# Patient Record
Sex: FEMALE | Race: WHITE | NOT HISPANIC OR LATINO | Employment: FULL TIME | URBAN - METROPOLITAN AREA
[De-identification: names, ages, dates, MRNs, and addresses within clinical notes are randomized per-mention and may not be internally consistent; named-entity substitution may affect disease eponyms.]

---

## 2017-03-01 ENCOUNTER — HOSPITAL ENCOUNTER (OUTPATIENT)
Dept: LAB | Facility: MEDICAL CENTER | Age: 47
End: 2017-03-01
Attending: NURSE PRACTITIONER
Payer: COMMERCIAL

## 2017-03-01 LAB
25(OH)D3 SERPL-MCNC: 31 NG/ML (ref 30–100)
ALBUMIN SERPL BCP-MCNC: 4.1 G/DL (ref 3.2–4.9)
ALBUMIN/GLOB SERPL: 1.3 G/DL
ALP SERPL-CCNC: 99 U/L (ref 30–99)
ALT SERPL-CCNC: 30 U/L (ref 2–50)
ANION GAP SERPL CALC-SCNC: 10 MMOL/L (ref 0–11.9)
APPEARANCE UR: ABNORMAL
AST SERPL-CCNC: 28 U/L (ref 12–45)
BACTERIA #/AREA URNS HPF: ABNORMAL /HPF
BASOPHILS # BLD AUTO: 0.6 % (ref 0–1.8)
BASOPHILS # BLD: 0.04 K/UL (ref 0–0.12)
BILIRUB SERPL-MCNC: 0.9 MG/DL (ref 0.1–1.5)
BILIRUB UR QL STRIP.AUTO: NEGATIVE
BUN SERPL-MCNC: 12 MG/DL (ref 8–22)
CALCIUM SERPL-MCNC: 8.8 MG/DL (ref 8.4–10.2)
CHLORIDE SERPL-SCNC: 105 MMOL/L (ref 96–112)
CHOLEST SERPL-MCNC: 236 MG/DL (ref 100–199)
CO2 SERPL-SCNC: 23 MMOL/L (ref 20–33)
COLOR UR: YELLOW
CREAT SERPL-MCNC: 0.57 MG/DL (ref 0.5–1.4)
CULTURE IF INDICATED INDCX: YES UA CULTURE
EOSINOPHIL # BLD AUTO: 0.07 K/UL (ref 0–0.51)
EOSINOPHIL NFR BLD: 1.1 % (ref 0–6.9)
EPI CELLS #/AREA URNS HPF: ABNORMAL /HPF
ERYTHROCYTE [DISTWIDTH] IN BLOOD BY AUTOMATED COUNT: 44.7 FL (ref 35.9–50)
GFR SERPL CREATININE-BSD FRML MDRD: >60 ML/MIN/1.73 M 2
GLOBULIN SER CALC-MCNC: 3.1 G/DL (ref 1.9–3.5)
GLUCOSE SERPL-MCNC: 81 MG/DL (ref 65–99)
GLUCOSE UR STRIP.AUTO-MCNC: NEGATIVE MG/DL
HCT VFR BLD AUTO: 40.5 % (ref 37–47)
HDLC SERPL-MCNC: 60 MG/DL
HGB BLD-MCNC: 13.9 G/DL (ref 12–16)
IMM GRANULOCYTES # BLD AUTO: 0.03 K/UL (ref 0–0.11)
IMM GRANULOCYTES NFR BLD AUTO: 0.5 % (ref 0–0.9)
KETONES UR STRIP.AUTO-MCNC: 15 MG/DL
LDLC SERPL CALC-MCNC: 157 MG/DL
LEUKOCYTE ESTERASE UR QL STRIP.AUTO: ABNORMAL
LYMPHOCYTES # BLD AUTO: 2.02 K/UL (ref 1–4.8)
LYMPHOCYTES NFR BLD: 31.1 % (ref 22–41)
MCH RBC QN AUTO: 32.2 PG (ref 27–33)
MCHC RBC AUTO-ENTMCNC: 34.3 G/DL (ref 33.6–35)
MCV RBC AUTO: 93.8 FL (ref 81.4–97.8)
MICRO URNS: ABNORMAL
MONOCYTES # BLD AUTO: 0.53 K/UL (ref 0–0.85)
MONOCYTES NFR BLD AUTO: 8.2 % (ref 0–13.4)
MUCOUS THREADS #/AREA URNS HPF: ABNORMAL /HPF
NEUTROPHILS # BLD AUTO: 3.8 K/UL (ref 2–7.15)
NEUTROPHILS NFR BLD: 58.5 % (ref 44–72)
NITRITE UR QL STRIP.AUTO: NEGATIVE
NRBC # BLD AUTO: 0 K/UL
NRBC BLD AUTO-RTO: 0 /100 WBC
PH UR STRIP.AUTO: 5 [PH]
PLATELET # BLD AUTO: 202 K/UL (ref 164–446)
PMV BLD AUTO: 10.5 FL (ref 9–12.9)
POTASSIUM SERPL-SCNC: 3.7 MMOL/L (ref 3.6–5.5)
PROLACTIN SERPL-MCNC: 7.52 NG/ML (ref 2.8–26)
PROT SERPL-MCNC: 7.2 G/DL (ref 6–8.2)
PROT UR QL STRIP: NEGATIVE MG/DL
RBC # BLD AUTO: 4.32 M/UL (ref 4.2–5.4)
RBC # URNS HPF: ABNORMAL /HPF
RBC UR QL AUTO: NEGATIVE
SODIUM SERPL-SCNC: 138 MMOL/L (ref 135–145)
SP GR UR REFRACTOMETRY: 1.03
T4 FREE SERPL-MCNC: 0.76 NG/DL (ref 0.58–1.64)
TESTOST SERPL-MCNC: 34 NG/DL (ref 9–75)
THYROPEROXIDASE AB SERPL-ACNC: 0.6 IU/ML (ref 0–9)
TRIGL SERPL-MCNC: 97 MG/DL (ref 0–149)
TSH SERPL DL<=0.005 MIU/L-ACNC: 1.07 UIU/ML (ref 0.35–5.5)
VIT B12 SERPL-MCNC: 289 PG/ML (ref 211–911)
WBC # BLD AUTO: 6.5 K/UL (ref 4.8–10.8)
WBC #/AREA URNS HPF: ABNORMAL /HPF

## 2017-03-01 PROCEDURE — 36415 COLL VENOUS BLD VENIPUNCTURE: CPT

## 2017-03-01 PROCEDURE — 84443 ASSAY THYROID STIM HORMONE: CPT

## 2017-03-01 PROCEDURE — 84146 ASSAY OF PROLACTIN: CPT

## 2017-03-01 PROCEDURE — 81001 URINALYSIS AUTO W/SCOPE: CPT

## 2017-03-01 PROCEDURE — 82607 VITAMIN B-12: CPT

## 2017-03-01 PROCEDURE — 80053 COMPREHEN METABOLIC PANEL: CPT

## 2017-03-01 PROCEDURE — 87086 URINE CULTURE/COLONY COUNT: CPT

## 2017-03-01 PROCEDURE — 86376 MICROSOMAL ANTIBODY EACH: CPT

## 2017-03-01 PROCEDURE — 84439 ASSAY OF FREE THYROXINE: CPT

## 2017-03-01 PROCEDURE — 85025 COMPLETE CBC W/AUTO DIFF WBC: CPT

## 2017-03-01 PROCEDURE — 80061 LIPID PANEL: CPT

## 2017-03-01 PROCEDURE — 82306 VITAMIN D 25 HYDROXY: CPT

## 2017-03-01 PROCEDURE — 84403 ASSAY OF TOTAL TESTOSTERONE: CPT

## 2017-03-03 LAB
BACTERIA UR CULT: NORMAL
SIGNIFICANT IND 70042: NORMAL
SOURCE SOURCE: NORMAL

## 2017-03-20 ENCOUNTER — HOSPITAL ENCOUNTER (OUTPATIENT)
Dept: RADIOLOGY | Facility: MEDICAL CENTER | Age: 47
End: 2017-03-20
Attending: NURSE PRACTITIONER
Payer: COMMERCIAL

## 2017-03-20 DIAGNOSIS — Z13.9 SCREENING: ICD-10-CM

## 2017-03-20 PROCEDURE — 77063 BREAST TOMOSYNTHESIS BI: CPT

## 2017-03-21 ENCOUNTER — HOSPITAL ENCOUNTER (OUTPATIENT)
Dept: RADIOLOGY | Facility: MEDICAL CENTER | Age: 47
End: 2017-03-21
Attending: NURSE PRACTITIONER
Payer: COMMERCIAL

## 2017-03-21 DIAGNOSIS — R51.9 HEADACHE, UNSPECIFIED HEADACHE TYPE: ICD-10-CM

## 2017-03-21 PROCEDURE — A9579 GAD-BASE MR CONTRAST NOS,1ML: HCPCS | Performed by: NURSE PRACTITIONER

## 2017-03-21 PROCEDURE — 70553 MRI BRAIN STEM W/O & W/DYE: CPT

## 2017-03-21 PROCEDURE — 700117 HCHG RX CONTRAST REV CODE 255: Performed by: NURSE PRACTITIONER

## 2017-03-21 RX ADMIN — GADODIAMIDE 20 ML: 287 INJECTION INTRAVENOUS at 09:12

## 2017-05-15 ENCOUNTER — HOSPITAL ENCOUNTER (OUTPATIENT)
Dept: LAB | Facility: MEDICAL CENTER | Age: 47
End: 2017-05-15
Attending: SPECIALIST
Payer: COMMERCIAL

## 2017-05-15 LAB
ALBUMIN SERPL BCP-MCNC: 4.6 G/DL (ref 3.2–4.9)
ALBUMIN/GLOB SERPL: 1.3 G/DL
ALP SERPL-CCNC: 111 U/L (ref 30–99)
ALT SERPL-CCNC: 19 U/L (ref 2–50)
ANION GAP SERPL CALC-SCNC: 10 MMOL/L (ref 0–11.9)
AST SERPL-CCNC: 27 U/L (ref 12–45)
BILIRUB SERPL-MCNC: 0.4 MG/DL (ref 0.1–1.5)
BUN SERPL-MCNC: 14 MG/DL (ref 8–22)
CALCIUM SERPL-MCNC: 10 MG/DL (ref 8.5–10.5)
CHLORIDE SERPL-SCNC: 107 MMOL/L (ref 96–112)
CO2 SERPL-SCNC: 20 MMOL/L (ref 20–33)
CREAT SERPL-MCNC: 0.78 MG/DL (ref 0.5–1.4)
GFR SERPL CREATININE-BSD FRML MDRD: >60 ML/MIN/1.73 M 2
GLOBULIN SER CALC-MCNC: 3.5 G/DL (ref 1.9–3.5)
GLUCOSE SERPL-MCNC: 84 MG/DL (ref 65–99)
POTASSIUM SERPL-SCNC: 4.2 MMOL/L (ref 3.6–5.5)
PROT SERPL-MCNC: 8.1 G/DL (ref 6–8.2)
SODIUM SERPL-SCNC: 137 MMOL/L (ref 135–145)

## 2017-05-15 PROCEDURE — 86038 ANTINUCLEAR ANTIBODIES: CPT

## 2017-05-15 PROCEDURE — 36415 COLL VENOUS BLD VENIPUNCTURE: CPT

## 2017-05-15 PROCEDURE — 80053 COMPREHEN METABOLIC PANEL: CPT

## 2017-05-16 ENCOUNTER — HOSPITAL ENCOUNTER (OUTPATIENT)
Dept: LAB | Facility: MEDICAL CENTER | Age: 47
End: 2017-05-16
Attending: SPECIALIST
Payer: COMMERCIAL

## 2017-05-16 PROCEDURE — 85652 RBC SED RATE AUTOMATED: CPT

## 2017-05-16 PROCEDURE — 85025 COMPLETE CBC W/AUTO DIFF WBC: CPT

## 2017-05-17 LAB
BASOPHILS # BLD AUTO: 0.6 % (ref 0–1.8)
BASOPHILS # BLD: 0.05 K/UL (ref 0–0.12)
EOSINOPHIL # BLD AUTO: 0.07 K/UL (ref 0–0.51)
EOSINOPHIL NFR BLD: 0.9 % (ref 0–6.9)
ERYTHROCYTE [DISTWIDTH] IN BLOOD BY AUTOMATED COUNT: 43.3 FL (ref 35.9–50)
ERYTHROCYTE [SEDIMENTATION RATE] IN BLOOD BY WESTERGREN METHOD: 14 MM/HOUR (ref 0–20)
HCT VFR BLD AUTO: 42.9 % (ref 37–47)
HGB BLD-MCNC: 14.3 G/DL (ref 12–16)
IMM GRANULOCYTES # BLD AUTO: 0.02 K/UL (ref 0–0.11)
IMM GRANULOCYTES NFR BLD AUTO: 0.3 % (ref 0–0.9)
LYMPHOCYTES # BLD AUTO: 2.52 K/UL (ref 1–4.8)
LYMPHOCYTES NFR BLD: 32.2 % (ref 22–41)
MCH RBC QN AUTO: 31.1 PG (ref 27–33)
MCHC RBC AUTO-ENTMCNC: 33.3 G/DL (ref 33.6–35)
MCV RBC AUTO: 93.3 FL (ref 81.4–97.8)
MONOCYTES # BLD AUTO: 0.7 K/UL (ref 0–0.85)
MONOCYTES NFR BLD AUTO: 8.9 % (ref 0–13.4)
NEUTROPHILS # BLD AUTO: 4.47 K/UL (ref 2–7.15)
NEUTROPHILS NFR BLD: 57.1 % (ref 44–72)
NRBC # BLD AUTO: 0 K/UL
NRBC BLD AUTO-RTO: 0 /100 WBC
NUCLEAR IGG SER QL IA: NORMAL
PLATELET # BLD AUTO: 203 K/UL (ref 164–446)
PMV BLD AUTO: 11.3 FL (ref 9–12.9)
RBC # BLD AUTO: 4.6 M/UL (ref 4.2–5.4)
WBC # BLD AUTO: 7.8 K/UL (ref 4.8–10.8)

## 2017-05-24 ENCOUNTER — RX ONLY (OUTPATIENT)
Age: 47
Setting detail: RX ONLY
End: 2017-05-24

## 2017-05-24 PROBLEM — L91.8 OTHER HYPERTROPHIC DISORDERS OF THE SKIN: Status: ACTIVE | Noted: 2017-05-24

## 2017-06-26 ENCOUNTER — OFFICE VISIT (OUTPATIENT)
Dept: URGENT CARE | Facility: PHYSICIAN GROUP | Age: 47
End: 2017-06-26
Payer: COMMERCIAL

## 2017-06-26 ENCOUNTER — HOSPITAL ENCOUNTER (OUTPATIENT)
Facility: MEDICAL CENTER | Age: 47
End: 2017-06-26
Attending: FAMILY MEDICINE
Payer: COMMERCIAL

## 2017-06-26 VITALS
RESPIRATION RATE: 12 BRPM | TEMPERATURE: 100 F | HEIGHT: 68 IN | BODY MASS INDEX: 35.61 KG/M2 | HEART RATE: 89 BPM | OXYGEN SATURATION: 100 % | WEIGHT: 235 LBS

## 2017-06-26 DIAGNOSIS — N39.0 ACUTE UTI: ICD-10-CM

## 2017-06-26 LAB
APPEARANCE UR: NORMAL
BILIRUB UR STRIP-MCNC: NEGATIVE MG/DL
COLOR UR AUTO: NORMAL
GLUCOSE UR STRIP.AUTO-MCNC: NEGATIVE MG/DL
KETONES UR STRIP.AUTO-MCNC: NEGATIVE MG/DL
LEUKOCYTE ESTERASE UR QL STRIP.AUTO: NORMAL
NITRITE UR QL STRIP.AUTO: NEGATIVE
PH UR STRIP.AUTO: 6 [PH] (ref 5–8)
PROT UR QL STRIP: 30 MG/DL
RBC UR QL AUTO: NORMAL
SP GR UR STRIP.AUTO: 1.03
UROBILINOGEN UR STRIP-MCNC: NEGATIVE MG/DL

## 2017-06-26 PROCEDURE — 87077 CULTURE AEROBIC IDENTIFY: CPT

## 2017-06-26 PROCEDURE — 87086 URINE CULTURE/COLONY COUNT: CPT

## 2017-06-26 PROCEDURE — 99214 OFFICE O/P EST MOD 30 MIN: CPT | Performed by: FAMILY MEDICINE

## 2017-06-26 PROCEDURE — 81002 URINALYSIS NONAUTO W/O SCOPE: CPT | Performed by: FAMILY MEDICINE

## 2017-06-26 RX ORDER — NITROFURANTOIN 25; 75 MG/1; MG/1
100 CAPSULE ORAL 2 TIMES DAILY
Qty: 10 CAP | Refills: 0 | Status: SHIPPED | OUTPATIENT
Start: 2017-06-26 | End: 2017-07-01

## 2017-06-26 RX ORDER — PHENAZOPYRIDINE HYDROCHLORIDE 100 MG/1
100 TABLET, FILM COATED ORAL 3 TIMES DAILY PRN
Qty: 6 TAB | Refills: 0 | Status: SHIPPED | OUTPATIENT
Start: 2017-06-26 | End: 2019-02-01

## 2017-06-26 ASSESSMENT — PATIENT HEALTH QUESTIONNAIRE - PHQ9: CLINICAL INTERPRETATION OF PHQ2 SCORE: 0

## 2017-06-26 NOTE — MR AVS SNAPSHOT
"        Melba Frye   2017 6:15 PM   Office Visit   MRN: [id number]    Department:  [facility]   Dept Phone:  [phone]    Description:  Female : 1970   Provider:  Dilip Babb M.D.           Reason for Visit     UTI poss UTI burn when urinate x 1 day       Allergies as of 2017     Allergen Noted Reactions    Bactrim 2010       Fatigue and ringing of the ears      You were diagnosed with     Acute UTI   [920841]         Vital Signs     Pulse Temperature Respirations Height Weight Body Mass Index    89 37.8 °C (100 °F) 12 1.727 m (5' 8\") 106.595 kg (235 lb) 35.74 kg/m2    Oxygen Saturation Smoking Status                100% Never Smoker           Basic Information     Date Of Birth Sex Race Ethnicity Preferred Language    1970 Female White Non- English      Problem List              ICD-10-CM Priority Class Noted - Resolved    Fatigue R53.83   2014 - Present    Mixed anxiety and depressive disorder F41.8   2014 - Present    Complicated migraine G43.109   2014 - Present    TMJ arthralgia M26.629   2014 - Present    Menopausal symptom N95.1   2014 - Present    Hyperlipidemia E78.5   2015 - Present    Dermatitis, contact L25.9   2015 - Present      Health Maintenance        Date Due Completion Dates    PAP SMEAR 2016 (Done)    Override on 2013: Done    MAMMOGRAM 3/20/2018 3/20/2017, 2016, 2015, 2015, 2014, 2013, 2012, 2011, 2010, 2010, 2009, 2009, 2008, 2007, 2007    IMM DTaP/Tdap/Td Vaccine (2 - Td) 2024            Current Immunizations     Influenza Vaccine Quad Inj (Preserved) 2015  4:45 PM    Tdap Vaccine 2014      Below and/or attached are the medications your provider expects you to take. Review all of your home medications and newly ordered medications with your provider and/or pharmacist. Follow medication instructions as " directed by your provider and/or pharmacist. Please keep your medication list with you and share with your provider. Update the information when medications are discontinued, doses are changed, or new medications (including over-the-counter products) are added; and carry medication information at all times in the event of emergency situations     Allergies:  BACTRIM - (reactions not documented)               Medications  Valid as of: June 26, 2017 -  6:54 PM    Generic Name Brand Name Tablet Size Instructions for use    BusPIRone HCl (Tab) BUSPAR 15 MG Take 1 Tab by mouth 2 times a day.        ClonazePAM (Tab) KLONOPIN 1 MG Take 1 Tab by mouth 2 times a day.        Estradiol (Gel) Estradiol 0.5 MG/0.5GM Apply 1 Device to skin as directed every day at 6 PM. Indications: Moderate to Severe Vasomotor Symptoms        Guaifenesin-Codeine (Solution) ROBITUSSIN -10 mg/5mL Take 5 mL by mouth every four hours as needed for Cough.        HydrOXYzine HCl (Tab) ATARAX 25 MG Take 1 Tab by mouth 3 times a day as needed. FOR ANXIETY        Nitrofurantoin Monohyd Macro (Cap) MACROBID 100 MG Take 1 Cap by mouth 2 times a day for 5 days.        Phenazopyridine HCl (Tab) PYRIDIUM 100 MG Take 1 Tab by mouth 3 times a day as needed.        Sertraline HCl (Tab) ZOLOFT 50 MG Take 1 Tab by mouth every day. Begin with one half tablet for five days        Testosterone (Gel) Testosterone 20.25 MG/1.25GM (1.62%) Apply  to skin as directed.        .                 Medicines prescribed today were sent to:     [id number] - [place] - [address]    [address] [place] [address]    Phone: [phone] Fax: [phone]    Open 24 Hours?: No      Medication refill instructions:       If your prescription bottle indicates you have medication refills left, it is not necessary to call your provider’s office. Please contact your pharmacy and they will refill your medication.    If your prescription bottle indicates you do not  have any refills left, you may request refills at any time through one of the following ways: The online [employer] system (except Urgent Care), by calling your provider’s office, or by asking your pharmacy to contact your provider’s office with a refill request. Medication refills are processed only during regular business hours and may not be available until the next business day. Your provider may request additional information or to have a follow-up visit with you prior to refilling your medication.   *Please Note: Medication refills are assigned a new Rx number when refilled electronically. Your pharmacy may indicate that no refills were authorized even though a new prescription for the same medication is available at the pharmacy. Please request the medicine by name with the pharmacy before contacting your provider for a refill.        Your To Do List     Future Labs/Procedures Complete By Expires    URINE CULTURE(NEW)  As directed 6/26/2018         [employer] Access Code: Activation code not generated  Current [employer] Status: Active

## 2017-06-27 DIAGNOSIS — N39.0 ACUTE UTI: ICD-10-CM

## 2017-06-27 ASSESSMENT — ENCOUNTER SYMPTOMS
WEIGHT LOSS: 0
EYE REDNESS: 0
EYE DISCHARGE: 0
MYALGIAS: 0

## 2017-06-27 NOTE — PROGRESS NOTES
"Subjective:      Melba Frye is a 46 y.o. female who presents with UTI            HPI  1 day urinary burning, urgency, frequency, blood. Moderate pelvic pressure. No fever. No flank pain. +PMH UTI. No PMH pyelonephritis. No OTC medications. No other aggravating or alleviating factors.    Review of Systems   Constitutional: Negative for weight loss and malaise/fatigue.   Eyes: Negative for discharge and redness.   Musculoskeletal: Negative for myalgias and joint pain.   Skin: Negative for itching and rash.     .  Medications, Allergies, and current problem list reviewed today in Epic  Denies possible pregnancy     Objective:     Pulse 89  Temp(Src) 37.8 °C (100 °F)  Resp 12  Ht 1.727 m (5' 8\")  Wt 106.595 kg (235 lb)  BMI 35.74 kg/m2  SpO2 100%     Physical Exam   Constitutional: She appears well-developed and well-nourished. No distress.   HENT:   Mouth/Throat: Oropharynx is clear and moist.   Eyes: Conjunctivae are normal.   Genitourinary:   Tender suprapubic, no cvat   Neurological:   Speech is clear. Patient is appropriate and cooperative.     Skin: Skin is warm and dry. No rash noted.               Assessment/Plan:     1. Acute UTI    - POCT Urinalysis  - nitrofurantoin monohydr macro (MACROBID) 100 MG Cap; Take 1 Cap by mouth 2 times a day for 5 days.  Dispense: 10 Cap; Refill: 0  - phenazopyridine (PYRIDIUM) 100 MG Tab; Take 1 Tab by mouth 3 times a day as needed.  Dispense: 6 Tab; Refill: 0  - URINE CULTURE(NEW); Future    Differential diagnosis, natural history, supportive care, and indications for immediate follow-up discussed at length.   Will f/u culture      "

## 2017-06-29 LAB
BACTERIA UR CULT: ABNORMAL
BACTERIA UR CULT: ABNORMAL
SIGNIFICANT IND 70042: ABNORMAL
SOURCE SOURCE: ABNORMAL

## 2017-06-29 RX ORDER — AMOXICILLIN 875 MG/1
875 TABLET, COATED ORAL 2 TIMES DAILY
Qty: 10 TAB | Refills: 0 | Status: SHIPPED | OUTPATIENT
Start: 2017-06-29 | End: 2017-07-04

## 2017-06-30 NOTE — PROGRESS NOTES
Culture + GBS  Discussed with patient probable colonization however she is still symptomatic. Will try amoxicillin.

## 2018-03-22 ENCOUNTER — HOSPITAL ENCOUNTER (OUTPATIENT)
Dept: RADIOLOGY | Facility: MEDICAL CENTER | Age: 48
End: 2018-03-22
Attending: NURSE PRACTITIONER
Payer: COMMERCIAL

## 2018-03-22 DIAGNOSIS — Z12.31 SCREENING MAMMOGRAM, ENCOUNTER FOR: ICD-10-CM

## 2018-03-22 PROCEDURE — 77067 SCR MAMMO BI INCL CAD: CPT

## 2018-03-26 ENCOUNTER — APPOINTMENT (RX ONLY)
Dept: URBAN - METROPOLITAN AREA CLINIC 4 | Facility: CLINIC | Age: 48
Setting detail: DERMATOLOGY
End: 2018-03-26

## 2018-03-26 DIAGNOSIS — L82.1 OTHER SEBORRHEIC KERATOSIS: ICD-10-CM

## 2018-03-26 DIAGNOSIS — D18.0 HEMANGIOMA: ICD-10-CM

## 2018-03-26 DIAGNOSIS — D22 MELANOCYTIC NEVI: ICD-10-CM

## 2018-03-26 DIAGNOSIS — L81.4 OTHER MELANIN HYPERPIGMENTATION: ICD-10-CM

## 2018-03-26 PROBLEM — D18.01 HEMANGIOMA OF SKIN AND SUBCUTANEOUS TISSUE: Status: ACTIVE | Noted: 2018-03-26

## 2018-03-26 PROBLEM — F41.9 ANXIETY DISORDER, UNSPECIFIED: Status: ACTIVE | Noted: 2018-03-26

## 2018-03-26 PROBLEM — D22.5 MELANOCYTIC NEVI OF TRUNK: Status: ACTIVE | Noted: 2018-03-26

## 2018-03-26 PROCEDURE — 99213 OFFICE O/P EST LOW 20 MIN: CPT

## 2018-03-26 PROCEDURE — ? COUNSELING

## 2018-03-26 ASSESSMENT — LOCATION SIMPLE DESCRIPTION DERM
LOCATION SIMPLE: RIGHT THIGH
LOCATION SIMPLE: LEFT FOREARM
LOCATION SIMPLE: RIGHT POSTERIOR UPPER ARM
LOCATION SIMPLE: UPPER BACK
LOCATION SIMPLE: RIGHT FOREARM
LOCATION SIMPLE: LEFT CHEEK
LOCATION SIMPLE: RIGHT ANTERIOR NECK
LOCATION SIMPLE: RIGHT HAND
LOCATION SIMPLE: LEFT POSTERIOR UPPER ARM
LOCATION SIMPLE: LEFT THIGH
LOCATION SIMPLE: LEFT HAND
LOCATION SIMPLE: ABDOMEN
LOCATION SIMPLE: POSTERIOR NECK
LOCATION SIMPLE: RIGHT UPPER BACK
LOCATION SIMPLE: LEFT FOREHEAD

## 2018-03-26 ASSESSMENT — LOCATION DETAILED DESCRIPTION DERM
LOCATION DETAILED: LEFT ANTERIOR PROXIMAL THIGH
LOCATION DETAILED: LEFT MEDIAL MALAR CHEEK
LOCATION DETAILED: RIGHT PROXIMAL DORSAL FOREARM
LOCATION DETAILED: RIGHT ANTERIOR PROXIMAL THIGH
LOCATION DETAILED: RIGHT POSTERIOR NECK
LOCATION DETAILED: SUPERIOR THORACIC SPINE
LOCATION DETAILED: LEFT RADIAL DORSAL HAND
LOCATION DETAILED: RIGHT INFERIOR ANTERIOR NECK
LOCATION DETAILED: LEFT MEDIAL FOREHEAD
LOCATION DETAILED: RIGHT PROXIMAL POSTERIOR UPPER ARM
LOCATION DETAILED: PERIUMBILICAL SKIN
LOCATION DETAILED: RIGHT SUPERIOR MEDIAL UPPER BACK
LOCATION DETAILED: RIGHT DORSAL MIDDLE METACARPOPHALANGEAL JOINT
LOCATION DETAILED: EPIGASTRIC SKIN
LOCATION DETAILED: RIGHT ANTERIOR DISTAL THIGH
LOCATION DETAILED: LEFT ANTERIOR DISTAL THIGH
LOCATION DETAILED: LEFT PROXIMAL MEDIAL POSTERIOR UPPER ARM
LOCATION DETAILED: LEFT PROXIMAL DORSAL FOREARM

## 2018-03-26 ASSESSMENT — LOCATION ZONE DERM
LOCATION ZONE: NECK
LOCATION ZONE: FACE
LOCATION ZONE: LEG
LOCATION ZONE: ARM
LOCATION ZONE: TRUNK
LOCATION ZONE: HAND

## 2018-06-09 ENCOUNTER — OFFICE VISIT (OUTPATIENT)
Dept: URGENT CARE | Facility: CLINIC | Age: 48
End: 2018-06-09
Payer: COMMERCIAL

## 2018-06-09 VITALS
SYSTOLIC BLOOD PRESSURE: 124 MMHG | RESPIRATION RATE: 16 BRPM | TEMPERATURE: 98.9 F | WEIGHT: 220 LBS | DIASTOLIC BLOOD PRESSURE: 88 MMHG | BODY MASS INDEX: 33.34 KG/M2 | HEIGHT: 68 IN | OXYGEN SATURATION: 96 % | HEART RATE: 80 BPM

## 2018-06-09 DIAGNOSIS — J40 BRONCHITIS: ICD-10-CM

## 2018-06-09 PROCEDURE — 99214 OFFICE O/P EST MOD 30 MIN: CPT | Performed by: PHYSICIAN ASSISTANT

## 2018-06-09 RX ORDER — TOPIRAMATE SPINKLE 15 MG/1
15 CAPSULE ORAL 2 TIMES DAILY
COMMUNITY
End: 2019-02-01

## 2018-06-09 RX ORDER — VENLAFAXINE 25 MG/1
25 TABLET ORAL 3 TIMES DAILY
COMMUNITY
End: 2020-03-03

## 2018-06-09 RX ORDER — DOXYCYCLINE HYCLATE 100 MG/1
100 CAPSULE ORAL 2 TIMES DAILY
Qty: 14 EACH | Refills: 0 | Status: SHIPPED | OUTPATIENT
Start: 2018-06-09 | End: 2018-06-16

## 2018-06-09 ASSESSMENT — ENCOUNTER SYMPTOMS
SHORTNESS OF BREATH: 0
PALPITATIONS: 0
SORE THROAT: 0
HEMOPTYSIS: 0
CHILLS: 0
SPUTUM PRODUCTION: 1
FEVER: 0
COUGH: 1
WHEEZING: 0

## 2018-06-09 NOTE — PROGRESS NOTES
Subjective:      Melba Frye is a 47 y.o. female who presents with Cough (x3days worsens at night- wet cough) and Sore Throat (x3days from cough)            Cough   This is a new problem. The current episode started in the past 7 days. The problem has been unchanged. The cough is productive of sputum. Pertinent negatives include no chest pain, chills, ear pain, fever, hemoptysis, sore throat, shortness of breath or wheezing. Nothing aggravates the symptoms. She has tried OTC cough suppressant for the symptoms. The treatment provided no relief.       Review of Systems   Constitutional: Negative for chills, fever and malaise/fatigue.   HENT: Negative for congestion, ear pain and sore throat.    Respiratory: Positive for cough and sputum production. Negative for hemoptysis, shortness of breath and wheezing.    Cardiovascular: Negative for chest pain and palpitations.   All other systems reviewed and are negative.    PMH:  has a past medical history of Anxiety; Complicated migraine (6/9/2014); Depression; Dermatitis, contact (11/16/2015); Fatigue (6/9/2014); Hyperlipidemia (1/23/2015); Menopausal symptom (7/2/2014); Mixed anxiety and depressive disorder (6/9/2014); TMJ arthralgia (6/9/2014); and UTI (lower urinary tract infection).  MEDS:   Current Outpatient Prescriptions:   •  venlafaxine (EFFEXOR) 25 MG Tab, Take 25 mg by mouth 3 times a day., Disp: , Rfl:   •  topiramate (TOPOMAX) 15 MG CAPSULE SPRINKLE, Take 15 mg by mouth 2 times a day., Disp: , Rfl:   •  SUMAtriptan Succinate (IMITREX PO), Take  by mouth., Disp: , Rfl:   •  Hydrocod Polst-CPM Polst ER (TUSSIONEX PENNKINETIC ER) 10-8 MG/5ML Suspension Extended Release, Take 5 mL by mouth every 12 hours for 10 days., Disp: 100 mL, Rfl: 0  •  doxycycline (VIBRAMYCIN) 100 MG Cap, Take 1 Cap by mouth 2 times a day for 7 days., Disp: 14 Each, Rfl: 0  •  phenazopyridine (PYRIDIUM) 100 MG Tab, Take 1 Tab by mouth 3 times a day as needed., Disp: 6 Tab, Rfl: 0  •   hydrOXYzine (ATARAX) 25 MG Tab, Take 1 Tab by mouth 3 times a day as needed. FOR ANXIETY, Disp: 90 Tab, Rfl: 3  •  guaifenesin-codeine (CHERATUSSIN AC) Solution, Take 5 mL by mouth every four hours as needed for Cough., Disp: 120 mL, Rfl: 0  •  sertraline (ZOLOFT) 50 MG Tab, Take 1 Tab by mouth every day. Begin with one half tablet for five days, Disp: 90 Tab, Rfl: 1  •  busPIRone (BUSPAR) 15 MG tablet, Take 1 Tab by mouth 2 times a day., Disp: 180 Tab, Rfl: 3  •  clonazepam (KLONOPIN) 1 MG TABS, Take 1 Tab by mouth 2 times a day., Disp: 60 Tab, Rfl: 0  •  Testosterone 20.25 MG/1.25GM (1.62%) GEL, Apply  to skin as directed., Disp: , Rfl:   •  Estradiol (DIVIGEL) 0.5 MG/0.5GM GEL, Apply 1 Device to skin as directed every day at 6 PM. Indications: Moderate to Severe Vasomotor Symptoms, Disp: 30 Each, Rfl: prn  ALLERGIES:   Allergies   Allergen Reactions   • Bactrim      Fatigue and ringing of the ears     SURGHX:   Past Surgical History:   Procedure Laterality Date   • MYRINGOTOMY  8/27/2010    Performed by [address]AV [address]RE[address] at SURGERY SAME DAY [facility] ORS   • LAPAROSCOPY  5/28/2010    Performed by JACKI SHARMA at SURGERY [facility] ORS   • LYMPH NODE SAMPLING  5/28/2010    Performed by JACKI SHARMA at SURGERY [facility] ORS   • DEBULKING  5/28/2010    Performed by JACKI SHARMA at SURGERY [facility] ORS   • CYSTOSCOPY  10/15/08    Performed by YU GODINEZ at SURGERY SAME DAY [facility] ORS   • VAGINAL HYSTERECTOMY SCOPE TOTAL  10/15/08    Performed by YU GODINEZ at SURGERY SAME DAY [facility] ORS   • OTHER  1984    TONSILECTOMY/ ADNOIDS   • APPENDECTOMY  1981   • TONSILLECTOMY AND ADENOIDECTOMY       SOCHX:  reports that she has never smoked. She has never used smokeless tobacco. She reports that she does not drink alcohol or use drugs.  FH: Family history was reviewed, no pertinent findings to report  Medications, Allergies, and current problem list reviewed today in Epic       Objective:     /88   " Pulse 80   Temp 37.2 °C (98.9 °F)   Resp 16   Ht 1.727 m (5' 8\")   Wt 99.8 kg (220 lb)   SpO2 96%   Breastfeeding? No   BMI 33.45 kg/m²      Physical Exam   Constitutional: She is oriented to person, place, and time. She appears well-developed and well-nourished. She is active.  Non-toxic appearance. She does not have a sickly appearance. She does not appear ill. No distress. She is not intubated.   HENT:   Head: Normocephalic and atraumatic.   Right Ear: Hearing, tympanic membrane, external ear and ear canal normal.   Left Ear: Hearing, tympanic membrane, external ear and ear canal normal.   Nose: Nose normal.   Mouth/Throat: Uvula is midline, oropharynx is clear and moist and mucous membranes are normal.   Eyes: Conjunctivae, EOM and lids are normal.   Neck: Normal range of motion and full passive range of motion without pain. Neck supple.   Cardiovascular: Regular rhythm, S1 normal, S2 normal and normal heart sounds.  Exam reveals no gallop and no friction rub.    No murmur heard.  Pulmonary/Chest: Effort normal and breath sounds normal. No accessory muscle usage. No apnea, no tachypnea and no bradypnea. She is not intubated. No respiratory distress. She has no decreased breath sounds. She has no wheezes. She has no rhonchi. She has no rales. She exhibits no tenderness.   Musculoskeletal: Normal range of motion.   Neurological: She is alert and oriented to person, place, and time.   Skin: Skin is warm and dry.   Psychiatric: She has a normal mood and affect. Her speech is normal and behavior is normal. Judgment and thought content normal.   Vitals reviewed.              Assessment/Plan:   Discussed most likely viral etiology.  Contingent antibiotic prescription given to patient to fill upon meeting criteria of guidelines discussed.     1. Bronchitis    - Hydrocod Polst-CPM Polst ER (TUSSIONEX PENNKINETIC ER) 10-8 MG/5ML Suspension Extended Release; Take 5 mL by mouth every 12 hours for 10 days.  " Dispense: 100 mL; Refill: 0  - doxycycline (VIBRAMYCIN) 100 MG Cap; Take 1 Cap by mouth 2 times a day for 7 days.  Dispense: 14 Each; Refill: 0    Differential diagnosis, natural history, supportive care discussed. Follow-up with primary care provider within 7-10 days, emergency room precautions discussed.  Patient and/or family appears understanding of information.  Handout and review of patients diagnosis and treatment was discussed extensively.

## 2018-06-27 ENCOUNTER — HOSPITAL ENCOUNTER (OUTPATIENT)
Dept: LAB | Facility: MEDICAL CENTER | Age: 48
End: 2018-06-27
Attending: OBSTETRICS & GYNECOLOGY
Payer: COMMERCIAL

## 2018-06-27 PROCEDURE — 87086 URINE CULTURE/COLONY COUNT: CPT

## 2018-06-29 LAB
BACTERIA UR CULT: NORMAL
SIGNIFICANT IND 70042: NORMAL
SITE SITE: NORMAL
SOURCE SOURCE: NORMAL

## 2018-10-14 ENCOUNTER — OFFICE VISIT (OUTPATIENT)
Dept: URGENT CARE | Facility: CLINIC | Age: 48
End: 2018-10-14
Payer: COMMERCIAL

## 2018-10-14 VITALS
OXYGEN SATURATION: 97 % | RESPIRATION RATE: 16 BRPM | BODY MASS INDEX: 33.8 KG/M2 | SYSTOLIC BLOOD PRESSURE: 124 MMHG | HEIGHT: 68 IN | WEIGHT: 223 LBS | DIASTOLIC BLOOD PRESSURE: 84 MMHG | TEMPERATURE: 98 F | HEART RATE: 95 BPM

## 2018-10-14 DIAGNOSIS — H66.91 ACUTE OTITIS MEDIA, RIGHT: ICD-10-CM

## 2018-10-14 PROCEDURE — 99214 OFFICE O/P EST MOD 30 MIN: CPT | Performed by: FAMILY MEDICINE

## 2018-10-14 RX ORDER — AMOXICILLIN AND CLAVULANATE POTASSIUM 875; 125 MG/1; MG/1
1 TABLET, FILM COATED ORAL 2 TIMES DAILY
Qty: 14 TAB | Refills: 0 | Status: SHIPPED | OUTPATIENT
Start: 2018-10-14 | End: 2018-10-14 | Stop reason: SDUPTHER

## 2018-10-14 RX ORDER — CIPROFLOXACIN AND DEXAMETHASONE 3; 1 MG/ML; MG/ML
4 SUSPENSION/ DROPS AURICULAR (OTIC) 2 TIMES DAILY
Qty: 1 BOTTLE | Refills: 0 | Status: SHIPPED | OUTPATIENT
Start: 2018-10-14 | End: 2018-10-21

## 2018-10-14 RX ORDER — VENLAFAXINE HYDROCHLORIDE 37.5 MG/1
37.5 CAPSULE, EXTENDED RELEASE ORAL
Refills: 2 | COMMUNITY
Start: 2018-09-30 | End: 2019-02-01

## 2018-10-14 RX ORDER — AMOXICILLIN AND CLAVULANATE POTASSIUM 875; 125 MG/1; MG/1
1 TABLET, FILM COATED ORAL 2 TIMES DAILY
Qty: 14 TAB | Refills: 0 | Status: SHIPPED | OUTPATIENT
Start: 2018-10-14 | End: 2018-10-21

## 2018-10-14 ASSESSMENT — ENCOUNTER SYMPTOMS
VOMITING: 0
WHEEZING: 0
FEVER: 0
PALPITATIONS: 0
DEPRESSION: 0
SINUS PAIN: 0
DOUBLE VISION: 0
SHORTNESS OF BREATH: 0
SORE THROAT: 0
DIZZINESS: 0
BLURRED VISION: 0
CHILLS: 0
COUGH: 0
ABDOMINAL PAIN: 0
MUSCULOSKELETAL NEGATIVE: 1
DIARRHEA: 0
BRUISES/BLEEDS EASILY: 0
HEADACHES: 0

## 2018-10-14 NOTE — PROGRESS NOTES
"Subjective:      Melba Frye is a 48 y.o. female who presents with Ear Fullness (x 1 wk, Rt. ear fullness)    Patient reports right ear pain and drainage x 10 days. States she has had recurrent ear problems x10 years and has been diagnosed with eustacian tube dystunction. She had 2 rounds of myringotomy tubes as an adult. Last set of tubes was removed 4 years ago. Saw her ENT Oct 3 Dr. Suarez and was feeling well on that day. The next day she started having right ear pressure/fullness. It has gotten progressively worse and is now having pink-tinged, yellow drainage from right ear. Has not needed oral or topical antibiotics in \"years\" for an ear infection. Denies any congestion/sinus pain, fever or chills, lymphadenopathy, sore throat. Denies any alleviating or exacerbating factors.      Review of Systems   Constitutional: Negative for chills and fever.   HENT: Positive for ear discharge (right), ear pain and hearing loss (chronic hearing loss in right ear, she has been to audiology and had multiple tests done). Negative for congestion, sinus pain and sore throat.    Eyes: Negative for blurred vision and double vision.   Respiratory: Negative for cough, shortness of breath and wheezing.    Cardiovascular: Negative for chest pain and palpitations.   Gastrointestinal: Negative for abdominal pain, diarrhea and vomiting.   Genitourinary: Negative for dysuria and urgency.   Musculoskeletal: Negative.    Skin: Negative for itching and rash.   Neurological: Negative for dizziness and headaches.   Endo/Heme/Allergies: Negative for environmental allergies. Does not bruise/bleed easily.   Psychiatric/Behavioral: Negative for depression and suicidal ideas.   All other systems reviewed and are negative.      PMH:  has a past medical history of Anxiety; Complicated migraine (6/9/2014); Depression; Dermatitis, contact (11/16/2015); Fatigue (6/9/2014); Hyperlipidemia (1/23/2015); Lentigo (10/17/2018); Menopausal symptom " (7/2/2014); Mixed anxiety and depressive disorder (6/9/2014); Seborrheic keratoses (10/17/2018); TMJ arthralgia (6/9/2014); and UTI (lower urinary tract infection).  MEDS:   Current Outpatient Prescriptions:   •  ciprofloxacin/dexamethasone (CIPRODEX) 0.3-0.1 % Suspension, Place 4 Drops in right ear 2 times a day for 7 days., Disp: 1 Bottle, Rfl: 0  •  amoxicillin-clavulanate (AUGMENTIN) 875-125 MG Tab, Take 1 Tab by mouth 2 times a day for 7 days., Disp: 14 Tab, Rfl: 0  •  venlafaxine XR (EFFEXOR XR) 37.5 MG CAPSULE SR 24 HR, Take 37.5 mg by mouth every day., Disp: , Rfl: 2  •  venlafaxine (EFFEXOR) 25 MG Tab, Take 25 mg by mouth 3 times a day., Disp: , Rfl:   •  topiramate (TOPOMAX) 15 MG CAPSULE SPRINKLE, Take 15 mg by mouth 2 times a day., Disp: , Rfl:   •  SUMAtriptan Succinate (IMITREX PO), Take  by mouth., Disp: , Rfl:   •  phenazopyridine (PYRIDIUM) 100 MG Tab, Take 1 Tab by mouth 3 times a day as needed., Disp: 6 Tab, Rfl: 0  •  hydrOXYzine (ATARAX) 25 MG Tab, Take 1 Tab by mouth 3 times a day as needed. FOR ANXIETY, Disp: 90 Tab, Rfl: 3  •  guaifenesin-codeine (CHERATUSSIN AC) Solution, Take 5 mL by mouth every four hours as needed for Cough., Disp: 120 mL, Rfl: 0  •  sertraline (ZOLOFT) 50 MG Tab, Take 1 Tab by mouth every day. Begin with one half tablet for five days, Disp: 90 Tab, Rfl: 1  •  busPIRone (BUSPAR) 15 MG tablet, Take 1 Tab by mouth 2 times a day., Disp: 180 Tab, Rfl: 3  •  clonazepam (KLONOPIN) 1 MG TABS, Take 1 Tab by mouth 2 times a day., Disp: 60 Tab, Rfl: 0  •  Testosterone 20.25 MG/1.25GM (1.62%) GEL, Apply  to skin as directed., Disp: , Rfl:   •  Estradiol (DIVIGEL) 0.5 MG/0.5GM GEL, Apply 1 Device to skin as directed every day at 6 PM. Indications: Moderate to Severe Vasomotor Symptoms, Disp: 30 Each, Rfl: prn  ALLERGIES:   Allergies   Allergen Reactions   • Bactrim      Fatigue and ringing of the ears     SURGHX:   Past Surgical History:   Procedure Laterality Date   • MYRINGOTOMY   "8/27/2010    Performed by [address] at SURGERY SAME DAY [facility] ORS   • LAPAROSCOPY  5/28/2010    Performed by JACKI SHARMA at SURGERY [facility]   • LYMPH NODE SAMPLING  5/28/2010    Performed by JACKI SHARMA at SURGERY [facility] ORS   • DEBULKING  5/28/2010    Performed by JACKI SHARMA at SURGERY [facility] ORS   • CYSTOSCOPY  10/15/08    Performed by YU GODINEZ at SURGERY SAME DAY [facility]   • VAGINAL HYSTERECTOMY SCOPE TOTAL  10/15/08    Performed by YU GODINEZ at SURGERY SAME DAY [facility] ORS   • OTHER  1984    TONSILECTOMY/ ADNOIDS   • APPENDECTOMY  1981   • TONSILLECTOMY AND ADENOIDECTOMY       SOCHX:  reports that she has never smoked. She has never used smokeless tobacco. She reports that she does not drink alcohol or use drugs.  FH: Family history was reviewed, no pertinent findings to report       Objective:     /84 (BP Location: Left arm, Patient Position: Sitting, BP Cuff Size: Adult)   Pulse 95   Temp 36.7 °C (98 °F) (Temporal)   Resp 16   Ht 1.727 m (5' 8\")   Wt 101.2 kg (223 lb)   SpO2 97%   BMI 33.91 kg/m²      Physical Exam   Constitutional: She is oriented to person, place, and time. She appears well-developed and well-nourished. No distress.   HENT:   Head: Normocephalic and atraumatic.   Right Ear: External ear normal.   Left Ear: External ear normal.   Ears:    Nose: Nose normal.   Mouth/Throat: No oropharyngeal exudate.   Left TM: intact, dull, no erythema or discharge  Right ear canal: +erythema, +drainage. TM visualized, appears to be small hole in TM (likely residual from myringotomy tube)   Eyes: Pupils are equal, round, and reactive to light. Conjunctivae and EOM are normal.   Neck: Normal range of motion. Neck supple.   Cardiovascular: Normal rate and regular rhythm.    No murmur heard.  Pulmonary/Chest: Effort normal and breath sounds normal.   Abdominal: Soft. Bowel sounds are normal.   Musculoskeletal: Normal range of motion. She exhibits " no edema or deformity.   Lymphadenopathy:     She has no cervical adenopathy.   Neurological: She is alert and oriented to person, place, and time.   Skin: Skin is warm and dry. Capillary refill takes less than 2 seconds. She is not diaphoretic.   Psychiatric: She has a normal mood and affect. Her behavior is normal.   Nursing note and vitals reviewed.         Assessment/Plan:     1. Acute otitis media, right  ciprofloxacin/dexamethasone (CIPRODEX) 0.3-0.1 % Suspension    amoxicillin-clavulanate (AUGMENTIN) 875-125 MG Tab     Differential diagnosis, natural history, supportive care discussed. Follow-up with primary care provider within 7-10 days, emergency room precautions discussed.  Patient and/or family appears understanding of information.  -Start Cidprodex Otic. You may not require oral antibiotics. If you develop fever or worsening of pain, start oral Augmentin.  -Return to UC if no improvement or worsening of symtpoms    Case reviewed and discussed with Dr. Barrios during Dr. Cleveland's raining period.

## 2018-10-17 ENCOUNTER — OFFICE VISIT (OUTPATIENT)
Dept: DERMATOLOGY | Facility: IMAGING CENTER | Age: 48
End: 2018-10-17
Payer: COMMERCIAL

## 2018-10-17 VITALS
WEIGHT: 215 LBS | BODY MASS INDEX: 32.58 KG/M2 | SYSTOLIC BLOOD PRESSURE: 110 MMHG | HEART RATE: 60 BPM | DIASTOLIC BLOOD PRESSURE: 80 MMHG | TEMPERATURE: 98.1 F | HEIGHT: 68 IN

## 2018-10-17 DIAGNOSIS — L82.1 SEBORRHEIC KERATOSES: ICD-10-CM

## 2018-10-17 DIAGNOSIS — D18.01 CHERRY ANGIOMA: ICD-10-CM

## 2018-10-17 DIAGNOSIS — D21.9 FIBROMA: ICD-10-CM

## 2018-10-17 DIAGNOSIS — L81.4 LENTIGO: ICD-10-CM

## 2018-10-17 PROCEDURE — 99212 OFFICE O/P EST SF 10 MIN: CPT | Performed by: NURSE PRACTITIONER

## 2018-10-17 NOTE — PROGRESS NOTES
"Chief Complaint   Patient presents with   • Skin Lesion       HISTORY OF PRESENT ILLNESS: Patient is a 48 y.o. Female patient who is known to me from primary care. She presents today requesting a skin check.  She has no personal or family history of skin cancer.                   Lentigo  Patient has had a lesion on her right temple for years that she is concerned about.     Seborrheic keratoses  She has had itchy lesions on her back for years.           Allergies:Bactrim          ROS:  Review of Systems   Constitutional: Negative for fever, chills, weight loss and malaise/fatigue.   Skin: she has     Exam:  Blood pressure 110/80, pulse 60, temperature 36.7 °C (98.1 °F), height 1.727 m (5' 8\"), weight 97.5 kg (215 lb), not currently breastfeeding.  General:  Well nourished, well developed female in NAD  Head is grossly normal.  An examination was performed including the scalp( including the hair) , head and face, inspection of eyelids, lips , right and left ear externally but not ear canals.  Neck and chest and back.  Including  Both upper and lower extremities, including hands and feet and nails and between fingers and toes.     All areas were found to be within normal limits except as noted in the description below.     Scalp: right lateral scalp two mm raised skin colored keratosis   Left face right temple 3 by 4 mm dark brown lentigo, scattered light brown lentigo's   Back with scattered cherry angioma and seborrheic keratosis  Right anterior shoulder with a small fibroma  Chest with scattered small lentigo and seborrheic keratosis  Lower abdomen with well-healed surgical incision consistent with history of ruptured appendix and peritonitis no other concerning lesions are seen              Please note that this dictation was created using voice recognition software. I have made every reasonable attempt to correct obvious errors, but I expect that there are errors of grammar and possibly content that I did not " discover before finalizing the note.    Assessment/Plan:  1. Lentigo     2. Seborrheic keratoses

## 2019-02-01 ENCOUNTER — HOSPITAL ENCOUNTER (OUTPATIENT)
Facility: MEDICAL CENTER | Age: 49
End: 2019-02-01
Attending: FAMILY MEDICINE
Payer: COMMERCIAL

## 2019-02-01 ENCOUNTER — OFFICE VISIT (OUTPATIENT)
Dept: URGENT CARE | Facility: MEDICAL CENTER | Age: 49
End: 2019-02-01
Payer: COMMERCIAL

## 2019-02-01 VITALS
OXYGEN SATURATION: 97 % | HEART RATE: 77 BPM | DIASTOLIC BLOOD PRESSURE: 92 MMHG | SYSTOLIC BLOOD PRESSURE: 142 MMHG | WEIGHT: 241.2 LBS | TEMPERATURE: 97.8 F | HEIGHT: 68 IN | BODY MASS INDEX: 36.56 KG/M2

## 2019-02-01 DIAGNOSIS — N30.00 ACUTE CYSTITIS WITHOUT HEMATURIA: ICD-10-CM

## 2019-02-01 DIAGNOSIS — R30.0 DYSURIA: ICD-10-CM

## 2019-02-01 DIAGNOSIS — R35.0 FREQUENCY OF URINATION: ICD-10-CM

## 2019-02-01 LAB
APPEARANCE UR: NORMAL
BILIRUB UR STRIP-MCNC: NEGATIVE MG/DL
COLOR UR AUTO: YELLOW
GLUCOSE UR STRIP.AUTO-MCNC: NEGATIVE MG/DL
KETONES UR STRIP.AUTO-MCNC: NEGATIVE MG/DL
LEUKOCYTE ESTERASE UR QL STRIP.AUTO: NORMAL
NITRITE UR QL STRIP.AUTO: NEGATIVE
PH UR STRIP.AUTO: 5.5 [PH] (ref 5–8)
PROT UR QL STRIP: NEGATIVE MG/DL
RBC UR QL AUTO: NORMAL
SP GR UR STRIP.AUTO: 1.02
UROBILINOGEN UR STRIP-MCNC: 0.2 MG/DL

## 2019-02-01 PROCEDURE — 81002 URINALYSIS NONAUTO W/O SCOPE: CPT | Performed by: FAMILY MEDICINE

## 2019-02-01 PROCEDURE — 87077 CULTURE AEROBIC IDENTIFY: CPT

## 2019-02-01 PROCEDURE — 87186 SC STD MICRODIL/AGAR DIL: CPT

## 2019-02-01 PROCEDURE — 99214 OFFICE O/P EST MOD 30 MIN: CPT | Performed by: FAMILY MEDICINE

## 2019-02-01 PROCEDURE — 87086 URINE CULTURE/COLONY COUNT: CPT

## 2019-02-01 RX ORDER — NITROFURANTOIN MACROCRYSTALS 100 MG/1
100 CAPSULE ORAL 2 TIMES DAILY
Qty: 14 CAP | Refills: 0 | Status: SHIPPED | OUTPATIENT
Start: 2019-02-01 | End: 2019-02-08

## 2019-02-01 RX ORDER — PHENAZOPYRIDINE HYDROCHLORIDE 100 MG/1
100 TABLET, FILM COATED ORAL 3 TIMES DAILY PRN
Qty: 6 TAB | Refills: 0 | Status: SHIPPED | OUTPATIENT
Start: 2019-02-01 | End: 2020-03-03

## 2019-02-01 ASSESSMENT — ENCOUNTER SYMPTOMS
CONSTITUTIONAL NEGATIVE: 1
NEUROLOGICAL NEGATIVE: 1

## 2019-02-02 NOTE — PATIENT INSTRUCTIONS
Follow up if not significantly improved as expected in 2-3 days, sooner if any worsening or new symptoms    For more information see the handout below      Urinary Tract Infection, Adult  Introduction  A urinary tract infection (UTI) is an infection of any part of the urinary tract. The urinary tract includes the:  · Kidneys.  · Ureters.  · Bladder.  · Urethra.  These organs make, store, and get rid of pee (urine) in the body.  Follow these instructions at home:  · Take over-the-counter and prescription medicines only as told by your doctor.  · If you were prescribed an antibiotic medicine, take it as told by your doctor. Do not stop taking the antibiotic even if you start to feel better.  · Avoid the following drinks:  ¨ Alcohol.  ¨ Caffeine.  ¨ Tea.  ¨ Carbonated drinks.  · Drink enough fluid to keep your pee clear or pale yellow.  · Keep all follow-up visits as told by your doctor. This is important.  · Make sure to:  ¨ Empty your bladder often and completely. Do not to hold pee for long periods of time.  ¨ Empty your bladder before and after sex.  ¨ Wipe from front to back after a bowel movement if you are female. Use each tissue one time when you wipe.  Contact a doctor if:  · You have back pain.  · You have a fever.  · You feel sick to your stomach (nauseous).  · You throw up (vomit).  · Your symptoms do not get better after 3 days.  · Your symptoms go away and then come back.  Get help right away if:  · You have very bad back pain.  · You have very bad lower belly (abdominal) pain.  · You are throwing up and cannot keep down any medicines or water.  This information is not intended to replace advice given to you by your health care provider. Make sure you discuss any questions you have with your health care provider.  Document Released: 06/05/2009 Document Revised: 05/25/2017 Document Reviewed: 11/07/2016  © 2017 [employer]

## 2019-02-02 NOTE — PROGRESS NOTES
"Subjective:      Melba Frye is a 48 y.o. female who presents with UTI (pressure in lower abdomen/ frequency X1 day)            This is a new problem  48-year-old female with remote history of frequent UTIs presenting with 1 day history of urinary frequency, urgency or dyspnea and dysuria.  She also has mild lower abdominal discomfort but denies any back pain or flank pain, nausea or vomiting, fever or chills.  No history of kidney stones.        Review of Systems   Constitutional: Negative.    Skin: Negative.    Neurological: Negative.           Objective:     /92   Pulse 77   Temp 36.6 °C (97.8 °F) (Temporal)   Ht 1.727 m (5' 8\")   Wt 109.4 kg (241 lb 3.2 oz)   SpO2 97%   BMI 36.67 kg/m²      Physical Exam   Constitutional: She is oriented to person, place, and time. She appears well-developed and well-nourished.  Non-toxic appearance. She does not have a sickly appearance. She does not appear ill. No distress.   HENT:   Head: Normocephalic and atraumatic.   Eyes: Conjunctivae are normal.   Cardiovascular: Normal rate.    Pulmonary/Chest: Effort normal. No respiratory distress.   Abdominal: Soft. She exhibits no distension and no mass. There is tenderness in the suprapubic area. There is no rebound, no guarding and no CVA tenderness. No hernia.   Neurological: She is alert and oriented to person, place, and time.   Skin: She is not diaphoretic. No pallor.       Results for orders placed or performed in visit on 02/01/19   POCT Urinalysis   Result Value Ref Range    POC Color Yellow Negative    POC Appearance Cloudy Negative    POC Leukocyte Esterase Small Negative    POC Nitrites Negative Negative    POC Urobiligen 0.2 Negative (0.2) mg/dL    POC Protein Negative Negative mg/dL    POC Urine PH 5.5 5.0 - 8.0    POC Blood Trace-intact Negative    POC Specific Gravity 1.025 <1.005 - >1.030    POC Ketones Negative Negative mg/dL    POC Bilirubin Negative Negative mg/dL    POC Glucose Negative Negative " mg/dL          Assessment/Plan:     1. Acute cystitis without hematuria  - URINE CULTURE(NEW); Future  - nitrofurantoin macro crystals (MACRODANTIN) 100 MG Cap; Take 1 Cap by mouth 2 Times a Day for 7 days.  Dispense: 14 Cap; Refill: 0    2. Frequency of urination  - POCT Urinalysis    3. Dysuria  - phenazopyridine (PYRIDIUM) 100 MG Tab; Take 1 Tab by mouth 3 times a day as needed.  Dispense: 6 Tab; Refill: 0      Uncomplicated UTI  Plan per orders and instructions  Warning signs reviewed

## 2019-02-04 LAB
BACTERIA UR CULT: ABNORMAL
BACTERIA UR CULT: ABNORMAL
SIGNIFICANT IND 70042: ABNORMAL
SITE SITE: ABNORMAL
SOURCE SOURCE: ABNORMAL

## 2019-02-25 ENCOUNTER — OFFICE VISIT (OUTPATIENT)
Dept: URGENT CARE | Facility: CLINIC | Age: 49
End: 2019-02-25
Payer: COMMERCIAL

## 2019-02-25 ENCOUNTER — APPOINTMENT (OUTPATIENT)
Dept: RADIOLOGY | Facility: MEDICAL CENTER | Age: 49
End: 2019-02-25
Attending: EMERGENCY MEDICINE
Payer: COMMERCIAL

## 2019-02-25 ENCOUNTER — HOSPITAL ENCOUNTER (EMERGENCY)
Facility: MEDICAL CENTER | Age: 49
End: 2019-02-25
Attending: EMERGENCY MEDICINE
Payer: COMMERCIAL

## 2019-02-25 VITALS
HEART RATE: 67 BPM | BODY MASS INDEX: 37.36 KG/M2 | WEIGHT: 246.47 LBS | OXYGEN SATURATION: 94 % | TEMPERATURE: 97.7 F | RESPIRATION RATE: 18 BRPM | HEIGHT: 68 IN | DIASTOLIC BLOOD PRESSURE: 81 MMHG | SYSTOLIC BLOOD PRESSURE: 123 MMHG

## 2019-02-25 VITALS
HEART RATE: 76 BPM | OXYGEN SATURATION: 98 % | BODY MASS INDEX: 36.53 KG/M2 | HEIGHT: 68 IN | RESPIRATION RATE: 16 BRPM | DIASTOLIC BLOOD PRESSURE: 88 MMHG | WEIGHT: 241 LBS | SYSTOLIC BLOOD PRESSURE: 140 MMHG | TEMPERATURE: 98.7 F

## 2019-02-25 DIAGNOSIS — K92.1 MELENA: ICD-10-CM

## 2019-02-25 DIAGNOSIS — R10.32 LLQ ABDOMINAL PAIN: ICD-10-CM

## 2019-02-25 DIAGNOSIS — R19.7 DIARRHEA, UNSPECIFIED TYPE: ICD-10-CM

## 2019-02-25 DIAGNOSIS — R10.814 LEFT LOWER QUADRANT ABDOMINAL TENDERNESS WITHOUT REBOUND TENDERNESS: ICD-10-CM

## 2019-02-25 LAB
ALBUMIN SERPL BCP-MCNC: 4.2 G/DL (ref 3.2–4.9)
ALBUMIN/GLOB SERPL: 1.5 G/DL
ALP SERPL-CCNC: 87 U/L (ref 30–99)
ALT SERPL-CCNC: 25 U/L (ref 2–50)
ANION GAP SERPL CALC-SCNC: 9 MMOL/L (ref 0–11.9)
APPEARANCE UR: CLEAR
APPEARANCE UR: CLEAR
AST SERPL-CCNC: 19 U/L (ref 12–45)
BACTERIA #/AREA URNS HPF: ABNORMAL /HPF
BASOPHILS # BLD AUTO: 0.6 % (ref 0–1.8)
BASOPHILS # BLD: 0.05 K/UL (ref 0–0.12)
BILIRUB SERPL-MCNC: 0.5 MG/DL (ref 0.1–1.5)
BILIRUB UR QL STRIP.AUTO: NEGATIVE
BILIRUB UR STRIP-MCNC: NORMAL MG/DL
BUN SERPL-MCNC: 18 MG/DL (ref 8–22)
CALCIUM SERPL-MCNC: 8.6 MG/DL (ref 8.4–10.2)
CHLORIDE SERPL-SCNC: 102 MMOL/L (ref 96–112)
CO2 SERPL-SCNC: 25 MMOL/L (ref 20–33)
COLOR UR AUTO: YELLOW
COLOR UR: YELLOW
CREAT SERPL-MCNC: 0.65 MG/DL (ref 0.5–1.4)
EOSINOPHIL # BLD AUTO: 0.13 K/UL (ref 0–0.51)
EOSINOPHIL NFR BLD: 1.6 % (ref 0–6.9)
EPI CELLS #/AREA URNS HPF: ABNORMAL /HPF
ERYTHROCYTE [DISTWIDTH] IN BLOOD BY AUTOMATED COUNT: 43.4 FL (ref 35.9–50)
GLOBULIN SER CALC-MCNC: 2.8 G/DL (ref 1.9–3.5)
GLUCOSE SERPL-MCNC: 80 MG/DL (ref 65–99)
GLUCOSE UR STRIP.AUTO-MCNC: NEGATIVE MG/DL
GLUCOSE UR STRIP.AUTO-MCNC: NORMAL MG/DL
HCG SERPL QL: NEGATIVE
HCT VFR BLD AUTO: 41.1 % (ref 37–47)
HGB BLD-MCNC: 13.8 G/DL (ref 12–16)
IMM GRANULOCYTES # BLD AUTO: 0.04 K/UL (ref 0–0.11)
IMM GRANULOCYTES NFR BLD AUTO: 0.5 % (ref 0–0.9)
INT CON NEG: NORMAL
INT CON POS: NORMAL
KETONES UR STRIP.AUTO-MCNC: NEGATIVE MG/DL
KETONES UR STRIP.AUTO-MCNC: NORMAL MG/DL
LEUKOCYTE ESTERASE UR QL STRIP.AUTO: ABNORMAL
LEUKOCYTE ESTERASE UR QL STRIP.AUTO: NORMAL
LIPASE SERPL-CCNC: 32 U/L (ref 7–58)
LYMPHOCYTES # BLD AUTO: 2.95 K/UL (ref 1–4.8)
LYMPHOCYTES NFR BLD: 36.5 % (ref 22–41)
MCH RBC QN AUTO: 31.2 PG (ref 27–33)
MCHC RBC AUTO-ENTMCNC: 33.6 G/DL (ref 33.6–35)
MCV RBC AUTO: 93 FL (ref 81.4–97.8)
MICRO URNS: ABNORMAL
MONOCYTES # BLD AUTO: 0.69 K/UL (ref 0–0.85)
MONOCYTES NFR BLD AUTO: 8.5 % (ref 0–13.4)
MUCOUS THREADS #/AREA URNS HPF: ABNORMAL /HPF
NEUTROPHILS # BLD AUTO: 4.23 K/UL (ref 2–7.15)
NEUTROPHILS NFR BLD: 52.3 % (ref 44–72)
NITRITE UR QL STRIP.AUTO: NEGATIVE
NITRITE UR QL STRIP.AUTO: NORMAL
NRBC # BLD AUTO: 0 K/UL
NRBC BLD-RTO: 0 /100 WBC
PH UR STRIP.AUTO: 6.5 [PH]
PH UR STRIP.AUTO: 7 [PH] (ref 5–8)
PLATELET # BLD AUTO: 196 K/UL (ref 164–446)
PMV BLD AUTO: 10.5 FL (ref 9–12.9)
POC FECAL OCCULT BLOOD: NORMAL
POTASSIUM SERPL-SCNC: 3.6 MMOL/L (ref 3.6–5.5)
PROT SERPL-MCNC: 7 G/DL (ref 6–8.2)
PROT UR QL STRIP: NEGATIVE MG/DL
PROT UR QL STRIP: NORMAL MG/DL
RBC # BLD AUTO: 4.42 M/UL (ref 4.2–5.4)
RBC # URNS HPF: ABNORMAL /HPF
RBC UR QL AUTO: NEGATIVE
RBC UR QL AUTO: NORMAL
SODIUM SERPL-SCNC: 136 MMOL/L (ref 135–145)
SP GR UR STRIP.AUTO: 1.01
SP GR UR STRIP.AUTO: <=1.005
UROBILINOGEN UR STRIP-MCNC: 0.2 MG/DL
WBC # BLD AUTO: 8.1 K/UL (ref 4.8–10.8)
WBC #/AREA URNS HPF: ABNORMAL /HPF

## 2019-02-25 PROCEDURE — 99285 EMERGENCY DEPT VISIT HI MDM: CPT

## 2019-02-25 PROCEDURE — 85025 COMPLETE CBC W/AUTO DIFF WBC: CPT

## 2019-02-25 PROCEDURE — 80053 COMPREHEN METABOLIC PANEL: CPT

## 2019-02-25 PROCEDURE — 84703 CHORIONIC GONADOTROPIN ASSAY: CPT

## 2019-02-25 PROCEDURE — 81001 URINALYSIS AUTO W/SCOPE: CPT

## 2019-02-25 PROCEDURE — 36415 COLL VENOUS BLD VENIPUNCTURE: CPT

## 2019-02-25 PROCEDURE — 83690 ASSAY OF LIPASE: CPT

## 2019-02-25 PROCEDURE — 81002 URINALYSIS NONAUTO W/O SCOPE: CPT | Performed by: EMERGENCY MEDICINE

## 2019-02-25 PROCEDURE — 82274 ASSAY TEST FOR BLOOD FECAL: CPT | Performed by: EMERGENCY MEDICINE

## 2019-02-25 PROCEDURE — 99214 OFFICE O/P EST MOD 30 MIN: CPT | Performed by: EMERGENCY MEDICINE

## 2019-02-25 PROCEDURE — 700117 HCHG RX CONTRAST REV CODE 255: Performed by: EMERGENCY MEDICINE

## 2019-02-25 PROCEDURE — 74177 CT ABD & PELVIS W/CONTRAST: CPT

## 2019-02-25 RX ADMIN — IOHEXOL 100 ML: 350 INJECTION, SOLUTION INTRAVENOUS at 23:05

## 2019-02-25 ASSESSMENT — ENCOUNTER SYMPTOMS
FEVER: 0
DIZZINESS: 0
NAUSEA: 0
HEADACHES: 0
BLOOD IN STOOL: 0
CHILLS: 0
FEVER: 0
CHILLS: 0
NAUSEA: 0
ANOREXIA: 0
SHORTNESS OF BREATH: 0
CONSTIPATION: 0
ABDOMINAL PAIN: 1
BRUISES/BLEEDS EASILY: 0
VOMITING: 0
FLANK PAIN: 0
ABDOMINAL PAIN: 1
DIZZINESS: 0
ROS GI COMMENTS: 1
HEARTBURN: 0
DIARRHEA: 1
WEIGHT LOSS: 0
CHANGE IN BOWEL HABIT: 1
VOMITING: 0
DIARRHEA: 1

## 2019-02-26 NOTE — ED PROVIDER NOTES
ED Provider Note    CHIEF COMPLAINT  Chief Complaint   Patient presents with   • Melena   • LLQ Pain       HPI  Melba Frye is a 48 y.o. female with a history of prior complicated appendectomy with abscess formation, prior hysterectomy and oophorectomy, and previous UTIs presenting to the emergency department with left lower quadrant pain and black stools.  Patient reports that over the past several months she has had intermittent episodes of sharp left lower quadrant pain, which has been associated with diarrhea.  She states that she recently was on a trip to [place] where she ate lots of spicy food, and after that trip she has had frequent episodes of watery diarrhea.  She took Pepto-Bismol for her symptoms 2 days ago and subsequently has had black stools today.  Patient was seen at urgent care where guaiac was positive for blood.  Patient denies any bright red blood per rectum, or any other medication use.  She has not had any fevers, nausea, or vomiting.    Of note, patient did recently have a UTI several weeks ago which was treated with antibiotics.  She denies any further dysuria or hematuria.    REVIEW OF SYSTEMS  Review of Systems   Constitutional: Negative for chills, fever and weight loss.   Respiratory: Negative for shortness of breath.    Cardiovascular: Negative for chest pain.   Gastrointestinal: Positive for abdominal pain, diarrhea and melena. Negative for nausea and vomiting.   Genitourinary: Negative for dysuria and hematuria.   Neurological: Negative for dizziness and headaches.   Endo/Heme/Allergies: Does not bruise/bleed easily.   All other systems reviewed and are negative.      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    SOCIAL HISTORY  Social History     Social History Main Topics   • Smoking status: Never Smoker   • Smokeless tobacco: Never Used   • Alcohol use No   • Drug use: No   • Sexual activity: Yes     Partners: Male       SURGICAL HISTORY   has a past surgical history that includes cystoscopy (10/15/08); laparoscopy (5/28/2010); lymph node sampling (5/28/2010); debulking (5/28/2010); appendectomy (1981); other (1984); vaginal hysterectomy scope total (10/15/08); myringotomy (8/27/2010); and tonsillectomy and adenoidectomy.    CURRENT MEDICATIONS  Home Medications    **Home medications have not yet been reviewed for this encounter**         ALLERGIES  Allergies   Allergen Reactions   • Bactrim      Fatigue and ringing of the ears       PHYSICAL EXAM  VITAL SIGNS: BP (!) 177/110   Pulse 64   Temp 36.5 °C (97.7 °F) (Temporal)   Resp 18   Ht 1.727 m (5' 8\")   Wt 111.8 kg (246 lb 7.6 oz)   SpO2 98%   BMI 37.48 kg/m²    Pulse ox interpretation: I interpret this pulse ox as normal.    Physical Exam   Constitutional: She is oriented to person, place, and time and well-developed, well-nourished, and in no distress. No distress.   HENT:   Head: Normocephalic and atraumatic.   Mouth/Throat: Oropharynx is clear and moist.   Eyes: Pupils are equal, round, and reactive to light. Conjunctivae and EOM are normal.   Neck: Normal range of motion. Neck supple.   Cardiovascular: Normal rate, regular rhythm and intact distal pulses.    Pulmonary/Chest: Effort normal and breath sounds normal. No respiratory distress. She has no rales.   Abdominal: Soft. Bowel sounds are normal. There is tenderness (LLQ). There is no rebound and no guarding.   Well healed laparotomy scar present without erythema   Musculoskeletal: Normal range of motion. She exhibits no edema or deformity.   Neurological: She is alert and oriented to person, place, and time. GCS score is " 15.   Skin: Skin is warm and dry. She is not diaphoretic.   Psychiatric: Affect and judgment normal.   Nursing note and vitals reviewed.        DIAGNOSTIC STUDIES  Results for orders placed or performed during the hospital encounter of 02/25/19   HCG Qual Serum   Result Value Ref Range    Beta-Hcg Qualitative Serum Negative Negative   CBC WITH DIFFERENTIAL   Result Value Ref Range    WBC 8.1 4.8 - 10.8 K/uL    RBC 4.42 4.20 - 5.40 M/uL    Hemoglobin 13.8 12.0 - 16.0 g/dL    Hematocrit 41.1 37.0 - 47.0 %    MCV 93.0 81.4 - 97.8 fL    MCH 31.2 27.0 - 33.0 pg    MCHC 33.6 33.6 - 35.0 g/dL    RDW 43.4 35.9 - 50.0 fL    Platelet Count 196 164 - 446 K/uL    MPV 10.5 9.0 - 12.9 fL    Neutrophils-Polys 52.30 44.00 - 72.00 %    Lymphocytes 36.50 22.00 - 41.00 %    Monocytes 8.50 0.00 - 13.40 %    Eosinophils 1.60 0.00 - 6.90 %    Basophils 0.60 0.00 - 1.80 %    Immature Granulocytes 0.50 0.00 - 0.90 %    Nucleated RBC 0.00 /100 WBC    Neutrophils (Absolute) 4.23 2.00 - 7.15 K/uL    Lymphs (Absolute) 2.95 1.00 - 4.80 K/uL    Monos (Absolute) 0.69 0.00 - 0.85 K/uL    Eos (Absolute) 0.13 0.00 - 0.51 K/uL    Baso (Absolute) 0.05 0.00 - 0.12 K/uL    Immature Granulocytes (abs) 0.04 0.00 - 0.11 K/uL    NRBC (Absolute) 0.00 K/uL   COMP METABOLIC PANEL   Result Value Ref Range    Sodium 136 135 - 145 mmol/L    Potassium 3.6 3.6 - 5.5 mmol/L    Chloride 102 96 - 112 mmol/L    Co2 25 20 - 33 mmol/L    Anion Gap 9.0 0.0 - 11.9    Glucose 80 65 - 99 mg/dL    Bun 18 8 - 22 mg/dL    Creatinine 0.65 0.50 - 1.40 mg/dL    Calcium 8.6 8.4 - 10.2 mg/dL    AST(SGOT) 19 12 - 45 U/L    ALT(SGPT) 25 2 - 50 U/L    Alkaline Phosphatase 87 30 - 99 U/L    Total Bilirubin 0.5 0.1 - 1.5 mg/dL    Albumin 4.2 3.2 - 4.9 g/dL    Total Protein 7.0 6.0 - 8.2 g/dL    Globulin 2.8 1.9 - 3.5 g/dL    A-G Ratio 1.5 g/dL   LIPASE   Result Value Ref Range    Lipase 32 7 - 58 U/L   URINALYSIS CULTURE, IF INDICATED   Result Value Ref Range    Color Yellow      Character Clear     Specific Gravity <=1.005 <1.035    Ph 6.5 5.0 - 8.0    Glucose Negative Negative mg/dL    Ketones Negative Negative mg/dL    Protein Negative Negative mg/dL    Bilirubin Negative Negative    Nitrite Negative Negative    Leukocyte Esterase Trace (A) Negative    Occult Blood Negative Negative    Micro Urine Req Microscopic    ESTIMATED GFR   Result Value Ref Range    GFR If African American >60 >60 mL/min/1.73 m 2    GFR If Non African American >60 >60 mL/min/1.73 m 2   URINE MICROSCOPIC (W/UA)   Result Value Ref Range    WBC 0-2 /hpf    RBC 0-2 /hpf    Bacteria Few (A) None /hpf    Epithelial Cells Few Few /hpf    Mucous Threads Rare /hpf       CT-ABDOMEN-PELVIS WITH   Final Result         1. No acute inflammatory change identified in the abdomen or pelvis.          COURSE & MEDICAL DECISION MAKING  Pertinent Labs & Imaging studies reviewed. (See chart for details)  48-year-old female presents emergency department by referral from urgent care for melena and left lower quadrant abdominal pain.  Differential diagnosis includes but is not limited to diverticulosis, diverticulitis, electrolyte abnormality, pregnancy, dehydration, UTI, pyelonephritis, anemia, coagulopathy, nephrolithiasis    Given concern for above listed differential diagnosis, IV access was obtained and basic laboratory studies were drawn.  These were largely unremarkable without significant leukocytosis, anemia, or electrolyte disturbance.  Patient had a negative pregnancy test and a history of a hysterectomy.  Urinalysis was not concerning for infection and had no present hematuria to suggest nephrolithiasis.    CT was obtained showing no acute inflammatory change.  There is no bowel wall thickening or edema to suggest gastroenteritis.    I discussed these results and the plan of care with the patient and her .  I recommended follow-up with gastroenterology with her history of guaiac positive stool.  Patient is not currently  "anemic, and is not having a brisk GI bleed to necessitate admission at this time.  Return precautions were discussed in detail, and patient was comfortable with discharge home.    On arrival, the patient was notably hypertensive, though this improved without intervention.  Feel that this was likely an aberrant reading.  Repeat vitals prior to discharge were as follows: /81   Pulse 67   Temp 36.5 °C (97.7 °F) (Temporal)   Resp 18   Ht 1.727 m (5' 8\")   Wt 111.8 kg (246 lb 7.6 oz)   SpO2 94%   BMI 37.48 kg/m²      The patient will return for new or worsening symptoms and is stable at the time of discharge.    The patient is referred to a primary physician for blood pressure management, diabetic screening, and for all other preventative health concerns.    DISPOSITION:  Patient will be discharged home in stable condition.    FOLLOW UP:  [facility]  [address] [address]  [place] [id number] [phone]    Schedule an appointment as soon as possible for a visit       [facility]  [place] [id number]  [phone]    Schedule an appointment as soon as possible for a visit         OUTPATIENT MEDICATIONS:  New Prescriptions    No medications on file         FINAL IMPRESSION  Visit Diagnoses     ICD-10-CM   1. LLQ abdominal pain R10.32   2. Melena K92.1   3. Diarrhea, unspecified type R19.7              Electronically signed by: Patricia Harden, 2/25/2019 9:54 PM        "

## 2019-02-26 NOTE — ED TRIAGE NOTES
Pt sent from UC with c/o LLQ pain x a few months. Pt states thought it was just gas but states that she started having black stools this evening. Pt states took pepto bismol yesterday morning. Pt has no other complaints

## 2019-04-03 ENCOUNTER — HOSPITAL ENCOUNTER (OUTPATIENT)
Dept: RADIOLOGY | Facility: MEDICAL CENTER | Age: 49
End: 2019-04-03
Attending: OBSTETRICS & GYNECOLOGY
Payer: COMMERCIAL

## 2019-04-03 DIAGNOSIS — Z12.31 VISIT FOR SCREENING MAMMOGRAM: ICD-10-CM

## 2019-04-03 PROCEDURE — 77063 BREAST TOMOSYNTHESIS BI: CPT

## 2019-04-26 ENCOUNTER — HOSPITAL ENCOUNTER (OUTPATIENT)
Dept: LAB | Facility: MEDICAL CENTER | Age: 49
End: 2019-04-26
Attending: NURSE PRACTITIONER
Payer: COMMERCIAL

## 2019-04-26 LAB
25(OH)D3 SERPL-MCNC: 31 NG/ML (ref 30–100)
ALBUMIN SERPL BCP-MCNC: 4.1 G/DL (ref 3.2–4.9)
ALBUMIN/GLOB SERPL: 1.7 G/DL
ALP SERPL-CCNC: 84 U/L (ref 30–99)
ALT SERPL-CCNC: 26 U/L (ref 2–50)
ANION GAP SERPL CALC-SCNC: 3 MMOL/L (ref 0–11.9)
APPEARANCE UR: CLEAR
AST SERPL-CCNC: 19 U/L (ref 12–45)
BASOPHILS # BLD AUTO: 0.7 % (ref 0–1.8)
BASOPHILS # BLD: 0.04 K/UL (ref 0–0.12)
BILIRUB SERPL-MCNC: 0.6 MG/DL (ref 0.1–1.5)
BILIRUB UR QL STRIP.AUTO: NEGATIVE
BUN SERPL-MCNC: 14 MG/DL (ref 8–22)
CALCIUM SERPL-MCNC: 9 MG/DL (ref 8.5–10.5)
CHLORIDE SERPL-SCNC: 106 MMOL/L (ref 96–112)
CHOLEST SERPL-MCNC: 220 MG/DL (ref 100–199)
CO2 SERPL-SCNC: 26 MMOL/L (ref 20–33)
COLOR UR: YELLOW
CREAT SERPL-MCNC: 0.66 MG/DL (ref 0.5–1.4)
EOSINOPHIL # BLD AUTO: 0.1 K/UL (ref 0–0.51)
EOSINOPHIL NFR BLD: 1.7 % (ref 0–6.9)
ERYTHROCYTE [DISTWIDTH] IN BLOOD BY AUTOMATED COUNT: 45.7 FL (ref 35.9–50)
FASTING STATUS PATIENT QL REPORTED: NORMAL
GLOBULIN SER CALC-MCNC: 2.4 G/DL (ref 1.9–3.5)
GLUCOSE SERPL-MCNC: 106 MG/DL (ref 65–99)
GLUCOSE UR STRIP.AUTO-MCNC: NEGATIVE MG/DL
HCT VFR BLD AUTO: 43.8 % (ref 37–47)
HDLC SERPL-MCNC: 45 MG/DL
HGB BLD-MCNC: 14.3 G/DL (ref 12–16)
IMM GRANULOCYTES # BLD AUTO: 0.01 K/UL (ref 0–0.11)
IMM GRANULOCYTES NFR BLD AUTO: 0.2 % (ref 0–0.9)
KETONES UR STRIP.AUTO-MCNC: NEGATIVE MG/DL
LDLC SERPL CALC-MCNC: 150 MG/DL
LEUKOCYTE ESTERASE UR QL STRIP.AUTO: NEGATIVE
LYMPHOCYTES # BLD AUTO: 1.78 K/UL (ref 1–4.8)
LYMPHOCYTES NFR BLD: 30.3 % (ref 22–41)
MCH RBC QN AUTO: 31.4 PG (ref 27–33)
MCHC RBC AUTO-ENTMCNC: 32.6 G/DL (ref 33.6–35)
MCV RBC AUTO: 96.1 FL (ref 81.4–97.8)
MICRO URNS: NORMAL
MONOCYTES # BLD AUTO: 0.46 K/UL (ref 0–0.85)
MONOCYTES NFR BLD AUTO: 7.8 % (ref 0–13.4)
NEUTROPHILS # BLD AUTO: 3.48 K/UL (ref 2–7.15)
NEUTROPHILS NFR BLD: 59.3 % (ref 44–72)
NITRITE UR QL STRIP.AUTO: NEGATIVE
NRBC # BLD AUTO: 0 K/UL
NRBC BLD-RTO: 0 /100 WBC
PH UR STRIP.AUTO: 6.5 [PH]
PLATELET # BLD AUTO: 202 K/UL (ref 164–446)
PMV BLD AUTO: 11.2 FL (ref 9–12.9)
POTASSIUM SERPL-SCNC: 4 MMOL/L (ref 3.6–5.5)
PROT SERPL-MCNC: 6.5 G/DL (ref 6–8.2)
PROT UR QL STRIP: NEGATIVE MG/DL
RBC # BLD AUTO: 4.56 M/UL (ref 4.2–5.4)
RBC UR QL AUTO: NEGATIVE
SODIUM SERPL-SCNC: 135 MMOL/L (ref 135–145)
SP GR UR STRIP.AUTO: 1.02
T4 FREE SERPL-MCNC: 0.78 NG/DL (ref 0.53–1.43)
TRIGL SERPL-MCNC: 124 MG/DL (ref 0–149)
TSH SERPL DL<=0.005 MIU/L-ACNC: 0.88 UIU/ML (ref 0.38–5.33)
UROBILINOGEN UR STRIP.AUTO-MCNC: 0.2 MG/DL
WBC # BLD AUTO: 5.9 K/UL (ref 4.8–10.8)

## 2019-04-26 PROCEDURE — 84443 ASSAY THYROID STIM HORMONE: CPT

## 2019-04-26 PROCEDURE — 81003 URINALYSIS AUTO W/O SCOPE: CPT

## 2019-04-26 PROCEDURE — 84439 ASSAY OF FREE THYROXINE: CPT

## 2019-04-26 PROCEDURE — 85025 COMPLETE CBC W/AUTO DIFF WBC: CPT

## 2019-04-26 PROCEDURE — 36415 COLL VENOUS BLD VENIPUNCTURE: CPT

## 2019-04-26 PROCEDURE — 82306 VITAMIN D 25 HYDROXY: CPT

## 2019-04-26 PROCEDURE — 80053 COMPREHEN METABOLIC PANEL: CPT

## 2019-04-26 PROCEDURE — 80061 LIPID PANEL: CPT

## 2019-05-28 ENCOUNTER — OFFICE VISIT (OUTPATIENT)
Dept: DERMATOLOGY | Facility: IMAGING CENTER | Age: 49
End: 2019-05-28
Payer: COMMERCIAL

## 2019-05-28 VITALS — HEIGHT: 68 IN | TEMPERATURE: 98.8 F | BODY MASS INDEX: 32.58 KG/M2 | WEIGHT: 215 LBS

## 2019-05-28 DIAGNOSIS — B35.8 MAJOCCHI'S GRANULOMA: ICD-10-CM

## 2019-05-28 PROCEDURE — 99213 OFFICE O/P EST LOW 20 MIN: CPT | Performed by: DERMATOLOGY

## 2019-05-28 RX ORDER — TERBINAFINE HYDROCHLORIDE 250 MG/1
250 TABLET ORAL DAILY
Qty: 14 TAB | Refills: 1 | Status: SHIPPED | OUTPATIENT
Start: 2019-05-28 | End: 2020-03-03

## 2019-05-28 ASSESSMENT — ENCOUNTER SYMPTOMS
CHILLS: 0
FEVER: 0

## 2019-05-28 NOTE — PROGRESS NOTES
Dermatology Return Patient Visit    Chief Complaint   Patient presents with   • Rash       Subjective:     HPI:   Melba Frye is a 48 y.o. female presenting for    HPI: rash lower extremity - left  Onset: first started 5 years ago, seems to have been off -on   Red, scaly, itchy, sometimes has pimples/blisters  Aggravating factors: unsure  Alleviating factors: topical medication   New creams/topicals: none prior  New medications (up to last 6 months): none   New travel:no, but son went to tropical country in [place] - was having skin issues, came back and stayed with her for awhile  Other exposures: no  Treatments: Triamcinolone , clobetasol ointment - help temporarily, then comes back worse    History of skin cancer: No  History of biopsies:Yes, Details: mole removal 5 years ago , benign   History of blistering/severe sunburns:No  Family history of skin cancer:No  Family history of atypical moles:No        Past Medical History:   Diagnosis Date   • Anxiety    • Complicated migraine 6/9/2014   • Depression    • Dermatitis, contact 11/16/2015   • Fatigue 6/9/2014   • Hyperlipidemia 1/23/2015   • Lentigo 10/17/2018   • Menopausal symptom 7/2/2014   • Mixed anxiety and depressive disorder 6/9/2014   • Seborrheic keratoses 10/17/2018   • TMJ arthralgia 6/9/2014   • UTI (lower urinary tract infection)        Current Outpatient Prescriptions on File Prior to Visit   Medication Sig Dispense Refill   • phenazopyridine (PYRIDIUM) 100 MG Tab Take 1 Tab by mouth 3 times a day as needed. (Patient not taking: Reported on 2/25/2019) 6 Tab 0   • venlafaxine (EFFEXOR) 25 MG Tab Take 25 mg by mouth 3 times a day.     • SUMAtriptan Succinate (IMITREX PO) Take  by mouth.       No current facility-administered medications on file prior to visit.        Allergies   Allergen Reactions   • Bactrim      Fatigue and ringing of the ears       Family History   Problem Relation Age of Onset   • Non-contributory Mother    • Lung Disease Mother   "       COPD   • Cancer Mother         dysplasia    • Arthritis Mother    • Psychiatry Mother    • Heart Disease Mother    • Hypertension Mother    • Stroke Mother    • Non-contributory Father    • Cancer Father 73        prostate cancer   • Lung Disease Maternal Grandmother    • Lung Disease Paternal Aunt    • Diabetes Paternal Aunt    • Hyperlipidemia Paternal Aunt        Social History     Social History   • Marital status:      Spouse name: N/A   • Number of children: N/A   • Years of education: N/A     Occupational History   • Not on file.     Social History Main Topics   • Smoking status: Never Smoker   • Smokeless tobacco: Never Used   • Alcohol use No   • Drug use: No   • Sexual activity: Yes     Partners: Male     Other Topics Concern   • Not on file     Social History Narrative   • No narrative on file       Review of Systems   Constitutional: Negative for chills and fever.   Skin: Positive for itching and rash.   All other systems reviewed and are negative.       Objective:     A focused cutaneous exam was completed including: hair, ears, face, eyelids, conjunctiva, lips, neck, left and right upper extremities (including hands/digits and fingernails), left and right lower extremities with the following pertinent findings listed below. Remaining above-listed examined areas within normal limits / negative for rashes or lesions.    Temp 37.1 °C (98.8 °F)   Ht 1.727 m (5' 8\")   Wt 97.5 kg (215 lb)     Physical Exam   Constitutional: She is oriented to person, place, and time and well-developed, well-nourished, and in no distress.   HENT:   Head: Normocephalic and atraumatic.   Eyes: Conjunctivae and lids are normal.   Neck: Normal range of motion.   Pulmonary/Chest: Effort normal.   Neurological: She is alert and oriented to person, place, and time.   Skin: Skin is warm and dry.        Psychiatric: Mood and affect normal.   Vitals reviewed.      DATA: lfts wnl 4/26    Assessment and Plan:     1. " Majocchi's granuloma  -terbinafine 250 mg daily x 2 weeks. S/e, included, but not limited to, rash, diarrhea, vomiting, abdomina pain, nausea, liver enzyme disorder, alopecia, discussed. Baseline labs okay. Patient to notify me if develops and abdominal pain, nausea, diarrhea, yellowing of eyes/mucosa.  - moisturizer, no steroid cream  - bx if no improvement in 2-4 weeks    Followup: Return in about 3 weeks (around 6/18/2019) for f/u rash, CHRIS.    Jill Dan M.D.

## 2019-06-20 ENCOUNTER — OFFICE VISIT (OUTPATIENT)
Dept: DERMATOLOGY | Facility: IMAGING CENTER | Age: 49
End: 2019-06-20
Payer: COMMERCIAL

## 2019-06-20 DIAGNOSIS — B35.8 MAJOCCHI'S GRANULOMA: ICD-10-CM

## 2019-06-20 DIAGNOSIS — D22.9 MULTIPLE NEVI: ICD-10-CM

## 2019-06-20 DIAGNOSIS — L82.1 SEBORRHEIC KERATOSES: ICD-10-CM

## 2019-06-20 PROCEDURE — 99213 OFFICE O/P EST LOW 20 MIN: CPT | Performed by: DERMATOLOGY

## 2019-06-20 ASSESSMENT — ENCOUNTER SYMPTOMS
FEVER: 0
CHILLS: 0

## 2019-06-20 NOTE — PROGRESS NOTES
Dermatology Return Patient Visit    Chief Complaint   Patient presents with   • Follow-Up       Subjective:     HPI:   Melba Frye is a 48 y.o. female presenting for    Follow up majocchis granumola - left leg - improved  S/p terbinafine, no more active rash/itching    CHRIS   HPI: several moles on back   Time present: years, increasing in number  Painful lesions: No  Itching lesions: No  Enlarging lesions: Yes  Anything make it better or worse?no    History of skin cancer: No  History of biopsies:Yes, Details: mole removal 5 years ago , benign   History of blistering/severe sunburns:No  Family history of skin cancer:No  Family history of atypical moles:No        Past Medical History:   Diagnosis Date   • Anxiety    • Complicated migraine 6/9/2014   • Depression    • Dermatitis, contact 11/16/2015   • Fatigue 6/9/2014   • Hyperlipidemia 1/23/2015   • Lentigo 10/17/2018   • Menopausal symptom 7/2/2014   • Mixed anxiety and depressive disorder 6/9/2014   • Seborrheic keratoses 10/17/2018   • TMJ arthralgia 6/9/2014   • UTI (lower urinary tract infection)        Current Outpatient Prescriptions on File Prior to Visit   Medication Sig Dispense Refill   • terbinafine (LAMISIL) 250 MG Tab Take 1 Tab by mouth every day. 14 Tab 1   • phenazopyridine (PYRIDIUM) 100 MG Tab Take 1 Tab by mouth 3 times a day as needed. (Patient not taking: Reported on 2/25/2019) 6 Tab 0   • venlafaxine (EFFEXOR) 25 MG Tab Take 25 mg by mouth 3 times a day.     • SUMAtriptan Succinate (IMITREX PO) Take  by mouth.       No current facility-administered medications on file prior to visit.        Allergies   Allergen Reactions   • Bactrim      Fatigue and ringing of the ears       Family History   Problem Relation Age of Onset   • Non-contributory Mother    • Lung Disease Mother         COPD   • Cancer Mother         dysplasia    • Arthritis Mother    • Psychiatry Mother    • Heart Disease Mother    • Hypertension Mother    • Stroke Mother    •  Non-contributory Father    • Cancer Father 73        prostate cancer   • Lung Disease Maternal Grandmother    • Lung Disease Paternal Aunt    • Diabetes Paternal Aunt    • Hyperlipidemia Paternal Aunt        Social History     Social History   • Marital status:      Spouse name: N/A   • Number of children: N/A   • Years of education: N/A     Occupational History   • Not on file.     Social History Main Topics   • Smoking status: Never Smoker   • Smokeless tobacco: Never Used   • Alcohol use No   • Drug use: No   • Sexual activity: Yes     Partners: Male     Other Topics Concern   • Not on file     Social History Narrative   • No narrative on file       Review of Systems   Constitutional: Negative for chills and fever.   Skin: Negative for itching and rash.   All other systems reviewed and are negative.       Objective:     A full mucocutaneous exam was completed including: scalp, hair, ears, face, eyelids, conjunctiva, lips, gums/tongue/oropharynx, neck, chest, breasts, abdomen, back , left and right upper extremities (including hands/digits and fingernails), left and right lower extremities (including feet/toes, toenails), buttocks and including external genitalia (patient refusal;) with the following pertinent findings listed below. Remaining above-listed examined areas within normal limits / negative for rashes or lesions.    There were no vitals taken for this visit.    Physical Exam   Constitutional: She is oriented to person, place, and time and well-developed, well-nourished, and in no distress.   HENT:   Head: Normocephalic and atraumatic.       Right Ear: External ear normal.   Left Ear: External ear normal.   Nose: Nose normal.   Mouth/Throat: Oropharynx is clear and moist.   Eyes: Conjunctivae and lids are normal.   Neck: Normal range of motion. Neck supple.   Cardiovascular: Intact distal pulses.    Pulmonary/Chest: Effort normal.   Neurological: She is alert and oriented to person, place, and  time.   Skin: Skin is warm and dry.        Psychiatric: Mood and affect normal.       DATA: lfts wnl 4/26    Assessment and Plan:     1. Multiple nevi  - Benign-appearing nature of lesions discussed. Advised to return to clinic for any new or concerning changes.  - ABCDE's of melanoma discussed  - discussed importance of regular sun protection/sunscreen use, SPF 30 or greater with broad spectrum coverage, need for reapplication every  minutes    2. Seborrheic keratoses  - Benign-appearing nature of lesions discussed. Advised to return to clinic for any new or concerning changes.    3. Majocchi's granuloma - resolved  - resolved, rtc if recurs    Followup: Return in about 1 year (around 6/20/2020), or if symptoms worsen or fail to improve.    Jill Dan M.D.

## 2019-06-21 ENCOUNTER — APPOINTMENT (OUTPATIENT)
Dept: DERMATOLOGY | Facility: IMAGING CENTER | Age: 49
End: 2019-06-21

## 2019-07-31 ENCOUNTER — APPOINTMENT (OUTPATIENT)
Dept: DERMATOLOGY | Facility: IMAGING CENTER | Age: 49
End: 2019-07-31

## 2020-02-20 ENCOUNTER — HOSPITAL ENCOUNTER (OUTPATIENT)
Dept: LAB | Facility: MEDICAL CENTER | Age: 50
End: 2020-02-20
Attending: OBSTETRICS & GYNECOLOGY
Payer: COMMERCIAL

## 2020-02-20 LAB
T4 FREE SERPL-MCNC: 1.05 NG/DL (ref 0.53–1.43)
TSH SERPL DL<=0.005 MIU/L-ACNC: 1.28 UIU/ML (ref 0.38–5.33)

## 2020-02-20 PROCEDURE — 36415 COLL VENOUS BLD VENIPUNCTURE: CPT

## 2020-02-20 PROCEDURE — 84439 ASSAY OF FREE THYROXINE: CPT

## 2020-02-20 PROCEDURE — 84443 ASSAY THYROID STIM HORMONE: CPT

## 2020-03-03 ENCOUNTER — OFFICE VISIT (OUTPATIENT)
Dept: MEDICAL GROUP | Facility: IMAGING CENTER | Age: 50
End: 2020-03-03
Payer: COMMERCIAL

## 2020-03-03 VITALS
DIASTOLIC BLOOD PRESSURE: 92 MMHG | HEIGHT: 67 IN | RESPIRATION RATE: 12 BRPM | WEIGHT: 229 LBS | BODY MASS INDEX: 35.94 KG/M2 | HEART RATE: 88 BPM | SYSTOLIC BLOOD PRESSURE: 150 MMHG | TEMPERATURE: 98.9 F | OXYGEN SATURATION: 95 %

## 2020-03-03 DIAGNOSIS — Z23 NEED FOR INFLUENZA VACCINATION: ICD-10-CM

## 2020-03-03 DIAGNOSIS — H69.93 DYSFUNCTION OF BOTH EUSTACHIAN TUBES: ICD-10-CM

## 2020-03-03 PROCEDURE — 90686 IIV4 VACC NO PRSV 0.5 ML IM: CPT | Performed by: FAMILY MEDICINE

## 2020-03-03 PROCEDURE — 99213 OFFICE O/P EST LOW 20 MIN: CPT | Mod: 25 | Performed by: FAMILY MEDICINE

## 2020-03-03 PROCEDURE — 90471 IMMUNIZATION ADMIN: CPT | Performed by: FAMILY MEDICINE

## 2020-03-03 RX ORDER — MELOXICAM 15 MG/1
15 TABLET ORAL
COMMUNITY
Start: 2020-02-25 | End: 2020-12-01

## 2020-03-03 SDOH — HEALTH STABILITY: MENTAL HEALTH: HOW OFTEN DO YOU HAVE A DRINK CONTAINING ALCOHOL?: MONTHLY OR LESS

## 2020-03-03 SDOH — HEALTH STABILITY: MENTAL HEALTH: HOW OFTEN DO YOU HAVE 6 OR MORE DRINKS ON ONE OCCASION?: NEVER

## 2020-03-03 SDOH — HEALTH STABILITY: MENTAL HEALTH: HOW MANY STANDARD DRINKS CONTAINING ALCOHOL DO YOU HAVE ON A TYPICAL DAY?: 1 OR 2

## 2020-03-03 ASSESSMENT — PAIN SCALES - GENERAL: PAINLEVEL: NO PAIN

## 2020-03-03 ASSESSMENT — FIBROSIS 4 INDEX: FIB4 SCORE: 0.92

## 2020-03-03 ASSESSMENT — PATIENT HEALTH QUESTIONNAIRE - PHQ9: CLINICAL INTERPRETATION OF PHQ2 SCORE: 0

## 2020-03-03 NOTE — PROGRESS NOTES
Subjective:     CC:    Chief Complaint   Patient presents with   • Establish Care   • Ear Fullness     right side       HISTORY OF THE PRESENT ILLNESS: This pleasant 49 y.o. female is here to establish care and discuss right ear fullness. His/her prior PCP was with [facility].  Traveling to [facility], [facility], and [place]. She is mostly concenred about corona.  She would like to discuss how she can avoid oswaldo this illness during her travels.  She has chronic issues with her ears. She currently feels fullness has had tubes an an adult. Ear infections as an adult. No fevers, chills, fatigue.  No ear pain.   She is a teacher.  Allergies: Bactrim    Current Outpatient Medications Ordered in Epic   Medication Sig Dispense Refill   • meloxicam (MOBIC) 15 MG tablet Take 15 mg by mouth every day.     • Tretinoin (ALTRENO) 0.05 % Lotion 1 Application by Apply externally route every bedtime. 1 Tube 3     No current Epic-ordered facility-administered medications on file.        Past Medical History:   Diagnosis Date   • Anxiety    • Complicated migraine 6/9/2014   • Depression    • Dermatitis, contact 11/16/2015   • Fatigue 6/9/2014   • Hyperlipidemia 1/23/2015   • Lentigo 10/17/2018   • Menopausal symptom 7/2/2014   • Mixed anxiety and depressive disorder 6/9/2014   • Seborrheic keratoses 10/17/2018   • TMJ arthralgia 6/9/2014   • UTI (lower urinary tract infection)        Past Surgical History:   Procedure Laterality Date   • MYRINGOTOMY  8/27/2010    Performed by BHARGAV STRE[address] at SURGERY SAME DAY [facility] ORS   • LAPAROSCOPY  5/28/2010    Performed by JACKI SHARMA at SURGERY [facility] ORS   • LYMPH NODE SAMPLING  5/28/2010    Performed by JACKI SHARMA at SURGERY [facility] ORS   • DEBULKING  5/28/2010    Performed by JACKI SHARMA at SURGERY [facility] ORS   • CYSTOSCOPY  10/15/08    Performed by YU GODINEZ at SURGERY SAME DAY [facility] ORS   • VAGINAL HYSTERECTOMY SCOPE TOTAL  10/15/08    Performed by  "YU GODINEZ at SURGERY SAME DAY [facility] ORS   • OTHER  1984    TONSILECTOMY/ ADNOIDS   • APPENDECTOMY  1981   • TONSILLECTOMY AND ADENOIDECTOMY         Social History     Tobacco Use   • Smoking status: Never Smoker   • Smokeless tobacco: Never Used   Substance Use Topics   • Alcohol use: Yes     Alcohol/week: 0.0 oz     Frequency: Monthly or less     Drinks per session: 1 or 2     Binge frequency: Never   • Drug use: No       Social History     Social History Narrative   • Not on file       Family History   Problem Relation Age of Onset   • Non-contributory Mother    • Lung Disease Mother         COPD   • Cancer Mother         dysplasia    • Arthritis Mother    • Psychiatric Illness Mother    • Heart Disease Mother    • Hypertension Mother    • Stroke Mother    • Non-contributory Father    • Cancer Father 73        prostate cancer   • Lung Disease Maternal Grandmother    • Lung Disease Paternal Aunt    • Diabetes Paternal Aunt    • Hyperlipidemia Paternal Aunt        ROS:   Gen: no fevers/chills, no changes in weight  Eyes: no changes in vision  ENT: no sore throat, no hearing loss  Pulm: no sob, no cough  CV: no chest pain, no palpitations  GI: no nausea/vomiting, no diarrhea  : no dysuria  MSk: no myalgias  Skin: no rash  Neuro: no headaches, no numbness/tingling  Heme/Lymph: no easy bruising      Objective:     Exam: /92   Pulse 88   Temp 37.2 °C (98.9 °F)   Resp 12   Ht 1.702 m (5' 7\")   Wt 103.9 kg (229 lb)   SpO2 95%  Body mass index is 35.87 kg/m².    General: Normal appearing. No distress.  HEENT: Normocephalic. Eyes conjunctiva clear lids without ptosis, eomi. ear canals clear without erythema bilaterally.  Tympanic membranes without bulging with good light reflex bilaterally.  Bilateral tympanic membranes with clear fluid behind them.  Neck: Thyroid is not enlarged.  Pulmonary: Clear to ausculation.  Normal effort. No rales, ronchi, or wheezing.  Cardiovascular: Regular rate and rhythm " without murmur. Carotid and radial pulses are intact and equal bilaterally.  Neurologic: No facial droop, normal gait, alert and oriented  Lymph: No cervical or supraclavicular lymph nodes are palpable  Skin: Warm and dry.  No obvious lesions.  Musculoskeletal: No extremity cyanosis, clubbing, or edema.  Psych: Normal mood and affect. Judgment and insight is normal.      Assessment & Plan:   49 y.o. female with the following -  Discussed how viruses such as corona virus are transmitted in great detail to help the patient avoid any illness while traveling.  We will do the flu vaccine today.  I discussed common side effects as well as rare.  Patient expresses understanding.  Return for annual  Problem List Items Addressed This Visit     Dysfunction of both eustachian tubes      Other Visit Diagnoses     Need for influenza vaccination        Relevant Orders    Influenza Vaccine Quad Injection (PF) (Completed)        Problem   Dysfunction of Both Eustachian Tubes    Discussed ways to mitigate such as applying a little bit of pressure closing the nose and pushing lightly  Flonase  Simply saline nasal mist  Do not do the opening up of the eustachian tube with viral or bacterial URI       Return for annual.    Please note that this dictation was created using voice recognition software. I have made every reasonable attempt to correct obvious errors, but I expect that there are errors of grammar and possibly content that I did not discover before finalizing the note.

## 2020-03-04 PROBLEM — H69.93 DYSFUNCTION OF BOTH EUSTACHIAN TUBES: Status: ACTIVE | Noted: 2020-03-04

## 2020-06-12 ENCOUNTER — OFFICE VISIT (OUTPATIENT)
Dept: DERMATOLOGY | Facility: IMAGING CENTER | Age: 50
End: 2020-06-12
Payer: COMMERCIAL

## 2020-06-12 DIAGNOSIS — L82.1 SEBORRHEIC KERATOSES: ICD-10-CM

## 2020-06-12 DIAGNOSIS — D22.9 MULTIPLE NEVI: ICD-10-CM

## 2020-06-12 DIAGNOSIS — Z12.83 SKIN CANCER SCREENING: ICD-10-CM

## 2020-06-12 DIAGNOSIS — D18.01 CHERRY ANGIOMA: ICD-10-CM

## 2020-06-12 DIAGNOSIS — L82.0 INFLAMED SEBORRHEIC KERATOSIS: ICD-10-CM

## 2020-06-12 DIAGNOSIS — L81.4 LENTIGO: ICD-10-CM

## 2020-06-12 DIAGNOSIS — R20.8 OTHER DISTURBANCES OF SKIN SENSATION: ICD-10-CM

## 2020-06-12 PROCEDURE — 17110 DESTRUCTION B9 LES UP TO 14: CPT | Performed by: NURSE PRACTITIONER

## 2020-06-12 PROCEDURE — 99213 OFFICE O/P EST LOW 20 MIN: CPT | Mod: 25 | Performed by: NURSE PRACTITIONER

## 2020-06-12 ASSESSMENT — ENCOUNTER SYMPTOMS
CHILLS: 0
FEVER: 0

## 2020-06-12 NOTE — PROGRESS NOTES
Dermatology Return Patient Visit    Chief Complaint   Patient presents with   • Follow-Up     CHRIS        Subjective:     HPI:   Melba Frye is a 48 y.o. 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     • Tretinoin (ALTRENO) 0.05 % Lotion 1 Application by Apply externally route every bedtime. 1 Tube 3     No current facility-administered medications on file prior to visit.        Allergies   Allergen Reactions   • Bactrim      Fatigue and ringing of the ears       Family History   Problem Relation Age of Onset   • Non-contributory Mother    • Lung Disease Mother         COPD   • Cancer Mother         dysplasia    • Arthritis Mother    • Psychiatric Illness Mother    • Heart Disease Mother    • Hypertension Mother    • Stroke Mother    • Non-contributory Father    • Cancer Father 73        prostate cancer   • Lung Disease Maternal Grandmother    • Lung Disease Paternal Aunt    • Diabetes Paternal Aunt    • Hyperlipidemia Paternal Aunt        Social History     Socioeconomic History   • Marital status:      Spouse name: Not on file   • Number of children: Not on file   • Years of education: Not on file   • Highest education level: Not on file    Occupational History   • Not on file   Social Needs   • Financial resource strain: Not on file   • Food insecurity     Worry: Not on file     Inability: Not on file   • Transportation needs     Medical: Not on file     Non-medical: Not on file   Tobacco Use   • Smoking status: Never Smoker   • Smokeless tobacco: Never Used   Substance and Sexual Activity   • Alcohol use: Yes     Alcohol/week: 0.0 oz     Frequency: Monthly or less     Drinks per session: 1 or 2     Binge frequency: Never   • Drug use: No   • Sexual activity: Yes     Partners: Male   Lifestyle   • Physical activity     Days per week: Not on file     Minutes per session: Not on file   • Stress: Not on file   Relationships   • Social connections     Talks on phone: Not on file     Gets together: Not on file     Attends Cheondo[employer] service: Not on file     Active member of club or organization: Not on file     Attends meetings of clubs or organizations: Not on file     Relationship status: Not on file   • Intimate partner violence     Fear of current or ex partner: Not on file     Emotionally abused: Not on file     Physically abused: Not on file     Forced sexual activity: Not on file   Other Topics Concern   • Not on file   Social History Narrative   • Not on file       Review of Systems   Constitutional: Negative for chills and fever.   Skin: Negative for itching and rash.   All other systems reviewed and are negative.       Objective:     A full mucocutaneous exam was completed including: scalp, hair, ears, face, eyelids, conjunctiva, lips, gums/tongue/oropharynx, neck, chest, breasts, abdomen, back , left and right upper extremities (including hands/digits and fingernails), left and right lower extremities (including feet/toes, toenails), buttocks and including external genitalia (patient refusal;) with the following pertinent findings listed below. Remaining above-listed examined areas within normal limits / negative for rashes or lesions.    There  were no vitals taken for this visit.    Physical Exam   Constitutional: She is oriented to person, place, and time and well-developed, well-nourished, and in no distress.   HENT:   Head: Normocephalic and atraumatic.       Right Ear: External ear normal.   Left Ear: External ear normal.   Nose: Nose normal.   Mouth/Throat: Oropharynx is clear and moist.   Eyes: Conjunctivae and lids are normal.   Neck: Normal range of motion. Neck supple.   Cardiovascular: Intact distal pulses.   Pulmonary/Chest: Effort normal.   Lymphadenopathy:     She has no cervical adenopathy.   Neurological: She is alert and oriented to person, place, and time.   Skin: Skin is warm and dry.   Several tan medium and dark brown stuck-on waxy papules scattered on the face, trunk and extremities    Several scattered tan and light brown macules over trunk and extremities       Psychiatric: Mood and affect normal.       DATA: none applicable    Assessment and Plan:     1. Multiple nevi  - Benign-appearing nature of lesions discussed. Advised to return to clinic for any new or concerning changes.  - ABCDE's of melanoma discussed      2. Other disturbances of skin sensation  R/t to ISK see plan below    3. Inflamed seborrheic keratosis  CRYOTHERAPY:  Risks (including, but not limited to: hypo or hyperpigmentation, redness, blister, blood blister, recurrence, need for further treatment, infection, scar) and benefits of cryotherapy discussed. Patient verbally agreed to proceed with treatment. 2 cryotherapy freeze thaw cycles of 10 seconds were applied to 1 lesion on face with cryac. Patient tolerated procedure well. Aftercare instructions given.      4. Seborrheic keratoses  - Benign-appearing nature of lesions discussed. Advised to return to clinic for any new or concerning changes.      5. Lentigo  - Discussed benign nature of lesions, related to sun exposure  - Discussed sun protection, hand out provided      6. Cherry angioma  - Benign-appearing  nature of lesions discussed. Advised to return to clinic for any new or concerning changes.      7. Skin cancer screening  Skin cancer education  - discussed importance of sun protective clothing, eyewear  - discussed importance of daily use of broad spectrum sunscreen with SPF 30 or greater, as well as need for reapplication ~every 2 hours when exposed to UVR  - discussed importance of regular self-exams, ideally once per month, every 12 months exams in clinic  - ABCDE's of melanoma discussed  - patient to bring any new or concerning lesions to my attention  - Patient educational handout provided and reviewed with patient        Followup: Return in about 1 year (around 6/12/2021) for CHRIS or sooner for any concerns.    MAYRA Hyde.

## 2020-06-22 ENCOUNTER — HOSPITAL ENCOUNTER (OUTPATIENT)
Dept: RADIOLOGY | Facility: MEDICAL CENTER | Age: 50
End: 2020-06-22
Attending: OBSTETRICS & GYNECOLOGY
Payer: COMMERCIAL

## 2020-06-22 DIAGNOSIS — Z12.31 VISIT FOR SCREENING MAMMOGRAM: ICD-10-CM

## 2020-06-22 PROCEDURE — 77067 SCR MAMMO BI INCL CAD: CPT | Mod: 50

## 2020-07-02 ENCOUNTER — TELEPHONE (OUTPATIENT)
Dept: OBGYN | Facility: CLINIC | Age: 50
End: 2020-07-02

## 2020-07-02 NOTE — TELEPHONE ENCOUNTER
Pt called this afternoon asking if she can get her shingles vaccine yet. After looking on the schedule, pt has 1 more pt ahead of her on the waiting list. I let pt know that as soon as we get the shipment in, we will give her a call to schedule a nurse visit for that. Pt understood and no further questions were asked/kb

## 2020-11-02 ENCOUNTER — HOSPITAL ENCOUNTER (OUTPATIENT)
Facility: MEDICAL CENTER | Age: 50
End: 2020-11-02
Attending: PHYSICIAN ASSISTANT
Payer: COMMERCIAL

## 2020-11-02 ENCOUNTER — OFFICE VISIT (OUTPATIENT)
Dept: URGENT CARE | Facility: CLINIC | Age: 50
End: 2020-11-02
Payer: COMMERCIAL

## 2020-11-02 VITALS
WEIGHT: 206 LBS | DIASTOLIC BLOOD PRESSURE: 80 MMHG | RESPIRATION RATE: 16 BRPM | TEMPERATURE: 98.2 F | HEIGHT: 67 IN | SYSTOLIC BLOOD PRESSURE: 130 MMHG | HEART RATE: 66 BPM | BODY MASS INDEX: 32.33 KG/M2 | OXYGEN SATURATION: 97 %

## 2020-11-02 DIAGNOSIS — H81.399 PERIPHERAL VERTIGO, UNSPECIFIED LATERALITY: ICD-10-CM

## 2020-11-02 LAB
APPEARANCE UR: CLEAR
BILIRUB UR STRIP-MCNC: NORMAL MG/DL
COLOR UR AUTO: YELLOW
GLUCOSE UR STRIP.AUTO-MCNC: NORMAL MG/DL
KETONES UR STRIP.AUTO-MCNC: NORMAL MG/DL
LEUKOCYTE ESTERASE UR QL STRIP.AUTO: NORMAL
NITRITE UR QL STRIP.AUTO: NORMAL
PH UR STRIP.AUTO: 5.5 [PH] (ref 5–8)
PROT UR QL STRIP: NORMAL MG/DL
RBC UR QL AUTO: NORMAL
SP GR UR STRIP.AUTO: 1.03
UROBILINOGEN UR STRIP-MCNC: 0.2 MG/DL

## 2020-11-02 PROCEDURE — 81002 URINALYSIS NONAUTO W/O SCOPE: CPT | Performed by: PHYSICIAN ASSISTANT

## 2020-11-02 PROCEDURE — 99214 OFFICE O/P EST MOD 30 MIN: CPT | Performed by: PHYSICIAN ASSISTANT

## 2020-11-02 PROCEDURE — 87086 URINE CULTURE/COLONY COUNT: CPT

## 2020-11-02 RX ORDER — MECLIZINE HCL 12.5 MG/1
12.5 TABLET ORAL 3 TIMES DAILY PRN
Qty: 30 TAB | Refills: 0 | Status: SHIPPED | OUTPATIENT
Start: 2020-11-02 | End: 2020-11-25 | Stop reason: SDUPTHER

## 2020-11-02 ASSESSMENT — ENCOUNTER SYMPTOMS
MYALGIAS: 0
NAUSEA: 0
CONSTIPATION: 0
EYE PAIN: 0
ABDOMINAL PAIN: 0
CHILLS: 0
DIARRHEA: 0
HEADACHES: 0
DIZZINESS: 1
SORE THROAT: 0
FEVER: 0
VOMITING: 0
SHORTNESS OF BREATH: 0
COUGH: 0

## 2020-11-02 ASSESSMENT — FIBROSIS 4 INDEX: FIB4 SCORE: 0.94

## 2020-11-03 DIAGNOSIS — H81.399 PERIPHERAL VERTIGO, UNSPECIFIED LATERALITY: ICD-10-CM

## 2020-11-03 NOTE — PROGRESS NOTES
"Subjective:   Melba Frye is a 50 y.o. female who presents for Otalgia (10/28 pressure both ears, dizziness)      This is a 50-year-old female presenting with several days of ear pressure of both ears as well as 2 episodes ofx disequilibrium lasting around 2 to 3 minutes each.  The patient has had positional vertigo in the past but this was different.  She reports that the first episode she was driving and had an onset of a \"roller coaster sensation\" which resolved over 1 to 2 minutes.  The second episode occurred when she was walking and she had this disequilibrium sensation lasting around 3 minutes.  She has baseline tinnitus in her right greater than left ears.  She has had numerous different ear procedures including tympanostomy and other procedures.  She has never had this problem before specifically.  Her disequilibrium resolves completely at baseline and is currently resolved.  She does not feel like she could triggered by moving her head although she does not want to try      Review of Systems   Constitutional: Negative for chills and fever.   HENT: Positive for ear pain and tinnitus. Negative for congestion and sore throat.    Eyes: Negative for pain.   Respiratory: Negative for cough and shortness of breath.    Cardiovascular: Negative for chest pain.   Gastrointestinal: Negative for abdominal pain, constipation, diarrhea, nausea and vomiting.   Genitourinary: Negative for dysuria.   Musculoskeletal: Negative for myalgias.   Skin: Negative for rash.   Neurological: Positive for dizziness. Negative for headaches.       Medications:    • meclizine Tabs  • meloxicam  • Tretinoin Lotn    Allergies: Bactrim    Problem List: Melba Frye has Fatigue; Mixed anxiety and depressive disorder; Complicated migraine; TMJ arthralgia; Menopausal symptom; Hyperlipidemia; Dermatitis, contact; Lentigo; Seborrheic keratoses; and Dysfunction of both eustachian tubes on their problem list.    Surgical History:  Past " "Surgical History:   Procedure Laterality Date   • MYRINGOTOMY  8/27/2010    Performed by BHARGAV [address] at SURGERY SAME DAY [facility] ORS   • LAPAROSCOPY  5/28/2010    Performed by JACKI SHARMA at SURGERY [facility] ORS   • LYMPH NODE SAMPLING  5/28/2010    Performed by JACKI SHARMA at SURGERY [facility] ORS   • DEBULKING  5/28/2010    Performed by JACKI SHARMA at SURGERY [facility] ORS   • CYSTOSCOPY  10/15/08    Performed by YU GODINEZ at SURGERY SAME DAY [facility] ORS   • VAGINAL HYSTERECTOMY SCOPE TOTAL  10/15/08    Performed by YU GODINEZ at SURGERY SAME DAY [facility] ORS   • OTHER  1984    TONSILECTOMY/ ADNOIDS   • APPENDECTOMY  1981   • TONSILLECTOMY AND ADENOIDECTOMY         Past Social Hx: Melba Frye  reports that she has never smoked. She has never used smokeless tobacco. She reports current alcohol use. She reports that she does not use drugs.     Past Family Hx:  Melba Frye family history includes Arthritis in her mother; Cancer in her mother; Cancer (age of onset: 73) in her father; Diabetes in her paternal aunt; Heart Disease in her mother; Hyperlipidemia in her paternal aunt; Hypertension in her mother; Lung Disease in her maternal grandmother, mother, and paternal aunt; Non-contributory in her father and mother; Psychiatric Illness in her mother; Stroke in her mother.     Problem list, medications, and allergies reviewed by myself today in Epic.     Objective:     /80 (BP Location: Right arm, Patient Position: Sitting, BP Cuff Size: Adult)   Pulse 66   Temp 36.8 °C (98.2 °F) (Temporal)   Resp 16   Ht 1.702 m (5' 7\")   Wt 93.4 kg (206 lb)   SpO2 97%   BMI 32.26 kg/m²     Physical Exam  Vitals signs reviewed.   Constitutional:       Appearance: Normal appearance.   HENT:      Head: Normocephalic and atraumatic.      Right Ear: Ear canal and external ear normal. There is no impacted cerumen.      Left Ear: Ear canal and external ear normal. There is no impacted " cerumen.      Ears:      Comments: Left-sided TM is sclerotic.  Right side is nonerythematous but with some air-fluid bubbles     Nose: Nose normal.      Mouth/Throat:      Mouth: Mucous membranes are moist.   Eyes:      Extraocular Movements: Extraocular movements intact.      Conjunctiva/sclera: Conjunctivae normal.      Pupils: Pupils are equal, round, and reactive to light.   Cardiovascular:      Rate and Rhythm: Normal rate.   Pulmonary:      Effort: Pulmonary effort is normal.   Skin:     General: Skin is warm and dry.      Capillary Refill: Capillary refill takes less than 2 seconds.   Neurological:      General: No focal deficit present.      Mental Status: She is alert and oriented to person, place, and time.      Cranial Nerves: No cranial nerve deficit.      Coordination: Coordination normal.      Gait: Gait normal.      Deep Tendon Reflexes: Reflexes normal.      Comments: Negative Romberg.  Ambulatory with steady station and gait.  Nonfocal neuro exam.       UA  Assessment/Plan:     Diagnosis and associated orders:     1. 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  She does not have any's associations currently.    • Her long history of ear  problems would also suggest a peripheral cause           Differential diagnosis, natural history, supportive care, and indications for immediate follow-up discussed.    Advised the patient to follow-up with the primary care physician for recheck, reevaluation, and consideration of further management.    Please note that this dictation was created using voice recognition software. I have made a reasonable attempt to correct obvious errors, but I expect that there are errors of grammar and possibly content that I did not discover before finalizing the note.    This note was electronically signed by Abhijeet Quijano PA-C

## 2020-11-04 ENCOUNTER — TELEMEDICINE (OUTPATIENT)
Dept: MEDICAL GROUP | Facility: IMAGING CENTER | Age: 50
End: 2020-11-04
Payer: COMMERCIAL

## 2020-11-04 ENCOUNTER — NURSE TRIAGE (OUTPATIENT)
Dept: HEALTH INFORMATION MANAGEMENT | Facility: OTHER | Age: 50
End: 2020-11-04

## 2020-11-04 VITALS — HEIGHT: 67 IN | WEIGHT: 199 LBS | BODY MASS INDEX: 31.23 KG/M2

## 2020-11-04 DIAGNOSIS — R42 VERTIGO: ICD-10-CM

## 2020-11-04 PROCEDURE — 99213 OFFICE O/P EST LOW 20 MIN: CPT | Mod: 95,CR | Performed by: FAMILY MEDICINE

## 2020-11-04 ASSESSMENT — PAIN SCALES - GENERAL: PAINLEVEL: NO PAIN

## 2020-11-04 ASSESSMENT — FIBROSIS 4 INDEX: FIB4 SCORE: 0.94

## 2020-11-04 NOTE — TELEPHONE ENCOUNTER
Regarding: NEXT STEP OF CARE OR CLEARANCE FOR IN OFFICE VISIT   ----- Message from Yanet Calero sent at 11/4/2020  8:04 AM PST -----  PT HAS EAR PAIN, 3X THIS WEEK AND LAST MORE THEN A MINUTE

## 2020-11-04 NOTE — TELEPHONE ENCOUNTER
Ear fullness for 11 years.  Dizzy started 10/28, while she was driving & she had to pull over.  Lasted over one minute & felt shaky & tired thereafter.  11/2 same thing happened, occurred while she was walking, lasted over 1 minute, went to , given meclizine, been taking, had another spell, dizziness last evening 11/3, vitals fine.    This is different than being light headed. She thinks related to her ears.   Ears stopped up right now.         Has had congestion & runny nose, started 1 week ago.  Not a constant, comes & goes.  No travel.  No known covid contact.  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

## 2020-11-04 NOTE — PROGRESS NOTES
Telemedicine Visit: New Patient     This encounter was conducted via Zoom.   Verbal consent was obtained. Patient's identity was verified. Patient aware this will be   billed the same as an in person evaluation.  Patient in home, I am in office at [address] [place] [address]  Subjective:     Chief Complaint   Patient presents with   • Dizziness     1 week ago. 2 episodes       Melba Frye is a 50 y.o. female with history of fatigue, anxiety and depression, migraine, TMJ, menopausal symptoms, hyperlipidemia, and dysfunction of both eustachian tubes on virtual visit to discuss dizziness for 1 week.  She did go to urgent care who gave her meclizine. And referred her to school therapy though patient is concerned because she was told that physical therapy would call her and she has not heard from them. The two times that it occurred it lasted just over a minute. She was driving the first time and the second time she was walking.     Infection: none  Position: not associated with position.   Tinnitus: yes both ears  Hearing loss: none    ROS  See HPI  Constitutional: Negative for fever, chills and malaise/fatigue.   HENT: Negative for congestion, itchy watery eyes  Eyes: Negative for pain or sudden changes in vision  Respiratory: Negative for cough and shortness of breath.    Cardiovascular: Negative for leg swelling or chest pain  Gastrointestinal: Negative for nausea, vomiting, abdominal pain and diarrhea.   Genitourinary: Negative for dysuria and hematuria.   Skin: Negative for rash or concerning moles   Neurological: With dizziness, no focal weakness   Psychiatric/Behavioral: Negative for depression.  The patient is not nervous/anxious.    Musculoskeletal: no weakness or joint stiffness    Allergies   Allergen Reactions   • Bactrim      Fatigue and ringing of the ears       Current medicines (including changes today)  Current Outpatient Medications   Medication Sig Dispense Refill   • meclizine (ANTIVERT)  12.5 MG Tab Take 1 Tab by mouth 3 times a day as needed. 30 Tab 0   • Tretinoin (ALTRENO) 0.05 % Lotion 1 Application by Apply externally route every bedtime. 1 Tube 3   • meloxicam (MOBIC) 15 MG tablet Take 15 mg by mouth every day.       No current facility-administered medications for this visit.        She  has a past medical history of Anxiety, Complicated migraine (2014), Depression, Dermatitis, contact (2015), Fatigue (2014), Hyperlipidemia (2015), Lentigo (10/17/2018), Menopausal symptom (2014), Mixed anxiety and depressive disorder (2014), Seborrheic keratoses (10/17/2018), TMJ arthralgia (2014), and UTI (lower urinary tract infection).  She  has a past surgical history that includes cystoscopy (10/15/08); laparoscopy (2010); lymph node sampling (2010); debulking (2010); appendectomy (); other (); vaginal hysterectomy scope total (10/15/08); myringotomy (2010); and tonsillectomy and adenoidectomy.      Family History   Problem Relation Age of Onset   • Non-contributory Mother    • Lung Disease Mother         COPD   • Cancer Mother         dysplasia    • Arthritis Mother    • Psychiatric Illness Mother    • Heart Disease Mother    • Hypertension Mother    • Stroke Mother    • Non-contributory Father    • Cancer Father 73        prostate cancer   • Lung Disease Maternal Grandmother    • Lung Disease Paternal Aunt    • Diabetes Paternal Aunt    • Hyperlipidemia Paternal Aunt      Family Status   Relation Name Status   • Mo   at age 73   • Fa     • Sis  Alive   • MGMo     • MGFa     • PGMo     • PGFa     • Son  Alive   • PAunt  (Not Specified)       Patient Active Problem List    Diagnosis Date Noted   • Dysfunction of both eustachian tubes 2020   • Lentigo 10/17/2018   • Seborrheic keratoses 10/17/2018   • Dermatitis, contact 2015   • Hyperlipidemia 2015   • Menopausal symptom 2014  "  • Fatigue 06/09/2014   • Mixed anxiety and depressive disorder 06/09/2014   • Complicated migraine 06/09/2014   • TMJ arthralgia 06/09/2014          Objective:   Vitals obtained by patient:  Ht 1.702 m (5' 7\")   Wt 90.3 kg (199 lb)   BMI 31.17 kg/m²     Physical Exam:  Constitutional: Alert, no distress, well-groomed.  Skin: No rashes in visible areas.  Eye: Round. Conjunctiva clear, lids normal. No icterus.   ENMT: Lips pink without lesions, good dentition, moist mucous membranes. Phonation normal.  Neck: No masses, no thyromegaly. Moves freely without pain.  CV: Pulse as reported by patient  Respiratory: Unlabored respiratory effort, no cough or audible wheeze  Psych: Alert and oriented x3, normal affect and mood.   Neuro: symmetric face. Alert and oriented. Follows commands. No aphasia or dysarthria.    Labs:  Office Visit on 11/02/2020   Component Date Value Ref Range Status   • POC Color 11/02/2020 yellow  Negative Final   • POC Appearance 11/02/2020 clear  Negative Final   • POC Leukocyte Esterase 11/02/2020 small  Negative Final   • POC Nitrites 11/02/2020 neg  Negative Final   • POC Urobiligen 11/02/2020 0.2  Negative (0.2) mg/dL Final   • POC Protein 11/02/2020 neg  Negative mg/dL Final   • POC Urine PH 11/02/2020 5.5  5.0 - 8.0 Final   • POC Blood 11/02/2020 neg  Negative Final   • POC Specific Gravity 11/02/2020 1.030  <1.005 - >1.030 Final   • POC Ketones 11/02/2020 neg  Negative mg/dL Final   • POC Bilirubin 11/02/2020 neg  Negative mg/dL Final   • POC Glucose 11/02/2020 neg  Negative mg/dL Final       Imaging:   No results found.    Assessment and Plan:   The following treatment plan was discussed:   -start claritin and flonase.   -Patient prefers that I placed the physical therapy referral again  -discussed most likely cause from her eustachian tube dysfunction and fluid build up in the inner ear possibly displacing her canalith  -continue meclizine as indicated   Problem List Items Addressed This " Visit     None      Visit Diagnoses     Vertigo        Relevant Orders    REFERRAL TO PHYSICAL THERAPY        Follow-up: Return if symptoms worsen or fail to improve.        Portions of this note may be dictated using [employer] voice recognition software.  Variances in spelling and vocabulary are possible and unintentional.  Not all areas may be caught/corrected.  Please notify me if any discrepancies are noted or if the meaning of any statement is not correct/clear.

## 2020-11-05 LAB
BACTERIA UR CULT: NORMAL
SIGNIFICANT IND 70042: NORMAL
SITE SITE: NORMAL
SOURCE SOURCE: NORMAL

## 2020-11-20 ENCOUNTER — PHYSICAL THERAPY (OUTPATIENT)
Dept: PHYSICAL THERAPY | Facility: REHABILITATION | Age: 50
End: 2020-11-20
Attending: FAMILY MEDICINE
Payer: COMMERCIAL

## 2020-11-20 DIAGNOSIS — R42 VERTIGO: ICD-10-CM

## 2020-11-20 PROCEDURE — 97161 PT EVAL LOW COMPLEX 20 MIN: CPT

## 2020-11-20 PROCEDURE — 97535 SELF CARE MNGMENT TRAINING: CPT

## 2020-11-20 ASSESSMENT — ENCOUNTER SYMPTOMS
AWAKENINGS PER NIGHT: 1
PAIN SCALE AT HIGHEST: 10
PAIN TIMING: UNABLE TO SPECIFY
PAIN SCALE AT LOWEST: 0
PAIN SCALE: 6
MIGRAINE HEADACHES: 1

## 2020-11-20 NOTE — OP THERAPY EVALUATION
"  Outpatient Physical Therapy  VESTIBULAR EVALUATION    [address]  [address]  [place] [address]  Phone:  [phone]  Fax:  [phone]    Date of Evaluation: 2020    Patient: Melba Frye  YOB: 1970  MRN: [id number]     Referring Provider: [place] [address] [address]   Referring Diagnosis: Vertigo [R42]     Time Calculation    Start time: 945                Chief Complaint: Vertigo    Visit Diagnoses     ICD-10-CM   1. Vertigo  R42         History of Present Illness:     Mechanism of injury:    Pt presents to PT with complaint of dizziness.  PMH:  fatigue, anxiety and depression, migraine, TMJ, menopausal symptoms, hyperlipidemia, and dysfunction of both eustachian tubes.  The problem started in Oct 28th. The first two times that it occurred it lasted just over a minute. She was driving the first time and the second time she was walking. She did go to urgent care who gave her meclizine (didn't help) and referred her to physical therapy. Since then, she has had 5-6 episodes a day.  Sometimes with motion and sometimes while she is sitting still.  Feels like \"there is a water balloon sloshing back and forth in my head.\" Constant lightheadedness bw episodes. Fatigued   Tried claratin and flonase, which didn't help.     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 Activities: bird watching, cooking reading, hiking.     Headaches: migraine headaches and tension headaches      Ear problems: Pain/pressure bilateral and chronic. Ringing more on the R since onset.    Sleep disturbance:  Interrupted sleep (Prefers L side sleeping)    Times per night awakened:  1  Symptoms:     Current symptom ratin    At best symptom ratin (rare)    At worst symptom rating:  10 (nauseating)    Symptom timing:  Unable to specify    Progression:  Worsening  Social Support:     Lives in:  " Multiple-level home    Lives with:  Spouse  Treatments:     No prior treatments received    Patient goals:     Patient goals for therapy:  Improved balance    Other patient goals:  Resolve dizziness      Past Medical History:   Diagnosis Date   • Anxiety    • Complicated migraine 6/9/2014   • Depression    • Dermatitis, contact 11/16/2015   • Fatigue 6/9/2014   • Hyperlipidemia 1/23/2015   • Lentigo 10/17/2018   • Menopausal symptom 7/2/2014   • Mixed anxiety and depressive disorder 6/9/2014   • Seborrheic keratoses 10/17/2018   • TMJ arthralgia 6/9/2014   • UTI (lower urinary tract infection)      Past Surgical History:   Procedure Laterality Date   • MYRINGOTOMY  8/27/2010    Performed by [address]AV [address] at SURGERY SAME DAY [facility] ORS   • LAPAROSCOPY  5/28/2010    Performed by JAKCI SHARMA at SURGERY [facility] ORS   • LYMPH NODE SAMPLING  5/28/2010    Performed by JACKI SHARMA at SURGERY [facility] ORS   • DEBULKING  5/28/2010    Performed by JACKI SHARMA at SURGERY [facility] ORS   • CYSTOSCOPY  10/15/08    Performed by YU GODINEZ at SURGERY SAME DAY [facility] ORS   • VAGINAL HYSTERECTOMY SCOPE TOTAL  10/15/08    Performed by YU GODINEZ at SURGERY SAME DAY [facility] ORS   • OTHER  1984    TONSILECTOMY/ ADNOIDS   • APPENDECTOMY  1981   • TONSILLECTOMY AND ADENOIDECTOMY       Social History     Tobacco Use   • Smoking status: Never Smoker   • Smokeless tobacco: Never Used   Substance Use Topics   • Alcohol use: Not Currently     Alcohol/week: 0.0 oz     Frequency: Monthly or less     Drinks per session: 1 or 2     Binge frequency: Never     Family and Occupational History     Socioeconomic History   • Marital status:      Spouse name: Not on file   • Number of children: Not on file   • Years of education: Not on file   • Highest education level: Not on file   Occupational History   • Not on file       Objective:    Gait:     Comments: Unremarkable  Vestibulospinal Exam:     MCTSIB:          Firm, eyes open: within normal limits        Firm, eyes closed: within normal limits        Foam, eyes open: within normal limits        Foam, eyes closed: within normal limits        Composite Score: within normal limits      Dynamic Gait Index: 24/24  Oculomotor Exam:         Details: No spontaneous nystagmus central gaze          Details: No spontaneous nystagmus eccentric gaze    No saccadic eye movements    Smooth pursuit present    Convergence:        Normal convergence    No skew  Active Range of Motion:     Within functional limits  Limb Ataxia Exam:     Finger-to-Nose:         Intention tremor: none    Dysdiadochokinesia: none.  Strength Exam:     Upper extremities within functional limits    Lower extremities within functional limits  Reflex Exam:     Sanchez reflex: absent      Deep Tendon Reflexes:         Left L4 (Patellar): normal (2+)        Right L4 (Patellar): normal (2+)  Sensation Tests:     Left Light Touch Sensation:         All left lumbar dermatomes intact      Right Light Touch Sensation:         All right lumbar dermatomes intact      Vertebrobasilar Exam:    Comments: NEG seated active exam  Vestibulo-Ocular Exam:     Abnormal head thrust test        Details: corrective saccade on head moving right (3 of 5 attempts)  BPPV Exam:     Normal Highland-Hallpike    Normal straight head-hanging test    Normal roll test  Breathing-Related Tests:     Normal hyperventilation test    Normal Valsalva test    Comments: Had an episode of her fwd/back sloshing sensation after the valsalva.  Was delayed 10-15 seconds and lasted about 30 seconds. NEG for nystagmus and did not happen on repeat testing. Perhaps spontaneous?        Therapeutic Treatments and Modalities:     1. Functional Training, Self Care (CPT 11790), Education: discussed testing results, sources of dizziness, importance of following through with sinus health recommendations considering issues with eustachian tubes. Discussed goals of VRT and  initiated trial of HEP: head circles, VORx1 and clayton sways EC.     Time-based treatments/modalities:             Assessment:     Functional impairments:  Decreased gaze stabilization and decreased postural stability    Other impairments:  Spontaneous dizziness (5-6 per day)    Assessment details:    Mrs. Frye is a 50 y.o female who presents to PT with complaint of dizziness, onset 10/28/20.  Her history is notable for migraines, chronic inner ear problems with dysfunction of both eustachian tubes and anxiety.  PT evaluation was relatively unremarkable, but some subtle findings could suggest vestibular neuritis.  Pt demonstrated decreased VOR control with GALINDO to the R and onset of episode after valsalva (pressure) testing.  Suggesting pt follow through with recommended sinus hygiene and antihistamine recommendations along with a trial of skilled PT services for VRT.      She may wish to speak with Dr. Nguyen about whether a trial of an anti-inflammatory/cortisone would be indicated to reduce inner ear pressure. Was not able to schedule with the ENT for several more months.     Prognosis: good    Goals:     Short term goals:  See LTGs    Long term goals:  - Reduce DHI to less than 20%  - Pt able to report no episodes of dizziness x 1 week  - Indep with HEP and 'just right' challenge progression    Long term goal time span:  6-8 weeks  Plan:     Therapy options:  Physical therapy treatment to continue    Planned therapy interventions:  Canalith Repositioning (CPT 87024), Neuromuscular Re-education (CPT 97811), Functional Training, Self Care (CPT 52510), Therapeutic Exercise (CPT 13252) and Therapeutic Activities (CPT 41795)    Frequency:  1x week    Duration in weeks:  8    Discussed with:  Patient      Functional Assessment Used    DHI= 60%      Referring provider co-signature:  I have reviewed this plan of care and my co-signature certifies the need for services.    Certification Period: 11/20/2020 to   01/15/21    Physician Signature: ________________________________ Date: ______________

## 2020-11-23 ENCOUNTER — APPOINTMENT (OUTPATIENT)
Dept: PHYSICAL THERAPY | Facility: REHABILITATION | Age: 50
End: 2020-11-23
Attending: FAMILY MEDICINE
Payer: COMMERCIAL

## 2020-11-25 ENCOUNTER — TELEMEDICINE (OUTPATIENT)
Dept: MEDICAL GROUP | Facility: IMAGING CENTER | Age: 50
End: 2020-11-25
Payer: COMMERCIAL

## 2020-11-25 VITALS — WEIGHT: 203 LBS | HEIGHT: 67 IN | BODY MASS INDEX: 31.86 KG/M2

## 2020-11-25 DIAGNOSIS — H69.93 DYSFUNCTION OF BOTH EUSTACHIAN TUBES: ICD-10-CM

## 2020-11-25 DIAGNOSIS — H81.399 PERIPHERAL VERTIGO, UNSPECIFIED LATERALITY: ICD-10-CM

## 2020-11-25 PROCEDURE — 99214 OFFICE O/P EST MOD 30 MIN: CPT | Mod: 95,CR | Performed by: FAMILY MEDICINE

## 2020-11-25 RX ORDER — MECLIZINE HCL 12.5 MG/1
12.5 TABLET ORAL
Qty: 30 TAB | Refills: 0 | Status: SHIPPED | OUTPATIENT
Start: 2020-11-25 | End: 2020-12-23

## 2020-11-25 RX ORDER — HYDROCHLOROTHIAZIDE 12.5 MG/1
12.5 TABLET ORAL EVERY MORNING
Qty: 30 TAB | Refills: 0 | Status: SHIPPED | OUTPATIENT
Start: 2020-11-25 | End: 2020-12-23

## 2020-11-25 ASSESSMENT — FIBROSIS 4 INDEX: FIB4 SCORE: 0.94

## 2020-11-25 NOTE — PROGRESS NOTES
Telemedicine Visit: New Patient     This encounter was conducted via Zoom.   Verbal consent was obtained. Patient's identity was verified. Patient aware this will be   billed the same as an in person evaluation.  Patient in home, I am in office at [address] [place] [address]  Subjective:     Chief Complaint   Patient presents with   • Paperwork     JONGBECKY Frye is a 50 y.o. female with history of fatigue, anxiety and depression, complicated migraine, TMJ, menopausal symptoms, hyperlipidemia, eustachian tube dysfunction on virtual visit to discuss leave of absence.  She also wants to discuss a steroid prescription.  She went to pt who does not think she has vertigo or bppv   She is sleeping elevated.   It has been j just under 3 weeks since she started with vertigo symptoms which are intermittent in nature and not associated with position.  Tendinitis bilaterally.  No hearing loss.  Last blood pressure read 130/80  Was seeing Dr. Suarez and Kevyn has an apt in [place]    She gets weakness if walking during an episode of vertigo. It occurs both arms and both legs. 5-6 times per day it is occurring last 15 sec to a minute.       ROS  See HPI  Constitutional: Negative for fever, chills and malaise/fatigue.   HENT: Negative for congestion, itchy watery eyes  Eyes: Negative for pain or sudden changes in vision  Respiratory: Negative for cough and shortness of breath.    Cardiovascular: Negative for leg swelling or chest pain  Gastrointestinal: Negative for nausea, vomiting, abdominal pain and diarrhea.   Genitourinary: Negative for dysuria and hematuria.   Skin: Negative for rash or concerning moles   Neurological: Negative for dizziness, focal weakness   Psychiatric/Behavioral: Negative for depression.  The patient is not nervous/anxious.    Musculoskeletal: no weakness or joint stiffness    Allergies   Allergen Reactions   • Bactrim      Fatigue and ringing of the ears       Current medicines  (including changes today)  Current Outpatient Medications   Medication Sig Dispense Refill   • Fluticasone Propionate (FLONASE NA) Administer  into affected nostril(S).     • hydroCHLOROthiazide (HYDRODIURIL) 12.5 MG tablet Take 1 Tab by mouth every morning. 30 Tab 0   • meclizine (ANTIVERT) 12.5 MG Tab Take 1 Tab by mouth every bedtime. 30 Tab 0   • Tretinoin (ALTRENO) 0.05 % Lotion 1 Application by Apply externally route every bedtime. 1 Tube 3   • meloxicam (MOBIC) 15 MG tablet Take 15 mg by mouth every day.       No current facility-administered medications for this visit.        She  has a past medical history of Anxiety, Complicated migraine (2014), Depression, Dermatitis, contact (2015), Fatigue (2014), Hyperlipidemia (2015), Lentigo (10/17/2018), Menopausal symptom (2014), Mixed anxiety and depressive disorder (2014), Seborrheic keratoses (10/17/2018), TMJ arthralgia (2014), and UTI (lower urinary tract infection).  She  has a past surgical history that includes cystoscopy (10/15/08); laparoscopy (2010); lymph node sampling (2010); debulking (2010); appendectomy (); other (); vaginal hysterectomy scope total (10/15/08); myringotomy (2010); and tonsillectomy and adenoidectomy.      Family History   Problem Relation Age of Onset   • Non-contributory Mother    • Lung Disease Mother         COPD   • Cancer Mother         dysplasia    • Arthritis Mother    • Psychiatric Illness Mother    • Heart Disease Mother    • Hypertension Mother    • Stroke Mother    • Non-contributory Father    • Cancer Father 73        prostate cancer   • Lung Disease Maternal Grandmother    • Lung Disease Paternal Aunt    • Diabetes Paternal Aunt    • Hyperlipidemia Paternal Aunt      Family Status   Relation Name Status   • Mo   at age 73   • Fa     • Sis  Alive   • MGMo     • MGFa     • PGMo     • PGFa     • Son  Alive   • PAunt   "(Not Specified)       Patient Active Problem List    Diagnosis Date Noted   • Dysfunction of both eustachian tubes 03/04/2020   • Lentigo 10/17/2018   • Seborrheic keratoses 10/17/2018   • Dermatitis, contact 11/16/2015   • Hyperlipidemia 01/23/2015   • Menopausal symptom 07/02/2014   • Fatigue 06/09/2014   • Mixed anxiety and depressive disorder 06/09/2014   • Complicated migraine 06/09/2014   • TMJ arthralgia 06/09/2014          Objective:   Vitals obtained by patient:  Ht 1.702 m (5' 7\")   Wt 92.1 kg (203 lb)   BMI 31.79 kg/m²     Physical Exam:  Constitutional: Alert, no distress, well-groomed.  Skin: No rashes in visible areas.  Eye: Round. Conjunctiva clear, lids normal. No icterus.   ENMT: Lips pink without lesions, good dentition, moist mucous membranes. Phonation normal.  Neck: No masses, no thyromegaly. Moves freely without pain.  CV: Pulse as reported by patient  Respiratory: Unlabored respiratory effort, no cough or audible wheeze  Psych: Alert and oriented x3, normal affect and mood.   Neuro: symmetric face. Alert and oriented. Follows commands. No aphasia or dysarthria.    Labs:  Hospital Outpatient Visit on 11/02/2020   Component Date Value Ref Range Status   • Significant Indicator 11/02/2020 NEG   Final   • Source 11/02/2020 UR   Final   • Site 11/02/2020 -   Final   • Culture Result 11/02/2020 Mixed skin bernie 10-50,000 cfu/mL   Final   Office Visit on 11/02/2020   Component Date Value Ref Range Status   • POC Color 11/02/2020 yellow  Negative Final   • POC Appearance 11/02/2020 clear  Negative Final   • POC Leukocyte Esterase 11/02/2020 small  Negative Final   • POC Nitrites 11/02/2020 neg  Negative Final   • POC Urobiligen 11/02/2020 0.2  Negative (0.2) mg/dL Final   • POC Protein 11/02/2020 neg  Negative mg/dL Final   • POC Urine PH 11/02/2020 5.5  5.0 - 8.0 Final   • POC Blood 11/02/2020 neg  Negative Final   • POC Specific Gravity 11/02/2020 1.030  <1.005 - >1.030 Final   • POC Ketones " 11/02/2020 neg  Negative mg/dL Final   • POC Bilirubin 11/02/2020 neg  Negative mg/dL Final   • POC Glucose 11/02/2020 neg  Negative mg/dL Final       Imaging:   No results found.    Assessment and Plan:   The following treatment plan was discussed:   Take meclinzine at night  hctz in am   Eustachian tube exercises throughout the day  Continue to ent  rto 5 days for forms  Consider steroid if this does not work and or imaging of sinues  Problem List Items Addressed This Visit     Dysfunction of both eustachian tubes    Relevant Medications    hydroCHLOROthiazide (HYDRODIURIL) 12.5 MG tablet    meclizine (ANTIVERT) 12.5 MG Tab      Other Visit Diagnoses     Peripheral vertigo, unspecified laterality        Relevant Medications    hydroCHLOROthiazide (HYDRODIURIL) 12.5 MG tablet    meclizine (ANTIVERT) 12.5 MG Tab          Follow-up: Return in about 5 days (around 11/30/2020).        Portions of this note may be dictated using [employer] voice recognition software.  Variances in spelling and vocabulary are possible and unintentional.  Not all areas may be caught/corrected.  Please notify me if any discrepancies are noted or if the meaning of any statement is not correct/clear.

## 2020-11-30 ENCOUNTER — PHYSICAL THERAPY (OUTPATIENT)
Dept: PHYSICAL THERAPY | Facility: REHABILITATION | Age: 50
End: 2020-11-30
Attending: FAMILY MEDICINE
Payer: COMMERCIAL

## 2020-11-30 DIAGNOSIS — R42 VERTIGO: ICD-10-CM

## 2020-11-30 PROCEDURE — 97112 NEUROMUSCULAR REEDUCATION: CPT

## 2020-11-30 NOTE — OP THERAPY DAILY TREATMENT
Outpatient Physical Therapy  DAILY TREATMENT     [facility] Physical [address]  [address]  [place] [address]  Phone:  [phone]  Fax:  [phone]    Date: 11/30/2020    Patient: Melba Frye  YOB: 1970  MRN: [id number]     Time Calculation    Start time: 1346  Stop time: 1442 Time Calculation (min): 56 minutes         Chief Complaint: Vertigo    Visit #: 2    SUBJECTIVE:  States she has been using saline for sinuses, humidifier and sleeping with head elevated. Also given a diuretic to trial.  States she feels less wooshy in the head, but isn't sure what is helping. Concerned that while the headiness has improved, had some symptoms of the L side of her body pulsing/twitching. Not present today.  Had 1 day that felt clear, but then felt the pressure come over her again. Feels like sometimes having a diet coke (caffene) really helps.     OBJECTIVE:      Therapeutic Treatments and Modalities:     1. Neuromuscular Re-education (CPT 21197)    Therapeutic Treatment and Modalities Summary:   - Screening: bilat C5/6, L4/S1 reflexes 2/4. Coordination (heel to shin and finger to nose) intact, clonus and hoffmans neg, ANNELIESE intact.   - Standing head circles EO and EC x 10 ea  - Standing airex static balance EC  - Airex VORx1 x 1 min  - Airex EC HTs x 10 ea  - Rocker board: AP and lateral x 1 min ea, lateral EC and balance on tilt AP.  Too difficult to complete EC AP    Time-based treatments/modalities:    Physical Therapy Timed Treatment Charges  Neuromusc re-ed, balance, coor, post minutes (CPT 73165): 56 minutes    ASSESSMENT:   Response to treatment: Overall improved tolerance to movement in the clinic and improved willingness to challenge more complex tasks. Reproduces motion sensitivity and imbalance, but does not trigger nausea or headache. Ok to progress HEP with above challenges. Check back in 2 weeks.     PLAN/RECOMMENDATIONS:   Plan for treatment: therapy treatment  to continue next visit.  Planned interventions for next visit: continue with current treatment.

## 2020-12-01 ENCOUNTER — TELEMEDICINE (OUTPATIENT)
Dept: MEDICAL GROUP | Facility: IMAGING CENTER | Age: 50
End: 2020-12-01
Payer: COMMERCIAL

## 2020-12-01 VITALS — HEIGHT: 67 IN | WEIGHT: 205 LBS | BODY MASS INDEX: 32.18 KG/M2

## 2020-12-01 DIAGNOSIS — R25.1 TREMOR: ICD-10-CM

## 2020-12-01 DIAGNOSIS — R42 VERTIGO: ICD-10-CM

## 2020-12-01 DIAGNOSIS — R53.1 WEAKNESS: ICD-10-CM

## 2020-12-01 PROCEDURE — 99214 OFFICE O/P EST MOD 30 MIN: CPT | Mod: 95,CR | Performed by: FAMILY MEDICINE

## 2020-12-01 ASSESSMENT — FIBROSIS 4 INDEX: FIB4 SCORE: 0.94

## 2020-12-01 ASSESSMENT — PAIN SCALES - GENERAL: PAINLEVEL: 3=SLIGHT PAIN

## 2020-12-01 NOTE — PROGRESS NOTES
Telemedicine Visit: New Patient     This encounter was conducted via Zoom.   Verbal consent was obtained. Patient's identity was verified. Patient aware this will be   billed the same as an in person evaluation.  Patient in home, I am in office at [address] [place] [address]  Subjective:     Chief Complaint   Patient presents with   • Paperwork     leave of absence        Melba Frye is a 50 y.o. female with history of vertigo on virtual visit to discuss la paper work.  He has been having intermittent episodes of vertigo for the past few weeks now.  Physical therapy was unhelpful.  Meclizine helped as well as hydrochlorothiazide which were recently started to reduce symptoms.  Patient however reports that she still does get episodes of dizziness though less severe she has also noticed her left arm and left leg twitching during the episode and also feels weakness of the left arm and left leg all of which resolved within 30 seconds.  With no residual effect.  She does have ringing in the ears bilaterally right worse than left this is a chronic issue.  She will be seeing ENT in January.  She continues to see physical therapy.  She denies any drooping of the face or changes in speech during these episodes.  No loss of consciousness during this episode as well.    ROS  See HPI  Constitutional: Negative for fever, chills and malaise/fatigue.   HENT: Negative for congestion, itchy watery eyes  Eyes: Negative for pain or sudden changes in vision  Respiratory: Negative for cough and shortness of breath.    Cardiovascular: Negative for leg swelling or chest pain  Gastrointestinal: Negative for nausea, vomiting, abdominal pain and diarrhea.   Genitourinary: Negative for dysuria and hematuria.   Skin: Negative for rash or concerning moles   Neurological: Negative for dizziness, with intermittent weakness  Psychiatric/Behavioral: Negative for depression.  The patient is not nervous/anxious.    Musculoskeletal:  no weakness or joint stiffness    Allergies   Allergen Reactions   • Bactrim      Fatigue and ringing of the ears       Current medicines (including changes today)  Current Outpatient Medications   Medication Sig Dispense Refill   • Fluticasone Propionate (FLONASE NA) Administer  into affected nostril(S).     • hydroCHLOROthiazide (HYDRODIURIL) 12.5 MG tablet Take 1 Tab by mouth every morning. 30 Tab 0   • meclizine (ANTIVERT) 12.5 MG Tab Take 1 Tab by mouth every bedtime. 30 Tab 0   • Tretinoin (ALTRENO) 0.05 % Lotion 1 Application by Apply externally route every bedtime. 1 Tube 3     No current facility-administered medications for this visit.        She  has a past medical history of Anxiety, Complicated migraine (2014), Depression, Dermatitis, contact (2015), Fatigue (2014), Hyperlipidemia (2015), Lentigo (10/17/2018), Menopausal symptom (2014), Mixed anxiety and depressive disorder (2014), Seborrheic keratoses (10/17/2018), TMJ arthralgia (2014), and UTI (lower urinary tract infection).  She  has a past surgical history that includes cystoscopy (10/15/08); laparoscopy (2010); lymph node sampling (2010); debulking (2010); appendectomy (); other (); vaginal hysterectomy scope total (10/15/08); myringotomy (2010); and tonsillectomy and adenoidectomy.      Family History   Problem Relation Age of Onset   • Non-contributory Mother    • Lung Disease Mother         COPD   • Cancer Mother         dysplasia    • Arthritis Mother    • Psychiatric Illness Mother    • Heart Disease Mother    • Hypertension Mother    • Stroke Mother    • Non-contributory Father    • Cancer Father 73        prostate cancer   • Lung Disease Maternal Grandmother    • Lung Disease Paternal Aunt    • Diabetes Paternal Aunt    • Hyperlipidemia Paternal Aunt      Family Status   Relation Name Status   • Mo   at age 73   • Fa     • Sis  Alive   • MGMo     • MGFa   "   • PGMo     • PGFa     • Son  Alive   • PAunt  (Not Specified)       Patient Active Problem List    Diagnosis Date Noted   • Dysfunction of both eustachian tubes 2020   • Lentigo 10/17/2018   • Seborrheic keratoses 10/17/2018   • Dermatitis, contact 2015   • Hyperlipidemia 2015   • Menopausal symptom 2014   • Fatigue 2014   • Mixed anxiety and depressive disorder 2014   • Complicated migraine 2014   • TMJ arthralgia 2014          Objective:   Vitals obtained by patient:  Ht 1.702 m (5' 7\")   Wt 93 kg (205 lb)   BMI 32.11 kg/m²     Physical Exam:  Constitutional: Alert, no distress, well-groomed.  Skin: No rashes in visible areas.  Eye: Round. Conjunctiva clear, lids normal. No icterus.   ENMT: Lips pink without lesions, good dentition, moist mucous membranes. Phonation normal.  Neck: No masses, no thyromegaly. Moves freely without pain.  CV: Pulse as reported by patient  Respiratory: Unlabored respiratory effort, no cough or audible wheeze  Psych: Alert and oriented x3, normal affect and mood.   Neuro: symmetric face. Alert and oriented. Follows commands. No aphasia or dysarthria.    Labs:  Hospital Outpatient Visit on 2020   Component Date Value Ref Range Status   • Significant Indicator 2020 NEG   Final   • Source 2020 UR   Final   • Site 2020 -   Final   • Culture Result 2020 Mixed skin bernie 10-50,000 cfu/mL   Final   Office Visit on 2020   Component Date Value Ref Range Status   • POC Color 2020 yellow  Negative Final   • POC Appearance 2020 clear  Negative Final   • POC Leukocyte Esterase 2020 small  Negative Final   • POC Nitrites 2020 neg  Negative Final   • POC Urobiligen 2020 0.2  Negative (0.2) mg/dL Final   • POC Protein 2020 neg  Negative mg/dL Final   • POC Urine PH 2020 5.5  5.0 - 8.0 Final   • POC Blood 2020 neg  Negative Final   • POC " Specific Gravity 11/02/2020 1.030  <1.005 - >1.030 Final   • POC Ketones 11/02/2020 neg  Negative mg/dL Final   • POC Bilirubin 11/02/2020 neg  Negative mg/dL Final   • POC Glucose 11/02/2020 neg  Negative mg/dL Final       Imaging:   No results found.    Assessment and Plan:   The following treatment plan was discussed:   Continue hctz and meclizine for now.   Problem List Items Addressed This Visit     None      Visit Diagnoses     Weakness        Relevant Orders    REFERRAL TO NEUROLOGY    MR-BRAIN-WITH & W/O    ESTIMATED GFR    CBC WITH DIFFERENTIAL    Comp Metabolic Panel    Tremor        Relevant Orders    REFERRAL TO NEUROLOGY    MR-BRAIN-WITH & W/O    ESTIMATED GFR    CBC WITH DIFFERENTIAL    Comp Metabolic Panel    Vertigo        Relevant Orders    REFERRAL TO NEUROLOGY    MR-BRAIN-WITH & W/O    ESTIMATED GFR    CBC WITH DIFFERENTIAL    Comp Metabolic Panel          Follow-up: Return if symptoms worsen or fail to improve.        Portions of this note may be dictated using Dragon NaturallySpeaking voice recognition software.  Variances in spelling and vocabulary are possible and unintentional.  Not all areas may be caught/corrected.  Please notify me if any discrepancies are noted or if the meaning of any statement is not correct/clear.

## 2020-12-03 ENCOUNTER — APPOINTMENT (OUTPATIENT)
Dept: PHYSICAL THERAPY | Facility: REHABILITATION | Age: 50
End: 2020-12-03
Attending: PSYCHIATRY & NEUROLOGY
Payer: COMMERCIAL

## 2020-12-07 ENCOUNTER — HOSPITAL ENCOUNTER (OUTPATIENT)
Dept: LAB | Facility: MEDICAL CENTER | Age: 50
End: 2020-12-07
Attending: FAMILY MEDICINE
Payer: COMMERCIAL

## 2020-12-07 DIAGNOSIS — R42 VERTIGO: ICD-10-CM

## 2020-12-07 DIAGNOSIS — R53.1 WEAKNESS: ICD-10-CM

## 2020-12-07 DIAGNOSIS — R25.1 TREMOR: ICD-10-CM

## 2020-12-07 LAB
ALBUMIN SERPL BCP-MCNC: 4.2 G/DL (ref 3.2–4.9)
ALBUMIN/GLOB SERPL: 1.4 G/DL
ALP SERPL-CCNC: 107 U/L (ref 30–99)
ALT SERPL-CCNC: 14 U/L (ref 2–50)
ANION GAP SERPL CALC-SCNC: 11 MMOL/L (ref 7–16)
AST SERPL-CCNC: 16 U/L (ref 12–45)
BASOPHILS # BLD AUTO: 0.5 % (ref 0–1.8)
BASOPHILS # BLD: 0.04 K/UL (ref 0–0.12)
BILIRUB SERPL-MCNC: 0.2 MG/DL (ref 0.1–1.5)
BUN SERPL-MCNC: 13 MG/DL (ref 8–22)
CALCIUM SERPL-MCNC: 9 MG/DL (ref 8.4–10.2)
CHLORIDE SERPL-SCNC: 104 MMOL/L (ref 96–112)
CO2 SERPL-SCNC: 27 MMOL/L (ref 20–33)
CREAT SERPL-MCNC: 0.71 MG/DL (ref 0.5–1.4)
EOSINOPHIL # BLD AUTO: 0.09 K/UL (ref 0–0.51)
EOSINOPHIL NFR BLD: 1.1 % (ref 0–6.9)
ERYTHROCYTE [DISTWIDTH] IN BLOOD BY AUTOMATED COUNT: 43.2 FL (ref 35.9–50)
FASTING STATUS PATIENT QL REPORTED: NORMAL
GLOBULIN SER CALC-MCNC: 2.9 G/DL (ref 1.9–3.5)
GLUCOSE SERPL-MCNC: 101 MG/DL (ref 65–99)
HCT VFR BLD AUTO: 40.8 % (ref 37–47)
HGB BLD-MCNC: 13.8 G/DL (ref 12–16)
IMM GRANULOCYTES # BLD AUTO: 0.02 K/UL (ref 0–0.11)
IMM GRANULOCYTES NFR BLD AUTO: 0.3 % (ref 0–0.9)
LYMPHOCYTES # BLD AUTO: 2.78 K/UL (ref 1–4.8)
LYMPHOCYTES NFR BLD: 35.1 % (ref 22–41)
MCH RBC QN AUTO: 32.2 PG (ref 27–33)
MCHC RBC AUTO-ENTMCNC: 33.8 G/DL (ref 33.6–35)
MCV RBC AUTO: 95.1 FL (ref 81.4–97.8)
MONOCYTES # BLD AUTO: 0.64 K/UL (ref 0–0.85)
MONOCYTES NFR BLD AUTO: 8.1 % (ref 0–13.4)
NEUTROPHILS # BLD AUTO: 4.36 K/UL (ref 2–7.15)
NEUTROPHILS NFR BLD: 54.9 % (ref 44–72)
NRBC # BLD AUTO: 0 K/UL
NRBC BLD-RTO: 0 /100 WBC
PLATELET # BLD AUTO: 194 K/UL (ref 164–446)
PMV BLD AUTO: 10.2 FL (ref 9–12.9)
POTASSIUM SERPL-SCNC: 3.7 MMOL/L (ref 3.6–5.5)
PROT SERPL-MCNC: 7.1 G/DL (ref 6–8.2)
RBC # BLD AUTO: 4.29 M/UL (ref 4.2–5.4)
SODIUM SERPL-SCNC: 142 MMOL/L (ref 135–145)
WBC # BLD AUTO: 7.9 K/UL (ref 4.8–10.8)

## 2020-12-07 PROCEDURE — 36415 COLL VENOUS BLD VENIPUNCTURE: CPT

## 2020-12-07 PROCEDURE — 80053 COMPREHEN METABOLIC PANEL: CPT

## 2020-12-07 PROCEDURE — 85025 COMPLETE CBC W/AUTO DIFF WBC: CPT

## 2020-12-10 ENCOUNTER — APPOINTMENT (OUTPATIENT)
Dept: RADIOLOGY | Facility: MEDICAL CENTER | Age: 50
DRG: 060 | End: 2020-12-10
Attending: EMERGENCY MEDICINE
Payer: COMMERCIAL

## 2020-12-10 ENCOUNTER — PHYSICAL THERAPY (OUTPATIENT)
Dept: PHYSICAL THERAPY | Facility: REHABILITATION | Age: 50
End: 2020-12-10
Attending: FAMILY MEDICINE
Payer: COMMERCIAL

## 2020-12-10 ENCOUNTER — HOSPITAL ENCOUNTER (INPATIENT)
Facility: MEDICAL CENTER | Age: 50
LOS: 2 days | DRG: 060 | End: 2020-12-13
Attending: EMERGENCY MEDICINE | Admitting: STUDENT IN AN ORGANIZED HEALTH CARE EDUCATION/TRAINING PROGRAM
Payer: COMMERCIAL

## 2020-12-10 DIAGNOSIS — R29.898 LEFT ARM WEAKNESS: ICD-10-CM

## 2020-12-10 DIAGNOSIS — R42 VERTIGO: ICD-10-CM

## 2020-12-10 LAB
ALBUMIN SERPL BCP-MCNC: 4.6 G/DL (ref 3.2–4.9)
ALBUMIN/GLOB SERPL: 1.6 G/DL
ALP SERPL-CCNC: 102 U/L (ref 30–99)
ALT SERPL-CCNC: 16 U/L (ref 2–50)
ANION GAP SERPL CALC-SCNC: 11 MMOL/L (ref 7–16)
AST SERPL-CCNC: 18 U/L (ref 12–45)
BASOPHILS # BLD AUTO: 0.6 % (ref 0–1.8)
BASOPHILS # BLD: 0.05 K/UL (ref 0–0.12)
BILIRUB SERPL-MCNC: 0.2 MG/DL (ref 0.1–1.5)
BUN SERPL-MCNC: 18 MG/DL (ref 8–22)
CALCIUM SERPL-MCNC: 9.7 MG/DL (ref 8.5–10.5)
CHLORIDE SERPL-SCNC: 101 MMOL/L (ref 96–112)
CO2 SERPL-SCNC: 24 MMOL/L (ref 20–33)
CREAT SERPL-MCNC: 0.66 MG/DL (ref 0.5–1.4)
CRP SERPL HS-MCNC: 0.27 MG/DL (ref 0–0.75)
EKG IMPRESSION: NORMAL
EOSINOPHIL # BLD AUTO: 0.07 K/UL (ref 0–0.51)
EOSINOPHIL NFR BLD: 0.9 % (ref 0–6.9)
ERYTHROCYTE [DISTWIDTH] IN BLOOD BY AUTOMATED COUNT: 43.5 FL (ref 35.9–50)
GLOBULIN SER CALC-MCNC: 2.8 G/DL (ref 1.9–3.5)
GLUCOSE SERPL-MCNC: 83 MG/DL (ref 65–99)
HCT VFR BLD AUTO: 43.4 % (ref 37–47)
HGB BLD-MCNC: 14.5 G/DL (ref 12–16)
IMM GRANULOCYTES # BLD AUTO: 0.02 K/UL (ref 0–0.11)
IMM GRANULOCYTES NFR BLD AUTO: 0.3 % (ref 0–0.9)
LYMPHOCYTES # BLD AUTO: 3.11 K/UL (ref 1–4.8)
LYMPHOCYTES NFR BLD: 39 % (ref 22–41)
MCH RBC QN AUTO: 31.4 PG (ref 27–33)
MCHC RBC AUTO-ENTMCNC: 33.4 G/DL (ref 33.6–35)
MCV RBC AUTO: 93.9 FL (ref 81.4–97.8)
MONOCYTES # BLD AUTO: 0.61 K/UL (ref 0–0.85)
MONOCYTES NFR BLD AUTO: 7.7 % (ref 0–13.4)
NEUTROPHILS # BLD AUTO: 4.11 K/UL (ref 2–7.15)
NEUTROPHILS NFR BLD: 51.5 % (ref 44–72)
NRBC # BLD AUTO: 0 K/UL
NRBC BLD-RTO: 0 /100 WBC
PLATELET # BLD AUTO: 203 K/UL (ref 164–446)
PMV BLD AUTO: 10.5 FL (ref 9–12.9)
POTASSIUM SERPL-SCNC: 3.8 MMOL/L (ref 3.6–5.5)
PROT SERPL-MCNC: 7.4 G/DL (ref 6–8.2)
RBC # BLD AUTO: 4.62 M/UL (ref 4.2–5.4)
SODIUM SERPL-SCNC: 136 MMOL/L (ref 135–145)
TROPONIN T SERPL-MCNC: <6 NG/L (ref 6–19)
WBC # BLD AUTO: 8 K/UL (ref 4.8–10.8)

## 2020-12-10 PROCEDURE — 36415 COLL VENOUS BLD VENIPUNCTURE: CPT

## 2020-12-10 PROCEDURE — 80307 DRUG TEST PRSMV CHEM ANLYZR: CPT

## 2020-12-10 PROCEDURE — 700117 HCHG RX CONTRAST REV CODE 255: Performed by: EMERGENCY MEDICINE

## 2020-12-10 PROCEDURE — 85652 RBC SED RATE AUTOMATED: CPT

## 2020-12-10 PROCEDURE — 93005 ELECTROCARDIOGRAM TRACING: CPT | Performed by: EMERGENCY MEDICINE

## 2020-12-10 PROCEDURE — 81001 URINALYSIS AUTO W/SCOPE: CPT

## 2020-12-10 PROCEDURE — 86140 C-REACTIVE PROTEIN: CPT

## 2020-12-10 PROCEDURE — 84484 ASSAY OF TROPONIN QUANT: CPT

## 2020-12-10 PROCEDURE — 97112 NEUROMUSCULAR REEDUCATION: CPT

## 2020-12-10 PROCEDURE — 80053 COMPREHEN METABOLIC PANEL: CPT

## 2020-12-10 PROCEDURE — 85025 COMPLETE CBC W/AUTO DIFF WBC: CPT

## 2020-12-10 PROCEDURE — 70450 CT HEAD/BRAIN W/O DYE: CPT

## 2020-12-10 PROCEDURE — 70553 MRI BRAIN STEM W/O & W/DYE: CPT

## 2020-12-10 PROCEDURE — 83655 ASSAY OF LEAD: CPT

## 2020-12-10 PROCEDURE — 99285 EMERGENCY DEPT VISIT HI MDM: CPT

## 2020-12-10 PROCEDURE — 82175 ASSAY OF ARSENIC: CPT

## 2020-12-10 PROCEDURE — 82607 VITAMIN B-12: CPT

## 2020-12-10 PROCEDURE — A9576 INJ PROHANCE MULTIPACK: HCPCS | Performed by: EMERGENCY MEDICINE

## 2020-12-10 PROCEDURE — G0475 HIV COMBINATION ASSAY: HCPCS

## 2020-12-10 PROCEDURE — 82300 ASSAY OF CADMIUM: CPT

## 2020-12-10 PROCEDURE — 86780 TREPONEMA PALLIDUM: CPT

## 2020-12-10 PROCEDURE — 83825 ASSAY OF MERCURY: CPT

## 2020-12-10 PROCEDURE — 84443 ASSAY THYROID STIM HORMONE: CPT

## 2020-12-10 RX ORDER — FAMOTIDINE 10 MG
10 TABLET ORAL 2 TIMES DAILY
Status: DISCONTINUED | OUTPATIENT
Start: 2020-12-11 | End: 2020-12-13 | Stop reason: HOSPADM

## 2020-12-10 RX ADMIN — GADOTERIDOL 15 ML: 279.3 INJECTION, SOLUTION INTRAVENOUS at 21:34

## 2020-12-10 ASSESSMENT — FIBROSIS 4 INDEX: FIB4 SCORE: 1.12

## 2020-12-10 NOTE — OP THERAPY DAILY TREATMENT
Outpatient Physical Therapy  DAILY TREATMENT     [facility] [address].  [address]  [place] [address]  Phone:  [phone]  Fax:  [phone]    Date: 12/10/2020    Patient: Melba Frye  YOB: 1970  MRN: [id number]     Time Calculation    Start time: 1348  Stop time: 1415 Time Calculation (min): 27 minutes         Chief Complaint: Vertigo    Visit #: 3    SUBJECTIVE:  I continue to have episodes of feeling my brain 'slosh' and more frequently coming with L sided weakness.  Scheduled for MRI next week, but wasn't able to get into the neurologist until April. Had an episode on the way over.  is concerned. Pt is feeling fine at presentation.     OBJECTIVE:        Therapeutic Treatments and Modalities:     1. Neuromuscular Re-education (CPT 21009)    Therapeutic Treatment and Modalities Summary:   - Rocker board: AP and lateral x 1 min ea, EO/EC  - Ball circels: x 10 ea way  - Travolta: x 10 ea  - Gait with: HTs 3 way, tandem fwd/back, self ball toss.     ** See below**    Time-based treatments/modalities:    Physical Therapy Timed Treatment Charges  Neuromusc re-ed, balance, coor, post minutes (CPT 69748): 27 minutes    ASSESSMENT:   Response to treatment: Mrs. Frye presented to PT without symptoms and calm demeanor.  She has been compliant with her HEP, but neurologic symptoms persist episodically.  She was able to tolerate today's session without issue, but during conversation to close out session, pt had sudden onset of extreme dizziness. There was no change in her head positions. She had to be assisted to seated.  She was observed to rock slightly back and forth and hold increased tone in the L UE and LE.  L hand was clawed.  There was no change in her LE reflexes during the episode (which lasted about 1 min).  Bilat L4 2/4 and neg clonus.  Hand was able to be moved passively without substantial tone. Pt was sent to ED out of caution.      PLAN/RECOMMENDATIONS:   Plan for treatment: therapy treatment to continue next visit.  Planned interventions for next visit: continue with current treatment.

## 2020-12-10 NOTE — ED TRIAGE NOTES
Pt amb to triage.  Chief Complaint   Patient presents with   • Dizziness   • Unilateral Weakness     left side x2wks +intermittent left arm twitching     No neuro deficits noted in triage. Pt states she has been evaluated for vertigo and vestibular issues. Reports no relief w/ meclizine. Pt hypertensive in triage. Reports recently starting HCTZ.

## 2020-12-11 PROBLEM — R74.8 ALKALINE PHOSPHATASE ELEVATION: Status: ACTIVE | Noted: 2020-12-11

## 2020-12-11 PROBLEM — R42 VERTIGO: Status: ACTIVE | Noted: 2020-12-11

## 2020-12-11 PROBLEM — G37.9 DEMYELINATING DISEASE OF CENTRAL NERVOUS SYSTEM, UNSPECIFIED (HCC): Status: ACTIVE | Noted: 2020-12-11

## 2020-12-11 PROBLEM — H69.93 DYSFUNCTION OF BOTH EUSTACHIAN TUBES: Chronic | Status: ACTIVE | Noted: 2020-03-04

## 2020-12-11 LAB
25(OH)D3 SERPL-MCNC: 31 NG/ML (ref 30–100)
AMMONIA PLAS-SCNC: 13 UMOL/L (ref 11–45)
AMPHET UR QL SCN: NEGATIVE
APPEARANCE UR: CLEAR
APTT PPP: 26.3 SEC (ref 24.7–36)
BACTERIA #/AREA URNS HPF: NEGATIVE /HPF
BARBITURATES UR QL SCN: NEGATIVE
BENZODIAZ UR QL SCN: NEGATIVE
BILIRUB UR QL STRIP.AUTO: NEGATIVE
BURR CELLS/RBC NFR CSF MANUAL: 0 %
BZE UR QL SCN: NEGATIVE
CANNABINOIDS UR QL SCN: NEGATIVE
CLARITY CSF: CLEAR
COLOR CSF: COLORLESS
COLOR SPUN CSF: COLORLESS
COLOR UR: YELLOW
COVID ORDER STATUS COVID19: NORMAL
EPI CELLS #/AREA URNS HPF: NEGATIVE /HPF
ERYTHROCYTE [SEDIMENTATION RATE] IN BLOOD BY WESTERGREN METHOD: 7 MM/HOUR (ref 0–30)
GLUCOSE CSF-MCNC: 102 MG/DL (ref 40–80)
GLUCOSE UR STRIP.AUTO-MCNC: NEGATIVE MG/DL
HIV 1+2 AB+HIV1 P24 AG SERPL QL IA: NORMAL
HYALINE CASTS #/AREA URNS LPF: NORMAL /LPF
INR PPP: 0.98 (ref 0.87–1.13)
KETONES UR STRIP.AUTO-MCNC: NEGATIVE MG/DL
LEUKOCYTE ESTERASE UR QL STRIP.AUTO: ABNORMAL
LYMPHOCYTES NFR CSF: 92 %
MAGNESIUM SERPL-MCNC: 2.1 MG/DL (ref 1.5–2.5)
METHADONE UR QL SCN: NEGATIVE
MICRO URNS: ABNORMAL
MONONUC CELLS NFR CSF: 8 %
NITRITE UR QL STRIP.AUTO: NEGATIVE
OPIATES UR QL SCN: NEGATIVE
OXYCODONE UR QL SCN: NEGATIVE
PCP UR QL SCN: NEGATIVE
PH UR STRIP.AUTO: 7 [PH] (ref 5–8)
PROPOXYPH UR QL SCN: NEGATIVE
PROT CSF-MCNC: 40 MG/DL (ref 15–45)
PROT UR QL STRIP: NEGATIVE MG/DL
PROTHROMBIN TIME: 13.3 SEC (ref 12–14.6)
RBC # CSF: <1 CELLS/UL
RBC # URNS HPF: NORMAL /HPF
RBC UR QL AUTO: NEGATIVE
SARS-COV-2 RNA RESP QL NAA+PROBE: NOTDETECTED
SP GR UR STRIP.AUTO: 1.01
SPECIMEN SOURCE: NORMAL
SPECIMEN VOL CSF: 16 ML
TREPONEMA PALLIDUM IGG+IGM AB [PRESENCE] IN SERUM OR PLASMA BY IMMUNOASSAY: NORMAL
TSH SERPL DL<=0.005 MIU/L-ACNC: 4.15 UIU/ML (ref 0.38–5.33)
TUBE # CSF: 3
TUBE # CSF: 4
UROBILINOGEN UR STRIP.AUTO-MCNC: 0.2 MG/DL
VIT B12 SERPL-MCNC: 498 PG/ML (ref 211–911)
WBC # CSF: 6 CELLS/UL (ref 0–10)
WBC #/AREA URNS HPF: NORMAL /HPF

## 2020-12-11 PROCEDURE — 62270 DX LMBR SPI PNXR: CPT | Performed by: PSYCHIATRY & NEUROLOGY

## 2020-12-11 PROCEDURE — 87529 HSV DNA AMP PROBE: CPT

## 2020-12-11 PROCEDURE — 82042 OTHER SOURCE ALBUMIN QUAN EA: CPT

## 2020-12-11 PROCEDURE — 83735 ASSAY OF MAGNESIUM: CPT

## 2020-12-11 PROCEDURE — 83916 OLIGOCLONAL BANDS: CPT | Mod: 91

## 2020-12-11 PROCEDURE — 99223 1ST HOSP IP/OBS HIGH 75: CPT | Mod: 25 | Performed by: PSYCHIATRY & NEUROLOGY

## 2020-12-11 PROCEDURE — 86038 ANTINUCLEAR ANTIBODIES: CPT

## 2020-12-11 PROCEDURE — 700105 HCHG RX REV CODE 258: Performed by: PSYCHIATRY & NEUROLOGY

## 2020-12-11 PROCEDURE — 82945 GLUCOSE OTHER FLUID: CPT

## 2020-12-11 PROCEDURE — 700102 HCHG RX REV CODE 250 W/ 637 OVERRIDE(OP): Performed by: STUDENT IN AN ORGANIZED HEALTH CARE EDUCATION/TRAINING PROGRAM

## 2020-12-11 PROCEDURE — 85730 THROMBOPLASTIN TIME PARTIAL: CPT

## 2020-12-11 PROCEDURE — 82306 VITAMIN D 25 HYDROXY: CPT

## 2020-12-11 PROCEDURE — 84157 ASSAY OF PROTEIN OTHER: CPT

## 2020-12-11 PROCEDURE — 82140 ASSAY OF AMMONIA: CPT

## 2020-12-11 PROCEDURE — 85610 PROTHROMBIN TIME: CPT

## 2020-12-11 PROCEDURE — 96365 THER/PROPH/DIAG IV INF INIT: CPT

## 2020-12-11 PROCEDURE — 700111 HCHG RX REV CODE 636 W/ 250 OVERRIDE (IP): Performed by: NURSE PRACTITIONER

## 2020-12-11 PROCEDURE — 86255 FLUORESCENT ANTIBODY SCREEN: CPT | Mod: 91

## 2020-12-11 PROCEDURE — 84425 ASSAY OF VITAMIN B-1: CPT

## 2020-12-11 PROCEDURE — 82040 ASSAY OF SERUM ALBUMIN: CPT

## 2020-12-11 PROCEDURE — A9270 NON-COVERED ITEM OR SERVICE: HCPCS | Performed by: PSYCHIATRY & NEUROLOGY

## 2020-12-11 PROCEDURE — 89051 BODY FLUID CELL COUNT: CPT

## 2020-12-11 PROCEDURE — U0003 INFECTIOUS AGENT DETECTION BY NUCLEIC ACID (DNA OR RNA); SEVERE ACUTE RESPIRATORY SYNDROME CORONAVIRUS 2 (SARS-COV-2) (CORONAVIRUS DISEASE [COVID-19]), AMPLIFIED PROBE TECHNIQUE, MAKING USE OF HIGH THROUGHPUT TECHNOLOGIES AS DESCRIBED BY CMS-2020-01-R: HCPCS

## 2020-12-11 PROCEDURE — 99233 SBSQ HOSP IP/OBS HIGH 50: CPT | Performed by: STUDENT IN AN ORGANIZED HEALTH CARE EDUCATION/TRAINING PROGRAM

## 2020-12-11 PROCEDURE — C9803 HOPD COVID-19 SPEC COLLECT: HCPCS | Performed by: EMERGENCY MEDICINE

## 2020-12-11 PROCEDURE — 82784 ASSAY IGA/IGD/IGG/IGM EACH: CPT

## 2020-12-11 PROCEDURE — 009U3ZX DRAINAGE OF SPINAL CANAL, PERCUTANEOUS APPROACH, DIAGNOSTIC: ICD-10-PCS | Performed by: PSYCHIATRY & NEUROLOGY

## 2020-12-11 PROCEDURE — A9270 NON-COVERED ITEM OR SERVICE: HCPCS | Performed by: STUDENT IN AN ORGANIZED HEALTH CARE EDUCATION/TRAINING PROGRAM

## 2020-12-11 PROCEDURE — 700102 HCHG RX REV CODE 250 W/ 637 OVERRIDE(OP): Performed by: PSYCHIATRY & NEUROLOGY

## 2020-12-11 PROCEDURE — 700111 HCHG RX REV CODE 636 W/ 250 OVERRIDE (IP): Performed by: PSYCHIATRY & NEUROLOGY

## 2020-12-11 PROCEDURE — 36415 COLL VENOUS BLD VENIPUNCTURE: CPT

## 2020-12-11 PROCEDURE — 82164 ANGIOTENSIN I ENZYME TEST: CPT

## 2020-12-11 PROCEDURE — 770020 HCHG ROOM/CARE - TELE (206)

## 2020-12-11 RX ORDER — AMOXICILLIN 250 MG
2 CAPSULE ORAL 2 TIMES DAILY
Status: DISCONTINUED | OUTPATIENT
Start: 2020-12-11 | End: 2020-12-13 | Stop reason: HOSPADM

## 2020-12-11 RX ORDER — ACETAMINOPHEN 325 MG/1
650 TABLET ORAL EVERY 6 HOURS PRN
Status: DISCONTINUED | OUTPATIENT
Start: 2020-12-11 | End: 2020-12-13 | Stop reason: HOSPADM

## 2020-12-11 RX ORDER — LORAZEPAM 2 MG/ML
1 INJECTION INTRAMUSCULAR ONCE
Status: COMPLETED | OUTPATIENT
Start: 2020-12-11 | End: 2020-12-11

## 2020-12-11 RX ORDER — MAGNESIUM OXIDE 400 MG/1
400 TABLET ORAL DAILY
COMMUNITY
End: 2021-01-27

## 2020-12-11 RX ORDER — CLONIDINE HYDROCHLORIDE 0.1 MG/1
0.1 TABLET ORAL EVERY 6 HOURS PRN
Status: DISCONTINUED | OUTPATIENT
Start: 2020-12-11 | End: 2020-12-13 | Stop reason: HOSPADM

## 2020-12-11 RX ORDER — HYDROCHLOROTHIAZIDE 12.5 MG/1
12.5 TABLET ORAL EVERY MORNING
Status: DISCONTINUED | OUTPATIENT
Start: 2020-12-11 | End: 2020-12-13 | Stop reason: HOSPADM

## 2020-12-11 RX ORDER — ONDANSETRON 2 MG/ML
4 INJECTION INTRAMUSCULAR; INTRAVENOUS EVERY 4 HOURS PRN
Status: DISCONTINUED | OUTPATIENT
Start: 2020-12-11 | End: 2020-12-13 | Stop reason: HOSPADM

## 2020-12-11 RX ORDER — BISACODYL 10 MG
10 SUPPOSITORY, RECTAL RECTAL
Status: DISCONTINUED | OUTPATIENT
Start: 2020-12-11 | End: 2020-12-13 | Stop reason: HOSPADM

## 2020-12-11 RX ORDER — ONDANSETRON 4 MG/1
4 TABLET, ORALLY DISINTEGRATING ORAL EVERY 4 HOURS PRN
Status: DISCONTINUED | OUTPATIENT
Start: 2020-12-11 | End: 2020-12-13 | Stop reason: HOSPADM

## 2020-12-11 RX ORDER — POLYETHYLENE GLYCOL 3350 17 G/17G
1 POWDER, FOR SOLUTION ORAL
Status: DISCONTINUED | OUTPATIENT
Start: 2020-12-11 | End: 2020-12-13 | Stop reason: HOSPADM

## 2020-12-11 RX ORDER — FLUTICASONE PROPIONATE 50 MCG
2 SPRAY, SUSPENSION (ML) NASAL
Status: DISCONTINUED | OUTPATIENT
Start: 2020-12-11 | End: 2020-12-13 | Stop reason: HOSPADM

## 2020-12-11 RX ORDER — ACETAMINOPHEN 500 MG
1000 TABLET ORAL EVERY 6 HOURS PRN
Status: ON HOLD | COMMUNITY
End: 2021-03-02

## 2020-12-11 RX ORDER — LABETALOL HYDROCHLORIDE 5 MG/ML
10 INJECTION, SOLUTION INTRAVENOUS EVERY 4 HOURS PRN
Status: DISCONTINUED | OUTPATIENT
Start: 2020-12-11 | End: 2020-12-13 | Stop reason: HOSPADM

## 2020-12-11 RX ADMIN — HYDROCHLOROTHIAZIDE 12.5 MG: 12.5 TABLET ORAL at 05:12

## 2020-12-11 RX ADMIN — SODIUM CHLORIDE 1000 MG: 9 INJECTION, SOLUTION INTRAVENOUS at 00:53

## 2020-12-11 RX ADMIN — LORAZEPAM 1 MG: 2 INJECTION INTRAMUSCULAR; INTRAVENOUS at 15:28

## 2020-12-11 RX ADMIN — FAMOTIDINE 10 MG: 10 TABLET, FILM COATED ORAL at 05:12

## 2020-12-11 RX ADMIN — ACETAMINOPHEN 650 MG: 325 TABLET, FILM COATED ORAL at 12:58

## 2020-12-11 RX ADMIN — LORAZEPAM 1 MG: 2 INJECTION INTRAMUSCULAR; INTRAVENOUS at 15:15

## 2020-12-11 ASSESSMENT — ENCOUNTER SYMPTOMS
SPEECH CHANGE: 0
FALLS: 0
LOSS OF CONSCIOUSNESS: 0
WHEEZING: 0
ABDOMINAL PAIN: 0
PSYCHIATRIC NEGATIVE: 1
DIARRHEA: 0
SORE THROAT: 0
WEAKNESS: 0
COUGH: 0
INSOMNIA: 0
TINGLING: 0
EYE PAIN: 0
SHORTNESS OF BREATH: 0
WEIGHT LOSS: 0
FLANK PAIN: 0
CONSTIPATION: 0
HEADACHES: 0
VOMITING: 0
SINUS PAIN: 0
CARDIOVASCULAR NEGATIVE: 1
FEVER: 0
BLURRED VISION: 1
RESPIRATORY NEGATIVE: 1
PALPITATIONS: 0
SPUTUM PRODUCTION: 0
DIZZINESS: 1
MYALGIAS: 0
GASTROINTESTINAL NEGATIVE: 1
MUSCULOSKELETAL NEGATIVE: 1
WEAKNESS: 1
NECK PAIN: 0
DOUBLE VISION: 0
CHILLS: 0
PHOTOPHOBIA: 0
SENSORY CHANGE: 1
BLURRED VISION: 0
MEMORY LOSS: 0
HEADACHES: 1
NERVOUS/ANXIOUS: 1
HALLUCINATIONS: 0
TREMORS: 1
HEARTBURN: 0
HEMOPTYSIS: 0
FOCAL WEAKNESS: 0
NAUSEA: 0
BRUISES/BLEEDS EASILY: 0
ORTHOPNEA: 0
BACK PAIN: 0

## 2020-12-11 ASSESSMENT — FIBROSIS 4 INDEX: FIB4 SCORE: 1.13

## 2020-12-11 ASSESSMENT — LIFESTYLE VARIABLES: SUBSTANCE_ABUSE: 0

## 2020-12-11 NOTE — PROGRESS NOTES
Brief Neurohospitalist Note    Discussed w Dr. Talon Bragg    50F presents w vertigo and intermittent L arm and leg weakness over a two wee period     MRI brain shows multiple T2 hyperintensities w contrast enhancement     DDx demyelinating disease    Recommend initiation of solumedrol 1g IV qd for 3-5 days pending clinical improvement w GI ppx, accuchecks, and SSI. Anticipate the need for diagnostic LP and referral t o be seen by Dr. Yohan Calixto.     formal Neurohospitalist consult post 0700 with additional recommendations to follow. I will place preliminary orders.     NBA Nguyen MD  Neurology

## 2020-12-11 NOTE — ED NOTES
Pt talking to spouse at bedside, NAD. Pt comfort level checked, warm blankets provided. Updated on POC. Call light in reach.

## 2020-12-11 NOTE — ED NOTES
Pt returns from CT. NAD. Pt is A&O x4, denies headache, n/v or weakness/ tingling at this time. Pt has strong equal / push/pulls. No drift. Updated on POC. Call light in reach.

## 2020-12-11 NOTE — PROGRESS NOTES
Hospital Medicine Daily Progress Note    Date of Service  12/11/2020    Chief Complaint  50 y.o. female with PMHx of Complicated migraines, HLD, TMJ admitted 12/10/2020 with Vertigo    Hospital Course  The patient stated upon arrival to the ED that starting 4 days ago she began experiencing numbness on the face which was attributed to anxiety therefore treated with multiple medications.  She did not improve with those medication regimen.  On October 28, 2020 around 4 PM she started to have pounding vertigo-like feeling which took less than 1 minute and resolved. She states the vertigo is nonpositional.  It starts with the vertigo and radiates down to left arm then radiates to left feet and lasts about 30 seconds associated with right ear fullness, tinnitus.  She also has headaches which has been going on for years, describes as generalized and pressure-like headache without any pounding.  Blurred vision bilaterally which takes about few hours and resolves. Noncontrast CT head was done showing hyperdense paramedian right posterior frontal region mass versus late subarachnoid hemorrhage, MRI was performed and showeds T2 hyperintensities which was concerning for demyelinating disease.  Neurology was consulted Dr. Nguyen is on board and believes this clinical picture to be more consistent with demyelinating disease.  Patient was subsequently started on Solumedrol 1g Daily for 3-5 days with famotidine. Will continue monitoring for improvement.     Interval Problem Update  Patient examined at bedside  AA0X3  Not in any acute distress  No overnight complaints  Neurology Recommendations Appreciated Dr. Nguyen-  MRI brain shows multiple T2 hyperintensities w contrast enhancement      DDx demyelinating disease     Recommend initiation of solumedrol 1g IV qd for 3-5 days pending clinical improvement w GI ppx, accuchecks, and SSI. Anticipate the need for diagnostic LP and referral t o be seen by Dr. Yohan Calixto.    Will  continue with Solumedrol treatement and will continue monitoring for clinical improvement    Consultants/Specialty  Neurology    Code Status  Full Code    Disposition  Home once neurologically and medically stable    Review of Systems  Review of Systems   Constitutional: Positive for malaise/fatigue.   HENT: Negative.    Eyes: Positive for blurred vision.   Respiratory: Negative.    Cardiovascular: Negative.    Gastrointestinal: Negative.    Genitourinary: Negative.    Musculoskeletal: Negative.    Skin: Negative.    Neurological: Positive for dizziness, weakness and headaches.        Vertigo for last 5 weeks   Endo/Heme/Allergies: Negative.    Psychiatric/Behavioral: Negative.         Physical Exam  Temp:  [36.5 °C (97.7 °F)-37.5 °C (99.5 °F)] 37.5 °C (99.5 °F)  Pulse:  [55-95] 95  Resp:  [15-20] 18  BP: (121-188)/() 157/79  SpO2:  [93 %-99 %] 97 %    Physical Exam    Fluids    Intake/Output Summary (Last 24 hours) at 12/11/2020 0953  Last data filed at 12/11/2020 0500  Gross per 24 hour   Intake 200 ml   Output 14 ml   Net 186 ml       Laboratory  Recent Labs     12/10/20  1844   WBC 8.0   RBC 4.62   HEMOGLOBIN 14.5   HEMATOCRIT 43.4   MCV 93.9   MCH 31.4   MCHC 33.4*   RDW 43.5   PLATELETCT 203   MPV 10.5     Recent Labs     12/10/20  1844   SODIUM 136   POTASSIUM 3.8   CHLORIDE 101   CO2 24   GLUCOSE 83   BUN 18   CREATININE 0.66   CALCIUM 9.7                   Imaging  MR-BRAIN-WITH & W/O   Final Result      1.  There is an approximately 8 x 6 mm sized enhancing lesion in the right medial frontal gray matter.There are a few tiny satellite nodular enhancement.  The adjacent cortex demonstrates diffuse thickening. The gradient echo images does not demonstrate    any magnetic susceptibility at this level. The diffusion-weighted sequences are nondiagnostic due to the artifact. This is a nonspecific finding. The differential diagnosis includes primary brain neoplasm, lymphoma, active demyelinating plaque and     subacute infarct. Pre and postcontrast MR examination is recommended in 4 weeks for further characterization. Contrast enhancement would be resolved if this lesion is active demyelinating plaque or subacute .   2.  There are a few abnormal periventricular and subcortical T2 hyperintensities. The differential diagnosis includes demyelination, nonspecific foci of gliosis and ischemia The left lateral periventricular lesion is well-defined and demonstrates typical    characteristic of demyelinating plaque.   3.  Mild cerebral volume loss.   4.  There is mucosal thickening in the bilateral mastoid air cells.      CT-HEAD W/O   Final Result      1.  Ill-defined hyperdense area in the paramedian RIGHT posterior frontal region which may indicate mass or late subacute hemorrhage.  Recommend further evaluation with contrast-enhanced MRI.   2.  RIGHT mastoid fluid, likely inflammatory.      MR-CERVICAL SPINE-WITH & W/O    (Results Pending)        Assessment/Plan  * Vertigo  Assessment & Plan  Pt with 4 days history of vertigo and intermittently weak left arm and leg with fasciculations   CT head showing right mastoid fluid, likely inflammatory, and hyperdense paramedian right posterior frontal region mass versus late subarachnoid hemorrhage  MRI showing T2 hyperintensities  ?BPPV vs ?demylinating disease  For associated anxiety, pt refuses any benzodiazepines or antidepressants, as wishes to stay of psych medication    PLAN  Admiting patient to neurology unit  Neurology was consulted, Dr. Nguyen is following.  Patient was started on Solu-Medrol and will continue for 3 to 5 days per neurology recommendations with Famotidine for ulcer prophylaxis.  MRI brain report by neuroradiologist is pending.  Multiple sclerosis profile, ESR, KATIE IgG, HIV, HSV ordered per neurology.  Possible Lumbar puncture with glucose, protein, cell count, cytology, flow cytometry,  '  Neurology will obtain B12 and B1, thyroid panel, syphilis.     Primary team to continue following neuro recommendations    Dysfunction of both eustachian tubes- (present on admission)  Assessment & Plan  CT head showing right mastoid fluid, likely inflammatory,  Continuing her home Flonase PRN    Complicated migraine- (present on admission)  Assessment & Plan  -Tylenol for headaches, as her episode could be migraine related.  There is no photophobia or phonophobia, though ?positive aura.    Alkaline phosphatase elevation  Assessment & Plan  Abdominal exam is benign.  f/u GGT          VTE prophylaxis: SCD's until LP is Completed

## 2020-12-11 NOTE — PROGRESS NOTES
Received bedside report from ER RN, pt care assumed, VSS, pt assessment complete. Pt AAOx4, 0/10 pain at this time. No signs of acute distress noted at this time. POC discussed with pt and verbalizes no questions. Pt denies any additional needs at this time. Bed in lowest position, pt educated on fall risk and verbalized understanding, call light within reach, hourly rounding initiated.

## 2020-12-11 NOTE — ED PROVIDER NOTES
ED Provider Note    CHIEF COMPLAINT  Chief Complaint   Patient presents with   • Dizziness   • Unilateral Weakness     left side x2wks +intermittent left arm twitching       HPI  Melba Frye is a 50 y.o. female who presents with intermittent vertigo-like symptoms since October.  She has been followed up by her primary care physician for this.  She is referred to Dr. Leahy from neurology however does not have an appointment until April.  She is scheduled to have an MRI performed next Tuesday.  She states that over the past 2 weeks however, the vertigo symptoms are now associated with left-sided numbness and weakness and twitching.  She states that she has these episodes of vertigo every day lasting seconds to minutes at a time and now involving left-sided abnormalities for the past 2 weeks.  No fevers.  No vomiting.  She has been struggling with chronic headaches for the past 10 or so years.  She is on hydrochlorothiazide and meclizine since the onset of vertigo symptoms in October.  She has also been seen at physical therapy for vertigo as well.    REVIEW OF SYSTEMS  See HPI for further details. All other systems are negative.     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    SOCIAL HISTORY  Social History     Tobacco Use   • Smoking status: Never Smoker   • Smokeless tobacco: Never Used   Substance and Sexual Activity   • Alcohol use: Not Currently     Alcohol/week: 0.0 oz     Frequency: Monthly or less     Drinks per session: 1 or 2     Binge frequency: Never   • Drug use: No   • Sexual activity: Yes     Partners: Male       SURGICAL HISTORY   has a past  "surgical history that includes cystoscopy (10/15/08); laparoscopy (5/28/2010); lymph node sampling (5/28/2010); debulking (5/28/2010); appendectomy (1981); other (1984); vaginal hysterectomy scope total (10/15/08); myringotomy (8/27/2010); and tonsillectomy and adenoidectomy.    CURRENT MEDICATIONS  Home Medications     Reviewed by Karmen Helms R.N. (Registered Nurse) on 12/10/20 at 1538  Med List Status: Complete   Medication Last Dose Status   Fluticasone Propionate (FLONASE NA) 12/10/2020 Active   hydroCHLOROthiazide (HYDRODIURIL) 12.5 MG tablet 12/10/2020 Active   meclizine (ANTIVERT) 12.5 MG Tab 12/9/2020 Active   Tretinoin (ALTRENO) 0.05 % Lotion 12/9/2020 Active                ALLERGIES  Allergies   Allergen Reactions   • Bactrim      Fatigue and ringing of the ears       PHYSICAL EXAM  VITAL SIGNS: /82   Pulse 78   Temp 36.5 °C (97.7 °F) (Temporal)   Resp 16   Ht 1.702 m (5' 7\")   Wt 97 kg (213 lb 13.5 oz)   SpO2 96%   BMI 33.49 kg/m²   Pulse ox interpretation: I interpret this pulse ox as normal.  Constitutional: Alert in no apparent distress.  HENT: No signs of trauma, Bilateral external ears normal, Nose normal.   Eyes: Pupils are equal and reactive, Conjunctiva normal, Non-icteric.   Neck: Normal range of motion, No tenderness, Supple, No stridor.   Cardiovascular: Regular rate and rhythm.   Thorax & Lungs: Normal breath sounds, No respiratory distress, No wheezing, No chest tenderness.   Abdomen: Bowel sounds normal, Soft, No tenderness, No masses, No pulsatile masses. No peritoneal signs.  Skin: Warm, Dry, No erythema, No rash.   Back: No bony tenderness, No CVA tenderness.   Extremities: Intact distal pulses, No edema, No tenderness, No cyanosis  Musculoskeletal: Good range of motion in all major joints. No tenderness to palpation or major deformities noted.   Neurologic: Alert, Normal motor function and gait, Normal sensory function, No focal deficits noted.       DIAGNOSTIC STUDIES " / PROCEDURES    EKG - Physician interpretation  Results for orders placed or performed during the hospital encounter of 12/10/20   EKG   Result Value Ref Range    Report       [facility]    Test Date:  2020-12-10  Pt Name:    MARGE FALL               Department: ER  MRN:        [id number]                      Room:       [facility]  Gender:     Female                       Technician: [id number]  :        [id number]                   Requested By:ANDREW JOHNSON  Order #:    [id number]                    Reading MD: ANDREW JOHNSON MD    Measurements  Intervals                                Axis  Rate:       66                           P:          48  AL:         176                          QRS:        33  QRSD:       82                           T:          22  QT:         412  QTc:        432    Interpretive Statements  SINUS RHYTHM  EARLY PRECORDIAL R/S TRANSITION  Compared to ECG 2010 10:34:35  Sinus bradycardia no longer present  Electronically Signed On 12- 22:36:50 PST by ADNREW JOHNSON MD           LABS  Labs Reviewed   CBC WITH DIFFERENTIAL - Abnormal; Notable for the following components:       Result Value    MCHC 33.4 (*)     All other components within normal limits   COMP METABOLIC PANEL - Abnormal; Notable for the following components:    Alkaline Phosphatase 102 (*)     All other components within normal limits   TROPONIN   ESTIMATED GFR   SED RATE   CRP QUANTITIVE (NON-CARDIAC)   MULTIPLE SCLEROSIS PROFILE   AMMONIA   HEAVY METALS BLOOD   MISCELLANEOUS TEST   CSF CELL COUNT   CSF PROTEIN   CSF GLUCOSE   CYTOLOGY   FLOW CYTOMETRY REQUEST   TSH WITH REFLEX TO FT4   VITAMIN B12   VITAMIN B1   T.PALLIDUM AB EIA   HIV AG/AB COMBO ASSAY DIAGNOSTIC   URINALYSIS   URINE DRUG SCREEN   HSV (HERPES SIMPLEX VIRUS) BY PCR (BLOOD)   KATIE IGG SHAMIR W/RFLX TO KATIE IGG IFA   IGG SERUM QUANT         RADIOLOGY  CT-HEAD W/O   Final Result      1.  Ill-defined hyperdense area in the  paramedian RIGHT posterior frontal region which may indicate mass or late subacute hemorrhage.  Recommend further evaluation with contrast-enhanced MRI.   2.  RIGHT mastoid fluid, likely inflammatory.      MR-BRAIN-WITH & W/O    (Results Pending)         COURSE & MEDICAL DECISION MAKING    Medications   famotidine (PEPCID) tablet 10 mg (has no administration in time range)   methylPREDNISolone sod succ (SOLU-MEDROL) 1,000 mg in  mL IVPB (has no administration in time range)   gadoteridol (PROHANCE) injection 15 mL (15 mL Intravenous Given 12/10/20 2134)       Pertinent Labs & Imaging studies reviewed. (See chart for details)  50 y.o. female presenting with dizziness over the past few months and intermittent weakness and numbness and tingling to the left upper and lower extremity over the past few weeks.  She has been followed up on an outpatient basis with her primary care physician.  She was being treated for possible peripheral vertigo.  She was seen at physical therapy when she had a brief episode of left-sided numbness and weakness and was sent here.  These episodes have been transient lasting a few minutes at a time.  She is asymptomatic currently.  Not consistent with ischemic stroke.  No vomiting.  No headache.  Prior to onset of vertigo in October, the patient has not been rather healthy and not on any chronic medications.  She has been recently started on hydrochlorothiazide and meclizine for vertigo symptoms.    CT of the head was performed due to the patient's abnormal symptoms.  Showed an ill-defined hyperdense area in the paramedian right posterior frontal region.  MRI with and without contrast was ordered for further evaluation of this lesion.  I was contacted by radiology regarding the abnormality.  Unclear as to the etiology at this time with a rather broad differential of possible mass versus MS.  There is some artifact present due to the patient's braces.  Pending overread by neuroradiologist  "in the morning.    Spoke with neurologist, Dr. Nguyen, and he will order medications for treatment for suspected MS.  Spoke with the [facility] internal medicine resident service and they are agreeable to the hospitalization.      /88   Pulse 75   Temp 36.5 °C (97.7 °F) (Temporal)   Resp 18   Ht 1.702 m (5' 7\")   Wt 97 kg (213 lb 13.5 oz)   SpO2 98%   BMI 33.49 kg/m²       FINAL IMPRESSION  1. Left arm weakness    2. Vertigo            Electronically signed by: Talon Bragg M.D., 12/10/2020 6:26 PM    "

## 2020-12-11 NOTE — ASSESSMENT & PLAN NOTE
Pt with 4 days history of vertigo and intermittently weak left arm and leg with fasciculations   CT head showing right mastoid fluid, likely inflammatory, and hyperdense paramedian right posterior frontal region mass versus late subarachnoid hemorrhage  MRI showing T2 hyperintensities  ?BPPV vs ?demylinating disease  For associated anxiety, pt refuses any benzodiazepines or antidepressants, as wishes to stay of psych medication    PLAN  Admiting patient to neurology unit  Neurology was consulted, Dr. Nguyen is following.  Patient was started on Solu-Medrol and will continue for 3 to 5 days per neurology recommendations with Famotidine for ulcer prophylaxis.  MRI brain report by neuroradiologist is pending.  Multiple sclerosis profile, ESR, KATIE IgG, HIV, HSV ordered per neurology.  Possible Lumbar puncture with glucose, protein, cell count, cytology, flow cytometry,  '  Neurology will obtain B12 and B1, thyroid panel, syphilis.    Primary team to continue following neuro recommendations

## 2020-12-11 NOTE — ASSESSMENT & PLAN NOTE
-Tylenol for headaches, as her episode could be migraine related.  There is no photophobia or phonophobia, though ?positive aura.

## 2020-12-11 NOTE — H&P
"History & Physical Note    Date of Admission: 12/11/2020  Admission Status: Inpatient  [facility] Team: [facility]  Attending: Dr. Marti  Senior Resident: Dr. Garsia  Intern: Dr. Frias  Contact Number: [phone]    Chief Complaint: Dizziness and Unilateral Weakness (left side x2wks +intermittent left arm twitching)    History of Present Illness (HPI): Melba is a 49 y/o woman with past medical history notable for complicated migraines, dysfunctional eustachian tube, hyperlipidemia, BPPV, TMJ joint disease presenting to ED with chronic transient vertigo that over the last 4 days has become associated with ~45second episodes of left arm and left leg fascinations.   She notes these episodes began after she gave a speech at work that had put her under significant social pressure. She originally discussed these symptoms with her vertigo therapist who'd been managing her BPPV through maneuvers. Pt says her new left arm and leg ymptoms were of concern to the therapist, so pt was encouraged to get an MRI. Pt says this is why she came to ED. She says left arm and leg changes have been proceeded by a pressure-like sensation that arises from \"inside [her] brain\". She denies headache or vision changes, but notes she then gets about 45 seconds of left arm and leg fasciculations. She denies associated pain, paresthesia, JONG, vision changesspeech changes, or weakness.     Review of Systems:   Review of Systems   Constitutional: Negative for chills, fever, malaise/fatigue and weight loss.   HENT: Negative for congestion, ear discharge, ear pain, hearing loss, sinus pain, sore throat and tinnitus.    Eyes: Negative for blurred vision, double vision, photophobia and pain.   Respiratory: Negative for cough, hemoptysis, sputum production, shortness of breath and wheezing.    Cardiovascular: Negative for chest pain, palpitations, orthopnea and leg swelling.   Gastrointestinal: Negative for abdominal pain, constipation, diarrhea, " heartburn, nausea and vomiting.   Genitourinary: Negative for dysuria, flank pain and hematuria.   Musculoskeletal: Negative for back pain, falls, joint pain, myalgias and neck pain.   Skin: Negative for rash.   Neurological: Positive for dizziness, tremors (intermittent with fasciculations left arm and) and sensory change (nonspecific head pressure, non painful). Negative for tingling, speech change, focal weakness, loss of consciousness, weakness and headaches.   Endo/Heme/Allergies: Does not bruise/bleed easily.   Psychiatric/Behavioral: Negative for hallucinations, memory loss, substance abuse and suicidal ideas. The patient is nervous/anxious. The patient does not have insomnia.        Past Medical History:   Past Medical History was reviewed with patient.   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    Past Surgical History: Past Surgical History was reviewed with patient.   has a past surgical history that includes cystoscopy (10/15/08); laparoscopy (5/28/2010); lymph node sampling (5/28/2010); debulking (5/28/2010); appendectomy (1981); other (1984); vaginal hysterectomy scope total (10/15/08); myringotomy (8/27/2010); and tonsillectomy and adenoidectomy.    Medications: Medications have been reviewed with patient.    Prior to Admission Medications   Prescriptions Last Dose Informant Patient Reported? Taking?   Biotin 10 MG Cap 12/9/2020 at PM Patient Yes Yes   Sig: Take 1 Cap by mouth every day.   Calcium 200 MG Tab 12/9/2020 at PM Patient Yes Yes   Sig: Take 1 Tab by mouth every day.   Fluticasone Propionate (FLONASE NA) 12/10/2020 at AM Patient Yes No   Sig: Administer 1 Spray into  affected nostril(S) every morning.   Tretinoin (ALTRENO) 0.05 % Lotion 2020 at Unknown time Patient No No   Si Application by Apply externally route every bedtime.   Patient not taking: Reported on 2020   acetaminophen (TYLENOL) 500 MG Tab 12/10/2020 at 1130 Patient Yes Yes   Sig: Take 1,000 mg by mouth every 6 hours as needed.   hydroCHLOROthiazide (HYDRODIURIL) 12.5 MG tablet 12/10/2020 at AM Patient No No   Sig: Take 1 Tab by mouth every morning.   magnesium oxide (MAG-OX) 400 MG Tab tablet 2020 at PM Patient Yes Yes   Sig: Take 400 mg by mouth every day.   meclizine (ANTIVERT) 12.5 MG Tab 2020 at PM Patient No No   Sig: Take 1 Tab by mouth every bedtime.   multivitamin (THERAGRAN) Tab 2020 at PM Patient Yes Yes   Sig: Take 1 Tab by mouth every day.   vitamin D (CHOLECALCIFEROL) 1000 Unit (25 mcg) Tab 2020 at PM Patient Yes Yes   Sig: Take 1,000 Units by mouth every day.      Facility-Administered Medications: None        Allergies: Allergies have been reviewed with patient.  Allergies   Allergen Reactions   • Bactrim      Fatigue and ringing of the ears       Family History:   family history includes Arthritis in her mother; Cancer in her mother; Cancer (age of onset: 73) in her father; Diabetes in her paternal aunt; Heart Disease in her mother; Hyperlipidemia in her paternal aunt; Hypertension in her mother; Lung Disease in her maternal grandmother, mother, and paternal aunt; Non-contributory in her father and mother; Psychiatric Illness in her mother; Stroke in her mother.     Social History:   Social History     Tobacco Use   Smoking Status Never Smoker   Smokeless Tobacco Never Used      Social History     Substance and Sexual Activity   Alcohol Use Not Currently   • Alcohol/week: 0.0 oz   • Frequency: Monthly or less   • Drinks per session: 1 or 2   • Binge frequency: Never      Social History     Substance and Sexual Activity   Drug Use No        Sexual activity:  reports  "being sexually active and has had partner(s) who are Male.  Employment: teacher  Activity Level: little  Living situation:  Lives with    Recent travel:  denies  Primary Care Provider:  Trinity Nugyen M.D.  Other (stressors, spirituality, exposures):  Work-related stress  Physical Exam:   /79   Pulse 80   Temp 36.6 °C (97.9 °F) (Temporal)   Resp 18   Ht 1.702 m (5' 7\")   Wt 94.6 kg (208 lb 8.9 oz)   SpO2 95%  Body mass index is 32.66 kg/m².    Vitals:  Temp:  [36.5 °C (97.7 °F)-36.8 °C (98.2 °F)] 36.6 °C (97.9 °F)  Pulse:  [55-80] 80  Resp:  [15-20] 18  BP: (121-188)/() 142/79  SpO2:  [93 %-99 %] 95 %    Physical Exam  Constitutional:       General: She is not in acute distress.     Appearance: Normal appearance. She is obese. She is not ill-appearing.   HENT:      Head: Normocephalic and atraumatic.      Right Ear: Tympanic membrane normal.      Left Ear: Tympanic membrane normal.      Nose: Nose normal. No congestion or rhinorrhea.      Mouth/Throat:      Mouth: Mucous membranes are moist.      Pharynx: Oropharynx is clear.   Eyes:      Extraocular Movements: Extraocular movements intact.      Conjunctiva/sclera: Conjunctivae normal.      Pupils: Pupils are equal, round, and reactive to light.   Neck:      Musculoskeletal: Normal range of motion and neck supple. No neck rigidity or muscular tenderness.   Cardiovascular:      Rate and Rhythm: Normal rate and regular rhythm.      Heart sounds: Normal heart sounds. No murmur.   Pulmonary:      Effort: Pulmonary effort is normal. No respiratory distress.      Breath sounds: Normal breath sounds. No wheezing, rhonchi or rales.   Abdominal:      General: Abdomen is flat. Bowel sounds are normal.      Palpations: Abdomen is soft.      Tenderness: There is no right CVA tenderness or left CVA tenderness.   Musculoskeletal:         General: No tenderness.      Right lower leg: No edema.      Left lower leg: No edema.   Skin:     Findings: No " bruising, erythema, lesion or rash.   Neurological:      General: No focal deficit present.      Mental Status: She is alert and oriented to person, place, and time. Mental status is at baseline.      Cranial Nerves: No cranial nerve deficit.      Sensory: No sensory deficit.      Motor: No weakness.      Coordination: Coordination normal.      Gait: Gait normal.      Deep Tendon Reflexes: Reflexes normal.   Psychiatric:         Mood and Affect: Mood normal.         Behavior: Behavior normal.         Thought Content: Thought content normal.         Judgment: Judgment normal.         Labs:   Recent Results (from the past 24 hour(s))   CBC WITH DIFFERENTIAL    Collection Time: 12/10/20  6:44 PM   Result Value Ref Range    WBC 8.0 4.8 - 10.8 K/uL    RBC 4.62 4.20 - 5.40 M/uL    Hemoglobin 14.5 12.0 - 16.0 g/dL    Hematocrit 43.4 37.0 - 47.0 %    MCV 93.9 81.4 - 97.8 fL    MCH 31.4 27.0 - 33.0 pg    MCHC 33.4 (L) 33.6 - 35.0 g/dL    RDW 43.5 35.9 - 50.0 fL    Platelet Count 203 164 - 446 K/uL    MPV 10.5 9.0 - 12.9 fL    Neutrophils-Polys 51.50 44.00 - 72.00 %    Lymphocytes 39.00 22.00 - 41.00 %    Monocytes 7.70 0.00 - 13.40 %    Eosinophils 0.90 0.00 - 6.90 %    Basophils 0.60 0.00 - 1.80 %    Immature Granulocytes 0.30 0.00 - 0.90 %    Nucleated RBC 0.00 /100 WBC    Neutrophils (Absolute) 4.11 2.00 - 7.15 K/uL    Lymphs (Absolute) 3.11 1.00 - 4.80 K/uL    Monos (Absolute) 0.61 0.00 - 0.85 K/uL    Eos (Absolute) 0.07 0.00 - 0.51 K/uL    Baso (Absolute) 0.05 0.00 - 0.12 K/uL    Immature Granulocytes (abs) 0.02 0.00 - 0.11 K/uL    NRBC (Absolute) 0.00 K/uL   Comp Metabolic Panel    Collection Time: 12/10/20  6:44 PM   Result Value Ref Range    Sodium 136 135 - 145 mmol/L    Potassium 3.8 3.6 - 5.5 mmol/L    Chloride 101 96 - 112 mmol/L    Co2 24 20 - 33 mmol/L    Anion Gap 11.0 7.0 - 16.0    Glucose 83 65 - 99 mg/dL    Bun 18 8 - 22 mg/dL    Creatinine 0.66 0.50 - 1.40 mg/dL    Calcium 9.7 8.5 - 10.5 mg/dL    AST(SGOT)  18 12 - 45 U/L    ALT(SGPT) 16 2 - 50 U/L    Alkaline Phosphatase 102 (H) 30 - 99 U/L    Total Bilirubin 0.2 0.1 - 1.5 mg/dL    Albumin 4.6 3.2 - 4.9 g/dL    Total Protein 7.4 6.0 - 8.2 g/dL    Globulin 2.8 1.9 - 3.5 g/dL    A-G Ratio 1.6 g/dL   TROPONIN    Collection Time: 12/10/20  6:44 PM   Result Value Ref Range    Troponin T <6 6 - 19 ng/L   ESTIMATED GFR    Collection Time: 12/10/20  6:44 PM   Result Value Ref Range    GFR If African American >60 >60 mL/min/1.73 m 2    GFR If Non African American >60 >60 mL/min/1.73 m 2   Sed Rate    Collection Time: 12/10/20  6:44 PM   Result Value Ref Range    Sed Rate Westergren 7 0 - 30 mm/hour   CRP QUANTITIVE (NON-CARDIAC)    Collection Time: 12/10/20  6:44 PM   Result Value Ref Range    Stat C-Reactive Protein 0.27 0.00 - 0.75 mg/dL   EKG    Collection Time: 12/10/20  6:53 PM   Result Value Ref Range    Report       [facility]    Test Date:  2020-12-10  Pt Name:    MARGE FALL               Department: ER  MRN:        [id number]                      Room:       [facility]  Gender:     Female                       Technician: [id number]  :        1970                   Requested By:ANDREW JOHNSON  Order #:    [id number]                    Reading MD: ANDREW JOHNSON MD    Measurements  Intervals                                Axis  Rate:       66                           P:          48  SD:         176                          QRS:        33  QRSD:       82                           T:          22  QT:         412  QTc:        432    Interpretive Statements  SINUS RHYTHM  EARLY PRECORDIAL R/S TRANSITION  Compared to ECG 2010 10:34:35  Sinus bradycardia no longer present  Electronically Signed On 12- 22:36:50 PST by ANDREW JOHNSON MD     AMMONIA    Collection Time: 20 12:23 AM   Result Value Ref Range    Ammonia 13 11 - 45 umol/L   HSV (HERPES SIMPLEX VIRUS) BY PCR (BLOOD)    Collection Time: 20 12:23 AM   Result Value Ref  Range    HSV Source Serum    Magnesium    Collection Time: 20 12:23 AM   Result Value Ref Range    Magnesium 2.1 1.5 - 2.5 mg/dL   COVID/SARS CoV-2 PCR    Collection Time: 20  1:41 AM    Specimen: Nasopharyngeal; Respirate   Result Value Ref Range    COVID Order Status Received    SARS-CoV-2, PCR (In-House)    Collection Time: 20  1:41 AM   Result Value Ref Range    SARS-CoV-2 Source NP Swab              EKG:   Results for orders placed or performed during the hospital encounter of 12/10/20   EKG   Result Value Ref Range    Report       [facility] Emergency Dept.    Test Date:  2020-12-10  Pt Name:    MARGE FALL               Department: ER  MRN:        [id number]                      Room:       [facility]  Gender:     Female                       Technician: [id number]  :        1970                   Requested By:ANDREW JOHNSON  Order #:    [id number]                    Reading MD: ANDREW JOHNSON MD    Measurements  Intervals                                Axis  Rate:       66                           P:          48  ID:         176                          QRS:        33  QRSD:       82                           T:          22  QT:         412  QTc:        432    Interpretive Statements  SINUS RHYTHM  EARLY PRECORDIAL R/S TRANSITION  Compared to ECG 2010 10:34:35  Sinus bradycardia no longer present  Electronically Signed On 12- 22:36:50 PST by ANDREW JOHNSON MD         Imaging:   CT-HEAD W/O   Final Result      1.  Ill-defined hyperdense area in the paramedian RIGHT posterior frontal region which may indicate mass or late subacute hemorrhage.  Recommend further evaluation with contrast-enhanced MRI.   2.  RIGHT mastoid fluid, likely inflammatory.      MR-BRAIN-WITH & W/O    (Results Pending)       Previous Data Review:   ED notes reviewed     Problem Representation:     * Vertigo  Assessment & Plan  Pt with 4 days history of vertigo and intermittently weak left arm and  leg with fasciculations   CT head showing right mastoid fluid, likely inflammatory, and hyperdense paramedian right posterior frontal region mass versus late subarachnoid hemorrhage  MRI showing T2 hyperintensities  ?BPPV vs ?demylinating disease  For associated anxiety, pt refuses any benzodiazepines or antidepressants, as wishes to stay of psych medication    PLAN  Admiting patient to neurology unit  Neurology was consulted, Dr. Nguyen is following.  Patient was started on Solu-Medrol and will continue for 3 to 5 days per neurology recommendations with Famotidine for ulcer prophylaxis.  MRI brain report by neuroradiologist is pending.  Multiple sclerosis profile, ESR, KATIE IgG, HIV, HSV ordered per neurology.  Possible Lumbar puncture with glucose, protein, cell count, cytology, flow cytometry,  '  Neurology will obtain B12 and B1, thyroid panel, syphilis.    Primary team to continue following neuro recommendations    Dysfunction of both eustachian tubes- (present on admission)  Assessment & Plan  CT head showing right mastoid fluid, likely inflammatory,  Continuing her home Flonase PRN    Complicated migraine- (present on admission)  Assessment & Plan  -Tylenol for headaches, as her episode could be migraine related.  There is no photophobia or phonophobia, though ?positive aura.    Alkaline phosphatase elevation  Assessment & Plan  Abdominal exam is benign.  f/u GGT     DVT prophylaxis:  SCDs until lumbar puncture  CODE STATUS:  Full  Med-rec:  Complete

## 2020-12-11 NOTE — PROCEDURES
Lumbar Puncture Procedure  Performed in collaboration with Corinne Canavero APRN and Dr. Maxwell Hussein.     Procedure:   Patient was informed of the potential risks and benefits associated with procedure and gave her consent. Patient was positioned in Left lateral recumbent position and was draped in usual sterile fashion. Patient was given Lidocaine 1% 5 mL subcutaneously to L3-L4 region, and 20g spinal was then inserted. After 1 attempt, approximately 15mL of CSF was extracted. CSF clear in color. Spinal needle was then removed and was found to be intact. Band aid placed over insertion site. Patient tolerated procedure well and without complication. CSF hand-delivered to Clinical Lab.     MAYRA Villalobos.  [place] [facility] [employer]

## 2020-12-11 NOTE — SENIOR ADMIT NOTE
Senior admit note     This is 58 years old female with medical history notable for complicated migraines, dysfunctional eustachian tube, hyperlipidemia, TMJ joint disease presenting to emergency department with transient vertigo and left arm weakness.  She states that when she was 3 days she felt on the numbness on the face which was attributed to anxiety therefore treated with multiple medications.  She did not improve with those medication regimen.  On October 28, 2020 around 4 PM she started to have pounding vertigo-like feeling which took less than 1 minute and resolved, have another episode at the school which was similar.  She states the vertigo is nonpositional.  It starts with the vertigo and radiates down to left arm then radiates to left feet and lasts about 30 seconds associated with right ear fullness, tinnitus.  She also has headaches which has been going on for years, describes as generalized and pressure-like headache without any pounding.  Blurred vision bilaterally which takes about few hours and resolves.  Patient denies any fever or chills, weakness or numbness on the extremities now, swallow problem, language dysfunction, double vision, pain with eye movements, balance problems, chest pain or shortness of breath, abdominal pain, nausea, vomiting, diarrhea, constipation, melena.  She stated she does not want to use any benzodiazepines or other psychiatric medications anymore.  In the emergency department patient is hemodynamically stable with blood pressure 125/80, CMP is completely within normal limits except alkaline phosphatase elevation of 102, CBC within normal limits, negative troponin and C-reactive protein.  CT head was done showing hyperdense paramedian right posterior frontal region mass versus late subarachnoid hemorrhage, MRI was showing T2 hyperintensities which was concerning for demyelinating disease.  Neurology was consulted Dr. Nguyen is following.  Patient was started on  Solu-Medrol with famotidine.        Vitals:    12/11/20 0000   BP:    Pulse: 75   Resp: 18   Temp:    SpO2: 98%        Positive physical exam, brief  PERRLA, EOMI, alert and oriented to all spheres, cranial nerves all intact, motor strength 5/5 on all extremities, reflexes slightly hyper, no sensory deficit found, coordination is within normal limits with finger-to-nose and heel-to-shin test.  Benign cardiopulmonary auscultation  Abdomen is soft without any guarding or rebound or tenderness.  No pretibial edema or signs of volume overload        Assessment  #Demyelinating disease, unspecified  #Vertigo  #Temporomandibular joint disease  #Complex migraines  #Anxiety  #Alkaline phosphatase elevation     Plan  -Admit patient to neurology unit  -Patient was started on Solu-Medrol, we will continue this for 3 to 5 days per neurology recommendations with ulcer prophylaxis famotidine. MRI brain report by neuroradiologist is pending.  -Lumbar puncture with glucose, protein, cell count, cytology, flow cytometry, KATIE IgG, HIV, HSV ordered per neurology.  Multiple sclerosis profile.  ESR.  Neurology will obtain B12 and B1, thyroid panel, syphilis.  We will continue to follow their recommendations  -Tylenol for headaches, this episode could be migraine related.  However there is no photophobia or phonophobia.  -Patient refuses any benzodiazepines or antidepressants.  She does not want to take any of those medications.  -Sent GGT for alkaline phosphatase elevation.  Abdominal exam is benign.     DVT prophylaxis:  SCDs until lumbar puncture  CODE STATUS:  Full  Med-rec:  Complete     This patient care was provided during crisis level of care due to COVID-19 pandemic  Please note that this dictation was created using voice recognition software.  I have made every reasonable attempt to correct obvious errors, but it is possible there are errors of grammar or possibly content that I did not discover before finalizing the  note.  Please see intern Dr. Frias HPI for further information

## 2020-12-11 NOTE — ED NOTES
"Called to room by pt. Pt c/o dizziness, states \"feels like by brain is flopping around.\" Mild twitching noted to left arm, lasted <15 seconds. Pt was able to lift arm, had strong/eqaul  during twitching episode.  "

## 2020-12-11 NOTE — CONSULTS
"Chief Complaint   Patient presents with   • Dizziness   • Unilateral Weakness     left side x2wks +intermittent left arm twitching     Neurology Consultation     History of present illness:  This is a 50-year old female with PMhx significant for anxiety/depression, migraine headaches, and dyslipidemia who presented to [facility] on 12/10/20 for a chief complaint of a 2-week history unilateral (Left sided) paresthesia and vertigo.   The patient states that approximately two weeks ago, she was driving in her vehicle when she suddenly noted severe dizziness, further described as a \"back and forth\" sensation; she thus pulled over and had her  pick her up. Duration of this event 5-6 minutes, resolved spontaneously. She reports that over the next several days she felt \"off,\" but no additional vertigo events. Four days after initial event, patient notes that she began having LUE/LLE paresthesia, further described as \"floating\" sensation, with occasional associated \"twiching\" that she further describes as tremulous-like movement. Symptoms have been intermittent in nature; patient also reportedly saw her PCP on 11/25/20, was given PRN Meclizine and HCTZ for hypertension and for diuretic purposes given concern for eustachian tube dysfunction. These supportive measures have reportedly not helped; patient thus presented to the ED yesterday for further evaluation.     On arrival here, CT head revealed \"Ill-defined hyperdense area in the paramedian RIGHT posterior frontal region which may indicate mass or late subacute hemorrhage;\" follow up MRI Brain w/wo revealed \"enhancing lesion in the right medial frontal gray matter...a nonspecific finding. The differential diagnosis includes primary brain neoplasm, lymphoma, active demyelinating plaque and subacute infarct.\"     Currently, patient is sitting up in bed; awake and alert. States that she feels well. Denies weakness, numbness at this time; denies \"floating\" " sensation at this time. She admits to feeling anxious. She admits to mild bifrontal headache. She denies problem with vision, speech or swallowing. No recent falls or trauma.     Neurology has been consulted by Dr. Humberto Guo to further evaluate the findings noted above.     Past medical history:   Past Medical History:   Diagnosis Date   • Anxiety    • Complicated migraine 6/9/2014   • Depression    • Dermatitis, contact 11/16/2015   • Fatigue 6/9/2014   • Hyperlipidemia 1/23/2015   • Lentigo 10/17/2018   • Menopausal symptom 7/2/2014   • Mixed anxiety and depressive disorder 6/9/2014   • Seborrheic keratoses 10/17/2018   • TMJ arthralgia 6/9/2014   • UTI (lower urinary tract infection)        Past surgical history:   Past Surgical History:   Procedure Laterality Date   • MYRINGOTOMY  8/27/2010    Performed by [address] at SURGERY SAME DAY [facility] ORS   • LAPAROSCOPY  5/28/2010    Performed by JACKI SHARMA at SURGERY [facility] ORS   • LYMPH NODE SAMPLING  5/28/2010    Performed by JACKI SHARMA at SURGERY [facility] ORS   • DEBULKING  5/28/2010    Performed by JACKI SHARMA at SURGERY [facility] ORS   • CYSTOSCOPY  10/15/08    Performed by YU GODINEZ at SURGERY SAME DAY [facility] ORS   • VAGINAL HYSTERECTOMY SCOPE TOTAL  10/15/08    Performed by YU GODINEZ at SURGERY SAME DAY [facility] ORS   • OTHER  1984    TONSILECTOMY/ ADNOIDS   • APPENDECTOMY  1981   • TONSILLECTOMY AND ADENOIDECTOMY         Family history:   Family History   Problem Relation Age of Onset   • Non-contributory Mother    • Lung Disease Mother         COPD   • Cancer Mother         dysplasia    • Arthritis Mother    • Psychiatric Illness Mother    • Heart Disease Mother    • Hypertension Mother    • Stroke Mother    • Non-contributory Father    • Cancer Father 73        prostate cancer   • Lung Disease Maternal Grandmother    • Lung Disease Paternal Aunt    • Diabetes Paternal Aunt    • Hyperlipidemia Paternal Aunt         Social history:   Social History     Socioeconomic History   • Marital status:      Spouse name: Not on file   • Number of children: Not on file   • Years of education: Not on file   • Highest education level: Not on file   Occupational History   • Not on file   Social Needs   • Financial resource strain: Not on file   • Food insecurity     Worry: Not on file     Inability: Not on file   • Transportation needs     Medical: Not on file     Non-medical: Not on file   Tobacco Use   • Smoking status: Never Smoker   • Smokeless tobacco: Never Used   Substance and Sexual Activity   • Alcohol use: Not Currently     Alcohol/week: 0.0 oz     Frequency: Monthly or less     Drinks per session: 1 or 2     Binge frequency: Never   • Drug use: No   • Sexual activity: Yes     Partners: Male   Lifestyle   • Physical activity     Days per week: Not on file     Minutes per session: Not on file   • Stress: Not on file   Relationships   • Social connections     Talks on phone: Not on file     Gets together: Not on file     Attends Sikh service: Not on file     Active member of club or organization: Not on file     Attends meetings of clubs or organizations: Not on file     Relationship status: Not on file   • Intimate partner violence     Fear of current or ex partner: Not on file     Emotionally abused: Not on file     Physically abused: Not on file     Forced sexual activity: Not on file   Other Topics Concern   • Not on file   Social History Narrative   • Not on file       Current medications:   Current Facility-Administered Medications   Medication Dose   • senna-docusate (PERICOLACE or SENOKOT S) 8.6-50 MG per tablet 2 Tab  2 Tab    And   • polyethylene glycol/lytes (MIRALAX) PACKET 1 Packet  1 Packet    And   • magnesium hydroxide (MILK OF MAGNESIA) suspension 30 mL  30 mL    And   • bisacodyl (DULCOLAX) suppository 10 mg  10 mg   • acetaminophen (Tylenol) tablet 650 mg  650 mg   • labetalol (NORMODYNE/TRANDATE)  injection 10 mg  10 mg   • cloNIDine (CATAPRES) tablet 0.1 mg  0.1 mg   • ondansetron (ZOFRAN) syringe/vial injection 4 mg  4 mg   • ondansetron (ZOFRAN ODT) dispertab 4 mg  4 mg   • hydroCHLOROthiazide (HYDRODIURIL) tablet 12.5 mg  12.5 mg   • fluticasone (FLONASE) nasal spray 100 mcg  2 Spray   • famotidine (PEPCID) tablet 10 mg  10 mg   • methylPREDNISolone sod succ (SOLU-MEDROL) 1,000 mg in  mL IVPB  1 g       Medication Allergy:  Allergies   Allergen Reactions   • Bactrim      Fatigue and ringing of the ears       Review of systems:   Constitutional: denies fever, night sweats, weight loss.   Eyes: denies acute vision change, eye pain or secretion.   Ears, Nose, Mouth, Throat: denies nasal secretion, nasal bleeding, difficulty swallowing, hearing loss, tinnitus, vertigo, ear pain, acute dental problems, oral ulcers or lesions.   Endocrine: denies recent weight changes, heat or cold intolerance, polyuria, polydypsia, polyphagia,abnormal hair growth.  Cardiovascular: denies new onset of chest pain, palpitations, syncope, or dyspnea of exertion.  Pulmonary: denies shortness of breath, new onset of cough, hemoptysis, wheezing, chest pain or flu-like symptoms.   GI: denies nausea, vomiting, diarrhea, GI bleeding, change in appetite, abdominal pain, and change in bowel habits.  : denies dysuria, urinary incontinence, hematuria.  Heme/oncology: denies history of easy bruising or bleeding. No history of cancer, DVTor PE.  Allergy/immunology: denies hives/urticaria, or itching.   Dermatologic: denies new rash, or new skin lesions.  Musculoskeletal:denies joint swelling or pain, muscle pain, neck and back pain.   Neurologic: As noted above. denies headaches, acute visual changes, facial droopiness, muscle weakness (focal or generalized), paresthesias, anesthesia, ataxia, change in speech or language, memory loss, abnormal movements, seizures, loss of consciousness, or episodes of confusion.   Psychiatric: denies  symptoms of depression, anxiety, hallucinations, mood swings or changes, suicidal or homicidal thoughts.     Physical examination:   Vitals:    12/11/20 0500 12/11/20 0820 12/11/20 1135 12/11/20 1600   BP: 142/79 157/79 146/90 137/76   Pulse: 80 95 90 (!) 107   Resp:  18 17 18   Temp: 36.6 °C (97.9 °F) 37.5 °C (99.5 °F) 37.1 °C (98.7 °F) 37.4 °C (99.3 °F)   TempSrc: Temporal Temporal Temporal Temporal   SpO2: 95% 97% 93% 95%   Weight:       Height:         General: Patient in no acute distress, pleasant and cooperative.  HEENT: Normocephalic, no signs of acute trauma.   Neck: supple, no meningeal signs or carotid bruits. There is normal range of motion. No tenderness on exam.   Chest: clear to auscultation. No cough.   CV: RRR, no murmurs.   Skin: no signs of acute rashes or trauma.   Musculoskeletal: joints exhibit full range of motion, without any pain to palpation. There are no signs of joint or muscle swelling. There is no tenderness to deep palpation of muscles.   Psychiatric: No hallucinatory behavior.        NEUROLOGICAL EXAM:   Mental status, orientation: Awake, alert and fully oriented.   Speech and language: speech is clear and fluent. The patient is able to name, repeat and comprehend.   Memory: There is intact recollection of recent and remote events.   Cranial nerve exam: Pupils are 3-4 mm bilaterally and equally reactive to light and accommodation. Visual fields are intact by confrontation. There is no nystagmus on primary or secondary gaze. Intact full EOM in all directions of gaze. Face appears symmetric. Sensation in the face is intact to light touch. Uvula is midline. Palate elevates symmetrically. Tongue is midline and without any signs of tongue biting or fasciculations. Shoulder shrug is intact bilaterally.   Motor exam: Strength is 5/5 in all extremities. Tone is normal; no spasticity; no bradykinesia. No abnormal movements were seen on exam.   Sensory exam reveals normal sense of light touch  and pinprick in all extremities.   Deep tendon reflexes:  2+ throughout. Plantar responses are flexor. There is no clonus.   Coordination: shows a normal finger-nose-finger. Normal rapidly alternating movements.   Gait: Not assessed as patient is a fall risk.       ANCILLARY DATA REVIEWED:     Lab Data Review:  Recent Results (from the past 24 hour(s))   CBC WITH DIFFERENTIAL    Collection Time: 12/10/20  6:44 PM   Result Value Ref Range    WBC 8.0 4.8 - 10.8 K/uL    RBC 4.62 4.20 - 5.40 M/uL    Hemoglobin 14.5 12.0 - 16.0 g/dL    Hematocrit 43.4 37.0 - 47.0 %    MCV 93.9 81.4 - 97.8 fL    MCH 31.4 27.0 - 33.0 pg    MCHC 33.4 (L) 33.6 - 35.0 g/dL    RDW 43.5 35.9 - 50.0 fL    Platelet Count 203 164 - 446 K/uL    MPV 10.5 9.0 - 12.9 fL    Neutrophils-Polys 51.50 44.00 - 72.00 %    Lymphocytes 39.00 22.00 - 41.00 %    Monocytes 7.70 0.00 - 13.40 %    Eosinophils 0.90 0.00 - 6.90 %    Basophils 0.60 0.00 - 1.80 %    Immature Granulocytes 0.30 0.00 - 0.90 %    Nucleated RBC 0.00 /100 WBC    Neutrophils (Absolute) 4.11 2.00 - 7.15 K/uL    Lymphs (Absolute) 3.11 1.00 - 4.80 K/uL    Monos (Absolute) 0.61 0.00 - 0.85 K/uL    Eos (Absolute) 0.07 0.00 - 0.51 K/uL    Baso (Absolute) 0.05 0.00 - 0.12 K/uL    Immature Granulocytes (abs) 0.02 0.00 - 0.11 K/uL    NRBC (Absolute) 0.00 K/uL   Comp Metabolic Panel    Collection Time: 12/10/20  6:44 PM   Result Value Ref Range    Sodium 136 135 - 145 mmol/L    Potassium 3.8 3.6 - 5.5 mmol/L    Chloride 101 96 - 112 mmol/L    Co2 24 20 - 33 mmol/L    Anion Gap 11.0 7.0 - 16.0    Glucose 83 65 - 99 mg/dL    Bun 18 8 - 22 mg/dL    Creatinine 0.66 0.50 - 1.40 mg/dL    Calcium 9.7 8.5 - 10.5 mg/dL    AST(SGOT) 18 12 - 45 U/L    ALT(SGPT) 16 2 - 50 U/L    Alkaline Phosphatase 102 (H) 30 - 99 U/L    Total Bilirubin 0.2 0.1 - 1.5 mg/dL    Albumin 4.6 3.2 - 4.9 g/dL    Total Protein 7.4 6.0 - 8.2 g/dL    Globulin 2.8 1.9 - 3.5 g/dL    A-G Ratio 1.6 g/dL   TROPONIN    Collection Time: 12/10/20   6:44 PM   Result Value Ref Range    Troponin T <6 6 - 19 ng/L   ESTIMATED GFR    Collection Time: 12/10/20  6:44 PM   Result Value Ref Range    GFR If African American >60 >60 mL/min/1.73 m 2    GFR If Non African American >60 >60 mL/min/1.73 m 2   Sed Rate    Collection Time: 12/10/20  6:44 PM   Result Value Ref Range    Sed Rate Westergren 7 0 - 30 mm/hour   CRP QUANTITIVE (NON-CARDIAC)    Collection Time: 12/10/20  6:44 PM   Result Value Ref Range    Stat C-Reactive Protein 0.27 0.00 - 0.75 mg/dL   EKG    Collection Time: 12/10/20  6:53 PM   Result Value Ref Range    Report       [facility]    Test Date:  2020-12-10  Pt Name:    MARGE FALL               Department: ER  MRN:        [id number]                      Room:       [facility]  Gender:     Female                       Technician: [facility]  :        [id number]                   Requested By:ANDREW JOHNSON  Order #:    [id number]                    Reading MD: ANDREW OJHNSON MD    Measurements  Intervals                                Axis  Rate:       66                           P:          48  ME:         176                          QRS:        33  QRSD:       82                           T:          22  QT:         412  QTc:        432    Interpretive Statements  SINUS RHYTHM  EARLY PRECORDIAL R/S TRANSITION  Compared to ECG 2010 10:34:35  Sinus bradycardia no longer present  Electronically Signed On 12- 22:36:50 PST by ANDREW JOHNSON MD     AMMONIA    Collection Time: 20 12:23 AM   Result Value Ref Range    Ammonia 13 11 - 45 umol/L   HSV (HERPES SIMPLEX VIRUS) BY PCR (BLOOD)    Collection Time: 20 12:23 AM   Result Value Ref Range    HSV Source Serum    Magnesium    Collection Time: 20 12:23 AM   Result Value Ref Range    Magnesium 2.1 1.5 - 2.5 mg/dL   COVID/SARS CoV-2 PCR    Collection Time: 20  1:41 AM    Specimen: Nasopharyngeal; Respirate   Result Value Ref Range    COVID Order Status  Received    SARS-CoV-2, PCR (In-House)    Collection Time: 12/11/20  1:41 AM   Result Value Ref Range    SARS-CoV-2 Source NP Swab     SARS-CoV-2 by PCR NotDetected    Prothrombin Time    Collection Time: 12/11/20 10:33 AM   Result Value Ref Range    PT 13.3 12.0 - 14.6 sec    INR 0.98 0.87 - 1.13   APTT    Collection Time: 12/11/20 10:33 AM   Result Value Ref Range    APTT 26.3 24.7 - 36.0 sec   VITAMIN D,25 HYDROXY    Collection Time: 12/11/20 10:33 AM   Result Value Ref Range    25-Hydroxy   Vitamin D 25 31 30 - 100 ng/mL   CSF GLUCOSE    Collection Time: 12/11/20  3:40 PM   Result Value Ref Range    Glucose  (H) 40 - 80 mg/dL   CSF PROTEIN    Collection Time: 12/11/20  3:40 PM   Result Value Ref Range    Total Protein, CSF 40 15 - 45 mg/dL       Labs reviewed by me.       Imaging reviewed by me:     MR-BRAIN-WITH & W/O   Final Result      1.  There is an approximately 8 x 6 mm sized enhancing lesion in the right medial frontal gray matter.There are a few tiny satellite nodular enhancement.  The adjacent cortex demonstrates diffuse thickening. The gradient echo images does not demonstrate    any magnetic susceptibility at this level. The diffusion-weighted sequences are nondiagnostic due to the artifact. This is a nonspecific finding. The differential diagnosis includes primary brain neoplasm, lymphoma, active demyelinating plaque and    subacute infarct. Pre and postcontrast MR examination is recommended in 4 weeks for further characterization. Contrast enhancement would be resolved if this lesion is active demyelinating plaque or subacute .   2.  There are a few abnormal periventricular and subcortical T2 hyperintensities. The differential diagnosis includes demyelination, nonspecific foci of gliosis and ischemia The left lateral periventricular lesion is well-defined and demonstrates typical    characteristic of demyelinating plaque.   3.  Mild cerebral volume loss.   4.  There is mucosal thickening in  "the bilateral mastoid air cells.      CT-HEAD W/O   Final Result      1.  Ill-defined hyperdense area in the paramedian RIGHT posterior frontal region which may indicate mass or late subacute hemorrhage.  Recommend further evaluation with contrast-enhanced MRI.   2.  RIGHT mastoid fluid, likely inflammatory.          Modified Jim Hogg Scale (MRS): 0 = No symptoms      ASSESSMENT AND PLAN:  50-year old female with PMhx significant for anxiety/depression, migraine headaches, and dyslipidemia who presented to [facility] on 12/10/20 for a chief complaint of a 2-week history unilateral (Left sided) paresthesia and vertigo-like sensation; events intermittent in nature; no obvious provocation. Patient reports symptoms are essentially resolved now. Upon presentation here, CT head revealed Right frontal lesion, subacute hemorrhage versus mass; MRI Brain w/wo contrast further revealed \"enhancing lesion in the right medial frontal gray matter-- differential diagnosis includes primary brain neoplasm, lymphoma, active demyelinating plaque and subacute infarct.\" Also note, a single T2 hyperintense lesion involving Left periventricular white matter-- though not clearly demyelinating/no active plaques, concerning for a demyelinating process of unclear etiology.     Per review of patient's medical record, patient had MRI Brain in 2017 (she reports that she has been seen by outpatient neurology Dr. Mendiola in the past for migraines); lesions noted above were not apparent per review of this previous study.     Given thus far unclear nature of the above, will obtain LP for CSF analysis; will plan to obtain repeat MRI Brain w/wo contrast and MRI Cervical Spine w/wo contrast in 4 weeks to further determine nature of lesions. Differential diagnoses include multiple sclerosis, meningioma, CNS lymphoma, or other neoplasm of the brain.     Additional Recommendations/Plan:     -q4h and PRN neuro assessment. VS per nursing/unit protocol. "   -LP for CSF analysis completed this afternoon; will check CSF cell counts, glucose/protein, culture, VDRL, OCBs, ACE, Cytology and flow cytometry. Will follow up with results as they are available.  -No need for IV Solumedrol tx at this time as this is not clearly MS and patient's symptoms are resolved at this time. This was discussed with Dr. Hussein and Dr. Calixto (MS/neuro immuno), and they agree.   -Note B12, Folate, TSH, RPR, Vitamin D all WNL.   -PT/OT eval and treat.   -Will place outpatient neurology referral for follow up after discharge; tentative plan for repeat MRI Brain, C spine in one month.   -All other medical management per primary team.     The plan of care above has been discussed with Dr. Hussein. Please call with questions.     MAYRA Villalobos.  [facility]

## 2020-12-12 ENCOUNTER — APPOINTMENT (OUTPATIENT)
Dept: RADIOLOGY | Facility: MEDICAL CENTER | Age: 50
DRG: 060 | End: 2020-12-12
Attending: STUDENT IN AN ORGANIZED HEALTH CARE EDUCATION/TRAINING PROGRAM
Payer: COMMERCIAL

## 2020-12-12 DIAGNOSIS — G93.9 BRAIN LESION: ICD-10-CM

## 2020-12-12 PROBLEM — R74.8 ALKALINE PHOSPHATASE ELEVATION: Status: RESOLVED | Noted: 2020-12-11 | Resolved: 2020-12-12

## 2020-12-12 PROBLEM — H69.93 DYSFUNCTION OF BOTH EUSTACHIAN TUBES: Chronic | Status: RESOLVED | Noted: 2020-03-04 | Resolved: 2020-12-12

## 2020-12-12 PROBLEM — R42 VERTIGO: Status: RESOLVED | Noted: 2020-12-11 | Resolved: 2020-12-12

## 2020-12-12 LAB
ALBUMIN SERPL BCP-MCNC: 4.2 G/DL (ref 3.2–4.9)
ALBUMIN/GLOB SERPL: 1.8 G/DL
ALP SERPL-CCNC: 80 U/L (ref 30–99)
ALT SERPL-CCNC: 14 U/L (ref 2–50)
ANION GAP SERPL CALC-SCNC: 13 MMOL/L (ref 7–16)
APPEARANCE UR: CLEAR
ARSENIC BLD-MCNC: <10 UG/L
AST SERPL-CCNC: 14 U/L (ref 12–45)
BACTERIA #/AREA URNS HPF: ABNORMAL /HPF
BILIRUB SERPL-MCNC: 0.3 MG/DL (ref 0.1–1.5)
BILIRUB UR QL STRIP.AUTO: NEGATIVE
BUN SERPL-MCNC: 18 MG/DL (ref 8–22)
CADMIUM BLD-MCNC: <1 UG/L
CALCIUM SERPL-MCNC: 9.7 MG/DL (ref 8.5–10.5)
CHLORIDE SERPL-SCNC: 102 MMOL/L (ref 96–112)
CO2 SERPL-SCNC: 23 MMOL/L (ref 20–33)
COLOR UR: YELLOW
CREAT SERPL-MCNC: 0.72 MG/DL (ref 0.5–1.4)
EPI CELLS #/AREA URNS HPF: NEGATIVE /HPF
ERYTHROCYTE [DISTWIDTH] IN BLOOD BY AUTOMATED COUNT: 44.1 FL (ref 35.9–50)
GLOBULIN SER CALC-MCNC: 2.4 G/DL (ref 1.9–3.5)
GLUCOSE SERPL-MCNC: 172 MG/DL (ref 65–99)
GLUCOSE UR STRIP.AUTO-MCNC: >=1000 MG/DL
HCT VFR BLD AUTO: 40.1 % (ref 37–47)
HGB BLD-MCNC: 13.4 G/DL (ref 12–16)
HYALINE CASTS #/AREA URNS LPF: ABNORMAL /LPF
IGG SERPL-MCNC: 1070 MG/DL (ref 768–1632)
KETONES UR STRIP.AUTO-MCNC: ABNORMAL MG/DL
LEAD BLDV-MCNC: <2 UG/DL
LEUKOCYTE ESTERASE UR QL STRIP.AUTO: ABNORMAL
MCH RBC QN AUTO: 31.7 PG (ref 27–33)
MCHC RBC AUTO-ENTMCNC: 33.4 G/DL (ref 33.6–35)
MCV RBC AUTO: 94.8 FL (ref 81.4–97.8)
MERCURY BLD-MCNC: <2.5 UG/L
MICRO URNS: ABNORMAL
NITRITE UR QL STRIP.AUTO: NEGATIVE
NUCLEAR IGG SER QL IA: NORMAL
PH UR STRIP.AUTO: 5.5 [PH] (ref 5–8)
PLATELET # BLD AUTO: 230 K/UL (ref 164–446)
PMV BLD AUTO: 10.8 FL (ref 9–12.9)
POTASSIUM SERPL-SCNC: 3.7 MMOL/L (ref 3.6–5.5)
PROT SERPL-MCNC: 6.6 G/DL (ref 6–8.2)
PROT UR QL STRIP: NEGATIVE MG/DL
RBC # BLD AUTO: 4.23 M/UL (ref 4.2–5.4)
RBC # URNS HPF: ABNORMAL /HPF
RBC UR QL AUTO: NEGATIVE
SODIUM SERPL-SCNC: 138 MMOL/L (ref 135–145)
SP GR UR STRIP.AUTO: 1.04
UROBILINOGEN UR STRIP.AUTO-MCNC: 0.2 MG/DL
WBC # BLD AUTO: 14.4 K/UL (ref 4.8–10.8)
WBC #/AREA URNS HPF: ABNORMAL /HPF

## 2020-12-12 PROCEDURE — 700102 HCHG RX REV CODE 250 W/ 637 OVERRIDE(OP): Performed by: PSYCHIATRY & NEUROLOGY

## 2020-12-12 PROCEDURE — 700102 HCHG RX REV CODE 250 W/ 637 OVERRIDE(OP): Performed by: STUDENT IN AN ORGANIZED HEALTH CARE EDUCATION/TRAINING PROGRAM

## 2020-12-12 PROCEDURE — 81001 URINALYSIS AUTO W/SCOPE: CPT

## 2020-12-12 PROCEDURE — 85027 COMPLETE CBC AUTOMATED: CPT

## 2020-12-12 PROCEDURE — 99223 1ST HOSP IP/OBS HIGH 75: CPT | Mod: GC | Performed by: STUDENT IN AN ORGANIZED HEALTH CARE EDUCATION/TRAINING PROGRAM

## 2020-12-12 PROCEDURE — A9270 NON-COVERED ITEM OR SERVICE: HCPCS | Performed by: PSYCHIATRY & NEUROLOGY

## 2020-12-12 PROCEDURE — 36415 COLL VENOUS BLD VENIPUNCTURE: CPT

## 2020-12-12 PROCEDURE — 770020 HCHG ROOM/CARE - TELE (206)

## 2020-12-12 PROCEDURE — 99232 SBSQ HOSP IP/OBS MODERATE 35: CPT | Performed by: NURSE PRACTITIONER

## 2020-12-12 PROCEDURE — 87040 BLOOD CULTURE FOR BACTERIA: CPT

## 2020-12-12 PROCEDURE — 71045 X-RAY EXAM CHEST 1 VIEW: CPT

## 2020-12-12 PROCEDURE — A9270 NON-COVERED ITEM OR SERVICE: HCPCS | Performed by: STUDENT IN AN ORGANIZED HEALTH CARE EDUCATION/TRAINING PROGRAM

## 2020-12-12 PROCEDURE — 80053 COMPREHEN METABOLIC PANEL: CPT

## 2020-12-12 RX ADMIN — ACETAMINOPHEN 650 MG: 325 TABLET, FILM COATED ORAL at 20:17

## 2020-12-12 RX ADMIN — HYDROCHLOROTHIAZIDE 12.5 MG: 12.5 TABLET ORAL at 05:52

## 2020-12-12 RX ADMIN — FAMOTIDINE 10 MG: 10 TABLET, FILM COATED ORAL at 17:10

## 2020-12-12 RX ADMIN — FAMOTIDINE 10 MG: 10 TABLET, FILM COATED ORAL at 05:52

## 2020-12-12 ASSESSMENT — ENCOUNTER SYMPTOMS
BLURRED VISION: 1
HEADACHES: 1
MUSCULOSKELETAL NEGATIVE: 1
WEAKNESS: 1
DIZZINESS: 1
CARDIOVASCULAR NEGATIVE: 1
RESPIRATORY NEGATIVE: 1
GASTROINTESTINAL NEGATIVE: 1
PSYCHIATRIC NEGATIVE: 1

## 2020-12-12 ASSESSMENT — PAIN DESCRIPTION - PAIN TYPE
TYPE: ACUTE PAIN
TYPE: ACUTE PAIN

## 2020-12-12 NOTE — PROGRESS NOTES
Hospital Medicine Daily Progress Note    Date of Service  12/12/2020    Chief Complaint  50 y.o. female with PMHx of Complicated migraines, HLD, TMJ admitted 12/10/2020 with Vertigo    Hospital Course  The patient stated upon arrival to the ED that starting 4 days ago she began experiencing numbness on the face which was attributed to anxiety therefore treated with multiple medications.  She did not improve with those medication regimen.  On October 28, 2020 around 4 PM she started to have pounding vertigo-like feeling which took less than 1 minute and resolved. She states the vertigo is nonpositional.  It starts with the vertigo and radiates down to left arm then radiates to left feet and lasts about 30 seconds associated with right ear fullness, tinnitus.  She also has headaches which has been going on for years, describes as generalized and pressure-like headache without any pounding.  Blurred vision bilaterally which takes about few hours and resolves. Noncontrast CT head was done showing hyperdense paramedian right posterior frontal region mass versus late subarachnoid hemorrhage, MRI was performed and showeds T2 hyperintensities which was concerning for demyelinating disease.  Neurology was consulted Dr. Nguyen is on board and believes this clinical picture to be more consistent with demyelinating disease.  Patient was subsequently started on Solumedrol 1g Daily for 3-5 days with famotidine. Will continue monitoring for improvement. 12/11 symptoms resolved. As per neurology, not clearly MS will DC Solumedrol. CSF performed on 12/11 and fluid sent to lab for analysis including CSF cell counts, glucose/protein, culture, VDRL, OCBs, ACE, Cytology and flow cytometry. Patient will follow Neuro OP for Brain and C-spine MRI in one month to evaluate for any changes.     Interval Problem Update  Patient examined at bedside  [id number]  Not in any acute distress  No overnight complaints  Neurology Recommendations Appreciated  Dr. Nguyen-  -q4h and PRN neuro assessment. VS per nursing/unit protocol.   -LP for CSF analysis completed this afternoon; will check CSF cell counts, glucose/protein, culture, VDRL, OCBs, ACE, Cytology and flow cytometry. Will follow up with results as they are available.  -No need for IV Solumedrol tx at this time as this is not clearly MS and patient's symptoms are resolved at this time. Note B12, Folate, TSH, RPR, Vitamin D all WNL.    14k WBC-Septic work up initiated. Will start Ceftriaxone 1g Daily after cultures are obtained.      Consultants/Specialty  Neurology    Code Status  Full Code    Disposition  Home once neurologically and medically stable    Review of Systems  Review of Systems   Constitutional: Positive for malaise/fatigue.   HENT: Negative.    Eyes: Positive for blurred vision.   Respiratory: Negative.    Cardiovascular: Negative.    Gastrointestinal: Negative.    Genitourinary: Negative.    Musculoskeletal: Negative.    Skin: Negative.    Neurological: Positive for dizziness, weakness and headaches.        Vertigo for last 5 weeks   Endo/Heme/Allergies: Negative.    Psychiatric/Behavioral: Negative.         Physical Exam  Temp:  [36.3 °C (97.3 °F)-37.5 °C (99.5 °F)] 36.6 °C (97.9 °F)  Pulse:  [] 64  Resp:  [16-18] 17  BP: (101-157)/(58-90) 115/64  SpO2:  [92 %-100 %] 98 %    Physical Exam    Fluids    Intake/Output Summary (Last 24 hours) at 12/12/2020 0730  Last data filed at 12/11/2020 2000  Gross per 24 hour   Intake 800 ml   Output --   Net 800 ml       Laboratory  Recent Labs     12/10/20  1844 12/12/20  0038   WBC 8.0 14.4*   RBC 4.62 4.23   HEMOGLOBIN 14.5 13.4   HEMATOCRIT 43.4 40.1   MCV 93.9 94.8   MCH 31.4 31.7   MCHC 33.4* 33.4*   RDW 43.5 44.1   PLATELETCT 203 230   MPV 10.5 10.8     Recent Labs     12/10/20  1844 12/12/20 0038   SODIUM 136 138   POTASSIUM 3.8 3.7   CHLORIDE 101 102   CO2 24 23   GLUCOSE 83 172*   BUN 18 18   CREATININE 0.66 0.72   CALCIUM 9.7 9.7     Recent  Labs     12/11/20  1033   APTT 26.3   INR 0.98               Imaging  MR-BRAIN-WITH & W/O   Final Result      1.  There is an approximately 8 x 6 mm sized enhancing lesion in the right medial frontal gray matter.There are a few tiny satellite nodular enhancement.  The adjacent cortex demonstrates diffuse thickening. The gradient echo images does not demonstrate    any magnetic susceptibility at this level. The diffusion-weighted sequences are nondiagnostic due to the artifact. This is a nonspecific finding. The differential diagnosis includes primary brain neoplasm, lymphoma, active demyelinating plaque and    subacute infarct. Pre and postcontrast MR examination is recommended in 4 weeks for further characterization. Contrast enhancement would be resolved if this lesion is active demyelinating plaque or subacute .   2.  There are a few abnormal periventricular and subcortical T2 hyperintensities. The differential diagnosis includes demyelination, nonspecific foci of gliosis and ischemia The left lateral periventricular lesion is well-defined and demonstrates typical    characteristic of demyelinating plaque.   3.  Mild cerebral volume loss.   4.  There is mucosal thickening in the bilateral mastoid air cells.      CT-HEAD W/O   Final Result      1.  Ill-defined hyperdense area in the paramedian RIGHT posterior frontal region which may indicate mass or late subacute hemorrhage.  Recommend further evaluation with contrast-enhanced MRI.   2.  RIGHT mastoid fluid, likely inflammatory.      DX-CHEST-LIMITED (1 VIEW)    (Results Pending)        Assessment/Plan  * Vertigo  Assessment & Plan  Pt with 4 days history of vertigo and intermittently weak left arm and leg with fasciculations   CT head showing right mastoid fluid, likely inflammatory, and hyperdense paramedian right posterior frontal region mass versus late subarachnoid hemorrhage  MRI showing T2 hyperintensities  ?BPPV vs ?demylinating disease  For associated  anxiety, pt refuses any benzodiazepines or antidepressants, as wishes to stay of psych medication    PLAN  Admiting patient to neurology unit  Neurology was consulted, Dr. Nguyen is following.  Patient was started on Solu-Medrol and will continue for 3 to 5 days per neurology recommendations with Famotidine for ulcer prophylaxis.  MRI brain report by neuroradiologist is pending.  Multiple sclerosis profile, ESR, KATIE IgG, HIV, HSV ordered per neurology.  Possible Lumbar puncture with glucose, protein, cell count, cytology, flow cytometry,  '  Neurology will obtain B12 and B1, thyroid panel, syphilis.    Primary team to continue following neuro recommendations    Dysfunction of both eustachian tubes- (present on admission)  Assessment & Plan  CT head showing right mastoid fluid, likely inflammatory,  Continuing her home Flonase PRN    Complicated migraine- (present on admission)  Assessment & Plan  -Tylenol for headaches, as her episode could be migraine related.  There is no photophobia or phonophobia, though ?positive aura.    Alkaline phosphatase elevation  Assessment & Plan  Abdominal exam is benign.  f/u GGT        VTE prophylaxis: SCD's until LP is Completed

## 2020-12-12 NOTE — PROGRESS NOTES
Assumed care of patient at bedside report from NOC RN. Updated on POC. Patient currently A & O x 4; on room air; up self, steady on feet with steady gait; without complaints of acute pain. Call light within reach. Fall precautions in place. Bed locked and in lowest position. All questions answered. No other needs indicated at this time.

## 2020-12-12 NOTE — DISCHARGE SUMMARY
Discharge Summary    CHIEF COMPLAINT ON ADMISSION  Chief Complaint   Patient presents with   • Dizziness   • Unilateral Weakness     left side x2wks +intermittent left arm twitching       Reason for Admission  Left side numbness; Weakness; Dizz*     Admission Date  12/10/2020    CODE STATUS  Full Code    HPI & HOSPITAL COURSE  50 y.o. female with PMHx of Complicated migraines, HLD, TMJ admitted 12/10/2020 with Vertigo.    The patient stated upon arrival to the ED that starting 4 days ago she began experiencing numbness on the face which was attributed to anxiety therefore treated with multiple medications.  She did not improve with those medication regimen.  On October 28, 2020 around 4 PM she started to have pounding vertigo-like feeling which took less than 1 minute and resolved. She states the vertigo is nonpositional.  It starts with the vertigo and radiates down to left arm then radiates to left feet and lasts about 30 seconds associated with right ear fullness, tinnitus.  She also has headaches which has been going on for years, describes as generalized and pressure-like headache without any pounding.  Blurred vision bilaterally which takes about few hours and resolves. Noncontrast CT head was done showing hyperdense paramedian right posterior frontal region mass versus late subarachnoid hemorrhage, MRI was performed and showeds T2 hyperintensities which was concerning for demyelinating disease.  Neurology was consulted Dr. Nguyen is on board and believes this clinical picture to be more consistent with demyelinating disease.  Patient was subsequently started on Solumedrol 1g Daily for 3-5 days with famotidine. Will continue monitoring for improvement. 12/11 symptoms resolved. As per neurology, not clearly MS will DC Solumedrol. CSF performed on 12/11 and fluid sent to lab for analysis including CSF cell counts, glucose/protein, culture, VDRL, OCBs, ACE, Cytology and flow cytometry. Patient will follow Neuro  OP for Brain and C-spine MRI in one month to evaluate for any changes.       Therefore, she is discharged in good and stable condition to home with close outpatient follow-up.    The patient met 2-midnight criteria for an inpatient stay at the time of discharge.    Discharge Date  12/12/20    FOLLOW UP ITEMS POST DISCHARGE  Follow up with Neurology in 1 month for follow up Brain MRI to evaluate for any demyelinating disease.     DISCHARGE DIAGNOSES  Principal Problem (Resolved):    Vertigo POA: Unknown  Active Problems:    Complicated migraine POA: Yes  Resolved Problems:    Dysfunction of both eustachian tubes (Chronic) POA: Yes      Overview: Discussed ways to mitigate such as applying a little bit of pressure       closing the nose and pushing lightly      Flonase      Simply saline nasal mist      Do not do the opening up of the eustachian tube with viral or bacterial       URI    Alkaline phosphatase elevation POA: Unknown      FOLLOW UP  Future Appointments   Date Time Provider Department Center   12/15/2020  3:15 PM [address] [facility]   12/21/2020  1:15 PM Meron Alejo, PT, DPT [id number] [address]   4/1/2021  1:00 PM Shaheed Landin M.D. [facility] None     No follow-up provider specified.    MEDICATIONS ON DISCHARGE     Medication List      CONTINUE taking these medications      Instructions   acetaminophen 500 MG Tabs  Commonly known as: TYLENOL   Take 1,000 mg by mouth every 6 hours as needed.  Dose: 1,000 mg     Biotin 10 MG Caps   Take 1 Cap by mouth every day.  Dose: 1 Cap     Calcium 200 MG Tabs   Take 1 Tab by mouth every day.  Dose: 1 Tab     FLONASE NA   Administer 1 Spray into affected nostril(S) every morning.  Dose: 1 Spray     hydroCHLOROthiazide 12.5 MG tablet  Commonly known as: HYDRODIURIL   Take 1 Tab by mouth every morning.  Dose: 12.5 mg     magnesium oxide 400 MG Tabs tablet  Commonly known as: MAG-OX   Take 400 mg by mouth every day.  Dose: 400 mg     meclizine 12.5 MG  Tabs  Commonly known as: ANTIVERT   Take 1 Tab by mouth every bedtime.  Dose: 12.5 mg     multivitamin Tabs   Take 1 Tab by mouth every day.  Dose: 1 Tab     Tretinoin 0.05 % Lotn  Commonly known as: Altreno   1 Application by Apply externally route every bedtime.  Dose: 1 Application     vitamin D 1000 Unit (25 mcg) Tabs  Commonly known as: cholecalciferol   Take 1,000 Units by mouth every day.  Dose: 1,000 Units            Allergies  Allergies   Allergen Reactions   • Bactrim      Fatigue and ringing of the ears       DIET  Orders Placed This Encounter   Procedures   • Diet Order Diet: Cardiac     Standing Status:   Standing     Number of Occurrences:   1     Order Specific Question:   Diet:     Answer:   Cardiac [6]       ACTIVITY  As tolerated.  Weight bearing as tolerated    CONSULTATIONS  Neurology    PROCEDURES  None    LABORATORY  Lab Results   Component Value Date    SODIUM 138 12/12/2020    POTASSIUM 3.7 12/12/2020    CHLORIDE 102 12/12/2020    CO2 23 12/12/2020    GLUCOSE 172 (H) 12/12/2020    BUN 18 12/12/2020    CREATININE 0.72 12/12/2020        Lab Results   Component Value Date    WBC 14.4 (H) 12/12/2020    HEMOGLOBIN 13.4 12/12/2020    HEMATOCRIT 40.1 12/12/2020    PLATELETCT 230 12/12/2020        Total time of the discharge process exceeds 37 minutes.

## 2020-12-12 NOTE — DISCHARGE INSTRUCTIONS
Discharge Instructions    Discharged to home by car with relative. Discharged via wheelchair, hospital escort: Yes.  Special equipment needed: Not Applicable    Be sure to schedule a follow-up appointment with your primary care doctor or any specialists as instructed.     Discharge Plan:        I understand that a diet low in cholesterol, fat, and sodium is recommended for good health. Unless I have been given specific instructions below for another diet, I accept this instruction as my diet prescription.   Other diet: regular    Special Instructions: None    · Is patient discharged on Warfarin / Coumadin?   No     Migraine Headache  A migraine headache is a very strong throbbing pain on one side or both sides of your head. This type of headache can also cause other symptoms. It can last from 4 hours to 3 days. Talk with your doctor about what things may bring on (trigger) this condition.  What are the causes?  The exact cause of this condition is not known. This condition may be triggered or caused by:  · Drinking alcohol.  · Smoking.  · Taking medicines, such as:  ? Medicine used to treat chest pain (nitroglycerin).  ? Birth control pills.  ? Estrogen.  ? Some blood pressure medicines.  · Eating or drinking certain products.  · Doing physical activity.  Other things that may trigger a migraine headache include:  · Having a menstrual period.  · Pregnancy.  · Hunger.  · Stress.  · Not getting enough sleep or getting too much sleep.  · Weather changes.  · Tiredness (fatigue).  What increases the risk?  · Being 25-55 years old.  · Being female.  · Having a family history of migraine headaches.  · Being .  · Having depression or anxiety.  · Being very overweight.  What are the signs or symptoms?  · A throbbing pain. This pain may:  ? Happen in any area of the head, such as on one side or both sides.  ? Make it hard to do daily activities.  ? Get worse with physical activity.  ? Get worse around bright lights  or loud noises.  · Other symptoms may include:  ? Feeling sick to your stomach (nauseous).  ? Vomiting.  ? Dizziness.  ? Being sensitive to bright lights, loud noises, or smells.  · Before you get a migraine headache, you may get warning signs (an aura). An aura may include:  ? Seeing flashing lights or having blind spots.  ? Seeing bright spots, halos, or zigzag lines.  ? Having tunnel vision or blurred vision.  ? Having numbness or a tingling feeling.  ? Having trouble talking.  ? Having weak muscles.  · Some people have symptoms after a migraine headache (postdromal phase), such as:  ? Tiredness.  ? Trouble thinking (concentrating).  How is this treated?  · Taking medicines that:  ? Relieve pain.  ? Relieve the feeling of being sick to your stomach.  ? Prevent migraine headaches.  · Treatment may also include:  ? Having acupuncture.  ? Avoiding foods that bring on migraine headaches.  ? Learning ways to control your body functions (biofeedback).  ? Therapy to help you know and deal with negative thoughts (cognitive behavioral therapy).  Follow these instructions at home:  Medicines  · Take over-the-counter and prescription medicines only as told by your doctor.  · Ask your doctor if the medicine prescribed to you:  ? Requires you to avoid driving or using heavy machinery.  ? Can cause trouble pooping (constipation). You may need to take these steps to prevent or treat trouble pooping:  § Drink enough fluid to keep your pee (urine) pale yellow.  § Take over-the-counter or prescription medicines.  § Eat foods that are high in fiber. These include beans, whole grains, and fresh fruits and vegetables.  § Limit foods that are high in fat and sugar. These include fried or sweet foods.  Lifestyle  · Do not drink alcohol.  · Do not use any products that contain nicotine or tobacco, such as cigarettes, e-cigarettes, and chewing tobacco. If you need help quitting, ask your doctor.  · Get at least 8 hours of sleep every  night.  · Limit and deal with stress.  General instructions         · Keep a journal to find out what may bring on your migraine headaches. For example, write down:  ? What you eat and drink.  ? How much sleep you get.  ? Any change in what you eat or drink.  ? Any change in your medicines.  · If you have a migraine headache:  ? Avoid things that make your symptoms worse, such as bright lights.  ? It may help to lie down in a dark, quiet room.  ? Do not drive or use heavy machinery.  ? Ask your doctor what activities are safe for you.  · Keep all follow-up visits as told by your doctor. This is important.  Contact a doctor if:  · You get a migraine headache that is different or worse than others you have had.  · You have more than 15 headache days in one month.  Get help right away if:  · Your migraine headache gets very bad.  · Your migraine headache lasts longer than 72 hours.  · You have a fever.  · You have a stiff neck.  · You have trouble seeing.  · Your muscles feel weak or like you cannot control them.  · You start to lose your balance a lot.  · You start to have trouble walking.  · You pass out (faint).  · You have a seizure.  Summary  · A migraine headache is a very strong throbbing pain on one side or both sides of your head. These headaches can also cause other symptoms.  · This condition may be treated with medicines and changes to your lifestyle.  · Keep a journal to find out what may bring on your migraine headaches.  · Contact a doctor if you get a migraine headache that is different or worse than others you have had.  · Contact your doctor if you have more than 15 headache days in a month.  This information is not intended to replace advice given to you by your health care provider. Make sure you discuss any questions you have with your health care provider.  Document Released: 09/26/2009 Document Revised: 04/10/2020 Document Reviewed: 01/30/2020  Elsevier Patient Education © 2020 [employer]      Depression / Suicide Risk    As you are discharged from this [facility] facility, it is important to learn how to keep safe from harming yourself.    Recognize the warning signs:  · Abrupt changes in personality, positive or negative- including increase in energy   · Giving away possessions  · Change in eating patterns- significant weight changes-  positive or negative  · Change in sleeping patterns- unable to sleep or sleeping all the time   · Unwillingness or inability to communicate  · Depression  · Unusual sadness, discouragement and loneliness  · Talk of wanting to die  · Neglect of personal appearance   · Rebelliousness- reckless behavior  · Withdrawal from people/activities they love  · Confusion- inability to concentrate     If you or a loved one observes any of these behaviors or has concerns about self-harm, here's what you can do:  · Talk about it- your feelings and reasons for harming yourself  · Remove any means that you might use to hurt yourself (examples: pills, rope, extension cords, firearm)  · Get professional help from the community (Mental Health, Substance Abuse, psychological counseling)  · Do not be alone:Call your Safe Contact- someone whom you trust who will be there for you.  · Call your local CRISIS HOTLINE 081-7486 or 253-354-0112  · Call your local Children's Mobile Crisis Response Team [place] (904) 448-7037 or www.[address] Mobile  · Call the toll free National Suicide Prevention Hotlines   · National Suicide Prevention Lifeline 152-102-RMPS (3669)  · National Hope Line Network 800-SUICIDE (392-0850)

## 2020-12-12 NOTE — PROGRESS NOTES
"Chief Complaint   Patient presents with   • Dizziness   • Unilateral Weakness     left side x2wks +intermittent left arm twitching     Neurology Progress Note    History of present illness:  This is a 50-year old female with PMhx significant for anxiety/depression, migraine headaches, and dyslipidemia who presented to [facility] on 12/10/20 for a chief complaint of a 2-week history unilateral (Left sided) paresthesia and vertigo.   The patient states that approximately two weeks ago, she was driving in her vehicle when she suddenly noted severe dizziness, further described as a \"back and forth\" sensation; she thus pulled over and had her  pick her up. Duration of this event 5-6 minutes, resolved spontaneously. She reports that over the next several days she felt \"off,\" but no additional vertigo events. Four days after initial event, patient notes that she began having LUE/LLE paresthesia, further described as \"floating\" sensation, with occasional associated \"twiching\" that she further describes as tremulous-like movement. Symptoms have been intermittent in nature; patient also reportedly saw her PCP on 11/25/20, was given PRN Meclizine and HCTZ for hypertension and for diuretic purposes given concern for eustachian tube dysfunction. These supportive measures have reportedly not helped; patient thus presented to the ED yesterday for further evaluation.     On arrival here, CT head revealed \"Ill-defined hyperdense area in the paramedian RIGHT posterior frontal region which may indicate mass or late subacute hemorrhage;\" follow up MRI Brain w/wo revealed \"enhancing lesion in the right medial frontal gray matter...a nonspecific finding. The differential diagnosis includes primary brain neoplasm, lymphoma, active demyelinating plaque and subacute infarct.\"     Currently, patient is sitting up in bed; awake and alert. States that she feels well. Denies weakness, numbness at this time; denies \"floating\" " "sensation at this time. She admits to feeling anxious. She admits to mild bifrontal headache. She denies problem with vision, speech or swallowing. No recent falls or trauma.     Neurology has been consulted by Dr. Humberto Guo to further evaluate the findings noted above.     Interval, 12/12/20:   Patient is sitting up in bed; awake and alert. States that she feels well; \"better than she's felt in weeks.\" Attributes this to good sleep last night and receiving Ativan for LP yesterday. She denies headache, localized pain from LP/spinal tap; denies focal weakness, numbness, problem with vision, speech or swallowing. Planning for discharge today.     No changes to HPI as was previously documented.     Past medical history:   Past Medical History:   Diagnosis Date   • Anxiety    • Complicated migraine 6/9/2014   • Depression    • Dermatitis, contact 11/16/2015   • Fatigue 6/9/2014   • Hyperlipidemia 1/23/2015   • Lentigo 10/17/2018   • Menopausal symptom 7/2/2014   • Mixed anxiety and depressive disorder 6/9/2014   • Seborrheic keratoses 10/17/2018   • TMJ arthralgia 6/9/2014   • UTI (lower urinary tract infection)        Past surgical history:   Past Surgical History:   Procedure Laterality Date   • MYRINGOTOMY  8/27/2010    Performed by [address] at SURGERY SAME DAY [facility] ORS   • LAPAROSCOPY  5/28/2010    Performed by JACKI SHARMA at SURGERY [facility] ORS   • LYMPH NODE SAMPLING  5/28/2010    Performed by JACKI SHARMA at SURGERY [facility] ORS   • DEBULKING  5/28/2010    Performed by JACKI SHARMA at SURGERY [facility] ORS   • CYSTOSCOPY  10/15/08    Performed by YU GODINEZ at SURGERY SAME DAY [facility] ORS   • VAGINAL HYSTERECTOMY SCOPE TOTAL  10/15/08    Performed by YU GODINEZ at SURGERY SAME DAY [facility] ORS   • OTHER  1984    TONSILECTOMY/ ADNOIDS   • APPENDECTOMY  1981   • TONSILLECTOMY AND ADENOIDECTOMY         Family history:   Family History   Problem Relation Age of Onset   • " Non-contributory Mother    • Lung Disease Mother         COPD   • Cancer Mother         dysplasia    • Arthritis Mother    • Psychiatric Illness Mother    • Heart Disease Mother    • Hypertension Mother    • Stroke Mother    • Non-contributory Father    • Cancer Father 73        prostate cancer   • Lung Disease Maternal Grandmother    • Lung Disease Paternal Aunt    • Diabetes Paternal Aunt    • Hyperlipidemia Paternal Aunt        Social history:   Social History     Socioeconomic History   • Marital status:      Spouse name: Not on file   • Number of children: Not on file   • Years of education: Not on file   • Highest education level: Not on file   Occupational History   • Not on file   Social Needs   • Financial resource strain: Not on file   • Food insecurity     Worry: Not on file     Inability: Not on file   • Transportation needs     Medical: Not on file     Non-medical: Not on file   Tobacco Use   • Smoking status: Never Smoker   • Smokeless tobacco: Never Used   Substance and Sexual Activity   • Alcohol use: Not Currently     Alcohol/week: 0.0 oz     Frequency: Monthly or less     Drinks per session: 1 or 2     Binge frequency: Never   • Drug use: No   • Sexual activity: Yes     Partners: Male   Lifestyle   • Physical activity     Days per week: Not on file     Minutes per session: Not on file   • Stress: Not on file   Relationships   • Social connections     Talks on phone: Not on file     Gets together: Not on file     Attends Christianity service: Not on file     Active member of club or organization: Not on file     Attends meetings of clubs or organizations: Not on file     Relationship status: Not on file   • Intimate partner violence     Fear of current or ex partner: Not on file     Emotionally abused: Not on file     Physically abused: Not on file     Forced sexual activity: Not on file   Other Topics Concern   • Not on file   Social History Narrative   • Not on file       Current medications:    Current Facility-Administered Medications   Medication Dose   • senna-docusate (PERICOLACE or SENOKOT S) 8.6-50 MG per tablet 2 Tab  2 Tab    And   • polyethylene glycol/lytes (MIRALAX) PACKET 1 Packet  1 Packet    And   • magnesium hydroxide (MILK OF MAGNESIA) suspension 30 mL  30 mL    And   • bisacodyl (DULCOLAX) suppository 10 mg  10 mg   • acetaminophen (Tylenol) tablet 650 mg  650 mg   • labetalol (NORMODYNE/TRANDATE) injection 10 mg  10 mg   • cloNIDine (CATAPRES) tablet 0.1 mg  0.1 mg   • ondansetron (ZOFRAN) syringe/vial injection 4 mg  4 mg   • ondansetron (ZOFRAN ODT) dispertab 4 mg  4 mg   • hydroCHLOROthiazide (HYDRODIURIL) tablet 12.5 mg  12.5 mg   • fluticasone (FLONASE) nasal spray 100 mcg  2 Spray   • famotidine (PEPCID) tablet 10 mg  10 mg       Medication Allergy:  Allergies   Allergen Reactions   • Bactrim      Fatigue and ringing of the ears       Review of systems:   Constitutional: denies fever, night sweats, weight loss.   Eyes: denies acute vision change, eye pain or secretion.   Ears, Nose, Mouth, Throat: denies nasal secretion, nasal bleeding, difficulty swallowing, hearing loss, tinnitus, vertigo, ear pain, acute dental problems, oral ulcers or lesions.   Endocrine: denies recent weight changes, heat or cold intolerance, polyuria, polydypsia, polyphagia,abnormal hair growth.  Cardiovascular: denies new onset of chest pain, palpitations, syncope, or dyspnea of exertion.  Pulmonary: denies shortness of breath, new onset of cough, hemoptysis, wheezing, chest pain or flu-like symptoms.   GI: denies nausea, vomiting, diarrhea, GI bleeding, change in appetite, abdominal pain, and change in bowel habits.  : denies dysuria, urinary incontinence, hematuria.  Heme/oncology: denies history of easy bruising or bleeding. No history of cancer, DVTor PE.  Allergy/immunology: denies hives/urticaria, or itching.   Dermatologic: denies new rash, or new skin lesions.  Musculoskeletal:denies joint  swelling or pain, muscle pain, neck and back pain.   Neurologic: As noted above. denies headaches, acute visual changes, facial droopiness, muscle weakness (focal or generalized), paresthesias, anesthesia, ataxia, change in speech or language, memory loss, abnormal movements, seizures, loss of consciousness, or episodes of confusion.   Psychiatric: denies symptoms of depression, anxiety, hallucinations, mood swings or changes, suicidal or homicidal thoughts.       Physical examination:   Vitals:    12/12/20 0000 12/12/20 0500 12/12/20 0718 12/12/20 1100   BP: 112/60 101/58 115/64 112/58   Pulse: 84 75 64 94   Resp: 18 16 17 17   Temp: 37.1 °C (98.7 °F) 36.3 °C (97.3 °F) 36.6 °C (97.9 °F) 37.2 °C (99 °F)   TempSrc: Temporal Temporal Temporal Temporal   SpO2: 92% 100% 98% 99%   Weight:       Height:         General: Patient in no acute distress, pleasant and cooperative.  HEENT: Normocephalic, no signs of acute trauma.   Neck: supple, no meningeal signs or carotid bruits. There is normal range of motion. No tenderness on exam.   Chest: clear to auscultation. No cough.   CV: RRR, no murmurs.   Skin: no signs of acute rashes or trauma.   Musculoskeletal: joints exhibit full range of motion, without any pain to palpation. There are no signs of joint or muscle swelling. There is no tenderness to deep palpation of muscles.   Psychiatric: No hallucinatory behavior.        NEUROLOGICAL EXAM:   Mental status, orientation: Awake, alert and fully oriented.   Speech and language: speech is clear and fluent. The patient is able to name, repeat and comprehend.   Memory: There is intact recollection of recent and remote events.   Cranial nerve exam: Pupils are 3-4 mm bilaterally and equally reactive to light and accommodation. Visual fields are intact by confrontation. There is no nystagmus on primary or secondary gaze. Intact full EOM in all directions of gaze. Face appears symmetric. Sensation in the face is intact to light  touch. Uvula is midline. Palate elevates symmetrically. Tongue is midline and without any signs of tongue biting or fasciculations. Shoulder shrug is intact bilaterally.   Motor exam: Strength is 5/5 in all extremities. Tone is normal; no spasticity; no bradykinesia. No abnormal movements were seen on exam.   Sensory exam reveals normal sense of light touch and pinprick in all extremities.   Deep tendon reflexes:  2+ throughout. Plantar responses are flexor. There is no clonus.   Coordination: shows a normal finger-nose-finger. Normal rapidly alternating movements.   Gait: Not assessed as patient is a fall risk.       No significant changes to exam as was documented on 12/11/20.        ANCILLARY DATA REVIEWED:     Lab Data Review:  Recent Results (from the past 24 hour(s))   CSF Cell Count    Collection Time: 12/11/20  3:40 PM   Result Value Ref Range    Number Of Tubes 4     Volume 16.0 mL    Color-Body Fluid Colorless     Character-Body Fluid Clear     Supernatant Appearance Colorless     Total RBC Count <1 cells/uL    Crenated RBC 0 %    Total WBC Count 6 0 - 10 cells/uL    Lymphs 92 %    Mononuclear Cells - CSF 8 %    CSF Tube Number 3    CSF GLUCOSE    Collection Time: 12/11/20  3:40 PM   Result Value Ref Range    Glucose  (H) 40 - 80 mg/dL   CSF PROTEIN    Collection Time: 12/11/20  3:40 PM   Result Value Ref Range    Total Protein, CSF 40 15 - 45 mg/dL   Comp Metabolic Panel (CMP)    Collection Time: 12/12/20 12:38 AM   Result Value Ref Range    Sodium 138 135 - 145 mmol/L    Potassium 3.7 3.6 - 5.5 mmol/L    Chloride 102 96 - 112 mmol/L    Co2 23 20 - 33 mmol/L    Anion Gap 13.0 7.0 - 16.0    Glucose 172 (H) 65 - 99 mg/dL    Bun 18 8 - 22 mg/dL    Creatinine 0.72 0.50 - 1.40 mg/dL    Calcium 9.7 8.5 - 10.5 mg/dL    AST(SGOT) 14 12 - 45 U/L    ALT(SGPT) 14 2 - 50 U/L    Alkaline Phosphatase 80 30 - 99 U/L    Total Bilirubin 0.3 0.1 - 1.5 mg/dL    Albumin 4.2 3.2 - 4.9 g/dL    Total Protein 6.6 6.0 -  8.2 g/dL    Globulin 2.4 1.9 - 3.5 g/dL    A-G Ratio 1.8 g/dL   CBC WITHOUT DIFFERENTIAL    Collection Time: 12/12/20 12:38 AM   Result Value Ref Range    WBC 14.4 (H) 4.8 - 10.8 K/uL    RBC 4.23 4.20 - 5.40 M/uL    Hemoglobin 13.4 12.0 - 16.0 g/dL    Hematocrit 40.1 37.0 - 47.0 %    MCV 94.8 81.4 - 97.8 fL    MCH 31.7 27.0 - 33.0 pg    MCHC 33.4 (L) 33.6 - 35.0 g/dL    RDW 44.1 35.9 - 50.0 fL    Platelet Count 230 164 - 446 K/uL    MPV 10.8 9.0 - 12.9 fL   ESTIMATED GFR    Collection Time: 12/12/20 12:38 AM   Result Value Ref Range    GFR If African American >60 >60 mL/min/1.73 m 2    GFR If Non African American >60 >60 mL/min/1.73 m 2   URINALYSIS    Collection Time: 12/12/20  8:06 AM    Specimen: Urine, Clean Catch   Result Value Ref Range    Color Yellow     Character Clear     Specific Gravity 1.039 <1.035    Ph 5.5 5.0 - 8.0    Glucose >=1000 (A) Negative mg/dL    Ketones Trace (A) Negative mg/dL    Protein Negative Negative mg/dL    Bilirubin Negative Negative    Urobilinogen, Urine 0.2 Negative    Nitrite Negative Negative    Leukocyte Esterase Small (A) Negative    Occult Blood Negative Negative    Micro Urine Req Microscopic    URINE MICROSCOPIC (W/UA)    Collection Time: 12/12/20  8:06 AM   Result Value Ref Range    WBC 20-50 (A) /hpf    RBC 0-2 /hpf    Bacteria Few (A) None /hpf    Epithelial Cells Negative /hpf    Hyaline Cast 0-2 /lpf       Labs reviewed by me.       Imaging reviewed by me:     DX-CHEST-LIMITED (1 VIEW)   Final Result      Mild hazy left basilar opacity may represent atelectasis or pneumonitis.         MR-BRAIN-WITH & W/O   Final Result      1.  There is an approximately 8 x 6 mm sized enhancing lesion in the right medial frontal gray matter.There are a few tiny satellite nodular enhancement.  The adjacent cortex demonstrates diffuse thickening. The gradient echo images does not demonstrate    any magnetic susceptibility at this level. The diffusion-weighted sequences are nondiagnostic  "due to the artifact. This is a nonspecific finding. The differential diagnosis includes primary brain neoplasm, lymphoma, active demyelinating plaque and    subacute infarct. Pre and postcontrast MR examination is recommended in 4 weeks for further characterization. Contrast enhancement would be resolved if this lesion is active demyelinating plaque or subacute .   2.  There are a few abnormal periventricular and subcortical T2 hyperintensities. The differential diagnosis includes demyelination, nonspecific foci of gliosis and ischemia The left lateral periventricular lesion is well-defined and demonstrates typical    characteristic of demyelinating plaque.   3.  Mild cerebral volume loss.   4.  There is mucosal thickening in the bilateral mastoid air cells.      CT-HEAD W/O   Final Result      1.  Ill-defined hyperdense area in the paramedian RIGHT posterior frontal region which may indicate mass or late subacute hemorrhage.  Recommend further evaluation with contrast-enhanced MRI.   2.  RIGHT mastoid fluid, likely inflammatory.          Modified Kathleen Scale (MRS): 0 = No symptoms      ASSESSMENT AND PLAN:  50-year old female with PMhx significant for anxiety/depression, migraine headaches, and dyslipidemia who presented to [facility] on 12/10/20 for a chief complaint of a 2-week history unilateral (Left sided) paresthesia and vertigo-like sensation; events intermittent in nature; no obvious provocation. Patient reports symptoms are essentially resolved now. Upon presentation here, CT head revealed Right frontal lesion, subacute hemorrhage versus mass; MRI Brain w/wo contrast further revealed \"enhancing lesion in the right medial frontal gray matter-- differential diagnosis includes primary brain neoplasm, lymphoma, active demyelinating plaque and subacute infarct.\" Also note, a single T2 hyperintense lesion involving Left periventricular white matter-- though not clearly demyelinating/no active plaques, " concerning for a demyelinating process of unclear etiology.     As was previously noted, patient had MRI Brain in 2017 (she reports that she has been seen by outpatient neurology Dr. Mendiola in the past for migraines); lesions noted above were not apparent per review of this previous study.     Patient had LP yesterday for CSF analysis; thus far, all CSF unremarkable/essentially normal; patient notably does have WBC 6, RBC 0; could indicate inflammatory or infectious process; patient does not appear toxic; no leukocytosis present on admission. Differential diagnoses still include multiple sclerosis, meningioma, CNS lymphoma, or other neoplasm of the brain. Work-up to be completed as outpatient.     Recommendations/Plan:     -q4h and PRN neuro assessment. VS per nursing/unit protocol.   -LP for CSF analysis completed this afternoon; will check CSF cell counts, glucose/protein, culture, VDRL, OCBs, ACE, Cytology and flow cytometry-- all still pending; to be followed as outpatient.   -No need for IV Solumedrol tx at this time as this is not clearly MS and patient's symptoms are resolved at this time. This was discussed with Dr. Hussein and Dr. Calixto (MS/neuro immuno), and they agree.   -Note B12, Folate, TSH, RPR, Vitamin D all WNL.   -Will place outpatient neurology referral for follow up after discharge; tentative plan for repeat MRI Brain, C spine in one month.   -All other medical management per primary team.     The plan of care above has been discussed with Dr. Hussein. Other than the above, no further recommendations from a neurological perspective; patient is clear for discharge. Please call with questions.     YAZ VillalobosRMODE.  [place] [facility]

## 2020-12-13 VITALS
HEART RATE: 65 BPM | SYSTOLIC BLOOD PRESSURE: 113 MMHG | HEIGHT: 67 IN | OXYGEN SATURATION: 95 % | WEIGHT: 208.56 LBS | BODY MASS INDEX: 32.73 KG/M2 | TEMPERATURE: 98.6 F | RESPIRATION RATE: 16 BRPM | DIASTOLIC BLOOD PRESSURE: 73 MMHG

## 2020-12-13 LAB
ANION GAP SERPL CALC-SCNC: 10 MMOL/L (ref 7–16)
BUN SERPL-MCNC: 24 MG/DL (ref 8–22)
CALCIUM SERPL-MCNC: 8.9 MG/DL (ref 8.5–10.5)
CHLORIDE SERPL-SCNC: 102 MMOL/L (ref 96–112)
CO2 SERPL-SCNC: 26 MMOL/L (ref 20–33)
CREAT SERPL-MCNC: 0.72 MG/DL (ref 0.5–1.4)
ERYTHROCYTE [DISTWIDTH] IN BLOOD BY AUTOMATED COUNT: 46.4 FL (ref 35.9–50)
GLUCOSE SERPL-MCNC: 98 MG/DL (ref 65–99)
HCT VFR BLD AUTO: 39.1 % (ref 37–47)
HGB BLD-MCNC: 13 G/DL (ref 12–16)
MCH RBC QN AUTO: 31.9 PG (ref 27–33)
MCHC RBC AUTO-ENTMCNC: 33.2 G/DL (ref 33.6–35)
MCV RBC AUTO: 96.1 FL (ref 81.4–97.8)
PLATELET # BLD AUTO: 198 K/UL (ref 164–446)
PMV BLD AUTO: 10.4 FL (ref 9–12.9)
POTASSIUM SERPL-SCNC: 3.7 MMOL/L (ref 3.6–5.5)
RBC # BLD AUTO: 4.07 M/UL (ref 4.2–5.4)
SODIUM SERPL-SCNC: 138 MMOL/L (ref 135–145)
WBC # BLD AUTO: 10.7 K/UL (ref 4.8–10.8)

## 2020-12-13 PROCEDURE — 700102 HCHG RX REV CODE 250 W/ 637 OVERRIDE(OP): Performed by: PSYCHIATRY & NEUROLOGY

## 2020-12-13 PROCEDURE — 36415 COLL VENOUS BLD VENIPUNCTURE: CPT

## 2020-12-13 PROCEDURE — 80048 BASIC METABOLIC PNL TOTAL CA: CPT

## 2020-12-13 PROCEDURE — A9270 NON-COVERED ITEM OR SERVICE: HCPCS | Performed by: PSYCHIATRY & NEUROLOGY

## 2020-12-13 PROCEDURE — 99239 HOSP IP/OBS DSCHRG MGMT >30: CPT | Performed by: STUDENT IN AN ORGANIZED HEALTH CARE EDUCATION/TRAINING PROGRAM

## 2020-12-13 PROCEDURE — A9270 NON-COVERED ITEM OR SERVICE: HCPCS | Performed by: STUDENT IN AN ORGANIZED HEALTH CARE EDUCATION/TRAINING PROGRAM

## 2020-12-13 PROCEDURE — 700102 HCHG RX REV CODE 250 W/ 637 OVERRIDE(OP): Performed by: STUDENT IN AN ORGANIZED HEALTH CARE EDUCATION/TRAINING PROGRAM

## 2020-12-13 PROCEDURE — 85027 COMPLETE CBC AUTOMATED: CPT

## 2020-12-13 RX ADMIN — FAMOTIDINE 10 MG: 10 TABLET, FILM COATED ORAL at 05:18

## 2020-12-13 RX ADMIN — HYDROCHLOROTHIAZIDE 12.5 MG: 12.5 TABLET ORAL at 05:18

## 2020-12-13 ASSESSMENT — PAIN DESCRIPTION - PAIN TYPE: TYPE: ACUTE PAIN

## 2020-12-13 NOTE — PROGRESS NOTES
IV removed. Discharge instructions provided and patient verbalizes understanding. Patient states that all question have been answered. Copy of discharge provided to patient and signed. Prescription: no new prescriptions. Patient states that all personal items are in possess. Patient escorted off unit.

## 2020-12-13 NOTE — CARE PLAN
Problem: Communication  Goal: The ability to communicate needs accurately and effectively will improve  Outcome: PROGRESSING AS EXPECTED  Intervention: [place] patient and significant other/support system to call light to alert staff of needs  Flowsheets (Taken 12/12/2020 1811)  Oriented to:: All of the Following : Location of Bathroom, Visiting Policy, Unit Routine, Call Light and Bedside Controls, Bedside Rail Policy, Smoking Policy, Rights and Responsibilities, Bedside Report, and Patient Education Notebook  Intervention: Educate patient and significant other/support system about the plan of care, procedures, treatments, medications and allow for questions  Note: Discussed daily POC. Discussed MD rounding. Pt asked appropriate questions. Pt verbalized understanding.\

## 2020-12-13 NOTE — PROGRESS NOTES
Pt. Is restless and complained of stabbing pain in his post op foot. The pt. Was started on gabapentin tonight, but has no other pain meds ordered. Hospitalist paged.

## 2020-12-13 NOTE — DISCHARGE SUMMARY
Discharge Summary    CHIEF COMPLAINT ON ADMISSION  Chief Complaint   Patient presents with   • Dizziness   • Unilateral Weakness     left side x2wks +intermittent left arm twitching       Reason for Admission  Left side numbness; Weakness; Dizz*     Admission Date  12/10/2020    CODE STATUS  Full Code    HPI & HOSPITAL COURSE  50 y.o. female with PMHx of Complicated migraines, HLD, TMJ admitted 12/10/2020 with Vertigo.    The patient stated upon arrival to the ED that starting 4 days ago she began experiencing numbness on the face which was attributed to anxiety therefore treated with multiple medications.  She did not improve with those medication regimen.  On October 28, 2020 around 4 PM she started to have pounding vertigo-like feeling which took less than 1 minute and resolved. She states the vertigo is nonpositional.  It starts with the vertigo and radiates down to left arm then radiates to left feet and lasts about 30 seconds associated with right ear fullness, tinnitus.  She also has headaches which has been going on for years, describes as generalized and pressure-like headache without any pounding.  Blurred vision bilaterally which takes about few hours and resolves. Noncontrast CT head was done showing hyperdense paramedian right posterior frontal region mass versus late subarachnoid hemorrhage, MRI was performed and showeds T2 hyperintensities which was concerning for demyelinating disease.  Neurology was consulted Dr. Nguyen is on board and believes this clinical picture to be more consistent with demyelinating disease.  Patient was subsequently started on Solumedrol 1g Daily for 3-5 days with famotidine. Will continue monitoring for improvement. 12/11 symptoms resolved. As per neurology, not clearly MS will DC Solumedrol. CSF performed on 12/11 and fluid sent to lab for analysis including CSF cell counts, glucose/protein, culture, VDRL, OCBs, ACE, Cytology and flow cytometry. Patient will follow Neuro  OP for Brain and C-spine MRI in one month to evaluate for any changes.       Therefore, she is discharged in good and stable condition to home with close outpatient follow-up.    The patient met 2-midnight criteria for an inpatient stay at the time of discharge.    Discharge Date  12/13/20    FOLLOW UP ITEMS POST DISCHARGE  Follow up with Neurology in 1 month for follow up Brain MRI to evaluate for any demyelinating disease.     DISCHARGE DIAGNOSES  Principal Problem (Resolved):    Vertigo POA: Unknown  Active Problems:    Complicated migraine POA: Yes  Resolved Problems:    Dysfunction of both eustachian tubes (Chronic) POA: Yes      Overview: Discussed ways to mitigate such as applying a little bit of pressure       closing the nose and pushing lightly      Flonase      Simply saline nasal mist      Do not do the opening up of the eustachian tube with viral or bacterial       URI    Alkaline phosphatase elevation POA: Unknown      FOLLOW UP  Future Appointments   Date Time Provider Department Center   12/15/2020  3:15 PM [address] [facility]   12/21/2020  1:15 PM Meron Alejo, PT, DPT [facility] [address]   4/1/2021  1:00 PM Shaheed Landin M.D. [facility] None     No follow-up provider specified.    MEDICATIONS ON DISCHARGE     Medication List      CONTINUE taking these medications      Instructions   acetaminophen 500 MG Tabs  Commonly known as: TYLENOL   Take 1,000 mg by mouth every 6 hours as needed.  Dose: 1,000 mg     Biotin 10 MG Caps   Take 1 Cap by mouth every day.  Dose: 1 Cap     Calcium 200 MG Tabs   Take 1 Tab by mouth every day.  Dose: 1 Tab     FLONASE NA   Administer 1 Spray into affected nostril(S) every morning.  Dose: 1 Spray     hydroCHLOROthiazide 12.5 MG tablet  Commonly known as: HYDRODIURIL   Take 1 Tab by mouth every morning.  Dose: 12.5 mg     magnesium oxide 400 MG Tabs tablet  Commonly known as: MAG-OX   Take 400 mg by mouth every day.  Dose: 400 mg     meclizine 12.5 MG  Tabs  Commonly known as: ANTIVERT   Take 1 Tab by mouth every bedtime.  Dose: 12.5 mg     multivitamin Tabs   Take 1 Tab by mouth every day.  Dose: 1 Tab     Tretinoin 0.05 % Lotn  Commonly known as: Altreno   1 Application by Apply externally route every bedtime.  Dose: 1 Application     vitamin D 1000 Unit (25 mcg) Tabs  Commonly known as: cholecalciferol   Take 1,000 Units by mouth every day.  Dose: 1,000 Units            Allergies  Allergies   Allergen Reactions   • Bactrim      Fatigue and ringing of the ears       DIET  Orders Placed This Encounter   Procedures   • Diet Order Diet: Cardiac     Standing Status:   Standing     Number of Occurrences:   1     Order Specific Question:   Diet:     Answer:   Cardiac [6]       ACTIVITY  As tolerated.  Weight bearing as tolerated    CONSULTATIONS  Neurology    PROCEDURES  None    LABORATORY  Lab Results   Component Value Date    SODIUM 138 12/13/2020    POTASSIUM 3.7 12/13/2020    CHLORIDE 102 12/13/2020    CO2 26 12/13/2020    GLUCOSE 98 12/13/2020    BUN 24 (H) 12/13/2020    CREATININE 0.72 12/13/2020        Lab Results   Component Value Date    WBC 10.7 12/13/2020    HEMOGLOBIN 13.0 12/13/2020    HEMATOCRIT 39.1 12/13/2020    PLATELETCT 198 12/13/2020        Total time of the discharge process exceeds 37 minutes.

## 2020-12-14 ENCOUNTER — TELEPHONE (OUTPATIENT)
Dept: MEDICAL GROUP | Facility: IMAGING CENTER | Age: 50
End: 2020-12-14

## 2020-12-14 DIAGNOSIS — F41.1 GAD (GENERALIZED ANXIETY DISORDER): ICD-10-CM

## 2020-12-14 LAB
ACE CSF-CCNC: 1 U/L (ref 0–2.5)
HSV DNA SPEC QL NAA+PROBE: NOT DETECTED
SPECIMEN SOURCE: NORMAL

## 2020-12-14 NOTE — PROGRESS NOTES
"Deborah Nguyen M.D.; [facility]             Pt called during lunch stating that she needs a referral submitted for an \"anxiety therapist\". I explained that I would put in the request but an appointment may be needed. She did make one for January but urgently needs that referral due to \"just leaving the hospital\". Pt ended in tears saying that she \"desperately needs to talk to someone\".     Please call her back ASAP [phone].     Thank you!        "

## 2020-12-14 NOTE — TELEPHONE ENCOUNTER
Spoke with patient. Pt reports that she feels like she needs to go back on medication for her anxiety. She had previously tried several medications to include clonazepam, xanax, and several others and has weaned off of all anxiety meds at this time. Pt reports she had ativan in the hospital and this was the first time she felt ok since October. Pt reports she has seen therapists in the past but would like to try someone new. She most recently was seeing Jackeline Mahan. Notified pt that Dr. Nguyen can put in an urgent referral, but it may still take some time to get set up. Advised pt that in the meantime, she could reach out to her current therapist. Pt agrees this would be a good start. Pt denies any thoughts of self harm. Encouraged to call us with any needs.

## 2020-12-14 NOTE — TELEPHONE ENCOUNTER
"----- Message from Deborah Maria Victoria sent at 12/14/2020 12:24 PM PST -----  Regarding: Urgent Referral/ appt  Pt called during lunch stating that she needs a referral submitted for an \"anxiety therapist\". I explained that I would put in the request but an appointment may be needed. She did make one for January but urgently needs that referral due to \"just leaving the hospital\". Pt ended in tears saying that she \"desperately needs to talk to someone\".     Please call her back ASAP [phone].    Thank you!    "

## 2020-12-15 ENCOUNTER — APPOINTMENT (OUTPATIENT)
Dept: RADIOLOGY | Facility: MEDICAL CENTER | Age: 50
End: 2020-12-15
Attending: FAMILY MEDICINE
Payer: COMMERCIAL

## 2020-12-15 LAB — VIT B1 BLD-MCNC: 150 NMOL/L (ref 70–180)

## 2020-12-16 LAB
OLIGOCLONAL BANDS CSF ELPH-IMP: ABNORMAL
OLIGOCLONAL BANDS CSF IEF: 10 BANDS (ref 0–1)
OLIGOCLONAL BANDS.IT SER+CSF QL: POSITIVE
TEST NAME 95000: NORMAL
TEST NAME 95000: NORMAL

## 2020-12-17 LAB
ALB CSF/SERPL: 5.9 RATIO (ref 0–9)
ALBUMIN CSF-MCNC: 27 MG/DL (ref 0–35)
ALBUMIN SERPL-MCNC: 4540 MG/DL (ref 3500–5200)
BACTERIA BLD CULT: NORMAL
IGG CSF-MCNC: 3.7 MG/DL (ref 0–6)
IGG SERPL-MCNC: 994 MG/DL (ref 768–1632)
IGG SYNTH RATE SER+CSF CALC-MRATE: 1.6 MG/D
IGG/ALB CLEAR SER+CSF-RTO: 0.63 RATIO (ref 0.28–0.66)
IGG/ALB CSF: 0.14 RATIO (ref 0.09–0.25)
OLIGOCLONAL BANDS CSF ELPH-IMP: ABNORMAL
OLIGOCLONAL BANDS CSF ELPH-IMP: POSITIVE
OLIGOCLONAL BANDS CSF IEF: 10 BANDS (ref 0–1)
SIGNIFICANT IND 70042: NORMAL
SITE SITE: NORMAL
SOURCE SOURCE: NORMAL

## 2020-12-18 ENCOUNTER — TELEPHONE (OUTPATIENT)
Dept: MEDICAL GROUP | Facility: IMAGING CENTER | Age: 50
End: 2020-12-18

## 2020-12-18 NOTE — TELEPHONE ENCOUNTER
Received message from patient requesting referral information regarding her anxiety.    Called patient and provided contact information. No further questions at this time.

## 2020-12-21 ENCOUNTER — APPOINTMENT (OUTPATIENT)
Dept: PHYSICAL THERAPY | Facility: REHABILITATION | Age: 50
End: 2020-12-21
Attending: FAMILY MEDICINE
Payer: COMMERCIAL

## 2020-12-22 DIAGNOSIS — H81.399 PERIPHERAL VERTIGO, UNSPECIFIED LATERALITY: ICD-10-CM

## 2020-12-22 DIAGNOSIS — H69.93 DYSFUNCTION OF BOTH EUSTACHIAN TUBES: ICD-10-CM

## 2020-12-23 DIAGNOSIS — H81.399 PERIPHERAL VERTIGO, UNSPECIFIED LATERALITY: ICD-10-CM

## 2020-12-23 DIAGNOSIS — H69.93 DYSFUNCTION OF BOTH EUSTACHIAN TUBES: ICD-10-CM

## 2020-12-23 RX ORDER — MECLIZINE HCL 12.5 MG/1
TABLET ORAL
Qty: 30 TAB | Refills: 0 | Status: SHIPPED | OUTPATIENT
Start: 2020-12-23 | End: 2021-01-06

## 2020-12-23 RX ORDER — HYDROCHLOROTHIAZIDE 12.5 MG/1
TABLET ORAL
Qty: 30 TAB | Refills: 0 | Status: SHIPPED | OUTPATIENT
Start: 2020-12-23 | End: 2021-01-25

## 2020-12-30 ENCOUNTER — TELEPHONE (OUTPATIENT)
Dept: MEDICAL GROUP | Facility: IMAGING CENTER | Age: 50
End: 2020-12-30

## 2020-12-30 NOTE — TELEPHONE ENCOUNTER
Received call from patient with concerns of lab results from recent ER visit. Patient is concerned about what results could mean. Patient states there was a possible diagnosis of Multiple Sclerosis while in the hospital.   Spoke with provider in office, Dr. Benites. She is recommending patient keep follow-up appointment scheduled with neurology on 1/5/21. She states they will be able to explain in further detail what lab results could mean and order furthering testing from there.   Notified patient of recommendation. Will plan to keep appointment and follow-up from there. No further questions at this time.

## 2021-01-05 ENCOUNTER — OFFICE VISIT (OUTPATIENT)
Dept: NEUROLOGY | Facility: MEDICAL CENTER | Age: 51
End: 2021-01-05
Attending: STUDENT IN AN ORGANIZED HEALTH CARE EDUCATION/TRAINING PROGRAM
Payer: COMMERCIAL

## 2021-01-05 VITALS
OXYGEN SATURATION: 98 % | HEART RATE: 102 BPM | WEIGHT: 215.39 LBS | HEIGHT: 67 IN | RESPIRATION RATE: 16 BRPM | SYSTOLIC BLOOD PRESSURE: 118 MMHG | DIASTOLIC BLOOD PRESSURE: 82 MMHG | TEMPERATURE: 97.6 F | BODY MASS INDEX: 33.81 KG/M2

## 2021-01-05 DIAGNOSIS — G89.29 CHRONIC INTRACTABLE HEADACHE, UNSPECIFIED HEADACHE TYPE: ICD-10-CM

## 2021-01-05 DIAGNOSIS — G37.9 DEMYELINATING DISEASE OF CENTRAL NERVOUS SYSTEM, UNSPECIFIED (HCC): ICD-10-CM

## 2021-01-05 DIAGNOSIS — G40.109 LOCALIZATION-RELATED FOCAL EPILEPSY WITH SIMPLE PARTIAL SEIZURES (HCC): ICD-10-CM

## 2021-01-05 DIAGNOSIS — R90.89 ABNORMAL BRAIN MRI: ICD-10-CM

## 2021-01-05 DIAGNOSIS — F41.8 ANXIETY ASSOCIATED WITH DEPRESSION: ICD-10-CM

## 2021-01-05 DIAGNOSIS — F32.A DEPRESSIVE DISORDER: ICD-10-CM

## 2021-01-05 DIAGNOSIS — R51.9 CHRONIC INTRACTABLE HEADACHE, UNSPECIFIED HEADACHE TYPE: ICD-10-CM

## 2021-01-05 PROCEDURE — 99417 PROLNG OP E/M EACH 15 MIN: CPT | Performed by: STUDENT IN AN ORGANIZED HEALTH CARE EDUCATION/TRAINING PROGRAM

## 2021-01-05 PROCEDURE — 99211 OFF/OP EST MAY X REQ PHY/QHP: CPT | Performed by: STUDENT IN AN ORGANIZED HEALTH CARE EDUCATION/TRAINING PROGRAM

## 2021-01-05 PROCEDURE — 99205 OFFICE O/P NEW HI 60 MIN: CPT | Performed by: STUDENT IN AN ORGANIZED HEALTH CARE EDUCATION/TRAINING PROGRAM

## 2021-01-05 RX ORDER — LAMOTRIGINE 25 MG/1
25 TABLET ORAL DAILY
Qty: 60 TAB | Refills: 3 | Status: SHIPPED | OUTPATIENT
Start: 2021-01-05 | End: 2021-02-18

## 2021-01-05 RX ORDER — DESVENLAFAXINE 25 MG/1
25 TABLET, EXTENDED RELEASE ORAL DAILY
Qty: 30 TAB | Refills: 4 | Status: ON HOLD | OUTPATIENT
Start: 2021-01-05 | End: 2021-03-15

## 2021-01-05 ASSESSMENT — FIBROSIS 4 INDEX: FIB4 SCORE: 0.96

## 2021-01-05 ASSESSMENT — PATIENT HEALTH QUESTIONNAIRE - PHQ9
CLINICAL INTERPRETATION OF PHQ2 SCORE: 1
5. POOR APPETITE OR OVEREATING: 3 - NEARLY EVERY DAY
SUM OF ALL RESPONSES TO PHQ QUESTIONS 1-9: 11

## 2021-01-05 NOTE — Clinical Note
Dr. Calixto,    I saw this patient today. She likely has focal seizures and lesions on MRI concerning for demyelinating disease with superimposed seizure edema. I have asked staff to fit her into your clinic.    Thanks,    Fady

## 2021-01-05 NOTE — PROGRESS NOTES
" GENERAL NEUROLOGY CLINIC INITIAL ENCOUNTER  CHIEF COMPLAINT(S): abnormal MRI Brain, episodes of daily left sided numbness, hand jerking and weakness.    History of present illness:  Melba Frye 50 y.o. female presents today for episodes of nonspecific dizziness, spreading numbness, left arm jerking, and weakness to the left hand and foot.    Patient says that on October 26 driving home he had earthquake feeling in her head, unsteady, swimming or swinging in the head. Lasts 1 minute.  Was back to normal.  1 week after that time her symptoms recurred.  Both times they were sudden onset.  In further describing her symptoms she says that she experiences a \"weird feeling\", has an abnormal sensation that involves down her left arm and also involves her leg, up with brief involuntary jerking movements of her left hand and arm.  Left foot jerking also occurs to a lesser extent and seems to be asynchronous with the left hand jerking.  She has weakness after these episodes on the left side lasting for minutes to hours.  Currently has these episodes 4-6 times per day.  However, some days she want to have any of the symptoms at all.  She has never experienced these symptoms before although has had episodic unexplainable neurological symptoms that began about 10 years ago. Years ago she had abnormal sensation middle back of head, tingling. Happened once or twice a day.  She no longer has any symptoms.  The sensations are different than her current symptoms.  Also describes episodes of dimming of her vision, blurring of vision, not blacking out, lasting 1 to 2 minutes.  No significant eye pain or color desaturation noted.  Has not had these episodes for several months.  Always has a constant holocranial/holocephalic headache, pressure-like.  Does not endorse positional component.  Almost always present throughout the day, is present daily, waxes and wanes in intensity from mild to moderate severity.    Of note patient " was recently admitted to the hospital on December 10, 2020 where she had a work-up showing an abnormal T2 hyperintensity of the right posterior frontal and paramedian region concerning for demyelinating disease versus gliosis versus tumor.  Patient was given Solu-Medrol 1 g daily for 3 to 5 days.  Second day of slight Medrol on 12-11 her symptoms resolved.  She had a lumbar puncture which was notable for oligoclonal bands.    Of note patient reports being on chronic benzodiazepine use.  She is currently not on any benzos and has been for many months to years.  She reports that when she was hospitalized a couple weeks ago and given Ativan her symptoms seem to quickly and dramatically improve.  Discussed that this may be supportive of her symptoms being seizures.    Denies any fever, chills, night sweats.  Positive for excessive fatigue, poor concentration. Reports chronic ride sided facial numbness.  Denies any bowel or bladder issues.  Denies any double vision.  Denies any balance or gait issues.  Denies falls.  Has fluctuating weakness and loss of dexterity of the left hand, as mentioned above.  Denies shortness of breath, chest pain, abdominal pain.  No skin rashes.  Currently denies any issues with her vision.    Patient's PMH, PSH, FH, and SH were reviewed.    Medications and allergies were reviewed.        Past medical history:   Past Medical History:   Diagnosis Date   • Anxiety    • Complicated migraine 6/9/2014   • Depression    • Dermatitis, contact 11/16/2015   • Fatigue 6/9/2014   • Hyperlipidemia 1/23/2015   • Lentigo 10/17/2018   • Menopausal symptom 7/2/2014   • Mixed anxiety and depressive disorder 6/9/2014   • Seborrheic keratoses 10/17/2018   • TMJ arthralgia 6/9/2014   • UTI (lower urinary tract infection)        Past surgical history:   Past Surgical History:   Procedure Laterality Date   • MYRINGOTOMY  8/27/2010    Performed by [address] at SURGERY SAME DAY [facility] ORS   • LAPAROSCOPY   5/28/2010    Performed by JACKI SHARMA at SURGERY [facility] ORS   • LYMPH NODE SAMPLING  5/28/2010    Performed by JACKI SHARMA at SURGERY [facility] ORS   • DEBULKING  5/28/2010    Performed by JACKI SHARMA at SURGERY [facility] ORS   • CYSTOSCOPY  10/15/08    Performed by YU GODINEZ at SURGERY SAME DAY [facility] ORS   • VAGINAL HYSTERECTOMY SCOPE TOTAL  10/15/08    Performed by YU GODINEZ at SURGERY SAME DAY [facility] ORS   • OTHER  1984    TONSILECTOMY/ ADNOIDS   • APPENDECTOMY  1981   • TONSILLECTOMY AND ADENOIDECTOMY         Family history:   Family History   Problem Relation Age of Onset   • Non-contributory Mother    • Lung Disease Mother         COPD   • Cancer Mother         dysplasia    • Arthritis Mother    • Psychiatric Illness Mother    • Heart Disease Mother    • Hypertension Mother    • Stroke Mother    • Non-contributory Father    • Cancer Father 73        prostate cancer   • Lung Disease Maternal Grandmother    • Lung Disease Paternal Aunt    • Diabetes Paternal Aunt    • Hyperlipidemia Paternal Aunt        Social history:   Social History     Socioeconomic History   • Marital status:      Spouse name: Not on file   • Number of children: Not on file   • Years of education: Not on file   • Highest education level: Not on file   Occupational History   • Not on file   Social Needs   • Financial resource strain: Not on file   • Food insecurity     Worry: Not on file     Inability: Not on file   • Transportation needs     Medical: Not on file     Non-medical: Not on file   Tobacco Use   • Smoking status: Never Smoker   • Smokeless tobacco: Never Used   Substance and Sexual Activity   • Alcohol use: Not Currently     Alcohol/week: 0.0 oz     Frequency: Monthly or less     Drinks per session: 1 or 2     Binge frequency: Never   • Drug use: No   • Sexual activity: Yes     Partners: Male   Lifestyle   • Physical activity     Days per week: Not on file     Minutes per session: Not on file    • Stress: Not on file   Relationships   • Social connections     Talks on phone: Not on file     Gets together: Not on file     Attends Latter day service: Not on file     Active member of club or organization: Not on file     Attends meetings of clubs or organizations: Not on file     Relationship status: Not on file   • Intimate partner violence     Fear of current or ex partner: Not on file     Emotionally abused: Not on file     Physically abused: Not on file     Forced sexual activity: Not on file   Other Topics Concern   • Not on file   Social History Narrative   • Not on file       Current medications:   Current Outpatient Medications   Medication   • lamoTRIgine (LAMICTAL) 25 MG Tab   • Desvenlafaxine Succinate ER 25 MG TABLET SR 24 HR   • hydroCHLOROthiazide (HYDRODIURIL) 12.5 MG tablet   • meclizine (ANTIVERT) 12.5 MG Tab   • vitamin D (CHOLECALCIFEROL) 1000 Unit (25 mcg) Tab   • Biotin 10 MG Cap   • acetaminophen (TYLENOL) 500 MG Tab   • multivitamin (THERAGRAN) Tab   • magnesium oxide (MAG-OX) 400 MG Tab tablet   • Calcium 200 MG Tab   • Fluticasone Propionate (FLONASE NA)   • Tretinoin (ALTRENO) 0.05 % Lotion     No current facility-administered medications for this visit.        Medication Allergy:  Allergies   Allergen Reactions   • Bactrim      Fatigue and ringing of the ears         Review of systems:   Pertinent positives and negatives are as outlined above      Physical examination:   Vitals:    01/05/21 1003   BP: 118/82   Pulse: (!) 102   Resp: 16   Temp: 36.4 °C (97.6 °F)   SpO2: 98%       General: Patient in no acute distress, pleasant and cooperative.  HEENT: Normocephalic, no signs of acute trauma.   Neck: appears supple, here is normal range of motion. No tenderness on exam.   Chest: clear to auscultation. Symmetrical chest rise with inhalation. No cough.   CV: RRR, no murmurs.   Skin: no signs of acute rashes or trauma.   Musculoskeletal: joints exhibit full range of motion. There are no  signs of joint or muscle swelling.   Psychiatric: pertinent positives as discussed above    NEUROLOGICAL EXAM:   Mental status:  orientation: Awake, alert and oriented to self, month, year, situation.  Attention: Intact  Visio-spatial testing: Intact  Frontal release signs: Absent  Speech and language: speech is clear and fluent. The patient is able to name, repeat and comprehend. No impairment in fluency. No word substitions or paraphasic errors  Memory: There is intact recollection of recent and remote events.   Cranial nerve exam:   I: smell Not tested   II: visual acuity  OS: 20/20, corrected. 1 error.   OD: 20/20 corrected, 0 errors.  No red color desaturation   II: visual fields      Fundoscopic: Full to confrontation  Visual neglect: absent    Visualization attempted with ophthalmoscope but unable to visualize retina in either eye.   II: pupils Equal, round, reactive to light.  Negative for bilateral APD   III,VII: ptosis None   III,IV,VI: extraocular muscles  Full ROM.  No spontaneous nystagmus   V: mastication Normal   V: facial light touch sensation  Subjective Right facial numbness   V,VII: corneal reflex  Not tested   VII: facial muscle function - upper  Normal   VII: facial muscle function - lower Normal   VIII: hearing Not tested   IX: soft palate elevation  Normal   IX,X: gag reflex Not tested   XI: trapezius strength  5/5   XI: sternocleidomastoid strength 5/5   XI: neck flexion strength  5/5   XII: tongue  protrudes midlune     Motor exam:   • Strength is 5/5 in the distal and proximal upper and lower extremities   • Tone is normal.  • No abnormal movements were seen on exam.   • No muscle fasciculation  • No areas of atrophy  • Tests of praxis: NT  Sensory exam reveals normal sense of light touch, proprioception, vibration and pinprick in all extremities. Cortical sensory testing: Normal. Test of neglect: none present to simultaneous stimulation with touch  Deep tendon reflexes:  2+ throughout.  Plantar responses are flexor. There is no clonus.   Coordination: shows a normal finger-nose-finger. Normal rapidly alternating movements. Heel-knee-shin movements smooth and coordinated bilaterally.   Gait:   • The patient was able to get up from seated position on first attempt without requiring assistance.   • Found to be steady when walking.   • Movements were fluid with normal arm swing.   • The patient was able to turn without difficulties or tendency to fall.   • Romberg exam absent        ANCILLARY DATA REVIEWED:       Lab Data Review:  Reviewed    Records reviewed:   Reviewed    Imaging:   Reviewed all MRI scans of brain and all CT heads personally.    MRI Brain w/wo 12/20:              EEG:  NA      ASSESSMENT, PLAN, EDUCATION/COUNSELING:  This is a 50-year-old female who arrives to neurology clinic to establish care after new onset and recurrent symptoms of nonspecific dizziness as well as involving left-sided dysesthesias, hand more than feet jerking, followed by transient post ictal weakness.  The description of her symptoms are concerning for focal seizures.   Of note she has a nonfocal exam for me today except for some mild subjective right facial numbness that she has had chronically for years.  She does have an area of hyperintensity as well as enhancement in the posterior frontal and paramedian frontal regions that involve the gray and white matter.  Her symptoms correlate with this more active lesion which involves the motor cortex as well as primary somatosensory cortex.  We discussed the differential diagnosis for what this lesion can be.  Specifically we discussed the possibility of demyelinating lesion, inflammation/swelling from seizures, tumor, subacute arterial or venous stroke, subacute hemorrhage.      The rest of MRI does  has periventricular and juxtacortical hyperintensities that are concerning for demyelinating lesion such as MS.  Her recent lumbar puncture with positive oligoclonal  bands support this diagnosis.  However, the hyperintensity involving the paramedian right posterior frontal region is somewhat atypical and that it does involve the gray matter as well as the white matter.  I suspect that we are dealing with an epileptogenic region from an active demyelinating lesion or an older gliotic epileptogenic region that is irritated the surrounding cortex causing focal seizures with possible superimposed seizure edema.  I discussed the neck steps and the work-up. Essentially, we need to do more testing.  I will order a full MRI of the her spine with and without contrast.  I also discussed referring the patient to Dr. Solorzano who is in MS specialist so he can evaluate her case to determine if her clinical picture is consistent with a demyelinating disease.  We discussed that some of her MRI pictures were suboptimal due to metal artifact from her braces.  She says that she does not need to wear the braces anymore and will plan to have them removed on January 13.  We discussed about possibly repeating an MRI of the brain but I will defer any repeat imaging of the brain to Dr. Calixto.  I will also defer laboratory work-up to him as well.  I told her that I would continue to follow her for what is likely focal seizures.     In regards to what are likely focal seizures I recommended starting Lamictal with a plan to gradually increase it over the next several weeks.  I will order an EEG to evaluate for this.  We had an extensive discussion about epilepsy, how to manage it, risks of ongoing seizures.    Patient also has significant anxiety and endorses depression with passive suicidal ideation given how poorly she has felt for many years and especially more recently for the past several months.  She would like to speak with a psychologist but is also open to antidepressant medications.  I will refer her to psychology.  Also we discussed that we can start with a low dose of SNRI to help with her  anxiety and depressive symptoms.  It may also have the benefit of helping with her headache symptoms.      Visit Diagnoses     ICD-10-CM   1. 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 Depressive disorder  F32.9   3. Anxiety associated with depression  F41.8   4. Chronic intractable headache, unspecified headache type  R51.9    G89.29   5. Abnormal brain MRI  R90.89   6. Demyelinating disease of central nervous system, unspecified (HCC)  G37.9        Orders Placed This Encounter   • MR-CERVICAL SPINE-WITH & W/O   • MR-THORACIC SPINE-WITH & W/O   • MR-LUMBAR SPINE-WITH & W/O   • REFERRAL TO NEURODIAGNOSTICS (EEG,EP,EMG/NCS/DBS)   • REFERRAL TO PSYCHOLOGY   • REFERRAL TO NEUROLOGY   • lamoTRIgine (LAMICTAL) 25 MG Tab   • Desvenlafaxine Succinate ER 25 MG TABLET SR 24 HR        •     FOLLOW-UP:   4 weeks            BILLING DOCUMENTATION:       Counseling:  I spent a total of 80 minutes of face-to-face time in this visit. Over 50% of the time of the visit today was spent on counseling and or coordination of care wtih the patient and/or family, as above in assessment in plan.       Fady Davidson MD  Epilepsy and General Neurology  Department of Neurology  Clinical  of Neurology [facility] [employer].

## 2021-01-06 ENCOUNTER — TELEMEDICINE (OUTPATIENT)
Dept: MEDICAL GROUP | Facility: IMAGING CENTER | Age: 51
End: 2021-01-06
Payer: COMMERCIAL

## 2021-01-06 VITALS — HEIGHT: 67 IN | WEIGHT: 215 LBS | BODY MASS INDEX: 33.74 KG/M2

## 2021-01-06 DIAGNOSIS — F41.8 MIXED ANXIETY AND DEPRESSIVE DISORDER: ICD-10-CM

## 2021-01-06 PROCEDURE — 99213 OFFICE O/P EST LOW 20 MIN: CPT | Mod: 95,CR | Performed by: FAMILY MEDICINE

## 2021-01-06 ASSESSMENT — PAIN SCALES - GENERAL: PAINLEVEL: NO PAIN

## 2021-01-06 ASSESSMENT — ANXIETY QUESTIONNAIRES
4. TROUBLE RELAXING: NEARLY EVERY DAY
5. BEING SO RESTLESS THAT IT IS HARD TO SIT STILL: NOT AT ALL
1. FEELING NERVOUS, ANXIOUS, OR ON EDGE: NEARLY EVERY DAY
7. FEELING AFRAID AS IF SOMETHING AWFUL MIGHT HAPPEN: SEVERAL DAYS
3. WORRYING TOO MUCH ABOUT DIFFERENT THINGS: NOT AT ALL
6. BECOMING EASILY ANNOYED OR IRRITABLE: NOT AT ALL
2. NOT BEING ABLE TO STOP OR CONTROL WORRYING: SEVERAL DAYS
GAD7 TOTAL SCORE: 8

## 2021-01-06 ASSESSMENT — FIBROSIS 4 INDEX: FIB4 SCORE: 0.96

## 2021-01-06 ASSESSMENT — PATIENT HEALTH QUESTIONNAIRE - PHQ9
CLINICAL INTERPRETATION OF PHQ2 SCORE: 6
SUM OF ALL RESPONSES TO PHQ QUESTIONS 1-9: 17
5. POOR APPETITE OR OVEREATING: 3 - NEARLY EVERY DAY

## 2021-01-06 NOTE — PROGRESS NOTES
Telemedicine Visit: New Patient     This encounter was conducted via Zoom.   Verbal consent was obtained. Patient's identity was verified. Patient aware this will be   billed the same as an in person evaluation.  Patient in home, I am in office at [address] [place] [address]  Subjective:     Chief Complaint   Patient presents with   • Depression   • Anxiety       Melba Frye is a 50 y.o. female with history of mdd on virtual visit to discuss mdd and anxiety. Mostly depression. She was recently diagnosed with likely MS though not diffinitive yet per the patient. I had been seeing her for vertigo though the patient developed weakness on the right side of her body. She reports that 11 years ago she started to experience severe fatigue quit suddenly that was intermittent in nature with pressure in her ears and full ent work up determined to be eustachian tube dysfunction at that time due to not having any other symptoms.      She just started her lamotrigine and desvenlafaxine yesterday and today.   Neuro has already referred to a therapist as well.      She has suffered from depression and anxiety for the past 10 years. She she has thoughts that she would be better off dead. Though has never had plans or intentions of suicide. She also reports that she is a [employer] and she would never hurt herself.       ROS  See HPI  Constitutional: Negative for fever, chills and with malaise/fatigue.   HENT: Negative for congestion, itchy watery eyes  Eyes: Negative for pain or sudden changes in vision  Respiratory: Negative for cough and shortness of breath.    Cardiovascular: Negative for leg swelling or chest pain  Gastrointestinal: Negative for nausea, vomiting, abdominal pain and diarrhea.   Genitourinary: Negative for dysuria and hematuria.   Skin: Negative for rash or concerning moles   Neurological: Negative for dizziness, with focal weakness   Psychiatric/Behavioral: With anxiety and  depression  Musculoskeletal: no weakness or joint stiffness    Allergies   Allergen Reactions   • Bactrim      Fatigue and ringing of the ears       Current medicines (including changes today)  Current Outpatient Medications   Medication Sig Dispense Refill   • lamoTRIgine (LAMICTAL) 25 MG Tab Take 1 Tab by mouth every day. Start by taking 25 mg daily for 2 weeks.  Then increase to 50 mg daily. 60 Tab 3   • Desvenlafaxine Succinate ER 25 MG TABLET SR 24 HR Take 25 mg by mouth every day. 30 Tab 4   • hydroCHLOROthiazide (HYDRODIURIL) 12.5 MG tablet TAKE 1 TABLET BY MOUTH EVERY DAY IN THE MORNING 30 Tab 0   • vitamin D (CHOLECALCIFEROL) 1000 Unit (25 mcg) Tab Take 1,000 Units by mouth every day.     • Biotin 10 MG Cap Take 1 Cap by mouth every day.     • acetaminophen (TYLENOL) 500 MG Tab Take 1,000 mg by mouth every 6 hours as needed.     • multivitamin (THERAGRAN) Tab Take 1 Tab by mouth every day.     • magnesium oxide (MAG-OX) 400 MG Tab tablet Take 400 mg by mouth every day.     • Calcium 200 MG Tab Take 1 Tab by mouth every day.     • Fluticasone Propionate (FLONASE NA) Administer 1 Spray into affected nostril(S) every morning.     • Tretinoin (ALTRENO) 0.05 % Lotion 1 Application by Apply externally route every bedtime. 1 Tube 3     No current facility-administered medications for this visit.        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  She  has a past surgical history that includes cystoscopy (10/15/08); laparoscopy (5/28/2010); lymph node sampling (5/28/2010); debulking (5/28/2010); appendectomy (1981); other (1984); vaginal hysterectomy scope total (10/15/08); myringotomy  "(2010); and tonsillectomy and adenoidectomy.      Family History   Problem Relation Age of Onset   • Non-contributory Mother    • Lung Disease Mother         COPD   • Cancer Mother         dysplasia    • Arthritis Mother    • Psychiatric Illness Mother    • Heart Disease Mother    • Hypertension Mother    • Stroke Mother    • Non-contributory Father    • Cancer Father 73        prostate cancer   • Lung Disease Maternal Grandmother    • Lung Disease Paternal Aunt    • Diabetes Paternal Aunt    • Hyperlipidemia Paternal Aunt      Family Status   Relation Name Status   • Mo   at age 73   • Fa     • Sis  Alive   • MGMo     • MGFa     • PGMo     • PGFa     • Son  Alive   • PAunt  (Not Specified)       Patient Active Problem List    Diagnosis Date Noted   • Complicated migraine 2014     Priority: Medium   • Lentigo 10/17/2018   • Seborrheic keratoses 10/17/2018   • Dermatitis, contact 2015   • Hyperlipidemia 2015   • Menopausal symptom 2014   • Fatigue 2014   • Mixed anxiety and depressive disorder 2014   • TMJ arthralgia 2014          Objective:   Vitals obtained by patient:  Ht 1.702 m (5' 7\")   Wt 97.5 kg (215 lb)   BMI 33.67 kg/m²     Physical Exam:  Constitutional: Alert, no distress, well-groomed.  Skin: No rashes in visible areas.  Eye: Round. Conjunctiva clear, lids normal. No icterus.   ENMT: Lips pink without lesions, good dentition, moist mucous membranes. Phonation normal.  Neck: No masses, no thyromegaly. Moves freely without pain.  CV: Pulse as reported by patient  Respiratory: Unlabored respiratory effort, no cough or audible wheeze  Psych: Alert and oriented x3, normal affect and mood.   Neuro: symmetric face. Alert and oriented. Follows commands. No aphasia or dysarthria.    Labs:  Admission on 12/10/2020, Discharged on 2020   Component Date Value Ref Range Status   • WBC 12/10/2020 8.0  4.8 - 10.8 K/uL " Final   • RBC 12/10/2020 4.62  4.20 - 5.40 M/uL Final   • Hemoglobin 12/10/2020 14.5  12.0 - 16.0 g/dL Final   • Hematocrit 12/10/2020 43.4  37.0 - 47.0 % Final   • MCV 12/10/2020 93.9  81.4 - 97.8 fL Final   • MCH 12/10/2020 31.4  27.0 - 33.0 pg Final   • MCHC 12/10/2020 33.4* 33.6 - 35.0 g/dL Final   • RDW 12/10/2020 43.5  35.9 - 50.0 fL Final   • Platelet Count 12/10/2020 203  164 - 446 K/uL Final   • MPV 12/10/2020 10.5  9.0 - 12.9 fL Final   • Neutrophils-Polys 12/10/2020 51.50  44.00 - 72.00 % Final   • Lymphocytes 12/10/2020 39.00  22.00 - 41.00 % Final   • Monocytes 12/10/2020 7.70  0.00 - 13.40 % Final   • Eosinophils 12/10/2020 0.90  0.00 - 6.90 % Final   • Basophils 12/10/2020 0.60  0.00 - 1.80 % Final   • Immature Granulocytes 12/10/2020 0.30  0.00 - 0.90 % Final   • Nucleated RBC 12/10/2020 0.00  /100 WBC Final   • Neutrophils (Absolute) 12/10/2020 4.11  2.00 - 7.15 K/uL Final    Includes immature neutrophils, if present.   • Lymphs (Absolute) 12/10/2020 3.11  1.00 - 4.80 K/uL Final   • Monos (Absolute) 12/10/2020 0.61  0.00 - 0.85 K/uL Final   • Eos (Absolute) 12/10/2020 0.07  0.00 - 0.51 K/uL Final   • Baso (Absolute) 12/10/2020 0.05  0.00 - 0.12 K/uL Final   • Immature Granulocytes (abs) 12/10/2020 0.02  0.00 - 0.11 K/uL Final   • NRBC (Absolute) 12/10/2020 0.00  K/uL Final   • Sodium 12/10/2020 136  135 - 145 mmol/L Final   • Potassium 12/10/2020 3.8  3.6 - 5.5 mmol/L Final   • Chloride 12/10/2020 101  96 - 112 mmol/L Final   • Co2 12/10/2020 24  20 - 33 mmol/L Final   • Anion Gap 12/10/2020 11.0  7.0 - 16.0 Final   • Glucose 12/10/2020 83  65 - 99 mg/dL Final   • Bun 12/10/2020 18  8 - 22 mg/dL Final   • Creatinine 12/10/2020 0.66  0.50 - 1.40 mg/dL Final   • Calcium 12/10/2020 9.7  8.5 - 10.5 mg/dL Final   • AST(SGOT) 12/10/2020 18  12 - 45 U/L Final   • ALT(SGPT) 12/10/2020 16  2 - 50 U/L Final   • Alkaline Phosphatase 12/10/2020 102* 30 - 99 U/L Final   • Total Bilirubin 12/10/2020 0.2  0.1 -  1.5 mg/dL Final   • Albumin 12/10/2020 4.6  3.2 - 4.9 g/dL Final   • Total Protein 12/10/2020 7.4  6.0 - 8.2 g/dL Final   • Globulin 12/10/2020 2.8  1.9 - 3.5 g/dL Final   • A-G Ratio 12/10/2020 1.6  g/dL Final   • Troponin T 12/10/2020 <6  6 - 19 ng/L Final    Comment: As of 2019 at 0900, the Troponin I test has been replaced with the  Ultra High Sensitivity (Gen 5) Troponin T test.  Please note new analyte,  methodology, and reference range.  The Ultra High Sensitivity Troponin T test has a reference range for  positive troponins that follows the recommendation of the American College  of Cardiology (ACC) committee in conjunction with the 99th percentile  reference population. Normal range: 6-19 ng/L     • Report 12/10/2020    Final                    Value:[facility]    Test Date:  2020-12-10  Pt Name:    MARGE FALL               Department: ER  MRN:        [id number]                      Room:       [facility]  Gender:     Female                       Technician: [id number]  :        [id number]                   Requested By:ANDREW JOHNSON  Order #:    [id number]                    Reading MD: ANDREW JOHNSON MD    Measurements  Intervals                                Axis  Rate:       66                           P:          48  CO:         176                          QRS:        33  QRSD:       82                           T:          22  QT:         412  QTc:        432    Interpretive Statements  SINUS RHYTHM  EARLY PRECORDIAL R/S TRANSITION  Compared to ECG 2010 10:34:35  Sinus bradycardia no longer present  Electronically Signed On 12- 22:36:50 PST by ANDREW JOHNSON MD     • GFR If  12/10/2020 >60  >60 mL/min/1.73 m 2 Final   • GFR If Non  12/10/2020 >60  >60 mL/min/1.73 m 2 Final   • Sed Rate Westergren 12/10/2020 7  0 - 30 mm/hour Final   • Stat C-Reactive Protein 12/10/2020 0.27  0.00 - 0.75 mg/dL Final   • TSH 12/10/2020 4.150   0.380 - 5.330 uIU/mL Final    Comment: Please note new reference ranges effective 12/14/2017 10:00 AM  Pregnant Females, 1st Trimester  0.050-3.700  Pregnant Females, 2nd Trimester  0.310-4.350  Pregnant Females, 3rd Trimester  0.410-5.180     • Vitamin B12 -True Cobalamin 12/10/2020 498  211 - 911 pg/mL Final   • Vitamin B1 12/11/2020 150  70 - 180 nmol/L Final    Comment: INTERPRETIVE INFORMATION: Vitamin B1, Whole Blood  This assay measures the concentration of thiamine diphosphate  (TDP), the primary active form of vitamin B1. Approximately 90  percent of vitamin B1 present in whole blood is TDP. Thiamine and  thiamine monophosphate, which comprise the remaining 10 percent,  are not measured.  Test developed and characteristics determined by [email or web address]. See Compliance Statement B: [email or web address]/CS  Performed By: [email or web address]  [address] [place] [id number]  : Katie Redmond MD     • Syphilis, Treponemal Qual 12/10/2020 Non-Reactive  Non-Reactive Final    Comment: Result of Non-reactive indicates no serologic evidence of  infection with Treponema pallidum.  (Incubating or early  primary syphilis cannot be excluded).     • HIV Ag/Ab Combo Assay 12/10/2020 Non-Reactive  Non Reactive Final    Comment: Roche HIV is a diagnostic test for the qualitative determination of HIV-1  p24 antigen and antibodies to HIV-1 (HIV-1 groups M and O) and HIV-2 in  human serum and plasma. A negative screen does not preclude the possibility  of exposure or infection. Consider retesting in high-risk patients, if  clinically indicated.     • Ammonia 12/11/2020 13  11 - 45 umol/L Final   • Color 12/10/2020 Yellow   Final   • Character 12/10/2020 Clear   Final   • Specific Gravity 12/10/2020 1.011  <1.035 Final   • Ph 12/10/2020 7.0  5.0 - 8.0 Final   • Glucose 12/10/2020 Negative  Negative mg/dL Final   • Ketones 12/10/2020 Negative  Negative mg/dL Final   • Protein 12/10/2020 Negative  Negative  mg/dL Final   • Bilirubin 12/10/2020 Negative  Negative Final   • Urobilinogen, Urine 12/10/2020 0.2  Negative Final   • Nitrite 12/10/2020 Negative  Negative Final   • Leukocyte Esterase 12/10/2020 Trace* Negative Final   • Occult Blood 12/10/2020 Negative  Negative Final   • Micro Urine Req 12/10/2020 Microscopic   Final   • Amphetamines Urine 12/10/2020 Negative  Negative Final   • Barbiturates 12/10/2020 Negative  Negative Final   • Benzodiazepines 12/10/2020 Negative  Negative Final   • Cocaine Metabolite 12/10/2020 Negative  Negative Final   • Methadone 12/10/2020 Negative  Negative Final   • Opiates 12/10/2020 Negative  Negative Final   • Oxycodone 12/10/2020 Negative  Negative Final   • Phencyclidine -Pcp 12/10/2020 Negative  Negative Final   • Propoxyphene 12/10/2020 Negative  Negative Final   • Cannabinoid Metab 12/10/2020 Negative  Negative Final    Comment: The above compounds were screened at the following thresholds:  Phencyclidine                25 ng/mL  Benzodiazepines             200 ng/mL  Cocaine (Benzoylecgonine)   300 ng/mL  Amphetamines               1000 ng/mL  THC (Tetrahydrocannabinol)   50 ng/mL  Opiates (Morphine)          300 ng/mL  Barbiturates                200 ng/mL  Methadone                   300 ng/mL  Propoxyphene                300 ng/mL  Oxycodone                   100 ng/mL  This assay provides a preliminary unconfirmed analytical test  result that may be suitable for the clinical management of  patients in certain situations. A more specific alternative  chemical method must be used to obtain a confirmed analytical  result. Gas chromatography/mass spectrometry (GC/MS) is the  preferred confirmatory method. Clinical consideration and  professional judgment should be applied to any drug-of-abuse  test result, particularly when preliminary positive results  are used.     • HSV DNA By PCR 12/11/2020 Not Detected   Final    Comment: NOT DETECTED - A negative result does not rule  out the  presence of PCR inhibitors in the patient specimen or assay  specific nucleic acid in concentrations below the level of  detection by the assay.  INTERPRETIVE INFORMATION: Herpes Simplex Virus by PCR  Test developed and characteristics determined by [email or web address]. See Compliance Statement B: [employer]/  Performed by [email or web address],  [address] [place] [id number] [phone]  [email or web address], Katie Redmond MD - Lab. Director     • HSV Source 12/11/2020 Serum   Final   • Antinuclear Antibody 12/11/2020 None Detected  None Detected Final    Comment: If suspicion of connective tissue disease is strong and KATIE EIA is  negative, consider testing for KATIE by IFA ([id number]).  INTERPRETIVE INFORMATION: Anti-Nuclear Antibodies (KATIE), IgG by  SHAMIR  Antinuclear Antibodies (KATIE), IgG by SHAMIR: KATIE specimens are  screened using enzyme-linked immunosorbent assay (SHAMIR)  methodology. All SHAMIR results reported as Detected are further  tested by indirect fluorescent assay (IFA) using HEp-2 substrate  with an IgG-specific conjugate. The KATIE SHAMIR screen is designed  to detect antibodies against dsDNA, histones, SS-A (Ro), SS-B  (La), Choudhary, Choudhary/RNP, Scl-70, Johanna-1, centromeric proteins, other  antigens extracted from the HEp-2 cell nucleus. KATIE SHAMIR assays  have been reported to have lower sensitivities than KATIE IFA for  systemic autoimmune rheumatic diseases (SARD).  Negative results do not necessarily rule out SARD.  Performed By: [email or web address]  [address] [place] [id number]  : Katie Redmond MD     • Arsenic, Blood 12/10/2020 <10.0  <=12.0 ug/L Final    Comment: INTERPRETIVE INFORMATION: Arsenic, Blood  Elevated results may be due to skin or collection-related  contamination, including the use of a noncertified metal-free  collection/transport tube. If contamination concerns exist due to  elevated levels of blood arsenic, confirmation with a second  specimen collected in  a certified metal-free tube is recommended.  Potentially toxic ranges for blood arsenic: Greater than or equal  to 600 ug/L.  Blood arsenic is for the detection of recent exposure poisoning  only. Blood arsenic levels in healthy subjects vary considerably  with exposure to arsenic in the diet and the environment. A  24-hour urine arsenic is useful for the detection of chronic  exposure.  Test developed and characteristics determined by [facility]. See Compliance Statement B: [email or web address]BLOOM     • Mercury, Blood 12/10/2020 <2.5  <=10.0 ug/L Final    Comment: INTERPRETIVE INFORMATION: Mercury, Blood  Elevated results may be due to skin or collection-related  contamination, including the use of a noncertified metal-free  collection/transport tube. If contamination concerns exist due to  elevated levels of blood mercury, confirmation with a second  specimen collected in a certified metal-free tube is recommended.  Blood mercury levels predominantly reflect recent exposure and are  most useful in the diagnosis of acute poisoning as blood mercury  concentrations rise sharply and fall quickly over several days  after ingestion. Blood concentrations in unexposed individuals  rarely exceed 20 ug/L. The provided reference interval relates to  inorganic mercury concentrations. Dietary and non-occupational  exposure to organic mercury forms may contribute to an elevated  total mercury result. Clinical presentation after toxic exposure  to organic mercury may include dysarthria, ataxia and constricted  vision fields with mercury blood concentrations f                           rom 20 to 50  ug/L.  Test developed and characteristics determined by [facility]. See Compliance Statement B: [employer]/CS  Performed By: [facility]  [address] [place] [id number]  : Katie Redmond MD     • Lead Blood 12/10/2020 <2.0  <=4.9 ug/dL Final    Comment: INTERPRETIVE INFORMATION: Lead,  "Blood (Venous)  Elevated results may be due to skin or collection-related  contamination, including the use of a noncertified lead-free tube.  If contamination concerns exist due to elevated levels of blood  lead, confirmation with a second specimen collected in a certified  lead-free tube is recommended.  Information sources for reference intervals and interpretive  comments include the \"CDC Response to the 2012 Advisory Committee  on Childhood Lead Poisoning Prevention Report\" and the  \"Recommendations for Medical Management of Adult Lead Exposure,  Environmental Health Perspectives, 2007.\" Thresholds and time  intervals for retesting, medical evaluation, and response vary by  state and regulatory body. Contact your State Department of Health  and/or applicable regulatory agency for specific guidance on  medical management recommendations.  Age            Concentration   Comment  All ages       5-9.9 ug/dL     Adverse health effects are  possible, part                           icularly in  children under 6 years of  age and pregnant women.  Discuss health risks  associated with continued  lead exposure. For children  and women who are or may  become pregnant, reduce  lead exposure.  All ages        10-19.9 ug/dL  Reduced lead exposure and  increased biological  monitoring are recommended.  All ages        20-69.9 ug/dL  Removal from lead exposure  and prompt medical  evaluation are recommended.  Consider chelation therapy  when concentrations exceed  50 ug/dL and symptoms of  lead toxicity are present.  Less than 19     Greater than  Critical. Immediate medical  years of age     44.9 ug/dL    evaluation is recommended.  Consider chelation therapy  when symptoms of lead  toxicity are present.  Greater than 19  Greater than  Critical. Immediate medical  years of age     69.9 ug/dL    evaluation is recommended  Consider chelation therapy  when symptoms of lead  toxicity are present.  Test developed and " characteristics determined by [facility]. See Compl                           iance Statement B: [employer]/CS     • Cadmium Blood 12/10/2020 <1.0  <=5.0 ug/L Final    Comment: INTERPRETATION INFORMATION: Cadmium, Blood  Elevated results may be due to skin or collection-related  contamination, including the use of a noncertified metal-free  collection/transport tube. If contamination concerns exist due to  elevated levels of blood cadmium, confirmation with a second  specimen collected in a certified metal-free tube is recommended.  Blood cadmium levels can be used to monitor acute toxicity and in  combination with cadmium urine and B-2 microglobulin is the  preferred method for monitoring occupational exposure. Symptoms  associated with cadmium toxicity vary based upon route of exposure  and may include tubular proteinuria, fever, headache, dyspnea,  chest pain, conjunctivitis, rhinitis, sore throat and cough.  Ingestion of cadmium in high concentration may cause vomiting,  diarrhea, salivation, cramps, and abdominal pain.  Test developed and characteristics determined by [facility]. See Compliance Statement B: [employer]KOWN/CS     • Immunoglobulin G 12/11/2020 1070  768 - 1632 mg/dL Final    Comment: REFERENCE INTERVAL: Immunoglobulin G  Access complete set of age- and/or gender-specific reference  intervals for this test in the [facility] Test Directory  ([email or web address]).  Performed By: [facility]  [address] [place] [id number]  : Katie Redmond MD     • Magnesium 12/11/2020 2.1  1.5 - 2.5 mg/dL Final   • COVID Order Status 12/11/2020 Received   Final    Comment: The order for SARS CoV-2 testing has been received by the  Laboratory. This result is neither positive nor negative.  Final results of testing will report in 24-48 hours, separately.     • Miscellaneous Lab Result 12/11/2020 SEE NOTE   Final    Comment: Test name                     Result Flag  "Units  RefIntvl  -------------------------------------------------------------  Neuromyelitis Optica/AQP4-IgG, CSF  < 1:1             < 1:1  Aquaporin-4 Receptor Antibody, IgG is not detected. No further  testing will be performed.  INTERPRETIVE INFORMATION: Neuromyelitis Optica/AQP4-IgG, CSF Rflx  Diagnosis of neuromyelitis optica (NMO) requires the presence of  longitudinally extensive acute myelitis (lesions extending over 3  or more vertebral segments) and optic neuritis. Approximately 75  percent of patients with NMO express antibodies to the aquaporin-4  (AQP4) receptor. While the absence of AQP4 receptor antibodies  does not rule out a diagnosis of NMO, presence of this antibody is  diagnostic for NMO.  See Compliance Statement B: [email or web address]/CS  Performed By: [facility]  [address] [place] [id number]  : Katie Redmond MD     • SARS-CoV-2 Source 12/11/2020 NP Swab   Final   • SARS-CoV-2 by PCR 12/11/2020 NotDetected   Final    Comment: PATIENTS: Important information regarding your results and instructions can  be found at [email or web address]   \"After your  Covid-19 Test\"  [facility] providers: PLEASE REFER TO DE-ESCALATION AND RETESTING PROTOCOL  on [email or web address]  **The TaqPath COVID-19 SARS-CoV-2 test has been made available for use under  the Emergency Use Authorization (EUA) only.     • WBC 12/10/2020 0-2  /hpf Final    Comment: Female  <12 Yr 0-2  >12 Yr 0-5  Male   None     • RBC 12/10/2020 0-2  /hpf Final    Comment: Female  >12 Yr 0-2  Male   None     • Bacteria 12/10/2020 Negative  None /hpf Final   • Epithelial Cells 12/10/2020 Negative  /hpf Final   • Hyaline Cast 12/10/2020 0-2  /lpf Final   • Number Of Tubes 12/11/2020 4   Final   • Volume 12/11/2020 16.0  mL Final   • Color-Body Fluid 12/11/2020 Colorless   Final   • Character-Body Fluid 12/11/2020 Clear   Final   • Supernatant Appearance 12/11/2020 Colorless   Final   • " Total RBC Count 12/11/2020 <1  cells/uL Final   • Crenated RBC 12/11/2020 0  % Final   • Total WBC Count 12/11/2020 6  0 - 10 cells/uL Final   • Lymphs 12/11/2020 92  % Final   • Mononuclear Cells - CSF 12/11/2020 8  % Final   • CSF Tube Number 12/11/2020 3   Final   • Glucose CSF 12/11/2020 102* 40 - 80 mg/dL Final   • Total Protein, CSF 12/11/2020 40  15 - 45 mg/dL Final   • Miscellaneous Lab Result 12/11/2020 SEE NOTE   Final    Comment: Test name                     Result Flag Units  RefIntvl  -------------------------------------------------------------  Neuromyelitis Optica/AQP4-IgG, CSF  < 1:1             < 1:1  Aquaporin-4 Receptor Antibody, IgG is not detected. No further  testing will be performed.  INTERPRETIVE INFORMATION: Neuromyelitis Optica/AQP4-IgG, CSF Rflx  Diagnosis of neuromyelitis optica (NMO) requires the presence of  longitudinally extensive acute myelitis (lesions extending over 3  or more vertebral segments) and optic neuritis. Approximately 75  percent of patients with NMO express antibodies to the aquaporin-4  (AQP4) receptor. While the absence of AQP4 receptor antibodies  does not rule out a diagnosis of NMO, presence of this antibody is  diagnostic for NMO.  See Compliance Statement B: www.eSoft.com/CS  Performed By: [facility]  [address] [place] [id number]  : Katie Redmond MD     • PT 12/11/2020 13.3  12.0 - 14.6 sec Final   • INR 12/11/2020 0.98  0.87 - 1.13 Final    Comment: INR - Non-therapeutic Reference Range: 0.87-1.13  INR - Therapeutic Reference Range: 2.0-4.0     • APTT 12/11/2020 26.3  24.7 - 36.0 sec Final   • Oligoclonal Bands CSF 12/11/2020 Positive*  Final   • Oligoclonal Bands CSF 12/11/2020 10* 0 - 1 Bands Final   • Interpretation 12/11/2020 See Note   Final    Comment: Isoelectric focusing/immunofixation reveals two or more unique  bands in the CSF but no bands in the serum.  This is consistent  with intrathecal synthesis of  immunoglobulin and is considered to  be a positive result for oligoclonal bands.  Oligoclonal bands are  present in approximately 95 percent of patients with multiple  sclerosis but may also be present in the CSF from patients with  viral or bacterial meningoencephalitis, subacute sclerosing  panencephalitis, neurosyphilis, Guillain-[place] syndrome, or  meningeal carcinomatosis.  Performed By: [facility]  [address] [place] [id number]  : Katie Redmond MD     • 25-Hydroxy   Vitamin D 25 12/11/2020 31  30 - 100 ng/mL Final    Comment: Adult Ranges:   <20 ng/mL - Deficiency  20-29 ng/mL - Insufficiency   ng/mL - Sufficiency  Effective 3/18/2020, this electrochemiluminescence binding assay is  performed using Roche amando e immunoassay analyzer.  The Elecsys Vitamin D  total II assay is intended for the quantitative determination of total 25  hydroxyvitamin D in human serum and plasma. This assay is to be used as an  aid in the assessment of vitamin D sufficiency in adults.     • IgG CSF 12/11/2020 3.7  0.0 - 6.0 mg/dL Final   • Serum Albumin 12/11/2020 4540  3500 - 5200 mg/dL Final   • CSF Albumin 12/11/2020 27  0 - 35 mg/dL Final   • Albumin Index 12/11/2020 5.9  0.0 - 9.0 ratio Final   • Cns IgG Syn 12/11/2020 1.6  <=8.0 mg/d Final   • IgG Ratio 12/11/2020 0.63  0.28 - 0.66 ratio Final   • Albumin Ratio 12/11/2020 0.14  0.09 - 0.25 ratio Final   • Oligoclonal Bands CSF 12/11/2020 Positive* Negative Final   • Oligoclonal Bands CSF 12/11/2020 10* 0 - 1 Bands Final   • Interpretation 12/11/2020 See Note   Final    Comment: Isoelectric focusing/immunofixation reveals two or more unique  bands in the CSF but no bands in the serum.  This is consistent  with intrathecal synthesis of immunoglobulin and is considered to  be a positive result for oligoclonal bands.  Oligoclonal bands are  present in approximately 95 percent of patients with multiple  sclerosis but may also be  present in the CSF from patients with  viral or bacterial meningoencephalitis, subacute sclerosing  panencephalitis, neurosyphilis, Guillain-[place] syndrome, or  meningeal carcinomatosis.  Performed By: [facility]  [address] [place] [id number]  : Katie Redmond MD     • Immunoglobulin G 12/11/2020 994  768 - 1632 mg/dL Final    Comment: REFERENCE INTERVAL: Immunoglobulin G  Access complete set of age- and/or gender-specific reference  intervals for this test in the [facility] Laboratory Test Directory  ([facility].com).     • Ace-Csf 12/11/2020 1.0  0.0 - 2.5 U/L Final    Comment: This test was developed and its performance characteristics  determined by [facility]. The U.S. Food and Drug  Administration has not approved or cleared this test; however, FDA  clearance or approval is not currently required for clinical use.  The results are not intended to be used as the sole means for  clinical diagnosis or patient management decisions.  Performed By: [facility]  [address] [place], [place] [id number]  : Katie Redmond MD     • Sodium 12/12/2020 138  135 - 145 mmol/L Final   • Potassium 12/12/2020 3.7  3.6 - 5.5 mmol/L Final   • Chloride 12/12/2020 102  96 - 112 mmol/L Final   • Co2 12/12/2020 23  20 - 33 mmol/L Final   • Anion Gap 12/12/2020 13.0  7.0 - 16.0 Final   • Glucose 12/12/2020 172* 65 - 99 mg/dL Final   • Bun 12/12/2020 18  8 - 22 mg/dL Final   • Creatinine 12/12/2020 0.72  0.50 - 1.40 mg/dL Final   • Calcium 12/12/2020 9.7  8.5 - 10.5 mg/dL Final   • AST(SGOT) 12/12/2020 14  12 - 45 U/L Final   • ALT(SGPT) 12/12/2020 14  2 - 50 U/L Final   • Alkaline Phosphatase 12/12/2020 80  30 - 99 U/L Final   • Total Bilirubin 12/12/2020 0.3  0.1 - 1.5 mg/dL Final   • Albumin 12/12/2020 4.2  3.2 - 4.9 g/dL Final   • Total Protein 12/12/2020 6.6  6.0 - 8.2 g/dL Final   • Globulin 12/12/2020 2.4  1.9 - 3.5 g/dL Final   • A-G Ratio 12/12/2020 1.8  g/dL  Final   • WBC 12/12/2020 14.4* 4.8 - 10.8 K/uL Final   • RBC 12/12/2020 4.23  4.20 - 5.40 M/uL Final   • Hemoglobin 12/12/2020 13.4  12.0 - 16.0 g/dL Final   • Hematocrit 12/12/2020 40.1  37.0 - 47.0 % Final   • MCV 12/12/2020 94.8  81.4 - 97.8 fL Final   • MCH 12/12/2020 31.7  27.0 - 33.0 pg Final   • MCHC 12/12/2020 33.4* 33.6 - 35.0 g/dL Final   • RDW 12/12/2020 44.1  35.9 - 50.0 fL Final   • Platelet Count 12/12/2020 230  164 - 446 K/uL Final   • MPV 12/12/2020 10.8  9.0 - 12.9 fL Final   • GFR If  12/12/2020 >60  >60 mL/min/1.73 m 2 Final   • GFR If Non  12/12/2020 >60  >60 mL/min/1.73 m 2 Final   • Color 12/12/2020 Yellow   Final   • Character 12/12/2020 Clear   Final   • Specific Gravity 12/12/2020 1.039  <1.035 Final   • Ph 12/12/2020 5.5  5.0 - 8.0 Final   • Glucose 12/12/2020 >=1000* Negative mg/dL Final   • Ketones 12/12/2020 Trace* Negative mg/dL Final   • Protein 12/12/2020 Negative  Negative mg/dL Final   • Bilirubin 12/12/2020 Negative  Negative Final   • Urobilinogen, Urine 12/12/2020 0.2  Negative Final   • Nitrite 12/12/2020 Negative  Negative Final   • Leukocyte Esterase 12/12/2020 Small* Negative Final   • Occult Blood 12/12/2020 Negative  Negative Final   • Micro Urine Req 12/12/2020 Microscopic   Final   • Significant Indicator 12/12/2020 NEG   Final   • Source 12/12/2020 BLD   Final   • Site 12/12/2020 PERIPHERAL   Final   • Culture Result 12/12/2020 No growth after 5 days of incubation.   Final   • WBC 12/12/2020 20-50* /hpf Final    Comment: Female  <12 Yr 0-2  >12 Yr 0-5  Male   None     • RBC 12/12/2020 0-2  /hpf Final    Comment: Female  >12 Yr 0-2  Male   None     • Bacteria 12/12/2020 Few* None /hpf Final   • Epithelial Cells 12/12/2020 Negative  /hpf Final   • Hyaline Cast 12/12/2020 0-2  /lpf Final   • WBC 12/13/2020 10.7  4.8 - 10.8 K/uL Final   • RBC 12/13/2020 4.07* 4.20 - 5.40 M/uL Final   • Hemoglobin 12/13/2020 13.0  12.0 - 16.0 g/dL Final   •  Hematocrit 12/13/2020 39.1  37.0 - 47.0 % Final   • MCV 12/13/2020 96.1  81.4 - 97.8 fL Final   • MCH 12/13/2020 31.9  27.0 - 33.0 pg Final   • MCHC 12/13/2020 33.2* 33.6 - 35.0 g/dL Final   • RDW 12/13/2020 46.4  35.9 - 50.0 fL Final   • Platelet Count 12/13/2020 198  164 - 446 K/uL Final   • MPV 12/13/2020 10.4  9.0 - 12.9 fL Final   • Sodium 12/13/2020 138  135 - 145 mmol/L Final   • Potassium 12/13/2020 3.7  3.6 - 5.5 mmol/L Final   • Chloride 12/13/2020 102  96 - 112 mmol/L Final   • Co2 12/13/2020 26  20 - 33 mmol/L Final   • Glucose 12/13/2020 98  65 - 99 mg/dL Final   • Bun 12/13/2020 24* 8 - 22 mg/dL Final   • Creatinine 12/13/2020 0.72  0.50 - 1.40 mg/dL Final   • Calcium 12/13/2020 8.9  8.5 - 10.5 mg/dL Final   • Anion Gap 12/13/2020 10.0  7.0 - 16.0 Final   • GFR If  12/13/2020 >60  >60 mL/min/1.73 m 2 Final   • GFR If Non  12/13/2020 >60  >60 mL/min/1.73 m 2 Final   Hospital Outpatient Visit on 12/07/2020   Component Date Value Ref Range Status   • Sodium 12/07/2020 142  135 - 145 mmol/L Final   • Potassium 12/07/2020 3.7  3.6 - 5.5 mmol/L Final   • Chloride 12/07/2020 104  96 - 112 mmol/L Final   • Co2 12/07/2020 27  20 - 33 mmol/L Final   • Anion Gap 12/07/2020 11.0  7.0 - 16.0 Final   • Glucose 12/07/2020 101* 65 - 99 mg/dL Final   • Bun 12/07/2020 13  8 - 22 mg/dL Final   • Creatinine 12/07/2020 0.71  0.50 - 1.40 mg/dL Final   • Calcium 12/07/2020 9.0  8.4 - 10.2 mg/dL Final   • AST(SGOT) 12/07/2020 16  12 - 45 U/L Final   • ALT(SGPT) 12/07/2020 14  2 - 50 U/L Final   • Alkaline Phosphatase 12/07/2020 107* 30 - 99 U/L Final   • Total Bilirubin 12/07/2020 0.2  0.1 - 1.5 mg/dL Final   • Albumin 12/07/2020 4.2  3.2 - 4.9 g/dL Final   • Total Protein 12/07/2020 7.1  6.0 - 8.2 g/dL Final   • Globulin 12/07/2020 2.9  1.9 - 3.5 g/dL Final   • A-G Ratio 12/07/2020 1.4  g/dL Final   • WBC 12/07/2020 7.9  4.8 - 10.8 K/uL Final   • RBC 12/07/2020 4.29  4.20 - 5.40 M/uL Final   •  Hemoglobin 12/07/2020 13.8  12.0 - 16.0 g/dL Final   • Hematocrit 12/07/2020 40.8  37.0 - 47.0 % Final   • MCV 12/07/2020 95.1  81.4 - 97.8 fL Final   • MCH 12/07/2020 32.2  27.0 - 33.0 pg Final   • MCHC 12/07/2020 33.8  33.6 - 35.0 g/dL Final   • RDW 12/07/2020 43.2  35.9 - 50.0 fL Final   • Platelet Count 12/07/2020 194  164 - 446 K/uL Final   • MPV 12/07/2020 10.2  9.0 - 12.9 fL Final   • Neutrophils-Polys 12/07/2020 54.90  44.00 - 72.00 % Final   • Lymphocytes 12/07/2020 35.10  22.00 - 41.00 % Final   • Monocytes 12/07/2020 8.10  0.00 - 13.40 % Final   • Eosinophils 12/07/2020 1.10  0.00 - 6.90 % Final   • Basophils 12/07/2020 0.50  0.00 - 1.80 % Final   • Immature Granulocytes 12/07/2020 0.30  0.00 - 0.90 % Final   • Nucleated RBC 12/07/2020 0.00  /100 WBC Final   • Neutrophils (Absolute) 12/07/2020 4.36  2.00 - 7.15 K/uL Final    Includes immature neutrophils, if present.   • Lymphs (Absolute) 12/07/2020 2.78  1.00 - 4.80 K/uL Final   • Monos (Absolute) 12/07/2020 0.64  0.00 - 0.85 K/uL Final   • Eos (Absolute) 12/07/2020 0.09  0.00 - 0.51 K/uL Final   • Baso (Absolute) 12/07/2020 0.04  0.00 - 0.12 K/uL Final   • Immature Granulocytes (abs) 12/07/2020 0.02  0.00 - 0.11 K/uL Final   • NRBC (Absolute) 12/07/2020 0.00  K/uL Final   • GFR If  12/07/2020 >60  >60 mL/min/1.73 m 2 Final   • GFR If Non  12/07/2020 >60  >60 mL/min/1.73 m 2 Final   • Fasting Status 12/07/2020 Non-Fasting   Final       Imaging:   Ct-head W/o    Result Date: 12/10/2020  12/10/2020 7:09 PM HISTORY/REASON FOR EXAM:  Headache, chronic, with new features; L weakness and numbness. Dizziness. TECHNIQUE/EXAM DESCRIPTION AND NUMBER OF VIEWS: CT of the head without contrast. The study was performed on a helical multidetector CT scanner. Contiguous 2.5 mm axial sections were obtained from the skull base through the vertex. Up to date radiation dose reduction adjustments have been utilized to meet ALARA standards for  radiation dose reduction. COMPARISON:  MRI brain 3/21/2017 FINDINGS: Lateral ventricles are normal in size and symmetric. Cortical sulci are within normal limits. No significant mass effect or midline shift. Basal cisterns are patent. Ill-defined area of increased density in the RIGHT paramedian posterior frontal region measuring approximately 17 mm. Calvaria are intact. Visualized orbits are unremarkable. RIGHT mastoid fluid. Visualized paranasal sinuses are unremarkable.     1.  Ill-defined hyperdense area in the paramedian RIGHT posterior frontal region which may indicate mass or late subacute hemorrhage.  Recommend further evaluation with contrast-enhanced MRI. 2.  RIGHT mastoid fluid, likely inflammatory.    Dx-chest-limited (1 View)    Result Date: 12/12/2020 12/12/2020 8:18 AM HISTORY/REASON FOR EXAM:  Fever TECHNIQUE/EXAM DESCRIPTION AND NUMBER OF VIEWS: Single AP view of the chest. COMPARISON: 5/27/2010 FINDINGS: The heart is not enlarged. There is mild hazy left basilar opacity. No pleural effusion or pneumothorax is seen. Costophrenic angles are clear.     Mild hazy left basilar opacity may represent atelectasis or pneumonitis.     Mr-brain-with & W/o    Result Date: 12/11/2020  12/10/2020 9:26 PM HISTORY/REASON FOR EXAM:  headaches; L numbness / weakness; vertigo; concern for mass on CT. TECHNIQUE/EXAM DESCRIPTION:   MRI of the brain without and with contrast. T1 sagittal, T2 fast spin-echo axial, T1 coronal, FLAIR coronal, diffusion-weighted and apparent diffusion coefficient (ADC map) axial images were obtained of the whole brain. T1 postcontrast axial and T1 postcontrast coronal images were obtained. The study was performed on a [employer] 1.5 Dede MRI scanner. 15ml mL ProHance contrast was administered intravenously. COMPARISON:  None. FINDINGS:     There is an approximately 8 x 6 mm sized enhancing lesion in the right medial frontal gray matter. There are a few tiny satellite nodular enhancement.  The adjacent cortex demonstrates diffuse thickening. The gradient echo images does not demonstrate any magnetic susceptibility at this level. The diffusion-weighted sequences are nondiagnostic due to the artifact. There is an another well-defined periventricular lesion adjacent to the left lateral ventricle. There are few other subcortical and periventricular T2 hyperintensities. There is no hydrocephalus. The visualized flow voids of the cerebral vasculature are unremarkable. There is mucosal thickening in the bilateral mastoid air cells.     1.  There is an approximately 8 x 6 mm sized enhancing lesion in the right medial frontal gray matter.There are a few tiny satellite nodular enhancement.  The adjacent cortex demonstrates diffuse thickening. The gradient echo images does not demonstrate any magnetic susceptibility at this level. The diffusion-weighted sequences are nondiagnostic due to the artifact. This is a nonspecific finding. The differential diagnosis includes primary brain neoplasm, lymphoma, active demyelinating plaque and subacute infarct. Pre and postcontrast MR examination is recommended in 4 weeks for further characterization. Contrast enhancement would be resolved if this lesion is active demyelinating plaque or subacute . 2.  There are a few abnormal periventricular and subcortical T2 hyperintensities. The differential diagnosis includes demyelination, nonspecific foci of gliosis and ischemia The left lateral periventricular lesion is well-defined and demonstrates typical  characteristic of demyelinating plaque. 3.  Mild cerebral volume loss. 4.  There is mucosal thickening in the bilateral mastoid air cells.      Assessment and Plan:   The following treatment plan was discussed:    Review of ER visit in December  Review of neurology notes  Patient to continue to therapy  Patient to continue desvenlafaxine and Lamictal return to office in 1 month    Problem List Items Addressed This Visit      Mixed anxiety and depressive disorder     Stable no suicidal intention or plan                 Follow-up: Return in about 1 month (around 2/6/2021).        Portions of this note may be dictated using Dragon NaturallySpeaking voice recognition software.  Variances in spelling and vocabulary are possible and unintentional.  Not all areas may be caught/corrected.  Please notify me if any discrepancies are noted or if the meaning of any statement is not correct/clear.

## 2021-01-09 ENCOUNTER — HOSPITAL ENCOUNTER (OUTPATIENT)
Dept: RADIOLOGY | Facility: MEDICAL CENTER | Age: 51
End: 2021-01-09
Attending: STUDENT IN AN ORGANIZED HEALTH CARE EDUCATION/TRAINING PROGRAM
Payer: COMMERCIAL

## 2021-01-09 DIAGNOSIS — R90.89 ABNORMAL BRAIN MRI: ICD-10-CM

## 2021-01-09 DIAGNOSIS — G37.9 DEMYELINATING DISEASE OF CENTRAL NERVOUS SYSTEM, UNSPECIFIED (HCC): ICD-10-CM

## 2021-01-09 PROCEDURE — 72157 MRI CHEST SPINE W/O & W/DYE: CPT

## 2021-01-09 PROCEDURE — A9576 INJ PROHANCE MULTIPACK: HCPCS

## 2021-01-09 PROCEDURE — 72156 MRI NECK SPINE W/O & W/DYE: CPT

## 2021-01-09 PROCEDURE — 72158 MRI LUMBAR SPINE W/O & W/DYE: CPT

## 2021-01-09 PROCEDURE — 700117 HCHG RX CONTRAST REV CODE 255

## 2021-01-09 RX ADMIN — GADOTERIDOL 20 ML: 279.3 INJECTION, SOLUTION INTRAVENOUS at 14:38

## 2021-01-14 ENCOUNTER — TELEPHONE (OUTPATIENT)
Dept: NEUROLOGY | Facility: MEDICAL CENTER | Age: 51
End: 2021-01-14

## 2021-01-14 NOTE — TELEPHONE ENCOUNTER
Patient called leaving a detailed message requesting a call back . Spoke with patient via phone call. Patient is requesting the results of her MRI however I do see patient is following up with Dr Zhou for MRI'S and you will see patient for focal seizures ?     Patient is exteremly worried about her results. Patient is also requesting a work note from 01/04/2021-02/12/2021? Patient notified me that you spoke with her in regards to that in your last appointment.

## 2021-01-19 ENCOUNTER — TELEPHONE (OUTPATIENT)
Dept: PHYSICAL THERAPY | Facility: REHABILITATION | Age: 51
End: 2021-01-19

## 2021-01-20 NOTE — OP THERAPY DISCHARGE SUMMARY
Outpatient Physical Therapy  DISCHARGE SUMMARY NOTE      [address]  [address]  [place] [address]  Phone:  [phone]  Fax:  [phone]    Date of Visit: 01/19/2021    Patient: Melba Frye  YOB: 1970  MRN: [id number]     Referring Provider: No referring provider defined for this encounter.   Referring Diagnosis No admission diagnoses are documented for this encounter.         Functional Assessment Used        Your patient is being discharged from Physical Therapy with the following comments:   · other    Comments:  Mrs. Frye was seen for 3 PT visits treating her dizziness and unsteadiness.  Evaluation revealed complaints most consistent with central vertigo.  Treatment focused on development of a balance/vestibular HEP as well as compensation strategies to improve safety.  On last visit, 12/10/20, pt experienced an 'episode' during our session.  Due to the quality of the dizziness and presence of L UE/LE posturing, she was recommended to follow up with the ED.  Imaging confirms central abnormalities and she is undergoing further neurology consult.  Will d/c from PT at this time, welcomed to return with new Rx if indicated.      Meron Alejo, PT, DPT    Date: 1/19/2021

## 2021-01-21 ENCOUNTER — APPOINTMENT (OUTPATIENT)
Dept: BEHAVIORAL HEALTH | Facility: CLINIC | Age: 51
End: 2021-01-21
Attending: FAMILY MEDICINE
Payer: COMMERCIAL

## 2021-01-23 DIAGNOSIS — H69.93 DYSFUNCTION OF BOTH EUSTACHIAN TUBES: ICD-10-CM

## 2021-01-23 DIAGNOSIS — H81.399 PERIPHERAL VERTIGO, UNSPECIFIED LATERALITY: ICD-10-CM

## 2021-01-25 RX ORDER — HYDROCHLOROTHIAZIDE 12.5 MG/1
TABLET ORAL
Qty: 30 TAB | Refills: 0 | Status: SHIPPED | OUTPATIENT
Start: 2021-01-25 | End: 2021-01-27

## 2021-01-27 ENCOUNTER — OFFICE VISIT (OUTPATIENT)
Dept: NEUROLOGY | Facility: MEDICAL CENTER | Age: 51
End: 2021-01-27
Attending: PSYCHIATRY & NEUROLOGY
Payer: COMMERCIAL

## 2021-01-27 ENCOUNTER — HOSPITAL ENCOUNTER (OUTPATIENT)
Dept: LAB | Facility: MEDICAL CENTER | Age: 51
End: 2021-01-27
Attending: PSYCHIATRY & NEUROLOGY
Payer: COMMERCIAL

## 2021-01-27 VITALS
SYSTOLIC BLOOD PRESSURE: 130 MMHG | BODY MASS INDEX: 31.49 KG/M2 | DIASTOLIC BLOOD PRESSURE: 74 MMHG | HEART RATE: 95 BPM | OXYGEN SATURATION: 98 % | HEIGHT: 67 IN | TEMPERATURE: 97.8 F | WEIGHT: 200.62 LBS

## 2021-01-27 DIAGNOSIS — R90.89 ABNORMAL BRAIN MRI: Primary | ICD-10-CM

## 2021-01-27 DIAGNOSIS — R90.89 ABNORMAL BRAIN MRI: ICD-10-CM

## 2021-01-27 PROCEDURE — 86225 DNA ANTIBODY NATIVE: CPT

## 2021-01-27 PROCEDURE — 99212 OFFICE O/P EST SF 10 MIN: CPT | Performed by: PSYCHIATRY & NEUROLOGY

## 2021-01-27 PROCEDURE — 99202 OFFICE O/P NEW SF 15 MIN: CPT | Performed by: PSYCHIATRY & NEUROLOGY

## 2021-01-27 PROCEDURE — 99215 OFFICE O/P EST HI 40 MIN: CPT | Performed by: PSYCHIATRY & NEUROLOGY

## 2021-01-27 PROCEDURE — 86255 FLUORESCENT ANTIBODY SCREEN: CPT

## 2021-01-27 PROCEDURE — 99417 PROLNG OP E/M EACH 15 MIN: CPT | Performed by: PSYCHIATRY & NEUROLOGY

## 2021-01-27 PROCEDURE — 86235 NUCLEAR ANTIGEN ANTIBODY: CPT | Mod: 91

## 2021-01-27 PROCEDURE — 36415 COLL VENOUS BLD VENIPUNCTURE: CPT

## 2021-01-27 ASSESSMENT — FIBROSIS 4 INDEX: FIB4 SCORE: 0.96

## 2021-01-27 NOTE — PROGRESS NOTES
"[facility]  MULTIPLE SCLEROSIS & NEUROIMMUNOLOGY  NEW PATIENT VISIT    Referral source: Fady Davidson MD    DISEASE SUMMARY:  Principal neurologic diagnosis: TBD  Diagnosis of MS: TBD  Disease History:  - 12/2020: weakness involving the left side of the body; twitching on the left side  Disease course at onset: TBD  Current disease course: TBD  Previous disease therapies:  - none  Current disease therapies:  - none  Symptomatic therapies:  - methylprednisolone 1,000 mg IV x1  CSF (12/11/2020):  - oligoclonal bands: positive, 10  Other Testing:  -   MRI head:  - 12/10/2020: enhancing lesion in the right medial frontal gray matter w/ satellite nodular enhancement; cortical thickening  - 3/21/2017: stable  - 6/21/2011: stable  - 6/30/2010: perhaps some juxta-cortical (but mostly non-specific) lesions  MRI cervical spine:  - 1/9/2021: stable  MRI thoracic spine:  - 1/9/2021: stable    CC: \"abnormal MRI brain\"    HISTORY OF ILLNESS:  Melba Frye is a 50 y.o. woman with a history most notable for migraine headaches, depression, and anxiety.  Today, she was accompanied by her  (Dario), and she provided the following history:    2011:  Melba had a hystrectomy.  This cuased \"majory anxiety,\" and she was prescribed Xanax.    2012:  Melba had endometriosis involving an ovary.  This scared her as well.    She saw a counselor.  She was a \"mess\" emotionally.  She developed a \"weird\" sensation in her head.  It felt as if someone cracked an egg over her head and the contents were running down over her scalp.    She noticed numbness in the lips and over the head.  She noticed ear \"pressure.\"  Sometimes these symptoms would resolve completely.    2015:  Melba took a year off of work and tapered off all of her medications under the supervision of her physicians.    10/28/2020:  Melba had switched schools.  She went to [employer].  This provoked anxiety.  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  As she " "was driving home she felt as if her raheem was doing \"flip flops.\"  She pulled over.  She was shaking, and she felt frightened.  Her  picked her up.    She went to work on Friday.  She felt well over the weekend.    11/3/2020:  Melba had a recurrence of \"brain flops.\"  She was sent home.  Melba saw her PCP, and she diagnosed her with BPPV.  She saw PT.    The brain flops stopped.    12/2020:  Melba developed weakness on the left side of her body.  She noticed \"twitching\" and weakness on the left side for 30 seconds at a time.    Once her PT witnessed one of these events, and she was sent to the ED.    Melba underwent workup.  She was treated with 1,000 mg methylprednisone x1.  She was started on lamotrigine.  She currently takes 50 mg nightly.  She continues to experience episodes of weakness on the left side.  She experiences twitching in the left lower extremity every 2-3 days.    A review of MS-related symptoms was notable for the following:    Fatigue: yes;    Weakness: yes; present in the left lower extremity, discussed above  Numbness: yes; present in the left lower extremity, discussed above  Incoordination: no  Spasms/Spasticity: no  Vision Impairment: yes; brief periods (20 minutes) of blurriness (uncertain if this is monocular or binocular)  Walking/Balance Problems: yes; discussed above  Neuralgia: no  Bowel Symptoms: yes; intermittently  Bladder Symptoms: yes; rare incontinence, attributed to vaginal deliveries  Heat Sensitivity: no  Depression: no  Cognitive/Memory Problems: no  Sexual Dysfunction: no  Anxiety: yes; she recently established with behavioral health    MEDICAL AND SURGICAL HISTORY:  Past Medical History:   Diagnosis Date   • Anxiety    • Complicated migraine 6/9/2014   • Depression    • Dermatitis, contact 11/16/2015   • Fatigue 6/9/2014   • Hyperlipidemia 1/23/2015   • Lentigo 10/17/2018   • Menopausal symptom 7/2/2014   • Mixed anxiety and depressive disorder 6/9/2014 "   • Seborrheic keratoses 10/17/2018   • TMJ arthralgia 6/9/2014   • UTI (lower urinary tract infection)      Past Surgical History:   Procedure Laterality Date   • MYRINGOTOMY  8/27/2010    Performed by [address] at SURGERY SAME DAY [facility] ORS   • LAPAROSCOPY  5/28/2010    Performed by JACKI SHARMA at SURGERY [facility] ORS   • LYMPH NODE SAMPLING  5/28/2010    Performed by JACKI SHARMA at SURGERY [facility] ORS   • DEBULKING  5/28/2010    Performed by JACKI SHARMA at SURGERY [facility] ORS   • CYSTOSCOPY  10/15/08    Performed by YU GODINEZ at SURGERY SAME DAY [facility] ORS   • VAGINAL HYSTERECTOMY SCOPE TOTAL  10/15/08    Performed by YU GODINEZ at SURGERY SAME DAY [facility] ORS   • OTHER  1984    TONSILECTOMY/ ADNOIDS   • APPENDECTOMY  1981   • TONSILLECTOMY AND ADENOIDECTOMY       MEDICATIONS:  Current Outpatient Medications   Medication Sig   • lamoTRIgine (LAMICTAL) 25 MG Tab Take 1 Tab by mouth every day. Start by taking 25 mg daily for 2 weeks.  Then increase to 50 mg daily.   • Desvenlafaxine Succinate ER 25 MG TABLET SR 24 HR Take 25 mg by mouth every day.   • vitamin D (CHOLECALCIFEROL) 1000 Unit (25 mcg) Tab Take 1,000 Units by mouth every day.   • Biotin 10 MG Cap Take 1 Cap by mouth every day.   • acetaminophen (TYLENOL) 500 MG Tab Take 1,000 mg by mouth every 6 hours as needed.   • multivitamin (THERAGRAN) Tab Take 1 Tab by mouth every day.   • Calcium 200 MG Tab Take 1 Tab by mouth every day.   • Tretinoin (ALTRENO) 0.05 % Lotion 1 Application by Apply externally route every bedtime.     SOCIAL HISTORY:  Social History     Tobacco Use   • Smoking status: Never Smoker   • Smokeless tobacco: Never Used   Substance Use Topics   • Alcohol use: Not Currently     Alcohol/week: 0.0 oz     Frequency: Monthly or less     Drinks per session: 1 or 2     Binge frequency: Never     Social History     Social History Narrative   • Not on file     FAMILY HISTORY:  Family History   Problem  Relation Age of Onset   • Non-contributory Mother    • Lung Disease Mother         COPD   • Cancer Mother         dysplasia    • Arthritis Mother    • Psychiatric Illness Mother    • Heart Disease Mother    • Hypertension Mother    • Stroke Mother    • Non-contributory Father    • Cancer Father 73        prostate cancer   • Lung Disease Maternal Grandmother    • Lung Disease Paternal Aunt    • Diabetes Paternal Aunt    • Hyperlipidemia Paternal Aunt      REVIEW OF SYSTEMS:  A ROS was completed.  Pertinent positives and negatives were included in the HPI, above.  All other systems were reviewed and are negative.    PHYSICAL EXAM:  General/Medical:  - NAD  - heart rate and rhythm were regular    Neuro:  MENTAL STATUS: awake and alert; no deficits of speech or language; oriented to person, place, and time; affect was appropriate to situation; pleasant, cooperative    CRANIAL NERVES:    II: acuity was: J1+/J1+; fields intact to confrontation; pupils 3/3 to 2/2 without a relative afferent pupillary defect; discs sharp    III/IV/VI: versions intact without nystagmus    V: facial sensation symmetric to light touch    VII: facial expression symmetric    VIII: hearing intact to finger rub    IX/X: palate elevates symmetrically    XI: shoulder shrug symmetric    XII: tongue midline    MOTOR:  - bulk and tone were normal throughout  Upper Extremity Strength  (R/L)    5/5   Elbow flexion 5/5   Elbow extension 5/5   Shoulder abduction 5/5     Lower Extremity Strength  (R/L)   Hip flexion 5/4   Knee extension 5/5   Knee flexion 5/4   Ankle plantarflexion 5/5   Ankle dorsiflexion 5/5     - can walk on toes and heels  - mild downward drift on the leftno pronator drift; no abnormal movements    SENSATION:  - light touch: intact in the upper and lower extremities  - vibration (R/L, seconds): 12/12 at the great toes  - pinprick: NT  - proprioception: NT  - Romberg: absent    COORDINATION:  - finger to nose was normal, no ataxia on  "exam  - finger tapping was rapid and accurate, bilaterally    REFLEXES:  Reflex Right Left   BR 2+ 2+   Biceps 3+ 3+   Triceps 2+ 2+   Patellae 2+ 2+   Achilles 2+ 3+   Toes mute mute     GAIT:  - narrow base and normal  - heel-raised and toe-raised gait: intact  - tandem gait: took corrective steps    QUANTITATIVE SCORES:  Timed 25-foot walk (sec): NT.  Assistive device: none    REVIEW OF IMAGING STUDIES: I reviewed the following studies:  MRI Brain:  Date: 12/10/2020  W/o and w/ contrast?: yes  Indication: \"headaches; left numbness, weakness; vertigo; concern for mass on CT\"  Comparison: none  Impression:  \"1) There is an approximately 8 x 6 mm sized enhancing lesion in the right medial frontal gray matter. There are a few tiny satellite nodular enhancement.  The adjacent cortex demonstrates diffuse thickening. The gradient echo images does not demonstrate any magnetic susceptibility at this level. The diffusion-weighted sequences are nondiagnostic due to the artifact. This is a nonspecific finding. The differential diagnosis includes primary brain neoplasm, lymphoma, active demyelinating plaque and subacute infarct. Pre and postcontrast MR examination is recommended in 4 weeks for further characterization. Contrast enhancement would be resolved if this lesion is active demyelinating plaque or subacute.  2) There are a few abnormal periventricular and subcortical T2 hyperintensities. The differential diagnosis includes demyelination, nonspecific foci of gliosis and ischemia.  The left lateral periventricular lesion is well-defined and demonstrates typical characteristic of demyelinating plaque.  3) Mild cerebral volume loss.  4) There is mucosal thickening in the bilateral mastoid air cells.\"    Date: 3/21/2017  W/o and w/ contrast?: yes  Indication: \"episodes of facial numbness mostly on the right side for about 7 years; migraines\"  Comparison: 6/21/2011  Impression:  \"1) Minimal-mild supratentorial white matter " "disease. Perhaps minimal progression since 6/21/2011. Nonspecific findings most consistent with microvascular ischemic change versus demyelination or gliosis. Morphology characteristic of multiple sclerosis is not demonstrated.  2) Interval clearing of paranasal sinus opacities. The paranasal sinuses are clear at this time.\"    Date: 6/21/2011  W/o and w/ contrast?: without  Indication: \"headache\"  Comparison: 6/30/2010  Impression:  \"1) Minimal supratentorial white matter disease with a few rare punctate foci of bright FLAIR signal primarily in the subcortical white matter of the anterior frontal lobes.  There are no periventricular lesions with morphology indicative of multiple sclerosis.  There is no significant change since June 2010.  2) Minimal residual right mastoid opacities consistent postinflammatory change.  3) Paranasal sinus opacities consistent with active inflammatory sinus disease, possible right maxillary sinusitis with air-fluid level.\"    Date: 6/30/2010  W/o and w/ contrast?: yes  Indication: \"head pressure, headaches, sinus headaches\"  Comparison: none  Impression:  \"1) Minimal supratentorial white matter disease with a few rare punctate foci of FLAIR hyperintensity consistent with small vessel ischemic change versus demyelination or gliosis.  2) Normal variant dominant distal left vertebral artery.  3) No evidence of acute cerebral infarction, hemorrhage, or mass lesion.  4) The paranasal sinuses are clear.  5) Diffuse reticular opacities in the right mastoid suggesting inflammatory mastoiditis.\"    MRI Cervical Spine:  Date: 1/9/2021  W/o and w/ contrast?: yes  Indication: \"concern for demyelinating disease; initial workup\"  Comparison: none  Impression:  \"1) MRI OF THE CERVICAL SPINE WITHOUT AND WITH CONTRAST WITHIN NORMAL LIMITS. NO EVIDENCE OF DEMYELINATING DISEASE IN THE CERVICAL SPINAL CORD.\"    MRI Thoracic Spine:  Date: 1/9/2021  W/o and w/ contrast?: yes  Indication: \"concern for " "demyelinating disease, initial workup\"  Comparison: none  Impression:  \"1) Normal MRI scan of the thoracic spine with or without contrast.  2) No evidence of demyelinating process in the thoracic cord.\"    REVIEW OF LABORATORY STUDIES:  12/10/2020:  - KATIE Ab: none detected  - anti AQP4 IgG, CSF: negative  - HIV Ag/Ab: non-reactive  - HSV DNA PCR, serum: not detected  - vitamin B1: 150  - vitamin B12: 498    12/11/2020:  - ACE, CSF: 1.0  - oligoclonal bands: positive (10)    ASSESSMENT:  Melba Frye is a 50 y.o. woman with abnormal MRI raheem as well as a history of migraine headaches, depression, and anxiety.  The differential diagnosis at this point remains broad, but I am most suspicious for an inflammatory/immune-mediated process given the presence of oligoclonal bands.  Plan to repeat MRI brain w/o and w/ contrast.  I have also ordered some additional labs for MS mimics.  Melba reports continued episodes of \"twitching\" in the left lower extremity, so I recommended she increase the dosage of lamotrigine to 50 mg BID.    PLAN:  Seizures:  - increase lamotrigine to 50 mg BID    Abnormal MRI Brain:  - repeat MRI brain w/o and w/ contrast    Orders Placed This Encounter   • MR-BRAIN-WITH & W/O   • MOG IGG TITER, SERUM   • SSA 52 AND 60 (RO)(HARI) AB, IGG   • SSB(LA)(HARI)IGG AB   • DSDNA AB, IGG W/RFLX TO IFA TITER   • KATIE COMPREHENSIVE PANEL     Follow-Up:  - Return in about 2 months (around 3/27/2021).    Signed: Yohan Calixto M.D. at 7:05 AM on 01/27/21    BILLING DOCUMENTATION:   I spent 57 minutes face-to-face with this patient.  Over 50% of this time was spent on counseling and/or coordination of care wtih the patient and/or family.  Please see \"assessment\" above for details of our discussion.  "

## 2021-01-28 ENCOUNTER — OFFICE VISIT (OUTPATIENT)
Dept: NEUROLOGY | Facility: MEDICAL CENTER | Age: 51
End: 2021-01-28
Attending: STUDENT IN AN ORGANIZED HEALTH CARE EDUCATION/TRAINING PROGRAM
Payer: COMMERCIAL

## 2021-01-28 VITALS
HEART RATE: 105 BPM | SYSTOLIC BLOOD PRESSURE: 108 MMHG | BODY MASS INDEX: 33.81 KG/M2 | WEIGHT: 215.39 LBS | HEIGHT: 67 IN | DIASTOLIC BLOOD PRESSURE: 76 MMHG

## 2021-01-28 DIAGNOSIS — G93.9 BRAIN LESION: ICD-10-CM

## 2021-01-28 DIAGNOSIS — G37.9 DEMYELINATING DISEASE OF CENTRAL NERVOUS SYSTEM, UNSPECIFIED (HCC): ICD-10-CM

## 2021-01-28 DIAGNOSIS — F32.A DEPRESSIVE DISORDER: ICD-10-CM

## 2021-01-28 DIAGNOSIS — G40.109 LOCALIZATION-RELATED FOCAL EPILEPSY WITH SIMPLE PARTIAL SEIZURES (HCC): ICD-10-CM

## 2021-01-28 DIAGNOSIS — F41.8 ANXIETY ASSOCIATED WITH DEPRESSION: ICD-10-CM

## 2021-01-28 DIAGNOSIS — R90.89 ABNORMAL BRAIN MRI: ICD-10-CM

## 2021-01-28 PROCEDURE — 99215 OFFICE O/P EST HI 40 MIN: CPT | Performed by: STUDENT IN AN ORGANIZED HEALTH CARE EDUCATION/TRAINING PROGRAM

## 2021-01-28 PROCEDURE — 99212 OFFICE O/P EST SF 10 MIN: CPT | Performed by: STUDENT IN AN ORGANIZED HEALTH CARE EDUCATION/TRAINING PROGRAM

## 2021-01-28 ASSESSMENT — FIBROSIS 4 INDEX: FIB4 SCORE: 0.96

## 2021-01-28 NOTE — LETTER
February 19, 2021       Patient: Melba Frye   YOB: 1970   Date of Visit: 1/28/2021         To Whom It May Concern:    In my medical opinion, I recommend that Melba Frye {Work release (duty restriction):18402}    If you have any questions or concerns, please don't hesitate to call [phone]          Sincerely,          Fady Davidson M.D.  Electronically Signed

## 2021-01-28 NOTE — LETTER
January 28, 2021    To Whom It May Concern:         This is confirmation that Melba Frye is under my care for a neurological condition. It is my recommendation that she refrain from all work-related activities from January 4, 2021 through February 12, 2021.         If you have any questions please do not hesitate to call me at the phone number listed below.    Sincerely,          Fady Davidson M.D.  [phone]

## 2021-01-28 NOTE — PROGRESS NOTES
Neurology Clinic - Follow-up Note    Chief complaint: Abnormal MRI, focal seizures          Interval History:  Patient is doing better since starting Lamictal. She went from having multiple focal left-sided seizures per day to one every couple of days. Her weakness and dexterity in her left hand has improved although it is still not the same as the right. Left leg is about the same. Slightly weaker. NO major difference since I last saw her. Using a cane now. Mood is better since she has been on SNRI. Saw Dr. Zhou yesterday who recommended further testing. He increased her Lamictal to 50 mg BID. EEG still pending.    ROS: Pertinent positives and negatives are as documented above          Current medications:     Current Outpatient Medications   Medication Instructions   • acetaminophen (TYLENOL) 1,000 mg, Oral, EVERY 6 HOURS PRN   • Biotin 10 MG Cap 1 Cap, Oral, DAILY   • Calcium 200 MG Tab 1 Tab, Oral, DAILY   • Desvenlafaxine Succinate ER 25 mg, Oral, DAILY   • lamoTRIgine (LAMICTAL) 25 mg, Oral, DAILY, Start by taking 25 mg daily for 2 weeks.  Then increase to 50 mg daily.   • multivitamin (THERAGRAN) Tab 1 Tab, Oral, DAILY   • Tretinoin (ALTRENO) 0.05 % Lotion 1 Application, Apply externally, EVERY BEDTIME   • vitamin D (CHOLECALCIFEROL) 1,000 Units, Oral, DAILY           Physical examination:   Vitals:    01/28/21 1249   BP: 108/76   Pulse: (!) 105     Vitals:    01/28/21 1249   BP: 108/76   Pulse: (!) 105       General: Patient in no acute distress, pleasant and cooperative.  HEENT: Normocephalic, no signs of acute trauma.   Neck: visibly supple, with  normal range of motion.   Chest: clear to auscultation. No cough.   CV: RRR, no murmurs.   Skin: no signs of acute rashes or trauma.   Musculoskeletal: Limited range of motion at the ankles  Psychiatric:  Denies symptoms of depression or suicidal ideation. Mood and affect appear normal on exam.      NEUROLOGICAL EXAM:   Mental status WNL  CN: visual acuity  intact. Face symmetrical. EOMI. PERRLA.   Motor: Tone normal. Mild weakness of left hand (slowed finger tapping). Mild weakness of right hip flex and extension. Mild knee flex weakness. Mild foot doriflexion and plantarflexion weakness.  Coordination: FNF mild clumbsy on L. Heel knee shin slow.  Gait: Somewhat antalgic favoring weight on the right. Decreased arm swing on the left. Trouble with toe walk d/t weakness on L. 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 She has abnormal brain lesion on MRI in the medial right frontal region that is currently being worked up by Dr. Calixto. Differential includes seizure edema, tumefactive MS, Brain tumour. Her Lamictal was appropraitely increased to 50 mg BID yesterday so no changes for ow. She has a pending EEG in February. We will f/u in clinic the week after that. Patient and  agree to plan as I have outlined it above.      Encounter Diagnoses   Name Primary?   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.   Office: [phone]  Fax: [phone]     BILLING DOCUMENTATION:       Counseling:  I spent a total of 40 minutes of face-to-face time in this visit. Over 50% of the time of the visit today was spent on counseling and or coordination of care wtih the patient and/or family, as above in assessment in plan.

## 2021-01-29 LAB — DSDNA AB TITR SER CLIF: NORMAL {TITER}

## 2021-02-01 LAB
ENA SS-B IGG SER IA-ACNC: 1 AU/ML (ref 0–40)
SSA52 R0ENA AB IGG Q0420: 0 AU/ML (ref 0–40)
SSA60 R0ENA AB IGG Q0419: 0 AU/ML (ref 0–40)

## 2021-02-03 LAB — MOG AB SER QL CBA IFA: NORMAL

## 2021-02-10 DIAGNOSIS — R90.89 ABNORMAL BRAIN MRI: Primary | ICD-10-CM

## 2021-02-11 ENCOUNTER — NON-PROVIDER VISIT (OUTPATIENT)
Dept: NEUROLOGY | Facility: MEDICAL CENTER | Age: 51
End: 2021-02-11
Attending: STUDENT IN AN ORGANIZED HEALTH CARE EDUCATION/TRAINING PROGRAM
Payer: COMMERCIAL

## 2021-02-11 DIAGNOSIS — G40.109 LOCALIZATION-RELATED FOCAL EPILEPSY WITH SIMPLE PARTIAL SEIZURES (HCC): ICD-10-CM

## 2021-02-11 PROCEDURE — 95816 EEG AWAKE AND DROWSY: CPT | Performed by: STUDENT IN AN ORGANIZED HEALTH CARE EDUCATION/TRAINING PROGRAM

## 2021-02-11 PROCEDURE — 95816 EEG AWAKE AND DROWSY: CPT | Mod: 26 | Performed by: STUDENT IN AN ORGANIZED HEALTH CARE EDUCATION/TRAINING PROGRAM

## 2021-02-12 ENCOUNTER — TELEPHONE (OUTPATIENT)
Dept: NEUROLOGY | Facility: MEDICAL CENTER | Age: 51
End: 2021-02-12

## 2021-02-12 ENCOUNTER — HOSPITAL ENCOUNTER (OUTPATIENT)
Dept: RADIOLOGY | Facility: MEDICAL CENTER | Age: 51
End: 2021-02-12
Attending: PSYCHIATRY & NEUROLOGY
Payer: COMMERCIAL

## 2021-02-12 DIAGNOSIS — R90.89 ABNORMAL BRAIN MRI: ICD-10-CM

## 2021-02-12 DIAGNOSIS — R90.89 ABNORMAL BRAIN MRI: Primary | ICD-10-CM

## 2021-02-12 PROCEDURE — A9576 INJ PROHANCE MULTIPACK: HCPCS | Performed by: PSYCHIATRY & NEUROLOGY

## 2021-02-12 PROCEDURE — 700117 HCHG RX CONTRAST REV CODE 255: Performed by: PSYCHIATRY & NEUROLOGY

## 2021-02-12 PROCEDURE — 70553 MRI BRAIN STEM W/O & W/DYE: CPT

## 2021-02-12 RX ADMIN — GADOTERIDOL 19 ML: 279.3 INJECTION, SOLUTION INTRAVENOUS at 15:41

## 2021-02-13 NOTE — TELEPHONE ENCOUNTER
I discussed Melba's imaging with the reading radiologist.    I relayed the results to Melba.  At the last clinic visit we discussed that if the lesion were bigger I would recommend biopsy.  Melba agreed, and I have placed an urgent referral to neurosurgery.    Yohan Calixto M.D.

## 2021-02-18 DIAGNOSIS — G40.109 LOCALIZATION-RELATED FOCAL EPILEPSY WITH SIMPLE PARTIAL SEIZURES (HCC): ICD-10-CM

## 2021-02-18 RX ORDER — LAMOTRIGINE 25 MG/1
25 TABLET ORAL DAILY
Qty: 60 TABLET | Refills: 3 | OUTPATIENT
Start: 2021-02-18

## 2021-02-18 NOTE — TELEPHONE ENCOUNTER
Received request via: Patient    Was the patient seen in the last year in this department? Yes    Does the patient have an active prescription (recently filled or refills available) for medication(s) requested? No     Per note patient is now taking Lamotrigine 50 MG BID please update and send in new prescription for patient     Pharmacy is requiring a new fill with new instructions please send new script today .

## 2021-02-19 ENCOUNTER — TELEPHONE (OUTPATIENT)
Dept: MEDICAL GROUP | Facility: IMAGING CENTER | Age: 51
End: 2021-02-19

## 2021-02-19 NOTE — TELEPHONE ENCOUNTER
Pt called to request letter for work to explain her leave of absence. Her insurance was cancelled because a letter was never received from neurologist and pt states she is supposed to have a procedure tomorrow. Reviewed with Dr. Nguyen. Ok to write letter noting patient's work up of neuro condition. Pt would like letter faxed to [phone]. Letter sent and patient called back to request more specific dates be added to letter. Per Dr. Clement, pt will require appointment if FMLA paperwork is necessary.

## 2021-02-19 NOTE — LETTER
February 19, 2021       Patient: Melba Frye   YOB: 1970   Date of Visit: 2/19/2021         To Whom It May Concern:      Melba is a patient under my care. She is being assessed for a neurological condition that is requiring a leave of absence.       If you have any questions or concerns, please don't hesitate to call [phone]          Sincerely,    Trinity Nguyen MD  Electronically Signed

## 2021-02-19 NOTE — LETTER
February 19, 2021       Patient: Melba Frye   YOB: 1970   Date of Visit: 2/19/2021         To Whom It May Concern:    Melba is a patient under my care. She is being assessed for a neurological condition that is requiring a leave of absence starting 2/15/21 with an approximate return date in 2 months.     If you have any questions or concerns, please don't hesitate to call [phone]          Sincerely,    Trinity Nguyen MD  Electronically Signed

## 2021-02-21 ENCOUNTER — HOSPITAL ENCOUNTER (OUTPATIENT)
Dept: RADIOLOGY | Facility: MEDICAL CENTER | Age: 51
End: 2021-02-21
Attending: NURSE PRACTITIONER
Payer: COMMERCIAL

## 2021-02-21 DIAGNOSIS — G93.9 DISEASE OF BRAIN: ICD-10-CM

## 2021-02-21 PROCEDURE — 71260 CT THORAX DX C+: CPT

## 2021-02-21 PROCEDURE — 700117 HCHG RX CONTRAST REV CODE 255: Performed by: NURSE PRACTITIONER

## 2021-02-21 RX ADMIN — IOHEXOL 25 ML: 240 INJECTION, SOLUTION INTRATHECAL; INTRAVASCULAR; INTRAVENOUS; ORAL at 15:12

## 2021-02-21 RX ADMIN — IOHEXOL 100 ML: 350 INJECTION, SOLUTION INTRAVENOUS at 15:12

## 2021-02-26 ENCOUNTER — TELEMEDICINE (OUTPATIENT)
Dept: NEUROLOGY | Facility: MEDICAL CENTER | Age: 51
End: 2021-02-26
Attending: STUDENT IN AN ORGANIZED HEALTH CARE EDUCATION/TRAINING PROGRAM
Payer: COMMERCIAL

## 2021-02-26 DIAGNOSIS — G93.9 BRAIN LESION: ICD-10-CM

## 2021-02-26 DIAGNOSIS — G40.109 LOCALIZATION-RELATED FOCAL EPILEPSY WITH SIMPLE PARTIAL SEIZURES (HCC): Primary | ICD-10-CM

## 2021-02-26 DIAGNOSIS — F41.8 MIXED ANXIETY AND DEPRESSIVE DISORDER: ICD-10-CM

## 2021-02-26 DIAGNOSIS — G37.9 DEMYELINATING DISEASE OF CENTRAL NERVOUS SYSTEM, UNSPECIFIED (HCC): ICD-10-CM

## 2021-02-26 DIAGNOSIS — R90.89 ABNORMAL BRAIN MRI: ICD-10-CM

## 2021-02-26 PROCEDURE — 99213 OFFICE O/P EST LOW 20 MIN: CPT | Mod: 95,CR | Performed by: STUDENT IN AN ORGANIZED HEALTH CARE EDUCATION/TRAINING PROGRAM

## 2021-02-26 PROCEDURE — 99212 OFFICE O/P EST SF 10 MIN: CPT | Performed by: STUDENT IN AN ORGANIZED HEALTH CARE EDUCATION/TRAINING PROGRAM

## 2021-02-26 ASSESSMENT — ENCOUNTER SYMPTOMS
CARDIOVASCULAR NEGATIVE: 1
CHILLS: 0
FALLS: 0
EYES NEGATIVE: 1
SEIZURES: 1
FEVER: 0
GASTROINTESTINAL NEGATIVE: 1
HALLUCINATIONS: 0

## 2021-02-26 ASSESSMENT — LIFESTYLE VARIABLES: SUBSTANCE_ABUSE: 0

## 2021-02-26 NOTE — PROGRESS NOTES
Telemedicine: Established Patient   This evaluation was conducted via Zoom using secure and encrypted videoconferencing technology. The patient was in a private location in the state of [place].    The patient's identity was confirmed and verbal consent was obtained for this virtual visit.    Subjective:   CC:   Chief Complaint   Patient presents with   • Follow-Up     Localization-related focal epilepsy with simple partial seizures        Melba Frye is a 50 y.o. female presenting for evaluation and management of: focal epilepsy    Patient is doing well overral. Seizures were increasing again a few days ago occurring every 3 days so Dr. IRIZARRY Increased her Lamictal to 50/75 which she feels is helping. She feels her strength and mobility on the left side has improved. She is bicycling and doing strength exercises of her foot would keep it mobile. She has fu with neurosurgery in a couple of weeks regarding next steps in addressing unidentifed lesion in her brain. Requested updated insurance letter which I completed this visit    Review of Systems   Constitutional: Negative for chills and fever.   Eyes: Negative.    Cardiovascular: Negative.    Gastrointestinal: Negative.    Genitourinary: Negative.    Musculoskeletal: Negative for falls.   Neurological: Positive for seizures.   Psychiatric/Behavioral: Negative for hallucinations and substance abuse.         Allergies   Allergen Reactions   • Bactrim      Fatigue and ringing of the ears       Current medicines (including changes today)  Current Outpatient Medications   Medication Sig Dispense Refill   • lamoTRIgine (LAMICTAL) 25 MG Tab Take 4 Tablets by mouth 2 times a day for 30 days. (Patient taking differently: Take 50 mg by mouth 2 times a day. 50mg in morning and 75 at night) 240 tablet 0   • Desvenlafaxine Succinate ER 25 MG TABLET SR 24 HR Take 25 mg by mouth every day. 30 Tab 4   • vitamin D (CHOLECALCIFEROL) 1000 Unit (25 mcg) Tab Take 1,000 Units by  mouth every day.     • Biotin 10 MG Cap Take 1 Cap by mouth every day.     • acetaminophen (TYLENOL) 500 MG Tab Take 1,000 mg by mouth every 6 hours as needed.     • multivitamin (THERAGRAN) Tab Take 1 Tab by mouth every day.     • Calcium 200 MG Tab Take 1 Tab by mouth every day.     • Tretinoin (ALTRENO) 0.05 % Lotion 1 Application by Apply externally route every bedtime. 1 Tube 3     No current facility-administered medications for this visit.       Patient Active Problem List    Diagnosis Date Noted   • Complicated migraine 06/09/2014     Priority: Medium   • Lentigo 10/17/2018   • Seborrheic keratoses 10/17/2018   • Dermatitis, contact 11/16/2015   • Hyperlipidemia 01/23/2015   • Menopausal symptom 07/02/2014   • Fatigue 06/09/2014   • Mixed anxiety and depressive disorder 06/09/2014   • TMJ arthralgia 06/09/2014       Family History   Problem Relation Age of Onset   • Non-contributory Mother    • Lung Disease Mother         COPD   • Cancer Mother         dysplasia    • Arthritis Mother    • Psychiatric Illness Mother    • Heart Disease Mother    • Hypertension Mother    • Stroke Mother    • Non-contributory Father    • Cancer Father 73        prostate cancer   • Lung Disease Maternal Grandmother    • Lung Disease Paternal Aunt    • Diabetes Paternal Aunt    • Hyperlipidemia Paternal Aunt        She  has a past medical history of Anxiety, Complicated migraine (6/9/2014), Depression, Dermatitis, contact (11/16/2015), Fatigue (6/9/2014), Hyperlipidemia (1/23/2015), Lentigo (10/17/2018), Menopausal symptom (7/2/2014), Mixed anxiety and depressive disorder (6/9/2014), Seborrheic keratoses (10/17/2018), 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  She  has a past surgical history that includes cystoscopy (10/15/08); laparoscopy (5/28/2010); lymph node sampling (5/28/2010); debulking (5/28/2010); appendectomy (1981); other (1984); vaginal hysterectomy scope total (10/15/08); myringotomy  (8/27/2010); and tonsillectomy and adenoidectomy.       Objective:   There were no vitals taken for this visit.    Physical Exam   Constitutional: She is well-developed, well-nourished, and in no distress. No distress.   HENT:   Head: Normocephalic and atraumatic.   Mouth/Throat: No oropharyngeal exudate.   Eyes: Conjunctivae and EOM are normal.   Musculoskeletal:      Cervical back: Normal range of motion.   Skin: She is not diaphoretic.       Assessment and Plan:   The following treatment plan was discussed:     1. 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)   -continue with 50/75 of Lamictal. Increase by 25 mg every 1-2 weeks as needed to optimize seizure control  2. Abnormal brain MRI  3. Brain lesion  4. Demyelinating disease of central nervous system, unspecified (HCC)   -pt to fu with Dr. ARNOLD and neurosurgery      5. Mixed anxiety and depressive disorder   -c/w antidepressant      Follow-up: f/u 6 weeks             Fady Davidson MD  Epilepsy and General Neurology  Department of Neurology  Instructor of Clinical Neurology [facility].       BILLING DOCUMENTATION:       Counseling:  I spent a total of 20 minutes of face-to-face time in this visit. Over 50% of the time of the visit today was spent on counseling and or coordination of care wtih the patient and/or family, as above in assessment in plan.

## 2021-02-26 NOTE — LETTER
February 26, 2021       Patient: Melba Frye   YOB: 1970   Date of Visit: 2/26/2021         To Whom It May Concern:    This is to inform you that my patient, Melba Frye, is under my care for a neurological disorder. There is a reasonable expectation that she will return to work by April 15, 2021.    If you have any questions or concerns, please don't hesitate to call [phone]          Sincerely,          Fady Davidson MD  Epilepsy and General Neurology  Department of Neurology  Instructor of Clinical Neurology [facility].

## 2021-03-02 ENCOUNTER — APPOINTMENT (OUTPATIENT)
Dept: RADIOLOGY | Facility: MEDICAL CENTER | Age: 51
End: 2021-03-02
Attending: EMERGENCY MEDICINE
Payer: COMMERCIAL

## 2021-03-02 ENCOUNTER — HOSPITAL ENCOUNTER (EMERGENCY)
Facility: MEDICAL CENTER | Age: 51
End: 2021-03-02
Attending: EMERGENCY MEDICINE
Payer: COMMERCIAL

## 2021-03-02 ENCOUNTER — HOSPITAL ENCOUNTER (INPATIENT)
Facility: MEDICAL CENTER | Age: 51
LOS: 13 days | DRG: 025 | End: 2021-03-15
Attending: STUDENT IN AN ORGANIZED HEALTH CARE EDUCATION/TRAINING PROGRAM | Admitting: INTERNAL MEDICINE
Payer: COMMERCIAL

## 2021-03-02 VITALS
OXYGEN SATURATION: 94 % | SYSTOLIC BLOOD PRESSURE: 150 MMHG | HEIGHT: 67 IN | RESPIRATION RATE: 21 BRPM | WEIGHT: 218.03 LBS | DIASTOLIC BLOOD PRESSURE: 90 MMHG | HEART RATE: 73 BPM | TEMPERATURE: 98.9 F | BODY MASS INDEX: 34.22 KG/M2

## 2021-03-02 DIAGNOSIS — G93.89 RIGHT FRONTAL LOBE MASS: ICD-10-CM

## 2021-03-02 DIAGNOSIS — G40.109 SIMPLE PARTIAL SEIZURES (HCC): ICD-10-CM

## 2021-03-02 DIAGNOSIS — F41.8 MIXED ANXIETY AND DEPRESSIVE DISORDER: ICD-10-CM

## 2021-03-02 DIAGNOSIS — D49.6 NEOPLASM OF BRAIN CAUSING MASS EFFECT ON ADJACENT STRUCTURES (HCC): ICD-10-CM

## 2021-03-02 DIAGNOSIS — G40.109 LOCALIZATION-RELATED FOCAL EPILEPSY WITH SIMPLE PARTIAL SEIZURES (HCC): ICD-10-CM

## 2021-03-02 PROBLEM — G37.9 DEMYELINATING DISEASE OF CENTRAL NERVOUS SYSTEM, UNSPECIFIED (HCC): Status: ACTIVE | Noted: 2021-03-02

## 2021-03-02 PROBLEM — R51.9 HEADACHE: Status: ACTIVE | Noted: 2021-03-02

## 2021-03-02 LAB
ANION GAP SERPL CALC-SCNC: 11 MMOL/L (ref 7–16)
BASOPHILS # BLD AUTO: 0.5 % (ref 0–1.8)
BASOPHILS # BLD: 0.03 K/UL (ref 0–0.12)
BUN SERPL-MCNC: 11 MG/DL (ref 8–22)
CALCIUM SERPL-MCNC: 9.4 MG/DL (ref 8.4–10.2)
CHLORIDE SERPL-SCNC: 104 MMOL/L (ref 96–112)
CO2 SERPL-SCNC: 25 MMOL/L (ref 20–33)
CREAT SERPL-MCNC: 0.65 MG/DL (ref 0.5–1.4)
EOSINOPHIL # BLD AUTO: 0.07 K/UL (ref 0–0.51)
EOSINOPHIL NFR BLD: 1.2 % (ref 0–6.9)
ERYTHROCYTE [DISTWIDTH] IN BLOOD BY AUTOMATED COUNT: 43.1 FL (ref 35.9–50)
GLUCOSE SERPL-MCNC: 116 MG/DL (ref 65–99)
HCT VFR BLD AUTO: 43 % (ref 37–47)
HGB BLD-MCNC: 14.5 G/DL (ref 12–16)
IMM GRANULOCYTES # BLD AUTO: 0.03 K/UL (ref 0–0.11)
IMM GRANULOCYTES NFR BLD AUTO: 0.5 % (ref 0–0.9)
LYMPHOCYTES # BLD AUTO: 1.74 K/UL (ref 1–4.8)
LYMPHOCYTES NFR BLD: 28.8 % (ref 22–41)
MCH RBC QN AUTO: 31.7 PG (ref 27–33)
MCHC RBC AUTO-ENTMCNC: 33.7 G/DL (ref 33.6–35)
MCV RBC AUTO: 93.9 FL (ref 81.4–97.8)
MONOCYTES # BLD AUTO: 0.5 K/UL (ref 0–0.85)
MONOCYTES NFR BLD AUTO: 8.3 % (ref 0–13.4)
NEUTROPHILS # BLD AUTO: 3.68 K/UL (ref 2–7.15)
NEUTROPHILS NFR BLD: 60.7 % (ref 44–72)
NRBC # BLD AUTO: 0 K/UL
NRBC BLD-RTO: 0 /100 WBC
PLATELET # BLD AUTO: 226 K/UL (ref 164–446)
PMV BLD AUTO: 10.1 FL (ref 9–12.9)
POTASSIUM SERPL-SCNC: 3.9 MMOL/L (ref 3.6–5.5)
RBC # BLD AUTO: 4.58 M/UL (ref 4.2–5.4)
SARS-COV-2 RNA RESP QL NAA+PROBE: NOTDETECTED
SODIUM SERPL-SCNC: 140 MMOL/L (ref 135–145)
SPECIMEN SOURCE: NORMAL
WBC # BLD AUTO: 6.1 K/UL (ref 4.8–10.8)

## 2021-03-02 PROCEDURE — 36415 COLL VENOUS BLD VENIPUNCTURE: CPT

## 2021-03-02 PROCEDURE — 85025 COMPLETE CBC W/AUTO DIFF WBC: CPT

## 2021-03-02 PROCEDURE — U0005 INFEC AGEN DETEC AMPLI PROBE: HCPCS

## 2021-03-02 PROCEDURE — U0003 INFECTIOUS AGENT DETECTION BY NUCLEIC ACID (DNA OR RNA); SEVERE ACUTE RESPIRATORY SYNDROME CORONAVIRUS 2 (SARS-COV-2) (CORONAVIRUS DISEASE [COVID-19]), AMPLIFIED PROBE TECHNIQUE, MAKING USE OF HIGH THROUGHPUT TECHNOLOGIES AS DESCRIBED BY CMS-2020-01-R: HCPCS

## 2021-03-02 PROCEDURE — 99285 EMERGENCY DEPT VISIT HI MDM: CPT

## 2021-03-02 PROCEDURE — 770006 HCHG ROOM/CARE - MED/SURG/GYN SEMI*

## 2021-03-02 PROCEDURE — 700111 HCHG RX REV CODE 636 W/ 250 OVERRIDE (IP): Performed by: EMERGENCY MEDICINE

## 2021-03-02 PROCEDURE — 96374 THER/PROPH/DIAG INJ IV PUSH: CPT

## 2021-03-02 PROCEDURE — 70450 CT HEAD/BRAIN W/O DYE: CPT

## 2021-03-02 PROCEDURE — 94760 N-INVAS EAR/PLS OXIMETRY 1: CPT

## 2021-03-02 PROCEDURE — 99223 1ST HOSP IP/OBS HIGH 75: CPT | Mod: GC | Performed by: INTERNAL MEDICINE

## 2021-03-02 PROCEDURE — 80048 BASIC METABOLIC PNL TOTAL CA: CPT

## 2021-03-02 PROCEDURE — A9270 NON-COVERED ITEM OR SERVICE: HCPCS | Performed by: GENERAL PRACTICE

## 2021-03-02 PROCEDURE — 700102 HCHG RX REV CODE 250 W/ 637 OVERRIDE(OP): Performed by: GENERAL PRACTICE

## 2021-03-02 RX ORDER — DEXAMETHASONE 4 MG/1
4 TABLET ORAL EVERY 6 HOURS
Status: DISCONTINUED | OUTPATIENT
Start: 2021-03-02 | End: 2021-03-10

## 2021-03-02 RX ORDER — LAMOTRIGINE 100 MG/1
50-75 TABLET ORAL 2 TIMES DAILY
Status: DISCONTINUED | OUTPATIENT
Start: 2021-03-02 | End: 2021-03-02

## 2021-03-02 RX ORDER — VENLAFAXINE 25 MG/1
12.5 TABLET ORAL 2 TIMES DAILY
Status: DISCONTINUED | OUTPATIENT
Start: 2021-03-02 | End: 2021-03-15 | Stop reason: HOSPADM

## 2021-03-02 RX ORDER — ACETAMINOPHEN 325 MG/1
650 TABLET ORAL EVERY 6 HOURS PRN
Status: ACTIVE | OUTPATIENT
Start: 2021-03-02 | End: 2021-03-02

## 2021-03-02 RX ORDER — ACETAMINOPHEN 325 MG/1
650 TABLET ORAL EVERY 6 HOURS PRN
Status: DISCONTINUED | OUTPATIENT
Start: 2021-03-02 | End: 2021-03-15 | Stop reason: HOSPADM

## 2021-03-02 RX ORDER — BISACODYL 10 MG
10 SUPPOSITORY, RECTAL RECTAL
Status: DISCONTINUED | OUTPATIENT
Start: 2021-03-02 | End: 2021-03-10

## 2021-03-02 RX ORDER — DESVENLAFAXINE 25 MG/1
25 TABLET, EXTENDED RELEASE ORAL DAILY
Status: DISCONTINUED | OUTPATIENT
Start: 2021-03-02 | End: 2021-03-02

## 2021-03-02 RX ORDER — LAMOTRIGINE 100 MG/1
75 TABLET ORAL 2 TIMES DAILY
Status: DISCONTINUED | OUTPATIENT
Start: 2021-03-02 | End: 2021-03-08

## 2021-03-02 RX ORDER — LORAZEPAM 2 MG/ML
1 INJECTION INTRAMUSCULAR ONCE
Status: COMPLETED | OUTPATIENT
Start: 2021-03-02 | End: 2021-03-02

## 2021-03-02 RX ORDER — AMOXICILLIN 250 MG
2 CAPSULE ORAL 2 TIMES DAILY
Status: DISCONTINUED | OUTPATIENT
Start: 2021-03-02 | End: 2021-03-10

## 2021-03-02 RX ORDER — ONDANSETRON 2 MG/ML
4 INJECTION INTRAMUSCULAR; INTRAVENOUS EVERY 4 HOURS PRN
Status: CANCELLED | OUTPATIENT
Start: 2021-03-02 | End: 2021-03-02

## 2021-03-02 RX ORDER — POLYETHYLENE GLYCOL 3350 17 G/17G
1 POWDER, FOR SOLUTION ORAL
Status: DISCONTINUED | OUTPATIENT
Start: 2021-03-02 | End: 2021-03-10

## 2021-03-02 RX ORDER — ONDANSETRON 2 MG/ML
4 INJECTION INTRAMUSCULAR; INTRAVENOUS EVERY 4 HOURS PRN
Status: ACTIVE | OUTPATIENT
Start: 2021-03-02 | End: 2021-03-02

## 2021-03-02 RX ORDER — ACETAMINOPHEN 325 MG/1
650 TABLET ORAL EVERY 6 HOURS PRN
Status: CANCELLED | OUTPATIENT
Start: 2021-03-02 | End: 2021-03-02

## 2021-03-02 RX ADMIN — ACETAMINOPHEN 650 MG: 325 TABLET, FILM COATED ORAL at 18:00

## 2021-03-02 RX ADMIN — DEXAMETHASONE 4 MG: 4 TABLET ORAL at 18:32

## 2021-03-02 RX ADMIN — LORAZEPAM 1 MG: 2 INJECTION INTRAMUSCULAR; INTRAVENOUS at 11:04

## 2021-03-02 RX ADMIN — DEXAMETHASONE 4 MG: 4 TABLET ORAL at 23:46

## 2021-03-02 RX ADMIN — LAMOTRIGINE 75 MG: 100 TABLET ORAL at 18:00

## 2021-03-02 ASSESSMENT — ENCOUNTER SYMPTOMS
ABDOMINAL PAIN: 0
MYALGIAS: 0
CARDIOVASCULAR NEGATIVE: 1
DOUBLE VISION: 0
BLURRED VISION: 1
LOSS OF CONSCIOUSNESS: 0
EYE PAIN: 0
CONSTIPATION: 0
CHILLS: 0
DIZZINESS: 1
SHORTNESS OF BREATH: 0
WEIGHT LOSS: 0
FEVER: 0
SEIZURES: 1
TREMORS: 0
VOMITING: 0
CONSTITUTIONAL NEGATIVE: 1
RESPIRATORY NEGATIVE: 1
GASTROINTESTINAL NEGATIVE: 1
NERVOUS/ANXIOUS: 0
DEPRESSION: 0
BLOOD IN STOOL: 0
DIARRHEA: 0
SORE THROAT: 0
SENSORY CHANGE: 1
PSYCHIATRIC NEGATIVE: 1
PALPITATIONS: 0
MUSCULOSKELETAL NEGATIVE: 1
SPEECH CHANGE: 0
TINGLING: 1
COUGH: 0
WEAKNESS: 1
HEADACHES: 1
BACK PAIN: 0

## 2021-03-02 ASSESSMENT — FIBROSIS 4 INDEX
FIB4 SCORE: 0.84
FIB4 SCORE: 0.96

## 2021-03-02 ASSESSMENT — PAIN DESCRIPTION - PAIN TYPE
TYPE: ACUTE PAIN
TYPE: ACUTE PAIN

## 2021-03-02 NOTE — ED TRIAGE NOTES
Chief Complaint   Patient presents with   • Seizure   • Weakness     left arm weakness adn left leg weakness      pt states she had a seizure this AM, hx of seizure, lasted about 45 seconds. Pt on Lamictal. Pt also complains of left arm and left leg weakness that have gotten progressively worse since last October.

## 2021-03-02 NOTE — PROGRESS NOTES
[facility]PITAL[facility] TRIAGE OFFICER DIRECT ADMISSION REPORT  Transferring facility: [facility]  Transferring physician: Dr Anaya  Chief complaint: Weakness, Seizure  Pertinent history & patient course: 50F with known frontal lobe mass and seizures being worked up. Presented to ED with weakness and seizure disorder. Repeat CT showing enlarging mass with vasogenic edema. ED requesting transfer for further workup and higher level of care.  Pertinent imaging & lab results: CT scan with enlarging frontal mass  Code Status: Full per transferring provider, I personally verified with the transferring provider patient's code status and the transferring provider has confirmed this with the patient.  Further work up or recommendations per triage officer prior to transfer: None  Consultants called prior to transfer and pertinent input from consultants: None  Patient accepted for transfer: Yes  Consultants to be called upon arrival: Call Neurology and Neurosurgery  Admission status: Inpatient.   Floor requested: Neuro  If ICU transfer, name of intensivist case discussed with and pertinent input from critical care: n/a    Please inform the triage officer upon arrival of the patient to [facility] for assignment of a hospitalist to perform admission.     For any question or concerns regarding the care of this patient, please reach out to the assigned hospitalist.

## 2021-03-02 NOTE — PROGRESS NOTES
Med rec complete per med rec tech at [facility] prior to transfer to [facility]    ED Note from tech:  Med rec updated and complete  Allergies reviewed  Interviewed pt with  at bedside with permission from pt.  Pt reports no antibiotics in the last 2 weeks

## 2021-03-02 NOTE — ED NOTES
Med rec updated and complete  Allergies reviewed  Interviewed pt with  at bedside with permission from pt.  Pt reports no antibiotics in the last 2 weeks

## 2021-03-02 NOTE — ED NOTES
Assumed care of pt-spouse at bedside. Aware of pending transfer to [facility]. Remains npo, denies needs at this time

## 2021-03-02 NOTE — ED PROVIDER NOTES
ED Provider Note    CHIEF COMPLAINT  Chief Complaint   Patient presents with   • Seizure   • Weakness     left arm weakness adn left leg weakness       HPI  Melba Frye is a 50 y.o. female who presents to the emergency department with a seizure.  Sounds as though the patient had a simple partial seizure involving the left upper extremity left lower extremity this morning.  She is currently maintained on Lamictal for seizures.  Today she has had some worsening weakness of left upper extremity and left lower extremity.  She had a 45-second seizure that was in the left upper extremity left leg and left groin.  Spontaneously resolved after about 45 seconds.  Because of this she contacted her neurologist who recommend that she come to the emergency department for evaluation.  No significant headache at this time.  No fevers.    REVIEW OF SYSTEMS  See HPI for further details. All other systems are negative.     PAST MEDICAL HISTORY  Past Medical History:   Diagnosis Date   • Anxiety    • Complicated migraine 6/9/2014   • Depression    • Dermatitis, contact 11/16/2015   • Fatigue 6/9/2014   • Hyperlipidemia 1/23/2015   • Lentigo 10/17/2018   • Menopausal symptom 7/2/2014   • Mixed anxiety and depressive disorder 6/9/2014   • Seborrheic keratoses 10/17/2018   • TMJ arthralgia 6/9/2014   • UTI (lower urinary tract infection)        FAMILY HISTORY  Family History   Problem Relation Age of Onset   • Non-contributory Mother    • Lung Disease Mother         COPD   • Cancer Mother         dysplasia    • Arthritis Mother    • Psychiatric Illness Mother    • Heart Disease Mother    • Hypertension Mother    • Stroke Mother    • Non-contributory Father    • Cancer Father 73        prostate cancer   • Lung Disease Maternal Grandmother    • Lung Disease Paternal Aunt    • Diabetes Paternal Aunt    • Hyperlipidemia Paternal Aunt        SOCIAL HISTORY  Social History     Socioeconomic History   • Marital status:       Spouse name: Not on file   • Number of children: Not on file   • Years of education: Not on file   • Highest education level: Not on file   Occupational History   • Not on file   Tobacco Use   • Smoking status: Never Smoker   • Smokeless tobacco: Never Used   Substance and Sexual Activity   • Alcohol use: Not Currently     Alcohol/week: 0.0 oz   • Drug use: No   • Sexual activity: Yes     Partners: Male   Other Topics Concern   • Not on file   Social History Narrative   • Not on file     Social Determinants of Health     Financial Resource Strain:    • Difficulty of Paying Living Expenses:    Food Insecurity:    • Worried About Running Out of Food in the Last Year:    • Ran Out of Food in the Last Year:    Transportation Needs:    • Lack of Transportation (Medical):    • Lack of Transportation (Non-Medical):    Physical Activity:    • Days of Exercise per Week:    • Minutes of Exercise per Session:    Stress:    • Feeling of Stress :    Social Connections:    • Frequency of Communication with Friends and Family:    • Frequency of Social Gatherings with Friends and Family:    • Attends Gnosticist Services:    • Active Member of Clubs or Organizations:    • Attends Club or Organization Meetings:    • Marital Status:    Intimate Partner Violence:    • Fear of Current or Ex-Partner:    • Emotionally Abused:    • Physically Abused:    • Sexually Abused:        SURGICAL HISTORY  Past Surgical History:   Procedure Laterality Date   • MYRINGOTOMY  8/27/2010    Performed by [address] at SURGERY SAME DAY [facility] ORS   • LAPAROSCOPY  5/28/2010    Performed by JACKI SHARMA at SURGERY [facility] ORS   • LYMPH NODE SAMPLING  5/28/2010    Performed by JACKI SHARMA at SURGERY [facility] ORS   • DEBULKING  5/28/2010    Performed by JACKI SHARMA at SURGERY [facility] ORS   • CYSTOSCOPY  10/15/08    Performed by YU GODINEZ at SURGERY SAME DAY [facility] ORS   • VAGINAL HYSTERECTOMY SCOPE TOTAL  10/15/08    Performed by  "YU GODINEZ at SURGERY SAME DAY [facility] ORS   • OTHER  1984    TONSILECTOMY/ ADNOIDS   • APPENDECTOMY  1981   • TONSILLECTOMY AND ADENOIDECTOMY         CURRENT MEDICATIONS  Home Medications    **Home medications have not yet been reviewed for this encounter**         ALLERGIES  Allergies   Allergen Reactions   • Bactrim      Fatigue and ringing of the ears       PHYSICAL EXAM  VITAL SIGNS: BP (!) 190/95   Pulse 85   Temp 37.2 °C (98.9 °F) (Temporal)   Resp 16   Ht 1.702 m (5' 7\")   Wt 98.9 kg (218 lb 0.6 oz)   SpO2 94%   BMI 34.15 kg/m²   Constitutional: Well developed, Well nourished, somewhat anxious, non-toxic appearance.   HENT: Normocephalic, atraumatic, oropharynx is moist.  Eyes: He was are equal round reactive to light.  Extraocular movements are intact.  Neck: Normal range of motion, No tenderness, Supple, No stridor.   Cardiovascular: Normal heart rate, Normal rhythm, No murmurs, No rubs, No gallops.   Thorax & Lungs: Normal breath sounds, No respiratory distress, No wheezing, No chest tenderness.   Abdomen: Bowel sounds normal, Soft, No tenderness, No masses, No pulsatile masses.   Skin: Warm, Dry, No erythema, No rash.   Back: No tenderness, No CVA tenderness.   Extremities: Intact distal pulses, No edema, No tenderness, No cyanosis, No clubbing.   Neurologic: 4 out of 5 strength in the left upper extremity left lower extremity.  Normal speech and language.  Normal strength in the right upper extremity right lower extremity.  Psychiatric: Affect normal, Judgment normal, Mood normal.     EKG      RADIOLOGY/PROCEDURES  CT-HEAD W/O   Final Result      3 cm mass within the high right frontal lobe in a parasagittal location. There is extensive vasogenic edema involving the right cerebral hemisphere with resultant mass effect on the right lateral ventricle and 7 mm of leftward midline shift. Differential    diagnosis includes brain tumor as well as abscess. The amount of mass effect and midline " shift has increased somewhat from brain MRI.      This was discussed with INES MONTESINOS at 10:45 AM on 3/12/2021.        Results for orders placed or performed during the hospital encounter of 03/02/21   CBC WITH DIFFERENTIAL   Result Value Ref Range    WBC 6.1 4.8 - 10.8 K/uL    RBC 4.58 4.20 - 5.40 M/uL    Hemoglobin 14.5 12.0 - 16.0 g/dL    Hematocrit 43.0 37.0 - 47.0 %    MCV 93.9 81.4 - 97.8 fL    MCH 31.7 27.0 - 33.0 pg    MCHC 33.7 33.6 - 35.0 g/dL    RDW 43.1 35.9 - 50.0 fL    Platelet Count 226 164 - 446 K/uL    MPV 10.1 9.0 - 12.9 fL    Neutrophils-Polys 60.70 44.00 - 72.00 %    Lymphocytes 28.80 22.00 - 41.00 %    Monocytes 8.30 0.00 - 13.40 %    Eosinophils 1.20 0.00 - 6.90 %    Basophils 0.50 0.00 - 1.80 %    Immature Granulocytes 0.50 0.00 - 0.90 %    Nucleated RBC 0.00 /100 WBC    Neutrophils (Absolute) 3.68 2.00 - 7.15 K/uL    Lymphs (Absolute) 1.74 1.00 - 4.80 K/uL    Monos (Absolute) 0.50 0.00 - 0.85 K/uL    Eos (Absolute) 0.07 0.00 - 0.51 K/uL    Baso (Absolute) 0.03 0.00 - 0.12 K/uL    Immature Granulocytes (abs) 0.03 0.00 - 0.11 K/uL    NRBC (Absolute) 0.00 K/uL   BASIC METABOLIC PANEL   Result Value Ref Range    Sodium 140 135 - 145 mmol/L    Potassium 3.9 3.6 - 5.5 mmol/L    Chloride 104 96 - 112 mmol/L    Co2 25 20 - 33 mmol/L    Glucose 116 (H) 65 - 99 mg/dL    Bun 11 8 - 22 mg/dL    Creatinine 0.65 0.50 - 1.40 mg/dL    Calcium 9.4 8.4 - 10.2 mg/dL    Anion Gap 11.0 7.0 - 16.0   ESTIMATED GFR   Result Value Ref Range    GFR If African American >60 >60 mL/min/1.73 m 2    GFR If Non African American >60 >60 mL/min/1.73 m 2         COURSE & MEDICAL DECISION MAKING  Pertinent Labs & Imaging studies reviewed. (See chart for details)    Patient presents today with a simple partial seizure involving the left upper extremity left lower extremity.  I have reviewed the electronic medical record.  The patient recently had brain MRI that showed a brain mass with vasogenic edema.  She is currently  undergoing metastatic work-up.  She is not started treatment.    Repeat CT scan is obtained today.  Findings as above.  With increased mass-effect and vasogenic edema.    Feel the patient would benefit from transfer to [facility] for specialty care including neurosurgery and oncology.    I spoke with the on-call hospitalist Dr. Mix who accepts the patient for transfer.    Patient has done well in the emergency department.  No further seizures.  Vital signs stable.  Transferred in stable condition    FINAL IMPRESSION  1. Simple partial seizures (HCC)    2. 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  The critical care time associated with the care of the patient was 35 minutes. Review chart for interventions. This time is exclusive of any other billable procedures.            Electronically signed by: Cesar Anaya M.D., 3/2/2021 11:00 AM

## 2021-03-03 LAB
ANION GAP SERPL CALC-SCNC: 15 MMOL/L (ref 7–16)
BASOPHILS # BLD AUTO: 0.2 % (ref 0–1.8)
BASOPHILS # BLD: 0.01 K/UL (ref 0–0.12)
BUN SERPL-MCNC: 13 MG/DL (ref 8–22)
CALCIUM SERPL-MCNC: 9.9 MG/DL (ref 8.5–10.5)
CHLORIDE SERPL-SCNC: 102 MMOL/L (ref 96–112)
CO2 SERPL-SCNC: 20 MMOL/L (ref 20–33)
CREAT SERPL-MCNC: 0.62 MG/DL (ref 0.5–1.4)
EOSINOPHIL # BLD AUTO: 0 K/UL (ref 0–0.51)
EOSINOPHIL NFR BLD: 0 % (ref 0–6.9)
ERYTHROCYTE [DISTWIDTH] IN BLOOD BY AUTOMATED COUNT: 44.5 FL (ref 35.9–50)
GLUCOSE SERPL-MCNC: 155 MG/DL (ref 65–99)
HCT VFR BLD AUTO: 45.1 % (ref 37–47)
HGB BLD-MCNC: 15.5 G/DL (ref 12–16)
IMM GRANULOCYTES # BLD AUTO: 0.02 K/UL (ref 0–0.11)
IMM GRANULOCYTES NFR BLD AUTO: 0.3 % (ref 0–0.9)
LYMPHOCYTES # BLD AUTO: 0.86 K/UL (ref 1–4.8)
LYMPHOCYTES NFR BLD: 13.4 % (ref 22–41)
MCH RBC QN AUTO: 33 PG (ref 27–33)
MCHC RBC AUTO-ENTMCNC: 34.4 G/DL (ref 33.6–35)
MCV RBC AUTO: 96 FL (ref 81.4–97.8)
MONOCYTES # BLD AUTO: 0.09 K/UL (ref 0–0.85)
MONOCYTES NFR BLD AUTO: 1.4 % (ref 0–13.4)
NEUTROPHILS # BLD AUTO: 5.43 K/UL (ref 2–7.15)
NEUTROPHILS NFR BLD: 84.7 % (ref 44–72)
NRBC # BLD AUTO: 0 K/UL
NRBC BLD-RTO: 0 /100 WBC
PLATELET # BLD AUTO: 242 K/UL (ref 164–446)
PMV BLD AUTO: 10.1 FL (ref 9–12.9)
POTASSIUM SERPL-SCNC: 4.6 MMOL/L (ref 3.6–5.5)
RBC # BLD AUTO: 4.7 M/UL (ref 4.2–5.4)
SODIUM SERPL-SCNC: 137 MMOL/L (ref 135–145)
WBC # BLD AUTO: 6.4 K/UL (ref 4.8–10.8)

## 2021-03-03 PROCEDURE — 99233 SBSQ HOSP IP/OBS HIGH 50: CPT | Performed by: STUDENT IN AN ORGANIZED HEALTH CARE EDUCATION/TRAINING PROGRAM

## 2021-03-03 PROCEDURE — 85025 COMPLETE CBC W/AUTO DIFF WBC: CPT

## 2021-03-03 PROCEDURE — 80048 BASIC METABOLIC PNL TOTAL CA: CPT

## 2021-03-03 PROCEDURE — 36415 COLL VENOUS BLD VENIPUNCTURE: CPT

## 2021-03-03 PROCEDURE — 770006 HCHG ROOM/CARE - MED/SURG/GYN SEMI*

## 2021-03-03 PROCEDURE — 700102 HCHG RX REV CODE 250 W/ 637 OVERRIDE(OP): Performed by: GENERAL PRACTICE

## 2021-03-03 PROCEDURE — A9270 NON-COVERED ITEM OR SERVICE: HCPCS | Performed by: GENERAL PRACTICE

## 2021-03-03 PROCEDURE — 700111 HCHG RX REV CODE 636 W/ 250 OVERRIDE (IP): Performed by: STUDENT IN AN ORGANIZED HEALTH CARE EDUCATION/TRAINING PROGRAM

## 2021-03-03 RX ORDER — LORAZEPAM 2 MG/ML
0.5 INJECTION INTRAMUSCULAR EVERY 4 HOURS PRN
Status: DISCONTINUED | OUTPATIENT
Start: 2021-03-03 | End: 2021-03-10

## 2021-03-03 RX ADMIN — VENLAFAXINE 12.5 MG: 25 TABLET ORAL at 17:31

## 2021-03-03 RX ADMIN — ACETAMINOPHEN 650 MG: 325 TABLET, FILM COATED ORAL at 05:02

## 2021-03-03 RX ADMIN — DEXAMETHASONE 4 MG: 4 TABLET ORAL at 05:03

## 2021-03-03 RX ADMIN — VENLAFAXINE 12.5 MG: 25 TABLET ORAL at 05:03

## 2021-03-03 RX ADMIN — LAMOTRIGINE 75 MG: 100 TABLET ORAL at 05:03

## 2021-03-03 RX ADMIN — LORAZEPAM 0.5 MG: 2 INJECTION INTRAMUSCULAR; INTRAVENOUS at 09:05

## 2021-03-03 RX ADMIN — ACETAMINOPHEN 650 MG: 325 TABLET, FILM COATED ORAL at 17:40

## 2021-03-03 RX ADMIN — LAMOTRIGINE 75 MG: 100 TABLET ORAL at 18:00

## 2021-03-03 ASSESSMENT — COGNITIVE AND FUNCTIONAL STATUS - GENERAL
SUGGESTED CMS G CODE MODIFIER DAILY ACTIVITY: CH
WALKING IN HOSPITAL ROOM: A LITTLE
SUGGESTED CMS G CODE MODIFIER MOBILITY: CJ
CLIMB 3 TO 5 STEPS WITH RAILING: A LITTLE
MOBILITY SCORE: 22
DAILY ACTIVITIY SCORE: 24

## 2021-03-03 ASSESSMENT — ENCOUNTER SYMPTOMS
EYES NEGATIVE: 1
GASTROINTESTINAL NEGATIVE: 1
CONSTITUTIONAL NEGATIVE: 1
MUSCULOSKELETAL NEGATIVE: 1
PSYCHIATRIC NEGATIVE: 1
NEUROLOGICAL NEGATIVE: 1
RESPIRATORY NEGATIVE: 1
CARDIOVASCULAR NEGATIVE: 1

## 2021-03-03 ASSESSMENT — PAIN DESCRIPTION - PAIN TYPE: TYPE: ACUTE PAIN

## 2021-03-03 NOTE — CONSULTS
DATE OF SERVICE:  03/03/2021     NEUROSURGERY CONSULTATION NOTE     HISTORY OF PRESENT ILLNESS:  This is a woman born in 1970 who presents to the   hospital with a concern for seizure.  She had a chest, abdomen and pelvis as   well as an MRI of the brain and was due to be seen in my office as an   outpatient.  Subsequently, due to the seizures, presented to the hospital and   she has also been having some hemiparesis on the left side for the last   several days.  MRI with and without contrast shows a rim enhancing shaggy   bordered lesion in the right parietal region adjacent to or within the motor   strip consistent with the patient's symptoms.  She did not have any findings   per report on the chest, abdomen and pelvis, making this either most likely   lymphoma versus glioblastoma based on its appearance.  The patient, aside from   that, is doing well.  She does have some paresthesias in the left side, some   proprioceptive deficits in the left side, but is otherwise doing well.     REVIEW OF SYSTEMS:  A 12-point review of systems is positive for seizures,   left hemiparesis and apraxia and aside from that, she denies any other   positive findings.     PAST MEDICAL HISTORY:  Noncontributory.     PAST SURGICAL HISTORY:  Noncontributory.     MEDICATIONS:  Please see EMR.     PHYSICAL EXAMINATION:    GENERAL:  She is alert, oriented x3.  She is able to answer questions   appropriately.  No signs of dysarthria or aphasia.  HEENT:  Atraumatic, normocephalic.  PULMONARY:  Normal breathing.  CARDIOVASCULAR:  2+ pulses x4.  MOTOR EXAMINATION:  She has a slight weakness in the left .  Her left   upper extremity, otherwise is relatively intact relative to the right side.    She has proprioceptive deficits and also says she has some intermittent   sensory problems in the left upper.  Left lower extremity also has some   coordination issues as well as motor dysfunction.  She says it is weak and she   is walking with  difficulty at this time.     IMAGING:  She has an MRI of the brain which demonstrates a right parietal   lesion that is paramedian.  It appears to be intrinsic.  It has a rim   enhancement with a hypointense core. It is possible that this is consistent   with likely glioblastoma, less likely lymphoma, less likely metastases or   infection.  Chest, abdomen and pelvis showed no findings for metastases or   primary lesions.     ASSESSMENT AND PLAN:  At this time, we are working towards getting an   operating room available for the patient.  She will need to have an open   biopsy versus possible resection with phase reversal and Stealth navigation.    At this time based on her potential diagnosis for lymphoma, we would hold off   on steroids.  This is going to take at least a few days before we can get into   the OR.  Based on OR availability at this time, we will try to move her case   forward, but likely she will need surgery and the surgical availability at   this time is not available until Tuesday of next week.  If we can accommodate   the case earlier, we will do so.  However, based on possible diagnosis of   lymphoma, we would hold off on steroids for now.  The patient should be on   anti-seizure medication and she can have a formal diet.  She will also need   preoperative laboratory workup and as soon as we know a solid date for the   surgical intervention, we will order a Stealth MRI with contrast for   navigation and make the patient n.p.o. and notified both the primary team as   well as the patient and her .     Total 50 minutes were spent in direct patient care, coordination and   consultation.        ______________________________  MD CRISS Nguyễn/ANGI    DD:  03/03/2021 07:35  DT:  03/03/2021 08:07    Job#:  [id number]

## 2021-03-03 NOTE — SENIOR ADMIT NOTE
"                                                 Senior Admit Note     Mrs Frye is a 50 y.o. female with PMHx of migraines, depression, seizures (Lamictal since 12/2020 ) was transferred from [facility].  She presented to [facility] today morning having a seizure episode.  Patient's been describes that she had left for left upper and lower extremity shaking movements which spontaneously resolved within a minute.  Denies any loss of consciousness notes she did had post stroke confusion, and headache and blurred vision.  She is also complaining of some tingling and mild weakness in the left side no tongue biting or incontinence of urine or bowel.  Patient denies any recent changes in her medication except for increasing the dose Lamictal 75 mg twice daily starting tomorrow ( not yet taken higher dose).  Denies fever, chills, chest pain, shortness of breath,  no hematuria or hematochezia.  Denies any head trauma.  Patient states that she had previous EEG x 2 in 12/ 2020 and 02/2021 which were both unremarkable.  She has been following with Dr. Davidson, neurologist.  She had MRI of brain last month which did show T2 hyperintense intensity in cerebral white matter which was suspicious for either demyelination or nonspecific gliosis also had LPN last year December which showed oligoclonal bands.  Patient was supposed to follow-up with neurosurgery Dr. Maxwell Mao this week presented to hospital due to seizures.    Exam:  /93   Pulse 74   Temp 36.7 °C (98 °F) (Temporal)   Resp 17   Ht 1.7 m (5' 6.93\")   Wt 97.2 kg (214 lb 4.6 oz)   SpO2 100%   BMI 33.63 kg/m²     Physical exam:   General: Not in acute distress  HEENT: NC/AT. PERRLA, EOMI. moist mucous membranes. No lymphadenopathy.  CVS: RRR, S1S2 WNL, no MRG  RS: CTA, good air entry. No wheezes or ronchi.  Abdomen: Soft, NT/ND, BS +. No organomegaly.  No palpable axillary lymph nodes or breast mass.  Ext: No pedal " edema  Neuro: CN 2-12 wnl. 4/5 strength in left lower extremity. 5/5 in other extremities  Deep tendon reflexes 2+ except mild right patellar hyperreflexia 3+    Labs:  Recent Labs     03/02/21  1017   WBC 6.1   RBC 4.58   HEMOGLOBIN 14.5   HEMATOCRIT 43.0   MCV 93.9   MCH 31.7   RDW 43.1   PLATELETCT 226   MPV 10.1   NEUTSPOLYS 60.70   LYMPHOCYTES 28.80   MONOCYTES 8.30   EOSINOPHILS 1.20   BASOPHILS 0.50     Recent Labs     03/02/21  1017   SODIUM 140   POTASSIUM 3.9   CHLORIDE 104   CO2 25   GLUCOSE 116*   BUN 11     Recent Labs     03/02/21  1017   CREATININE 0.65         No orders to display       Assessment and Plan:    # Seizures -most likely simple partial seizures  # Right frontal lobe mass -unclear etiology - GBM versus gliosis versus lymphoma.  #Post seizure headache    Plan  Patient admitted to neurology floor  Every 4 neurochecks  Focal epilepsy with simple partial seizures likely due to right frontal lobe mass  We will continue Lamictal at 75 mg twice daily dose  CT head showed 3 cm right frontal mass with extensive surrounding edema and mild midline shift  Will start on Decadron 4 mg every 6 hours to decrease the surrounding edema  Consulted neurosurgery, discussed with Dr. Mao and recommended no further imaging studies at this point.   NS will follow the patient tomorrow morning  No pharmacological DVT prophylaxis place on SCDs  Seizure and fall precautions  Tylenol as needed for headaches. can consider migraine cocktail if Tylenol does not help      For further details , please refer to H&P by Dr. Nieves.

## 2021-03-03 NOTE — PROGRESS NOTES
Hospital Medicine Daily Progress Note    Date of Service  3/3/2021    Chief Complaint  50 y.o. female with PMHx of Migraines, Depression and Seizure Disorder (Lamictal since 12/20) admitted 3/2/2021 with Seizures    Hospital Course  Mrs Frye is a 50 y.o. female with PMHx of migraines, depression, seizures (Lamictal since 12/2020 ) was transferred from [facility].  She presented to [facility] today morning having a seizure episode.  Patient's been describes that she had left for left upper and lower extremity shaking movements which spontaneously resolved within a minute.  Denies any loss of consciousness notes she did had post stroke confusion, and headache and blurred vision.  She is also complaining of some tingling and mild weakness in the left side no tongue biting or incontinence of urine or bowel.  Patient denies any recent changes in her medication except for increasing the dose Lamictal 75 mg twice daily starting tomorrow ( not yet taken higher dose).  Denies fever, chills, chest pain, shortness of breath,  no hematuria or hematochezia.  Denies any head trauma.  Patient states that she had previous EEG x 2 in 12/ 2020 and 02/2021 which were both unremarkable.  She has been following with Dr. Davidson, neurologist.  She had MRI of brain last month which did show T2 hyperintense intensity in cerebral white matter which was suspicious for either demyelination or nonspecific gliosis also had LPN last year December which showed oligoclonal bands. The Patient Has an outpatient appointment with Dr. Maxwell Mao in two days for progressively enlarging right frontal mass. The patient had a CT head w/o contrast 3/2/21: 3 cm mass within the high right frontal lobe in a parasagittal location. There is extensive vasogenic edema involving the right cerebral hemisphere with resultant mass effect on the right lateral ventricle and 7 mm of leftward midline shift. The amount of mass effect  and midline shift has increased somewhat from brain MRI 2/12/21. The patient denies any breast changes, masses or abnormal mammograms. Denies rectal bleeding, melanotic stools, hematuria, vaginal bleeding.  Denies any history of cancer.       Interval Problem Update  Patient examined at bedside  In no acute distress  No overnight complaints   Follow up Neurosurgery Recommendations-Dr. Mao    As per Dr. Mao, hold off on Steroids     Consultants/Specialty  Neurology-Dr. Davidson  Neurosurgery-Dr. Mao    Code Status  Full Code    Disposition  TBD    Review of Systems  Review of Systems   Constitutional: Negative.    HENT: Negative.    Eyes: Negative.    Respiratory: Negative.    Cardiovascular: Negative.    Gastrointestinal: Negative.    Genitourinary: Negative.    Musculoskeletal: Negative.    Skin: Negative.    Neurological: Negative.    Endo/Heme/Allergies: Negative.    Psychiatric/Behavioral: Negative.         Physical Exam  Temp:  [36.3 °C (97.4 °F)-36.7 °C (98.1 °F)] 36.7 °C (98.1 °F)  Pulse:  [74-96] 80  Resp:  [16-18] 16  BP: (130-153)/(82-93) 134/83  SpO2:  [95 %-100 %] 96 %    Physical Exam  Vitals reviewed.   HENT:      Head: Normocephalic and atraumatic.      Right Ear: External ear normal.      Left Ear: External ear normal.      Nose: Nose normal.      Mouth/Throat:      Mouth: Mucous membranes are moist.   Eyes:      Pupils: Pupils are equal, round, and reactive to light.   Cardiovascular:      Rate and Rhythm: Normal rate and regular rhythm.      Pulses: Normal pulses.      Heart sounds: Normal heart sounds.   Pulmonary:      Effort: Pulmonary effort is normal.      Breath sounds: Normal breath sounds.   Abdominal:      General: Abdomen is flat.   Musculoskeletal:         General: Normal range of motion.      Cervical back: Neck supple.   Skin:     General: Skin is warm.      Capillary Refill: Capillary refill takes less than 2 seconds.   Neurological:      General: No focal deficit present.       Mental Status: She is alert.   Psychiatric:         Mood and Affect: Mood normal.         Fluids    Intake/Output Summary (Last 24 hours) at 3/3/2021 0654  Last data filed at 3/2/2021 2000  Gross per 24 hour   Intake 240 ml   Output --   Net 240 ml       Laboratory  Recent Labs     03/02/21  1017 03/03/21  0421   WBC 6.1 6.4   RBC 4.58 4.70   HEMOGLOBIN 14.5 15.5   HEMATOCRIT 43.0 45.1   MCV 93.9 96.0   MCH 31.7 33.0   MCHC 33.7 34.4   RDW 43.1 44.5   PLATELETCT 226 242   MPV 10.1 10.1     Recent Labs     03/02/21  1017 03/03/21  0421   SODIUM 140 137   POTASSIUM 3.9 4.6   CHLORIDE 104 102   CO2 25 20   GLUCOSE 116* 155*   BUN 11 13   CREATININE 0.65 0.62   CALCIUM 9.4 9.9                   Imaging  No orders to display        Assessment/Plan  * Right frontal lobe mass  Assessment & Plan  DDX: Primary vs. Metastatic tumor vs. Abscess less likely or lymphoma vs. Demyelinating pathology (MS)/immune-mediated process given the presence of oligoclonal bands on LP December 2020.  admit to inpatient neurosurgery; discussed with Dr. 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   -continue Lamictal, now 75mg BID  -seizure, aspiration, fall precautions    Headache  Assessment & Plan  -Tylenol PRN  -may escalate if needed    Hyperlipidemia- (present on admission)  Assessment & Plan   2019. 10 year ASCVD risk 1.4%. no statin indicated    Mixed anxiety and depressive disorder- (present on admission)  Assessment & Plan  -continue Desvenlafaxine          VTE prophylaxis: Lovenox

## 2021-03-03 NOTE — ASSESSMENT & PLAN NOTE
CT scan head shows right frontal mass 3 cm, MRI brain to 2/21 shows right frontal lobe lesion 3 x 2.4 x 1.1 cm increased in size from MRI 12/20/2020.  Status post craniotomy with biopsy on 3/9 by Dr. Mao and biopsy confirmed grade IV glioblastoma  Repeat MRI 3/11 shows postsurgical changes as evidenced by right frontoparietal craniectomy and biopsy of the right medial parietal enhancing lesion, 7 mm midline shift towards left side  Oncology and radiation oncology on board  PLAN:  On decadron with dosing and taper per neurosurgery  Keppra and Lamictal for seizure prophylaxis.  Follow oncology and radiation oncology recommendations - outpatient treatment  PT/OT eval- acute rehab  Hold anticoagulation, all NSAIDs, SCDs for DVT prophylaxis  Palliative following

## 2021-03-03 NOTE — CARE PLAN
Problem: Safety  Goal: Will remain free from falls  Outcome: PROGRESSING AS EXPECTED  Note: Treaded slipper socks on, bed in the lowest and locked position, educated on the need to call for assistance, call light within reach     Problem: Knowledge Deficit  Goal: Knowledge of disease process/condition, treatment plan, diagnostic tests, and medications will improve  Outcome: PROGRESSING AS EXPECTED  Note: Pt understands Dx, nursing interventions and POC, all questions asked, needs assessed, hourly rounding in place

## 2021-03-03 NOTE — ASSESSMENT & PLAN NOTE
Secondary to brain mass.  She is followed by Neurology on outpatient basis, started Lamictal in December  Had seizure 3/8/2021 and thus Lamictal dose was increased and Keppra added IV with transition to oral on 3/10  PLAN:  Continue Keppra and Lamictal for seizure prophylaxis, Ativan if seizures observed

## 2021-03-03 NOTE — H&P
History & Physical Note    Date of Admission: 3/2/2021  Admission Status: Inpatient  Attending: Luis Miguel Rendon MD  Contact Number: [phone]    Chief Complaint: seizure, weakness    History of Present Illness (HPI): 50 year old female with a history of seizures since December 2020 on Lamictal, Migraine headaches, Depression, HLD, presents for a seizure this morning at home that involved her left upper/lower extremity with spontaneous resolution in 45 seconds.Transferred from [facility]. No LOC. Has had confusion and headache since then. Denies tongue biting or urinary or bowel incontinence. The patient denies fever, chills, pain, shortness of breath. Patient reports she called Dr. Davidson this morning afterwards, and was told to come to the ED. Patient reports she is taking her Lamictal as prescribed.Last took it this morning.     The patient has had vertigo,left sided weakness/numbness, and left sided jerking since October 2020, that has progressed to seizures since December 2020. Had a negative EEG in December 2020 and a repeat February 2021 that were unremarkable. Followed by Neurology, Dr. Davidson, that has prescribed Lamictal now starting at 75mg BID tomorrow. Followed by Neurology, Dr. Calixto, an MS specialist given concern for demyelinating disease process from imaging. LP in December 2020 showed oligoclonal bands. MRI 2/12/21: Scattered mild nonspecific T2 hyperintensities elsewhere within the cerebral white matter again noted which could be related to demyelination or other nonspecific gliosis.  Has an outpatient appointment with Dr. Maxwell Mao in two days for progressively enlarging right frontal mass. The patient had a CT head w/o contrast 3/2/21: 3 cm mass within the high right frontal lobe in a parasagittal location. There is extensive vasogenic edema involving the right cerebral hemisphere with resultant mass effect on the right lateral ventricle and 7 mm of leftward midline  shift. The amount of mass effect and midline shift has increased somewhat from brain MRI 2/12/21. The patient denies any breast changes, masses or abnormal mammograms. Denies rectal bleeding, melanotic stools, hematuria, vaginal bleeding.  Denies a history of cancer.     Review of Systems: Review of Systems   Constitutional: Negative.  Negative for chills, fever, malaise/fatigue and weight loss.   HENT: Negative.  Negative for congestion and sore throat.    Eyes: Positive for blurred vision. Negative for double vision and pain.   Respiratory: Negative.  Negative for cough and shortness of breath.    Cardiovascular: Negative.  Negative for chest pain and palpitations.   Gastrointestinal: Negative.  Negative for abdominal pain, blood in stool, constipation, diarrhea, melena and vomiting.   Genitourinary: Negative.  Negative for dysuria and hematuria.   Musculoskeletal: Negative.  Negative for back pain, joint pain and myalgias.   Skin: Negative.  Negative for rash.   Neurological: Positive for dizziness, tingling, sensory change, seizures, weakness and headaches. Negative for tremors, speech change and loss of consciousness.   Psychiatric/Behavioral: Negative.  Negative for depression. The patient is not nervous/anxious.          Past Medical History:   Past Medical History was reviewed with patient.   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    Past Surgical History: Past Surgical History was reviewed with patient.   has a past surgical history that includes cystoscopy (10/15/08); laparoscopy (5/28/2010); lymph node sampling (5/28/2010); debulking (5/28/2010); appendectomy (1981); other  (); vaginal hysterectomy scope total (10/15/08); myringotomy (2010); and tonsillectomy and adenoidectomy.    Medications: Medications have been reviewed with patient.  Prior to Admission Medications   Prescriptions Last Dose Informant Patient Reported? Taking?   Desvenlafaxine Succinate ER 25 MG TABLET SR 24 HR 3/2/2021 at 0900 Patient No No   Sig: Take 25 mg by mouth every day.   Tretinoin (ALTRENO) 0.05 % Lotion Not Taking at Unknown time Patient No No   Si Application by Apply externally route every bedtime.   Patient not taking: Reported on 3/2/2021   VITAMIN D PO 3d ago at Unknown time Patient Yes No   Sig: Take 1 Units by mouth every evening.   lamoTRIgine (LAMICTAL) 25 MG Tab 3/2/2021 at 0900 Patient No No   Sig: Take 4 Tablets by mouth 2 times a day for 30 days.   Patient taking differently: Take 50-75 mg by mouth 2 times a day. 50mg in morning and 75 at night   multivitamin (THERAGRAN) Tab 3d ago at Unknown time Patient Yes No   Sig: Take 1 tablet by mouth every evening.      Facility-Administered Medications: None        Allergies: Allergies have been reviewed with patient.  Allergies   Allergen Reactions   • Bactrim      Fatigue, ringing of the ears, and headache        Family History: reviewed  family history includes Arthritis in her mother; Cancer in her mother; Cancer (age of onset: 73) in her father; Diabetes in her paternal aunt; Heart Disease in her mother; Hyperlipidemia in her paternal aunt; Hypertension in her mother; Lung Disease in her maternal grandmother, mother, and paternal aunt; Non-contributory in her father and mother; Psychiatric Illness in her mother; Stroke in her mother.     Social History:   Tobacco: No  Alcohol: No  Recreational drugs (illegal and prescription):  No  Activity Level: normal, no impairment  Living situation:  Lives with   Recent travel:  no  Primary Care Provider: reviewed Trinity Nguyen M.D.      Physical Exam  Vitals and nursing note  reviewed.   Constitutional:       General: She is not in acute distress.     Appearance: Normal appearance. She is not ill-appearing.   HENT:      Head: Normocephalic and atraumatic.      Mouth/Throat:      Mouth: Mucous membranes are dry.      Pharynx: No oropharyngeal exudate or posterior oropharyngeal erythema.      Comments: mallampati class II  Eyes:      General: No visual field deficit.        Right eye: No discharge.         Left eye: No discharge.      Extraocular Movements: Extraocular movements intact.      Conjunctiva/sclera: Conjunctivae normal.      Pupils: Pupils are equal, round, and reactive to light.   Cardiovascular:      Rate and Rhythm: Normal rate and regular rhythm.      Pulses: Normal pulses.      Heart sounds: Normal heart sounds. No murmur. No friction rub. No gallop.    Pulmonary:      Effort: Pulmonary effort is normal. No respiratory distress.      Breath sounds: Normal breath sounds. No stridor. No wheezing, rhonchi or rales.   Abdominal:      General: Bowel sounds are normal. There is no distension.      Palpations: Abdomen is soft.      Tenderness: There is no abdominal tenderness.   Genitourinary:     Rectum: Normal. Guaiac result negative.   Musculoskeletal:         General: No swelling, tenderness, deformity or signs of injury.      Cervical back: Neck supple. No tenderness.      Right lower leg: No edema.      Left lower leg: No edema.   Skin:     General: Skin is warm.      Capillary Refill: Capillary refill takes less than 2 seconds.      Findings: No rash.   Neurological:      Mental Status: She is alert and oriented to person, place, and time.      GCS: GCS eye subscore is 4. GCS verbal subscore is 5. GCS motor subscore is 6.      Cranial Nerves: No cranial nerve deficit, dysarthria or facial asymmetry.      Sensory: No sensory deficit.      Motor: Weakness present. No tremor, atrophy, abnormal muscle tone or seizure activity.      Coordination: Coordination normal.  Finger-Nose-Finger Test and Heel to Shin Test normal. Rapid alternating movements normal.      Deep Tendon Reflexes: Reflexes normal. Babinski sign absent on the right side. Babinski sign absent on the left side.      Reflex Scores:       Tricep reflexes are 2+ on the right side and 2+ on the left side.       Bicep reflexes are 2+ on the right side and 2+ on the left side.       Brachioradialis reflexes are 2+ on the right side and 2+ on the left side.       Patellar reflexes are 2+ on the right side and 3+ on the left side.       Achilles reflexes are 2+ on the right side and 2+ on the left side.  Psychiatric:         Mood and Affect: Mood normal.         Behavior: Behavior normal.         Labs:   Recent Labs     03/02/21  1017   WBC 6.1   RBC 4.58   HEMOGLOBIN 14.5   HEMATOCRIT 43.0   MCV 93.9   MCH 31.7   RDW 43.1   PLATELETCT 226   MPV 10.1   NEUTSPOLYS 60.70   LYMPHOCYTES 28.80   MONOCYTES 8.30   EOSINOPHILS 1.20   BASOPHILS 0.50     Recent Labs     03/02/21  1017   SODIUM 140   POTASSIUM 3.9   CHLORIDE 104   CO2 25   GLUCOSE 116*   BUN 11           Imaging:   CT head w/o contrast 3/2/21:   3 cm mass within the high right frontal lobe in a parasagittal location. There is extensive vasogenic edema involving the right cerebral hemisphere with resultant mass effect on the right lateral ventricle and 7 mm of leftward midline shift. Differential   diagnosis includes brain tumor as well as abscess. The amount of mass effect and midline shift has increased somewhat from brain MRI.    MRI Brain w/&w/o contrast 2/12/21: Marked interval increase in size of medial posterior right frontal lobe lesion now measuring 3 x 2.4 x 3.1 cm, previously 0.8 x 0.7 x 0.6 cm. There is irregular thick walled peripheral enhancement now seen and new large amount of vasogenic edema with new   mass effect as detailed above. The lesion remains indeterminate however malignancy must be excluded.     Scattered mild nonspecific T2  hyperintensities elsewhere within the cerebral white matter again noted which could be related to demyelination or other nonspecific gliosis.      Previous Data Review: reviewed    Problem Representation: 50 year old female with a history of seizures since December 2020 on Lamictal, Migraine headaches, Depression, HLD, presents as a transferred from [facility] for a simple partial seizure of the LUE/LLE with spontaneous resolution and told to come to the ED after claling her Neurologist. with CT head this morning showing chronic 3cm right frontal mass with extensive surrounding edema increased mass effect and midline shift from MRI Brain 2/12/21. Concern for primary vs metastatic tumor vs. Less likely abscess with work up by neurology ongoing for possible demyelinating pathology        * Right frontal lobe mass  Assessment & Plan  DDX: Primary vs. Metastatic tumor vs. Abscess less likely or lymphoma vs. Demyelinating pathology (MS)/immune-mediated process given the presence of oligoclonal bands on LP December 2020.  admit to inpatient neurosurgery; discussed with Dr. 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   -continue Lamictal, now 75mg BID  -seizure, aspiration, fall precautions    Headache  Assessment & Plan  -Tylenol PRN  -may escalate if needed    Hyperlipidemia- (present on admission)  Assessment & Plan   2019. 10 year ASCVD risk 1.4%. no statin indicated    Mixed anxiety and depressive disorder- (present on admission)  Assessment & Plan  -continue Desvenlafaxine

## 2021-03-04 LAB
APTT PPP: 25.3 SEC (ref 24.7–36)
INR PPP: 0.91 (ref 0.87–1.13)
PROTHROMBIN TIME: 12.6 SEC (ref 12–14.6)

## 2021-03-04 PROCEDURE — 99233 SBSQ HOSP IP/OBS HIGH 50: CPT | Performed by: STUDENT IN AN ORGANIZED HEALTH CARE EDUCATION/TRAINING PROGRAM

## 2021-03-04 PROCEDURE — 36415 COLL VENOUS BLD VENIPUNCTURE: CPT

## 2021-03-04 PROCEDURE — 85610 PROTHROMBIN TIME: CPT

## 2021-03-04 PROCEDURE — A9270 NON-COVERED ITEM OR SERVICE: HCPCS | Performed by: GENERAL PRACTICE

## 2021-03-04 PROCEDURE — 770006 HCHG ROOM/CARE - MED/SURG/GYN SEMI*

## 2021-03-04 PROCEDURE — 85730 THROMBOPLASTIN TIME PARTIAL: CPT

## 2021-03-04 PROCEDURE — 700111 HCHG RX REV CODE 636 W/ 250 OVERRIDE (IP): Performed by: STUDENT IN AN ORGANIZED HEALTH CARE EDUCATION/TRAINING PROGRAM

## 2021-03-04 PROCEDURE — 700102 HCHG RX REV CODE 250 W/ 637 OVERRIDE(OP): Performed by: GENERAL PRACTICE

## 2021-03-04 RX ORDER — DIPHENHYDRAMINE HYDROCHLORIDE 50 MG/ML
25 INJECTION INTRAMUSCULAR; INTRAVENOUS EVERY 6 HOURS PRN
Status: DISCONTINUED | OUTPATIENT
Start: 2021-03-04 | End: 2021-03-15 | Stop reason: HOSPADM

## 2021-03-04 RX ADMIN — LAMOTRIGINE 75 MG: 100 TABLET ORAL at 05:59

## 2021-03-04 RX ADMIN — LAMOTRIGINE 75 MG: 100 TABLET ORAL at 16:57

## 2021-03-04 RX ADMIN — ACETAMINOPHEN 650 MG: 325 TABLET, FILM COATED ORAL at 05:59

## 2021-03-04 RX ADMIN — DIPHENHYDRAMINE HYDROCHLORIDE 25 MG: 50 INJECTION INTRAMUSCULAR; INTRAVENOUS at 20:41

## 2021-03-04 RX ADMIN — VENLAFAXINE 12.5 MG: 25 TABLET ORAL at 16:57

## 2021-03-04 RX ADMIN — VENLAFAXINE 12.5 MG: 25 TABLET ORAL at 05:59

## 2021-03-04 ASSESSMENT — ENCOUNTER SYMPTOMS
MUSCULOSKELETAL NEGATIVE: 1
GASTROINTESTINAL NEGATIVE: 1
CARDIOVASCULAR NEGATIVE: 1
EYES NEGATIVE: 1
PSYCHIATRIC NEGATIVE: 1
RESPIRATORY NEGATIVE: 1
NEUROLOGICAL NEGATIVE: 1
CONSTITUTIONAL NEGATIVE: 1

## 2021-03-04 ASSESSMENT — LIFESTYLE VARIABLES
TOTAL SCORE: 0
HOW MANY TIMES IN THE PAST YEAR HAVE YOU HAD 5 OR MORE DRINKS IN A DAY: 0
DOES PATIENT WANT TO STOP DRINKING: NO
AVERAGE NUMBER OF DAYS PER WEEK YOU HAVE A DRINK CONTAINING ALCOHOL: 0
TOTAL SCORE: 0
HAVE YOU EVER FELT YOU SHOULD CUT DOWN ON YOUR DRINKING: NO
HAVE PEOPLE ANNOYED YOU BY CRITICIZING YOUR DRINKING: NO
EVER HAD A DRINK FIRST THING IN THE MORNING TO STEADY YOUR NERVES TO GET RID OF A HANGOVER: NO
ON A TYPICAL DAY WHEN YOU DRINK ALCOHOL HOW MANY DRINKS DO YOU HAVE: 0
ALCOHOL_USE: NO
CONSUMPTION TOTAL: NEGATIVE
TOTAL SCORE: 0
EVER FELT BAD OR GUILTY ABOUT YOUR DRINKING: NO

## 2021-03-04 ASSESSMENT — PATIENT HEALTH QUESTIONNAIRE - PHQ9
SUM OF ALL RESPONSES TO PHQ9 QUESTIONS 1 AND 2: 0
1. LITTLE INTEREST OR PLEASURE IN DOING THINGS: NOT AT ALL
2. FEELING DOWN, DEPRESSED, IRRITABLE, OR HOPELESS: NOT AT ALL

## 2021-03-04 ASSESSMENT — PAIN DESCRIPTION - PAIN TYPE: TYPE: ACUTE PAIN

## 2021-03-04 NOTE — PROGRESS NOTES
Hospital Medicine Daily Progress Note    Date of Service  3/4/2021    Chief Complaint  50 y.o. female with PMHx of Migraines, Depression and Seizure Disorder (Lamictal since 12/20) admitted 3/2/2021 with Seizures    Hospital Course  Mrs Frye is a 50 y.o. female with PMHx of migraines, depression, seizures (Lamictal since 12/2020 ) was transferred from [facility].  She presented to [facility] today morning having a seizure episode.  Patient's been describes that she had left for left upper and lower extremity shaking movements which spontaneously resolved within a minute.  Denies any loss of consciousness notes she did had post stroke confusion, and headache and blurred vision.  She is also complaining of some tingling and mild weakness in the left side no tongue biting or incontinence of urine or bowel.  Patient denies any recent changes in her medication except for increasing the dose Lamictal 75 mg twice daily starting tomorrow ( not yet taken higher dose).  Denies fever, chills, chest pain, shortness of breath,  no hematuria or hematochezia.  Denies any head trauma.  Patient states that she had previous EEG x 2 in 12/ 2020 and 02/2021 which were both unremarkable.  She has been following with Dr. Davidson, neurologist.  She had MRI of brain last month which did show T2 hyperintense intensity in cerebral white matter which was suspicious for either demyelination or nonspecific gliosis also had LPN last year December which showed oligoclonal bands. The Patient Has an outpatient appointment with Dr. Maxwell Mao in two days for progressively enlarging right frontal mass. The patient had a CT head w/o contrast 3/2/21: 3 cm mass within the high right frontal lobe in a parasagittal location. There is extensive vasogenic edema involving the right cerebral hemisphere with resultant mass effect on the right lateral ventricle and 7 mm of leftward midline shift. The amount of mass effect  and midline shift has increased somewhat from brain MRI 2/12/21. The patient denies any breast changes, masses or abnormal mammograms. Denies rectal bleeding, melanotic stools, hematuria, vaginal bleeding.  Denies any history of cancer.       Interval Problem Update  Patient examined at bedside  In no acute distress  No overnight complaints   Follow up Neurosurgery Recommendations-Dr. Mao    As per Dr. Mao, hold off on Steroids due to increased risk for lymphoma.   At this time, we are working towards getting an   operating room available for the patient.  She will need to have an open biopsy versus possible resection with phase reversal and Stealth navigation. This is going to take at least a few days before we can get into the OR.  Based on OR availability at this time, we will try to move her case   forward, but likely she will need surgery and the surgical availability at this time is not available until Tuesday of next week.  If we can accommodate   the case earlier, we will do so.  However, based on possible diagnosis of lymphoma, we would hold off on steroids for now.  The patient should be on   anti-seizure medication and she can have a formal diet.  She will also need preoperative laboratory workup and as soon as we know a solid date for the   surgical intervention, we will order a Stealth MRI with contrast for navigation and make the patient n.p.o. and notified both the primary team as well as the patient and her .    Consultants/Specialty  Neurology-Dr. Davidson  Neurosurgery-Dr. Mao    Code Status  Full Code    Disposition  TBD    Review of Systems  Review of Systems   Constitutional: Negative.    HENT: Negative.    Eyes: Negative.    Respiratory: Negative.    Cardiovascular: Negative.    Gastrointestinal: Negative.    Genitourinary: Negative.    Musculoskeletal: Negative.    Skin: Negative.    Neurological: Negative.    Endo/Heme/Allergies: Negative.    Psychiatric/Behavioral: Negative.          Physical Exam  Temp:  [36.3 °C (97.4 °F)-36.6 °C (97.9 °F)] 36.4 °C (97.5 °F)  Pulse:  [] 79  Resp:  [16-17] 17  BP: (115-145)/(54-87) 132/80  SpO2:  [91 %-97 %] 97 %    Physical Exam  Vitals reviewed.   HENT:      Head: Normocephalic and atraumatic.      Right Ear: External ear normal.      Left Ear: External ear normal.      Nose: Nose normal.      Mouth/Throat:      Mouth: Mucous membranes are moist.   Eyes:      Pupils: Pupils are equal, round, and reactive to light.   Cardiovascular:      Rate and Rhythm: Normal rate and regular rhythm.      Pulses: Normal pulses.      Heart sounds: Normal heart sounds.   Pulmonary:      Effort: Pulmonary effort is normal.      Breath sounds: Normal breath sounds.   Abdominal:      General: Abdomen is flat.   Musculoskeletal:         General: Normal range of motion.      Cervical back: Neck supple.   Skin:     General: Skin is warm.      Capillary Refill: Capillary refill takes less than 2 seconds.   Neurological:      General: No focal deficit present.      Mental Status: She is alert.   Psychiatric:         Mood and Affect: Mood normal.         Fluids    Intake/Output Summary (Last 24 hours) at 3/4/2021 0741  Last data filed at 3/3/2021 2000  Gross per 24 hour   Intake 890 ml   Output --   Net 890 ml       Laboratory  Recent Labs     03/02/21  1017 03/03/21  0421   WBC 6.1 6.4   RBC 4.58 4.70   HEMOGLOBIN 14.5 15.5   HEMATOCRIT 43.0 45.1   MCV 93.9 96.0   MCH 31.7 33.0   MCHC 33.7 34.4   RDW 43.1 44.5   PLATELETCT 226 242   MPV 10.1 10.1     Recent Labs     03/02/21  1017 03/03/21  0421   SODIUM 140 137   POTASSIUM 3.9 4.6   CHLORIDE 104 102   CO2 25 20   GLUCOSE 116* 155*   BUN 11 13   CREATININE 0.65 0.62   CALCIUM 9.4 9.9                   Imaging  No orders to display        Assessment/Plan  * Right frontal lobe mass  Assessment & Plan  DDX: Primary vs. Metastatic tumor vs. Abscess less likely or lymphoma vs. Demyelinating pathology (MS)/immune-mediated process  given the presence of oligoclonal bands on LP December 2020.  admit to inpatient neurosurgery; discussed with Dr. 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   -continue Lamictal, now 75mg BID  -seizure, aspiration, fall precautions    Headache  Assessment & Plan  -Tylenol PRN  -may escalate if needed    Hyperlipidemia- (present on admission)  Assessment & Plan   2019. 10 year ASCVD risk 1.4%. no statin indicated    Mixed anxiety and depressive disorder- (present on admission)  Assessment & Plan  -continue Desvenlafaxine        VTE prophylaxis: Lovenox

## 2021-03-04 NOTE — CARE PLAN
Problem: Communication  Goal: The ability to communicate needs accurately and effectively will improve  Outcome: PROGRESSING AS EXPECTED     Problem: Safety  Goal: Will remain free from falls  Outcome: PROGRESSING AS EXPECTED  Note: Treaded slipper socks on, bed in lowest and locked position, bed alarm on, educated on the need to call for assistance, call light within reach, hourly rounding in place

## 2021-03-04 NOTE — PROGRESS NOTES
Neurosurgery Progress Note    Subjective:  Poor sleep qhs, anxiety about surgery  Minimal headaches, well controlled on tylenol    Exam:  A&O, tearful at times  Speech fluent & appropriate  Left  4+, left IP/quad 4+, remaining strength intact  sensation grossly intact  PERRL, EOM intact, facial symmetry  No drift    BP  Min: 115/54  Max: 140/78  Pulse  Av.5  Min: 78  Max: 101  Resp  Av  Min: 16  Max: 18  Temp  Av.5 °C (97.7 °F)  Min: 36.3 °C (97.4 °F)  Max: 36.6 °C (97.9 °F)  SpO2  Av.7 %  Min: 91 %  Max: 97 %    No data recorded    Recent Labs     21  1017 21  0421   WBC 6.1 6.4   RBC 4.58 4.70   HEMOGLOBIN 14.5 15.5   HEMATOCRIT 43.0 45.1   MCV 93.9 96.0   MCH 31.7 33.0   MCHC 33.7 34.4   RDW 43.1 44.5   PLATELETCT 226 242   MPV 10.1 10.1     Recent Labs     21  1017 21  0421   SODIUM 140 137   POTASSIUM 3.9 4.6   CHLORIDE 104 102   CO2 25 20   GLUCOSE 116* 155*   BUN 11 13   CREATININE 0.65 0.62   CALCIUM 9.4 9.9               Intake/Output       21 07 - 21 0659 21 07 - 21 0659       Total  Total       Intake    P.O.  650  240 890  --  -- --    P.O. 650 240 890 -- -- --    Total Intake 650 240 890 -- -- --       Output    Urine  --  -- --  --  -- --    Number of Times Voided 2 x -- 2 x -- -- --    Stool  --  -- --  --  -- --    Number of Times Stooled 1 x 0 x 1 x -- -- --    Total Output -- -- -- -- -- --       Net I/O     650 240 890 -- -- --            Intake/Output Summary (Last 24 hours) at 3/4/2021 1207  Last data filed at 3/3/2021 2000  Gross per 24 hour   Intake 490 ml   Output --   Net 490 ml            • influenza vaccine quad  0.5 mL Once PRN   • LORazepam  0.5 mg Q4HRS PRN   • senna-docusate  2 tablet BID    And   • polyethylene glycol/lytes  1 Packet QDAY PRN    And   • magnesium hydroxide  30 mL QDAY PRN    And   • bisacodyl  10 mg QDAY PRN   • acetaminophen  650 mg Q6HRS PRN   • [Held  by provider] dexamethasone  4 mg Q6HRS   • lamoTRIgine  75 mg BID   • venlafaxine  12.5 mg BID       Assessment and Plan:  Hospital day #2 right parietal brain lesion, seizures  POD #   Prophylactic anticoagulation: no         Start date/time: hold for surgery    Neuro stable  Plan for OR Tues, NPO at [facility] Monday  Hold anticoagulants, NSAIDs, ASA  Stealth brain MRI monday  lamictal for seizures, mgmt per neurology  CT CAP 2/21/21 negative  No steroids, lymphoma in differential  Labs reviewed, coags ordered  Ativan prn anxiety  Mobilize as tolerated

## 2021-03-05 LAB
ALBUMIN SERPL BCP-MCNC: 3.6 G/DL (ref 3.2–4.9)
ALBUMIN/GLOB SERPL: 1.5 G/DL
ALP SERPL-CCNC: 77 U/L (ref 30–99)
ALT SERPL-CCNC: 12 U/L (ref 2–50)
ANION GAP SERPL CALC-SCNC: 9 MMOL/L (ref 7–16)
AST SERPL-CCNC: 11 U/L (ref 12–45)
BILIRUB SERPL-MCNC: 0.3 MG/DL (ref 0.1–1.5)
BUN SERPL-MCNC: 17 MG/DL (ref 8–22)
CALCIUM SERPL-MCNC: 8.5 MG/DL (ref 8.5–10.5)
CHLORIDE SERPL-SCNC: 102 MMOL/L (ref 96–112)
CO2 SERPL-SCNC: 25 MMOL/L (ref 20–33)
CREAT SERPL-MCNC: 0.62 MG/DL (ref 0.5–1.4)
ERYTHROCYTE [DISTWIDTH] IN BLOOD BY AUTOMATED COUNT: 45.5 FL (ref 35.9–50)
GLOBULIN SER CALC-MCNC: 2.4 G/DL (ref 1.9–3.5)
GLUCOSE SERPL-MCNC: 93 MG/DL (ref 65–99)
HCT VFR BLD AUTO: 40.3 % (ref 37–47)
HGB BLD-MCNC: 13.2 G/DL (ref 12–16)
MCH RBC QN AUTO: 32 PG (ref 27–33)
MCHC RBC AUTO-ENTMCNC: 32.8 G/DL (ref 33.6–35)
MCV RBC AUTO: 97.6 FL (ref 81.4–97.8)
PLATELET # BLD AUTO: 196 K/UL (ref 164–446)
PMV BLD AUTO: 10.2 FL (ref 9–12.9)
POTASSIUM SERPL-SCNC: 4.1 MMOL/L (ref 3.6–5.5)
PROT SERPL-MCNC: 6 G/DL (ref 6–8.2)
RBC # BLD AUTO: 4.13 M/UL (ref 4.2–5.4)
SODIUM SERPL-SCNC: 136 MMOL/L (ref 135–145)
WBC # BLD AUTO: 8.4 K/UL (ref 4.8–10.8)

## 2021-03-05 PROCEDURE — 770006 HCHG ROOM/CARE - MED/SURG/GYN SEMI*

## 2021-03-05 PROCEDURE — A9270 NON-COVERED ITEM OR SERVICE: HCPCS | Performed by: GENERAL PRACTICE

## 2021-03-05 PROCEDURE — 99233 SBSQ HOSP IP/OBS HIGH 50: CPT | Performed by: STUDENT IN AN ORGANIZED HEALTH CARE EDUCATION/TRAINING PROGRAM

## 2021-03-05 PROCEDURE — 80053 COMPREHEN METABOLIC PANEL: CPT

## 2021-03-05 PROCEDURE — 85027 COMPLETE CBC AUTOMATED: CPT

## 2021-03-05 PROCEDURE — 36415 COLL VENOUS BLD VENIPUNCTURE: CPT

## 2021-03-05 PROCEDURE — 700102 HCHG RX REV CODE 250 W/ 637 OVERRIDE(OP): Performed by: GENERAL PRACTICE

## 2021-03-05 RX ADMIN — VENLAFAXINE 12.5 MG: 25 TABLET ORAL at 05:47

## 2021-03-05 RX ADMIN — ACETAMINOPHEN 650 MG: 325 TABLET, FILM COATED ORAL at 17:51

## 2021-03-05 RX ADMIN — LAMOTRIGINE 75 MG: 100 TABLET ORAL at 16:26

## 2021-03-05 RX ADMIN — VENLAFAXINE 12.5 MG: 25 TABLET ORAL at 16:25

## 2021-03-05 RX ADMIN — ACETAMINOPHEN 650 MG: 325 TABLET, FILM COATED ORAL at 01:00

## 2021-03-05 RX ADMIN — LAMOTRIGINE 75 MG: 100 TABLET ORAL at 05:48

## 2021-03-05 ASSESSMENT — PAIN DESCRIPTION - PAIN TYPE
TYPE: ACUTE PAIN

## 2021-03-05 ASSESSMENT — ENCOUNTER SYMPTOMS
NEUROLOGICAL NEGATIVE: 1
CARDIOVASCULAR NEGATIVE: 1
EYES NEGATIVE: 1
PSYCHIATRIC NEGATIVE: 1
RESPIRATORY NEGATIVE: 1
CONSTITUTIONAL NEGATIVE: 1
MUSCULOSKELETAL NEGATIVE: 1
GASTROINTESTINAL NEGATIVE: 1

## 2021-03-05 NOTE — PROGRESS NOTES
Hospital Medicine Daily Progress Note    Date of Service  3/5/2021    Chief Complaint  50 y.o. female with PMHx of Migraines, Depression and Seizure Disorder (Lamictal since 12/20) admitted 3/2/2021 with Seizures    Hospital Course  Mrs Frye is a 50 y.o. female with PMHx of migraines, depression, seizures (Lamictal since 12/2020 ) was transferred from [facility].  She presented to [facility] today morning having a seizure episode.  Patient's been describes that she had left for left upper and lower extremity shaking movements which spontaneously resolved within a minute.  Denies any loss of consciousness notes she did had post stroke confusion, and headache and blurred vision.  She is also complaining of some tingling and mild weakness in the left side no tongue biting or incontinence of urine or bowel.  Patient denies any recent changes in her medication except for increasing the dose Lamictal 75 mg twice daily starting tomorrow ( not yet taken higher dose).  Denies fever, chills, chest pain, shortness of breath,  no hematuria or hematochezia.  Denies any head trauma.  Patient states that she had previous EEG x 2 in 12/ 2020 and 02/2021 which were both unremarkable.  She has been following with Dr. Davidson, neurologist.  She had MRI of brain last month which did show T2 hyperintense intensity in cerebral white matter which was suspicious for either demyelination or nonspecific gliosis also had LPN last year December which showed oligoclonal bands. The Patient Has an outpatient appointment with Dr. Maxwell Mao in two days for progressively enlarging right frontal mass. The patient had a CT head w/o contrast 3/2/21: 3 cm mass within the high right frontal lobe in a parasagittal location. There is extensive vasogenic edema involving the right cerebral hemisphere with resultant mass effect on the right lateral ventricle and 7 mm of leftward midline shift. The amount of mass effect  and midline shift has increased somewhat from brain MRI 2/12/21. The patient denies any breast changes, masses or abnormal mammograms. Denies rectal bleeding, melanotic stools, hematuria, vaginal bleeding.  Denies any history of cancer.       Interval Problem Update  Patient examined at bedside  In no acute distress  No overnight complaints   Follow up Neurosurgery Recommendations-Dr. Mao    As per Dr. Mao, hold off on Steroids due to increased risk for lymphoma.   At this time, we are working towards getting an   operating room available for the patient.  She will need to have an open biopsy versus possible resection with phase reversal and Stealth navigation. This is going to take at least a few days before we can get into the OR.  Based on OR availability at this time, we will try to move her case   forward, but likely she will need surgery and the surgical availability at this time is not available until Tuesday of next week.  If we can accommodate   the case earlier, we will do so.  However, based on possible diagnosis of lymphoma, we would hold off on steroids for now.  The patient should be on   anti-seizure medication and she can have a formal diet.  She will also need preoperative laboratory workup and as soon as we know a solid date for the   surgical intervention, we will order a Stealth MRI with contrast for navigation and make the patient n.p.o. and notified both the primary team as well as the patient and her .    OR on 3/9 for Open Lesion Biopsy vs Resection  Follow up Stealth MRI    Consultants/Specialty  Neurology-Dr. Davidson  Neurosurgery-Dr. Mao    Code Status  Full Code    Disposition  TBD    Review of Systems  Review of Systems   Constitutional: Negative.    HENT: Negative.    Eyes: Negative.    Respiratory: Negative.    Cardiovascular: Negative.    Gastrointestinal: Negative.    Genitourinary: Negative.    Musculoskeletal: Negative.    Skin: Negative.    Neurological: Negative.     Endo/Heme/Allergies: Negative.    Psychiatric/Behavioral: Negative.         Physical Exam  Temp:  [36.2 °C (97.2 °F)-36.7 °C (98 °F)] 36.5 °C (97.7 °F)  Pulse:  [62-77] 70  Resp:  [16-18] 16  BP: (130-148)/(69-90) 148/90  SpO2:  [95 %-97 %] 95 %    Physical Exam  Vitals reviewed.   HENT:      Head: Normocephalic and atraumatic.      Right Ear: External ear normal.      Left Ear: External ear normal.      Nose: Nose normal.      Mouth/Throat:      Mouth: Mucous membranes are moist.   Eyes:      Pupils: Pupils are equal, round, and reactive to light.   Cardiovascular:      Rate and Rhythm: Normal rate and regular rhythm.      Pulses: Normal pulses.      Heart sounds: Normal heart sounds.   Pulmonary:      Effort: Pulmonary effort is normal.      Breath sounds: Normal breath sounds.   Abdominal:      General: Abdomen is flat.   Musculoskeletal:         General: Normal range of motion.      Cervical back: Neck supple.   Skin:     General: Skin is warm.      Capillary Refill: Capillary refill takes less than 2 seconds.   Neurological:      General: No focal deficit present.      Mental Status: She is alert.   Psychiatric:         Mood and Affect: Mood normal.         Fluids    Intake/Output Summary (Last 24 hours) at 3/5/2021 0840  Last data filed at 3/4/2021 2000  Gross per 24 hour   Intake 480 ml   Output --   Net 480 ml       Laboratory  Recent Labs     03/02/21  1017 03/03/21  0421 03/05/21 0217   WBC 6.1 6.4 8.4   RBC 4.58 4.70 4.13*   HEMOGLOBIN 14.5 15.5 13.2   HEMATOCRIT 43.0 45.1 40.3   MCV 93.9 96.0 97.6   MCH 31.7 33.0 32.0   MCHC 33.7 34.4 32.8*   RDW 43.1 44.5 45.5   PLATELETCT 226 242 196   MPV 10.1 10.1 10.2     Recent Labs     03/02/21  1017 03/03/21  0421 03/05/21 0217   SODIUM 140 137 136   POTASSIUM 3.9 4.6 4.1   CHLORIDE 104 102 102   CO2 25 20 25   GLUCOSE 116* 155* 93   BUN 11 13 17   CREATININE 0.65 0.62 0.62   CALCIUM 9.4 9.9 8.5     Recent Labs     03/04/21  1556   APTT 25.3   INR 0.91                Imaging  No orders to display        Assessment/Plan  * Right frontal lobe mass  Assessment & Plan  DDX: Primary vs. Metastatic tumor vs. Abscess less likely or lymphoma vs. Demyelinating pathology (MS)/immune-mediated process given the presence of oligoclonal bands on LP December 2020.  admit to inpatient neurosurgery; discussed with Dr. 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   -continue Lamictal, now 75mg BID  -seizure, aspiration, fall precautions    Headache  Assessment & Plan  -Tylenol PRN  -may escalate if needed    Hyperlipidemia- (present on admission)  Assessment & Plan   2019. 10 year ASCVD risk 1.4%. no statin indicated    Mixed anxiety and depressive disorder- (present on admission)  Assessment & Plan  -continue Desvenlafaxine        VTE prophylaxis: Lovenox

## 2021-03-05 NOTE — CARE PLAN
Problem: Communication  Goal: The ability to communicate needs accurately and effectively will improve  Outcome: PROGRESSING AS EXPECTED  Patient engaged in care plan and encouraged to express needs.       Problem: Pain Management  Goal: Pain level will decrease to patient's comfort goal  Outcome: PROGRESSING AS EXPECTED   Patient encouraged to express pain level on a scale of 0-10.

## 2021-03-05 NOTE — PROGRESS NOTES
Neurosurgery Progress Note    Subjective:  No acute event over night  Mild HA      Exam:  A&O, face symmt, PERRLA  Speech fluent & appropriate  Left  4+,Left tricep 4/5 left IP/quad 4+, left foot drop TA/GS 3/4  No pronator drift  sensation grossly intact      BP  Min: 130/77  Max: 148/90  Pulse  Av.8  Min: 62  Max: 77  Resp  Av  Min: 16  Max: 18  Temp  Av.5 °C (97.7 °F)  Min: 36.2 °C (97.2 °F)  Max: 36.7 °C (98 °F)  SpO2  Av.3 %  Min: 95 %  Max: 97 %    No data recorded    Recent Labs     21  1017 21  0421 21  0217   WBC 6.1 6.4 8.4   RBC 4.58 4.70 4.13*   HEMOGLOBIN 14.5 15.5 13.2   HEMATOCRIT 43.0 45.1 40.3   MCV 93.9 96.0 97.6   MCH 31.7 33.0 32.0   MCHC 33.7 34.4 32.8*   RDW 43.1 44.5 45.5   PLATELETCT 226 242 196   MPV 10.1 10.1 10.2     Recent Labs     21  1017 21  0421 21  0217   SODIUM 140 137 136   POTASSIUM 3.9 4.6 4.1   CHLORIDE 104 102 102   CO2 25 20 25   GLUCOSE 116* 155* 93   BUN 11 13 17   CREATININE 0.65 0.62 0.62   CALCIUM 9.4 9.9 8.5     Recent Labs     21  1556   APTT 25.3   INR 0.91           Intake/Output       21 07 - 21 0659 21 07 - 21 0659       Total  Total       Intake    P.O.  480  240 720  --  -- --    P.O. 480 240 720 -- -- --    Total Intake 480 240 720 -- -- --       Output    Urine  --  -- --  --  -- --    Number of Times Voided 3 x -- 3 x -- -- --    Stool  --  -- --  --  -- --    Number of Times Stooled -- 1 x 1 x -- -- --    Total Output -- -- -- -- -- --       Net I/O     480 240 720 -- -- --            Intake/Output Summary (Last 24 hours) at 3/5/2021 0848  Last data filed at 3/4/2021 2000  Gross per 24 hour   Intake 480 ml   Output --   Net 480 ml            • influenza vaccine quad  0.5 mL Once PRN   • diphenhydrAMINE  25 mg Q6HRS PRN   • LORazepam  0.5 mg Q4HRS PRN   • senna-docusate  2 tablet BID    And   • polyethylene glycol/lytes  1 Packet  QDAY PRN    And   • magnesium hydroxide  30 mL QDAY PRN    And   • bisacodyl  10 mg QDAY PRN   • acetaminophen  650 mg Q6HRS PRN   • [Held by provider] dexamethasone  4 mg Q6HRS   • lamoTRIgine  75 mg BID   • venlafaxine  12.5 mg BID       Assessment and Plan:  Hospital day #3 right parietal brain lesion, seizures  POD #   Prophylactic anticoagulation: no         Start date/time: hold for surgery    Neuro stable  Plan for OR Tues, NPO at [facility] Monday  Hold anticoagulants, NSAIDs, ASA  Stealth brain MRI monday  lamictal for seizures, mgmt per neurology  CT CAP 2/21/21 negative  No steroids, lymphoma in differential  INR 0.91 normal on 3/4    Ativan prn anxiety  Mobilize as tolerated

## 2021-03-06 LAB
ALBUMIN SERPL BCP-MCNC: 3.5 G/DL (ref 3.2–4.9)
ALBUMIN/GLOB SERPL: 1.3 G/DL
ALP SERPL-CCNC: 83 U/L (ref 30–99)
ALT SERPL-CCNC: 10 U/L (ref 2–50)
ANION GAP SERPL CALC-SCNC: 14 MMOL/L (ref 7–16)
AST SERPL-CCNC: 9 U/L (ref 12–45)
BILIRUB SERPL-MCNC: 0.4 MG/DL (ref 0.1–1.5)
BUN SERPL-MCNC: 13 MG/DL (ref 8–22)
CALCIUM SERPL-MCNC: 8.9 MG/DL (ref 8.5–10.5)
CHLORIDE SERPL-SCNC: 102 MMOL/L (ref 96–112)
CO2 SERPL-SCNC: 22 MMOL/L (ref 20–33)
CREAT SERPL-MCNC: 0.58 MG/DL (ref 0.5–1.4)
ERYTHROCYTE [DISTWIDTH] IN BLOOD BY AUTOMATED COUNT: 45.6 FL (ref 35.9–50)
GLOBULIN SER CALC-MCNC: 2.7 G/DL (ref 1.9–3.5)
GLUCOSE SERPL-MCNC: 104 MG/DL (ref 65–99)
HCT VFR BLD AUTO: 40.9 % (ref 37–47)
HGB BLD-MCNC: 13.7 G/DL (ref 12–16)
MCH RBC QN AUTO: 32.5 PG (ref 27–33)
MCHC RBC AUTO-ENTMCNC: 33.5 G/DL (ref 33.6–35)
MCV RBC AUTO: 97.1 FL (ref 81.4–97.8)
PLATELET # BLD AUTO: 188 K/UL (ref 164–446)
PMV BLD AUTO: 10 FL (ref 9–12.9)
POTASSIUM SERPL-SCNC: 4.2 MMOL/L (ref 3.6–5.5)
PROT SERPL-MCNC: 6.2 G/DL (ref 6–8.2)
RBC # BLD AUTO: 4.21 M/UL (ref 4.2–5.4)
SODIUM SERPL-SCNC: 138 MMOL/L (ref 135–145)
WBC # BLD AUTO: 7.9 K/UL (ref 4.8–10.8)

## 2021-03-06 PROCEDURE — 700111 HCHG RX REV CODE 636 W/ 250 OVERRIDE (IP): Performed by: PHYSICIAN ASSISTANT

## 2021-03-06 PROCEDURE — A9270 NON-COVERED ITEM OR SERVICE: HCPCS | Performed by: GENERAL PRACTICE

## 2021-03-06 PROCEDURE — 80053 COMPREHEN METABOLIC PANEL: CPT

## 2021-03-06 PROCEDURE — 700102 HCHG RX REV CODE 250 W/ 637 OVERRIDE(OP): Performed by: GENERAL PRACTICE

## 2021-03-06 PROCEDURE — 770006 HCHG ROOM/CARE - MED/SURG/GYN SEMI*

## 2021-03-06 PROCEDURE — 99233 SBSQ HOSP IP/OBS HIGH 50: CPT | Performed by: STUDENT IN AN ORGANIZED HEALTH CARE EDUCATION/TRAINING PROGRAM

## 2021-03-06 PROCEDURE — 36415 COLL VENOUS BLD VENIPUNCTURE: CPT

## 2021-03-06 PROCEDURE — 85027 COMPLETE CBC AUTOMATED: CPT

## 2021-03-06 RX ADMIN — ACETAMINOPHEN 650 MG: 325 TABLET, FILM COATED ORAL at 04:04

## 2021-03-06 RX ADMIN — VENLAFAXINE 12.5 MG: 25 TABLET ORAL at 19:10

## 2021-03-06 RX ADMIN — VENLAFAXINE 12.5 MG: 25 TABLET ORAL at 04:17

## 2021-03-06 RX ADMIN — LAMOTRIGINE 75 MG: 100 TABLET ORAL at 19:10

## 2021-03-06 RX ADMIN — LAMOTRIGINE 75 MG: 100 TABLET ORAL at 04:17

## 2021-03-06 RX ADMIN — LORAZEPAM 0.5 MG: 2 INJECTION INTRAMUSCULAR; INTRAVENOUS at 18:31

## 2021-03-06 RX ADMIN — LORAZEPAM 0.5 MG: 2 INJECTION INTRAMUSCULAR; INTRAVENOUS at 08:15

## 2021-03-06 ASSESSMENT — ENCOUNTER SYMPTOMS
NEUROLOGICAL NEGATIVE: 1
GASTROINTESTINAL NEGATIVE: 1
PSYCHIATRIC NEGATIVE: 1
RESPIRATORY NEGATIVE: 1
MUSCULOSKELETAL NEGATIVE: 1
CONSTITUTIONAL NEGATIVE: 1
EYES NEGATIVE: 1
CARDIOVASCULAR NEGATIVE: 1

## 2021-03-06 ASSESSMENT — PAIN DESCRIPTION - PAIN TYPE
TYPE: ACUTE PAIN

## 2021-03-06 ASSESSMENT — FIBROSIS 4 INDEX: FIB4 SCORE: 0.77

## 2021-03-06 NOTE — PROGRESS NOTES
Neurosurgery Progress Note    Subjective:  No acute event over night  Mild BARN[place]  Reports feeling stronger her left extremities      Exam:  A&O, face symmt, PERRLA  Speech fluent & appropriate  Left  4+,Left tricep 4/5 left IP/quad 4+, left foot drop TA/GS 3/4  No pronator drift      BP  Min: 121/87  Max: 158/93  Pulse  Av.3  Min: 74  Max: 98  Resp  Av.3  Min: 16  Max: 18  Temp  Av.2 °C (97.1 °F)  Min: 36 °C (96.8 °F)  Max: 36.3 °C (97.3 °F)  SpO2  Av.5 %  Min: 93 %  Max: 97 %    No data recorded    Recent Labs     21  0455   WBC 8.4 7.9   RBC 4.13* 4.21   HEMOGLOBIN 13.2 13.7   HEMATOCRIT 40.3 40.9   MCV 97.6 97.1   MCH 32.0 32.5   MCHC 32.8* 33.5*   RDW 45.5 45.6   PLATELETCT 196 188   MPV 10.2 10.0     Recent Labs     21  02121  0455   SODIUM 136 138   POTASSIUM 4.1 4.2   CHLORIDE 102 102   CO2 25 22   GLUCOSE 93 104*   BUN 17 13   CREATININE 0.62 0.58   CALCIUM 8.5 8.9     Recent Labs     21  1556   APTT 25.3   INR 0.91           Intake/Output       21 07 - 21 0659 21 07 - 21 59       Total [id number] Total       Intake    P.O.  240  -- 240  --  -- --    P.O. 240 -- 240 -- -- --    Total Intake 240 -- 240 -- -- --       Output    Urine  --  -- --  --  -- --    Number of Times Voided 3 x -- 3 x 1 x -- 1 x    Stool  --  -- --  --  -- --    Number of Times Stooled 2 x -- 2 x -- -- --    Total Output -- -- -- -- -- --       Net I/O     240 -- 240 -- -- --          No intake or output data in the 24 hours ending 21 0911         • diphenhydrAMINE  25 mg Q6HRS PRN   • LORazepam  0.5 mg Q4HRS PRN   • senna-docusate  2 tablet BID    And   • polyethylene glycol/lytes  1 Packet QDAY PRN    And   • magnesium hydroxide  30 mL QDAY PRN    And   • bisacodyl  10 mg QDAY PRN   • acetaminophen  650 mg Q6HRS PRN   • [Held by provider] dexamethasone  4 mg Q6HRS   • lamoTRIgine  75 mg BID   • venlafaxine   12.5 mg BID       Assessment and Plan:  Hospital day #4 right parietal brain lesion, seizures  POD #   Prophylactic anticoagulation: no         Start date/time: hold for surgery    Neuro stable  Labs reviewed and stable  Plan for OR Tues, NPO at [facility] Monday  Hold anticoagulants, NSAIDs, ASA  Stealth brain MRI monday  lamictal for seizures, mgmt per neurology  CT CAP 2/21/21 negative  No steroids, lymphoma in differential  INR 0.91 normal on 3/4    Ativan prn anxiety  Mobilize as tolerated

## 2021-03-06 NOTE — DISCHARGE PLANNING
Anticipated Discharge Disposition: Surgery 3/9    Action: Pt is anticipated to have surgery on 3/9. PT/OT evals are on hold till then. At moment, pt has no needs.     Barriers to Discharge: TX evals and surgery    Plan: Lsw to f/u with pt and IDT collaboration

## 2021-03-06 NOTE — PROGRESS NOTES
Hospital Medicine Daily Progress Note    Date of Service  3/6/2021    Chief Complaint  50 y.o. female with PMHx of Migraines, Depression and Seizure Disorder (Lamictal since 12/20) admitted 3/2/2021 with Seizures    Hospital Course  Mrs Frye is a 50 y.o. female with PMHx of migraines, depression, seizures (Lamictal since 12/2020 ) was transferred from [facility].  She presented to [facility] today morning having a seizure episode.  Patient's been describes that she had left for left upper and lower extremity shaking movements which spontaneously resolved within a minute.  Denies any loss of consciousness notes she did had post stroke confusion, and headache and blurred vision.  She is also complaining of some tingling and mild weakness in the left side no tongue biting or incontinence of urine or bowel.  Patient denies any recent changes in her medication except for increasing the dose Lamictal 75 mg twice daily starting tomorrow ( not yet taken higher dose).  Denies fever, chills, chest pain, shortness of breath,  no hematuria or hematochezia.  Denies any head trauma.  Patient states that she had previous EEG x 2 in 12/ 2020 and 02/2021 which were both unremarkable.  She has been following with Dr. Davidson, neurologist.  She had MRI of brain last month which did show T2 hyperintense intensity in cerebral white matter which was suspicious for either demyelination or nonspecific gliosis also had LPN last year December which showed oligoclonal bands. The Patient Has an outpatient appointment with Dr. Maxwell Mao in two days for progressively enlarging right frontal mass. The patient had a CT head w/o contrast 3/2/21: 3 cm mass within the high right frontal lobe in a parasagittal location. There is extensive vasogenic edema involving the right cerebral hemisphere with resultant mass effect on the right lateral ventricle and 7 mm of leftward midline shift. The amount of mass effect  and midline shift has increased somewhat from brain MRI 2/12/21. The patient denies any breast changes, masses or abnormal mammograms. Denies rectal bleeding, melanotic stools, hematuria, vaginal bleeding.  Denies any history of cancer.       Interval Problem Update  Patient examined at bedside  In no acute distress  No overnight complaints   Follow up Neurosurgery Recommendations-Dr. Mao    As per Dr. Mao, hold off on Steroids due to increased risk for lymphoma.   At this time, we are working towards getting an   operating room available for the patient.  She will need to have an open biopsy versus possible resection with phase reversal and Stealth navigation. This is going to take at least a few days before we can get into the OR.  Based on OR availability at this time, we will try to move her case   forward, but likely she will need surgery and the surgical availability at this time is not available until Tuesday of next week.  If we can accommodate   the case earlier, we will do so.  However, based on possible diagnosis of lymphoma, we would hold off on steroids for now.  The patient should be on   anti-seizure medication and she can have a formal diet.  She will also need preoperative laboratory workup and as soon as we know a solid date for the   surgical intervention, we will order a Stealth MRI with contrast for navigation and make the patient n.p.o. and notified both the primary team as well as the patient and her .    OR on 3/9 for Open Lesion Biopsy vs Resection  Follow up Stealth MRI    Consultants/Specialty  Neurology-Dr. Davidson  Neurosurgery-Dr. Mao    Code Status  Full Code    Disposition  TBD    Review of Systems  Review of Systems   Constitutional: Negative.    HENT: Negative.    Eyes: Negative.    Respiratory: Negative.    Cardiovascular: Negative.    Gastrointestinal: Negative.    Genitourinary: Negative.    Musculoskeletal: Negative.    Skin: Negative.    Neurological: Negative.     Endo/Heme/Allergies: Negative.    Psychiatric/Behavioral: Negative.         Physical Exam  Temp:  [36 °C (96.8 °F)-36.2 °C (97.2 °F)] 36.1 °C (97 °F)  Pulse:  [74-98] 74  Resp:  [16-18] 18  BP: (121-140)/(79-92) 140/92  SpO2:  [95 %-97 %] 97 %    Physical Exam  Vitals reviewed.   HENT:      Head: Normocephalic and atraumatic.      Right Ear: External ear normal.      Left Ear: External ear normal.      Nose: Nose normal.      Mouth/Throat:      Mouth: Mucous membranes are moist.   Eyes:      Pupils: Pupils are equal, round, and reactive to light.   Cardiovascular:      Rate and Rhythm: Normal rate and regular rhythm.      Pulses: Normal pulses.      Heart sounds: Normal heart sounds.   Pulmonary:      Effort: Pulmonary effort is normal.      Breath sounds: Normal breath sounds.   Abdominal:      General: Abdomen is flat.   Musculoskeletal:         General: Normal range of motion.      Cervical back: Neck supple.   Skin:     General: Skin is warm.      Capillary Refill: Capillary refill takes less than 2 seconds.   Neurological:      General: No focal deficit present.      Mental Status: She is alert.   Psychiatric:         Mood and Affect: Mood normal.         Fluids    Intake/Output Summary (Last 24 hours) at 3/6/2021 0759  Last data filed at 3/5/2021 0900  Gross per 24 hour   Intake 240 ml   Output --   Net 240 ml       Laboratory  Recent Labs     03/05/21 0217 03/06/21  0455   WBC 8.4 7.9   RBC 4.13* 4.21   HEMOGLOBIN 13.2 13.7   HEMATOCRIT 40.3 40.9   MCV 97.6 97.1   MCH 32.0 32.5   MCHC 32.8* 33.5*   RDW 45.5 45.6   PLATELETCT 196 188   MPV 10.2 10.0     Recent Labs     03/05/21  0217 03/06/21  0455   SODIUM 136 138   POTASSIUM 4.1 4.2   CHLORIDE 102 102   CO2 25 22   GLUCOSE 93 104*   BUN 17 13   CREATININE 0.62 0.58   CALCIUM 8.5 8.9     Recent Labs     03/04/21  1556   APTT 25.3   INR 0.91               Imaging  No orders to display        Assessment/Plan  * Right frontal lobe mass  Assessment &  Plan  DDX: Primary vs. Metastatic tumor vs. Abscess less likely or lymphoma vs. Demyelinating pathology (MS)/immune-mediated process given the presence of oligoclonal bands on LP December 2020.  admit to inpatient neurosurgery; discussed with Dr. 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   -continue Lamictal, now 75mg BID  -seizure, aspiration, fall precautions    Headache  Assessment & Plan  -Tylenol PRN  -may escalate if needed    Hyperlipidemia- (present on admission)  Assessment & Plan   2019. 10 year ASCVD risk 1.4%. no statin indicated    Mixed anxiety and depressive disorder- (present on admission)  Assessment & Plan  -continue Desvenlafaxine        VTE prophylaxis: Lovenox

## 2021-03-06 NOTE — PROGRESS NOTES
Spiritual Care Note    Patient Information     Patient's Name: Melba Frye   MRN: [id number]    YOB: 1970   Age and Gender: 50 y.o. female   Service Area: Neurosciences   Room (and Bed): [place] [address]   Ethnicity or Nationality:     Primary Language: English   Hindu/Spiritual preference: Baptist   Place of Residence: [place]   Family/Friends/Others Present: Yes,  Dario   Clinical Team Present: No   Medical Diagnosis(-es)/Procedure(s): Brain mass   Code Status: Full Code    Date of Admission: 3/2/2021   Length of Stay: 4 days        Spiritual Care Provider Information:  Name of Spiritual Care Provider: Ilene Okeefe  Title of Spiritual Care Provider: Associate   Phone Number: [phone]  E-mail: [email or web address]  Total time : 15 minutes    Spiritual Screen Results:    Gen Nursing        Palliative Care         Encounter/Request Information  Encounter/Request Type   Visited With: Patient and family together  Nature of the Visit: Initial, On shift  General Visit: Yes  Referral From/ Origin of Request: Epic nursing  Referral To: Other /SCP    Religous Needs/Values  Hindu Needs Visit  Hindu Needs: Prayer    Spiritual Assessment     Spiritual Care Encounters    Observations/Symptoms: Anxious, Tearful    Interaction/Conversation: Pt states that she is having surgery on Tuesday to remove a brain mass causing unilaterial weakness; she is nervous both about the surgery and about what it might reveal. She has a strong support system of friends and family praying for her, including her  Dario, at the bedside, who reads her Bible verses to comfort her. The couple requested prayer and thanked the , who will refer this case to the colleague who normally covers this unit.    Assessment: Need, Distress    Need: Seeking Spiritual Assistance and Support    Distress: Anxiety about the Future, Coping, Upsetting Diagnosis/Prognosis    Interventions:  Compassionate presence, active listening, emotional support and validation,  Prayer.    Outcomes: Ability to Communicate with Truth and Honesty, Spiritual Comfort    Plan: (Refer to colleague)    Notes:

## 2021-03-06 NOTE — CARE PLAN
Problem: Communication  Goal: The ability to communicate needs accurately and effectively will improve  Outcome: PROGRESSING AS EXPECTED  Patient engaged in care plan and encouraged to communicate needs.     Problem: Pain Management  Goal: Pain level will decrease to patient's comfort goal  Outcome: PROGRESSING AS EXPECTED   Patient encouraged to express pain levels on scale of 0-10.

## 2021-03-06 NOTE — CARE PLAN
Problem: Safety  Goal: Will remain free from falls  Outcome: PROGRESSING AS EXPECTED  Note: Fall precautions in place.     Problem: Knowledge Deficit  Goal: Knowledge of disease process/condition, treatment plan, diagnostic tests, and medications will improve  Outcome: PROGRESSING AS EXPECTED  Note: Educated pt on POC pertaining to brain biopsy on 3/9.

## 2021-03-07 PROCEDURE — 770006 HCHG ROOM/CARE - MED/SURG/GYN SEMI*

## 2021-03-07 PROCEDURE — 700102 HCHG RX REV CODE 250 W/ 637 OVERRIDE(OP): Performed by: GENERAL PRACTICE

## 2021-03-07 PROCEDURE — A9270 NON-COVERED ITEM OR SERVICE: HCPCS | Performed by: GENERAL PRACTICE

## 2021-03-07 PROCEDURE — 99233 SBSQ HOSP IP/OBS HIGH 50: CPT | Performed by: STUDENT IN AN ORGANIZED HEALTH CARE EDUCATION/TRAINING PROGRAM

## 2021-03-07 PROCEDURE — 700111 HCHG RX REV CODE 636 W/ 250 OVERRIDE (IP): Performed by: PHYSICIAN ASSISTANT

## 2021-03-07 RX ADMIN — LAMOTRIGINE 75 MG: 100 TABLET ORAL at 18:39

## 2021-03-07 RX ADMIN — VENLAFAXINE 12.5 MG: 25 TABLET ORAL at 18:39

## 2021-03-07 RX ADMIN — ACETAMINOPHEN 650 MG: 325 TABLET, FILM COATED ORAL at 23:02

## 2021-03-07 RX ADMIN — ACETAMINOPHEN 650 MG: 325 TABLET, FILM COATED ORAL at 03:43

## 2021-03-07 RX ADMIN — LORAZEPAM 0.5 MG: 2 INJECTION INTRAMUSCULAR; INTRAVENOUS at 12:02

## 2021-03-07 RX ADMIN — LAMOTRIGINE 75 MG: 100 TABLET ORAL at 05:38

## 2021-03-07 RX ADMIN — VENLAFAXINE 12.5 MG: 25 TABLET ORAL at 05:37

## 2021-03-07 ASSESSMENT — PAIN DESCRIPTION - PAIN TYPE
TYPE: ACUTE PAIN

## 2021-03-07 ASSESSMENT — ENCOUNTER SYMPTOMS
CONSTITUTIONAL NEGATIVE: 1
PSYCHIATRIC NEGATIVE: 1
CARDIOVASCULAR NEGATIVE: 1
RESPIRATORY NEGATIVE: 1
EYES NEGATIVE: 1
MUSCULOSKELETAL NEGATIVE: 1
NEUROLOGICAL NEGATIVE: 1
GASTROINTESTINAL NEGATIVE: 1

## 2021-03-07 NOTE — PROGRESS NOTES
Neurosurgery Progress Note    Subjective:  No acute event over night  Mild BARNHART  Reports feeling stronger her left extremities  Ambulating in hallways with FWW      Exam:  A&O, face symmt, PERRLA  Speech fluent & appropriate  Left  4+,Left tricep 4/5 left IP/quad 4+, left foot drop TA/GS 3/4  No pronator drift      BP  Min: 123/84  Max: 156/95  Pulse  Av.3  Min: 76  Max: 86  Resp  Av  Min: 17  Max: 17  Temp  Av.3 °C (97.4 °F)  Min: 36 °C (96.8 °F)  Max: 36.6 °C (97.8 °F)  SpO2  Av.3 %  Min: 92 %  Max: 94 %    No data recorded    Recent Labs     21  0455   WBC 8.4 7.9   RBC 4.13* 4.21   HEMOGLOBIN 13.2 13.7   HEMATOCRIT 40.3 40.9   MCV 97.6 97.1   MCH 32.0 32.5   MCHC 32.8* 33.5*   RDW 45.5 45.6   PLATELETCT 196 188   MPV 10.2 10.0     Recent Labs     21  0217 21  0455   SODIUM 136 138   POTASSIUM 4.1 4.2   CHLORIDE 102 102   CO2 25 22   GLUCOSE 93 104*   BUN 17 13   CREATININE 0.62 0.58   CALCIUM 8.5 8.9     Recent Labs     21  1556   APTT 25.3   INR 0.91           Intake/Output       21 - 2159 21 - 21 0659       Total  Total       Intake    Total Intake -- -- -- -- -- --       Output    Urine  --  -- --  --  -- --    Number of Times Voided 1 x 2 x 3 x -- -- --    Total Output -- -- -- -- -- --       Net I/O     -- -- -- -- -- --          No intake or output data in the 24 hours ending 21 0953         • diphenhydrAMINE  25 mg Q6HRS PRN   • LORazepam  0.5 mg Q4HRS PRN   • senna-docusate  2 tablet BID    And   • polyethylene glycol/lytes  1 Packet QDAY PRN    And   • magnesium hydroxide  30 mL QDAY PRN    And   • bisacodyl  10 mg QDAY PRN   • acetaminophen  650 mg Q6HRS PRN   • [Held by provider] dexamethasone  4 mg Q6HRS   • lamoTRIgine  75 mg BID   • venlafaxine  12.5 mg BID       Assessment and Plan:  Hospital day #5 right parietal brain lesion, seizures  POD #    Prophylactic anticoagulation: no         Start date/time: hold for surgery    Neuro stable  Labs reviewed and stable  Plan for OR Tues, NPO at [facility] Monday  Hold anticoagulants, NSAIDs, ASA  Stealth brain MRI monday  lamictal for seizures, mgmt per neurology  CT CAP 2/21/21 negative  No steroids, lymphoma in differential  INR 0.91 normal on 3/4

## 2021-03-07 NOTE — CARE PLAN
Problem: Safety  Goal: Will remain free from injury  Outcome: PROGRESSING AS EXPECTED     Problem: Pain Management  Goal: Pain level will decrease to patient's comfort goal  Outcome: PROGRESSING AS EXPECTED     Problem: Psychosocial Needs:  Goal: Level of anxiety will decrease  Outcome: PROGRESSING AS EXPECTED

## 2021-03-07 NOTE — PROGRESS NOTES
Assumed pt care at 0700. Received bedside report .     Pt Alert and oriented x  4 .VSS. POC discussed and education provided on administered medications. All questions and concerns addressed. Fall precautions, seizure precautions,  hourly rounding and Q4 hour neuro checks in place.      Patient has waxing/waning of L leg, states L foot very weak today. Continues to be able to ambulate with front wheel walker.  at bedside for majority of day. Plan for surgery Tuesday. [facility] Brain MRI Monday.

## 2021-03-07 NOTE — PROGRESS NOTES
Hospital Medicine Daily Progress Note    Date of Service  3/7/2021    Chief Complaint  50 y.o. female with PMHx of Migraines, Depression and Seizure Disorder (Lamictal since 12/20) admitted 3/2/2021 with Seizures    Hospital Course  Mrs Frye is a 50 y.o. female with PMHx of migraines, depression, seizures (Lamictal since 12/2020 ) was transferred from [facility].  She presented to [facility] today morning having a seizure episode.  Patient's been describes that she had left for left upper and lower extremity shaking movements which spontaneously resolved within a minute.  Denies any loss of consciousness notes she did had post stroke confusion, and headache and blurred vision.  She is also complaining of some tingling and mild weakness in the left side no tongue biting or incontinence of urine or bowel.  Patient denies any recent changes in her medication except for increasing the dose Lamictal 75 mg twice daily starting tomorrow ( not yet taken higher dose).  Denies fever, chills, chest pain, shortness of breath,  no hematuria or hematochezia.  Denies any head trauma.  Patient states that she had previous EEG x 2 in 12/ 2020 and 02/2021 which were both unremarkable.  She has been following with Dr. Davidson, neurologist.  She had MRI of brain last month which did show T2 hyperintense intensity in cerebral white matter which was suspicious for either demyelination or nonspecific gliosis also had LPN last year December which showed oligoclonal bands. The Patient Has an outpatient appointment with Dr. Maxwell Mao in two days for progressively enlarging right frontal mass. The patient had a CT head w/o contrast 3/2/21: 3 cm mass within the high right frontal lobe in a parasagittal location. There is extensive vasogenic edema involving the right cerebral hemisphere with resultant mass effect on the right lateral ventricle and 7 mm of leftward midline shift. The amount of mass effect  and midline shift has increased somewhat from brain MRI 2/12/21. The patient denies any breast changes, masses or abnormal mammograms. Denies rectal bleeding, melanotic stools, hematuria, vaginal bleeding.  Denies any history of cancer.       Interval Problem Update  Patient examined at bedside  In no acute distress  No overnight complaints   Follow up Neurosurgery Recommendations-Dr. Mao    As per Dr. Mao, hold off on Steroids due to increased risk for lymphoma.   At this time, we are working towards getting an   operating room available for the patient.  She will need to have an open biopsy versus possible resection with phase reversal and Stealth navigation. This is going to take at least a few days before we can get into the OR.  Based on OR availability at this time, we will try to move her case   forward, but likely she will need surgery and the surgical availability at this time is not available until Tuesday of next week.  If we can accommodate   the case earlier, we will do so.  However, based on possible diagnosis of lymphoma, we would hold off on steroids for now.  The patient should be on   anti-seizure medication and she can have a formal diet.  She will also need preoperative laboratory workup and as soon as we know a solid date for the   surgical intervention, we will order a Stealth MRI with contrast for navigation and make the patient n.p.o. and notified both the primary team as well as the patient and her .    OR on 3/9 for Open Lesion Biopsy vs Resection  Follow up Stealth MRI    Consultants/Specialty  Neurology-Dr. Davidson  Neurosurgery-Dr. Mao    Code Status  Full Code    Disposition  TBD    Review of Systems  Review of Systems   Constitutional: Negative.    HENT: Negative.    Eyes: Negative.    Respiratory: Negative.    Cardiovascular: Negative.    Gastrointestinal: Negative.    Genitourinary: Negative.    Musculoskeletal: Negative.    Skin: Negative.    Neurological: Negative.     Endo/Heme/Allergies: Negative.    Psychiatric/Behavioral: Negative.         Physical Exam  Temp:  [36 °C (96.8 °F)-36.6 °C (97.8 °F)] 36.6 °C (97.8 °F)  Pulse:  [75-86] 76  Resp:  [17] 17  BP: (123-158)/(84-95) 123/84  SpO2:  [92 %-94 %] 92 %    Physical Exam  Vitals reviewed.   HENT:      Head: Normocephalic and atraumatic.      Right Ear: External ear normal.      Left Ear: External ear normal.      Nose: Nose normal.      Mouth/Throat:      Mouth: Mucous membranes are moist.   Eyes:      Pupils: Pupils are equal, round, and reactive to light.   Cardiovascular:      Rate and Rhythm: Normal rate and regular rhythm.      Pulses: Normal pulses.      Heart sounds: Normal heart sounds.   Pulmonary:      Effort: Pulmonary effort is normal.      Breath sounds: Normal breath sounds.   Abdominal:      General: Abdomen is flat.   Musculoskeletal:         General: Normal range of motion.      Cervical back: Neck supple.   Skin:     General: Skin is warm.      Capillary Refill: Capillary refill takes less than 2 seconds.   Neurological:      General: No focal deficit present.      Mental Status: She is alert.   Psychiatric:         Mood and Affect: Mood normal.         Fluids  No intake or output data in the 24 hours ending 03/07/21 0714    Laboratory  Recent Labs     03/05/21 0217 03/06/21  0455   WBC 8.4 7.9   RBC 4.13* 4.21   HEMOGLOBIN 13.2 13.7   HEMATOCRIT 40.3 40.9   MCV 97.6 97.1   MCH 32.0 32.5   MCHC 32.8* 33.5*   RDW 45.5 45.6   PLATELETCT 196 188   MPV 10.2 10.0     Recent Labs     03/05/21  0217 03/06/21  0455   SODIUM 136 138   POTASSIUM 4.1 4.2   CHLORIDE 102 102   CO2 25 22   GLUCOSE 93 104*   BUN 17 13   CREATININE 0.62 0.58   CALCIUM 8.5 8.9     Recent Labs     03/04/21  1556   APTT 25.3   INR 0.91               Imaging  No orders to display        Assessment/Plan  * Right frontal lobe mass  Assessment & Plan  DDX: Primary vs. Metastatic tumor vs. Abscess less likely or lymphoma vs. Demyelinating  pathology (MS)/immune-mediated process given the presence of oligoclonal bands on LP December 2020.  admit to inpatient neurosurgery; discussed with Dr. 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   -continue Lamictal, now 75mg BID  -seizure, aspiration, fall precautions    Headache  Assessment & Plan  -Tylenol PRN  -may escalate if needed    Hyperlipidemia- (present on admission)  Assessment & Plan   2019. 10 year ASCVD risk 1.4%. no statin indicated    Mixed anxiety and depressive disorder- (present on admission)  Assessment & Plan  -continue Desvenlafaxine        VTE prophylaxis: Lovenox

## 2021-03-07 NOTE — CARE PLAN
Problem: Communication  Goal: The ability to communicate needs accurately and effectively will improve  Outcome: PROGRESSING AS EXPECTED     Problem: Safety  Goal: Will remain free from injury  Outcome: PROGRESSING AS EXPECTED  Goal: Will remain free from falls  Outcome: PROGRESSING AS EXPECTED  Note: Patient A+Ox4, verbalizes understanding of calling for assistance before getting out of bed. Bed locked and in low position, call light within reach. Non slid socks on patient. Patient demonstrates appropriate use of call light.       Problem: Infection  Goal: Will remain free from infection  Outcome: PROGRESSING AS EXPECTED

## 2021-03-08 ENCOUNTER — APPOINTMENT (OUTPATIENT)
Dept: RADIOLOGY | Facility: MEDICAL CENTER | Age: 51
DRG: 025 | End: 2021-03-08
Attending: PHYSICIAN ASSISTANT
Payer: COMMERCIAL

## 2021-03-08 PROCEDURE — 70553 MRI BRAIN STEM W/O & W/DYE: CPT

## 2021-03-08 PROCEDURE — 700102 HCHG RX REV CODE 250 W/ 637 OVERRIDE(OP): Performed by: STUDENT IN AN ORGANIZED HEALTH CARE EDUCATION/TRAINING PROGRAM

## 2021-03-08 PROCEDURE — A9576 INJ PROHANCE MULTIPACK: HCPCS | Performed by: PHYSICIAN ASSISTANT

## 2021-03-08 PROCEDURE — 700111 HCHG RX REV CODE 636 W/ 250 OVERRIDE (IP): Performed by: PHYSICIAN ASSISTANT

## 2021-03-08 PROCEDURE — 700102 HCHG RX REV CODE 250 W/ 637 OVERRIDE(OP): Performed by: GENERAL PRACTICE

## 2021-03-08 PROCEDURE — A9270 NON-COVERED ITEM OR SERVICE: HCPCS | Performed by: STUDENT IN AN ORGANIZED HEALTH CARE EDUCATION/TRAINING PROGRAM

## 2021-03-08 PROCEDURE — 99232 SBSQ HOSP IP/OBS MODERATE 35: CPT | Mod: GC | Performed by: INTERNAL MEDICINE

## 2021-03-08 PROCEDURE — 770006 HCHG ROOM/CARE - MED/SURG/GYN SEMI*

## 2021-03-08 PROCEDURE — 700117 HCHG RX CONTRAST REV CODE 255: Performed by: PHYSICIAN ASSISTANT

## 2021-03-08 PROCEDURE — A9270 NON-COVERED ITEM OR SERVICE: HCPCS | Performed by: GENERAL PRACTICE

## 2021-03-08 RX ORDER — LAMOTRIGINE 100 MG/1
100 TABLET ORAL 2 TIMES DAILY
Status: DISCONTINUED | OUTPATIENT
Start: 2021-03-08 | End: 2021-03-15 | Stop reason: HOSPADM

## 2021-03-08 RX ORDER — LORAZEPAM 2 MG/ML
1-2 INJECTION INTRAMUSCULAR EVERY 6 HOURS PRN
Status: DISCONTINUED | OUTPATIENT
Start: 2021-03-08 | End: 2021-03-15 | Stop reason: HOSPADM

## 2021-03-08 RX ADMIN — VENLAFAXINE 12.5 MG: 25 TABLET ORAL at 05:11

## 2021-03-08 RX ADMIN — VENLAFAXINE 12.5 MG: 25 TABLET ORAL at 17:58

## 2021-03-08 RX ADMIN — LAMOTRIGINE 100 MG: 100 TABLET ORAL at 17:58

## 2021-03-08 RX ADMIN — ACETAMINOPHEN 650 MG: 325 TABLET, FILM COATED ORAL at 09:16

## 2021-03-08 RX ADMIN — LAMOTRIGINE 75 MG: 100 TABLET ORAL at 05:11

## 2021-03-08 RX ADMIN — DOCUSATE SODIUM 50 MG AND SENNOSIDES 8.6 MG 2 TABLET: 8.6; 5 TABLET, FILM COATED ORAL at 17:59

## 2021-03-08 RX ADMIN — LORAZEPAM 0.5 MG: 2 INJECTION INTRAMUSCULAR; INTRAVENOUS at 22:43

## 2021-03-08 RX ADMIN — GADOTERIDOL 20 ML: 279.3 INJECTION, SOLUTION INTRAVENOUS at 23:15

## 2021-03-08 ASSESSMENT — ENCOUNTER SYMPTOMS
WEAKNESS: 0
VOMITING: 0
CONSTIPATION: 0
BACK PAIN: 0
FEVER: 0
WEIGHT LOSS: 0
CHILLS: 0
NAUSEA: 0
TREMORS: 0
MEMORY LOSS: 0
SORE THROAT: 0
HEADACHES: 0
PALPITATIONS: 0
FOCAL WEAKNESS: 1
TINGLING: 0
ABDOMINAL PAIN: 0
FALLS: 0
COUGH: 0
SINUS PAIN: 0
DIZZINESS: 0
DEPRESSION: 0
WHEEZING: 0
NERVOUS/ANXIOUS: 0
BRUISES/BLEEDS EASILY: 0
DIARRHEA: 0
SHORTNESS OF BREATH: 0
SEIZURES: 1
NECK PAIN: 0

## 2021-03-08 ASSESSMENT — PAIN DESCRIPTION - PAIN TYPE
TYPE: ACUTE PAIN
TYPE: ACUTE PAIN

## 2021-03-08 NOTE — PROGRESS NOTES
Daily Progress Note:     Date of Service: 3/8/2021  Primary Team: [facility] Green Team   Attending: Oswald Quintero M.D.   Senior Resident: Dr. Merino  Intern: Dr. Waller  Contact:  [phone]    Chief Complaint:   Seizures, weakness    Subjective:  No acute events overnight.  Patient denies any seizure-like activity, vision changes, nausea, vomiting, fever, chills, bowel, urinary incontinence.  Is able to ambulate around.  Neurosurgical intervention planned for tomorrow.  N.p.o. after midnight.    Patient reporting having unwitnessed seizure around 1:30 PM.  Visited patient bedside in afternoon, reported having shaking in left leg similar to seizure on admission lasted 45 seconds.  Denies loss of consciousness, bowel incontinence, dysuria, vision changes, or any other symptoms.  1st seizure since being admitted.  Lamictal increased from 75 mg to 100 mg twice daily, prn Ativan added if another seizure is witnessed.    Consultants/Specialty:  Neurosurgery  Review of Systems:  Review of Systems   Constitutional: Negative for chills, fever, malaise/fatigue and weight loss.   HENT: Negative for congestion, ear discharge, ear pain, sinus pain and sore throat.    Respiratory: Negative for cough, shortness of breath and wheezing.    Cardiovascular: Negative for chest pain, palpitations and leg swelling.   Gastrointestinal: Negative for abdominal pain, constipation, diarrhea, nausea and vomiting.   Genitourinary: Negative for dysuria, frequency, hematuria and urgency.   Musculoskeletal: Negative for back pain, falls, joint pain and neck pain.   Skin: Negative for itching and rash.   Neurological: Positive for focal weakness (Loss of dorsiflexion left foot) and seizures. Negative for dizziness, tingling, tremors, weakness and headaches.   Endo/Heme/Allergies: Does not bruise/bleed easily.   Psychiatric/Behavioral: Negative for depression and memory loss. The patient is not nervous/anxious.        Objective Data:   Physical  Exam:   Vitals:   Temp:  [36 °C (96.8 °F)-36.7 °C (98.1 °F)] 36.4 °C (97.5 °F)  Pulse:  [70-92] 70  Resp:  [16-18] 16  BP: (127-159)/(75-90) 158/90  SpO2:  [91 %-96 %] 96 %    Physical Exam  Constitutional:       Appearance: Normal appearance.   HENT:      Head: Normocephalic and atraumatic.      Right Ear: External ear normal.      Left Ear: External ear normal.      Nose: Nose normal.      Mouth/Throat:      Mouth: Mucous membranes are moist.   Eyes:      Extraocular Movements: Extraocular movements intact.      Pupils: Pupils are equal, round, and reactive to light.   Cardiovascular:      Rate and Rhythm: Normal rate and regular rhythm.      Pulses: Normal pulses.      Heart sounds: Normal heart sounds.   Pulmonary:      Effort: Pulmonary effort is normal.      Breath sounds: Normal breath sounds.   Abdominal:      General: Abdomen is flat. Bowel sounds are normal.      Palpations: Abdomen is soft.   Musculoskeletal:         General: No swelling. Normal range of motion.      Cervical back: Normal range of motion. No rigidity. No muscular tenderness.      Right lower leg: No edema.      Left lower leg: No edema.      Comments: LLE strength 4/5, impaired dorsiflexion of foot, RLE strength 5/5.   Skin:     General: Skin is warm and dry.      Capillary Refill: Capillary refill takes less than 2 seconds.   Neurological:      General: No focal deficit present.      Mental Status: She is alert and oriented to person, place, and time.   Psychiatric:         Mood and Affect: Mood normal.         Behavior: Behavior normal.           Labs:   No results found for this or any previous visit (from the past 24 hour(s)).   Imaging:   MR-[facility] BRAIN WITH & W/O    (Results Pending)      Problem Representation: 50-year-old female with past medical history seizures December 2020, migraines, depression, hyperlipidemia who presented with a 45-second seizure of the left upper and lower extremities without loss of consciousness.  MRI  brain notable for 3 x 2.4 x 2.1 right frontal lobe lesion increasing in size compared to 2020.  Admitted for management of seizures with biopsy versus resection by neurosurgery planed for 3/9/21.    * Right frontal lobe mass- (present on admission)  Assessment & Plan  CT scan head shows right frontal mass 3 cm, MRI brain to 2121 shows right frontal lobe lesion 3 x 2.4 x 1.1 cm increased in size from MRI 12/20/2020.  DDX: Primary vs. Metastatic tumor vs. Abscess less likely or lymphoma vs. Demyelinating pathology (MS)/immune-mediated process given the presence of oligoclonal bands on LP December 2020.    Neurosurgery on board, appreciate recommendations  PLAN:  Neurosurgery plans for surgical intervention Tuesday, n.p.o. after midnight  -Decadron held per neurosurgery recommendations  -continue Lamictal  -N.p.o. after midnight  -Regular diet  -No blood thinners  -A.m. labs before surgery      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)- (present on admission)  Assessment & Plan  Followed by Neurology on outpatient basis, started Lamictal in December  Had seizure 3/8/2021 in afternoon 45 seconds - left leg shaking  -continue Lamictal, increase to 100mg BID  -prn Ativan for witnessed seizure  -seizure, aspiration, fall precautions    Headache- (present on admission)  Assessment & Plan  -Tylenol PRN  -may escalate if needed    Hyperlipidemia- (present on admission)  Assessment & Plan   2019. 10 year ASCVD risk 1.4%. no statin indicated    Mixed anxiety and depressive disorder- (present on admission)  Assessment & Plan  -continue Desvenlafaxine        * Code Status: FULL  * Diet: Regular, NPO at midnight for surgical intervention  * Lines/Tubes/Drains: PIV   IVF: none indicated  * Prophylaxis:        -- DVT:SCDs        -- GI: None  * PCP: Trinity Nguyen M.D.   * Social / DC planning: likely to TBD once acute illness(es) have been addressed  * Dispo: TBD after neurosurgical  intervention

## 2021-03-08 NOTE — PROGRESS NOTES
Neurosurgery Progress Note    Subjective:  No acute event over night  Mild BARNHART  Reports feeling stronger her left extremities    Exam:  A&O, face symmt, PERRLA  Speech fluent & appropriate  Left  4+,Left tricep 4/5 left IP/quad 4+, left foot drop TA/GS 3/4  No pronator drift      BP  Min: 142/83  Max: 159/80  Pulse  Av  Min: 70  Max: 92  Resp  Av.8  Min: 16  Max: 18  Temp  Av.4 °C (97.6 °F)  Min: 36 °C (96.8 °F)  Max: 36.7 °C (98.1 °F)  SpO2  Av %  Min: 91 %  Max: 96 %    No data recorded    Recent Labs     21  0455   WBC 7.9   RBC 4.21   HEMOGLOBIN 13.7   HEMATOCRIT 40.9   MCV 97.1   MCH 32.5   MCHC 33.5*   RDW 45.6   PLATELETCT 188   MPV 10.0     Recent Labs     21  0455   SODIUM 138   POTASSIUM 4.2   CHLORIDE 102   CO2 22   GLUCOSE 104*   BUN 13   CREATININE 0.58   CALCIUM 8.9               Intake/Output       21 07 - 21 0659 21 07 - 21 0659      5665-1442 3110-2734 Total 3091-7969 6441-8496 Total       Intake    Total Intake -- -- -- -- -- --       Output    Urine  --  -- --  --  -- --    Number of Times Voided 4 x 2 x 6 x -- -- --    Stool  --  -- --  --  -- --    Number of Times Stooled 1 x -- 1 x -- -- --    Total Output -- -- -- -- -- --       Net I/O     -- -- -- -- -- --          No intake or output data in the 24 hours ending 21 1352         • diphenhydrAMINE  25 mg Q6HRS PRN   • LORazepam  0.5 mg Q4HRS PRN   • senna-docusate  2 tablet BID    And   • polyethylene glycol/lytes  1 Packet QDAY PRN    And   • magnesium hydroxide  30 mL QDAY PRN    And   • bisacodyl  10 mg QDAY PRN   • acetaminophen  650 mg Q6HRS PRN   • [Held by provider] dexamethasone  4 mg Q6HRS   • lamoTRIgine  75 mg BID   • venlafaxine  12.5 mg BID       Assessment and Plan:  Hospital day #6 right parietal brain lesion, seizures  POD #   Prophylactic anticoagulation: no         Start date/time: hold for surgery    Neuro stable  Labs reviewed and stable  Plan for OR  Tues, NPO at [facility] tonight  Hold anticoagulants, NSAIDs, ASA  Stealth brain MRI today  lamictal for seizures, mgmt per neurology  CT CAP 2/21/21 negative  No steroids, lymphoma in differential  INR 0.91 normal on 3/4

## 2021-03-08 NOTE — DISCHARGE SUMMARY
Discharge Summary    Date of Admission: 3/2/2021  Date of Discharge: 3/15/2021  Discharging Attending: Oswald Quintero M.D.   Discharging Senior Resident: Dr. Merino  Discharging Intern: Dr. Waller    CHIEF COMPLAINT ON ADMISSION  Seizure Weakness    Reason for Admission  Focal Seizure, Weakness, and marked increase in right frontal lobe mass on subsequent imaging    Admission Date  3/2/2021    CODE STATUS  Full Code    HPI & HOSPITAL COURSE    This is a 50 y.o. female here with PMH seizures since 12/2020 on lamotragine per Neurology, migraine headache, depression, HLD, transferred from [facility] for simple partial seizure with spontaneous resolution. Patient had seizure 12/2020, imaging was performed at that time. MRI brain done on December 10,2020, showed 8*6mm enhancing lesion in right frontal medial area. Patient had repeat MRI brain 02/12/2021 which showed marked increase in right frontal lobe mass measuring 3*2.4*3.1 cm. CT chest/ abdomen/ pelvis on 02/21/2021 did not reveal any evidence of mass.     She was admitted and underwent imaging and brain biopsy. MRI stealth brain 03/09/2021 showed 3.5*2.5cm peripheral enhancing lesion in right medial parietal lobe with surrounding white matter edema suspicious for high grade neoplasm and 5mm midline shift. She underwent right craniotomy and open biopsy with neurosurgery, Dr. Mao on 03/09/2021, biopsy result showed Glioblastoma, Who Grade IV. Neurosurgery cleared patient for discharge to acute rehab with instruction to avoid ASA, NSAID, continue dexamethasone taper, and follow up in 2 week for staple removal or outpatient visit at [facility] Neurology group.    Radiation oncology and hematology/Oncology were consulted:  - Radiation Oncology, Dr Frye, was consulted. Discussed goals of care, prognosis and treatment plans with patient. Plans for CT mapping with plans to start radiation therapy within 3 week post surgery for optimal outcome. Additional, patient will be  presented at multidisplinary tumor board with Dr. Mao on Monday March 15, 2021.  - Hematology/Oncololgy, Dr. Cerda, was consulted and Dr Cerda discussed chemotherapy options and plan. Patient will be following with heme/onc outpatient  - [facility] Neurosurgery, Dr. Mao- patient cleared for rehab, staple removal in 2 week  - Palliative Care    At day of discharge patient was pleasant and stable. Vital signs were stable. She continues to have focal weakness in left legs.      Therefore, she is discharged in guarded and stable condition to an inpatient rehabilitation hospital.    The patient met 2-midnight criteria for an inpatient stay at the time of discharge.    PHYSICAL EXAM ON DISCHARGE  Temp:  [36 °C (96.8 °F)-36.2 °C (97.1 °F)] 36 °C (96.8 °F)  Pulse:  [56-82] 56  Resp:  [16-18] 16  BP: (124-140)/(70-79) 132/72  SpO2:  [93 %-96 %] 95 %        Discharge Date  03/15/2021    FOLLOW UP ITEMS POST DISCHARGE  - Nurse care manager was consulted during this admission  - Continue dexamethasone taper as prescribed  - Continue AED  - Continue multivitamins  - Avoid ASA/ NSAID/ blood thinner until cleared by neurosurgery  - Follow up with [facility] Neurosurgery Dr. Mao, in 2 week for staples removal and/ or patient will be seen in rehab  - Follow up with Neurology clinic outpatient for management of seizure medication  - Neurology follow up on 03/24/21 with Dr. Davidson  - Neurology follow up on 03/31/21 with Dr. Calixto  - Follow up with Dr. Frye in radiation therapy clinic, for further discussion on treatment, CT Mapping  - Radiation therapy clinic on 03/24/21 with Dr. Frye  - Call hematology oncology office to schedule follow up appointment    DISCHARGE DIAGNOSES  Principal Problem:    Glioblastoma of frontal lobe (HCC) POA: Yes  Active Problems:    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) POA: Yes    Demyelinating disease of central nervous system, unspecified (HCC) POA: Yes    Acute blood  loss as cause of postoperative anemia POA: No    Mixed anxiety and depressive disorder POA: Yes    Hyperlipidemia POA: Yes  Resolved Problems:    Headache POA: Yes      FOLLOW UP  Future Appointments   Date Time Provider Department Center   3/24/2021  8:40 AM Fady Davidson M.D. [facility] None   3/31/2021 10:00 AM Yohan Calixto M.D. [facility] None     Maxwell Mao M.D.  [address]  [place] [address]  [phone]    Call in 2 weeks      Michael Cerda M.D.  [address] [place] [address]  [place] [address]  [phone]    In 2 weeks      Trinity Nguyen M.D.  [address]   [place] [id number] [phone]    In 2 weeks      Edenilson Frye M.D.  [address]  [place] [id number] [phone]    In 2 weeks  for radiation therapy      MEDICATIONS ON DISCHARGE     Medication List      START taking these medications      Instructions   acetaminophen 325 MG Tabs  Commonly known as: Tylenol   Take 2 Tablets by mouth every 6 hours as needed (Mild Pain; (Pain scale 1-3); Temp greater than 100.5 F).  Dose: 650 mg     * dexamethasone 6 MG Tabs  Commonly known as: DECADRON   Take 1 tablet by mouth every 8 hours.  Dose: 6 mg     * dexamethasone 4 MG Tabs  Start taking on: March 16, 2021  Commonly known as: DECADRON   Take 1 tablet by mouth every 8 hours.  Dose: 4 mg     * dexamethasone 4 MG Tabs  Start taking on: March 17, 2021  Commonly known as: DECADRON   Take 1 tablet by mouth every 12 hours.  Dose: 4 mg     * dexamethasone 2 MG tablet  Start taking on: March 18, 2021  Commonly known as: DECADRON   Take 1 tablet by mouth every 8 hours.  Dose: 2 mg     * dexamethasone 2 MG tablet  Start taking on: March 19, 2021  Commonly known as: DECADRON   Take 1 tablet by mouth every 12 hours.  Dose: 2 mg     * dexamethasone 2 MG tablet  Start taking on: March 20, 2021  Commonly known as: DECADRON   Take 1 tablet by mouth every day.  Dose: 2 mg     docusate sodium 100 MG Caps   Take 100 mg by mouth 2 Times a Day.  Dose: 100  mg     fleet 7-19 GM/118ML Enem   Insert 133 mL into the rectum one time as needed (if sennosides, docusate, polyethylene glycol 3350, magnesium hydroxide, and/or bisacodyl ineffective or not ordered).  Dose: 1 Each     folic acid 1 MG Tabs  Start taking on: March 16, 2021  Commonly known as: FOLVITE   Take 1 tablet by mouth every day.  Dose: 1 mg     hydrALAZINE 20 MG/ML Soln  Commonly known as: APRESOLINE   Infuse 0.5 mL into a venous catheter every four hours as needed (SBP greater than 160 mmHg if labetalol ineffective, not ordered, or patient unable to tolerate.).  Dose: 10 mg     labetalol 5 MG/ML Soln  Commonly known as: NORMODYNE/TRANDATE   Infuse 2-4 mL into a venous catheter every four hours as needed (SBP over 160 mmHg.  Hold for HR less than 55.).  Dose: 10-20 mg     levETIRAcetam 500 MG Tabs  Commonly known as: KEPPRA   Take 1 tablet by mouth 2 Times a Day.  Dose: 500 mg     * LORazepam 2 MG/ML Soln  Commonly known as: ATIVAN   Infuse 0.5-1 mL into a venous catheter every 6 hours as needed (seizures) for up to 3 days.  Dose: 1-2 mg     * LORazepam 0.5 MG Tabs  Commonly known as: ATIVAN   Take 1 tablet by mouth every four hours as needed for Anxiety for up to 14 days.  Dose: 0.5 mg     venlafaxine 25 MG Tabs  Commonly known as: EFFEXOR   Take 0.5 Tablets by mouth 2 Times a Day.  Dose: 12.5 mg         * This list has 8 medication(s) that are the same as other medications prescribed for you. Read the directions carefully, and ask your doctor or other care provider to review them with you.            CHANGE how you take these medications      Instructions   lamoTRIgine 100 MG Tabs  What changed:   · medication strength  · when to take this  Commonly known as: LAMICTAL   Take 1 tablet by mouth 2 Times a Day.  Dose: 100 mg        CONTINUE taking these medications      Instructions   multivitamin Tabs   Take 1 tablet by mouth every evening.  Dose: 1 tablet     VITAMIN D PO   Take 1 Units by mouth every  evening.  Dose: 1 Units        STOP taking these medications    Desvenlafaxine Succinate ER 25 MG Tb24     Tretinoin 0.05 % Lotn  Commonly known as: Altreno            Allergies  Allergies   Allergen Reactions   • Bactrim      Fatigue, ringing of the ears, and headache        DIET  Orders Placed This Encounter   Procedures   • Diet Order Diet: Regular     Standing Status:   Standing     Number of Occurrences:   1     Order Specific Question:   Diet:     Answer:   Regular [1]       ACTIVITY  As tolerated and directed by rehab.  Weight bearing as tolerated    CONSULTATIONS  Dr. Morales with Critical Care consulted.  Treatment options were discussed and plan of care agreed upon., Dr. Mao with Neurosurgery consulted.  Treatment options were discussed and plan of care agreed upon. and Dr. Cerda with hematology/oncology and Dr. Frye with radiation oncology were consulted. Treatment options were discussed and plan of care agreed upon.    PROCEDURES  Right craniotomy and open biopsy with neurosurgery, Dr. Mao on 03/09/2021, no complications.    Time spent on discharge 45 minutes.

## 2021-03-09 ENCOUNTER — ANESTHESIA (OUTPATIENT)
Dept: SURGERY | Facility: MEDICAL CENTER | Age: 51
DRG: 025 | End: 2021-03-09
Payer: COMMERCIAL

## 2021-03-09 ENCOUNTER — ANESTHESIA EVENT (OUTPATIENT)
Dept: SURGERY | Facility: MEDICAL CENTER | Age: 51
DRG: 025 | End: 2021-03-09
Payer: COMMERCIAL

## 2021-03-09 ENCOUNTER — APPOINTMENT (OUTPATIENT)
Dept: RADIOLOGY | Facility: MEDICAL CENTER | Age: 51
DRG: 025 | End: 2021-03-09
Attending: PHYSICIAN ASSISTANT
Payer: COMMERCIAL

## 2021-03-09 LAB
ALBUMIN SERPL BCP-MCNC: 3.8 G/DL (ref 3.2–4.9)
ALBUMIN/GLOB SERPL: 1.4 G/DL
ALP SERPL-CCNC: 83 U/L (ref 30–99)
ALT SERPL-CCNC: 12 U/L (ref 2–50)
ANION GAP SERPL CALC-SCNC: 12 MMOL/L (ref 7–16)
AST SERPL-CCNC: 9 U/L (ref 12–45)
BASOPHILS # BLD AUTO: 0.5 % (ref 0–1.8)
BASOPHILS # BLD: 0.04 K/UL (ref 0–0.12)
BILIRUB SERPL-MCNC: 0.7 MG/DL (ref 0.1–1.5)
BUN SERPL-MCNC: 11 MG/DL (ref 8–22)
CALCIUM SERPL-MCNC: 9 MG/DL (ref 8.5–10.5)
CHLORIDE SERPL-SCNC: 103 MMOL/L (ref 96–112)
CO2 SERPL-SCNC: 23 MMOL/L (ref 20–33)
CREAT SERPL-MCNC: 0.64 MG/DL (ref 0.5–1.4)
EOSINOPHIL # BLD AUTO: 0.18 K/UL (ref 0–0.51)
EOSINOPHIL NFR BLD: 2.2 % (ref 0–6.9)
ERYTHROCYTE [DISTWIDTH] IN BLOOD BY AUTOMATED COUNT: 44.3 FL (ref 35.9–50)
GLOBULIN SER CALC-MCNC: 2.7 G/DL (ref 1.9–3.5)
GLUCOSE SERPL-MCNC: 100 MG/DL (ref 65–99)
HCT VFR BLD AUTO: 42.6 % (ref 37–47)
HGB BLD-MCNC: 14.3 G/DL (ref 12–16)
IMM GRANULOCYTES # BLD AUTO: 0.07 K/UL (ref 0–0.11)
IMM GRANULOCYTES NFR BLD AUTO: 0.9 % (ref 0–0.9)
INR PPP: 0.92 (ref 0.87–1.13)
LYMPHOCYTES # BLD AUTO: 2.14 K/UL (ref 1–4.8)
LYMPHOCYTES NFR BLD: 26.2 % (ref 22–41)
MAGNESIUM SERPL-MCNC: 2.2 MG/DL (ref 1.5–2.5)
MCH RBC QN AUTO: 32.1 PG (ref 27–33)
MCHC RBC AUTO-ENTMCNC: 33.6 G/DL (ref 33.6–35)
MCV RBC AUTO: 95.5 FL (ref 81.4–97.8)
MONOCYTES # BLD AUTO: 0.79 K/UL (ref 0–0.85)
MONOCYTES NFR BLD AUTO: 9.7 % (ref 0–13.4)
NEUTROPHILS # BLD AUTO: 4.94 K/UL (ref 2–7.15)
NEUTROPHILS NFR BLD: 60.5 % (ref 44–72)
NRBC # BLD AUTO: 0 K/UL
NRBC BLD-RTO: 0 /100 WBC
PATHOLOGY CONSULT NOTE: NORMAL
PHOSPHATE SERPL-MCNC: 3.3 MG/DL (ref 2.5–4.5)
PLATELET # BLD AUTO: 208 K/UL (ref 164–446)
PMV BLD AUTO: 10 FL (ref 9–12.9)
POTASSIUM SERPL-SCNC: 4.1 MMOL/L (ref 3.6–5.5)
PROT SERPL-MCNC: 6.5 G/DL (ref 6–8.2)
PROTHROMBIN TIME: 12.6 SEC (ref 12–14.6)
RBC # BLD AUTO: 4.46 M/UL (ref 4.2–5.4)
SODIUM SERPL-SCNC: 138 MMOL/L (ref 135–145)
WBC # BLD AUTO: 8.2 K/UL (ref 4.8–10.8)

## 2021-03-09 PROCEDURE — 160031 HCHG SURGERY MINUTES - 1ST 30 MINS LEVEL 5: Performed by: NEUROLOGICAL SURGERY

## 2021-03-09 PROCEDURE — 99291 CRITICAL CARE FIRST HOUR: CPT | Performed by: PSYCHIATRY & NEUROLOGY

## 2021-03-09 PROCEDURE — 160042 HCHG SURGERY MINUTES - EA ADDL 1 MIN LEVEL 5: Performed by: NEUROLOGICAL SURGERY

## 2021-03-09 PROCEDURE — 4A11X4G MONITORING OF PERIPHERAL NERVOUS ELECTRICAL ACTIVITY, INTRAOPERATIVE, EXTERNAL APPROACH: ICD-10-PCS | Performed by: NEUROLOGICAL SURGERY

## 2021-03-09 PROCEDURE — A9270 NON-COVERED ITEM OR SERVICE: HCPCS | Performed by: STUDENT IN AN ORGANIZED HEALTH CARE EDUCATION/TRAINING PROGRAM

## 2021-03-09 PROCEDURE — 501838 HCHG SUTURE GENERAL: Performed by: NEUROLOGICAL SURGERY

## 2021-03-09 PROCEDURE — 700111 HCHG RX REV CODE 636 W/ 250 OVERRIDE (IP): Performed by: PHYSICIAN ASSISTANT

## 2021-03-09 PROCEDURE — 99233 SBSQ HOSP IP/OBS HIGH 50: CPT | Mod: GC | Performed by: INTERNAL MEDICINE

## 2021-03-09 PROCEDURE — 70450 CT HEAD/BRAIN W/O DYE: CPT

## 2021-03-09 PROCEDURE — 700101 HCHG RX REV CODE 250: Performed by: PHYSICIAN ASSISTANT

## 2021-03-09 PROCEDURE — 95940 IONM IN OPERATNG ROOM 15 MIN: CPT | Performed by: NEUROLOGICAL SURGERY

## 2021-03-09 PROCEDURE — 80053 COMPREHEN METABOLIC PANEL: CPT

## 2021-03-09 PROCEDURE — 700111 HCHG RX REV CODE 636 W/ 250 OVERRIDE (IP): Performed by: NEUROLOGICAL SURGERY

## 2021-03-09 PROCEDURE — C1781 MESH (IMPLANTABLE): HCPCS | Performed by: NEUROLOGICAL SURGERY

## 2021-03-09 PROCEDURE — A9270 NON-COVERED ITEM OR SERVICE: HCPCS | Performed by: PHYSICIAN ASSISTANT

## 2021-03-09 PROCEDURE — 500331 HCHG COTTONOID, SURG PATTIE: Performed by: NEUROLOGICAL SURGERY

## 2021-03-09 PROCEDURE — 700111 HCHG RX REV CODE 636 W/ 250 OVERRIDE (IP)

## 2021-03-09 PROCEDURE — C1713 ANCHOR/SCREW BN/BN,TIS/BN: HCPCS | Performed by: NEUROLOGICAL SURGERY

## 2021-03-09 PROCEDURE — 700101 HCHG RX REV CODE 250: Performed by: ANESTHESIOLOGY

## 2021-03-09 PROCEDURE — 770022 HCHG ROOM/CARE - ICU (200)

## 2021-03-09 PROCEDURE — 95938 SOMATOSENSORY TESTING: CPT | Performed by: NEUROLOGICAL SURGERY

## 2021-03-09 PROCEDURE — 500889 HCHG PACK, NEURO: Performed by: NEUROLOGICAL SURGERY

## 2021-03-09 PROCEDURE — 700102 HCHG RX REV CODE 250 W/ 637 OVERRIDE(OP): Performed by: PHYSICIAN ASSISTANT

## 2021-03-09 PROCEDURE — 700102 HCHG RX REV CODE 250 W/ 637 OVERRIDE(OP): Performed by: GENERAL PRACTICE

## 2021-03-09 PROCEDURE — 700101 HCHG RX REV CODE 250: Performed by: NEUROLOGICAL SURGERY

## 2021-03-09 PROCEDURE — 700105 HCHG RX REV CODE 258: Performed by: ANESTHESIOLOGY

## 2021-03-09 PROCEDURE — 160009 HCHG ANES TIME/MIN: Performed by: NEUROLOGICAL SURGERY

## 2021-03-09 PROCEDURE — 160036 HCHG PACU - EA ADDL 30 MINS PHASE I: Performed by: NEUROLOGICAL SURGERY

## 2021-03-09 PROCEDURE — 88332 PATH CONSLTJ SURG EA ADD BLK: CPT | Mod: 59

## 2021-03-09 PROCEDURE — 160048 HCHG OR STATISTICAL LEVEL 1-5: Performed by: NEUROLOGICAL SURGERY

## 2021-03-09 PROCEDURE — 110454 HCHG SHELL REV 250: Performed by: NEUROLOGICAL SURGERY

## 2021-03-09 PROCEDURE — 160035 HCHG PACU - 1ST 60 MINS PHASE I: Performed by: NEUROLOGICAL SURGERY

## 2021-03-09 PROCEDURE — 00B70ZX EXCISION OF CEREBRAL HEMISPHERE, OPEN APPROACH, DIAGNOSTIC: ICD-10-PCS | Performed by: NEUROLOGICAL SURGERY

## 2021-03-09 PROCEDURE — 8E09XBH COMPUTER ASSISTED PROCEDURE OF HEAD AND NECK REGION, WITH MAGNETIC RESONANCE IMAGING: ICD-10-PCS | Performed by: NEUROLOGICAL SURGERY

## 2021-03-09 PROCEDURE — 85025 COMPLETE CBC W/AUTO DIFF WBC: CPT

## 2021-03-09 PROCEDURE — 83735 ASSAY OF MAGNESIUM: CPT

## 2021-03-09 PROCEDURE — 700111 HCHG RX REV CODE 636 W/ 250 OVERRIDE (IP): Performed by: ANESTHESIOLOGY

## 2021-03-09 PROCEDURE — 500075 HCHG BLADE, CLIPPER NEURO: Performed by: NEUROLOGICAL SURGERY

## 2021-03-09 PROCEDURE — 88307 TISSUE EXAM BY PATHOLOGIST: CPT | Mod: 59

## 2021-03-09 PROCEDURE — 84100 ASSAY OF PHOSPHORUS: CPT

## 2021-03-09 PROCEDURE — 700102 HCHG RX REV CODE 250 W/ 637 OVERRIDE(OP): Performed by: STUDENT IN AN ORGANIZED HEALTH CARE EDUCATION/TRAINING PROGRAM

## 2021-03-09 PROCEDURE — 85610 PROTHROMBIN TIME: CPT

## 2021-03-09 PROCEDURE — A9270 NON-COVERED ITEM OR SERVICE: HCPCS | Performed by: GENERAL PRACTICE

## 2021-03-09 PROCEDURE — 95939 C MOTOR EVOKED UPR&LWR LIMBS: CPT | Performed by: NEUROLOGICAL SURGERY

## 2021-03-09 PROCEDURE — 160002 HCHG RECOVERY MINUTES (STAT): Performed by: NEUROLOGICAL SURGERY

## 2021-03-09 PROCEDURE — 88331 PATH CONSLTJ SURG 1 BLK 1SPC: CPT | Mod: 91

## 2021-03-09 DEVICE — PLATE NC BURR HOLE COVER 10MM (6NCX6=36): Type: IMPLANTABLE DEVICE | Site: BRAIN | Status: FUNCTIONAL

## 2021-03-09 DEVICE — IMPLANTABLE DEVICE: Type: IMPLANTABLE DEVICE | Site: BRAIN | Status: FUNCTIONAL

## 2021-03-09 DEVICE — SCREW STRYK NC 1.5X5MM (6NCX40=240) (80EA/PK) CONSIGNED QTY 240 PRE-LOAD: Type: IMPLANTABLE DEVICE | Site: BRAIN | Status: FUNCTIONAL

## 2021-03-09 DEVICE — DUREPAIR 3X3: Type: IMPLANTABLE DEVICE | Site: BRAIN | Status: FUNCTIONAL

## 2021-03-09 RX ORDER — SODIUM CHLORIDE 9 MG/ML
INJECTION, SOLUTION INTRAVENOUS
Status: DISCONTINUED | OUTPATIENT
Start: 2021-03-09 | End: 2021-03-09 | Stop reason: SURG

## 2021-03-09 RX ORDER — HYDRALAZINE HYDROCHLORIDE 20 MG/ML
10 INJECTION INTRAMUSCULAR; INTRAVENOUS
Status: DISCONTINUED | OUTPATIENT
Start: 2021-03-09 | End: 2021-03-10

## 2021-03-09 RX ORDER — DIPHENHYDRAMINE HYDROCHLORIDE 50 MG/ML
12.5 INJECTION INTRAMUSCULAR; INTRAVENOUS
Status: DISCONTINUED | OUTPATIENT
Start: 2021-03-09 | End: 2021-03-09 | Stop reason: HOSPADM

## 2021-03-09 RX ORDER — LIDOCAINE HYDROCHLORIDE 20 MG/ML
INJECTION, SOLUTION EPIDURAL; INFILTRATION; INTRACAUDAL; PERINEURAL PRN
Status: DISCONTINUED | OUTPATIENT
Start: 2021-03-09 | End: 2021-03-09 | Stop reason: SURG

## 2021-03-09 RX ORDER — MORPHINE SULFATE 4 MG/ML
2 INJECTION, SOLUTION INTRAMUSCULAR; INTRAVENOUS
Status: DISCONTINUED | OUTPATIENT
Start: 2021-03-09 | End: 2021-03-09 | Stop reason: HOSPADM

## 2021-03-09 RX ORDER — CEFAZOLIN SODIUM 1 G/3ML
INJECTION, POWDER, FOR SOLUTION INTRAMUSCULAR; INTRAVENOUS
Status: DISCONTINUED | OUTPATIENT
Start: 2021-03-09 | End: 2021-03-09 | Stop reason: HOSPADM

## 2021-03-09 RX ORDER — CEFAZOLIN SODIUM 1 G/3ML
INJECTION, POWDER, FOR SOLUTION INTRAMUSCULAR; INTRAVENOUS PRN
Status: DISCONTINUED | OUTPATIENT
Start: 2021-03-09 | End: 2021-03-09 | Stop reason: SURG

## 2021-03-09 RX ORDER — HYDROMORPHONE HYDROCHLORIDE 1 MG/ML
0.5 INJECTION, SOLUTION INTRAMUSCULAR; INTRAVENOUS; SUBCUTANEOUS
Status: DISCONTINUED | OUTPATIENT
Start: 2021-03-09 | End: 2021-03-15 | Stop reason: HOSPADM

## 2021-03-09 RX ORDER — LABETALOL HYDROCHLORIDE 5 MG/ML
10 INJECTION, SOLUTION INTRAVENOUS
Status: DISCONTINUED | OUTPATIENT
Start: 2021-03-09 | End: 2021-03-10

## 2021-03-09 RX ORDER — OXYCODONE HYDROCHLORIDE 10 MG/1
10 TABLET ORAL
Status: DISCONTINUED | OUTPATIENT
Start: 2021-03-09 | End: 2021-03-15 | Stop reason: HOSPADM

## 2021-03-09 RX ORDER — LEVETIRACETAM 500 MG/5ML
INJECTION, SOLUTION, CONCENTRATE INTRAVENOUS PRN
Status: DISCONTINUED | OUTPATIENT
Start: 2021-03-09 | End: 2021-03-09 | Stop reason: SURG

## 2021-03-09 RX ORDER — CLONIDINE HYDROCHLORIDE 0.1 MG/1
0.1 TABLET ORAL EVERY 4 HOURS PRN
Status: DISCONTINUED | OUTPATIENT
Start: 2021-03-09 | End: 2021-03-15 | Stop reason: HOSPADM

## 2021-03-09 RX ORDER — DOCUSATE SODIUM 100 MG/1
100 CAPSULE, LIQUID FILLED ORAL 2 TIMES DAILY
Status: DISCONTINUED | OUTPATIENT
Start: 2021-03-09 | End: 2021-03-15 | Stop reason: HOSPADM

## 2021-03-09 RX ORDER — PHENYLEPHRINE HYDROCHLORIDE 10 MG/ML
INJECTION, SOLUTION INTRAMUSCULAR; INTRAVENOUS; SUBCUTANEOUS PRN
Status: DISCONTINUED | OUTPATIENT
Start: 2021-03-09 | End: 2021-03-09 | Stop reason: SURG

## 2021-03-09 RX ORDER — DIPHENHYDRAMINE HYDROCHLORIDE 50 MG/ML
25 INJECTION INTRAMUSCULAR; INTRAVENOUS EVERY 6 HOURS PRN
Status: DISCONTINUED | OUTPATIENT
Start: 2021-03-09 | End: 2021-03-10

## 2021-03-09 RX ORDER — ONDANSETRON 2 MG/ML
INJECTION INTRAMUSCULAR; INTRAVENOUS PRN
Status: DISCONTINUED | OUTPATIENT
Start: 2021-03-09 | End: 2021-03-09 | Stop reason: SURG

## 2021-03-09 RX ORDER — ENEMA 19; 7 G/133ML; G/133ML
1 ENEMA RECTAL
Status: DISCONTINUED | OUTPATIENT
Start: 2021-03-09 | End: 2021-03-15 | Stop reason: HOSPADM

## 2021-03-09 RX ORDER — FAMOTIDINE 20 MG/1
20 TABLET, FILM COATED ORAL 2 TIMES DAILY
Status: DISCONTINUED | OUTPATIENT
Start: 2021-03-09 | End: 2021-03-12

## 2021-03-09 RX ORDER — OXYCODONE HYDROCHLORIDE 5 MG/1
5 TABLET ORAL
Status: DISCONTINUED | OUTPATIENT
Start: 2021-03-09 | End: 2021-03-15 | Stop reason: HOSPADM

## 2021-03-09 RX ORDER — SCOLOPAMINE TRANSDERMAL SYSTEM 1 MG/1
1 PATCH, EXTENDED RELEASE TRANSDERMAL
Status: DISCONTINUED | OUTPATIENT
Start: 2021-03-09 | End: 2021-03-10

## 2021-03-09 RX ORDER — BISACODYL 10 MG
10 SUPPOSITORY, RECTAL RECTAL
Status: DISCONTINUED | OUTPATIENT
Start: 2021-03-09 | End: 2021-03-15 | Stop reason: HOSPADM

## 2021-03-09 RX ORDER — BUPIVACAINE HYDROCHLORIDE AND EPINEPHRINE 5; 5 MG/ML; UG/ML
INJECTION, SOLUTION PERINEURAL
Status: DISCONTINUED | OUTPATIENT
Start: 2021-03-09 | End: 2021-03-09 | Stop reason: HOSPADM

## 2021-03-09 RX ORDER — DIPHENHYDRAMINE HCL 25 MG
25 TABLET ORAL EVERY 6 HOURS PRN
Status: DISCONTINUED | OUTPATIENT
Start: 2021-03-09 | End: 2021-03-10

## 2021-03-09 RX ORDER — MORPHINE SULFATE 4 MG/ML
1 INJECTION, SOLUTION INTRAMUSCULAR; INTRAVENOUS
Status: DISCONTINUED | OUTPATIENT
Start: 2021-03-09 | End: 2021-03-09 | Stop reason: HOSPADM

## 2021-03-09 RX ORDER — DEXAMETHASONE SODIUM PHOSPHATE 4 MG/ML
INJECTION, SOLUTION INTRA-ARTICULAR; INTRALESIONAL; INTRAMUSCULAR; INTRAVENOUS; SOFT TISSUE PRN
Status: DISCONTINUED | OUTPATIENT
Start: 2021-03-09 | End: 2021-03-09 | Stop reason: SURG

## 2021-03-09 RX ORDER — DEXAMETHASONE SODIUM PHOSPHATE 4 MG/ML
10 INJECTION, SOLUTION INTRA-ARTICULAR; INTRALESIONAL; INTRAMUSCULAR; INTRAVENOUS; SOFT TISSUE EVERY 6 HOURS
Status: COMPLETED | OUTPATIENT
Start: 2021-03-09 | End: 2021-03-11

## 2021-03-09 RX ORDER — ACETAMINOPHEN 500 MG
1000 TABLET ORAL EVERY 6 HOURS
Status: DISCONTINUED | OUTPATIENT
Start: 2021-03-09 | End: 2021-03-11

## 2021-03-09 RX ORDER — DEXTROSE MONOHYDRATE, SODIUM CHLORIDE, AND POTASSIUM CHLORIDE 50; 1.49; 9 G/1000ML; G/1000ML; G/1000ML
INJECTION, SOLUTION INTRAVENOUS CONTINUOUS
Status: DISCONTINUED | OUTPATIENT
Start: 2021-03-09 | End: 2021-03-10

## 2021-03-09 RX ORDER — ONDANSETRON 2 MG/ML
4 INJECTION INTRAMUSCULAR; INTRAVENOUS
Status: DISCONTINUED | OUTPATIENT
Start: 2021-03-09 | End: 2021-03-09 | Stop reason: HOSPADM

## 2021-03-09 RX ORDER — CEFAZOLIN SODIUM 2 G/100ML
2 INJECTION, SOLUTION INTRAVENOUS EVERY 8 HOURS
Status: COMPLETED | OUTPATIENT
Start: 2021-03-09 | End: 2021-03-10

## 2021-03-09 RX ORDER — AMOXICILLIN 250 MG
1 CAPSULE ORAL NIGHTLY
Status: DISCONTINUED | OUTPATIENT
Start: 2021-03-09 | End: 2021-03-15 | Stop reason: HOSPADM

## 2021-03-09 RX ORDER — SODIUM CHLORIDE, SODIUM LACTATE, POTASSIUM CHLORIDE, CALCIUM CHLORIDE 600; 310; 30; 20 MG/100ML; MG/100ML; MG/100ML; MG/100ML
INJECTION, SOLUTION INTRAVENOUS
Status: DISCONTINUED | OUTPATIENT
Start: 2021-03-09 | End: 2021-03-09 | Stop reason: SURG

## 2021-03-09 RX ORDER — AMOXICILLIN 250 MG
1 CAPSULE ORAL
Status: DISCONTINUED | OUTPATIENT
Start: 2021-03-09 | End: 2021-03-15 | Stop reason: HOSPADM

## 2021-03-09 RX ORDER — POLYETHYLENE GLYCOL 3350 17 G/17G
1 POWDER, FOR SOLUTION ORAL 2 TIMES DAILY PRN
Status: DISCONTINUED | OUTPATIENT
Start: 2021-03-09 | End: 2021-03-15 | Stop reason: HOSPADM

## 2021-03-09 RX ORDER — ACETAMINOPHEN 500 MG
1000 TABLET ORAL EVERY 6 HOURS PRN
Status: DISCONTINUED | OUTPATIENT
Start: 2021-03-14 | End: 2021-03-11

## 2021-03-09 RX ORDER — ONDANSETRON 2 MG/ML
4 INJECTION INTRAMUSCULAR; INTRAVENOUS EVERY 4 HOURS PRN
Status: DISCONTINUED | OUTPATIENT
Start: 2021-03-09 | End: 2021-03-15 | Stop reason: HOSPADM

## 2021-03-09 RX ORDER — LEVETIRACETAM 5 MG/ML
500 INJECTION INTRAVASCULAR EVERY 12 HOURS
Status: DISCONTINUED | OUTPATIENT
Start: 2021-03-09 | End: 2021-03-10

## 2021-03-09 RX ORDER — DEXAMETHASONE SODIUM PHOSPHATE 4 MG/ML
4 INJECTION, SOLUTION INTRA-ARTICULAR; INTRALESIONAL; INTRAMUSCULAR; INTRAVENOUS; SOFT TISSUE
Status: DISCONTINUED | OUTPATIENT
Start: 2021-03-09 | End: 2021-03-10

## 2021-03-09 RX ORDER — HALOPERIDOL 5 MG/ML
1 INJECTION INTRAMUSCULAR
Status: DISCONTINUED | OUTPATIENT
Start: 2021-03-09 | End: 2021-03-09 | Stop reason: HOSPADM

## 2021-03-09 RX ORDER — MORPHINE SULFATE 10 MG/ML
5 INJECTION, SOLUTION INTRAMUSCULAR; INTRAVENOUS
Status: DISCONTINUED | OUTPATIENT
Start: 2021-03-09 | End: 2021-03-09 | Stop reason: HOSPADM

## 2021-03-09 RX ADMIN — PHENYLEPHRINE HYDROCHLORIDE 100 MCG: 10 INJECTION INTRAVENOUS at 08:56

## 2021-03-09 RX ADMIN — LIDOCAINE HYDROCHLORIDE 100 MG: 20 INJECTION, SOLUTION EPIDURAL; INFILTRATION; INTRACAUDAL at 07:40

## 2021-03-09 RX ADMIN — OXYCODONE 5 MG: 5 TABLET ORAL at 15:00

## 2021-03-09 RX ADMIN — OXYCODONE 10 MG: 5 TABLET ORAL at 21:08

## 2021-03-09 RX ADMIN — VENLAFAXINE 12.5 MG: 25 TABLET ORAL at 04:46

## 2021-03-09 RX ADMIN — SODIUM CHLORIDE, POTASSIUM CHLORIDE, SODIUM LACTATE AND CALCIUM CHLORIDE: 600; 310; 30; 20 INJECTION, SOLUTION INTRAVENOUS at 07:30

## 2021-03-09 RX ADMIN — HYDROMORPHONE HYDROCHLORIDE: 1 INJECTION, SOLUTION INTRAMUSCULAR; INTRAVENOUS; SUBCUTANEOUS at 17:08

## 2021-03-09 RX ADMIN — LAMOTRIGINE 100 MG: 100 TABLET ORAL at 04:48

## 2021-03-09 RX ADMIN — ACETAMINOPHEN 650 MG: 325 TABLET, FILM COATED ORAL at 01:34

## 2021-03-09 RX ADMIN — CEFAZOLIN SODIUM 2 G: 2 INJECTION, SOLUTION INTRAVENOUS at 23:50

## 2021-03-09 RX ADMIN — DEXAMETHASONE SODIUM PHOSPHATE 10 MG: 4 INJECTION, SOLUTION INTRA-ARTICULAR; INTRALESIONAL; INTRAMUSCULAR; INTRAVENOUS; SOFT TISSUE at 13:17

## 2021-03-09 RX ADMIN — SODIUM CHLORIDE: 9 INJECTION, SOLUTION INTRAVENOUS at 09:16

## 2021-03-09 RX ADMIN — FENTANYL CITRATE 100 MCG: 50 INJECTION, SOLUTION INTRAMUSCULAR; INTRAVENOUS at 10:22

## 2021-03-09 RX ADMIN — LAMOTRIGINE 100 MG: 100 TABLET ORAL at 18:49

## 2021-03-09 RX ADMIN — EPHEDRINE SULFATE 10 MG: 50 INJECTION INTRAMUSCULAR; INTRAVENOUS; SUBCUTANEOUS at 08:06

## 2021-03-09 RX ADMIN — MINERAL OIL, PETROLATUM 1 APPLICATION: 425; 573 OINTMENT OPHTHALMIC at 13:25

## 2021-03-09 RX ADMIN — ONDANSETRON 4 MG: 2 INJECTION INTRAMUSCULAR; INTRAVENOUS at 10:35

## 2021-03-09 RX ADMIN — FAMOTIDINE 20 MG: 20 TABLET ORAL at 18:45

## 2021-03-09 RX ADMIN — LEVETIRACETAM INJECTION 500 MG: 5 INJECTION INTRAVENOUS at 18:49

## 2021-03-09 RX ADMIN — ACETAMINOPHEN 1000 MG: 500 TABLET, FILM COATED ORAL at 18:44

## 2021-03-09 RX ADMIN — PROPOFOL 150 MCG/KG/MIN: 10 INJECTION, EMULSION INTRAVENOUS at 08:15

## 2021-03-09 RX ADMIN — CEFAZOLIN 2 G: 330 INJECTION, POWDER, FOR SOLUTION INTRAMUSCULAR; INTRAVENOUS at 07:45

## 2021-03-09 RX ADMIN — DEXAMETHASONE SODIUM PHOSPHATE 10 MG: 4 INJECTION, SOLUTION INTRA-ARTICULAR; INTRALESIONAL; INTRAMUSCULAR; INTRAVENOUS; SOFT TISSUE at 23:51

## 2021-03-09 RX ADMIN — POTASSIUM CHLORIDE, DEXTROSE MONOHYDRATE AND SODIUM CHLORIDE: 150; 5; 900 INJECTION, SOLUTION INTRAVENOUS at 13:17

## 2021-03-09 RX ADMIN — POTASSIUM CHLORIDE, DEXTROSE MONOHYDRATE AND SODIUM CHLORIDE: 150; 5; 900 INJECTION, SOLUTION INTRAVENOUS at 23:59

## 2021-03-09 RX ADMIN — SUGAMMADEX 200 MG: 100 INJECTION, SOLUTION INTRAVENOUS at 10:42

## 2021-03-09 RX ADMIN — VENLAFAXINE 12.5 MG: 25 TABLET ORAL at 20:12

## 2021-03-09 RX ADMIN — ROCURONIUM BROMIDE 50 MG: 10 INJECTION, SOLUTION INTRAVENOUS at 07:40

## 2021-03-09 RX ADMIN — ROCURONIUM BROMIDE 20 MG: 10 INJECTION, SOLUTION INTRAVENOUS at 10:10

## 2021-03-09 RX ADMIN — ALFENTANIL HYDROCHLORIDE 600 MCG: 500 INJECTION INTRAVENOUS at 10:54

## 2021-03-09 RX ADMIN — ALFENTANIL HYDROCHLORIDE 200 MCG: 500 INJECTION INTRAVENOUS at 10:41

## 2021-03-09 RX ADMIN — LEVETIRACETAM 1000 MG: 100 INJECTION INTRAVENOUS at 08:15

## 2021-03-09 RX ADMIN — ACETAMINOPHEN 1000 MG: 500 TABLET, FILM COATED ORAL at 13:17

## 2021-03-09 RX ADMIN — ALFENTANIL HYDROCHLORIDE 200 MCG: 500 INJECTION INTRAVENOUS at 10:42

## 2021-03-09 RX ADMIN — FENTANYL CITRATE 50 MCG: 50 INJECTION, SOLUTION INTRAMUSCULAR; INTRAVENOUS at 10:21

## 2021-03-09 RX ADMIN — PHENYLEPHRINE HYDROCHLORIDE 100 MCG: 10 INJECTION INTRAVENOUS at 10:37

## 2021-03-09 RX ADMIN — DEXAMETHASONE SODIUM PHOSPHATE 4 MG: 4 INJECTION, SOLUTION INTRA-ARTICULAR; INTRALESIONAL; INTRAMUSCULAR; INTRAVENOUS; SOFT TISSUE at 10:35

## 2021-03-09 RX ADMIN — DEXAMETHASONE SODIUM PHOSPHATE 10 MG: 4 INJECTION, SOLUTION INTRA-ARTICULAR; INTRALESIONAL; INTRAMUSCULAR; INTRAVENOUS; SOFT TISSUE at 18:46

## 2021-03-09 RX ADMIN — LORAZEPAM 0.5 MG: 2 INJECTION INTRAMUSCULAR; INTRAVENOUS at 04:46

## 2021-03-09 RX ADMIN — CEFAZOLIN SODIUM 2 G: 2 INJECTION, SOLUTION INTRAVENOUS at 16:36

## 2021-03-09 RX ADMIN — ACETAMINOPHEN 1000 MG: 500 TABLET, FILM COATED ORAL at 23:50

## 2021-03-09 RX ADMIN — FENTANYL CITRATE 100 MCG: 50 INJECTION, SOLUTION INTRAMUSCULAR; INTRAVENOUS at 10:17

## 2021-03-09 ASSESSMENT — PATIENT HEALTH QUESTIONNAIRE - PHQ9
1. LITTLE INTEREST OR PLEASURE IN DOING THINGS: NOT AT ALL
2. FEELING DOWN, DEPRESSED, IRRITABLE, OR HOPELESS: NOT AT ALL
SUM OF ALL RESPONSES TO PHQ9 QUESTIONS 1 AND 2: 0

## 2021-03-09 ASSESSMENT — ENCOUNTER SYMPTOMS
EYES NEGATIVE: 1
SHORTNESS OF BREATH: 0
FOCAL WEAKNESS: 1
SORE THROAT: 0
WEIGHT LOSS: 0
BACK PAIN: 0
FALLS: 0
NECK PAIN: 0
PALPITATIONS: 0
SINUS PAIN: 0
FEVER: 0
MUSCULOSKELETAL NEGATIVE: 1
TREMORS: 0
ABDOMINAL PAIN: 0
DEPRESSION: 0
WEAKNESS: 0
CONSTITUTIONAL NEGATIVE: 1
CARDIOVASCULAR NEGATIVE: 1
CONSTIPATION: 0
MEMORY LOSS: 0
WHEEZING: 0
DIARRHEA: 0
DIZZINESS: 0
HEADACHES: 0
SEIZURES: 1
CHILLS: 0
VOMITING: 0
RESPIRATORY NEGATIVE: 1
NAUSEA: 0
BRUISES/BLEEDS EASILY: 0
GASTROINTESTINAL NEGATIVE: 1
COUGH: 0
TINGLING: 0
NERVOUS/ANXIOUS: 0

## 2021-03-09 ASSESSMENT — PAIN DESCRIPTION - PAIN TYPE
TYPE: ACUTE PAIN
TYPE: ACUTE PAIN;SURGICAL PAIN
TYPE: ACUTE PAIN
TYPE: ACUTE PAIN;SURGICAL PAIN
TYPE: ACUTE PAIN
TYPE: ACUTE PAIN

## 2021-03-09 ASSESSMENT — FIBROSIS 4 INDEX: FIB4 SCORE: 0.64

## 2021-03-09 NOTE — ANESTHESIA PREPROCEDURE EVALUATION
Relevant Problems   NEURO   (+) Headache   (+) 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)      CARDIAC   (+) Complicated migraine       Physical Exam    Airway   Mallampati: II  TM distance: >3 FB  Neck ROM: full       Cardiovascular - normal exam  Rhythm: regular  Rate: normal  (-) murmur     Dental - normal exam           Pulmonary - normal exam  Breath sounds clear to auscultation     Abdominal    Neurological - normal exam                 Anesthesia Plan    ASA 3       Plan - general       Airway plan will be ETT          Induction: intravenous      Pertinent diagnostic labs and testing reviewed    Informed Consent:    Anesthetic plan and risks discussed with patient.

## 2021-03-09 NOTE — OP REPORT
DATE OF SERVICE:  03/09/2021     SURGEON:  Maxwell Mao MD     FIRST ASSISTANT:  Majo Garcia PA-C     PREOPERATIVE DIAGNOSIS:  Right frontoparietal mesial brain lesion.     POSTOPERATIVE DIAGNOSES:  Right frontoparietal mesial brain lesion with a   tentative diagnosis of glioblastoma.     SURGERIES:  1.  Right craniotomy for open biopsy of brain lesion.  2.  Use of Stealth stereotactic guidance for localization of brain lesion as   well as planning of craniotomy.  3.  Use of intraoperative ultrasound for identification of the mass.  4.  Use of neuromonitoring with phase reversal and cortical mapping with   cortical probe stimulator for identification of the motor strip.  5.  Modifier 22.  This procedure was at least 50% more challenging than the   average lesion as the patient's tumor that was identified was completely   contained within the patient's motor strip on the right side requiring us to   do an interhemispheric approach and map her leg and then biopsied tissue that   likely correlated with her lower extremity function on the left side.     SPECIMENS:  Sent for both permanent and frozen came back prelim diagnosis as   abnormal tissue with most likely diagnosis as glio-based.     COMPLICATIONS:  None.     ESTIMATED BLOOD LOSS:  Less than 200 mL.     BRIEF HISTORY OF PRESENT ILLNESS:  This is a woman born in 1970, who presented   with seizures and hemiparesis on the left side.  Imaging confirmed a large   2.5 cm rim-enhancing lesion that was contained within the parenchyma of the   right frontoparietal junction concerning for location in the motor strip.  She   was placed on anti-seizure medication, taken off of steroids and then planned   for surgical resection.     CONSENT:  Consent was obtained from the patient apprising her and her    of the risks, complications, indications of the surgery, which included, but   were not limited to paralysis, infection and need for more surgery.  She    agreed and consented.     PROCEDURE IN DETAIL:  The patient was brought into the operating room where   she was placed under general anesthesia with endotracheal intubation.  She had   then a right bump placed and her head was turned to the left with her head   tucked slightly so that the apices of her head was the most proud aspect of   the approach.  We then registered the Stealth navigation, planned our incision   and then did a strip shave in a U-shaped style incision pedicalized towards   the posterior aspect of her head.  We then performed a surgical timeout   confirming the correct side, site, procedure and patient with all faculty and   staff agreeing.  We infiltrated her scalp with lidocaine with epinephrine and   then incised the dermis using a 10 blade surgical scalpel and then reflected a   cutaneous flap posteriorly and then tagged this using a stitch, rubber band   and snap to a Urrutia retractor.  Once this was completed, we then brought   the Stealth back into the field and identified the sagittal sinus and then   made 2 kelsey holes along the sagittal sinus anteriorly and posteriorly to the   patient's lesion.  I then made a lateral bur hole, stripped the dura   internally and then turned a craniotomy flap passed this off for plating.  We   then made several dural tack-ups and then tagged the dura to the bone using   4-0 Nurolon.  After that was completed, we then irrigated all the bone dust   out of the wound, used Stealth navigation to determine the location of the   sinus and then skeletonized the superior sagittal sinus using the acorn bur.    After that was done, we then made a U-shaped flap and the dura pedicalized   towards the superior sagittal sinus and then brought our neuro monitoring into   the field with a 6-lead strip.  After 3 phase reversals, we were able to   identify the motor strip in the same proximity as the patient's tumor.  Once   we had appropriate gyrus localized for  motor, we brought the ultrasound into   the field.  We were able to see hypercellularity in the exact same location as   both the Stealth correlating with the tumor as was the phase reversal for the   motor strip.  We then used the monopolar probe to stimulate the same cortex   and determined that the patient's tumor was in fact in the same gyrus as   stimulated for her arm, her leg and the residual motor groups that were being   monitored.  We therefore knew that the enhancing lesion was contained   completely within the patient's motor strip with no components of it that were   outside that gyrus.  Because of this, the dysfunction in her leg, we did   interhemispheric approach by using gentle retraction on the mesial temporal or   mesial frontal lobe to pull it away from the falx cerebri and then dissected   down the falx cerebri until we could see the mesial part of the motor strip.    We stimulated this and it stimulated as the patient's leg.  Unfortunately,   with both ultrasound guidance as well as with Stealth, we confirmed that this   location where there was stimulating as her leg did correlate with where the   tumor was located.  We ended up doing 2 separate biopsy specimens, the first   one returned nondiagnostic, the second one returned as diagnostic for abnormal   tissue most likely glioblastoma.  Given that this was located within the   patient's motor strip, we did not remove more tissue.  We were able to send   permanent specimen as well for pathology.  We ran motors throughout the entire   time that we were doing this through transcranial motors and she had stable   signals throughout despite the knowledge that we had our corticectomy through   the patient's mesial motor strip.  Once this was completed, we then checked   for appropriate hemostasis.  We did a dural underlay and so the native dura   back over and then replated the bone in the wound.  All counts were correct   x2.  This case deserves a  modifier 22 as we were resecting directly within the   motor strip requiring significant numbers of modalities to check including   neuromonitoring, direct motor mapping, ultrasound as well as Stealth   navigation to confirm our location and that we were not arbitrarily resecting   or biopsying an area that was not involved with the tumor, but still in the   motor strip.  My physician assistant was also necessary.  She expedited the   case as possible, kept the field dry and assisting with the direct biopsy.        ______________________________  MD CRISS Nguyễn/NOY    DD:  03/09/2021 12:06  DT:  03/09/2021 13:02    Job#:  [id number]

## 2021-03-09 NOTE — PROGRESS NOTES
Daily Progress Note:     Date of Service: 3/9/2021  Primary Team: [facility] IM Green Team   Attending: Oswald Quintero M.D.   Senior Resident: Dr. Merino  Intern: Dr. Waller  Contact:  [phone]    Chief Complaint:   Seizures, weakness    Subjective:  Patient seen and examined in preop before right craniotomy this morning.  Patient reported having unwitnessed seizure around 1:30 PM yesterday afternoon.  Denied loss of consciousness, bowel incontinence, dysuria, vision changes, or any other symptoms.  No seizure-like activity overnight.    Neurosurgery plans for right craniotomy for tumor resection today.  Consultants/Specialty:  Neurosurgery  Review of Systems:  Review of Systems   Constitutional: Negative for chills, fever, malaise/fatigue and weight loss.   HENT: Negative for congestion, ear discharge, ear pain, sinus pain and sore throat.    Respiratory: Negative for cough, shortness of breath and wheezing.    Cardiovascular: Negative for chest pain, palpitations and leg swelling.   Gastrointestinal: Negative for abdominal pain, constipation, diarrhea, nausea and vomiting.   Genitourinary: Negative for dysuria, frequency, hematuria and urgency.   Musculoskeletal: Negative for back pain, falls, joint pain and neck pain.   Skin: Negative for itching and rash.   Neurological: Positive for focal weakness (Loss of dorsiflexion left foot) and seizures. Negative for dizziness, tingling, tremors, weakness and headaches.   Endo/Heme/Allergies: Does not bruise/bleed easily.   Psychiatric/Behavioral: Negative for depression and memory loss. The patient is not nervous/anxious.        Objective Data:   Physical Exam:   Vitals:   Temp:  [36.2 °C (97.1 °F)-36.6 °C (97.9 °F)] 36.3 °C (97.3 °F)  Pulse:  [76-87] 78  Resp:  [16-18] 16  BP: (128-152)/(86-97) 150/96  SpO2:  [93 %-96 %] 96 %    Physical Exam  Constitutional:       Appearance: Normal appearance.   HENT:      Head: Normocephalic and atraumatic.      Right Ear: External ear  normal.      Left Ear: External ear normal.      Nose: Nose normal.      Mouth/Throat:      Mouth: Mucous membranes are moist.   Eyes:      Extraocular Movements: Extraocular movements intact.      Pupils: Pupils are equal, round, and reactive to light.   Cardiovascular:      Rate and Rhythm: Normal rate and regular rhythm.      Pulses: Normal pulses.      Heart sounds: Normal heart sounds.   Pulmonary:      Effort: Pulmonary effort is normal.      Breath sounds: Normal breath sounds.   Abdominal:      General: Abdomen is flat. Bowel sounds are normal.      Palpations: Abdomen is soft.   Musculoskeletal:         General: No swelling. Normal range of motion.      Cervical back: Normal range of motion. No rigidity. No muscular tenderness.      Right lower leg: No edema.      Left lower leg: No edema.      Comments: LLE strength 4/5, impaired dorsiflexion of foot, RLE strength 5/5.   Skin:     General: Skin is warm and dry.      Capillary Refill: Capillary refill takes less than 2 seconds.   Neurological:      General: No focal deficit present.      Mental Status: She is alert and oriented to person, place, and time.   Psychiatric:         Mood and Affect: Mood normal.         Behavior: Behavior normal.           Labs:   Recent Results (from the past 24 hour(s))   CBC WITH DIFFERENTIAL    Collection Time: 03/09/21  5:35 AM   Result Value Ref Range    WBC 8.2 4.8 - 10.8 K/uL    RBC 4.46 4.20 - 5.40 M/uL    Hemoglobin 14.3 12.0 - 16.0 g/dL    Hematocrit 42.6 37.0 - 47.0 %    MCV 95.5 81.4 - 97.8 fL    MCH 32.1 27.0 - 33.0 pg    MCHC 33.6 33.6 - 35.0 g/dL    RDW 44.3 35.9 - 50.0 fL    Platelet Count 208 164 - 446 K/uL    MPV 10.0 9.0 - 12.9 fL    Neutrophils-Polys 60.50 44.00 - 72.00 %    Lymphocytes 26.20 22.00 - 41.00 %    Monocytes 9.70 0.00 - 13.40 %    Eosinophils 2.20 0.00 - 6.90 %    Basophils 0.50 0.00 - 1.80 %    Immature Granulocytes 0.90 0.00 - 0.90 %    Nucleated RBC 0.00 /100 WBC    Neutrophils (Absolute)  4.94 2.00 - 7.15 K/uL    Lymphs (Absolute) 2.14 1.00 - 4.80 K/uL    Monos (Absolute) 0.79 0.00 - 0.85 K/uL    Eos (Absolute) 0.18 0.00 - 0.51 K/uL    Baso (Absolute) 0.04 0.00 - 0.12 K/uL    Immature Granulocytes (abs) 0.07 0.00 - 0.11 K/uL    NRBC (Absolute) 0.00 K/uL   Comp Metabolic Panel    Collection Time: 03/09/21  5:35 AM   Result Value Ref Range    Sodium 138 135 - 145 mmol/L    Potassium 4.1 3.6 - 5.5 mmol/L    Chloride 103 96 - 112 mmol/L    Co2 23 20 - 33 mmol/L    Anion Gap 12.0 7.0 - 16.0    Glucose 100 (H) 65 - 99 mg/dL    Bun 11 8 - 22 mg/dL    Creatinine 0.64 0.50 - 1.40 mg/dL    Calcium 9.0 8.5 - 10.5 mg/dL    AST(SGOT) 9 (L) 12 - 45 U/L    ALT(SGPT) 12 2 - 50 U/L    Alkaline Phosphatase 83 30 - 99 U/L    Total Bilirubin 0.7 0.1 - 1.5 mg/dL    Albumin 3.8 3.2 - 4.9 g/dL    Total Protein 6.5 6.0 - 8.2 g/dL    Globulin 2.7 1.9 - 3.5 g/dL    A-G Ratio 1.4 g/dL   Prothrombin Time    Collection Time: 03/09/21  5:35 AM   Result Value Ref Range    PT 12.6 12.0 - 14.6 sec    INR 0.92 0.87 - 1.13   MAGNESIUM    Collection Time: 03/09/21  5:35 AM   Result Value Ref Range    Magnesium 2.2 1.5 - 2.5 mg/dL   PHOSPHORUS    Collection Time: 03/09/21  5:35 AM   Result Value Ref Range    Phosphorus 3.3 2.5 - 4.5 mg/dL   ESTIMATED GFR    Collection Time: 03/09/21  5:35 AM   Result Value Ref Range    GFR If African American >60 >60 mL/min/1.73 m 2    GFR If Non African American >60 >60 mL/min/1.73 m 2      Imaging:   MR-STEALTH BRAIN WITH & W/O    (Results Pending)      Problem Representation: 50-year-old female with past medical history seizures December 2020, migraines, depression, hyperlipidemia who presented with a 45-second seizure of the left upper and lower extremities without loss of consciousness.  MRI brain notable for 3 x 2.4 x 2.1 right frontal lobe lesion increasing in size compared to 2020.  Admitted for management of seizures with right craniotomy by neurosurgery planed for 3/9/21.    * Right frontal  lobe mass- (present on admission)  Assessment & Plan  CT scan head shows right frontal mass 3 cm, MRI brain to 2121 shows right frontal lobe lesion 3 x 2.4 x 1.1 cm increased in size from MRI 12/20/2020.  DDX: Primary vs. Metastatic tumor vs. Abscess less likely or lymphoma vs. Demyelinating pathology (MS)/immune-mediated process given the presence of oligoclonal bands on LP December 2020.    Neurosurgery on board, appreciate recommendations  PLAN:  Neurosurgery plans for surgical intervention today 3/9/21  -Decadron held per neurosurgery recommendations  -continue Lamictal  -N.p.o. 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 10 year ASCVD risk 1.4%. no statin indicated    Mixed anxiety and depressive disorder- (present on admission)  Assessment & Plan  -continue Desvenlafaxine        * Code Status: FULL  * Diet: Currently NPO due to surgery 3/9/21  * Lines/Tubes/Drains: PIV   IVF: none indicated  * Prophylaxis:        -- DVT:SCDs        -- GI: None  * PCP: Trinity Nguyen M.D.   * Social / DC planning: likely to TBD once acute illness(es) have been addressed  * Dispo: TBD after neurosurgical intervention

## 2021-03-09 NOTE — ANESTHESIA POSTPROCEDURE EVALUATION
Patient: Melba Frye    Procedure Summary     Date: 03/09/21 Room / Location: [facility] 03 / SURGERY [facility]    Anesthesia Start: 0730 Anesthesia Stop: 1110    Procedure: IRGHT CRANIOTOMY, USING FRAMELESS STEREOTAXY - FOR OPEN BIOPSY WITH PHASE REVERSAL (Right Head) Diagnosis: (Primary Brain Tumor)    Surgeons: Maxwell Mao M.D. Responsible Provider: oJnes Orourke M.D.    Anesthesia Type: general ASA Status: 3          Final Anesthesia Type: general  Last vitals  BP   Blood Pressure: 141/72, Arterial BP: 132/120    Temp   36.3 °C (97.3 °F)    Pulse   78   Resp   (!) 24    SpO2   94 %      Anesthesia Post Evaluation    Patient location during evaluation: PACU  Patient participation: complete - patient participated  Level of consciousness: awake and alert    Airway patency: patent  Anesthetic complications: no  Cardiovascular status: hemodynamically stable  Respiratory status: acceptable  Hydration status: euvolemic    PONV: none          There were no known complications for this encounter.     Nurse Pain Score: 0 (NPRS)

## 2021-03-09 NOTE — CONSULTS
Critical Care Consultation    Date of consult: 3/9/2021    Referring Physician  Oswald Quintero M.D.    Reason for Consultation  Post op crani    History of Presenting Illness  50 y.o. female who presented 3/2/2021 with seizure and subsequent MRI revealed a right frontal mass. No evidence of met on CT C/A/P. Arrives to the ICU post crani with biopsy sent. Per report unable to undergo much in the way of debulking given the adherent nature of the mass in the motor cortex. Also noted to have a hx of seizures manifest as left leg shaking. Currently on Lamictal and Keppra.    Code Status  Full Code    Review of Systems  Review of Systems   Constitutional: Negative.    HENT: Negative.    Eyes: Negative.    Respiratory: Negative.    Cardiovascular: Negative.    Gastrointestinal: Negative.    Genitourinary: Negative.    Musculoskeletal: Negative.    Skin: Negative.    Neurological: Positive for focal weakness.       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    Surgical History   has a past surgical history that includes cystoscopy (10/15/08); laparoscopy (5/28/2010); lymph node sampling (5/28/2010); debulking (5/28/2010); appendectomy (1981); other (1984); vaginal hysterectomy scope total (10/15/08); myringotomy (8/27/2010); tonsillectomy and adenoidectomy; and craniotomy stealth (Right, 3/9/2021).    Family History  family history includes Arthritis in her mother; Cancer in her mother; Cancer (age of onset: 73) in her father; Diabetes in her paternal aunt; Heart Disease in her mother; Hyperlipidemia in her paternal aunt; Hypertension in her mother; Lung Disease in her maternal  grandmother, mother, and paternal aunt; Non-contributory in her father and mother; Psychiatric Illness in her mother; Stroke in her mother.    Social History   reports that she has never smoked. She has never used smokeless tobacco. She reports previous alcohol use. She reports that she does not use drugs.    Medications  Home Medications     Reviewed by Immanuel Goldsmith R.N. (Registered Nurse) on 03/09/21 at 0824  Med List Status: Complete   Medication Last Dose Status   acetaminophen (Tylenol) tablet 650 mg  Active   bisacodyl (DULCOLAX) suppository 10 mg  Active   ceFAZolin (ANCEF) injection  Active   Desvenlafaxine Succinate ER 25 MG TABLET SR 24 HR 3/2/2021 Active   dexamethasone (DECADRON) tablet 4 mg  Active   diphenhydrAMINE (BENADRYL) injection 25 mg  Active   ePHEDrine injection  Active   lamoTRIgine (LAMICTAL) 25 MG Tab 3/2/2021 Active   lamoTRIgine (LAMICTAL) tablet 100 mg  Active   lidocaine PF (XYLOCAINE-MPF) 2 % injection PF  Active   LORazepam (ATIVAN) injection 0.5 mg  Active   LORazepam (ATIVAN) injection 1-2 mg  Active   magnesium hydroxide (MILK OF MAGNESIA) suspension 30 mL  Active   multivitamin (THERAGRAN) Tab 3d ago Active   polyethylene glycol/lytes (MIRALAX) PACKET 1 Packet  Active   propofol  Active   rocuronium (ZEMURON) injection  Active   senna-docusate (PERICOLACE or SENOKOT S) 8.6-50 MG per tablet 2 tablet  Active   Tretinoin (ALTRENO) 0.05 % Lotion Not Taking Active   venlafaxine (EFFEXOR) tablet 12.5 mg  Active   VITAMIN D PO 3d ago Active              Current Facility-Administered Medications   Medication Dose Route Frequency Provider Last Rate Last Admin   • Pharmacy Consult Request ...Pain Management Review 1 Each  1 Each Other PHARMACY TO DOSE Majo Garcia P.A.-C.       • MD ALERT...DO NOT ADMINISTER NSAIDS or ASPIRIN unless ORDERED By Neurosurgery 1 Each  1 Each Other PRN Majo Garcia P.A.-C.       • ondansetron (ZOFRAN) syringe/vial injection 4 mg  4 mg  Intravenous Q4HRS PRN Majo Garcia, ADWOA.A.-C.       • dexamethasone (DECADRON) injection 4 mg  4 mg Intravenous Once PRN Majo Garcia, P.A.-C.       • diphenhydrAMINE (BENADRYL) injection 25 mg  25 mg Intravenous Q6HRS PRN Majo Garcia, P.A.-C.       • scopolamine (TRANSDERM-SCOP) patch 1 Patch  1 Patch Transdermal Q72HRS PRN Majo Garcia, P.A.-C.       • labetalol (NORMODYNE/TRANDATE) injection 10 mg  10 mg Intravenous Q HOUR PRN Majo Garcia, P.A.-C.       • hydrALAZINE (APRESOLINE) injection 10 mg  10 mg Intravenous Q HOUR PRN Majo Garcia, P.A.-C.       • cloNIDine (CATAPRES) tablet 0.1 mg  0.1 mg Oral Q4HRS PRN Majo Garcia, P.A.-C.       • niCARdipine (CARDENE) 25 mg in  mL Infusion  0-15 mg/hr Intravenous Continuous Majo Garcia, P.A.-C.       • docusate sodium (COLACE) capsule 100 mg  100 mg Oral BID Majo Garcia, P.A.-C.       • senna-docusate (PERICOLACE or SENOKOT S) 8.6-50 MG per tablet 1 tablet  1 tablet Oral Nightly Majo Garcia, P.A.-C.       • senna-docusate (PERICOLACE or SENOKOT S) 8.6-50 MG per tablet 1 tablet  1 tablet Oral Q24HRS PRN Majo Garcia, P.A.-C.       • polyethylene glycol/lytes (MIRALAX) PACKET 1 Packet  1 Packet Oral BID PRN Majo Garcia, P.A.-C.       • magnesium hydroxide (MILK OF MAGNESIA) suspension 30 mL  30 mL Oral QDAY PRN Majo Garcia, P.A.-C.       • bisacodyl (DULCOLAX) suppository 10 mg  10 mg Rectal Q24HRS PRN Majo Garcia, P.A.-C.       • fleet enema 133 mL  1 Each Rectal Once PRN Majo Garcia P.A.-C.       • artificial tears (EYE LUBRICANT) ophth ointment 1 Application  1 Application Both Eyes Q8HRS Majo Garcia P.A.-C.       • dextrose 5 % and 0.9 % NaCl with KCl 20 mEq infusion   Intravenous Continuous BECCA Walker-PHAM.       • acetaminophen (TYLENOL) tablet 1,000 mg  1,000 mg Oral Q6HRS MERLENE WalkerAKyree-ANYA        Followed by   • [START ON 3/14/2021] acetaminophen  (TYLENOL) tablet 1,000 mg  1,000 mg Oral Q6HRS PRN Majo Garcia, ADWOA.A.-C.       • oxyCODONE immediate-release (ROXICODONE) tablet 5 mg  5 mg Oral Q3HRS PRN ADWOA Walker.A.-C.        Or   • oxyCODONE immediate-release (ROXICODONE) tablet 10 mg  10 mg Oral Q3HRS PRN Majo Garcia, P.A.-C.        Or   • HYDROmorphone pf (DILAUDID) injection 0.5 mg  0.5 mg Intravenous Q3HRS PRN Majo Garcia, P.A.-C.       • ceFAZolin in dextrose (ANCEF) IVPB premix 2 g  2 g Intravenous Q8HR Majo Garcia, ADWOA.A.-C.       • levETIRAcetam (Keppra) 500 mg in 100 mL NaCl IV premix  500 mg Intravenous Q12HRS Majo Garcia, P.A.-C.       • dexamethasone (DECADRON) injection 10 mg  10 mg Intravenous Q6HRS Majo Garcia, P.A.-C.       • famotidine (PEPCID) tablet 20 mg  20 mg Oral BID Majo Garcia, ADWOA.A.-C.        Or   • famotidine (PEPCID) injection 20 mg  20 mg Intravenous BID Majo Garcia, P.A.-C.       • diphenhydrAMINE (BENADRYL) tablet/capsule 25 mg  25 mg Oral Q6HRS PRN Majo Garcia, ADWOA.A.-C.        Or   • diphenhydrAMINE (BENADRYL) injection 25 mg  25 mg Intravenous Q6HRS PRN Majo Garcia, P.A.-C.       • benzocaine-menthol (CEPACOL) lozenge 1 Lozenge  1 Lozenge Mouth/Throat Q2HRS PRN Majo Garcia, ADWOA.A.-C.       • lamoTRIgine (LAMICTAL) tablet 100 mg  100 mg Oral BID Gilles Waller M.D.   100 mg at 03/09/21 0448   • LORazepam (ATIVAN) injection 1-2 mg  1-2 mg Intravenous Q6HRS PRN Juwan Merino M.D.       • diphenhydrAMINE (BENADRYL) injection 25 mg  25 mg Intravenous Q6HRS PRN Cristhian Sullivan M.D.   25 mg at 03/04/21 2041   • LORazepam (ATIVAN) injection 0.5 mg  0.5 mg Intravenous Q4HRS PRN MERLENE WalkerAKyree-ANYA   0.5 mg at 03/09/21 0446   • senna-docusate (PERICOLACE or SENOKOT S) 8.6-50 MG per tablet 2 tablet  2 tablet Oral BID Ruy Nieves Jr., M.D.   2 tablet at 03/08/21 1759    And   • polyethylene glycol/lytes (MIRALAX) PACKET 1 Packet  1 Packet Oral QDAY PRN  Ruy Nieves Jr., M.D.        And   • magnesium hydroxide (MILK OF MAGNESIA) suspension 30 mL  30 mL Oral QDAY PRN Ruy Nieves Jr., M.D.        And   • bisacodyl (DULCOLAX) suppository 10 mg  10 mg Rectal QDAY PRN Ruy Nieves Jr., M.D.       • acetaminophen (Tylenol) tablet 650 mg  650 mg Oral Q6HRS PRN Ruy Nieves Jr., M.D.   650 mg at 03/09/21 0134   • [Held by provider] dexamethasone (DECADRON) tablet 4 mg  4 mg Oral Q6HRS Ruy Nieves Jr., M.D.   Stopped at 03/03/21 1200   • venlafaxine (EFFEXOR) tablet 12.5 mg  12.5 mg Oral BID Ryu Nieves Jr., M.D.   12.5 mg at 03/09/21 0446       Allergies  Allergies   Allergen Reactions   • Bactrim      Fatigue, ringing of the ears, and headache        Vital Signs last 24 hours  Temp:  [36.2 °C (97.1 °F)-36.6 °C (97.9 °F)] 36.3 °C (97.3 °F)  Pulse:  [76-97] 87  Resp:  [16-33] 17  BP: (128-152)/(72-97) 145/77  SpO2:  [93 %-99 %] 97 %    Physical Exam  Physical Exam  Constitutional:       General: She is not in acute distress.  HENT:      Mouth/Throat:      Mouth: Mucous membranes are moist.      Pharynx: Oropharynx is clear.   Eyes:      Extraocular Movements: Extraocular movements intact.      Pupils: Pupils are equal, round, and reactive to light.   Cardiovascular:      Rate and Rhythm: Normal rate and regular rhythm.      Pulses: Normal pulses.   Pulmonary:      Effort: Pulmonary effort is normal. No respiratory distress.   Abdominal:      General: Abdomen is flat.   Skin:     General: Skin is warm and dry.   Neurological:      Mental Status: She is alert.      Comments: GCS 15. LLE UMN pattern of weakness         Fluids    Intake/Output Summary (Last 24 hours) at 3/9/2021 1247  Last data filed at 3/9/2021 1208  Gross per 24 hour   Intake 1600 ml   Output 800 ml   Net 800 ml       Laboratory  Recent Results (from the past 48 hour(s))   CBC WITH DIFFERENTIAL    Collection Time: 03/09/21  5:35 AM   Result Value Ref Range    WBC  8.2 4.8 - 10.8 K/uL    RBC 4.46 4.20 - 5.40 M/uL    Hemoglobin 14.3 12.0 - 16.0 g/dL    Hematocrit 42.6 37.0 - 47.0 %    MCV 95.5 81.4 - 97.8 fL    MCH 32.1 27.0 - 33.0 pg    MCHC 33.6 33.6 - 35.0 g/dL    RDW 44.3 35.9 - 50.0 fL    Platelet Count 208 164 - 446 K/uL    MPV 10.0 9.0 - 12.9 fL    Neutrophils-Polys 60.50 44.00 - 72.00 %    Lymphocytes 26.20 22.00 - 41.00 %    Monocytes 9.70 0.00 - 13.40 %    Eosinophils 2.20 0.00 - 6.90 %    Basophils 0.50 0.00 - 1.80 %    Immature Granulocytes 0.90 0.00 - 0.90 %    Nucleated RBC 0.00 /100 WBC    Neutrophils (Absolute) 4.94 2.00 - 7.15 K/uL    Lymphs (Absolute) 2.14 1.00 - 4.80 K/uL    Monos (Absolute) 0.79 0.00 - 0.85 K/uL    Eos (Absolute) 0.18 0.00 - 0.51 K/uL    Baso (Absolute) 0.04 0.00 - 0.12 K/uL    Immature Granulocytes (abs) 0.07 0.00 - 0.11 K/uL    NRBC (Absolute) 0.00 K/uL   Comp Metabolic Panel    Collection Time: 03/09/21  5:35 AM   Result Value Ref Range    Sodium 138 135 - 145 mmol/L    Potassium 4.1 3.6 - 5.5 mmol/L    Chloride 103 96 - 112 mmol/L    Co2 23 20 - 33 mmol/L    Anion Gap 12.0 7.0 - 16.0    Glucose 100 (H) 65 - 99 mg/dL    Bun 11 8 - 22 mg/dL    Creatinine 0.64 0.50 - 1.40 mg/dL    Calcium 9.0 8.5 - 10.5 mg/dL    AST(SGOT) 9 (L) 12 - 45 U/L    ALT(SGPT) 12 2 - 50 U/L    Alkaline Phosphatase 83 30 - 99 U/L    Total Bilirubin 0.7 0.1 - 1.5 mg/dL    Albumin 3.8 3.2 - 4.9 g/dL    Total Protein 6.5 6.0 - 8.2 g/dL    Globulin 2.7 1.9 - 3.5 g/dL    A-G Ratio 1.4 g/dL   Prothrombin Time    Collection Time: 03/09/21  5:35 AM   Result Value Ref Range    PT 12.6 12.0 - 14.6 sec    INR 0.92 0.87 - 1.13   MAGNESIUM    Collection Time: 03/09/21  5:35 AM   Result Value Ref Range    Magnesium 2.2 1.5 - 2.5 mg/dL   PHOSPHORUS    Collection Time: 03/09/21  5:35 AM   Result Value Ref Range    Phosphorus 3.3 2.5 - 4.5 mg/dL   ESTIMATED GFR    Collection Time: 03/09/21  5:35 AM   Result Value Ref Range    GFR If African American >60 >60 mL/min/1.73 m 2    GFR If  Non African American >60 >60 mL/min/1.73 m 2   Histology Request    Collection Time: 03/09/21 11:19 AM   Result Value Ref Range    Pathology Request Sent to [facility]        Imaging  [facility] BRAIN WITH & W/O   Final Result      1.  There is an approximately 3.5 x 2.5 cm sized peripherally enhancing lesion in the right medial parietal lobe. Diffuse white matter edema is noted surrounding the lesion. When compared with the previous MRI dated 2/12/2021, there has been mild    interval reduction in the size of the lesion. The lesion is highly suspicious for high-grade neoplasm such as glioblastoma multiforme. However interval reduction in the size and presence of left periventricular white matter T2 hyperintensity may suggest    tumefactive demyelinating lesion.   2.  Again noted is well-defined left periventricular T2 hyperintensity concerning for demyelinating plaque.   3.  5 mm midline shift towards left side.      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    The patient remains critically ill.  Critical care time = 35 minutes in directly providing and coordinating critical care and extensive data review.  No time overlap and excludes procedures.

## 2021-03-09 NOTE — PROGRESS NOTES
Neurosurgery Progress Note    Subjective:  No acute event over night  Mild BARNHART  Reports feeling stronger her left extremities    Exam:  A&O, face symmt, PERRLA  Speech fluent & appropriate  Left  4+,Left tricep 4/5 left IP/quad 4+, left foot drop TA/GS 3/4  No pronator drift      BP  Min: 128/86  Max: 158/90  Pulse  Av  Min: 70  Max: 87  Resp  Av.4  Min: 16  Max: 18  Temp  Av.4 °C (97.5 °F)  Min: 36.2 °C (97.1 °F)  Max: 36.6 °C (97.9 °F)  SpO2  Av.6 %  Min: 93 %  Max: 96 %    No data recorded    Recent Labs     21  0535   WBC 8.2   RBC 4.46   HEMOGLOBIN 14.3   HEMATOCRIT 42.6   MCV 95.5   MCH 32.1   MCHC 33.6   RDW 44.3   PLATELETCT 208   MPV 10.0     Recent Labs     21  0535   SODIUM 138   POTASSIUM 4.1   CHLORIDE 103   CO2 23   GLUCOSE 100*   BUN 11   CREATININE 0.64   CALCIUM 9.0     Recent Labs     21  0535   INR 0.92           Intake/Output       21 0700 - 21 0659 21 0700 - 03/10/21 0659      2131-1572 3024-1404 Total 3417-9549 3994-4117 Total       Intake    Total Intake -- -- -- -- -- --       Output    Urine  --  -- --  --  -- --    Number of Times Voided -- 1 x 1 x -- -- --    Total Output -- -- -- -- -- --       Net I/O     -- -- -- -- -- --          No intake or output data in the 24 hours ending 21 0707         • [MAR Hold] lamoTRIgine  100 mg BID   • [MAR Hold] LORazepam  1-2 mg Q6HRS PRN   • [MAR Hold] diphenhydrAMINE  25 mg Q6HRS PRN   • [MAR Hold] LORazepam  0.5 mg Q4HRS PRN   • [MAR Hold] senna-docusate  2 tablet BID    And   • [MAR Hold] polyethylene glycol/lytes  1 Packet QDAY PRN    And   • [MAR Hold] magnesium hydroxide  30 mL QDAY PRN    And   • [MAR Hold] bisacodyl  10 mg QDAY PRN   • [MAR Hold] acetaminophen  650 mg Q6HRS PRN   • [Held by provider] dexamethasone  4 mg Q6HRS   • [MAR Hold] venlafaxine  12.5 mg BID       Assessment and Plan:  Hospital day #7 right parietal brain lesion, seizures  POD #   Prophylactic  anticoagulation: no         Start date/time: hold for surgery    Neuro stable  Labs reviewed and stable  Hold anticoagulants, NSAIDs, ASA  lamictal for seizures, mgmt per neurology  CT CAP 2/21/21 negative  No steroids, lymphoma in differential  INR 0.91 normal on 3/4    Right Craniotomy for tumor resection     Mao

## 2021-03-09 NOTE — PROGRESS NOTES
Pt's  updated, report given to ICU RN, transported on 2L NC, O2 tank more than half full. Pt denies pain at this time, VSS.

## 2021-03-09 NOTE — CARE PLAN
Problem: Safety  Goal: Will remain free from falls  Outcome: PROGRESSING AS EXPECTED  Note: Bed alarm on. Bed in lowest, locked position. Call light and belongings within reach. Pt calls appropriately. Will continue hourly rounding.      Problem: Venous Thromboembolism (VTW)/Deep Vein Thrombosis (DVT) Prevention:  Goal: Patient will participate in Venous Thrombosis (VTE)/Deep Vein Thrombosis (DVT)Prevention Measures  Outcome: PROGRESSING AS EXPECTED  Note: SCDs in place. Pharmacological prophylaxis contraindicated at this time. Pt ambulatory. ROM performed. Will continue to monitor.

## 2021-03-09 NOTE — ANESTHESIA TIME REPORT
Anesthesia Start and Stop Event Times     Date Time Event    3/9/2021 0727 Ready for Procedure     0730 Anesthesia Start     1110 Anesthesia Stop        Responsible Staff  03/09/21    Name Role Begin End    Jones Orourke M.D. Anesth 0730 1110        Preop Diagnosis (Free Text):  Pre-op Diagnosis     Brain Lesion        Preop Diagnosis (Codes):    Post op Diagnosis  Brain lesion      Premium Reason  Non-Premium    Comments:

## 2021-03-09 NOTE — PROGRESS NOTES
Report given to pre-op nurse. Pt sent to Pre-op with transport and  at bedside. All belongings sent with son.

## 2021-03-09 NOTE — ANESTHESIA PROCEDURE NOTES
Arterial Line  Performed by: Jones Orourke M.D.  Authorized by: Jones Orourke M.D.     Start Time:  3/9/2021 7:46 AM  End Time:  3/9/2021 7:46 AM  Localization: surface landmarks    Patient Location:  OR  Indication: continuous blood pressure monitoring        Catheter Size:  20 G  Seldinger Technique?: Yes    Laterality:  Left  Site:  Radial artery  Line Secured:  Antimicrobial disc, tape and transparent dressing  Events: patient tolerated procedure well with no complications

## 2021-03-10 ENCOUNTER — APPOINTMENT (OUTPATIENT)
Dept: RADIOLOGY | Facility: MEDICAL CENTER | Age: 51
DRG: 025 | End: 2021-03-10
Attending: PHYSICIAN ASSISTANT
Payer: COMMERCIAL

## 2021-03-10 PROBLEM — R51.9 HEADACHE: Status: RESOLVED | Noted: 2021-03-02 | Resolved: 2021-03-10

## 2021-03-10 LAB
ANION GAP SERPL CALC-SCNC: 13 MMOL/L (ref 7–16)
BUN SERPL-MCNC: 7 MG/DL (ref 8–22)
CALCIUM SERPL-MCNC: 9 MG/DL (ref 8.5–10.5)
CHLORIDE SERPL-SCNC: 102 MMOL/L (ref 96–112)
CO2 SERPL-SCNC: 19 MMOL/L (ref 20–33)
CREAT SERPL-MCNC: 0.59 MG/DL (ref 0.5–1.4)
ERYTHROCYTE [DISTWIDTH] IN BLOOD BY AUTOMATED COUNT: 47.3 FL (ref 35.9–50)
GLUCOSE SERPL-MCNC: 186 MG/DL (ref 65–99)
HCT VFR BLD AUTO: 29.9 % (ref 37–47)
HGB BLD-MCNC: 9.5 G/DL (ref 12–16)
MCH RBC QN AUTO: 32.9 PG (ref 27–33)
MCHC RBC AUTO-ENTMCNC: 31.8 G/DL (ref 33.6–35)
MCV RBC AUTO: 103.5 FL (ref 81.4–97.8)
PLATELET # BLD AUTO: 144 K/UL (ref 164–446)
PMV BLD AUTO: 10.5 FL (ref 9–12.9)
POTASSIUM SERPL-SCNC: 4.1 MMOL/L (ref 3.6–5.5)
RBC # BLD AUTO: 2.89 M/UL (ref 4.2–5.4)
SODIUM SERPL-SCNC: 134 MMOL/L (ref 135–145)
WBC # BLD AUTO: 8.4 K/UL (ref 4.8–10.8)

## 2021-03-10 PROCEDURE — 700102 HCHG RX REV CODE 250 W/ 637 OVERRIDE(OP): Performed by: PHYSICIAN ASSISTANT

## 2021-03-10 PROCEDURE — 700102 HCHG RX REV CODE 250 W/ 637 OVERRIDE(OP): Performed by: STUDENT IN AN ORGANIZED HEALTH CARE EDUCATION/TRAINING PROGRAM

## 2021-03-10 PROCEDURE — A9270 NON-COVERED ITEM OR SERVICE: HCPCS | Performed by: PHYSICIAN ASSISTANT

## 2021-03-10 PROCEDURE — 700102 HCHG RX REV CODE 250 W/ 637 OVERRIDE(OP): Performed by: HOSPITALIST

## 2021-03-10 PROCEDURE — 700111 HCHG RX REV CODE 636 W/ 250 OVERRIDE (IP): Performed by: PHYSICIAN ASSISTANT

## 2021-03-10 PROCEDURE — 85027 COMPLETE CBC AUTOMATED: CPT

## 2021-03-10 PROCEDURE — A9270 NON-COVERED ITEM OR SERVICE: HCPCS | Performed by: HOSPITALIST

## 2021-03-10 PROCEDURE — 700102 HCHG RX REV CODE 250 W/ 637 OVERRIDE(OP): Performed by: GENERAL PRACTICE

## 2021-03-10 PROCEDURE — 770006 HCHG ROOM/CARE - MED/SURG/GYN SEMI*

## 2021-03-10 PROCEDURE — 80048 BASIC METABOLIC PNL TOTAL CA: CPT

## 2021-03-10 PROCEDURE — 97166 OT EVAL MOD COMPLEX 45 MIN: CPT

## 2021-03-10 PROCEDURE — A9270 NON-COVERED ITEM OR SERVICE: HCPCS | Performed by: GENERAL PRACTICE

## 2021-03-10 PROCEDURE — A9270 NON-COVERED ITEM OR SERVICE: HCPCS | Performed by: STUDENT IN AN ORGANIZED HEALTH CARE EDUCATION/TRAINING PROGRAM

## 2021-03-10 PROCEDURE — 97162 PT EVAL MOD COMPLEX 30 MIN: CPT

## 2021-03-10 PROCEDURE — 99233 SBSQ HOSP IP/OBS HIGH 50: CPT | Performed by: HOSPITALIST

## 2021-03-10 RX ORDER — LABETALOL HYDROCHLORIDE 5 MG/ML
10-20 INJECTION, SOLUTION INTRAVENOUS EVERY 4 HOURS PRN
Status: DISCONTINUED | OUTPATIENT
Start: 2021-03-10 | End: 2021-03-15 | Stop reason: HOSPADM

## 2021-03-10 RX ORDER — LORAZEPAM 2 MG/ML
0.5 INJECTION INTRAMUSCULAR ONCE
Status: ACTIVE | OUTPATIENT
Start: 2021-03-10 | End: 2021-03-11

## 2021-03-10 RX ORDER — HYDRALAZINE HYDROCHLORIDE 20 MG/ML
10 INJECTION INTRAMUSCULAR; INTRAVENOUS EVERY 4 HOURS PRN
Status: DISCONTINUED | OUTPATIENT
Start: 2021-03-10 | End: 2021-03-15 | Stop reason: HOSPADM

## 2021-03-10 RX ORDER — LABETALOL HYDROCHLORIDE 5 MG/ML
10-20 INJECTION, SOLUTION INTRAVENOUS
Status: DISCONTINUED | OUTPATIENT
Start: 2021-03-10 | End: 2021-03-10

## 2021-03-10 RX ORDER — LEVETIRACETAM 500 MG/1
500 TABLET ORAL 2 TIMES DAILY
Status: DISCONTINUED | OUTPATIENT
Start: 2021-03-10 | End: 2021-03-15 | Stop reason: HOSPADM

## 2021-03-10 RX ORDER — LORAZEPAM 2 MG/ML
0.5 INJECTION INTRAMUSCULAR EVERY 4 HOURS PRN
Status: DISCONTINUED | OUTPATIENT
Start: 2021-03-10 | End: 2021-03-11

## 2021-03-10 RX ADMIN — LEVETIRACETAM 500 MG: 500 TABLET, FILM COATED ORAL at 17:57

## 2021-03-10 RX ADMIN — VENLAFAXINE 12.5 MG: 25 TABLET ORAL at 17:56

## 2021-03-10 RX ADMIN — DEXAMETHASONE SODIUM PHOSPHATE 10 MG: 4 INJECTION, SOLUTION INTRA-ARTICULAR; INTRALESIONAL; INTRAMUSCULAR; INTRAVENOUS; SOFT TISSUE at 05:10

## 2021-03-10 RX ADMIN — ACETAMINOPHEN 1000 MG: 500 TABLET, FILM COATED ORAL at 17:56

## 2021-03-10 RX ADMIN — ACETAMINOPHEN 1000 MG: 500 TABLET, FILM COATED ORAL at 05:08

## 2021-03-10 RX ADMIN — FAMOTIDINE 20 MG: 20 TABLET ORAL at 05:07

## 2021-03-10 RX ADMIN — FAMOTIDINE 20 MG: 20 TABLET ORAL at 17:57

## 2021-03-10 RX ADMIN — DEXAMETHASONE SODIUM PHOSPHATE 10 MG: 4 INJECTION, SOLUTION INTRA-ARTICULAR; INTRALESIONAL; INTRAMUSCULAR; INTRAVENOUS; SOFT TISSUE at 17:56

## 2021-03-10 RX ADMIN — DEXAMETHASONE SODIUM PHOSPHATE 10 MG: 4 INJECTION, SOLUTION INTRA-ARTICULAR; INTRALESIONAL; INTRAMUSCULAR; INTRAVENOUS; SOFT TISSUE at 12:36

## 2021-03-10 RX ADMIN — OXYCODONE 10 MG: 5 TABLET ORAL at 03:17

## 2021-03-10 RX ADMIN — VENLAFAXINE 12.5 MG: 25 TABLET ORAL at 05:08

## 2021-03-10 RX ADMIN — LAMOTRIGINE 100 MG: 100 TABLET ORAL at 17:57

## 2021-03-10 RX ADMIN — LEVETIRACETAM INJECTION 500 MG: 5 INJECTION INTRAVENOUS at 05:10

## 2021-03-10 RX ADMIN — ACETAMINOPHEN 1000 MG: 500 TABLET, FILM COATED ORAL at 23:48

## 2021-03-10 RX ADMIN — ACETAMINOPHEN 1000 MG: 500 TABLET, FILM COATED ORAL at 12:35

## 2021-03-10 RX ADMIN — DEXAMETHASONE SODIUM PHOSPHATE 10 MG: 4 INJECTION, SOLUTION INTRA-ARTICULAR; INTRALESIONAL; INTRAMUSCULAR; INTRAVENOUS; SOFT TISSUE at 23:48

## 2021-03-10 RX ADMIN — LAMOTRIGINE 100 MG: 100 TABLET ORAL at 05:08

## 2021-03-10 ASSESSMENT — ENCOUNTER SYMPTOMS
ROS GI COMMENTS: TOLERATING A DIET
CHILLS: 0
SHORTNESS OF BREATH: 0
FEVER: 0

## 2021-03-10 ASSESSMENT — COGNITIVE AND FUNCTIONAL STATUS - GENERAL
DRESSING REGULAR LOWER BODY CLOTHING: A LOT
MOBILITY SCORE: 12
WALKING IN HOSPITAL ROOM: A LOT
DAILY ACTIVITIY SCORE: 14
HELP NEEDED FOR BATHING: A LOT
SUGGESTED CMS G CODE MODIFIER MOBILITY: CM
EATING MEALS: A LITTLE
TOILETING: A LOT
TOILETING: A LOT
DRESSING REGULAR LOWER BODY CLOTHING: A LOT
EATING MEALS: A LOT
SUGGESTED CMS G CODE MODIFIER MOBILITY: CL
MOVING FROM LYING ON BACK TO SITTING ON SIDE OF FLAT BED: UNABLE
SUGGESTED CMS G CODE MODIFIER DAILY ACTIVITY: CL
WALKING IN HOSPITAL ROOM: A LOT
PERSONAL GROOMING: A LITTLE
MOVING FROM LYING ON BACK TO SITTING ON SIDE OF FLAT BED: A LOT
CLIMB 3 TO 5 STEPS WITH RAILING: TOTAL
MOBILITY SCORE: 8
DAILY ACTIVITIY SCORE: 12
SUGGESTED CMS G CODE MODIFIER DAILY ACTIVITY: CK
DRESSING REGULAR UPPER BODY CLOTHING: A LOT
DRESSING REGULAR UPPER BODY CLOTHING: A LOT
MOVING TO AND FROM BED TO CHAIR: UNABLE
PERSONAL GROOMING: A LOT
HELP NEEDED FOR BATHING: A LOT
STANDING UP FROM CHAIR USING ARMS: A LOT
CLIMB 3 TO 5 STEPS WITH RAILING: A LOT
STANDING UP FROM CHAIR USING ARMS: A LOT
TURNING FROM BACK TO SIDE WHILE IN FLAT BAD: A LOT
TURNING FROM BACK TO SIDE WHILE IN FLAT BAD: UNABLE
MOVING TO AND FROM BED TO CHAIR: A LOT

## 2021-03-10 ASSESSMENT — GAIT ASSESSMENTS
DISTANCE (FEET): 15
DEVIATION: ATAXIC;DECREASED HEEL STRIKE;DECREASED TOE OFF
GAIT LEVEL OF ASSIST: MODERATE ASSIST
ASSISTIVE DEVICE: FRONT WHEEL WALKER

## 2021-03-10 ASSESSMENT — PAIN DESCRIPTION - PAIN TYPE
TYPE: ACUTE PAIN

## 2021-03-10 ASSESSMENT — ACTIVITIES OF DAILY LIVING (ADL): TOILETING: INDEPENDENT

## 2021-03-10 NOTE — CARE PLAN
Problem: Urinary Elimination:  Goal: Ability to reestablish a normal urinary elimination pattern will improve  Outcome: PROGRESSING AS EXPECTED   NOTE: Bernabe discontinued per protocol    Problem: Skin Integrity  Goal: Risk for impaired skin integrity will decrease  Outcome: PROGRESSING AS EXPECTED   Patient educated on the importance of turing self. Patient verbalizes understanding.

## 2021-03-10 NOTE — PROGRESS NOTES
Hospital Medicine Daily Progress Note    Date of Service  3/10/2021    Chief Complaint  50 y.o. female admitted 3/2/2021 with seizures    Hospital Course  Ms. Frye has a history of recently diagnosed brain mass vs demyelination on MRI and seizures that presented to [facility] with seizure activity. She was transferred to [facility] for neurosurgery consultation. She had been on Lamictal as an outpatient and the dose was increased and Keppra was added. She underwent craniotomy and biopsy by Dr. Mao on 3/9 with subsequent ICU admission. She has significant left sided weakness.    Interval Problem Update  3/10: patient seen and evaluated in the ICU. Her sodium level is 134 this morning. She is tolerating a diet. Hemovac remains in. She is scheduled for an MRI this morning and requests ativan prior--she had 0.5 mg IV ativan prior to her last one. She has not had any seizure activity. She is on 2 liters oxygen and incentive spirometry ordered and discussed. She is hoping to get up to chair today. I discussed with Dr. Mao this morning and he plans on removing the hemovac and is okay with Lovenox starting tomorrow.     Consultants/Specialty  Critical care. I discussed with Dr. Carmen Mao, neurosurgery    Code Status  Full Code    Disposition  neuro    Review of Systems  Review of Systems   Constitutional: Negative for chills and fever.   Respiratory: Negative for shortness of breath.    Cardiovascular: Negative for chest pain.   Gastrointestinal:        Tolerating a diet   All other systems reviewed and are negative.       Physical Exam  Pulse:  [] 94  Resp:  [9-39] 39  BP: (122-160)/(66-92) 154/89  SpO2:  [91 %-99 %] 96 %    Physical Exam  Vitals and nursing note reviewed.   Constitutional:       Appearance: She is not ill-appearing.   HENT:      Head:      Comments: Staples in place  hemovac      Mouth/Throat:      Mouth: Mucous membranes are dry.      Pharynx: Oropharynx is clear.   Eyes:       General: No scleral icterus.     Conjunctiva/sclera: Conjunctivae normal.   Cardiovascular:      Rate and Rhythm: Normal rate and regular rhythm.      Heart sounds: No murmur.   Pulmonary:      Effort: Pulmonary effort is normal. No respiratory distress.      Breath sounds: Normal breath sounds.   Abdominal:      General: There is no distension.      Tenderness: There is no abdominal tenderness.   Musculoskeletal:      Cervical back: Normal range of motion and neck supple.      Right lower leg: No edema.      Left lower leg: No edema.      Comments: SCDs bilaterally   Skin:     General: Skin is warm and dry.   Neurological:      Mental Status: She is alert.      Comments: She is able to lift her left leg against gravity but not flex the left knee  Left hand is flexed in a fist position and she is able to flex the hand when fingers are extended but not extend her hand.    Psychiatric:         Mood and Affect: Mood normal.         Behavior: Behavior normal.         Fluids    Intake/Output Summary (Last 24 hours) at 3/10/2021 0721  Last data filed at 3/10/2021 0600  Gross per 24 hour   Intake 4001.67 ml   Output 2310 ml   Net 1691.67 ml       Laboratory  Recent Labs     03/09/21  0535 03/10/21  0420   WBC 8.2 8.4   RBC 4.46 2.89*   HEMOGLOBIN 14.3 9.5*   HEMATOCRIT 42.6 29.9*   MCV 95.5 103.5*   MCH 32.1 32.9   MCHC 33.6 31.8*   RDW 44.3 47.3   PLATELETCT 208 144*   MPV 10.0 10.5     Recent Labs     03/09/21  0535 03/10/21  0603   SODIUM 138 134*   POTASSIUM 4.1 4.1   CHLORIDE 103 102   CO2 23 19*   GLUCOSE 100* 186*   BUN 11 7*   CREATININE 0.64 0.59   CALCIUM 9.0 9.0     Recent Labs     03/09/21  0535   INR 0.92               Imaging  CT-HEAD W/O   Final Result      1.  No intracranial hemorrhage identified following right parietal craniotomy and tumor resection.      2.  Residual right to left midline shift measuring 4.3 mm.      3.  Large area of low attenuation/edema in the right temporal and parietal white matter  is present with minimal air near the site of tumor in the right parietal convexity medially.      4.  No hydrocephalus.      MR-STEALTH BRAIN WITH & W/O   Final Result      1.  There is an approximately 3.5 x 2.5 cm sized peripherally enhancing lesion in the right medial parietal lobe. Diffuse white matter edema is noted surrounding the lesion. When compared with the previous MRI dated 2/12/2021, there has been mild    interval reduction in the size of the lesion. The lesion is highly suspicious for high-grade neoplasm such as glioblastoma multiforme. However interval reduction in the size and presence of left periventricular white matter T2 hyperintensity may suggest    tumefactive demyelinating lesion.   2.  Again noted is well-defined left periventricular T2 hyperintensity concerning for demyelinating plaque.   3.  5 mm midline shift towards left side.      MR-BRAIN-WITH & W/O    (Results Pending)        Assessment/Plan  * Right frontal lobe mass- (present on admission)  Assessment & Plan  CT scan head shows right frontal mass 3 cm, MRI brain to 2121 shows right frontal lobe lesion 3 x 2.4 x 1.1 cm increased in size from MRI 12/20/2020.  Status post craniotomy with biopsy on 3/9 by Dr. Mao and biopsies remain pending  On decadron with dosing and taper per neurosurgery  Check sodium level in the morning  Keppra and Lamictal for seizure prophylaxis.  Once biopsy results are back, she will likely require oncology and radiation oncology consultations.   PT/OT evals due to left sided weakness and possible acute rehab consult based on recommendations  OT for brace left hand due to flexion  Repeat MRI on 3/10  SCDs and start Lovenox 40 mg daily on 3/10 per Dr. Mao.        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  She is followed by Neurology on outpatient basis, started Lamictal in December  Had seizure 3/8/2021 and  thus Lamictal dose was increased and Keppra added IV with transition to oral on 3/10    Hyperlipidemia- (present on admission)  Assessment & Plan   2019. 10 year ASCVD risk 1.4%. no statin indicated    Mixed anxiety and depressive disorder- (present on admission)  Assessment & Plan  -continue Desvenlafaxine        VTE prophylaxis: SCDs
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
## 2021-03-10 NOTE — PROGRESS NOTES
Neurosurgery Progress Note    Subjective:  No acute event over night  Ct head show significant shift     Exam:  A&O, face symmt, PERRLA  Speech fluent & appropriate  Left  4+,Left tricep 4/5 left IP/quad 4+, left foot drop TA/GS 3/4  No pronator drift      BP  Min: 122/66  Max: 160/88  Pulse  Av  Min: 72  Max: 103  Resp  Av.9  Min: 9  Max: 39  Monitored Temp 2  Av.9 °C (98.5 °F)  Min: 36.5 °C (97.7 °F)  Max: 37.3 °C (99.1 °F)  SpO2  Av %  Min: 91 %  Max: 99 %    No data recorded    Recent Labs     21  0535 03/10/21  0420   WBC 8.2 8.4   RBC 4.46 2.89*   HEMOGLOBIN 14.3 9.5*   HEMATOCRIT 42.6 29.9*   MCV 95.5 103.5*   MCH 32.1 32.9   MCHC 33.6 31.8*   RDW 44.3 47.3   PLATELETCT 208 144*   MPV 10.0 10.5     Recent Labs     21  0535 03/10/21  0603   SODIUM 138 134*   POTASSIUM 4.1 4.1   CHLORIDE 103 102   CO2 23 19*   GLUCOSE 100* 186*   BUN 11 7*   CREATININE 0.64 0.59   CALCIUM 9.0 9.0     Recent Labs     21  0535   INR 0.92           Intake/Output       21 07 - 03/10/21 0659 03/10/21 07 - 21 0659       9379-4283 Total 9465-7146 6098-1223 Total       Intake    P.O.  200  330 530  --  -- --    P.O. 200 330 530 -- -- --    I.V.  2071.7  1200 3271.7  --  -- --    Cardene Volume -- 0 0 -- -- --    Volume (mL) (Lactated Ringers) 1000 -- 1000 -- -- --    Volume (mL) (NS infusion) 600 -- 600 -- -- --    Volume (mL) (dextrose 5 % and 0.9 % NaCl with KCl 20 mEq infusion) 471.7 1200 1671.7 -- -- --    IV Piggyback  100  100 200  --  -- --    Volume (mL) (levETIRAcetam (Keppra) 500 mg in 100 mL NaCl IV premix) -- 100 100 -- -- --    Volume (mL) (ceFAZolin in dextrose (ANCEF) IVPB premix 2 g) 100 0 100 -- -- --    Total Intake 2371.7 1630 4001.7 -- -- --       Output    Urine  1250  780   --  -- --    Urine 300 -- 300 -- -- --    Output (mL) (Urethral Catheter Latex;Temperature probe 16 Fr.)  -- -- --    Drains  40  40 80  --  -- --    Output  (mL) (Closed/Suction Drain Right Scalp) 40 40 80 -- -- --    Stool  --  -- --  --  -- --    Number of Times Stooled 1 x -- 1 x -- -- --    Blood  200  -- 200  --  -- --    Est. Blood Loss 200 -- 200 -- -- --    Total Output 9306 757 9292 -- -- --       Net I/O     881.7 810 1691.7 -- -- --            Intake/Output Summary (Last 24 hours) at 3/10/2021 0727  Last data filed at 3/10/2021 0600  Gross per 24 hour   Intake 4001.67 ml   Output 2310 ml   Net 1691.67 ml            • labetalol  10-20 mg Q HOUR PRN   • Pharmacy Consult Request  1 Each PHARMACY TO DOSE   • MD ALERT...DO NOT ADMINISTER NSAIDS or ASPIRIN unless ORDERED By Neurosurgery  1 Each PRN   • ondansetron  4 mg Q4HRS PRN   • dexamethasone  4 mg Once PRN   • diphenhydrAMINE  25 mg Q6HRS PRN   • scopolamine  1 Patch Q72HRS PRN   • hydrALAZINE  10 mg Q HOUR PRN   • cloNIDine  0.1 mg Q4HRS PRN   • docusate sodium  100 mg BID   • senna-docusate  1 tablet Nightly   • senna-docusate  1 tablet Q24HRS PRN   • polyethylene glycol/lytes  1 Packet BID PRN   • magnesium hydroxide  30 mL QDAY PRN   • bisacodyl  10 mg Q24HRS PRN   • fleet  1 Each Once PRN   • artificial tears  1 Application Q8HRS   • dextrose 5 % and 0.9 % NaCl with KCl 20 mEq   Continuous   • acetaminophen  1,000 mg Q6HRS    Followed by   • [START ON 3/14/2021] acetaminophen  1,000 mg Q6HRS PRN   • oxyCODONE immediate-release  5 mg Q3HRS PRN    Or   • oxyCODONE immediate-release  10 mg Q3HRS PRN    Or   • HYDROmorphone  0.5 mg Q3HRS PRN   • levETIRAcetam (Keppra) IV  500 mg Q12HRS   • dexamethasone  10 mg Q6HRS   • famotidine  20 mg BID    Or   • famotidine  20 mg BID   • diphenhydrAMINE  25 mg Q6HRS PRN    Or   • diphenhydrAMINE  25 mg Q6HRS PRN   • benzocaine-menthol  1 Lozenge Q2HRS PRN   • lamoTRIgine  100 mg BID   • LORazepam  1-2 mg Q6HRS PRN   • diphenhydrAMINE  25 mg Q6HRS PRN   • LORazepam  0.5 mg Q4HRS PRN   • senna-docusate  2 tablet BID    And   • polyethylene glycol/lytes  1 Packet QDAY PRN     And   • magnesium hydroxide  30 mL QDAY PRN    And   • bisacodyl  10 mg QDAY PRN   • acetaminophen  650 mg Q6HRS PRN   • [Held by provider] dexamethasone  4 mg Q6HRS   • venlafaxine  12.5 mg BID       Assessment and Plan:  Hospital day #8 right parietal brain lesion, seizures  POD # 1  Prophylactic anticoagulation: no         Start date/time: hold for surgery    Neuro stable  Labs reviewed and stable  Dex 10Q6 for now   AEDs  [facility]  80  Will remove this am 3/10/2021    [place]

## 2021-03-11 ENCOUNTER — APPOINTMENT (OUTPATIENT)
Dept: RADIOLOGY | Facility: MEDICAL CENTER | Age: 51
DRG: 025 | End: 2021-03-11
Attending: PHYSICIAN ASSISTANT
Payer: COMMERCIAL

## 2021-03-11 PROBLEM — C71.1: Status: ACTIVE | Noted: 2021-03-02

## 2021-03-11 PROBLEM — D62 ACUTE BLOOD LOSS AS CAUSE OF POSTOPERATIVE ANEMIA: Status: ACTIVE | Noted: 2021-03-11

## 2021-03-11 LAB
ANION GAP SERPL CALC-SCNC: 9 MMOL/L (ref 7–16)
BUN SERPL-MCNC: 14 MG/DL (ref 8–22)
CALCIUM SERPL-MCNC: 9 MG/DL (ref 8.5–10.5)
CHLORIDE SERPL-SCNC: 109 MMOL/L (ref 96–112)
CO2 SERPL-SCNC: 23 MMOL/L (ref 20–33)
CREAT SERPL-MCNC: 0.5 MG/DL (ref 0.5–1.4)
GLUCOSE SERPL-MCNC: 147 MG/DL (ref 65–99)
MAGNESIUM SERPL-MCNC: 2.3 MG/DL (ref 1.5–2.5)
PHOSPHATE SERPL-MCNC: 2.7 MG/DL (ref 2.5–4.5)
POTASSIUM SERPL-SCNC: 4.5 MMOL/L (ref 3.6–5.5)
SODIUM SERPL-SCNC: 141 MMOL/L (ref 135–145)

## 2021-03-11 PROCEDURE — 700117 HCHG RX CONTRAST REV CODE 255: Performed by: PHYSICIAN ASSISTANT

## 2021-03-11 PROCEDURE — A9270 NON-COVERED ITEM OR SERVICE: HCPCS | Performed by: NURSE PRACTITIONER

## 2021-03-11 PROCEDURE — 97112 NEUROMUSCULAR REEDUCATION: CPT | Mod: CO

## 2021-03-11 PROCEDURE — 700111 HCHG RX REV CODE 636 W/ 250 OVERRIDE (IP): Performed by: PHYSICIAN ASSISTANT

## 2021-03-11 PROCEDURE — 700102 HCHG RX REV CODE 250 W/ 637 OVERRIDE(OP): Performed by: NURSE PRACTITIONER

## 2021-03-11 PROCEDURE — A9576 INJ PROHANCE MULTIPACK: HCPCS | Performed by: PHYSICIAN ASSISTANT

## 2021-03-11 PROCEDURE — 36415 COLL VENOUS BLD VENIPUNCTURE: CPT

## 2021-03-11 PROCEDURE — 770006 HCHG ROOM/CARE - MED/SURG/GYN SEMI*

## 2021-03-11 PROCEDURE — 83735 ASSAY OF MAGNESIUM: CPT

## 2021-03-11 PROCEDURE — 700102 HCHG RX REV CODE 250 W/ 637 OVERRIDE(OP): Performed by: PHYSICIAN ASSISTANT

## 2021-03-11 PROCEDURE — A9270 NON-COVERED ITEM OR SERVICE: HCPCS | Performed by: GENERAL PRACTICE

## 2021-03-11 PROCEDURE — 70553 MRI BRAIN STEM W/O & W/DYE: CPT

## 2021-03-11 PROCEDURE — 700111 HCHG RX REV CODE 636 W/ 250 OVERRIDE (IP): Performed by: NURSE PRACTITIONER

## 2021-03-11 PROCEDURE — 84100 ASSAY OF PHOSPHORUS: CPT

## 2021-03-11 PROCEDURE — A9270 NON-COVERED ITEM OR SERVICE: HCPCS | Performed by: STUDENT IN AN ORGANIZED HEALTH CARE EDUCATION/TRAINING PROGRAM

## 2021-03-11 PROCEDURE — 99223 1ST HOSP IP/OBS HIGH 75: CPT | Performed by: RADIOLOGY

## 2021-03-11 PROCEDURE — 700102 HCHG RX REV CODE 250 W/ 637 OVERRIDE(OP): Performed by: STUDENT IN AN ORGANIZED HEALTH CARE EDUCATION/TRAINING PROGRAM

## 2021-03-11 PROCEDURE — 99233 SBSQ HOSP IP/OBS HIGH 50: CPT | Mod: GC | Performed by: INTERNAL MEDICINE

## 2021-03-11 PROCEDURE — 97535 SELF CARE MNGMENT TRAINING: CPT | Mod: CO

## 2021-03-11 PROCEDURE — A9270 NON-COVERED ITEM OR SERVICE: HCPCS | Performed by: HOSPITALIST

## 2021-03-11 PROCEDURE — 700111 HCHG RX REV CODE 636 W/ 250 OVERRIDE (IP): Performed by: HOSPITALIST

## 2021-03-11 PROCEDURE — A9270 NON-COVERED ITEM OR SERVICE: HCPCS | Performed by: PHYSICIAN ASSISTANT

## 2021-03-11 PROCEDURE — L4398 FOOT DROP SPLINT PRE OTS: HCPCS

## 2021-03-11 PROCEDURE — 700102 HCHG RX REV CODE 250 W/ 637 OVERRIDE(OP): Performed by: HOSPITALIST

## 2021-03-11 PROCEDURE — 80048 BASIC METABOLIC PNL TOTAL CA: CPT

## 2021-03-11 PROCEDURE — 700102 HCHG RX REV CODE 250 W/ 637 OVERRIDE(OP): Performed by: GENERAL PRACTICE

## 2021-03-11 RX ORDER — DEXAMETHASONE 1 MG
2 TABLET ORAL EVERY 8 HOURS
Status: DISCONTINUED | OUTPATIENT
Start: 2021-03-18 | End: 2021-03-15 | Stop reason: HOSPADM

## 2021-03-11 RX ORDER — DEXAMETHASONE 4 MG/1
8 TABLET ORAL EVERY 8 HOURS
Status: COMPLETED | OUTPATIENT
Start: 2021-03-13 | End: 2021-03-14

## 2021-03-11 RX ORDER — LORAZEPAM 2 MG/ML
.5-1 INJECTION INTRAMUSCULAR EVERY 4 HOURS PRN
Status: ACTIVE | OUTPATIENT
Start: 2021-03-11 | End: 2021-03-11

## 2021-03-11 RX ORDER — DEXAMETHASONE 1 MG
2 TABLET ORAL DAILY
Status: DISCONTINUED | OUTPATIENT
Start: 2021-03-20 | End: 2021-03-15 | Stop reason: HOSPADM

## 2021-03-11 RX ORDER — FOLIC ACID 1 MG/1
1 TABLET ORAL DAILY
Status: DISCONTINUED | OUTPATIENT
Start: 2021-03-12 | End: 2021-03-15 | Stop reason: HOSPADM

## 2021-03-11 RX ORDER — DEXAMETHASONE 4 MG/1
4 TABLET ORAL EVERY 8 HOURS
Status: CANCELLED | OUTPATIENT
Start: 2021-03-16 | End: 2021-03-17

## 2021-03-11 RX ORDER — LORAZEPAM 1 MG/1
0.5 TABLET ORAL EVERY 4 HOURS PRN
Status: DISCONTINUED | OUTPATIENT
Start: 2021-03-11 | End: 2021-03-15 | Stop reason: HOSPADM

## 2021-03-11 RX ORDER — DEXAMETHASONE 1 MG
2 TABLET ORAL EVERY 12 HOURS
Status: DISCONTINUED | OUTPATIENT
Start: 2021-03-19 | End: 2021-03-15 | Stop reason: HOSPADM

## 2021-03-11 RX ORDER — DEXAMETHASONE 4 MG/1
4 TABLET ORAL EVERY 12 HOURS
Status: DISCONTINUED | OUTPATIENT
Start: 2021-03-17 | End: 2021-03-15 | Stop reason: HOSPADM

## 2021-03-11 RX ORDER — DEXAMETHASONE 6 MG/1
6 TABLET ORAL EVERY 8 HOURS
Status: CANCELLED | OUTPATIENT
Start: 2021-03-15 | End: 2021-03-16

## 2021-03-11 RX ORDER — DEXAMETHASONE 1 MG
2 TABLET ORAL EVERY 8 HOURS
Status: CANCELLED | OUTPATIENT
Start: 2021-03-18 | End: 2021-03-19

## 2021-03-11 RX ORDER — DEXAMETHASONE 1 MG
2 TABLET ORAL DAILY
Status: CANCELLED | OUTPATIENT
Start: 2021-03-20 | End: 2021-03-21

## 2021-03-11 RX ORDER — DEXAMETHASONE 4 MG/1
4 TABLET ORAL EVERY 12 HOURS
Status: CANCELLED | OUTPATIENT
Start: 2021-03-17 | End: 2021-03-18

## 2021-03-11 RX ORDER — DEXAMETHASONE 6 MG/1
6 TABLET ORAL EVERY 8 HOURS
Status: DISCONTINUED | OUTPATIENT
Start: 2021-03-15 | End: 2021-03-15 | Stop reason: HOSPADM

## 2021-03-11 RX ORDER — DEXAMETHASONE 1 MG
2 TABLET ORAL EVERY 12 HOURS
Status: CANCELLED | OUTPATIENT
Start: 2021-03-19 | End: 2021-03-20

## 2021-03-11 RX ORDER — DEXAMETHASONE 4 MG/1
8 TABLET ORAL EVERY 8 HOURS
Status: CANCELLED | OUTPATIENT
Start: 2021-03-13 | End: 2021-03-15

## 2021-03-11 RX ORDER — DEXAMETHASONE 4 MG/1
4 TABLET ORAL EVERY 8 HOURS
Status: DISCONTINUED | OUTPATIENT
Start: 2021-03-16 | End: 2021-03-15 | Stop reason: HOSPADM

## 2021-03-11 RX ADMIN — LORAZEPAM 0.5 MG: 2 INJECTION INTRAMUSCULAR; INTRAVENOUS at 06:22

## 2021-03-11 RX ADMIN — LAMOTRIGINE 100 MG: 100 TABLET ORAL at 18:45

## 2021-03-11 RX ADMIN — VENLAFAXINE 12.5 MG: 25 TABLET ORAL at 09:16

## 2021-03-11 RX ADMIN — LORAZEPAM 0.5 MG: 1 TABLET ORAL at 21:56

## 2021-03-11 RX ADMIN — VENLAFAXINE 12.5 MG: 25 TABLET ORAL at 22:02

## 2021-03-11 RX ADMIN — DEXAMETHASONE 10 MG: 4 TABLET ORAL at 21:56

## 2021-03-11 RX ADMIN — LORAZEPAM 0.5 MG: 2 INJECTION INTRAMUSCULAR; INTRAVENOUS at 11:34

## 2021-03-11 RX ADMIN — LORAZEPAM 0.5 MG: 2 INJECTION INTRAMUSCULAR; INTRAVENOUS at 02:08

## 2021-03-11 RX ADMIN — GADOTERIDOL 20 ML: 279.3 INJECTION, SOLUTION INTRAVENOUS at 13:22

## 2021-03-11 RX ADMIN — LEVETIRACETAM 500 MG: 500 TABLET, FILM COATED ORAL at 18:45

## 2021-03-11 RX ADMIN — DEXAMETHASONE SODIUM PHOSPHATE 10 MG: 4 INJECTION, SOLUTION INTRA-ARTICULAR; INTRALESIONAL; INTRAMUSCULAR; INTRAVENOUS; SOFT TISSUE at 13:44

## 2021-03-11 RX ADMIN — FAMOTIDINE 20 MG: 20 TABLET ORAL at 06:02

## 2021-03-11 RX ADMIN — LEVETIRACETAM 500 MG: 500 TABLET, FILM COATED ORAL at 06:02

## 2021-03-11 RX ADMIN — DEXAMETHASONE 10 MG: 4 TABLET ORAL at 15:51

## 2021-03-11 RX ADMIN — LAMOTRIGINE 100 MG: 100 TABLET ORAL at 06:02

## 2021-03-11 RX ADMIN — FAMOTIDINE 20 MG: 20 TABLET ORAL at 18:45

## 2021-03-11 RX ADMIN — DEXAMETHASONE SODIUM PHOSPHATE 10 MG: 4 INJECTION, SOLUTION INTRA-ARTICULAR; INTRALESIONAL; INTRAMUSCULAR; INTRAVENOUS; SOFT TISSUE at 06:01

## 2021-03-11 RX ADMIN — ACETAMINOPHEN 1000 MG: 500 TABLET, FILM COATED ORAL at 06:02

## 2021-03-11 ASSESSMENT — ENCOUNTER SYMPTOMS
MEMORY LOSS: 0
DIZZINESS: 0
HEADACHES: 0
DIARRHEA: 0
NAUSEA: 0
SINUS PAIN: 0
SORE THROAT: 0
FEVER: 0
SHORTNESS OF BREATH: 0
TINGLING: 0
BRUISES/BLEEDS EASILY: 0
VOMITING: 0
CONSTIPATION: 0
NERVOUS/ANXIOUS: 0
TREMORS: 0
BACK PAIN: 0
DEPRESSION: 0
WHEEZING: 0
NECK PAIN: 0
SEIZURES: 1
WEAKNESS: 0
FOCAL WEAKNESS: 1
FALLS: 0
PALPITATIONS: 0
COUGH: 0
CHILLS: 0
ABDOMINAL PAIN: 0
WEIGHT LOSS: 0

## 2021-03-11 ASSESSMENT — COGNITIVE AND FUNCTIONAL STATUS - GENERAL
TOILETING: A LOT
DRESSING REGULAR UPPER BODY CLOTHING: A LITTLE
EATING MEALS: A LITTLE
SUGGESTED CMS G CODE MODIFIER DAILY ACTIVITY: CK
DRESSING REGULAR LOWER BODY CLOTHING: A LOT
HELP NEEDED FOR BATHING: A LOT
DAILY ACTIVITIY SCORE: 15
PERSONAL GROOMING: A LITTLE

## 2021-03-11 ASSESSMENT — PAIN DESCRIPTION - PAIN TYPE: TYPE: ACUTE PAIN;SURGICAL PAIN

## 2021-03-11 ASSESSMENT — FIBROSIS 4 INDEX: FIB4 SCORE: 0.92

## 2021-03-11 NOTE — PROGRESS NOTES
Neurosurgery Progress Note    Subjective:  No acute event over night  Slight HA, controlled with tylenol  Feels stronger in her left extremities  Ambulating    Exam:  A&O, face symmt, PERRLA  Speech fluent & appropriate  Left  4+, difficulty lifting left arm up 3/5 Left tricep/deltoid 4/5 left IP/quad 4+/5, left foot drop TA/GS 2/5  RUE/RLE 5/5        BP  Min: 132/69  Max: 162/88  Pulse  Av.3  Min: 75  Max: 125  Resp  Av.5  Min: 11  Max: 42  Temp  Av.7 °C (98.1 °F)  Min: 36.5 °C (97.7 °F)  Max: 37.1 °C (98.8 °F)  Monitored Temp 2  Av.2 °C (99 °F)  Min: 36.7 °C (98.1 °F)  Max: 37.9 °C (100.2 °F)  SpO2  Av.9 %  Min: 93 %  Max: 97 %    No data recorded    Recent Labs     21  0535 03/10/21  0420   WBC 8.2 8.4   RBC 4.46 2.89*   HEMOGLOBIN 14.3 9.5*   HEMATOCRIT 42.6 29.9*   MCV 95.5 103.5*   MCH 32.1 32.9   MCHC 33.6 31.8*   RDW 44.3 47.3   PLATELETCT 208 144*   MPV 10.0 10.5     Recent Labs     21  0535 03/10/21  0603 21  0426   SODIUM 138 134* 141   POTASSIUM 4.1 4.1 4.5   CHLORIDE 103 102 109   CO2 23 19* 23   GLUCOSE 100* 186* 147*   BUN 11 7* 14   CREATININE 0.64 0.59 0.50   CALCIUM 9.0 9.0 9.0     Recent Labs     21  0535   INR 0.92           Intake/Output       03/10/21 0700 - 21 0659 21 - 2159      1900-0659 Total 9132-6110 1553-3851 Total       Intake    P.O.  850  600 1450  --  -- --    P.O.  -- -- --    I.V.  261.7  -- 261.7  --  -- --    Volume (mL) (dextrose 5 % and 0.9 % NaCl with KCl 20 mEq infusion) 261.7 -- 261.7 -- -- --    Total Intake 1111.7 600 1711.7 -- -- --       Output    Urine  660  150 810  --  -- --    Number of Times Voided -- 1 x 1 x -- -- --    Urine Void (mL) -- 150 150 -- -- --    Output (mL) ([REMOVED] Urethral Catheter Latex;Temperature probe 16 Fr.) 660 -- 660 -- -- --    Total Output 660 150 810 -- -- --       Net I/O     451.7 450 901.7 -- -- --            Intake/Output Summary  (Last 24 hours) at 3/11/2021 0842  Last data filed at 3/11/2021 0600  Gross per 24 hour   Intake 1511.67 ml   Output 660 ml   Net 851.67 ml            • LORazepam  0.5 mg Once   • levETIRAcetam  500 mg BID   • artificial tears  1 Application PRN   • hydrALAZINE  10 mg Q4HRS PRN   • labetalol  10-20 mg Q4HRS PRN   • LORazepam  0.5 mg Q4HRS PRN   • Pharmacy Consult Request  1 Each PHARMACY TO DOSE   • MD ALERT...DO NOT ADMINISTER NSAIDS or ASPIRIN unless ORDERED By Neurosurgery  1 Each PRN   • ondansetron  4 mg Q4HRS PRN   • cloNIDine  0.1 mg Q4HRS PRN   • docusate sodium  100 mg BID   • senna-docusate  1 tablet Nightly   • senna-docusate  1 tablet Q24HRS PRN   • polyethylene glycol/lytes  1 Packet BID PRN   • magnesium hydroxide  30 mL QDAY PRN   • bisacodyl  10 mg Q24HRS PRN   • fleet  1 Each Once PRN   • acetaminophen  1,000 mg Q6HRS    Followed by   • [START ON 3/14/2021] acetaminophen  1,000 mg Q6HRS PRN   • oxyCODONE immediate-release  5 mg Q3HRS PRN    Or   • oxyCODONE immediate-release  10 mg Q3HRS PRN    Or   • HYDROmorphone  0.5 mg Q3HRS PRN   • dexamethasone  10 mg Q6HRS   • famotidine  20 mg BID    Or   • famotidine  20 mg BID   • benzocaine-menthol  1 Lozenge Q2HRS PRN   • lamoTRIgine  100 mg BID   • LORazepam  1-2 mg Q6HRS PRN   • diphenhydrAMINE  25 mg Q6HRS PRN   • acetaminophen  650 mg Q6HRS PRN   • venlafaxine  12.5 mg BID       Assessment and Plan:  Hospital day #9 right parietal brain lesion, seizures  POD # 2 craniotomy for right frontal mass  Prophylactic anticoagulation: no         Start date/time: hold for surgery      Labs reviewed and stable  Path pending  Dex on 10 day taper  Continue seizure medications  Post-op MRI pending  Left foot drop: trial foot drop boot  Need med/rad oncology establishment    OK to discharge once established with med and rad oncologists.  Continue seizure medications and follow up wit neurologist  Follow up with [facility] in 2 weeks  No NSAIDs, ASA, blood  thinners  Keep incision clean/dry  No need for dressing  Disposition per IM

## 2021-03-11 NOTE — THERAPY
"Occupational Therapy   Initial Evaluation     Patient Name: Melba Frye  Age:  50 y.o., Sex:  female  Medical Record #: [id number]  Today's Date: 3/10/2021     Precautions  Precautions: Fall Risk  Comments: L sided deficits    Assessment  Patient is 50 y.o. female who presents to acute w/ R frontal lobe mass and seizures. Pt is now s/p tumor resection. Pt's L UE has decreased AROM, strength, coordination and sensation, when pt focuses on isolating joints w/ increased time pt is able to break tone. Pt demo'd impaired balance, functional mobility, activity tolerance and L UE function impacting functional independence. Will continue to follow.     Plan    Recommend Occupational Therapy 5 times per week until therapy goals are met for the following treatments:  Adaptive Equipment, Neuro Re-Education / Balance, Self Care/Activities of Daily Living, Therapeutic Activities and Therapeutic Exercises.    DC Equipment Recommendations: (P) Unable to determine at this time  Discharge Recommendations: (P) Recommend post-acute placement for additional occupational therapy services prior to discharge home(please consider PM&R consult )     Subjective    \"Give me a break, give me a break, give me a break of that kit alfredito bar\"     Objective       03/10/21 1521   Total Time Spent   Total Time Spent (Mins) 30   Charge Group   OT Evaluation OT Evaluation Mod   Initial Contact Note    Initial Contact Note Order Received and Verified, Occupational Therapy Evaluation in Progress with Full Report to Follow.   Prior Living Situation   Prior Services Intermittent Physical Support for ADL Per Family   Housing / Facility 3 Story House   Bathroom Set up Walk In Shower;Shower Chair   Equipment Owned Tub / Shower Seat   Lives with - Patient's Self Care Capacity Spouse   Comments spouse present, appears supportive, reports he has been physically assisting pt since Dec   Prior Level of ADL Function   Self Feeding Independent   Grooming / " Hygiene Independent   Bathing Independent   Dressing Independent   Toileting Independent   Prior Level of IADL Function   Medication Management Independent   Laundry Independent   Kitchen Mobility Independent   Finances Independent   Home Management Independent   Shopping Independent   Prior Level Of Mobility Independent Without Device in Community;Independent Without Device in Home   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 )   Precautions   Precautions Fall Risk   Comments L sided deficits   Vitals   O2 (LPM) 2   O2 Delivery Device Silicone Nasal Cannula   Pain 0 - 10 Group   Therapist Pain Assessment Post Activity Pain Same as Prior to Activity;Nurse Notified  (no c/o pain during session)   Cognition    Cognition / Consciousness WDL   Level of Consciousness Alert   Comments very pleasent and cooperative   Passive ROM Upper Body   Passive ROM Upper Body WDL   Comments L UE hypertonic   Active ROM Upper Body   Active ROM Upper Body  X   Dominant Hand Right   Comments L UE impaired shoulder flexion, external rotation, elbow flexion, wrist extension    Strength Upper Body   Upper Body Strength  X   Comments L UE grossly 2-/5   Upper Body Muscle Tone   Upper Body Muscle Tone  X   Lt Upper Extremity Muscle Tone Hypertonic   Coordination Upper Body   Coordination X   Fine Motor Coordination impaired L side   Balance Assessment   Sitting Balance (Static) Fair -   Sitting Balance (Dynamic) Poor +   Standing Balance (Static) Poor   Standing Balance (Dynamic) Poor -   Weight Shift Sitting Fair   Weight Shift Standing Poor   Comments w/ B UE support, unable to maintain grasp on FWW   Bed Mobility    Supine to Sit   (in chair pre)   Sit to Supine Moderate Assist   Scooting Minimal Assist   ADL Assessment   Grooming Minimal Assist;Seated   Lower Body Dressing Maximal Assist   How much help from another person does the patient currently need...   Putting on and taking off regular lower body clothing? 2    Bathing (including washing, rinsing, and drying)? 2   Toileting, which includes using a toilet, bedpan, or urinal? 2   Putting on and taking off regular upper body clothing? 2   Taking care of personal grooming such as brushing teeth? 3   Eating meals? 3   6 Clicks Daily Activity Score 14   Functional Mobility   Sit to Stand Moderate Assist   Bed, Chair, Wheelchair Transfer Moderate Assist   Mobility within room w/ HHA   ICU Target Mobility Level   ICU Mobility - Targeted Level Level 4   Edema / Skin Assessment   Edema / Skin  Not Assessed   Activity Tolerance   Sitting in Chair 10+ min (up pre)   Sitting Edge of Bed 10 min   Standing 6 min   Patient / Family Goals   Patient / Family Goal #1 To get back to hiking   Short Term Goals   Short Term Goal # 1 Pt will use L UE as a gross assist during ADLs w/ no v/c's   Short Term Goal # 2 Pt will demo standing grooming w/ SPV   Short Term Goal # 3 Pt will demo LB dressing w/ SPV   Short Term Goal # 4 Pt will demo toilet txf w/ SPV   Short Term Goal # 5 Pt will increase strength in L UE to 3/5 grossly   Education Group   Role of Occupational Therapist Patient Response Patient;Acceptance;Explanation;Demonstration;Verbal Demonstration   Problem List   Problem List Decreased Active Daily Living Skills;Decreased Homemaking Skills;Decreased Upper Extremity AROM Left;Decreased Upper Extremity Strength Left;Decreased Functional Mobility;Decreased Activity Tolerance;Safety Awareness Deficits / Cognition;Impaired Coordination Left Upper Extremity;Impaired Postural Control / Balance;Impaired Sensation Left Upper Extremity   Anticipated Discharge Equipment and Recommendations   DC Equipment Recommendations Unable to determine at this time   Discharge Recommendations Recommend post-acute placement for additional occupational therapy services prior to discharge home  (please consider PM&R consult )   Interdisciplinary Plan of Care Collaboration   IDT Collaboration with   Nursing;Physical Therapist   Patient Position at End of Therapy In Bed;Bed Alarm On;Call Light within Reach;Tray Table within Reach;Phone within Reach;Family / Friend in Room   Collaboration Comments report given   Session Information   Date / Session Number  3/10, 1 (1/5, 3/16)   Priority 3  (CVA)

## 2021-03-11 NOTE — CARE PLAN
Problem: Communication  Goal: The ability to communicate needs accurately and effectively will improve  Outcome: PROGRESSING AS EXPECTED  Note: Patient actively involved in plan of care. Patient receptive to and seeks education of plan of care. Patient communicates needs appropriately with staff.      Problem: Psychosocial Needs:  Goal: Level of anxiety will decrease  Outcome: PROGRESSING AS EXPECTED  Note: Patient is anxious and receptive to conversation and anxiety reducing techniques. One dose Ativan given to assist with anxiety levels.

## 2021-03-11 NOTE — THERAPY
Physical Therapy   Initial Evaluation     Patient Name: Melba Frye  Age:  50 y.o., Sex:  female  Medical Record #: [id number]  Today's Date: 3/10/2021     Precautions: (P) Fall Risk    Assessment  Patient is 50 y.o. female with a diagnosis of R frontal lobe brain mass with seizure, s/p craniotomy with tumor resection 3/9. Pt reports she lives with spouse and has had some progressive mobility challenges over past several weeks. Prior to decline, she was working as a teacher and very active including hiking. Today, pt was very pleasant and eager to work with PT. She does demonstrate some impulsivity with mobility, but is responsive to cues and instructions. Pt was able to transfers and ambulate with mod A, though required second person to assist with LUE on FWW due to LUE weakness and impaired coordination. Pt will benefit from continued PT in the acute setting to address deficits with emphasis on balance, gait. Pt is very motivated with good support from spouse, and will be a good candidate for inpatient acute rehab. Pt will benefit from further PT in the post-acute setting prior to D/C home.    Plan    Recommend Physical Therapy 5 times per week until therapy goals are met for the following treatments:  Bed Mobility, Equipment, Gait Training, Manual Therapy, Neuro Re-Education / Balance, Stair Training, Therapeutic Activities and Therapeutic Exercises    DC Equipment Recommendations: (P) Unable to determine at this time  Discharge Recommendations: (P) Recommend post-acute placement for additional physical therapy services prior to discharge home       Subjective    Pt stating she wants to get back to hiking.     Objective       03/10/21 1440   Prior Living Situation   Prior Services Intermittent Physical Support for ADL Per Family   Bathroom Set up Walk In Shower   Equipment Owned Front-Wheel Walker   Lives with - Patient's Self Care Capacity Spouse   Comments worked as a teacher   Prior Level of Functional  Mobility   Bed Mobility Independent   Transfer Status Independent   Ambulation Independent   Comments reports some help with spouse for dressing over past few weeks; pt reports she was hiking at "[employer] and working as a teacher   Cognition    Level of Consciousness Alert   Ability To Follow Commands 2 Step   Safety Awareness Impulsive   Strength Lower Body   Comments flaccid left ankle, weakness LLE grossly   Neurological Concerns   Comments brain mass   Coordination Lower Body    Comments impaired LLE   Balance Assessment   Sitting Balance (Static) Fair -   Sitting Balance (Dynamic) Poor +   Standing Balance (Static) Poor   Standing Balance (Dynamic) Poor -   Weight Shift Sitting Fair   Weight Shift Standing Poor   Comments with FWW, assist L hand   Gait Analysis   Gait Level Of Assist Moderate Assist  (x2)   Assistive Device Front Wheel Walker  (assist for left hand)   Distance (Feet) 15   # of Times Distance was Traveled 1   Deviation Ataxic;Decreased Heel Strike;Decreased Toe Off   # of Stairs Climbed 0   Weight Bearing Status no restriction   Comments 2 person assist with ambulation, mod A; requires asistance to hold left UE to walker   Bed Mobility    Sit to Supine Moderate Assist   Scooting Moderate Assist   Comments up in chair   Functional Mobility   Sit to Stand Moderate Assist   Bed, Chair, Wheelchair Transfer Moderate Assist   Transfer Method Stand Step   Mobility chair->stand->amb->bed->supine   How much help from another person does the patient currently need...   6 clicks Mobility Score 8   Activity Tolerance   Sitting in Chair found up in chair   Sitting Edge of Bed 10 minutes approx   Standing 8 minutes approx   Comments fatigued   Patient / Family Goals    Patient / Family Goal #1 Get back to hiking   Short Term Goals    Short Term Goal # 1 Pt will demonstrate supine<->sit with min A within 6 visits in order to promote return home   Short Term Goal # 2 Pt will be able to transfer sit to stand with  LRAD with min A within 6 visits in order to promote return home   Short Term Goal # 3 Pt will be able to amb 15' with LRAD and min A within 6 visits in order to promote return home   Education Group   Education Provided Role of Physical Therapist   Role of Physical Therapist Patient Response Patient;Eager;Significant Other;Explanation;Verbal Demonstration   Problem List    Problems Pain;Impaired Bed Mobility;Impaired Transfers;Impaired Ambulation;Functional Strength Deficit;Impaired Balance;Impaired Coordination;Safety Awareness Deficits / Cognition;Motor Planning / Sequencing   Anticipated Discharge Equipment and Recommendations   DC Equipment Recommendations Unable to determine at this time   Discharge Recommendations Recommend post-acute placement for additional physical therapy services prior to discharge home

## 2021-03-11 NOTE — CONSULTS
RADIATION ONCOLOGY CONSULT    DATE OF SERVICE: 3/11/2021    IDENTIFICATION: A 50 y.o. female with   Visit Diagnoses     ICD-10-CM   1. Right frontal lobe mass  G93.89     Glioblastoma of frontal lobe (HCC)  Staging form: Pediatric High Grade Glioma  - Clinical stage from 3/11/2021: Histology: Glioblastoma multiforme, Location: Frontal lobe - Signed by Edenilson Frye M.D. on 3/11/2021  Stage used in treatment planning: Yes    She is here at the kind request of Dr. Mao    HISTORY OF PRESENT ILLNESS:  Patient originally presented with seizures and went to the ER at [facility] and underwent MRI brain December 10, 2020 which showed an approximate 8 x 6 cm enhancing lesion in the right frontal medial gray matter area with tiny satellite nodular enhancement nonspecific.  Patient also had a MRI CT and L-spine on January 9, 2021 which showed no evidence of multiple sclerosis.  Patient then had repeat MRI brain February 12, 2021 which showed marked interval increase in medial posterior right frontal lobe now measuring 3 x 2.4 x 3.1 with irregular thick-walled peripheral enhancement. She had CT chest abdomen pelvis on February 21, 2021 which showed no evidence of metastatic disease. Patient was readmitted with headaches and seizures and underwent MRI stealth brain March 9, 2021 which showed 3.5 x 2.5 cm peripheral enhancing lesion in the right medial parietal lobe with surrounding white matter edema highly suspicious for high-grade neoplasm. Patient underwent surgery with Dr. Mao with craniotomy with motor mapping and given proximity to motor strip was really only able to do an open biopsy with pathology showing glioblastoma. MRI brain postoperatively on March 11, 2021 showed postsurgical changes in the right frontal parietal craniotomy and biopsy of right medial parietal enhancing lesion. Currently patient is depressed and has some residual left upper extremity weakness and left lower extremity weakness but she says  the left upper extremity weakness is improving.    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)   • Glioblastoma of frontal lobe (HCC)   • Demyelinating disease of central nervous system, unspecified (HCC)        PAST SURGICAL HISTORY:  Past Surgical History:   Procedure Laterality Date   • CRANIOTOMY STEALTH Right 3/9/2021    Procedure: RIGHT CRANIOTOMY, USING FRAMELESS STEREOTAXY - FOR OPEN BIOPSY WITH PHASE REVERSAL;  Surgeon: Maxwell Mao M.D.;  Location: SURGERY [facility];  Service: Neurosurgery   • MYRINGOTOMY  8/27/2010    Performed by [address] at SURGERY SAME DAY [facility] ORS   • LAPAROSCOPY  5/28/2010    Performed by JACKI SHARMA at SURGERY [facility] ORS   • LYMPH NODE SAMPLING  5/28/2010    Performed by JACKI SHARMA at SURGERY [facility] ORS   • DEBULKING  5/28/2010    Performed by JACKI SHARMA at SURGERY [facility] ORS   • CYSTOSCOPY  10/15/08    Performed by YU GODINEZ at SURGERY SAME DAY [facility] ORS   • VAGINAL HYSTERECTOMY SCOPE TOTAL  10/15/08    Performed by YU GODINEZ at SURGERY SAME DAY [facility] ORS   • OTHER  1984    TONSILECTOMY/ ADNOIDS   • APPENDECTOMY  1981   • TONSILLECTOMY AND ADENOIDECTOMY         CURRENT MEDICATIONS:  Current Facility-Administered Medications   Medication Dose Route Frequency Provider Last Rate Last Admin   • dexamethasone (DECADRON) tablet 10 mg  10 mg Oral Q8HRS Emre Almanzar, A.P.R.N.        Followed by   • [START ON 3/13/2021] dexamethasone (DECADRON) tablet 8 mg  8 mg Oral Q8HRS Emre Almanzar, A.P.R.N.        Followed by   • [START ON 3/15/2021] dexamethasone (DECADRON) tablet 6 mg  6 mg Oral Q8HRS Emre Almanzar, A.P.R.N.        Followed by   • [START ON 3/16/2021]  dexamethasone (DECADRON) tablet 4 mg  4 mg Oral Q8HRS Emre Almanzar A.P.R.N.        Followed by   • [START ON 3/17/2021] dexamethasone (DECADRON) tablet 4 mg  4 mg Oral Q12HRS Emre Almanzar A.P.R.N.        Followed by   • [START ON 3/18/2021] dexamethasone (DECADRON) tablet 2 mg  2 mg Oral Q8HRS Emre Almanzar A.P.R.N.        Followed by   • [START ON 3/19/2021] dexamethasone (DECADRON) tablet 2 mg  2 mg Oral Q12HRS Emre Almanzar A.P.R.N.        Followed by   • [START ON 3/20/2021] dexamethasone (DECADRON) tablet 2 mg  2 mg Oral DAILY BECKY Vazquez.P.R.N.       • [START ON 3/12/2021] folic acid (FOLVITE) tablet 1 mg  1 mg Oral DAILY Juwan Merino M.D.       • levETIRAcetam (KEPPRA) tablet 500 mg  500 mg Oral BID Manpreet Rogers M.D.   500 mg at 03/11/21 0602   • artificial tears (EYE LUBRICANT) ophth ointment 1 Application  1 Application Both Eyes PRN Manpreet Rogers M.D.       • hydrALAZINE (APRESOLINE) injection 10 mg  10 mg Intravenous Q4HRS PRN Manpreet Rogers M.D.       • labetalol (NORMODYNE/TRANDATE) injection 10-20 mg  10-20 mg Intravenous Q4HRS PRN Manpreet Rogers M.D.       • Pharmacy Consult Request ...Pain Management Review 1 Each  1 Each Other PHARMACY TO DOSE Majo Garcia P.A.-C.       • MD ALERT...DO NOT ADMINISTER NSAIDS or ASPIRIN unless ORDERED By Neurosurgery 1 Each  1 Each Other PRN MALIA WalkerC.       • ondansetron (ZOFRAN) syringe/vial injection 4 mg  4 mg Intravenous Q4HRS PRN MALIA WalkerC.       • cloNIDine (CATAPRES) tablet 0.1 mg  0.1 mg Oral Q4HRS PRN SCAR Walker.-C.       • docusate sodium (COLACE) capsule 100 mg  100 mg Oral BID Majo Garcia P.A.-C.       • senna-docusate (PERICOLACE or SENOKOT S) 8.6-50 MG per tablet 1 tablet  1 tablet Oral Nightly Majo Garcia P.A.-C.       • senna-docusate (PERICOLACE or SENOKOT S) 8.6-50 MG per tablet 1 tablet  1 tablet Oral Q24HRS PRN Majo Garcia P.A.-C.       • polyethylene glycol/lytes (MIRALAX) PACKET 1  Packet  1 Packet Oral BID PRN Majo Garcia, ADWOA.A.-C.       • magnesium hydroxide (MILK OF MAGNESIA) suspension 30 mL  30 mL Oral QDAY PRN Majo Garcia, P.A.-C.       • bisacodyl (DULCOLAX) suppository 10 mg  10 mg Rectal Q24HRS PRN Majo Garcia, P.A.-C.       • fleet enema 133 mL  1 Each Rectal Once PRN Majo Garcia, P.A.-C.       • oxyCODONE immediate-release (ROXICODONE) tablet 5 mg  5 mg Oral Q3HRS PRN Majo Garcia, P.A.-C.   5 mg at 03/09/21 1500    Or   • oxyCODONE immediate-release (ROXICODONE) tablet 10 mg  10 mg Oral Q3HRS PRN Majo Garcia P.A.-C.   10 mg at 03/10/21 0317    Or   • HYDROmorphone pf (DILAUDID) injection 0.5 mg  0.5 mg Intravenous Q3HRS PRN Majo Garcia, P.A.-C.   Given at 03/09/21 1708   • famotidine (PEPCID) tablet 20 mg  20 mg Oral BID Majo Garcia, P.A.-C.   20 mg at 03/11/21 0602    Or   • famotidine (PEPCID) injection 20 mg  20 mg Intravenous BID Majo Garcia, P.A.-C.       • benzocaine-menthol (CEPACOL) lozenge 1 Lozenge  1 Lozenge Mouth/Throat Q2HRS PRN Majo Garcia, ADWOA.A.-C.       • lamoTRIgine (LAMICTAL) tablet 100 mg  100 mg Oral BID Gilles Waller M.D.   100 mg at 03/11/21 0602   • LORazepam (ATIVAN) injection 1-2 mg  1-2 mg Intravenous Q6HRS PRN Juwan Merino M.D.       • diphenhydrAMINE (BENADRYL) injection 25 mg  25 mg Intravenous Q6HRS PRN Cristhian Sullivan M.D.   25 mg at 03/04/21 2041   • acetaminophen (Tylenol) tablet 650 mg  650 mg Oral Q6HRS PRN Ruy Nieves Jr., M.D.   650 mg at 03/09/21 0134   • venlafaxine (EFFEXOR) tablet 12.5 mg  12.5 mg Oral BID Ruy Nieves Jr., M.D.   12.5 mg at 03/11/21 0916       ALLERGIES:    Bactrim    FAMILY HISTORY:    family history includes Arthritis in her mother; Cancer in her mother; Cancer (age of onset: 73) in her father; Diabetes in her paternal aunt; Heart Disease in her mother; Hyperlipidemia in her paternal aunt; Hypertension in her mother; Lung Disease in her maternal  "grandmother, mother, and paternal aunt; Non-contributory in her father and mother; Psychiatric Illness in her mother; Stroke in her mother.    SOCIAL HISTORY:     reports that she has never smoked. She has never used smokeless tobacco. She reports previous alcohol use. She reports that she does not use drugs.    REVIEW OF SYSTEMS: 10 point review systems negative except as noted in HPI.    PHYSICAL EXAM:    ECOG PERFORMANCE STATUS: 1  /94   Pulse 76   Temp 36.7 °C (98 °F) (Temporal)   Resp 20   Ht 1.702 m (5' 7\")   Wt 102 kg (224 lb 10.4 oz)   SpO2 96%   BMI 35.18 kg/m²   Physical Exam  Vitals reviewed.   Constitutional:       Appearance: Normal appearance.   HENT:      Head: Normocephalic and atraumatic.      Mouth/Throat:      Mouth: Mucous membranes are moist.   Eyes:      Pupils: Pupils are equal, round, and reactive to light.   Cardiovascular:      Rate and Rhythm: Normal rate.   Pulmonary:      Effort: Pulmonary effort is normal.   Abdominal:      General: Abdomen is flat.   Musculoskeletal:         General: Normal range of motion.      Cervical back: Normal range of motion.   Neurological:      Mental Status: She is alert.      Motor: Weakness present.      Comments: LUE weakness, LLE weakness   Psychiatric:         Mood and Affect: Mood normal.          LABORATORY DATA:   Lab Results   Component Value Date/Time    WBC 8.4 03/10/2021 0420    WBC 8.2 03/09/2021 0535    WBC 7.9 03/06/2021 0455    HEMOGLOBIN 9.5 (L) 03/10/2021 0420    HEMOGLOBIN 14.3 03/09/2021 0535    HEMOGLOBIN 13.7 03/06/2021 0455    HEMATOCRIT 29.9 (L) 03/10/2021 0420    HEMATOCRIT 42.6 03/09/2021 0535    HEMATOCRIT 40.9 03/06/2021 0455    .5 (H) 03/10/2021 0420    MCV 95.5 03/09/2021 0535    MCV 97.1 03/06/2021 0455    PLATELETCT 144 (L) 03/10/2021 0420    PLATELETCT 208 03/09/2021 0535    PLATELETCT 188 03/06/2021 0455    NEUTS 4.94 03/09/2021 0535    NEUTS 5.43 03/03/2021 0421    NEUTS 3.68 03/02/2021 1017      [facility] " Results   Component Value Date/Time    SODIUM 141 03/11/2021 0426    SODIUM 134 (L) 03/10/2021 0603    SODIUM 138 03/09/2021 0535    POTASSIUM 4.5 03/11/2021 0426    POTASSIUM 4.1 03/10/2021 0603    POTASSIUM 4.1 03/09/2021 0535    BUN 14 03/11/2021 0426    BUN 7 (L) 03/10/2021 0603    BUN 11 03/09/2021 0535    CREATININE 0.50 03/11/2021 0426    CREATININE 0.59 03/10/2021 0603    CREATININE 0.64 03/09/2021 0535    CALCIUM 9.0 03/11/2021 0426    CALCIUM 9.0 03/10/2021 0603    CALCIUM 9.0 03/09/2021 0535    ALBUMIN 3.8 03/09/2021 0535    ALBUMIN 3.5 03/06/2021 0455    ALBUMIN 3.6 03/05/2021 0217    ASTSGOT 9 (L) 03/09/2021 0535    ASTSGOT 9 (L) 03/06/2021 0455    ASTSGOT 11 (L) 03/05/2021 0217    ALKPHOSPHAT 83 03/09/2021 0535    ALKPHOSPHAT 83 03/06/2021 0455    ALKPHOSPHAT 77 03/05/2021 0217    IFNOTAFR >60 03/11/2021 0426    IFNOTAFR >60 03/10/2021 0603    IFNOTAFR >60 03/09/2021 0535       RADIOLOGY DATA:  CT-CHEST,ABDOMEN,PELVIS WITH    Result Date: 2/21/2021 2/21/2021 2:52 PM HISTORY/REASON FOR EXAM: Brain tumor. Evaluate for source of metastatic disease. TECHNIQUE/EXAM DESCRIPTION: CT scan of the chest, abdomen and pelvis with contrast. Thin-section helical scanning was obtained with intravenous contrast from the lung apices through the pubic symphysis to include the chest, abdomen and pelvis. 100 mL of Omnipaque 350 nonionic contrast was administered intravenously without complication. Low dose optimization technique was utilized for this CT exam including automated exposure control and adjustment of the mA and/or kV according to patient size. COMPARISON: 2/25/2019 FINDINGS: CT CHEST: There is no evidence of focal consolidation or pleural effusion. There is no evidence of mediastinal or hilar adenopathy. There is no evidence of pericardial effusion. There is no atherosclerotic change of the aorta or coronary vessels. CT ABDOMEN: There is fatty change of the liver. The cysts are wonderful The kidneys are  normal. The adrenal glands are normal. The pancreas is normal. The aorta is normal in appearance. No evidence of retroperitoneal adenopathy or hematoma. CT PELVIS: The appendix is not identified. There are retroperitoneal surgical clips bilaterally. There has been prior hysterectomy. There is no evidence of bowel obstruction or inflammatory change. There is no evidence of free fluid. There is a pessary seen within the vagina. No acute osseous abnormalities are seen.     1.  No evidence of thoracic, abdominal or pelvic adenopathy or mass. 2.  Fatty liver. 3.  Prior hysterectomy.    CT-HEAD W/O    Result Date: 3/9/2021  3/9/2021 5:05 PM HISTORY/REASON FOR EXAM: Anaplastic glioma. Postoperative evaluation. TECHNIQUE/EXAM DESCRIPTION AND NUMBER OF VIEWS: CT of the head without contrast. Up to date radiation dose reduction adjustments have been utilized to meet ALARA standards for radiation dose reduction. COMPARISON:  CT brain 3/2/2021. MRI brain 2/12/2021 FINDINGS: Posterior right parietal craniotomy changes are present. A large area of low attenuation within the right temporal and parietal white matter is without significant change. There is minimal intracranial air and fluid in the region of a previously identified elliptically shaped mass in the posterior right parietal convexity near the midline. No hemorrhage is present. There is residual mass effect upon the body of the right lateral ventricle. No extra-axial fluid collection is present. There is residual right to left midline shift measuring 4.3 mm. The ventricles and CSF spaces are normal.     1.  No intracranial hemorrhage identified following right parietal craniotomy and tumor resection. 2.  Residual right to left midline shift measuring 4.3 mm. 3.  Large area of low attenuation/edema in the right temporal and parietal white matter is present with minimal air near the site of tumor in the right parietal convexity medially. 4.  No hydrocephalus.    CT-HEAD  W/O    Result Date: 3/2/2021  3/2/2021 10:35 AM HISTORY/REASON FOR EXAM: Seizure. TECHNIQUE/EXAM DESCRIPTION AND NUMBER OF VIEWS: CT of the head without contrast. Up to date radiation dose reduction adjustments have been utilized to meet ALARA standards for radiation dose reduction. COMPARISON: Brain MRI 2/12/2021 and head CT 12/10/2020 FINDINGS: There is a large amount of vasogenic edema involving the right frontal and parietal lobes within the periventricular white matter. This extends into the basal ganglia. There is likely a mass within the high right frontal lobe in a parasagittal location measuring approximately 3 cm in size. There is mass effect on the right lateral ventricle with 7 mm of leftward midline shift. There is no periventricular chronic small vessel ischemic change present. There is no intracranial hemorrhage seen. The calvarium is intact. There is no scalp injury. There is no evidence of sinus disease.     3 cm mass within the high right frontal lobe in a parasagittal location. There is extensive vasogenic edema involving the right cerebral hemisphere with resultant mass effect on the right lateral ventricle and 7 mm of leftward midline shift. Differential  diagnosis includes brain tumor as well as abscess. The amount of mass effect and midline shift has increased somewhat from brain MRI. This was discussed with INES MONTESINOS at 10:45 AM on 3/12/2021.    MR-BRAIN-WITH & W/O    Result Date: 3/11/2021  3/11/2021 12:07 PM HISTORY/REASON FOR EXAM:  Anaplastic gliomas/glioblastoma, monitor, post resection; Post Surgical. TECHNIQUE/EXAM DESCRIPTION:   MRI of the brain without and with contrast. T1 sagittal, T2 fast spin-echo axial, T1 coronal, FLAIR coronal, diffusion-weighted and apparent diffusion coefficient (ADC map) axial images were obtained of the whole brain. T1 postcontrast axial and T1 postcontrast coronal images were obtained. The study was performed on a [employer] Signa 1.5 Dede MRI scanner.  20 mL ProHance contrast was administered intravenously. COMPARISON:  3/8/2021 FINDINGS:   There are postsurgical changes as evidenced by right frontoparietal craniectomy and biopsy of the right medial parietal enhancing lesion. Again noted is diffuse white matter edema. There is 7 mm midline shift towards left side. There is focal  periventricular T2 hyperintensities along the left lateral ventricle. A few nonspecific T2 hyperintensities are noted in the supratentorial white matter. There is no acute infarct. The visualized flow voids of the cerebral vasculature are unremarkable.     1.  There are postsurgical changes as evidenced by right frontoparietal craniectomy and biopsy of the right medial parietal enhancing lesion. 2.  7 mm midline shift towards left side 3.  Periventricular T2 hyperintensities adjacent to the left lateral ventricle. 4.  A few nonspecific T2 hyperintensities in the subcortical and periventricular white matter.    MR-BRAIN-WITH & W/O    Result Date: 2/12/2021 2/12/2021 3:31 PM HISTORY/REASON FOR EXAM:  workup for possible MS. Left-sided weakness, unsteady gait TECHNIQUE/EXAM DESCRIPTION:   MRI of the brain without and with contrast. T1 sagittal, T2 fast spin-echo axial, T1 coronal, FLAIR coronal, diffusion-weighted and apparent diffusion coefficient (ADC map) axial images were obtained of the whole brain. T1 postcontrast axial and T1 postcontrast coronal images were obtained. The study was performed on a [place] Signa 1.5 Dede MRI scanner. 19 mL ProHance contrast was administered intravenously. COMPARISON:  12/10/2020 FINDINGS: There has been marked interval increase in size of a complex lesion in the medial posterior right frontal lobe which now measures approximately 3 x 2.4 x 3.1 cm, previously 0.8 x 0.7 x 0.6 cm. The lesion demonstrates heterogeneous hyperintense T2 signal and irregular somewhat thick-walled peripheral enhancement with lack of enhancement centrally. The peripheral rim has  mildly restricted diffusion with no significant restricted diffusion centrally within the lesion making a pyogenic abscess unlikely. There is now a large amount of vasogenic edema. There is no interrupted enhancement visualized. This lesion remains indeterminate however malignancy must be excluded such as a glioma or lymphoma or metastatic disease. Tumefactive MS cannot be excluded. There is localized mass effect with mild sulcal and right lateral ventricle effacement and new 4 mm leftward midline shift. There is no other or new enhancing lesion seen. Redemonstrated is mild scattered nonspecific T2 FLAIR hyperintensities within the periventricular and subcortical cerebral white matter which are again nonspecific and could be related to demyelination, chronic microvascular ischemic changes or other nonspecific gliosis. A couple periventricular lesions again demonstrate morphology commonly seen with demyelinating disease. No acute infarct on DWI. No intracranial hemorrhage or extra-axial fluid collection. Basilar cisterns are patent. Incidental small 7 mm pineal cyst. Posterior fossa structures are within normal limits. Proximal vascular flow voids are patent. Paranasal sinuses are clear. Prominent bilateral mastoid effusions are again noted.     Marked interval increase in size of medial posterior right frontal lobe lesion now measuring 3 x 2.4 x 3.1 cm, previously 0.8 x 0.7 x 0.6 cm. There is irregular thick walled peripheral enhancement now seen and new large amount of vasogenic edema with new  mass effect as detailed above. The lesion remains indeterminate however malignancy must be excluded. Scattered mild nonspecific T2 hyperintensities elsewhere within the cerebral white matter again noted which could be related to demyelination or other nonspecific gliosis. These findings were discussed with FUENTES WILSON on 2/12/2021 4:20 PM.    [email or web address] BRAIN WITH & W/O    Result Date: 3/9/2021  3/8/2021 11:02 PM  HISTORY/REASON FOR EXAM:  Brain tumor, primary; Headache TECHNIQUE/EXAM DESCRIPTION AND NUMBER OF VIEWS: MRI of the brain without and with contrast, Stealth protocol. Fiducial markers for the Stealth stereotactic system were placed on the scalp. Thin-section 2 mm T2 fast spin-echo true axial images were obtained of the whole brain. Thin-section 1.5 mm T1-weighted postcontrast axial 3D BRAVO images were obtained of the whole brain. 3D reconstructions in the Coronal and Sagittal planes were generated from the axial 3D BRAVO postcontrast sequence. The study was performed on a [place] Signa 1.5 Dede MRI scanner. 20 mL ProHance contrast was administered intravenously. COMPARISON:  2/12/21 FINDINGS: There is an approximately 3.5 x 2.5 cm sized peripherally enhancing lesion in the right medial parietal lobe. Diffuse white matter edema is noted surrounding the lesion. When compared with the previous MRI dated 2/12/2021, there has been mild interval reduction in the size of the lesion. Again noted is well-defined left periventricular T2 hyperintensity. There is mucosal thickening in the bilateral mastoid air cells. There is an approximately 5 mm midline shift towards left side.     1.  There is an approximately 3.5 x 2.5 cm sized peripherally enhancing lesion in the right medial parietal lobe. Diffuse white matter edema is noted surrounding the lesion. When compared with the previous MRI dated 2/12/2021, there has been mild interval reduction in the size of the lesion. The lesion is highly suspicious for high-grade neoplasm such as glioblastoma multiforme. However interval reduction in the size and presence of left periventricular white matter T2 hyperintensity may suggest tumefactive demyelinating lesion. 2.  Again noted is well-defined left periventricular T2 hyperintensity concerning for demyelinating plaque. 3.  5 mm midline shift towards left side.      IMPRESSION:    A 50 y.o. with  Visit Diagnoses     ICD-10-CM   1.  Right frontal lobe mass  G93.89     Glioblastoma of frontal lobe (HCC)  Staging form: Pediatric High Grade Glioma  - Clinical stage from 3/11/2021: Histology: Glioblastoma multiforme, Location: Frontal lobe - Signed by Edenilson Frye M.D. on 3/11/2021  Stage used in treatment planning: Yes      RECOMMENDATIONS:   I discussed with patient and her  the general treatment paradigm and prognosis for glioblastoma. I explained that in the interim she will need acute rehab for 2 weeks with plan for follow-up in 2 weeks with staple removal and CT mapping with plan to start radiation within 3 weeks post surgery for optimal outcomes. We will plan to present patient at our multidisciplinary tumor board with Dr. Mao on Monday March 15, 2021. Dr. Cerda will see patient as an outpatient for discussion of Temodar. I did offer her a second opinion at [facility] with Dr. Sharri Gomez if she is interested in clinical trial opportunities    After surgery, radiotherapy is the mainstay of treatment for people with glioblastoma. A pivotal clinical trial carried out in the early 1970s showed that among 303 GBM patients randomized to radiation or nonradiation therapy, those who received radiation had a median survival more than double those who did not. Subsequent clinical research has attempted to build on the backbone of surgery followed by radiation. Onaverage, radiotherapy after surgery can reduce the tumor size  and slow progression.  Whole brain radiotherapy does not improve outcome when compared to the more precise and targeted three-dimensional conformal radiotherapy.  A total radiation dose of 60-65 Gy has been found to be optimal for treatment.  Most of studies show no benefit from the addition of chemotherapy. However, a large clinical trial of 575 participants randomized to standard radiation versus radiation plus temozolomide chemotherapy showed that the group receiving temozolomide survived a median of 14.6 months as  opposed to 12.1 months for the group receiving radiation alone.  This treatment regime is now standard for most cases of glioblastoma where the person is not enrolled in a clinical trial. Temozolomide seems to work by sensitizing the tumor cells to radiation.    We discussed the goals of care and prognosis at length.  The median survival time from the time of diagnosis without any treatment is 3 months, but with treatment survival of 1-2 years is common. Increasing age (> 60 years of age) carries a worse prognostic risk. Death is usually due to cerebral edema or increased intracranial pressure.    A good initial Karnofsky Performance Score (KPS) and MGMT methylation are associated with longer survival. A DNA test can be conducted on glioblastomas to determine whether or not the promoter of the MGMT gene is methylated. Patients with a methylated MGMT promoter have been associated with significantly greater long-term benefit than patients with an unmethylated MGMT promoter. This DNA characteristic is intrinsic to the patient and currently cannot be altered externally. Another positive prognostic marker for glioblastoma patients is mutation of the IDH1 gene, which can be tested by DNA-based methods or by immunohistochemistry using an antibody against the most common mutation, namely [id number].    More prognostic power can be obtained by combining the mutational status of IDH1 and the methylation status of MGMT into a two-gene predictor. Patients with both IDH1 mutations and MGMT methylation have the longest survival, patients with an IDH1 mutation or MGMT methylation an intermediate survival and patients without either genetic event have the shortest survival.    Long-term benefits have also been associated with those patients who receive surgery, radiotherapy, and temozolomide chemotherapy. However, much remains unknown about why some patients survive longer with glioblastoma. Age of under 50 is linked to longer  survival in glioblastoma multiforme, as is 98%+ resection and use of temozolomide chemotherapy and better Karnofsky performance scores. A recent study confirms that younger age is associated with a much better prognosis, with a small fraction of patients under 40 years of age achieving a population-based cure. The population-based cure is thought to occur when a population's risk of death returns to that of the normal population, and in GBM, this is thought to occur after 10 years.    We will plan to offer the patient adjuvant radiotherapy. The treatment consists of 6 weeks of radiation treatments to the brain using thermoplastic facemask immobilzation and a linear accelerator. The goal is to help slow down the overall pace of the disease and prevent the appearance of recurrent disease.  We discussed the risks of treatment including: hair loss, hearing changes due to fluid in the ear canal, scalp irritation, the risk of progression in the brain, cataracts, dry eye, fatigue. We discussed the neurocognitive imapact of radiotherapy. They would like to move forward with treatment and we will have our coordinators work on that process.    We also discussed tumor-treating fields called Optune which is started after chemoradiation. We gave him information to read about it and recent EMMY paper Mariah et al. Effect of tumor-treating fields plus maintenance temozolomide vs maintenance temozolomide alone on survival in patients with glioblastoma: a randomized clinical trial. EMMY. 2017. The recent update at [facility] 2017  showed median survival of 25 months when % compliant on Optune.    Thank you for the opportunity to participate in her care.  If any questions or comments, please do not hesitate in calling.

## 2021-03-11 NOTE — THERAPY
"Missed Therapy     Patient Name: Melba Frye  Age:  50 y.o., Sex:  female  Medical Record #: [id number]  Today's Date: 3/11/2021    Discussed missed therapy with jazmine       03/11/21 7201   Interdisciplinary Plan of Care Collaboration   IDT Collaboration with  Nursing   Patient Position at End of Therapy In Bed;Family / Friend in Room   Collaboration Comments pt reports getting \"bad news today\" and would like to take today off from therapy.     "

## 2021-03-11 NOTE — PROGRESS NOTES
Daily Progress Note:     Date of Service: 3/11/2021  Primary Team: [facility] IM Green Team   Attending: Oswald Quintero M.D.   Senior Resident: Dr. Merino  Intern: Dr. Waller  Contact:  [phone]    Chief Complaint:   Seizures, weakness    Subjective:  Patient returned to neurosciences floor from neuro ICU this morning.  Biopsy confirmed grade 4 glioblastoma from tumor from right frontal lobe lesion.  Repeat MRI done this afternoon, showed post surgical changes. Hg 9.5 this am, likely secondary to recent craniotomy, will get anemia workup.    Patient seen and examined this morning, later in the morning, and in the afternoon. Dr. Frye radiation oncology spoke to patient this morning and informed patient of biopsy results.  Oncology Dr. Cerda consulted.  She was understandably very tearful about the news when visited later in morning.  Visited patient in afternoon and discussed further plans of care.  Patient wanted to take a day to take everything in, we will respect her wishes and visit her tomorrow.    Consultants/Specialty:  Neurosurgery  Critical care  Physiatry  Oncology  Radiation oncology  Review of Systems:  Review of Systems   Constitutional: Negative for chills, fever, malaise/fatigue and weight loss.   HENT: Negative for congestion, ear discharge, ear pain, sinus pain and sore throat.    Respiratory: Negative for cough, shortness of breath and wheezing.    Cardiovascular: Negative for chest pain, palpitations and leg swelling.   Gastrointestinal: Negative for abdominal pain, constipation, diarrhea, nausea and vomiting.   Genitourinary: Negative for dysuria, frequency, hematuria and urgency.   Musculoskeletal: Negative for back pain, falls, joint pain and neck pain.   Skin: Negative for itching and rash.   Neurological: Positive for focal weakness (Weakness of left leg) and seizures. Negative for dizziness, tingling, tremors, weakness and headaches.   Endo/Heme/Allergies: Does not bruise/bleed easily.    Psychiatric/Behavioral: Negative for depression and memory loss. The patient is not nervous/anxious.        Objective Data:   Physical Exam:   Vitals:   Temp:  [36.5 °C (97.7 °F)-37.1 °C (98.8 °F)] 36.7 °C (98 °F)  Pulse:  [75-94] 76  Resp:  [16-20] 20  BP: (132-162)/(69-94) 137/94  SpO2:  [93 %-96 %] 96 %    Physical Exam  Constitutional:       Appearance: Normal appearance.   HENT:      Head: Normocephalic and atraumatic.      Right Ear: External ear normal.      Left Ear: External ear normal.      Nose: Nose normal.      Mouth/Throat:      Mouth: Mucous membranes are moist.   Eyes:      Extraocular Movements: Extraocular movements intact.      Pupils: Pupils are equal, round, and reactive to light.   Cardiovascular:      Rate and Rhythm: Normal rate and regular rhythm.      Pulses: Normal pulses.      Heart sounds: Normal heart sounds.   Pulmonary:      Effort: Pulmonary effort is normal.      Breath sounds: Normal breath sounds.   Abdominal:      General: Abdomen is flat. Bowel sounds are normal.      Palpations: Abdomen is soft.   Musculoskeletal:         General: No swelling. Normal range of motion.      Cervical back: Normal range of motion. No rigidity. No muscular tenderness.      Right lower leg: No edema.      Left lower leg: No edema.      Comments: LLE strength 3/5, RLE strength 5/5.   Skin:     General: Skin is warm and dry.      Capillary Refill: Capillary refill takes less than 2 seconds.   Neurological:      General: No focal deficit present.      Mental Status: She is alert and oriented to person, place, and time.   Psychiatric:         Mood and Affect: Mood normal.         Behavior: Behavior normal.           Labs:   Recent Results (from the past 24 hour(s))   Basic Metabolic Panel    Collection Time: 03/11/21  4:26 AM   Result Value Ref Range    Sodium 141 135 - 145 mmol/L    Potassium 4.5 3.6 - 5.5 mmol/L    Chloride 109 96 - 112 mmol/L    Co2 23 20 - 33 mmol/L    Glucose 147 (H) 65 - 99 mg/dL     Bun 14 8 - 22 mg/dL    Creatinine 0.50 0.50 - 1.40 mg/dL    Calcium 9.0 8.5 - 10.5 mg/dL    Anion Gap 9.0 7.0 - 16.0   ESTIMATED GFR    Collection Time: 03/11/21  4:26 AM   Result Value Ref Range    GFR If African American >60 >60 mL/min/1.73 m 2    GFR If Non African American >60 >60 mL/min/1.73 m 2   PHOSPHORUS    Collection Time: 03/11/21  9:16 AM   Result Value Ref Range    Phosphorus 2.7 2.5 - 4.5 mg/dL   MAGNESIUM    Collection Time: 03/11/21  9:16 AM   Result Value Ref Range    Magnesium 2.3 1.5 - 2.5 mg/dL      Imaging:   MR-BRAIN-WITH & W/O   Final Result      1.  There are postsurgical changes as evidenced by right frontoparietal craniectomy and biopsy of the right medial parietal enhancing lesion.   2.  7 mm midline shift towards left side   3.  Periventricular T2 hyperintensities adjacent to the left lateral ventricle.   4.  A few nonspecific T2 hyperintensities in the subcortical and periventricular white matter.      CT-HEAD W/O   Final Result      1.  No intracranial hemorrhage identified following right parietal craniotomy and tumor resection.      2.  Residual right to left midline shift measuring 4.3 mm.      3.  Large area of low attenuation/edema in the right temporal and parietal white matter is present with minimal air near the site of tumor in the right parietal convexity medially.      4.  No hydrocephalus.      MR-STEALTH BRAIN WITH & W/O   Final Result      1.  There is an approximately 3.5 x 2.5 cm sized peripherally enhancing lesion in the right medial parietal lobe. Diffuse white matter edema is noted surrounding the lesion. When compared with the previous MRI dated 2/12/2021, there has been mild    interval reduction in the size of the lesion. The lesion is highly suspicious for high-grade neoplasm such as glioblastoma multiforme. However interval reduction in the size and presence of left periventricular white matter T2 hyperintensity may suggest    tumefactive demyelinating lesion.    2.  Again noted is well-defined left periventricular T2 hyperintensity concerning for demyelinating plaque.   3.  5 mm midline shift towards left side.         Problem Representation: 50-year-old female with past medical history seizures December 2020, migraines, depression, hyperlipidemia who presented with a 45-second seizure of the left upper and lower extremities without loss of consciousness.  MRI brain notable for 3 x 2.4 x 2.1 right frontal lobe lesion increasing in size compared to 2020.  Right craniotomy by neurosurgery done 3/9/21 confirming grade IV glioblastoma by biopsy.  PT/OT, physiatry, radiation oncology, oncology following, will consider palliative consult.    * Glioblastoma of frontal lobe (HCC)- (present on admission)  Assessment & Plan  CT scan head shows right frontal mass 3 cm, MRI brain to 2/21 shows right frontal lobe lesion 3 x 2.4 x 1.1 cm increased in size from MRI 12/20/2020.  Status post craniotomy with biopsy on 3/9 by Dr. Mao and biopsy confirmed grade IV glioblastoma  Repeat MRI 3/11 shows postsurgical changes as evidenced by right frontoparietal craniectomy and biopsy of the right medial parietal enhancing lesion, 7 mm midline shift towards left side  Oncology and radiation oncology on board, she recommendations  PLAN:  On decadron with dosing and taper per neurosurgery  Keppra and Lamictal for seizure prophylaxis.  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  She is followed by Neurology on outpatient basis, started Lamictal in December  Had seizure 3/8/2021 and thus Lamictal dose was increased and Keppra added IV  with transition to oral on 3/10  PLAN:  Continue Keppra and Lamictal for seizure prophylaxis, Ativan if seizures observed    Acute blood loss as cause of postoperative anemia  Assessment & Plan  Hg 9.5 on 3/11 labs compared to 14.9 3/10. .5. Likely secondary to recent craniotomy with wound vac placement  Will obtain anemia workup on am labs - iron panel, ferritin, B12/folate, TSH, reticulocyte count    Hyperlipidemia- (present on admission)  Assessment & Plan   2019. 10 year ASCVD risk 1.4%. no statin indicated    Mixed anxiety and depressive disorder- (present on admission)  Assessment & Plan  -continue Desvenlafaxine        * Code Status: FULL  * Diet: Regular  * Lines/Tubes/Drains: PIV   IVF: none indicated  * Prophylaxis:        -- DVT:SCDs, no anticoagulation due to confirmed glioblastoma        -- GI: None  * PCP: Trinity Nguyen M.D.   * Social / DC planning: likely to Acute Rehab once acute illness(es) have been addressed  * Dispo: Likely to inpatient rehab    Prognosis remains guarded.

## 2021-03-11 NOTE — DISCHARGE PLANNING
[facility] Transitional Care Coordination     Referral from:  Dr. Quintero  Facesheet indicates: [employer]  Potential Rehab Diagnosis: NTBI    Chart review indicates patient has on going medical management and therapy needs to possibly meet inpatient rehab facility criteria with the goal of returning to community.    D/C support: Spouse     Physiatry to consult.       Last Covid test date:  3/02/2021 - COVID negative      Thank you for the referral.

## 2021-03-11 NOTE — CONSULTS
Physical Medicine and Rehabilitation Consultation         Initial Consult      Initial Consultation Date: 3/11/2021  Consulting provider: Dr. Oswald Quintero  Reason for consultation: assess for acute inpatient rehab appropriateness  LOS: 9 Day(s)      Chief complaint: brain mass        HPI:   The patient is a 50 y.o. female with a past medical history of migraines, depression, seizures;  who presented on 3/2/2021  1:45 PM to [facility] with seizure that lasted about a minute. Also with tingling and weakness of left side. Of note, had brain MRI in 2/2021 with a right medial parietal lobe mass s/p craniotomy and biopsy on 3/9 with Dr. Mao. Hospital course with thrombocytopenia, foot drop, and anxiety.       Social Hx:  Pre-morbidly, this patient lived in:   3 story home  Lives with spouse, who can assist      Current level of function:   PT, 3/10: Mod A for gait x15 ft wiith FWW; Mod A for bed mobility and transfers  OT, 3/10: Mod A for bed mobility; Max A for LBD; Mod A for transfers        PMH:  Past Medical History:   Diagnosis Date   • Anxiety    • Complicated migraine 6/9/2014   • Depression    • Dermatitis, contact 11/16/2015   • Fatigue 6/9/2014   • Hyperlipidemia 1/23/2015   • Lentigo 10/17/2018   • Menopausal symptom 7/2/2014   • Mixed anxiety and depressive disorder 6/9/2014   • Seborrheic keratoses 10/17/2018   • TMJ arthralgia 6/9/2014   • UTI (lower urinary tract infection)          PSH:  Past Surgical History:   Procedure Laterality Date   • CRANIOTOMY STEALTH Right 3/9/2021    Procedure: RIGHT CRANIOTOMY, USING FRAMELESS STEREOTAXY - FOR OPEN BIOPSY WITH PHASE REVERSAL;  Surgeon: Maxwell Mao M.D.;  Location: SURGERY [facility];  Service: Neurosurgery   • MYRINGOTOMY  8/27/2010    Performed by [address] at SURGERY SAME DAY [facility] ORS   • LAPAROSCOPY  5/28/2010    Performed by JACKI SHARMA at SURGERY [facility] ORS   • LYMPH NODE SAMPLING  5/28/2010    Performed by JACKI SHARMA at  SURGERY [facility] ORS   • DEBULKING  5/28/2010    Performed by JACKI SHARMA at SURGERY [facility] ORS   • CYSTOSCOPY  10/15/08    Performed by YU GODINEZ at SURGERY SAME DAY [facility] ORS   • VAGINAL HYSTERECTOMY SCOPE TOTAL  10/15/08    Performed by YU GODINEZ at SURGERY SAME DAY [facility] ORS   • OTHER  1984    TONSILECTOMY/ ADNOIDS   • APPENDECTOMY  1981   • TONSILLECTOMY AND ADENOIDECTOMY           FHX: Reviewed.  Family History   Problem Relation Age of Onset   • Non-contributory Mother    • Lung Disease Mother         COPD   • Cancer Mother         dysplasia    • Arthritis Mother    • Psychiatric Illness Mother    • Heart Disease Mother    • Hypertension Mother    • Stroke Mother    • Non-contributory Father    • Cancer Father 73        prostate cancer   • Lung Disease Maternal Grandmother    • Lung Disease Paternal Aunt    • Diabetes Paternal Aunt    • Hyperlipidemia Paternal Aunt                  Medications:  Current Facility-Administered Medications   Medication Dose   • dexamethasone (DECADRON) tablet 10 mg  10 mg    Followed by   • [START ON 3/13/2021] dexamethasone (DECADRON) tablet 8 mg  8 mg    Followed by   • [START ON 3/15/2021] dexamethasone (DECADRON) tablet 6 mg  6 mg    Followed by   • [START ON 3/16/2021] dexamethasone (DECADRON) tablet 4 mg  4 mg    Followed by   • [START ON 3/17/2021] dexamethasone (DECADRON) tablet 4 mg  4 mg    Followed by   • [START ON 3/18/2021] dexamethasone (DECADRON) tablet 2 mg  2 mg    Followed by   • [START ON 3/19/2021] dexamethasone (DECADRON) tablet 2 mg  2 mg    Followed by   • [START ON 3/20/2021] dexamethasone (DECADRON) tablet 2 mg  2 mg   • [START ON 3/12/2021] folic acid (FOLVITE) tablet 1 mg  1 mg   • levETIRAcetam (KEPPRA) tablet 500 mg  500 mg   • artificial tears (EYE LUBRICANT) ophth ointment 1 Application  1 Application   • hydrALAZINE (APRESOLINE) injection 10 mg  10 mg   • labetalol (NORMODYNE/TRANDATE) injection 10-20 mg  10-20 mg   •  "LORazepam (ATIVAN) injection 0.5 mg  0.5 mg   • Pharmacy Consult Request ...Pain Management Review 1 Each  1 Each   • MD ALERT...DO NOT ADMINISTER NSAIDS or ASPIRIN unless ORDERED By Neurosurgery 1 Each  1 Each   • ondansetron (ZOFRAN) syringe/vial injection 4 mg  4 mg   • cloNIDine (CATAPRES) tablet 0.1 mg  0.1 mg   • docusate sodium (COLACE) capsule 100 mg  100 mg   • senna-docusate (PERICOLACE or SENOKOT S) 8.6-50 MG per tablet 1 tablet  1 tablet   • senna-docusate (PERICOLACE or SENOKOT S) 8.6-50 MG per tablet 1 tablet  1 tablet   • polyethylene glycol/lytes (MIRALAX) PACKET 1 Packet  1 Packet   • magnesium hydroxide (MILK OF MAGNESIA) suspension 30 mL  30 mL   • bisacodyl (DULCOLAX) suppository 10 mg  10 mg   • fleet enema 133 mL  1 Each   • oxyCODONE immediate-release (ROXICODONE) tablet 5 mg  5 mg    Or   • oxyCODONE immediate-release (ROXICODONE) tablet 10 mg  10 mg    Or   • HYDROmorphone pf (DILAUDID) injection 0.5 mg  0.5 mg   • dexamethasone (DECADRON) injection 10 mg  10 mg   • famotidine (PEPCID) tablet 20 mg  20 mg    Or   • famotidine (PEPCID) injection 20 mg  20 mg   • benzocaine-menthol (CEPACOL) lozenge 1 Lozenge  1 Lozenge   • lamoTRIgine (LAMICTAL) tablet 100 mg  100 mg   • LORazepam (ATIVAN) injection 1-2 mg  1-2 mg   • diphenhydrAMINE (BENADRYL) injection 25 mg  25 mg   • acetaminophen (Tylenol) tablet 650 mg  650 mg   • venlafaxine (EFFEXOR) tablet 12.5 mg  12.5 mg       Allergies:  Allergies   Allergen Reactions   • Bactrim      Fatigue, ringing of the ears, and headache          Vitals: /94   Pulse 76   Temp 36.7 °C (98 °F) (Temporal)   Resp 20   Ht 1.702 m (5' 7\")   Wt 102 kg (224 lb 10.4 oz)   SpO2 96%             Labs: Reviewed and significant for 3/10: Hgb 9.5, Na 141, K 4.5, Cr 0.5  Recent Labs     03/09/21  0535 03/10/21  0420   RBC 4.46 2.89*   HEMOGLOBIN 14.3 9.5*   HEMATOCRIT 42.6 29.9*   PLATELETCT 208 144*   PROTHROMBTM 12.6  --    INR 0.92  --      Recent Labs     " 03/09/21  0535 03/10/21  0603 03/11/21  0426   SODIUM 138 134* 141   POTASSIUM 4.1 4.1 4.5   CHLORIDE 103 102 109   CO2 23 19* 23   GLUCOSE 100* 186* 147*   BUN 11 7* 14   CREATININE 0.64 0.59 0.50   CALCIUM 9.0 9.0 9.0     Recent Results (from the past 24 hour(s))   Basic Metabolic Panel    Collection Time: 03/11/21  4:26 AM   Result Value Ref Range    Sodium 141 135 - 145 mmol/L    Potassium 4.5 3.6 - 5.5 mmol/L    Chloride 109 96 - 112 mmol/L    Co2 23 20 - 33 mmol/L    Glucose 147 (H) 65 - 99 mg/dL    Bun 14 8 - 22 mg/dL    Creatinine 0.50 0.50 - 1.40 mg/dL    Calcium 9.0 8.5 - 10.5 mg/dL    Anion Gap 9.0 7.0 - 16.0   ESTIMATED GFR    Collection Time: 03/11/21  4:26 AM   Result Value Ref Range    GFR If African American >60 >60 mL/min/1.73 m 2    GFR If Non African American >60 >60 mL/min/1.73 m 2   PHOSPHORUS    Collection Time: 03/11/21  9:16 AM   Result Value Ref Range    Phosphorus 2.7 2.5 - 4.5 mg/dL   MAGNESIUM    Collection Time: 03/11/21  9:16 AM   Result Value Ref Range    Magnesium 2.3 1.5 - 2.5 mg/dL           Imaging:  MR-STEALTH BRAIN WITH & W/O  Result Date: 3/9/2021  3/8/2021 11:02 PM HISTORY/REASON FOR EXAM:  Brain tumor, primary; Headache TECHNIQUE/EXAM DESCRIPTION AND NUMBER OF VIEWS: MRI of the brain without and with contrast, [facility] protocol. Fiducial markers for the Stealth stereotactic system were placed on the scalp. Thin-section 2 mm T2 fast spin-echo true axial images were obtained of the whole brain. Thin-section 1.5 mm T1-weighted postcontrast axial 3D BRAVO images were obtained of the whole brain. 3D reconstructions in the Coronal and Sagittal planes were generated from the axial 3D BRAVO postcontrast sequence. The study was performed on a [employer] Signa 1.5 Dede MRI scanner. 20 mL ProHance contrast was administered intravenously. COMPARISON:  2/12/21 FINDINGS: There is an approximately 3.5 x 2.5 cm sized peripherally enhancing lesion in the right medial parietal lobe. Diffuse white  matter edema is noted surrounding the lesion. When compared with the previous MRI dated 2/12/2021, there has been mild interval reduction in the size of the lesion. Again noted is well-defined left periventricular T2 hyperintensity. There is mucosal thickening in the bilateral mastoid air cells. There is an approximately 5 mm midline shift towards left side.     1.  There is an approximately 3.5 x 2.5 cm sized peripherally enhancing lesion in the right medial parietal lobe. Diffuse white matter edema is noted surrounding the lesion. When compared with the previous MRI dated 2/12/2021, there has been mild interval reduction in the size of the lesion. The lesion is highly suspicious for high-grade neoplasm such as glioblastoma multiforme. However interval reduction in the size and presence of left periventricular white matter T2 hyperintensity may suggest tumefactive demyelinating lesion. 2.  Again noted is well-defined left periventricular T2 hyperintensity concerning for demyelinating plaque. 3.  5 mm midline shift towards left side.              ASSESSMENT:  Patient is a 50 y.o. female admitted with right frontoparietal mesial brain lesion s/p craniotomy and biopsy on 3/9 with Dr. Mao      #Rehab:   -Vitals: stable, RA  -Insurance: [place]  -Discharge support: spouse may be able to assist, has been physically assisting at home  -Rehab Impairment Code: 0002.1 - Brain Dysfunction: Non-Traumatic  -Reason for admission: Right frontal lobe mass  -Pending oncology and rad onc recs  -When DC support is verified, patient meets criteria for inpatient rehab and can submit to insurance.  -Timing of rehab will have to be either before or after any chemo/XRT treatments.       #Neuro: right frontoparietal mesial brain lesion s/p craniotomy and biopsy on 3/9 with Dr. Mao  -lamictal, keppra  -Decadron taper  -Foot drop boot     #Mood: anxiety, PRN Ativan, Effexor     #GI: pepcid    #Pain: Tylenol, PRN oxycodone      #Anemia:  Hgb 9.5 on 3/10 <= 14.3 on 3/9  -monitor    #Thrombocytopenia: Plt 144 on 3/10 <= 208 on 3/9     #Supp: folate    #Bowel: 1x on 3/9    #Bladder: east removed; voiding    #DVT PPX: SCDs          Thank you for allowing us to participate in the care of this patient.             J Carlos Radford MD  Physical Medicine and Rehabilitation   3/11/2021

## 2021-03-11 NOTE — THERAPY
Occupational Therapy  Daily Treatment     Patient Name: Melba Frye  Age:  50 y.o., Sex:  female  Medical Record #: [id number]  Today's Date: 3/11/2021       Precautions: Fall Risk  Comments: L side deficits     Assessment    Pt seen for OT tx. Continues to be limited by decreased activity tolerance, balance deficits, inattention, L side weakness and poor safety awareness impacting ability to complete ADLs and ADL transfers independently. LUE continues to be limited by weakness and limited ROM. Amb w/ mod A amb w/ HHA for balance and safety. Required mod A for ADL txfs, L inattention. Provided educated on exercises to increase UE strength and ROM manipulating ADL objects. Will continue to benefit from OT services while in house.     Plan    Continue current treatment plan.    DC Equipment Recommendations: Unable to determine at this time  Discharge Recommendations: Recommend post-acute placement for additional occupational therapy services prior to discharge home       03/11/21 0901   Active ROM Upper Body   Active ROM Upper Body  X   Comments LUE impaired ROM d/t weakness. distal ROM weaker    Strength Upper Body   Upper Body Strength  X   Comments LUE grossly limtied by weakness, improving grasp    Balance   Sitting Balance (Static) Fair   Sitting Balance (Dynamic) Fair -   Standing Balance (Static) Poor +   Standing Balance (Dynamic) Poor -   Weight Shift Sitting Fair   Weight Shift Standing Poor   Bed Mobility    Supine to Sit Minimal Assist   Sit to Supine Moderate Assist   Scooting Minimal Assist   Activities of Daily Living   Grooming Minimal Assist;Seated  (using B hands )   Upper Body Dressing Moderate Assist   Lower Body Dressing Maximal Assist   Toileting Minimal Assist  (using RUE )   Functional Mobility   Sit to Stand Minimal Assist   Bed, Chair, Wheelchair Transfer Moderate Assist   Toilet Transfers Moderate Assist   Short Term Goals   Short Term Goal # 1 Pt will use L UE as a gross assist during  ADLs w/ no v/c's   Goal Outcome # 1 Progressing as expected   Short Term Goal # 2 Pt will demo standing grooming w/ SPV   Goal Outcome # 2 Progressing as expected   Short Term Goal # 3 Pt will demo LB dressing w/ SPV   Goal Outcome # 3 Progressing as expected   Short Term Goal # 4 Pt will demo toilet txf w/ SPV   Goal Outcome # 4 Progressing as expected   Short Term Goal # 5 Pt will increase strength in L UE to 3/5 grossly   Goal Outcome # 5 Progressing as expected   Anticipated Discharge Equipment and Recommendations   DC Equipment Recommendations Unable to determine at this time   Discharge Recommendations Recommend post-acute placement for additional occupational therapy services prior to discharge home

## 2021-03-12 ENCOUNTER — APPOINTMENT (OUTPATIENT)
Dept: RADIOLOGY | Facility: MEDICAL CENTER | Age: 51
DRG: 025 | End: 2021-03-12
Attending: INTERNAL MEDICINE
Payer: COMMERCIAL

## 2021-03-12 LAB
ALBUMIN SERPL BCP-MCNC: 3.4 G/DL (ref 3.2–4.9)
ALBUMIN/GLOB SERPL: 1.4 G/DL
ALP SERPL-CCNC: 74 U/L (ref 30–99)
ALT SERPL-CCNC: 9 U/L (ref 2–50)
ANION GAP SERPL CALC-SCNC: 10 MMOL/L (ref 7–16)
AST SERPL-CCNC: 10 U/L (ref 12–45)
BILIRUB SERPL-MCNC: 0.2 MG/DL (ref 0.1–1.5)
BUN SERPL-MCNC: 15 MG/DL (ref 8–22)
CALCIUM SERPL-MCNC: 8.8 MG/DL (ref 8.5–10.5)
CHLORIDE SERPL-SCNC: 105 MMOL/L (ref 96–112)
CO2 SERPL-SCNC: 24 MMOL/L (ref 20–33)
CREAT SERPL-MCNC: 0.46 MG/DL (ref 0.5–1.4)
ERYTHROCYTE [DISTWIDTH] IN BLOOD BY AUTOMATED COUNT: 45.8 FL (ref 35.9–50)
FERRITIN SERPL-MCNC: 229 NG/ML (ref 10–291)
FOLATE SERPL-MCNC: 9.9 NG/ML
GLOBULIN SER CALC-MCNC: 2.5 G/DL (ref 1.9–3.5)
GLUCOSE SERPL-MCNC: 140 MG/DL (ref 65–99)
HCT VFR BLD AUTO: 35.1 % (ref 37–47)
HGB BLD-MCNC: 11.9 G/DL (ref 12–16)
HGB RETIC QN AUTO: 35.4 PG/CELL (ref 29–35)
IMM RETICS NFR: 15.9 % (ref 9.3–17.4)
IRON SATN MFR SERPL: 35 % (ref 15–55)
IRON SERPL-MCNC: 85 UG/DL (ref 40–170)
MAGNESIUM SERPL-MCNC: 2.2 MG/DL (ref 1.5–2.5)
MCH RBC QN AUTO: 33.1 PG (ref 27–33)
MCHC RBC AUTO-ENTMCNC: 33.9 G/DL (ref 33.6–35)
MCV RBC AUTO: 97.5 FL (ref 81.4–97.8)
PHOSPHATE SERPL-MCNC: 2.8 MG/DL (ref 2.5–4.5)
PLATELET # BLD AUTO: 216 K/UL (ref 164–446)
PMV BLD AUTO: 10.4 FL (ref 9–12.9)
POTASSIUM SERPL-SCNC: 4.1 MMOL/L (ref 3.6–5.5)
PROT SERPL-MCNC: 5.9 G/DL (ref 6–8.2)
RBC # BLD AUTO: 3.6 M/UL (ref 4.2–5.4)
RETICS # AUTO: 0.08 M/UL (ref 0.04–0.06)
RETICS/RBC NFR: 2.1 % (ref 0.8–2.1)
SODIUM SERPL-SCNC: 139 MMOL/L (ref 135–145)
T4 FREE SERPL-MCNC: 1.85 NG/DL (ref 0.93–1.7)
TIBC SERPL-MCNC: 244 UG/DL (ref 250–450)
TSH SERPL DL<=0.005 MIU/L-ACNC: 0.1 UIU/ML (ref 0.38–5.33)
UIBC SERPL-MCNC: 159 UG/DL (ref 110–370)
VIT B12 SERPL-MCNC: 396 PG/ML (ref 211–911)
WBC # BLD AUTO: 11.4 K/UL (ref 4.8–10.8)

## 2021-03-12 PROCEDURE — 700102 HCHG RX REV CODE 250 W/ 637 OVERRIDE(OP): Performed by: PHYSICIAN ASSISTANT

## 2021-03-12 PROCEDURE — 82607 VITAMIN B-12: CPT

## 2021-03-12 PROCEDURE — 83540 ASSAY OF IRON: CPT

## 2021-03-12 PROCEDURE — 85046 RETICYTE/HGB CONCENTRATE: CPT

## 2021-03-12 PROCEDURE — 83735 ASSAY OF MAGNESIUM: CPT

## 2021-03-12 PROCEDURE — 97530 THERAPEUTIC ACTIVITIES: CPT

## 2021-03-12 PROCEDURE — 80053 COMPREHEN METABOLIC PANEL: CPT

## 2021-03-12 PROCEDURE — 700102 HCHG RX REV CODE 250 W/ 637 OVERRIDE(OP): Performed by: GENERAL PRACTICE

## 2021-03-12 PROCEDURE — 700102 HCHG RX REV CODE 250 W/ 637 OVERRIDE(OP): Performed by: NURSE PRACTITIONER

## 2021-03-12 PROCEDURE — 82746 ASSAY OF FOLIC ACID SERUM: CPT

## 2021-03-12 PROCEDURE — A9270 NON-COVERED ITEM OR SERVICE: HCPCS | Performed by: STUDENT IN AN ORGANIZED HEALTH CARE EDUCATION/TRAINING PROGRAM

## 2021-03-12 PROCEDURE — 700102 HCHG RX REV CODE 250 W/ 637 OVERRIDE(OP): Performed by: STUDENT IN AN ORGANIZED HEALTH CARE EDUCATION/TRAINING PROGRAM

## 2021-03-12 PROCEDURE — 36415 COLL VENOUS BLD VENIPUNCTURE: CPT

## 2021-03-12 PROCEDURE — 84100 ASSAY OF PHOSPHORUS: CPT

## 2021-03-12 PROCEDURE — 99232 SBSQ HOSP IP/OBS MODERATE 35: CPT | Mod: GC | Performed by: INTERNAL MEDICINE

## 2021-03-12 PROCEDURE — 85027 COMPLETE CBC AUTOMATED: CPT

## 2021-03-12 PROCEDURE — A9270 NON-COVERED ITEM OR SERVICE: HCPCS | Performed by: HOSPITALIST

## 2021-03-12 PROCEDURE — 84439 ASSAY OF FREE THYROXINE: CPT

## 2021-03-12 PROCEDURE — 700102 HCHG RX REV CODE 250 W/ 637 OVERRIDE(OP): Performed by: HOSPITALIST

## 2021-03-12 PROCEDURE — 97116 GAIT TRAINING THERAPY: CPT

## 2021-03-12 PROCEDURE — 770006 HCHG ROOM/CARE - MED/SURG/GYN SEMI*

## 2021-03-12 PROCEDURE — A9270 NON-COVERED ITEM OR SERVICE: HCPCS | Performed by: PHYSICIAN ASSISTANT

## 2021-03-12 PROCEDURE — A9270 NON-COVERED ITEM OR SERVICE: HCPCS | Performed by: GENERAL PRACTICE

## 2021-03-12 PROCEDURE — 84443 ASSAY THYROID STIM HORMONE: CPT

## 2021-03-12 PROCEDURE — 82728 ASSAY OF FERRITIN: CPT

## 2021-03-12 PROCEDURE — A9270 NON-COVERED ITEM OR SERVICE: HCPCS | Performed by: NURSE PRACTITIONER

## 2021-03-12 PROCEDURE — 83550 IRON BINDING TEST: CPT

## 2021-03-12 RX ADMIN — VENLAFAXINE 12.5 MG: 25 TABLET ORAL at 05:50

## 2021-03-12 RX ADMIN — LORAZEPAM 0.5 MG: 1 TABLET ORAL at 08:39

## 2021-03-12 RX ADMIN — LORAZEPAM 0.5 MG: 1 TABLET ORAL at 14:35

## 2021-03-12 RX ADMIN — DEXAMETHASONE 10 MG: 4 TABLET ORAL at 13:54

## 2021-03-12 RX ADMIN — VENLAFAXINE 12.5 MG: 25 TABLET ORAL at 17:23

## 2021-03-12 RX ADMIN — DOCUSATE SODIUM 50 MG AND SENNOSIDES 8.6 MG 1 TABLET: 8.6; 5 TABLET, FILM COATED ORAL at 20:59

## 2021-03-12 RX ADMIN — ACETAMINOPHEN 650 MG: 325 TABLET, FILM COATED ORAL at 08:39

## 2021-03-12 RX ADMIN — DEXAMETHASONE 10 MG: 4 TABLET ORAL at 21:00

## 2021-03-12 RX ADMIN — LEVETIRACETAM 500 MG: 500 TABLET, FILM COATED ORAL at 05:50

## 2021-03-12 RX ADMIN — DOCUSATE SODIUM 100 MG: 100 CAPSULE, LIQUID FILLED ORAL at 17:23

## 2021-03-12 RX ADMIN — LORAZEPAM 0.5 MG: 1 TABLET ORAL at 03:52

## 2021-03-12 RX ADMIN — ACETAMINOPHEN 650 MG: 325 TABLET, FILM COATED ORAL at 23:03

## 2021-03-12 RX ADMIN — LORAZEPAM 0.5 MG: 1 TABLET ORAL at 20:59

## 2021-03-12 RX ADMIN — LAMOTRIGINE 100 MG: 100 TABLET ORAL at 05:50

## 2021-03-12 RX ADMIN — DEXAMETHASONE 10 MG: 4 TABLET ORAL at 05:50

## 2021-03-12 RX ADMIN — LEVETIRACETAM 500 MG: 500 TABLET, FILM COATED ORAL at 18:00

## 2021-03-12 RX ADMIN — FAMOTIDINE 20 MG: 20 TABLET ORAL at 05:50

## 2021-03-12 RX ADMIN — LAMOTRIGINE 100 MG: 100 TABLET ORAL at 17:23

## 2021-03-12 RX ADMIN — FOLIC ACID 1 MG: 1 TABLET ORAL at 05:50

## 2021-03-12 ASSESSMENT — COGNITIVE AND FUNCTIONAL STATUS - GENERAL
SUGGESTED CMS G CODE MODIFIER MOBILITY: CL
STANDING UP FROM CHAIR USING ARMS: A LOT
TOILETING: A LOT
WALKING IN HOSPITAL ROOM: A LOT
DAILY ACTIVITIY SCORE: 14
SUGGESTED CMS G CODE MODIFIER DAILY ACTIVITY: CK
STANDING UP FROM CHAIR USING ARMS: A LOT
MOVING FROM LYING ON BACK TO SITTING ON SIDE OF FLAT BED: A LOT
MOBILITY SCORE: 13
MOVING TO AND FROM BED TO CHAIR: A LOT
MOVING FROM LYING ON BACK TO SITTING ON SIDE OF FLAT BED: A LOT
CLIMB 3 TO 5 STEPS WITH RAILING: A LOT
WALKING IN HOSPITAL ROOM: A LOT
MOBILITY SCORE: 13
TURNING FROM BACK TO SIDE WHILE IN FLAT BAD: A LITTLE
TURNING FROM BACK TO SIDE WHILE IN FLAT BAD: A LITTLE
DRESSING REGULAR LOWER BODY CLOTHING: A LOT
SUGGESTED CMS G CODE MODIFIER MOBILITY: CL
DRESSING REGULAR UPPER BODY CLOTHING: A LOT
EATING MEALS: A LITTLE
HELP NEEDED FOR BATHING: A LOT
PERSONAL GROOMING: A LITTLE
CLIMB 3 TO 5 STEPS WITH RAILING: A LOT
MOVING TO AND FROM BED TO CHAIR: A LOT

## 2021-03-12 ASSESSMENT — ENCOUNTER SYMPTOMS
WEAKNESS: 0
CONSTIPATION: 0
NERVOUS/ANXIOUS: 0
NECK PAIN: 0
HEADACHES: 0
COUGH: 0
WHEEZING: 0
DIARRHEA: 0
FALLS: 0
WEIGHT LOSS: 0
FOCAL WEAKNESS: 1
NAUSEA: 0
MEMORY LOSS: 0
BRUISES/BLEEDS EASILY: 0
DIZZINESS: 0
SHORTNESS OF BREATH: 0
PALPITATIONS: 0
ABDOMINAL PAIN: 0
SORE THROAT: 0
VOMITING: 0
FEVER: 0
DEPRESSION: 0
SEIZURES: 1
CHILLS: 0
SINUS PAIN: 0
BACK PAIN: 0
TREMORS: 0
TINGLING: 0

## 2021-03-12 ASSESSMENT — GAIT ASSESSMENTS
GAIT LEVEL OF ASSIST: MODERATE ASSIST
ASSISTIVE DEVICE: HEMI-WALKER
DISTANCE (FEET): 25
DEVIATION: ATAXIC;DECREASED BASE OF SUPPORT;STEP TO

## 2021-03-12 ASSESSMENT — PAIN DESCRIPTION - PAIN TYPE: TYPE: ACUTE PAIN;SURGICAL PAIN

## 2021-03-12 NOTE — ASSESSMENT & PLAN NOTE
Hg 9.5 on 3/11 labs compared to 14.9 3/10. .5. Likely secondary to recent craniotomy with wound vac placement  anemia workup notable for low TSH/high T4 - CTM

## 2021-03-12 NOTE — DISCHARGE PLANNING
Anticipated Discharge Disposition:   Acute Rehab    Action:    RN LISSY spoke to patient and spouse J Carlos at bedside.  Questions were answered regarding homemaker assistance, disability, prior auth, DME and acute rehab.  They provided consent to send referral to [facility].  Choice form faxed to [facility].    Barriers to Discharge:    Medical clearance  Placement acceptance  Insurance authorization    Plan:    F/U with [facility].

## 2021-03-12 NOTE — CONSULTS
Consult Note: Hematology/Oncology    Date of consultation: 3/11/2021 8:21 PM    Referring provider: Cesar Anaya M.D.     Reason for consultation: GBM      History of presenting illness:     50-year-old white female who we are asked to see for recently diagnosed GBM.    Her history dates back to December 10, 2020.  She had presented to the emergency room down in [facility].  She had had a seizure at presentation.  She had imaging done of an MRI which showed a 8 x 6 mm mass in the right frontal lobe.  She also had some nonspecific satellite areas.  She had a follow-up image in February 2021.  This showed significant increase in size of this area to 3 x 2.4 x 3.1 cm.  She then had a Stealth MRI on 3/8/2021.  This showed a mass at 3-1/2 cm x 2.5 cm.  This was in the right medial parietal lobe.  She underwent surgery on 3/9/2021.  She had a right craniotomy.  The tumor was within the patient's motor strip on the right side.  She essentially had mostly a biopsy and not a gross total resection for these reasons.  She would have had significant deficits.    She has been seen by radiation oncology Dr. Frye.  She is very sad.  Her  is at the bedside.  However, she did want me to go ahead with the consultation tonight with them.    Past Medical History:    Past Medical History:   Diagnosis Date   • Anxiety    • Complicated migraine 6/9/2014   • Depression    • Dermatitis, contact 11/16/2015   • Fatigue 6/9/2014   • Hyperlipidemia 1/23/2015   • Lentigo 10/17/2018   • Menopausal symptom 7/2/2014   • Mixed anxiety and depressive disorder 6/9/2014   • Seborrheic keratoses 10/17/2018   • TMJ arthralgia 6/9/2014   • UTI (lower urinary tract infection)        Past surgical history:    Past Surgical History:   Procedure Laterality Date   • CRANIOTOMY STEALTH Right 3/9/2021    Procedure: RIGHT CRANIOTOMY, USING FRAMELESS STEREOTAXY - FOR OPEN BIOPSY WITH PHASE REVERSAL;  Surgeon: Maxwell Mao M.D.;  Location:  SURGERY [facility];  Service: Neurosurgery   • MYRINGOTOMY  8/27/2010    Performed by BH[address]AV [address] at SURGERY SAME DAY [facility] ORS   • LAPAROSCOPY  5/28/2010    Performed by JACKI SHARMA at SURGERY [facility] ORS   • LYMPH NODE SAMPLING  5/28/2010    Performed by JACKI SHARMA at SURGERY [facility] ORS   • DEBULKING  5/28/2010    Performed by JACKI SHARMA at SURGERY [facility] ORS   • CYSTOSCOPY  10/15/08    Performed by YU GODINEZ at SURGERY SAME DAY [facility] ORS   • VAGINAL HYSTERECTOMY SCOPE TOTAL  10/15/08    Performed by YU GODINEZ at SURGERY SAME DAY [facility] ORS   • OTHER  1984    TONSILECTOMY/ ADNOIDS   • APPENDECTOMY  1981   • TONSILLECTOMY AND ADENOIDECTOMY         Allergies:  Bactrim    Medications:    Current Facility-Administered Medications   Medication Dose Route Frequency Provider Last Rate Last Admin   • dexamethasone (DECADRON) tablet 10 mg  10 mg Oral Q8HRS Emre Almanzar, A.P.R.N.   10 mg at 03/11/21 1551    Followed by   • [START ON 3/13/2021] dexamethasone (DECADRON) tablet 8 mg  8 mg Oral Q8HRS Emre Almanzar, A.P.R.N.        Followed by   • [START ON 3/15/2021] dexamethasone (DECADRON) tablet 6 mg  6 mg Oral Q8HRS Emre Almanzar, A.P.R.N.        Followed by   • [START ON 3/16/2021] dexamethasone (DECADRON) tablet 4 mg  4 mg Oral Q8HRS Emre Almanzar A.P.R.N.        Followed by   • [START ON 3/17/2021] dexamethasone (DECADRON) tablet 4 mg  4 mg Oral Q12HRS Emre Almanzar A.P.R.N.        Followed by   • [START ON 3/18/2021] dexamethasone (DECADRON) tablet 2 mg  2 mg Oral Q8HRS Emre Almanzar A.P.R.N.        Followed by   • [START ON 3/19/2021] dexamethasone (DECADRON) tablet 2 mg  2 mg Oral Q12HRS Emre Almanzar A.P.R.N.        Followed by   • [START ON 3/20/2021] dexamethasone (DECADRON) tablet 2 mg  2 mg Oral DAILY RUPERT Vazquez       • [START ON 3/12/2021] folic acid (FOLVITE) tablet 1 mg  1 mg Oral DAILY Juwan Merino M.D.       • levETIRAcetam (KEPPRA) tablet 500 mg  500 mg Oral BID Manpreet Rogers M.D.    500 mg at 03/11/21 1845   • artificial tears (EYE LUBRICANT) ophth ointment 1 Application  1 Application Both Eyes PRN Manpreet Rogers M.D.       • hydrALAZINE (APRESOLINE) injection 10 mg  10 mg Intravenous Q4HRS PRN Manpreet Rogers M.D.       • labetalol (NORMODYNE/TRANDATE) injection 10-20 mg  10-20 mg Intravenous Q4HRS PRN Manpreet Rogers M.D.       • Pharmacy Consult Request ...Pain Management Review 1 Each  1 Each Other PHARMACY TO DOSE ADWOA Walker.A.-C.       • MD ALERT...DO NOT ADMINISTER NSAIDS or ASPIRIN unless ORDERED By Neurosurgery 1 Each  1 Each Other PRN Majo Garcia, ADWOA.A.-C.       • ondansetron (ZOFRAN) syringe/vial injection 4 mg  4 mg Intravenous Q4HRS PRN Majo Garcia, P.A.-C.       • cloNIDine (CATAPRES) tablet 0.1 mg  0.1 mg Oral Q4HRS PRN Majo Garcia, P.A.-C.       • docusate sodium (COLACE) capsule 100 mg  100 mg Oral BID Majo Garcia, P.A.-C.       • senna-docusate (PERICOLACE or SENOKOT S) 8.6-50 MG per tablet 1 tablet  1 tablet Oral Nightly Majo Garcia, P.A.-C.       • senna-docusate (PERICOLACE or SENOKOT S) 8.6-50 MG per tablet 1 tablet  1 tablet Oral Q24HRS PRN Majo Garcia, P.A.-C.       • polyethylene glycol/lytes (MIRALAX) PACKET 1 Packet  1 Packet Oral BID PRN Majo Garcia, P.A.-C.       • magnesium hydroxide (MILK OF MAGNESIA) suspension 30 mL  30 mL Oral QDAY PRN Majo Garcia, P.A.-C.       • bisacodyl (DULCOLAX) suppository 10 mg  10 mg Rectal Q24HRS PRN Majo Garcia, ADWOA.A.-C.       • fleet enema 133 mL  1 Each Rectal Once PRN Majocharleen Garcia P.A.-C.       • oxyCODONE immediate-release (ROXICODONE) tablet 5 mg  5 mg Oral Q3HRS PRN Majo Garcia P.A.-C.   5 mg at 03/09/21 1500    Or   • oxyCODONE immediate-release (ROXICODONE) tablet 10 mg  10 mg Oral Q3HRS PRN Majo Garcia P.A.-C.   10 mg at 03/10/21 0317    Or   • HYDROmorphone pf (DILAUDID) injection 0.5 mg  0.5 mg Intravenous Q3HRS PRN Majo Garcia P.A.-C.    Given at 03/09/21 1708   • famotidine (PEPCID) tablet 20 mg  20 mg Oral BID MERLENE WalkerA.-CKyree   20 mg at 03/11/21 1845    Or   • famotidine (PEPCID) injection 20 mg  20 mg Intravenous BID ADWOA Walker.A.-C.       • benzocaine-menthol (CEPACOL) lozenge 1 Lozenge  1 Lozenge Mouth/Throat Q2HRS PRN ADWOA Walker.A.-C.       • lamoTRIgine (LAMICTAL) tablet 100 mg  100 mg Oral BID Gilles Waller M.D.   100 mg at 03/11/21 1845   • LORazepam (ATIVAN) injection 1-2 mg  1-2 mg Intravenous Q6HRS PRN Juwan Merino M.D.       • diphenhydrAMINE (BENADRYL) injection 25 mg  25 mg Intravenous Q6HRS PRN Cristhian Sullivan M.D.   25 mg at 03/04/21 2041   • acetaminophen (Tylenol) tablet 650 mg  650 mg Oral Q6HRS PRN Ruy Nieves Jr., M.D.   650 mg at 03/09/21 0134   • venlafaxine (EFFEXOR) tablet 12.5 mg  12.5 mg Oral BID Ruy Nieves Jr., M.D.   12.5 mg at 03/11/21 0916       Social History:     Social History     Socioeconomic History   • Marital status:      Spouse name: Not on file   • Number of children: Not on file   • Years of education: Not on file   • Highest education level: Not on file   Occupational History   • Not on file   Tobacco Use   • Smoking status: Never Smoker   • Smokeless tobacco: Never Used   Substance and Sexual Activity   • Alcohol use: Not Currently     Alcohol/week: 0.0 oz   • Drug use: No   • Sexual activity: Yes     Partners: Male   Other Topics Concern   • Not on file   Social History Narrative   • Not on file     Social Determinants of Health     Financial Resource Strain:    • Difficulty of Paying Living Expenses:    Food Insecurity:    • Worried About Running Out of Food in the Last Year:    • Ran Out of Food in the Last Year:    Transportation Needs:    • Lack of Transportation (Medical):    • Lack of Transportation (Non-Medical):    Physical Activity:    • Days of Exercise per Week:    • Minutes of Exercise per Session:    Stress:    • Feeling of Stress  ":    Social Connections:    • Frequency of Communication with Friends and Family:    • Frequency of Social Gatherings with Friends and Family:    • Attends [employer] Services:    • Active Member of Clubs or Organizations:    • Attends Club or Organization Meetings:    • Marital Status:    Intimate Partner Violence:    • Fear of Current or Ex-Partner:    • Emotionally Abused:    • Physically Abused:    • Sexually Abused:        Family History:     Family History   Problem Relation Age of Onset   • Non-contributory Mother    • Lung Disease Mother         COPD   • Cancer Mother         dysplasia    • Arthritis Mother    • Psychiatric Illness Mother    • Heart Disease Mother    • Hypertension Mother    • Stroke Mother    • Non-contributory Father    • Cancer Father 73        prostate cancer   • Lung Disease Maternal Grandmother    • Lung Disease Paternal Aunt    • Diabetes Paternal Aunt    • Hyperlipidemia Paternal Aunt        Review of Systems:   All other review of systems are negative except what was mentioned above in the HPI.    Constitutional: No fever, chills, weight loss ,malaise/fatigue.    HEENT: No new auditory or visual complaints. No sore throat and neck pain.     Respiratory:No new cough, sputum production, shortness of breath and wheezing.    Cardiovascular: No new chest pain, palpitations, orthopnea and leg swelling.    Gastrointestinal: No heartburn, nausea, vomiting ,abdominal pain, hematochezia or melena     Genitourinary: Negative for dysuria, hematuria    Musculoskeletal: No new arthralgias or myalgias   Skin: Negative for rash and itching.    Neurological: focal weakness on left  Endo/Heme/Allergies: No abnormal bleed/bruise.    Psychiatric/Behavioral: tearful/depression/anxiety.    Physical Exam:   Vitals:   /92   Pulse (!) 103   Temp 36.8 °C (98.2 °F) (Temporal)   Resp 16   Ht 1.702 m (5' 7\")   Wt 102 kg (224 lb 10.4 oz)   SpO2 92%   BMI 35.18 kg/m²     General: Not in acute " distress, alert and oriented x 3  HEENT: No pallor, icterus. Oropharynx clear.   Neck: Supple, no palpable masses.  Lymph nodes: No palpable cervical, supraclavicular  CVS: regular rate and rhythm  RESP: Clear to auscultate bilaterally  ABD: Soft, non tender, non distended, positive bowel sounds  EXT: No edema or cyanosis  CNS: Alert and oriented x3, she is still weak on left side  Skin- No rash      Labs:   Recent Labs     03/09/21  0535 03/10/21  0420   RBC 4.46 2.89*   HEMOGLOBIN 14.3 9.5*   HEMATOCRIT 42.6 29.9*   PLATELETCT 208 144*   PROTHROMBTM 12.6  --    INR 0.92  --      Lab Results   Component Value Date/Time    SODIUM 141 03/11/2021 04:26 AM    POTASSIUM 4.5 03/11/2021 04:26 AM    CHLORIDE 109 03/11/2021 04:26 AM    CO2 23 03/11/2021 04:26 AM    GLUCOSE 147 (H) 03/11/2021 04:26 AM    BUN 14 03/11/2021 04:26 AM    CREATININE 0.50 03/11/2021 04:26 AM        Assessment and Plan:  50-year-old white female status post open biopsy only on 3/5/2021 which involved the motor strip on the right which has come back as a GBM.  MGMT and IDH pending.    We just talked in general at first because she had already met with Dr. Frey and was still upset.  She was tearful throughout most of her conversation but then continued to ask more questions so we did end up discussing more details.  Her and her  both understand that Temodar is a chemotherapy of choice at this time to do concurrently with radiation.  They understand that obviously one would hope for a gross total resection in GBM if we can get it.  I told her however in her situation the tumor was contained within a sensitive area that would have left her with significant deficits.  I told her quality life is as important as quantity at this point.  I told her this is a very important crossroad.    I told her we can see what her MGMT and IDH status are.  I told her it would not change the fact that I would use Temodar but it may help lessen the sense of  response/prognosis.  Dr. Frye's consult went into detail about survival so we did not harp on this.  They both understand that this is a guarded prognosis.    They have also been offered second opinions by other consultants.  We discussed that Temodar would be an option for her to do concurrently with radiation therapy when she is healed.  They tell me that she will be going to rehab.  Her  will call my office next week to get her follow-up appointments.     She and her  agreed and verbalized their agreement and understanding with the current plan.  I answered all questions and concerns at this time.                 Please note that this dictation was created using voice recognition software. I have made every reasonable attempt to correct obvious errors, but I expect that there are errors of grammar and possibly content that I did not discover before finalizing the note.      SIGNATURES:  Michael Cerda M.D.    CC:  LAYA Linder MD

## 2021-03-12 NOTE — PROGRESS NOTES
Care assumed of pt. Pt lying in bed, resting eyes open. Pt denies wanting to get up to chair at this time, pt states it is too early. Pt is very tearful regarding diagnosis she received yesterday. Pt stating she will get up once her  is here.

## 2021-03-12 NOTE — PROGRESS NOTES
Neurosurgery Progress Note    Subjective:  No acute event over night  Slight HA, controlled with tylenol  Tearful and anxious about path result and future treatments    Exam:  A&O, face symmt, PERRLA  Speech fluent & appropriate  Left  4+, difficulty lifting left arm up 3/5 Left tricep/deltoid 4/5 left IP/quad 4+/5, left foot drop TA/GS 2/5  RUE/RLE 5/5  Incision well approximated, no drainage, no redness        BP  Min: 136/82  Max: 144/85  Pulse  Av.5  Min: 73  Max: 103  Resp  Av  Min: 14  Max: 18  Temp  Av.6 °C (97.8 °F)  Min: 36.1 °C (97 °F)  Max: 36.8 °C (98.3 °F)  SpO2  Av %  Min: 92 %  Max: 99 %    No data recorded    Recent Labs     03/10/21  0420 21  0305   WBC 8.4 11.4*   RBC 2.89* 3.60*   HEMOGLOBIN 9.5* 11.9*   HEMATOCRIT 29.9* 35.1*   .5* 97.5   MCH 32.9 33.1*   MCHC 31.8* 33.9   RDW 47.3 45.8   PLATELETCT 144* 216   MPV 10.5 10.4     Recent Labs     03/10/21  0603 21  0426 21  0305   SODIUM 134* 141 139   POTASSIUM 4.1 4.5 4.1   CHLORIDE 102 109 105   CO2 19* 23 24   GLUCOSE 186* 147* 140*   BUN 7* 14 15   CREATININE 0.59 0.50 0.46*   CALCIUM 9.0 9.0 8.8               Intake/Output       21 07 - 21 0659 21 07 - 21 0659       Total  Total       Intake    P.O.  240  -- 240  --  -- --    P.O. 240 -- 240 -- -- --    Total Intake 240 -- 240 -- -- --       Output    Urine  --  -- --  --  -- --    Number of Times Voided 1 x 2 x 3 x -- -- --    Total Output -- -- -- -- -- --       Net I/O     240 -- 240 -- -- --            Intake/Output Summary (Last 24 hours) at 3/12/2021 0852  Last data filed at 3/11/2021 1200  Gross per 24 hour   Intake 240 ml   Output --   Net 240 ml            • dexamethasone  10 mg Q8HRS    Followed by   • [START ON 3/13/2021] dexamethasone  8 mg Q8HRS    Followed by   • [START ON 3/15/2021] dexamethasone  6 mg Q8HRS    Followed by   • [START ON 3/16/2021] dexamethasone  4  mg Q8HRS    Followed by   • [START ON 3/17/2021] dexamethasone  4 mg Q12HRS    Followed by   • [START ON 3/18/2021] dexamethasone  2 mg Q8HRS    Followed by   • [START ON 3/19/2021] dexamethasone  2 mg Q12HRS    Followed by   • [START ON 3/20/2021] dexamethasone  2 mg DAILY   • folic acid  1 mg DAILY   • LORazepam  0.5 mg Q4HRS PRN   • levETIRAcetam  500 mg BID   • artificial tears  1 Application PRN   • hydrALAZINE  10 mg Q4HRS PRN   • labetalol  10-20 mg Q4HRS PRN   • Pharmacy Consult Request  1 Each PHARMACY TO DOSE   • MD ALERT...DO NOT ADMINISTER NSAIDS or ASPIRIN unless ORDERED By Neurosurgery  1 Each PRN   • ondansetron  4 mg Q4HRS PRN   • cloNIDine  0.1 mg Q4HRS PRN   • docusate sodium  100 mg BID   • senna-docusate  1 tablet Nightly   • senna-docusate  1 tablet Q24HRS PRN   • polyethylene glycol/lytes  1 Packet BID PRN   • magnesium hydroxide  30 mL QDAY PRN   • bisacodyl  10 mg Q24HRS PRN   • fleet  1 Each Once PRN   • oxyCODONE immediate-release  5 mg Q3HRS PRN    Or   • oxyCODONE immediate-release  10 mg Q3HRS PRN    Or   • HYDROmorphone  0.5 mg Q3HRS PRN   • famotidine  20 mg BID    Or   • famotidine  20 mg BID   • benzocaine-menthol  1 Lozenge Q2HRS PRN   • lamoTRIgine  100 mg BID   • LORazepam  1-2 mg Q6HRS PRN   • diphenhydrAMINE  25 mg Q6HRS PRN   • acetaminophen  650 mg Q6HRS PRN   • venlafaxine  12.5 mg BID       Assessment and Plan:  Hospital day #10 right parietal brain lesion, seizures  POD # 3 craniotomy for right frontal mass  Prophylactic anticoagulation: no         Start date/time: hold for surgery      Labs reviewed and stable  Path: GBM IV  Dex on 10 day taper  Continue seizure medications: Lamictal and Keppra  Post-op MRI: will have Dr. Mao review it  Seen by Dr. Frye and Dr. Prashant Frye recommending radiation starting 3 weeks post-op      OK to discharge to Rehab from neuro surgery stand point  Continue seizure medications and follow up with neurologist  Staple removal 2 weeks  post op (either at [facility] group or at Rehab)  No NSAIDs, ASA, blood thinners  Keep incision clean/dry  No need for dressing  Disposition per IM

## 2021-03-12 NOTE — CARE PLAN
Problem: Venous Thromboembolism (VTW)/Deep Vein Thrombosis (DVT) Prevention:  Goal: Patient will participate in Venous Thrombosis (VTE)/Deep Vein Thrombosis (DVT)Prevention Measures  Outcome: PROGRESSING AS EXPECTED  Note: SCDs in use.      Problem: Pain Management  Goal: Pain level will decrease to patient's comfort goal  Outcome: PROGRESSING AS EXPECTED  Note: PRN tylenol utilized for headache.      Problem: Psychosocial Needs:  Goal: Level of anxiety will decrease  Outcome: PROGRESSING SLOWER THAN EXPECTED  Note: Pt very tearful today due to diagnosis received yesterday. Increased time at bedside for emotional support.      Problem: Communication  Goal: The ability to communicate needs accurately and effectively will improve  Outcome: MET  Note: Pt able to communicate effectively.      Problem: Fluid Volume:  Goal: Will maintain balanced intake and output  Outcome: MET

## 2021-03-12 NOTE — PROGRESS NOTES
Daily Progress Note:     Date of Service: 3/12/2021  Primary Team: [facility] Green Team   Attending: Oswald Quintero M.D.   Senior Resident: Dr. Merino  Intern: Dr. Waller  Contact:  [phone]    Chief Complaint:   Seizures, weakness    Subjective:    Patient was seen laying in chair by bedside this noon with  by bedside. Patient report she is feeling well this AM. She continues to have improve control of her left hand. She still have difficulty with left foot dorsiflexion/plantar flexion.   All questions answered. Patient's  requested for basic information for regarding glioblastoma. Information was printed and provided.     I offered palliative consult to patient and . I explained to them that the consult was to help facilitate difficult discussion between family members. Patient and  became visibly tearful and replied they have advance directive in place and are still hopeful. I explained to them that it is important to have these discussion early and these discussion can occur concurrently with her plan for treatment. I gave patient more time. Patient and  requested for palliative consult.       Consultants/Specialty:  Neurosurgery  Critical care  Physiatry  Oncology  Radiation oncology    Review of Systems:  Review of Systems   Constitutional: Negative for chills, fever, malaise/fatigue and weight loss.   HENT: Negative for congestion, ear discharge, ear pain, sinus pain and sore throat.    Respiratory: Negative for cough, shortness of breath and wheezing.    Cardiovascular: Negative for chest pain, palpitations and leg swelling.   Gastrointestinal: Negative for abdominal pain, constipation, diarrhea, nausea and vomiting.   Genitourinary: Negative for dysuria, frequency, hematuria and urgency.   Musculoskeletal: Negative for back pain, falls, joint pain and neck pain.   Skin: Negative for itching and rash.   Neurological: Positive for focal weakness (Weakness of left leg) and  seizures. Negative for dizziness, tingling, tremors, weakness and headaches.   Endo/Heme/Allergies: Does not bruise/bleed easily.   Psychiatric/Behavioral: Negative for depression and memory loss. The patient is not nervous/anxious.        Objective Data:   Physical Exam:   Vitals:   Temp:  [36.1 °C (97 °F)-36.8 °C (98.3 °F)] 36.8 °C (98.3 °F)  Pulse:  [] 77  Resp:  [14-18] 14  BP: (136-144)/(82-92) 140/86  SpO2:  [92 %-99 %] 96 %    Physical Exam  Constitutional:       Appearance: Normal appearance.   HENT:      Head: Normocephalic and atraumatic.      Right Ear: External ear normal.      Left Ear: External ear normal.      Nose: Nose normal.      Mouth/Throat:      Mouth: Mucous membranes are moist.   Eyes:      Extraocular Movements: Extraocular movements intact.      Pupils: Pupils are equal, round, and reactive to light.   Cardiovascular:      Rate and Rhythm: Normal rate and regular rhythm.      Pulses: Normal pulses.      Heart sounds: Normal heart sounds.   Pulmonary:      Effort: Pulmonary effort is normal.      Breath sounds: Normal breath sounds.   Abdominal:      General: Abdomen is flat. Bowel sounds are normal.      Palpations: Abdomen is soft.   Musculoskeletal:         General: No swelling. Normal range of motion.      Cervical back: Normal range of motion. No rigidity. No muscular tenderness.      Right lower leg: No edema.      Left lower leg: No edema.      Comments: LLE strength 3/5, RLE strength 5/5., inability to dorsiflex or plantar flex LLE   Skin:     General: Skin is warm and dry.      Capillary Refill: Capillary refill takes less than 2 seconds.   Neurological:      General: No focal deficit present.      Mental Status: She is alert and oriented to person, place, and time.   Psychiatric:         Mood and Affect: Mood normal.         Behavior: Behavior normal.           Labs:   Recent Results (from the past 24 hour(s))   CBC WITHOUT DIFFERENTIAL    Collection Time: 03/12/21  3:05 AM    Result Value Ref Range    WBC 11.4 (H) 4.8 - 10.8 K/uL    RBC 3.60 (L) 4.20 - 5.40 M/uL    Hemoglobin 11.9 (L) 12.0 - 16.0 g/dL    Hematocrit 35.1 (L) 37.0 - 47.0 %    MCV 97.5 81.4 - 97.8 fL    MCH 33.1 (H) 27.0 - 33.0 pg    MCHC 33.9 33.6 - 35.0 g/dL    RDW 45.8 35.9 - 50.0 fL    Platelet Count 216 164 - 446 K/uL    MPV 10.4 9.0 - 12.9 fL   RETICULOCYTES COUNT    Collection Time: 03/12/21  3:05 AM   Result Value Ref Range    Reticulocyte Count 2.1 0.8 - 2.1 %    Retic, Absolute 0.08 (H) 0.04 - 0.06 M/uL    Imm. Reticulocyte Fraction 15.9 9.3 - 17.4 %    Retic Hgb Equivalent 35.4 (H) 29.0 - 35.0 pg/cell   IRON/TOTAL IRON BIND    Collection Time: 03/12/21  3:05 AM   Result Value Ref Range    Iron 85 40 - 170 ug/dL    Total Iron Binding 244 (L) 250 - 450 ug/dL    Unsat Iron Binding 159 110 - 370 ug/dL    % Saturation 35 15 - 55 %   FERRITIN    Collection Time: 03/12/21  3:05 AM   Result Value Ref Range    Ferritin 229.0 10.0 - 291.0 ng/mL   VITAMIN B12    Collection Time: 03/12/21  3:05 AM   Result Value Ref Range    Vitamin B12 -True Cobalamin 396 211 - 911 pg/mL   FOLATE    Collection Time: 03/12/21  3:05 AM   Result Value Ref Range    Folate -Folic Acid 9.9 >4.0 ng/mL   TSH WITH REFLEX TO FT4    Collection Time: 03/12/21  3:05 AM   Result Value Ref Range    TSH 0.100 (L) 0.380 - 5.330 uIU/mL   Comp Metabolic Panel    Collection Time: 03/12/21  3:05 AM   Result Value Ref Range    Sodium 139 135 - 145 mmol/L    Potassium 4.1 3.6 - 5.5 mmol/L    Chloride 105 96 - 112 mmol/L    Co2 24 20 - 33 mmol/L    Anion Gap 10.0 7.0 - 16.0    Glucose 140 (H) 65 - 99 mg/dL    Bun 15 8 - 22 mg/dL    Creatinine 0.46 (L) 0.50 - 1.40 mg/dL    Calcium 8.8 8.5 - 10.5 mg/dL    AST(SGOT) 10 (L) 12 - 45 U/L    ALT(SGPT) 9 2 - 50 U/L    Alkaline Phosphatase 74 30 - 99 U/L    Total Bilirubin 0.2 0.1 - 1.5 mg/dL    Albumin 3.4 3.2 - 4.9 g/dL    Total Protein 5.9 (L) 6.0 - 8.2 g/dL    Globulin 2.5 1.9 - 3.5 g/dL    A-G Ratio 1.4 g/dL    MAGNESIUM    Collection Time: 03/12/21  3:05 AM   Result Value Ref Range    Magnesium 2.2 1.5 - 2.5 mg/dL   PHOSPHORUS    Collection Time: 03/12/21  3:05 AM   Result Value Ref Range    Phosphorus 2.8 2.5 - 4.5 mg/dL   ESTIMATED GFR    Collection Time: 03/12/21  3:05 AM   Result Value Ref Range    GFR If African American >60 >60 mL/min/1.73 m 2    GFR If Non African American >60 >60 mL/min/1.73 m 2   FREE THYROXINE    Collection Time: 03/12/21  3:05 AM   Result Value Ref Range    Free T-4 1.85 (H) 0.93 - 1.70 ng/dL      Imaging:   IR-US GUIDED PIV   Final Result    Ultrasound-guided PERIPHERAL IV INSERTION performed by    qualified nursing staff as above.      MR-BRAIN-WITH & W/O   Final Result      1.  There are postsurgical changes as evidenced by right frontoparietal craniectomy and biopsy of the right medial parietal enhancing lesion.   2.  7 mm midline shift towards left side   3.  Periventricular T2 hyperintensities adjacent to the left lateral ventricle.   4.  A few nonspecific T2 hyperintensities in the subcortical and periventricular white matter.      CT-HEAD W/O   Final Result      1.  No intracranial hemorrhage identified following right parietal craniotomy and tumor resection.      2.  Residual right to left midline shift measuring 4.3 mm.      3.  Large area of low attenuation/edema in the right temporal and parietal white matter is present with minimal air near the site of tumor in the right parietal convexity medially.      4.  No hydrocephalus.      MR-STEALTH BRAIN WITH & W/O   Final Result      1.  There is an approximately 3.5 x 2.5 cm sized peripherally enhancing lesion in the right medial parietal lobe. Diffuse white matter edema is noted surrounding the lesion. When compared with the previous MRI dated 2/12/2021, there has been mild    interval reduction in the size of the lesion. The lesion is highly suspicious for high-grade neoplasm such as glioblastoma multiforme. However interval  reduction in the size and presence of left periventricular white matter T2 hyperintensity may suggest    tumefactive demyelinating lesion.   2.  Again noted is well-defined left periventricular T2 hyperintensity concerning for demyelinating plaque.   3.  5 mm midline shift towards left side.         Problem Representation:   50-year-old female with past medical history seizures December 2020, migraines, depression, hyperlipidemia who presented with a 45-second seizure of the left upper and lower extremities without loss of consciousness.  MRI brain notable for 3 x 2.4 x 2.1 right frontal lobe lesion increasing in size compared to 2020.  Right craniotomy by neurosurgery done 3/9/21 confirming grade IV glioblastoma by biopsy.  PT/OT, physiatry, radiation oncology, oncology following, will consider palliative consult.    * Glioblastoma of frontal lobe (HCC)- (present on admission)  Assessment & Plan  CT scan head shows right frontal mass 3 cm, MRI brain to 2/21 shows right frontal lobe lesion 3 x 2.4 x 1.1 cm increased in size from MRI 12/20/2020.  Status post craniotomy with biopsy on 3/9 by Dr. Mao and biopsy confirmed grade IV glioblastoma  Repeat MRI 3/11 shows postsurgical changes as evidenced by right frontoparietal craniectomy and biopsy of the right medial parietal enhancing lesion, 7 mm midline shift towards left side  Oncology and radiation oncology on board, she recommendations  PLAN:  On decadron with dosing and taper per neurosurgery  Keppra and Lamictal for seizure prophylaxis.  Follow oncology and radiation oncology recommendations  PT/OT evals due to left sided weakness and probable acute rehab consult based on recommendations  Hold anticoagulation, all NSAIDs, SCDs for DVT prophylaxis  Offered consulting palliative with  and patient, not interested at this time, report they have advance directive in place.     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  She is followed by Neurology on outpatient basis, started Lamictal in December  Had seizure 3/8/2021 and thus Lamictal dose was increased and Keppra added IV with transition to oral on 3/10  PLAN:  Continue Keppra and Lamictal for seizure prophylaxis, Ativan if seizures observed    Acute blood loss as cause of postoperative anemia  Assessment & Plan  Hg 9.5 on 3/11 labs compared to 14.9 3/10. .5. Likely secondary to recent craniotomy with wound vac placement  Will obtain anemia workup on am labs - iron panel, ferritin, B12/folate, TSH, reticulocyte count    Hyperlipidemia- (present on admission)  Assessment & Plan   2019. 10 year ASCVD risk 1.4%. no statin indicated    Mixed anxiety and depressive disorder- (present on admission)  Assessment & Plan  -continue Desvenlafaxine        * Code Status: FULL  * Diet: Regular  * Lines/Tubes/Drains: PIV   IVF: none indicated  * Prophylaxis:        -- DVT:SCDs, no anticoagulation due to confirmed glioblastoma        -- GI: None  * PCP: Trinity Nguyen M.D.   * Social / DC planning: likely to Acute Rehab once acute illness(es) have been addressed  * Dispo: Likely to inpatient rehab    Prognosis remains guarded.

## 2021-03-12 NOTE — DISCHARGE PLANNING
Follow up for rehab met with patient and her  explained IRF level of   care. Covid restriction. Both agreeable to IRF with [facility].

## 2021-03-12 NOTE — CARE PLAN
Problem: Safety  Goal: Will remain free from falls  Outcome: PROGRESSING AS EXPECTED  Note: Pt at risk for falls due to LLE weakness and surgery. Bed alarm on. Bed in lowest, locked position. Pt educated on risk. Call light and belongings within reach. Pt calls appropriately. Will continue hourly rounding.      Problem: Pain Management  Goal: Pain level will decrease to patient's comfort goal  Outcome: PROGRESSING AS EXPECTED  Note: Pt at risk for pain due to surgery. No complaints of pain this shift. Will continue to assess pt and medicate/provide comfort as needed.

## 2021-03-12 NOTE — DISCHARGE PLANNING
PAS completed submitted to insurance for consideration for IRF level of care with [facility].

## 2021-03-12 NOTE — PROGRESS NOTES
Pt's  present. Pt assisted up to the bathroom then up to chair. Pt denies any other needs. No distress noted.  requesting to speak with  Juani, called Juani and notified of husbands request. Per juani she will be with the  in about 2 hours after rounds. Will continue to monitor.

## 2021-03-12 NOTE — PREADMISSION SCREENING NOTE
Updated Pre-Screen Assessment     Name: Melba Frye  MRN: [id number]  : 1970    Medical Status/ Changes:     Gilles Waller M.D.   Resident   Hospital Medicine   Progress Notes   Cosign Needed   Date of Service:  3/15/2021  1:17 PM               Expand AllCollapse All      Daily Progress Note:      Date of Service: 3/15/2021  Primary Team: [facility] IM Green Team   Attending: Oswald Quintero M.D.   Senior Resident: Dr. Merino  Intern: Dr. Waller  Contact:  [phone]     Chief Complaint:   Seizures, weakness     Subjective:     Patient denies any concerns today.  Has regular bowel movements, no abdominal pain, eating and drinking well.  Medically stable awaiting placement for inpatient rehab.     Consultants/Specialty:  Neurosurgery  Critical care  Physiatry  Oncology  Radiation oncology     Review of Systems:  Review of Systems   Constitutional: Negative for chills, fever, malaise/fatigue and weight loss.   HENT: Negative for congestion, ear discharge, ear pain, sinus pain and sore throat.    Respiratory: Negative for cough, shortness of breath and wheezing.    Cardiovascular: Negative for chest pain, palpitations and leg swelling.   Gastrointestinal: Negative for abdominal pain, constipation, diarrhea, nausea and vomiting.   Genitourinary: Negative for dysuria, frequency, hematuria and urgency.   Musculoskeletal: Negative for back pain, falls, joint pain and neck pain.   Skin: Negative for itching and rash.   Neurological: Positive for focal weakness (Weakness of left leg) and seizures. Negative for dizziness, tingling, tremors, weakness and headaches.   Endo/Heme/Allergies: Does not bruise/bleed easily.   Psychiatric/Behavioral: Negative for depression and memory loss. The patient is not nervous/anxious.          Objective Data:   Physical Exam:   Vitals:   Temp:  [36 °C (96.8 °F)-36.2 °C (97.1 °F)] 36 °C (96.8 °F)  Pulse:  [56-82] 56  Resp:  [16-18] 16  BP: (124-140)/(70-79) 132/72  SpO2:  [93 %-96 %] 95  %     Physical Exam  Constitutional:       Appearance: Normal appearance.   HENT:      Head: Normocephalic and atraumatic.      Right Ear: External ear normal.      Left Ear: External ear normal.      Nose: Nose normal.      Mouth/Throat:      Mouth: Mucous membranes are moist.   Eyes:      Extraocular Movements: Extraocular movements intact.      Pupils: Pupils are equal, round, and reactive to light.   Cardiovascular:      Rate and Rhythm: Normal rate and regular rhythm.      Pulses: Normal pulses.      Heart sounds: Normal heart sounds.   Pulmonary:      Effort: Pulmonary effort is normal.      Breath sounds: Normal breath sounds.   Abdominal:      General: Abdomen is flat. Bowel sounds are normal.      Palpations: Abdomen is soft.   Musculoskeletal:         General: No swelling. Normal range of motion.      Cervical back: Normal range of motion. No rigidity. No muscular tenderness.      Right lower leg: No edema.      Left lower leg: No edema.      Comments: LLE strength 3/5   Skin:     General: Skin is warm and dry.      Capillary Refill: Capillary refill takes less than 2 seconds.   Neurological:      General: No focal deficit present.      Mental Status: She is alert and oriented to person, place, and time.   Psychiatric:         Mood and Affect: Mood normal.         Behavior: Behavior normal.                     Problem Representation:   50-year-old female with past medical history seizures December 2020, migraines, depression, hyperlipidemia who presented with a 45-second seizure of the left upper and lower extremities without loss of consciousness.  MRI brain notable for 3 x 2.4 x 2.1 right frontal lobe lesion increasing in size compared to 2020.  Right craniotomy by neurosurgery done 3/9/21 confirming grade IV glioblastoma by biopsy.  PT/OT, physiatry, radiation oncology, oncology following.  Medically stable for placement to acute rehab.     * Glioblastoma of frontal lobe (HCC)- (present on  admission)  Assessment & Plan  CT scan head shows right frontal mass 3 cm, MRI brain to 2/21 shows right frontal lobe lesion 3 x 2.4 x 1.1 cm increased in size from MRI 12/20/2020.  Status post craniotomy with biopsy on 3/9 by Dr. Mao and biopsy confirmed grade IV glioblastoma  Repeat MRI 3/11 shows postsurgical changes as evidenced by right frontoparietal craniectomy and biopsy of the right medial parietal enhancing lesion, 7 mm midline shift towards left side  Oncology and radiation oncology on board  PLAN:  On decadron with dosing and taper per neurosurgery  Keppra and Lamictal for seizure prophylaxis.  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  She is followed by Neurology on outpatient basis, started Lamictal in December  Had seizure 3/8/2021 and thus Lamictal dose was increased and Keppra added IV with transition to oral on 3/10  PLAN:  Continue Keppra and Lamictal for seizure prophylaxis, Ativan if seizures observed     Acute blood loss as cause of postoperative anemia  Assessment & Plan  Hg 9.5 on 3/11 labs compared to 14.9 3/10. .5. Likely secondary to recent craniotomy with wound vac placement  anemia workup notable for low TSH/high T4 - CTM     Hyperlipidemia- (present on admission)  Assessment & Plan   2019. 10 year ASCVD risk 1.4%. no statin indicated     Mixed anxiety and depressive disorder- (present on admission)  Assessment & Plan  -continue Desvenlafaxine         * Code Status: FULL  * Diet: Regular  * Lines/Tubes/Drains: PIV   IVF: none indicated  * Prophylaxis:        -- DVT:SCDs, no anticoagulation due to confirmed glioblastoma        -- GI: None  * PCP: Trinity Nguyen M.D.   * Social / DC  planning: likely to Acute Rehab once acute illness(es) have been addressed  * Dispo: Likely to inpatient rehab pending acceptance.     Prognosis remains guarded.        Majo Garcia P.A.-C.   Physician Assistant   Surgery Neurosurgery   Progress Notes   Cosign Needed Addendum   Date of Service:  3/15/2021  8:14 AM               Cosign Needed Addendum           Neurosurgery Progress Note     Subjective:  No acute event over night  Slight HA, controlled with tylenol            Assessment and Plan:  Hospital day #13 right parietal brain lesion, seizures  POD # 6 craniotomy for right frontal mass  Prophylactic anticoagulation:yes         Start date/time: as indicated, starting 3/15/21        Path: GBM IV  Dex on 10 day taper  Continue seizure medications: Lamictal and Keppra  Seen by Dr. Frye and Dr. Prashant Frye recommending radiation starting 3 weeks post-op        OK to discharge to Rehab from neuro surgery stand point  Continue seizure medications and follow up with neurologist  Staple removal 2 weeks post op (either at [facility] group or at Rehab)  No NSAIDs, ASA, blood thinners  Keep incision clean/dry, ok to shower & pat dry  No need for dressing  Disposition per IM    Functional Status/ Changes:     Renay Person, Student   PT Student      Therapy   Signed   Date of Service:  3/12/2021  4:13 PM                    Physical Therapy   Daily Treatment     Patient Name: Melba Frye  Age:  50 y.o., Sex:  female  Medical Record #: [id number]  Today's Date: 3/12/2021     Precautions: Fall Risk     Assessment     Patient seen for PT treatment.  Patient able to perform bed mobility with min assist, patient using R LE to support L LE as much as possible prior to onset of fatigue.  Wrapped L ankle with DF assist ace wrap.  Patient able to ambulate approx. 25 ft with hemiwalker with mod assist.  Able to ambulate approx. 50 ft with FWW and L hand  attachment with min assist and max cues for  sequencing.  Patient with step to gait, narrow ALLYSON with FWW, patient verbally cueing herself for L heel strike and knee extension during stance phase.  Will continue to follow.         Plan     Continue current treatment plan.     DC Equipment Recommendations: Unable to determine at this time  Discharge Recommendations: Recommend post-acute placement for additional physical therapy services prior to discharge home.        Objective       03/12/21 1613   Precautions   Comments L deficits   Cognition    Cognition / Consciousness WDL   Level of Consciousness Alert   Comments Very pleasant & cooperative, tearful at times   Other Treatments   Other Treatments Provided Ankle DF assist with ace wrap   Balance   Sitting Balance (Static) Fair -   Sitting Balance (Dynamic) Fair -   Standing Balance (Static) Poor +   Standing Balance (Dynamic) Poor -   Weight Shift Sitting Fair   Weight Shift Standing Poor   Skilled Intervention Verbal Cuing   Gait Analysis   Gait Level Of Assist Moderate Assist   Assistive Device Rolando-Walker   Distance (Feet) 25   # of Times Distance was Traveled 1   Deviation Ataxic;Decreased Base Of Support;Step To   Weight Bearing Status No restriction   Skilled Intervention Verbal Cuing;Facilitation   Comments Mod assist with hemiwalker x25 ft to bathroom.  50 ft with FWW and L hand  attachment, min A with max cueing for sequencing   Bed Mobility    Supine to Sit Minimal Assist   Sit to Supine Minimal Assist   Scooting Minimal Assist   Skilled Intervention Verbal Cuing;Facilitation   Functional Mobility   Sit to Stand Minimal Assist   Bed, Chair, Wheelchair Transfer Minimal Assist   Transfer Method Stand Step   Mobility Ambulation   Skilled Intervention Verbal Cuing;Facilitation   Short Term Goals    Short Term Goal # 1 Pt will demonstrate supine<->sit with min A within 6 visits in order to promote return home   Goal Outcome # 1 Goal met, new goal added   Short Term Goal # 1 B  Pt will demonstrate  supine<->sit with SPV within 6 visits in order to increase independence with bed mobility   Short Term Goal # 2 Pt will be able to transfer sit to stand with LRAD with min A within 6 visits in order to promote return home   Goal Outcome # 2 Goal met, new goal added   Short Term Goal # 2 B  Pt will be able to transfer sit to stand with LRAD with SPV within 6 visits in order to increase OOB activity   Short Term Goal # 3 Pt will be able to amb 15' with LRAD and min A within 6 visits in order to promote return home   Goal Outcome # 3 Goal not met             Cosigned by: Katharina Gonzales, PT at 3/12/2021  5:39 PM     Reviewer: Maren Herrera R.N.  Date: 3/15/2021  Time: 2:02 PM      Pre-Admission Screening Form    Patient Information:   Name: Melba Frye     MRN: [id number]       : 1970      Age: 50 y.o.   Gender: female      Race: White [7]       Marital Status:  [2]  Family Contact: J Carlos Frye        Relationship: Spouse [17]  Home Phone: [phone]           Cell Phone: [phone]  Advanced Directives: None  Code Status:  FULL  Current Attending Provider: Oswald Quintero M.D.  Referring Physician: Dr. Quintero       Physiatrist Consult: Malina      Referral Date:   2012 Primary Payor Source:  [facility]  Secondary Payor Source:      Medical Information:   Date of Admission to Acute Care Setting:3/2/2021  Room Number: S187/02  Rehabilitation Diagnosis: 0002.1 - Brain Dysfunction: Non-Traumatic  Immunization History   Administered Date(s) Administered   • Cholera Vaccine - HISTORICAL DATA 2017   • Hepatitis A Vaccine, Adult 2017   • Influenza Vaccine Quad Inj (Pf) 2020   • Influenza Vaccine Quad Inj (Preserved) 2015   • Tdap Vaccine 2014, 2017   • Typhoid Vaccine (Oral) - HISTORICAL DATA 2017     Allergies   Allergen Reactions   • Bactrim      Fatigue, ringing of the ears, and headache      Past Medical History:   Diagnosis Date   • Anxiety    •  Complicated migraine 6/9/2014   • Depression    • Dermatitis, contact 11/16/2015   • Fatigue 6/9/2014   • Hyperlipidemia 1/23/2015   • Lentigo 10/17/2018   • Menopausal symptom 7/2/2014   • Mixed anxiety and depressive disorder 6/9/2014   • Seborrheic keratoses 10/17/2018   • TMJ arthralgia 6/9/2014   • UTI (lower urinary tract infection)      Past Surgical History:   Procedure Laterality Date   • CRANIOTOMY STEALTH Right 3/9/2021    Procedure: RIGHT CRANIOTOMY, USING FRAMELESS STEREOTAXY - FOR OPEN BIOPSY WITH PHASE REVERSAL;  Surgeon: Maxwell Mao M.D.;  Location: SURGERY [facility];  Service: Neurosurgery   • MYRINGOTOMY  8/27/2010    Performed by [address] at SURGERY SAME DAY [facility] ORS   • LAPAROSCOPY  5/28/2010    Performed by JACKI SHARMA at SURGERY [facility] ORS   • LYMPH NODE SAMPLING  5/28/2010    Performed by JACKI SHARMA at SURGERY [facility] ORS   • DEBULKING  5/28/2010    Performed by JACKI SHARMA at SURGERY [facility] ORS   • CYSTOSCOPY  10/15/08    Performed by YU GODINEZ at SURGERY [facility] ORS   • VAGINAL HYSTERECTOMY SCOPE TOTAL  10/15/08    Performed by YU GODINEZ at SURGERY [facility] ORS   • OTHER  1984    TONSILECTOMY/ ADNOIDS   • APPENDECTOMY  1981   • TONSILLECTOMY AND ADENOIDECTOMY         History Leading to Admission, Conditions that Caused the Need for Rehab (CMS):         Mrs Frye is a 50 y.o. female with PMHx of migraines, depression, seizures (Lamictal since 12/2020 ) was transferred from [facility].  She presented to [facility] today morning having a seizure episode.  Patient's been describes that she had left for left upper and lower extremity shaking movements which spontaneously resolved within a minute.  Denies any loss of consciousness notes she did had post stroke confusion, and headache and blurred vision.  She is also complaining of some tingling and mild weakness in the left side no tongue  biting or incontinence of urine or bowel.  Patient denies any recent changes in her medication except for increasing the dose Lamictal 75 mg twice daily starting tomorrow ( not yet taken higher dose).  Denies fever, chills, chest pain, shortness of breath,  no hematuria or hematochezia.  Denies any head trauma.  Patient states that she had previous EEG x 2 in 12/ 2020 and 02/2021 which were both unremarkable.  She has been following with Dr. Davidson, neurologist.  She had MRI of brain last month which did show T2 hyperintense intensity in cerebral white matter which was suspicious for either demyelination or nonspecific gliosis also had LPN last year December which showed oligoclonal bands.  Patient was supposed to follow-up with neurosurgery Dr. Maxwell Mao this week presented to hospital due to seizures.     Plan  Patient admitted to neurology floor  Every 4 neurochecks  Focal epilepsy with simple partial seizures likely due to right frontal lobe mass  We will continue Lamictal at 75 mg twice daily dose  CT head showed 3 cm right frontal mass with extensive surrounding edema and mild midline shift  Will start on Decadron 4 mg every 6 hours to decrease the surrounding edema  Consulted neurosurgery, discussed with Dr. Mao and recommended no further imaging studies at this point.   NS will follow the patient tomorrow morning  No pharmacological DVT prophylaxis place on SCDs  Seizure and fall precautions  Tylenol as needed for headaches. can consider migraine cocktail if Tylenol does not help        For further details , please refer to H&P by Dr. Nieves.      Maxwell Mao M.D.   Physician   Surgery Neurosurgery   Consults   Signed   Date of Service:  3/3/2021 12:00 AM                          DATE OF SERVICE:  03/03/2021      NEUROSURGERY CONSULTATION NOTE        ASSESSMENT AND PLAN:  At this time, we are working towards getting an   operating room available for the patient.  She will need to have an open    biopsy versus possible resection with phase reversal and Stealth navigation.    At this time based on her potential diagnosis for lymphoma, we would hold off   on steroids.  This is going to take at least a few days before we can get into   the OR.  Based on OR availability at this time, we will try to move her case   forward, but likely she will need surgery and the surgical availability at   this time is not available until Tuesday of next week.  If we can accommodate   the case earlier, we will do so.  However, based on possible diagnosis of   lymphoma, we would hold off on steroids for now.  The patient should be on   anti-seizure medication and she can have a formal diet.  She will also need   preoperative laboratory workup and as soon as we know a solid date for the   surgical intervention, we will order a Stealth MRI with contrast for   navigation and make the patient n.p.o. and notified both the primary team as   well as the patient and her .     Total 50 minutes were spent in direct patient care, coordination and   consultation.           ______________________________  Maxwell Mao M.D.   Physician   Surgery Neurosurgery   OP Report   Signed   Date of Service:  3/9/2021 12:00 AM                      DATE OF SERVICE:  03/09/2021      SURGEON:  Maxwell Mao MD     FIRST ASSISTANT:  Majo Garcia PA-C     PREOPERATIVE DIAGNOSIS:  Right frontoparietal mesial brain lesion.     POSTOPERATIVE DIAGNOSES:  Right frontoparietal mesial brain lesion with a   tentative diagnosis of glioblastoma.                J Carlos Radford M.D.   Physician   Physical Medicine & Rehab   Consults   Signed   Date of Service:  3/11/2021 11:54 AM            Consult Orders   IP Consult For Physiatry [[id number]] ordered by Oswald Quintero M.D. at 03/11/21 1141          Expand AllCollapse All      []Hide copied text    []Hover for details                                                  Physical Medicine and  Rehabilitation Consultation                                                                                      Initial Consult        Initial Consultation Date: 3/11/2021  Consulting provider: Dr. Oswald Quintero  Reason for consultation: assess for acute inpatient rehab appropriateness  LOS: 9 Day(s)        Chief complaint: brain mass           HPI:   The patient is a 50 y.o. female with a past medical history of migraines, depression, seizures;  who presented on 3/2/2021  1:45 PM to [facility] with seizure that lasted about a minute. Also with tingling and weakness of left side. Of note, had brain MRI in 2/2021 with a right medial parietal lobe mass s/p craniotomy and biopsy on 3/9 with Dr. Mao. Hospital course with thrombocytopenia, foot drop, and anxiety.         Social Hx:  Pre-morbidly, this patient lived in:   3 story home  Lives with spouse, who can assist        Current level of function:   PT, 3/10: Mod A for gait x15 ft wiith FWW; Mod A for bed mobility and transfers  OT, 3/10: Mod A for bed mobility; Max A for LBD; Mod A for transfers              Recent Results   Recent Results (from the past 24 hour(s))   Basic Metabolic Panel     Collection Time: 03/11/21  4:26 AM   Result Value Ref Range     Sodium 141 135 - 145 mmol/L     Potassium 4.5 3.6 - 5.5 mmol/L     Chloride 109 96 - 112 mmol/L     Co2 23 20 - 33 mmol/L     Glucose 147 (H) 65 - 99 mg/dL     Bun 14 8 - 22 mg/dL     Creatinine 0.50 0.50 - 1.40 mg/dL     Calcium 9.0 8.5 - 10.5 mg/dL     Anion Gap 9.0 7.0 - 16.0   ESTIMATED GFR     Collection Time: 03/11/21  4:26 AM   Result Value Ref Range     GFR If African American >60 >60 mL/min/1.73 m 2     GFR If Non African American >60 >60 mL/min/1.73 m 2   PHOSPHORUS     Collection Time: 03/11/21  9:16 AM   Result Value Ref Range     Phosphorus 2.7 2.5 - 4.5 mg/dL   MAGNESIUM     Collection Time: 03/11/21  9:16 AM   Result Value Ref Range     Magnesium 2.3 1.5 - 2.5 mg/dL                   Imaging:  MR-STEALTH BRAIN WITH & W/O  Result Date: 3/9/2021  3/8/2021 11:02 PM HISTORY/REASON FOR EXAM:  Brain tumor, primary; Headache TECHNIQUE/EXAM DESCRIPTION AND NUMBER OF VIEWS: MRI of the brain without and with contrast, Stealth protocol. Fiducial markers for the Stealth stereotactic system were placed on the scalp. Thin-section 2 mm T2 fast spin-echo true axial images were obtained of the whole brain. Thin-section 1.5 mm T1-weighted postcontrast axial 3D BRAVO images were obtained of the whole brain. 3D reconstructions in the Coronal and Sagittal planes were generated from the axial 3D BRAVO postcontrast sequence. The study was performed on a K[employer] Signa 1.5 Dede MRI scanner. 20 mL ProHance contrast was administered intravenously. COMPARISON:  2/12/21 FINDINGS: There is an approximately 3.5 x 2.5 cm sized peripherally enhancing lesion in the right medial parietal lobe. Diffuse white matter edema is noted surrounding the lesion. When compared with the previous MRI dated 2/12/2021, there has been mild interval reduction in the size of the lesion. Again noted is well-defined left periventricular T2 hyperintensity. There is mucosal thickening in the bilateral mastoid air cells. There is an approximately 5 mm midline shift towards left side.      1.  There is an approximately 3.5 x 2.5 cm sized peripherally enhancing lesion in the right medial parietal lobe. Diffuse white matter edema is noted surrounding the lesion. When compared with the previous MRI dated 2/12/2021, there has been mild interval reduction in the size of the lesion. The lesion is highly suspicious for high-grade neoplasm such as glioblastoma multiforme. However interval reduction in the size and presence of left periventricular white matter T2 hyperintensity may suggest tumefactive demyelinating lesion. 2.  Again noted is well-defined left periventricular T2 hyperintensity concerning for demyelinating plaque. 3.  5 mm midline  shift towards left side.                    ASSESSMENT:  Patient is a 50 y.o. female admitted with right frontoparietal mesial brain lesion s/p craniotomy and biopsy on 3/9 with Dr. Mao        #Rehab:   -Vitals: stable, RA  -Insurance: [place]  -Discharge support: spouse may be able to assist, has been physically assisting at home  -Rehab Impairment Code: 0002.1 - Brain Dysfunction: Non-Traumatic  -Reason for admission: Right frontal lobe mass  -Pending oncology and rad onc recs  -When DC support is verified, patient meets criteria for inpatient rehab and can submit to insurance.  -Timing of rehab will have to be either before or after any chemo/XRT treatments.         #Neuro: right frontoparietal mesial brain lesion s/p craniotomy and biopsy on 3/9 with Dr. Mao  -vasquez keppra  -Decadron taper  -Foot drop boot      #Mood: anxiety, PRN Ativan, Effexor      #GI: pepcid     #Pain: Tylenol, PRN oxycodone       #Anemia: Hgb 9.5 on 3/10 <= 14.3 on 3/9  -monitor     #Thrombocytopenia: Plt 144 on 3/10 <= 208 on 3/9      #Supp: folate     #Bowel: 1x on 3/9     #Bladder: east removed; voiding     #DVT PPX: SCDs              Thank you for allowing us to participate in the care of this patient.                  J Carlos Radford MD  Physical Medicine and Rehabilitation   3/11/2021                    Edenilson Frye M.D.   Physician   Radiation Oncology   Consults   Signed   Date of Service:  3/11/2021  3:36 PM               Expand AllCollapse All      []Hide copied text    []Manuel for details  RADIATION ONCOLOGY CONSULT     DATE OF SERVICE: 3/11/2021     IDENTIFICATION: A 50 y.o. female with        Visit Diagnoses       ICD-10-CM   1. Right frontal lobe mass  G93.89      Glioblastoma of frontal lobe (HCC)  Staging form: Pediatric High Grade Glioma  - Clinical stage from 3/11/2021: Histology: Glioblastoma multiforme, Location: Frontal lobe - Signed by Edenilson Frye M.D. on 3/11/2021  Stage used in treatment planning:  Yes           RECOMMENDATIONS:   I discussed with patient and her  the general treatment paradigm and prognosis for glioblastoma. I explained that in the interim she will need acute rehab for 2 weeks with plan for follow-up in 2 weeks with staple removal and CT mapping with plan to start radiation within 3 weeks post surgery for optimal outcomes. We will plan to present patient at our multidisciplinary tumor board with Dr. Mao on Monday March 15, 2021. Dr. Cerda will see patient as an outpatient for discussion of Temodar. I did offer her a second opinion at [facility] with Dr. Sharri Gomez if she is interested in clinical trial opportunities     After surgery, radiotherapy is the mainstay of treatment for people with glioblastoma. A pivotal clinical trial carried out in the early 1970s showed that among 303 GBM patients randomized to radiation or nonradiation therapy, those who received radiation had a median survival more than double those who did not. Subsequent clinical research has attempted to build on the backbone of surgery followed by radiation. Onaverage, radiotherapy after surgery can reduce the tumor size  and slow progression.  Whole brain radiotherapy does not improve outcome when compared to the more precise and targeted three-dimensional conformal radiotherapy.  A total radiation dose of 60-65 Gy has been found to be optimal for treatment.  Most of studies show no benefit from the addition of chemotherapy. However, a large clinical trial of 575 participants randomized to standard radiation versus radiation plus temozolomide chemotherapy showed that the group receiving temozolomide survived a median of 14.6 months as opposed to 12.1 months for the group receiving radiation alone.  This treatment regime is now standard for most cases of glioblastoma where the person is not enrolled in a clinical trial. Temozolomide seems to work by sensitizing the tumor cells to radiation.     We discussed the goals  of care and prognosis at length.  The median survival time from the time of diagnosis without any treatment is 3 months, but with treatment survival of 1-2 years is common. Increasing age (> 60 years of age) carries a worse prognostic risk. Death is usually due to cerebral edema or increased intracranial pressure.     A good initial Karnofsky Performance Score (KPS) and MGMT methylation are associated with longer survival. A DNA test can be conducted on glioblastomas to determine whether or not the promoter of the MGMT gene is methylated. Patients with a methylated MGMT promoter have been associated with significantly greater long-term benefit than patients with an unmethylated MGMT promoter. This DNA characteristic is intrinsic to the patient and currently cannot be altered externally. Another positive prognostic marker for glioblastoma patients is mutation of the IDH1 gene, which can be tested by DNA-based methods or by immunohistochemistry using an antibody against the most common mutation, namely [id number].     More prognostic power can be obtained by combining the mutational status of IDH1 and the methylation status of MGMT into a two-gene predictor. Patients with both IDH1 mutations and MGMT methylation have the longest survival, patients with an IDH1 mutation or MGMT methylation an intermediate survival and patients without either genetic event have the shortest survival.     Long-term benefits have also been associated with those patients who receive surgery, radiotherapy, and temozolomide chemotherapy. However, much remains unknown about why some patients survive longer with glioblastoma. Age of under 50 is linked to longer survival in glioblastoma multiforme, as is 98%+ resection and use of temozolomide chemotherapy and better Karnofsky performance scores. A recent study confirms that younger age is associated with a much better prognosis, with a small fraction of patients under 40 years of age achieving  a population-based cure. The population-based cure is thought to occur when a population's risk of death returns to that of the normal population, and in GBM, this is thought to occur after 10 years.     We will plan to offer the patient adjuvant radiotherapy. The treatment consists of 6 weeks of radiation treatments to the brain using thermoplastic facemask immobilzation and a linear accelerator. The goal is to help slow down the overall pace of the disease and prevent the appearance of recurrent disease.  We discussed the risks of treatment including: hair loss, hearing changes due to fluid in the ear canal, scalp irritation, the risk of progression in the brain, cataracts, dry eye, fatigue. We discussed the neurocognitive imapact of radiotherapy. They would like to move forward with treatment and we will have our coordinators work on that process.     We also discussed tumor-treating fields called Optune which is started after chemoradiation. We gave him information to read about it and recent [facility] paper Mariah et al. Effect of tumor-treating fields plus maintenance temozolomide vs maintenance temozolomide alone on survival in patients with glioblastoma: a randomized clinical trial. [facility]. 2017. The recent update at [facility] 2017  showed median survival of 25 months when % compliant on Optune.     Thank you for the opportunity to participate in her care.  If any questions or comments, please do not hesitate in calling.                       Michael Cerda M.D.   Physician   Hematology & Oncology   Consults   Addendum   Date of Service:  3/11/2021  8:21 PM               Addendum        Expand AllCollapse All        Consult Note: Hematology/Oncology     Date of consultation: 3/11/2021 8:21 PM     Referring provider: Cesar Anaya M.D.      Reason for consultation: GBM                     Assessment and Plan:  50-year-old white female status post open biopsy only on 3/5/2021 which involved the motor strip on  the right which has come back as a GBM.  MGMT and IDH pending.     We just talked in general at first because she had already met with Dr. Frye and was still upset.  She was tearful throughout most of her conversation but then continued to ask more questions so we did end up discussing more details.  Her and her  both understand that Temodar is a chemotherapy of choice at this time to do concurrently with radiation.  They understand that obviously one would hope for a gross total resection in GBM if we can get it.  I told her however in her situation the tumor was contained within a sensitive area that would have left her with significant deficits.  I told her quality life is as important as quantity at this point.  I told her this is a very important crossroad.     I told her we can see what her MGMT and IDH status are.  I told her it would not change the fact that I would use Temodar but it may help lessen the sense of response/prognosis.  Dr. Frye's consult went into detail about survival so we did not harp on this.  They both understand that this is a guarded prognosis.     They have also been offered second opinions by other consultants.  We discussed that Temodar would be an option for her to do concurrently with radiation therapy when she is healed.  They tell me that she will be going to rehab.  Her  will call my office next week to get her follow-up appointments.      She and her  agreed and verbalized their agreement and understanding with the current plan.  I answered all questions and concerns at this time.                  Please note that this dictation was created using voice recognition software. I have made every reasonable attempt to correct obvious errors, but I expect that there are errors of grammar and possibly content that I did not discover before finalizing the note.        SIGNATURES:  Michael Cerda M.D.     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) 03/02/2021   • Glioblastoma of frontal lobe (HCC) 03/02/2021   • Demyelinating disease of central nervous system, unspecified (HCC) 03/02/2021   • Acute blood loss as cause of postoperative anemia 03/11/2021   • Complicated migraine 06/09/2014   • Hyperlipidemia 01/23/2015   • Mixed anxiety and depressive disorder 06/09/2014   • Lentigo 10/17/2018   • Seborrheic keratoses 10/17/2018   • Dermatitis, contact 11/16/2015   • Menopausal symptom 07/02/2014   • Fatigue 06/09/2014   • TMJ arthralgia 06/09/2014       Current Vital Signs:   Temperature: 36.8 °C (98.3 °F) Pulse: 77 Respiration: 14 Blood Pressure: 140/86  Weight: 102 kg (224 lb 10.4 oz) Height: 170.2 cm (5' 7\")  Pulse Oximetry: 96 % O2 (LPM): 0      Completed Laboratory Reports:  Recent Labs     03/10/21  0420 03/10/21  0603 03/11/21  0426 03/12/21  0305   WBC 8.4  --   --  11.4*   HEMOGLOBIN 9.5*  --   --  11.9*   HEMATOCRIT 29.9*  --   --  35.1*   PLATELETCT 144*  --   --  216   SODIUM  --  134* 141 139   POTASSIUM  --  4.1 4.5 4.1   BUN  --  7* 14 15   CREATININE  --  0.59 0.50 0.46*   ALBUMIN  --   --   --  3.4   GLUCOSE  --  186* 147* 140*   Results for MARGE FALL (MRN [id number]) as of 3/12/2021 15:03   Ref. Range 3/2/2021 11:12 3/12/2021 03:05   Ferritin Latest Ref Range: 10.0 - 291.0 ng/mL  229.0   Folate -Folic Acid Latest Ref Range: >4.0 ng/mL  9.9   Vitamin B12 -True Cobalamin Latest Ref Range: 211 - 911 pg/mL  396   TSH Latest Ref Range: 0.380 - 5.330 uIU/mL  0.100 (L)   Free T-4 Latest Ref Range: 0.93 - 1.70 ng/dL  1.85 (H)   SARS-CoV-2 by PCR Unknown NotDetected  "   SARS-CoV-2 Source Unknown Nasal Swab      Additional Labs: Not Applicable    Prior Living Situation:   Housing / Facility: [address] [facility]  Lives with - Patient's Self Care Capacity: Spouse  Equipment Owned: Tub / Shower Seat    Prior Level of Function / Living Situation:   Physical Therapy: Prior Services: Intermittent Physical Support for ADL Per Family  Housing / Facility: [address] [facility]""  Bathroom Set up: Walk In Shower, Shower Chair  Equipment Owned: Tub / Shower Seat  Lives with - Patient's Self Care Capacity: Spouse  Bed Mobility: Independent  Transfer Status: Independent  Ambulation: Independent  Current Level of Function:   Gait Level Of Assist: Moderate Assist(x2)  Assistive Device: Front Wheel Walker(assist for left hand)  Distance (Feet): 15  Deviation: Ataxic, Decreased Heel Strike, Decreased Toe Off  # of Stairs Climbed: 0  Weight Bearing Status: no restriction  Supine to Sit: Minimal Assist  Sit to Supine: Moderate Assist  Scooting: Minimal Assist  Skilled Intervention: Verbal Cuing, Tactile Cuing, Facilitation  Comments: up in chair  Sit to Stand: Minimal Assist  Bed, Chair, Wheelchair Transfer: Moderate Assist  Toilet Transfers: Moderate Assist  Transfer Method: Stand Step  Skilled Intervention: Verbal Cuing, Tactile Cuing, Facilitation  Sitting in Chair: 10+ min (up pre)  Sitting Edge of Bed: 10 min  Standin min  Occupational Therapy:   Self Feeding: Independent  Grooming / Hygiene: Independent  Bathing: Independent  Dressing: Independent  Toileting: Independent  Medication Management: Independent  Laundry: Independent  Kitchen Mobility: Independent  Finances: Independent  Home Management: Independent  Shopping: Independent  Prior Level Of Mobility: Independent Without Device in Community, Independent Without Device in Home  Prior Services: Intermittent Physical Support for ADL Per Family  Housing / Facility: [address] [facility]""  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 Therapeutic Interventions Required: Maximize Endurance, Mobility, Strength and Safety  Occupational Therapy: 1 hr / day 5 days / week. Therapeutic Interventions Required: Maximize Self Care, ADLs, IADLs and Energy Conservation  Speech & Language Pathology: 1 hr / day 5 days / week. Therapeutic Interventions Required: Maximize Cognition and Safety    She requires 24-hour rehabilitation nursing to manage bowel and bladder function, skin care, surgical incision, wound, nutrition and fluid intake, pulmonary hygiene, pain control, safety, medication management and patient/family goals. In addition, rehabilitation nursing will reiterate and reinforce therapy skills and equipment use, including ADLs, as well as provide education to the patient and family. Melba Cesia Frye is willing to participate in and is able to tolerate the proposed plan of care.    Rehabilitation Goals and Plan (Expected frequency & duration of treatment in the IRF):   Return to the Community, Modified Independent Level of Care and Minimal Assist Level of Care  Anticipated Date of Rehabilitation Admission: 03/13/21  Patient/Family oriented IRF level of care/facility/plan: Yes  Patient/Family willing to participate in IRF care/facility/plan: Yes  Patient able to tolerate IRF level of care proposed: Yes  Patient has potential to benefit IRF level of care proposed: Yes  Comments: Not Applicable    Special Needs or Precautions - Medical Necessity:  Requires Oxygen  Current Medications:     Current Facility-Administered Medications Ordered in Epic   Medication Dose Route Frequency Provider Last Rate Last Admin   • dexamethasone (DECADRON) tablet 10 mg  10 mg Oral Q8HRS Emre Almanzar, A.P.R.N.   10 mg at 03/12/21 1354    Followed by   • [START ON 3/13/2021] dexamethasone (DECADRON) tablet 8 mg  8 mg Oral Q8HRS Emre Almanzar A.P.R.N.        Followed by   • [START ON 3/15/2021] dexamethasone (DECADRON) tablet 6 mg  6 mg Oral Q8HRS Emre Almanzar A.P.R.N.        Followed by   • [START ON 3/16/2021] dexamethasone (DECADRON) tablet 4 mg  4 mg Oral Q8HRS Emre Almanzar A.P.R.N.        Followed by   • [START ON 3/17/2021] dexamethasone (DECADRON) tablet 4 mg  4 mg Oral Q12HRS Emre Almanzar A.P.R.N.        Followed by   • [START ON 3/18/2021] dexamethasone (DECADRON) tablet 2 mg  2 mg Oral Q8HRS Emre Almanzar A.P.R.N.        Followed by   • [START ON 3/19/2021] dexamethasone (DECADRON) tablet 2 mg  2 mg Oral Q12HRS Emre Almanzar A.P.R.N.        Followed by   • [START ON 3/20/2021] dexamethasone (DECADRON) tablet 2 mg  2 mg Oral DAILY Emre Almanzar A.P.R.N.       • folic acid (FOLVITE) tablet 1 mg  1 mg Oral DAILY Juwan Merino M.D.   1 mg at 03/12/21 0550   • LORazepam (ATIVAN) tablet 0.5 mg  0.5 mg Oral Q4HRS PRN Sofia Markham M.D.   0.5 mg at 03/12/21 1435   • levETIRAcetam (KEPPRA) tablet 500 mg  500 mg Oral BID Manpreet Rogers M.D.   500 mg at 03/12/21 0550   • artificial tears (EYE LUBRICANT) ophth ointment 1 Application  1 Application Both Eyes PRN Manpreet Rogers M.D.       • hydrALAZINE (APRESOLINE) injection 10 mg  10 mg Intravenous Q4HRS PRN Manpreet Rogers M.D.       • labetalol (NORMODYNE/TRANDATE) injection 10-20 mg  10-20 mg Intravenous Q4HRS PRN Manpreet Rogers M.D.       • Pharmacy Consult Request ...Pain Management Review 1 Each  1 Each Other PHARMACY TO DOSE Majo Garcia P.A.-C.       • MD ALERT...DO NOT ADMINISTER NSAIDS or ASPIRIN unless ORDERED By Neurosurgery 1 Each  1 Each Other PRN Majo Garcia P.A.-C.        • ondansetron (ZOFRAN) syringe/vial injection 4 mg  4 mg Intravenous Q4HRS PRN Majo Garcia, ADWOA.A.-C.       • cloNIDine (CATAPRES) tablet 0.1 mg  0.1 mg Oral Q4HRS PRN Majo Garcia, ADWOA.A.-C.       • docusate sodium (COLACE) capsule 100 mg  100 mg Oral BID Majo Garcia, ADWOA.A.-C.       • senna-docusate (PERICOLACE or SENOKOT S) 8.6-50 MG per tablet 1 tablet  1 tablet Oral Nightly Majo Garcia, ADWOA.A.-C.       • senna-docusate (PERICOLACE or SENOKOT S) 8.6-50 MG per tablet 1 tablet  1 tablet Oral Q24HRS PRN Majo Garcia, ADWOA.A.-C.       • polyethylene glycol/lytes (MIRALAX) PACKET 1 Packet  1 Packet Oral BID PRN Majo Garcia, ADWOA.A.-C.       • magnesium hydroxide (MILK OF MAGNESIA) suspension 30 mL  30 mL Oral QDAY PRN Majo Garcia, P.A.-C.       • bisacodyl (DULCOLAX) suppository 10 mg  10 mg Rectal Q24HRS PRN Majo Garcia, ADWOA.A.-C.       • fleet enema 133 mL  1 Each Rectal Once PRN Majo Garica, P.A.-C.       • oxyCODONE immediate-release (ROXICODONE) tablet 5 mg  5 mg Oral Q3HRS PRN Majo Garcia, P.A.-C.   5 mg at 03/09/21 1500    Or   • oxyCODONE immediate-release (ROXICODONE) tablet 10 mg  10 mg Oral Q3HRS PRN Majo Garcia, P.A.-C.   10 mg at 03/10/21 0317    Or   • HYDROmorphone pf (DILAUDID) injection 0.5 mg  0.5 mg Intravenous Q3HRS PRN Majo Garcia, P.A.-C.   Given at 03/09/21 1708   • benzocaine-menthol (CEPACOL) lozenge 1 Lozenge  1 Lozenge Mouth/Throat Q2HRS PRN Majo Garcia P.A.-C.       • lamoTRIgine (LAMICTAL) tablet 100 mg  100 mg Oral BID Gilles Waller M.D.   100 mg at 03/12/21 0550   • LORazepam (ATIVAN) injection 1-2 mg  1-2 mg Intravenous Q6HRS PRN Juwan Merino M.D.       • diphenhydrAMINE (BENADRYL) injection 25 mg  25 mg Intravenous Q6HRS PRN Cristhian Sullivan M.D.   25 mg at 03/04/21 2041   • acetaminophen (Tylenol) tablet 650 mg  650 mg Oral Q6HRS PRN Ruy Nieves Jr., M.D.   650 mg at 03/12/21 0839   • venlafaxine (EFFEXOR)  tablet 12.5 mg  12.5 mg Oral BID Ruy Nieves Jr., M.D.   12.5 mg at 03/12/21 0550     No current Epic-ordered outpatient medications on file.     Diet:   DIET ORDERS (From admission to next 24h)     Start     Ordered    03/09/21 1239  Diet Order Diet: Regular  ALL MEALS     Question:  Diet:  Answer:  Regular    03/09/21 1238                Anticipated Discharge Destination / Patient/Family Goal:  Destination: Home with Assistance Support System: Spouse, Family  and Friends  Anticipated home health services: OT, PT and Nursing  Previously used  service/ provider: Not Applicable  Anticipated DME Needs: Oxygen and Walker  Outpatient Services: OT, PT and SLP  Alternative resources to address additional identified needs:     Pre-Screen Completed: 3/12/2021 2:51 PM Edgar Carson

## 2021-03-13 LAB
ALBUMIN SERPL BCP-MCNC: 3.2 G/DL (ref 3.2–4.9)
ALBUMIN/GLOB SERPL: 1.3 G/DL
ALP SERPL-CCNC: 71 U/L (ref 30–99)
ALT SERPL-CCNC: 13 U/L (ref 2–50)
ANION GAP SERPL CALC-SCNC: 9 MMOL/L (ref 7–16)
AST SERPL-CCNC: 10 U/L (ref 12–45)
BASOPHILS # BLD AUTO: 0.2 % (ref 0–1.8)
BASOPHILS # BLD: 0.02 K/UL (ref 0–0.12)
BILIRUB SERPL-MCNC: 0.2 MG/DL (ref 0.1–1.5)
BUN SERPL-MCNC: 16 MG/DL (ref 8–22)
CALCIUM SERPL-MCNC: 8.7 MG/DL (ref 8.5–10.5)
CHLORIDE SERPL-SCNC: 105 MMOL/L (ref 96–112)
CO2 SERPL-SCNC: 26 MMOL/L (ref 20–33)
CREAT SERPL-MCNC: 0.53 MG/DL (ref 0.5–1.4)
EOSINOPHIL # BLD AUTO: 0 K/UL (ref 0–0.51)
EOSINOPHIL NFR BLD: 0 % (ref 0–6.9)
ERYTHROCYTE [DISTWIDTH] IN BLOOD BY AUTOMATED COUNT: 45.1 FL (ref 35.9–50)
GLOBULIN SER CALC-MCNC: 2.4 G/DL (ref 1.9–3.5)
GLUCOSE SERPL-MCNC: 136 MG/DL (ref 65–99)
HCT VFR BLD AUTO: 35.8 % (ref 37–47)
HGB BLD-MCNC: 12.2 G/DL (ref 12–16)
IMM GRANULOCYTES # BLD AUTO: 0.34 K/UL (ref 0–0.11)
IMM GRANULOCYTES NFR BLD AUTO: 3.2 % (ref 0–0.9)
LYMPHOCYTES # BLD AUTO: 1.38 K/UL (ref 1–4.8)
LYMPHOCYTES NFR BLD: 13 % (ref 22–41)
MAGNESIUM SERPL-MCNC: 2.2 MG/DL (ref 1.5–2.5)
MCH RBC QN AUTO: 32.6 PG (ref 27–33)
MCHC RBC AUTO-ENTMCNC: 34.1 G/DL (ref 33.6–35)
MCV RBC AUTO: 95.7 FL (ref 81.4–97.8)
MONOCYTES # BLD AUTO: 0.74 K/UL (ref 0–0.85)
MONOCYTES NFR BLD AUTO: 7 % (ref 0–13.4)
NEUTROPHILS # BLD AUTO: 8.13 K/UL (ref 2–7.15)
NEUTROPHILS NFR BLD: 76.6 % (ref 44–72)
NRBC # BLD AUTO: 0 K/UL
NRBC BLD-RTO: 0 /100 WBC
PHOSPHATE SERPL-MCNC: 3.4 MG/DL (ref 2.5–4.5)
PLATELET # BLD AUTO: 219 K/UL (ref 164–446)
PMV BLD AUTO: 10.2 FL (ref 9–12.9)
POTASSIUM SERPL-SCNC: 4.5 MMOL/L (ref 3.6–5.5)
PROT SERPL-MCNC: 5.6 G/DL (ref 6–8.2)
RBC # BLD AUTO: 3.74 M/UL (ref 4.2–5.4)
SODIUM SERPL-SCNC: 140 MMOL/L (ref 135–145)
WBC # BLD AUTO: 10.6 K/UL (ref 4.8–10.8)

## 2021-03-13 PROCEDURE — A9270 NON-COVERED ITEM OR SERVICE: HCPCS | Performed by: GENERAL PRACTICE

## 2021-03-13 PROCEDURE — 700102 HCHG RX REV CODE 250 W/ 637 OVERRIDE(OP): Performed by: NURSE PRACTITIONER

## 2021-03-13 PROCEDURE — 700101 HCHG RX REV CODE 250: Performed by: PHYSICIAN ASSISTANT

## 2021-03-13 PROCEDURE — 85025 COMPLETE CBC W/AUTO DIFF WBC: CPT

## 2021-03-13 PROCEDURE — 700102 HCHG RX REV CODE 250 W/ 637 OVERRIDE(OP): Performed by: STUDENT IN AN ORGANIZED HEALTH CARE EDUCATION/TRAINING PROGRAM

## 2021-03-13 PROCEDURE — A9270 NON-COVERED ITEM OR SERVICE: HCPCS | Performed by: NURSE PRACTITIONER

## 2021-03-13 PROCEDURE — 84100 ASSAY OF PHOSPHORUS: CPT

## 2021-03-13 PROCEDURE — A9270 NON-COVERED ITEM OR SERVICE: HCPCS | Performed by: STUDENT IN AN ORGANIZED HEALTH CARE EDUCATION/TRAINING PROGRAM

## 2021-03-13 PROCEDURE — A9270 NON-COVERED ITEM OR SERVICE: HCPCS | Performed by: PHYSICIAN ASSISTANT

## 2021-03-13 PROCEDURE — 83735 ASSAY OF MAGNESIUM: CPT

## 2021-03-13 PROCEDURE — 700102 HCHG RX REV CODE 250 W/ 637 OVERRIDE(OP): Performed by: PHYSICIAN ASSISTANT

## 2021-03-13 PROCEDURE — 80053 COMPREHEN METABOLIC PANEL: CPT

## 2021-03-13 PROCEDURE — 36415 COLL VENOUS BLD VENIPUNCTURE: CPT

## 2021-03-13 PROCEDURE — 770006 HCHG ROOM/CARE - MED/SURG/GYN SEMI*

## 2021-03-13 PROCEDURE — 700102 HCHG RX REV CODE 250 W/ 637 OVERRIDE(OP): Performed by: HOSPITALIST

## 2021-03-13 PROCEDURE — 700102 HCHG RX REV CODE 250 W/ 637 OVERRIDE(OP): Performed by: GENERAL PRACTICE

## 2021-03-13 PROCEDURE — 99232 SBSQ HOSP IP/OBS MODERATE 35: CPT | Mod: GC | Performed by: INTERNAL MEDICINE

## 2021-03-13 PROCEDURE — A9270 NON-COVERED ITEM OR SERVICE: HCPCS | Performed by: HOSPITALIST

## 2021-03-13 RX ADMIN — DOCUSATE SODIUM 100 MG: 100 CAPSULE, LIQUID FILLED ORAL at 16:06

## 2021-03-13 RX ADMIN — DEXAMETHASONE 8 MG: 4 TABLET ORAL at 05:18

## 2021-03-13 RX ADMIN — FOLIC ACID 1 MG: 1 TABLET ORAL at 05:16

## 2021-03-13 RX ADMIN — LEVETIRACETAM 500 MG: 500 TABLET, FILM COATED ORAL at 16:07

## 2021-03-13 RX ADMIN — DOCUSATE SODIUM 50 MG AND SENNOSIDES 8.6 MG 1 TABLET: 8.6; 5 TABLET, FILM COATED ORAL at 20:49

## 2021-03-13 RX ADMIN — LAMOTRIGINE 100 MG: 100 TABLET ORAL at 05:16

## 2021-03-13 RX ADMIN — VENLAFAXINE 12.5 MG: 25 TABLET ORAL at 05:17

## 2021-03-13 RX ADMIN — DOCUSATE SODIUM 100 MG: 100 CAPSULE, LIQUID FILLED ORAL at 05:16

## 2021-03-13 RX ADMIN — DEXAMETHASONE 8 MG: 4 TABLET ORAL at 16:08

## 2021-03-13 RX ADMIN — DEXAMETHASONE 8 MG: 4 TABLET ORAL at 21:59

## 2021-03-13 RX ADMIN — LAMOTRIGINE 100 MG: 100 TABLET ORAL at 16:07

## 2021-03-13 RX ADMIN — LORAZEPAM 0.5 MG: 1 TABLET ORAL at 20:48

## 2021-03-13 RX ADMIN — POLYETHYLENE GLYCOL 3350 1 PACKET: 17 POWDER, FOR SOLUTION ORAL at 05:16

## 2021-03-13 RX ADMIN — LEVETIRACETAM 500 MG: 500 TABLET, FILM COATED ORAL at 05:16

## 2021-03-13 RX ADMIN — VENLAFAXINE 12.5 MG: 25 TABLET ORAL at 16:07

## 2021-03-13 ASSESSMENT — ENCOUNTER SYMPTOMS
PALPITATIONS: 0
SORE THROAT: 0
BRUISES/BLEEDS EASILY: 0
SEIZURES: 1
NAUSEA: 0
SHORTNESS OF BREATH: 0
DEPRESSION: 0
TINGLING: 0
WHEEZING: 0
NERVOUS/ANXIOUS: 0
WEIGHT LOSS: 0
MEMORY LOSS: 0
DIARRHEA: 0
HEADACHES: 0
COUGH: 0
CHILLS: 0
FOCAL WEAKNESS: 1
VOMITING: 0
NECK PAIN: 0
SINUS PAIN: 0
FALLS: 0
CONSTIPATION: 0
ABDOMINAL PAIN: 0
TREMORS: 0
BACK PAIN: 0
WEAKNESS: 0
DIZZINESS: 0
FEVER: 0

## 2021-03-13 ASSESSMENT — FIBROSIS 4 INDEX: FIB4 SCORE: 0.65

## 2021-03-13 ASSESSMENT — PAIN DESCRIPTION - PAIN TYPE
TYPE: ACUTE PAIN
TYPE: ACUTE PAIN;SURGICAL PAIN

## 2021-03-13 NOTE — DISCHARGE PLANNING
Following for post acute services Insurance is pending authorization for IRF level of care with [facility].

## 2021-03-13 NOTE — CARE PLAN
Problem: Safety  Goal: Will remain free from injury  Outcome: PROGRESSING AS EXPECTED  Goal: Will remain free from falls  Outcome: PROGRESSING AS EXPECTED  Note: Patient A+Ox4, verbalizes understanding of calling for assistance before getting out of bed. Bed locked and in low position, call light within reach. Patient demonstrates appropriate use of call light.  Non slid socks on patient. Front wheel walker used when ambulating out of bed. Intentional hourly rounding in place.      Problem: Infection  Goal: Will remain free from infection  Outcome: PROGRESSING AS EXPECTED

## 2021-03-13 NOTE — THERAPY
Physical Therapy   Daily Treatment     Patient Name: Melba Frye  Age:  50 y.o., Sex:  female  Medical Record #: [id number]  Today's Date: 3/12/2021     Precautions: Fall Risk    Assessment    Patient seen for PT treatment.  Patient able to perform bed mobility with min assist, patient using R LE to support L LE as much as possible prior to onset of fatigue.  Wrapped L ankle with DF assist ace wrap.  Patient able to ambulate approx. 25 ft with hemiwalker with mod assist.  Able to ambulate approx. 50 ft with FWW and L hand  attachment with min assist and max cues for sequencing.  Patient with step to gait, narrow ALLYSON with FWW, patient verbally cueing herself for L heel strike and knee extension during stance phase.  Will continue to follow.        Plan    Continue current treatment plan.    DC Equipment Recommendations: Unable to determine at this time  Discharge Recommendations: Recommend post-acute placement for additional physical therapy services prior to discharge home.       Objective     03/12/21 1613   Precautions   Comments L deficits   Cognition    Cognition / Consciousness WDL   Level of Consciousness Alert   Comments Very pleasant & cooperative, tearful at times   Other Treatments   Other Treatments Provided Ankle DF assist with ace wrap   Balance   Sitting Balance (Static) Fair -   Sitting Balance (Dynamic) Fair -   Standing Balance (Static) Poor +   Standing Balance (Dynamic) Poor -   Weight Shift Sitting Fair   Weight Shift Standing Poor   Skilled Intervention Verbal Cuing   Gait Analysis   Gait Level Of Assist Moderate Assist   Assistive Device Rolando-Walker   Distance (Feet) 25   # of Times Distance was Traveled 1   Deviation Ataxic;Decreased Base Of Support;Step To   Weight Bearing Status No restriction   Skilled Intervention Verbal Cuing;Facilitation   Comments Mod assist with hemiwalker x25 ft to bathroom.  50 ft with FWW and L hand  attachment, min A with max cueing for sequencing    Bed Mobility    Supine to Sit Minimal Assist   Sit to Supine Minimal Assist   Scooting Minimal Assist   Skilled Intervention Verbal Cuing;Facilitation   Functional Mobility   Sit to Stand Minimal Assist   Bed, Chair, Wheelchair Transfer Minimal Assist   Transfer Method Stand Step   Mobility Ambulation   Skilled Intervention Verbal Cuing;Facilitation   Short Term Goals    Short Term Goal # 1 Pt will demonstrate supine<->sit with min A within 6 visits in order to promote return home   Goal Outcome # 1 Goal met, new goal added   Short Term Goal # 1 B  Pt will demonstrate supine<->sit with SPV within 6 visits in order to increase independence with bed mobility   Short Term Goal # 2 Pt will be able to transfer sit to stand with LRAD with min A within 6 visits in order to promote return home   Goal Outcome # 2 Goal met, new goal added   Short Term Goal # 2 B  Pt will be able to transfer sit to stand with LRAD with SPV within 6 visits in order to increase OOB activity   Short Term Goal # 3 Pt will be able to amb 15' with LRAD and min A within 6 visits in order to promote return home   Goal Outcome # 3 Goal not met

## 2021-03-13 NOTE — PROGRESS NOTES
Assumed care of pt. Pt lying in bed, resting eyes open. Pt alert. Denies pain at this time. Pt stated she got some sleep, and had a good night. No distress noted.

## 2021-03-13 NOTE — PROGRESS NOTES
Daily Progress Note:     Date of Service: 3/13/2021  Primary Team: [facility] IM Green Team   Attending: Oswald Quintero M.D.   Senior Resident: Dr. Merino  Intern: Dr. Waller  Contact:  [phone]    Chief Complaint:   Seizures, weakness    Subjective:    No acute events overnight. Patient report she is feeling well this AM. Reports good control of her left hand. Continued difficulty with left foot dorsiflexion/plantar flexion. Seen later with  in morning working on appointments. Medically for transfer to inpatient rehab when accepted.    Consultants/Specialty:  Neurosurgery  Critical care  Physiatry  Oncology  Radiation oncology    Review of Systems:  Review of Systems   Constitutional: Negative for chills, fever, malaise/fatigue and weight loss.   HENT: Negative for congestion, ear discharge, ear pain, sinus pain and sore throat.    Respiratory: Negative for cough, shortness of breath and wheezing.    Cardiovascular: Negative for chest pain, palpitations and leg swelling.   Gastrointestinal: Negative for abdominal pain, constipation, diarrhea, nausea and vomiting.   Genitourinary: Negative for dysuria, frequency, hematuria and urgency.   Musculoskeletal: Negative for back pain, falls, joint pain and neck pain.   Skin: Negative for itching and rash.   Neurological: Positive for focal weakness (Weakness of left leg) and seizures. Negative for dizziness, tingling, tremors, weakness and headaches.   Endo/Heme/Allergies: Does not bruise/bleed easily.   Psychiatric/Behavioral: Negative for depression and memory loss. The patient is not nervous/anxious.        Objective Data:   Physical Exam:   Vitals:   Temp:  [36.2 °C (97.2 °F)-36.8 °C (98.3 °F)] 36.8 °C (98.3 °F)  Pulse:  [67-89] 67  Resp:  [15-16] 15  BP: (129-154)/(73-88) 150/88  SpO2:  [93 %-97 %] 94 %    Physical Exam  Constitutional:       Appearance: Normal appearance.   HENT:      Head: Normocephalic and atraumatic.      Right Ear: External ear normal.       Left Ear: External ear normal.      Nose: Nose normal.      Mouth/Throat:      Mouth: Mucous membranes are moist.   Eyes:      Extraocular Movements: Extraocular movements intact.      Pupils: Pupils are equal, round, and reactive to light.   Cardiovascular:      Rate and Rhythm: Normal rate and regular rhythm.      Pulses: Normal pulses.      Heart sounds: Normal heart sounds.   Pulmonary:      Effort: Pulmonary effort is normal.      Breath sounds: Normal breath sounds.   Abdominal:      General: Abdomen is flat. Bowel sounds are normal.      Palpations: Abdomen is soft.   Musculoskeletal:         General: No swelling. Normal range of motion.      Cervical back: Normal range of motion. No rigidity. No muscular tenderness.      Right lower leg: No edema.      Left lower leg: No edema.      Comments: LLE strength 3/5, RLE strength 5/5., inability to dorsiflex or plantar flex LLE   Skin:     General: Skin is warm and dry.      Capillary Refill: Capillary refill takes less than 2 seconds.   Neurological:      General: No focal deficit present.      Mental Status: She is alert and oriented to person, place, and time.   Psychiatric:         Mood and Affect: Mood normal.         Behavior: Behavior normal.           Labs:   Recent Results (from the past 24 hour(s))   CBC WITH DIFFERENTIAL    Collection Time: 03/13/21  2:06 AM   Result Value Ref Range    WBC 10.6 4.8 - 10.8 K/uL    RBC 3.74 (L) 4.20 - 5.40 M/uL    Hemoglobin 12.2 12.0 - 16.0 g/dL    Hematocrit 35.8 (L) 37.0 - 47.0 %    MCV 95.7 81.4 - 97.8 fL    MCH 32.6 27.0 - 33.0 pg    MCHC 34.1 33.6 - 35.0 g/dL    RDW 45.1 35.9 - 50.0 fL    Platelet Count 219 164 - 446 K/uL    MPV 10.2 9.0 - 12.9 fL    Neutrophils-Polys 76.60 (H) 44.00 - 72.00 %    Lymphocytes 13.00 (L) 22.00 - 41.00 %    Monocytes 7.00 0.00 - 13.40 %    Eosinophils 0.00 0.00 - 6.90 %    Basophils 0.20 0.00 - 1.80 %    Immature Granulocytes 3.20 (H) 0.00 - 0.90 %    Nucleated RBC 0.00 /100 WBC     Neutrophils (Absolute) 8.13 (H) 2.00 - 7.15 K/uL    Lymphs (Absolute) 1.38 1.00 - 4.80 K/uL    Monos (Absolute) 0.74 0.00 - 0.85 K/uL    Eos (Absolute) 0.00 0.00 - 0.51 K/uL    Baso (Absolute) 0.02 0.00 - 0.12 K/uL    Immature Granulocytes (abs) 0.34 (H) 0.00 - 0.11 K/uL    NRBC (Absolute) 0.00 K/uL   Comp Metabolic Panel    Collection Time: 03/13/21  2:06 AM   Result Value Ref Range    Sodium 140 135 - 145 mmol/L    Potassium 4.5 3.6 - 5.5 mmol/L    Chloride 105 96 - 112 mmol/L    Co2 26 20 - 33 mmol/L    Anion Gap 9.0 7.0 - 16.0    Glucose 136 (H) 65 - 99 mg/dL    Bun 16 8 - 22 mg/dL    Creatinine 0.53 0.50 - 1.40 mg/dL    Calcium 8.7 8.5 - 10.5 mg/dL    AST(SGOT) 10 (L) 12 - 45 U/L    ALT(SGPT) 13 2 - 50 U/L    Alkaline Phosphatase 71 30 - 99 U/L    Total Bilirubin 0.2 0.1 - 1.5 mg/dL    Albumin 3.2 3.2 - 4.9 g/dL    Total Protein 5.6 (L) 6.0 - 8.2 g/dL    Globulin 2.4 1.9 - 3.5 g/dL    A-G Ratio 1.3 g/dL   MAGNESIUM    Collection Time: 03/13/21  2:06 AM   Result Value Ref Range    Magnesium 2.2 1.5 - 2.5 mg/dL   PHOSPHORUS    Collection Time: 03/13/21  2:06 AM   Result Value Ref Range    Phosphorus 3.4 2.5 - 4.5 mg/dL   ESTIMATED GFR    Collection Time: 03/13/21  2:06 AM   Result Value Ref Range    GFR If African American >60 >60 mL/min/1.73 m 2    GFR If Non African American >60 >60 mL/min/1.73 m 2      Imaging:   IR-US GUIDED PIV   Final Result    Ultrasound-guided PERIPHERAL IV INSERTION performed by    qualified nursing staff as above.      MR-BRAIN-WITH & W/O   Final Result      1.  There are postsurgical changes as evidenced by right frontoparietal craniectomy and biopsy of the right medial parietal enhancing lesion.   2.  7 mm midline shift towards left side   3.  Periventricular T2 hyperintensities adjacent to the left lateral ventricle.   4.  A few nonspecific T2 hyperintensities in the subcortical and periventricular white matter.      CT-HEAD W/O   Final Result      1.  No intracranial hemorrhage  identified following right parietal craniotomy and tumor resection.      2.  Residual right to left midline shift measuring 4.3 mm.      3.  Large area of low attenuation/edema in the right temporal and parietal white matter is present with minimal air near the site of tumor in the right parietal convexity medially.      4.  No hydrocephalus.      MR-STEALTH BRAIN WITH & W/O   Final Result      1.  There is an approximately 3.5 x 2.5 cm sized peripherally enhancing lesion in the right medial parietal lobe. Diffuse white matter edema is noted surrounding the lesion. When compared with the previous MRI dated 2/12/2021, there has been mild    interval reduction in the size of the lesion. The lesion is highly suspicious for high-grade neoplasm such as glioblastoma multiforme. However interval reduction in the size and presence of left periventricular white matter T2 hyperintensity may suggest    tumefactive demyelinating lesion.   2.  Again noted is well-defined left periventricular T2 hyperintensity concerning for demyelinating plaque.   3.  5 mm midline shift towards left side.         Problem Representation:   50-year-old female with past medical history seizures December 2020, migraines, depression, hyperlipidemia who presented with a 45-second seizure of the left upper and lower extremities without loss of consciousness.  MRI brain notable for 3 x 2.4 x 2.1 right frontal lobe lesion increasing in size compared to 2020.  Right craniotomy by neurosurgery done 3/9/21 confirming grade IV glioblastoma by biopsy.  PT/OT, physiatry, radiation oncology, oncology following, will consider palliative consult.    * Glioblastoma of frontal lobe (HCC)- (present on admission)  Assessment & Plan  CT scan head shows right frontal mass 3 cm, MRI brain to 2/21 shows right frontal lobe lesion 3 x 2.4 x 1.1 cm increased in size from MRI 12/20/2020.  Status post craniotomy with biopsy on 3/9 by Dr. Mao and biopsy confirmed grade IV  glioblastoma  Repeat MRI 3/11 shows postsurgical changes as evidenced by right frontoparietal craniectomy and biopsy of the right medial parietal enhancing lesion, 7 mm midline shift towards left side  Oncology and radiation oncology on board, she recommendations  PLAN:  On decadron with dosing and taper per neurosurgery  Keppra and Lamictal for seizure prophylaxis.  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  She is followed by Neurology on outpatient basis, started Lamictal in December  Had seizure 3/8/2021 and thus Lamictal dose was increased and Keppra added IV with transition to oral on 3/10  PLAN:  Continue Keppra and Lamictal for seizure prophylaxis, Ativan if seizures observed    Acute blood loss as cause of postoperative anemia  Assessment & Plan  Hg 9.5 on 3/11 labs compared to 14.9 3/10. .5. Likely secondary to recent craniotomy with wound vac placement  Will obtain anemia workup on am labs - iron panel, ferritin, B12/folate, TSH, reticulocyte count    Hyperlipidemia- (present on admission)  Assessment & Plan   2019. 10 year ASCVD risk 1.4%. no statin indicated    Mixed anxiety and depressive disorder- (present on admission)  Assessment & Plan  -continue Desvenlafaxine        * Code Status: FULL  * Diet: Regular  * Lines/Tubes/Drains: PIV   IVF: none indicated  * Prophylaxis:        -- DVT:SCDs, no anticoagulation due to confirmed glioblastoma        -- GI: None  * PCP: Trinity Nguyen M.D.   * Social / DC planning: likely to Acute Rehab once acute illness(es) have been addressed  * Dispo: Likely to inpatient rehab pending acceptance.    Prognosis remains  guarded.

## 2021-03-13 NOTE — PROGRESS NOTES
Neurosurgery Progress Note    Subjective:  No acute event over night  Slight HA, controlled with tylenol    Exam:  AAOx4, tongue ML  Slight drift on left, but able to raise own.  FCx4  CDI with staples        BP  Min: 129/73  Max: 154/87  Pulse  Av  Min: 67  Max: 89  Resp  Avg: 15.8  Min: 15  Max: 16  Temp  Av.4 °C (97.6 °F)  Min: 36.2 °C (97.2 °F)  Max: 36.8 °C (98.3 °F)  SpO2  Av.5 %  Min: 93 %  Max: 97 %    No data recorded    Recent Labs     21  0305 21  0206   WBC 11.4* 10.6   RBC 3.60* 3.74*   HEMOGLOBIN 11.9* 12.2   HEMATOCRIT 35.1* 35.8*   MCV 97.5 95.7   MCH 33.1* 32.6   MCHC 33.9 34.1   RDW 45.8 45.1   PLATELETCT 216 219   MPV 10.4 10.2     Recent Labs     21  0426 21  0305 21  0206   SODIUM 141 139 140   POTASSIUM 4.5 4.1 4.5   CHLORIDE 109 105 105   CO2 23 24 26   GLUCOSE 147* 140* 136*   BUN 14 15 16   CREATININE 0.50 0.46* 0.53   CALCIUM 9.0 8.8 8.7               Intake/Output       21 0700 - 21 0659 21 - 21 0659       Total  Total       Intake    P.O.  360  -- 360  --  -- --    P.O. 360 -- 360 -- -- --    Total Intake 360 -- 360 -- -- --       Output    Urine  --  -- --  --  -- --    Number of Times Voided 1 x -- 1 x 1 x -- 1 x    Total Output -- -- -- -- -- --       Net I/O     360 -- 360 -- -- --            Intake/Output Summary (Last 24 hours) at 3/13/2021 0948  Last data filed at 3/12/2021 1600  Gross per 24 hour   Intake 360 ml   Output --   Net 360 ml            • dexamethasone  8 mg Q8HRS    Followed by   • [START ON 3/15/2021] dexamethasone  6 mg Q8HRS    Followed by   • [START ON 3/16/2021] dexamethasone  4 mg Q8HRS    Followed by   • [START ON 3/17/2021] dexamethasone  4 mg Q12HRS    Followed by   • [START ON 3/18/2021] dexamethasone  2 mg Q8HRS    Followed by   • [START ON 3/19/2021] dexamethasone  2 mg Q12HRS    Followed by   • [START ON 3/20/2021] dexamethasone  2 mg DAILY   •  folic acid  1 mg DAILY   • LORazepam  0.5 mg Q4HRS PRN   • levETIRAcetam  500 mg BID   • artificial tears  1 Application PRN   • hydrALAZINE  10 mg Q4HRS PRN   • labetalol  10-20 mg Q4HRS PRN   • Pharmacy Consult Request  1 Each PHARMACY TO DOSE   • MD ALERT...DO NOT ADMINISTER NSAIDS or ASPIRIN unless ORDERED By Neurosurgery  1 Each PRN   • ondansetron  4 mg Q4HRS PRN   • cloNIDine  0.1 mg Q4HRS PRN   • docusate sodium  100 mg BID   • senna-docusate  1 tablet Nightly   • senna-docusate  1 tablet Q24HRS PRN   • polyethylene glycol/lytes  1 Packet BID PRN   • magnesium hydroxide  30 mL QDAY PRN   • bisacodyl  10 mg Q24HRS PRN   • fleet  1 Each Once PRN   • oxyCODONE immediate-release  5 mg Q3HRS PRN    Or   • oxyCODONE immediate-release  10 mg Q3HRS PRN    Or   • HYDROmorphone  0.5 mg Q3HRS PRN   • benzocaine-menthol  1 Lozenge Q2HRS PRN   • lamoTRIgine  100 mg BID   • LORazepam  1-2 mg Q6HRS PRN   • diphenhydrAMINE  25 mg Q6HRS PRN   • acetaminophen  650 mg Q6HRS PRN   • venlafaxine  12.5 mg BID       Assessment and Plan:  Hospital day #11 right parietal brain lesion, seizures  POD # 4 craniotomy for right frontal mass  Prophylactic anticoagulation: no         Start date/time: hold for surgery      Path: GBM IV  Dex on 10 day taper  Continue seizure medications: Lamictal and Keppra  Seen by Dr. Frye and Dr. Prashant Frye recommending radiation starting 3 weeks post-op      OK to discharge to Rehab from neuro surgery stand point  Continue seizure medications and follow up with neurologist  Staple removal 2 weeks post op (either at [facility] group or at Rehab)  No NSAIDs, ASA, blood thinners  Keep incision clean/dry  No need for dressing  Disposition per IM

## 2021-03-14 PROCEDURE — A9270 NON-COVERED ITEM OR SERVICE: HCPCS | Performed by: NURSE PRACTITIONER

## 2021-03-14 PROCEDURE — A9270 NON-COVERED ITEM OR SERVICE: HCPCS | Performed by: STUDENT IN AN ORGANIZED HEALTH CARE EDUCATION/TRAINING PROGRAM

## 2021-03-14 PROCEDURE — 770006 HCHG ROOM/CARE - MED/SURG/GYN SEMI*

## 2021-03-14 PROCEDURE — 700102 HCHG RX REV CODE 250 W/ 637 OVERRIDE(OP): Performed by: NURSE PRACTITIONER

## 2021-03-14 PROCEDURE — 700102 HCHG RX REV CODE 250 W/ 637 OVERRIDE(OP): Performed by: HOSPITALIST

## 2021-03-14 PROCEDURE — 700102 HCHG RX REV CODE 250 W/ 637 OVERRIDE(OP): Performed by: STUDENT IN AN ORGANIZED HEALTH CARE EDUCATION/TRAINING PROGRAM

## 2021-03-14 PROCEDURE — A9270 NON-COVERED ITEM OR SERVICE: HCPCS | Performed by: HOSPITALIST

## 2021-03-14 PROCEDURE — 700102 HCHG RX REV CODE 250 W/ 637 OVERRIDE(OP): Performed by: PHYSICIAN ASSISTANT

## 2021-03-14 PROCEDURE — A9270 NON-COVERED ITEM OR SERVICE: HCPCS | Performed by: PHYSICIAN ASSISTANT

## 2021-03-14 PROCEDURE — 99232 SBSQ HOSP IP/OBS MODERATE 35: CPT | Mod: GC | Performed by: INTERNAL MEDICINE

## 2021-03-14 PROCEDURE — 700102 HCHG RX REV CODE 250 W/ 637 OVERRIDE(OP): Performed by: GENERAL PRACTICE

## 2021-03-14 PROCEDURE — A9270 NON-COVERED ITEM OR SERVICE: HCPCS | Performed by: GENERAL PRACTICE

## 2021-03-14 RX ADMIN — LORAZEPAM 0.5 MG: 1 TABLET ORAL at 14:45

## 2021-03-14 RX ADMIN — DEXAMETHASONE 8 MG: 4 TABLET ORAL at 15:27

## 2021-03-14 RX ADMIN — LEVETIRACETAM 500 MG: 500 TABLET, FILM COATED ORAL at 18:17

## 2021-03-14 RX ADMIN — LAMOTRIGINE 100 MG: 100 TABLET ORAL at 05:35

## 2021-03-14 RX ADMIN — FOLIC ACID 1 MG: 1 TABLET ORAL at 05:35

## 2021-03-14 RX ADMIN — DEXAMETHASONE 8 MG: 4 TABLET ORAL at 05:34

## 2021-03-14 RX ADMIN — DOCUSATE SODIUM 100 MG: 100 CAPSULE, LIQUID FILLED ORAL at 18:17

## 2021-03-14 RX ADMIN — DOCUSATE SODIUM 100 MG: 100 CAPSULE, LIQUID FILLED ORAL at 05:35

## 2021-03-14 RX ADMIN — LEVETIRACETAM 500 MG: 500 TABLET, FILM COATED ORAL at 05:35

## 2021-03-14 RX ADMIN — LORAZEPAM 0.5 MG: 1 TABLET ORAL at 09:31

## 2021-03-14 RX ADMIN — DOCUSATE SODIUM 50 MG AND SENNOSIDES 8.6 MG 1 TABLET: 8.6; 5 TABLET, FILM COATED ORAL at 21:39

## 2021-03-14 RX ADMIN — VENLAFAXINE 12.5 MG: 25 TABLET ORAL at 05:35

## 2021-03-14 RX ADMIN — DEXAMETHASONE 8 MG: 4 TABLET ORAL at 21:40

## 2021-03-14 RX ADMIN — LAMOTRIGINE 100 MG: 100 TABLET ORAL at 18:17

## 2021-03-14 RX ADMIN — LORAZEPAM 0.5 MG: 1 TABLET ORAL at 21:40

## 2021-03-14 RX ADMIN — VENLAFAXINE 12.5 MG: 25 TABLET ORAL at 18:17

## 2021-03-14 ASSESSMENT — ENCOUNTER SYMPTOMS
SINUS PAIN: 0
NERVOUS/ANXIOUS: 0
SHORTNESS OF BREATH: 0
COUGH: 0
VOMITING: 0
ABDOMINAL PAIN: 0
TREMORS: 0
NECK PAIN: 0
BACK PAIN: 0
WHEEZING: 0
DIZZINESS: 0
FEVER: 0
HEADACHES: 0
CHILLS: 0
TINGLING: 0
SORE THROAT: 0
WEAKNESS: 0
FOCAL WEAKNESS: 1
BRUISES/BLEEDS EASILY: 0
FALLS: 0
NAUSEA: 0
WEIGHT LOSS: 0
DEPRESSION: 0
MEMORY LOSS: 0
DIARRHEA: 0
PALPITATIONS: 0
SEIZURES: 1
CONSTIPATION: 0

## 2021-03-14 ASSESSMENT — PAIN DESCRIPTION - PAIN TYPE: TYPE: ACUTE PAIN

## 2021-03-14 NOTE — CONSULTS
Reason for PC Consult: Advance Care Planning    Consulted by: Juwan Merino MD    Assessment:  General: 49yo lady transferred from [facility] ED 3/2 where she presented with weakness, headache, and seizures, known frontal lobe mass. Repeat CT demonstrated 3cm right frontal lobe mass with increased (from 2/12/21 imaging) mass effect - vasogenic edema and 7mm midline [facility] 3.5 x 2.5 cm. 3/9 - craniotomy with Dr. Mao - mass involves motor strip, no resection possible, only biopsy. Pathology confirmed Glioblastoma multiforme. Residual LUE and LLE weakness. Seen by Dr. Zuniga - radiation and chemotherapy planned after 2-3 weeks post-op, then possibly optune device.  [facility] awaiting insurance approval.    PMH:  Symptoms (veritgo, weakness) starting October 2020, seizures beginning 12/2020, migraines, anxiety, depression, HLD    Dyspnea: No-    Last BM: 03/13/21-    Pain: No-    Depression: Yes- Pt carries diagnosis and takes Desvenlafaxine at home - swiched to Effexor here. Mood appropriate for devastating new diagnosis.    Spiritual:  Is Jehovah's witness or spirituality important for coping with this illness? Yes- Pt has been in touch with her .  Has a  or spiritual provider visit been requested? No    Palliative Performance Scale: 40    Advanced Directive: None- was completed as part of family trust, spouse to email to PC office  DPOA:  -    POLST:No-      Code Status: Full- addressed with pt who confirmed full code    Social:   Pt lives in [place] with her  Dario and her terrier/sejal Su. Her 33yo son Vel Berman lives locally. Her 27yo stepdtr Kelli Burns lives at [place], [place]. Pt is an , usually 3rd-4th grade, but this past fall, she was doing YOLIE in various grades.     Outcome:  Pt was sleeping. Introduced self and role of Palliative Care to her  Dario. Assessed 's understanding of pt's current  "medical status, overall health picture, and options for future care. (I later returned when pt was awake and had similar encounter.) Both pt and her  expressed good understanding of acute situation and POC with oncology team. General discussion of burden vs benefit and possible side effects of cancer treatments and resources available (including RN Navigator and support groups). Dario may need to return to work soon in order to maintain his insurance with [employer] since pt will be losing hers 4/15. Encouraged Dario to reach out to pt's union to ask about any additional benefits.  Also discussed possible care needs for pt at home including private caregivers and life alert type safety systems.     Explored family's/pt's values, beliefs, and preferences in order to identify GOC. Pt plans to pursue all treatment being recommended. They hope to be able to travel. Dario tearfully shared that he and pt do not want prognosis/\"timeline\" discussed with them except by oncology once pt's MGMT and IDH status is known. Both stated their plan is \"to take things one day at a time.\" They have not yet told their children about pt's prognosis. Pt stated that she wanted \"to protect them\" and repeatedly apologized for her tears.  Validated pt's sadness and unimaginable difficulty of receiving this diagnosis, provided therapeutic silence and touch. Encouraged both pt and  to keep communication open between themselves as a couple and with their adult children.    Active listening, reflection, reminiscing, validation & normalization, empathic support and therapeutic touch utilized throughout these encounters.  All questions answered.  PC contact information given.     Discussed with/Updated: Dr. Waller, pt JUDITH Desai    Plan:  D/c [facility] pending insurance authorization. Palliative care to continue to follow, provide support, and help facilitate decision-making as clinical picture evolves.    Consideration for hospice:  Pt is " exploring active treatment including radiation and chemotherapy.    Thank you for allowing Palliative Care to participate in this patient's care. Please feel free to call [phone] with any questions or concerns.

## 2021-03-14 NOTE — CARE PLAN
Problem: Knowledge Deficit  Goal: Knowledge of disease process/condition, treatment plan, diagnostic tests, and medications will improve  Outcome: PROGRESSING AS EXPECTED  Note: POC discussed with patient. All questions answered.      Problem: Psychosocial Needs:  Goal: Level of anxiety will decrease  Outcome: PROGRESSING SLOWER THAN EXPECTED  Note: Pt. Has moments of crying and anxiety. PRN Ativan given frequently.

## 2021-03-14 NOTE — PROGRESS NOTES
Neurosurgery Progress Note    Subjective:  No acute event over night  Slight HA, controlled with tylenol    Exam:  AAOx4, tongue ML  Slight drift on left, but able to raise own.  FCx4, left df/pf weakness  CDI with staples        BP  Min: 119/73  Max: 139/88  Pulse  Av.3  Min: 61  Max: 87  Resp  Av.5  Min: 16  Max: 17  Temp  Av.3 °C (97.3 °F)  Min: 35.8 °C (96.5 °F)  Max: 36.8 °C (98.2 °F)  SpO2  Av %  Min: 93 %  Max: 97 %    No data recorded    Recent Labs     21  03021  0206   WBC 11.4* 10.6   RBC 3.60* 3.74*   HEMOGLOBIN 11.9* 12.2   HEMATOCRIT 35.1* 35.8*   MCV 97.5 95.7   MCH 33.1* 32.6   MCHC 33.9 34.1   RDW 45.8 45.1   PLATELETCT 216 219   MPV 10.4 10.2     Recent Labs     21  03021  0206   SODIUM 139 140   POTASSIUM 4.1 4.5   CHLORIDE 105 105   CO2 24 26   GLUCOSE 140* 136*   BUN 15 16   CREATININE 0.46* 0.53   CALCIUM 8.8 8.7               Intake/Output       21 07 - 21 0659 21 07 - 03/15/21 0659      9623-5905 9249-1849 Total  6471-2802 Total       Intake    P.O.  490  -- 490  --  -- --    P.O. 490 -- 490 -- -- --    Total Intake 490 -- 490 -- -- --       Output    Urine  --  -- --  --  -- --    Number of Times Voided 1 x 1 x 2 x -- -- --    Stool  --  -- --  --  -- --    Number of Times Stooled 1 x -- 1 x -- -- --    Total Output -- -- -- -- -- --       Net I/O     490 -- 490 -- -- --            Intake/Output Summary (Last 24 hours) at 3/14/2021 0921  Last data filed at 3/13/2021 1800  Gross per 24 hour   Intake 490 ml   Output --   Net 490 ml            • dexamethasone  8 mg Q8HRS    Followed by   • [START ON 3/15/2021] dexamethasone  6 mg Q8HRS    Followed by   • [START ON 3/16/2021] dexamethasone  4 mg Q8HRS    Followed by   • [START ON 3/17/2021] dexamethasone  4 mg Q12HRS    Followed by   • [START ON 3/18/2021] dexamethasone  2 mg Q8HRS    Followed by   • [START ON 3/19/2021] dexamethasone  2 mg Q12HRS    Followed by   •  [START ON 3/20/2021] dexamethasone  2 mg DAILY   • folic acid  1 mg DAILY   • LORazepam  0.5 mg Q4HRS PRN   • levETIRAcetam  500 mg BID   • artificial tears  1 Application PRN   • hydrALAZINE  10 mg Q4HRS PRN   • labetalol  10-20 mg Q4HRS PRN   • Pharmacy Consult Request  1 Each PHARMACY TO DOSE   • MD ALERT...DO NOT ADMINISTER NSAIDS or ASPIRIN unless ORDERED By Neurosurgery  1 Each PRN   • ondansetron  4 mg Q4HRS PRN   • cloNIDine  0.1 mg Q4HRS PRN   • docusate sodium  100 mg BID   • senna-docusate  1 tablet Nightly   • senna-docusate  1 tablet Q24HRS PRN   • polyethylene glycol/lytes  1 Packet BID PRN   • magnesium hydroxide  30 mL QDAY PRN   • bisacodyl  10 mg Q24HRS PRN   • fleet  1 Each Once PRN   • oxyCODONE immediate-release  5 mg Q3HRS PRN    Or   • oxyCODONE immediate-release  10 mg Q3HRS PRN    Or   • HYDROmorphone  0.5 mg Q3HRS PRN   • benzocaine-menthol  1 Lozenge Q2HRS PRN   • lamoTRIgine  100 mg BID   • LORazepam  1-2 mg Q6HRS PRN   • diphenhydrAMINE  25 mg Q6HRS PRN   • acetaminophen  650 mg Q6HRS PRN   • venlafaxine  12.5 mg BID       Assessment and Plan:  Hospital day #12 right parietal brain lesion, seizures  POD # 5 craniotomy for right frontal mass  Prophylactic anticoagulation: no         Start date/time: hold for surgery      Path: GBM IV  Dex on 10 day taper  Continue seizure medications: Lamictal and Keppra  Seen by Dr. Frye and Dr. Prashant Frye recommending radiation starting 3 weeks post-op      OK to discharge to Rehab from neuro surgery stand point  Continue seizure medications and follow up with neurologist  Staple removal 2 weeks post op (either at [facility]surgery group or at Rehab)  No NSAIDs, ASA, blood thinners  Keep incision clean/dry  No need for dressing  Disposition per IM

## 2021-03-14 NOTE — PROGRESS NOTES
present. Updates given.  and wife smiling, laughing at times, seems their spirits are higher today. Will continue to monitor.

## 2021-03-14 NOTE — PROGRESS NOTES
spoke with this RN,  very tearful,  stating that they do not want to be told anymore percentages or survival rates.  stating they do not want any staff talking about these things, they want to take it day by day and give it all they have. Comment in the Sticky notes and specialty comments.

## 2021-03-14 NOTE — CARE PLAN
Problem: Venous Thromboembolism (VTW)/Deep Vein Thrombosis (DVT) Prevention:  Goal: Patient will participate in Venous Thrombosis (VTE)/Deep Vein Thrombosis (DVT)Prevention Measures  Outcome: PROGRESSING AS EXPECTED     Problem: Bowel/Gastric:  Goal: Normal bowel function is maintained or improved  Outcome: PROGRESSING AS EXPECTED     Problem: Knowledge Deficit  Goal: Knowledge of the prescribed therapeutic regimen will improve  Outcome: PROGRESSING AS EXPECTED     Problem: Psychosocial Needs:  Goal: Level of anxiety will decrease  Outcome: PROGRESSING AS EXPECTED  Note: Patient resting at this time, states no needs. Pt verbalizes different relaxation techniques she uses to decrease anxiety, such as a breathing techniques. Ativan given once prior to bedtime. RN discussed various coping mechanisms and support systems in place for pt.

## 2021-03-14 NOTE — PROGRESS NOTES
Daily Progress Note:     Date of Service: 3/14/2021  Primary Team: [facility] Green Team   Attending: Oswald Quintero M.D.   Senior Resident: Dr. Merino  Intern: Dr. Waller  Contact:  [phone]    Chief Complaint:   Seizures, weakness    Subjective:    No acute events overnight. Patient report she is feeling well this AM. No issues with bowel movements, no abdominal pain, eating and drinking well. Continued weakness of left leg. Medically clear for transfer to inpatient rehab when accepted.    Consultants/Specialty:  Neurosurgery  Critical care  Physiatry  Oncology  Radiation oncology    Review of Systems:  Review of Systems   Constitutional: Negative for chills, fever, malaise/fatigue and weight loss.   HENT: Negative for congestion, ear discharge, ear pain, sinus pain and sore throat.    Respiratory: Negative for cough, shortness of breath and wheezing.    Cardiovascular: Negative for chest pain, palpitations and leg swelling.   Gastrointestinal: Negative for abdominal pain, constipation, diarrhea, nausea and vomiting.   Genitourinary: Negative for dysuria, frequency, hematuria and urgency.   Musculoskeletal: Negative for back pain, falls, joint pain and neck pain.   Skin: Negative for itching and rash.   Neurological: Positive for focal weakness (Weakness of left leg) and seizures. Negative for dizziness, tingling, tremors, weakness and headaches.   Endo/Heme/Allergies: Does not bruise/bleed easily.   Psychiatric/Behavioral: Negative for depression and memory loss. The patient is not nervous/anxious.        Objective Data:   Physical Exam:   Vitals:   Temp:  [35.8 °C (96.5 °F)-36.8 °C (98.2 °F)] 35.8 °C (96.5 °F)  Pulse:  [61-87] 64  Resp:  [16-17] 16  BP: (119-139)/(73-88) 124/73  SpO2:  [93 %-97 %] 97 %    Physical Exam  Constitutional:       Appearance: Normal appearance.   HENT:      Head: Normocephalic and atraumatic.      Right Ear: External ear normal.      Left Ear: External ear normal.      Nose: Nose  normal.      Mouth/Throat:      Mouth: Mucous membranes are moist.   Eyes:      Extraocular Movements: Extraocular movements intact.      Pupils: Pupils are equal, round, and reactive to light.   Cardiovascular:      Rate and Rhythm: Normal rate and regular rhythm.      Pulses: Normal pulses.      Heart sounds: Normal heart sounds.   Pulmonary:      Effort: Pulmonary effort is normal.      Breath sounds: Normal breath sounds.   Abdominal:      General: Abdomen is flat. Bowel sounds are normal.      Palpations: Abdomen is soft.   Musculoskeletal:         General: No swelling. Normal range of motion.      Cervical back: Normal range of motion. No rigidity. No muscular tenderness.      Right lower leg: No edema.      Left lower leg: No edema.      Comments: LLE strength 3/5, RLE strength 5/5., inability to dorsiflex or plantar flex LLE   Skin:     General: Skin is warm and dry.      Capillary Refill: Capillary refill takes less than 2 seconds.   Neurological:      General: No focal deficit present.      Mental Status: She is alert and oriented to person, place, and time.   Psychiatric:         Mood and Affect: Mood normal.         Behavior: Behavior normal.           Labs:   No results found for this or any previous visit (from the past 24 hour(s)).   Imaging:   IR-US GUIDED PIV   Final Result    Ultrasound-guided PERIPHERAL IV INSERTION performed by    qualified nursing staff as above.      MR-BRAIN-WITH & W/O   Final Result      1.  There are postsurgical changes as evidenced by right frontoparietal craniectomy and biopsy of the right medial parietal enhancing lesion.   2.  7 mm midline shift towards left side   3.  Periventricular T2 hyperintensities adjacent to the left lateral ventricle.   4.  A few nonspecific T2 hyperintensities in the subcortical and periventricular white matter.      CT-HEAD W/O   Final Result      1.  No intracranial hemorrhage identified following right parietal craniotomy and tumor  resection.      2.  Residual right to left midline shift measuring 4.3 mm.      3.  Large area of low attenuation/edema in the right temporal and parietal white matter is present with minimal air near the site of tumor in the right parietal convexity medially.      4.  No hydrocephalus.      MR-STEALTH BRAIN WITH & W/O   Final Result      1.  There is an approximately 3.5 x 2.5 cm sized peripherally enhancing lesion in the right medial parietal lobe. Diffuse white matter edema is noted surrounding the lesion. When compared with the previous MRI dated 2/12/2021, there has been mild    interval reduction in the size of the lesion. The lesion is highly suspicious for high-grade neoplasm such as glioblastoma multiforme. However interval reduction in the size and presence of left periventricular white matter T2 hyperintensity may suggest    tumefactive demyelinating lesion.   2.  Again noted is well-defined left periventricular T2 hyperintensity concerning for demyelinating plaque.   3.  5 mm midline shift towards left side.         Problem Representation:   50-year-old female with past medical history seizures December 2020, migraines, depression, hyperlipidemia who presented with a 45-second seizure of the left upper and lower extremities without loss of consciousness.  MRI brain notable for 3 x 2.4 x 2.1 right frontal lobe lesion increasing in size compared to 2020.  Right craniotomy by neurosurgery done 3/9/21 confirming grade IV glioblastoma by biopsy.  PT/OT, physiatry, radiation oncology, oncology following, will consider palliative consult.    * Glioblastoma of frontal lobe (HCC)- (present on admission)  Assessment & Plan  CT scan head shows right frontal mass 3 cm, MRI brain to 2/21 shows right frontal lobe lesion 3 x 2.4 x 1.1 cm increased in size from MRI 12/20/2020.  Status post craniotomy with biopsy on 3/9 by Dr. Mao and biopsy confirmed grade IV glioblastoma  Repeat MRI 3/11 shows postsurgical changes as  evidenced by right frontoparietal craniectomy and biopsy of the right medial parietal enhancing lesion, 7 mm midline shift towards left side  Oncology and radiation oncology on board, she recommendations  PLAN:  On decadron with dosing and taper per neurosurgery  Keppra and Lamictal for seizure prophylaxis.  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  She is followed by Neurology on outpatient basis, started Lamictal in December  Had seizure 3/8/2021 and thus Lamictal dose was increased and Keppra added IV with transition to oral on 3/10  PLAN:  Continue Keppra and Lamictal for seizure prophylaxis, Ativan if seizures observed    Acute blood loss as cause of postoperative anemia  Assessment & Plan  Hg 9.5 on 3/11 labs compared to 14.9 3/10. .5. Likely secondary to recent craniotomy with wound vac placement  Will obtain anemia workup on am labs - iron panel, ferritin, B12/folate, TSH, reticulocyte count    Hyperlipidemia- (present on admission)  Assessment & Plan   2019. 10 year ASCVD risk 1.4%. no statin indicated    Mixed anxiety and depressive disorder- (present on admission)  Assessment & Plan  -continue Desvenlafaxine        * Code Status: FULL  * Diet: Regular  * Lines/Tubes/Drains: PIV   IVF: none indicated  * Prophylaxis:        -- DVT:SCDs, no anticoagulation due to confirmed glioblastoma        -- GI: None  * PCP: Trinity Nguyen M.D.   * Social / DC planning: likely to Acute Rehab once acute illness(es) have been addressed  * Dispo: Likely to inpatient rehab pending acceptance.    Prognosis remains guarded.

## 2021-03-14 NOTE — CARE PLAN
Problem: Safety  Goal: Will remain free from injury  Outcome: PROGRESSING AS EXPECTED  Goal: Will remain free from falls  Outcome: PROGRESSING AS EXPECTED  Note: Fall precautions in place. Pt oriented. Utilizes call light.      Problem: Venous Thromboembolism (VTW)/Deep Vein Thrombosis (DVT) Prevention:  Goal: Patient will participate in Venous Thrombosis (VTE)/Deep Vein Thrombosis (DVT)Prevention Measures  Outcome: PROGRESSING AS EXPECTED  Note: SCD in use     Problem: Bowel/Gastric:  Goal: Normal bowel function is maintained or improved  Outcome: PROGRESSING AS EXPECTED  Note: Pt had large bowel movement today  Goal: Will not experience complications related to bowel motility  Outcome: PROGRESSING AS EXPECTED     Problem: Psychosocial Needs:  Goal: Level of anxiety will decrease  Outcome: PROGRESSING AS EXPECTED  Note: Pt and  waxing and waining with anxiety and tearfulness. Frequent time spent at bedside for encouragement and positive talks.      Problem: Pain Management  Goal: Pain level will decrease to patient's comfort goal  Outcome: MET  Note: Pt aydee pain.

## 2021-03-15 ENCOUNTER — HOSPITAL ENCOUNTER (INPATIENT)
Facility: REHABILITATION | Age: 51
LOS: 12 days | DRG: 949 | End: 2021-03-27
Attending: PHYSICAL MEDICINE & REHABILITATION | Admitting: PHYSICAL MEDICINE & REHABILITATION
Payer: COMMERCIAL

## 2021-03-15 VITALS
WEIGHT: 232.37 LBS | RESPIRATION RATE: 16 BRPM | OXYGEN SATURATION: 95 % | DIASTOLIC BLOOD PRESSURE: 72 MMHG | TEMPERATURE: 96.8 F | SYSTOLIC BLOOD PRESSURE: 132 MMHG | HEART RATE: 56 BPM | HEIGHT: 67 IN | BODY MASS INDEX: 36.47 KG/M2

## 2021-03-15 PROBLEM — Z78.9 IMPAIRED MOBILITY AND ADLS: Status: ACTIVE | Noted: 2021-03-15

## 2021-03-15 PROBLEM — Z74.09 IMPAIRED MOBILITY AND ADLS: Status: ACTIVE | Noted: 2021-03-15

## 2021-03-15 LAB
ALBUMIN SERPL BCP-MCNC: 3.3 G/DL (ref 3.2–4.9)
ALBUMIN/GLOB SERPL: 1.4 G/DL
ALP SERPL-CCNC: 69 U/L (ref 30–99)
ALT SERPL-CCNC: 15 U/L (ref 2–50)
ANION GAP SERPL CALC-SCNC: 11 MMOL/L (ref 7–16)
AST SERPL-CCNC: 14 U/L (ref 12–45)
BASOPHILS # BLD AUTO: 0.2 % (ref 0–1.8)
BASOPHILS # BLD: 0.02 K/UL (ref 0–0.12)
BILIRUB SERPL-MCNC: 0.3 MG/DL (ref 0.1–1.5)
BUN SERPL-MCNC: 15 MG/DL (ref 8–22)
CALCIUM SERPL-MCNC: 8.5 MG/DL (ref 8.5–10.5)
CHLORIDE SERPL-SCNC: 102 MMOL/L (ref 96–112)
CO2 SERPL-SCNC: 23 MMOL/L (ref 20–33)
CREAT SERPL-MCNC: 0.51 MG/DL (ref 0.5–1.4)
EOSINOPHIL # BLD AUTO: 0 K/UL (ref 0–0.51)
EOSINOPHIL NFR BLD: 0 % (ref 0–6.9)
ERYTHROCYTE [DISTWIDTH] IN BLOOD BY AUTOMATED COUNT: 45.1 FL (ref 35.9–50)
GLOBULIN SER CALC-MCNC: 2.3 G/DL (ref 1.9–3.5)
GLUCOSE BLD-MCNC: 103 MG/DL (ref 65–99)
GLUCOSE SERPL-MCNC: 136 MG/DL (ref 65–99)
HCT VFR BLD AUTO: 37 % (ref 37–47)
HGB BLD-MCNC: 12.8 G/DL (ref 12–16)
IMM GRANULOCYTES # BLD AUTO: 0.42 K/UL (ref 0–0.11)
IMM GRANULOCYTES NFR BLD AUTO: 3.7 % (ref 0–0.9)
LYMPHOCYTES # BLD AUTO: 1.49 K/UL (ref 1–4.8)
LYMPHOCYTES NFR BLD: 13.2 % (ref 22–41)
MAGNESIUM SERPL-MCNC: 2.1 MG/DL (ref 1.5–2.5)
MCH RBC QN AUTO: 33.2 PG (ref 27–33)
MCHC RBC AUTO-ENTMCNC: 34.6 G/DL (ref 33.6–35)
MCV RBC AUTO: 96.1 FL (ref 81.4–97.8)
MONOCYTES # BLD AUTO: 0.67 K/UL (ref 0–0.85)
MONOCYTES NFR BLD AUTO: 6 % (ref 0–13.4)
NEUTROPHILS # BLD AUTO: 8.66 K/UL (ref 2–7.15)
NEUTROPHILS NFR BLD: 76.9 % (ref 44–72)
NRBC # BLD AUTO: 0 K/UL
NRBC BLD-RTO: 0 /100 WBC
PHOSPHATE SERPL-MCNC: 3.9 MG/DL (ref 2.5–4.5)
PLATELET # BLD AUTO: 225 K/UL (ref 164–446)
PMV BLD AUTO: 10 FL (ref 9–12.9)
POTASSIUM SERPL-SCNC: 4.2 MMOL/L (ref 3.6–5.5)
PROT SERPL-MCNC: 5.6 G/DL (ref 6–8.2)
RBC # BLD AUTO: 3.85 M/UL (ref 4.2–5.4)
SODIUM SERPL-SCNC: 136 MMOL/L (ref 135–145)
WBC # BLD AUTO: 11.3 K/UL (ref 4.8–10.8)

## 2021-03-15 PROCEDURE — A9270 NON-COVERED ITEM OR SERVICE: HCPCS | Performed by: STUDENT IN AN ORGANIZED HEALTH CARE EDUCATION/TRAINING PROGRAM

## 2021-03-15 PROCEDURE — 700102 HCHG RX REV CODE 250 W/ 637 OVERRIDE(OP): Performed by: NURSE PRACTITIONER

## 2021-03-15 PROCEDURE — A9270 NON-COVERED ITEM OR SERVICE: HCPCS | Performed by: GENERAL PRACTICE

## 2021-03-15 PROCEDURE — 97535 SELF CARE MNGMENT TRAINING: CPT | Mod: CO

## 2021-03-15 PROCEDURE — 85025 COMPLETE CBC W/AUTO DIFF WBC: CPT

## 2021-03-15 PROCEDURE — A9270 NON-COVERED ITEM OR SERVICE: HCPCS | Performed by: PHYSICAL MEDICINE & REHABILITATION

## 2021-03-15 PROCEDURE — 700102 HCHG RX REV CODE 250 W/ 637 OVERRIDE(OP): Performed by: STUDENT IN AN ORGANIZED HEALTH CARE EDUCATION/TRAINING PROGRAM

## 2021-03-15 PROCEDURE — 97112 NEUROMUSCULAR REEDUCATION: CPT | Mod: CO

## 2021-03-15 PROCEDURE — 82962 GLUCOSE BLOOD TEST: CPT | Mod: 91

## 2021-03-15 PROCEDURE — 99239 HOSP IP/OBS DSCHRG MGMT >30: CPT | Mod: GC | Performed by: INTERNAL MEDICINE

## 2021-03-15 PROCEDURE — 80053 COMPREHEN METABOLIC PANEL: CPT

## 2021-03-15 PROCEDURE — 84100 ASSAY OF PHOSPHORUS: CPT

## 2021-03-15 PROCEDURE — 770010 HCHG ROOM/CARE - REHAB SEMI PRIVAT*

## 2021-03-15 PROCEDURE — A9270 NON-COVERED ITEM OR SERVICE: HCPCS | Performed by: HOSPITALIST

## 2021-03-15 PROCEDURE — 700102 HCHG RX REV CODE 250 W/ 637 OVERRIDE(OP): Performed by: GENERAL PRACTICE

## 2021-03-15 PROCEDURE — 83735 ASSAY OF MAGNESIUM: CPT

## 2021-03-15 PROCEDURE — 99223 1ST HOSP IP/OBS HIGH 75: CPT | Performed by: PHYSICAL MEDICINE & REHABILITATION

## 2021-03-15 PROCEDURE — A9270 NON-COVERED ITEM OR SERVICE: HCPCS | Performed by: NURSE PRACTITIONER

## 2021-03-15 PROCEDURE — 94760 N-INVAS EAR/PLS OXIMETRY 1: CPT

## 2021-03-15 PROCEDURE — 36415 COLL VENOUS BLD VENIPUNCTURE: CPT

## 2021-03-15 PROCEDURE — 700102 HCHG RX REV CODE 250 W/ 637 OVERRIDE(OP): Performed by: PHYSICAL MEDICINE & REHABILITATION

## 2021-03-15 PROCEDURE — 700102 HCHG RX REV CODE 250 W/ 637 OVERRIDE(OP): Performed by: PHYSICIAN ASSISTANT

## 2021-03-15 PROCEDURE — U0005 INFEC AGEN DETEC AMPLI PROBE: HCPCS

## 2021-03-15 PROCEDURE — A9270 NON-COVERED ITEM OR SERVICE: HCPCS | Performed by: PHYSICIAN ASSISTANT

## 2021-03-15 PROCEDURE — U0003 INFECTIOUS AGENT DETECTION BY NUCLEIC ACID (DNA OR RNA); SEVERE ACUTE RESPIRATORY SYNDROME CORONAVIRUS 2 (SARS-COV-2) (CORONAVIRUS DISEASE [COVID-19]), AMPLIFIED PROBE TECHNIQUE, MAKING USE OF HIGH THROUGHPUT TECHNOLOGIES AS DESCRIBED BY CMS-2020-01-R: HCPCS

## 2021-03-15 PROCEDURE — 700102 HCHG RX REV CODE 250 W/ 637 OVERRIDE(OP): Performed by: HOSPITALIST

## 2021-03-15 RX ORDER — FOLIC ACID 1 MG/1
1 TABLET ORAL DAILY
Status: CANCELLED | OUTPATIENT
Start: 2021-03-16

## 2021-03-15 RX ORDER — VENLAFAXINE 25 MG/1
12.5 TABLET ORAL 2 TIMES DAILY
Qty: 90 TABLET | Refills: 3 | Status: ON HOLD
Start: 2021-03-15 | End: 2021-03-23

## 2021-03-15 RX ORDER — FOLIC ACID 1 MG/1
1 TABLET ORAL DAILY
Status: DISCONTINUED | OUTPATIENT
Start: 2021-03-16 | End: 2021-03-27 | Stop reason: HOSPADM

## 2021-03-15 RX ORDER — DEXAMETHASONE 4 MG/1
6 TABLET ORAL EVERY 8 HOURS
Status: COMPLETED | OUTPATIENT
Start: 2021-03-15 | End: 2021-03-15

## 2021-03-15 RX ORDER — ENEMA 19; 7 G/133ML; G/133ML
1 ENEMA RECTAL
Status: ON HOLD
Start: 2021-03-15 | End: 2021-03-23

## 2021-03-15 RX ORDER — DEXAMETHASONE 4 MG/1
4 TABLET ORAL EVERY 12 HOURS
Status: COMPLETED | OUTPATIENT
Start: 2021-03-17 | End: 2021-03-17

## 2021-03-15 RX ORDER — LORAZEPAM 0.5 MG/1
0.5 TABLET ORAL EVERY 4 HOURS PRN
Qty: 30 TABLET | Refills: 0 | Status: ON HOLD
Start: 2021-03-15 | End: 2021-03-23

## 2021-03-15 RX ORDER — DEXAMETHASONE 4 MG/1
4 TABLET ORAL EVERY 12 HOURS
Qty: 10 TABLET | Refills: 0 | Status: ON HOLD
Start: 2021-03-17 | End: 2021-03-23

## 2021-03-15 RX ORDER — DEXAMETHASONE 6 MG/1
6 TABLET ORAL EVERY 8 HOURS
Qty: 30 TABLET | Status: ON HOLD
Start: 2021-03-15 | End: 2021-03-23

## 2021-03-15 RX ORDER — DEXAMETHASONE 4 MG/1
4 TABLET ORAL EVERY 12 HOURS
Status: CANCELLED | OUTPATIENT
Start: 2021-03-17 | End: 2021-03-18

## 2021-03-15 RX ORDER — ONDANSETRON 4 MG/1
4 TABLET, ORALLY DISINTEGRATING ORAL 4 TIMES DAILY PRN
Status: DISCONTINUED | OUTPATIENT
Start: 2021-03-15 | End: 2021-03-27 | Stop reason: HOSPADM

## 2021-03-15 RX ORDER — ENEMA 19; 7 G/133ML; G/133ML
1 ENEMA RECTAL
Status: DISCONTINUED | OUTPATIENT
Start: 2021-03-15 | End: 2021-03-27 | Stop reason: HOSPADM

## 2021-03-15 RX ORDER — LEVETIRACETAM 500 MG/1
500 TABLET ORAL 2 TIMES DAILY
Status: CANCELLED | OUTPATIENT
Start: 2021-03-15

## 2021-03-15 RX ORDER — ACETAMINOPHEN 325 MG/1
650 TABLET ORAL EVERY 4 HOURS PRN
Status: DISCONTINUED | OUTPATIENT
Start: 2021-03-15 | End: 2021-03-16

## 2021-03-15 RX ORDER — LABETALOL HYDROCHLORIDE 5 MG/ML
10-20 INJECTION, SOLUTION INTRAVENOUS EVERY 4 HOURS PRN
Refills: 0 | Status: ON HOLD
Start: 2021-03-15 | End: 2021-03-23

## 2021-03-15 RX ORDER — POLYETHYLENE GLYCOL 3350 17 G/17G
1 POWDER, FOR SOLUTION ORAL
Status: DISCONTINUED | OUTPATIENT
Start: 2021-03-15 | End: 2021-03-27 | Stop reason: HOSPADM

## 2021-03-15 RX ORDER — ONDANSETRON 2 MG/ML
4 INJECTION INTRAMUSCULAR; INTRAVENOUS 4 TIMES DAILY PRN
Status: DISCONTINUED | OUTPATIENT
Start: 2021-03-15 | End: 2021-03-27 | Stop reason: HOSPADM

## 2021-03-15 RX ORDER — DEXAMETHASONE 1 MG
2 TABLET ORAL DAILY
Status: CANCELLED | OUTPATIENT
Start: 2021-03-20 | End: 2021-03-21

## 2021-03-15 RX ORDER — POLYVINYL ALCOHOL 14 MG/ML
1 SOLUTION/ DROPS OPHTHALMIC PRN
Status: DISCONTINUED | OUTPATIENT
Start: 2021-03-15 | End: 2021-03-27 | Stop reason: HOSPADM

## 2021-03-15 RX ORDER — DEXAMETHASONE 4 MG/1
4 TABLET ORAL EVERY 8 HOURS
Status: COMPLETED | OUTPATIENT
Start: 2021-03-16 | End: 2021-03-16

## 2021-03-15 RX ORDER — DEXAMETHASONE 4 MG/1
4 TABLET ORAL EVERY 8 HOURS
Status: CANCELLED | OUTPATIENT
Start: 2021-03-16 | End: 2021-03-17

## 2021-03-15 RX ORDER — HYDROXYZINE HYDROCHLORIDE 25 MG/1
50 TABLET, FILM COATED ORAL EVERY 6 HOURS PRN
Status: DISCONTINUED | OUTPATIENT
Start: 2021-03-15 | End: 2021-03-27 | Stop reason: HOSPADM

## 2021-03-15 RX ORDER — LAMOTRIGINE 100 MG/1
100 TABLET ORAL 2 TIMES DAILY
Status: DISCONTINUED | OUTPATIENT
Start: 2021-03-15 | End: 2021-03-27 | Stop reason: HOSPADM

## 2021-03-15 RX ORDER — DEXAMETHASONE 1 MG
2 TABLET ORAL EVERY 12 HOURS
Status: COMPLETED | OUTPATIENT
Start: 2021-03-19 | End: 2021-03-19

## 2021-03-15 RX ORDER — DEXAMETHASONE 2 MG/1
2 TABLET ORAL EVERY 12 HOURS
Qty: 10 TABLET | Refills: 0 | Status: ON HOLD
Start: 2021-03-19 | End: 2021-03-23

## 2021-03-15 RX ORDER — DEXTROSE MONOHYDRATE 25 G/50ML
50 INJECTION, SOLUTION INTRAVENOUS
Status: DISCONTINUED | OUTPATIENT
Start: 2021-03-15 | End: 2021-03-27 | Stop reason: HOSPADM

## 2021-03-15 RX ORDER — OXYCODONE HYDROCHLORIDE 5 MG/1
5 TABLET ORAL
Status: DISCONTINUED | OUTPATIENT
Start: 2021-03-15 | End: 2021-03-23

## 2021-03-15 RX ORDER — ALUMINA, MAGNESIA, AND SIMETHICONE 2400; 2400; 240 MG/30ML; MG/30ML; MG/30ML
20 SUSPENSION ORAL
Status: DISCONTINUED | OUTPATIENT
Start: 2021-03-15 | End: 2021-03-27 | Stop reason: HOSPADM

## 2021-03-15 RX ORDER — DEXAMETHASONE 2 MG/1
2 TABLET ORAL DAILY
Qty: 10 TABLET | Refills: 0 | Status: ON HOLD
Start: 2021-03-20 | End: 2021-03-23

## 2021-03-15 RX ORDER — DEXAMETHASONE 4 MG/1
4 TABLET ORAL EVERY 8 HOURS
Qty: 10 TABLET | Refills: 0 | Status: ON HOLD
Start: 2021-03-16 | End: 2021-03-23

## 2021-03-15 RX ORDER — DEXAMETHASONE 1 MG
2 TABLET ORAL EVERY 12 HOURS
Status: CANCELLED | OUTPATIENT
Start: 2021-03-19 | End: 2021-03-20

## 2021-03-15 RX ORDER — LEVETIRACETAM 500 MG/1
500 TABLET ORAL 2 TIMES DAILY
Status: DISCONTINUED | OUTPATIENT
Start: 2021-03-15 | End: 2021-03-27 | Stop reason: HOSPADM

## 2021-03-15 RX ORDER — OXYCODONE HYDROCHLORIDE 10 MG/1
10 TABLET ORAL
Status: DISCONTINUED | OUTPATIENT
Start: 2021-03-15 | End: 2021-03-23

## 2021-03-15 RX ORDER — ACETAMINOPHEN 325 MG/1
650 TABLET ORAL EVERY 6 HOURS PRN
Qty: 30 TABLET | Refills: 0 | Status: SHIPPED
Start: 2021-03-15 | End: 2021-11-10

## 2021-03-15 RX ORDER — VENLAFAXINE 25 MG/1
12.5 TABLET ORAL 2 TIMES DAILY
Status: DISCONTINUED | OUTPATIENT
Start: 2021-03-15 | End: 2021-03-27 | Stop reason: HOSPADM

## 2021-03-15 RX ORDER — HYDRALAZINE HYDROCHLORIDE 20 MG/ML
10 INJECTION INTRAMUSCULAR; INTRAVENOUS EVERY 4 HOURS PRN
Refills: 0 | Status: ON HOLD
Start: 2021-03-15 | End: 2021-03-23

## 2021-03-15 RX ORDER — DEXAMETHASONE 6 MG/1
6 TABLET ORAL EVERY 8 HOURS
Status: CANCELLED | OUTPATIENT
Start: 2021-03-15 | End: 2021-03-16

## 2021-03-15 RX ORDER — LANOLIN ALCOHOL/MO/W.PET/CERES
3 CREAM (GRAM) TOPICAL NIGHTLY PRN
Status: DISCONTINUED | OUTPATIENT
Start: 2021-03-15 | End: 2021-03-27 | Stop reason: HOSPADM

## 2021-03-15 RX ORDER — HYDROMORPHONE HYDROCHLORIDE 2 MG/ML
0.5 INJECTION, SOLUTION INTRAMUSCULAR; INTRAVENOUS; SUBCUTANEOUS
Status: DISCONTINUED | OUTPATIENT
Start: 2021-03-15 | End: 2021-03-23

## 2021-03-15 RX ORDER — AMOXICILLIN 250 MG
2 CAPSULE ORAL 2 TIMES DAILY
Status: DISCONTINUED | OUTPATIENT
Start: 2021-03-15 | End: 2021-03-27 | Stop reason: HOSPADM

## 2021-03-15 RX ORDER — BISACODYL 10 MG
10 SUPPOSITORY, RECTAL RECTAL
Status: DISCONTINUED | OUTPATIENT
Start: 2021-03-15 | End: 2021-03-27 | Stop reason: HOSPADM

## 2021-03-15 RX ORDER — VENLAFAXINE 25 MG/1
12.5 TABLET ORAL 2 TIMES DAILY
Status: CANCELLED | OUTPATIENT
Start: 2021-03-15

## 2021-03-15 RX ORDER — FOLIC ACID 1 MG/1
1 TABLET ORAL DAILY
Qty: 30 TABLET | Status: SHIPPED
Start: 2021-03-16 | End: 2021-07-21

## 2021-03-15 RX ORDER — DEXAMETHASONE 1 MG
2 TABLET ORAL EVERY 8 HOURS
Status: COMPLETED | OUTPATIENT
Start: 2021-03-18 | End: 2021-03-18

## 2021-03-15 RX ORDER — LORAZEPAM 0.5 MG/1
0.5 TABLET ORAL EVERY 4 HOURS PRN
Status: DISCONTINUED | OUTPATIENT
Start: 2021-03-15 | End: 2021-03-27 | Stop reason: HOSPADM

## 2021-03-15 RX ORDER — LAMOTRIGINE 100 MG/1
100 TABLET ORAL 2 TIMES DAILY
Qty: 60 TABLET | Refills: 0 | Status: ON HOLD
Start: 2021-03-15 | End: 2021-03-23

## 2021-03-15 RX ORDER — LEVETIRACETAM 500 MG/1
500 TABLET ORAL 2 TIMES DAILY
Qty: 60 TABLET | Status: ON HOLD
Start: 2021-03-15 | End: 2021-03-23 | Stop reason: SDUPTHER

## 2021-03-15 RX ORDER — PSEUDOEPHEDRINE HCL 30 MG
100 TABLET ORAL 2 TIMES DAILY
Qty: 30 CAPSULE | Refills: 0 | Status: ON HOLD
Start: 2021-03-15 | End: 2021-03-23

## 2021-03-15 RX ORDER — DEXAMETHASONE 1 MG
2 TABLET ORAL DAILY
Status: COMPLETED | OUTPATIENT
Start: 2021-03-20 | End: 2021-03-20

## 2021-03-15 RX ORDER — LAMOTRIGINE 100 MG/1
100 TABLET ORAL 2 TIMES DAILY
Status: CANCELLED | OUTPATIENT
Start: 2021-03-15

## 2021-03-15 RX ORDER — LORAZEPAM 2 MG/ML
1-2 INJECTION INTRAMUSCULAR EVERY 6 HOURS PRN
Refills: 0 | Status: ON HOLD
Start: 2021-03-15 | End: 2021-03-23

## 2021-03-15 RX ORDER — DEXAMETHASONE 1 MG
2 TABLET ORAL EVERY 8 HOURS
Status: CANCELLED | OUTPATIENT
Start: 2021-03-18 | End: 2021-03-19

## 2021-03-15 RX ORDER — DEXAMETHASONE 2 MG/1
2 TABLET ORAL EVERY 8 HOURS
Qty: 10 TABLET | Refills: 0 | Status: ON HOLD
Start: 2021-03-18 | End: 2021-03-23

## 2021-03-15 RX ORDER — MIDAZOLAM HYDROCHLORIDE 5 MG/ML
5 INJECTION INTRAMUSCULAR; INTRAVENOUS PRN
Status: DISCONTINUED | OUTPATIENT
Start: 2021-03-15 | End: 2021-03-27 | Stop reason: HOSPADM

## 2021-03-15 RX ORDER — ECHINACEA PURPUREA EXTRACT 125 MG
2 TABLET ORAL PRN
Status: DISCONTINUED | OUTPATIENT
Start: 2021-03-15 | End: 2021-03-27 | Stop reason: HOSPADM

## 2021-03-15 RX ORDER — LACTULOSE 20 G/30ML
30 SOLUTION ORAL
Status: DISCONTINUED | OUTPATIENT
Start: 2021-03-15 | End: 2021-03-27 | Stop reason: HOSPADM

## 2021-03-15 RX ADMIN — LEVETIRACETAM 500 MG: 500 TABLET, FILM COATED ORAL at 06:15

## 2021-03-15 RX ADMIN — ACETAMINOPHEN 650 MG: 325 TABLET, FILM COATED ORAL at 21:45

## 2021-03-15 RX ADMIN — LEVETIRACETAM 500 MG: 500 TABLET ORAL at 21:45

## 2021-03-15 RX ADMIN — SENNOSIDES AND DOCUSATE SODIUM 2 TABLET: 8.6; 5 TABLET ORAL at 21:45

## 2021-03-15 RX ADMIN — FOLIC ACID 1 MG: 1 TABLET ORAL at 06:14

## 2021-03-15 RX ADMIN — LAMOTRIGINE 100 MG: 100 TABLET ORAL at 21:45

## 2021-03-15 RX ADMIN — DOCUSATE SODIUM 100 MG: 100 CAPSULE, LIQUID FILLED ORAL at 06:15

## 2021-03-15 RX ADMIN — LORAZEPAM 0.5 MG: 1 TABLET ORAL at 09:18

## 2021-03-15 RX ADMIN — DEXAMETHASONE 6 MG: 4 TABLET ORAL at 21:46

## 2021-03-15 RX ADMIN — LAMOTRIGINE 100 MG: 100 TABLET ORAL at 06:15

## 2021-03-15 RX ADMIN — DEXAMETHASONE 6 MG: 6 TABLET ORAL at 06:15

## 2021-03-15 RX ADMIN — VENLAFAXINE 12.5 MG: 25 TABLET ORAL at 06:15

## 2021-03-15 RX ADMIN — LORAZEPAM 0.5 MG: 0.5 TABLET ORAL at 21:45

## 2021-03-15 RX ADMIN — VENLAFAXINE 12.5 MG: 25 TABLET ORAL at 21:49

## 2021-03-15 RX ADMIN — DEXAMETHASONE 6 MG: 6 TABLET ORAL at 14:46

## 2021-03-15 ASSESSMENT — COGNITIVE AND FUNCTIONAL STATUS - GENERAL
SUGGESTED CMS G CODE MODIFIER DAILY ACTIVITY: CK
DRESSING REGULAR LOWER BODY CLOTHING: A LOT
EATING MEALS: A LITTLE
TOILETING: A LOT
PERSONAL GROOMING: A LITTLE
DRESSING REGULAR UPPER BODY CLOTHING: A LITTLE
DAILY ACTIVITIY SCORE: 15
HELP NEEDED FOR BATHING: A LOT

## 2021-03-15 ASSESSMENT — PAIN DESCRIPTION - PAIN TYPE
TYPE: ACUTE PAIN

## 2021-03-15 ASSESSMENT — ENCOUNTER SYMPTOMS
CHILLS: 0
DEPRESSION: 0
SINUS PAIN: 0
NECK PAIN: 0
FOCAL WEAKNESS: 1
COUGH: 0
TREMORS: 0
CONSTIPATION: 0
WEAKNESS: 0
SEIZURES: 1
BACK PAIN: 0
MEMORY LOSS: 0
PALPITATIONS: 0
SORE THROAT: 0
WHEEZING: 0
HEADACHES: 0
DIZZINESS: 0
ABDOMINAL PAIN: 0
NAUSEA: 0
VOMITING: 0
FEVER: 0
TINGLING: 0
DIARRHEA: 0
FALLS: 0
SHORTNESS OF BREATH: 0
NERVOUS/ANXIOUS: 0
BRUISES/BLEEDS EASILY: 0
WEIGHT LOSS: 0

## 2021-03-15 ASSESSMENT — LIFESTYLE VARIABLES
HAVE PEOPLE ANNOYED YOU BY CRITICIZING YOUR DRINKING: NO
HOW MANY TIMES IN THE PAST YEAR HAVE YOU HAD 5 OR MORE DRINKS IN A DAY: 0
EVER_SMOKED: NEVER
EVER HAD A DRINK FIRST THING IN THE MORNING TO STEADY YOUR NERVES TO GET RID OF A HANGOVER: NO
TOTAL SCORE: 0
HAVE YOU EVER FELT YOU SHOULD CUT DOWN ON YOUR DRINKING: NO
CONSUMPTION TOTAL: NEGATIVE
AVERAGE NUMBER OF DAYS PER WEEK YOU HAVE A DRINK CONTAINING ALCOHOL: 0
EVER FELT BAD OR GUILTY ABOUT YOUR DRINKING: NO
ON A TYPICAL DAY WHEN YOU DRINK ALCOHOL HOW MANY DRINKS DO YOU HAVE: 0
ALCOHOL_USE: NO
TOTAL SCORE: 0
TOTAL SCORE: 0

## 2021-03-15 ASSESSMENT — PATIENT HEALTH QUESTIONNAIRE - PHQ9
2. FEELING DOWN, DEPRESSED, IRRITABLE, OR HOPELESS: NOT AT ALL
1. LITTLE INTEREST OR PLEASURE IN DOING THINGS: NOT AT ALL
SUM OF ALL RESPONSES TO PHQ9 QUESTIONS 1 AND 2: 0

## 2021-03-15 ASSESSMENT — FIBROSIS 4 INDEX: FIB4 SCORE: 0.82

## 2021-03-15 NOTE — FLOWSHEET NOTE
03/15/21 1654   Events/Summary/Plan   Events/Summary/Plan RT assessment   Vital Signs   Respiration 18   Pulse Oximetry 93 %   $ Pulse Oximetry (Spot Check) Yes   Respiratory Assessment   Level of Consciousness Alert   Chest Exam   Work Of Breathing / Effort Within Normal Limits   Oxygen   O2 Delivery Device None - Room Air   Smoking History   Have you ever smoked Never

## 2021-03-15 NOTE — DISCHARGE PLANNING
Transitional Care CoordinationTransional Care Coordination:   Tc from [facility] ext [phone]; appt for simulation @1430 has been canceled as Dr Edenilson Frye indicates unable to complete due to staples.   Message sent to Ruba  / bedside nurse re: cancellation of simulation,  and confirming  at 1530.  Dr Solis/Dr Futlon @ [facility] aware also.

## 2021-03-15 NOTE — DISCHARGE PLANNING
Simulation has been arranged for 1430 today prior to potential transfer to [facility].  Bedside nurse aware.

## 2021-03-15 NOTE — PROGRESS NOTES
Daily Progress Note:     Date of Service: 3/15/2021  Primary Team: [facility]   Attending: Oswald Quintero M.D.   Senior Resident: Dr. Merino  Intern: Dr. Waller  Contact:  [phone]    Chief Complaint:   Seizures, weakness    Subjective:    Patient denies any concerns today.  Has regular bowel movements, no abdominal pain, eating and drinking well.  Medically stable awaiting placement for inpatient rehab.    Consultants/Specialty:  Neurosurgery  Critical care  Physiatry  Oncology  Radiation oncology    Review of Systems:  Review of Systems   Constitutional: Negative for chills, fever, malaise/fatigue and weight loss.   HENT: Negative for congestion, ear discharge, ear pain, sinus pain and sore throat.    Respiratory: Negative for cough, shortness of breath and wheezing.    Cardiovascular: Negative for chest pain, palpitations and leg swelling.   Gastrointestinal: Negative for abdominal pain, constipation, diarrhea, nausea and vomiting.   Genitourinary: Negative for dysuria, frequency, hematuria and urgency.   Musculoskeletal: Negative for back pain, falls, joint pain and neck pain.   Skin: Negative for itching and rash.   Neurological: Positive for focal weakness (Weakness of left leg) and seizures. Negative for dizziness, tingling, tremors, weakness and headaches.   Endo/Heme/Allergies: Does not bruise/bleed easily.   Psychiatric/Behavioral: Negative for depression and memory loss. The patient is not nervous/anxious.        Objective Data:   Physical Exam:   Vitals:   Temp:  [36 °C (96.8 °F)-36.2 °C (97.1 °F)] 36 °C (96.8 °F)  Pulse:  [56-82] 56  Resp:  [16-18] 16  BP: (124-140)/(70-79) 132/72  SpO2:  [93 %-96 %] 95 %    Physical Exam  Constitutional:       Appearance: Normal appearance.   HENT:      Head: Normocephalic and atraumatic.      Right Ear: External ear normal.      Left Ear: External ear normal.      Nose: Nose normal.      Mouth/Throat:      Mouth: Mucous membranes are moist.   Eyes:       Extraocular Movements: Extraocular movements intact.      Pupils: Pupils are equal, round, and reactive to light.   Cardiovascular:      Rate and Rhythm: Normal rate and regular rhythm.      Pulses: Normal pulses.      Heart sounds: Normal heart sounds.   Pulmonary:      Effort: Pulmonary effort is normal.      Breath sounds: Normal breath sounds.   Abdominal:      General: Abdomen is flat. Bowel sounds are normal.      Palpations: Abdomen is soft.   Musculoskeletal:         General: No swelling. Normal range of motion.      Cervical back: Normal range of motion. No rigidity. No muscular tenderness.      Right lower leg: No edema.      Left lower leg: No edema.      Comments: LLE strength 3/5   Skin:     General: Skin is warm and dry.      Capillary Refill: Capillary refill takes less than 2 seconds.   Neurological:      General: No focal deficit present.      Mental Status: She is alert and oriented to person, place, and time.   Psychiatric:         Mood and Affect: Mood normal.         Behavior: Behavior normal.           Labs:   Recent Results (from the past 24 hour(s))   CBC WITH DIFFERENTIAL    Collection Time: 03/15/21  4:40 AM   Result Value Ref Range    WBC 11.3 (H) 4.8 - 10.8 K/uL    RBC 3.85 (L) 4.20 - 5.40 M/uL    Hemoglobin 12.8 12.0 - 16.0 g/dL    Hematocrit 37.0 37.0 - 47.0 %    MCV 96.1 81.4 - 97.8 fL    MCH 33.2 (H) 27.0 - 33.0 pg    MCHC 34.6 33.6 - 35.0 g/dL    RDW 45.1 35.9 - 50.0 fL    Platelet Count 225 164 - 446 K/uL    MPV 10.0 9.0 - 12.9 fL    Neutrophils-Polys 76.90 (H) 44.00 - 72.00 %    Lymphocytes 13.20 (L) 22.00 - 41.00 %    Monocytes 6.00 0.00 - 13.40 %    Eosinophils 0.00 0.00 - 6.90 %    Basophils 0.20 0.00 - 1.80 %    Immature Granulocytes 3.70 (H) 0.00 - 0.90 %    Nucleated RBC 0.00 /100 WBC    Neutrophils (Absolute) 8.66 (H) 2.00 - 7.15 K/uL    Lymphs (Absolute) 1.49 1.00 - 4.80 K/uL    Monos (Absolute) 0.67 0.00 - 0.85 K/uL    Eos (Absolute) 0.00 0.00 - 0.51 K/uL    Baso  (Absolute) 0.02 0.00 - 0.12 K/uL    Immature Granulocytes (abs) 0.42 (H) 0.00 - 0.11 K/uL    NRBC (Absolute) 0.00 K/uL   Comp Metabolic Panel    Collection Time: 03/15/21  4:40 AM   Result Value Ref Range    Sodium 136 135 - 145 mmol/L    Potassium 4.2 3.6 - 5.5 mmol/L    Chloride 102 96 - 112 mmol/L    Co2 23 20 - 33 mmol/L    Anion Gap 11.0 7.0 - 16.0    Glucose 136 (H) 65 - 99 mg/dL    Bun 15 8 - 22 mg/dL    Creatinine 0.51 0.50 - 1.40 mg/dL    Calcium 8.5 8.5 - 10.5 mg/dL    AST(SGOT) 14 12 - 45 U/L    ALT(SGPT) 15 2 - 50 U/L    Alkaline Phosphatase 69 30 - 99 U/L    Total Bilirubin 0.3 0.1 - 1.5 mg/dL    Albumin 3.3 3.2 - 4.9 g/dL    Total Protein 5.6 (L) 6.0 - 8.2 g/dL    Globulin 2.3 1.9 - 3.5 g/dL    A-G Ratio 1.4 g/dL   MAGNESIUM    Collection Time: 03/15/21  4:40 AM   Result Value Ref Range    Magnesium 2.1 1.5 - 2.5 mg/dL   PHOSPHORUS    Collection Time: 03/15/21  4:40 AM   Result Value Ref Range    Phosphorus 3.9 2.5 - 4.5 mg/dL   ESTIMATED GFR    Collection Time: 03/15/21  4:40 AM   Result Value Ref Range    GFR If African American >60 >60 mL/min/1.73 m 2    GFR If Non African American >60 >60 mL/min/1.73 m 2      Imaging:   IR-US GUIDED PIV   Final Result    Ultrasound-guided PERIPHERAL IV INSERTION performed by    qualified nursing staff as above.      MR-BRAIN-WITH & W/O   Final Result      1.  There are postsurgical changes as evidenced by right frontoparietal craniectomy and biopsy of the right medial parietal enhancing lesion.   2.  7 mm midline shift towards left side   3.  Periventricular T2 hyperintensities adjacent to the left lateral ventricle.   4.  A few nonspecific T2 hyperintensities in the subcortical and periventricular white matter.      CT-HEAD W/O   Final Result      1.  No intracranial hemorrhage identified following right parietal craniotomy and tumor resection.      2.  Residual right to left midline shift measuring 4.3 mm.      3.  Large area of low attenuation/edema in the right  temporal and parietal white matter is present with minimal air near the site of tumor in the right parietal convexity medially.      4.  No hydrocephalus.      MR-STEALTH BRAIN WITH & W/O   Final Result      1.  There is an approximately 3.5 x 2.5 cm sized peripherally enhancing lesion in the right medial parietal lobe. Diffuse white matter edema is noted surrounding the lesion. When compared with the previous MRI dated 2/12/2021, there has been mild    interval reduction in the size of the lesion. The lesion is highly suspicious for high-grade neoplasm such as glioblastoma multiforme. However interval reduction in the size and presence of left periventricular white matter T2 hyperintensity may suggest    tumefactive demyelinating lesion.   2.  Again noted is well-defined left periventricular T2 hyperintensity concerning for demyelinating plaque.   3.  5 mm midline shift towards left side.         Problem Representation:   50-year-old female with past medical history seizures December 2020, migraines, depression, hyperlipidemia who presented with a 45-second seizure of the left upper and lower extremities without loss of consciousness.  MRI brain notable for 3 x 2.4 x 2.1 right frontal lobe lesion increasing in size compared to 2020.  Right craniotomy by neurosurgery done 3/9/21 confirming grade IV glioblastoma by biopsy.  PT/OT, physiatry, radiation oncology, oncology following.  Medically stable for placement to acute rehab.    * Glioblastoma of frontal lobe (HCC)- (present on admission)  Assessment & Plan  CT scan head shows right frontal mass 3 cm, MRI brain to 2/21 shows right frontal lobe lesion 3 x 2.4 x 1.1 cm increased in size from MRI 12/20/2020.  Status post craniotomy with biopsy on 3/9 by Dr. Mao and biopsy confirmed grade IV glioblastoma  Repeat MRI 3/11 shows postsurgical changes as evidenced by right frontoparietal craniectomy and biopsy of the right medial parietal enhancing lesion, 7 mm midline  shift towards left side  Oncology and radiation oncology on board  PLAN:  On decadron with dosing and taper per neurosurgery  Keppra and Lamictal for seizure prophylaxis.  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  She is followed by Neurology on outpatient basis, started Lamictal in December  Had seizure 3/8/2021 and thus Lamictal dose was increased and Keppra added IV with transition to oral on 3/10  PLAN:  Continue Keppra and Lamictal for seizure prophylaxis, Ativan if seizures observed    Acute blood loss as cause of postoperative anemia  Assessment & Plan  Hg 9.5 on 3/11 labs compared to 14.9 3/10. .5. Likely secondary to recent craniotomy with wound vac placement  anemia workup notable for low TSH/high T4 - CTM    Hyperlipidemia- (present on admission)  Assessment & Plan   2019. 10 year ASCVD risk 1.4%. no statin indicated    Mixed anxiety and depressive disorder- (present on admission)  Assessment & Plan  -continue Desvenlafaxine        * Code Status: FULL  * Diet: Regular  * Lines/Tubes/Drains: PIV   IVF: none indicated  * Prophylaxis:        -- DVT:SCDs, no anticoagulation due to confirmed glioblastoma        -- GI: None  * PCP: Trinity Nguyen M.D.   * Social / DC planning: likely to Acute Rehab once acute illness(es) have been addressed  * Dispo: Likely to inpatient rehab pending acceptance.    Prognosis remains guarded.

## 2021-03-15 NOTE — CARE PLAN
Problem: Safety  Goal: Will remain free from injury  Outcome: PROGRESSING AS EXPECTED  Goal: Will remain free from falls  Outcome: PROGRESSING AS EXPECTED  Note: Patient A+Ox4, verbalizes understanding of calling for assistance before getting out of bed. Bed locked and in low position, call light within reach. Non slid socks on patient. Patient demonstrates appropriate use of call light.  Bed alarm is on.     Problem: Infection  Goal: Will remain free from infection  Outcome: PROGRESSING AS EXPECTED

## 2021-03-15 NOTE — PROGRESS NOTES
Neurosurgery Progress Note    Subjective:  No acute event over night  Slight HA, controlled with tylenol    Exam:  AAOx4, tongue ML  Slight drift on left, but able to raise own.  FCx4, left df/pf weakness  CDI with staples  EOM intact, PERRL    BP  Min: 124/70  Max: 140/72  Pulse  Av.8  Min: 56  Max: 82  Resp  Av  Min: 16  Max: 18  Temp  Av.1 °C (97 °F)  Min: 36 °C (96.8 °F)  Max: 36.2 °C (97.1 °F)  SpO2  Av.5 %  Min: 93 %  Max: 96 %    No data recorded    Recent Labs     21  0206 03/15/21  0440   WBC 10.6 11.3*   RBC 3.74* 3.85*   HEMOGLOBIN 12.2 12.8   HEMATOCRIT 35.8* 37.0   MCV 95.7 96.1   MCH 32.6 33.2*   MCHC 34.1 34.6   RDW 45.1 45.1   PLATELETCT 219 225   MPV 10.2 10.0     Recent Labs     21  0206 03/15/21  0440   SODIUM 140 136   POTASSIUM 4.5 4.2   CHLORIDE 105 102   CO2 26 23   GLUCOSE 136* 136*   BUN 16 15   CREATININE 0.53 0.51   CALCIUM 8.7 8.5               Intake/Output       21 0700 - 03/15/21 0659 03/15/21 07 - 21 0659      1900-5025 4847-5956 Total 9984-2135 3770-1344 Total       Intake    P.O.  120  -- 120  --  -- --    P.O. 120 -- 120 -- -- --    Total Intake 120 -- 120 -- -- --       Output    Stool  --  -- --  --  -- --    Number of Times Stooled -- 1 x 1 x -- -- --    Total Output -- -- -- -- -- --       Net I/O     120 -- 120 -- -- --          No intake or output data in the 24 hours ending 03/15/21 0814         • dexamethasone  6 mg Q8HRS    Followed by   • [START ON 3/16/2021] dexamethasone  4 mg Q8HRS    Followed by   • [START ON 3/17/2021] dexamethasone  4 mg Q12HRS    Followed by   • [START ON 3/18/2021] dexamethasone  2 mg Q8HRS    Followed by   • [START ON 3/19/2021] dexamethasone  2 mg Q12HRS    Followed by   • [START ON 3/20/2021] dexamethasone  2 mg DAILY   • folic acid  1 mg DAILY   • LORazepam  0.5 mg Q4HRS PRN   • levETIRAcetam  500 mg BID   • artificial tears  1 Application PRN   • hydrALAZINE  10 mg Q4HRS PRN   • labetalol  10-20  mg Q4HRS PRN   • Pharmacy Consult Request  1 Each PHARMACY TO DOSE   • MD ALERT...DO NOT ADMINISTER NSAIDS or ASPIRIN unless ORDERED By Neurosurgery  1 Each PRN   • ondansetron  4 mg Q4HRS PRN   • cloNIDine  0.1 mg Q4HRS PRN   • docusate sodium  100 mg BID   • senna-docusate  1 tablet Nightly   • senna-docusate  1 tablet Q24HRS PRN   • polyethylene glycol/lytes  1 Packet BID PRN   • magnesium hydroxide  30 mL QDAY PRN   • bisacodyl  10 mg Q24HRS PRN   • fleet  1 Each Once PRN   • oxyCODONE immediate-release  5 mg Q3HRS PRN    Or   • oxyCODONE immediate-release  10 mg Q3HRS PRN    Or   • HYDROmorphone  0.5 mg Q3HRS PRN   • benzocaine-menthol  1 Lozenge Q2HRS PRN   • lamoTRIgine  100 mg BID   • LORazepam  1-2 mg Q6HRS PRN   • diphenhydrAMINE  25 mg Q6HRS PRN   • acetaminophen  650 mg Q6HRS PRN   • venlafaxine  12.5 mg BID       Assessment and Plan:  Hospital day #13 right parietal brain lesion, seizures  POD # 6 craniotomy for right frontal mass  Prophylactic anticoagulation:yes         Start date/time: as indicated, starting 3/15/21      Path: GBM IV  Dex on 10 day taper  Continue seizure medications: Lamictal and Keppra  Seen by Dr. Frye and Dr. Prashant Frye recommending radiation starting 3 weeks post-op      OK to discharge to Rehab from neuro surgery stand point  Continue seizure medications and follow up with neurologist  Staple removal 2 weeks post op (either at [facility] or at Rehab)  No NSAIDs, ASA, blood thinners  Keep incision clean/dry, ok to shower & pat dry  No need for dressing  Disposition per IM

## 2021-03-15 NOTE — H&P
"REHABILITATION HISTORY AND PHYSICAL/POST ADMISSION EVALUATION    3/15/2021  4:10 PM  Melba Frye  [id number]  Admission  3/15/2021  3:59 PM  [facility] Code/Reason for admission: 0002.1 - Brain Dysfunction: Non-Traumatic   Etiologic diagnosis/problem: Glioblastoma of frontal lobe (HCC)  Chief Complaint: \"I'm scared\"    HPI:  Per Dr. Radford's consult \"The patient is a 50 y.o. female with a past medical history of migraines, depression/anxiety, seizures;  who presented on 3/2/2021  1:45 PM to [facility] with seizure that lasted about a minute. Also with tingling and weakness of left side. Of note, had brain MRI in 2/2021 with a right medial parietal lobe mass s/p craniotomy and biopsy on 3/9 with Dr. Mao. Hospital course with thrombocytopenia, foot drop, and anxiety.\"    Patient current reports she's very scared about her diagnosis, and doesn't want to hear any statistics about her prognosis. She reports intermittent headache, relieved by tylenol.     Patient was evaluated by Rehab Medicine physician and Physical Therapy and Occupational Therapy and determined to be appropriate for acute inpatient rehab and was transferred to [facility] on 3/15/2021  3:59 PM.    With this acute therapeutic intervention, this patient hopes to improve her functional status, and return to independent living with the supportive care of family.    REVIEW OF SYSTEMS:     A complete review of systems was performed and was negative in detail with the exception of items mentioned elsewhere in this document.    PMH:  Past Medical History:   Diagnosis Date   • Anxiety    • Complicated migraine 6/9/2014   • Depression    • Dermatitis, contact 11/16/2015   • Fatigue 6/9/2014   • Hyperlipidemia 1/23/2015   • Lentigo 10/17/2018   • Menopausal symptom 7/2/2014   • Mixed anxiety and depressive disorder 6/9/2014   • Seborrheic keratoses 10/17/2018   • TMJ arthralgia 6/9/2014   • UTI (lower urinary tract infection)  "       PSH:  Past Surgical History:   Procedure Laterality Date   • CRANIOTOMY STEALTH Right 3/9/2021    Procedure: RIGHT CRANIOTOMY, USING FRAMELESS STEREOTAXY - FOR OPEN BIOPSY WITH PHASE REVERSAL;  Surgeon: Maxwell Mao M.D.;  Location: SURGERY [facility];  Service: Neurosurgery   • MYRINGOTOMY  8/27/2010    Performed by [address] at SURGERY SAME DAY [facility] ORS   • LAPAROSCOPY  5/28/2010    Performed by JACKI SHARMA at SURGERY [facility] ORS   • LYMPH NODE SAMPLING  5/28/2010    Performed by JACKI SHARMA at SURGERY [facility] ORS   • DEBULKING  5/28/2010    Performed by JACKI SHARMA at SURGERY [facility] ORS   • CYSTOSCOPY  10/15/08    Performed by YU GODINEZ at SURGERY SAME DAY [facility] ORS   • VAGINAL HYSTERECTOMY SCOPE TOTAL  10/15/08    Performed by YU GODINEZ at SURGERY SAME DAY [facility] ORS   • OTHER  1984    TONSILECTOMY/ ADNOIDS   • APPENDECTOMY  1981   • TONSILLECTOMY AND ADENOIDECTOMY         Family History   Problem Relation Age of Onset   • Non-contributory Mother    • Lung Disease Mother         COPD   • Cancer Mother         dysplasia    • Arthritis Mother    • Psychiatric Illness Mother    • Heart Disease Mother    • Hypertension Mother    • Stroke Mother    • Non-contributory Father    • Cancer Father 73        prostate cancer   • Lung Disease Maternal Grandmother    • Lung Disease Paternal Aunt    • Diabetes Paternal Aunt    • Hyperlipidemia Paternal Aunt         MEDICATIONS:  Current Facility-Administered Medications   Medication Dose   • hydrOXYzine HCl (ATARAX) tablet 50 mg  50 mg   • melatonin tablet 3 mg  3 mg   • Respiratory Therapy Consult     • Pharmacy Consult Request ...Pain Management Review 1 Each  1 Each   • acetaminophen (Tylenol) tablet 650 mg  650 mg   • lactulose 20 GM/30ML solution 30 mL  30 mL   • docusate sodium (ENEMEEZ) enema 283 mg  283 mg   • fleet enema 133 mL  1 Each   • artificial tears ophthalmic solution 1 Drop  1 Drop   •  benzocaine-menthol (CEPACOL) lozenge 1 Lozenge  1 Lozenge   • mag hydrox-al hydrox-simeth (MAALOX PLUS ES or MYLANTA DS) suspension 20 mL  20 mL   • ondansetron (ZOFRAN ODT) dispertab 4 mg  4 mg    Or   • ondansetron (ZOFRAN) syringe/vial injection 4 mg  4 mg   • sodium chloride (OCEAN) 0.65 % nasal spray 2 Spray  2 Spray   • midazolam (VERSED) 5 mg/mL (1 mL vial)  5 mg   • oxyCODONE immediate-release (ROXICODONE) tablet 5 mg  5 mg    Or   • oxyCODONE immediate release (ROXICODONE) tablet 10 mg  10 mg    Or   • HYDROmorphone (DILAUDID) injection 0.5 mg  0.5 mg   • insulin regular (HumuLIN R,NovoLIN R) injection  1-6 Units    And   • glucose 4 g chewable tablet 16 g  16 g    And   • dextrose 50% (D50W) injection 50 mL  50 mL   • senna-docusate (PERICOLACE or SENOKOT S) 8.6-50 MG per tablet 2 tablet  2 tablet    And   • polyethylene glycol/lytes (MIRALAX) PACKET 1 Packet  1 Packet    And   • magnesium hydroxide (MILK OF MAGNESIA) suspension 30 mL  30 mL    And   • bisacodyl (DULCOLAX) suppository 10 mg  10 mg   • MD ALERT...DO NOT ADMINISTER NSAIDS or ASPIRIN unless ORDERED By Neurosurgery 1 Each  1 Each   • dexamethasone (DECADRON) tablet 6 mg  6 mg    Followed by   • [START ON 3/16/2021] dexamethasone (DECADRON) tablet 4 mg  4 mg    Followed by   • [START ON 3/17/2021] dexamethasone (DECADRON) tablet 4 mg  4 mg    Followed by   • [START ON 3/18/2021] dexamethasone (DECADRON) tablet 2 mg  2 mg    Followed by   • [START ON 3/19/2021] dexamethasone (DECADRON) tablet 2 mg  2 mg    Followed by   • [START ON 3/20/2021] dexamethasone (DECADRON) tablet 2 mg  2 mg   • [START ON 3/16/2021] folic acid (FOLVITE) tablet 1 mg  1 mg   • lamoTRIgine (LAMICTAL) tablet 100 mg  100 mg   • levETIRAcetam (KEPPRA) tablet 500 mg  500 mg   • venlafaxine (EFFEXOR) tablet 12.5 mg  12.5 mg       ALLERGIES:  Bactrim    PSYCHOSOCIAL HISTORY:  Pre-morbidly, this patient lived in:   3 story home  Lives with spouse, who can assist     She has been  " for 20 years, has 2 grown children.    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    No tobacco, alcohol, or drugs.     LEVEL OF FUNCTION PRIOR TO DISABILTY:  Independent    LEVEL OF FUNCTION PRIOR TO ADMISSION to [facility]:  PT:  Gait Level Of Assist: Moderate Assist(x2)  Assistive Device: Front Wheel Walker(assist for left hand)  Distance (Feet): 15  Deviation: Ataxic, Decreased Heel Strike, Decreased Toe Off  # of Stairs Climbed: 0  Weight Bearing Status: no restriction  Supine to Sit: Minimal Assist  Sit to Supine: Moderate Assist  Scooting: Minimal Assist  Skilled Intervention: Verbal Cuing, Tactile Cuing, Facilitation  Comments: up in chair  Sit to Stand: Minimal Assist  Bed, Chair, Wheelchair Transfer: Moderate Assist  Toilet Transfers: Moderate Assist  Transfer Method: Stand Step  Skilled Intervention: Verbal Cuing, Tactile Cuing, Facilitation  Sitting in Chair: 10+ min (up pre)  Sitting Edge of Bed: 10 min  Standin min    OT:  Upper Body Dressing: Moderate Assist  Lower Body Dressing: Maximal Assist  Toileting: Minimal Assist(using RUE )  Skilled Intervention: Verbal Cuing, Tactile Cuing, Facilitation    CURRENT LEVEL OF FUNCTION:   Same as level of function prior to admission to [facility]    PHYSICAL EXAM:     VITAL SIGNS:   height is 1.702 m (5' 7\") and weight is 102 kg (225 lb 15.5 oz). Her temporal temperature is 36.6 °C (97.8 °F). Her blood pressure is 142/91 and her pulse is 90. Her respiration is 18 and oxygen saturation is 93%.     GENERAL: in no acute distress  HEENT: intact craniotomy incision with staples  CARDIAC: Regular rate and rhythm, normal S1, S2, no murmurs, no peripheral edema   LUNGS: Clear to auscultation, normal respiratory effort, on room air   ABDOMINAL: bowel sounds present, soft, nontender and nondistended    EXTREMITIES: no edema  MSK: No joint swelling    NEURO:    Mental status: alert  Speech: fluent, no aphasia or " dysarthria    CRANIAL NERVES:  2,3: visual acuity grossly intact, PERRL  3,4,6: EOMI bilaterally, no nystagmus or diplopia  5: intact in all branches  7: no facial asymmetry  8: hearing grossly intact  9,10: symmetric palate elevation  11: SCM/Trapezius strength 5/5 bilaterally  12: tongue protrudes midline    Motor:  Shoulder flexors:  Right -  5/5, Left -  5/5  Elbow flexors:  Right -  5/5, Left -  5/5  Elbow extensors:  Right -  5/5, Left -  5/5  Symmetrical   Hip flexors:  Right -  5/5, Left -  4+/5  Knee ext:  Right -  5/5, Left -  4/5  Dorsiflexors:  Right -  5/5, Left -  0/5  EHL:  Right -  5/5, Left -  1/5  Plantar flexors:  Right -  5/5, Left -  2/5   Positive left Pronator drift    Sensory:   intact to light touch through out    DTRs:   2+ in bilateral biceps, triceps, brachioradialis  3+ in bilateral patellar tendons and 2+ in bilateral achilles tendons  Several beats of clonus at left ankles  Negative babinski b/l  Positive left Sanchez    Tone: no spasticity noted    RADIOLOGY:          Results for orders placed during the hospital encounter of 03/02/21   MR-BRAIN-WITH & W/O    Impression 1.  There are postsurgical changes as evidenced by right frontoparietal craniectomy and biopsy of the right medial parietal enhancing lesion.  2.  7 mm midline shift towards left side  3.  Periventricular T2 hyperintensities adjacent to the left lateral ventricle.  4.  A few nonspecific T2 hyperintensities in the subcortical and periventricular white matter.                      LABS:  Recent Labs     03/13/21  0206 03/15/21  0440   SODIUM 140 136   POTASSIUM 4.5 4.2   CHLORIDE 105 102   CO2 26 23   GLUCOSE 136* 136*   BUN 16 15   CREATININE 0.53 0.51   CALCIUM 8.7 8.5     Recent Labs     03/13/21  0206 03/15/21  0440   WBC 10.6 11.3*   RBC 3.74* 3.85*   HEMOGLOBIN 12.2 12.8   HEMATOCRIT 35.8* 37.0   MCV 95.7 96.1   MCH 32.6 33.2*   MCHC 34.1 34.6   RDW 45.1 45.1   PLATELETCT 219 225   MPV 10.2 10.0       PRIMARY  REHAB DIAGNOSIS:    This patient is a 50 y.o. female admitted for acute inpatient rehabilitation with Glioblastoma of frontal lobe (HCC).    IMPAIRMENTS:   ADLs/IADLs  Mobility    SECONDARY DIAGNOSIS/MEDICAL CO-MORBIDITIES AFFECTING FUNCTION:  Seizure disorder  Anxiety disorder  History of headaches    RELEVANT CHANGES SINCE PREADMISSION EVALUATION:    Status unchanged    The patient's rehabilitation potential is excellent  The patient's medical prognosis is fair    PLAN:   Discussion and Recommendations, discussed with the patient and/or family:   1. The patient requires an acute inpatient rehabilitation program with a coordinated program of care at an intensity and frequency not available at a lower level of care. This recommendation is substantiated by the patient's medical physicians who recommend that the patient's intervention and assessment of medical issues needs to be done at an acute level of care for patient's safety and maximum outcome.     2. A coordinated program of care will be supplied by an interdisciplinary team of physical therapy, occupational therapy, rehab physician, rehab nursing, and, if needed, speech therapy and rehab psychology. Rehab team presents a patient-specific rehabilitation and education program concentrating on prevention of future problems related to accessibility, mobility, skin, bowel, bladder, sexuality, and psychosocial and medical/surgical problems.     3. Need for Rehabilitation Physician: The rehab physician will be evaluating the patient on a multi-weekly basis to help coordinate the program of care. The rehab physician communicates between medical physicians, therapists, and nurses to maximize the patient's potential outcome. Specific areas in which the rehab physician will be providing daily assessment include the following:   A. Assessing the patient's heart rate and blood pressure response (vitals monitoring) to activity and making adjustments in medications or  conservative measures as needed.   B. The rehab physician will be assessing the frequency at which the program can be increased to allow the patient to reach optimal functional outcome.   C. The rehab physician will also provide assessments in daily skin care, especially in light of patient's impairments in mobility.   D. The rehab physician will provide special expertise in understanding how to work with functional impairment and recommend appropriate interventions, compensatory techniques, and education that will facilitate the patient's outcome.     4. Rehab R.N.   The rehab RN will be working with patient to carry over in room mobility and activities of daily living when the patient is not in 3 hours of skilled therapy. Rehab nursing will be working in conjunction with rehab physician to address all the medical issues above and continue to assess laboratory work and discuss abnormalities with the treating physicians, assess vitals, and response to activity, and discuss and report abnormalities with the rehab physician. Rehab RN will also continue daily skin care, supervise bladder/bowel program, instruct in medication administration, and ensure patient safety.     5. Therapies to treat at intensity and frequency of (may change after completion of evaluation by all therapeutic disciplines):       PT:  Physical therapy to address mobility, transfer, gait training and evaluation for adaptive equipment needs 1hour/day at least 5 days/week for the duration of the ELOS (see below)       OT:  Occupational therapy to address ADLs, self-care, home management training, functional mobility/transfers and assistive device evaluation, and community re-integration 1hour/day at least 5 days/week for the duration of the ELOS (see below).        ST/Dysphagia:  Speech therapy to address speech, language, and cognitive deficits as well as swallowing difficulties with retraining/dysphagia management and community re-integration with  comprehension, expression, cognitive training 1hour/day at least 5 days/week for the duration of the ELOS (see below).     6. Medical management / Rehabilitation Issues/Adverse Potential affecting function as part of rehabilitation plan.    GBM  S/p resection 3/9  Steroid taper  Staples out 3/16  Radiation mapping 3/24    Seizure disorder  Keppra  Lamictal    Anxiety disorder  Effexor  PRN Ativan    History of headaches  PRN tylenol    I performed a complete drug regimen review and did not identify any potential clinically significant medication issues.    The patient's CODE STATUS was confirmed as FULL CODE on admission, with the patient and/or family at bedside.    REHABILITATION ISSUES/ADVERSE POTENTIAL:  1.  GBM: Patient demonstrates functional deficits in strength, balance, coordination, and ADL's. Patient is admitted to [facility] for comprehensive rehabilitation therapy as described below.   Rehabilitation nursing monitors bowel and bladder control, educates on medication administration, co-morbidities and monitors patient safety.    2.  DVT prophylaxis:  Patient is on nothing for anticoagulation upon transfer due to hemorrhage risk, not cleared by neurosurgery. Encourage OOB. Monitor daily for signs and symptoms of DVT including but not limited to swelling and pain to prevent the development of DVT that may interfere with therapies.    3.  Pain: No issues with pain currently / Controlled with as needed oral analgesics.    4.  Nutrition/Dysphagia: Dietician monitors nutrient intake, recommend supplements prn and provide nutrition education to pt/family to promote optimal nutrition for wound healing/recovery.     5.  Bladder/bowel:  Start bowel and bladder program, to prevent constipation, urinary retention (which may lead to UTI), and urinary incontinence (which will impact upon pt's functional independence).   - TV Q3h while awake with post void bladder scans, I&O cath for PVRs >400  - up  to commode after meal     6.  Skin/dermal ulcer prophylaxis: Monitor for new skin conditions with q.2 h. turns as required to prevent the development of skin breakdown.     7.  Cognition/Behavior:  Psychologist Dr. Wall provides adjustment counseling to illness and psychosocial barriers that may be potential barriers to rehabilitation.     8. Respiratory therapy: RT performs O2 management prn, breathing retraining, pulmonary hygiene and bronchospasm management prn to optimize participation in therapies.    Pt was seen today for 71 min, and entire time spent in face-to-face contact was >50% in counseling and coordination of care as detailed in A/P above.        GOALS/EXPECTED LEVEL OF FUNCTION BASED ON CURRENT MEDICAL AND FUNCTIONAL STATUS (may change based on patient's medical status and rate of impairment recovery):  Transfers:   Modified Independent  Mobility/Gait:   Modified Independent  ADL's:   Supervision  Cognition:  Least Verbal Cues    DISPOSITION: Discharge to pre-morbid independent living setting with the supportive care of patient's spouse.      ELOS: 14 days    Susi Fulton M.D.  Physical Medicine and Rehabilitation

## 2021-03-15 NOTE — DISCHARGE PLANNING
[facility] Transitional Care Coordination     Insurance has authorized inpatient rehab.     Dr. Susi Fulton will accept Melba to inpatient rehab.  Transport scheduled 3:30p today.  Nursing to call report to [phone].  Volate message to bedside RN Ruba.  LISSY gleason.  Telephone call to spouse J Carlos with update.  Reviewed current [facility] visitor policy to include one designated visitor allowed for duration of admission.  J Carlos voiced understanding.  Provided [facility] contact information.      [facility] will follow to assist as needed with transition to [facility].

## 2021-03-15 NOTE — THERAPY
Occupational Therapy  Daily Treatment     Patient Name: Melba Frye  Age:  50 y.o., Sex:  female  Medical Record #: [id number]  Today's Date: 3/15/2021       Precautions: Fall Risk  Comments: L deficits    Assessment    Pt seen for OT tx. Continues to be limited by decreased activity tolerance, balance deficits, inattention, LUE weakness and poor safety awareness impacting ability to complete ADLs and ADL transfers. Able to move LUE on all planes, continues to be limited by poor coordination. Increased independence in manipulating ADL objects w/ L hand. Will continue to benefit from OT services while in house.     Plan    Continue current treatment plan.    DC Equipment Recommendations: Unable to determine at this time  Discharge Recommendations: Recommend post-acute placement for additional occupational therapy services prior to discharge home       03/15/21 1240   Active ROM Upper Body   Active ROM Upper Body  X   Comments LUE impaired by weakness and incoordination    Strength Upper Body   Upper Body Strength  X   Comments LUE grossly limited by weakness, improving grasp    Balance   Sitting Balance (Static) Fair -   Sitting Balance (Dynamic) Fair -   Standing Balance (Static) Fair -   Standing Balance (Dynamic) Poor +   Weight Shift Sitting Fair   Weight Shift Standing Poor   Bed Mobility    Supine to Sit Minimal Assist   Sit to Supine Minimal Assist   Scooting Minimal Assist   Activities of Daily Living   Grooming Minimal Assist;Standing   Upper Body Dressing Moderate Assist   Lower Body Dressing Maximal Assist   Toileting Minimal Assist   Functional Mobility   Sit to Stand Minimal Assist   Bed, Chair, Wheelchair Transfer Minimal Assist   Toilet Transfers Minimal Assist   Short Term Goals   Short Term Goal # 1 Pt will use L UE as a gross assist during ADLs w/ no v/c's   Goal Outcome # 1 Progressing as expected   Short Term Goal # 2 Pt will demo standing grooming w/ SPV   Goal Outcome # 2 Progressing as  expected   Short Term Goal # 3 Pt will demo LB dressing w/ SPV   Goal Outcome # 3 Progressing as expected   Short Term Goal # 4 Pt will demo toilet txf w/ SPV   Goal Outcome # 4 Progressing as expected   Short Term Goal # 5 Pt will increase strength in L UE to 3/5 grossly   Goal Outcome # 5 Progressing as expected   Anticipated Discharge Equipment and Recommendations   DC Equipment Recommendations Unable to determine at this time   Discharge Recommendations Recommend post-acute placement for additional occupational therapy services prior to discharge home

## 2021-03-15 NOTE — DISCHARGE INSTRUCTIONS
Discharge Instructions    Discharged to other by medical transportation with escort. Discharged via wheelchair, hospital escort: Yes.  Special equipment needed: Not Applicable    Be sure to schedule a follow-up appointment with your primary care doctor or any specialists as instructed.     Discharge Plan:   Diet Plan: Discussed  Activity Level: Discussed  Confirmed Follow up Appointment: Appointment Scheduled  Confirmed Symptoms Management: Discussed  Medication Reconciliation Updated: Yes  Influenza Vaccine Indication: Patient Refuses    I understand that a diet low in cholesterol, fat, and sodium is recommended for good health. Unless I have been given specific instructions below for another diet, I accept this instruction as my diet prescription.   Other diet: regular    Special Instructions: None    · Is patient discharged on Warfarin / Coumadin?   No     Depression / Suicide Risk    As you are discharged from this [facility] facility, it is important to learn how to keep safe from harming yourself.    Recognize the warning signs:  · Abrupt changes in personality, positive or negative- including increase in energy   · Giving away possessions  · Change in eating patterns- significant weight changes-  positive or negative  · Change in sleeping patterns- unable to sleep or sleeping all the time   · Unwillingness or inability to communicate  · Depression  · Unusual sadness, discouragement and loneliness  · Talk of wanting to die  · Neglect of personal appearance   · Rebelliousness- reckless behavior  · Withdrawal from people/activities they love  · Confusion- inability to concentrate     If you or a loved one observes any of these behaviors or has concerns about self-harm, here's what you can do:  · Talk about it- your feelings and reasons for harming yourself  · Remove any means that you might use to hurt yourself (examples: pills, rope, extension cords, firearm)  · Get professional help from the community  (Mental Health, Substance Abuse, psychological counseling)  · Do not be alone:Call your Safe Contact- someone whom you trust who will be there for you.  · Call your local CRISIS HOTLINE 625-3892 or 364-400-7461  · Call your local Children's Mobile Crisis Response Team [place] (304) 164-7949 or www.[employer]  · Call the toll free National Suicide Prevention Hotlines   · National Suicide Prevention Lifeline 678-279-DCTU (3311)  · National Hope Line Network 800-SUICIDE (233-4967)

## 2021-03-15 NOTE — DISCHARGE PLANNING
Anticipated Discharge Disposition:   [facility]    Action:    Pt accepted by Dr. Fulton with [facility] and insurance has authorized.  Transport today at 1530 per [facility].  Pt and spouse agreeable.  Verbal for Cobra obtained.    Verbal for Cobra obtained from Dr. Waller.    Cobra form placed with patient's hard chart and bedside RN Ruba informed.    Barriers to Discharge:    None    Plan:    DC

## 2021-03-15 NOTE — PROGRESS NOTES
0900:  at bedside, updated on pt condition and plan of care, all questions answered.     1500: Attempted to call to give report to Rehab, per person who answered there is no nurse assigned to pt so they will call back to get report, this RN provided call back number     1515: Report given to Ruba WONG at [facility], all questions answered, call back number provided. Discussed discharge plan and discharge teaching with pt and  at bedside, AVS provided, all questions answered. Pt discharged via wheelchair with pt transport, all belongings with pt, COBRA given to transporter.

## 2021-03-15 NOTE — FLOWSHEET NOTE
03/15/21 1653   Patient History   Pulmonary Diagnosis none   Procedures Relevant to Respiratory Status no   Home O2 No   Nocturnal CPAP No   Home Treatments/Frequency No   Sleep Apnea Screening   Have you had a sleep study? No   Have you been diagnosed with sleep apnea? No

## 2021-03-16 ENCOUNTER — HOSPITAL ENCOUNTER (OUTPATIENT)
Dept: RADIATION ONCOLOGY | Facility: MEDICAL CENTER | Age: 51
End: 2021-03-31
Attending: RADIOLOGY
Payer: COMMERCIAL

## 2021-03-16 ENCOUNTER — APPOINTMENT (OUTPATIENT)
Dept: RADIATION ONCOLOGY | Facility: MEDICAL CENTER | Age: 51
DRG: 949 | End: 2021-03-16
Attending: PHYSICAL MEDICINE & REHABILITATION
Payer: COMMERCIAL

## 2021-03-16 ENCOUNTER — PATIENT OUTREACH (OUTPATIENT)
Dept: OTHER | Facility: MEDICAL CENTER | Age: 51
End: 2021-03-16

## 2021-03-16 PROBLEM — E87.1 HYPONATREMIA: Status: ACTIVE | Noted: 2021-03-16

## 2021-03-16 PROBLEM — E55.9 VITAMIN D INSUFFICIENCY: Status: ACTIVE | Noted: 2021-03-16

## 2021-03-16 LAB
25(OH)D3 SERPL-MCNC: 21 NG/ML (ref 30–100)
ALBUMIN SERPL BCP-MCNC: 3.3 G/DL (ref 3.2–4.9)
ALBUMIN/GLOB SERPL: 1.3 G/DL
ALP SERPL-CCNC: 67 U/L (ref 30–99)
ALT SERPL-CCNC: 17 U/L (ref 2–50)
ANION GAP SERPL CALC-SCNC: 12 MMOL/L (ref 7–16)
AST SERPL-CCNC: 11 U/L (ref 12–45)
BASOPHILS # BLD AUTO: 0.1 % (ref 0–1.8)
BASOPHILS # BLD: 0.02 K/UL (ref 0–0.12)
BILIRUB SERPL-MCNC: 0.5 MG/DL (ref 0.1–1.5)
BUN SERPL-MCNC: 16 MG/DL (ref 8–22)
CALCIUM SERPL-MCNC: 8.8 MG/DL (ref 8.5–10.5)
CHLORIDE SERPL-SCNC: 96 MMOL/L (ref 96–112)
CO2 SERPL-SCNC: 23 MMOL/L (ref 20–33)
CREAT SERPL-MCNC: 0.45 MG/DL (ref 0.5–1.4)
EOSINOPHIL # BLD AUTO: 0 K/UL (ref 0–0.51)
EOSINOPHIL NFR BLD: 0 % (ref 0–6.9)
ERYTHROCYTE [DISTWIDTH] IN BLOOD BY AUTOMATED COUNT: 45.3 FL (ref 35.9–50)
GLOBULIN SER CALC-MCNC: 2.5 G/DL (ref 1.9–3.5)
GLUCOSE BLD-MCNC: 112 MG/DL (ref 65–99)
GLUCOSE BLD-MCNC: 92 MG/DL (ref 65–99)
GLUCOSE BLD-MCNC: 93 MG/DL (ref 65–99)
GLUCOSE SERPL-MCNC: 97 MG/DL (ref 65–99)
HCT VFR BLD AUTO: 39.3 % (ref 37–47)
HGB BLD-MCNC: 13.4 G/DL (ref 12–16)
IMM GRANULOCYTES # BLD AUTO: 0.44 K/UL (ref 0–0.11)
IMM GRANULOCYTES NFR BLD AUTO: 2.9 % (ref 0–0.9)
LYMPHOCYTES # BLD AUTO: 1.79 K/UL (ref 1–4.8)
LYMPHOCYTES NFR BLD: 11.8 % (ref 22–41)
MAGNESIUM SERPL-MCNC: 2.2 MG/DL (ref 1.5–2.5)
MCH RBC QN AUTO: 32.8 PG (ref 27–33)
MCHC RBC AUTO-ENTMCNC: 34.1 G/DL (ref 33.6–35)
MCV RBC AUTO: 96.3 FL (ref 81.4–97.8)
MONOCYTES # BLD AUTO: 0.9 K/UL (ref 0–0.85)
MONOCYTES NFR BLD AUTO: 5.9 % (ref 0–13.4)
NEUTROPHILS # BLD AUTO: 12.08 K/UL (ref 2–7.15)
NEUTROPHILS NFR BLD: 79.3 % (ref 44–72)
NRBC # BLD AUTO: 0 K/UL
NRBC BLD-RTO: 0 /100 WBC
PLATELET # BLD AUTO: 226 K/UL (ref 164–446)
PMV BLD AUTO: 10 FL (ref 9–12.9)
POTASSIUM SERPL-SCNC: 3.9 MMOL/L (ref 3.6–5.5)
PROT SERPL-MCNC: 5.8 G/DL (ref 6–8.2)
RBC # BLD AUTO: 4.08 M/UL (ref 4.2–5.4)
SARS-COV-2 RNA RESP QL NAA+PROBE: NOTDETECTED
SODIUM SERPL-SCNC: 131 MMOL/L (ref 135–145)
SPECIMEN SOURCE: NORMAL
WBC # BLD AUTO: 15.2 K/UL (ref 4.8–10.8)

## 2021-03-16 PROCEDURE — 82306 VITAMIN D 25 HYDROXY: CPT

## 2021-03-16 PROCEDURE — 97162 PT EVAL MOD COMPLEX 30 MIN: CPT

## 2021-03-16 PROCEDURE — 97535 SELF CARE MNGMENT TRAINING: CPT

## 2021-03-16 PROCEDURE — 82962 GLUCOSE BLOOD TEST: CPT | Mod: 91

## 2021-03-16 PROCEDURE — 770010 HCHG ROOM/CARE - REHAB SEMI PRIVAT*

## 2021-03-16 PROCEDURE — 700102 HCHG RX REV CODE 250 W/ 637 OVERRIDE(OP): Performed by: PHYSICAL MEDICINE & REHABILITATION

## 2021-03-16 PROCEDURE — 97530 THERAPEUTIC ACTIVITIES: CPT

## 2021-03-16 PROCEDURE — A9270 NON-COVERED ITEM OR SERVICE: HCPCS | Performed by: PHYSICAL MEDICINE & REHABILITATION

## 2021-03-16 PROCEDURE — 83735 ASSAY OF MAGNESIUM: CPT

## 2021-03-16 PROCEDURE — 80053 COMPREHEN METABOLIC PANEL: CPT

## 2021-03-16 PROCEDURE — 92522 EVALUATE SPEECH PRODUCTION: CPT

## 2021-03-16 PROCEDURE — 97166 OT EVAL MOD COMPLEX 45 MIN: CPT

## 2021-03-16 PROCEDURE — 85025 COMPLETE CBC W/AUTO DIFF WBC: CPT

## 2021-03-16 PROCEDURE — 36415 COLL VENOUS BLD VENIPUNCTURE: CPT

## 2021-03-16 PROCEDURE — 99233 SBSQ HOSP IP/OBS HIGH 50: CPT | Performed by: PHYSICAL MEDICINE & REHABILITATION

## 2021-03-16 RX ORDER — SODIUM CHLORIDE 1 G/1
1 TABLET ORAL
Status: DISCONTINUED | OUTPATIENT
Start: 2021-03-16 | End: 2021-03-23

## 2021-03-16 RX ORDER — ACETAMINOPHEN 500 MG
1000 TABLET ORAL 3 TIMES DAILY
Status: DISCONTINUED | OUTPATIENT
Start: 2021-03-16 | End: 2021-03-27 | Stop reason: HOSPADM

## 2021-03-16 RX ADMIN — HYDROXYZINE HYDROCHLORIDE 50 MG: 25 TABLET, FILM COATED ORAL at 16:54

## 2021-03-16 RX ADMIN — VENLAFAXINE 12.5 MG: 25 TABLET ORAL at 08:35

## 2021-03-16 RX ADMIN — Medication 1 G: at 17:47

## 2021-03-16 RX ADMIN — ACETAMINOPHEN 1000 MG: 500 TABLET, FILM COATED ORAL at 21:23

## 2021-03-16 RX ADMIN — DEXAMETHASONE 4 MG: 4 TABLET ORAL at 05:44

## 2021-03-16 RX ADMIN — LAMOTRIGINE 100 MG: 100 TABLET ORAL at 08:35

## 2021-03-16 RX ADMIN — VENLAFAXINE 12.5 MG: 25 TABLET ORAL at 21:23

## 2021-03-16 RX ADMIN — SENNOSIDES AND DOCUSATE SODIUM 2 TABLET: 8.6; 5 TABLET ORAL at 21:23

## 2021-03-16 RX ADMIN — DEXAMETHASONE 4 MG: 4 TABLET ORAL at 21:23

## 2021-03-16 RX ADMIN — LAMOTRIGINE 100 MG: 100 TABLET ORAL at 21:23

## 2021-03-16 RX ADMIN — ACETAMINOPHEN 650 MG: 325 TABLET, FILM COATED ORAL at 08:41

## 2021-03-16 RX ADMIN — LEVETIRACETAM 500 MG: 500 TABLET ORAL at 08:35

## 2021-03-16 RX ADMIN — Medication 1 G: at 11:29

## 2021-03-16 RX ADMIN — DEXAMETHASONE 4 MG: 4 TABLET ORAL at 14:04

## 2021-03-16 RX ADMIN — FOLIC ACID 1 MG: 1 TABLET ORAL at 08:41

## 2021-03-16 RX ADMIN — ACETAMINOPHEN 1000 MG: 500 TABLET, FILM COATED ORAL at 14:03

## 2021-03-16 RX ADMIN — LEVETIRACETAM 500 MG: 500 TABLET ORAL at 21:23

## 2021-03-16 ASSESSMENT — ACTIVITIES OF DAILY LIVING (ADL)
BED_CHAIR_WHEELCHAIR_TRANSFER_DESCRIPTION: ADAPTIVE EQUIPMENT;SET-UP OF EQUIPMENT
TOILETING: INDEPENDENT

## 2021-03-16 ASSESSMENT — BRIEF INTERVIEW FOR MENTAL STATUS (BIMS)
BIMS SUMMARY SCORE: 13
ASKED TO RECALL BLUE: YES, NO CUE REQUIRED
ASKED TO RECALL SOCK: YES, NO CUE REQUIRED
WHAT MONTH IS IT: ACCURATE WITHIN 5 DAYS
WHAT YEAR IS IT: CORRECT
INITIAL REPETITION OF BED BLUE SOCK - FIRST ATTEMPT: 3
WHAT DAY OF THE WEEK IS IT: INCORRECT
ASKED TO RECALL BED: YES, AFTER CUEING (A PIECE OF FURNITURE")"

## 2021-03-16 ASSESSMENT — GAIT ASSESSMENTS
ASSISTIVE DEVICE: WHEELCHAIR PUSH
DISTANCE (FEET): 8
GAIT LEVEL OF ASSIST: TOTAL ASSIST X 2
DEVIATION: BRADYKINETIC;DECREASED HEEL STRIKE;OTHER (COMMENT)

## 2021-03-16 NOTE — PROGRESS NOTES
2 RN skin check completed with Princess PERALTA   Incision to head. Staples in place. Clean, dry, no drainage noted.   Skin Risk/Erickson 19.

## 2021-03-16 NOTE — DISCHARGE PLANNING
CASE MANAGEMENT INITIAL ASSESSMENT    Admit Date:  3/15/2021     I spoke with patients  to discuss role of case management / discharge planning / team conference.     Patient is a  50 y.o. female transferred from [facility] S/P right medial parietal lobe mass s/p craniotomy and biopsy on 3/9 with Dr. Mao, per H&P.    PLOF: Home and independent with spouse, working full time as a teacher.      PCP: Trinity Nguyen MD  Neuro: Dr. Davidson, Dr. Calixto  Radiol/Onc: Edenilson Frye  Onc/Hematology: Dr. Cerda  Surgeon: Dr. Mao    ADM MD: Susi Fulton    Diagnosis: Glioblastoma multiforme (HCC) [C71.9]    Co-morbidities:   Patient Active Problem List    Diagnosis Date Noted   • 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) 03/02/2021   • Glioblastoma of frontal lobe (HCC) 03/02/2021   • Demyelinating disease of central nervous system, unspecified (HCC) 03/02/2021   • Acute blood loss as cause of postoperative anemia 03/11/2021   • Complicated migraine 06/09/2014   • Hyperlipidemia 01/23/2015   • Mixed anxiety and depressive disorder 06/09/2014   • Impaired mobility and ADLs 03/15/2021   • Lentigo 10/17/2018   • Seborrheic keratoses 10/17/2018   • Dermatitis, contact 11/16/2015   • Menopausal symptom 07/02/2014   • Fatigue 06/09/2014   • TMJ arthralgia 06/09/2014     Prior Living Situation:  Housing / Facility: 2 Story House  Lives with - Patient's Self Care Capacity: Spouse    Prior Level of Function:  Medication Management: Independent  Finances: Independent  Home Management: Independent  Shopping: Independent  Prior Level Of Mobility: Independent Without Device in Community, Independent Without Device in Home  Driving / Transportation: Driving Independent    Support Systems:  Primary : Above  Other support systems: Mother-in Law (per notes)  Advance Directives: No  Power of  (Name & Phone): J Carlos Frye, : [phone].    Previous Services Utilized:    Equipment Owned: Front-Wheel Walker, Quad Cane, Tub / Shower Seat, Other (Comments)(walking sticks)  Prior Services: Home-Independent    Other Information:  Occupation (Pre-Hospital Vocational): Employed Full Time(teacher)     Primary Payor Source: Other (Comments)([employer])  Primary Care Practitioner : Trinity Nguyen MD  Other MDs: Edenilson Frye, Radiol/Oncol, Dr. Cerda, Oncology/Hematology, Dr. Davidson, Neurol, and Dr. Calixto, Neurology    Additional Case Management Questions:  Have you ever received case management services for yourself or a family member? No    Do you feel you have and an understanding of what services  provide? Yes    Do you have any additional questions regarding case management? No         CASE MANAGEMENT PLAN OF CARE   Patient / Family Goal: Per    1. Remain hopeful and positive   2. Gain strength   3. Start treatments asap    Barriers:   1. Functional limitation  2. Stairs  3. Diagnosis    Plan:  1. Continue to follow patient through hospitalization and provide discharge planning in collaboration with patient, family, physicians and ancillary services.     2. Utilize community resources to ensure a safe discharge.

## 2021-03-16 NOTE — THERAPY
"Occupational Therapy   Initial Evaluation     Patient Name: Melba Frye  Age:  50 y.o., Sex:  female  Medical Record #: [id number]  Today's Date: 3/16/2021     Subjective    Pt goes by \"Giselle\"    Pt asleep in bed upon arrival, pleasant and cooperative, agreeable to therapy and shower    Per CM and pt: Spouse and pt request that staff do not talk about/bring up pt's prognosis/disease progression - the focus during therapies is to remain hopeful and work on getting stronger to return home.     Objective       03/16/21 0701   Prior Living Situation   Prior Services Home-Independent   Housing / Facility 2 Story House   Steps Into Home 1   Steps In Home   (flight of stairs)   Bathroom Set up Walk In Shower;Shower Chair   Equipment Owned None   Lives with - Patient's Self Care Capacity Spouse   Comments Pt reports living in [place] with spouse. D/c plan: mother-in-law to stay with pt and spouse will work only x 3 days/week   Prior Level of ADL Function   Self Feeding Independent   Grooming / Hygiene Independent   Bathing Independent   Dressing Independent   Toileting Independent   Prior Level of IADL Function   Medication Management Independent   Laundry Independent   Kitchen Mobility Independent   Finances Independent   Home Management Independent   Shopping Independent   Prior Level Of Mobility Independent Without Device in Community;Independent Without Device in Home   Driving / Transportation Driving Independent   Occupation (Pre-Hospital Vocational) Employed Full Time  (teacher)   Prior Functioning: Everyday Activities   Self Care Independent   Indoor Mobility (Ambulation) Independent   Stairs Independent   Functional Cognition Independent   Prior Device Use None of the given options   Pain 0 - 10 Group   Therapist Pain Assessment 0   Cognition    Cognition / Consciousness WDL   Level of Consciousness Alert   ABS (Agitated Behavior Scale)   Agitated Behavior Scale Performed No   Vision Screen   Vision   (pt " reports no issues)   Passive ROM Upper Body   Passive ROM Upper Body WDL   Active ROM Upper Body   Active ROM Upper Body  WDL   Dominant Hand Right   Strength Upper Body   Upper Body Strength  X   Gross Strength Generalized Weakness, Equal Bilaterally.    Comments RUE WFL, LUE 4/5   Sensation Upper Body   Upper Extremity Sensation  WDL   Upper Body Muscle Tone   Upper Body Muscle Tone  X   Lt Upper Extremity Muscle Tone Functional  (tight at elbow/shoulder. Pt reports feeling tight at triceps)   Balance Assessment   Sitting Balance (Static) Fair +   Sitting Balance (Dynamic) Fair  (with posterior lean)   Standing Balance (Static) Fair -   Standing Balance (Dynamic) Fair -   Weight Shift Sitting Fair   Weight Shift Standing Fair   Comments requires UE support   Bed Mobility    Supine to Sit Minimal Assist  (assist LLE)   Sit to Stand Contact Guard Assist   Coordination Upper Body   Coordination X   Fine Motor Coordination LUE impaired with difficulty grasping items during ADL observation   Gross Motor Coordination LUE impaired with finger-nose test   Eating   Assistance Needed Set-up / clean-up   Physical Assistance Level No physical assistance   CARE Score 5   Eating Discharge Goal   Discharge Goal 6   Oral Hygiene   Assistance Needed Set-up / clean-up   Physical Assistance Level No physical assistance   CARE Score 5   Oral Hygiene Discharge Goal   Discharge Goal 6   Shower/Bathe Self   Assistance Needed Incidental touching   Physical Assistance Level No physical assistance   CARE Score 4   Shower/Bathe Self Discharge Goal   Discharge Goal 6   Upper Body Dressing   Assistance Needed Set-up / clean-up   Physical Assistance Level No physical assistance   CARE Score 5   Upper Body Dressing Discharge Goal   Discharge Goal 6   Lower Body Dressing   Assistance Needed Physical assistance   Physical Assistance Level 25% or less   CARE Score 3   Lower Body Dressing Discharge Goal   Discharge Goal 6   Putting On/Taking Off  Footwear   Assistance Needed Physical assistance   Physical Assistance Level 26%-50%   CARE Score 3   Putting On/Taking Off Footwear Discharge Goal   Discharge Goal 6   Toileting Hygiene   Assistance Needed Incidental touching   Physical Assistance Level No physical assistance   CARE Score 4   Toileting Hygiene Discharge Goal   Discharge Goal 6   Toilet Transfer   Assistance Needed Incidental touching   Physical Assistance Level No physical assistance   CARE Score 4   Toilet Transfer Discharge Goal   Discharge Goal 6   Functional Level of Assist   Grooming Supervision;Seated   Bathing Contact Guard Assist  (x1 CGA when standing)   Bathing Description Grab bar;Hand held shower;Tub bench;Increased time   Upper Body Dressing Stand by Assist   Lower Body Dressing Moderate Assist  (assist with don pants/sock on L side)   Toileting Contact Guard Assist   Toilet Transfers Contact Guard Assist  (stand pivot w/c<>toilet with GB)   Tub / Shower Transfers Contact Guard Assist  (stand pivot w/c<>toilet with GB)   Problem List   Problem List Decreased Active Daily Living Skills;Decreased Homemaking Skills;Decreased Upper Extremity Strength Left;Decreased Functional Mobility;Decreased Activity Tolerance;Safety Awareness Deficits / Cognition;Impaired Coordination Left Upper Extremity;Impaired Upper Extremity Tone Left;Impaired Postural Control / Balance   Precautions   Precautions Fall Risk   Comments L alma delia   Current Discharge Plan   Current Discharge Plan Return to Prior Living Situation   Benefit    Therapy Benefit Patient Would Benefit from Inpatient Rehab Occupational Therapy to Maximize [place] with ADLs, IADLs and Functional Mobility.   Interdisciplinary Plan of Care Collaboration   IDT Collaboration with  Nursing   Patient Position at End of Therapy Seated;Call Light within Reach;Tray Table within Reach   Collaboration Comments re: CLOF   Equipment Needs   Assistive Device / DME Grab Bars By Toilet;Grab Bars In Shower  / Tub;Shower Chair   Adaptive Equipment Not Assessed   Strengths & Barriers   Strengths Able to follow instructions;Good insight into deficits/needs;Independent prior level of function;Making steady progress towards goals;Manages pain appropriately;Motivated for self care and independence;Pleasant and cooperative;Supportive family;Willingly participates in therapeutic activities   Barriers Decreased endurance;Generalized weakness;Hemiparesis;Home accessibility;Impaired balance;Impaired activity tolerance   OT Total Time Spent   OT Individual Total Time Spent (Mins) 60   OT Charge Group   Charges Yes   OT Self Care / ADL 1   OT Evaluation OT Evaluation Mod       Assessment  Patient is 50 y.o. female with a diagnosis of Glioblastoma of frontal lobe. PMH includes migraines, depression/anxiety, seizures.  Additional factors influencing patient status / progress (ie: cognitive factors, co-morbidities, social support, etc): Pt presents with impaired standing balance, L foot drop, L hemiparesis, and decrease strength/activity tolerance/coordination that impacts full participation with ADL's and functional mobility.      Plan  Recommend Occupational Therapy  minutes per day 5-7 days per week for 2 weeks for the following treatments:  OT Group Therapy, OT Self Care/ADL, OT Cognitive Skill Dev, OT Community Reintegration, OT Neuro Re-Ed/Balance, OT Therapeutic Activity and OT Therapeutic Exercise.    Goals:  Long term and short term goals have been discussed with patient and they are in agreement.    Occupational Therapy Goals     Problem: Bathing     Dates: Start: 03/16/21       Goal: STG-Within one week, patient will bathe     Dates: Start: 03/16/21       Description: 1) Individualized Goal:  with supervision using DME/AE as needed and improve safety strategies   2) Interventions:  OT Group Therapy, OT Self Care/ADL, OT Cognitive Skill Dev, OT Community Reintegration, OT Neuro Re-Ed/Balance, OT Therapeutic Activity,  and OT Therapeutic Exercise                  Problem: Dressing     Dates: Start: 03/16/21       Goal: STG-Within one week, patient will dress LB     Dates: Start: 03/16/21       Description: 1) Individualized Goal:  with SBA using DME/AE as needed  2) Interventions:  OT Group Therapy, OT Self Care/ADL, OT Cognitive Skill Dev, OT Community Reintegration, OT Neuro Re-Ed/Balance, OT Therapeutic Activity, and OT Therapeutic Exercise                Problem: Functional Transfers     Dates: Start: 03/16/21       Goal: STG-Within one week, patient will transfer to toilet     Dates: Start: 03/16/21       Description: 1) Individualized Goal:  with distant supervision using DME/AE as needed at w/c level  2) Interventions:  OT Group Therapy, OT Self Care/ADL, OT Cognitive Skill Dev, OT Community Reintegration, OT Neuro Re-Ed/Balance, OT Therapeutic Activity, and OT Therapeutic Exercise                Problem: OT Long Term Goals     Dates: Start: 03/16/21       Goal: LTG-By discharge, patient will complete basic self care tasks     Dates: Start: 03/16/21       Description: 1) Individualized Goal:  Mod I using DME/AE as needed at FWW level  2) Interventions:  OT Group Therapy, OT Self Care/ADL, OT Cognitive Skill Dev, OT Community Reintegration, OT Neuro Re-Ed/Balance, OT Therapeutic Activity, and OT Therapeutic Exercise          Goal: LTG-By discharge, patient will perform bathroom transfers     Dates: Start: 03/16/21       Description: 1) Individualized Goal:  Mod I using DME/AE as needed at FWW level  2) Interventions:  OT Group Therapy, OT Self Care/ADL, OT Cognitive Skill Dev, OT Community Reintegration, OT Neuro Re-Ed/Balance, OT Therapeutic Activity, and OT Therapeutic Exercise                Problem: Toileting     Dates: Start: 03/16/21       Goal: STG-Within one week, patient will complete toileting tasks     Dates: Start: 03/16/21       Description: 1) Individualized Goal:  with supervision using DME/AE as needed   2)  Interventions:  OT Group Therapy, OT Self Care/ADL, OT Cognitive Skill Dev, OT Community Reintegration, OT Neuro Re-Ed/Balance, OT Therapeutic Activity, and OT Therapeutic Exercise

## 2021-03-16 NOTE — THERAPY
"Speech Language Pathology   Initial Assessment     Patient Name: Melba Frye  AGE:  50 y.o., SEX:  female  Medical Record #: [id number]  Today's Date: 3/16/2021     Subjective    Pt found seated in bed, agreeable and jovial throughout session. Pt verbalized, \"just to let you know, I don't want to get into anything about prognosis or anything like that about my situation right now. I might cry.\" Therapist reassured pt that we would only discuss what she was comfortable talking about. Pt was tearful once during evaluation when struggling with a time management problem, stating \"I know I should be able to do this. I am really good at this and do it all the time for my job.\" Reassurance provided and education offered about how challenging (I.e., executive function tasks), particularly unfamiliar ones, may be a bit more challenging at this time. Pt in acceptance and able to redirect back to task given encouragement and time.     Objective       03/16/21 1402   Precautions   Precautions Fall Risk;Other (See Comments)   Comments Seizure precautions   Prior Living Situation   Prior Services Intermittent Physical Support for ADL Per Service   Housing / Facility 2 Story House   Lives with - Patient's Self Care Capacity Spouse   Prior Level Of Function   Communication Within Functional Limits   Swallow Within Functional Limits   Dentition Intact   Dentures None   Hearing Within Functional Limits for Evaluation   Hearing Aid None   Vision Within Functional Limits for Evaluation;Wears Corrective Lenses;Reading    Patient's Primary Language English   Education Completed College   Occupation (Pre-Hospital Vocational) 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)   Prior Functioning: Everyday Activities   Self Care Independent   Indoor Mobility (Ambulation) Independent   Stairs Independent   Functional Cognition Independent   Prior Device Use None of the given options   Receptive Language / Auditory Comprehension " "  Receptive Language / Auditory Comprehension WDL   Expressive Language   Expressive Language (WDL) WDL   Reading Comprehension    Reading Comprehension (WDL) WDL   Written Language Expression   Written Language Expression (WDL) WDL   Dominant Hand Right   Cognition   Cognitive-Linguistic (WDL) WDL   ABS (Agitated Behavior Scale)   Agitated Behavior Scale Performed No   Cognitive Pattern Assessment   Cognitive Pattern Assessment Used BIMS   Brief Interview for Mental Status (BIMS)   Repetition of Three Words (First Attempt) 3   Temporal Orientation: Year Correct   Temporal Orientation: Month Accurate within 5 days   Temporal Orientation: Day Incorrect   Recall: \"Sock\" Yes, no cue required   Recall: \"Blue\" Yes, no cue required   Recall: \"Bed\" Yes, after cueing (\"a piece of furniture\")   BIMS Summary Score 13   Social / Pragmatic Communication   Social / Pragmatic Communication WDL   Tracheostomy   Tracheostomy No   Speech Mechanisms / Voice Production   Speech Mechanisms / Voice Production (WDL) WDL   Swallowing   Swallowing (WDL)   (denies any difficulty chewing or swallowing; no gross abnor)   Swallowing/Nutritional Status   Swallowing/Nutritional Status Regular food   Functional Level of Assist   Comprehension Independent   Comprehension Description Glasses   Expression Independent   Social Interaction Independent   Problem Solving Modified Independent   Problem Solving Description Other (comment)  (very slight difficulty noted on complex executive fxn task)   Memory Independent   Outcome Measures   Outcome Measures Utilized SCCAN   SCCAN (Scales of Cognitive and Communicative Ability for Neurorehabilitation)   Oral Expression - Raw Score 19   Oral Expression - Scale Performance Score 100   Orientation - Raw Score 11   Orientation - Scale Performance Score 92   Memory - Raw Score 19   Memory - Scale Performance Score 100   Speech Comprehension - Raw Score 13   Speech Comprehension - Scale Performance Score 100 "   Reading Comprehension - Raw Score 11   Reading Comprehension - Scale Performance Score 92   Writing - Raw Score 7   Writing - Scale Performance Score 100   Attention - Raw Score 15   Attention - Scale Performance Score 94   Problem Solving - Raw Score 22   Problem Solving - Scale Performance Score 96   SCCAN Total Raw Score 92   SCCAN Degree of Severity Typical Functioning   Current Discharge Plan   Current Discharge Plan Return to Prior Living Situation   Benefit   Therapy Benefit Patient would not benefit from Inpatient Rehab Speech-Language Pathology.  No further Speech Therapy recommended at this time.   Interdisciplinary Plan of Care Collaboration   IDT Collaboration with  Physician   Patient Position at End of Therapy In Bed;Bed Alarm On;Call Light within Reach;Tray Table within Reach;Phone within Reach   Collaboration Comments Discussed eval results with MD; OT notified SLP of CLOF   Strengths & Barriers   Strengths Able to follow instructions;Alert and oriented;Effective communication skills;Independent prior level of function;Motivated for self care and independence;Pleasant and cooperative;Supportive family;Willingly participates in therapeutic activities   Speech Language Pathologist Assigned   Assigned SLP / Extension No skilled SLP indicated at this time.   SLP Total Time Spent   SLP Individual Total Time Spent (Mins) 60   Evaluation Charges   Charges Yes   SLP Speech Language Evaluation Evaluate Speech Production       Assessment    Patient is 50 y.o. female with a diagnosis of s/p right medial parietal lobe mass s/p craniotomy and biopsy on 3/9/21, with hospital course with thrombocytopenia, foot drop, and anxiety. Patient does not report recognizing any communication or cognitive-based deficits since tumorr resection. Evaluation of cognitive-linguistic skills using the SCCAN and non-standardized modalities revealed communication skills for verbal and written expression to be WFL as well as auditory  "and reading comprehension to be WFL. SCCAN raw score was 94 indicating \"Typical Function.\" The only marginally notable difficulty patient demonstrated was completing a temporal organization/executive function task. Pt became tearful when noticing her lack of success and indicated frustration and sadness about this observation (see subjective). SLP provided education about how any kind of brain injury can cause alteration in higher level cognitive skills, but at this time testing revealed everything evaluated to be in the \"typical\" range. SLP offered that if patient notices any other difficulties in daily functional tasks or when resuming IADLS, she could benefit from reaching out for SLP rx at that time. At this time, however, patient does not require skilled SLP services at this level of care.   Additional factors influencing patient status/progress (ie: cognitive factors, co-morbidities, social support, etc): motivated, supportive family, anxious about diagnosis and prognosis.      Plan  Recommend No Speech Therapy at this time.    Goals:  Safe discharge home to [facility].  "

## 2021-03-16 NOTE — PROGRESS NOTES
Patient care assumed. Report received from [facility] ELEUTERIO Chance. Patient is alert and calm, resting in bed. Call light and bedside table within reach. Will continue to monitor.

## 2021-03-16 NOTE — CARE PLAN
Problem: Safety  Goal: Will remain free from falls  Note: Pt uses call light consistently and appropriately. Waits for assistance does not attempt self transfer this shift. Able to verbalize needs.      Problem: Pain Management  Goal: Pain level will decrease to patient's comfort goal  Note: Patient complains of headache medicated with prn tylenol with good results.

## 2021-03-16 NOTE — CARE PLAN
Problem: Safety  Goal: Will remain free from injury  Note: Per patient report she has had 4 falls in the last three months. [place] 15. High fall risk. Alarms in place. Education regarding fall risk was provided.       Problem: Bowel/Gastric:  Goal: Normal bowel function is maintained or improved  Note: Per report patient is continent of bowels. LBM 3/15/21.

## 2021-03-16 NOTE — THERAPY
"Physical Therapy   Initial Evaluation     Patient Name: Melba Frye  Age:  50 y.o., Sex:  female  Medical Record #: [id number]  Today's Date: 3/16/2021     Subjective    Patient reported that she experiences \"pressure\" in her head that inc as anxiety increases. Patient was pleased post tx, and excited for future PT tx.       Objective       03/16/21 1031   Prior Living Situation   Prior Services Intermittent Physical Support for ADL Per Family   Housing / Facility 2 Story House   Steps Into Home 1   Steps In Home 18  (8, landing (with space for chair) + 10 more )   Rail   (RHR till landing, LHR after landing)   Bathroom Set up   (plans to add grab bars to toilet/ shower)   Equipment Owned Quad Cane;Front-Wheel Walker;Other (Comments)  (walking sticks)   Lives with - Patient's Self Care Capacity Spouse   Comments Mother in law to move in post D/C.    Prior Level of Functional Mobility   Bed Mobility Required Assist   Transfer Status Required Assist   Ambulation Independent   Distance Ambulation (Feet) 30   Assistive Devices Used Quad Cane   Stairs Required Assist   Prior Functioning: Everyday Activities   Self Care Independent   Indoor Mobility (Ambulation) Independent   Stairs Needed some help   Functional Cognition Independent   Prior Device Use None of the given options   Pain 0 - 10 Group   Location Head  (\"pressure\")   Therapist Pain Assessment 1;2;Prior to Activity  (patient associated \"pressure\" with anxiety)   Passive ROM Lower Body   Passive ROM Lower Body X   Lt Ankle Dorsiflexion Degrees 0   Strength Lower Body   Lower Body Strength  X   Lt Hip Flexion Strength 3 (F)   Lt Knee Flexion Strength 3 (F)   Lt Knee Extension Strength 3+ (F+)   Lt Ankle Dorsiflexion Strength 2- (P-)   Sensation Lower Body   Comments Impaired sensory perceptions LLE   Lower Body Muscle Tone   Comments TBD, not present on quick screen   Balance Assessment   Sitting Balance (Static) Fair +   Sitting Balance (Dynamic) Fair "   Comments UE support in standing   Bed Mobility    Supine to Sit Stand by Assist   Sit to Supine Stand by Assist   Sit to Stand Minimal Assist   Scooting Stand by Assist   Rolling Supervised   Neurological Concerns   Neurological Concerns Yes   Clonus Intermittent  (3 beats)   Coordination Lower Body    Coordination Lower Body  X   Toe Tapping Left Impaired   Roll Left and Right   Assistance Needed Supervision   CARE Score 4   Roll Left and Right Discharge Goal   Discharge Goal 6   Sit to Lying   Assistance Needed Supervision   CARE Score 4   Sit to Lying Discharge Goal   Discharge Goal 6   Lying to Sitting on Side of Bed   Assistance Needed Supervision   CARE Score 4   Lying to Sitting on Side of Bed Discharge Goal   Discharge Goal 6   Sit to Stand   Assistance Needed Physical assistance   Physical Assistance Level 25% or less   CARE Score 3   Sit to Stand Discharge Goal   Discharge Goal 4   Chair/Bed-to-Chair Transfer   Assistance Needed Physical assistance   Physical Assistance Level 25% or less   CARE Score 3   Chair/Bed-to-Chair Transfer Discharge Goal   Discharge Goal 4   Car Transfer   Reason if not Attempted Environmental limitations  (In room isolation for covid rule out)   CARE Score 10   Car Transfer Discharge Goal   Discharge Goal 4   Walk 10 Feet   Reason if not Attempted Safety concerns   CARE Score 88   Walk 10 Feet Discharge Goal   Discharge Goal 4   Walk 50 Feet with Two Turns   Reason if not Attempted Safety concerns   CARE Score 88   Walk 50 Feet with Two Turns Discharge Goal   Discharge Goal 4   Walk 150 Feet   Reason if not Attempted Safety concerns   CARE Score 88   Walk 150 Feet Discharge Goal   Discharge Goal 4   Walking 10 Feet on Uneven Surfaces   Reason if not Attempted Safety concerns   CARE Score 88   Walking 10 Feet on Uneven Surfaces Discharge Goal   Discharge Goal 4   1 Step (Curb)   Reason if not Attempted Safety concerns   CARE Score 88   1 Step (Curb) Discharge Goal   Discharge  Goal 4   4 Steps   Reason if not Attempted Environmental limitations  (In room isolation for covid rule out)   CARE Score 10   4 Steps Discharge Goal   Discharge Goal 4   12 Steps   Reason if not Attempted Safety concerns   CARE Score 88   12 Steps Discharge Goal   Discharge Goal 4   Picking Up Object   Reason if not Attempted Safety concerns   CARE Score 88   Picking Up Object Discharge Goal   Discharge Goal 4   Wheel 50 Feet with Two Turns   Assistance Needed Physical assistance   Physical Assistance Level 25% or less   CARE Score 3   Type of Wheelchair/Scooter Manual   Wheel 50 Feet with Two Turns Discharge Goal   Discharge Goal 6   Wheel 150 Feet   Reason if not Attempted Safety concerns   CARE Score 88   Type of Wheelchair/Scooter Manual   Wheel 150 Feet Discharge Goal   Discharge Goal 6   Gait Functional Level of Assist    Gait Level Of Assist Total Assist X 2   Assistive Device Wheelchair Push   Distance (Feet) 8   # of Times Distance was Traveled 2   Deviation Bradykinetic;Decreased Heel Strike;Other (Comment)  (Dec L foot clearance)   Wheelchair Functional Level of Assist   Wheelchair Assist Minimal Assist   Distance Wheelchair (Feet or Distance) 30   Wheelchair Description Adaptive equipment;Assistance with steering;Extra time;Impaired coordination;Limited by fatigue;Verbal cueing   Stairs Functional Level of Assist   Level of Assist with Stairs Unable to Participate   Transfer Functional Level of Assist   Bed, Chair, Wheelchair Transfer Minimal Assist   Bed Chair Wheelchair Transfer Description Adaptive equipment;Set-up of equipment   Problem List    Problems Impaired Bed Mobility;Impaired Transfers;Impaired Balance;Impaired Ambulation;Functional ROM Deficit;Functional Strength Deficit;Impaired Coordination;Decreased Activity Tolerance;Motor Planning / Sequencing   Precautions   Precautions Fall Risk;Other (See Comments)   Comments Seizure precautions   Current Discharge Plan   Current Discharge Plan  Return to Prior Living Situation   Interdisciplinary Plan of Care Collaboration   IDT Collaboration with  Occupational Therapist;Nursing   Collaboration Comments LISSY FLEMING warned to not share prognosis information at this time with patient per spouse's request. BAUTISTA hose/ WHIT.    Benefit   Therapy Benefit Patient Would Benefit from Inpatient Rehabilitation Physical Therapy to Maximize Functional [place] with ADLs, IADLs and Mobility.   Strengths & Barriers   Strengths Supportive family;Pleasant and cooperative;Motivated for self care and independence;Willingly participates in therapeutic activities;Effective communication skills   Barriers Decreased endurance;Fatigue;Generalized weakness;Hemiparesis;Home accessibility;Impaired activity tolerance;Impaired balance;Impaired insight/denial of deficits;Limited mobility   PT Total Time Spent   PT Individual Total Time Spent (Mins) 60   PT Charge Group   PT Therapeutic Activities 1   PT Evaluation PT Evaluation Mod       Assessment  Patient is 50 y.o. female with a diagnosis of glioblastoma multiforme, with L sided alma delia/ drop foot, s/p craniotomy and biopsy of right medial/ parietal mass. Additional factors influencing patient status / progress (ie: cognitive factors, co-morbidities, social support, etc): PMH of migraines, depression, anxiety, seizures.  Patient's mother In law plans to move in post rehab D/C for 24/7 SPV and assistance.     Plan  Recommend Physical Therapy  minutes per day 5-7 days per week for 3 weeks for the following treatments:  PT Group Therapy, PT Gait Training, PT Self Care/Home Eval, PT Therapeutic Exercises, PT Neuro Re-Ed/Balance, PT Aquatic Therapy, PT Therapeutic Activity, PT Manual Therapy and PT Evaluation.    Goals:  Long term and short term goals have been discussed with patient and they are in agreement.    Physical Therapy Problems     Problem: Mobility     Dates: Start: 03/16/21       Goal: STG-Within one week, patient will  "ambulate household distance     Dates: Start: 03/16/21       Description: 1) Individualized goal:  Pt will amb with FWW 30 CGA indoors  2) Interventions:  PT E Stim Attended, PT Orthotics Training, PT Gait Training, PT Therapeutic Exercises, PT Neuro Re-Ed/Balance, PT Aquatic Therapy, PT Therapeutic Activity, PT Manual Therapy, and PT Evaluation              Goal: STG-Within one week, patient will ascend and descend four to six stairs     Dates: Start: 03/16/21       Description: 1) Individualized goal:  Patient will amb up/down 4\" stair in // bars 4x CGA/min A  2) Interventions:  PT E Stim Attended, PT Orthotics Training, PT Gait Training, PT Therapeutic Exercises, PT Neuro Re-Ed/Balance, PT Aquatic Therapy, PT Therapeutic Activity, PT Manual Therapy, and PT Evaluation                    Problem: Mobility Transfers     Dates: Start: 03/16/21       Goal: STG-Within one week, patient will sit to stand     Dates: Start: 03/16/21       Description: 1) Individualized goal:  Pt will transfer CGA consistently with FWW STS/SPT   2) Interventions:  PT E Stim Attended, PT Orthotics Training, PT Gait Training, PT Therapeutic Exercises, PT Neuro Re-Ed/Balance, PT Aquatic Therapy, PT Therapeutic Activity, PT Manual Therapy, and PT Evaluation                    Problem: PT-Long Term Goals     Dates: Start: 03/16/21       Goal: LTG-By discharge, patient will ambulate     Dates: Start: 03/16/21       Description: 1) Individualized goal:  Patient will amb >50 ft with FWW SBA over level surfaces.   2) Interventions:  PT E Stim Attended, PT Orthotics Training, PT Gait Training, PT Therapeutic Exercises, PT Neuro Re-Ed/Balance, PT Aquatic Therapy, PT Therapeutic Activity, PT Manual Therapy, and PT Evaluation            Goal: LTG-By discharge, patient will transfer one surface to another     Dates: Start: 03/16/21       Description: 1) Individualized goal:  Patient will transfer SPV with FWW STS/SPT  2) Interventions:  PT E Stim " Attended, PT Orthotics Training, PT Gait Training, PT Therapeutic Exercises, PT Neuro Re-Ed/Balance, PT Aquatic Therapy, PT Therapeutic Activity, PT Manual Therapy, and PT Evaluation              Goal: LTG-By discharge, patient will ambulate up/down flight of stairs     Dates: Start: 03/16/21       Description: 1) Individualized goal:  Patient will amb up/down 10 stairs 2x with 1HR (switches from R to left after landing) CGA  2) Interventions:  PT E Stim Attended, PT Orthotics Training, PT Gait Training, PT Therapeutic Exercises, PT Neuro Re-Ed/Balance, PT Aquatic Therapy, PT Therapeutic Activity, PT Manual Therapy, and PT Evaluation

## 2021-03-16 NOTE — PROGRESS NOTES
"Rehab Progress Note     Date of Service: 3/16/2021  Chief Complaint: follow up GBM    Interval Events (Subjective)    Patient reports mild headache. No new interval events.     ROS: No changes to bowel, bladder, mood, or sleep.       Objective:  VITAL SIGNS: /80   Pulse (!) 56   Temp 36.4 °C (97.5 °F) (Oral)   Resp 18   Ht 1.702 m (5' 7\")   Wt 102 kg (225 lb 15.5 oz)   SpO2 96%   BMI 35.39 kg/m²   Gen: alert, no apparent distress  Neuro: notable for left foot drop    Recent Results (from the past 72 hour(s))   CBC WITH DIFFERENTIAL    Collection Time: 03/15/21  4:40 AM   Result Value Ref Range    WBC 11.3 (H) 4.8 - 10.8 K/uL    RBC 3.85 (L) 4.20 - 5.40 M/uL    Hemoglobin 12.8 12.0 - 16.0 g/dL    Hematocrit 37.0 37.0 - 47.0 %    MCV 96.1 81.4 - 97.8 fL    MCH 33.2 (H) 27.0 - 33.0 pg    MCHC 34.6 33.6 - 35.0 g/dL    RDW 45.1 35.9 - 50.0 fL    Platelet Count 225 164 - 446 K/uL    MPV 10.0 9.0 - 12.9 fL    Neutrophils-Polys 76.90 (H) 44.00 - 72.00 %    Lymphocytes 13.20 (L) 22.00 - 41.00 %    Monocytes 6.00 0.00 - 13.40 %    Eosinophils 0.00 0.00 - 6.90 %    Basophils 0.20 0.00 - 1.80 %    Immature Granulocytes 3.70 (H) 0.00 - 0.90 %    Nucleated RBC 0.00 /100 WBC    Neutrophils (Absolute) 8.66 (H) 2.00 - 7.15 K/uL    Lymphs (Absolute) 1.49 1.00 - 4.80 K/uL    Monos (Absolute) 0.67 0.00 - 0.85 K/uL    Eos (Absolute) 0.00 0.00 - 0.51 K/uL    Baso (Absolute) 0.02 0.00 - 0.12 K/uL    Immature Granulocytes (abs) 0.42 (H) 0.00 - 0.11 K/uL    NRBC (Absolute) 0.00 K/uL   Comp Metabolic Panel    Collection Time: 03/15/21  4:40 AM   Result Value Ref Range    Sodium 136 135 - 145 mmol/L    Potassium 4.2 3.6 - 5.5 mmol/L    Chloride 102 96 - 112 mmol/L    Co2 23 20 - 33 mmol/L    Anion Gap 11.0 7.0 - 16.0    Glucose 136 (H) 65 - 99 mg/dL    Bun 15 8 - 22 mg/dL    Creatinine 0.51 0.50 - 1.40 mg/dL    Calcium 8.5 8.5 - 10.5 mg/dL    AST(SGOT) 14 12 - 45 U/L    ALT(SGPT) 15 2 - 50 U/L    Alkaline Phosphatase 69 30 - 99 " U/L    Total Bilirubin 0.3 0.1 - 1.5 mg/dL    Albumin 3.3 3.2 - 4.9 g/dL    Total Protein 5.6 (L) 6.0 - 8.2 g/dL    Globulin 2.3 1.9 - 3.5 g/dL    A-G Ratio 1.4 g/dL   MAGNESIUM    Collection Time: 03/15/21  4:40 AM   Result Value Ref Range    Magnesium 2.1 1.5 - 2.5 mg/dL   PHOSPHORUS    Collection Time: 03/15/21  4:40 AM   Result Value Ref Range    Phosphorus 3.9 2.5 - 4.5 mg/dL   ESTIMATED GFR    Collection Time: 03/15/21  4:40 AM   Result Value Ref Range    GFR If African American >60 >60 mL/min/1.73 m 2    GFR If Non African American >60 >60 mL/min/1.73 m 2   ACCU-CHEK GLUCOSE    Collection Time: 03/15/21  5:16 PM   Result Value Ref Range    Glucose - Accu-Ck 103 (H) 65 - 99 mg/dL   SARS-CoV-2, PCR (In-House)    Collection Time: 03/15/21  5:20 PM   Result Value Ref Range    SARS-CoV-2 Source NP Swab    ACCU-CHEK GLUCOSE    Collection Time: 03/15/21  9:52 PM   Result Value Ref Range    Glucose - Accu-Ck 112 (H) 65 - 99 mg/dL   CBC with Differential    Collection Time: 03/16/21  6:03 AM   Result Value Ref Range    WBC 15.2 (H) 4.8 - 10.8 K/uL    RBC 4.08 (L) 4.20 - 5.40 M/uL    Hemoglobin 13.4 12.0 - 16.0 g/dL    Hematocrit 39.3 37.0 - 47.0 %    MCV 96.3 81.4 - 97.8 fL    MCH 32.8 27.0 - 33.0 pg    MCHC 34.1 33.6 - 35.0 g/dL    RDW 45.3 35.9 - 50.0 fL    Platelet Count 226 164 - 446 K/uL    MPV 10.0 9.0 - 12.9 fL    Neutrophils-Polys 79.30 (H) 44.00 - 72.00 %    Lymphocytes 11.80 (L) 22.00 - 41.00 %    Monocytes 5.90 0.00 - 13.40 %    Eosinophils 0.00 0.00 - 6.90 %    Basophils 0.10 0.00 - 1.80 %    Immature Granulocytes 2.90 (H) 0.00 - 0.90 %    Nucleated RBC 0.00 /100 WBC    Neutrophils (Absolute) 12.08 (H) 2.00 - 7.15 K/uL    Lymphs (Absolute) 1.79 1.00 - 4.80 K/uL    Monos (Absolute) 0.90 (H) 0.00 - 0.85 K/uL    Eos (Absolute) 0.00 0.00 - 0.51 K/uL    Baso (Absolute) 0.02 0.00 - 0.12 K/uL    Immature Granulocytes (abs) 0.44 (H) 0.00 - 0.11 K/uL    NRBC (Absolute) 0.00 K/uL   Comp Metabolic Panel (CMP)     Collection Time: 03/16/21  6:03 AM   Result Value Ref Range    Sodium 131 (L) 135 - 145 mmol/L    Potassium 3.9 3.6 - 5.5 mmol/L    Chloride 96 96 - 112 mmol/L    Co2 23 20 - 33 mmol/L    Anion Gap 12.0 7.0 - 16.0    Glucose 97 65 - 99 mg/dL    Bun 16 8 - 22 mg/dL    Creatinine 0.45 (L) 0.50 - 1.40 mg/dL    Calcium 8.8 8.5 - 10.5 mg/dL    AST(SGOT) 11 (L) 12 - 45 U/L    ALT(SGPT) 17 2 - 50 U/L    Alkaline Phosphatase 67 30 - 99 U/L    Total Bilirubin 0.5 0.1 - 1.5 mg/dL    Albumin 3.3 3.2 - 4.9 g/dL    Total Protein 5.8 (L) 6.0 - 8.2 g/dL    Globulin 2.5 1.9 - 3.5 g/dL    A-G Ratio 1.3 g/dL   Magnesium    Collection Time: 03/16/21  6:03 AM   Result Value Ref Range    Magnesium 2.2 1.5 - 2.5 mg/dL   ESTIMATED GFR    Collection Time: 03/16/21  6:03 AM   Result Value Ref Range    GFR If African American >60 >60 mL/min/1.73 m 2    GFR If Non African American >60 >60 mL/min/1.73 m 2   Vitamin D, 25-hydroxy (blood)    Collection Time: 03/16/21  6:07 AM   Result Value Ref Range    25-Hydroxy   Vitamin D 25 21 (L) 30 - 100 ng/mL   ACCU-CHEK GLUCOSE    Collection Time: 03/16/21  7:19 AM   Result Value Ref Range    Glucose - Accu-Ck 93 65 - 99 mg/dL   ACCU-CHEK GLUCOSE    Collection Time: 03/16/21 10:59 AM   Result Value Ref Range    Glucose - Accu-Ck 92 65 - 99 mg/dL       Current Facility-Administered Medications   Medication Frequency   • sodium chloride (SALT) tablet 1 g TID WITH MEALS   • hydrOXYzine HCl (ATARAX) tablet 50 mg Q6HRS PRN   • melatonin tablet 3 mg HS PRN   • Respiratory Therapy Consult Continuous RT   • Pharmacy Consult Request ...Pain Management Review 1 Each PHARMACY TO DOSE   • acetaminophen (Tylenol) tablet 650 mg Q4HRS PRN   • lactulose 20 GM/30ML solution 30 mL QDAY PRN   • docusate sodium (ENEMEEZ) enema 283 mg QDAY PRN   • fleet enema 133 mL QDAY PRN   • artificial tears ophthalmic solution 1 Drop PRN   • benzocaine-menthol (CEPACOL) lozenge 1 Lozenge Q2HRS PRN   • mag hydrox-al hydrox-simeth  (MAALOX PLUS ES or MYLANTA DS) suspension 20 mL Q2HRS PRN   • ondansetron (ZOFRAN ODT) dispertab 4 mg 4X/DAY PRN    Or   • ondansetron (ZOFRAN) syringe/vial injection 4 mg 4X/DAY PRN   • sodium chloride (OCEAN) 0.65 % nasal spray 2 Spray PRN   • midazolam (VERSED) 5 mg/mL (1 mL vial) PRN   • oxyCODONE immediate-release (ROXICODONE) tablet 5 mg Q3HRS PRN    Or   • oxyCODONE immediate release (ROXICODONE) tablet 10 mg Q3HRS PRN    Or   • HYDROmorphone (DILAUDID) injection 0.5 mg Q3HRS PRN   • glucose 4 g chewable tablet 16 g Q15 MIN PRN    And   • dextrose 50% (D50W) injection 50 mL Q15 MIN PRN   • senna-docusate (PERICOLACE or SENOKOT S) 8.6-50 MG per tablet 2 tablet BID    And   • polyethylene glycol/lytes (MIRALAX) PACKET 1 Packet QDAY PRN    And   • magnesium hydroxide (MILK OF MAGNESIA) suspension 30 mL QDAY PRN    And   • bisacodyl (DULCOLAX) suppository 10 mg QDAY PRN   • MD ALERT...DO NOT ADMINISTER NSAIDS or ASPIRIN unless ORDERED By Neurosurgery 1 Each PRN   • dexamethasone (DECADRON) tablet 4 mg Q8HRS    Followed by   • [START ON 3/17/2021] dexamethasone (DECADRON) tablet 4 mg Q12HRS    Followed by   • [START ON 3/18/2021] dexamethasone (DECADRON) tablet 2 mg Q8HRS    Followed by   • [START ON 3/19/2021] dexamethasone (DECADRON) tablet 2 mg Q12HRS    Followed by   • [START ON 3/20/2021] dexamethasone (DECADRON) tablet 2 mg DAILY   • folic acid (FOLVITE) tablet 1 mg DAILY   • lamoTRIgine (LAMICTAL) tablet 100 mg BID   • levETIRAcetam (KEPPRA) tablet 500 mg BID   • venlafaxine (EFFEXOR) tablet 12.5 mg BID   • LORazepam (ATIVAN) tablet 0.5 mg Q4HRS PRN       Orders Placed This Encounter   Procedures   • Diet Order Diet: Regular     Standing Status:   Standing     Number of Occurrences:   1     Order Specific Question:   Diet:     Answer:   Regular [1]       Assessment:  Active Hospital Problems    Diagnosis    • *Glioblastoma of frontal lobe (HCC)    • 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 female admitted for acute inpatient rehabilitation with Glioblastoma of frontal lobe (HCC).    Medical Decision Making and Plan:    GBM  S/p resection 3/9  Steroid taper  Staples out 3/23  Radiation mapping 3/24     Seizure disorder  Keppra  Lamictal     Anxiety disorder  Effexor  PRN Ativan     History of headaches  PRN tylenol    Hyponatremia  Start salt tabs  Monitor    Vit D insufficiency  Supplementation    Bowel program  Continue bowel medications  Scheduled Sennakot  PRN Miralax, MOM, bisacodyl suppository  Last BM 3/15    Bladder program  Check PVRs - 16  Bladder scan for no voids  ICP for over 400 cc  Scheduled toileting    DVT prophylaxis  Contraindicated given risk of hemorrhage.   Not cleared by neurosurgery.   Compression stockings on in am, off in pm.  Increase mobility. Monitor for swelling.    Total time:  35 minutes.  I spent greater than 50% of the time for patient care, counseling, and coordination on this date, including patient face-to face time, unit/floor time with review of records/pertinent lab data and studies, as well as discussing diagnostic evaluation/work up, planned therapeutic interventions, and future disposition of care, as per the interval events/subjective and the assessment and plan as noted above.      Susi Fulton M.D.   Physical Medicine and Rehabilitation

## 2021-03-16 NOTE — PROGRESS NOTES
Patient admitted to facility at 1555 via wheelchair; accompanied by hospital transport.  Patient assisted to room and positioned in bed for comfort and safety; call light within reach.  Patient assisted with stowing belongings and oriented to room and facility.  Admission assessment performed and documented in computer. Patient received and reviewed education binder. Admission paperwork completed; signed copies placed in chart.  Will continue to monitor.

## 2021-03-17 PROCEDURE — 97530 THERAPEUTIC ACTIVITIES: CPT

## 2021-03-17 PROCEDURE — 700102 HCHG RX REV CODE 250 W/ 637 OVERRIDE(OP): Performed by: PHYSICAL MEDICINE & REHABILITATION

## 2021-03-17 PROCEDURE — 97535 SELF CARE MNGMENT TRAINING: CPT

## 2021-03-17 PROCEDURE — 97112 NEUROMUSCULAR REEDUCATION: CPT

## 2021-03-17 PROCEDURE — 770010 HCHG ROOM/CARE - REHAB SEMI PRIVAT*

## 2021-03-17 PROCEDURE — 97110 THERAPEUTIC EXERCISES: CPT

## 2021-03-17 PROCEDURE — 97116 GAIT TRAINING THERAPY: CPT

## 2021-03-17 PROCEDURE — A9270 NON-COVERED ITEM OR SERVICE: HCPCS | Performed by: PHYSICAL MEDICINE & REHABILITATION

## 2021-03-17 PROCEDURE — 99233 SBSQ HOSP IP/OBS HIGH 50: CPT | Performed by: PHYSICAL MEDICINE & REHABILITATION

## 2021-03-17 RX ORDER — ACETAMINOPHEN 325 MG/1
650 TABLET ORAL EVERY 6 HOURS PRN
Status: DISCONTINUED | OUTPATIENT
Start: 2021-03-17 | End: 2021-03-27 | Stop reason: HOSPADM

## 2021-03-17 RX ADMIN — ACETAMINOPHEN 1000 MG: 500 TABLET, FILM COATED ORAL at 20:13

## 2021-03-17 RX ADMIN — ACETAMINOPHEN 650 MG: 325 TABLET, FILM COATED ORAL at 16:49

## 2021-03-17 RX ADMIN — LAMOTRIGINE 100 MG: 100 TABLET ORAL at 20:14

## 2021-03-17 RX ADMIN — LEVETIRACETAM 500 MG: 500 TABLET ORAL at 08:21

## 2021-03-17 RX ADMIN — DEXAMETHASONE 4 MG: 4 TABLET ORAL at 08:23

## 2021-03-17 RX ADMIN — VENLAFAXINE 12.5 MG: 25 TABLET ORAL at 08:21

## 2021-03-17 RX ADMIN — LAMOTRIGINE 100 MG: 100 TABLET ORAL at 08:21

## 2021-03-17 RX ADMIN — Medication 1 G: at 08:21

## 2021-03-17 RX ADMIN — FOLIC ACID 1 MG: 1 TABLET ORAL at 08:21

## 2021-03-17 RX ADMIN — ACETAMINOPHEN 1000 MG: 500 TABLET, FILM COATED ORAL at 13:38

## 2021-03-17 RX ADMIN — VENLAFAXINE 12.5 MG: 25 TABLET ORAL at 20:14

## 2021-03-17 RX ADMIN — DEXAMETHASONE 4 MG: 4 TABLET ORAL at 20:14

## 2021-03-17 RX ADMIN — Medication 1 G: at 11:59

## 2021-03-17 RX ADMIN — HYDROXYZINE HYDROCHLORIDE 50 MG: 25 TABLET, FILM COATED ORAL at 16:49

## 2021-03-17 RX ADMIN — ACETAMINOPHEN 1000 MG: 500 TABLET, FILM COATED ORAL at 08:21

## 2021-03-17 RX ADMIN — Medication 1 G: at 16:49

## 2021-03-17 RX ADMIN — LEVETIRACETAM 500 MG: 500 TABLET ORAL at 20:13

## 2021-03-17 ASSESSMENT — GAIT ASSESSMENTS
GAIT LEVEL OF ASSIST: CONTACT GUARD ASSIST
DEVIATION: BRADYKINETIC;DECREASED HEEL STRIKE
DISTANCE (FEET): 10
ASSISTIVE DEVICE: PARALLEL BARS

## 2021-03-17 ASSESSMENT — ACTIVITIES OF DAILY LIVING (ADL): BED_CHAIR_WHEELCHAIR_TRANSFER_DESCRIPTION: SUPERVISION FOR SAFETY;VERBAL CUEING;SET-UP OF EQUIPMENT

## 2021-03-17 NOTE — THERAPY
Occupational Therapy  Daily Treatment     Patient Name: Melba Frye  Age:  50 y.o., Sex:  female  Medical Record #: [id number]  Today's Date: 3/17/2021     Precautions  Precautions: Fall Risk, Other (See Comments)  Comments: Seizure prec, L alma delia, Pt does not want to discuss her diagnosis or prognosis at this point         Subjective    Pt reports she had a great day in therapy and would like to rest and catchup with her guest.      Objective       03/17/21 1331   Precautions   Precautions Fall Risk;Other (See Comments)   Comments Seizure prec, L alma delia   Functional Level of Assist   Grooming Supervision;Seated   Grooming Description Seated in wheelchair at sink;Supervision for safety   Toileting Contact Guard Assist   Toileting Description Grab bar;Increased time;Initial preparation for task;Assist for standing balance;Supervision for safety   Bed, Chair, Wheelchair Transfer Contact Guard Assist   Bed Chair Wheelchair Transfer Description Supervision for safety;Adaptive equipment;Set-up of equipment   Toilet Transfers Contact Guard Assist   Toilet Transfer Description Grab bar;Increased time;Initial preparation for task;Adaptive equipment   Interdisciplinary Plan of Care Collaboration   IDT Collaboration with  Other (See Comments)   Patient Position at End of Therapy In Bed;Call Light within Reach;Tray Table within Reach;Family / Friend in Room   Collaboration Comments  to discuss extra minutes   Therapy Missed   Missed Therapy (Minutes) 15   Reason For Missed Therapy Non-Medical - Other (Please Comment)  (Pt requested to rest,  reported she met her minutes)   OT Total Time Spent   OT Individual Total Time Spent (Mins) 15   OT Charge Group   OT Self Care / ADL 1   Pt met therapy minute requirement and just wanted some helping using the restroom and getting into bed.       Assessment    Pt demonstrated increase in independence by transfering with CGA. Pt is aware of her barriers and is motivated  to work towards independence. Pt will cont to benefit from OT to increase balance, endurance, and strength.     Strengths: Able to follow instructions, Good insight into deficits/needs, Independent prior level of function, Making steady progress towards goals, Manages pain appropriately, Motivated for self care and independence, Pleasant and cooperative, Supportive family, Willingly participates in therapeutic activities  Barriers: Decreased endurance, Generalized weakness, Hemiparesis, Home accessibility, Impaired balance, Impaired activity tolerance    Plan    ADLs, IADLs (cooking next week), standing tolerance/balance, STS     Passport items to be completed:  Perform bathroom transfers, complete dressing, complete feeding, get ready for the day, prepare a simple meal, participate in household tasks, adapt home for safety needs, demonstrate home exercise program, complete caregiver training     Occupational Therapy Goals     Problem: Bathing     Dates: Start: 03/16/21       Goal: STG-Within one week, patient will bathe     Dates: Start: 03/16/21       Description: 1) Individualized Goal:  with supervision using DME/AE as needed and improve safety strategies   2) Interventions:  OT Group Therapy, OT Self Care/ADL, OT Cognitive Skill Dev, OT Community Reintegration, OT Neuro Re-Ed/Balance, OT Therapeutic Activity, and OT Therapeutic Exercise      Note:     Goal Note filed on 03/17/21 1211 by Kartik Estrada OT/L    CGA                        Problem: Dressing     Dates: Start: 03/16/21       Goal: STG-Within one week, patient will dress LB     Dates: Start: 03/16/21       Description: 1) Individualized Goal:  with SBA using DME/AE as needed  2) Interventions:  OT Group Therapy, OT Self Care/ADL, OT Cognitive Skill Dev, OT Community Reintegration, OT Neuro Re-Ed/Balance, OT Therapeutic Activity, and OT Therapeutic Exercise    Note:     Goal Note filed on 03/17/21 1211 by Kartik Estrada OT/L    Min/mod                         Problem: Functional Transfers     Dates: Start: 03/16/21       Goal: STG-Within one week, patient will transfer to toilet     Dates: Start: 03/16/21       Description: 1) Individualized Goal:  with distant supervision using DME/AE as needed at w/c level  2) Interventions:  OT Group Therapy, OT Self Care/ADL, OT Cognitive Skill Dev, OT Community Reintegration, OT Neuro Re-Ed/Balance, OT Therapeutic Activity, and OT Therapeutic Exercise    Note:     Goal Note filed on 03/17/21 1211 by Kartik Estrada, OT/L    CGA                        Problem: OT Long Term Goals     Dates: Start: 03/16/21       Goal: LTG-By discharge, patient will complete basic self care tasks     Dates: Start: 03/16/21       Description: 1) Individualized Goal:  Mod I using DME/AE as needed at FWW level  2) Interventions:  OT Group Therapy, OT Self Care/ADL, OT Cognitive Skill Dev, OT Community Reintegration, OT Neuro Re-Ed/Balance, OT Therapeutic Activity, and OT Therapeutic Exercise          Goal: LTG-By discharge, patient will perform bathroom transfers     Dates: Start: 03/16/21       Description: 1) Individualized Goal:  Mod I using DME/AE as needed at FWW level  2) Interventions:  OT Group Therapy, OT Self Care/ADL, OT Cognitive Skill Dev, OT Community Reintegration, OT Neuro Re-Ed/Balance, OT Therapeutic Activity, and OT Therapeutic Exercise                Problem: Toileting     Dates: Start: 03/16/21       Goal: STG-Within one week, patient will complete toileting tasks     Dates: Start: 03/16/21       Description: 1) Individualized Goal:  with supervision using DME/AE as needed   2) Interventions:  OT Group Therapy, OT Self Care/ADL, OT Cognitive Skill Dev, OT Community Reintegration, OT Neuro Re-Ed/Balance, OT Therapeutic Activity, and OT Therapeutic Exercise    Note:     Goal Note filed on 03/17/21 1211 by Kartik Estrada, OT/L    CGA

## 2021-03-17 NOTE — CARE PLAN
"  Problem: Mobility  Goal: STG-Within one week, patient will ascend and descend four to six stairs  Description: 1) Individualized goal:  Patient will amb up/down 4\" stair in // bars 4x CGA/min A  2) Interventions:  PT E Stim Attended, PT Orthotics Training, PT Gait Training, PT Therapeutic Exercises, PT Neuro Re-Ed/Balance, PT Aquatic Therapy, PT Therapeutic Activity, PT Manual Therapy, and PT Evaluation      Outcome: NOT MET  Note: Eval 3/16  TBD     Problem: Mobility  Goal: STG-Within one week, patient will ambulate household distance  Description: 1) Individualized goal:  Pt will amb with FWW 30 CGA indoors  2) Interventions:  PT E Stim Attended, PT Orthotics Training, PT Gait Training, PT Therapeutic Exercises, PT Neuro Re-Ed/Balance, PT Aquatic Therapy, PT Therapeutic Activity, PT Manual Therapy, and PT Evaluation      Outcome: PROGRESSING AS EXPECTED  Note: Vector training- CGA/SBA 80 x 3 with FWW  L foot drag, impaired LUE , veering R       Problem: Mobility Transfers  Goal: STG-Within one week, patient will sit to stand  Description: 1) Individualized goal:  Pt will transfer CGA consistently with FWW STS/SPT   2) Interventions:  PT E Stim Attended, PT Orthotics Training, PT Gait Training, PT Therapeutic Exercises, PT Neuro Re-Ed/Balance, PT Aquatic Therapy, PT Therapeutic Activity, PT Manual Therapy, and PT Evaluation      Outcome: PROGRESSING AS EXPECTED  Note: Vector STS in // bars CGA/SBA  Dec motor planning, Eval 3/16       "

## 2021-03-17 NOTE — THERAPY
Physical Therapy   Daily Treatment     Patient Name: Melba Frye  Age:  50 y.o., Sex:  female  Medical Record #: [id number]  Today's Date: 3/17/2021     Precautions  Precautions: Fall Risk, Other (See Comments)  Comments: Seizure prec    Subjective    Patient reported that she preferred sleeping with seizure pads in place for safety, and slept well once provided.      Objective       03/17/21 0831   Precautions   Precautions Fall Risk;Other (See Comments)   Comments Seizure prec   Gait Functional Level of Assist    Gait Level Of Assist Contact Guard Assist  (Vector harness pregait training 10-20lb BWS 80x 3 CGA)   Assistive Device Parallel Bars   Distance (Feet) 10   # of Times Distance was Traveled 1   Deviation Bradykinetic;Decreased Heel Strike  (Dec L foot clearance )   Sitting Lower Body Exercises   Sitting Lower Body Exercises Yes  (HEP handout reviewed/ provided)   Ankle Pumps 1 set of 10;Bilateral  (limited LLE)   Hip Abduction 1 set of 10;Bilateral;Medium Resistance Theraband   Hip Adduction 1 set of 10;Bilateral  (pillow squeezes)   Long Arc Quad 1 set of 10;Bilateral   Marching Reciprocal;1 set of 10   Hamstring Curl 1 set of 10;Bilateral   Bed Mobility    Sit to Stand Contact Guard Assist  (Vector harness, // bars )   Neuro-Muscular Treatments   Comments Pt provided with shorter, dec depth wc for improved wc fit/ support, and propulsion ease. Strading in // bars while donning Vector harness 2 min x 2 SBA/CGA. Ace wrap LLE for eversion/ DF assist. Vector profile created in [employer] system.    Interdisciplinary Plan of Care Collaboration   IDT Collaboration with  Family / Caregiver;Physician   Patient Position at End of Therapy Seated;Family / Friend in Room;Self Releasing Lap Belt Applied   Collaboration Comments POC, CLOF   Strengths & Barriers   Strengths Supportive family;Pleasant and cooperative;Motivated for self care and independence;Willingly participates in therapeutic activities;Effective  communication skills   Barriers Decreased endurance;Fatigue;Generalized weakness;Hemiparesis;Home accessibility;Impaired activity tolerance;Impaired balance;Impaired insight/denial of deficits;Limited mobility   PT Total Time Spent   PT Individual Total Time Spent (Mins) 90   PT Charge Group   PT Gait Training 1   PT Therapeutic Exercise 1   PT Neuromuscular Re-Education / Balance 2   PT Therapeutic Activities 2       Assessment    Patient progressed in tolerance, and distance during Vector training. Patient completed 14.5 active minutes in vector system standing/ walking. Gait smoothing was utilized, with 10-20lb BWS. Patient improved, with practice, in symmetry, use of LUE, and LLE weight acceptance. Patient was able to utilize a mirror for fwd gaze and symmetry feedback. Patient demonstrated improved L foot clearance with Ace wrap DF/ eversion assist.     Strengths: Supportive family, Pleasant and cooperative, Motivated for self care and independence, Willingly participates in therapeutic activities, Effective communication skills  Barriers: Decreased endurance, Fatigue, Generalized weakness, Hemiparesis, Home accessibility, Impaired activity tolerance, Impaired balance, Impaired insight/denial of deficits, Limited mobility    Plan    Trial new wc height for ease of propulsion using alma delia technique, ongoing vector training ([place]), transfer safety and sequencing left/ right, LLE weight acceptance, symmetry training.     Passport items to be completed:  Get in/out of bed safely, in/out of a vehicle, safely use mobility device, walk or wheel around home/community, navigate up and down stairs, show how to get up/down from the ground, ensure home is accessible, demonstrate HEP, complete caregiver training    Physical Therapy Problems     Problem: Mobility     Dates: Start: 03/16/21       Goal: STG-Within one week, patient will ambulate household distance     Dates: Start: 03/16/21       Description: 1)  "Individualized goal:  Pt will amb with FWW 30 CGA indoors  2) Interventions:  PT E Stim Attended, PT Orthotics Training, PT Gait Training, PT Therapeutic Exercises, PT Neuro Re-Ed/Balance, PT Aquatic Therapy, PT Therapeutic Activity, PT Manual Therapy, and PT Evaluation        Note:     Goal Note filed on 03/17/21 1226 by Lashon Cason DPT    Vector training- CGA/SBA 80 x 3 with FWW  L foot drag, impaired LUE , veering R                    Goal: STG-Within one week, patient will ascend and descend four to six stairs     Dates: Start: 03/16/21       Description: 1) Individualized goal:  Patient will amb up/down 4\" stair in // bars 4x CGA/min A  2) Interventions:  PT E Stim Attended, PT Orthotics Training, PT Gait Training, PT Therapeutic Exercises, PT Neuro Re-Ed/Balance, PT Aquatic Therapy, PT Therapeutic Activity, PT Manual Therapy, and PT Evaluation        Note:     Goal Note filed on 03/17/21 1226 by Lashon Cason DPT    Eval 3/16  TBD                        Problem: Mobility Transfers     Dates: Start: 03/16/21       Goal: STG-Within one week, patient will sit to stand     Dates: Start: 03/16/21       Description: 1) Individualized goal:  Pt will transfer CGA consistently with FWW STS/SPT   2) Interventions:  PT E Stim Attended, PT Orthotics Training, PT Gait Training, PT Therapeutic Exercises, PT Neuro Re-Ed/Balance, PT Aquatic Therapy, PT Therapeutic Activity, PT Manual Therapy, and PT Evaluation        Note:     Goal Note filed on 03/17/21 1226 by Lashon Cason DPT    Vector STS in // bars CGA/SBA  Dec motor planning, Eval 3/16                          Problem: PT-Long Term Goals     Dates: Start: 03/16/21       Goal: LTG-By discharge, patient will ambulate     Dates: Start: 03/16/21       Description: 1) Individualized goal:  Patient will amb >50 ft with FWW SBA over level surfaces.   2) Interventions:  PT E Stim Attended, PT Orthotics Training, PT Gait Training, PT Therapeutic Exercises, PT Neuro " Re-Ed/Balance, PT Aquatic Therapy, PT Therapeutic Activity, PT Manual Therapy, and PT Evaluation            Goal: LTG-By discharge, patient will transfer one surface to another     Dates: Start: 03/16/21       Description: 1) Individualized goal:  Patient will transfer SPV with FWW STS/SPT  2) Interventions:  PT E Stim Attended, PT Orthotics Training, PT Gait Training, PT Therapeutic Exercises, PT Neuro Re-Ed/Balance, PT Aquatic Therapy, PT Therapeutic Activity, PT Manual Therapy, and PT Evaluation              Goal: LTG-By discharge, patient will ambulate up/down flight of stairs     Dates: Start: 03/16/21       Description: 1) Individualized goal:  Patient will amb up/down 10 stairs 2x with 1HR (switches from R to left after landing) CGA  2) Interventions:  PT E Stim Attended, PT Orthotics Training, PT Gait Training, PT Therapeutic Exercises, PT Neuro Re-Ed/Balance, PT Aquatic Therapy, PT Therapeutic Activity, PT Manual Therapy, and PT Evaluation

## 2021-03-17 NOTE — PROGRESS NOTES
Patient states that she is having tingling in LLE without movement of the limb.This passed within a few minutes per patient.      She is concerned that this could be another seizure but also states that she has used the leg a lot in therapy today.

## 2021-03-17 NOTE — CARE PLAN
Problem: Bathing  Goal: STG-Within one week, patient will bathe  Description: 1) Individualized Goal:  with supervision using DME/AE as needed and improve safety strategies   2) Interventions:  OT Group Therapy, OT Self Care/ADL, OT Cognitive Skill Dev, OT Community Reintegration, OT Neuro Re-Ed/Balance, OT Therapeutic Activity, and OT Therapeutic Exercise    Outcome: NOT MET  Note: CGA     Problem: Dressing  Goal: STG-Within one week, patient will dress LB  Description: 1) Individualized Goal:  with SBA using DME/AE as needed  2) Interventions:  OT Group Therapy, OT Self Care/ADL, OT Cognitive Skill Dev, OT Community Reintegration, OT Neuro Re-Ed/Balance, OT Therapeutic Activity, and OT Therapeutic Exercise  Outcome: NOT MET  Note: Min/mod     Problem: Toileting  Goal: STG-Within one week, patient will complete toileting tasks  Description: 1) Individualized Goal:  with supervision using DME/AE as needed   2) Interventions:  OT Group Therapy, OT Self Care/ADL, OT Cognitive Skill Dev, OT Community Reintegration, OT Neuro Re-Ed/Balance, OT Therapeutic Activity, and OT Therapeutic Exercise  Outcome: NOT MET  Note: CGA     Problem: Functional Transfers  Goal: STG-Within one week, patient will transfer to toilet  Description: 1) Individualized Goal:  with distant supervision using DME/AE as needed at w/c level  2) Interventions:  OT Group Therapy, OT Self Care/ADL, OT Cognitive Skill Dev, OT Community Reintegration, OT Neuro Re-Ed/Balance, OT Therapeutic Activity, and OT Therapeutic Exercise  Outcome: NOT MET  Note: CGA

## 2021-03-17 NOTE — CARE PLAN
Problem: Safety  Goal: Will remain free from injury  Outcome: PROGRESSING AS EXPECTED  Note: Call light within reach, patient encouraged to use for assistance for any needs and for assistance for safe transferring.      Goal: Will remain free from falls  Outcome: PROGRESSING AS EXPECTED      Dr. Wagner informed of pt's blood pressure. Orders given. Pt is currently resting comfortably in bed.

## 2021-03-17 NOTE — DISCHARGE PLANNING
Recommendation:  Update IPOC to address therapy service delivery:  PT___90__ min/day, OT _90____ min/day, ST _0____ min/day on 5/7 days per week for at least 15 hours per week.

## 2021-03-17 NOTE — PROGRESS NOTES
"Rehab Progress Note     Date of Service: 3/17/2021  Chief Complaint: follow up GBM    Interval Events (Subjective)    No new interval events. Met  today in the gym. Questions answered. Patient ambulating in the Vector.     ROS: No changes to bowel, bladder, pain, mood, or sleep.       Objective:  VITAL SIGNS: /78   Pulse 62   Temp 36.2 °C (97.2 °F) (Temporal)   Resp 18   Ht 1.702 m (5' 7\")   Wt 102 kg (225 lb 15.5 oz)   SpO2 96%   BMI 35.39 kg/m²   Gen: alert, no apparent distress  Neuro: notable for left foot drop, impaired balance    Recent Results (from the past 72 hour(s))   CBC WITH DIFFERENTIAL    Collection Time: 03/15/21  4:40 AM   Result Value Ref Range    WBC 11.3 (H) 4.8 - 10.8 K/uL    RBC 3.85 (L) 4.20 - 5.40 M/uL    Hemoglobin 12.8 12.0 - 16.0 g/dL    Hematocrit 37.0 37.0 - 47.0 %    MCV 96.1 81.4 - 97.8 fL    MCH 33.2 (H) 27.0 - 33.0 pg    MCHC 34.6 33.6 - 35.0 g/dL    RDW 45.1 35.9 - 50.0 fL    Platelet Count 225 164 - 446 K/uL    MPV 10.0 9.0 - 12.9 fL    Neutrophils-Polys 76.90 (H) 44.00 - 72.00 %    Lymphocytes 13.20 (L) 22.00 - 41.00 %    Monocytes 6.00 0.00 - 13.40 %    Eosinophils 0.00 0.00 - 6.90 %    Basophils 0.20 0.00 - 1.80 %    Immature Granulocytes 3.70 (H) 0.00 - 0.90 %    Nucleated RBC 0.00 /100 WBC    Neutrophils (Absolute) 8.66 (H) 2.00 - 7.15 K/uL    Lymphs (Absolute) 1.49 1.00 - 4.80 K/uL    Monos (Absolute) 0.67 0.00 - 0.85 K/uL    Eos (Absolute) 0.00 0.00 - 0.51 K/uL    Baso (Absolute) 0.02 0.00 - 0.12 K/uL    Immature Granulocytes (abs) 0.42 (H) 0.00 - 0.11 K/uL    NRBC (Absolute) 0.00 K/uL   Comp Metabolic Panel    Collection Time: 03/15/21  4:40 AM   Result Value Ref Range    Sodium 136 135 - 145 mmol/L    Potassium 4.2 3.6 - 5.5 mmol/L    Chloride 102 96 - 112 mmol/L    Co2 23 20 - 33 mmol/L    Anion Gap 11.0 7.0 - 16.0    Glucose 136 (H) 65 - 99 mg/dL    Bun 15 8 - 22 mg/dL    Creatinine 0.51 0.50 - 1.40 mg/dL    Calcium 8.5 8.5 - 10.5 mg/dL    AST(SGOT) " 14 12 - 45 U/L    ALT(SGPT) 15 2 - 50 U/L    Alkaline Phosphatase 69 30 - 99 U/L    Total Bilirubin 0.3 0.1 - 1.5 mg/dL    Albumin 3.3 3.2 - 4.9 g/dL    Total Protein 5.6 (L) 6.0 - 8.2 g/dL    Globulin 2.3 1.9 - 3.5 g/dL    A-G Ratio 1.4 g/dL   MAGNESIUM    Collection Time: 03/15/21  4:40 AM   Result Value Ref Range    Magnesium 2.1 1.5 - 2.5 mg/dL   PHOSPHORUS    Collection Time: 03/15/21  4:40 AM   Result Value Ref Range    Phosphorus 3.9 2.5 - 4.5 mg/dL   ESTIMATED GFR    Collection Time: 03/15/21  4:40 AM   Result Value Ref Range    GFR If African American >60 >60 mL/min/1.73 m 2    GFR If Non African American >60 >60 mL/min/1.73 m 2   ACCU-CHEK GLUCOSE    Collection Time: 03/15/21  5:16 PM   Result Value Ref Range    Glucose - Accu-Ck 103 (H) 65 - 99 mg/dL   SARS-CoV-2, PCR (In-House)    Collection Time: 03/15/21  5:20 PM   Result Value Ref Range    SARS-CoV-2 Source NP Swab     SARS-CoV-2 by PCR NotDetected    ACCU-CHEK GLUCOSE    Collection Time: 03/15/21  9:52 PM   Result Value Ref Range    Glucose - Accu-Ck 112 (H) 65 - 99 mg/dL   CBC with Differential    Collection Time: 03/16/21  6:03 AM   Result Value Ref Range    WBC 15.2 (H) 4.8 - 10.8 K/uL    RBC 4.08 (L) 4.20 - 5.40 M/uL    Hemoglobin 13.4 12.0 - 16.0 g/dL    Hematocrit 39.3 37.0 - 47.0 %    MCV 96.3 81.4 - 97.8 fL    MCH 32.8 27.0 - 33.0 pg    MCHC 34.1 33.6 - 35.0 g/dL    RDW 45.3 35.9 - 50.0 fL    Platelet Count 226 164 - 446 K/uL    MPV 10.0 9.0 - 12.9 fL    Neutrophils-Polys 79.30 (H) 44.00 - 72.00 %    Lymphocytes 11.80 (L) 22.00 - 41.00 %    Monocytes 5.90 0.00 - 13.40 %    Eosinophils 0.00 0.00 - 6.90 %    Basophils 0.10 0.00 - 1.80 %    Immature Granulocytes 2.90 (H) 0.00 - 0.90 %    Nucleated RBC 0.00 /100 WBC    Neutrophils (Absolute) 12.08 (H) 2.00 - 7.15 K/uL    Lymphs (Absolute) 1.79 1.00 - 4.80 K/uL    Monos (Absolute) 0.90 (H) 0.00 - 0.85 K/uL    Eos (Absolute) 0.00 0.00 - 0.51 K/uL    Baso (Absolute) 0.02 0.00 - 0.12 K/uL    Immature  Granulocytes (abs) 0.44 (H) 0.00 - 0.11 K/uL    NRBC (Absolute) 0.00 K/uL   Comp Metabolic Panel (CMP)    Collection Time: 03/16/21  6:03 AM   Result Value Ref Range    Sodium 131 (L) 135 - 145 mmol/L    Potassium 3.9 3.6 - 5.5 mmol/L    Chloride 96 96 - 112 mmol/L    Co2 23 20 - 33 mmol/L    Anion Gap 12.0 7.0 - 16.0    Glucose 97 65 - 99 mg/dL    Bun 16 8 - 22 mg/dL    Creatinine 0.45 (L) 0.50 - 1.40 mg/dL    Calcium 8.8 8.5 - 10.5 mg/dL    AST(SGOT) 11 (L) 12 - 45 U/L    ALT(SGPT) 17 2 - 50 U/L    Alkaline Phosphatase 67 30 - 99 U/L    Total Bilirubin 0.5 0.1 - 1.5 mg/dL    Albumin 3.3 3.2 - 4.9 g/dL    Total Protein 5.8 (L) 6.0 - 8.2 g/dL    Globulin 2.5 1.9 - 3.5 g/dL    A-G Ratio 1.3 g/dL   Magnesium    Collection Time: 03/16/21  6:03 AM   Result Value Ref Range    Magnesium 2.2 1.5 - 2.5 mg/dL   ESTIMATED GFR    Collection Time: 03/16/21  6:03 AM   Result Value Ref Range    GFR If African American >60 >60 mL/min/1.73 m 2    GFR If Non African American >60 >60 mL/min/1.73 m 2   Vitamin D, 25-hydroxy (blood)    Collection Time: 03/16/21  6:07 AM   Result Value Ref Range    25-Hydroxy   Vitamin D 25 21 (L) 30 - 100 ng/mL   ACCU-CHEK GLUCOSE    Collection Time: 03/16/21  7:19 AM   Result Value Ref Range    Glucose - Accu-Ck 93 65 - 99 mg/dL   ACCU-CHEK GLUCOSE    Collection Time: 03/16/21 10:59 AM   Result Value Ref Range    Glucose - Accu-Ck 92 65 - 99 mg/dL       Current Facility-Administered Medications   Medication Frequency   • sodium chloride (SALT) tablet 1 g TID WITH MEALS   • acetaminophen (TYLENOL) tablet 1,000 mg TID   • hydrOXYzine HCl (ATARAX) tablet 50 mg Q6HRS PRN   • melatonin tablet 3 mg HS PRN   • Respiratory Therapy Consult Continuous RT   • Pharmacy Consult Request ...Pain Management Review 1 Each PHARMACY TO DOSE   • lactulose 20 GM/30ML solution 30 mL QDAY PRN   • docusate sodium (ENEMEEZ) enema 283 mg QDAY PRN   • fleet enema 133 mL QDAY PRN   • artificial tears ophthalmic solution 1 Drop  PRN   • benzocaine-menthol (CEPACOL) lozenge 1 Lozenge Q2HRS PRN   • mag hydrox-al hydrox-simeth (MAALOX PLUS ES or MYLANTA DS) suspension 20 mL Q2HRS PRN   • ondansetron (ZOFRAN ODT) dispertab 4 mg 4X/DAY PRN    Or   • ondansetron (ZOFRAN) syringe/vial injection 4 mg 4X/DAY PRN   • sodium chloride (OCEAN) 0.65 % nasal spray 2 Spray PRN   • midazolam (VERSED) 5 mg/mL (1 mL vial) PRN   • oxyCODONE immediate-release (ROXICODONE) tablet 5 mg Q3HRS PRN    Or   • oxyCODONE immediate release (ROXICODONE) tablet 10 mg Q3HRS PRN    Or   • HYDROmorphone (DILAUDID) injection 0.5 mg Q3HRS PRN   • glucose 4 g chewable tablet 16 g Q15 MIN PRN    And   • dextrose 50% (D50W) injection 50 mL Q15 MIN PRN   • senna-docusate (PERICOLACE or SENOKOT S) 8.6-50 MG per tablet 2 tablet BID    And   • polyethylene glycol/lytes (MIRALAX) PACKET 1 Packet QDAY PRN    And   • magnesium hydroxide (MILK OF MAGNESIA) suspension 30 mL QDAY PRN    And   • bisacodyl (DULCOLAX) suppository 10 mg QDAY PRN   • MD ALERT...DO NOT ADMINISTER NSAIDS or ASPIRIN unless ORDERED By Neurosurgery 1 Each PRN   • dexamethasone (DECADRON) tablet 4 mg Q12HRS    Followed by   • [START ON 3/18/2021] dexamethasone (DECADRON) tablet 2 mg Q8HRS    Followed by   • [START ON 3/19/2021] dexamethasone (DECADRON) tablet 2 mg Q12HRS    Followed by   • [START ON 3/20/2021] dexamethasone (DECADRON) tablet 2 mg DAILY   • folic acid (FOLVITE) tablet 1 mg DAILY   • lamoTRIgine (LAMICTAL) tablet 100 mg BID   • levETIRAcetam (KEPPRA) tablet 500 mg BID   • venlafaxine (EFFEXOR) tablet 12.5 mg BID   • LORazepam (ATIVAN) tablet 0.5 mg Q4HRS PRN       Orders Placed This Encounter   Procedures   • Diet Order Diet: Regular     Standing Status:   Standing     Number of Occurrences:   1     Order Specific Question:   Diet:     Answer:   Regular [1]       Assessment:  Active Hospital Problems    Diagnosis    • *Glioblastoma of frontal lobe (HCC)    • 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 female admitted for acute inpatient rehabilitation with Glioblastoma of frontal lobe (HCC).    I led and attended the weekly conference today, and agree with the IDT conference documentation and plan of care as noted below.    Date of conference: 3/17/2021    Goals and barriers: See IDT note.    Biggest barriers: flight of stairs at home, left foot drop, impaired activity tolerance    CM/social support:  supportive     Anticipated DC date: 3/27    Home health: PT/OT    Equip: grab bars in shower    Follow up: PCP, Dr. Steele, Dr. Frye, Dr. Cerda, neurology        Medical Decision Making and Plan:    GBM  S/p resection 3/9  Steroid taper  Staples out 3/23  Radiation mapping 3/24     Seizure disorder  Keppra  Lamictal  Outpatient follow up with neurology     Anxiety disorder  Effexor  PRN Ativan  Consult psychology if needed, patient currently not interested     History of headaches  Scheduled tylenol    Hyponatremia  Start salt tabs  Monitor  Recheck in am    Vit D insufficiency  Supplementation    Bowel program  Continue bowel medications  Scheduled Sennakot  PRN Miralax, MOM, bisacodyl suppository  Last BM 3/16    Bladder program  Check PVRs - 16  Bladder scan for no voids  ICP for over 400 cc  Scheduled toileting    DVT prophylaxis  Contraindicated given risk of hemorrhage.   Not cleared by neurosurgery.   Compression stockings on in am, off in pm.  Increase mobility. Monitor for swelling.    Total time:  40 minutes.  I spent greater than 50% of the time for patient care, counseling, and coordination on this date, including patient face-to face time, unit/floor time with review of records/pertinent lab data and studies, as well as discussing diagnostic  evaluation/work up, planned therapeutic interventions, and future disposition of care, as per the interval events/subjective and the assessment and plan as noted above.        Susi Fulton M.D.   Physical Medicine and Rehabilitation

## 2021-03-17 NOTE — THERAPY
Occupational Therapy  Daily Treatment     Patient Name: Melba Frye  Age:  50 y.o., Sex:  female  Medical Record #: [id number]  Today's Date: 3/17/2021     Precautions  Precautions: Fall Risk, Other (See Comments)  Comments: Seizure prec         Subjective    Patient lying in bed upon OT arrival.  Spouse in room as well.  Patient happy she was able to don her shoes EOB.  Very excited about the prospect of cooking while at rehab.     Objective       03/17/21 1031   Precautions   Precautions Fall Risk;Other (See Comments)   Comments Seizure prec   Functional Level of Assist   Grooming Independent;Seated   Lower Body Dressing Stand by Assist   Lower Body Dressing Description Set-up of equipment;Supervision for safety;Verbal cueing  (setup/sba to don shoes)   Bed, Chair, Wheelchair Transfer Contact Guard Assist   Bed Chair Wheelchair Transfer Description Supervision for safety;Verbal cueing;Set-up of equipment  (CGA SPT bed to w/c without AD)   Fine Motor / Dexterity    Fine Motor / Dexterity Yes   Fine Motor / Dexterity Interventions L hand FMC picking up poker chips from tabletop and completing finger >< palm translation.  L hand used to , manipulate and place small pegs into small pegboard while copying design.   IADL Treatments   IADL Treatments Kitchen mobility education   Kitchen Mobility Education Educated patient on safe kitchen mobility techniques using counter for support while retrieving items from various locations and transporting from one spot to another.  Patient completed with CGA.   Bed Mobility    Supine to Sit Supervised   Scooting Supervised   Interdisciplinary Plan of Care Collaboration   IDT Collaboration with  Family / Caregiver   Patient Position at End of Therapy Seated;Self Releasing Lap Belt Applied;Chair Alarm On;Family / Friend in Room   Collaboration Comments spouse present throughout session; participated in family training for stall shower transfer   OT Total Time Spent   OT  Individual Total Time Spent (Mins) 60   OT Charge Group   OT Self Care / ADL 1   OT Therapy Activity 3     Educated on safe way to complete dry stall shower transfers with use of grab bars and shower chair.  Discussed grab bar placement for home shower.  Patient completed two dry stall shower transfers with grab bar and bench with CGA and verbal cues.  First attempt was with OT and second attempt was with spouse assisting.      Assessment    Patient and spouse feel comfortable with how patient completed stall shower transfer with spouse assisting.  Spouse to get grab bars installed in shower and by toilets.  Patient with minimal left hand incoordination.  Primary barriers include impaired standing balance and L LE weakness.  Strengths: Able to follow instructions, Good insight into deficits/needs, Independent prior level of function, Making steady progress towards goals, Manages pain appropriately, Motivated for self care and independence, Pleasant and cooperative, Supportive family, Willingly participates in therapeutic activities  Barriers: Decreased endurance, Generalized weakness, Hemiparesis, Home accessibility, Impaired balance, Impaired activity tolerance    Plan    ADLs, IADLs (cooking next week), standing tolerance/balance, STS    Passport items to be completed:  Perform bathroom transfers, complete dressing, complete feeding, get ready for the day, prepare a simple meal, participate in household tasks, adapt home for safety needs, demonstrate home exercise program, complete caregiver training     Occupational Therapy Goals     Problem: Bathing     Dates: Start: 03/16/21       Goal: STG-Within one week, patient will bathe     Dates: Start: 03/16/21       Description: 1) Individualized Goal:  with supervision using DME/AE as needed and improve safety strategies   2) Interventions:  OT Group Therapy, OT Self Care/ADL, OT Cognitive Skill Dev, OT Community Reintegration, OT Neuro Re-Ed/Balance, OT Therapeutic  Activity, and OT Therapeutic Exercise      Note:     Goal Note filed on 03/17/21 1211 by Kartik Estrada, OT/L    CGA                        Problem: Dressing     Dates: Start: 03/16/21       Goal: STG-Within one week, patient will dress LB     Dates: Start: 03/16/21       Description: 1) Individualized Goal:  with SBA using DME/AE as needed  2) Interventions:  OT Group Therapy, OT Self Care/ADL, OT Cognitive Skill Dev, OT Community Reintegration, OT Neuro Re-Ed/Balance, OT Therapeutic Activity, and OT Therapeutic Exercise    Note:     Goal Note filed on 03/17/21 1211 by Kartik Estrada OT/L    Min/mod                        Problem: Functional Transfers     Dates: Start: 03/16/21       Goal: STG-Within one week, patient will transfer to toilet     Dates: Start: 03/16/21       Description: 1) Individualized Goal:  with distant supervision using DME/AE as needed at w/c level  2) Interventions:  OT Group Therapy, OT Self Care/ADL, OT Cognitive Skill Dev, OT Community Reintegration, OT Neuro Re-Ed/Balance, OT Therapeutic Activity, and OT Therapeutic Exercise    Note:     Goal Note filed on 03/17/21 1211 by Kartik Estrada, OT/L    CGA                        Problem: OT Long Term Goals     Dates: Start: 03/16/21       Goal: LTG-By discharge, patient will complete basic self care tasks     Dates: Start: 03/16/21       Description: 1) Individualized Goal:  Mod I using DME/AE as needed at FWW level  2) Interventions:  OT Group Therapy, OT Self Care/ADL, OT Cognitive Skill Dev, OT Community Reintegration, OT Neuro Re-Ed/Balance, OT Therapeutic Activity, and OT Therapeutic Exercise          Goal: LTG-By discharge, patient will perform bathroom transfers     Dates: Start: 03/16/21       Description: 1) Individualized Goal:  Mod I using DME/AE as needed at FWW level  2) Interventions:  OT Group Therapy, OT Self Care/ADL, OT Cognitive Skill Dev, OT Community Reintegration, OT Neuro Re-Ed/Balance, OT Therapeutic Activity,  and OT Therapeutic Exercise                Problem: Toileting     Dates: Start: 03/16/21       Goal: STG-Within one week, patient will complete toileting tasks     Dates: Start: 03/16/21       Description: 1) Individualized Goal:  with supervision using DME/AE as needed   2) Interventions:  OT Group Therapy, OT Self Care/ADL, OT Cognitive Skill Dev, OT Community Reintegration, OT Neuro Re-Ed/Balance, OT Therapeutic Activity, and OT Therapeutic Exercise    Note:     Goal Note filed on 03/17/21 1211 by Kartik Estrada, OT/L    CGA

## 2021-03-17 NOTE — THERAPY
Physical Therapy   Daily Treatment     Patient Name: Melba Frye  Age:  50 y.o., Sex:  female  Medical Record #: [id number]  Today's Date: 3/17/2021     Precautions  Precautions: (P) Fall Risk, Other (See Comments)  Comments: (P) Seizure prec    Subjective    Patient was pleased to go outdoors.      Objective       03/17/21 1301   Precautions   Precautions Fall Risk;Other (See Comments)   Comments Seizure prec   Wheelchair Functional Level of Assist   Wheelchair Assist Supervised  (SPV indoors with alma delia technique, SPV-dep outdoors 300 ft)   Distance Wheelchair (Feet or Distance) 200   Wheelchair Description Adaptive equipment;Extra time;Impaired coordination;Limited by fatigue   Transfer Functional Level of Assist   Bed, Chair, Wheelchair Transfer Moderate Assist   Bed Chair Wheelchair Transfer Description Adaptive equipment;Set-up of equipment;Squat pivot transfer to wheelchair;Verbal cueing  (lateral scooting, with removal of wc armrest, toward left)   Bed Mobility    Supine to Sit Supervised   Scooting Contact Guard Assist   Rolling Supervised   Neuro-Muscular Treatments   Neuro-Muscular Treatments Weight Shift Left;Weight Shift Right;Verbal Cuing;Tactile Cuing;Sequencing   Comments EOB to marina shoes, set up, SPV.    Interdisciplinary Plan of Care Collaboration   IDT Collaboration with  Family / Caregiver   Patient Position at End of Therapy Seated;Family / Friend in Room   Collaboration Comments POC, HEP- wc mob with spouse   Strengths & Barriers   Strengths Supportive family;Pleasant and cooperative;Motivated for self care and independence;Willingly participates in therapeutic activities;Effective communication skills   Barriers Decreased endurance;Fatigue;Generalized weakness;Hemiparesis;Home accessibility;Impaired activity tolerance;Impaired balance;Impaired insight/denial of deficits;Limited mobility   PT Total Time Spent   PT Individual Total Time Spent (Mins) 30   PT Charge Group   PT Therapeutic  Activities 2       Assessment    Patient was motivated by outdoor tx, and by inc indep at wc level. Patient was able to utilize alma delia technique, and occasional use of LUE, to self propel indoor with SPV. Extra assistance was provided over various surfaces outdoors ranging from SPV-dep. Patient was limited by dec activity tolerance.     Strengths: (P) Supportive family, Pleasant and cooperative, Motivated for self care and independence, Willingly participates in therapeutic activities, Effective communication skills  Barriers: (P) Decreased endurance, Fatigue, Generalized weakness, Hemiparesis, Home accessibility, Impaired activity tolerance, Impaired balance, Impaired insight/denial of deficits, Limited mobility    Plan    Ongoing [facility] [place]) training for confidence/ pregait, ongoing evaluation of AFO needs (anticipate post leaf spring), initiate step training in // bars vs Vector. Initiate family training for transfers with spouse.     Passport items to be completed:  Get in/out of bed safely, in/out of a vehicle, safely use mobility device, walk or wheel around home/community, navigate up and down stairs, show how to get up/down from the ground, ensure home is accessible, demonstrate HEP, complete caregiver training    Physical Therapy Problems     Problem: Mobility     Dates: Start: 03/16/21       Goal: STG-Within one week, patient will ambulate household distance     Dates: Start: 03/16/21       Description: 1) Individualized goal:  Pt will amb with FWW 30 CGA indoors  2) Interventions:  PT E Stim Attended, PT Orthotics Training, PT Gait Training, PT Therapeutic Exercises, PT Neuro Re-Ed/Balance, PT Aquatic Therapy, PT Therapeutic Activity, PT Manual Therapy, and PT Evaluation        Note:     Goal Note filed on 03/17/21 1226 by Lashon Cason DPT    Vector training- CGA/SBA 80 x 3 with FWW  L foot drag, impaired LUE , veering R                    Goal: STG-Within one week, patient will ascend and descend  "four to six stairs     Dates: Start: 03/16/21       Description: 1) Individualized goal:  Patient will amb up/down 4\" stair in // bars 4x CGA/min A  2) Interventions:  PT E Stim Attended, PT Orthotics Training, PT Gait Training, PT Therapeutic Exercises, PT Neuro Re-Ed/Balance, PT Aquatic Therapy, PT Therapeutic Activity, PT Manual Therapy, and PT Evaluation        Note:     Goal Note filed on 03/17/21 1226 by Lashon Cason DPT    Eval 3/16  TBD                        Problem: Mobility Transfers     Dates: Start: 03/16/21       Goal: STG-Within one week, patient will sit to stand     Dates: Start: 03/16/21       Description: 1) Individualized goal:  Pt will transfer CGA consistently with FWW STS/SPT   2) Interventions:  PT E Stim Attended, PT Orthotics Training, PT Gait Training, PT Therapeutic Exercises, PT Neuro Re-Ed/Balance, PT Aquatic Therapy, PT Therapeutic Activity, PT Manual Therapy, and PT Evaluation        Note:     Goal Note filed on 03/17/21 1226 by Lashon Cason DPT    Vector STS in // bars CGA/SBA  Dec motor planning, Eval 3/16                          Problem: PT-Long Term Goals     Dates: Start: 03/16/21       Goal: LTG-By discharge, patient will ambulate     Dates: Start: 03/16/21       Description: 1) Individualized goal:  Patient will amb >50 ft with FWW SBA over level surfaces.   2) Interventions:  PT E Stim Attended, PT Orthotics Training, PT Gait Training, PT Therapeutic Exercises, PT Neuro Re-Ed/Balance, PT Aquatic Therapy, PT Therapeutic Activity, PT Manual Therapy, and PT Evaluation            Goal: LTG-By discharge, patient will transfer one surface to another     Dates: Start: 03/16/21       Description: 1) Individualized goal:  Patient will transfer SPV with FWW STS/SPT  2) Interventions:  PT E Stim Attended, PT Orthotics Training, PT Gait Training, PT Therapeutic Exercises, PT Neuro Re-Ed/Balance, PT Aquatic Therapy, PT Therapeutic Activity, PT Manual Therapy, and PT Evaluation        "       Goal: LTG-By discharge, patient will ambulate up/down flight of stairs     Dates: Start: 03/16/21       Description: 1) Individualized goal:  Patient will amb up/down 10 stairs 2x with 1HR (switches from R to left after landing) CGA  2) Interventions:  PT E Stim Attended, PT Orthotics Training, PT Gait Training, PT Therapeutic Exercises, PT Neuro Re-Ed/Balance, PT Aquatic Therapy, PT Therapeutic Activity, PT Manual Therapy, and PT Evaluation

## 2021-03-17 NOTE — CARE PLAN
Problem: Safety  Goal: Will remain free from falls  Note: Patient uses call light consistently and appropriately this shift.  Waits for assistance when needed and does not attempt self transfer.  Able to verbalize needs.  Will continue to monitor.      Problem: Pain Management  Goal: Pain level will decrease to patient's comfort goal  Note: Patient able to verbalize pain level and verbalize an acceptable level of pain.

## 2021-03-18 LAB
ANION GAP SERPL CALC-SCNC: 8 MMOL/L (ref 7–16)
BUN SERPL-MCNC: 18 MG/DL (ref 8–22)
CALCIUM SERPL-MCNC: 8.5 MG/DL (ref 8.5–10.5)
CHLORIDE SERPL-SCNC: 105 MMOL/L (ref 96–112)
CO2 SERPL-SCNC: 25 MMOL/L (ref 20–33)
CREAT SERPL-MCNC: 0.52 MG/DL (ref 0.5–1.4)
GLUCOSE SERPL-MCNC: 108 MG/DL (ref 65–99)
POTASSIUM SERPL-SCNC: 4.5 MMOL/L (ref 3.6–5.5)
SODIUM SERPL-SCNC: 138 MMOL/L (ref 135–145)

## 2021-03-18 PROCEDURE — 36415 COLL VENOUS BLD VENIPUNCTURE: CPT

## 2021-03-18 PROCEDURE — 97530 THERAPEUTIC ACTIVITIES: CPT

## 2021-03-18 PROCEDURE — 700102 HCHG RX REV CODE 250 W/ 637 OVERRIDE(OP): Performed by: PHYSICAL MEDICINE & REHABILITATION

## 2021-03-18 PROCEDURE — 770010 HCHG ROOM/CARE - REHAB SEMI PRIVAT*

## 2021-03-18 PROCEDURE — 97110 THERAPEUTIC EXERCISES: CPT

## 2021-03-18 PROCEDURE — 97112 NEUROMUSCULAR REEDUCATION: CPT

## 2021-03-18 PROCEDURE — A9270 NON-COVERED ITEM OR SERVICE: HCPCS | Performed by: PHYSICAL MEDICINE & REHABILITATION

## 2021-03-18 PROCEDURE — 80048 BASIC METABOLIC PNL TOTAL CA: CPT

## 2021-03-18 PROCEDURE — 97116 GAIT TRAINING THERAPY: CPT

## 2021-03-18 PROCEDURE — 97535 SELF CARE MNGMENT TRAINING: CPT

## 2021-03-18 PROCEDURE — 99232 SBSQ HOSP IP/OBS MODERATE 35: CPT | Performed by: PHYSICAL MEDICINE & REHABILITATION

## 2021-03-18 RX ADMIN — LAMOTRIGINE 100 MG: 100 TABLET ORAL at 21:10

## 2021-03-18 RX ADMIN — LAMOTRIGINE 100 MG: 100 TABLET ORAL at 08:04

## 2021-03-18 RX ADMIN — Medication 1 G: at 11:24

## 2021-03-18 RX ADMIN — DEXAMETHASONE 2 MG: 1 TABLET ORAL at 21:09

## 2021-03-18 RX ADMIN — LEVETIRACETAM 500 MG: 500 TABLET ORAL at 21:09

## 2021-03-18 RX ADMIN — HYDROXYZINE HYDROCHLORIDE 50 MG: 25 TABLET, FILM COATED ORAL at 21:18

## 2021-03-18 RX ADMIN — ACETAMINOPHEN 1000 MG: 500 TABLET, FILM COATED ORAL at 08:04

## 2021-03-18 RX ADMIN — FOLIC ACID 1 MG: 1 TABLET ORAL at 08:04

## 2021-03-18 RX ADMIN — VENLAFAXINE 12.5 MG: 25 TABLET ORAL at 21:08

## 2021-03-18 RX ADMIN — SENNOSIDES AND DOCUSATE SODIUM 2 TABLET: 8.6; 5 TABLET ORAL at 08:04

## 2021-03-18 RX ADMIN — DEXAMETHASONE 2 MG: 1 TABLET ORAL at 15:16

## 2021-03-18 RX ADMIN — HYDROXYZINE HYDROCHLORIDE 50 MG: 25 TABLET, FILM COATED ORAL at 08:07

## 2021-03-18 RX ADMIN — LEVETIRACETAM 500 MG: 500 TABLET ORAL at 08:04

## 2021-03-18 RX ADMIN — DEXAMETHASONE 2 MG: 1 TABLET ORAL at 08:07

## 2021-03-18 RX ADMIN — VENLAFAXINE 12.5 MG: 25 TABLET ORAL at 08:04

## 2021-03-18 RX ADMIN — Medication 1 G: at 08:04

## 2021-03-18 RX ADMIN — ACETAMINOPHEN 1000 MG: 500 TABLET, FILM COATED ORAL at 15:14

## 2021-03-18 RX ADMIN — Medication 1 G: at 17:17

## 2021-03-18 RX ADMIN — ACETAMINOPHEN 1000 MG: 500 TABLET, FILM COATED ORAL at 21:09

## 2021-03-18 ASSESSMENT — ACTIVITIES OF DAILY LIVING (ADL)
TOILET_TRANSFER_DESCRIPTION: GRAB BAR;SET-UP OF EQUIPMENT;SUPERVISION FOR SAFETY
BED_CHAIR_WHEELCHAIR_TRANSFER_DESCRIPTION: SET-UP OF EQUIPMENT;SUPERVISION FOR SAFETY;VERBAL CUEING
BED_CHAIR_WHEELCHAIR_TRANSFER_DESCRIPTION: ADAPTIVE EQUIPMENT;SET-UP OF EQUIPMENT;SUPERVISION FOR SAFETY
BED_CHAIR_WHEELCHAIR_TRANSFER_DESCRIPTION: ADAPTIVE EQUIPMENT;SET-UP OF EQUIPMENT;SUPERVISION FOR SAFETY;VERBAL CUEING
TOILETING_LEVEL_OF_ASSIST_DESCRIPTION: INCREASED TIME;INITIAL PREPARATION FOR TASK;SET-UP OF EQUIPMENT;SUPERVISION FOR SAFETY;VERBAL CUEING

## 2021-03-18 ASSESSMENT — GAIT ASSESSMENTS
ASSISTIVE DEVICE: FRONT WHEEL WALKER;PARALLEL BARS
DISTANCE (FEET): 60
DEVIATION: BRADYKINETIC;DECREASED HEEL STRIKE;DECREASED TOE OFF
GAIT LEVEL OF ASSIST: CONTACT GUARD ASSIST

## 2021-03-18 NOTE — THERAPY
Physical Therapy   Daily Treatment     Patient Name: Melba Frye  Age:  50 y.o., Sex:  female  Medical Record #: [id number]  Today's Date: 3/18/2021     Precautions  Precautions: Fall Risk, Other (See Comments)  Comments: seizure precautions     Subjective    Pt in bed resting with  bedside, agreeable to PT.      Objective       03/18/21 1301   Precautions   Precautions Fall Risk   Comments seizure precautions    Transfer Functional Level of Assist   Bed, Chair, Wheelchair Transfer Contact Guard Assist   Bed Chair Wheelchair Transfer Description Adaptive equipment;Set-up of equipment;Supervision for safety  (bed > WC)   Toilet Transfers Contact Guard Assist   Toilet Transfer Description Grab bar;Set-up of equipment;Supervision for safety  (WC <> toilet)   Neuro-Muscular Treatments   Neuro-Muscular Treatments Weight Shift Right;Weight Shift Left;Verbal Cuing;Postural Changes   Comments Bilateral weight shift activty with cone reaching and stacking, focus on increased WBing through LLE, cross body reaching for cones, in // bars with single UE support on // bars and SBA- CGA throughout; verbal cuing and demonstration for technique and sequencing; 15 cones 2x each direction with seated rest break between sets    Interdisciplinary Plan of Care Collaboration   IDT Collaboration with  Family / Caregiver   Patient Position at End of Therapy Seated  (in gym with )   Collaboration Comments  propelled pt in WC back to room after PT session    PT Total Time Spent   PT Individual Total Time Spent (Mins) 30   PT Charge Group   PT Neuromuscular Re-Education / Balance 1   PT Therapeutic Activities 1       Assessment    Pt motivated for participation. Good technique with transfers and able to verbalize sequencing while completing transfers. Improved LLE weightbearing by end of reaching activity.     Strengths: Supportive family, Pleasant and cooperative, Motivated for self care and independence, Willingly  "participates in therapeutic activities, Effective communication skills  Barriers: Decreased endurance, Fatigue, Generalized weakness, Hemiparesis, Home accessibility, Impaired activity tolerance, Impaired balance, Impaired insight/denial of deficits, Limited mobility    Plan    Ongoing Vector (West) training for confidence/ pregait, complete consult with Ability, continue gait training with FWW, initiate step training in // bars vs Vector. Initiate family training for transfers with spouse.     Physical Therapy Problems     Problem: Mobility     Dates: Start: 03/16/21       Goal: STG-Within one week, patient will ambulate household distance     Dates: Start: 03/16/21       Description: 1) Individualized goal:  Pt will amb with FWW 30 CGA indoors  2) Interventions:  PT E Stim Attended, PT Orthotics Training, PT Gait Training, PT Therapeutic Exercises, PT Neuro Re-Ed/Balance, PT Aquatic Therapy, PT Therapeutic Activity, PT Manual Therapy, and PT Evaluation        Note:     Goal Note filed on 03/17/21 1226 by Lashon Cason DPT    Vector training- CGA/SBA 80 x 3 with FWW  L foot drag, impaired LUE , veering R                    Goal: STG-Within one week, patient will ascend and descend four to six stairs     Dates: Start: 03/16/21       Description: 1) Individualized goal:  Patient will amb up/down 4\" stair in // bars 4x CGA/min A  2) Interventions:  PT E Stim Attended, PT Orthotics Training, PT Gait Training, PT Therapeutic Exercises, PT Neuro Re-Ed/Balance, PT Aquatic Therapy, PT Therapeutic Activity, PT Manual Therapy, and PT Evaluation        Note:     Goal Note filed on 03/17/21 1226 by Lashon Cason DPT    Eval 3/16  TBD                        Problem: Mobility Transfers     Dates: Start: 03/16/21       Goal: STG-Within one week, patient will sit to stand     Dates: Start: 03/16/21       Description: 1) Individualized goal:  Pt will transfer CGA consistently with FWW STS/SPT   2) Interventions:  PT E Stim " Attended, PT Orthotics Training, PT Gait Training, PT Therapeutic Exercises, PT Neuro Re-Ed/Balance, PT Aquatic Therapy, PT Therapeutic Activity, PT Manual Therapy, and PT Evaluation        Note:     Goal Note filed on 03/17/21 1226 by MINH PosadasT    Vector STS in // bars CGA/SBA  Dec motor planning, Eval 3/16                          Problem: PT-Long Term Goals     Dates: Start: 03/16/21       Goal: LTG-By discharge, patient will ambulate     Dates: Start: 03/16/21       Description: 1) Individualized goal:  Patient will amb >50 ft with FWW SBA over level surfaces.   2) Interventions:  PT E Stim Attended, PT Orthotics Training, PT Gait Training, PT Therapeutic Exercises, PT Neuro Re-Ed/Balance, PT Aquatic Therapy, PT Therapeutic Activity, PT Manual Therapy, and PT Evaluation            Goal: LTG-By discharge, patient will transfer one surface to another     Dates: Start: 03/16/21       Description: 1) Individualized goal:  Patient will transfer SPV with FWW STS/SPT  2) Interventions:  PT E Stim Attended, PT Orthotics Training, PT Gait Training, PT Therapeutic Exercises, PT Neuro Re-Ed/Balance, PT Aquatic Therapy, PT Therapeutic Activity, PT Manual Therapy, and PT Evaluation              Goal: LTG-By discharge, patient will ambulate up/down flight of stairs     Dates: Start: 03/16/21       Description: 1) Individualized goal:  Patient will amb up/down 10 stairs 2x with 1HR (switches from R to left after landing) CGA  2) Interventions:  PT E Stim Attended, PT Orthotics Training, PT Gait Training, PT Therapeutic Exercises, PT Neuro Re-Ed/Balance, PT Aquatic Therapy, PT Therapeutic Activity, PT Manual Therapy, and PT Evaluation

## 2021-03-18 NOTE — PROGRESS NOTES
"Rehab Progress Note     Encounter Date: 3/18/2021    CC: Therapy is going well    Interval Events (Subjective)  Vital signs stable.  Last BM documented 3/16.  Voiding volitionally with low PVRs at 37, 29.  Patient seen and examined in her room.  States she is doing well.  Working hard with therapy.  Will be getting an AFO for the left foot.  Discuss spasticity with the patient.  Having regular bowel movements and voiding volitionally.    14 point ROS reviewed and negative except as stated above.     Objective:  VITAL SIGNS: /83   Pulse 63   Temp 36.6 °C (97.8 °F) (Tympanic)   Resp 18   Ht 1.702 m (5' 7\")   Wt 102 kg (225 lb 15.5 oz)   SpO2 97%   BMI 35.39 kg/m²     GEN: No apparent distress, laying in bed comfortably.  HEENT: Head normocephalic, atraumatic.  Sclera nonicteric bilaterally, no ocular discharge appreciated bilaterally.  CV: Extremities warm and well-perfused, no peripheral edema appreciated bilaterally.  PULMONARY: Breathing nonlabored on room air, no respiratory accessory muscle use.  Not requiring supplemental oxygen.  ABD: Soft, nontender.  SKIN: No appreciable skin breakdown on exposed areas of skin.  NEURO: Awake alert.  Conversational.  Logical thought content.  Left lower extremity weakness greater than right lower extremity weakness.  1/5 strength of dorsiflexion on the left.  Does have 5-6 beats of clonus left ankle.  2-3 in the right.  Tone on modified Gracia scale is 1+/4 of the plantar flexors on the left.  PSYCH: Mood and affect within normal limits.    Recent Results (from the past 72 hour(s))   ACCU-CHEK GLUCOSE    Collection Time: 03/15/21  5:16 PM   Result Value Ref Range    Glucose - Accu-Ck 103 (H) 65 - 99 mg/dL   SARS-CoV-2, PCR (In-House)    Collection Time: 03/15/21  5:20 PM   Result Value Ref Range    SARS-CoV-2 Source NP Swab     SARS-CoV-2 by PCR NotDetected    ACCU-CHEK GLUCOSE    Collection Time: 03/15/21  9:52 PM   Result Value Ref Range    Glucose - Accu-Ck " 112 (H) 65 - 99 mg/dL   CBC with Differential    Collection Time: 03/16/21  6:03 AM   Result Value Ref Range    WBC 15.2 (H) 4.8 - 10.8 K/uL    RBC 4.08 (L) 4.20 - 5.40 M/uL    Hemoglobin 13.4 12.0 - 16.0 g/dL    Hematocrit 39.3 37.0 - 47.0 %    MCV 96.3 81.4 - 97.8 fL    MCH 32.8 27.0 - 33.0 pg    MCHC 34.1 33.6 - 35.0 g/dL    RDW 45.3 35.9 - 50.0 fL    Platelet Count 226 164 - 446 K/uL    MPV 10.0 9.0 - 12.9 fL    Neutrophils-Polys 79.30 (H) 44.00 - 72.00 %    Lymphocytes 11.80 (L) 22.00 - 41.00 %    Monocytes 5.90 0.00 - 13.40 %    Eosinophils 0.00 0.00 - 6.90 %    Basophils 0.10 0.00 - 1.80 %    Immature Granulocytes 2.90 (H) 0.00 - 0.90 %    Nucleated RBC 0.00 /100 WBC    Neutrophils (Absolute) 12.08 (H) 2.00 - 7.15 K/uL    Lymphs (Absolute) 1.79 1.00 - 4.80 K/uL    Monos (Absolute) 0.90 (H) 0.00 - 0.85 K/uL    Eos (Absolute) 0.00 0.00 - 0.51 K/uL    Baso (Absolute) 0.02 0.00 - 0.12 K/uL    Immature Granulocytes (abs) 0.44 (H) 0.00 - 0.11 K/uL    NRBC (Absolute) 0.00 K/uL   Comp Metabolic Panel (CMP)    Collection Time: 03/16/21  6:03 AM   Result Value Ref Range    Sodium 131 (L) 135 - 145 mmol/L    Potassium 3.9 3.6 - 5.5 mmol/L    Chloride 96 96 - 112 mmol/L    Co2 23 20 - 33 mmol/L    Anion Gap 12.0 7.0 - 16.0    Glucose 97 65 - 99 mg/dL    Bun 16 8 - 22 mg/dL    Creatinine 0.45 (L) 0.50 - 1.40 mg/dL    Calcium 8.8 8.5 - 10.5 mg/dL    AST(SGOT) 11 (L) 12 - 45 U/L    ALT(SGPT) 17 2 - 50 U/L    Alkaline Phosphatase 67 30 - 99 U/L    Total Bilirubin 0.5 0.1 - 1.5 mg/dL    Albumin 3.3 3.2 - 4.9 g/dL    Total Protein 5.8 (L) 6.0 - 8.2 g/dL    Globulin 2.5 1.9 - 3.5 g/dL    A-G Ratio 1.3 g/dL   Magnesium    Collection Time: 03/16/21  6:03 AM   Result Value Ref Range    Magnesium 2.2 1.5 - 2.5 mg/dL   ESTIMATED GFR    Collection Time: 03/16/21  6:03 AM   Result Value Ref Range    GFR If African American >60 >60 mL/min/1.73 m 2    GFR If Non African American >60 >60 mL/min/1.73 m 2   Vitamin D, 25-hydroxy (blood)     Collection Time: 03/16/21  6:07 AM   Result Value Ref Range    25-Hydroxy   Vitamin D 25 21 (L) 30 - 100 ng/mL   ACCU-CHEK GLUCOSE    Collection Time: 03/16/21  7:19 AM   Result Value Ref Range    Glucose - Accu-Ck 93 65 - 99 mg/dL   ACCU-CHEK GLUCOSE    Collection Time: 03/16/21 10:59 AM   Result Value Ref Range    Glucose - Accu-Ck 92 65 - 99 mg/dL   Basic Metabolic Panel    Collection Time: 03/18/21  5:38 AM   Result Value Ref Range    Sodium 138 135 - 145 mmol/L    Potassium 4.5 3.6 - 5.5 mmol/L    Chloride 105 96 - 112 mmol/L    Co2 25 20 - 33 mmol/L    Glucose 108 (H) 65 - 99 mg/dL    Bun 18 8 - 22 mg/dL    Creatinine 0.52 0.50 - 1.40 mg/dL    Calcium 8.5 8.5 - 10.5 mg/dL    Anion Gap 8.0 7.0 - 16.0   ESTIMATED GFR    Collection Time: 03/18/21  5:38 AM   Result Value Ref Range    GFR If African American >60 >60 mL/min/1.73 m 2    GFR If Non African American >60 >60 mL/min/1.73 m 2       Current Facility-Administered Medications   Medication Frequency   • acetaminophen (Tylenol) tablet 650 mg Q6HRS PRN   • sodium chloride (SALT) tablet 1 g TID WITH MEALS   • acetaminophen (TYLENOL) tablet 1,000 mg TID   • hydrOXYzine HCl (ATARAX) tablet 50 mg Q6HRS PRN   • melatonin tablet 3 mg HS PRN   • Respiratory Therapy Consult Continuous RT   • Pharmacy Consult Request ...Pain Management Review 1 Each PHARMACY TO DOSE   • lactulose 20 GM/30ML solution 30 mL QDAY PRN   • docusate sodium (ENEMEEZ) enema 283 mg QDAY PRN   • fleet enema 133 mL QDAY PRN   • artificial tears ophthalmic solution 1 Drop PRN   • benzocaine-menthol (CEPACOL) lozenge 1 Lozenge Q2HRS PRN   • mag hydrox-al hydrox-simeth (MAALOX PLUS ES or MYLANTA DS) suspension 20 mL Q2HRS PRN   • ondansetron (ZOFRAN ODT) dispertab 4 mg 4X/DAY PRN    Or   • ondansetron (ZOFRAN) syringe/vial injection 4 mg 4X/DAY PRN   • sodium chloride (OCEAN) 0.65 % nasal spray 2 Spray PRN   • midazolam (VERSED) 5 mg/mL (1 mL vial) PRN   • oxyCODONE immediate-release (ROXICODONE)  tablet 5 mg Q3HRS PRN    Or   • oxyCODONE immediate release (ROXICODONE) tablet 10 mg Q3HRS PRN    Or   • HYDROmorphone (DILAUDID) injection 0.5 mg Q3HRS PRN   • glucose 4 g chewable tablet 16 g Q15 MIN PRN    And   • dextrose 50% (D50W) injection 50 mL Q15 MIN PRN   • senna-docusate (PERICOLACE or SENOKOT S) 8.6-50 MG per tablet 2 tablet BID    And   • polyethylene glycol/lytes (MIRALAX) PACKET 1 Packet QDAY PRN    And   • magnesium hydroxide (MILK OF MAGNESIA) suspension 30 mL QDAY PRN    And   • bisacodyl (DULCOLAX) suppository 10 mg QDAY PRN   • MD ALERT...DO NOT ADMINISTER NSAIDS or ASPIRIN unless ORDERED By Neurosurgery 1 Each PRN   • dexamethasone (DECADRON) tablet 2 mg Q8HRS    Followed by   • [START ON 3/19/2021] dexamethasone (DECADRON) tablet 2 mg Q12HRS    Followed by   • [START ON 3/20/2021] dexamethasone (DECADRON) tablet 2 mg DAILY   • folic acid (FOLVITE) tablet 1 mg DAILY   • lamoTRIgine (LAMICTAL) tablet 100 mg BID   • levETIRAcetam (KEPPRA) tablet 500 mg BID   • venlafaxine (EFFEXOR) tablet 12.5 mg BID   • LORazepam (ATIVAN) tablet 0.5 mg Q4HRS PRN       Orders Placed This Encounter   Procedures   • Diet Order Diet: Regular     Standing Status:   Standing     Number of Occurrences:   1     Order Specific Question:   Diet:     Answer:   Regular [1]       Assessment:  Active Hospital Problems    Diagnosis    • *Glioblastoma of frontal lobe (HCC)    • 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 Susi Fulton's note dated 3/17/2021  GBM  S/p resection 3/9  Steroid taper  Staples out 3/23  Radiation mapping 3/24    Spasticity  Discussed spasticity management with patient.  Hold on initiating meds at this  time, continue to monitor  Agree with AFO, counseled on stretching of the calf muscles to prevent contracture and worsening spasticity     Seizure disorder  Keppra  Lamictal  Outpatient follow up with neurology     Anxiety disorder  Effexor  PRN Ativan  Consult psychology if needed, patient currently not interested     History of headaches  Scheduled tylenol     Hyponatremia  Start salt tabs --> reduce to twice daily 3/18.  Monitor  Recheck 3/18 - normal  Recheck 3/20     Vit D insufficiency  Supplementation     Bowel program  Continue bowel medications  Scheduled Sennakot  PRN Miralax, MOM, bisacodyl suppository  Last BM 3/18     Bladder program  Check PVRs -low PVRs  Bladder scan for no voids  ICP for over 400 cc  Scheduled toileting     DVT prophylaxis  Contraindicated given risk of hemorrhage.   Not cleared by neurosurgery.   Compression stockings on in am, off in pm.  Increase mobility. Monitor for swelling.       Total time: 27 minutes minutes.  I spent greater than 50% of the time for patient care and coordination on this date, including unit/floor time, and face-to-face time with the patient as per assessment and plan above.    Yanet Steele D.O.

## 2021-03-18 NOTE — THERAPY
"Physical Therapy   Daily Treatment     Patient Name: Melba Frye  Age:  50 y.o., Sex:  female  Medical Record #: [id number]  Today's Date: 3/18/2021     Precautions  Precautions: Fall Risk, Other (See Comments)  Comments: Seizure prec, L alma delia    Subjective    Pt was supine in bed upon arrival and agreeable to treatment.  Pt's spouse present during session.      Objective       03/18/21 1001   Precautions   Precautions Fall Risk;Other (See Comments)   Gait Functional Level of Assist    Gait Level Of Assist Contact Guard Assist   Assistive Device Front Wheel Walker;Parallel Bars   Distance (Feet) 60  (x60 feet with FWW, x 60 feet in // bars)   # of Times Distance was Traveled 2   Deviation Bradykinetic;Decreased Heel Strike;Decreased Toe Off   Wheelchair Functional Level of Assist   Wheelchair Assist Supervised   Distance Wheelchair (Feet or Distance) 150   Wheelchair Description Adaptive equipment;Extra time;Supervision for safety   Transfer Functional Level of Assist   Bed, Chair, Wheelchair Transfer Contact Guard Assist   Bed Chair Wheelchair Transfer Description Adaptive equipment;Set-up of equipment;Supervision for safety;Verbal cueing   Bed Mobility    Supine to Sit Supervised   Sit to Supine Supervised   Sit to Stand Contact Guard Assist   Scooting Stand by Assist   Interdisciplinary Plan of Care Collaboration   Patient Position at End of Therapy In Bed;Call Light within Reach;Tray Table within Reach;Phone within Reach;Family / Friend in Room   PT Total Time Spent   PT Individual Total Time Spent (Mins) 60   PT Charge Group   PT Gait Training 1   PT Neuromuscular Re-Education / Balance 2   PT Therapeutic Activities 1     Pre-gait activities in the // bars: WS with focus on quad and glut recruitment x 10 reps x 2, targeted stepping with focus on increased hip and knee flexion using dot target and 2\" cone x 10 reps x 2.  Gait training in // bars x 60 feet with focus on mechanics and increased walking " endurance.  Discussion of AFO options with set up of consult with Ability.  Discussion of POC/goals.  Education provided on motor recovery after brain surgery.     Assessment    Pt demonstrated good sequencing with STS with only minor VCing needed.  Pt with improvement in gait mechanics and endurance with improved symmetry of step length and LLE force use.  Pt motivated to improve.  Pt verbalized all education.    Strengths: Supportive family, Pleasant and cooperative, Motivated for self care and independence, Willingly participates in therapeutic activities, Effective communication skills  Barriers: Decreased endurance, Fatigue, Generalized weakness, Hemiparesis, Home accessibility, Impaired activity tolerance, Impaired balance, Impaired insight/denial of deficits, Limited mobility    Plan    Ongoing Vector (West) training for confidence/ pregait, complete consult with Ability, continue gait training with FWW, initiate step training in // bars vs Vector. Initiate family training for transfers with spouse.      Passport items to be completed:  Get in/out of bed safely, in/out of a vehicle, safely use mobility device, walk or wheel around home/community, navigate up and down stairs, show how to get up/down from the ground, ensure home is accessible, demonstrate HEP, complete caregiver training    Physical Therapy Problems     Problem: Mobility     Dates: Start: 03/16/21       Goal: STG-Within one week, patient will ambulate household distance     Dates: Start: 03/16/21       Description: 1) Individualized goal:  Pt will amb with FWW 30 CGA indoors  2) Interventions:  PT E Stim Attended, PT Orthotics Training, PT Gait Training, PT Therapeutic Exercises, PT Neuro Re-Ed/Balance, PT Aquatic Therapy, PT Therapeutic Activity, PT Manual Therapy, and PT Evaluation        Note:     Goal Note filed on 03/17/21 1226 by Lashon Cason DPT    Vector training- CGA/SBA 80 x 3 with FWW  L foot drag, impaired LUE , veering R      "               Goal: STG-Within one week, patient will ascend and descend four to six stairs     Dates: Start: 03/16/21       Description: 1) Individualized goal:  Patient will amb up/down 4\" stair in // bars 4x CGA/min A  2) Interventions:  PT E Stim Attended, PT Orthotics Training, PT Gait Training, PT Therapeutic Exercises, PT Neuro Re-Ed/Balance, PT Aquatic Therapy, PT Therapeutic Activity, PT Manual Therapy, and PT Evaluation        Note:     Goal Note filed on 03/17/21 1226 by Lashon Cason DPT    Eval 3/16  TBD                        Problem: Mobility Transfers     Dates: Start: 03/16/21       Goal: STG-Within one week, patient will sit to stand     Dates: Start: 03/16/21       Description: 1) Individualized goal:  Pt will transfer CGA consistently with FWW STS/SPT   2) Interventions:  PT E Stim Attended, PT Orthotics Training, PT Gait Training, PT Therapeutic Exercises, PT Neuro Re-Ed/Balance, PT Aquatic Therapy, PT Therapeutic Activity, PT Manual Therapy, and PT Evaluation        Note:     Goal Note filed on 03/17/21 1226 by Lashon Cason DPT    Vector STS in // bars CGA/SBA  Dec motor planning, Eval 3/16                          Problem: PT-Long Term Goals     Dates: Start: 03/16/21       Goal: LTG-By discharge, patient will ambulate     Dates: Start: 03/16/21       Description: 1) Individualized goal:  Patient will amb >50 ft with FWW SBA over level surfaces.   2) Interventions:  PT E Stim Attended, PT Orthotics Training, PT Gait Training, PT Therapeutic Exercises, PT Neuro Re-Ed/Balance, PT Aquatic Therapy, PT Therapeutic Activity, PT Manual Therapy, and PT Evaluation            Goal: LTG-By discharge, patient will transfer one surface to another     Dates: Start: 03/16/21       Description: 1) Individualized goal:  Patient will transfer SPV with FWW STS/SPT  2) Interventions:  PT E Stim Attended, PT Orthotics Training, PT Gait Training, PT Therapeutic Exercises, PT Neuro Re-Ed/Balance, PT Aquatic " Therapy, PT Therapeutic Activity, PT Manual Therapy, and PT Evaluation              Goal: LTG-By discharge, patient will ambulate up/down flight of stairs     Dates: Start: 03/16/21       Description: 1) Individualized goal:  Patient will amb up/down 10 stairs 2x with 1HR (switches from R to left after landing) CGA  2) Interventions:  PT E Stim Attended, PT Orthotics Training, PT Gait Training, PT Therapeutic Exercises, PT Neuro Re-Ed/Balance, PT Aquatic Therapy, PT Therapeutic Activity, PT Manual Therapy, and PT Evaluation

## 2021-03-18 NOTE — DISCHARGE PLANNING
CM spoke with patient and her  to update on IDT and target DC date of 3/27/21.  Answered questions.  CM will continue to monitor for DC needs.

## 2021-03-18 NOTE — THERAPY
"Occupational Therapy  Daily Treatment     Patient Name: Melba Frye  Age:  50 y.o., Sex:  female  Medical Record #: [id number]  Today's Date: 3/18/2021     Precautions  Precautions: (P) Fall Risk, Other (See Comments)  Comments: (P) seizure precautions          Subjective    \"It just feels off, I feel like I'm doing it wrong.\" pt reported about standing balance on LLE during session      Objective       03/18/21 1431   Precautions   Precautions Fall Risk;Other (See Comments)   Comments seizure precautions    Pain   Intervention Declines   Pain 0 - 10 Group   Pain Rating Scale (NPRS) 0   Therapist Pain Assessment Post Activity Pain Same as Prior to Activity   Cognition    Level of Consciousness Alert   Functional Level of Assist   Toileting Contact Guard Assist   Toileting Description Increased time;Initial preparation for task;Set-up of equipment;Supervision for safety;Verbal cueing   Balance   Comments pt engaged in standing tolerance at raised table; tolerated standing 5.5 min first trial and 9 min second trial wiht SBA using 1 hand support intermittently   Interdisciplinary Plan of Care Collaboration   Patient Position at End of Therapy Seated;Self Releasing Lap Belt Applied;Family / Friend in Room  (wheeled herself back to room w/  present)   OT Total Time Spent   OT Individual Total Time Spent (Mins) 30   OT Charge Group   OT Therapy Activity 2       Assessment    Pt tolerated session well. Educated pt on making sure to keep w/c close to surface that she is going to transfer to prior to transferring and always making sure w/c brakes are locked-  is doing family training tomorrow in therapy to learn xfer's with pt. Pt engaged in standing tolerance at raised tabletop during session while engaging in cognitive task and FMC for L hand (shuffling cards and playing [employer]). Pt tolerated standing 5.5 min first trial and short 1-2 min break and 9 min second trial with SBA. Educated pt on weight " shifting L/R and trying to put weight through LLE during standing and using arm support as needed on tabletop. Pt asking if she could work on standing in room with brushing teeth- instructed pt to always have someone with her during standing but encouraged pt to work on standing during BADL tasks at sink side I.e. brushing teeth, hair, washing face/hands.     Strengths: Able to follow instructions, Good insight into deficits/needs, Independent prior level of function, Making steady progress towards goals, Manages pain appropriately, Motivated for self care and independence, Pleasant and cooperative, Supportive family, Willingly participates in therapeutic activities  Barriers: Decreased endurance, Generalized weakness, Hemiparesis, Home accessibility, Impaired balance, Impaired activity tolerance    Plan    ADLs, IADLs (cooking next Tuesday/Wednesday), standing tolerance/balance, STS        Passport items to be completed:  get ready for the day, prepare a simple meal, participate in household tasks, demonstrate home exercise program, complete caregiver training     Occupational Therapy Goals     Problem: Bathing     Dates: Start: 03/16/21       Goal: STG-Within one week, patient will bathe     Dates: Start: 03/16/21       Description: 1) Individualized Goal:  with supervision using DME/AE as needed and improve safety strategies   2) Interventions:  OT Group Therapy, OT Self Care/ADL, OT Cognitive Skill Dev, OT Community Reintegration, OT Neuro Re-Ed/Balance, OT Therapeutic Activity, and OT Therapeutic Exercise      Note:     Goal Note filed on 03/17/21 1211 by Kartik Estrada OT/L    CGA                        Problem: Dressing     Dates: Start: 03/16/21       Goal: STG-Within one week, patient will dress LB     Dates: Start: 03/16/21       Description: 1) Individualized Goal:  with SBA using DME/AE as needed  2) Interventions:  OT Group Therapy, OT Self Care/ADL, OT Cognitive Skill Dev, OT Community  Reintegration, OT Neuro Re-Ed/Balance, OT Therapeutic Activity, and OT Therapeutic Exercise    Note:     Goal Note filed on 03/17/21 1211 by Kartik Estrada OT/KEATON    Min/mod                        Problem: Functional Transfers     Dates: Start: 03/16/21       Goal: STG-Within one week, patient will transfer to toilet     Dates: Start: 03/16/21       Description: 1) Individualized Goal:  with distant supervision using DME/AE as needed at w/c level  2) Interventions:  OT Group Therapy, OT Self Care/ADL, OT Cognitive Skill Dev, OT Community Reintegration, OT Neuro Re-Ed/Balance, OT Therapeutic Activity, and OT Therapeutic Exercise    Note:     Goal Note filed on 03/17/21 1211 by Kartik Estrada OT/L    CGA                        Problem: OT Long Term Goals     Dates: Start: 03/16/21       Goal: LTG-By discharge, patient will complete basic self care tasks     Dates: Start: 03/16/21       Description: 1) Individualized Goal:  Mod I using DME/AE as needed at FWW level  2) Interventions:  OT Group Therapy, OT Self Care/ADL, OT Cognitive Skill Dev, OT Community Reintegration, OT Neuro Re-Ed/Balance, OT Therapeutic Activity, and OT Therapeutic Exercise          Goal: LTG-By discharge, patient will perform bathroom transfers     Dates: Start: 03/16/21       Description: 1) Individualized Goal:  Mod I using DME/AE as needed at FWW level  2) Interventions:  OT Group Therapy, OT Self Care/ADL, OT Cognitive Skill Dev, OT Community Reintegration, OT Neuro Re-Ed/Balance, OT Therapeutic Activity, and OT Therapeutic Exercise                Problem: Toileting     Dates: Start: 03/16/21       Goal: STG-Within one week, patient will complete toileting tasks     Dates: Start: 03/16/21       Description: 1) Individualized Goal:  with supervision using DME/AE as needed   2) Interventions:  OT Group Therapy, OT Self Care/ADL, OT Cognitive Skill Dev, OT Community Reintegration, OT Neuro Re-Ed/Balance, OT Therapeutic Activity, and OT  Therapeutic Exercise    Note:     Goal Note filed on 03/17/21 1211 by Kartik Estrada, OT/KEATON    CGA

## 2021-03-18 NOTE — PROGRESS NOTES
Call placed to .  Introduced self and explained role of navigator.  He reports was suppose to see oncologist tomorrow, but will still be in rehab.  He reported physicians are working on trying to coordinate appointments.  Reviewed additional resources available and provided contact information.  He denies current questions and grateful for call.  He provided cell number stating number navigator called on was patient's cell (listed as home number in chart).  Will follow up with Dr Frye tomorrow regarding physician coordination.

## 2021-03-18 NOTE — CARE PLAN
Problem: Safety  Goal: Will remain free from falls  Note: Patient uses call light consistently and appropriately this shift.  Waits for assistance when needed and does not attempt self transfer.  Able to verbalize needs.  Will continue to monitor.      Problem: Infection  Goal: Will remain free from infection  Note: Patient remains free from s/s infection; afebrile.  Will continue to monitor.

## 2021-03-18 NOTE — THERAPY
Occupational Therapy  Daily Treatment     Patient Name: Melba Frye  Age:  50 y.o., Sex:  female  Medical Record #: [id number]  Today's Date: 3/18/2021     Precautions  Precautions: Fall Risk, Other (See Comments)  Comments: (P) Seizure prec         Subjective    Patient sitting in w/c just finishing breakfast.  Stated she'd like to get her shoes on and that she would need help with BAUTISTA hose.  Agreeable to attempt to don socks/shoes/TEDs without assist.     Objective       03/18/21 0831   Precautions   Precautions Fall Risk;Other (See Comments)   Comments Seizure prec   Functional Level of Assist   Eating Independent   Grooming Independent   Grooming Description Seated in wheelchair at sink   Lower Body Dressing Supervision   Lower Body Dressing Description Set-up of equipment;Supervision for safety  (setup/supervised to don socks/TEDs/slip on shoes)   Bed, Chair, Wheelchair Transfer Contact Guard Assist   Bed Chair Wheelchair Transfer Description Set-up of equipment;Supervision for safety;Verbal cueing  (SPT bed <> w/c w/ rail, cues for w/c positioning for x-xavier)   Sitting Lower Body Exercises   Sitting Lower Body Exercises Yes   Sit to Stand 1 set of 10  (verbal cues for hand placement, turning head to look at seat)   Nustep Time (See Comments)  (level 4 x 10 minutes w/ LEs only)   Comments L foot ace wrapped to nustep pedal with assistance of therapist to manually adduct left hip during use of nustep   Interdisciplinary Plan of Care Collaboration   Patient Position at End of Therapy Seated;Chair Alarm On;Self Releasing Lap Belt Applied  (taking self back to room in w/c)   OT Total Time Spent   OT Individual Total Time Spent (Mins) 60   OT Charge Group   OT Self Care / ADL 1   OT Therapy Activity 2   OT Therapeutic Exercise  1     Wheelchair mobility mod I after setup from room <> gym.  Verbal cues for placement of w/c with CGA for SPT w/c <> nustep.    Assessment    Patient continues to require verbal cues  for best and safest positioning of w/c prior to transferring in/out of it.  Patient unable to continuously adduct left hip to maintain midline position with use of nustep.  Strengths: Able to follow instructions, Good insight into deficits/needs, Independent prior level of function, Making steady progress towards goals, Manages pain appropriately, Motivated for self care and independence, Pleasant and cooperative, Supportive family, Willingly participates in therapeutic activities  Barriers: Decreased endurance, Generalized weakness, Hemiparesis, Home accessibility, Impaired balance, Impaired activity tolerance    Plan  ADLs, IADLs (cooking next Tuesday/Wednesday), standing tolerance/balance, STS      Passport items to be completed:  get ready for the day, prepare a simple meal, participate in household tasks, demonstrate home exercise program, complete caregiver training     Occupational Therapy Goals     Problem: Bathing     Dates: Start: 03/16/21       Goal: STG-Within one week, patient will bathe     Dates: Start: 03/16/21       Description: 1) Individualized Goal:  with supervision using DME/AE as needed and improve safety strategies   2) Interventions:  OT Group Therapy, OT Self Care/ADL, OT Cognitive Skill Dev, OT Community Reintegration, OT Neuro Re-Ed/Balance, OT Therapeutic Activity, and OT Therapeutic Exercise      Note:     Goal Note filed on 03/17/21 1211 by Kartik Estrada OT/L    CGA                        Problem: Dressing     Dates: Start: 03/16/21       Goal: STG-Within one week, patient will dress LB     Dates: Start: 03/16/21       Description: 1) Individualized Goal:  with SBA using DME/AE as needed  2) Interventions:  OT Group Therapy, OT Self Care/ADL, OT Cognitive Skill Dev, OT Community Reintegration, OT Neuro Re-Ed/Balance, OT Therapeutic Activity, and OT Therapeutic Exercise    Note:     Goal Note filed on 03/17/21 1211 by Kartik Estrada OT/L    Min/mod                         Problem: Functional Transfers     Dates: Start: 03/16/21       Goal: STG-Within one week, patient will transfer to toilet     Dates: Start: 03/16/21       Description: 1) Individualized Goal:  with distant supervision using DME/AE as needed at w/c level  2) Interventions:  OT Group Therapy, OT Self Care/ADL, OT Cognitive Skill Dev, OT Community Reintegration, OT Neuro Re-Ed/Balance, OT Therapeutic Activity, and OT Therapeutic Exercise    Note:     Goal Note filed on 03/17/21 1211 by Kartik Estrada, OT/L    CGA                        Problem: OT Long Term Goals     Dates: Start: 03/16/21       Goal: LTG-By discharge, patient will complete basic self care tasks     Dates: Start: 03/16/21       Description: 1) Individualized Goal:  Mod I using DME/AE as needed at FWW level  2) Interventions:  OT Group Therapy, OT Self Care/ADL, OT Cognitive Skill Dev, OT Community Reintegration, OT Neuro Re-Ed/Balance, OT Therapeutic Activity, and OT Therapeutic Exercise          Goal: LTG-By discharge, patient will perform bathroom transfers     Dates: Start: 03/16/21       Description: 1) Individualized Goal:  Mod I using DME/AE as needed at FWW level  2) Interventions:  OT Group Therapy, OT Self Care/ADL, OT Cognitive Skill Dev, OT Community Reintegration, OT Neuro Re-Ed/Balance, OT Therapeutic Activity, and OT Therapeutic Exercise                Problem: Toileting     Dates: Start: 03/16/21       Goal: STG-Within one week, patient will complete toileting tasks     Dates: Start: 03/16/21       Description: 1) Individualized Goal:  with supervision using DME/AE as needed   2) Interventions:  OT Group Therapy, OT Self Care/ADL, OT Cognitive Skill Dev, OT Community Reintegration, OT Neuro Re-Ed/Balance, OT Therapeutic Activity, and OT Therapeutic Exercise    Note:     Goal Note filed on 03/17/21 1211 by Kartik Estrada, OT/L    CGA

## 2021-03-19 PROCEDURE — A9270 NON-COVERED ITEM OR SERVICE: HCPCS | Performed by: PHYSICAL MEDICINE & REHABILITATION

## 2021-03-19 PROCEDURE — 99231 SBSQ HOSP IP/OBS SF/LOW 25: CPT | Performed by: PHYSICAL MEDICINE & REHABILITATION

## 2021-03-19 PROCEDURE — 97530 THERAPEUTIC ACTIVITIES: CPT

## 2021-03-19 PROCEDURE — 770010 HCHG ROOM/CARE - REHAB SEMI PRIVAT*

## 2021-03-19 PROCEDURE — 97116 GAIT TRAINING THERAPY: CPT

## 2021-03-19 PROCEDURE — 700102 HCHG RX REV CODE 250 W/ 637 OVERRIDE(OP): Performed by: PHYSICAL MEDICINE & REHABILITATION

## 2021-03-19 PROCEDURE — 97535 SELF CARE MNGMENT TRAINING: CPT

## 2021-03-19 RX ADMIN — FOLIC ACID 1 MG: 1 TABLET ORAL at 09:00

## 2021-03-19 RX ADMIN — DEXAMETHASONE 2 MG: 1 TABLET ORAL at 09:21

## 2021-03-19 RX ADMIN — LAMOTRIGINE 100 MG: 100 TABLET ORAL at 21:44

## 2021-03-19 RX ADMIN — LAMOTRIGINE 100 MG: 100 TABLET ORAL at 09:21

## 2021-03-19 RX ADMIN — VENLAFAXINE 12.5 MG: 25 TABLET ORAL at 21:45

## 2021-03-19 RX ADMIN — ACETAMINOPHEN 1000 MG: 500 TABLET, FILM COATED ORAL at 09:21

## 2021-03-19 RX ADMIN — LEVETIRACETAM 500 MG: 500 TABLET ORAL at 09:21

## 2021-03-19 RX ADMIN — VENLAFAXINE 12.5 MG: 25 TABLET ORAL at 09:21

## 2021-03-19 RX ADMIN — HYDROXYZINE HYDROCHLORIDE 50 MG: 25 TABLET, FILM COATED ORAL at 21:50

## 2021-03-19 RX ADMIN — HYDROXYZINE HYDROCHLORIDE 50 MG: 25 TABLET, FILM COATED ORAL at 09:21

## 2021-03-19 RX ADMIN — DEXAMETHASONE 2 MG: 1 TABLET ORAL at 21:44

## 2021-03-19 RX ADMIN — ACETAMINOPHEN 1000 MG: 500 TABLET, FILM COATED ORAL at 21:44

## 2021-03-19 RX ADMIN — Medication 1 G: at 18:02

## 2021-03-19 RX ADMIN — ACETAMINOPHEN 1000 MG: 500 TABLET, FILM COATED ORAL at 14:25

## 2021-03-19 RX ADMIN — LEVETIRACETAM 500 MG: 500 TABLET ORAL at 21:44

## 2021-03-19 RX ADMIN — Medication 1 G: at 14:25

## 2021-03-19 RX ADMIN — Medication 1 G: at 09:28

## 2021-03-19 ASSESSMENT — ACTIVITIES OF DAILY LIVING (ADL)
TUB_SHOWER_TRANSFER_DESCRIPTION: GRAB BAR;INCREASED TIME;SHOWER BENCH
TOILET_TRANSFER_DESCRIPTION: GRAB BAR;SUPERVISION FOR SAFETY;VERBAL CUEING
BED_CHAIR_WHEELCHAIR_TRANSFER_DESCRIPTION: VERBAL CUEING;SUPERVISION FOR SAFETY;SET-UP OF EQUIPMENT
BED_CHAIR_WHEELCHAIR_TRANSFER_DESCRIPTION: INCREASED TIME;SET-UP OF EQUIPMENT;SUPERVISION FOR SAFETY;VERBAL CUEING

## 2021-03-19 ASSESSMENT — GAIT ASSESSMENTS
DISTANCE (FEET): 250
ASSISTIVE DEVICE: FRONT WHEEL WALKER
GAIT LEVEL OF ASSIST: CONTACT GUARD ASSIST

## 2021-03-19 NOTE — THERAPY
"Physical Therapy   Daily Treatment     Patient Name: Melba Frye  Age:  50 y.o., Sex:  female  Medical Record #: [id number]  Today's Date: 3/19/2021     Precautions  Precautions: Fall Risk, Other (See Comments)  Comments: seizure precautions     Subjective    Pt was supine in bed upon arrival and agreeable to treatment.  Pt's spouse present for family training.      Objective       03/19/21 1301   Precautions   Precautions Fall Risk;Other (See Comments)   Stairs Functional Level of Assist   Level of Assist with Stairs Contact Guard Assist   # of Stairs Climbed 4  (6\" steps x 2 with BHR)   Stairs Description Extra time;Hand rails;Supervision for safety;Verbal cueing;Safety concerns   Transfer Functional Level of Assist   Bed, Chair, Wheelchair Transfer Contact Guard Assist   Bed Chair Wheelchair Transfer Description Verbal cueing;Supervision for safety;Set-up of equipment   Toilet Transfers Contact Guard Assist   Toilet Transfer Description Grab bar;Supervision for safety;Verbal cueing   Bed Mobility    Supine to Sit Supervised   Sit to Supine Supervised   Sit to Stand Contact Guard Assist   Scooting Supervised   Rolling Supervised   Interdisciplinary Plan of Care Collaboration   IDT Collaboration with  Nursing   Patient Position at End of Therapy Seated;Call Light within Reach;Tray Table within Reach;Phone within Reach   Collaboration Comments Pt's spouse now cleared to transfer pt in/OOB and on/off toilet   PT Total Time Spent   PT Individual Total Time Spent (Mins) 60   PT Charge Group   PT Gait Training 1   PT Therapeutic Activities 3     Completed family training with pt's spouse on transfers to/from bed and on/off toilet at w/c level.  Completed discussion on home set up and safety with review of photos of indoor stairs.  Discussion/problem solving completed with transfers in/out of home bathroom with education on equipment and resources to obtain equipment.  Discussion of use of aquatic therapy in future " sessions with tour of pool space.      Assessment    Pt demonstrated good safety and sequencing with stair training using both forward and side stepping approaches.  Pt with good insight into sequencing and mobility deficits.  Pt's spouse demonstrated good safety and guarding with pt with transfers and is now cleared to assist pt into/OOB and on/off toilet.   Strengths: Supportive family, Pleasant and cooperative, Motivated for self care and independence, Willingly participates in therapeutic activities, Effective communication skills  Barriers: Decreased endurance, Fatigue, Generalized weakness, Hemiparesis, Home accessibility, Impaired activity tolerance, Impaired balance, Impaired insight/denial of deficits, Limited mobility    Plan    Continue gait training with FWW, complete consult with [facility] (Monday at 3:30), continue stair training, pool therapy, continue family training as appropriate, car transfer, outdoor ambulation.     Passport items to be completed:  Get in/out of bed safely, in/out of a vehicle, safely use mobility device, walk or wheel around home/community, navigate up and down stairs, show how to get up/down from the ground, ensure home is accessible, demonstrate HEP, complete caregiver training    Physical Therapy Problems     Problem: Mobility     Dates: Start: 03/16/21       Goal: STG-Within one week, patient will ambulate household distance     Dates: Start: 03/16/21       Description: 1) Individualized goal:  Pt will amb with FWW 30 CGA indoors  2) Interventions:  PT E Stim Attended, PT Orthotics Training, PT Gait Training, PT Therapeutic Exercises, PT Neuro Re-Ed/Balance, PT Aquatic Therapy, PT Therapeutic Activity, PT Manual Therapy, and PT Evaluation        Note:     Goal Note filed on 03/17/21 1226 by Lashon Cason DPT    Vector training- CGA/SBA 80 x 3 with FWW  L foot drag, impaired LUE , veering R                    Goal: STG-Within one week, patient will ascend and descend four  "to six stairs     Dates: Start: 03/16/21       Description: 1) Individualized goal:  Patient will amb up/down 4\" stair in // bars 4x CGA/min A  2) Interventions:  PT E Stim Attended, PT Orthotics Training, PT Gait Training, PT Therapeutic Exercises, PT Neuro Re-Ed/Balance, PT Aquatic Therapy, PT Therapeutic Activity, PT Manual Therapy, and PT Evaluation        Note:     Goal Note filed on 03/17/21 1226 by Lashon Cason DPT    Eval 3/16  TBD                        Problem: Mobility Transfers     Dates: Start: 03/16/21       Goal: STG-Within one week, patient will sit to stand     Dates: Start: 03/16/21       Description: 1) Individualized goal:  Pt will transfer CGA consistently with FWW STS/SPT   2) Interventions:  PT E Stim Attended, PT Orthotics Training, PT Gait Training, PT Therapeutic Exercises, PT Neuro Re-Ed/Balance, PT Aquatic Therapy, PT Therapeutic Activity, PT Manual Therapy, and PT Evaluation        Note:     Goal Note filed on 03/17/21 1226 by Lashon Cason DPT    Vector STS in // bars CGA/SBA  Dec motor planning, Eval 3/16                          Problem: PT-Long Term Goals     Dates: Start: 03/16/21       Goal: LTG-By discharge, patient will ambulate     Dates: Start: 03/16/21       Description: 1) Individualized goal:  Patient will amb >50 ft with FWW SBA over level surfaces.   2) Interventions:  PT E Stim Attended, PT Orthotics Training, PT Gait Training, PT Therapeutic Exercises, PT Neuro Re-Ed/Balance, PT Aquatic Therapy, PT Therapeutic Activity, PT Manual Therapy, and PT Evaluation            Goal: LTG-By discharge, patient will transfer one surface to another     Dates: Start: 03/16/21       Description: 1) Individualized goal:  Patient will transfer SPV with FWW STS/SPT  2) Interventions:  PT E Stim Attended, PT Orthotics Training, PT Gait Training, PT Therapeutic Exercises, PT Neuro Re-Ed/Balance, PT Aquatic Therapy, PT Therapeutic Activity, PT Manual Therapy, and PT Evaluation              " Goal: LTG-By discharge, patient will ambulate up/down flight of stairs     Dates: Start: 03/16/21       Description: 1) Individualized goal:  Patient will amb up/down 10 stairs 2x with 1HR (switches from R to left after landing) CGA  2) Interventions:  PT E Stim Attended, PT Orthotics Training, PT Gait Training, PT Therapeutic Exercises, PT Neuro Re-Ed/Balance, PT Aquatic Therapy, PT Therapeutic Activity, PT Manual Therapy, and PT Evaluation

## 2021-03-19 NOTE — THERAPY
"Physical Therapy   Daily Treatment     Patient Name: Melba Frye  Age:  50 y.o., Sex:  female  Medical Record #: [id number]  Today's Date: 3/19/2021     Precautions  Precautions: Fall Risk, Other (See Comments)  Comments: seizure precautions     Subjective    Pt was seated in w/c upon arrival and agreeable to treatment.  Pt reported that she didn't sleep well last night.       Objective       03/19/21 0831   Precautions   Precautions Fall Risk;Other (See Comments)   Wheelchair Functional Level of Assist   Wheelchair Assist Modified Independent   Distance Wheelchair (Feet or Distance) 150  (x2)   Wheelchair Description Adaptive equipment   Stairs Functional Level of Assist   Level of Assist with Stairs Contact Guard Assist   # of Stairs Climbed 4  (4\" step, x 3 6\" step in // bars)   Stairs Description Extra time;Hand rails;Safety concerns;Supervision for safety;Verbal cueing   Interdisciplinary Plan of Care Collaboration   Patient Position at End of Therapy Seated;Call Light within Reach;Tray Table within Reach;Phone within Reach   PT Total Time Spent   PT Individual Total Time Spent (Mins) 30   PT Charge Group   PT Gait Training 1   PT Therapeutic Activities 1     Reviewed with pt home set up and safety.  Discussion of POC, goals.  Education provided on stair training biasing strong LE.    Assessment    Pt demonstrated good safety and sequening with stair training this session.  Pt verbalized all education.   Strengths: Supportive family, Pleasant and cooperative, Motivated for self care and independence, Willingly participates in therapeutic activities, Effective communication skills  Barriers: Decreased endurance, Fatigue, Generalized weakness, Hemiparesis, Home accessibility, Impaired activity tolerance, Impaired balance, Impaired insight/denial of deficits, Limited mobility    Plan    Continue gait training with FWW, complete consult with Ability (Monday at 3:30), continue stair training, Complete family " "training for transfers with spouse.      Passport items to be completed:  Get in/out of bed safely, in/out of a vehicle, safely use mobility device, walk or wheel around home/community, navigate up and down stairs, show how to get up/down from the ground, ensure home is accessible, demonstrate HEP, complete caregiver training      Physical Therapy Problems     Problem: Mobility     Dates: Start: 03/16/21       Goal: STG-Within one week, patient will ambulate household distance     Dates: Start: 03/16/21       Description: 1) Individualized goal:  Pt will amb with FWW 30 CGA indoors  2) Interventions:  PT E Stim Attended, PT Orthotics Training, PT Gait Training, PT Therapeutic Exercises, PT Neuro Re-Ed/Balance, PT Aquatic Therapy, PT Therapeutic Activity, PT Manual Therapy, and PT Evaluation        Note:     Goal Note filed on 03/17/21 1226 by Lashon Cason DPT    Vector training- CGA/SBA 80 x 3 with FWW  L foot drag, impaired LUE , veering R                    Goal: STG-Within one week, patient will ascend and descend four to six stairs     Dates: Start: 03/16/21       Description: 1) Individualized goal:  Patient will amb up/down 4\" stair in // bars 4x CGA/min A  2) Interventions:  PT E Stim Attended, PT Orthotics Training, PT Gait Training, PT Therapeutic Exercises, PT Neuro Re-Ed/Balance, PT Aquatic Therapy, PT Therapeutic Activity, PT Manual Therapy, and PT Evaluation        Note:     Goal Note filed on 03/17/21 1226 by Lashon Cason DPT    Eval 3/16  TBD                        Problem: Mobility Transfers     Dates: Start: 03/16/21       Goal: STG-Within one week, patient will sit to stand     Dates: Start: 03/16/21       Description: 1) Individualized goal:  Pt will transfer CGA consistently with FWW STS/SPT   2) Interventions:  PT E Stim Attended, PT Orthotics Training, PT Gait Training, PT Therapeutic Exercises, PT Neuro Re-Ed/Balance, PT Aquatic Therapy, PT Therapeutic Activity, PT Manual Therapy, and " PT Evaluation        Note:     Goal Note filed on 03/17/21 1226 by MINH PosadasT    Vector STS in // bars CGA/SBA  Dec motor planning, Sharifaal 3/16                          Problem: PT-Long Term Goals     Dates: Start: 03/16/21       Goal: LTG-By discharge, patient will ambulate     Dates: Start: 03/16/21       Description: 1) Individualized goal:  Patient will amb >50 ft with FWW SBA over level surfaces.   2) Interventions:  PT E Stim Attended, PT Orthotics Training, PT Gait Training, PT Therapeutic Exercises, PT Neuro Re-Ed/Balance, PT Aquatic Therapy, PT Therapeutic Activity, PT Manual Therapy, and PT Evaluation            Goal: LTG-By discharge, patient will transfer one surface to another     Dates: Start: 03/16/21       Description: 1) Individualized goal:  Patient will transfer SPV with FWW STS/SPT  2) Interventions:  PT E Stim Attended, PT Orthotics Training, PT Gait Training, PT Therapeutic Exercises, PT Neuro Re-Ed/Balance, PT Aquatic Therapy, PT Therapeutic Activity, PT Manual Therapy, and PT Evaluation              Goal: LTG-By discharge, patient will ambulate up/down flight of stairs     Dates: Start: 03/16/21       Description: 1) Individualized goal:  Patient will amb up/down 10 stairs 2x with 1HR (switches from R to left after landing) CGA  2) Interventions:  PT E Stim Attended, PT Orthotics Training, PT Gait Training, PT Therapeutic Exercises, PT Neuro Re-Ed/Balance, PT Aquatic Therapy, PT Therapeutic Activity, PT Manual Therapy, and PT Evaluation

## 2021-03-19 NOTE — CARE PLAN
Problem: Safety  Goal: Will remain free from injury  Outcome: PROGRESSING AS EXPECTED  Note: Pt uses call light consistently and appropriately. Waits for assistance does not attempt self transfer this shift. Able to verbalize needs.      Problem: Pain Management  Goal: Pain level will decrease to patient's comfort goal  Outcome: PROGRESSING AS EXPECTED  Note: Tylenol given as scheduled per MAR for c/o headache with good relief. Pt sleeps well with PRN atarax. Will continue to monitor.

## 2021-03-19 NOTE — PROGRESS NOTES
"Rehab Progress Note     Encounter Date: 3/19/2021    CC: Therapy is going well    Interval Events (Subjective)  Vital signs stable.  No new labs to review.  No new imaging to review.  Voiding volitionally with low PVRs.  Last BM 3/18.  Seen and examined in her room.  Has no issues.  States that she is doing better and better.  Ambulating over 600 feet.  Working on stairs.  Seen and examined in her room.    14 point ROS reviewed and negative except as stated above.     Objective:  VITAL SIGNS: /70   Pulse 70   Temp 36.6 °C (97.9 °F) (Temporal)   Resp 17   Ht 1.702 m (5' 7\")   Wt 102 kg (225 lb 15.5 oz)   SpO2 96%   BMI 35.39 kg/m²     GEN: No apparent distress, laying comfortably in bed.  HEENT: Head normocephalic, atraumatic.  Sclera nonicteric bilaterally, no ocular discharge appreciated bilaterally.  CV: Extremities warm and well-perfused, no peripheral edema appreciated bilaterally.  PULMONARY: Breathing nonlabored on room air, no respiratory accessory muscle use.  Not requiring supplemental oxygen.  ABD: Soft, nontender.  SKIN: No appreciable skin breakdown on exposed areas of skin.  PSYCH: Mood and affect within normal limits.  NEURO: Awake alert.  Conversational.  Logical thought content.  Improved strength of left dorsiflexion.  Spasticity still present but range of motion intact.      Recent Results (from the past 72 hour(s))   ACCU-CHEK GLUCOSE    Collection Time: 03/16/21 10:59 AM   Result Value Ref Range    Glucose - Accu-Ck 92 65 - 99 mg/dL   Basic Metabolic Panel    Collection Time: 03/18/21  5:38 AM   Result Value Ref Range    Sodium 138 135 - 145 mmol/L    Potassium 4.5 3.6 - 5.5 mmol/L    Chloride 105 96 - 112 mmol/L    Co2 25 20 - 33 mmol/L    Glucose 108 (H) 65 - 99 mg/dL    Bun 18 8 - 22 mg/dL    Creatinine 0.52 0.50 - 1.40 mg/dL    Calcium 8.5 8.5 - 10.5 mg/dL    Anion Gap 8.0 7.0 - 16.0   ESTIMATED GFR    Collection Time: 03/18/21  5:38 AM   Result Value Ref Range    GFR If  " American >60 >60 mL/min/1.73 m 2    GFR If Non African American >60 >60 mL/min/1.73 m 2       Current Facility-Administered Medications   Medication Frequency   • acetaminophen (Tylenol) tablet 650 mg Q6HRS PRN   • sodium chloride (SALT) tablet 1 g TID WITH MEALS   • acetaminophen (TYLENOL) tablet 1,000 mg TID   • hydrOXYzine HCl (ATARAX) tablet 50 mg Q6HRS PRN   • melatonin tablet 3 mg HS PRN   • Respiratory Therapy Consult Continuous RT   • Pharmacy Consult Request ...Pain Management Review 1 Each PHARMACY TO DOSE   • lactulose 20 GM/30ML solution 30 mL QDAY PRN   • docusate sodium (ENEMEEZ) enema 283 mg QDAY PRN   • fleet enema 133 mL QDAY PRN   • artificial tears ophthalmic solution 1 Drop PRN   • benzocaine-menthol (CEPACOL) lozenge 1 Lozenge Q2HRS PRN   • mag hydrox-al hydrox-simeth (MAALOX PLUS ES or MYLANTA DS) suspension 20 mL Q2HRS PRN   • ondansetron (ZOFRAN ODT) dispertab 4 mg 4X/DAY PRN    Or   • ondansetron (ZOFRAN) syringe/vial injection 4 mg 4X/DAY PRN   • sodium chloride (OCEAN) 0.65 % nasal spray 2 Spray PRN   • midazolam (VERSED) 5 mg/mL (1 mL vial) PRN   • oxyCODONE immediate-release (ROXICODONE) tablet 5 mg Q3HRS PRN    Or   • oxyCODONE immediate release (ROXICODONE) tablet 10 mg Q3HRS PRN    Or   • HYDROmorphone (DILAUDID) injection 0.5 mg Q3HRS PRN   • glucose 4 g chewable tablet 16 g Q15 MIN PRN    And   • dextrose 50% (D50W) injection 50 mL Q15 MIN PRN   • senna-docusate (PERICOLACE or SENOKOT S) 8.6-50 MG per tablet 2 tablet BID    And   • polyethylene glycol/lytes (MIRALAX) PACKET 1 Packet QDAY PRN    And   • magnesium hydroxide (MILK OF MAGNESIA) suspension 30 mL QDAY PRN    And   • bisacodyl (DULCOLAX) suppository 10 mg QDAY PRN   • MD ALERT...DO NOT ADMINISTER NSAIDS or ASPIRIN unless ORDERED By Neurosurgery 1 Each PRN   • dexamethasone (DECADRON) tablet 2 mg Q12HRS    Followed by   • [START ON 3/20/2021] dexamethasone (DECADRON) tablet 2 mg DAILY   • folic acid (FOLVITE) tablet 1 mg  DAILY   • lamoTRIgine (LAMICTAL) tablet 100 mg BID   • levETIRAcetam (KEPPRA) tablet 500 mg BID   • venlafaxine (EFFEXOR) tablet 12.5 mg BID   • LORazepam (ATIVAN) tablet 0.5 mg Q4HRS PRN       Orders Placed This Encounter   Procedures   • Diet Order Diet: Regular     Standing Status:   Standing     Number of Occurrences:   1     Order Specific Question:   Diet:     Answer:   Regular [1]       Assessment:  Active Hospital Problems    Diagnosis    • *Glioblastoma of frontal lobe (HCC)    • 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 Susi Fulton's note dated 3/17/2021  GBM  S/p resection 3/9  Steroid taper  Staples out 3/23  Radiation mapping 3/24    Spasticity  Discussed spasticity management with patient.  Hold on initiating meds at this time, continue to monitor  Agree with AFO, counseled on stretching of the calf muscles to prevent contracture and worsening spasticity     Seizure disorder  Keppra  Lamictal  Outpatient follow up with neurology     Anxiety disorder  Effexor  PRN Ativan  Consult psychology if needed, patient currently not interested     History of headaches  Scheduled tylenol     Hyponatremia  Start salt tabs --> reduce to twice daily 3/18.  Monitor  Recheck 3/18 - normal  Recheck 3/22     Vit D insufficiency  Supplementation     Bowel program  Continue bowel medications  Scheduled Sennakot  PRN Miralax, MOM, bisacodyl suppository  Last BM 3/18     Bladder program  Check PVRs -low PVRs  Bladder scan for no voids  ICP for over 400 cc  Scheduled toileting     DVT prophylaxis  Contraindicated given risk of hemorrhage.   Not cleared by neurosurgery.   Compression stockings on in am, off in pm.  Increase  mobility. Monitor for swelling.  Patient ambulating over 600 feet.  Prophylaxis likely indicated.       Total time: 20 minutes.  I spent greater than 50% of the time for patient care and coordination on this date, including unit/floor time, and face-to-face time with the patient as per assessment and plan above.    Yanet Steele D.O.

## 2021-03-19 NOTE — THERAPY
Physical Therapy   Daily Treatment     Patient Name: Melba Frye  Age:  50 y.o., Sex:  female  Medical Record #: [id number]  Today's Date: 3/19/2021     Precautions  Precautions: Fall Risk, Other (See Comments)  Comments: seizure precautions     Subjective    Pt seated in room with  present, agreeable to PT.      Objective       03/19/21 0931   Precautions   Precautions Fall Risk   Comments seizure precautions    Gait Functional Level of Assist    Gait Level Of Assist Contact Guard Assist   Assistive Device Front Wheel Walker   Distance (Feet) 250   # of Times Distance was Traveled 2.5   Deviation Bradykinetic;Ataxic;Decreased Base Of Support;Decreased Heel Strike;Decreased Toe Off  (L foot drop )   Interdisciplinary Plan of Care Collaboration   IDT Collaboration with  Family / Caregiver   Patient Position at End of Therapy Seated  (in therapy gym )   Collaboration Comments pt's  present for session. Propelled pt back to room after PT session    PT Total Time Spent   PT Individual Total Time Spent (Mins) 30   PT Charge Group   PT Gait Training 2     Pt education regarding gait mechanics and quality vs quantity of ambulation. Discussed neurological healing and importance of balance. Also discussed multi tasking and automaticity of gait.     Assessment    Pt motivated and pleased with progress, able to increase ambulation distance this session. One instance of L toe catch when talking to  during ambulation. Receptive to all education provided.     Strengths: Supportive family, Pleasant and cooperative, Motivated for self care and independence, Willingly participates in therapeutic activities, Effective communication skills  Barriers: Decreased endurance, Fatigue, Generalized weakness, Hemiparesis, Home accessibility, Impaired activity tolerance, Impaired balance, Impaired insight/denial of deficits, Limited mobility    Plan    Continue gait training with FWW, complete consult with Ability  "(Monday at 3:30), continue stair training, Complete family training for transfers with spouse.     Physical Therapy Problems     Problem: Mobility     Dates: Start: 03/16/21       Goal: STG-Within one week, patient will ambulate household distance     Dates: Start: 03/16/21       Description: 1) Individualized goal:  Pt will amb with FWW 30 CGA indoors  2) Interventions:  PT E Stim Attended, PT Orthotics Training, PT Gait Training, PT Therapeutic Exercises, PT Neuro Re-Ed/Balance, PT Aquatic Therapy, PT Therapeutic Activity, PT Manual Therapy, and PT Evaluation        Note:     Goal Note filed on 03/17/21 1226 by Lashon Cason DPT    Vector training- CGA/SBA 80 x 3 with FWW  L foot drag, impaired LUE , veering R                    Goal: STG-Within one week, patient will ascend and descend four to six stairs     Dates: Start: 03/16/21       Description: 1) Individualized goal:  Patient will amb up/down 4\" stair in // bars 4x CGA/min A  2) Interventions:  PT E Stim Attended, PT Orthotics Training, PT Gait Training, PT Therapeutic Exercises, PT Neuro Re-Ed/Balance, PT Aquatic Therapy, PT Therapeutic Activity, PT Manual Therapy, and PT Evaluation        Note:     Goal Note filed on 03/17/21 1226 by PAULINE Posadas 3/16  TBD                        Problem: Mobility Transfers     Dates: Start: 03/16/21       Goal: STG-Within one week, patient will sit to stand     Dates: Start: 03/16/21       Description: 1) Individualized goal:  Pt will transfer CGA consistently with FWW STS/SPT   2) Interventions:  PT E Stim Attended, PT Orthotics Training, PT Gait Training, PT Therapeutic Exercises, PT Neuro Re-Ed/Balance, PT Aquatic Therapy, PT Therapeutic Activity, PT Manual Therapy, and PT Evaluation        Note:     Goal Note filed on 03/17/21 1226 by Lashon Cason DPT    Vector STS in // bars CGA/SBA  Dec motor planning, Eval 3/16                          Problem: PT-Long Term Goals     Dates: Start: 03/16/21    "    Goal: LTG-By discharge, patient will ambulate     Dates: Start: 03/16/21       Description: 1) Individualized goal:  Patient will amb >50 ft with FWW SBA over level surfaces.   2) Interventions:  PT E Stim Attended, PT Orthotics Training, PT Gait Training, PT Therapeutic Exercises, PT Neuro Re-Ed/Balance, PT Aquatic Therapy, PT Therapeutic Activity, PT Manual Therapy, and PT Evaluation            Goal: LTG-By discharge, patient will transfer one surface to another     Dates: Start: 03/16/21       Description: 1) Individualized goal:  Patient will transfer SPV with FWW STS/SPT  2) Interventions:  PT E Stim Attended, PT Orthotics Training, PT Gait Training, PT Therapeutic Exercises, PT Neuro Re-Ed/Balance, PT Aquatic Therapy, PT Therapeutic Activity, PT Manual Therapy, and PT Evaluation              Goal: LTG-By discharge, patient will ambulate up/down flight of stairs     Dates: Start: 03/16/21       Description: 1) Individualized goal:  Patient will amb up/down 10 stairs 2x with 1HR (switches from R to left after landing) CGA  2) Interventions:  PT E Stim Attended, PT Orthotics Training, PT Gait Training, PT Therapeutic Exercises, PT Neuro Re-Ed/Balance, PT Aquatic Therapy, PT Therapeutic Activity, PT Manual Therapy, and PT Evaluation

## 2021-03-20 PROCEDURE — 770010 HCHG ROOM/CARE - REHAB SEMI PRIVAT*

## 2021-03-20 PROCEDURE — 700102 HCHG RX REV CODE 250 W/ 637 OVERRIDE(OP): Performed by: PHYSICAL MEDICINE & REHABILITATION

## 2021-03-20 PROCEDURE — A9270 NON-COVERED ITEM OR SERVICE: HCPCS | Performed by: PHYSICAL MEDICINE & REHABILITATION

## 2021-03-20 RX ADMIN — ACETAMINOPHEN 1000 MG: 500 TABLET, FILM COATED ORAL at 14:59

## 2021-03-20 RX ADMIN — LEVETIRACETAM 500 MG: 500 TABLET ORAL at 20:19

## 2021-03-20 RX ADMIN — FOLIC ACID 1 MG: 1 TABLET ORAL at 09:10

## 2021-03-20 RX ADMIN — MELATONIN TAB 3 MG 3 MG: 3 TAB at 20:19

## 2021-03-20 RX ADMIN — VENLAFAXINE 12.5 MG: 25 TABLET ORAL at 09:10

## 2021-03-20 RX ADMIN — Medication 1 G: at 09:07

## 2021-03-20 RX ADMIN — Medication 1 G: at 17:46

## 2021-03-20 RX ADMIN — VENLAFAXINE 12.5 MG: 25 TABLET ORAL at 20:19

## 2021-03-20 RX ADMIN — ACETAMINOPHEN 1000 MG: 500 TABLET, FILM COATED ORAL at 09:08

## 2021-03-20 RX ADMIN — HYDROXYZINE HYDROCHLORIDE 50 MG: 25 TABLET, FILM COATED ORAL at 17:46

## 2021-03-20 RX ADMIN — LAMOTRIGINE 100 MG: 100 TABLET ORAL at 20:19

## 2021-03-20 RX ADMIN — LEVETIRACETAM 500 MG: 500 TABLET ORAL at 09:10

## 2021-03-20 RX ADMIN — DEXAMETHASONE 2 MG: 1 TABLET ORAL at 09:07

## 2021-03-20 RX ADMIN — Medication 1 G: at 12:27

## 2021-03-20 RX ADMIN — ACETAMINOPHEN 1000 MG: 500 TABLET, FILM COATED ORAL at 20:19

## 2021-03-20 RX ADMIN — SENNOSIDES AND DOCUSATE SODIUM 2 TABLET: 8.6; 5 TABLET ORAL at 09:07

## 2021-03-20 RX ADMIN — LAMOTRIGINE 100 MG: 100 TABLET ORAL at 09:08

## 2021-03-20 NOTE — CARE PLAN
Problem: Bowel/Gastric:  Goal: Normal bowel function is maintained or improved  Outcome: PROGRESSING AS EXPECTED  Note: Pt continent of bowel. She refused her scheduled senna tonight. LBM 3/19/21.     Problem: Pain Management  Goal: Pain level will decrease to patient's comfort goal  Outcome: PROGRESSING AS EXPECTED  Note: Scheduled tylenol given for c/o mild headache with adequate relief. Pt sleeps good. Will continue to monitor.

## 2021-03-20 NOTE — THERAPY
"Occupational Therapy  Daily Treatment     Patient Name: Melba Frye  Age:  50 y.o., Sex:  female  Medical Record #: [id number]  Today's Date: 3/19/2021     Precautions  Precautions: Fall Risk, Other (See Comments)  Comments: seizure precautions     Safety   ADL Safety : (P) Requires Supervision for Safety    Subjective    \"I don't know why, but this is so fun!\" re: her independence with showering     Objective       03/19/21 1601   Safety    ADL Safety  Requires Supervision for Safety   Cognition    Level of Consciousness Alert   Functional Level of Assist   Eating Independent   Grooming Independent   Grooming Description Seated in wheelchair at sink  (supervision for standing at sink)   Bathing Supervision  (mod I for seated portions, supervision for standing portion)   Bathing Description Grab bar;Hand held shower;Tub bench;Increased time;Set-up of equipment;Supervision for safety;Verbal cueing   Upper Body Dressing Modified Independent   Upper Body Dressing Description   (gathered and donned/doffed clothes)   Lower Body Dressing Supervision   Lower Body Dressing Description   (w/c and walker)   Bed, Chair, Wheelchair Transfer Contact Guard Assist  (mild, self corrected LOB)   Bed Chair Wheelchair Transfer Description Increased time;Set-up of equipment;Supervision for safety;Verbal cueing   Tub / Shower Transfers Stand by Assist  (FWW level)   Tub Shower Transfer Description Grab bar;Increased time;Shower bench   Interdisciplinary Plan of Care Collaboration   Patient Position at End of Therapy In Bed;Call Light within Reach;Tray Table within Reach;Phone within Reach   OT Total Time Spent   OT Individual Total Time Spent (Mins) 60   OT Charge Group   OT Self Care / ADL 4       Assessment    Pt had positive attitude throughout session. Pt was at SBA-CGA level for gathering clothing. CGA for walking to restroom. Pt was excited to use walker functionally inside the room. Pt is doing a good job of thinking ahead " to be more safe.    Strengths: Able to follow instructions, Good insight into deficits/needs, Independent prior level of function, Making steady progress towards goals, Manages pain appropriately, Motivated for self care and independence, Pleasant and cooperative, Supportive family, Willingly participates in therapeutic activities  Barriers: Decreased endurance, Generalized weakness, Hemiparesis, Home accessibility, Impaired balance, Impaired activity tolerance    Plan    ADLs, IADLs (cooking next Tuesday/Wednesday), standing tolerance/balance, STS    Passport items to be completed:  Perform bathroom transfers, complete dressing, prepare a simple meal, participate in household tasks, adapt home for safety needs, demonstrate home exercise program, complete caregiver training     Occupational Therapy Goals     Problem: Bathing     Dates: Start: 03/16/21       Goal: STG-Within one week, patient will bathe     Dates: Start: 03/16/21       Description: 1) Individualized Goal:  with supervision using DME/AE as needed and improve safety strategies   2) Interventions:  OT Group Therapy, OT Self Care/ADL, OT Cognitive Skill Dev, OT Community Reintegration, OT Neuro Re-Ed/Balance, OT Therapeutic Activity, and OT Therapeutic Exercise      Note:     Goal Note filed on 03/17/21 1211 by Kartik Estrada OT/L    CGA                        Problem: Dressing     Dates: Start: 03/16/21       Goal: STG-Within one week, patient will dress LB     Dates: Start: 03/16/21       Description: 1) Individualized Goal:  with SBA using DME/AE as needed  2) Interventions:  OT Group Therapy, OT Self Care/ADL, OT Cognitive Skill Dev, OT Community Reintegration, OT Neuro Re-Ed/Balance, OT Therapeutic Activity, and OT Therapeutic Exercise    Note:     Goal Note filed on 03/17/21 1211 by Kartik Estrada OT/KE[place]    Min/mod                        Problem: Functional Transfers     Dates: Start: 03/16/21       Goal: STG-Within one week, patient will  transfer to toilet     Dates: Start: 03/16/21       Description: 1) Individualized Goal:  with distant supervision using DME/AE as needed at w/c level  2) Interventions:  OT Group Therapy, OT Self Care/ADL, OT Cognitive Skill Dev, OT Community Reintegration, OT Neuro Re-Ed/Balance, OT Therapeutic Activity, and OT Therapeutic Exercise    Note:     Goal Note filed on 03/17/21 1211 by Kartik Estrada, OT/L    CGA                        Problem: OT Long Term Goals     Dates: Start: 03/16/21       Goal: LTG-By discharge, patient will complete basic self care tasks     Dates: Start: 03/16/21       Description: 1) Individualized Goal:  Mod I using DME/AE as needed at FWW level  2) Interventions:  OT Group Therapy, OT Self Care/ADL, OT Cognitive Skill Dev, OT Community Reintegration, OT Neuro Re-Ed/Balance, OT Therapeutic Activity, and OT Therapeutic Exercise          Goal: LTG-By discharge, patient will perform bathroom transfers     Dates: Start: 03/16/21       Description: 1) Individualized Goal:  Mod I using DME/AE as needed at FWW level  2) Interventions:  OT Group Therapy, OT Self Care/ADL, OT Cognitive Skill Dev, OT Community Reintegration, OT Neuro Re-Ed/Balance, OT Therapeutic Activity, and OT Therapeutic Exercise                Problem: Toileting     Dates: Start: 03/16/21       Goal: STG-Within one week, patient will complete toileting tasks     Dates: Start: 03/16/21       Description: 1) Individualized Goal:  with supervision using DME/AE as needed   2) Interventions:  OT Group Therapy, OT Self Care/ADL, OT Cognitive Skill Dev, OT Community Reintegration, OT Neuro Re-Ed/Balance, OT Therapeutic Activity, and OT Therapeutic Exercise    Note:     Goal Note filed on 03/17/21 1211 by Kartik Estrada, OT/L    CGA

## 2021-03-21 PROCEDURE — 97530 THERAPEUTIC ACTIVITIES: CPT

## 2021-03-21 PROCEDURE — 99231 SBSQ HOSP IP/OBS SF/LOW 25: CPT | Performed by: PHYSICAL MEDICINE & REHABILITATION

## 2021-03-21 PROCEDURE — 770010 HCHG ROOM/CARE - REHAB SEMI PRIVAT*

## 2021-03-21 PROCEDURE — A9270 NON-COVERED ITEM OR SERVICE: HCPCS | Performed by: PHYSICAL MEDICINE & REHABILITATION

## 2021-03-21 PROCEDURE — 700102 HCHG RX REV CODE 250 W/ 637 OVERRIDE(OP): Performed by: PHYSICAL MEDICINE & REHABILITATION

## 2021-03-21 PROCEDURE — 97535 SELF CARE MNGMENT TRAINING: CPT

## 2021-03-21 PROCEDURE — 97116 GAIT TRAINING THERAPY: CPT

## 2021-03-21 RX ADMIN — ACETAMINOPHEN 1000 MG: 500 TABLET, FILM COATED ORAL at 14:51

## 2021-03-21 RX ADMIN — FOLIC ACID 1 MG: 1 TABLET ORAL at 08:30

## 2021-03-21 RX ADMIN — VENLAFAXINE 12.5 MG: 25 TABLET ORAL at 20:35

## 2021-03-21 RX ADMIN — Medication 1 G: at 11:43

## 2021-03-21 RX ADMIN — LAMOTRIGINE 100 MG: 100 TABLET ORAL at 08:30

## 2021-03-21 RX ADMIN — LAMOTRIGINE 100 MG: 100 TABLET ORAL at 20:35

## 2021-03-21 RX ADMIN — HYDROXYZINE HYDROCHLORIDE 50 MG: 25 TABLET, FILM COATED ORAL at 09:01

## 2021-03-21 RX ADMIN — VENLAFAXINE 12.5 MG: 25 TABLET ORAL at 08:29

## 2021-03-21 RX ADMIN — HYDROXYZINE HYDROCHLORIDE 50 MG: 25 TABLET, FILM COATED ORAL at 20:34

## 2021-03-21 RX ADMIN — HYDROXYZINE HYDROCHLORIDE 50 MG: 25 TABLET, FILM COATED ORAL at 01:57

## 2021-03-21 RX ADMIN — Medication 1 G: at 08:29

## 2021-03-21 RX ADMIN — ACETAMINOPHEN 1000 MG: 500 TABLET, FILM COATED ORAL at 08:29

## 2021-03-21 RX ADMIN — LEVETIRACETAM 500 MG: 500 TABLET ORAL at 08:29

## 2021-03-21 RX ADMIN — ACETAMINOPHEN 1000 MG: 500 TABLET, FILM COATED ORAL at 20:34

## 2021-03-21 RX ADMIN — MELATONIN TAB 3 MG 3 MG: 3 TAB at 20:34

## 2021-03-21 RX ADMIN — LEVETIRACETAM 500 MG: 500 TABLET ORAL at 20:39

## 2021-03-21 RX ADMIN — SENNOSIDES AND DOCUSATE SODIUM 2 TABLET: 8.6; 5 TABLET ORAL at 08:29

## 2021-03-21 ASSESSMENT — FIBROSIS 4 INDEX: FIB4 SCORE: 0.6

## 2021-03-21 ASSESSMENT — ACTIVITIES OF DAILY LIVING (ADL): BED_CHAIR_WHEELCHAIR_TRANSFER_DESCRIPTION: INCREASED TIME;SUPERVISION FOR SAFETY;VERBAL CUEING;SET-UP OF EQUIPMENT

## 2021-03-21 NOTE — THERAPY
"Occupational Therapy  Daily Treatment     Patient Name: Melba Frye  Age:  50 y.o., Sex:  female  Medical Record #: [id number]  Today's Date: 3/21/2021     Precautions  Precautions: (P) Fall Risk, Other (See Comments)  Comments: (P) Seizure precautions    Safety   ADL Safety : Requires Supervision for Safety    Subjective    \"I'd like to have my  here for when I change my lowers so he can see how I do.\"     Objective       03/21/21 0931   Precautions   Precautions Fall Risk;Other (See Comments)   Comments Seizure precautions   Functional Level of Assist   Lower Body Dressing Supervision   Lower Body Dressing Description Supervision for safety  (SBA to don/doff socks, slip on shoes, TEDs, pants/underwear)   IADL Treatments   IADL Treatments Other   Comments Simulated grocery shopping activity completed with patient pushing grocery cart while gathering ten items from high/low shelves, out of base of support and inside cabinets.  Min verbal cues required for problem solving safety situations.  Spouse provided CGA/SBA during activity.  OT provided education on how to guard patient during these types of activities, especially if patient is getting fatigued.    Interdisciplinary Plan of Care Collaboration   IDT Collaboration with  Family / Caregiver   Patient Position at End of Therapy Self Releasing Lap Belt Applied  (spouse and pt in gym awaiting PT)   Collaboration Comments spouse presents and participated in family training   OT Total Time Spent   OT Individual Total Time Spent (Mins) 60   OT Charge Group   OT Self Care / ADL 1   OT Therapy Activity 3     Discussed patient's goals and therapist's goals for remaining time here at rehab.      Assessment    Patient able to complete LB Dressing with SBA of spouse.  CGA/SBA for simulated grocery shopping activity using cart.  Patient stated multiple times during shopping activity she was getting fatigued, but did not want to take a rest break.  Spouse asked " good questions on how to help patient when she gets more fatigued.  Strengths: Able to follow instructions, Good insight into deficits/needs, Independent prior level of function, Making steady progress towards goals, Manages pain appropriately, Motivated for self care and independence, Pleasant and cooperative, Supportive family, Willingly participates in therapeutic activities  Barriers: Decreased endurance, Generalized weakness, Hemiparesis, Home accessibility, Impaired balance, Impaired activity tolerance    Plan    ADLs, IADLs (cooking Tuesday/Wednesday), standing tolerance/balance, STS    Passport items to be completed:  Passport items to be completed:  prepare a simple meal, participate in household tasks, adapt home for safety needs    Occupational Therapy Goals     Problem: Bathing     Dates: Start: 03/16/21       Goal: STG-Within one week, patient will bathe     Dates: Start: 03/16/21       Description: 1) Individualized Goal:  with supervision using DME/AE as needed and improve safety strategies   2) Interventions:  OT Group Therapy, OT Self Care/ADL, OT Cognitive Skill Dev, OT Community Reintegration, OT Neuro Re-Ed/Balance, OT Therapeutic Activity, and OT Therapeutic Exercise      Note:     Goal Note filed on 03/17/21 1211 by Kartik Estrada, OT/L    CGA                        Problem: Dressing     Dates: Start: 03/16/21       Goal: STG-Within one week, patient will dress LB     Dates: Start: 03/16/21       Description: 1) Individualized Goal:  with SBA using DME/AE as needed  2) Interventions:  OT Group Therapy, OT Self Care/ADL, OT Cognitive Skill Dev, OT Community Reintegration, OT Neuro Re-Ed/Balance, OT Therapeutic Activity, and OT Therapeutic Exercise    Note:     Goal Note filed on 03/17/21 1211 by Kartik Estrada OT/KEATON    Min/mod                        Problem: Functional Transfers     Dates: Start: 03/16/21       Goal: STG-Within one week, patient will transfer to toilet     Dates: Start:  03/16/21       Description: 1) Individualized Goal:  with distant supervision using DME/AE as needed at w/c level  2) Interventions:  OT Group Therapy, OT Self Care/ADL, OT Cognitive Skill Dev, OT Community Reintegration, OT Neuro Re-Ed/Balance, OT Therapeutic Activity, and OT Therapeutic Exercise    Note:     Goal Note filed on 03/17/21 1211 by Kartik Estrada, OT/L    CGA                        Problem: OT Long Term Goals     Dates: Start: 03/16/21       Goal: LTG-By discharge, patient will complete basic self care tasks     Dates: Start: 03/16/21       Description: 1) Individualized Goal:  Mod I using DME/AE as needed at FWW level  2) Interventions:  OT Group Therapy, OT Self Care/ADL, OT Cognitive Skill Dev, OT Community Reintegration, OT Neuro Re-Ed/Balance, OT Therapeutic Activity, and OT Therapeutic Exercise          Goal: LTG-By discharge, patient will perform bathroom transfers     Dates: Start: 03/16/21       Description: 1) Individualized Goal:  Mod I using DME/AE as needed at FWW level  2) Interventions:  OT Group Therapy, OT Self Care/ADL, OT Cognitive Skill Dev, OT Community Reintegration, OT Neuro Re-Ed/Balance, OT Therapeutic Activity, and OT Therapeutic Exercise                Problem: Toileting     Dates: Start: 03/16/21       Goal: STG-Within one week, patient will complete toileting tasks     Dates: Start: 03/16/21       Description: 1) Individualized Goal:  with supervision using DME/AE as needed   2) Interventions:  OT Group Therapy, OT Self Care/ADL, OT Cognitive Skill Dev, OT Community Reintegration, OT Neuro Re-Ed/Balance, OT Therapeutic Activity, and OT Therapeutic Exercise    Note:     Goal Note filed on 03/17/21 1211 by Kartik Estrada, OT/L    CGA

## 2021-03-21 NOTE — THERAPY
Physical Therapy   Daily Treatment     Patient Name: Melba Frye  Age:  50 y.o., Sex:  female  Medical Record #: [id number]  Today's Date: 3/21/2021     Precautions  Precautions: Fall Risk, Other (See Comments)  Comments: Seizure precautions    Subjective    Pt was supine in bed upon arrival and agreeable to treatment.   Pt's spouse present for family training.      Objective       03/21/21 1301   Precautions   Precautions Fall Risk;Other (See Comments)   Wheelchair Functional Level of Assist   Wheelchair Assist Modified Independent   Distance Wheelchair (Feet or Distance) 150   Wheelchair Description Adaptive equipment   Stairs Functional Level of Assist   Level of Assist with Stairs Contact Guard Assist   # of Stairs Climbed 4  (sidestep method)   Stairs Description Extra time;Hand rails;Supervision for safety;Verbal cueing   Transfer Functional Level of Assist   Bed, Chair, Wheelchair Transfer Stand by Assist  (close SBA)   Bed Chair Wheelchair Transfer Description Increased time;Supervision for safety;Verbal cueing;Set-up of equipment   Bed Mobility    Supine to Sit Supervised   Sit to Supine Supervised   Sit to Stand Contact Guard Assist  (to SBA)   Scooting Supervised   Rolling Supervised   Interdisciplinary Plan of Care Collaboration   Patient Position at End of Therapy Other (Comments)  (Hand off to OT)   PT Total Time Spent   PT Individual Total Time Spent (Mins) 60   PT Charge Group   PT Gait Training 1   PT Therapeutic Activities 3     Completed family training with pt's spouse on stair training using sidestep method.  Completed discussion and demonstration of fall prevention and recovery; handout given.  Completed floor recovery on mat table with chair on mat table with min A-CGA with VCing for sequencing.     Assessment    Pt's spouse again demonstrated good safety and guarding with pt with alternate stair training method.  Pt with good ability to complete floor recovery but was most challenged  "transitioning from a tall kneel to half kneel position with intermittent min A needed. Pt continues to be very motivated to progress with good insight into safety and deficits.   Strengths: Supportive family, Pleasant and cooperative, Motivated for self care and independence, Willingly participates in therapeutic activities, Effective communication skills  Barriers: Decreased endurance, Fatigue, Generalized weakness, Hemiparesis, Home accessibility, Impaired activity tolerance, Impaired balance, Impaired insight/denial of deficits, Limited mobility    Plan    Continue gait training with FWW, complete consult with Ability (Monday at 3:30), continue stair training, determine with MD if pool therapy is appropriate as pt with seizure precautions, continue family training as appropriate, car transfer with pt's personal vehicle, outdoor ambulation.       Passport items to be completed:  Get in/out of bed safely, in/out of a vehicle, safely use mobility device, walk or wheel around home/community, demonstrate HEP      Physical Therapy Problems     Problem: Mobility     Dates: Start: 03/16/21       Goal: STG-Within one week, patient will ambulate household distance     Dates: Start: 03/16/21       Description: 1) Individualized goal:  Pt will amb with FWW 30 CGA indoors  2) Interventions:  PT E Stim Attended, PT Orthotics Training, PT Gait Training, PT Therapeutic Exercises, PT Neuro Re-Ed/Balance, PT Aquatic Therapy, PT Therapeutic Activity, PT Manual Therapy, and PT Evaluation        Note:     Goal Note filed on 03/17/21 1226 by Lashon Cason DPT    Vector training- CGA/SBA 80 x 3 with FWW  L foot drag, impaired LUE , veering R                    Goal: STG-Within one week, patient will ascend and descend four to six stairs     Dates: Start: 03/16/21       Description: 1) Individualized goal:  Patient will amb up/down 4\" stair in // bars 4x CGA/min A  2) Interventions:  PT E Stim Attended, PT Orthotics Training, PT " Gait Training, PT Therapeutic Exercises, PT Neuro Re-Ed/Balance, PT Aquatic Therapy, PT Therapeutic Activity, PT Manual Therapy, and PT Evaluation        Note:     Goal Note filed on 03/17/21 1226 by Lashon Casno DPT    Eval 3/16  TBD                        Problem: Mobility Transfers     Dates: Start: 03/16/21       Goal: STG-Within one week, patient will sit to stand     Dates: Start: 03/16/21       Description: 1) Individualized goal:  Pt will transfer CGA consistently with FWW STS/SPT   2) Interventions:  PT E Stim Attended, PT Orthotics Training, PT Gait Training, PT Therapeutic Exercises, PT Neuro Re-Ed/Balance, PT Aquatic Therapy, PT Therapeutic Activity, PT Manual Therapy, and PT Evaluation        Note:     Goal Note filed on 03/17/21 1226 by Lashon Cason DPT    Vector STS in // bars CGA/SBA  Dec motor planning, Eval 3/16                          Problem: PT-Long Term Goals     Dates: Start: 03/16/21       Goal: LTG-By discharge, patient will ambulate     Dates: Start: 03/16/21       Description: 1) Individualized goal:  Patient will amb >50 ft with FWW SBA over level surfaces.   2) Interventions:  PT E Stim Attended, PT Orthotics Training, PT Gait Training, PT Therapeutic Exercises, PT Neuro Re-Ed/Balance, PT Aquatic Therapy, PT Therapeutic Activity, PT Manual Therapy, and PT Evaluation            Goal: LTG-By discharge, patient will transfer one surface to another     Dates: Start: 03/16/21       Description: 1) Individualized goal:  Patient will transfer SPV with FWW STS/SPT  2) Interventions:  PT E Stim Attended, PT Orthotics Training, PT Gait Training, PT Therapeutic Exercises, PT Neuro Re-Ed/Balance, PT Aquatic Therapy, PT Therapeutic Activity, PT Manual Therapy, and PT Evaluation              Goal: LTG-By discharge, patient will ambulate up/down flight of stairs     Dates: Start: 03/16/21       Description: 1) Individualized goal:  Patient will amb up/down 10 stairs 2x with 1HR (switches from R to  left after landing) CGA  2) Interventions:  PT E Stim Attended, PT Orthotics Training, PT Gait Training, PT Therapeutic Exercises, PT Neuro Re-Ed/Balance, PT Aquatic Therapy, PT Therapeutic Activity, PT Manual Therapy, and PT Evaluation

## 2021-03-21 NOTE — THERAPY
"Occupational Therapy  Daily Treatment     Patient Name: Melba Frye  Age:  50 y.o., Sex:  female  Medical Record #: [id number]  Today's Date: 3/21/2021     Precautions  Precautions: (P) Fall Risk, Other (See Comments)  Comments: Seizure precautions    Safety   ADL Safety : Requires Supervision for Safety    Subjective    \"I'm so tired!\"     Objective       03/21/21 1401   Precautions   Precautions Fall Risk;Other (See Comments)   IADL Treatments   IADL Treatments Home management   Home Management Simulated laundry task completed with use of FWW and CGA/SBA while transporting items to washing machine from across the room.  Patient SBA to place in washing machine, remove and transfer to dryer.  CGA/min A for balance to remove from dryer while standing.  Able to sit in chair and remove from dryer independently.  Discussed the use of a reacher as another method to remove items from dryer.   Interdisciplinary Plan of Care Collaboration   IDT Collaboration with  Family / Caregiver   Patient Position at End of Therapy Seated  (spouse taking pt back to room)   Collaboration Comments spouse family training continued during session   OT Total Time Spent   OT Individual Total Time Spent (Mins) 30   OT Charge Group   OT Therapy Activity 2     Dry stall shower transfer with one grab bar and shower chair with CGA and verbal cues with spouse assisting.      Assessment    Patient more fatigued this afternoon after a full day of therapy, though continues to push self hard and be highly motivated.  Spouse is a great support for patient and demonstrates the ability to help patient as needed.  Strengths: Able to follow instructions, Good insight into deficits/needs, Independent prior level of function, Making steady progress towards goals, Manages pain appropriately, Motivated for self care and independence, Pleasant and cooperative, Supportive family, Willingly participates in therapeutic activities  Barriers: Decreased " endurance, Generalized weakness, Hemiparesis, Home accessibility, Impaired balance, Impaired activity tolerance    Plan    ADLs, IADLs (cooking Tuesday/Wednesday; bed making task with spouse and patient working together), standing tolerance/balance, STS    Occupational Therapy Goals     Problem: Bathing     Dates: Start: 03/16/21       Goal: STG-Within one week, patient will bathe     Dates: Start: 03/16/21       Description: 1) Individualized Goal:  with supervision using DME/AE as needed and improve safety strategies   2) Interventions:  OT Group Therapy, OT Self Care/ADL, OT Cognitive Skill Dev, OT Community Reintegration, OT Neuro Re-Ed/Balance, OT Therapeutic Activity, and OT Therapeutic Exercise      Note:     Goal Note filed on 03/17/21 1211 by Kartik Estrada OT/L    CGA                        Problem: Dressing     Dates: Start: 03/16/21       Goal: STG-Within one week, patient will dress LB     Dates: Start: 03/16/21       Description: 1) Individualized Goal:  with SBA using DME/AE as needed  2) Interventions:  OT Group Therapy, OT Self Care/ADL, OT Cognitive Skill Dev, OT Community Reintegration, OT Neuro Re-Ed/Balance, OT Therapeutic Activity, and OT Therapeutic Exercise    Note:     Goal Note filed on 03/17/21 1211 by Kartik Estrada OT/KEATON    Min/mod                        Problem: Functional Transfers     Dates: Start: 03/16/21       Goal: STG-Within one week, patient will transfer to toilet     Dates: Start: 03/16/21       Description: 1) Individualized Goal:  with distant supervision using DME/AE as needed at w/c level  2) Interventions:  OT Group Therapy, OT Self Care/ADL, OT Cognitive Skill Dev, OT Community Reintegration, OT Neuro Re-Ed/Balance, OT Therapeutic Activity, and OT Therapeutic Exercise    Note:     Goal Note filed on 03/17/21 1211 by Kartik Estrada OT/L    CGA                        Problem: OT Long Term Goals     Dates: Start: 03/16/21       Goal: LTG-By discharge, patient  will complete basic self care tasks     Dates: Start: 03/16/21       Description: 1) Individualized Goal:  Mod I using DME/AE as needed at FWW level  2) Interventions:  OT Group Therapy, OT Self Care/ADL, OT Cognitive Skill Dev, OT Community Reintegration, OT Neuro Re-Ed/Balance, OT Therapeutic Activity, and OT Therapeutic Exercise          Goal: LTG-By discharge, patient will perform bathroom transfers     Dates: Start: 03/16/21       Description: 1) Individualized Goal:  Mod I using DME/AE as needed at FWW level  2) Interventions:  OT Group Therapy, OT Self Care/ADL, OT Cognitive Skill Dev, OT Community Reintegration, OT Neuro Re-Ed/Balance, OT Therapeutic Activity, and OT Therapeutic Exercise                Problem: Toileting     Dates: Start: 03/16/21       Goal: STG-Within one week, patient will complete toileting tasks     Dates: Start: 03/16/21       Description: 1) Individualized Goal:  with supervision using DME/AE as needed   2) Interventions:  OT Group Therapy, OT Self Care/ADL, OT Cognitive Skill Dev, OT Community Reintegration, OT Neuro Re-Ed/Balance, OT Therapeutic Activity, and OT Therapeutic Exercise    Note:     Goal Note filed on 03/17/21 1211 by Kartik Estrada, OT/L    CGA

## 2021-03-21 NOTE — PROGRESS NOTES
"Rehab Progress Note     Encounter Date: 3/21/2021    CC: Therapy is going well    Interval Events (Subjective)  Nursing staff report concerns about sleep.  The patient was seen in her room and reports that she feels like she slept very well, reports that she took melatonin 3mg and had the best sleep.    No other complaints, she feels that she is making progress with therapy.    14 point ROS reviewed and negative except as stated above.     Objective:  VITAL SIGNS: /75   Pulse 72   Temp 36.8 °C (98.3 °F) (Oral)   Resp 16   Ht 1.702 m (5' 7\")   Wt 96.5 kg (212 lb 11.9 oz)   SpO2 95%   BMI 33.32 kg/m²     GEN: No apparent distress, laying comfortably in bed.  HEENT: Head normocephalic, atraumatic.   CV: Extremities warm and well-perfused, no peripheral edema bilaterally.  PULMONARY: Breathing nonlabored on room air, no respiratory accessory muscle use.  Not requiring supplemental oxygen.  ABD: Soft, nontender.  SKIN: No appreciable skin breakdown on exposed areas of skin.  PSYCH: Mood and affect within normal limits.  NEURO: Awake alert.  Conversational.  Logical thought content.   Spasticity mild, range of motion intact.      No results found for this or any previous visit (from the past 72 hour(s)).    Current Facility-Administered Medications   Medication Frequency   • acetaminophen (Tylenol) tablet 650 mg Q6HRS PRN   • sodium chloride (SALT) tablet 1 g TID WITH MEALS   • acetaminophen (TYLENOL) tablet 1,000 mg TID   • hydrOXYzine HCl (ATARAX) tablet 50 mg Q6HRS PRN   • melatonin tablet 3 mg HS PRN   • Respiratory Therapy Consult Continuous RT   • Pharmacy Consult Request ...Pain Management Review 1 Each PHARMACY TO DOSE   • lactulose 20 GM/30ML solution 30 mL QDAY PRN   • docusate sodium (ENEMEEZ) enema 283 mg QDAY PRN   • fleet enema 133 mL QDAY PRN   • artificial tears ophthalmic solution 1 Drop PRN   • benzocaine-menthol (CEPACOL) lozenge 1 Lozenge Q2HRS PRN   • mag hydrox-al hydrox-simeth (MAALOX " PLUS ES or MYLANTA DS) suspension 20 mL Q2HRS PRN   • ondansetron (ZOFRAN ODT) dispertab 4 mg 4X/DAY PRN    Or   • ondansetron (ZOFRAN) syringe/vial injection 4 mg 4X/DAY PRN   • sodium chloride (OCEAN) 0.65 % nasal spray 2 Spray PRN   • midazolam (VERSED) 5 mg/mL (1 mL vial) PRN   • oxyCODONE immediate-release (ROXICODONE) tablet 5 mg Q3HRS PRN    Or   • oxyCODONE immediate release (ROXICODONE) tablet 10 mg Q3HRS PRN    Or   • HYDROmorphone (DILAUDID) injection 0.5 mg Q3HRS PRN   • glucose 4 g chewable tablet 16 g Q15 MIN PRN    And   • dextrose 50% (D50W) injection 50 mL Q15 MIN PRN   • senna-docusate (PERICOLACE or SENOKOT S) 8.6-50 MG per tablet 2 tablet BID    And   • polyethylene glycol/lytes (MIRALAX) PACKET 1 Packet QDAY PRN    And   • magnesium hydroxide (MILK OF MAGNESIA) suspension 30 mL QDAY PRN    And   • bisacodyl (DULCOLAX) suppository 10 mg QDAY PRN   • MD ALERT...DO NOT ADMINISTER NSAIDS or ASPIRIN unless ORDERED By Neurosurgery 1 Each PRN   • folic acid (FOLVITE) tablet 1 mg DAILY   • lamoTRIgine (LAMICTAL) tablet 100 mg BID   • levETIRAcetam (KEPPRA) tablet 500 mg BID   • venlafaxine (EFFEXOR) tablet 12.5 mg BID   • LORazepam (ATIVAN) tablet 0.5 mg Q4HRS PRN       Orders Placed This Encounter   Procedures   • Diet Order Diet: Regular     Standing Status:   Standing     Number of Occurrences:   1     Order Specific Question:   Diet:     Answer:   Regular [1]       Assessment:  Active Hospital Problems    Diagnosis    • *Glioblastoma of frontal lobe (HCC)    • 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 Susi Fulton's note dated 3/17/2021  GBM  S/p resection 3/9  Steroid taper  Staples out  3/23  Radiation mapping 3/24    Spasticity  Continue with stretching and AFO  No medications started     Seizure disorder  Keppra  Lamictal  Outpatient follow up with neurology     Anxiety disorder  Effexor  PRN Ativan  Consult psychology, if needed     Insomnia  Continue melatonin 3mg, prn    History of headaches  Scheduled tylenol     Hyponatremia  Start salt tabs --> reduce to twice daily 3/18.  Monitor  Recheck 3/18 - normal  Recheck 3/22     Vit D insufficiency  Supplementation     Bowel program  Continue bowel medications  Scheduled Sennakot  PRN Miralax, MOM, bisacodyl suppository  Last BM 3/19     Bladder program  Check PVRs -low PVRs  Bladder scan for no voids  ICP for over 400 cc  Scheduled toileting     DVT prophylaxis  Contraindicated given risk of hemorrhage.   Not cleared by neurosurgery.   Compression stockings on in am, off in pm.  Increase mobility. Monitor for swelling.  Patient ambulating over 600 feet.  Prophylaxis likely indicated.       Total time: 16 minutes.  I spent greater than 50% of the time for patient care and coordination on this date, including unit/floor time, and face-to-face time with the patient as per assessment and plan above.    Zaki Goss M.D.

## 2021-03-21 NOTE — THERAPY
Physical Therapy   Daily Treatment     Patient Name: Melba Frye  Age:  50 y.o., Sex:  female  Medical Record #: [id number]  Today's Date: 3/21/2021     Precautions  Precautions: Fall Risk, Other (See Comments)  Comments: Seizure precautions    Subjective    Pt was seated in w/c upon arrival and agreeable to treatment.  Pt's spouse present during session and for family training.      Objective       03/21/21 1031   Precautions   Precautions Fall Risk;Other (See Comments)   Stairs Functional Level of Assist   Level of Assist with Stairs Contact Guard Assist   # of Stairs Climbed 4   Stairs Description Extra time;Hand rails;Limited by fatigue;Safety concerns;Supervision for safety;Verbal cueing   Interdisciplinary Plan of Care Collaboration   Patient Position at End of Therapy Other (Comments)  (Pt's spouse took pt back to room in w/c)   PT Total Time Spent   PT Individual Total Time Spent (Mins) 30   PT Charge Group   PT Gait Training 1   PT Therapeutic Activities 1     Completed discussion with pt about pt goals.  Pt reported the following goals for the rest of her stay within the facility: practice stairs regularly, walk outside, complete car transfer in pt's personal vehicles, practice floor recovery, determine pacing for outdoor ambulation.  Completed family training with pt's spouse on stair training using forward approach.    Assessment    Pt continues to demonstrate good sequencing and safety with stair training.  Pt's spouse with good safety and guarding of pt with stair training.  Pt and pt's spouse verbalized all education.    Strengths: Supportive family, Pleasant and cooperative, Motivated for self care and independence, Willingly participates in therapeutic activities, Effective communication skills  Barriers: Decreased endurance, Fatigue, Generalized weakness, Hemiparesis, Home accessibility, Impaired activity tolerance, Impaired balance, Impaired insight/denial of deficits, Limited  "mobility    Plan    Continue gait training with FWW, complete consult with Ability (Monday at 3:30), continue stair training, determine with MD if pool therapy is appropriate as pt with seizure precautions, continue family training as appropriate, car transfer with pt's personal vehicle, outdoor ambulation, fall prevention and recovery.      Passport items to be completed:  Get in/out of bed safely, in/out of a vehicle, safely use mobility device, walk or wheel around home/community,  show how to get up/down from the ground, demonstrate HEP    Physical Therapy Problems     Problem: Mobility     Dates: Start: 03/16/21       Goal: STG-Within one week, patient will ambulate household distance     Dates: Start: 03/16/21       Description: 1) Individualized goal:  Pt will amb with FWW 30 CGA indoors  2) Interventions:  PT E Stim Attended, PT Orthotics Training, PT Gait Training, PT Therapeutic Exercises, PT Neuro Re-Ed/Balance, PT Aquatic Therapy, PT Therapeutic Activity, PT Manual Therapy, and PT Evaluation        Note:     Goal Note filed on 03/17/21 1226 by Lashon Cason DPT    Vector training- CGA/SBA 80 x 3 with FWW  L foot drag, impaired LUE , veering R                    Goal: STG-Within one week, patient will ascend and descend four to six stairs     Dates: Start: 03/16/21       Description: 1) Individualized goal:  Patient will amb up/down 4\" stair in // bars 4x CGA/min A  2) Interventions:  PT E Stim Attended, PT Orthotics Training, PT Gait Training, PT Therapeutic Exercises, PT Neuro Re-Ed/Balance, PT Aquatic Therapy, PT Therapeutic Activity, PT Manual Therapy, and PT Evaluation        Note:     Goal Note filed on 03/17/21 1226 by Lashon Casno DPT    Eval 3/16  TBD                        Problem: Mobility Transfers     Dates: Start: 03/16/21       Goal: STG-Within one week, patient will sit to stand     Dates: Start: 03/16/21       Description: 1) Individualized goal:  Pt will transfer CGA consistently " with FWW STS/SPT   2) Interventions:  PT E Stim Attended, PT Orthotics Training, PT Gait Training, PT Therapeutic Exercises, PT Neuro Re-Ed/Balance, PT Aquatic Therapy, PT Therapeutic Activity, PT Manual Therapy, and PT Evaluation        Note:     Goal Note filed on 03/17/21 1226 by MINH PosadasT    Vector STS in // bars CGA/SBA  Dec motor planning, Eval 3/16                          Problem: PT-Long Term Goals     Dates: Start: 03/16/21       Goal: LTG-By discharge, patient will ambulate     Dates: Start: 03/16/21       Description: 1) Individualized goal:  Patient will amb >50 ft with FWW SBA over level surfaces.   2) Interventions:  PT E Stim Attended, PT Orthotics Training, PT Gait Training, PT Therapeutic Exercises, PT Neuro Re-Ed/Balance, PT Aquatic Therapy, PT Therapeutic Activity, PT Manual Therapy, and PT Evaluation            Goal: LTG-By discharge, patient will transfer one surface to another     Dates: Start: 03/16/21       Description: 1) Individualized goal:  Patient will transfer SPV with FWW STS/SPT  2) Interventions:  PT E Stim Attended, PT Orthotics Training, PT Gait Training, PT Therapeutic Exercises, PT Neuro Re-Ed/Balance, PT Aquatic Therapy, PT Therapeutic Activity, PT Manual Therapy, and PT Evaluation              Goal: LTG-By discharge, patient will ambulate up/down flight of stairs     Dates: Start: 03/16/21       Description: 1) Individualized goal:  Patient will amb up/down 10 stairs 2x with 1HR (switches from R to left after landing) CGA  2) Interventions:  PT E Stim Attended, PT Orthotics Training, PT Gait Training, PT Therapeutic Exercises, PT Neuro Re-Ed/Balance, PT Aquatic Therapy, PT Therapeutic Activity, PT Manual Therapy, and PT Evaluation

## 2021-03-21 NOTE — CARE PLAN
Problem: Safety  Goal: Will remain free from falls  Outcome: PROGRESSING AS EXPECTED     Problem: Bowel/Gastric:  Goal: Normal bowel function is maintained or improved  Outcome: PROGRESSING AS EXPECTED  Note: Patient educated regarding diet, fluids, medications and mobilization to maximize potential for regular bowel movements. Patient engages in education and verbalizes understanding. Refused bowel medications.

## 2021-03-22 LAB
ANION GAP SERPL CALC-SCNC: 8 MMOL/L (ref 7–16)
BUN SERPL-MCNC: 12 MG/DL (ref 8–22)
CALCIUM SERPL-MCNC: 8.7 MG/DL (ref 8.5–10.5)
CHLORIDE SERPL-SCNC: 104 MMOL/L (ref 96–112)
CO2 SERPL-SCNC: 28 MMOL/L (ref 20–33)
CREAT SERPL-MCNC: 0.57 MG/DL (ref 0.5–1.4)
GLUCOSE SERPL-MCNC: 78 MG/DL (ref 65–99)
POTASSIUM SERPL-SCNC: 4.4 MMOL/L (ref 3.6–5.5)
SODIUM SERPL-SCNC: 140 MMOL/L (ref 135–145)

## 2021-03-22 PROCEDURE — 700102 HCHG RX REV CODE 250 W/ 637 OVERRIDE(OP): Performed by: PHYSICAL MEDICINE & REHABILITATION

## 2021-03-22 PROCEDURE — 97110 THERAPEUTIC EXERCISES: CPT

## 2021-03-22 PROCEDURE — A9270 NON-COVERED ITEM OR SERVICE: HCPCS | Performed by: PHYSICAL MEDICINE & REHABILITATION

## 2021-03-22 PROCEDURE — 97530 THERAPEUTIC ACTIVITIES: CPT

## 2021-03-22 PROCEDURE — 97535 SELF CARE MNGMENT TRAINING: CPT

## 2021-03-22 PROCEDURE — 97116 GAIT TRAINING THERAPY: CPT

## 2021-03-22 PROCEDURE — 99232 SBSQ HOSP IP/OBS MODERATE 35: CPT | Performed by: PHYSICAL MEDICINE & REHABILITATION

## 2021-03-22 PROCEDURE — 770010 HCHG ROOM/CARE - REHAB SEMI PRIVAT*

## 2021-03-22 PROCEDURE — 80048 BASIC METABOLIC PNL TOTAL CA: CPT

## 2021-03-22 PROCEDURE — 97112 NEUROMUSCULAR REEDUCATION: CPT

## 2021-03-22 PROCEDURE — 36415 COLL VENOUS BLD VENIPUNCTURE: CPT

## 2021-03-22 RX ADMIN — HYDROXYZINE HYDROCHLORIDE 50 MG: 25 TABLET, FILM COATED ORAL at 20:05

## 2021-03-22 RX ADMIN — LEVETIRACETAM 500 MG: 500 TABLET ORAL at 20:01

## 2021-03-22 RX ADMIN — LAMOTRIGINE 100 MG: 100 TABLET ORAL at 20:01

## 2021-03-22 RX ADMIN — VENLAFAXINE 12.5 MG: 25 TABLET ORAL at 20:02

## 2021-03-22 RX ADMIN — MELATONIN TAB 3 MG 3 MG: 3 TAB at 20:05

## 2021-03-22 RX ADMIN — ACETAMINOPHEN 1000 MG: 500 TABLET, FILM COATED ORAL at 16:08

## 2021-03-22 RX ADMIN — Medication 1 G: at 16:07

## 2021-03-22 RX ADMIN — FOLIC ACID 1 MG: 1 TABLET ORAL at 08:28

## 2021-03-22 RX ADMIN — LAMOTRIGINE 100 MG: 100 TABLET ORAL at 08:28

## 2021-03-22 RX ADMIN — Medication 1 G: at 08:27

## 2021-03-22 RX ADMIN — ACETAMINOPHEN 1000 MG: 500 TABLET, FILM COATED ORAL at 08:27

## 2021-03-22 RX ADMIN — SENNOSIDES AND DOCUSATE SODIUM 2 TABLET: 8.6; 5 TABLET ORAL at 08:28

## 2021-03-22 RX ADMIN — VENLAFAXINE 12.5 MG: 25 TABLET ORAL at 08:28

## 2021-03-22 RX ADMIN — BENZOCAINE AND MENTHOL 1 LOZENGE: 15; 3.6 LOZENGE ORAL at 08:27

## 2021-03-22 RX ADMIN — HYDROXYZINE HYDROCHLORIDE 50 MG: 25 TABLET, FILM COATED ORAL at 10:16

## 2021-03-22 RX ADMIN — Medication 1 G: at 12:04

## 2021-03-22 RX ADMIN — ACETAMINOPHEN 1000 MG: 500 TABLET, FILM COATED ORAL at 20:02

## 2021-03-22 RX ADMIN — LEVETIRACETAM 500 MG: 500 TABLET ORAL at 08:27

## 2021-03-22 ASSESSMENT — GAIT ASSESSMENTS
DEVIATION: BRADYKINETIC;DECREASED HEEL STRIKE
DISTANCE (FEET): 110
DEVIATION: BRADYKINETIC;DECREASED HEEL STRIKE;DECREASED TOE OFF
GAIT LEVEL OF ASSIST: CONTACT GUARD ASSIST
ASSISTIVE DEVICE: FRONT WHEEL WALKER
ASSISTIVE DEVICE: FRONT WHEEL WALKER
GAIT LEVEL OF ASSIST: CONTACT GUARD ASSIST
DISTANCE (FEET): 40

## 2021-03-22 ASSESSMENT — ACTIVITIES OF DAILY LIVING (ADL)
TOILETING_LEVEL_OF_ASSIST_DESCRIPTION: ADAPTIVE EQUIPMENT;GRAB BAR;INCREASED TIME;SUPERVISION FOR SAFETY;VERBAL CUEING
BED_CHAIR_WHEELCHAIR_TRANSFER_DESCRIPTION: ADAPTIVE EQUIPMENT;SET-UP OF EQUIPMENT
BED_CHAIR_WHEELCHAIR_TRANSFER_DESCRIPTION: INCREASED TIME;ADAPTIVE EQUIPMENT;SUPERVISION FOR SAFETY;VERBAL CUEING
TOILET_TRANSFER_DESCRIPTION: ADAPTIVE EQUIPMENT;GRAB BAR;SET-UP OF EQUIPMENT;SUPERVISION FOR SAFETY;VERBAL CUEING
TUB_SHOWER_TRANSFER_DESCRIPTION: GRAB BAR;ADAPTIVE EQUIPMENT;INCREASED TIME;SET-UP OF EQUIPMENT;SUPERVISION FOR SAFETY;VERBAL CUEING

## 2021-03-22 NOTE — THERAPY
"Occupational Therapy  Daily Treatment     Patient Name: Melba Frye  Age:  50 y.o., Sex:  female  Medical Record #: [id number]  Today's Date: 3/22/2021     Precautions  Precautions: (P) Fall Risk, Other (See Comments)  Comments: (P) Seizure precautions    Safety   ADL Safety : Requires Supervision for Safety    Subjective    \"At night my hand and arm will tense up frequently, I have to tell myself to relax and then it will.\"      Objective       03/22/21 1431   Precautions   Precautions Fall Risk;Other (See Comments)   Comments Seizure precautions   Cognition    Level of Consciousness Alert   Sitting Upper Body Exercises   Front Arm Raise 2 sets of 10;Bilateral;Weight (See Comments for lbs)   Shoulder Press 2 sets of 10;Bilateral;Weight (See Comments for lbs)   Internal Shoulder Rotation 2 sets of 10;Bilateral;Weight (See Comments for lbs)   Bicep Curls 2 sets of 10;Bilateral;Weight (See Comments for lbs)   Tricep Press 2 sets of 10;Bilateral;Weight (See Comments for lbs)   Comments 3 lb free weights used    Interdisciplinary Plan of Care Collaboration   IDT Collaboration with  Family / Caregiver   Patient Position at End of Therapy Seated;Self Releasing Lap Belt Applied;Call Light within Reach;Tray Table within Reach;Phone within Reach;Family / Friend in Room  ( present)   Collaboration Comments  present during session   OT Total Time Spent   OT Individual Total Time Spent (Mins) 30   OT Charge Group   OT Therapy Activity 1   OT Therapeutic Exercise  1       Assessment    Pt tolerated session well.  present throughout session. Educated pt/ on grab bar placement at home and using FWW for stability during xfer's and standing next to toilet/in shower initially until grab bars are installed. Pt engaged in community mobility with w/c outside on side walks and over various surfaces I.e. wooden bridge, pavement, stones- pt required min-mod a to get up wooden bridge on incline. Pt required " cues for attention to task- pt will go off side walk w/ w/c when distracted by various stimuli in the environment. Pt able to wheel herself with CGA on sidewalk around building ~100 ft. Educated pt on hand placement on w/c to increase momentum I.e. reaching hands back further on the w/c wheels. Pt issued handout on BUE strengthening exercises w/ free weights. Pt was completing free weight exercises with 3 lb weights at home previously.     Strengths: Able to follow instructions, Good insight into deficits/needs, Independent prior level of function, Making steady progress towards goals, Manages pain appropriately, Motivated for self care and independence, Pleasant and cooperative, Supportive family, Willingly participates in therapeutic activities  Barriers: Decreased endurance, Generalized weakness, Hemiparesis, Home accessibility, Impaired balance, Impaired activity tolerance    Plan    ADLs, IADLs (cooking Tuesday/Wednesday; bed making task with spouse and patient working together), standing tolerance/balance, STS    Occupational Therapy Goals     Problem: Bathing     Dates: Start: 03/16/21       Goal: STG-Within one week, patient will bathe     Dates: Start: 03/16/21       Description: 1) Individualized Goal:  with supervision using DME/AE as needed and improve safety strategies   2) Interventions:  OT Group Therapy, OT Self Care/ADL, OT Cognitive Skill Dev, OT Community Reintegration, OT Neuro Re-Ed/Balance, OT Therapeutic Activity, and OT Therapeutic Exercise      Note:     Goal Note filed on 03/17/21 1211 by Kartik Estrada, OT/L    CGA                        Problem: Dressing     Dates: Start: 03/16/21       Goal: STG-Within one week, patient will dress LB     Dates: Start: 03/16/21       Description: 1) Individualized Goal:  with SBA using DME/AE as needed  2) Interventions:  OT Group Therapy, OT Self Care/ADL, OT Cognitive Skill Dev, OT Community Reintegration, OT Neuro Re-Ed/Balance, OT Therapeutic  Activity, and OT Therapeutic Exercise    Note:     Goal Note filed on 03/17/21 1211 by Kartik Estrada OT/L    Min/mod                        Problem: Functional Transfers     Dates: Start: 03/16/21       Goal: STG-Within one week, patient will transfer to toilet     Dates: Start: 03/16/21       Description: 1) Individualized Goal:  with distant supervision using DME/AE as needed at w/c level  2) Interventions:  OT Group Therapy, OT Self Care/ADL, OT Cognitive Skill Dev, OT Community Reintegration, OT Neuro Re-Ed/Balance, OT Therapeutic Activity, and OT Therapeutic Exercise    Note:     Goal Note filed on 03/17/21 1211 by Kartik Estrada OT/L    CGA                        Problem: OT Long Term Goals     Dates: Start: 03/16/21       Goal: LTG-By discharge, patient will complete basic self care tasks     Dates: Start: 03/16/21       Description: 1) Individualized Goal:  Mod I using DME/AE as needed at FWW level  2) Interventions:  OT Group Therapy, OT Self Care/ADL, OT Cognitive Skill Dev, OT Community Reintegration, OT Neuro Re-Ed/Balance, OT Therapeutic Activity, and OT Therapeutic Exercise          Goal: LTG-By discharge, patient will perform bathroom transfers     Dates: Start: 03/16/21       Description: 1) Individualized Goal:  Mod I using DME/AE as needed at FWW level  2) Interventions:  OT Group Therapy, OT Self Care/ADL, OT Cognitive Skill Dev, OT Community Reintegration, OT Neuro Re-Ed/Balance, OT Therapeutic Activity, and OT Therapeutic Exercise                Problem: Toileting     Dates: Start: 03/16/21       Goal: STG-Within one week, patient will complete toileting tasks     Dates: Start: 03/16/21       Description: 1) Individualized Goal:  with supervision using DME/AE as needed   2) Interventions:  OT Group Therapy, OT Self Care/ADL, OT Cognitive Skill Dev, OT Community Reintegration, OT Neuro Re-Ed/Balance, OT Therapeutic Activity, and OT Therapeutic Exercise    Note:     Goal Note filed on  03/17/21 1211 by Kartik Estrada, OT/L    CGA

## 2021-03-22 NOTE — CARE PLAN
Problem: Safety  Goal: Will remain free from injury  Outcome: PROGRESSING AS EXPECTED     Problem: Bowel/Gastric:  Goal: Normal bowel function is maintained or improved  Outcome: PROGRESSING AS EXPECTED  Note: Patient educated regarding diet, fluids, medications and mobilization to maximize potential for regular bowel movements. Patient engages in education and verbalizes understanding. Patient refused bowel medications.

## 2021-03-22 NOTE — THERAPY
"Physical Therapy   Daily Treatment     Patient Name: Melba Frye  Age:  50 y.o., Sex:  female  Medical Record #: [id number]  Today's Date: 3/22/2021     Precautions  Precautions: Fall Risk, Other (See Comments)  Comments: Seizure precautions    Subjective    Pt was seated in w/c upon arrival and agreeable to treatment.  Pt's spouse present during session.       Objective       03/22/21 1331   Precautions   Precautions Fall Risk;Other (See Comments)   Gait Functional Level of Assist    Gait Level Of Assist Contact Guard Assist   Assistive Device Front Wheel Walker   Distance (Feet) 110   # of Times Distance was Traveled 2   Deviation Bradykinetic;Decreased Heel Strike;Decreased Toe Off  (L foot drop)   Interdisciplinary Plan of Care Collaboration   Patient Position at End of Therapy Seated;Family / Friend in Room  (Hand off to OT)   PT Total Time Spent   PT Individual Total Time Spent (Mins) 60   PT Charge Group   PT Gait Training 1   PT Neuromuscular Re-Education / Balance 1   PT Therapeutic Activities 2     LLE neuro re ed in // bars: WS x 10 reps, LLE tapping on dot target, 2\" cone, and 6\" cone x 40 reps total with 80-90% accuracy.  Discussion of POC, goals.    Assessment    Pt with improving accuracy with targeted stepping this session, progressing to 6\" cone.  Pt with one occurrence of toe catch with ambulation this session, and will benefit from future use of AFO.  Strengths: Supportive family, Pleasant and cooperative, Motivated for self care and independence, Willingly participates in therapeutic activities, Effective communication skills  Barriers: Decreased endurance, Fatigue, Generalized weakness, Hemiparesis, Home accessibility, Impaired activity tolerance, Impaired balance, Impaired insight/denial of deficits, Limited mobility    Plan    Continue gait training with FWW, complete consult with Ability (Monday at 3:30), continue stair training, determine with MD if pool therapy is appropriate as pt " "with seizure precautions, continue family training as appropriate, car transfer with pt's personal vehicle, outdoor ambulation.       Passport items to be completed:  Get in/out of bed safely, in/out of a vehicle, safely use mobility device, walk or wheel around home/community, demonstrate HEP    Physical Therapy Problems     Problem: Mobility     Dates: Start: 03/16/21       Goal: STG-Within one week, patient will ambulate household distance     Dates: Start: 03/16/21       Description: 1) Individualized goal:  Pt will amb with FWW 30 CGA indoors  2) Interventions:  PT E Stim Attended, PT Orthotics Training, PT Gait Training, PT Therapeutic Exercises, PT Neuro Re-Ed/Balance, PT Aquatic Therapy, PT Therapeutic Activity, PT Manual Therapy, and PT Evaluation        Note:     Goal Note filed on 03/17/21 1226 by Lashon Cason DPT    Vector training- CGA/SBA 80 x 3 with FWW  L foot drag, impaired LUE , veering R                    Goal: STG-Within one week, patient will ascend and descend four to six stairs     Dates: Start: 03/16/21       Description: 1) Individualized goal:  Patient will amb up/down 4\" stair in // bars 4x CGA/min A  2) Interventions:  PT E Stim Attended, PT Orthotics Training, PT Gait Training, PT Therapeutic Exercises, PT Neuro Re-Ed/Balance, PT Aquatic Therapy, PT Therapeutic Activity, PT Manual Therapy, and PT Evaluation        Note:     Goal Note filed on 03/17/21 1226 by Lashon Cason DPT    Eval 3/16  TBD                        Problem: Mobility Transfers     Dates: Start: 03/16/21       Goal: STG-Within one week, patient will sit to stand     Dates: Start: 03/16/21       Description: 1) Individualized goal:  Pt will transfer CGA consistently with FWW STS/SPT   2) Interventions:  PT E Stim Attended, PT Orthotics Training, PT Gait Training, PT Therapeutic Exercises, PT Neuro Re-Ed/Balance, PT Aquatic Therapy, PT Therapeutic Activity, PT Manual Therapy, and PT Evaluation        Note:     Goal " Note filed on 03/17/21 1226 by MINH PosadasT    Vector STS in // bars CGA/SBA  Dec motor planning, Eval 3/16                          Problem: PT-Long Term Goals     Dates: Start: 03/16/21       Goal: LTG-By discharge, patient will ambulate     Dates: Start: 03/16/21       Description: 1) Individualized goal:  Patient will amb >50 ft with FWW SBA over level surfaces.   2) Interventions:  PT E Stim Attended, PT Orthotics Training, PT Gait Training, PT Therapeutic Exercises, PT Neuro Re-Ed/Balance, PT Aquatic Therapy, PT Therapeutic Activity, PT Manual Therapy, and PT Evaluation            Goal: LTG-By discharge, patient will transfer one surface to another     Dates: Start: 03/16/21       Description: 1) Individualized goal:  Patient will transfer SPV with FWW STS/SPT  2) Interventions:  PT E Stim Attended, PT Orthotics Training, PT Gait Training, PT Therapeutic Exercises, PT Neuro Re-Ed/Balance, PT Aquatic Therapy, PT Therapeutic Activity, PT Manual Therapy, and PT Evaluation              Goal: LTG-By discharge, patient will ambulate up/down flight of stairs     Dates: Start: 03/16/21       Description: 1) Individualized goal:  Patient will amb up/down 10 stairs 2x with 1HR (switches from R to left after landing) CGA  2) Interventions:  PT E Stim Attended, PT Orthotics Training, PT Gait Training, PT Therapeutic Exercises, PT Neuro Re-Ed/Balance, PT Aquatic Therapy, PT Therapeutic Activity, PT Manual Therapy, and PT Evaluation

## 2021-03-22 NOTE — THERAPY
Physical Therapy   Daily Treatment     Patient Name: Melba Frye  Age:  50 y.o., Sex:  female  Medical Record #: [id number]  Today's Date: 3/22/2021     Precautions  Precautions: (P) Fall Risk, Other (See Comments)  Comments: (P) Seizure precautions    Subjective    Patient enjoyed [employer] and would like to use it more frequently; pt was agreeable to tech adjunct tc for [employer] as able.      Objective       03/22/21 1531   Precautions   Precautions Fall Risk;Other (See Comments)   Comments Seizure precautions   Gait Functional Level of Assist    Gait Level Of Assist Contact Guard Assist   Assistive Device Front Wheel Walker   Distance (Feet) 40   # of Times Distance was Traveled 1   Deviation Bradykinetic;Decreased Heel Strike  (during collaboration with [employer])   Transfer Functional Level of Assist   Bed, Chair, Wheelchair Transfer Contact Guard Assist   Bed Chair Wheelchair Transfer Description Adaptive equipment;Set-up of equipment  (SPT wc <> bed )   Sitting Lower Body Exercises   Comments M[employer] LE whitney 7 min AROM, L 37%, R 63%, 0.93 miles SPV   Bed Mobility    Supine to Sit Supervised   Neuro-Muscular Treatments   Neuro-Muscular Treatments Weight Shift Left;Weight Shift Right;Verbal Cuing;Sequencing   Interdisciplinary Plan of Care Collaboration   IDT Collaboration with  Other (See Comments);Family / Caregiver;Nursing   Patient Position at End of Therapy Seated;Edge of Bed;Other (Comments)  (RN present for meds)   Collaboration Comments Ability: colloaboration, observation of gait, and casting for L custom AFO to support foot clearance.    Strengths & Barriers   Strengths Supportive family;Pleasant and cooperative;Motivated for self care and independence;Willingly participates in therapeutic activities;Effective communication skills   Barriers Decreased endurance;Fatigue;Generalized weakness;Hemiparesis;Home accessibility;Impaired activity tolerance;Impaired balance;Impaired insight/denial of  deficits;Limited mobility   PT Total Time Spent   PT Individual Total Time Spent (Mins) 30   PT Charge Group   PT Neuromuscular Re-Education / Balance 1   PT Therapeutic Activities 1       Assessment    Through collaboration with [employer] [employer], it was confirmed that patient would best benefit from custom orthotic to provide multiplanar support to improve foot clearance and safety of gait.    Strengths: (P) Supportive family, Pleasant and cooperative, Motivated for self care and independence, Willingly participates in therapeutic activities, Effective communication skills  Barriers: (P) Decreased endurance, Fatigue, Generalized weakness, Hemiparesis, Home accessibility, Impaired activity tolerance, Impaired balance, Impaired insight/denial of deficits, Limited mobility    Plan    Continue gait training with FWW, continue stair training, determine with MD if pool therapy is appropriate as pt with seizure precautions, continue family training as appropriate, car transfer with pt's personal vehicle, outdoor ambulation.      Passport items to be completed:  Get in/out of bed safely, in/out of a vehicle, safely use mobility device, walk or wheel around home/community, demonstrate HEP    Physical Therapy Problems     Problem: Mobility     Dates: Start: 03/16/21       Goal: STG-Within one week, patient will ambulate household distance     Dates: Start: 03/16/21       Description: 1) Individualized goal:  Pt will amb with FWW 30 CGA indoors  2) Interventions:  PT E Stim Attended, PT Orthotics Training, PT Gait Training, PT Therapeutic Exercises, PT Neuro Re-Ed/Balance, PT Aquatic Therapy, PT Therapeutic Activity, PT Manual Therapy, and PT Evaluation        Note:     Goal Note filed on 03/17/21 1226 by Lashon Cason DPT    Vector training- CGA/SBA 80 x 3 with FWW  L foot drag, impaired LUE , veering R                    Goal: STG-Within one week, patient will ascend and descend four to six stairs     Dates: Start:  "03/16/21       Description: 1) Individualized goal:  Patient will amb up/down 4\" stair in // bars 4x CGA/min A  2) Interventions:  PT E Stim Attended, PT Orthotics Training, PT Gait Training, PT Therapeutic Exercises, PT Neuro Re-Ed/Balance, PT Aquatic Therapy, PT Therapeutic Activity, PT Manual Therapy, and PT Evaluation        Note:     Goal Note filed on 03/17/21 1226 by Lashon Cason DPT    Eval 3/16  TBD                        Problem: Mobility Transfers     Dates: Start: 03/16/21       Goal: STG-Within one week, patient will sit to stand     Dates: Start: 03/16/21       Description: 1) Individualized goal:  Pt will transfer CGA consistently with FWW STS/SPT   2) Interventions:  PT E Stim Attended, PT Orthotics Training, PT Gait Training, PT Therapeutic Exercises, PT Neuro Re-Ed/Balance, PT Aquatic Therapy, PT Therapeutic Activity, PT Manual Therapy, and PT Evaluation        Note:     Goal Note filed on 03/17/21 1226 by Lashon Cason DPT    Vector STS in // bars CGA/SBA  Dec motor planning, Eval 3/16                          Problem: PT-Long Term Goals     Dates: Start: 03/16/21       Goal: LTG-By discharge, patient will ambulate     Dates: Start: 03/16/21       Description: 1) Individualized goal:  Patient will amb >50 ft with FWW SBA over level surfaces.   2) Interventions:  PT E Stim Attended, PT Orthotics Training, PT Gait Training, PT Therapeutic Exercises, PT Neuro Re-Ed/Balance, PT Aquatic Therapy, PT Therapeutic Activity, PT Manual Therapy, and PT Evaluation            Goal: LTG-By discharge, patient will transfer one surface to another     Dates: Start: 03/16/21       Description: 1) Individualized goal:  Patient will transfer SPV with FWW STS/SPT  2) Interventions:  PT E Stim Attended, PT Orthotics Training, PT Gait Training, PT Therapeutic Exercises, PT Neuro Re-Ed/Balance, PT Aquatic Therapy, PT Therapeutic Activity, PT Manual Therapy, and PT Evaluation              Goal: LTG-By discharge, patient " will ambulate up/down flight of stairs     Dates: Start: 03/16/21       Description: 1) Individualized goal:  Patient will amb up/down 10 stairs 2x with 1HR (switches from R to left after landing) CGA  2) Interventions:  PT E Stim Attended, PT Orthotics Training, PT Gait Training, PT Therapeutic Exercises, PT Neuro Re-Ed/Balance, PT Aquatic Therapy, PT Therapeutic Activity, PT Manual Therapy, and PT Evaluation

## 2021-03-22 NOTE — THERAPY
"Occupational Therapy  Daily Treatment     Patient Name: Melba Frye  Age:  50 y.o., Sex:  female  Medical Record #: [id number]  Today's Date: 3/22/2021     Precautions  Precautions: Fall Risk, Other (See Comments)  Comments: Seizure precautions    Safety   ADL Safety : Requires Supervision for Safety    Subjective    \"I felt a little unsteady when standing to pull my pants up, when holding onto the bar with 1 hand.\" pt reported about dressing completed      Objective       03/22/21 0931   Precautions   Precautions Fall Risk;Other (See Comments)   Comments Seizure precautions   Pain   Intervention Shower   Pain 0 - 10 Group   Location Head   Location Orientation Right;Left   Pain Rating Scale (NPRS) 3   Description Aching   Comfort Goal Comfort with Movement;Perform Activity;Sleep Comfortably   Therapist Pain Assessment Post Activity Pain Same as Prior to Activity  (pt reported getting meds prior to therapy)   Cognition    Level of Consciousness Alert   Functional Level of Assist   Grooming Independent   Grooming Description Seated in wheelchair at sink  (sup for standing at sink )   Bathing Minimal Assist   Bathing Description Grab bar;Hand held shower;Tub bench;Increased time;Initial preparation for task;Supervision for safety;Assit wtih lower extremities;Assit with back  (assist with feet from  )   Upper Body Dressing Modified Independent   Upper Body Dressing Description Increased time  (seated in w/c to doff/don pull over shirt)   Lower Body Dressing Supervision   Lower Body Dressing Description Grab bar;Assistive devices;Supervision for safety;Increased time;Set-up of equipment  (FWW; doff/don pants/underwear and socks)   Toileting Stand by Assist   Toileting Description Adaptive equipment;Grab bar;Increased time;Supervision for safety;Verbal cueing  (FWW )   Bed, Chair, Wheelchair Transfer Contact Guard Assist   Bed Chair Wheelchair Transfer Description Increased time;Adaptive " equipment;Supervision for safety;Verbal cueing  (CGA for FWW ambulation )   Toilet Transfers Contact Guard Assist   Toilet Transfer Description Adaptive equipment;Grab bar;Set-up of equipment;Supervision for safety;Verbal cueing  (CGA w/ FWW ambulation to bathroom from bed)   Tub / Shower Transfers Contact Guard Assist   Tub Shower Transfer Description Grab bar;Adaptive equipment;Increased time;Set-up of equipment;Supervision for safety;Verbal cueing  (walking xfer with FWW)   Bed Mobility    Supine to Sit Supervised   Sit to Supine Supervised   Scooting Supervised   Rolling Supervised   Interdisciplinary Plan of Care Collaboration   IDT Collaboration with  Family / Caregiver   Patient Position at End of Therapy In Bed;Call Light within Reach;Tray Table within Reach;Phone within Reach;Family / Friend in Room   Collaboration Comments spouse assisted with ambulation to/from bathroom using FWW, shower and dressing tasks   OT Total Time Spent   OT Individual Total Time Spent (Mins) 60   OT Charge Group   OT Self Care / ADL 4       Assessment    Pt tolerated session well. Pt's spouse present during session and assisted pt with ambulation to/from bathroom using FWW and CGA- pt able to recall sequence of safe ambulation and saying it out loud.  assisted with shower/dressing routine this morning from FWW and w/c level. Pt reported fatigue at end of session and able to identify that she needed to sit and use the w/c, demonstrating good insight into needs/deficits.  cleared to complete showers with pt in room from w/c level. Issued pt long handled sponge 2/2 pt reported needing assist with back/feet during shower from .     Strengths: Able to follow instructions, Good insight into deficits/needs, Independent prior level of function, Making steady progress towards goals, Manages pain appropriately, Motivated for self care and independence, Pleasant and cooperative, Supportive family, Willingly participates  in therapeutic activities  Barriers: Decreased endurance, Generalized weakness, Hemiparesis, Home accessibility, Impaired balance, Impaired activity tolerance    Plan  ADLs, IADLs (cooking Tuesday/Wednesday; bed making task with spouse and patient working together), standing tolerance/balance, STS    Occupational Therapy Goals     Problem: Bathing     Dates: Start: 03/16/21       Goal: STG-Within one week, patient will bathe     Dates: Start: 03/16/21       Description: 1) Individualized Goal:  with supervision using DME/AE as needed and improve safety strategies   2) Interventions:  OT Group Therapy, OT Self Care/ADL, OT Cognitive Skill Dev, OT Community Reintegration, OT Neuro Re-Ed/Balance, OT Therapeutic Activity, and OT Therapeutic Exercise      Note:     Goal Note filed on 03/17/21 1211 by Kartik Estrada, OT/L    CGA                        Problem: Dressing     Dates: Start: 03/16/21       Goal: STG-Within one week, patient will dress LB     Dates: Start: 03/16/21       Description: 1) Individualized Goal:  with SBA using DME/AE as needed  2) Interventions:  OT Group Therapy, OT Self Care/ADL, OT Cognitive Skill Dev, OT Community Reintegration, OT Neuro Re-Ed/Balance, OT Therapeutic Activity, and OT Therapeutic Exercise    Note:     Goal Note filed on 03/17/21 1211 by Kartik Estrada OT/L    Min/mod                        Problem: Functional Transfers     Dates: Start: 03/16/21       Goal: STG-Within one week, patient will transfer to toilet     Dates: Start: 03/16/21       Description: 1) Individualized Goal:  with distant supervision using DME/AE as needed at w/c level  2) Interventions:  OT Group Therapy, OT Self Care/ADL, OT Cognitive Skill Dev, OT Community Reintegration, OT Neuro Re-Ed/Balance, OT Therapeutic Activity, and OT Therapeutic Exercise    Note:     Goal Note filed on 03/17/21 1211 by Kartik Estrada OT/L    CGA                        Problem: OT Long Term Goals     Dates: Start:  03/16/21       Goal: LTG-By discharge, patient will complete basic self care tasks     Dates: Start: 03/16/21       Description: 1) Individualized Goal:  Mod I using DME/AE as needed at [facility] level  2) Interventions:  OT Group Therapy, OT Self Care/ADL, OT Cognitive Skill Dev, OT Community Reintegration, OT Neuro Re-Ed/Balance, OT Therapeutic Activity, and OT Therapeutic Exercise          Goal: LTG-By discharge, patient will perform bathroom transfers     Dates: Start: 03/16/21       Description: 1) Individualized Goal:  Mod I using DME/AE as needed at FWW level  2) Interventions:  OT Group Therapy, OT Self Care/ADL, OT Cognitive Skill Dev, OT Community Reintegration, OT Neuro Re-Ed/Balance, OT Therapeutic Activity, and OT Therapeutic Exercise                Problem: Toileting     Dates: Start: 03/16/21       Goal: STG-Within one week, patient will complete toileting tasks     Dates: Start: 03/16/21       Description: 1) Individualized Goal:  with supervision using DME/AE as needed   2) Interventions:  OT Group Therapy, OT Self Care/ADL, OT Cognitive Skill Dev, OT Community Reintegration, OT Neuro Re-Ed/Balance, OT Therapeutic Activity, and OT Therapeutic Exercise    Note:     Goal Note filed on 03/17/21 1211 by Kartik Estrada OT/KEATON FREGOSO

## 2021-03-22 NOTE — PROGRESS NOTES
"Rehab Progress Note     Chief Complaint: GBM    Interval Events (Subjective)  Patient is doing well, endorses headache over the weekend, reports feeling achy today. No other issues. Labs WNL.     Objective:  VITAL SIGNS: /79   Pulse 64   Temp 36.4 °C (97.6 °F) (Tympanic)   Resp 18   Ht 1.702 m (5' 7\")   Wt 96.5 kg (212 lb 11.9 oz)   SpO2 94%   BMI 33.32 kg/m²     Physical Exam:  Vitals: /79   Pulse 64   Temp 36.4 °C (97.6 °F) (Tympanic)   Resp 18   Ht 1.702 m (5' 7\")   Wt 96.5 kg (212 lb 11.9 oz)   SpO2 94%   Gen: NAD  Head: NC/AT  Eyes/ Nose/ Mouth: PERRLA, moist mucous membranes  Cardio: RRR, no mumurs  Pulm: CTAB, with normal respiratory effort  Abd: Soft NTND, active bowel sounds,   Ext: No peripheral edema. No calf tenderness. No clubbing/cyanosis.   Tone: no spasticity noted, no cogwheeling noted    Recent Results (from the past 72 hour(s))   Basic Metabolic Panel    Collection Time: 03/22/21  6:15 AM   Result Value Ref Range    Sodium 140 135 - 145 mmol/L    Potassium 4.4 3.6 - 5.5 mmol/L    Chloride 104 96 - 112 mmol/L    Co2 28 20 - 33 mmol/L    Glucose 78 65 - 99 mg/dL    Bun 12 8 - 22 mg/dL    Creatinine 0.57 0.50 - 1.40 mg/dL    Calcium 8.7 8.5 - 10.5 mg/dL    Anion Gap 8.0 7.0 - 16.0   ESTIMATED GFR    Collection Time: 03/22/21  6:15 AM   Result Value Ref Range    GFR If African American >60 >60 mL/min/1.73 m 2    GFR If Non African American >60 >60 mL/min/1.73 m 2       Current Facility-Administered Medications   Medication Frequency   • acetaminophen (Tylenol) tablet 650 mg Q6HRS PRN   • sodium chloride (SALT) tablet 1 g TID WITH MEALS   • acetaminophen (TYLENOL) tablet 1,000 mg TID   • hydrOXYzine HCl (ATARAX) tablet 50 mg Q6HRS PRN   • melatonin tablet 3 mg HS PRN   • Respiratory Therapy Consult Continuous RT   • Pharmacy Consult Request ...Pain Management Review 1 Each PHARMACY TO DOSE   • lactulose 20 GM/30ML solution 30 mL QDAY PRN   • docusate sodium (ENEMEEZ) enema 283 " mg QDAY PRN   • fleet enema 133 mL QDAY PRN   • artificial tears ophthalmic solution 1 Drop PRN   • benzocaine-menthol (CEPACOL) lozenge 1 Lozenge Q2HRS PRN   • mag hydrox-al hydrox-simeth (MAALOX PLUS ES or MYLANTA DS) suspension 20 mL Q2HRS PRN   • ondansetron (ZOFRAN ODT) dispertab 4 mg 4X/DAY PRN    Or   • ondansetron (ZOFRAN) syringe/vial injection 4 mg 4X/DAY PRN   • sodium chloride (OCEAN) 0.65 % nasal spray 2 Spray PRN   • midazolam (VERSED) 5 mg/mL (1 mL vial) PRN   • oxyCODONE immediate-release (ROXICODONE) tablet 5 mg Q3HRS PRN    Or   • oxyCODONE immediate release (ROXICODONE) tablet 10 mg Q3HRS PRN    Or   • HYDROmorphone (DILAUDID) injection 0.5 mg Q3HRS PRN   • glucose 4 g chewable tablet 16 g Q15 MIN PRN    And   • dextrose 50% (D50W) injection 50 mL Q15 MIN PRN   • senna-docusate (PERICOLACE or SENOKOT S) 8.6-50 MG per tablet 2 tablet BID    And   • polyethylene glycol/lytes (MIRALAX) PACKET 1 Packet QDAY PRN    And   • magnesium hydroxide (MILK OF MAGNESIA) suspension 30 mL QDAY PRN    And   • bisacodyl (DULCOLAX) suppository 10 mg QDAY PRN   • MD ALERT...DO NOT ADMINISTER NSAIDS or ASPIRIN unless ORDERED By Neurosurgery 1 Each PRN   • folic acid (FOLVITE) tablet 1 mg DAILY   • lamoTRIgine (LAMICTAL) tablet 100 mg BID   • levETIRAcetam (KEPPRA) tablet 500 mg BID   • venlafaxine (EFFEXOR) tablet 12.5 mg BID   • LORazepam (ATIVAN) tablet 0.5 mg Q4HRS PRN       Orders Placed This Encounter   Procedures   • Diet Order Diet: Regular     Standing Status:   Standing     Number of Occurrences:   1     Order Specific Question:   Diet:     Answer:   Regular [1]       Assessment:  Active Hospital Problems    Diagnosis    • *Glioblastoma of frontal lobe (HCC)    • 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)    • Acute blood loss as cause of postoperative anemia    • Hyperlipidemia    • Mixed anxiety and depressive disorder    • Hyponatremia    • Vitamin D insufficiency    • Impaired  mobility and ADLs        Medical Decision Making and Plan:  GBM S/p resection 3/9  Steroid taper completed   Staples out 3/23  Radiation mapping 3/24     Spasticity  Continue with stretching and AFO  No medications started     Seizure disorder  Keppra  Lamictal  Outpatient follow up with neurology     Anxiety disorder  Effexor  PRN Ativan  Consult psychology, if needed     Insomnia  Continue melatonin 3mg, prn     History of headaches  Scheduled tylenol     Hyponatremia  Start salt tabs --> reduce to twice daily 3/18.  Monitor  Recheck 3/18 - normal  Rechecked 3/22 - normal      Vit D insufficiency  Supplementation     Bowel program  Continue bowel medications  Scheduled Sennakot  PRN Miralax, MOM, bisacodyl suppository  Last BM 3/19     Bladder program  Check PVRs -low PVRs  Bladder scan for no voids  ICP for over 400 cc  Scheduled toileting     DVT prophylaxis  Contraindicated given risk of hemorrhage.   Not cleared by neurosurgery.   Compression stockings on in am, off in pm.  Increase mobility. Monitor for swelling.  Patient ambulating over 600 feet.    Total time:  27 minutes.  I spent greater than 50% of the time for patient care and coordination on this date, including unit/floor time, and face-to-face time with the patient as per assessment and plan above.    Vasile Norris, DO   Physical Medicine and Rehabilitation

## 2021-03-23 PROBLEM — E87.1 HYPONATREMIA: Status: RESOLVED | Noted: 2021-03-16 | Resolved: 2021-03-23

## 2021-03-23 PROCEDURE — 99232 SBSQ HOSP IP/OBS MODERATE 35: CPT | Performed by: PHYSICAL MEDICINE & REHABILITATION

## 2021-03-23 PROCEDURE — 97530 THERAPEUTIC ACTIVITIES: CPT

## 2021-03-23 PROCEDURE — A9270 NON-COVERED ITEM OR SERVICE: HCPCS | Performed by: PHYSICAL MEDICINE & REHABILITATION

## 2021-03-23 PROCEDURE — 770010 HCHG ROOM/CARE - REHAB SEMI PRIVAT*

## 2021-03-23 PROCEDURE — 97116 GAIT TRAINING THERAPY: CPT | Mod: CQ

## 2021-03-23 PROCEDURE — 97112 NEUROMUSCULAR REEDUCATION: CPT | Mod: CQ

## 2021-03-23 PROCEDURE — 700102 HCHG RX REV CODE 250 W/ 637 OVERRIDE(OP): Performed by: PHYSICAL MEDICINE & REHABILITATION

## 2021-03-23 RX ORDER — LAMOTRIGINE 100 MG/1
100 TABLET ORAL 2 TIMES DAILY
Qty: 180 TABLET | Refills: 2 | Status: SHIPPED | OUTPATIENT
Start: 2021-03-23 | End: 2021-06-01

## 2021-03-23 RX ORDER — HYDROXYZINE 50 MG/1
50 TABLET, FILM COATED ORAL EVERY 8 HOURS PRN
Qty: 90 TABLET | Refills: 0 | Status: SHIPPED | OUTPATIENT
Start: 2021-03-23 | End: 2021-04-01 | Stop reason: SDUPTHER

## 2021-03-23 RX ORDER — LAMOTRIGINE 100 MG/1
100 TABLET ORAL 2 TIMES DAILY
Qty: 60 TABLET | Refills: 2 | Status: SHIPPED | OUTPATIENT
Start: 2021-03-23 | End: 2021-03-23 | Stop reason: SDUPTHER

## 2021-03-23 RX ORDER — LEVETIRACETAM 500 MG/1
500 TABLET ORAL 2 TIMES DAILY
Qty: 180 TABLET | Refills: 2 | Status: SHIPPED | OUTPATIENT
Start: 2021-03-23 | End: 2021-03-31

## 2021-03-23 RX ORDER — VENLAFAXINE 25 MG/1
12.5 TABLET ORAL 2 TIMES DAILY
Qty: 30 TABLET | Refills: 2 | Status: SHIPPED | OUTPATIENT
Start: 2021-03-23 | End: 2021-03-23 | Stop reason: SDUPTHER

## 2021-03-23 RX ORDER — LEVETIRACETAM 500 MG/1
500 TABLET ORAL 2 TIMES DAILY
Qty: 60 TABLET | Refills: 2 | Status: SHIPPED | OUTPATIENT
Start: 2021-03-23 | End: 2021-03-23 | Stop reason: SDUPTHER

## 2021-03-23 RX ORDER — VENLAFAXINE 25 MG/1
12.5 TABLET ORAL 2 TIMES DAILY
Qty: 90 TABLET | Refills: 2 | Status: SHIPPED | OUTPATIENT
Start: 2021-03-23 | End: 2021-09-30

## 2021-03-23 RX ORDER — LANOLIN ALCOHOL/MO/W.PET/CERES
3 CREAM (GRAM) TOPICAL NIGHTLY PRN
Qty: 30 TABLET | Refills: 2 | COMMUNITY
Start: 2021-03-23 | End: 2022-02-02

## 2021-03-23 RX ADMIN — HYDROXYZINE HYDROCHLORIDE 50 MG: 25 TABLET, FILM COATED ORAL at 19:53

## 2021-03-23 RX ADMIN — Medication 1 G: at 11:41

## 2021-03-23 RX ADMIN — LAMOTRIGINE 100 MG: 100 TABLET ORAL at 08:33

## 2021-03-23 RX ADMIN — Medication 1 G: at 08:33

## 2021-03-23 RX ADMIN — VENLAFAXINE 12.5 MG: 25 TABLET ORAL at 08:33

## 2021-03-23 RX ADMIN — FOLIC ACID 1 MG: 1 TABLET ORAL at 08:33

## 2021-03-23 RX ADMIN — VENLAFAXINE 12.5 MG: 25 TABLET ORAL at 19:53

## 2021-03-23 RX ADMIN — ACETAMINOPHEN 1000 MG: 500 TABLET, FILM COATED ORAL at 08:33

## 2021-03-23 RX ADMIN — ACETAMINOPHEN 1000 MG: 500 TABLET, FILM COATED ORAL at 19:53

## 2021-03-23 RX ADMIN — LEVETIRACETAM 500 MG: 500 TABLET ORAL at 19:53

## 2021-03-23 RX ADMIN — LEVETIRACETAM 500 MG: 500 TABLET ORAL at 08:33

## 2021-03-23 RX ADMIN — HYDROXYZINE HYDROCHLORIDE 50 MG: 25 TABLET, FILM COATED ORAL at 08:33

## 2021-03-23 RX ADMIN — LAMOTRIGINE 100 MG: 100 TABLET ORAL at 19:53

## 2021-03-23 RX ADMIN — ACETAMINOPHEN 1000 MG: 500 TABLET, FILM COATED ORAL at 14:38

## 2021-03-23 RX ADMIN — MELATONIN TAB 3 MG 3 MG: 3 TAB at 19:53

## 2021-03-23 ASSESSMENT — PATIENT HEALTH QUESTIONNAIRE - PHQ9
2. FEELING DOWN, DEPRESSED, IRRITABLE, OR HOPELESS: NOT AT ALL
SUM OF ALL RESPONSES TO PHQ9 QUESTIONS 1 AND 2: 0
1. LITTLE INTEREST OR PLEASURE IN DOING THINGS: NOT AT ALL

## 2021-03-23 ASSESSMENT — GAIT ASSESSMENTS
DEVIATION: DECREASED HEEL STRIKE;DECREASED TOE OFF;OTHER (COMMENT)
GAIT LEVEL OF ASSIST: CONTACT GUARD ASSIST
ASSISTIVE DEVICE: FRONT WHEEL WALKER

## 2021-03-23 NOTE — THERAPY
Occupational Therapy  Daily Treatment     Patient Name: Melba Frye  Age:  50 y.o., Sex:  female  Medical Record #: [id number]  Today's Date: 3/23/2021     Precautions  Precautions: (P) Fall Risk, Other (See Comments)  Comments: (P) Seizure prec    Safety   ADL Safety : Requires Supervision for Safety    Subjective    Patient requested to work on walking.     Objective       03/23/21 0901   Precautions   Precautions Fall Risk;Other (See Comments)   Comments Seizure prec   Standing Upper Body Exercises   Standing Upper Body Exercises Yes   Other Exercises Standing water bike level 2 x 6 minutes with cues for left knee extension    Interdisciplinary Plan of Care Collaboration   Patient Position at End of Therapy Seated;Call Light within Reach;Tray Table within Reach;Phone within Reach   OT Total Time Spent   OT Individual Total Time Spent (Mins) 30   OT Charge Group   OT Therapy Activity 2     CGA with FWW in gym with verbal cues for step through pattern and heel to toe.    Assessment    Patient tolerated all activities okay.  LEs more fatigued today.    Strengths: Able to follow instructions, Good insight into deficits/needs, Independent prior level of function, Making steady progress towards goals, Manages pain appropriately, Motivated for self care and independence, Pleasant and cooperative, Supportive family, Willingly participates in therapeutic activities  Barriers: Decreased endurance, Generalized weakness, Hemiparesis, Home accessibility, Impaired balance, Impaired activity tolerance    Plan    ADLs, IADLs (cooking Tuesday/Wednesday; bed making task with spouse and patient working together), standing tolerance/balance, STS    Occupational Therapy Goals     Problem: Bathing     Dates: Start: 03/16/21       Goal: STG-Within one week, patient will bathe     Dates: Start: 03/16/21       Description: 1) Individualized Goal:  with supervision using DME/AE as needed and improve safety strategies   2)  Interventions:  OT Group Therapy, OT Self Care/ADL, OT Cognitive Skill Dev, OT Community Reintegration, OT Neuro Re-Ed/Balance, OT Therapeutic Activity, and OT Therapeutic Exercise      Note:     Goal Note filed on 03/17/21 1211 by Kartik Estrada OT/L    CGA                        Problem: Dressing     Dates: Start: 03/16/21       Goal: STG-Within one week, patient will dress LB     Dates: Start: 03/16/21       Description: 1) Individualized Goal:  with SBA using DME/AE as needed  2) Interventions:  OT Group Therapy, OT Self Care/ADL, OT Cognitive Skill Dev, OT Community Reintegration, OT Neuro Re-Ed/Balance, OT Therapeutic Activity, and OT Therapeutic Exercise    Note:     Goal Note filed on 03/17/21 1211 by Kartik Estrada OT/L    Min/mod                        Problem: Functional Transfers     Dates: Start: 03/16/21       Goal: STG-Within one week, patient will transfer to toilet     Dates: Start: 03/16/21       Description: 1) Individualized Goal:  with distant supervision using DME/AE as needed at w/c level  2) Interventions:  OT Group Therapy, OT Self Care/ADL, OT Cognitive Skill Dev, OT Community Reintegration, OT Neuro Re-Ed/Balance, OT Therapeutic Activity, and OT Therapeutic Exercise    Note:     Goal Note filed on 03/17/21 1211 by Kartik Estrada OT/L    CGA                        Problem: OT Long Term Goals     Dates: Start: 03/16/21       Goal: LTG-By discharge, patient will complete basic self care tasks     Dates: Start: 03/16/21       Description: 1) Individualized Goal:  Mod I using DME/AE as needed at FWW level  2) Interventions:  OT Group Therapy, OT Self Care/ADL, OT Cognitive Skill Dev, OT Community Reintegration, OT Neuro Re-Ed/Balance, OT Therapeutic Activity, and OT Therapeutic Exercise          Goal: LTG-By discharge, patient will perform bathroom transfers     Dates: Start: 03/16/21       Description: 1) Individualized Goal:  Mod I using DME/AE as needed at FWW level  2)  Interventions:  OT Group Therapy, OT Self Care/ADL, OT Cognitive Skill Dev, OT Community Reintegration, OT Neuro Re-Ed/Balance, OT Therapeutic Activity, and OT Therapeutic Exercise                Problem: Toileting     Dates: Start: 03/16/21       Goal: STG-Within one week, patient will complete toileting tasks     Dates: Start: 03/16/21       Description: 1) Individualized Goal:  with supervision using DME/AE as needed   2) Interventions:  OT Group Therapy, OT Self Care/ADL, OT Cognitive Skill Dev, OT Community Reintegration, OT Neuro Re-Ed/Balance, OT Therapeutic Activity, and OT Therapeutic Exercise    Note:     Goal Note filed on 03/17/21 1211 by Kartik Estrada, OT/KEATON    CGA

## 2021-03-23 NOTE — THERAPY
Occupational Therapy  Daily Treatment     Patient Name: Melba Frye  Age:  50 y.o., Sex:  female  Medical Record #: [id number]  Today's Date: 3/23/2021     Precautions  Precautions: (P) Fall Risk, Other (See Comments)  Comments: (P) Seizure prec    Safety   ADL Safety : Requires Supervision for Safety    Subjective    Patient excited to cook today during therapy.  Spouse present and brought all needed supplies.     Objective       03/23/21 1031   Precautions   Precautions Fall Risk;Other (See Comments)   Comments Seizure prec   Pain 0 - 10 Group   Therapist Pain Assessment 0   Functional Level of Assist   Grooming Supervision   Grooming Description Standing at sink;Supervision for safety  (to wash hands standing)   IADL Treatments   IADL Treatments Meal preparation   Kitchen Mobility Education Completed kitchen mobility with SBA using FWW or counter for support with verbal cues for safety with technique   Meal Preparation Patient participated in meal prep activity using stove.  Able to complete with SBA overall while standing with intermittent sitting.  Independent and safe with using the stove.  Independent and safe with dicing carrots and an onion.  Required occasional assist to remove pot top.  Able to work with spouse as a team to complete task.   Home Management Spouse cleaned dishes as patient used them.     Interdisciplinary Plan of Care Collaboration   Patient Position at End of Therapy Seated;Call Light within Reach;Tray Table within Reach;Phone within Reach;Family / Friend in Room;Self Releasing Lap Belt Applied   OT Total Time Spent   OT Individual Total Time Spent (Mins) 60   OT Charge Group   OT Therapy Activity 4       Assessment    Patient was very happy to cook during OT demonstrating great motivation.  Able to tell spouse where and when she needed help and they worked together well as a team.    Strengths: Able to follow instructions, Good insight into deficits/needs, Independent prior level  of function, Making steady progress towards goals, Manages pain appropriately, Motivated for self care and independence, Pleasant and cooperative, Supportive family, Willingly participates in therapeutic activities  Barriers: Decreased endurance, Generalized weakness, Hemiparesis, Home accessibility, Impaired balance, Impaired activity tolerance    Plan    ADLs, IADLs (cooking Tuesday/Wednesday; bed making task with spouse and patient working together), standing tolerance/balance, STS        Occupational Therapy Goals     Problem: Bathing     Dates: Start: 03/16/21       Goal: STG-Within one week, patient will bathe     Dates: Start: 03/16/21       Description: 1) Individualized Goal:  with supervision using DME/AE as needed and improve safety strategies   2) Interventions:  OT Group Therapy, OT Self Care/ADL, OT Cognitive Skill Dev, OT Community Reintegration, OT Neuro Re-Ed/Balance, OT Therapeutic Activity, and OT Therapeutic Exercise      Note:     Goal Note filed on 03/17/21 1211 by Kartik Estrada, OT/L    CGA                        Problem: Dressing     Dates: Start: 03/16/21       Goal: STG-Within one week, patient will dress LB     Dates: Start: 03/16/21       Description: 1) Individualized Goal:  with SBA using DME/AE as needed  2) Interventions:  OT Group Therapy, OT Self Care/ADL, OT Cognitive Skill Dev, OT Community Reintegration, OT Neuro Re-Ed/Balance, OT Therapeutic Activity, and OT Therapeutic Exercise    Note:     Goal Note filed on 03/17/21 1211 by Kartik Estrada OT/L    Min/mod                        Problem: Functional Transfers     Dates: Start: 03/16/21       Goal: STG-Within one week, patient will transfer to toilet     Dates: Start: 03/16/21       Description: 1) Individualized Goal:  with distant supervision using DME/AE as needed at w/c level  2) Interventions:  OT Group Therapy, OT Self Care/ADL, OT Cognitive Skill Dev, OT Community Reintegration, OT Neuro Re-Ed/Balance, OT  Therapeutic Activity, and OT Therapeutic Exercise    Note:     Goal Note filed on 03/17/21 1211 by Kartik Estrada, OT/L    CGA                        Problem: OT Long Term Goals     Dates: Start: 03/16/21       Goal: LTG-By discharge, patient will complete basic self care tasks     Dates: Start: 03/16/21       Description: 1) Individualized Goal:  Mod I using DME/AE as needed at FWW level  2) Interventions:  OT Group Therapy, OT Self Care/ADL, OT Cognitive Skill Dev, OT Community Reintegration, OT Neuro Re-Ed/Balance, OT Therapeutic Activity, and OT Therapeutic Exercise          Goal: LTG-By discharge, patient will perform bathroom transfers     Dates: Start: 03/16/21       Description: 1) Individualized Goal:  Mod I using DME/AE as needed at FWW level  2) Interventions:  OT Group Therapy, OT Self Care/ADL, OT Cognitive Skill Dev, OT Community Reintegration, OT Neuro Re-Ed/Balance, OT Therapeutic Activity, and OT Therapeutic Exercise                Problem: Toileting     Dates: Start: 03/16/21       Goal: STG-Within one week, patient will complete toileting tasks     Dates: Start: 03/16/21       Description: 1) Individualized Goal:  with supervision using DME/AE as needed   2) Interventions:  OT Group Therapy, OT Self Care/ADL, OT Cognitive Skill Dev, OT Community Reintegration, OT Neuro Re-Ed/Balance, OT Therapeutic Activity, and OT Therapeutic Exercise    Note:     Goal Note filed on 03/17/21 1211 by Kartik Estrada, OT/L    CGA

## 2021-03-23 NOTE — CARE PLAN
Problem: Safety  Goal: Will remain free from injury  Outcome: PROGRESSING AS EXPECTED     Problem: Skin Integrity  Goal: Risk for impaired skin integrity will decrease  Outcome: PROGRESSING AS EXPECTED  Note: Head staples removed today per MD order. Patient tolerated well. Incision appears healing well.

## 2021-03-23 NOTE — THERAPY
"Physical Therapy   Daily Treatment     Patient Name: Melba Frye  Age:  50 y.o., Sex:  female  Medical Record #: [id number]  Today's Date: 3/23/2021     Precautions  Precautions: Fall Risk, Other (See Comments)  Comments: Seizure prec    Subjective    Pt agreeable to trial AFO     Objective       03/23/21 1501   Pain   Intervention Declines   Gait Functional Level of Assist    Gait Level Of Assist Contact Guard Assist   Assistive Device Front Wheel Walker   Distance (Feet)   (110 x 3 both in/outdoor surfaces)   # of Times Distance was Traveled 3   Deviation Decreased Heel Strike;Decreased Toe Off;Other (Comment)  (cues for terminal knee extension)   Stairs Functional Level of Assist   Level of Assist with Stairs Contact Guard Assist   # of Stairs Climbed 4  (x 2 (6\") stairs)   Stairs Description Extra time;Hand rails;Supervision for safety;Verbal cueing;Requires incidental assist  (side stepping x 1 trial and reciprocal/step to x 1 trial)   Bed Mobility    Sit to Stand Stand by Assist   Neuro-Muscular Treatments   Neuro-Muscular Treatments Verbal Cuing;Sequencing   Comments vc for sequencing with stairs and curb. cues for increased L knee extension in terminal stance and L glute activation   Interdisciplinary Plan of Care Collaboration   IDT Collaboration with  Family / Caregiver   Patient Position at End of Therapy Seated;Family / Friend in Room;Other (Comments)   Collaboration Comments SO present throughout session   PT Total Time Spent   PT Individual Total Time Spent (Mins) 60   PT Charge Group   PT Gait Training 3   PT Neuromuscular Re-Education / Balance 1   Supervising Physical Therapist Lashon Cason     Education provided on skin checks with AFO, wearing with therapy only at this time and discussed appropriate shoe to accomodate the AFO    4\" Curb/platform training with FWW x 4 trials with CGA/SBA     Outdoor amb with FWW x 100' over sidewalk/uneven surfaces CGA/SBA    Assessment    Pt tolerated new AFO " "well with no complaints. Pt completed gait training in and outdoors as well as curb training with CGA to SBA. Pt responded well to vc for quad/glute activation in mid/terminal stance.   Strengths: Supportive family, Pleasant and cooperative, Motivated for self care and independence, Willingly participates in therapeutic activities, Effective communication skills  Barriers: Decreased endurance, Fatigue, Generalized weakness, Hemiparesis, Home accessibility, Impaired activity tolerance, Impaired balance, Impaired insight/denial of deficits, Limited mobility    Plan    Assess AFO, continue gait training with FWW, continue stair training (6 consecutive, rest, followed by 8 consecutive), continue family training (complete stair training and gait), car transfer with pt's personal vehicle, outdoor ambulation.       Passport items to be completed:  Get in/out of bed safely, in/out of a vehicle, safely use mobility device, walk or wheel around home/community, demonstrate HEP    Physical Therapy Problems     Problem: Mobility     Dates: Start: 03/16/21       Goal: STG-Within one week, patient will ambulate household distance     Dates: Start: 03/16/21       Description: 1) Individualized goal:  Pt will amb with FWW 30 CGA indoors  2) Interventions:  PT E Stim Attended, PT Orthotics Training, PT Gait Training, PT Therapeutic Exercises, PT Neuro Re-Ed/Balance, PT Aquatic Therapy, PT Therapeutic Activity, PT Manual Therapy, and PT Evaluation        Note:     Goal Note filed on 03/17/21 1226 by Lashon Cason, PAULINE    Vector training- CGA/SBA 80 x 3 with FWW  L foot drag, impaired LUE , veering R                    Goal: STG-Within one week, patient will ascend and descend four to six stairs     Dates: Start: 03/16/21       Description: 1) Individualized goal:  Patient will amb up/down 4\" stair in // bars 4x CGA/min A  2) Interventions:  PT E Stim Attended, PT Orthotics Training, PT Gait Training, PT Therapeutic Exercises, PT " Neuro Re-Ed/Balance, PT Aquatic Therapy, PT Therapeutic Activity, PT Manual Therapy, and PT Evaluation        Note:     Goal Note filed on 03/17/21 1226 by Lashon Cason DPT    Eval 3/16  TBD                        Problem: Mobility Transfers     Dates: Start: 03/16/21       Goal: STG-Within one week, patient will sit to stand     Dates: Start: 03/16/21       Description: 1) Individualized goal:  Pt will transfer CGA consistently with FWW STS/SPT   2) Interventions:  PT E Stim Attended, PT Orthotics Training, PT Gait Training, PT Therapeutic Exercises, PT Neuro Re-Ed/Balance, PT Aquatic Therapy, PT Therapeutic Activity, PT Manual Therapy, and PT Evaluation        Note:     Goal Note filed on 03/17/21 1226 by Lashon Cason DPT    Vector STS in // bars CGA/SBA  Dec motor planning, Eval 3/16                          Problem: PT-Long Term Goals     Dates: Start: 03/16/21       Goal: LTG-By discharge, patient will ambulate     Dates: Start: 03/16/21       Description: 1) Individualized goal:  Patient will amb >50 ft with FWW SBA over level surfaces.   2) Interventions:  PT E Stim Attended, PT Orthotics Training, PT Gait Training, PT Therapeutic Exercises, PT Neuro Re-Ed/Balance, PT Aquatic Therapy, PT Therapeutic Activity, PT Manual Therapy, and PT Evaluation            Goal: LTG-By discharge, patient will transfer one surface to another     Dates: Start: 03/16/21       Description: 1) Individualized goal:  Patient will transfer SPV with FWW STS/SPT  2) Interventions:  PT E Stim Attended, PT Orthotics Training, PT Gait Training, PT Therapeutic Exercises, PT Neuro Re-Ed/Balance, PT Aquatic Therapy, PT Therapeutic Activity, PT Manual Therapy, and PT Evaluation              Goal: LTG-By discharge, patient will ambulate up/down flight of stairs     Dates: Start: 03/16/21       Description: 1) Individualized goal:  Patient will amb up/down 10 stairs 2x with 1HR (switches from R to left after landing) CGA  2) Interventions:   PT E Stim Attended, PT Orthotics Training, PT Gait Training, PT Therapeutic Exercises, PT Neuro Re-Ed/Balance, PT Aquatic Therapy, PT Therapeutic Activity, PT Manual Therapy, and PT Evaluation

## 2021-03-23 NOTE — PROGRESS NOTES
"Rehab Progress Note     Date of Service: 3/23/2021  Chief Complaint: follow up GBM    Interval Events (Subjective)    Patient seen and examined today in the kitchen during her OT session. Advised her pool therapy is contraindicated due to her cranial incision and seizure risk. Staples to be removed today and radiation mapping scheduled for tomorrow. Discussed with  process for getting pre-auth for her seizure medications started today. Patient has no complaints.     ROS: No changes to bowel, bladder, pain, mood, or sleep.         Objective:  VITAL SIGNS: /84   Pulse 73   Temp 36.5 °C (97.7 °F) (Oral)   Resp 18   Ht 1.702 m (5' 7\")   Wt 96.5 kg (212 lb 11.9 oz)   SpO2 97%   BMI 33.32 kg/m²   Gen: alert, no apparent distress  Neuro: notable for left foot drop    Recent Results (from the past 72 hour(s))   Basic Metabolic Panel    Collection Time: 03/22/21  6:15 AM   Result Value Ref Range    Sodium 140 135 - 145 mmol/L    Potassium 4.4 3.6 - 5.5 mmol/L    Chloride 104 96 - 112 mmol/L    Co2 28 20 - 33 mmol/L    Glucose 78 65 - 99 mg/dL    Bun 12 8 - 22 mg/dL    Creatinine 0.57 0.50 - 1.40 mg/dL    Calcium 8.7 8.5 - 10.5 mg/dL    Anion Gap 8.0 7.0 - 16.0   ESTIMATED GFR    Collection Time: 03/22/21  6:15 AM   Result Value Ref Range    GFR If African American >60 >60 mL/min/1.73 m 2    GFR If Non African American >60 >60 mL/min/1.73 m 2       Current Facility-Administered Medications   Medication Frequency   • acetaminophen (Tylenol) tablet 650 mg Q6HRS PRN   • sodium chloride (SALT) tablet 1 g TID WITH MEALS   • acetaminophen (TYLENOL) tablet 1,000 mg TID   • hydrOXYzine HCl (ATARAX) tablet 50 mg Q6HRS PRN   • melatonin tablet 3 mg HS PRN   • Respiratory Therapy Consult Continuous RT   • Pharmacy Consult Request ...Pain Management Review 1 Each PHARMACY TO DOSE   • lactulose 20 GM/30ML solution 30 mL QDAY PRN   • docusate sodium (ENEMEEZ) enema 283 mg QDAY PRN   • fleet enema 133 mL QDAY PRN   • " artificial tears ophthalmic solution 1 Drop PRN   • benzocaine-menthol (CEPACOL) lozenge 1 Lozenge Q2HRS PRN   • mag hydrox-al hydrox-simeth (MAALOX PLUS ES or MYLANTA DS) suspension 20 mL Q2HRS PRN   • ondansetron (ZOFRAN ODT) dispertab 4 mg 4X/DAY PRN    Or   • ondansetron (ZOFRAN) syringe/vial injection 4 mg 4X/DAY PRN   • sodium chloride (OCEAN) 0.65 % nasal spray 2 Spray PRN   • midazolam (VERSED) 5 mg/mL (1 mL vial) PRN   • oxyCODONE immediate-release (ROXICODONE) tablet 5 mg Q3HRS PRN    Or   • oxyCODONE immediate release (ROXICODONE) tablet 10 mg Q3HRS PRN    Or   • HYDROmorphone (DILAUDID) injection 0.5 mg Q3HRS PRN   • glucose 4 g chewable tablet 16 g Q15 MIN PRN    And   • dextrose 50% (D50W) injection 50 mL Q15 MIN PRN   • senna-docusate (PERICOLACE or SENOKOT S) 8.6-50 MG per tablet 2 tablet BID    And   • polyethylene glycol/lytes (MIRALAX) PACKET 1 Packet QDAY PRN    And   • magnesium hydroxide (MILK OF MAGNESIA) suspension 30 mL QDAY PRN    And   • bisacodyl (DULCOLAX) suppository 10 mg QDAY PRN   • MD ALERT...DO NOT ADMINISTER NSAIDS or ASPIRIN unless ORDERED By Neurosurgery 1 Each PRN   • folic acid (FOLVITE) tablet 1 mg DAILY   • lamoTRIgine (LAMICTAL) tablet 100 mg BID   • levETIRAcetam (KEPPRA) tablet 500 mg BID   • venlafaxine (EFFEXOR) tablet 12.5 mg BID   • LORazepam (ATIVAN) tablet 0.5 mg Q4HRS PRN       Orders Placed This Encounter   Procedures   • Diet Order Diet: Regular     Standing Status:   Standing     Number of Occurrences:   1     Order Specific Question:   Diet:     Answer:   Regular [1]       Assessment:  Active Hospital Problems    Diagnosis    • *Glioblastoma of frontal lobe (HCC)    • 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  female admitted for acute inpatient rehabilitation with Glioblastoma of frontal lobe (HCC).    I led and attended the weekly conference, and agree with the IDT conference documentation and plan of care as noted below.    Date of conference: 3/17/2021    Goals and barriers: See IDT note.    Biggest barriers: flight of stairs at home, left foot drop, impaired activity tolerance    CM/social support:  supportive     Anticipated DC date: 3/27    Home health: PT/OT    Equip: grab bars in shower    Follow up: PCP, Dr. Steele, Dr. Frye, Dr. Cerda, neurology        Medical Decision Making and Plan:    GBM  S/p resection 3/9  S/p Steroid taper  Staples out today  Radiation mapping tomorrow  Outpatient follow up with oncology     Seizure disorder  Keppra  Lamictal  Outpatient follow up with neurology  Send in RX today for pre-auth     Anxiety disorder  Effexor  PRN Ativan, last use 3/15  PRN Atarax, last use 3.23  Consult psychology if needed, patient currently not interested     History of headaches  Scheduled tylenol    Hyponatremia, resolved  Discontinue salt tabs  Monitor    Vit D insufficiency  Supplementation    Insomnia, resolved  Melatonin    Bowel program  Continue bowel medications  Scheduled Sennakot  PRN Miralax, MOM, bisacodyl suppository  Last BM 3/22    Bladder program  Check PVRs - 16  Not retaining  Bladder scan for no voids  ICP for over 400 cc  Scheduled toileting    DVT prophylaxis  Contraindicated given risk of hemorrhage.   Not cleared by neurosurgery.   Compression stockings on in am, off in pm.  Increase mobility. Monitor for swelling.    Total time:  25 minutes.  I spent greater than 50% of the time for patient care, counseling, and coordination on this date, including patient face-to face time, unit/floor time with review of records/pertinent lab data and studies, as well as discussing diagnostic evaluation/work up, planned therapeutic interventions, and future disposition of care, as per the  interval events/subjective and the assessment and plan as noted above.    I have performed a physical exam, reviewed and updated ROS, as well as the assessment and plan today 3/23/2021. In review of note from 3/17/2021 there are no new changes except as documented above.          Susi Fulton M.D.   Physical Medicine and Rehabilitation

## 2021-03-23 NOTE — THERAPY
Physical Therapy   Daily Treatment     Patient Name: Melba Frye  Age:  50 y.o., Sex:  female  Medical Record #: [id number]  Today's Date: 3/23/2021     Precautions  Precautions: Fall Risk, Other (See Comments)  Comments: Seizure prec    Subjective    Patient is seated in w/c upon arrival , agreeable to therapy. States her  is able to place a chair half way up the stairs on a landing at home.      Objective       03/23/21 0877   Cosignature   Documentation Review Approved with modifications made by preceptor in flowsheet   Precautions   Precautions Fall Risk;Other (See Comments)   Comments Seizure precautions   Stairs Functional Level of Assist   Level of Assist with Stairs Contact Guard Assist   # of Stairs Climbed 8  (8 consecutive x1; 4 consecutive x3)   Stairs Description Extra time;Hand rails;Supervision for safety;Verbal cueing  (Side stepping method with BUE on L rail )   Bed Mobility    Sit to Stand Contact Guard Assist  (to SBA)   Neuro-Muscular Treatments   Neuro-Muscular Treatments Compensatory Strategies;Sequencing;Tactile Cuing;Verbal Cuing   Comments Sequencing instruction and VC with sidestep stair technique   Interdisciplinary Plan of Care Collaboration   IDT Collaboration with  Nursing;Occupational Therapist;Physician   Patient Position at End of Therapy Seated;Chair Alarm On;Self Releasing Lap Belt Applied   Collaboration Comments OT handoff, RN providing meds, aquatic therapy deferral with MD   Strengths & Barriers   Strengths Supportive family;Pleasant and cooperative;Motivated for self care and independence;Willingly participates in therapeutic activities;Effective communication skills   Barriers Decreased endurance;Fatigue;Generalized weakness;Hemiparesis;Home accessibility;Impaired activity tolerance;Impaired balance;Impaired insight/denial of deficits;Limited mobility   PT Total Time Spent   PT Individual Total Time Spent (Mins) 30   PT Charge Group   PT Neuromuscular  "Re-Education / Balance 1   PT Therapeutic Activities 1       Assessment    Patient progressed to 8 consecutive 6\" steps today, resembling half of patients stair case at home. A chair is able to be placed on platform after 8 steps. Patient shows awareness of DF deficit on L with stair negotiation. Patient continues to be limited by activity tolerance.    Strengths: Supportive family, Pleasant and cooperative, Motivated for self care and independence, Willingly participates in therapeutic activities, Effective communication skills  Barriers: Decreased endurance, Fatigue, Generalized weakness, Hemiparesis, Home accessibility, Impaired activity tolerance, Impaired balance, Impaired insight/denial of deficits, Limited mobility    Plan    Assess AFO, continue gait training with FWW, continue stair training (6 consecutive, rest, followed by 8 consecutive), continue family training (complete stair training and gait), car transfer with pt's personal vehicle, outdoor ambulation.       Passport items to be completed:  Get in/out of bed safely, in/out of a vehicle, safely use mobility device, walk or wheel around home/community, demonstrate HEP      Physical Therapy Problems     Problem: Mobility     Dates: Start: 03/16/21       Goal: STG-Within one week, patient will ambulate household distance     Dates: Start: 03/16/21       Description: 1) Individualized goal:  Pt will amb with FWW 30 CGA indoors  2) Interventions:  PT E Stim Attended, PT Orthotics Training, PT Gait Training, PT Therapeutic Exercises, PT Neuro Re-Ed/Balance, PT Aquatic Therapy, PT Therapeutic Activity, PT Manual Therapy, and PT Evaluation        Note:     Goal Note filed on 03/17/21 1226 by Lashon Cason DPT    Vector training- CGA/SBA 80 x 3 with FWW  L foot drag, impaired LUE , veering R                    Goal: STG-Within one week, patient will ascend and descend four to six stairs     Dates: Start: 03/16/21       Description: 1) Individualized " "goal:  Patient will amb up/down 4\" stair in // bars 4x CGA/min A  2) Interventions:  PT E Stim Attended, PT Orthotics Training, PT Gait Training, PT Therapeutic Exercises, PT Neuro Re-Ed/Balance, PT Aquatic Therapy, PT Therapeutic Activity, PT Manual Therapy, and PT Evaluation        Note:     Goal Note filed on 03/17/21 1226 by Lashon Cason DPT    Eval 3/16  TBD                        Problem: Mobility Transfers     Dates: Start: 03/16/21       Goal: STG-Within one week, patient will sit to stand     Dates: Start: 03/16/21       Description: 1) Individualized goal:  Pt will transfer CGA consistently with FWW STS/SPT   2) Interventions:  PT E Stim Attended, PT Orthotics Training, PT Gait Training, PT Therapeutic Exercises, PT Neuro Re-Ed/Balance, PT Aquatic Therapy, PT Therapeutic Activity, PT Manual Therapy, and PT Evaluation        Note:     Goal Note filed on 03/17/21 1226 by Lashon Cason DPT    Vector STS in // bars CGA/SBA  Dec motor planning, Eval 3/16                          Problem: PT-Long Term Goals     Dates: Start: 03/16/21       Goal: LTG-By discharge, patient will ambulate     Dates: Start: 03/16/21       Description: 1) Individualized goal:  Patient will amb >50 ft with FWW SBA over level surfaces.   2) Interventions:  PT E Stim Attended, PT Orthotics Training, PT Gait Training, PT Therapeutic Exercises, PT Neuro Re-Ed/Balance, PT Aquatic Therapy, PT Therapeutic Activity, PT Manual Therapy, and PT Evaluation            Goal: LTG-By discharge, patient will transfer one surface to another     Dates: Start: 03/16/21       Description: 1) Individualized goal:  Patient will transfer SPV with FWW STS/SPT  2) Interventions:  PT E Stim Attended, PT Orthotics Training, PT Gait Training, PT Therapeutic Exercises, PT Neuro Re-Ed/Balance, PT Aquatic Therapy, PT Therapeutic Activity, PT Manual Therapy, and PT Evaluation              Goal: LTG-By discharge, patient will ambulate up/down flight of stairs     " Dates: Start: 03/16/21       Description: 1) Individualized goal:  Patient will amb up/down 10 stairs 2x with 1HR (switches from R to left after landing) CGA  2) Interventions:  PT E Stim Attended, PT Orthotics Training, PT Gait Training, PT Therapeutic Exercises, PT Neuro Re-Ed/Balance, PT Aquatic Therapy, PT Therapeutic Activity, PT Manual Therapy, and PT Evaluation

## 2021-03-24 ENCOUNTER — HOSPITAL ENCOUNTER (OUTPATIENT)
Dept: RADIATION ONCOLOGY | Facility: MEDICAL CENTER | Age: 51
End: 2021-03-24
Payer: COMMERCIAL

## 2021-03-24 DIAGNOSIS — C71.1 GLIOBLASTOMA OF FRONTAL LOBE (HCC): ICD-10-CM

## 2021-03-24 DIAGNOSIS — G40.109 LOCALIZATION-RELATED FOCAL EPILEPSY WITH SIMPLE PARTIAL SEIZURES (HCC): ICD-10-CM

## 2021-03-24 PROCEDURE — 77263 THER RADIOLOGY TX PLNG CPLX: CPT | Performed by: RADIOLOGY

## 2021-03-24 PROCEDURE — 97112 NEUROMUSCULAR REEDUCATION: CPT

## 2021-03-24 PROCEDURE — 97535 SELF CARE MNGMENT TRAINING: CPT

## 2021-03-24 PROCEDURE — 700102 HCHG RX REV CODE 250 W/ 637 OVERRIDE(OP): Performed by: PHYSICAL MEDICINE & REHABILITATION

## 2021-03-24 PROCEDURE — 77334 RADIATION TREATMENT AID(S): CPT | Performed by: RADIOLOGY

## 2021-03-24 PROCEDURE — 97116 GAIT TRAINING THERAPY: CPT

## 2021-03-24 PROCEDURE — A9270 NON-COVERED ITEM OR SERVICE: HCPCS | Performed by: PHYSICAL MEDICINE & REHABILITATION

## 2021-03-24 PROCEDURE — 99233 SBSQ HOSP IP/OBS HIGH 50: CPT | Performed by: PHYSICAL MEDICINE & REHABILITATION

## 2021-03-24 PROCEDURE — 77290 THER RAD SIMULAJ FIELD CPLX: CPT | Performed by: RADIOLOGY

## 2021-03-24 PROCEDURE — 77334 RADIATION TREATMENT AID(S): CPT | Mod: 26 | Performed by: RADIOLOGY

## 2021-03-24 PROCEDURE — 97530 THERAPEUTIC ACTIVITIES: CPT

## 2021-03-24 PROCEDURE — 77470 SPECIAL RADIATION TREATMENT: CPT | Mod: 26 | Performed by: RADIOLOGY

## 2021-03-24 PROCEDURE — 770010 HCHG ROOM/CARE - REHAB SEMI PRIVAT*

## 2021-03-24 RX ADMIN — LEVETIRACETAM 500 MG: 500 TABLET ORAL at 19:50

## 2021-03-24 RX ADMIN — ACETAMINOPHEN 1000 MG: 500 TABLET, FILM COATED ORAL at 14:51

## 2021-03-24 RX ADMIN — LAMOTRIGINE 100 MG: 100 TABLET ORAL at 19:51

## 2021-03-24 RX ADMIN — ACETAMINOPHEN 1000 MG: 500 TABLET, FILM COATED ORAL at 08:06

## 2021-03-24 RX ADMIN — VENLAFAXINE 12.5 MG: 25 TABLET ORAL at 19:51

## 2021-03-24 RX ADMIN — LEVETIRACETAM 500 MG: 500 TABLET ORAL at 08:06

## 2021-03-24 RX ADMIN — FOLIC ACID 1 MG: 1 TABLET ORAL at 08:06

## 2021-03-24 RX ADMIN — HYDROXYZINE HYDROCHLORIDE 50 MG: 25 TABLET, FILM COATED ORAL at 19:50

## 2021-03-24 RX ADMIN — LAMOTRIGINE 100 MG: 100 TABLET ORAL at 08:06

## 2021-03-24 RX ADMIN — MELATONIN TAB 3 MG 3 MG: 3 TAB at 19:50

## 2021-03-24 RX ADMIN — HYDROXYZINE HYDROCHLORIDE 50 MG: 25 TABLET, FILM COATED ORAL at 08:06

## 2021-03-24 RX ADMIN — ACETAMINOPHEN 1000 MG: 500 TABLET, FILM COATED ORAL at 19:50

## 2021-03-24 RX ADMIN — VENLAFAXINE 12.5 MG: 25 TABLET ORAL at 08:06

## 2021-03-24 ASSESSMENT — PATIENT HEALTH QUESTIONNAIRE - PHQ9
1. LITTLE INTEREST OR PLEASURE IN DOING THINGS: NOT AT ALL
SUM OF ALL RESPONSES TO PHQ9 QUESTIONS 1 AND 2: 0
2. FEELING DOWN, DEPRESSED, IRRITABLE, OR HOPELESS: NOT AT ALL

## 2021-03-24 ASSESSMENT — GAIT ASSESSMENTS
DISTANCE (FEET): 110
GAIT LEVEL OF ASSIST: CONTACT GUARD ASSIST
ASSISTIVE DEVICE: FRONT WHEEL WALKER
GAIT LEVEL OF ASSIST: CONTACT GUARD ASSIST
DEVIATION: DECREASED HEEL STRIKE;DECREASED TOE OFF;BRADYKINETIC
DEVIATION: DECREASED BASE OF SUPPORT;BRADYKINETIC;DECREASED HEEL STRIKE;DECREASED TOE OFF
ASSISTIVE DEVICE: NONE;OTHER (COMMENTS)
DISTANCE (FEET): 350

## 2021-03-24 ASSESSMENT — ACTIVITIES OF DAILY LIVING (ADL)
BED_CHAIR_WHEELCHAIR_TRANSFER_DESCRIPTION: ADAPTIVE EQUIPMENT;SET-UP OF EQUIPMENT
BED_CHAIR_WHEELCHAIR_TRANSFER_DESCRIPTION: SUPERVISION FOR SAFETY;VERBAL CUEING

## 2021-03-24 NOTE — PROGRESS NOTES
Patient was out for radiation mapping appointment. Returned to facility at about 9:30 am with CNA.

## 2021-03-24 NOTE — CT SIMULATION
PATIENT NAME Melba Frye   PRIMARY PHYSICIAN Trinity Nguyen [id number]   REFERRING PHYSICIAN No ref. provider found 1969     Glioblastoma of frontal lobe (HCC)  Staging form: Brain and Spinal Cord, AJCC 8th Edition  - Pathologic stage from 3/24/2021: WHO Grade IV - Signed by Edenilson Frye M.D. on 3/24/2021  Histologic grading system: 4 grade system  IDH1 mutation: Negative  IDH2 mutation: Negative  MGMT methylation: Absent         Treatment Planning CT Simulation      Order Questions     Question Answer Comment    Is this for a new course of treatment? Yes     Is this an Addendum? No     Implanted Device/Pacemaker No     Simulation Status Initial     Planned Start Date 4/5/2021     Treatment Site Brain - Overlapping sites     Laterality Axial     Treatment Technique IMRT     Treatment Pattern/Frequency Daily     Concurrent Chemotherapy Yes     CT Technique 3D     Slice Thickness 2mm     Scan Extent Brain     Treatment Device(s) Aquaplast Mask     Patient Attire Gown     Patient Position Supine     Patient Orientation Head First     Arm Position Down     Chin Position Neutral     Treatment Machine No preference     Treatment Image Guidance CBCT     Frequency (CBCT) Daily     Image Guidance Match Bone     Treatment Planning Image Fusion CT/MR     Other Orders Special Tx Procedure      Weekly Physics Check

## 2021-03-24 NOTE — THERAPY
Physical Therapy   Daily Treatment     Patient Name: Melba Frye  Age:  50 y.o., Sex:  female  Medical Record #: [id number]  Today's Date: 3/24/2021     Precautions  Precautions: Fall Risk, Other (See Comments)  Comments: Seizure prec    Subjective    Pt was seated in w/c upon arrival and agreeable to treatment.  Pt's spouse present during session.       Objective       03/24/21 1001   Precautions   Precautions Fall Risk;Other (See Comments)   Comments Seizure prec   Gait Functional Level of Assist    Gait Level Of Assist Contact Guard Assist   Assistive Device Front Wheel Walker  (L AFO)   Distance (Feet) 350   # of Times Distance was Traveled 1   Deviation Decreased Base Of Support;Bradykinetic;Decreased Heel Strike;Decreased Toe Off   Interdisciplinary Plan of Care Collaboration   Patient Position at End of Therapy Seated;Family / Friend in Room  (Hand off to OT)   PT Total Time Spent   PT Individual Total Time Spent (Mins) 30   PT Charge Group   PT Gait Training 1   PT Therapeutic Activities 1     Education provided on donning/doffing AFO.  Completed skin check at end of session with no redness noted.  Reviewed sequencing of car transfer in preparation for practice in PM session.      Assessment    Pt continues to make steady progress with improvement in gait mechanics and safety with use of AFO.  Pt and pt's spouse verbalized all education.   Strengths: Supportive family, Pleasant and cooperative, Motivated for self care and independence, Willingly participates in therapeutic activities, Effective communication skills  Barriers: Decreased endurance, Fatigue, Generalized weakness, Hemiparesis, Home accessibility, Impaired activity tolerance, Impaired balance, Impaired insight/denial of deficits, Limited mobility    Plan    Continue gait training with FWW, continue stair training (6 consecutive, rest, followed by 8 consecutive), continue family training (complete stair training and gait), car transfer with  "pt's personal vehicle, outdoor ambulation.       Passport items to be completed:  Get in/out of bed safely, in/out of a vehicle, safely use mobility device, walk or wheel around home/community, demonstrate HEP    Physical Therapy Problems     Problem: Mobility     Dates: Start: 03/16/21       Goal: STG-Within one week, patient will ambulate household distance     Dates: Start: 03/16/21       Description: 1) Individualized goal:  Pt will amb with FWW 30 CGA indoors  2) Interventions:  PT E Stim Attended, PT Orthotics Training, PT Gait Training, PT Therapeutic Exercises, PT Neuro Re-Ed/Balance, PT Aquatic Therapy, PT Therapeutic Activity, PT Manual Therapy, and PT Evaluation        Note:     Goal Note filed on 03/17/21 1226 by Lashon Cason DPT    Vector training- CGA/SBA 80 x 3 with FWW  L foot drag, impaired LUE , veering R                    Goal: STG-Within one week, patient will ascend and descend four to six stairs     Dates: Start: 03/16/21       Description: 1) Individualized goal:  Patient will amb up/down 4\" stair in // bars 4x CGA/min A  2) Interventions:  PT E Stim Attended, PT Orthotics Training, PT Gait Training, PT Therapeutic Exercises, PT Neuro Re-Ed/Balance, PT Aquatic Therapy, PT Therapeutic Activity, PT Manual Therapy, and PT Evaluation        Note:     Goal Note filed on 03/17/21 1226 by Lashon Cason DPT    Eval 3/16  TBD                        Problem: Mobility Transfers     Dates: Start: 03/16/21       Goal: STG-Within one week, patient will sit to stand     Dates: Start: 03/16/21       Description: 1) Individualized goal:  Pt will transfer CGA consistently with FWW STS/SPT   2) Interventions:  PT E Stim Attended, PT Orthotics Training, PT Gait Training, PT Therapeutic Exercises, PT Neuro Re-Ed/Balance, PT Aquatic Therapy, PT Therapeutic Activity, PT Manual Therapy, and PT Evaluation        Note:     Goal Note filed on 03/17/21 1226 by Lashon Cason DPT    Vector STS in // bars " CGA/SBA  Dec motor planning, Eval 3/16                          Problem: PT-Long Term Goals     Dates: Start: 03/16/21       Goal: LTG-By discharge, patient will ambulate     Dates: Start: 03/16/21       Description: 1) Individualized goal:  Patient will amb >50 ft with FWW SBA over level surfaces.   2) Interventions:  PT E Stim Attended, PT Orthotics Training, PT Gait Training, PT Therapeutic Exercises, PT Neuro Re-Ed/Balance, PT Aquatic Therapy, PT Therapeutic Activity, PT Manual Therapy, and PT Evaluation            Goal: LTG-By discharge, patient will transfer one surface to another     Dates: Start: 03/16/21       Description: 1) Individualized goal:  Patient will transfer SPV with FWW STS/SPT  2) Interventions:  PT E Stim Attended, PT Orthotics Training, PT Gait Training, PT Therapeutic Exercises, PT Neuro Re-Ed/Balance, PT Aquatic Therapy, PT Therapeutic Activity, PT Manual Therapy, and PT Evaluation              Goal: LTG-By discharge, patient will ambulate up/down flight of stairs     Dates: Start: 03/16/21       Description: 1) Individualized goal:  Patient will amb up/down 10 stairs 2x with 1HR (switches from R to left after landing) CGA  2) Interventions:  PT E Stim Attended, PT Orthotics Training, PT Gait Training, PT Therapeutic Exercises, PT Neuro Re-Ed/Balance, PT Aquatic Therapy, PT Therapeutic Activity, PT Manual Therapy, and PT Evaluation

## 2021-03-24 NOTE — THERAPY
Occupational Therapy  Daily Treatment     Patient Name: Melba Frye  Age:  50 y.o., Sex:  female  Medical Record #: [id number]  Today's Date: 3/24/2021     Precautions  Precautions: (P) Fall Risk, Other (See Comments)  Comments: (P) Seizure prec    Safety   ADL Safety : Requires Supervision for Safety    Subjective    Patient and spouse requesting to practice car transfer into spouse's truck.     Objective       03/24/21 1401   Precautions   Precautions Fall Risk;Other (See Comments)   Comments Seizure prec   Sitting Upper Body Exercises   Sitting Upper Body Exercises Yes   Lat Pull 3 sets of 10;Bilateral;Weight (See Comments for lbs)  (30, 35, 40 lbs on equalizer)   Interdisciplinary Plan of Care Collaboration   IDT Collaboration with  Family / Caregiver   Patient Position at End of Therapy Seated;Self Releasing Lap Belt Applied   Collaboration Comments spouse present for car transfer   OT Total Time Spent   OT Individual Total Time Spent (Mins) 30   OT Charge Group   OT Therapy Activity 2     Completed car transfer into spouse's passenger side truck with CGA/min A and verbal cues with running board and hand .      Functional mobility with FWW with spouse providing CGA in gym (~ 300').    Assessment    Patient able to get in/out of spouse's truck with CGA/min A and was very happy to be able to complete.    Strengths: Able to follow instructions, Good insight into deficits/needs, Independent prior level of function, Making steady progress towards goals, Manages pain appropriately, Motivated for self care and independence, Pleasant and cooperative, Supportive family, Willingly participates in therapeutic activities  Barriers: Decreased endurance, Generalized weakness, Hemiparesis, Home accessibility, Impaired balance, Impaired activity tolerance    Plan    ADLs, IADLs (bed making task with spouse and patient working together), standing tolerance/balance, STS      Occupational Therapy Goals     Problem:  Bathing     Dates: Start: 03/16/21       Goal: STG-Within one week, patient will bathe     Dates: Start: 03/16/21       Description: 1) Individualized Goal:  with supervision using DME/AE as needed and improve safety strategies   2) Interventions:  OT Group Therapy, OT Self Care/ADL, OT Cognitive Skill Dev, OT Community Reintegration, OT Neuro Re-Ed/Balance, OT Therapeutic Activity, and OT Therapeutic Exercise      Note:     Goal Note filed on 03/24/21 1135 by Kartik Estrada OT/L    Min A to wash feet from spouse                        Problem: Dressing     Dates: Start: 03/16/21       Goal: STG-Within one week, patient will dress LB     Dates: Start: 03/16/21       Description: 1) Individualized Goal:  with SBA using DME/AE as needed  2) Interventions:  OT Group Therapy, OT Self Care/ADL, OT Cognitive Skill Dev, OT Community Reintegration, OT Neuro Re-Ed/Balance, OT Therapeutic Activity, and OT Therapeutic Exercise    Note:     Goal Note filed on 03/24/21 1135 by Kartik Estrada OT/L    SBA                        Problem: Functional Transfers     Dates: Start: 03/16/21       Goal: STG-Within one week, patient will transfer to toilet     Dates: Start: 03/16/21       Description: 1) Individualized Goal:  with distant supervision using DME/AE as needed at w/c level  2) Interventions:  OT Group Therapy, OT Self Care/ADL, OT Cognitive Skill Dev, OT Community Reintegration, OT Neuro Re-Ed/Balance, OT Therapeutic Activity, and OT Therapeutic Exercise    Note:     Goal Note filed on 03/24/21 1135 by Kartik Estrada OT/L    CGA with grab bar                        Problem: OT Long Term Goals     Dates: Start: 03/16/21       Goal: LTG-By discharge, patient will complete basic self care tasks     Dates: Start: 03/16/21       Description: 1) Individualized Goal:  Mod I using DME/AE as needed at FWW level  2) Interventions:  OT Group Therapy, OT Self Care/ADL, OT Cognitive Skill Dev, OT Community Reintegration, OT  Neuro Re-Ed/Balance, OT Therapeutic Activity, and OT Therapeutic Exercise          Goal: LTG-By discharge, patient will perform bathroom transfers     Dates: Start: 03/16/21       Description: 1) Individualized Goal:  Mod I using DME/AE as needed at FWW level  2) Interventions:  OT Group Therapy, OT Self Care/ADL, OT Cognitive Skill Dev, OT Community Reintegration, OT Neuro Re-Ed/Balance, OT Therapeutic Activity, and OT Therapeutic Exercise                Problem: Toileting     Dates: Start: 03/16/21       Goal: STG-Within one week, patient will complete toileting tasks     Dates: Start: 03/16/21       Description: 1) Individualized Goal:  with supervision using DME/AE as needed   2) Interventions:  OT Group Therapy, OT Self Care/ADL, OT Cognitive Skill Dev, OT Community Reintegration, OT Neuro Re-Ed/Balance, OT Therapeutic Activity, and OT Therapeutic Exercise    Note:     Goal Note filed on 03/24/21 1135 by Kartik Estrada OT/KEATON AKHTAR

## 2021-03-24 NOTE — PROGRESS NOTES
"Rehab Progress Note     Date of Service: 3/24/2021  Chief Complaint: follow up GBM    Interval Events (Subjective)    Patient seen and examined today in her room.  She completed her radiation mapping this morning and said that it went well.  She denies any pain.  She does not want to discuss the possible side effects associated with her radiation treatment she does feel like she will be ready for discharge home on Saturday.  Her radiation is supposed to start on April 5. Medications sent to her pharmacy and were covered by her insurance.     ROS: No changes to bowel, bladder, pain, mood, or sleep.     Objective:  VITAL SIGNS: /94   Pulse 69   Temp 36.5 °C (97.7 °F) (Temporal)   Resp 18   Ht 1.702 m (5' 7\")   Wt 96.5 kg (212 lb 11.9 oz)   SpO2 96%   BMI 33.32 kg/m²   Gen: alert, no apparent distress  Neuro: notable for left foot drop    Recent Results (from the past 72 hour(s))   Basic Metabolic Panel    Collection Time: 03/22/21  6:15 AM   Result Value Ref Range    Sodium 140 135 - 145 mmol/L    Potassium 4.4 3.6 - 5.5 mmol/L    Chloride 104 96 - 112 mmol/L    Co2 28 20 - 33 mmol/L    Glucose 78 65 - 99 mg/dL    Bun 12 8 - 22 mg/dL    Creatinine 0.57 0.50 - 1.40 mg/dL    Calcium 8.7 8.5 - 10.5 mg/dL    Anion Gap 8.0 7.0 - 16.0   ESTIMATED GFR    Collection Time: 03/22/21  6:15 AM   Result Value Ref Range    GFR If African American >60 >60 mL/min/1.73 m 2    GFR If Non African American >60 >60 mL/min/1.73 m 2       Current Facility-Administered Medications   Medication Frequency   • acetaminophen (Tylenol) tablet 650 mg Q6HRS PRN   • acetaminophen (TYLENOL) tablet 1,000 mg TID   • hydrOXYzine HCl (ATARAX) tablet 50 mg Q6HRS PRN   • melatonin tablet 3 mg HS PRN   • Respiratory Therapy Consult Continuous RT   • Pharmacy Consult Request ...Pain Management Review 1 Each PHARMACY TO DOSE   • lactulose 20 GM/30ML solution 30 mL QDAY PRN   • docusate sodium (ENEMEEZ) enema 283 mg QDAY PRN   • fleet enema 133 mL " QDAY PRN   • artificial tears ophthalmic solution 1 Drop PRN   • benzocaine-menthol (CEPACOL) lozenge 1 Lozenge Q2HRS PRN   • mag hydrox-al hydrox-simeth (MAALOX PLUS ES or MYLANTA DS) suspension 20 mL Q2HRS PRN   • ondansetron (ZOFRAN ODT) dispertab 4 mg 4X/DAY PRN    Or   • ondansetron (ZOFRAN) syringe/vial injection 4 mg 4X/DAY PRN   • sodium chloride (OCEAN) 0.65 % nasal spray 2 Spray PRN   • midazolam (VERSED) 5 mg/mL (1 mL vial) PRN   • glucose 4 g chewable tablet 16 g Q15 MIN PRN    And   • dextrose 50% (D50W) injection 50 mL Q15 MIN PRN   • senna-docusate (PERICOLACE or SENOKOT S) 8.6-50 MG per tablet 2 tablet BID    And   • polyethylene glycol/lytes (MIRALAX) PACKET 1 Packet QDAY PRN    And   • magnesium hydroxide (MILK OF MAGNESIA) suspension 30 mL QDAY PRN    And   • bisacodyl (DULCOLAX) suppository 10 mg QDAY PRN   • MD ALERT...DO NOT ADMINISTER NSAIDS or ASPIRIN unless ORDERED By Neurosurgery 1 Each PRN   • folic acid (FOLVITE) tablet 1 mg DAILY   • lamoTRIgine (LAMICTAL) tablet 100 mg BID   • levETIRAcetam (KEPPRA) tablet 500 mg BID   • venlafaxine (EFFEXOR) tablet 12.5 mg BID   • LORazepam (ATIVAN) tablet 0.5 mg Q4HRS PRN       Orders Placed This Encounter   Procedures   • Diet Order Diet: Regular     Standing Status:   Standing     Number of Occurrences:   1     Order Specific Question:   Diet:     Answer:   Regular [1]       Assessment:  Active Hospital Problems    Diagnosis    • *Glioblastoma of frontal lobe (HCC)    • 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 female admitted for acute inpatient rehabilitation with Glioblastoma of frontal lobe (HCC).    I led and attended the weekly conference, and agree with the IDT conference documentation and  plan of care as noted below.    Date of conference: 3/24/2021    Goals and barriers: See IDT note.    CM/social support:  supportive     Anticipated DC date: 3/27    Home health: PT/OT    Equip: grab bars in shower and by toilet    Follow up: PCP, Dr. Steele, Dr. Frye, Dr. Cerda, neurology      Medical Decision Making and Plan:    GBM  S/p resection 3/9  S/p Steroid taper  Staples out 3/23  Radiation mapping today 3/24  Outpatient follow up with oncology  Outpatient follow up with Dr. Steele    Left foot drop  Custom fitted AFO     Seizure disorder  Keppra  Lamictal  Outpatient follow up with neurology  Sent in RX, covered     Anxiety disorder  Effexor  PRN Ativan, last use 3/15, patient does not want to use at discharge, prefers Atarax  PRN Atarax, last use 3/23     History of headaches  Scheduled tylenol    Hyponatremia, resolved  Discontinued salt tabs    Vit D insufficiency  Supplementation    Insomnia, resolved  Melatonin    Bowel program  Continue bowel medications  Scheduled Sennakot  PRN Miralax, MOM, bisacodyl suppository  Last BM 3/23    Bladder program  Check PVRs - 16  Not retaining  Bladder scan for no voids  ICP for over 400 cc  Scheduled toileting    DVT prophylaxis  Contraindicated given risk of hemorrhage.   Not cleared by neurosurgery.   Compression stockings on in am, off in pm.  Increase mobility. Monitor for swelling.    Total time:  40 minutes.  I spent greater than 50% of the time for patient care, counseling, and coordination on this date, including patient face-to face time, unit/floor time with review of records/pertinent lab data and studies, as well as discussing diagnostic evaluation/work up, planned therapeutic interventions, and future disposition of care, as per the interval events/subjective and the assessment and plan as noted above.            Susi Fulton M.D.   Physical Medicine and Rehabilitation

## 2021-03-24 NOTE — CARE PLAN
Problem: Bathing  Goal: STG-Within one week, patient will bathe  Description: 1) Individualized Goal:  with supervision using DME/AE as needed and improve safety strategies   2) Interventions:  OT Group Therapy, OT Self Care/ADL, OT Cognitive Skill Dev, OT Community Reintegration, OT Neuro Re-Ed/Balance, OT Therapeutic Activity, and OT Therapeutic Exercise    Outcome: NOT MET  Note: Min A to wash feet from spouse     Problem: Dressing  Goal: STG-Within one week, patient will dress LB  Description: 1) Individualized Goal:  with SBA using DME/AE as needed  2) Interventions:  OT Group Therapy, OT Self Care/ADL, OT Cognitive Skill Dev, OT Community Reintegration, OT Neuro Re-Ed/Balance, OT Therapeutic Activity, and OT Therapeutic Exercise  Outcome: NOT MET  Note: SBA     Problem: Toileting  Goal: STG-Within one week, patient will complete toileting tasks  Description: 1) Individualized Goal:  with supervision using DME/AE as needed   2) Interventions:  OT Group Therapy, OT Self Care/ADL, OT Cognitive Skill Dev, OT Community Reintegration, OT Neuro Re-Ed/Balance, OT Therapeutic Activity, and OT Therapeutic Exercise  Outcome: NOT MET  Note: SBA      Problem: Functional Transfers  Goal: STG-Within one week, patient will transfer to toilet  Description: 1) Individualized Goal:  with distant supervision using DME/AE as needed at w/c level  2) Interventions:  OT Group Therapy, OT Self Care/ADL, OT Cognitive Skill Dev, OT Community Reintegration, OT Neuro Re-Ed/Balance, OT Therapeutic Activity, and OT Therapeutic Exercise  Outcome: NOT MET  Note: CGA with grab bar

## 2021-03-24 NOTE — THERAPY
"Occupational Therapy  Daily Treatment     Patient Name: Melba Frye  Age:  50 y.o., Sex:  female  Medical Record #: [id number]  Today's Date: 3/24/2021     Precautions  Precautions: Fall Risk, Other (See Comments)  Comments: Seizure prec    Safety   ADL Safety : Requires Supervision for Safety    Subjective    \"Thank you so much for allowing me to cook!\"     Objective       03/24/21 1031   Precautions   Precautions Fall Risk;Other (See Comments)   Comments Seizure prec   Functional Level of Assist   Eating Independent   Eating Description   (ate the meal she prepared for session)   Grooming Supervision;Standing  (to wash hands)   IADL Treatments   Kitchen Mobility Education Setup/SBA to CGA with FWW or support of kitchen counter.  Required assist of spouse to remove Australian oven from fridge and transfer to stove.   Meal Preparation Continued cooking meal which was started yesterday with setup/SBA sitting and standing at stove.  Monitored stove temperature and turned burner on/off appropriately without cues or assist.     Home Management Cleaned up work area and dishes with min A of spouse and SBA at times with verbal cues from therapist on way to position self while washing dishes.   Interdisciplinary Plan of Care Collaboration   IDT Collaboration with  Family / Caregiver   Patient Position at End of Therapy Seated;Self Releasing Lap Belt Applied   Collaboration Comments spouse present and assisted pt as needed during session   OT Total Time Spent   OT Individual Total Time Spent (Mins) 60   OT Charge Group   OT Self Care / ADL 1   OT Therapy Activity 3       Assessment    Patient took frequent seated rest breaks during cooking session today without verbal cues to do so.  Recognized when she needed a break today where she needed cueing to do so yesterday.  Spouse and patient work very well as a team.  Strengths: Able to follow instructions, Good insight into deficits/needs, Independent prior level of function, " Making steady progress towards goals, Manages pain appropriately, Motivated for self care and independence, Pleasant and cooperative, Supportive family, Willingly participates in therapeutic activities  Barriers: Decreased endurance, Generalized weakness, Hemiparesis, Home accessibility, Impaired balance, Impaired activity tolerance    Plan    ADLs, IADLs (bed making task with spouse and patient working together), standing tolerance/balance, STS      Occupational Therapy Goals     Problem: Bathing     Dates: Start: 03/16/21       Goal: STG-Within one week, patient will bathe     Dates: Start: 03/16/21       Description: 1) Individualized Goal:  with supervision using DME/AE as needed and improve safety strategies   2) Interventions:  OT Group Therapy, OT Self Care/ADL, OT Cognitive Skill Dev, OT Community Reintegration, OT Neuro Re-Ed/Balance, OT Therapeutic Activity, and OT Therapeutic Exercise      Note:     Goal Note filed on 03/17/21 1211 by Kartik Estrada, OT/L    CGA                        Problem: Dressing     Dates: Start: 03/16/21       Goal: STG-Within one week, patient will dress LB     Dates: Start: 03/16/21       Description: 1) Individualized Goal:  with SBA using DME/AE as needed  2) Interventions:  OT Group Therapy, OT Self Care/ADL, OT Cognitive Skill Dev, OT Community Reintegration, OT Neuro Re-Ed/Balance, OT Therapeutic Activity, and OT Therapeutic Exercise    Note:     Goal Note filed on 03/17/21 1211 by Kartik Estrada OT/L    Min/mod                        Problem: Functional Transfers     Dates: Start: 03/16/21       Goal: STG-Within one week, patient will transfer to toilet     Dates: Start: 03/16/21       Description: 1) Individualized Goal:  with distant supervision using DME/AE as needed at w/c level  2) Interventions:  OT Group Therapy, OT Self Care/ADL, OT Cognitive Skill Dev, OT Community Reintegration, OT Neuro Re-Ed/Balance, OT Therapeutic Activity, and OT Therapeutic Exercise     Note:     Goal Note filed on 03/17/21 1211 by Kartik Estarda, OT/L    CGA                        Problem: OT Long Term Goals     Dates: Start: 03/16/21       Goal: LTG-By discharge, patient will complete basic self care tasks     Dates: Start: 03/16/21       Description: 1) Individualized Goal:  Mod I using DME/AE as needed at FWW level  2) Interventions:  OT Group Therapy, OT Self Care/ADL, OT Cognitive Skill Dev, OT Community Reintegration, OT Neuro Re-Ed/Balance, OT Therapeutic Activity, and OT Therapeutic Exercise          Goal: LTG-By discharge, patient will perform bathroom transfers     Dates: Start: 03/16/21       Description: 1) Individualized Goal:  Mod I using DME/AE as needed at FWW level  2) Interventions:  OT Group Therapy, OT Self Care/ADL, OT Cognitive Skill Dev, OT Community Reintegration, OT Neuro Re-Ed/Balance, OT Therapeutic Activity, and OT Therapeutic Exercise                Problem: Toileting     Dates: Start: 03/16/21       Goal: STG-Within one week, patient will complete toileting tasks     Dates: Start: 03/16/21       Description: 1) Individualized Goal:  with supervision using DME/AE as needed   2) Interventions:  OT Group Therapy, OT Self Care/ADL, OT Cognitive Skill Dev, OT Community Reintegration, OT Neuro Re-Ed/Balance, OT Therapeutic Activity, and OT Therapeutic Exercise    Note:     Goal Note filed on 03/17/21 1211 by Kartik Estrada, OT/L    CGA

## 2021-03-24 NOTE — THERAPY
Physical Therapy   Daily Treatment     Patient Name: Melba Frye  Age:  50 y.o., Sex:  female  Medical Record #: [id number]  Today's Date: 3/24/2021     Precautions  Precautions: (P) Fall Risk  Comments: (P) seizure precautions    Subjective    Pt reports she has worked on car transfers in her own car, stairs, curbs, floor transfers and walking outside.     Objective       03/24/21 1501   Precautions   Precautions Fall Risk   Comments seizure precautions   Gait Functional Level of Assist    Gait Level Of Assist Contact Guard Assist   Assistive Device None;Other (Comments)  (left AFO)   Distance (Feet) 110   # of Times Distance was Traveled 2  (Straight amb focus on wt shifts and fast walking.)   Deviation Decreased Heel Strike;Decreased Toe Off;Bradykinetic   Stairs Functional Level of Assist   Level of Assist with Stairs Contact Guard Assist   # of Stairs Climbed 16   Stairs Description AFO;Extra time;Hand rails;Supervision for safety;Verbal cueing  (worked on erect posture and its effect on foot clearance)   Transfer Functional Level of Assist   Bed, Chair, Wheelchair Transfer Stand by Assist   Bed Chair Wheelchair Transfer Description Supervision for safety;Verbal cueing  (working on equalizing weight and use of  both LE's)   Neuro-Muscular Treatments   Neuro-Muscular Treatments Anterior weight shift;Sequencing;Tactile Cuing;Taping;Verbal Cuing;Weight Shift Right;Weight Shift Left  (working facilitation of left LE use in function and balance)   Comments worked without an UE AD and worked on left LE use and balance with single leg stance on the left with right toe taps and holds on step stool. as well as doing wall push ups to standing to work on ankle plantar flexion and balance recovery   Interdisciplinary Plan of Care Collaboration   IDT Collaboration with  Physical Therapist   Patient Position at End of Therapy Seated;Chair Alarm On;Self Releasing Lap Belt Applied;Call Light within Reach;Tray Table  within Reach   Collaboration Comments notes from primary PT on what pt has been working on   PT Total Time Spent   PT Individual Total Time Spent (Mins) 30   PT Charge Group   PT Gait Training 1   PT Neuromuscular Re-Education / Balance 1     Stair Training with bilateral UE on rails and then 1 UE on rail - going over posture and foot placement.    Transfer Training - working on equalizing use of left LE on sit to stands and stand to sits.  Going over and practicing sit to stands with normalized movements.    Balance work - working on side to side and forward to back weight shifts.  Single leg stance on the left with right LE performing toe taps on various ht and placed step stools and then holding positions and adjusting posture.  Wall push ups to stands to work on ankle function in balance.    Gait work - ambulation without AD to focus on balance and weight shifts and then speed walking to work on use of momentum in gait for more normalized and energy efficient gait.  Assessment    Pt hard working - at times a little impulsive.  Showing good potential and progress in use of the the left LE.    Strengths: Supportive family, Pleasant and cooperative, Motivated for self care and independence, Willingly participates in therapeutic activities, Effective communication skills  Barriers: Decreased endurance, Fatigue, Generalized weakness, Hemiparesis, Home accessibility, Impaired activity tolerance, Impaired balance, Impaired insight/denial of deficits, Limited mobility    Plan    Continue to work on higher level balance and increasing use and normalization of left LE use in functional mobility tasks.  Looking at body position and its effect on the left LE function or avoidance.    Passport items to be completed: working on all aspects of the passport with good progress noted.  Get in/out of bed safely, in/out of a vehicle, safely use mobility device, walk or wheel around home/community, navigate up and down stairs, show  how to get up/down from the ground, ensure home is accessible, demonstrate HEP, complete caregiver training    Physical Therapy Problems     Problem: PT-Long Term Goals     Dates: Start: 03/16/21       Goal: LTG-By discharge, patient will ambulate     Dates: Start: 03/16/21       Description: 1) Individualized goal:  Patient will amb >50 ft with FWW SBA over level surfaces.   2) Interventions:  PT E Stim Attended, PT Orthotics Training, PT Gait Training, PT Therapeutic Exercises, PT Neuro Re-Ed/Balance, PT Aquatic Therapy, PT Therapeutic Activity, PT Manual Therapy, and PT Evaluation            Goal: LTG-By discharge, patient will transfer one surface to another     Dates: Start: 03/16/21       Description: 1) Individualized goal:  Patient will transfer SPV with FWW STS/SPT  2) Interventions:  PT E Stim Attended, PT Orthotics Training, PT Gait Training, PT Therapeutic Exercises, PT Neuro Re-Ed/Balance, PT Aquatic Therapy, PT Therapeutic Activity, PT Manual Therapy, and PT Evaluation              Goal: LTG-By discharge, patient will ambulate up/down flight of stairs     Dates: Start: 03/16/21       Description: 1) Individualized goal:  Patient will amb up/down 10 stairs 2x with 1HR (switches from R to left after landing) CGA  2) Interventions:  PT E Stim Attended, PT Orthotics Training, PT Gait Training, PT Therapeutic Exercises, PT Neuro Re-Ed/Balance, PT Aquatic Therapy, PT Therapeutic Activity, PT Manual Therapy, and PT Evaluation

## 2021-03-24 NOTE — THERAPY
Physical Therapy   Daily Treatment     Patient Name: Melba Frye  Age:  50 y.o., Sex:  female  Medical Record #: [id number]  Today's Date: 3/24/2021     Precautions  Precautions: Fall Risk, Other (See Comments)  Comments: Seizure prec    Subjective    Patient was disappointed (tearful) with inability to successfully transfer into personal truck; agreeable to re-try another time, and try patient's other vehicle.      Objective       03/24/21 1301   Precautions   Precautions Fall Risk;Other (See Comments)   Comments Seizure prec   Transfer Functional Level of Assist   Bed, Chair, Wheelchair Transfer Contact Guard Assist   Bed Chair Wheelchair Transfer Description Adaptive equipment;Set-up of equipment  (AFO donned prior to transfer)   Bed Mobility    Supine to Sit Supervised   Sit to Stand Contact Guard Assist   Scooting Supervised   Rolling Supervised   Neuro-Muscular Treatments   Neuro-Muscular Treatments Weight Shift Left;Weight Shift Right;Verbal Cuing;Tactile Cuing;Sequencing;Postural Facilitation;Facilitation;Postural Changes;Joint Approximation   Comments Truck transfer into patient's vehicle with 3-4 attempts using various methods and sequencing. Unable to accomplish transfer due to fear of falling, high running board, and difficulty sequencing. CGA/min A, with 1 LOB max A to recover. Trials discontinued after LOB, with plan to re-attempt at another time. Education on AFO wear schedule, and skin checks.   Interdisciplinary Plan of Care Collaboration   IDT Collaboration with  Family / Caregiver   Patient Position at End of Therapy Seated;Family / Friend in Room;Self Releasing Lap Belt Applied   Collaboration Comments Family training initiated for truck transfer training- sequencing demo, use of gait belt, and guarding techniques.    Strengths & Barriers   Strengths Supportive family;Pleasant and cooperative;Motivated for self care and independence;Willingly participates in therapeutic activities;Effective  communication skills   Barriers Decreased endurance;Fatigue;Generalized weakness;Hemiparesis;Home accessibility;Impaired activity tolerance;Impaired balance;Impaired insight/denial of deficits;Limited mobility   PT Total Time Spent   PT Individual Total Time Spent (Mins) 30   PT Charge Group   PT Neuromuscular Re-Education / Balance 1   PT Therapeutic Activities 1       Assessment    Truck transfer was unsuccessful due to truck height, need for running board, lack of confidence, fear of falling, and trouble sequencing new transfer. Spouse was educated on sequencing strategies, and demonstration was performed. Transfer discontinued after max A recovery from LOB. Spouse plans to bring other vehicle to trial tomorrow.       Strengths: (P) Supportive family, Pleasant and cooperative, Motivated for self care and independence, Willingly participates in therapeutic activities, Effective communication skills  Barriers: (P) Decreased endurance, Fatigue, Generalized weakness, Hemiparesis, Home accessibility, Impaired activity tolerance, Impaired balance, Impaired insight/denial of deficits, Limited mobility    Plan    Continue gait training with FWW, continue stair training (8 consecutive, rest, followed by 8 consecutive), continue family training (complete stair training and gait), car transfer with pt's other personal vehicle, outdoor ambulation.  D/C anticipated in 3 days, complete passport.      Passport items to be completed:  Get in/out of bed safely, in/out of a vehicle, safely use mobility device, walk or wheel around home/community,     Physical Therapy Problems     Problem: PT-Long Term Goals     Dates: Start: 03/16/21       Goal: LTG-By discharge, patient will ambulate     Dates: Start: 03/16/21       Description: 1) Individualized goal:  Patient will amb >50 ft with FWW SBA over level surfaces.   2) Interventions:  PT E Stim Attended, PT Orthotics Training, PT Gait Training, PT Therapeutic Exercises, PT Neuro  Re-Ed/Balance, PT Aquatic Therapy, PT Therapeutic Activity, PT Manual Therapy, and PT Evaluation            Goal: LTG-By discharge, patient will transfer one surface to another     Dates: Start: 03/16/21       Description: 1) Individualized goal:  Patient will transfer SPV with FWW STS/SPT  2) Interventions:  PT E Stim Attended, PT Orthotics Training, PT Gait Training, PT Therapeutic Exercises, PT Neuro Re-Ed/Balance, PT Aquatic Therapy, PT Therapeutic Activity, PT Manual Therapy, and PT Evaluation              Goal: LTG-By discharge, patient will ambulate up/down flight of stairs     Dates: Start: 03/16/21       Description: 1) Individualized goal:  Patient will amb up/down 10 stairs 2x with 1HR (switches from R to left after landing) CGA  2) Interventions:  PT E Stim Attended, PT Orthotics Training, PT Gait Training, PT Therapeutic Exercises, PT Neuro Re-Ed/Balance, PT Aquatic Therapy, PT Therapeutic Activity, PT Manual Therapy, and PT Evaluation

## 2021-03-24 NOTE — CARE PLAN
"  Problem: Mobility  Goal: STG-Within one week, patient will ambulate household distance  Description: 1) Individualized goal:  Pt will amb with FWW 30 CGA indoors  2) Interventions:  PT E Stim Attended, PT Orthotics Training, PT Gait Training, PT Therapeutic Exercises, PT Neuro Re-Ed/Balance, PT Aquatic Therapy, PT Therapeutic Activity, PT Manual Therapy, and PT Evaluation      Outcome: MET  Goal: STG-Within one week, patient will ascend and descend four to six stairs  Description: 1) Individualized goal:  Patient will amb up/down 4\" stair in // bars 4x CGA/min A  2) Interventions:  PT E Stim Attended, PT Orthotics Training, PT Gait Training, PT Therapeutic Exercises, PT Neuro Re-Ed/Balance, PT Aquatic Therapy, PT Therapeutic Activity, PT Manual Therapy, and PT Evaluation      Outcome: MET     Problem: Mobility Transfers  Goal: STG-Within one week, patient will sit to stand  Description: 1) Individualized goal:  Pt will transfer CGA consistently with FWW STS/SPT   2) Interventions:  PT E Stim Attended, PT Orthotics Training, PT Gait Training, PT Therapeutic Exercises, PT Neuro Re-Ed/Balance, PT Aquatic Therapy, PT Therapeutic Activity, PT Manual Therapy, and PT Evaluation      Outcome: MET     "

## 2021-03-25 ENCOUNTER — PATIENT OUTREACH (OUTPATIENT)
Dept: OTHER | Facility: MEDICAL CENTER | Age: 51
End: 2021-03-25

## 2021-03-25 PROCEDURE — 99232 SBSQ HOSP IP/OBS MODERATE 35: CPT | Performed by: PHYSICAL MEDICINE & REHABILITATION

## 2021-03-25 PROCEDURE — 97530 THERAPEUTIC ACTIVITIES: CPT

## 2021-03-25 PROCEDURE — 700102 HCHG RX REV CODE 250 W/ 637 OVERRIDE(OP): Performed by: PHYSICAL MEDICINE & REHABILITATION

## 2021-03-25 PROCEDURE — A9270 NON-COVERED ITEM OR SERVICE: HCPCS | Performed by: PHYSICAL MEDICINE & REHABILITATION

## 2021-03-25 PROCEDURE — 97535 SELF CARE MNGMENT TRAINING: CPT

## 2021-03-25 PROCEDURE — 97116 GAIT TRAINING THERAPY: CPT

## 2021-03-25 PROCEDURE — 97110 THERAPEUTIC EXERCISES: CPT

## 2021-03-25 PROCEDURE — 770010 HCHG ROOM/CARE - REHAB SEMI PRIVAT*

## 2021-03-25 RX ADMIN — ACETAMINOPHEN 1000 MG: 500 TABLET, FILM COATED ORAL at 19:27

## 2021-03-25 RX ADMIN — LAMOTRIGINE 100 MG: 100 TABLET ORAL at 08:16

## 2021-03-25 RX ADMIN — MELATONIN TAB 3 MG 3 MG: 3 TAB at 19:27

## 2021-03-25 RX ADMIN — VENLAFAXINE 12.5 MG: 25 TABLET ORAL at 08:16

## 2021-03-25 RX ADMIN — ACETAMINOPHEN 650 MG: 325 TABLET, FILM COATED ORAL at 03:17

## 2021-03-25 RX ADMIN — ACETAMINOPHEN 1000 MG: 500 TABLET, FILM COATED ORAL at 14:35

## 2021-03-25 RX ADMIN — FOLIC ACID 1 MG: 1 TABLET ORAL at 08:16

## 2021-03-25 RX ADMIN — VENLAFAXINE 12.5 MG: 25 TABLET ORAL at 19:27

## 2021-03-25 RX ADMIN — LEVETIRACETAM 500 MG: 500 TABLET ORAL at 19:27

## 2021-03-25 RX ADMIN — ACETAMINOPHEN 1000 MG: 500 TABLET, FILM COATED ORAL at 08:16

## 2021-03-25 RX ADMIN — HYDROXYZINE HYDROCHLORIDE 50 MG: 25 TABLET, FILM COATED ORAL at 08:16

## 2021-03-25 RX ADMIN — HYDROXYZINE HYDROCHLORIDE 50 MG: 25 TABLET, FILM COATED ORAL at 19:27

## 2021-03-25 RX ADMIN — LAMOTRIGINE 100 MG: 100 TABLET ORAL at 19:27

## 2021-03-25 RX ADMIN — LEVETIRACETAM 500 MG: 500 TABLET ORAL at 08:16

## 2021-03-25 ASSESSMENT — GAIT ASSESSMENTS
DEVIATION: DECREASED HEEL STRIKE;DECREASED TOE OFF;BRADYKINETIC
ASSISTIVE DEVICE: FRONT WHEEL WALKER
GAIT LEVEL OF ASSIST: CONTACT GUARD ASSIST
DISTANCE (FEET): 300
ASSISTIVE DEVICE: FRONT WHEEL WALKER;OTHER (COMMENTS)
GAIT LEVEL OF ASSIST: CONTACT GUARD ASSIST
DISTANCE (FEET): 30
DEVIATION: DECREASED HEEL STRIKE;DECREASED TOE OFF

## 2021-03-25 ASSESSMENT — ACTIVITIES OF DAILY LIVING (ADL)
TOILET_TRANSFER_DESCRIPTION: GRAB BAR
BED_CHAIR_WHEELCHAIR_TRANSFER_DESCRIPTION: INCREASED TIME;SET-UP OF EQUIPMENT;SUPERVISION FOR SAFETY
TOILETING_LEVEL_OF_ASSIST_DESCRIPTION: GRAB BAR

## 2021-03-25 NOTE — THERAPY
Occupational Therapy  Daily Treatment     Patient Name: Melba Frye  Age:  50 y.o., Sex:  female  Medical Record #: [id number]  Today's Date: 3/25/2021     Precautions  Precautions: Fall Risk  Comments: seizure prec    Safety   ADL Safety : Requires Supervision for Safety    Subjective    Patient lying in bed awake.  Agreeable to participate in OT, though not thrilled about 7 am session.  At end of session, she stated it was kind of nice to get up and going at 7 am.     Objective       03/25/21 0701   Precautions   Precautions Fall Risk   Comments seizure prec   Functional Level of Assist   Grooming Supervision;Standing  (SPV standing to brush teeth; seated Ind for makeup)   Upper Body Dressing Modified Independent   Upper Body Dressing Description Assist device equipment  (w/c)   Lower Body Dressing Modified Independent   Lower Body Dressing Description Assistive devices  (w/c)   Toileting Modified Independent   Toileting Description Grab bar   Toilet Transfers Supervised   Toilet Transfer Description Grab bar   Sitting Lower Body Exercises   Nustep Time (See Comments)  (nustep level 5 x 10 minutes with LEs only)   Bed Mobility    Supine to Sit Supervised   Scooting Supervised   Interdisciplinary Plan of Care Collaboration   Patient Position at End of Therapy Seated;Call Light within Reach;Tray Table within Reach;Phone within Reach;Chair Alarm On;Self Releasing Lap Belt Applied   OT Total Time Spent   OT Individual Total Time Spent (Mins) 60   OT Charge Group   OT Self Care / ADL 3   OT Therapeutic Exercise  1     Functional mobility with FWW bed to bathroom with SBA.    Assessment    Patient demonstrated improvements in independence with toileting, toilet transfers and LB dressing.  Improved ability to keep left LE from abducting during use of nustep.  Strengths: Able to follow instructions, Good insight into deficits/needs, Independent prior level of function, Making steady progress towards goals, Manages  pain appropriately, Motivated for self care and independence, Pleasant and cooperative, Supportive family, Willingly participates in therapeutic activities  Barriers: Decreased endurance, Generalized weakness, Hemiparesis, Home accessibility, Impaired balance, Impaired activity tolerance    Plan    ADLs, IADLs (bed making task with spouse and patient working together), standing tolerance/balance, STS      Occupational Therapy Goals     Problem: Bathing     Dates: Start: 03/16/21       Goal: STG-Within one week, patient will bathe     Dates: Start: 03/16/21       Description: 1) Individualized Goal:  with supervision using DME/AE as needed and improve safety strategies   2) Interventions:  OT Group Therapy, OT Self Care/ADL, OT Cognitive Skill Dev, OT Community Reintegration, OT Neuro Re-Ed/Balance, OT Therapeutic Activity, and OT Therapeutic Exercise      Note:     Goal Note filed on 03/24/21 1135 by Kartik Estrada OT/L    Min A to wash feet from spouse                        Problem: Dressing     Dates: Start: 03/16/21       Goal: STG-Within one week, patient will dress LB     Dates: Start: 03/16/21       Description: 1) Individualized Goal:  with SBA using DME/AE as needed  2) Interventions:  OT Group Therapy, OT Self Care/ADL, OT Cognitive Skill Dev, OT Community Reintegration, OT Neuro Re-Ed/Balance, OT Therapeutic Activity, and OT Therapeutic Exercise    Note:     Goal Note filed on 03/24/21 1135 by Kartik Estrada OT/L    SBA                        Problem: Functional Transfers     Dates: Start: 03/16/21       Goal: STG-Within one week, patient will transfer to toilet     Dates: Start: 03/16/21       Description: 1) Individualized Goal:  with distant supervision using DME/AE as needed at w/c level  2) Interventions:  OT Group Therapy, OT Self Care/ADL, OT Cognitive Skill Dev, OT Community Reintegration, OT Neuro Re-Ed/Balance, OT Therapeutic Activity, and OT Therapeutic Exercise    Note:     Goal Note  filed on 03/24/21 1135 by Kartik Estrada, OT/L    CGA with grab bar                        Problem: OT Long Term Goals     Dates: Start: 03/16/21       Goal: LTG-By discharge, patient will complete basic self care tasks     Dates: Start: 03/16/21       Description: 1) Individualized Goal:  Mod I using DME/AE as needed at FWW level  2) Interventions:  OT Group Therapy, OT Self Care/ADL, OT Cognitive Skill Dev, OT Community Reintegration, OT Neuro Re-Ed/Balance, OT Therapeutic Activity, and OT Therapeutic Exercise          Goal: LTG-By discharge, patient will perform bathroom transfers     Dates: Start: 03/16/21       Description: 1) Individualized Goal:  Mod I using DME/AE as needed at FWW level  2) Interventions:  OT Group Therapy, OT Self Care/ADL, OT Cognitive Skill Dev, OT Community Reintegration, OT Neuro Re-Ed/Balance, OT Therapeutic Activity, and OT Therapeutic Exercise                Problem: Toileting     Dates: Start: 03/16/21       Goal: STG-Within one week, patient will complete toileting tasks     Dates: Start: 03/16/21       Description: 1) Individualized Goal:  with supervision using DME/AE as needed   2) Interventions:  OT Group Therapy, OT Self Care/ADL, OT Cognitive Skill Dev, OT Community Reintegration, OT Neuro Re-Ed/Balance, OT Therapeutic Activity, and OT Therapeutic Exercise    Note:     Goal Note filed on 03/24/21 1135 by Kartik Estrada OT/L    SBA

## 2021-03-25 NOTE — DISCHARGE PLANNING
CM spoke with patients  J Carlos to update on IDT and DC date still set for 3/27/21.  J Carlos stated he and patient are really looking forward to getting home.  CM will continue to monitor for DC needs.

## 2021-03-25 NOTE — THERAPY
Occupational Therapy  Daily Treatment     Patient Name: Melba Frye  Age:  50 y.o., Sex:  female  Medical Record #: [id number]  Today's Date: 3/25/2021     Precautions  Precautions: (P) Fall Risk  Comments: (P) seizure prec    Safety   ADL Safety : Requires Supervision for Safety    Subjective    Pt up in w/c with spouse present. She reports that she is looking forward to d/c on Saturday.      Objective       03/25/21 1031   Precautions   Precautions Fall Risk   Comments seizure prec   IADL Treatments   Home Management Pt completed bed making task with her spouse. She was able to place 2 corners of fitted sheet from seated position while her spouse placed other 2 corners. She stood at FWW with SBA to place top sheet and comforter. She also put pillow cases on pillows from seated position with inc time.    Interdisciplinary Plan of Care Collaboration   IDT Collaboration with  Family / Caregiver   Patient Position at End of Therapy Seated;Self Releasing Lap Belt Applied;Family / Friend in Room   Collaboration Comments spouse present for therapy session.   OT Total Time Spent   OT Individual Total Time Spent (Mins) 30   OT Charge Group   OT Therapy Activity 2     Discussed safety and energy conservation strategies for IADLs. Pt reports that she is looking forward to going shopping at [employer] and going to the grocery store. Discussed bringing both w/c and FWW initially for outings, and to plan ahead/map out areas/stores she likes to go to determine spots to take seated rest breaks, making a shopping list organized by aisle, shopping at less busy times, and allotting extra time.     Assessment    Pt tolerated OT session well. She was able to make a bed with her spouse from seated and standing positions with increased time and SBA. She was receptive to education re: energy conservation and safety strategies for IADLs. Pt and spouse feel ready for d/c on Saturday.     Strengths: Able to follow instructions, Good  insight into deficits/needs, Independent prior level of function, Making steady progress towards goals, Manages pain appropriately, Motivated for self care and independence, Pleasant and cooperative, Supportive family, Willingly participates in therapeutic activities  Barriers: Decreased endurance, Generalized weakness, Hemiparesis, Home accessibility, Impaired balance, Impaired activity tolerance    Plan    ADLs, IADLs, standing tolerance/balance, STS        Occupational Therapy Goals     Problem: Bathing     Dates: Start: 03/16/21       Goal: STG-Within one week, patient will bathe     Dates: Start: 03/16/21       Description: 1) Individualized Goal:  with supervision using DME/AE as needed and improve safety strategies   2) Interventions:  OT Group Therapy, OT Self Care/ADL, OT Cognitive Skill Dev, OT Community Reintegration, OT Neuro Re-Ed/Balance, OT Therapeutic Activity, and OT Therapeutic Exercise      Note:     Goal Note filed on 03/24/21 1135 by Kartik Estrada OT/L    Min A to wash feet from spouse                        Problem: Dressing     Dates: Start: 03/16/21       Goal: STG-Within one week, patient will dress LB     Dates: Start: 03/16/21       Description: 1) Individualized Goal:  with SBA using DME/AE as needed  2) Interventions:  OT Group Therapy, OT Self Care/ADL, OT Cognitive Skill Dev, OT Community Reintegration, OT Neuro Re-Ed/Balance, OT Therapeutic Activity, and OT Therapeutic Exercise    Note:     Goal Note filed on 03/24/21 1135 by Kartik Estrada OT/L    SBA                        Problem: Functional Transfers     Dates: Start: 03/16/21       Goal: STG-Within one week, patient will transfer to toilet     Dates: Start: 03/16/21       Description: 1) Individualized Goal:  with distant supervision using DME/AE as needed at w/c level  2) Interventions:  OT Group Therapy, OT Self Care/ADL, OT Cognitive Skill Dev, OT Community Reintegration, OT Neuro Re-Ed/Balance, OT Therapeutic  Activity, and OT Therapeutic Exercise    Note:     Goal Note filed on 03/24/21 1135 by Kartik Estrada, OT/L    CGA with grab bar                        Problem: OT Long Term Goals     Dates: Start: 03/16/21       Goal: LTG-By discharge, patient will complete basic self care tasks     Dates: Start: 03/16/21       Description: 1) Individualized Goal:  Mod I using DME/AE as needed at FWW level  2) Interventions:  OT Group Therapy, OT Self Care/ADL, OT Cognitive Skill Dev, OT Community Reintegration, OT Neuro Re-Ed/Balance, OT Therapeutic Activity, and OT Therapeutic Exercise          Goal: LTG-By discharge, patient will perform bathroom transfers     Dates: Start: 03/16/21       Description: 1) Individualized Goal:  Mod I using DME/AE as needed at FWW level  2) Interventions:  OT Group Therapy, OT Self Care/ADL, OT Cognitive Skill Dev, OT Community Reintegration, OT Neuro Re-Ed/Balance, OT Therapeutic Activity, and OT Therapeutic Exercise                Problem: Toileting     Dates: Start: 03/16/21       Goal: STG-Within one week, patient will complete toileting tasks     Dates: Start: 03/16/21       Description: 1) Individualized Goal:  with supervision using DME/AE as needed   2) Interventions:  OT Group Therapy, OT Self Care/ADL, OT Cognitive Skill Dev, OT Community Reintegration, OT Neuro Re-Ed/Balance, OT Therapeutic Activity, and OT Therapeutic Exercise    Note:     Goal Note filed on 03/24/21 1135 by Kartik Estrada OT/L    NILAMA

## 2021-03-25 NOTE — DISCHARGE PLANNING
DME order sent to [facility].  HH referral sent to [facility] per choice form.  Awaiting responses.

## 2021-03-25 NOTE — PROGRESS NOTES
"Rehab Progress Note     Date of Service: 3/25/2021  Chief Complaint: follow up GBM    Interval Events (Subjective)    Patient seen and examined today in her room.  Her  is present.  She denies any spasms in her left leg but we did discuss the increased tone noted at her left ankle and the need to stretch this at home.      Patient reports she has not had her flu shot as she was told to wait until after her Covid shots.  She has had her first Covid vaccination and is going to check with the oncology  to see if it safe to have her second vaccine prior to starting chemotherapy.  Patient has no other complaints and feels like she will be ready for discharge home on Saturday.    ROS: No changes to bowel, bladder, pain, mood, or sleep.       Objective:  VITAL SIGNS: /81   Pulse 77   Temp 36.4 °C (97.5 °F) (Temporal)   Resp 18   Ht 1.702 m (5' 7\")   Wt 96.5 kg (212 lb 11.9 oz)   SpO2 99%   BMI 33.32 kg/m²   Gen: alert, no apparent distress  Neuro: notable for improved left foot drop, nonsustained clonus at the left ankle, increased tone at the left ankle, hyperreflexic at the left knee, no tone noted in the left arm    No results found for this or any previous visit (from the past 72 hour(s)).    Current Facility-Administered Medications   Medication Frequency   • acetaminophen (Tylenol) tablet 650 mg Q6HRS PRN   • acetaminophen (TYLENOL) tablet 1,000 mg TID   • hydrOXYzine HCl (ATARAX) tablet 50 mg Q6HRS PRN   • melatonin tablet 3 mg HS PRN   • Respiratory Therapy Consult Continuous RT   • Pharmacy Consult Request ...Pain Management Review 1 Each PHARMACY TO DOSE   • lactulose 20 GM/30ML solution 30 mL QDAY PRN   • docusate sodium (ENEMEEZ) enema 283 mg QDAY PRN   • fleet enema 133 mL QDAY PRN   • artificial tears ophthalmic solution 1 Drop PRN   • benzocaine-menthol (CEPACOL) lozenge 1 Lozenge Q2HRS PRN   • mag hydrox-al hydrox-simeth (MAALOX PLUS ES or MYLANTA DS) suspension 20 mL Q2HRS " PRN   • ondansetron (ZOFRAN ODT) dispertab 4 mg 4X/DAY PRN    Or   • ondansetron (ZOFRAN) syringe/vial injection 4 mg 4X/DAY PRN   • sodium chloride (OCEAN) 0.65 % nasal spray 2 Spray PRN   • midazolam (VERSED) 5 mg/mL (1 mL vial) PRN   • glucose 4 g chewable tablet 16 g Q15 MIN PRN    And   • dextrose 50% (D50W) injection 50 mL Q15 MIN PRN   • senna-docusate (PERICOLACE or SENOKOT S) 8.6-50 MG per tablet 2 tablet BID    And   • polyethylene glycol/lytes (MIRALAX) PACKET 1 Packet QDAY PRN    And   • magnesium hydroxide (MILK OF MAGNESIA) suspension 30 mL QDAY PRN    And   • bisacodyl (DULCOLAX) suppository 10 mg QDAY PRN   • MD ALERT...DO NOT ADMINISTER NSAIDS or ASPIRIN unless ORDERED By Neurosurgery 1 Each PRN   • folic acid (FOLVITE) tablet 1 mg DAILY   • lamoTRIgine (LAMICTAL) tablet 100 mg BID   • levETIRAcetam (KEPPRA) tablet 500 mg BID   • venlafaxine (EFFEXOR) tablet 12.5 mg BID   • LORazepam (ATIVAN) tablet 0.5 mg Q4HRS PRN       Orders Placed This Encounter   Procedures   • Diet Order Diet: Regular     Standing Status:   Standing     Number of Occurrences:   1     Order Specific Question:   Diet:     Answer:   Regular [1]       Assessment:  Active Hospital Problems    Diagnosis    • *Glioblastoma of frontal lobe (HCC)    • 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 female admitted for acute inpatient rehabilitation with Glioblastoma of frontal lobe (HCC).    I led and attended the weekly conference, and agree with the IDT conference documentation and plan of care as noted below.    Date of conference: 3/24/2021    Goals and barriers: See IDT note.    CM/social support:  supportive     Anticipated DC date: 3/27    Home health: PT/OT    Equip: sari  bars in shower and by toilet    Follow up: PCP, Dr. Steele, Dr. Frye, Dr. Cerda, neurology      Medical Decision Making and Plan:    GBM  S/p resection 3/9  S/p Steroid taper  Staples out 3/23  Radiation mapping completed 3/24  Outpatient follow up with oncology  Outpatient follow up with Dr. Steele, can discuss return to work and driving    Spasticity  Starting to develop at the left ankle  Monitor need for medications  Outpatient follow-up with Dr. Steele    Left foot drop  Custom fitted AFO     Seizure disorder  Keppra  Lamictal  Outpatient follow up with neurology  Sent in RX, covered     Anxiety disorder  Effexor  PRN Ativan, last use 3/15, patient does not want to use at discharge, prefers Atarax  PRN Atarax, last use 3/25     History of headaches  Scheduled tylenol    Hyponatremia, resolved  Discontinued salt tabs    Vit D insufficiency  Supplementation    Insomnia, resolved  Melatonin    Bowel program  Continue bowel medications  Scheduled Sennakot  PRN Miralax, MOM, bisacodyl suppository  Last BM 3/24    Bladder program  Check PVRs - 16  Not retaining  Bladder scan for no voids  ICP for over 400 cc  Scheduled toileting    DVT prophylaxis  Contraindicated given risk of hemorrhage.   Not cleared by neurosurgery.   Compression stockings on in am, off in pm.  Increase mobility. Monitor for swelling.    Total time:  27 minutes.  I spent greater than 50% of the time for patient care, counseling, and coordination on this date, including patient face-to face time, unit/floor time with review of records/pertinent lab data and studies, as well as discussing diagnostic evaluation/work up, planned therapeutic interventions, and future disposition of care, as per the interval events/subjective and the assessment and plan as noted above.    I have performed a physical exam, reviewed and updated ROS, as well as the assessment and plan today 3/25/2021. In review of note from 3/24/2021 there are no new changes except as  documented above.              Susi Fulton M.D.   Physical Medicine and Rehabilitation

## 2021-03-25 NOTE — PROGRESS NOTES
Telephone call from pt's , Dario, who stated he had some questions regarding appointments.  Provided him with [facility] and gave him the number for [facility] to check with them.  He also wanted to know if Melba should get her second Covid injection, which I deferred to her provider.  Gave Dario the number to contact [facility]/Covid for questions regarding scheduling an appointment for the injection.  Confirmed with Dario that Melba's radiation would be in the same department where she had the mapping appointment.  Denied other needs at this time.

## 2021-03-25 NOTE — THERAPY
Physical Therapy   Daily Treatment     Patient Name: Melba Frye  Age:  50 y.o., Sex:  female  Medical Record #: [id number]  Today's Date: 3/25/2021     Precautions  Precautions: Fall Risk  Comments: seizure prec    Subjective    Patient lying in bed upon arrival, agreeable to therapy. Pt  is present for duration of session and willing to participate in family training. Pt asks about obtaining a handicap parking permit and w/c upon d/c.      Objective       03/25/21 0931   Precautions   Precautions Fall Risk   Comments Seizure precautions   Gait Functional Level of Assist    Gait Level Of Assist Contact Guard Assist   Assistive Device Front Wheel Walker;Other (Comments)  (L AFO)   Distance (Feet) 30   # of Times Distance was Traveled 1   Deviation Decreased Heel Strike;Decreased Toe Off;Bradykinetic  (Decreased L foot clearance)   Stairs Functional Level of Assist   Level of Assist with Stairs Contact Guard Assist   # of Stairs Climbed 8  (consecutive )   Stairs Description AFO;Extra time;Hand rails;Supervision for safety;Verbal cueing   Transfer Functional Level of Assist   Bed, Chair, Wheelchair Transfer Contact Guard Assist   Bed Chair Wheelchair Transfer Description Increased time;Set-up of equipment;Supervision for safety   Bed Mobility    Supine to Sit Supervised   Sit to Stand Stand by Assist   Scooting Supervised   Rolling Supervised   Neuro-Muscular Treatments   Neuro-Muscular Treatments Anterior weight shift;Compensatory Strategies;Sequencing;Verbal Cuing   Comments Discussed sequencing for car transfers with compensation strategies as needed (adjucting seat position to assist with transfer, potential use of additional handle, using curb to assist with pick-up transfer);    Interdisciplinary Plan of Care Collaboration   IDT Collaboration with  Family / Caregiver;Physical Therapist;   Patient Position at End of Therapy Seated;Chair Alarm On;Self Releasing Lap Belt Applied;Family /  Friend in Room   Collaboration Comments Spouse participated in family training (see below); updated afternoon PT on pt goals; discussed handicap permit and w/c need at d/c with CM   Strengths & Barriers   Strengths Supportive family;Pleasant and cooperative;Motivated for self care and independence;Willingly participates in therapeutic activities;Effective communication skills   Barriers Decreased endurance;Fatigue;Generalized weakness;Hemiparesis;Home accessibility;Impaired activity tolerance;Impaired balance;Impaired insight/denial of deficits;Limited mobility   PT Total Time Spent   PT Individual Total Time Spent (Mins) 60   PT Charge Group   PT Therapeutic Activities 4     Car transfer into patients personal car (mini venkat) completed, including family training with . Family training also completed with  to include walking. Pt and  educated on proper gait belt use and positioning for safety.      cleared to walk patient to/from bathroom in room. Communicated this on whiteboard in patients room.     Assessment    Family training was successful.  demonstrated a safe car transfer and proper guarding/positioning with walking. Both patient and  verbalize understanding of safety with mobility as they prepare to return home. Patient continues to be limited by activity tolerance, requiring frequent rest breaks. Patient would benefit from w/c at home for safety and endurance with activities of daily living due to pt's intermittent fluctuation in mobility performance.     Strengths: (P) Supportive family, Pleasant and cooperative, Motivated for self care and independence, Willingly participates in therapeutic activities, Effective communication skills  Barriers: (P) Decreased endurance, Fatigue, Generalized weakness, Hemiparesis, Home accessibility, Impaired activity tolerance, Impaired balance, Impaired insight/denial of deficits, Limited mobility    Plan    IRF-FERCHO items and  complete passport as pt prepares to d/c on 3/27/21.     Passport items to be completed:  Get in/out of bed safely, in/out of a vehicle, safely use mobility device, walk or wheel around home/community.      Physical Therapy Problems     Problem: PT-Long Term Goals     Dates: Start: 03/16/21       Goal: LTG-By discharge, patient will ambulate     Dates: Start: 03/16/21       Description: 1) Individualized goal:  Patient will amb >50 ft with FWW SBA over level surfaces.   2) Interventions:  PT E Stim Attended, PT Orthotics Training, PT Gait Training, PT Therapeutic Exercises, PT Neuro Re-Ed/Balance, PT Aquatic Therapy, PT Therapeutic Activity, PT Manual Therapy, and PT Evaluation            Goal: LTG-By discharge, patient will transfer one surface to another     Dates: Start: 03/16/21       Description: 1) Individualized goal:  Patient will transfer SPV with FWW STS/SPT  2) Interventions:  PT E Stim Attended, PT Orthotics Training, PT Gait Training, PT Therapeutic Exercises, PT Neuro Re-Ed/Balance, PT Aquatic Therapy, PT Therapeutic Activity, PT Manual Therapy, and PT Evaluation              Goal: LTG-By discharge, patient will ambulate up/down flight of stairs     Dates: Start: 03/16/21       Description: 1) Individualized goal:  Patient will amb up/down 10 stairs 2x with 1HR (switches from R to left after landing) CGA  2) Interventions:  PT E Stim Attended, PT Orthotics Training, PT Gait Training, PT Therapeutic Exercises, PT Neuro Re-Ed/Balance, PT Aquatic Therapy, PT Therapeutic Activity, PT Manual Therapy, and PT Evaluation

## 2021-03-25 NOTE — THERAPY
Physical Therapy   Daily Treatment     Patient Name: Melba Frye  Age:  50 y.o., Sex:  female  Medical Record #: [id number]  Today's Date: 3/25/2021     Precautions  Precautions: Fall Risk  Comments: seizure prec    Subjective    Pt requesting outdoor ambulation     Objective       03/25/21 1301   Gait Functional Level of Assist    Gait Level Of Assist Contact Guard Assist   Assistive Device Front Wheel Walker   Distance (Feet) 300  (in addition to 100 ft x 1 around back courtyard and bridge)   # of Times Distance was Traveled 1   Deviation Decreased Heel Strike;Decreased Toe Off  (L alma delia-weakness)   Standing Lower Body Exercises   Standing Lower Body Exercises Yes   Hip Flexion 2 sets of 10   Comments pt requires manual cues for L stance/ terminal hip/knee ext   PT Total Time Spent   PT Individual Total Time Spent (Mins) 30   PT Charge Group   PT Gait Training 2       Assessment    Pt able to ambulated from back entrance to front entrance (~300+ ft) as noted with CGA/ min A for uneven terrains and curb cut outs, requires cues for attention to outdoor  Obstacles.    Strengths: Supportive family, Pleasant and cooperative, Motivated for self care and independence, Willingly participates in therapeutic activities, Effective communication skills  Barriers: Decreased endurance, Fatigue, Generalized weakness, Hemiparesis, Home accessibility, Impaired activity tolerance, Impaired balance, Impaired insight/denial of deficits, Limited mobility    Plan    IRF-FERCHO items and complete passport as pt prepares to d/c on 3/27/21.     Passport items to be completed:  Get in/out of bed safely, in/out of a vehicle, safely use mobility device, walk or wheel around home/community.      Physical Therapy Problems     Problem: PT-Long Term Goals     Dates: Start: 03/16/21       Goal: LTG-By discharge, patient will ambulate     Dates: Start: 03/16/21       Description: 1) Individualized goal:  Patient will amb >50 ft with FWW SBA over  level surfaces.   2) Interventions:  PT E Stim Attended, PT Orthotics Training, PT Gait Training, PT Therapeutic Exercises, PT Neuro Re-Ed/Balance, PT Aquatic Therapy, PT Therapeutic Activity, PT Manual Therapy, and PT Evaluation            Goal: LTG-By discharge, patient will transfer one surface to another     Dates: Start: 03/16/21       Description: 1) Individualized goal:  Patient will transfer SPV with FWW STS/SPT  2) Interventions:  PT E Stim Attended, PT Orthotics Training, PT Gait Training, PT Therapeutic Exercises, PT Neuro Re-Ed/Balance, PT Aquatic Therapy, PT Therapeutic Activity, PT Manual Therapy, and PT Evaluation              Goal: LTG-By discharge, patient will ambulate up/down flight of stairs     Dates: Start: 03/16/21       Description: 1) Individualized goal:  Patient will amb up/down 10 stairs 2x with 1HR (switches from R to left after landing) CGA  2) Interventions:  PT E Stim Attended, PT Orthotics Training, PT Gait Training, PT Therapeutic Exercises, PT Neuro Re-Ed/Balance, PT Aquatic Therapy, PT Therapeutic Activity, PT Manual Therapy, and PT Evaluation

## 2021-03-26 ENCOUNTER — TELEPHONE (OUTPATIENT)
Dept: MEDICAL GROUP | Facility: IMAGING CENTER | Age: 51
End: 2021-03-26

## 2021-03-26 PROCEDURE — 99231 SBSQ HOSP IP/OBS SF/LOW 25: CPT | Performed by: PHYSICAL MEDICINE & REHABILITATION

## 2021-03-26 PROCEDURE — A9270 NON-COVERED ITEM OR SERVICE: HCPCS | Performed by: PHYSICAL MEDICINE & REHABILITATION

## 2021-03-26 PROCEDURE — 97530 THERAPEUTIC ACTIVITIES: CPT

## 2021-03-26 PROCEDURE — 97535 SELF CARE MNGMENT TRAINING: CPT

## 2021-03-26 PROCEDURE — 770010 HCHG ROOM/CARE - REHAB SEMI PRIVAT*

## 2021-03-26 PROCEDURE — 97110 THERAPEUTIC EXERCISES: CPT

## 2021-03-26 PROCEDURE — 700102 HCHG RX REV CODE 250 W/ 637 OVERRIDE(OP): Performed by: PHYSICAL MEDICINE & REHABILITATION

## 2021-03-26 PROCEDURE — 97116 GAIT TRAINING THERAPY: CPT

## 2021-03-26 RX ADMIN — LAMOTRIGINE 100 MG: 100 TABLET ORAL at 20:30

## 2021-03-26 RX ADMIN — HYDROXYZINE HYDROCHLORIDE 50 MG: 25 TABLET, FILM COATED ORAL at 09:45

## 2021-03-26 RX ADMIN — LEVETIRACETAM 500 MG: 500 TABLET ORAL at 20:30

## 2021-03-26 RX ADMIN — ACETAMINOPHEN 1000 MG: 500 TABLET, FILM COATED ORAL at 09:41

## 2021-03-26 RX ADMIN — ACETAMINOPHEN 1000 MG: 500 TABLET, FILM COATED ORAL at 20:30

## 2021-03-26 RX ADMIN — HYDROXYZINE HYDROCHLORIDE 50 MG: 25 TABLET, FILM COATED ORAL at 20:32

## 2021-03-26 RX ADMIN — LAMOTRIGINE 100 MG: 100 TABLET ORAL at 09:41

## 2021-03-26 RX ADMIN — LEVETIRACETAM 500 MG: 500 TABLET ORAL at 09:41

## 2021-03-26 RX ADMIN — VENLAFAXINE 12.5 MG: 25 TABLET ORAL at 09:42

## 2021-03-26 RX ADMIN — VENLAFAXINE 12.5 MG: 25 TABLET ORAL at 20:30

## 2021-03-26 RX ADMIN — FOLIC ACID 1 MG: 1 TABLET ORAL at 09:41

## 2021-03-26 RX ADMIN — ACETAMINOPHEN 1000 MG: 500 TABLET, FILM COATED ORAL at 15:31

## 2021-03-26 ASSESSMENT — ACTIVITIES OF DAILY LIVING (ADL)
TOILETING_LEVEL_OF_ASSIST_DESCRIPTION: GRAB BAR
TOILET_TRANSFER_DESCRIPTION: GRAB BAR;ADAPTIVE EQUIPMENT
BED_CHAIR_WHEELCHAIR_TRANSFER_DESCRIPTION: INCREASED TIME;SET-UP OF EQUIPMENT;SUPERVISION FOR SAFETY

## 2021-03-26 ASSESSMENT — PAIN DESCRIPTION - PAIN TYPE
TYPE: ACUTE PAIN
TYPE: ACUTE PAIN

## 2021-03-26 ASSESSMENT — GAIT ASSESSMENTS
ASSISTIVE DEVICE: FRONT WHEEL WALKER
DEVIATION: DECREASED BASE OF SUPPORT;BRADYKINETIC;DECREASED HEEL STRIKE;DECREASED TOE OFF
GAIT LEVEL OF ASSIST: STAND BY ASSIST
DISTANCE (FEET): 220

## 2021-03-26 NOTE — THERAPY
Occupational Therapy  Daily Treatment     Patient Name: Melba Frye  Age:  50 y.o., Sex:  female  Medical Record #: [id number]  Today's Date: 3/26/2021     Precautions  Precautions: Fall Risk  Comments: seizure prec    Safety   ADL Safety : Requires Supervision for Safety    Subjective    Patient in bed eating breakfast.  Ready to get dressed and groomed for the day.     Objective       03/26/21 0831   Precautions   Precautions Fall Risk   Comments seizure prec   Functional Level of Assist   Eating Independent   Grooming Independent;Seated   Upper Body Dressing Modified Independent   Upper Body Dressing Description Assist device equipment   Lower Body Dressing Modified Independent   Lower Body Dressing Description Assistive devices   Toileting Modified Independent   Toileting Description Grab bar   Toilet Transfers Supervised   Toilet Transfer Description Grab bar;Adaptive equipment   Bed Mobility    Supine to Sit Supervised   Scooting Supervised   Interdisciplinary Plan of Care Collaboration   IDT Collaboration with  Family / Caregiver   Patient Position at End of Therapy Seated;Call Light within Reach;Tray Table within Reach;Phone within Reach;Self Releasing Lap Belt Applied;Chair Alarm On   Collaboration Comments spouse present   OT Total Time Spent   OT Individual Total Time Spent (Mins) 60   OT Charge Group   OT Self Care / ADL 3   OT Therapy Activity 1     Functional mobility with FWW and CGA/SBA x 325' and 130'.    Assessment    Patient completed adl routine with supervision/mod I with good safety, including donning/doff AFO and donning BAUTISTA hose.  Strengths: Able to follow instructions, Good insight into deficits/needs, Independent prior level of function, Making steady progress towards goals, Manages pain appropriately, Motivated for self care and independence, Pleasant and cooperative, Supportive family, Willingly participates in therapeutic activities  Barriers: Decreased endurance, Generalized  weakness, Hemiparesis, Home accessibility, Impaired balance, Impaired activity tolerance    Plan    Prepare for d/c home        Occupational Therapy Goals     Problem: Bathing     Dates: Start: 03/16/21       Goal: STG-Within one week, patient will bathe     Dates: Start: 03/16/21       Description: 1) Individualized Goal:  with supervision using DME/AE as needed and improve safety strategies   2) Interventions:  OT Group Therapy, OT Self Care/ADL, OT Cognitive Skill Dev, OT Community Reintegration, OT Neuro Re-Ed/Balance, OT Therapeutic Activity, and OT Therapeutic Exercise      Note:     Goal Note filed on 03/24/21 1135 by Kartik Estrada OT/L    Min A to wash feet from spouse                        Problem: Dressing     Dates: Start: 03/16/21       Goal: STG-Within one week, patient will dress LB     Dates: Start: 03/16/21       Description: 1) Individualized Goal:  with SBA using DME/AE as needed  2) Interventions:  OT Group Therapy, OT Self Care/ADL, OT Cognitive Skill Dev, OT Community Reintegration, OT Neuro Re-Ed/Balance, OT Therapeutic Activity, and OT Therapeutic Exercise    Note:     Goal Note filed on 03/24/21 1135 by Kartik Estrada, OT/L    SBA                        Problem: Functional Transfers     Dates: Start: 03/16/21       Goal: STG-Within one week, patient will transfer to toilet     Dates: Start: 03/16/21       Description: 1) Individualized Goal:  with distant supervision using DME/AE as needed at w/c level  2) Interventions:  OT Group Therapy, OT Self Care/ADL, OT Cognitive Skill Dev, OT Community Reintegration, OT Neuro Re-Ed/Balance, OT Therapeutic Activity, and OT Therapeutic Exercise    Note:     Goal Note filed on 03/24/21 1135 by Kartik Estrada, OT/L    CGA with grab bar                        Problem: OT Long Term Goals     Dates: Start: 03/16/21       Goal: LTG-By discharge, patient will complete basic self care tasks     Dates: Start: 03/16/21       Description: 1)  Individualized Goal:  Mod I using DME/AE as needed at FWW level  2) Interventions:  OT Group Therapy, OT Self Care/ADL, OT Cognitive Skill Dev, OT Community Reintegration, OT Neuro Re-Ed/Balance, OT Therapeutic Activity, and OT Therapeutic Exercise          Goal: LTG-By discharge, patient will perform bathroom transfers     Dates: Start: 03/16/21       Description: 1) Individualized Goal:  Mod I using DME/AE as needed at [facility] level  2) Interventions:  OT Group Therapy, OT Self Care/ADL, OT Cognitive Skill Dev, OT Community Reintegration, OT Neuro Re-Ed/Balance, OT Therapeutic Activity, and OT Therapeutic Exercise                Problem: Toileting     Dates: Start: 03/16/21       Goal: STG-Within one week, patient will complete toileting tasks     Dates: Start: 03/16/21       Description: 1) Individualized Goal:  with supervision using DME/AE as needed   2) Interventions:  OT Group Therapy, OT Self Care/ADL, OT Cognitive Skill Dev, OT Community Reintegration, OT Neuro Re-Ed/Balance, OT Therapeutic Activity, and OT Therapeutic Exercise    Note:     Goal Note filed on 03/24/21 1135 by Kartik Estrada OT/L    GIOVANA

## 2021-03-26 NOTE — DISCHARGE PLANNING
Case Management Discharge Instructions   Discharge Date: 3/27/2021     Discharge Location: Home with Home Health     Agency Name / Address / Phone: [facility] [phone]     Home Health: Occupational Therapist, Registered Nurse, Physical Therapist, Speech Therapist     DME Provider / Phone: [facility] [phone]     Medical Equipment Ordered: Wheelchair           Follow-up Information:     Edenilson Frye M.D.  [address]  [place] [address]  [phone]     On 3/24/2021     Yohan Calixto M.D.  [address]  [place] [address]  [phone]     On 3/31/2021  10:00am-Seizure Clinic    Fady Davidson M.D.  [address] [facility] [facility] [address]  [place] [address]  [phone]     On 4/1/2021  10:00am    Maxwell Mao M.D.  [address] [facility] [facility] [address]  [place] [address]  [phone]     On 4/1/2021  1:00pm (You will be seeing Dr. Mao's nurse practitioner)    Michael Cerda M.D.  [address] [place] [address]  [place] [address]  [phone]     On 4/2/2021  9:30am    Trinity Nguyen M.D.  [address] [place] [address]  [phone]     On 4/6/2021  1:15pm

## 2021-03-26 NOTE — TELEPHONE ENCOUNTER
Patient's  called regarding patient's appointment on 4/6/2021. He requested that due to her condition that topics relating to mortality be avoided at her visit so that she does not lose hope with her condition.

## 2021-03-26 NOTE — TELEPHONE ENCOUNTER
----- Message from Trinity Nguyen M.D. sent at 3/10/2021  4:17 PM PST -----  Please make sure she has follow up with me after she recovers some from the surgery and can get out of the house. Preferably in office. I have not shared her biopsy results.   ----- Message -----  From: Int, Lab  Sent: 3/9/2021  11:20 AM PST  To: Trinity Nguyen M.D.

## 2021-03-26 NOTE — CARE PLAN
Problem: PT-Long Term Goals  Goal: LTG-By discharge, patient will ambulate  Description: 1) Individualized goal:  Patient will amb >50 ft with FWW SBA over level surfaces.   2) Interventions:  PT E Stim Attended, PT Orthotics Training, PT Gait Training, PT Therapeutic Exercises, PT Neuro Re-Ed/Balance, PT Aquatic Therapy, PT Therapeutic Activity, PT Manual Therapy, and PT Evaluation    Outcome: MET  Goal: LTG-By discharge, patient will transfer one surface to another  Description: 1) Individualized goal:  Patient will transfer SPV with FWW STS/SPT  2) Interventions:  PT E Stim Attended, PT Orthotics Training, PT Gait Training, PT Therapeutic Exercises, PT Neuro Re-Ed/Balance, PT Aquatic Therapy, PT Therapeutic Activity, PT Manual Therapy, and PT Evaluation      Outcome: MET  Goal: LTG-By discharge, patient will ambulate up/down flight of stairs  Description: 1) Individualized goal:  Patient will amb up/down 10 stairs 2x with 1HR (switches from R to left after landing) CGA  2) Interventions:  PT E Stim Attended, PT Orthotics Training, PT Gait Training, PT Therapeutic Exercises, PT Neuro Re-Ed/Balance, PT Aquatic Therapy, PT Therapeutic Activity, PT Manual Therapy, and PT Evaluation      Outcome: MET

## 2021-03-26 NOTE — DISCHARGE SUMMARY
"Admission Date: 3/15/2021    Discharge Date: 3/27/2021     Attending Provider: Lucrecia Sim M.D. 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 Radford's consult \"The patient is a 50 y.o. female with a past medical history of migraines, depression/anxiety, seizures;  who presented on 3/2/2021  1:45 PM to [facility] with seizure that lasted about a minute. Also with tingling and weakness of left side. Of note, had brain MRI in 2/2021 with a right medial parietal lobe mass s/p craniotomy and biopsy on 3/9 with Dr. Mao. Hospital course with thrombocytopenia, foot drop, and anxiety.\"     Patient current reports she's very scared about her diagnosis, and doesn't want to hear any statistics about her prognosis. She reports intermittent headache, relieved by tylenol.      Patient was evaluated by Rehab Medicine physician and Physical Therapy and Occupational Therapy and determined to be appropriate for acute inpatient rehab and was transferred to [facility] on 3/15/2021  3:59 PM.    Rehab Hospital Course by Problem List:    GBM  S/p resection 3/9  S/p Steroid taper  Staples out 3/23  Radiation mapping completed 3/24  Outpatient follow up with oncology  Outpatient follow up " with Dr. Steele, can discuss return to work and driving     Spasticity  Starting to develop at the left ankle  Monitor need for medications  Outpatient follow-up with Dr. Steele     Left foot drop  Custom fitted AFO     Seizure disorder  Keppra  Lamictal  Outpatient follow up with neurology  Sent in RX, covered     Anxiety disorder  Effexor  PRN Atarax, last use 3/25     History of headaches  Scheduled tylenol     Hyponatremia, resolved  Discontinued salt tabs     Vit D insufficiency  Supplementation     Insomnia, resolved  Melatonin     Bowel program  Continue bowel medications  Scheduled Sennakot  PRN Miralax, MOM, bisacodyl suppository  Last BM 3/25     Bladder program  Check PVRs - 16  Not retaining  Bladder scan for no voids  ICP for over 400 cc  Scheduled toileting     DVT prophylaxis  Contraindicated given risk of hemorrhage.   Not cleared by neurosurgery.   Compression stockings on in am, off in pm.  Increase mobility. Monitor for swelling.    Functional Status at Discharge  Eating:  Independent  Eating Description:  (ate the meal she prepared for session)  Grooming:  Independent, Seated  Grooming Description:  Standing at sink, Supervision for safety(to wash hands standing)  Bathing:  Minimal Assist  Bathing Description:  Grab bar, Hand held shower, Tub bench, Increased time, Initial preparation for task, Supervision for safety, Assit wtih lower extremities, Assit with back(assist with feet from  )  Upper Body Dressing:  Modified Independent  Upper Body Dressing Description:  Assist device equipment  Lower Body Dressing:  Modified Independent  Lower Body Dressing Description:  Assistive devices     Walk:  Contact Guard Assist  Distance Walked:  300(in addition to 100 ft x 1 around back courtyard and bridge)  Number of Times Distance Was Traveled:  1  Assistive Device:  Front Wheel Walker  Gait Deviation:  Decreased Heel Strike, Decreased Toe Off(L alma delia-weakness)  Wheelchair:  Modified  Independent  Distance Propelled:  150   Wheelchair Description:  Adaptive equipment  Stairs Contact Guard Assist  Stairs Description Ankle-foot orthosis, Extra time, Hand rails, Safety concerns, Supervision for safety, Verbal cueing     Comprehension:  Independent  Comprehension Description:  Lucrecia Llanes M.D., personally performed a complete drug regimen review and no potential clinically significant medication issues were identified.     Discharge Medication:     Medication List      START taking these medications      Instructions   hydrOXYzine HCl 50 MG Tabs  Commonly known as: ATARAX  Notes to patient: Anxiety   Take 1 tablet by mouth every 8 hours as needed for Anxiety.  Dose: 50 mg     melatonin 3 MG Tabs  Notes to patient: Sleep aid   Take 1 tablet by mouth at bedtime as needed (insomnia).  Dose: 3 mg        CONTINUE taking these medications      Instructions   acetaminophen 325 MG Tabs  Commonly known as: Tylenol  Notes to patient: Headache   Take 2 Tablets by mouth every 6 hours as needed (Mild Pain; (Pain scale 1-3); Temp greater than 100.5 F).  Dose: 650 mg     folic acid 1 MG Tabs  Commonly known as: FOLVITE  Notes to patient: Supplement   Take 1 tablet by mouth every day.  Dose: 1 mg     lamoTRIgine 100 MG Tabs  Commonly known as: LAMICTAL  Notes to patient: Prevent seizures   Take 1 tablet by mouth 2 Times a Day.  Dose: 100 mg     levETIRAcetam 500 MG Tabs  Commonly known as: KEPPRA  Notes to patient: Prevent seizures   Take 1 tablet by mouth 2 Times a Day.  Dose: 500 mg     multivitamin Tabs  Notes to patient: Supplement   Take 1 tablet by mouth every evening.  Dose: 1 tablet     venlafaxine 25 MG Tabs  Commonly known as: EFFEXOR  Notes to patient: Anxiety   Take 0.5 Tablets by mouth 2 Times a Day.  Dose: 12.5 mg     VITAMIN D PO  Notes to patient: Supplement   Take 1 Units by mouth every evening.  Dose: 1 Units        STOP taking these medications    dexamethasone 2 MG  tablet  Commonly known as: DECADRON     dexamethasone 4 MG Tabs  Commonly known as: DECADRON     dexamethasone 6 MG Tabs  Commonly known as: DECADRON     docusate sodium 100 MG Caps     fleet 7-19 GM/118ML Enem     hydrALAZINE 20 MG/ML Soln  Commonly known as: APRESOLINE     labetalol 5 MG/ML Soln  Commonly known as: NORMODYNE/TRANDATE     LORazepam 0.5 MG Tabs  Commonly known as: ATIVAN     LORazepam 2 MG/ML Soln  Commonly known as: ATIVAN            Discharge Diet:  Regular    Discharge Activity:  Do not return to work or driving until cleared by a physician.         Disposition:  Patient to discharge home with family support and community resources.     Discharge Location: Home with Home Health     Agency Name / Address / Phone: [facility] [phone]     Home Health: Occupational Therapist, Registered Nurse, Physical Therapist, Speech Therapist     DME Provider / Phone: [facility] [phone]     Medical Equipment Ordered: Wheelchair      Follow-up Information:     Edenilson Frye M.D.  [address] [facility] [address] [facility] [address] [facility] [address]  [place] [address]  [phone]     On 3/24/2021     Yohan Calixto M.D.  [address]  [place] [address]  [phone]     On 3/31/2021  10:00am-Seizure Clinic     Fady Davidson M.D.  [address]  [place] [address]  [phone]     On 4/1/2021  10:00am     Maxwell Mao M.D.  [address]  [place] [address] [phone]     On 4/1/2021  1:00pm (You will be seeing Dr. Mao's nurse practitioner)     Michael Cerda M.D.  [address] [place] [facility] [facility] [facility] [facility] [address]  [place] [address]  [phone]     On 4/2/2021  9:30am     Trinity Nguyen M.D.  [address] [place] [id number] [phone]     On 4/6/2021  1:15pm    Condition on Discharge:  Good    More than 33 minutes was spent on discharging this patient, including face-to-face time, prescription management, and the dictation of this note.    Lucrecia Sim M.D.    Date of Service: 3/27/2021

## 2021-03-26 NOTE — CARE PLAN
Problem: Venous Thromboembolism (VTW)/Deep Vein Thrombosis (DVT) Prevention:  Goal: Patient will participate in Venous Thrombosis (VTE)/Deep Vein Thrombosis (DVT)Prevention Measures  Outcome: PROGRESSING AS EXPECTED  Note: Per MD, pt does not require pharmacologic DVT prophylaxis. Pt is ambulatory and wears TEDs during the day. No s/s of DVT or edema noted; will continue to monitor.     Problem: Discharge Barriers/Planning  Goal: Patient's continuum of care needs will be met  Outcome: PROGRESSING AS EXPECTED  Note: Pt is scheduled to discharge home with  tomorrow, 3/27. Pt hoping to leave around 1000; has already met with pharmacist and .

## 2021-03-26 NOTE — PROGRESS NOTES
"Rehab Progress Note     Date of Service: 3/26/2021  Chief Complaint: follow up GBM    Interval Events (Subjective)    Patient seen and examined today in the gym.  All questions were answered.  She feels ready for discharge home tomorrow.  Pharmacist will meet with the patient and her  to discuss her discharge medications.      ROS: No changes to bowel, bladder, pain, mood, or sleep.           Objective:  VITAL SIGNS: /85   Pulse 66   Temp 36.5 °C (97.7 °F) (Temporal)   Resp 18   Ht 1.702 m (5' 7\")   Wt 96.5 kg (212 lb 11.9 oz)   SpO2 98%   BMI 33.32 kg/m²   Gen: alert, no apparent distress  Neuro: notable for mild left hemiparesis in the leg, AFO on    No results found for this or any previous visit (from the past 72 hour(s)).    Current Facility-Administered Medications   Medication Frequency   • acetaminophen (Tylenol) tablet 650 mg Q6HRS PRN   • acetaminophen (TYLENOL) tablet 1,000 mg TID   • hydrOXYzine HCl (ATARAX) tablet 50 mg Q6HRS PRN   • melatonin tablet 3 mg HS PRN   • Respiratory Therapy Consult Continuous RT   • Pharmacy Consult Request ...Pain Management Review 1 Each PHARMACY TO DOSE   • lactulose 20 GM/30ML solution 30 mL QDAY PRN   • docusate sodium (ENEMEEZ) enema 283 mg QDAY PRN   • fleet enema 133 mL QDAY PRN   • artificial tears ophthalmic solution 1 Drop PRN   • benzocaine-menthol (CEPACOL) lozenge 1 Lozenge Q2HRS PRN   • mag hydrox-al hydrox-simeth (MAALOX PLUS ES or MYLANTA DS) suspension 20 mL Q2HRS PRN   • ondansetron (ZOFRAN ODT) dispertab 4 mg 4X/DAY PRN    Or   • ondansetron (ZOFRAN) syringe/vial injection 4 mg 4X/DAY PRN   • sodium chloride (OCEAN) 0.65 % nasal spray 2 Spray PRN   • midazolam (VERSED) 5 mg/mL (1 mL vial) PRN   • glucose 4 g chewable tablet 16 g Q15 MIN PRN    And   • dextrose 50% (D50W) injection 50 mL Q15 MIN PRN   • senna-docusate (PERICOLACE or SENOKOT S) 8.6-50 MG per tablet 2 tablet BID    And   • polyethylene glycol/lytes (MIRALAX) PACKET 1 " Packet QDAY PRN    And   • magnesium hydroxide (MILK OF MAGNESIA) suspension 30 mL QDAY PRN    And   • bisacodyl (DULCOLAX) suppository 10 mg QDAY PRN   • MD ALERT...DO NOT ADMINISTER NSAIDS or ASPIRIN unless ORDERED By Neurosurgery 1 Each PRN   • folic acid (FOLVITE) tablet 1 mg DAILY   • lamoTRIgine (LAMICTAL) tablet 100 mg BID   • levETIRAcetam (KEPPRA) tablet 500 mg BID   • venlafaxine (EFFEXOR) tablet 12.5 mg BID   • LORazepam (ATIVAN) tablet 0.5 mg Q4HRS PRN       Orders Placed This Encounter   Procedures   • Diet Order Diet: Regular     Standing Status:   Standing     Number of Occurrences:   1     Order Specific Question:   Diet:     Answer:   Regular [1]       Assessment:  Active Hospital Problems    Diagnosis    • *Glioblastoma of frontal lobe (HCC)    • 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 female admitted for acute inpatient rehabilitation with Glioblastoma of frontal lobe (HCC).    I led and attended the weekly conference, and agree with the IDT conference documentation and plan of care as noted below.    Date of conference: 3/24/2021    Goals and barriers: See IDT note.    CM/social support:  supportive     Anticipated DC date: 3/27    Home health: PT/OT    Equip: grab bars in shower and by toilet    Follow up: PCP, Dr. Steele, Dr. Frye, Dr. Cerda, neurology      Medical Decision Making and Plan:    GBM  S/p resection 3/9  S/p Steroid taper  Staples out 3/23  Radiation mapping completed 3/24  Outpatient follow up with oncology  Outpatient follow up with Dr. Steele, can discuss return to work and driving    Spasticity  Starting to develop at the left ankle  Monitor need for medications  Outpatient follow-up with Dr. Steele    Left foot  drop  Custom fitted AFO     Seizure disorder  Keppra  Lamictal  Outpatient follow up with neurology  Sent in RX, covered     Anxiety disorder  Effexor  PRN Ativan, last use 3/15, patient does not want to use at discharge, prefers Atarax  PRN Atarax, last use 3/25     History of headaches  Scheduled tylenol    Hyponatremia, resolved  Discontinued salt tabs    Vit D insufficiency  Supplementation    Insomnia, resolved  Melatonin    Bowel program  Continue bowel medications  Scheduled Sennakot  PRN Miralax, MOM, bisacodyl suppository  Last BM 3/25    Bladder program  Check PVRs - 16  Not retaining  Bladder scan for no voids  ICP for over 400 cc  Scheduled toileting    DVT prophylaxis  Contraindicated given risk of hemorrhage.   Not cleared by neurosurgery.   Compression stockings on in am, off in pm.  Increase mobility. Monitor for swelling.    Total time:  16 minutes.  I spent greater than 50% of the time for patient care, counseling, and coordination on this date, including patient face-to face time, unit/floor time with review of records/pertinent lab data and studies, as well as discussing diagnostic evaluation/work up, planned therapeutic interventions, and future disposition of care, as per the interval events/subjective and the assessment and plan as noted above.    I have performed a physical exam, reviewed and updated ROS, as well as the assessment and plan today 3/26/2021. In review of note from 3/25/2021 there are no new changes except as documented above.    Susi Fulton M.D.   Physical Medicine and Rehabilitation

## 2021-03-26 NOTE — DISCHARGE PLANNING
Maurice Carpenter at [facility], DME referral accepted.  Lisa Pino at [facility], referral accepted.

## 2021-03-26 NOTE — THERAPY
"Occupational Therapy  Daily Treatment     Patient Name: Melba Frye  Age:  50 y.o., Sex:  female  Medical Record #: [id number]  Today's Date: 3/26/2021     Precautions  Precautions: (P) Fall Risk  Comments: (P) seizure prec    Safety   ADL Safety : Requires Supervision for Safety    Subjective    \"Can we work on getting up and down a curb?\"     Objective       03/26/21 1031   Precautions   Precautions Fall Risk   Comments seizure prec   Sitting Upper Body Exercises   Tricep Press Bilateral;Weight (See Comments for lbs)  (3 x 10 forward and backward w/ 35, 40, 45 lbs)   Interdisciplinary Plan of Care Collaboration   Patient Position at End of Therapy Seated;Self Releasing Lap Belt Applied   OT Total Time Spent   OT Individual Total Time Spent (Mins) 30   OT Charge Group   OT Therapy Activity 1   OT Therapeutic Exercise  1     Functional mobility from room to out front lobby to a curb with CGA of spouse with FWW.  Up/down curb x 2 with FWW and spouse assisting with CGA with verbal cues from therapist.    Assessment    Tolerated curb well with assist of spouse, but does need reminders for technique. Ready to d/c home tomorrow.  Strengths: Able to follow instructions, Good insight into deficits/needs, Independent prior level of function, Making steady progress towards goals, Manages pain appropriately, Motivated for self care and independence, Pleasant and cooperative, Supportive family, Willingly participates in therapeutic activities  Barriers: Decreased endurance, Generalized weakness, Hemiparesis, Home accessibility, Impaired balance, Impaired activity tolerance    Plan    D/C tomorrow      Occupational Therapy Goals     Problem: Bathing     Dates: Start: 03/16/21       Goal: STG-Within one week, patient will bathe     Dates: Start: 03/16/21       Description: 1) Individualized Goal:  with supervision using DME/AE as needed and improve safety strategies   2) Interventions:  OT Group Therapy, OT Self Care/ADL, " OT Cognitive Skill Dev, OT Community Reintegration, OT Neuro Re-Ed/Balance, OT Therapeutic Activity, and OT Therapeutic Exercise      Note:     Goal Note filed on 03/24/21 1135 by Kartik Estrada OT/L    Min A to wash feet from spouse                        Problem: Dressing     Dates: Start: 03/16/21       Goal: STG-Within one week, patient will dress LB     Dates: Start: 03/16/21       Description: 1) Individualized Goal:  with SBA using DME/AE as needed  2) Interventions:  OT Group Therapy, OT Self Care/ADL, OT Cognitive Skill Dev, OT Community Reintegration, OT Neuro Re-Ed/Balance, OT Therapeutic Activity, and OT Therapeutic Exercise    Note:     Goal Note filed on 03/24/21 1135 by Kartik Estrada OT/L    SBA                        Problem: Functional Transfers     Dates: Start: 03/16/21       Goal: STG-Within one week, patient will transfer to toilet     Dates: Start: 03/16/21       Description: 1) Individualized Goal:  with distant supervision using DME/AE as needed at w/c level  2) Interventions:  OT Group Therapy, OT Self Care/ADL, OT Cognitive Skill Dev, OT Community Reintegration, OT Neuro Re-Ed/Balance, OT Therapeutic Activity, and OT Therapeutic Exercise    Note:     Goal Note filed on 03/24/21 1135 by Kartik Estrada OT/L    CGA with grab bar                        Problem: OT Long Term Goals     Dates: Start: 03/16/21       Goal: LTG-By discharge, patient will complete basic self care tasks     Dates: Start: 03/16/21       Description: 1) Individualized Goal:  Mod I using DME/AE as needed at FWW level  2) Interventions:  OT Group Therapy, OT Self Care/ADL, OT Cognitive Skill Dev, OT Community Reintegration, OT Neuro Re-Ed/Balance, OT Therapeutic Activity, and OT Therapeutic Exercise          Goal: LTG-By discharge, patient will perform bathroom transfers     Dates: Start: 03/16/21       Description: 1) Individualized Goal:  Mod I using DME/AE as needed at FWW level  2) Interventions:  OT Group  Therapy, OT Self Care/ADL, OT Cognitive Skill Dev, OT Community Reintegration, OT Neuro Re-Ed/Balance, OT Therapeutic Activity, and OT Therapeutic Exercise                Problem: Toileting     Dates: Start: 03/16/21       Goal: STG-Within one week, patient will complete toileting tasks     Dates: Start: 03/16/21       Description: 1) Individualized Goal:  with supervision using DME/AE as needed   2) Interventions:  OT Group Therapy, OT Self Care/ADL, OT Cognitive Skill Dev, OT Community Reintegration, OT Neuro Re-Ed/Balance, OT Therapeutic Activity, and OT Therapeutic Exercise    Note:     Goal Note filed on 03/24/21 1135 by Kartik Estrada OT/KEATON AKHTAR

## 2021-03-26 NOTE — THERAPY
Physical Therapy   Daily Treatment     Patient Name: Melba Frye  Age:  50 y.o., Sex:  female  Medical Record #: [id number]  Today's Date: 3/26/2021     Precautions  Precautions: Fall Risk  Comments: seizure prec    Subjective    Pt was seated in w/c upon arrival and agreeable to treatment.  Pt's spouse present during session.      Objective       03/26/21 1101   Precautions   Precautions Fall Risk   Comments seizure prec   Wheelchair Functional Level of Assist   Wheelchair Assist Modified Independent   Distance Wheelchair (Feet or Distance) 150   Wheelchair Description Adaptive equipment   Stairs Functional Level of Assist   Level of Assist with Stairs Contact Guard Assist   # of Stairs Climbed 16   Stairs Description AFO;Extra time;Hand rails;Safety concerns;Supervision for safety;Verbal cueing   Transfer Functional Level of Assist   Bed, Chair, Wheelchair Transfer Supervised   Bed Chair Wheelchair Transfer Description Increased time;Set-up of equipment;Supervision for safety   Bed Mobility    Supine to Sit Modified Independent   Sit to Supine Modified Independent   Sit to Stand Stand by Assist   Scooting Modified Independent   Rolling Modified Independent   Interdisciplinary Plan of Care Collaboration   Patient Position at End of Therapy Seated;Self Releasing Lap Belt Applied  (Pt's spouse took pt back to room in w/c)   PT Total Time Spent   PT Individual Total Time Spent (Mins) 30   PT Charge Group   PT Gait Training 1   PT Therapeutic Activities 1     Completed D/C IRF FERCHO items in preparation for D/C tomorrow.  Completed bed mobility on regular queen sized bed.  Pt given resources on obtaining a bed rail for home use.     Assessment    Pt has made steady progress in mobility throughout her stay within the facility.  Pt and pt's spouse verbalized all education.    Strengths: Supportive family, Pleasant and cooperative, Motivated for self care and independence, Willingly participates in therapeutic  activities, Effective communication skills  Barriers: Decreased endurance, Fatigue, Generalized weakness, Hemiparesis, Home accessibility, Impaired activity tolerance, Impaired balance, Impaired insight/denial of deficits, Limited mobility    Plan    Complete remaining D/C IRF FERCHO items, sign off on passport, issue HEP      Physical Therapy Problems     Problem: PT-Long Term Goals     Dates: Start: 03/16/21       Goal: LTG-By discharge, patient will ambulate     Dates: Start: 03/16/21       Description: 1) Individualized goal:  Patient will amb >50 ft with FWW SBA over level surfaces.   2) Interventions:  PT E Stim Attended, PT Orthotics Training, PT Gait Training, PT Therapeutic Exercises, PT Neuro Re-Ed/Balance, PT Aquatic Therapy, PT Therapeutic Activity, PT Manual Therapy, and PT Evaluation            Goal: LTG-By discharge, patient will transfer one surface to another     Dates: Start: 03/16/21       Description: 1) Individualized goal:  Patient will transfer SPV with FWW STS/SPT  2) Interventions:  PT E Stim Attended, PT Orthotics Training, PT Gait Training, PT Therapeutic Exercises, PT Neuro Re-Ed/Balance, PT Aquatic Therapy, PT Therapeutic Activity, PT Manual Therapy, and PT Evaluation              Goal: LTG-By discharge, patient will ambulate up/down flight of stairs     Dates: Start: 03/16/21       Description: 1) Individualized goal:  Patient will amb up/down 10 stairs 2x with 1HR (switches from R to left after landing) CGA  2) Interventions:  PT E Stim Attended, PT Orthotics Training, PT Gait Training, PT Therapeutic Exercises, PT Neuro Re-Ed/Balance, PT Aquatic Therapy, PT Therapeutic Activity, PT Manual Therapy, and PT Evaluation

## 2021-03-26 NOTE — DISCHARGE PLANNING
Case management Summary:   Met with patient prior to discharge.   Reviewed all follow up appointments.   Referral made to [facility] and they are have accepted referral and are ready to follow.    [employer] has delivered wheelchair to patient.      During hospitalization, I have provided support and education and have been available for questions and information during hours of operation, communicated with therapy team and MD along with providing links/resources  to outside services.    Patient verbalizes agreement with all plans and has an understanding of the next steps within the post acute services.     Individualized Goals:    1. Remain hopeful and positive   2. Gain strength   3. Start treatments asap    Outcome:   1. met  2. met  3. met

## 2021-03-26 NOTE — THERAPY
Physical Therapy   Daily Treatment     Patient Name: Melba Frye  Age:  50 y.o., Sex:  female  Medical Record #: [id number]  Today's Date: 3/26/2021     Precautions  Precautions: Fall Risk  Comments: seizure prec    Subjective    Pt was supine upon arrival and agreeable to treatment.  Pt's spouse present during session.       Objective       03/26/21 1301   Precautions   Precautions Fall Risk   Gait Functional Level of Assist    Gait Level Of Assist Stand by Assist  (close SBA)   Assistive Device Front Wheel Walker  (L AFO)   Distance (Feet) 220   # of Times Distance was Traveled 1   Deviation Decreased Base Of Support;Bradykinetic;Decreased Heel Strike;Decreased Toe Off  (intermittent L circumduction)   Wheelchair Functional Level of Assist   Wheelchair Assist Modified Independent   Distance Wheelchair (Feet or Distance) 150   Wheelchair Description Adaptive equipment;Extra time   Standing Lower Body Exercises   Hamstring Curl Bilateral   (1x5)   Hip Extension Bilateral   (1x5)   Hip Abduction Bilateral  (1x5)   Marching   (1x5)   Heel Rise 1 set of 10   Toe Rise 1 set of 10   Mini Squat Partial  (1x5)   Comments Pt given standing HEP   Interdisciplinary Plan of Care Collaboration   Patient Position at End of Therapy Seated;Call Light within Reach;Tray Table within Reach;Phone within Reach   PT Total Time Spent   PT Individual Total Time Spent (Mins) 60   PT Charge Group   PT Gait Training 1   PT Therapeutic Activities 3     Completed remaining D/C IRF FRECHO items in preparation for D/C tomorrow.  Reviewed HEP.  Trialed new w/c.  Discussion of D/C planning.  Reviewed and checked off all passport items.    Assessment    Pt continues to make steady progress with ambulation, but would benefit from HH and OP therapy services to continue to address pt's L hemipariesis. Pt ready to D/C.  Strengths: Supportive family, Pleasant and cooperative, Motivated for self care and independence, Willingly participates in  therapeutic activities, Effective communication skills  Barriers: Decreased endurance, Fatigue, Generalized weakness, Hemiparesis, Home accessibility, Impaired activity tolerance, Impaired balance, Impaired insight/denial of deficits, Limited mobility    Plan    Pt to D/C tomorrow    Physical Therapy Problems     Problem: PT-Long Term Goals     Dates: Start: 03/16/21       Goal: LTG-By discharge, patient will ambulate     Dates: Start: 03/16/21       Description: 1) Individualized goal:  Patient will amb >50 ft with FWW SBA over level surfaces.   2) Interventions:  PT E Stim Attended, PT Orthotics Training, PT Gait Training, PT Therapeutic Exercises, PT Neuro Re-Ed/Balance, PT Aquatic Therapy, PT Therapeutic Activity, PT Manual Therapy, and PT Evaluation            Goal: LTG-By discharge, patient will transfer one surface to another     Dates: Start: 03/16/21       Description: 1) Individualized goal:  Patient will transfer SPV with FWW STS/SPT  2) Interventions:  PT E Stim Attended, PT Orthotics Training, PT Gait Training, PT Therapeutic Exercises, PT Neuro Re-Ed/Balance, PT Aquatic Therapy, PT Therapeutic Activity, PT Manual Therapy, and PT Evaluation              Goal: LTG-By discharge, patient will ambulate up/down flight of stairs     Dates: Start: 03/16/21       Description: 1) Individualized goal:  Patient will amb up/down 10 stairs 2x with 1HR (switches from R to left after landing) CGA  2) Interventions:  PT E Stim Attended, PT Orthotics Training, PT Gait Training, PT Therapeutic Exercises, PT Neuro Re-Ed/Balance, PT Aquatic Therapy, PT Therapeutic Activity, PT Manual Therapy, and PT Evaluation

## 2021-03-27 VITALS
SYSTOLIC BLOOD PRESSURE: 162 MMHG | HEIGHT: 67 IN | BODY MASS INDEX: 33.39 KG/M2 | TEMPERATURE: 96.8 F | RESPIRATION RATE: 18 BRPM | HEART RATE: 77 BPM | DIASTOLIC BLOOD PRESSURE: 93 MMHG | WEIGHT: 212.74 LBS | OXYGEN SATURATION: 97 %

## 2021-03-27 PROCEDURE — 700102 HCHG RX REV CODE 250 W/ 637 OVERRIDE(OP): Performed by: PHYSICAL MEDICINE & REHABILITATION

## 2021-03-27 PROCEDURE — A9270 NON-COVERED ITEM OR SERVICE: HCPCS | Performed by: PHYSICAL MEDICINE & REHABILITATION

## 2021-03-27 PROCEDURE — 99239 HOSP IP/OBS DSCHRG MGMT >30: CPT | Performed by: PHYSICAL MEDICINE & REHABILITATION

## 2021-03-27 RX ADMIN — FOLIC ACID 1 MG: 1 TABLET ORAL at 09:11

## 2021-03-27 RX ADMIN — HYDROXYZINE HYDROCHLORIDE 50 MG: 25 TABLET, FILM COATED ORAL at 09:15

## 2021-03-27 RX ADMIN — ACETAMINOPHEN 1000 MG: 500 TABLET, FILM COATED ORAL at 09:11

## 2021-03-27 RX ADMIN — LAMOTRIGINE 100 MG: 100 TABLET ORAL at 09:11

## 2021-03-27 RX ADMIN — LEVETIRACETAM 500 MG: 500 TABLET ORAL at 09:11

## 2021-03-27 RX ADMIN — VENLAFAXINE 12.5 MG: 25 TABLET ORAL at 09:11

## 2021-03-27 ASSESSMENT — PAIN DESCRIPTION - PAIN TYPE: TYPE: ACUTE PAIN;SURGICAL PAIN

## 2021-03-27 NOTE — CARE PLAN
Problem: Discharge Barriers/Planning  Goal: Patient's continuum of care needs will be met  Note: Patient discharged to home per order.  Discharge instructions reviewed with patient and spouse; they verbalize understanding and signed copies placed in chart.  Patient has all belongings; signed copy of form in chart.  Patient left facility at 0940 via wheelchair accompanied by rehab staff and .  Have enjoyed working with this pleasant patient.

## 2021-03-27 NOTE — CARE PLAN
Problem: Safety  Goal: Will remain free from injury  Note: Call light with in reach. Redirection to use call light for assistance. Non skid socks. Upper siderails up x2 while in bed.      Problem: Discharge Barriers/Planning  Goal: Patient's continuum of care needs will be met  Note: For discharge home 3/27/21. Brain Dysfunction. Glioblastoma Multiforme. Migraine, Seizure, Craniotomy. For out patient radiation and chemo therapy. Continent of bowel and bladder. Able to make needs known. Regular, thin liquids diet. Fall and Seizure precautions.

## 2021-03-31 ENCOUNTER — OFFICE VISIT (OUTPATIENT)
Dept: NEUROLOGY | Facility: MEDICAL CENTER | Age: 51
End: 2021-03-31
Attending: PSYCHIATRY & NEUROLOGY
Payer: COMMERCIAL

## 2021-03-31 VITALS
DIASTOLIC BLOOD PRESSURE: 82 MMHG | BODY MASS INDEX: 33.74 KG/M2 | TEMPERATURE: 97.7 F | SYSTOLIC BLOOD PRESSURE: 126 MMHG | OXYGEN SATURATION: 96 % | WEIGHT: 215 LBS | HEIGHT: 67 IN | HEART RATE: 83 BPM

## 2021-03-31 DIAGNOSIS — G40.109 LOCALIZATION-RELATED FOCAL EPILEPSY WITH SIMPLE PARTIAL SEIZURES (HCC): Primary | ICD-10-CM

## 2021-03-31 PROCEDURE — 99212 OFFICE O/P EST SF 10 MIN: CPT | Performed by: PSYCHIATRY & NEUROLOGY

## 2021-03-31 PROCEDURE — 99214 OFFICE O/P EST MOD 30 MIN: CPT | Performed by: PSYCHIATRY & NEUROLOGY

## 2021-03-31 RX ORDER — TEMOZOLOMIDE 140 MG/1
160 CAPSULE ORAL
COMMUNITY
Start: 2021-03-24 | End: 2021-07-21

## 2021-03-31 RX ORDER — TEMOZOLOMIDE 5 MG/1
CAPSULE ORAL
COMMUNITY
Start: 2021-03-24 | End: 2021-07-21

## 2021-03-31 RX ORDER — LEVETIRACETAM 750 MG/1
750 TABLET ORAL 2 TIMES DAILY
Qty: 180 TABLET | Refills: 2 | Status: SHIPPED | OUTPATIENT
Start: 2021-03-31 | End: 2021-05-06

## 2021-03-31 ASSESSMENT — FIBROSIS 4 INDEX: FIB4 SCORE: 0.6

## 2021-03-31 NOTE — PROGRESS NOTES
[facility]  GENERAL NEUROLOGY  FOLLOW-UP VISIT    CC: GBM    INTERVAL HISTORY:  Melba Frye is a 50 y.o. woman with GBM as well as a history of migraine headaches, depression, and anxiety.  I last saw her during her inpatient stay at [facility].  Today, she was accompanied by her , and she provided the following interval history:    After Melba left the hospital she spent 2 weeks at rehab.  She responded to therapy well and she is returned home.  She uses an AFO on the left ankle as well as a walker.  She is not had any falls at home.    Shortly after returning home from rehab Melba noticed some twitching of the left great toe.  Shortly afterward there was some twitching of the left ankle.  Both of these episodes lasted for just a few seconds.  Melba started Keppra 500 mg twice a day.  She tolerates this medication well without any side effects.    MEDICATIONS:  Current Outpatient Medications   Medication Sig   • levetiracetam (KEPPRA) 750 MG tablet Take 1 tablet by mouth 2 times a day for 90 days.   • hydrOXYzine HCl (ATARAX) 50 MG Tab Take 1 tablet by mouth every 8 hours as needed for Anxiety.   • melatonin 3 MG Tab Take 1 tablet by mouth at bedtime as needed (insomnia).   • lamoTRIgine (LAMICTAL) 100 MG Tab Take 1 tablet by mouth 2 Times a Day.   • venlafaxine (EFFEXOR) 25 MG Tab Take 0.5 Tablets by mouth 2 Times a Day.   • acetaminophen (TYLENOL) 325 MG Tab Take 2 Tablets by mouth every 6 hours as needed (Mild Pain; (Pain scale 1-3); Temp greater than 100.5 F).   • VITAMIN D PO Take 1 Units by mouth every evening.   • multivitamin (THERAGRAN) Tab Take 1 tablet by mouth every evening.   • temozolomide (TEMODAR) 5 MG capsule    • temozolomide (TEMODAR) 140 MG capsule    • folic acid (FOLVITE) 1 MG Tab Take 1 tablet by mouth every day. (Patient not taking: Reported on 3/31/2021)     MEDICAL, SOCIAL, AND FAMILY HISTORY:  There is no change in the patient's ROS or PFSH from their previous  "visit on 2/12/2021.    REVIEW OF SYSTEMS:  A ROS was completed.  Pertinent positives and negatives were included in the HPI, above.  All other systems were reviewed and are negative.    PHYSICAL EXAM:  General/Medical:  - NAD  - hair, skin, nails, and joints were normal    Neuro:  MENTAL STATUS: awake and alert; no deficits of speech or language; oriented to person, place, and time; affect was appropriate to situation; pleasant, cooperative    CRANIAL NERVES:    II: acuity was: J1+/J1+; fields intact to confrontation; pupils 3/3 to 2/2 without a relative afferent pupillary defect; discs sharp    III/IV/VI: versions intact without nystagmus    V: facial sensation symmetric to light touch    VII: facial expression symmetric    VIII: hearing intact to finger rub    IX/X: palate elevates symmetrically    XI: shoulder shrug symmetric    XII: tongue midline    MOTOR:  - bulk was normal  - slightly increased tone in the left upper extremity  Upper Extremity Strength  (R/L)    5/4   Elbow flexion 5/4   Elbow extension 5/4   Shoulder abduction 5/4     Lower Extremity Strength  (R/L)   Hip flexion 5/4   Knee extension 5/4   Knee flexion 5/5   Ankle plantarflexion 5/AFO   Ankle dorsiflexion 5/AFO     - no pronator drift; no abnormal movements    SENSATION:  - light touch: grossly intact over the upper and lower extremities  - vibration (R/L, seconds): at the great toes  - pinprick: NT  - proprioception: NT  - Romberg: absent    COORDINATION:  - finger to nose was normal, no ataxia on exam  - finger tapping was mildly slowed on the left    REFLEXES:  Reflex Right Left   BR 2+ 2+   Biceps 3+ 3+   Triceps 2+ 2+   Patellae 2+ 2+   Achilles NT NT   Toes NT NT     GAIT:  - left alma delia-paretic    REVIEW OF IMAGING STUDIES: I reviewed the following studies:  MRI Brain:  Date: 3/9/2021  W/o and w/ contrast?: yes  Indication: \"anaplastic gliomas/glioblastoma, monitor, post resection\"  Comparison: 3/8/2021  Impression:  \"1) There are " "postsurgical changes as evidenced by right frontoparietal craniectomy and biopsy of the right medial parietal enhancing lesion.  2) 7 mm midline shift towards left side.  3) Periventricular T2 hyperintensities adjacent to the left lateral ventricle.  4) A few nonspecific T2 hyperintensities in the subcortical and periventricular white matter.\"    REVIEW OF LABORATORY STUDIES:  Reviewed.    ASSESSMENT:  Melba Frye is a 50 y.o. woman with GBM and a history otherwise notable for migraine headaches, depression, and anxiety.  Melba seems to be in very good spirits today.  She has had perhaps 2 very brief events which could be consistent with focal seizure involving the left lower extremity.  Lamotrigine has been a rather expensive medication for her.  I have spoken with Dr. Davidson, and it seems reasonable at this point to transition her from lamotrigine to levetiracetam since this medication has fewer medication to medication interactions.  Melba seems to be tolerating a dosage of 500 mg twice a day well without side effects.  We agreed to increase the dosage to 750 mg twice a day.  After she has been on this dosage for 1 week she will gradually decrease the dosage of lamotrigine by 50 mg/day every 2 weeks until off.  She will alert the clinic if she experiences any additional events concerning for seizure.  We will follow-up in approximately 3 months.    PLAN:  GBM with localization-related epilepsy  - increase levetiracetam to 750 mg BID  - continue current dosage of lamotrigine for 1 week; afterward, decrease the dosage by 50 mg/day every 2 weeks until off    Follow-Up:  - Return in about 3 months (around 6/30/2021).    Signed: Yohan Calixto M.D. at 10:37 AM on 03/31/21    BILLING DOCUMENTATION:   I spent 30 minutes reviewing the medical record, interviewing and examining the patient, discussing my impression (see \"assessment\" above), and coordinating care.  "

## 2021-04-01 ENCOUNTER — OFFICE VISIT (OUTPATIENT)
Dept: NEUROLOGY | Facility: MEDICAL CENTER | Age: 51
End: 2021-04-01
Attending: STUDENT IN AN ORGANIZED HEALTH CARE EDUCATION/TRAINING PROGRAM
Payer: COMMERCIAL

## 2021-04-01 ENCOUNTER — HOSPITAL ENCOUNTER (OUTPATIENT)
Dept: RADIATION ONCOLOGY | Facility: MEDICAL CENTER | Age: 51
End: 2021-04-30
Attending: RADIOLOGY
Payer: COMMERCIAL

## 2021-04-01 VITALS
OXYGEN SATURATION: 97 % | WEIGHT: 190 LBS | BODY MASS INDEX: 29.82 KG/M2 | HEIGHT: 67 IN | HEART RATE: 85 BPM | RESPIRATION RATE: 16 BRPM | SYSTOLIC BLOOD PRESSURE: 132 MMHG | TEMPERATURE: 98 F | DIASTOLIC BLOOD PRESSURE: 85 MMHG

## 2021-04-01 DIAGNOSIS — F32.A DEPRESSIVE DISORDER: ICD-10-CM

## 2021-04-01 DIAGNOSIS — C71.1 GLIOBLASTOMA OF FRONTAL LOBE (HCC): Primary | ICD-10-CM

## 2021-04-01 DIAGNOSIS — F41.8 ANXIETY ASSOCIATED WITH DEPRESSION: ICD-10-CM

## 2021-04-01 DIAGNOSIS — G40.109 LOCALIZATION-RELATED FOCAL EPILEPSY WITH SIMPLE PARTIAL SEIZURES (HCC): ICD-10-CM

## 2021-04-01 PROCEDURE — 99212 OFFICE O/P EST SF 10 MIN: CPT | Performed by: STUDENT IN AN ORGANIZED HEALTH CARE EDUCATION/TRAINING PROGRAM

## 2021-04-01 PROCEDURE — 99215 OFFICE O/P EST HI 40 MIN: CPT | Performed by: STUDENT IN AN ORGANIZED HEALTH CARE EDUCATION/TRAINING PROGRAM

## 2021-04-01 RX ORDER — HYDROXYZINE 50 MG/1
50 TABLET, FILM COATED ORAL EVERY 8 HOURS PRN
Qty: 90 TABLET | Refills: 0 | Status: SHIPPED | OUTPATIENT
Start: 2021-04-01 | End: 2021-05-18

## 2021-04-01 ASSESSMENT — FIBROSIS 4 INDEX: FIB4 SCORE: 0.6

## 2021-04-01 NOTE — PROGRESS NOTES
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 She is accompanied by her . She remains in good spirits. She recently completed rehab. She is wearing a boot on left foot for left foot drop. She has switched from Keppra to Lamictal. She has been taking keppra 500 mg BID for 3 weeks. She has been instructed to increase keppra to 750 mg BID starting today with instructions to taper lamictal as outlined by Dr Calixto in yesterday's note.     She has had perhaps 2 very brief events which could be consistent with focal seizure involving the left lower extremity.     ROS: Pertinent positives and negatives are as documented above          Current medications:     Current Outpatient Medications   Medication Instructions   • acetaminophen (TYLENOL) 650 mg, Oral, EVERY 6 HOURS PRN   • folic acid (FOLVITE) 1 mg, Oral, DAILY   • hydrOXYzine HCl (ATARAX) 50 mg, Oral, EVERY 8 HOURS PRN   • lamoTRIgine (LAMICTAL) 100 mg, Oral, 2 TIMES DAILY   • levetiracetam (KEPPRA) 750 mg, Oral, 2 TIMES DAILY   • melatonin 3 mg, Oral, NIGHTLY PRN   • multivitamin (THERAGRAN) Tab 1 tablet, Oral, EVERY EVENING   • temozolomide (TEMODAR) 140 MG capsule No dose, route, or frequency recorded.   • temozolomide (TEMODAR) 5 MG capsule No dose, route, or frequency recorded.   • venlafaxine (EFFEXOR) 12.5 mg, Oral, 2 TIMES DAILY   • VITAMIN D PO 1 Units, Oral, EVERY EVENING          Outpatient Medications Marked as Taking for the 4/1/21 encounter (Office Visit) with Fady Davidson M.D.   Medication Sig Dispense Refill   • hydrOXYzine HCl (ATARAX) 50 MG Tab Take 1 tablet by mouth every 8 hours as needed for Anxiety. 90 tablet 0   • levetiracetam (KEPPRA) 750 MG tablet Take 1 tablet by mouth 2 times a day for 90 days. 180 tablet 2   • melatonin 3 MG Tab Take 1 tablet by mouth at bedtime as needed (insomnia). 30 tablet 2   •  lamoTRIgine (LAMICTAL) 100 MG Tab Take 1 tablet by mouth 2 Times a Day. 180 tablet 2   • venlafaxine (EFFEXOR) 25 MG Tab Take 0.5 Tablets by mouth 2 Times a Day. 90 tablet 2   • acetaminophen (TYLENOL) 325 MG Tab Take 2 Tablets by mouth every 6 hours as needed (Mild Pain; (Pain scale 1-3); Temp greater than 100.5 F). 30 tablet 0   • VITAMIN D PO Take 1 Units by mouth every evening.     • multivitamin (THERAGRAN) Tab Take 1 tablet by mouth every evening.           Physical examination:   Vitals:    04/01/21 1003   BP: 132/85   Pulse: 85   Resp: 16   Temp: 36.7 °C (98 °F)   SpO2: 97%     Vitals:    04/01/21 1003   BP: 132/85   Pulse: 85   Resp: 16   Temp: 36.7 °C (98 °F)   SpO2: 97%       General: Patient in no acute distress, pleasant and cooperative.  HEENT: Normocephalic, no signs of acute trauma.   Neck: visibly supple, with  normal range of motion.   Chest: clear to auscultation. No cough.   CV: RRR, no murmurs.   Skin: no signs of acute rashes or trauma.   Musculoskeletal: Normal ROM throughout  Psychiatric:  Denies symptoms of depression or suicidal ideation. Mood and affect appear normal on exam.      NEUROLOGICAL EXAM:   Mental status, orientation: Awake, alert and fully oriented.   Speech and language: speech is clear and fluent.  Patient is able to articulate her past medical history well and comprehension is grossly intact  Cranial nerve exam: Pupils are 3-4 mm bilaterally. Visual fields are intact by confrontation. There is No nystagmus. Intact full EOM in all directions of gaze. Face appears symmetric.      Motor exam: Strength is 5/5 in all extremities on right and 5/5 strength through. Tone is normal. No abnormal movements were seen on exam.   Sensory exam reveals normal sense of light touch,  Deep tendon reflexes:  2+ throughout. Plantar responses are flexor. There is no clonus.   Coordination: shows a normal finger-nose-finger.   Gait: Deferred        ANCILLARY DATA   REVIEWED:         Results for  orders placed during the hospital encounter of 03/02/21   MR-BRAIN-WITH & W/O    Impression 1.  There are postsurgical changes as evidenced by right frontoparietal craniectomy and biopsy of the right medial parietal enhancing lesion.  2.  7 mm midline shift towards left side  3.  Periventricular T2 hyperintensities adjacent to the left lateral ventricle.  4.  A few nonspecific T2 hyperintensities in the subcortical and periventricular white matter.        Results for orders placed during the hospital encounter of 06/21/11   MR-BRAIN-W/O    Impression 1. Minimal supratentorial white matter disease with a few rare punctate foci of bright FLAIR signal primarily in the subcortical white matter of the anterior frontal lobes.  There are no periventricular lesions with morphology indicative of multiple sclerosis.  There is no significant change since June 2010.  2. Minimal residual right mastoid opacities consistent postinflammatory change.  3. Paranasal sinus opacities consistent with active inflammatory sinus disease, possible right maxillary sinusitis with air-fluid level.                              Results for orders placed during the hospital encounter of 01/09/21   MR-CERVICAL SPINE-WITH & W/O    Impression 1. MRI OF THE CERVICAL SPINE WITHOUT AND WITH CONTRAST WITHIN NORMAL LIMITS. NO EVIDENCE OF DEMYELINATING DISEASE IN THE CERVICAL SPINAL CORD.           Results for orders placed during the hospital encounter of 01/09/21   MR-LUMBAR SPINE-WITH & W/O    Impression 1.  Minimal annular bulging at the L4-5 and L5-S1 levels.    2.  Small annular fissures in the annulus fibrosis posteriorly at the L3-4, L4-5, and L5-S1 levels.    3.  No evidence of demyelinating process in the conus medullaris.              Results for orders placed during the hospital encounter of 01/09/21   MR-THORACIC SPINE-WITH & W/O    Impression 1. Normal MRI scan of the thoracic spine with or without contrast.    2. No evidence of demyelinating  process in the thoracic cord.                                                            No results found for this or any previous visit.         Lab Data:  Reviewed      Admission on 03/15/2021, Discharged on 03/27/2021   Component Date Value   • Glucose - Accu-Ck 03/15/2021 103*   • SARS-CoV-2 Source 03/15/2021 NP Swab    • SARS-CoV-2 by PCR 03/15/2021 NotDetected    • WBC 03/16/2021 15.2*   • RBC 03/16/2021 4.08*   • Hemoglobin 03/16/2021 13.4    • Hematocrit 03/16/2021 39.3    • MCV 03/16/2021 96.3    • MCH 03/16/2021 32.8    • MCHC 03/16/2021 34.1    • RDW 03/16/2021 45.3    • Platelet Count 03/16/2021 226    • MPV 03/16/2021 10.0    • Neutrophils-Polys 03/16/2021 79.30*   • Lymphocytes 03/16/2021 11.80*   • Monocytes 03/16/2021 5.90    • Eosinophils 03/16/2021 0.00    • Basophils 03/16/2021 0.10    • Immature Granulocytes 03/16/2021 2.90*   • Nucleated RBC 03/16/2021 0.00    • Neutrophils (Absolute) 03/16/2021 12.08*   • Lymphs (Absolute) 03/16/2021 1.79    • Monos (Absolute) 03/16/2021 0.90*   • Eos (Absolute) 03/16/2021 0.00    • Baso (Absolute) 03/16/2021 0.02    • Immature Granulocytes (a* 03/16/2021 0.44*   • NRBC (Absolute) 03/16/2021 0.00    • Sodium 03/16/2021 131*   • Potassium 03/16/2021 3.9    • Chloride 03/16/2021 96    • Co2 03/16/2021 23    • Anion Gap 03/16/2021 12.0    • Glucose 03/16/2021 97    • Bun 03/16/2021 16    • Creatinine 03/16/2021 0.45*   • Calcium 03/16/2021 8.8    • AST(SGOT) 03/16/2021 11*   • ALT(SGPT) 03/16/2021 17    • Alkaline Phosphatase 03/16/2021 67    • Total Bilirubin 03/16/2021 0.5    • Albumin 03/16/2021 3.3    • Total Protein 03/16/2021 5.8*   • Globulin 03/16/2021 2.5    • A-G Ratio 03/16/2021 1.3    • Magnesium 03/16/2021 2.2    • 25-Hydroxy   Vitamin D 25 03/16/2021 21*   • Glucose - Accu-Ck 03/15/2021 112*   • Glucose - Accu-Ck 03/16/2021 93    • GFR If  03/16/2021 >60    • GFR If Non  Ameri* 03/16/2021 >60    • Glucose - Accu-Ck 03/16/2021  92    • Sodium 03/18/2021 138    • Potassium 03/18/2021 4.5    • Chloride 03/18/2021 105    • Co2 03/18/2021 25    • Glucose 03/18/2021 108*   • Bun 03/18/2021 18    • Creatinine 03/18/2021 0.52    • Calcium 03/18/2021 8.5    • Anion Gap 03/18/2021 8.0    • GFR If  03/18/2021 >60    • GFR If Non  Ameri* 03/18/2021 >60    • Sodium 03/22/2021 140    • Potassium 03/22/2021 4.4    • Chloride 03/22/2021 104    • Co2 03/22/2021 28    • Glucose 03/22/2021 78    • Bun 03/22/2021 12    • Creatinine 03/22/2021 0.57    • Calcium 03/22/2021 8.7    • Anion Gap 03/22/2021 8.0    • GFR If  03/22/2021 >60    • GFR If Non  Ameri* 03/22/2021 >60    No results displayed because visit has over 200 results.      Admission on 03/02/2021, Discharged on 03/02/2021   Component Date Value   • WBC 03/02/2021 6.1    • RBC 03/02/2021 4.58    • Hemoglobin 03/02/2021 14.5    • Hematocrit 03/02/2021 43.0    • MCV 03/02/2021 93.9    • MCH 03/02/2021 31.7    • MCHC 03/02/2021 33.7    • RDW 03/02/2021 43.1    • Platelet Count 03/02/2021 226    • MPV 03/02/2021 10.1    • Neutrophils-Polys 03/02/2021 60.70    • Lymphocytes 03/02/2021 28.80    • Monocytes 03/02/2021 8.30    • Eosinophils 03/02/2021 1.20    • Basophils 03/02/2021 0.50    • Immature Granulocytes 03/02/2021 0.50    • Nucleated RBC 03/02/2021 0.00    • Neutrophils (Absolute) 03/02/2021 3.68    • Lymphs (Absolute) 03/02/2021 1.74    • Monos (Absolute) 03/02/2021 0.50    • Eos (Absolute) 03/02/2021 0.07    • Baso (Absolute) 03/02/2021 0.03    • Immature Granulocytes (a* 03/02/2021 0.03    • NRBC (Absolute) 03/02/2021 0.00    • Sodium 03/02/2021 140    • Potassium 03/02/2021 3.9    • Chloride 03/02/2021 104    • Co2 03/02/2021 25    • Glucose 03/02/2021 116*   • Bun 03/02/2021 11    • Creatinine 03/02/2021 0.65    • Calcium 03/02/2021 9.4    • Anion Gap 03/02/2021 11.0    • GFR If  03/02/2021 >60    • GFR If Non  Ameri*  03/02/2021 >60    • SARS-CoV-2 Source 03/02/2021 Nasal Swab    • SARS-CoV-2 by PCR 03/02/2021 NotDetected    Hospital Outpatient Visit on 01/27/2021   Component Date Value   • Anti-Dna -Ds 01/27/2021 None Detected    • Sjogren'S Anti-Ss-B 01/27/2021 1    • SSA 52 (R0)(HARI) Ab, IgG 01/27/2021 0    • SSA 60 (R0)(HARI) Ab, IgG 01/27/2021 0    • MOG IgG Screen, Serum 01/27/2021 <1:10    Admission on 12/10/2020, Discharged on 12/13/2020   Component Date Value   • WBC 12/10/2020 8.0    • RBC 12/10/2020 4.62    • Hemoglobin 12/10/2020 14.5    • Hematocrit 12/10/2020 43.4    • MCV 12/10/2020 93.9    • MCH 12/10/2020 31.4    • MCHC 12/10/2020 33.4*   • RDW 12/10/2020 43.5    • Platelet Count 12/10/2020 203    • MPV 12/10/2020 10.5    • Neutrophils-Polys 12/10/2020 51.50    • Lymphocytes 12/10/2020 39.00    • Monocytes 12/10/2020 7.70    • Eosinophils 12/10/2020 0.90    • Basophils 12/10/2020 0.60    • Immature Granulocytes 12/10/2020 0.30    • Nucleated RBC 12/10/2020 0.00    • Neutrophils (Absolute) 12/10/2020 4.11    • Lymphs (Absolute) 12/10/2020 3.11    • Monos (Absolute) 12/10/2020 0.61    • Eos (Absolute) 12/10/2020 0.07    • Baso (Absolute) 12/10/2020 0.05    • Immature Granulocytes (a* 12/10/2020 0.02    • NRBC (Absolute) 12/10/2020 0.00    • Sodium 12/10/2020 136    • Potassium 12/10/2020 3.8    • Chloride 12/10/2020 101    • Co2 12/10/2020 24    • Anion Gap 12/10/2020 11.0    • Glucose 12/10/2020 83    • Bun 12/10/2020 18    • Creatinine 12/10/2020 0.66    • Calcium 12/10/2020 9.7    • AST(SGOT) 12/10/2020 18    • ALT(SGPT) 12/10/2020 16    • Alkaline Phosphatase 12/10/2020 102*   • Total Bilirubin 12/10/2020 0.2    • Albumin 12/10/2020 4.6    • Total Protein 12/10/2020 7.4    • Globulin 12/10/2020 2.8    • A-G Ratio 12/10/2020 1.6    • Troponin T 12/10/2020 <6    • Report 12/10/2020                      Value:[facility] – part of the [facility] Emergency Dept.    Test Date:  2020-12-10  Pt Name:    MAREG FALL                Department: ER  MRN:        [id number]                      Room:       [facility]  Gender:     Female                       Technician: [id number]  :        [id number]                   Requested By:ANDREW JOHNSON  Order #:    [id number]                    Reading MD: ANDREW JOHNSON MD    Measurements  Intervals                                Axis  Rate:       66                           P:          48  RI:         176                          QRS:        33  QRSD:       82                           T:          22  QT:         412  QTc:        432    Interpretive Statements  SINUS RHYTHM  EARLY PRECORDIAL R/S TRANSITION  Compared to ECG 2010 10:34:35  Sinus bradycardia no longer present  Electronically Signed On 12- 22:36:50 PST by ANDREW JOHNSON MD     • GFR If  12/10/2020 >60    • GFR If Non  Ameri* 12/10/2020 >60    • Sed Rate Westergren 12/10/2020 7    • Stat C-Reactive Protein 12/10/2020 0.27    • TSH 12/10/2020 4.150    • Vitamin B12 -True Cobala* 12/10/2020 498    • Vitamin B1 2020 150    • Syphilis, Treponemal Qual 12/10/2020 Non-Reactive    • HIV Ag/Ab Combo Assay 12/10/2020 Non-Reactive    • Ammonia 2020 13    • Color 12/10/2020 Yellow    • Character 12/10/2020 Clear    • Specific Gravity 12/10/2020 1.011    • Ph 12/10/2020 7.0    • Glucose 12/10/2020 Negative    • Ketones 12/10/2020 Negative    • Protein 12/10/2020 Negative    • Bilirubin 12/10/2020 Negative    • Urobilinogen, Urine 12/10/2020 0.2    • Nitrite 12/10/2020 Negative    • Leukocyte Esterase 12/10/2020 Trace*   • Occult Blood 12/10/2020 Negative    • Micro Urine Req 12/10/2020 Microscopic    • Amphetamines Urine 12/10/2020 Negative    • Barbiturates 12/10/2020 Negative    • Benzodiazepines 12/10/2020 Negative    • Cocaine Metabolite 12/10/2020 Negative    • Methadone 12/10/2020 Negative    • Opiates 12/10/2020 Negative    • Oxycodone 12/10/2020 Negative    • Phencyclidine -Pcp 12/10/2020 Negative    •  Propoxyphene 12/10/2020 Negative    • Cannabinoid Metab 12/10/2020 Negative    • HSV DNA By PCR 12/11/2020 Not Detected    • HSV Source 12/11/2020 Serum    • Antinuclear Antibody 12/11/2020 None Detected    • Arsenic, Blood 12/10/2020 <10.0    • Mercury, Blood 12/10/2020 <2.5    • Lead Blood 12/10/2020 <2.0    • Cadmium Blood 12/10/2020 <1.0    • Immunoglobulin G 12/11/2020 1070    • Magnesium 12/11/2020 2.1    • COVID Order Status 12/11/2020 Received    • Miscellaneous Lab Result 12/11/2020 SEE NOTE    • SARS-CoV-2 Source 12/11/2020 NP Swab    • SARS-CoV-2 by PCR 12/11/2020 NotDetected    • WBC 12/10/2020 0-2    • RBC 12/10/2020 0-2    • Bacteria 12/10/2020 Negative    • Epithelial Cells 12/10/2020 Negative    • Hyaline Cast 12/10/2020 0-2    • Number Of Tubes 12/11/2020 4    • Volume 12/11/2020 16.0    • Color-Body Fluid 12/11/2020 Colorless    • Character-Body Fluid 12/11/2020 Clear    • Supernatant Appearance 12/11/2020 Colorless    • Total RBC Count 12/11/2020 <1    • Crenated RBC 12/11/2020 0    • Total WBC Count 12/11/2020 6    • Lymphs 12/11/2020 92    • Mononuclear Cells - CSF 12/11/2020 8    • CSF Tube Number 12/11/2020 3    • Glucose CSF 12/11/2020 102*   • Total Protein, CSF 12/11/2020 40    • Miscellaneous Lab Result 12/11/2020 SEE NOTE    • PT 12/11/2020 13.3    • INR 12/11/2020 0.98    • APTT 12/11/2020 26.3    • Oligoclonal Bands CSF 12/11/2020 Positive*   • Oligoclonal Bands CSF 12/11/2020 10*   • Interpretation 12/11/2020 See Note    • 25-Hydroxy   Vitamin D 25 12/11/2020 31    • IgG CSF 12/11/2020 3.7    • Serum Albumin 12/11/2020 4540    • CSF Albumin 12/11/2020 27    • Albumin Index 12/11/2020 5.9    • Cns IgG Syn 12/11/2020 1.6    • IgG Ratio 12/11/2020 0.63    • Albumin Ratio 12/11/2020 0.14    • Oligoclonal Bands CSF 12/11/2020 Positive*   • Oligoclonal Bands CSF 12/11/2020 10*   • Interpretation 12/11/2020 See Note    • Immunoglobulin G 12/11/2020 994    • Ace-Csf 12/11/2020 1.0    • Sodium  12/12/2020 138    • Potassium 12/12/2020 3.7    • Chloride 12/12/2020 102    • Co2 12/12/2020 23    • Anion Gap 12/12/2020 13.0    • Glucose 12/12/2020 172*   • Bun 12/12/2020 18    • Creatinine 12/12/2020 0.72    • Calcium 12/12/2020 9.7    • AST(SGOT) 12/12/2020 14    • ALT(SGPT) 12/12/2020 14    • Alkaline Phosphatase 12/12/2020 80    • Total Bilirubin 12/12/2020 0.3    • Albumin 12/12/2020 4.2    • Total Protein 12/12/2020 6.6    • Globulin 12/12/2020 2.4    • A-G Ratio 12/12/2020 1.8    • WBC 12/12/2020 14.4*   • RBC 12/12/2020 4.23    • Hemoglobin 12/12/2020 13.4    • Hematocrit 12/12/2020 40.1    • MCV 12/12/2020 94.8    • MCH 12/12/2020 31.7    • MCHC 12/12/2020 33.4*   • RDW 12/12/2020 44.1    • Platelet Count 12/12/2020 230    • MPV 12/12/2020 10.8    • GFR If  12/12/2020 >60    • GFR If Non  Ameri* 12/12/2020 >60    • Color 12/12/2020 Yellow    • Character 12/12/2020 Clear    • Specific Gravity 12/12/2020 1.039    • Ph 12/12/2020 5.5    • Glucose 12/12/2020 >=1000*   • Ketones 12/12/2020 Trace*   • Protein 12/12/2020 Negative    • Bilirubin 12/12/2020 Negative    • Urobilinogen, Urine 12/12/2020 0.2    • Nitrite 12/12/2020 Negative    • Leukocyte Esterase 12/12/2020 Small*   • Occult Blood 12/12/2020 Negative    • Micro Urine Req 12/12/2020 Microscopic    • Significant Indicator 12/12/2020 NEG    • Source 12/12/2020 BLD    • Site 12/12/2020 PERIPHERAL    • Culture Result 12/12/2020 No growth after 5 days of incubation.    • WBC 12/12/2020 20-50*   • RBC 12/12/2020 0-2    • Bacteria 12/12/2020 Few*   • Epithelial Cells 12/12/2020 Negative    • Hyaline Cast 12/12/2020 0-2    • WBC 12/13/2020 10.7    • RBC 12/13/2020 4.07*   • Hemoglobin 12/13/2020 13.0    • Hematocrit 12/13/2020 39.1    • MCV 12/13/2020 96.1    • MCH 12/13/2020 31.9    • MCHC 12/13/2020 33.2*   • RDW 12/13/2020 46.4    • Platelet Count 12/13/2020 198    • MPV 12/13/2020 10.4    • Sodium 12/13/2020 138    • Potassium  12/13/2020 3.7    • Chloride 12/13/2020 102    • Co2 12/13/2020 26    • Glucose 12/13/2020 98    • Bun 12/13/2020 24*   • Creatinine 12/13/2020 0.72    • Calcium 12/13/2020 8.9    • Anion Gap 12/13/2020 10.0    • GFR If  12/13/2020 >60    • GFR If Non  Ameri* 12/13/2020 >60    Hospital Outpatient Visit on 12/07/2020   Component Date Value   • Sodium 12/07/2020 142    • Potassium 12/07/2020 3.7    • Chloride 12/07/2020 104    • Co2 12/07/2020 27    • Anion Gap 12/07/2020 11.0    • Glucose 12/07/2020 101*   • Bun 12/07/2020 13    • Creatinine 12/07/2020 0.71    • Calcium 12/07/2020 9.0    • AST(SGOT) 12/07/2020 16    • ALT(SGPT) 12/07/2020 14    • Alkaline Phosphatase 12/07/2020 107*   • Total Bilirubin 12/07/2020 0.2    • Albumin 12/07/2020 4.2    • Total Protein 12/07/2020 7.1    • Globulin 12/07/2020 2.9    • A-G Ratio 12/07/2020 1.4    • WBC 12/07/2020 7.9    • RBC 12/07/2020 4.29    • Hemoglobin 12/07/2020 13.8    • Hematocrit 12/07/2020 40.8    • MCV 12/07/2020 95.1    • MCH 12/07/2020 32.2    • MCHC 12/07/2020 33.8    • RDW 12/07/2020 43.2    • Platelet Count 12/07/2020 194    • MPV 12/07/2020 10.2    • Neutrophils-Polys 12/07/2020 54.90    • Lymphocytes 12/07/2020 35.10    • Monocytes 12/07/2020 8.10    • Eosinophils 12/07/2020 1.10    • Basophils 12/07/2020 0.50    • Immature Granulocytes 12/07/2020 0.30    • Nucleated RBC 12/07/2020 0.00    • Neutrophils (Absolute) 12/07/2020 4.36    • Lymphs (Absolute) 12/07/2020 2.78    • Monos (Absolute) 12/07/2020 0.64    • Eos (Absolute) 12/07/2020 0.09    • Baso (Absolute) 12/07/2020 0.04    • Immature Granulocytes (a* 12/07/2020 0.02    • NRBC (Absolute) 12/07/2020 0.00    • GFR If  12/07/2020 >60    • GFR If Non  Ameri* 12/07/2020 >60    • Fasting Status 12/07/2020 Non-Fasting    Hospital Outpatient Visit on 11/02/2020   Component Date Value   • Significant Indicator 11/02/2020 NEG    • Source 11/02/2020 UR    • Site  11/02/2020 -    • Culture Result 11/02/2020 Mixed skin bernie 10-50,000 cfu/mL    Office Visit on 11/02/2020   Component Date Value   • POC Color 11/02/2020 yellow    • POC Appearance 11/02/2020 clear    • POC Leukocyte Esterase 11/02/2020 small    • POC Nitrites 11/02/2020 neg    • POC Urobiligen 11/02/2020 0.2    • POC Protein 11/02/2020 neg    • POC Urine PH 11/02/2020 5.5    • POC Blood 11/02/2020 neg    • POC Specific Gravity 11/02/2020 1.030    • POC Ketones 11/02/2020 neg    • POC Bilirubin 11/02/2020 neg    • POC Glucose 11/02/2020 neg                  ASSESSMENT, PLAN, EDUCATION, AND COUNSELING:  Patient with GBM with localization related epilepsy. Switching to Keppra from lamictal to limit chances of medication interactions now that she will begin chemotherapy with Temodar. I agree with the plan to switch to keppra and tapering of lamictal. She will start by increasing to keppra 750 mg BID today. After she has been on this dosage for 1 week she will gradually decrease the dosage of lamotrigine by 50 mg/day every 2 weeks until off.  She will update me on side effects or problems. Discussed side effects of Keppra, particularly mood side effects. I also want to see her in 10 weeks, after she has completed her radiation. She has been taking atarax which has helped with her anxiety. Refill sent to pharmacy.     Encounter Diagnoses   Name Primary?   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 [employer].   Office: [phone]  Fax: [phone]      BILLING DOCUMENTATION:       Counseling:  I spent a total of 40 minutes of face-to-face time in this visit. Over 50% of the time of the visit today was spent on counseling and or coordination of care wtih the patient and/or family, as above in assessment in plan.

## 2021-04-02 PROCEDURE — 77301 RADIOTHERAPY DOSE PLAN IMRT: CPT | Performed by: RADIOLOGY

## 2021-04-02 PROCEDURE — 77301 RADIOTHERAPY DOSE PLAN IMRT: CPT | Mod: 26 | Performed by: RADIOLOGY

## 2021-04-02 PROCEDURE — 77300 RADIATION THERAPY DOSE PLAN: CPT | Mod: 26 | Performed by: RADIOLOGY

## 2021-04-02 PROCEDURE — 77338 DESIGN MLC DEVICE FOR IMRT: CPT | Performed by: RADIOLOGY

## 2021-04-02 PROCEDURE — 77300 RADIATION THERAPY DOSE PLAN: CPT | Performed by: RADIOLOGY

## 2021-04-02 PROCEDURE — 77338 DESIGN MLC DEVICE FOR IMRT: CPT | Mod: 26 | Performed by: RADIOLOGY

## 2021-04-05 ENCOUNTER — HOSPITAL ENCOUNTER (OUTPATIENT)
Dept: RADIATION ONCOLOGY | Facility: MEDICAL CENTER | Age: 51
End: 2021-04-05
Payer: COMMERCIAL

## 2021-04-05 LAB
CHEMOTHERAPY INFUSION START DATE: NORMAL
CHEMOTHERAPY RECORDS: 2
CHEMOTHERAPY RECORDS: 4600
CHEMOTHERAPY RECORDS: NORMAL
CHEMOTHERAPY RX CANCER: NORMAL
DATE 1ST CHEMO CANCER: NORMAL
RAD ONC ARIA COURSE LAST TREATMENT DATE: NORMAL
RAD ONC ARIA COURSE TREATMENT ELAPSED DAYS: NORMAL
RAD ONC ARIA REFERENCE POINT DOSAGE GIVEN TO DATE: 2
RAD ONC ARIA REFERENCE POINT DOSAGE GIVEN TO DATE: 2.07
RAD ONC ARIA REFERENCE POINT ID: NORMAL
RAD ONC ARIA REFERENCE POINT ID: NORMAL
RAD ONC ARIA REFERENCE POINT SESSION DOSAGE GIVEN: 2
RAD ONC ARIA REFERENCE POINT SESSION DOSAGE GIVEN: 2.07

## 2021-04-05 PROCEDURE — 77280 THER RAD SIMULAJ FIELD SMPL: CPT | Performed by: RADIOLOGY

## 2021-04-05 PROCEDURE — 77386 HCHG IMRT DELIVERY COMPLEX: CPT | Performed by: RADIOLOGY

## 2021-04-06 ENCOUNTER — OFFICE VISIT (OUTPATIENT)
Dept: MEDICAL GROUP | Facility: IMAGING CENTER | Age: 51
End: 2021-04-06
Payer: COMMERCIAL

## 2021-04-06 ENCOUNTER — HOSPITAL ENCOUNTER (OUTPATIENT)
Dept: RADIATION ONCOLOGY | Facility: MEDICAL CENTER | Age: 51
End: 2021-04-30
Payer: COMMERCIAL

## 2021-04-06 VITALS
TEMPERATURE: 99.3 F | DIASTOLIC BLOOD PRESSURE: 90 MMHG | WEIGHT: 190 LBS | SYSTOLIC BLOOD PRESSURE: 136 MMHG | BODY MASS INDEX: 29.82 KG/M2 | HEIGHT: 67 IN | HEART RATE: 90 BPM | OXYGEN SATURATION: 95 % | RESPIRATION RATE: 12 BRPM

## 2021-04-06 DIAGNOSIS — Z09 HOSPITAL DISCHARGE FOLLOW-UP: ICD-10-CM

## 2021-04-06 DIAGNOSIS — R03.0 ELEVATED BLOOD PRESSURE READING: ICD-10-CM

## 2021-04-06 LAB
CHEMOTHERAPY INFUSION START DATE: NORMAL
CHEMOTHERAPY RECORDS: 2
CHEMOTHERAPY RECORDS: 4600
CHEMOTHERAPY RECORDS: NORMAL
CHEMOTHERAPY RX CANCER: NORMAL
DATE 1ST CHEMO CANCER: NORMAL
RAD ONC ARIA COURSE LAST TREATMENT DATE: NORMAL
RAD ONC ARIA COURSE TREATMENT ELAPSED DAYS: NORMAL
RAD ONC ARIA REFERENCE POINT DOSAGE GIVEN TO DATE: 4
RAD ONC ARIA REFERENCE POINT DOSAGE GIVEN TO DATE: 4.14
RAD ONC ARIA REFERENCE POINT ID: NORMAL
RAD ONC ARIA REFERENCE POINT ID: NORMAL
RAD ONC ARIA REFERENCE POINT SESSION DOSAGE GIVEN: 2
RAD ONC ARIA REFERENCE POINT SESSION DOSAGE GIVEN: 2.07

## 2021-04-06 PROCEDURE — 77014 PR CT GUIDANCE PLACEMENT RAD THERAPY FIELDS: CPT | Mod: 26 | Performed by: RADIOLOGY

## 2021-04-06 PROCEDURE — 77386 HCHG IMRT DELIVERY COMPLEX: CPT | Performed by: RADIOLOGY

## 2021-04-06 PROCEDURE — 99213 OFFICE O/P EST LOW 20 MIN: CPT | Performed by: FAMILY MEDICINE

## 2021-04-06 RX ORDER — SULFAMETHOXAZOLE AND TRIMETHOPRIM 800; 160 MG/1; MG/1
TABLET ORAL
COMMUNITY
Start: 2021-04-02 | End: 2021-06-01

## 2021-04-06 RX ORDER — CEPHALEXIN 500 MG/1
CAPSULE ORAL
COMMUNITY
Start: 2021-04-01 | End: 2021-06-01

## 2021-04-06 RX ORDER — ONDANSETRON 4 MG/1
TABLET, FILM COATED ORAL
COMMUNITY
Start: 2021-04-02 | End: 2021-06-01

## 2021-04-06 ASSESSMENT — PAIN SCALES - GENERAL: PAINLEVEL: NO PAIN

## 2021-04-06 ASSESSMENT — FIBROSIS 4 INDEX: FIB4 SCORE: 0.6

## 2021-04-06 NOTE — PROGRESS NOTES
No chief complaint on file.    HPI:  50-year-old female for follow-up after neurotomy to remove glioblastoma stage IV.  Patient is doing well.  Her right arm and leg are getting stronger.  She is not experiencing too much swelling.  She is in good spirits.  She reports verbally herself that she does not want to know her diagnosis or prognosis.  She is following with neurology to control seizure activity.  Hydroxyzine is helping for her anxiety.  Also venlafaxine 12.5 mg twice daily.  She is happy with this dosing and does not feel the need to go up.  She is following with Dr. Frye for oncology as well as Dr. Cerda.      No Known Allergies  Current Outpatient Medications   Medication Sig Dispense Refill   • cephALEXin (KEFLEX) 500 MG Cap      • ondansetron (ZOFRAN) 4 MG Tab tablet      • sulfamethoxazole-trimethoprim (BACTRIM DS) 800-160 MG tablet      • hydrOXYzine HCl (ATARAX) 50 MG Tab Take 1 tablet by mouth every 8 hours as needed for Anxiety. 90 tablet 0   • temozolomide (TEMODAR) 5 MG capsule      • temozolomide (TEMODAR) 140 MG capsule 150mg once a day (140mg + 2, 5mg tabs)     • levetiracetam (KEPPRA) 750 MG tablet Take 1 tablet by mouth 2 times a day for 90 days. 180 tablet 2   • melatonin 3 MG Tab Take 1 tablet by mouth at bedtime as needed (insomnia). 30 tablet 2   • lamoTRIgine (LAMICTAL) 100 MG Tab Take 1 tablet by mouth 2 Times a Day. 180 tablet 2   • venlafaxine (EFFEXOR) 25 MG Tab Take 0.5 Tablets by mouth 2 Times a Day. 90 tablet 2   • acetaminophen (TYLENOL) 325 MG Tab Take 2 Tablets by mouth every 6 hours as needed (Mild Pain; (Pain scale 1-3); Temp greater than 100.5 F). 30 tablet 0   • folic acid (FOLVITE) 1 MG Tab Take 1 tablet by mouth every day. 30 tablet    • VITAMIN D PO Take 1 Units by mouth every evening.     • multivitamin (THERAGRAN) Tab Take 1 tablet by mouth every evening.       No current facility-administered medications for this visit.     Social History     Tobacco Use   • Smoking  "status: Never Smoker   • Smokeless tobacco: Never Used   Substance Use Topics   • Alcohol use: Not Currently     Alcohol/week: 0.0 oz   • Drug use: No     Family History   Problem Relation Age of Onset   • Non-contributory Mother    • Lung Disease Mother         COPD   • Cancer Mother         dysplasia    • Arthritis Mother    • Psychiatric Illness Mother    • Heart Disease Mother    • Hypertension Mother    • Stroke Mother    • Non-contributory Father    • Cancer Father 73        prostate cancer   • Lung Disease Maternal Grandmother    • Lung Disease Paternal Aunt    • Diabetes Paternal Aunt    • Hyperlipidemia Paternal Aunt        /90   Pulse 90   Temp 37.4 °C (99.3 °F)   Resp 12   Ht 1.702 m (5' 7\")   Wt 86.2 kg (190 lb)   SpO2 95%  Body mass index is 29.76 kg/m².  Review of Systems   Constitutional: Negative for chills, fever and malaise/fatigue.   HENT: Negative for hearing loss and tinnitus.    Eyes: Negative for double vision and pain.   Respiratory: Negative for cough, shortness of breath and wheezing.    Cardiovascular: Negative for chest pain, palpitations and leg swelling.   Gastrointestinal: Negative for abdominal pain, diarrhea, nausea and vomiting.   Genitourinary: Negative for dysuria and frequency.   Musculoskeletal: Negative for joint pain and myalgias.   Skin: Negative for itching and rash.   Neurological: Negative for dizziness and headaches.     Physical Exam   Constitutional: She is oriented to person, place, and time and well-developed, well-nourished, and in no distress. No distress.   HENT:   Head: Normocephalic and atraumatic.   Eyes: Pupils are equal, round, and reactive to light. Conjunctivae and EOM are normal. Right eye exhibits no discharge. Left eye exhibits no discharge. No scleral icterus.   Neck: No thyromegaly present.   Cardiovascular: Normal rate, regular rhythm and normal heart sounds. Exam reveals no gallop and no friction rub.   No murmur heard.  Pulmonary/Chest: " Effort normal and breath sounds normal. No respiratory distress. She has no wheezes. She has no rales.   Abdominal: She exhibits no distension.   Musculoskeletal:         General: No edema.   Neurological: She is alert and oriented to person, place, and time.   Skin: Skin is warm and dry. She is not diaphoretic.   Psychiatric: Affect and judgment normal.         All labs (last 1 month):   Hospital Outpatient Visit on 04/06/2021   Component Date Value Ref Range Status   • Course ID 04/06/2021 [id number]   Final   • Course Start Date 04/06/2021 [id number]   Final   • Course First Treatment Date 04/06/2021 04/05/2021   Final   • Course Last Treatment Date 04/06/2021 04/06/2021   Final   • Course Elapsed Days 04/06/2021 1 @ [id number]   Final   • Course Intent 04/06/2021 Curative   Final   • RP ID 04/06/2021 R Front GBM   Final   • RP Dosage Given to Date (Gy) 04/06/2021 4   Final   • RP Session Dosage Given (Gy) 04/06/2021 2   Final   • RP ID 04/06/2021 R Front GBM CP   Final   • RP Dosage Given to Date (Gy) 04/06/2021 4.15535598   Final   • RP Session Dosage Given (Gy) 04/06/2021 2.11084285   Final   • Plan ID 04/06/2021 R Front GBM   Final   • Plan Name 04/06/2021 R Front GBM   Final   • Plan Fractions Treated to Date 04/06/2021 2 of 23   Final   • Plan Prescribed Dose Per Fraction * 04/06/2021 2   Final   • Plan Total Prescribed Dose (cGy) 04/06/2021 4,600   Final   Hospital Outpatient Visit on 04/05/2021   Component Date Value Ref Range Status   • Course ID 04/05/2021 [id number]   Final   • Course Start Date 04/05/2021 [id number]   Final   • Course First Treatment Date 04/05/2021 04/05/2021   Final   • Course Last Treatment Date 04/05/2021 04/05/2021   Final   • Course Elapsed Days 04/05/2021 0 @ [id number]   Final   • Course Intent 04/05/2021 Curative   Final   • RP ID 04/05/2021 R Front GBM   Final   • RP Dosage Given to Date (Gy) 04/05/2021 2   Final   • RP Session Dosage Given (Gy) 04/05/2021 2   Final   •  "RP ID 04/05/2021 R Front GBM CP   Final   • RP Dosage Given to Date (Gy) 04/05/2021 2.39286905   Final   • RP Session Dosage Given (Gy) 04/05/2021 2.33659020   Final   • Plan ID 04/05/2021 R Front GBM   Final   • Plan Name 04/05/2021 R Front GBM   Final   • Plan Fractions Treated to Date 04/05/2021 1 of 23   Final   • Plan Prescribed Dose Per Fraction * 04/05/2021 2   Final   • Plan Total Prescribed Dose (cGy) 04/05/2021 4,600   Final   Admission on 03/15/2021, Discharged on 03/27/2021   Component Date Value Ref Range Status   • Glucose - Accu-Ck 03/15/2021 103* 65 - 99 mg/dL Final   • SARS-CoV-2 Source 03/15/2021 NP Swab   Final   • SARS-CoV-2 by PCR 03/15/2021 NotDetected   Final    Comment: PATIENTS: Important information regarding your results and instructions can  be found at [email or web address]   \"After your  Covid-19 Test\"  [facility] providers: PLEASE REFER TO DE-ESCALATION AND RETESTING PROTOCOL  on [email or web address]  **The Roche SARS-CoV-2 RT-PCR test has been made available for use under the  Emergency Use Authorization (EUA) only.     • WBC 03/16/2021 15.2* 4.8 - 10.8 K/uL Final   • RBC 03/16/2021 4.08* 4.20 - 5.40 M/uL Final   • Hemoglobin 03/16/2021 13.4  12.0 - 16.0 g/dL Final   • Hematocrit 03/16/2021 39.3  37.0 - 47.0 % Final   • MCV 03/16/2021 96.3  81.4 - 97.8 fL Final   • MCH 03/16/2021 32.8  27.0 - 33.0 pg Final   • MCHC 03/16/2021 34.1  33.6 - 35.0 g/dL Final   • RDW 03/16/2021 45.3  35.9 - 50.0 fL Final   • Platelet Count 03/16/2021 226  164 - 446 K/uL Final   • MPV 03/16/2021 10.0  9.0 - 12.9 fL Final   • Neutrophils-Polys 03/16/2021 79.30* 44.00 - 72.00 % Final   • Lymphocytes 03/16/2021 11.80* 22.00 - 41.00 % Final   • Monocytes 03/16/2021 5.90  0.00 - 13.40 % Final   • Eosinophils 03/16/2021 0.00  0.00 - 6.90 % Final   • Basophils 03/16/2021 0.10  0.00 - 1.80 % Final   • Immature Granulocytes 03/16/2021 2.90* 0.00 - 0.90 % Final   • Nucleated RBC 03/16/2021 0.00  " /100 WBC Final   • Neutrophils (Absolute) 03/16/2021 12.08* 2.00 - 7.15 K/uL Final    Includes immature neutrophils, if present.   • Lymphs (Absolute) 03/16/2021 1.79  1.00 - 4.80 K/uL Final   • Monos (Absolute) 03/16/2021 0.90* 0.00 - 0.85 K/uL Final   • Eos (Absolute) 03/16/2021 0.00  0.00 - 0.51 K/uL Final   • Baso (Absolute) 03/16/2021 0.02  0.00 - 0.12 K/uL Final   • Immature Granulocytes (abs) 03/16/2021 0.44* 0.00 - 0.11 K/uL Final   • NRBC (Absolute) 03/16/2021 0.00  K/uL Final   • Sodium 03/16/2021 131* 135 - 145 mmol/L Final   • Potassium 03/16/2021 3.9  3.6 - 5.5 mmol/L Final   • Chloride 03/16/2021 96  96 - 112 mmol/L Final   • Co2 03/16/2021 23  20 - 33 mmol/L Final   • Anion Gap 03/16/2021 12.0  7.0 - 16.0 Final   • Glucose 03/16/2021 97  65 - 99 mg/dL Final   • Bun 03/16/2021 16  8 - 22 mg/dL Final   • Creatinine 03/16/2021 0.45* 0.50 - 1.40 mg/dL Final   • Calcium 03/16/2021 8.8  8.5 - 10.5 mg/dL Final   • AST(SGOT) 03/16/2021 11* 12 - 45 U/L Final   • ALT(SGPT) 03/16/2021 17  2 - 50 U/L Final   • Alkaline Phosphatase 03/16/2021 67  30 - 99 U/L Final   • Total Bilirubin 03/16/2021 0.5  0.1 - 1.5 mg/dL Final   • Albumin 03/16/2021 3.3  3.2 - 4.9 g/dL Final   • Total Protein 03/16/2021 5.8* 6.0 - 8.2 g/dL Final   • Globulin 03/16/2021 2.5  1.9 - 3.5 g/dL Final   • A-G Ratio 03/16/2021 1.3  g/dL Final   • Magnesium 03/16/2021 2.2  1.5 - 2.5 mg/dL Final   • 25-Hydroxy   Vitamin D 25 03/16/2021 21* 30 - 100 ng/mL Final    Comment: Adult Ranges:   <20 ng/mL - Deficiency  20-29 ng/mL - Insufficiency   ng/mL - Sufficiency  Effective 3/18/2020, this electrochemiluminescence binding assay is  performed using Roche amando e immunoassay analyzer.  The [employer] Vitamin D  total II assay is intended for the quantitative determination of total 25  hydroxyvitamin D in human serum and plasma. This assay is to be used as an  aid in the assessment of vitamin D sufficiency in adults.     • Glucose - Accu-Ck  03/15/2021 112* 65 - 99 mg/dL Final   • Glucose - Accu-Ck 03/16/2021 93  65 - 99 mg/dL Final   • GFR If  03/16/2021 >60  >60 mL/min/1.73 m 2 Final   • GFR If Non  03/16/2021 >60  >60 mL/min/1.73 m 2 Final   • Glucose - Accu-Ck 03/16/2021 92  65 - 99 mg/dL Final   • Sodium 03/18/2021 138  135 - 145 mmol/L Final   • Potassium 03/18/2021 4.5  3.6 - 5.5 mmol/L Final   • Chloride 03/18/2021 105  96 - 112 mmol/L Final   • Co2 03/18/2021 25  20 - 33 mmol/L Final   • Glucose 03/18/2021 108* 65 - 99 mg/dL Final   • Bun 03/18/2021 18  8 - 22 mg/dL Final   • Creatinine 03/18/2021 0.52  0.50 - 1.40 mg/dL Final   • Calcium 03/18/2021 8.5  8.5 - 10.5 mg/dL Final   • Anion Gap 03/18/2021 8.0  7.0 - 16.0 Final   • GFR If  03/18/2021 >60  >60 mL/min/1.73 m 2 Final   • GFR If Non  03/18/2021 >60  >60 mL/min/1.73 m 2 Final   • Sodium 03/22/2021 140  135 - 145 mmol/L Final   • Potassium 03/22/2021 4.4  3.6 - 5.5 mmol/L Final   • Chloride 03/22/2021 104  96 - 112 mmol/L Final   • Co2 03/22/2021 28  20 - 33 mmol/L Final   • Glucose 03/22/2021 78  65 - 99 mg/dL Final   • Bun 03/22/2021 12  8 - 22 mg/dL Final   • Creatinine 03/22/2021 0.57  0.50 - 1.40 mg/dL Final   • Calcium 03/22/2021 8.7  8.5 - 10.5 mg/dL Final   • Anion Gap 03/22/2021 8.0  7.0 - 16.0 Final   • GFR If  03/22/2021 >60  >60 mL/min/1.73 m 2 Final   • GFR If Non  03/22/2021 >60  >60 mL/min/1.73 m 2 Final   No results displayed because visit has over 200 results.          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 TECHNIQUE/EXAM DESCRIPTION AND NUMBER OF VIEWS: CT of the head without  contrast. Up to date radiation dose reduction adjustments have been utilized to meet ALARA standards for radiation dose reduction. COMPARISON:  CT brain 3/2/2021. MRI brain 2/12/2021 FINDINGS: Posterior right parietal craniotomy changes are present. A large area of low attenuation within the right temporal and parietal white matter is without significant change. There is minimal intracranial air and fluid in the region of a previously identified elliptically shaped mass in the posterior right parietal convexity near the midline. No hemorrhage is present. There is residual mass effect upon the body of the right lateral ventricle. No extra-axial fluid collection is present. There is residual right to left midline shift measuring 4.3 mm. The ventricles and CSF spaces are normal.     1.  No intracranial hemorrhage identified following right parietal craniotomy and tumor resection. 2.  Residual right to left midline shift measuring 4.3 mm. 3.  Large area of low attenuation/edema in the right temporal and parietal white matter is present with minimal air near the site of tumor in the right parietal convexity medially. 4.  No hydrocephalus.    IR-US GUIDED PIV    Result Date: 3/12/2021  EXAMINATION:                                                                    HISTORY/REASON FOR EXAM:  Ultrasound Guided PIV.  TECHNIQUE/EXAM DESCRIPTION AND NUMBER OF VIEWS:  Peripheral IV insertion with ultrasound guidance.  The procedure was prepared using maximal sterile barrier technique including sterile gown, mask, cap, and donning of sterile gloves following appropriate hand hygiene and/or sterile scrub. Patient skin site was prepped with 2% Chlorhexidine solution.   FINDINGS: Peripheral IV insertion with Ultrasound Guidance was performed by qualified imaging nursing staff without the assistance of a Radiologist.      Ultrasound-guided PERIPHERAL IV INSERTION performed by qualified nursing staff as above.    MR-BRAIN-WITH &  W/O    Result Date: 3/11/2021  3/11/2021 12:07 PM HISTORY/REASON FOR EXAM:  Anaplastic gliomas/glioblastoma, monitor, post resection; Post Surgical. TECHNIQUE/EXAM DESCRIPTION:   MRI of the brain without and with contrast. T1 sagittal, T2 fast spin-echo axial, T1 coronal, FLAIR coronal, diffusion-weighted and apparent diffusion coefficient (ADC map) axial images were obtained of the whole brain. T1 postcontrast axial and T1 postcontrast coronal images were obtained. The study was performed on a [employer] Signa 1.5 Dede MRI scanner. 20 mL ProHance contrast was administered intravenously. COMPARISON:  3/8/2021 FINDINGS:   There are postsurgical changes as evidenced by right frontoparietal craniectomy and biopsy of the right medial parietal enhancing lesion. Again noted is diffuse white matter edema. There is 7 mm midline shift towards left side. There is focal  periventricular T2 hyperintensities along the left lateral ventricle. A few nonspecific T2 hyperintensities are noted in the supratentorial white matter. There is no acute infarct. The visualized flow voids of the cerebral vasculature are unremarkable.     1.  There are postsurgical changes as evidenced by right frontoparietal craniectomy and biopsy of the right medial parietal enhancing lesion. 2.  7 mm midline shift towards left side 3.  Periventricular T2 hyperintensities adjacent to the left lateral ventricle. 4.  A few nonspecific T2 hyperintensities in the subcortical and periventricular white matter.    MR-STEALTH BRAIN WITH & W/O    Result Date: 3/9/2021  3/8/2021 11:02 PM HISTORY/REASON FOR EXAM:  Brain tumor, primary; Headache TECHNIQUE/EXAM DESCRIPTION AND NUMBER OF VIEWS: MRI of the brain without and with contrast, Stealth protocol. Fiducial markers for the Stealth stereotactic system were placed on the scalp. Thin-section 2 mm T2 fast spin-echo true axial images were obtained of the whole brain. Thin-section 1.5 mm T1-weighted postcontrast axial 3D  BRAVO images were obtained of the whole brain. 3D reconstructions in the Coronal and Sagittal planes were generated from the axial 3D BRAVO postcontrast sequence. The study was performed on a [employer] Signa 1.5 Dede MRI scanner. 20 mL ProHance contrast was administered intravenously. COMPARISON:  2/12/21 FINDINGS: There is an approximately 3.5 x 2.5 cm sized peripherally enhancing lesion in the right medial parietal lobe. Diffuse white matter edema is noted surrounding the lesion. When compared with the previous MRI dated 2/12/2021, there has been mild interval reduction in the size of the lesion. Again noted is well-defined left periventricular T2 hyperintensity. There is mucosal thickening in the bilateral mastoid air cells. There is an approximately 5 mm midline shift towards left side.     1.  There is an approximately 3.5 x 2.5 cm sized peripherally enhancing lesion in the right medial parietal lobe. Diffuse white matter edema is noted surrounding the lesion. When compared with the previous MRI dated 2/12/2021, there has been mild interval reduction in the size of the lesion. The lesion is highly suspicious for high-grade neoplasm such as glioblastoma multiforme. However interval reduction in the size and presence of left periventricular white matter T2 hyperintensity may suggest tumefactive demyelinating lesion. 2.  Again noted is well-defined left periventricular T2 hyperintensity concerning for demyelinating plaque. 3.  5 mm midline shift towards left side.      A/P  Reviewed labs.  No elevated glucose.  Pressure borderline.  If he goes any higher she will need medication.  I discussed this with her and her .  Especially if she ends up going back on steroids she will likely need to come see me for labs and blood pressure management.  optun  information provided today  Return if symptoms worsen or fail to improve.    Problem List Items Addressed This Visit     Elevated blood pressure reading      Other  Visit Diagnoses     Hospital discharge follow-up              Portions of this note may be dictated using Dragon NaturallySpeaking voice recognition software.  Variances in spelling and vocabulary are possible and unintentional.  Not all areas may be caught/corrected.  Please notify me if any discrepancies are noted or if the meaning of any statement is not correct/clear.

## 2021-04-07 ENCOUNTER — HOSPITAL ENCOUNTER (OUTPATIENT)
Dept: RADIATION ONCOLOGY | Facility: MEDICAL CENTER | Age: 51
End: 2021-04-07
Payer: COMMERCIAL

## 2021-04-07 VITALS
DIASTOLIC BLOOD PRESSURE: 95 MMHG | BODY MASS INDEX: 36.6 KG/M2 | SYSTOLIC BLOOD PRESSURE: 170 MMHG | OXYGEN SATURATION: 93 % | HEART RATE: 86 BPM | WEIGHT: 233.69 LBS | TEMPERATURE: 98.3 F

## 2021-04-07 PROBLEM — R03.0 ELEVATED BLOOD PRESSURE READING: Status: ACTIVE | Noted: 2021-04-07

## 2021-04-07 LAB
CHEMOTHERAPY INFUSION START DATE: NORMAL
CHEMOTHERAPY RECORDS: 2
CHEMOTHERAPY RECORDS: 4600
CHEMOTHERAPY RECORDS: NORMAL
CHEMOTHERAPY RX CANCER: NORMAL
DATE 1ST CHEMO CANCER: NORMAL
RAD ONC ARIA COURSE LAST TREATMENT DATE: NORMAL
RAD ONC ARIA COURSE TREATMENT ELAPSED DAYS: NORMAL
RAD ONC ARIA REFERENCE POINT DOSAGE GIVEN TO DATE: 6
RAD ONC ARIA REFERENCE POINT DOSAGE GIVEN TO DATE: 6.2
RAD ONC ARIA REFERENCE POINT ID: NORMAL
RAD ONC ARIA REFERENCE POINT ID: NORMAL
RAD ONC ARIA REFERENCE POINT SESSION DOSAGE GIVEN: 2
RAD ONC ARIA REFERENCE POINT SESSION DOSAGE GIVEN: 2.07

## 2021-04-07 PROCEDURE — 77336 RADIATION PHYSICS CONSULT: CPT | Performed by: RADIOLOGY

## 2021-04-07 PROCEDURE — 77386 HCHG IMRT DELIVERY COMPLEX: CPT | Performed by: RADIOLOGY

## 2021-04-07 PROCEDURE — 77014 PR CT GUIDANCE PLACEMENT RAD THERAPY FIELDS: CPT | Mod: 26 | Performed by: RADIOLOGY

## 2021-04-07 ASSESSMENT — FIBROSIS 4 INDEX: FIB4 SCORE: 0.6

## 2021-04-07 ASSESSMENT — ENCOUNTER SYMPTOMS
EYE PAIN: 0
CHILLS: 0
HEADACHES: 0
COUGH: 0
PALPITATIONS: 0
DIZZINESS: 0
ABDOMINAL PAIN: 0
NAUSEA: 0
SHORTNESS OF BREATH: 0
MYALGIAS: 0
VOMITING: 0
DIARRHEA: 0
FEVER: 0
DOUBLE VISION: 0
WHEEZING: 0

## 2021-04-07 ASSESSMENT — PAIN SCALES - GENERAL: PAINLEVEL: NO PAIN

## 2021-04-07 NOTE — ON TREATMENT VISIT
ON TREATMENT  NOTE  RADIATION ONCOLOGY DEPARTMENT    Patient name:  Melba Frye    Primary Physician:  Trinity Nguyen M.D. MRN: [id number]  CSN: [id number]   Referring physician:  Maxwell Mao M.D. : 1970, 50 y.o.     ENCOUNTER DATE:  21    DIAGNOSIS:  Glioblastoma of frontal lobe (HCC)  Staging form: Brain and Spinal Cord, AJCC 8th Edition  - Pathologic stage from 3/24/2021: WHO Grade IV - Signed by Edenilson Frye M.D. on 3/24/2021  Histologic grading system: 4 grade system  IDH1 mutation: Negative  IDH2 mutation: Negative  MGMT methylation: Absent      TREATMENT SUMMARY:  [facility] Treatment Information        Some values may be hidden. Unless noted otherwise, only the newest values recorded on each date are displayed.         [facility] Treatment Summary 21   Course First Treatment Date 2021   Course Last Treatment Date 2021   R Front GBM Plan from Course C1_GBM   Fraction 3 of 23   Elapsed Course Days 2 @ [id number]   Prescribed Fraction Dose 200 cGy   Prescribed Total Dose 4,600 cGy   R Front GBM Reference Point from Course C1_GBM   Elapsed Course Days 2 @ [id number]   Session Dose 200 cGy   Total Dose 600 cGy   R Front GBM CP Reference Point from Course C1_GBM   Elapsed Course Days 2 @ [id number]   Session Dose 207 cGy   Total Dose 620 cGy             SUBJECTIVE:  No change      VITAL SIGNS:  KPS: 90, Able to carry on normal activity; minor signs or symptoms of disease (ECOG equivalent 0)  Encounter Vitals  Temperature: 36.8 °C (98.3 °F)  Blood Pressure: (!) 170/95  Pulse: 86  Pulse Oximetry: 93 %  Weight: 106 kg (233 lb 11 oz)  Pain Score: No pain  Pain Assessment 2021   Pain Score 0 0 0   Some recent data might be hidden          PHYSICAL EXAM:    Physical Exam  Constitutional:       Appearance: Normal appearance.   HENT:      Head: Normocephalic and atraumatic.   Skin:     Findings: No erythema.   Neurological:      Mental Status: She is  alert.          Toxicity Assessment 4/7/2021   Toxicity Assessment Brain   Fatigue (lethargy, malaise, asthenia) None   Radiation Dermatitis None   Nausea Able to eat   Mouth Dryness Mild   Headache Mild pain not interfering with function   External Auditory Canal Normal   Inner Ear/Hearing Normal   Middle Ear/Hearing Normal   Dizziness/lightheadedness None   Memory loss Normal   Neuropathy - Motor Normal   Seizure None   Vertigo None       CURRENT MEDICATIONS:    Current Outpatient Medications:   •  cephALEXin (KEFLEX) 500 MG Cap, , Disp: , Rfl:   •  ondansetron (ZOFRAN) 4 MG Tab tablet, , Disp: , Rfl:   •  sulfamethoxazole-trimethoprim (BACTRIM DS) 800-160 MG tablet, , Disp: , Rfl:   •  hydrOXYzine HCl (ATARAX) 50 MG Tab, Take 1 tablet by mouth every 8 hours as needed for Anxiety., Disp: 90 tablet, Rfl: 0  •  temozolomide (TEMODAR) 5 MG capsule, , Disp: , Rfl:   •  temozolomide (TEMODAR) 140 MG capsule, 150mg once a day (140mg + 2, 5mg tabs), Disp: , Rfl:   •  levetiracetam (KEPPRA) 750 MG tablet, Take 1 tablet by mouth 2 times a day for 90 days., Disp: 180 tablet, Rfl: 2  •  melatonin 3 MG Tab, Take 1 tablet by mouth at bedtime as needed (insomnia)., Disp: 30 tablet, Rfl: 2  •  lamoTRIgine (LAMICTAL) 100 MG Tab, Take 1 tablet by mouth 2 Times a Day., Disp: 180 tablet, Rfl: 2  •  venlafaxine (EFFEXOR) 25 MG Tab, Take 0.5 Tablets by mouth 2 Times a Day., Disp: 90 tablet, Rfl: 2  •  acetaminophen (TYLENOL) 325 MG Tab, Take 2 Tablets by mouth every 6 hours as needed (Mild Pain; (Pain scale 1-3); Temp greater than 100.5 F)., Disp: 30 tablet, Rfl: 0  •  folic acid (FOLVITE) 1 MG Tab, Take 1 tablet by mouth every day., Disp: 30 tablet, Rfl:   •  VITAMIN D PO, Take 1 Units by mouth every evening., Disp: , Rfl:   •  multivitamin (THERAGRAN) Tab, Take 1 tablet by mouth every evening., Disp: , Rfl:     LABORATORY DATA:   Lab Results   Component Value Date/Time    SODIUM 140 03/22/2021 06:15 AM    POTASSIUM 4.4 03/22/2021  06:15 AM    CHLORIDE 104 03/22/2021 06:15 AM    CO2 28 03/22/2021 06:15 AM    GLUCOSE 78 03/22/2021 06:15 AM    BUN 12 03/22/2021 06:15 AM    CREATININE 0.57 03/22/2021 06:15 AM         Lab Results   Component Value Date/Time    WBC 15.2 (H) 03/16/2021 06:03 AM    HEMOGLOBIN 13.4 03/16/2021 06:03 AM    HEMATOCRIT 39.3 03/16/2021 06:03 AM    MCV 96.3 03/16/2021 06:03 AM    PLATELETCT 226 03/16/2021 06:03 AM        RADIOLOGY DATA:  CT-HEAD W/O    Result Date: 3/9/2021  3/9/2021 5:05 PM HISTORY/REASON FOR EXAM: Anaplastic glioma. Postoperative evaluation. TECHNIQUE/EXAM DESCRIPTION AND NUMBER OF VIEWS: CT of the head without contrast. Up to date radiation dose reduction adjustments have been utilized to meet ALARA standards for radiation dose reduction. COMPARISON:  CT brain 3/2/2021. MRI brain 2/12/2021 FINDINGS: Posterior right parietal craniotomy changes are present. A large area of low attenuation within the right temporal and parietal white matter is without significant change. There is minimal intracranial air and fluid in the region of a previously identified elliptically shaped mass in the posterior right parietal convexity near the midline. No hemorrhage is present. There is residual mass effect upon the body of the right lateral ventricle. No extra-axial fluid collection is present. There is residual right to left midline shift measuring 4.3 mm. The ventricles and CSF spaces are normal.     1.  No intracranial hemorrhage identified following right parietal craniotomy and tumor resection. 2.  Residual right to left midline shift measuring 4.3 mm. 3.  Large area of low attenuation/edema in the right temporal and parietal white matter is present with minimal air near the site of tumor in the right parietal convexity medially. 4.  No hydrocephalus.    IR-US GUIDED PIV    Result Date: 3/12/2021  EXAMINATION:                                                                    HISTORY/REASON FOR EXAM:  Ultrasound  Guided PIV.  TECHNIQUE/EXAM DESCRIPTION AND NUMBER OF VIEWS:  Peripheral IV insertion with ultrasound guidance.  The procedure was prepared using maximal sterile barrier technique including sterile gown, mask, cap, and donning of sterile gloves following appropriate hand hygiene and/or sterile scrub. Patient skin site was prepped with 2% Chlorhexidine solution.   FINDINGS: Peripheral IV insertion with Ultrasound Guidance was performed by qualified imaging nursing staff without the assistance of a Radiologist.      Ultrasound-guided PERIPHERAL IV INSERTION performed by qualified nursing staff as above.    MR-BRAIN-WITH & W/O    Result Date: 3/11/2021  3/11/2021 12:07 PM HISTORY/REASON FOR EXAM:  Anaplastic gliomas/glioblastoma, monitor, post resection; Post Surgical. TECHNIQUE/EXAM DESCRIPTION:   MRI of the brain without and with contrast. T1 sagittal, T2 fast spin-echo axial, T1 coronal, FLAIR coronal, diffusion-weighted and apparent diffusion coefficient (ADC map) axial images were obtained of the whole brain. T1 postcontrast axial and T1 postcontrast coronal images were obtained. The study was performed on a [employer] Signa 1.5 Dede MRI scanner. 20 mL ProHance contrast was administered intravenously. COMPARISON:  3/8/2021 FINDINGS:   There are postsurgical changes as evidenced by right frontoparietal craniectomy and biopsy of the right medial parietal enhancing lesion. Again noted is diffuse white matter edema. There is 7 mm midline shift towards left side. There is focal  periventricular T2 hyperintensities along the left lateral ventricle. A few nonspecific T2 hyperintensities are noted in the supratentorial white matter. There is no acute infarct. The visualized flow voids of the cerebral vasculature are unremarkable.     1.  There are postsurgical changes as evidenced by right frontoparietal craniectomy and biopsy of the right medial parietal enhancing lesion. 2.  7 mm midline shift towards left side 3.   Periventricular T2 hyperintensities adjacent to the left lateral ventricle. 4.  A few nonspecific T2 hyperintensities in the subcortical and periventricular white matter.    MR-STEALTH BRAIN WITH & W/O    Result Date: 3/9/2021  3/8/2021 11:02 PM HISTORY/REASON FOR EXAM:  Brain tumor, primary; Headache TECHNIQUE/EXAM DESCRIPTION AND NUMBER OF VIEWS: MRI of the brain without and with contrast, Stealth protocol. Fiducial markers for the Stealth stereotactic system were placed on the scalp. Thin-section 2 mm T2 fast spin-echo true axial images were obtained of the whole brain. Thin-section 1.5 mm T1-weighted postcontrast axial 3D BRAVO images were obtained of the whole brain. 3D reconstructions in the Coronal and Sagittal planes were generated from the axial 3D BRAVO postcontrast sequence. The study was performed on a [employer] Signa 1.5 Dede MRI scanner. 20 mL ProHance contrast was administered intravenously. COMPARISON:  2/12/21 FINDINGS: There is an approximately 3.5 x 2.5 cm sized peripherally enhancing lesion in the right medial parietal lobe. Diffuse white matter edema is noted surrounding the lesion. When compared with the previous MRI dated 2/12/2021, there has been mild interval reduction in the size of the lesion. Again noted is well-defined left periventricular T2 hyperintensity. There is mucosal thickening in the bilateral mastoid air cells. There is an approximately 5 mm midline shift towards left side.     1.  There is an approximately 3.5 x 2.5 cm sized peripherally enhancing lesion in the right medial parietal lobe. Diffuse white matter edema is noted surrounding the lesion. When compared with the previous MRI dated 2/12/2021, there has been mild interval reduction in the size of the lesion. The lesion is highly suspicious for high-grade neoplasm such as glioblastoma multiforme. However interval reduction in the size and presence of left periventricular white matter T2 hyperintensity may suggest tumefactive  demyelinating lesion. 2.  Again noted is well-defined left periventricular T2 hyperintensity concerning for demyelinating plaque. 3.  5 mm midline shift towards left side.      IMPRESSION:  Glioblastoma of frontal lobe (HCC)  Staging form: Brain and Spinal Cord, AJCC 8th Edition  - Pathologic stage from 3/24/2021: WHO Grade IV - Signed by Edenilson Frye M.D. on 3/24/2021  Histologic grading system: 4 grade system  IDH1 mutation: Negative  IDH2 mutation: Negative  MGMT methylation: Absent      PLAN:  No change in treatment plan    Disposition:  Treatment plan reviewed. Questions answered. Continue therapy outlined.     Edenilson Frye M.D.    No orders of the defined types were placed in this encounter.

## 2021-04-08 ENCOUNTER — HOSPITAL ENCOUNTER (OUTPATIENT)
Dept: RADIATION ONCOLOGY | Facility: MEDICAL CENTER | Age: 51
End: 2021-04-08
Payer: COMMERCIAL

## 2021-04-08 LAB
CHEMOTHERAPY INFUSION START DATE: NORMAL
CHEMOTHERAPY RECORDS: 2
CHEMOTHERAPY RECORDS: 4600
CHEMOTHERAPY RECORDS: NORMAL
CHEMOTHERAPY RX CANCER: NORMAL
DATE 1ST CHEMO CANCER: NORMAL
RAD ONC ARIA COURSE LAST TREATMENT DATE: NORMAL
RAD ONC ARIA COURSE TREATMENT ELAPSED DAYS: NORMAL
RAD ONC ARIA REFERENCE POINT DOSAGE GIVEN TO DATE: 8
RAD ONC ARIA REFERENCE POINT DOSAGE GIVEN TO DATE: 8.27
RAD ONC ARIA REFERENCE POINT ID: NORMAL
RAD ONC ARIA REFERENCE POINT ID: NORMAL
RAD ONC ARIA REFERENCE POINT SESSION DOSAGE GIVEN: 2
RAD ONC ARIA REFERENCE POINT SESSION DOSAGE GIVEN: 2.07

## 2021-04-08 PROCEDURE — 77014 PR CT GUIDANCE PLACEMENT RAD THERAPY FIELDS: CPT | Mod: 26 | Performed by: RADIOLOGY

## 2021-04-08 PROCEDURE — 77386 HCHG IMRT DELIVERY COMPLEX: CPT | Performed by: RADIOLOGY

## 2021-04-08 NOTE — DISCHARGE PLANNING
Patients  called asking if patient can just do OP Therapy-home health still not out as patients insurance changed and that slowed everything down.   stated patient is doing so well both he and patient feel she'd do better with OP.      Patient recently followed up with her primary care MD.  CM referred them to her PCP to get OP PT referral.  CM available as needs arise.

## 2021-04-09 ENCOUNTER — HOSPITAL ENCOUNTER (OUTPATIENT)
Dept: RADIATION ONCOLOGY | Facility: MEDICAL CENTER | Age: 51
End: 2021-04-09
Payer: COMMERCIAL

## 2021-04-09 LAB
CHEMOTHERAPY INFUSION START DATE: NORMAL
CHEMOTHERAPY RECORDS: 2
CHEMOTHERAPY RECORDS: 4600
CHEMOTHERAPY RECORDS: NORMAL
CHEMOTHERAPY RX CANCER: NORMAL
DATE 1ST CHEMO CANCER: NORMAL
RAD ONC ARIA COURSE LAST TREATMENT DATE: NORMAL
RAD ONC ARIA COURSE TREATMENT ELAPSED DAYS: NORMAL
RAD ONC ARIA REFERENCE POINT DOSAGE GIVEN TO DATE: 10
RAD ONC ARIA REFERENCE POINT DOSAGE GIVEN TO DATE: 10.34
RAD ONC ARIA REFERENCE POINT ID: NORMAL
RAD ONC ARIA REFERENCE POINT ID: NORMAL
RAD ONC ARIA REFERENCE POINT SESSION DOSAGE GIVEN: 2
RAD ONC ARIA REFERENCE POINT SESSION DOSAGE GIVEN: 2.07

## 2021-04-09 PROCEDURE — 77386 HCHG IMRT DELIVERY COMPLEX: CPT | Performed by: RADIOLOGY

## 2021-04-09 PROCEDURE — 77427 RADIATION TX MANAGEMENT X5: CPT | Performed by: RADIOLOGY

## 2021-04-09 PROCEDURE — 77014 PR CT GUIDANCE PLACEMENT RAD THERAPY FIELDS: CPT | Mod: 26 | Performed by: RADIOLOGY

## 2021-04-11 ENCOUNTER — PATIENT MESSAGE (OUTPATIENT)
Dept: MEDICAL GROUP | Facility: IMAGING CENTER | Age: 51
End: 2021-04-11

## 2021-04-11 DIAGNOSIS — C71.1 GLIOBLASTOMA OF FRONTAL LOBE (HCC): ICD-10-CM

## 2021-04-12 ENCOUNTER — HOSPITAL ENCOUNTER (OUTPATIENT)
Dept: RADIATION ONCOLOGY | Facility: MEDICAL CENTER | Age: 51
End: 2021-04-12
Payer: COMMERCIAL

## 2021-04-12 LAB
CHEMOTHERAPY INFUSION START DATE: NORMAL
CHEMOTHERAPY RECORDS: 2
CHEMOTHERAPY RECORDS: 4600
CHEMOTHERAPY RECORDS: NORMAL
CHEMOTHERAPY RX CANCER: NORMAL
DATE 1ST CHEMO CANCER: NORMAL
RAD ONC ARIA COURSE LAST TREATMENT DATE: NORMAL
RAD ONC ARIA COURSE TREATMENT ELAPSED DAYS: NORMAL
RAD ONC ARIA REFERENCE POINT DOSAGE GIVEN TO DATE: 12
RAD ONC ARIA REFERENCE POINT DOSAGE GIVEN TO DATE: 12.41
RAD ONC ARIA REFERENCE POINT ID: NORMAL
RAD ONC ARIA REFERENCE POINT ID: NORMAL
RAD ONC ARIA REFERENCE POINT SESSION DOSAGE GIVEN: 2
RAD ONC ARIA REFERENCE POINT SESSION DOSAGE GIVEN: 2.07

## 2021-04-12 PROCEDURE — 77014 PR CT GUIDANCE PLACEMENT RAD THERAPY FIELDS: CPT | Mod: 26 | Performed by: RADIOLOGY

## 2021-04-12 PROCEDURE — 77386 HCHG IMRT DELIVERY COMPLEX: CPT | Performed by: RADIOLOGY

## 2021-04-13 ENCOUNTER — HOSPITAL ENCOUNTER (OUTPATIENT)
Dept: RADIATION ONCOLOGY | Facility: MEDICAL CENTER | Age: 51
End: 2021-04-13
Payer: COMMERCIAL

## 2021-04-13 LAB
CHEMOTHERAPY INFUSION START DATE: NORMAL
CHEMOTHERAPY RECORDS: 2
CHEMOTHERAPY RECORDS: 4600
CHEMOTHERAPY RECORDS: NORMAL
CHEMOTHERAPY RX CANCER: NORMAL
DATE 1ST CHEMO CANCER: NORMAL
RAD ONC ARIA COURSE LAST TREATMENT DATE: NORMAL
RAD ONC ARIA COURSE TREATMENT ELAPSED DAYS: NORMAL
RAD ONC ARIA REFERENCE POINT DOSAGE GIVEN TO DATE: 14
RAD ONC ARIA REFERENCE POINT DOSAGE GIVEN TO DATE: 14.48
RAD ONC ARIA REFERENCE POINT ID: NORMAL
RAD ONC ARIA REFERENCE POINT ID: NORMAL
RAD ONC ARIA REFERENCE POINT SESSION DOSAGE GIVEN: 2
RAD ONC ARIA REFERENCE POINT SESSION DOSAGE GIVEN: 2.07

## 2021-04-13 PROCEDURE — 77386 HCHG IMRT DELIVERY COMPLEX: CPT | Performed by: RADIOLOGY

## 2021-04-13 PROCEDURE — 77014 PR CT GUIDANCE PLACEMENT RAD THERAPY FIELDS: CPT | Mod: 26 | Performed by: RADIOLOGY

## 2021-04-14 ENCOUNTER — HOSPITAL ENCOUNTER (OUTPATIENT)
Dept: RADIATION ONCOLOGY | Facility: MEDICAL CENTER | Age: 51
End: 2021-04-14
Payer: COMMERCIAL

## 2021-04-14 ENCOUNTER — OFFICE VISIT (OUTPATIENT)
Dept: PHYSICAL MEDICINE AND REHAB | Facility: REHABILITATION | Age: 51
End: 2021-04-14
Payer: COMMERCIAL

## 2021-04-14 VITALS
DIASTOLIC BLOOD PRESSURE: 91 MMHG | BODY MASS INDEX: 36.81 KG/M2 | HEART RATE: 88 BPM | OXYGEN SATURATION: 93 % | WEIGHT: 235.01 LBS | TEMPERATURE: 97.7 F | SYSTOLIC BLOOD PRESSURE: 150 MMHG

## 2021-04-14 VITALS
TEMPERATURE: 97.6 F | RESPIRATION RATE: 18 BRPM | OXYGEN SATURATION: 98 % | BODY MASS INDEX: 36.1 KG/M2 | HEART RATE: 80 BPM | SYSTOLIC BLOOD PRESSURE: 112 MMHG | HEIGHT: 67 IN | DIASTOLIC BLOOD PRESSURE: 64 MMHG | WEIGHT: 230 LBS

## 2021-04-14 DIAGNOSIS — G81.94 LEFT HEMIPARESIS (HCC): ICD-10-CM

## 2021-04-14 DIAGNOSIS — G40.109 LOCALIZATION-RELATED FOCAL EPILEPSY WITH SIMPLE PARTIAL SEIZURES (HCC): ICD-10-CM

## 2021-04-14 DIAGNOSIS — M62.838 OTHER MUSCLE SPASM: ICD-10-CM

## 2021-04-14 DIAGNOSIS — R25.2 SPASTICITY: ICD-10-CM

## 2021-04-14 DIAGNOSIS — C71.1 GLIOBLASTOMA OF FRONTAL LOBE (HCC): ICD-10-CM

## 2021-04-14 DIAGNOSIS — G81.14 SPASTIC HEMIPLEGIA AFFECTING LEFT NONDOMINANT SIDE, UNSPECIFIED ETIOLOGY (HCC): ICD-10-CM

## 2021-04-14 DIAGNOSIS — S06.9X0D BRAIN INJURY, WITHOUT LOSS OF CONSCIOUSNESS, SUBSEQUENT ENCOUNTER: Primary | ICD-10-CM

## 2021-04-14 LAB
CHEMOTHERAPY INFUSION START DATE: NORMAL
CHEMOTHERAPY RECORDS: 2
CHEMOTHERAPY RECORDS: 4600
CHEMOTHERAPY RECORDS: NORMAL
CHEMOTHERAPY RX CANCER: NORMAL
DATE 1ST CHEMO CANCER: NORMAL
RAD ONC ARIA COURSE LAST TREATMENT DATE: NORMAL
RAD ONC ARIA COURSE TREATMENT ELAPSED DAYS: NORMAL
RAD ONC ARIA REFERENCE POINT DOSAGE GIVEN TO DATE: 16
RAD ONC ARIA REFERENCE POINT DOSAGE GIVEN TO DATE: 16.55
RAD ONC ARIA REFERENCE POINT ID: NORMAL
RAD ONC ARIA REFERENCE POINT ID: NORMAL
RAD ONC ARIA REFERENCE POINT SESSION DOSAGE GIVEN: 2
RAD ONC ARIA REFERENCE POINT SESSION DOSAGE GIVEN: 2.07

## 2021-04-14 PROCEDURE — 77386 HCHG IMRT DELIVERY COMPLEX: CPT | Performed by: RADIOLOGY

## 2021-04-14 PROCEDURE — 77014 PR CT GUIDANCE PLACEMENT RAD THERAPY FIELDS: CPT | Mod: 26 | Performed by: RADIOLOGY

## 2021-04-14 PROCEDURE — 99215 OFFICE O/P EST HI 40 MIN: CPT | Performed by: PHYSICAL MEDICINE & REHABILITATION

## 2021-04-14 PROCEDURE — 77336 RADIATION PHYSICS CONSULT: CPT | Performed by: RADIOLOGY

## 2021-04-14 ASSESSMENT — ENCOUNTER SYMPTOMS
FALLS: 0
SHORTNESS OF BREATH: 0
MEMORY LOSS: 0
PALPITATIONS: 0
TINGLING: 1
CHILLS: 0
COUGH: 0
FEVER: 0
CONSTIPATION: 0
DIARRHEA: 0
BRUISES/BLEEDS EASILY: 0
FOCAL WEAKNESS: 1
SORE THROAT: 0

## 2021-04-14 ASSESSMENT — FIBROSIS 4 INDEX
FIB4 SCORE: 0.6
FIB4 SCORE: 0.6

## 2021-04-14 ASSESSMENT — PAIN SCALES - GENERAL: PAINLEVEL: NO PAIN

## 2021-04-14 NOTE — PROGRESS NOTES
[facility]  PM[facility]  [address] [place] [address]  Ph: [phone]    NEW PATIENT EVALUATION    *Patient established in PM&R practice, however, patient new to writer as patient is transferring care. Therefore, patient billed as established.     Patient Name: Melba Frye   Patient : 1970  Patient Age: 50 y.o.     Examining Physician: Dr. Yanet Steele, DO    PM&R History to Date - Background Information:  Dates of Admission/Discharge to ARU: 3/15/2021-3/27/2021    SUBJECTIVE:   Patient Identification: Melba Frye is a 50 y.o. female with PMH significant for migraines, depression/anxiety, seizures, right medial parietal lobe mass seen on MRI 2021 s/p craniotomy and biopsy 3/9 Dr. Mao and rehabilitation history significant for GBM s/p resection 3/9/2021 Dr. Mao and is presenting to PM&R clinic for a NEW OUTPATIENT evaluation with the following chief complaint/s:    Chief Complaint: Lack of therapy.     Accompanied by Today: Dario  History of Present Illness:   -Records reviewed: Acute rehab discharge summary reviewed in its entirety.  -Therapy: Never established with HH. Changed insurance.   - At home now and can go up/down stairs.   - Using walker.   - Tried driving and felt really well. No issues,  agrees.   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   - Left foot has spasticity.  helps stretch.  -Occasional paresthesias in the left lower extremity.  Nonpainful.    Review of Systems:  Review of Systems   Constitutional: Negative for chills and fever.   HENT: Negative for congestion and sore throat.    Respiratory: Negative for cough and shortness of breath.    Cardiovascular: Negative for palpitations and leg swelling.   Gastrointestinal: Negative for constipation and diarrhea.   Musculoskeletal: Negative for falls and joint pain.   Neurological: Positive for tingling and focal weakness.         Spasticity   Endo/Heme/Allergies: Does not bruise/bleed easily.   Psychiatric/Behavioral: Negative for memory loss.      All other pertinent positive review of systems are noted above in HPI.   All other systems reviewed and are negative.    Past Medical History:  Past Medical History:   Diagnosis Date   • Lentigo 10/17/2018   • Seborrheic keratoses 10/17/2018   • Dermatitis, contact 11/16/2015   • Hyperlipidemia 1/23/2015   • Menopausal symptom 7/2/2014   • Fatigue 6/9/2014   • Mixed anxiety and depressive disorder 6/9/2014   • Complicated migraine 6/9/2014   • TMJ arthralgia 6/9/2014   • Anxiety    • Depression    • UTI (lower urinary tract infection)       Past Surgical History:   Procedure Laterality Date   • CRANIOTOMY STEALTH Right 3/9/2021    Procedure: RIGHT CRANIOTOMY, USING FRAMELESS STEREOTAXY - FOR OPEN BIOPSY WITH PHASE REVERSAL;  Surgeon: Maxwell Mao M.D.;  [place];  Service: Neurosurgery   • MYRINGOTOMY  8/27/2010    Performed by BH[address]AV [address]RE[address] at SURGERY SAME DAY [facility] ORS   • LAPAROSCOPY  5/28/2010    Performed by JACKI SHARMA at SURGERY [facility] ORS   • LYMPH NODE SAMPLING  5/28/2010    Performed by JACKI SHARMA at [place]S   • DEBULKING  5/28/2010    Performed by JACKI SHARMA at [place]S   • CYSTOSCOPY  10/15/08    Performed by YU GODINEZ at SURGERY SAME DAY [facility] ORS   • VAGINAL HYSTERECTOMY SCOPE TOTAL  10/15/08    Performed by YU GODINEZ at SURGERY SAME DAY [facility] ORS   • OTHER  1984    TONSILECTOMY/ ADNOIDS   • APPENDECTOMY  1981   • TONSILLECTOMY AND ADENOIDECTOMY          Current Outpatient Medications:   •  cephALEXin (KEFLEX) 500 MG Cap, , Disp: , Rfl:   •  ondansetron (ZOFRAN) 4 MG Tab tablet, , Disp: , Rfl:   •  sulfamethoxazole-trimethoprim (BACTRIM DS) 800-160 MG tablet, , Disp: , Rfl:   •  hydrOXYzine HCl (ATARAX) 50 MG Tab, Take 1 tablet by mouth every 8 hours as needed for Anxiety., Disp: 90 tablet,  Rfl: 0  •  temozolomide (TEMODAR) 5 MG capsule, , Disp: , Rfl:   •  temozolomide (TEMODAR) 140 MG capsule, 150mg once a day (140mg + 2, 5mg tabs), Disp: , Rfl:   •  levetiracetam (KEPPRA) 750 MG tablet, Take 1 tablet by mouth 2 times a day for 90 days., Disp: 180 tablet, Rfl: 2  •  melatonin 3 MG Tab, Take 1 tablet by mouth at bedtime as needed (insomnia)., Disp: 30 tablet, Rfl: 2  •  lamoTRIgine (LAMICTAL) 100 MG Tab, Take 1 tablet by mouth 2 Times a Day., Disp: 180 tablet, Rfl: 2  •  venlafaxine (EFFEXOR) 25 MG Tab, Take 0.5 Tablets by mouth 2 Times a Day., Disp: 90 tablet, Rfl: 2  •  acetaminophen (TYLENOL) 325 MG Tab, Take 2 Tablets by mouth every 6 hours as needed (Mild Pain; (Pain scale 1-3); Temp greater than 100.5 F)., Disp: 30 tablet, Rfl: 0  •  folic acid (FOLVITE) 1 MG Tab, Take 1 tablet by mouth every day., Disp: 30 tablet, Rfl:   •  VITAMIN D PO, Take 1 Units by mouth every evening., Disp: , Rfl:   •  multivitamin (THERAGRAN) Tab, Take 1 tablet by mouth every evening., Disp: , Rfl:   No Known Allergies     Past Social History:  Social History     Socioeconomic History   • Marital status:      Spouse name: Not on file   • Number of children: Not on file   • Years of education: Not on file   • Highest education level: Not on file   Occupational History   • Not on file   Tobacco Use   • Smoking status: Never Smoker   • Smokeless tobacco: Never Used   Substance and Sexual Activity   • Alcohol use: Not Currently     Alcohol/week: 0.0 oz   • Drug use: No   • Sexual activity: Yes     Partners: Male   Other Topics Concern   • Not on file   Social History Narrative   • Not on file     Social Determinants of Health     Financial Resource Strain:    • Difficulty of Paying Living Expenses:    Food Insecurity:    • Worried About Running Out of Food in the Last Year:    • Ran Out of Food in the Last Year:    Transportation Needs:    • Lack of Transportation (Medical):    • Lack of Transportation (Non-Medical):     Physical Activity:    • Days of Exercise per Week:    • Minutes of Exercise per Session:    Stress:    • Feeling of Stress :    Social Connections:    • Frequency of Communication with Friends and Family:    • Frequency of Social Gatherings with Friends and Family:    • Attends [employer] Services:    • Active Member of Clubs or Organizations:    • Attends Club or Organization Meetings:    • Marital Status:    Intimate Partner Violence:    • Fear of Current or Ex-Partner:    • Emotionally Abused:    • Physically Abused:    • Sexually Abused:         Family History:  Family History   Problem Relation Age of Onset   • Non-contributory Mother    • Lung Disease Mother         COPD   • Cancer Mother         dysplasia    • Arthritis Mother    • Psychiatric Illness Mother    • Heart Disease Mother    • Hypertension Mother    • Stroke Mother    • Non-contributory Father    • Cancer Father 73        prostate cancer   • Lung Disease Maternal Grandmother    • Lung Disease Paternal Aunt    • Diabetes Paternal Aunt    • Hyperlipidemia Paternal Aunt        Depression and Opioid Screening  PHQ-9:  Depression Screen (PHQ-2/PHQ-9) 3/23/2021 3/24/2021 3/25/2021   PHQ-2 Total Score 0 0 0   PHQ-2 Total Score - - -   PHQ-2 Total Score - - -   PHQ-9 Total Score - - -     Interpretation of PHQ-9 Total Score   Score Severity   1-4 No Depression   5-9 Mild Depression   10-14 Moderate Depression   15-19 Moderately Severe Depression   20-27 Severe Depression     OBJECTIVE:   Vital Signs:  Vitals:    04/14/21 0833   BP: 112/64   Pulse: 80   Resp: 18   Temp: 36.4 °C (97.6 °F)   SpO2: 98%        Pertinent Labs:  Lab Results   Component Value Date/Time    SODIUM 140 03/22/2021 06:15 AM    POTASSIUM 4.4 03/22/2021 06:15 AM    CHLORIDE 104 03/22/2021 06:15 AM    CO2 28 03/22/2021 06:15 AM    GLUCOSE 78 03/22/2021 06:15 AM    BUN 12 03/22/2021 06:15 AM    CREATININE 0.57 03/22/2021 06:15 AM       No results found for: HBA1C    Lab Results    Component Value Date/Time    WBC 15.2 (H) 03/16/2021 06:03 AM    RBC 4.08 (L) 03/16/2021 06:03 AM    HEMOGLOBIN 13.4 03/16/2021 06:03 AM    HEMATOCRIT 39.3 03/16/2021 06:03 AM    MCV 96.3 03/16/2021 06:03 AM    MCH 32.8 03/16/2021 06:03 AM    MCHC 34.1 03/16/2021 06:03 AM    MPV 10.0 03/16/2021 06:03 AM    NEUTSPOLYS 79.30 (H) 03/16/2021 06:03 AM    LYMPHOCYTES 11.80 (L) 03/16/2021 06:03 AM    MONOCYTES 5.90 03/16/2021 06:03 AM    EOSINOPHILS 0.00 03/16/2021 06:03 AM    BASOPHILS 0.10 03/16/2021 06:03 AM       Lab Results   Component Value Date/Time    ASTSGOT 11 (L) 03/16/2021 06:03 AM    ALTSGPT 17 03/16/2021 06:03 AM        Physical Exam:   GEN: No apparent distress  HEENT: Head normocephalic, atraumatic.  Sclera nonicteric bilaterally, no ocular discharge appreciated bilaterally.  CV: Extremities warm and well-perfused, no peripheral edema appreciated bilaterally.  PULMONARY: Breathing nonlabored on room air, no respiratory accessory muscle use.  Not requiring supplemental oxygen.  ABD: Soft, nontender.  SKIN: No appreciable skin breakdown on exposed areas of skin.  PSYCH: Mood and affect within normal limits.  NEURO: Awake alert.  Conversational.  Logical thought content.  Answers questions appropriately.    Motor Exam Upper Extremities   ? Myotome R L   Shoulder Abduction C5 5 5   Elbow flexion C5 5 5   Wrist extension C6 5 5   Elbow extension C7 5 5   Finger flexion C8 5 5   Finger abduction T1 5 5     Motor Exam Lower Extremities  ? Myotome R L   Hip flexion L2 5 5   Knee extension L3 5 5   Ankle dorsiflexion L4 5 2   Toe extension L5 5 2   Ankle plantarflexion S1 5 4       Tone on Modified Gracia Scale    L  L   Elbow extension (testing tone of elbow flexors) 0 Hip extension (testing hip flexors) 0   Elbow flexion (testing tone of elbow extensors) 0 Hip abduction (testing adductors) 0   Wrist extension (testing tone of wrist flexors)  0 Knee extension (testing knee flexors) 0   Finger extension (testing  tone of finger flexors) 0 Knee flexion (testing knee extensors) 0   Supination (testing forearm pronators) 0 Dorsiflexion (testing plantarflexors) 0     Plantarflexion (testing dorsiflexors) 3     7-8 beats of clonus appreciated left ankle    Imaging:   MRI brain 2/12/2021  Marked interval increase in size of medial posterior right frontal lobe lesion now measuring 3 x 2.4 x 3.1 cm, previously 0.8 x 0.7 x 0.6 cm. There is irregular thick walled peripheral enhancement now seen and new large amount of vasogenic edema with new   mass effect as detailed above. The lesion remains indeterminate however malignancy must be excluded.     Scattered mild nonspecific T2 hyperintensities elsewhere within the cerebral white matter again noted which could be related to demyelination or other nonspecific gliosis.     ASSESSMENT/PLAN: Melba Frye  is a 50 y.o. female with rehabilitation history significant for GBM s/p resection 3/9/2021 Dr. Mao, here for evaluation. The following plan was discussed with the patient who is in agreement.     Visit Diagnoses     ICD-10-CM   1. Brain injury, without loss of consciousness, subsequent encounter  S06.9X0D   2. Glioblastoma of frontal lobe (HCC)  C71.1   3. 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 Spasticity  R25.2   5. Left hemiparesis (HCC)  G81.94   6. Spastic hemiplegia affecting left nondominant side, unspecified etiology (HCC)  G81.14   7. Other muscle spasm  M62.838        Rehab/Neuro:   1. GBM right medial parietal lobe s/p right frontal parietal craniotomy for biopsy and resection 3/9/2021 Dr. Mao  2. Left foot drop: Has custom AFO  -Records reviewed as aforementioned in the HPI.  -Therapy: Was unable to establish with home health.  -Referral: Occupational therapy for evaluation and treatment as well as driving evaluation.  -Driving status: Had driven 1 time and felt safe and comfortable, as it has been, however, discussed  and counseled that should not return to driving given history of seizures, needs neurology clearance as well as driving evaluation.  Referred to OT for driving evaluation.  In the meantime she needs to discuss with neurology.  -Occupation: Works as a .  On leave until the next school year (approximately August 2021)  -Function: Ambulatory with walker.  Has left lower extremity weakness greater than left upper extremity weakness.  Most weakness is distal left lower extremity requiring AFO.  Does have spasticity of plantar flexors.    Spasticity: Developing left ankle while inpatient.  Continues to be problematic and clonus gets triggered especially in the evening.  -Conservative: Counseled on importance of continued range of motion and stretching for prevention of contracture and maintenance of range of motion.  -Counseling: Counseled on spasticity. Counseled on the various options regarding spasticity management including conservative, pharmacologic, interventional.  Patient already suffers from fatigue and has such focal spasticity counseled that toxin injections are a very good route in order to avoid the side effects from pharmacologic agents.  -Referral physical medicine rehabilitation for onabotulinumtoxinA injections     Botox Plan: I plan to inject to the left lower extremity  Location Botox Amount in Units   Left medial gastrocnemius  100 units   Left lateral gastrocnemius  100 units   Soleus  100 units   Total Units  300 units     The risks benefits and alternatives to this procedure were discussed and the patient wishes to proceed with the procedure. Risks include but are not limited to damage to surrounding structures, infection, bleeding, worsening of pain which can be permanent, weakness which can be permanent. Benefits include pain relief, improved function. Alternatives includes not doing the procedure.      Neuropathic Pain: Paresthesias, no pain  -Continue to monitor    Endo:  -Labs  reviewed: Vitamin D 21 on 3/16/2021  -Med management: Continue supplementation.    Mood:  -Med management: On venlafaxine 12.5 mg twice daily    Follow up: To be determined based on Botox    Total time spent was 60 minutes.  Included in this time is the time spent preparing for the visit including record review, my exam and evaluation, counseling and education regarding that which is aforementioned in the assessment and plan. Time was spent ordering the appropriate labs, tests, procedures, referrals, medications.  Including this time was also the time spent in care coordination. Included this time as the time spent obtaining history from someone other than the patient. Discussion involved the patient and .    Please note that this dictation was created using voice recognition software. I have made every reasonable attempt to correct obvious errors but there may be errors of grammar and content that I may have overlooked prior to finalization of this note.    Dr. Yanet Steele DO, MS  Department of Physical Medicine & Rehabilitation  Neuro Rehabilitation Clinic  [facility]

## 2021-04-14 NOTE — ON TREATMENT VISIT
ON TREATMENT  NOTE  RADIATION ONCOLOGY DEPARTMENT    Patient name:  Melba Frye    Primary Physician:  Trinity Nguyen M.D. MRN: [id number]  CSN: [id number]   Referring physician:  Maxwell Mao M.D. : 1970, 50 y.o.     ENCOUNTER DATE:  21    DIAGNOSIS:  Glioblastoma of frontal lobe (HCC)  Staging form: Brain and Spinal Cord, AJCC 8th Edition  - Pathologic stage from 3/24/2021: WHO Grade IV - Signed by Edenilson Frye M.D. on 3/24/2021  Histologic grading system: 4 grade system  IDH1 mutation: Negative  IDH2 mutation: Negative  MGMT methylation: Absent      TREATMENT SUMMARY:  Aria Treatment Information        Some values may be hidden. Unless noted otherwise, only the newest values recorded on each date are displayed.         Aria Treatment Summary 21   Course First Treatment Date 2021   Course Last Treatment Date 2021   R Front GBM Plan from Course C1_GBM   Fraction    Elapsed Course Days 9 @ [id number]   Prescribed Fraction Dose 200 cGy   Prescribed Total Dose 4,600 cGy   R Front GBM Reference Point from Course C1_GBM   Elapsed Course Days 9 @ [id number]   Session Dose 200 cGy   Total Dose 1,600 cGy   R Front GBM CP Reference Point from Course C1_GBM   Elapsed Course Days 9 @ [id number]   Session Dose 207 cGy   Total Dose 1,655 cGy             SUBJECTIVE:  No problems, occasional clonus in RLE in shower      VITAL SIGNS:  KPS: 90, Able to carry on normal activity; minor signs or symptoms of disease (ECOG equivalent 0)  Encounter Vitals  Temperature: 36.5 °C (97.7 °F)  Blood Pressure: 150/91  Pulse: 88  Pulse Oximetry: 93 %  Weight: 107 kg (235 lb 0.2 oz)  Pain Score: No pain  Pain Assessment 2021   Pain Score 0 0 0 0   Some recent data might be hidden          PHYSICAL EXAM:    Physical Exam  Constitutional:       Appearance: Normal appearance.   HENT:      Head: Normocephalic and atraumatic.   Skin:     Findings: No  erythema.   Neurological:      Mental Status: She is alert.          Toxicity Assessment 4/14/2021 4/7/2021   Toxicity Assessment Brain Brain   Fatigue (lethargy, malaise, asthenia) Increased fatigue over baseline, but not altering normal activities None   Radiation Dermatitis None None   Nausea None Able to eat   Mouth Dryness Mild Mild   Headache Mild pain not interfering with function Mild pain not interfering with function   External Auditory Canal Normal Normal   Inner Ear/Hearing Normal Normal   Middle Ear/Hearing Normal Normal   Dizziness/lightheadedness None None   Memory loss Normal Normal   Neuropathy - Motor Subjective weakness but no objective findings Normal   Seizure None None   Vertigo None None       CURRENT MEDICATIONS:    Current Outpatient Medications:   •  cephALEXin (KEFLEX) 500 MG Cap, , Disp: , Rfl:   •  ondansetron (ZOFRAN) 4 MG Tab tablet, , Disp: , Rfl:   •  sulfamethoxazole-trimethoprim (BACTRIM DS) 800-160 MG tablet, , Disp: , Rfl:   •  hydrOXYzine HCl (ATARAX) 50 MG Tab, Take 1 tablet by mouth every 8 hours as needed for Anxiety., Disp: 90 tablet, Rfl: 0  •  temozolomide (TEMODAR) 5 MG capsule, , Disp: , Rfl:   •  temozolomide (TEMODAR) 140 MG capsule, 150mg once a day (140mg + 2, 5mg tabs), Disp: , Rfl:   •  levetiracetam (KEPPRA) 750 MG tablet, Take 1 tablet by mouth 2 times a day for 90 days., Disp: 180 tablet, Rfl: 2  •  melatonin 3 MG Tab, Take 1 tablet by mouth at bedtime as needed (insomnia)., Disp: 30 tablet, Rfl: 2  •  lamoTRIgine (LAMICTAL) 100 MG Tab, Take 1 tablet by mouth 2 Times a Day., Disp: 180 tablet, Rfl: 2  •  venlafaxine (EFFEXOR) 25 MG Tab, Take 0.5 Tablets by mouth 2 Times a Day., Disp: 90 tablet, Rfl: 2  •  acetaminophen (TYLENOL) 325 MG Tab, Take 2 Tablets by mouth every 6 hours as needed (Mild Pain; (Pain scale 1-3); Temp greater than 100.5 F)., Disp: 30 tablet, Rfl: 0  •  folic acid (FOLVITE) 1 MG Tab, Take 1 tablet by mouth every day., Disp: 30 tablet, Rfl:   •   VITAMIN D PO, Take 1 Units by mouth every evening., Disp: , Rfl:   •  multivitamin (THERAGRAN) Tab, Take 1 tablet by mouth every evening., Disp: , Rfl:     LABORATORY DATA:   Lab Results   Component Value Date/Time    SODIUM 140 03/22/2021 06:15 AM    POTASSIUM 4.4 03/22/2021 06:15 AM    CHLORIDE 104 03/22/2021 06:15 AM    CO2 28 03/22/2021 06:15 AM    GLUCOSE 78 03/22/2021 06:15 AM    BUN 12 03/22/2021 06:15 AM    CREATININE 0.57 03/22/2021 06:15 AM         Lab Results   Component Value Date/Time    WBC 15.2 (H) 03/16/2021 06:03 AM    HEMOGLOBIN 13.4 03/16/2021 06:03 AM    HEMATOCRIT 39.3 03/16/2021 06:03 AM    MCV 96.3 03/16/2021 06:03 AM    PLATELETCT 226 03/16/2021 06:03 AM        RADIOLOGY DATA:  No results found.    IMPRESSION:  Glioblastoma of frontal lobe (HCC)  Staging form: Brain and Spinal Cord, AJCC 8th Edition  - Pathologic stage from 3/24/2021: WHO Grade IV - Signed by Edenilson Frye M.D. on 3/24/2021  Histologic grading system: 4 grade system  IDH1 mutation: Negative  IDH2 mutation: Negative  MGMT methylation: Absent      PLAN:  No change in treatment plan    Disposition:  Treatment plan reviewed. Questions answered. Continue therapy outlined.     Edenilson Frye M.D.    No orders of the defined types were placed in this encounter.

## 2021-04-14 NOTE — TELEPHONE ENCOUNTER
From: Melba Frye  To: Physician Trinity Nguyen  Sent: 4/11/2021 9:52 PM PDT  Subject: Non-Urgent Medical Question    Hi, could I please get a referral for out-patient physical therapy at [facility] on [facility]. I tried to get at home health but I don't qualify because I'm not home bound. Thank you so much, Giselle Frye

## 2021-04-15 ENCOUNTER — HOSPITAL ENCOUNTER (OUTPATIENT)
Dept: RADIATION ONCOLOGY | Facility: MEDICAL CENTER | Age: 51
End: 2021-04-15
Payer: COMMERCIAL

## 2021-04-15 LAB
CHEMOTHERAPY INFUSION START DATE: NORMAL
CHEMOTHERAPY RECORDS: 2
CHEMOTHERAPY RECORDS: 4600
CHEMOTHERAPY RECORDS: NORMAL
CHEMOTHERAPY RX CANCER: NORMAL
DATE 1ST CHEMO CANCER: NORMAL
RAD ONC ARIA COURSE LAST TREATMENT DATE: NORMAL
RAD ONC ARIA COURSE TREATMENT ELAPSED DAYS: NORMAL
RAD ONC ARIA REFERENCE POINT DOSAGE GIVEN TO DATE: 18
RAD ONC ARIA REFERENCE POINT DOSAGE GIVEN TO DATE: 18.61
RAD ONC ARIA REFERENCE POINT ID: NORMAL
RAD ONC ARIA REFERENCE POINT ID: NORMAL
RAD ONC ARIA REFERENCE POINT SESSION DOSAGE GIVEN: 2
RAD ONC ARIA REFERENCE POINT SESSION DOSAGE GIVEN: 2.07

## 2021-04-15 PROCEDURE — 77386 HCHG IMRT DELIVERY COMPLEX: CPT | Performed by: RADIOLOGY

## 2021-04-15 PROCEDURE — 77014 PR CT GUIDANCE PLACEMENT RAD THERAPY FIELDS: CPT | Mod: 26 | Performed by: RADIOLOGY

## 2021-04-16 ENCOUNTER — HOSPITAL ENCOUNTER (OUTPATIENT)
Dept: RADIATION ONCOLOGY | Facility: MEDICAL CENTER | Age: 51
End: 2021-04-16
Payer: COMMERCIAL

## 2021-04-16 LAB
CHEMOTHERAPY INFUSION START DATE: NORMAL
CHEMOTHERAPY RECORDS: 2
CHEMOTHERAPY RECORDS: 4600
CHEMOTHERAPY RECORDS: NORMAL
CHEMOTHERAPY RX CANCER: NORMAL
DATE 1ST CHEMO CANCER: NORMAL
RAD ONC ARIA COURSE LAST TREATMENT DATE: NORMAL
RAD ONC ARIA COURSE TREATMENT ELAPSED DAYS: NORMAL
RAD ONC ARIA REFERENCE POINT DOSAGE GIVEN TO DATE: 20
RAD ONC ARIA REFERENCE POINT DOSAGE GIVEN TO DATE: 20.68
RAD ONC ARIA REFERENCE POINT ID: NORMAL
RAD ONC ARIA REFERENCE POINT ID: NORMAL
RAD ONC ARIA REFERENCE POINT SESSION DOSAGE GIVEN: 2
RAD ONC ARIA REFERENCE POINT SESSION DOSAGE GIVEN: 2.07

## 2021-04-16 PROCEDURE — 77386 HCHG IMRT DELIVERY COMPLEX: CPT | Performed by: RADIOLOGY

## 2021-04-16 PROCEDURE — 77014 PR CT GUIDANCE PLACEMENT RAD THERAPY FIELDS: CPT | Mod: 26 | Performed by: RADIOLOGY

## 2021-04-16 PROCEDURE — 77427 RADIATION TX MANAGEMENT X5: CPT | Performed by: RADIOLOGY

## 2021-04-19 ENCOUNTER — HOSPITAL ENCOUNTER (OUTPATIENT)
Dept: RADIATION ONCOLOGY | Facility: MEDICAL CENTER | Age: 51
End: 2021-04-19
Payer: COMMERCIAL

## 2021-04-19 LAB
CHEMOTHERAPY INFUSION START DATE: NORMAL
CHEMOTHERAPY RECORDS: 2
CHEMOTHERAPY RECORDS: 4600
CHEMOTHERAPY RECORDS: NORMAL
CHEMOTHERAPY RX CANCER: NORMAL
DATE 1ST CHEMO CANCER: NORMAL
RAD ONC ARIA COURSE LAST TREATMENT DATE: NORMAL
RAD ONC ARIA COURSE TREATMENT ELAPSED DAYS: NORMAL
RAD ONC ARIA REFERENCE POINT DOSAGE GIVEN TO DATE: 22
RAD ONC ARIA REFERENCE POINT DOSAGE GIVEN TO DATE: 22.75
RAD ONC ARIA REFERENCE POINT ID: NORMAL
RAD ONC ARIA REFERENCE POINT ID: NORMAL
RAD ONC ARIA REFERENCE POINT SESSION DOSAGE GIVEN: 2
RAD ONC ARIA REFERENCE POINT SESSION DOSAGE GIVEN: 2.07

## 2021-04-19 PROCEDURE — 77386 HCHG IMRT DELIVERY COMPLEX: CPT | Performed by: RADIOLOGY

## 2021-04-19 PROCEDURE — 77014 PR CT GUIDANCE PLACEMENT RAD THERAPY FIELDS: CPT | Mod: 26 | Performed by: RADIOLOGY

## 2021-04-20 ENCOUNTER — HOSPITAL ENCOUNTER (OUTPATIENT)
Dept: RADIATION ONCOLOGY | Facility: MEDICAL CENTER | Age: 51
End: 2021-04-20
Payer: COMMERCIAL

## 2021-04-20 LAB
CHEMOTHERAPY INFUSION START DATE: NORMAL
CHEMOTHERAPY RECORDS: 2
CHEMOTHERAPY RECORDS: 4600
CHEMOTHERAPY RECORDS: NORMAL
CHEMOTHERAPY RX CANCER: NORMAL
DATE 1ST CHEMO CANCER: NORMAL
RAD ONC ARIA COURSE LAST TREATMENT DATE: NORMAL
RAD ONC ARIA COURSE TREATMENT ELAPSED DAYS: NORMAL
RAD ONC ARIA REFERENCE POINT DOSAGE GIVEN TO DATE: 24
RAD ONC ARIA REFERENCE POINT DOSAGE GIVEN TO DATE: 24.82
RAD ONC ARIA REFERENCE POINT ID: NORMAL
RAD ONC ARIA REFERENCE POINT ID: NORMAL
RAD ONC ARIA REFERENCE POINT SESSION DOSAGE GIVEN: 2
RAD ONC ARIA REFERENCE POINT SESSION DOSAGE GIVEN: 2.07

## 2021-04-20 PROCEDURE — 77014 PR CT GUIDANCE PLACEMENT RAD THERAPY FIELDS: CPT | Mod: 26 | Performed by: RADIOLOGY

## 2021-04-20 PROCEDURE — 77386 HCHG IMRT DELIVERY COMPLEX: CPT | Performed by: RADIOLOGY

## 2021-04-21 ENCOUNTER — APPOINTMENT (OUTPATIENT)
Dept: PHYSICAL MEDICINE AND REHAB | Facility: MEDICAL CENTER | Age: 51
End: 2021-04-21
Payer: COMMERCIAL

## 2021-04-21 ENCOUNTER — HOSPITAL ENCOUNTER (OUTPATIENT)
Dept: RADIATION ONCOLOGY | Facility: MEDICAL CENTER | Age: 51
End: 2021-04-21

## 2021-04-21 VITALS
WEIGHT: 235.56 LBS | HEART RATE: 92 BPM | BODY MASS INDEX: 36.89 KG/M2 | OXYGEN SATURATION: 95 % | SYSTOLIC BLOOD PRESSURE: 153 MMHG | DIASTOLIC BLOOD PRESSURE: 96 MMHG | TEMPERATURE: 99.4 F

## 2021-04-21 LAB
CHEMOTHERAPY INFUSION START DATE: NORMAL
CHEMOTHERAPY RECORDS: 2
CHEMOTHERAPY RECORDS: 4600
CHEMOTHERAPY RECORDS: NORMAL
CHEMOTHERAPY RX CANCER: NORMAL
DATE 1ST CHEMO CANCER: NORMAL
RAD ONC ARIA COURSE LAST TREATMENT DATE: NORMAL
RAD ONC ARIA COURSE TREATMENT ELAPSED DAYS: NORMAL
RAD ONC ARIA REFERENCE POINT DOSAGE GIVEN TO DATE: 26
RAD ONC ARIA REFERENCE POINT DOSAGE GIVEN TO DATE: 26.89
RAD ONC ARIA REFERENCE POINT ID: NORMAL
RAD ONC ARIA REFERENCE POINT ID: NORMAL
RAD ONC ARIA REFERENCE POINT SESSION DOSAGE GIVEN: 2
RAD ONC ARIA REFERENCE POINT SESSION DOSAGE GIVEN: 2.07

## 2021-04-21 PROCEDURE — 77014 PR CT GUIDANCE PLACEMENT RAD THERAPY FIELDS: CPT | Mod: 26 | Performed by: RADIOLOGY

## 2021-04-21 PROCEDURE — 77336 RADIATION PHYSICS CONSULT: CPT | Performed by: RADIOLOGY

## 2021-04-21 PROCEDURE — 77386 HCHG IMRT DELIVERY COMPLEX: CPT | Performed by: RADIOLOGY

## 2021-04-21 ASSESSMENT — PAIN SCALES - GENERAL: PAINLEVEL: NO PAIN

## 2021-04-21 ASSESSMENT — FIBROSIS 4 INDEX: FIB4 SCORE: 0.6

## 2021-04-21 NOTE — ON TREATMENT VISIT
ON TREATMENT  NOTE  RADIATION ONCOLOGY DEPARTMENT    Patient name:  Melba Frye    Primary Physician:  Trinity Nguyen M.D. MRN: [id number]  CSN: [id number]   Referring physician:  Maxwell Mao M.D. : 1970, 50 y.o.     ENCOUNTER DATE:  21    DIAGNOSIS:  Glioblastoma of frontal lobe (HCC)  Staging form: Brain and Spinal Cord, AJCC 8th Edition  - Pathologic stage from 3/24/2021: WHO Grade IV - Signed by Edenilson Frye M.D. on 3/24/2021  Histologic grading system: 4 grade system  IDH1 mutation: Negative  IDH2 mutation: Negative  MGMT methylation: Absent      TREATMENT SUMMARY:  Aria Treatment Information        Some values may be hidden. Unless noted otherwise, only the newest values recorded on each date are displayed.         Aria Treatment Summary 21   Course First Treatment Date 2021   Course Last Treatment Date 2021   R Front GBM Plan from Course C1_GBM   Fraction 13 of 23   Elapsed Course Days 16 @ [id number]   Prescribed Fraction Dose 200 cGy   Prescribed Total Dose 4,600 cGy   R Front GBM Reference Point from Course C1_GBM   Elapsed Course Days 16 @ [id number]   Session Dose 200 cGy   Total Dose 2,600 cGy   R Front GBM CP Reference Point from Course C1_GBM   Elapsed Course Days 16 @    Session Dose 207 cGy   Total Dose 2,689 cGy             SUBJECTIVE:  Well appearing      VITAL SIGNS:  KPS: 90, Able to carry on normal activity; minor signs or symptoms of disease (ECOG equivalent 0)  Encounter Vitals  Temperature: 37.4 °C (99.4 °F)  Blood Pressure: 153/96  Pulse: 92  Pulse Oximetry: 95 %  Weight: 107 kg (235 lb 9 oz)  Pain Score: No pain  Pain Assessment 2021   Pain Score 0 0 0 0 0   Some recent data might be hidden          PHYSICAL EXAM:    Physical Exam  Constitutional:       Appearance: Normal appearance.   HENT:      Head: Normocephalic and atraumatic.   Skin:     Findings: No erythema.    Neurological:      Mental Status: She is alert.          Toxicity Assessment 4/21/2021 4/14/2021 4/7/2021   Toxicity Assessment Brain Brain Brain   Fatigue (lethargy, malaise, asthenia) None Increased fatigue over baseline, but not altering normal activities None   Radiation Dermatitis Faint erythema or dry desquamation None None   Nausea None None Able to eat   Mouth Dryness Mild Mild Mild   Headache None Mild pain not interfering with function Mild pain not interfering with function   External Auditory Canal Normal Normal Normal   Inner Ear/Hearing Normal Normal Normal   Middle Ear/Hearing Normal Normal Normal   Dizziness/lightheadedness None None None   Memory loss Normal Normal Normal   Neuropathy - Motor Normal Subjective weakness but no objective findings Normal   Seizure None None None   Vertigo None None None       CURRENT MEDICATIONS:    Current Outpatient Medications:   •  cephALEXin (KEFLEX) 500 MG Cap, , Disp: , Rfl:   •  ondansetron (ZOFRAN) 4 MG Tab tablet, , Disp: , Rfl:   •  sulfamethoxazole-trimethoprim (BACTRIM DS) 800-160 MG tablet, , Disp: , Rfl:   •  hydrOXYzine HCl (ATARAX) 50 MG Tab, Take 1 tablet by mouth every 8 hours as needed for Anxiety., Disp: 90 tablet, Rfl: 0  •  temozolomide (TEMODAR) 5 MG capsule, , Disp: , Rfl:   •  temozolomide (TEMODAR) 140 MG capsule, 150mg once a day (140mg + 2, 5mg tabs), Disp: , Rfl:   •  levetiracetam (KEPPRA) 750 MG tablet, Take 1 tablet by mouth 2 times a day for 90 days., Disp: 180 tablet, Rfl: 2  •  melatonin 3 MG Tab, Take 1 tablet by mouth at bedtime as needed (insomnia)., Disp: 30 tablet, Rfl: 2  •  lamoTRIgine (LAMICTAL) 100 MG Tab, Take 1 tablet by mouth 2 Times a Day., Disp: 180 tablet, Rfl: 2  •  venlafaxine (EFFEXOR) 25 MG Tab, Take 0.5 Tablets by mouth 2 Times a Day., Disp: 90 tablet, Rfl: 2  •  acetaminophen (TYLENOL) 325 MG Tab, Take 2 Tablets by mouth every 6 hours as needed (Mild Pain; (Pain scale 1-3); Temp greater than 100.5 F)., Disp: 30  tablet, Rfl: 0  •  folic acid (FOLVITE) 1 MG Tab, Take 1 tablet by mouth every day., Disp: 30 tablet, Rfl:   •  VITAMIN D PO, Take 1 Units by mouth every evening., Disp: , Rfl:   •  multivitamin (THERAGRAN) Tab, Take 1 tablet by mouth every evening., Disp: , Rfl:     LABORATORY DATA:   Lab Results   Component Value Date/Time    SODIUM 140 03/22/2021 06:15 AM    POTASSIUM 4.4 03/22/2021 06:15 AM    CHLORIDE 104 03/22/2021 06:15 AM    CO2 28 03/22/2021 06:15 AM    GLUCOSE 78 03/22/2021 06:15 AM    BUN 12 03/22/2021 06:15 AM    CREATININE 0.57 03/22/2021 06:15 AM         Lab Results   Component Value Date/Time    WBC 15.2 (H) 03/16/2021 06:03 AM    HEMOGLOBIN 13.4 03/16/2021 06:03 AM    HEMATOCRIT 39.3 03/16/2021 06:03 AM    MCV 96.3 03/16/2021 06:03 AM    PLATELETCT 226 03/16/2021 06:03 AM        RADIOLOGY DATA:  No results found.    IMPRESSION:  Glioblastoma of frontal lobe (HCC)  Staging form: Brain and Spinal Cord, AJCC 8th Edition  - Pathologic stage from 3/24/2021: WHO Grade IV - Signed by Edenilson Frye M.D. on 3/24/2021  Histologic grading system: 4 grade system  IDH1 mutation: Negative  IDH2 mutation: Negative  MGMT methylation: Absent      PLAN:  No change in treatment plan    Disposition:  Treatment plan reviewed. Questions answered. Continue therapy outlined.     Edenilson Frye M.D.    No orders of the defined types were placed in this encounter.

## 2021-04-22 ENCOUNTER — PHYSICAL THERAPY (OUTPATIENT)
Dept: PHYSICAL THERAPY | Facility: REHABILITATION | Age: 51
End: 2021-04-22
Attending: INTERNAL MEDICINE
Payer: COMMERCIAL

## 2021-04-22 ENCOUNTER — HOSPITAL ENCOUNTER (OUTPATIENT)
Dept: RADIATION ONCOLOGY | Facility: MEDICAL CENTER | Age: 51
End: 2021-04-22
Payer: COMMERCIAL

## 2021-04-22 DIAGNOSIS — R26.9 GAIT ABNORMALITY: ICD-10-CM

## 2021-04-22 DIAGNOSIS — C71.1 MALIGNANT NEOPLASM OF FRONTAL LOBE (HCC): ICD-10-CM

## 2021-04-22 DIAGNOSIS — R26.89 BALANCE PROBLEMS: ICD-10-CM

## 2021-04-22 DIAGNOSIS — R53.1 WEAKNESS GENERALIZED: ICD-10-CM

## 2021-04-22 LAB
CHEMOTHERAPY INFUSION START DATE: NORMAL
CHEMOTHERAPY RECORDS: 2
CHEMOTHERAPY RECORDS: 4600
CHEMOTHERAPY RECORDS: NORMAL
CHEMOTHERAPY RX CANCER: NORMAL
DATE 1ST CHEMO CANCER: NORMAL
RAD ONC ARIA COURSE LAST TREATMENT DATE: NORMAL
RAD ONC ARIA COURSE TREATMENT ELAPSED DAYS: NORMAL
RAD ONC ARIA REFERENCE POINT DOSAGE GIVEN TO DATE: 28
RAD ONC ARIA REFERENCE POINT DOSAGE GIVEN TO DATE: 28.95
RAD ONC ARIA REFERENCE POINT ID: NORMAL
RAD ONC ARIA REFERENCE POINT ID: NORMAL
RAD ONC ARIA REFERENCE POINT SESSION DOSAGE GIVEN: 2
RAD ONC ARIA REFERENCE POINT SESSION DOSAGE GIVEN: 2.07

## 2021-04-22 PROCEDURE — 77386 HCHG IMRT DELIVERY COMPLEX: CPT | Performed by: RADIOLOGY

## 2021-04-22 PROCEDURE — 77014 PR CT GUIDANCE PLACEMENT RAD THERAPY FIELDS: CPT | Mod: 26 | Performed by: RADIOLOGY

## 2021-04-22 PROCEDURE — 97110 THERAPEUTIC EXERCISES: CPT

## 2021-04-22 PROCEDURE — 97163 PT EVAL HIGH COMPLEX 45 MIN: CPT

## 2021-04-22 SDOH — ECONOMIC STABILITY: GENERAL: QUALITY OF LIFE: GOOD

## 2021-04-22 ASSESSMENT — ACTIVITIES OF DAILY LIVING (ADL): POOR_BALANCE: 1

## 2021-04-22 NOTE — OP THERAPY EVALUATION
Outpatient Physical Therapy  INITIAL NEUROLOGICAL EVALUATION    [facility] [address].  [address]  [place] [address]  Phone:  [phone]  Fax:  [phone]    Date of Evaluation: 04/22/2021    Patient: Melba Frye  YOB: 1970  MRN: [id number]     Referring Provider: Michael Cerda M.D.  [address] [place] [address]  [place] [address]   Referring Diagnosis Malignant neoplasm of frontal lobe [C71.1]     Time Calculation                       Chief Complaint: Weakness and Loss Of Balance    Visit Diagnoses     ICD-10-CM   1. Malignant neoplasm of frontal lobe (HCC)  C71.1   2. Weakness generalized  R53.1   3. Gait abnormality  R26.9   4. Balance problems  R26.89       Subjective:   History of Present Illness:     Date of onset:  3/2/2021    Mechanism of injury:  Per patient request:No statistics related to CA diagnosis/prognosis. Positivity!    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 Pt had progressively worsening seizures. MRI 12/20 showed frontal lobe mass that by 2/21 had increased significantly. An open craniotomy and biopsy completed showing Glioblastoma grade IV. Pt DC from acute 3/15 to [facility] for stay with DC 3/27. Was referred to  but told she did not meet requirements for homebound and no therapy follow up occurred.    Pt has 16 steps in home that she completes with  CGA and even uses gait belt. When descending without AFO she notices some bruising on posterior heel as she was instructed to position foot as far against step as possible. In the home pt ambulates with 4WW ocassionally and often without ASD. Does have quad cane and bilateral walking sticks. Pt reports some difficulty getting in/out of bed related to weakness and back pain. She has also noticed weakness in left UE with pain in elbow with flexion. Feels sometimes she isn't aware of where her Lt UE is and will  knock makeup or things over. Pt reports ability to stand less than 5 min before too fatigue and needing to sit.    Pt MIL is home during the day with pt however pt able to complete all ADLs including showering, cooking, and only minor help needed for laundry. Ambulates short distances outside on level path occasionally during the day.    Pt goals to ambulate outside and return to hiking.    Some concern related to Lt UE shaking when reaching for an item.     Quality of life:  Good      Past Medical History:   Diagnosis Date   • Anxiety    • Complicated migraine 6/9/2014   • Depression    • Dermatitis, contact 11/16/2015   • Fatigue 6/9/2014   • Hyperlipidemia 1/23/2015   • Lentigo 10/17/2018   • Menopausal symptom 7/2/2014   • Mixed anxiety and depressive disorder 6/9/2014   • Seborrheic keratoses 10/17/2018   • TMJ arthralgia 6/9/2014   • UTI (lower urinary tract infection)      Past Surgical History:   Procedure Laterality Date   • CRANIOTOMY STEALTH Right 3/9/2021    Procedure: RIGHT CRANIOTOMY, USING FRAMELESS STEREOTAXY - FOR OPEN BIOPSY WITH PHASE REVERSAL;  Surgeon: Maxwell Mao M.D.;  Location: [facility];  Service: Neurosurgery   • MYRINGOTOMY  8/27/2010    Performed by [address] at SURGERY SAME DAY [facility] ORS   • LAPAROSCOPY  5/28/2010    Performed by JACKI SHARMA at [facility] ORS   • LYMPH NODE SAMPLING  5/28/2010    Performed by JACKI SHARMA at [facility] ORS   • DEBULKING  5/28/2010    Performed by JACKI SHARMA at [facility] ORS   • CYSTOSCOPY  10/15/08    Performed by YU GODINEZ at SURGERY SAME DAY [facility] ORS   • VAGINAL HYSTERECTOMY SCOPE TOTAL  10/15/08    Performed by YU GODINEZ at SURGERY SAME DAY [facility] ORS   • OTHER  1984    TONSILECTOMY/ ADNOIDS   • APPENDECTOMY  1981   • TONSILLECTOMY AND ADENOIDECTOMY       Social History     Tobacco Use   • Smoking status: Never Smoker   • Smokeless tobacco: Never Used   Substance Use Topics    • Alcohol use: Not Currently     Alcohol/week: 0.0 oz     Family and Occupational History     Socioeconomic History   • Marital status:      Spouse name: Not on file   • Number of children: Not on file   • Years of education: Not on file   • Highest education level: Not on file   Occupational History   • Not on file       Objective:   Active Range of Motion:   Upper extremity (left):     All left upper extremity active range of motion: All within functional limits  Upper extremity (right):     All right upper extremity active range of motion: All within functional limits  Lower extremity (left):     Hip flexion: Within functional limits    Hip extension: Within functional limits    Knee flexion: Within functional limits    Knee extension: Within functional limits    Ankle dorsiflexion: Below functional limits    Ankle plantar flexion: Below functional limits  Lower extremity (right):     All right lower extremity active range of motion: All within functional limits      Strength:   Upper extremity strength (left):     Shoulder flexion: 4+    Shoulder extension:  4+    Shoulder abduction: 4+    Shoulder external rotation:  4    Shoulder internal rotation: 4    Elbow flexion: 5    Elbow extension: 4    Forearm pronation: 5    Forearm supination: 5    Wrist extension: 5  Upper extremity strength (right):    Shoulder flexion: 5    Shoulder extension: 5    Shoulder abduction: 5    Shoulder external rotation: 5    Shoulder internal rotation: 5    Elbow flexion: 5    Elbow extension: 5    Forearm pronation: 5    Forearm supination: 5    Wrist flexion: 5    Wrist extension: 5  Lower extremity (left):     Hip flexion: 3+    Hip extension: 3    Hip abduction: 3+    Knee flexion: 4    Knee extension: 4+    Ankle dorsiflexion: 2+    Ankle plantar flexion: 2  Lower extremity (right):     Hip flexion: 5    Hip extension: 4    Hip abduction: 4+    Knee flexion: 5    Knee extension: 5    Ankle dorsiflexion: 5    Strength  Comments:  WILL ASSESS /PINCH AT LATER DATE    Tone, Sensation and Coordination:     Modified Gracia:     Tone comments:   Clonus left ankle DF  Noted with active ankle PF    Sensation   Upper extremity (left):     Light touch: Intact    Proprioception: Intact   Upper extremity (right):     Light touch: Intact    Proprioception: Intact   Lower extremity (left):     Proprioception: Intact   Lower extremity (right):     Proprioception: Intact     Coordination   Upper extremity (left):     Finger to finger: Impaired    Finger touch to nose: Impaired  Lower extremity (left):     Heel to shin: Impaired      Coordination comments:   Intention tremor noted with finger to nose and finger to finger    Balance/Gait Comments   Pt ambulates with 4WW and Left AFO. Pt is lacking knee extension on left LE in stance with mild circumduction of the hip and ER to advance the limb. Pt ambulates in foot flat initial contact with decreased right stride length. Pt has poor eccentric HS control resulting in quick advancement of limb in terminal swing occasionally hitting wheel of 4WW.    Sit<>stand: Pt completed with very significant right trunk shift with excessive weightbearing through Rt LE. Improved with mirror external feed back. Able to perform 3 reps standard chair no UE.       MCTSIB: 1 firm EO: 30 sec, 2 firm EC 30 sec, 3 Foam EO 14sec, 4 Foam EC unable    Pt educated on skin checks for AFO. Small nonblanchable redness noted under distal strap of AFO. Encouraged pt to contact prosthetist.     Activities of Daily Living:     ADL Comments:  Educated pt in log roll technique for getting in/out of bed due to complaint of LBP with supine>sit.         Therapeutic Exercises (CPT 04971):     1. Supine bridge, x 8 reps, BTB for abduction cues    2. STS, x 3 reps, standard chair, mirrir, no UE      Therapeutic Exercise Summary: HEP: Overload principle for reps (3 x 10) supine bridge with BTB for abduction cues, STS no UE, standing hip  abduction, standing hip extension, supine tricep ext, lat pull downs BTB.    Some gait awareness with heel strike IC and knee ext in gait      Time-based treatments/modalities:           Assessment, Response and Plan:   Impairments: abnormal coordination, abnormal gait, abnormal muscle firing, abnormal or restricted ROM, activity intolerance, impaired functional mobility, impaired balance, impaired physical strength, lacks appropriate home exercise program and limited mobility    Assessment details:  Giselle is a 50 year old female who presents to PT with strength, balance, and gait deficits secondary to brain sx related to glioblastoma grade IV in the frontal lobe. Pt strength deficits most notably impact her left LE and over overall stability and ability to ambulate independently. Prior to diagnosis pt a very independent and physically active female who wants to return to hiking outdoors as soon as able. Pt demo's significant balance deficits on dynamic surfaces via MCTSIB as well as coordination impairements and intention tremor noted in Lt UE. Pt reported she will be getting botox injections related to clonus in Lt LE. Pt has mod awareness to compensation strategies for LE weakness as noted with bridging and excessive trunk shift/weight bearing to Rt LE with STS. With use of mirror for external feedback pt made good improvements. Pt will benefit from skilled PT 2x per week for 10-12 weeks in order to improve strength, balance, and progress to appropriate AD in order to regain independence and return to PLOF.  Pt was provided HEP for LE and Lt UE strnegtheining as well as encouraged for continued ambulation for improving endurance. Pt verbalized understanding.   Pt was encouraged to contact her prosthetist due to nonblanchable skin on anterior foot located beneth distal strap of AFO. Pt verbalized agreement.  Barriers to therapy:  Comorbidities  Prognosis: fair    Prognosis details:  Pt is very motivated. Current  radiation regimen and unpredictable nature of brain tumor may impact progress towards goals.   Goals:   Short Term Goals:   1. Pt will ambulate on uneven surfaces with LRAD  feet in order to improve access to the community.  2. Pt will ascend/descend 16 steps with LRAD independently in order to access her home without supervision.   3. Pt will demonstrate compliance with HEP for improving LE and UE strength and stability in order to progress the POC.   4. Pt will ambulate with LRAD with heel strike 50% of the time without cueing.  Short term goal time span:  4-6 weeks      Long Term Goals:    1. Pt will ambulate mod I with LRAD on uneven surfaces x 1000 feet in order to safely return to hiking and increase independent access to the community.   2. Pt will demonstrate improved functional mobilty with TUG score 15 seconds or less with LRAD.  3. Pt will demonstrate improved dynamic balance with appropriate outcome measure indicating pt at low risk of falling.   4. Pt will demonstrate improved Lt LE strength with ability to perform sit sot stand without UE without trunk shift.     Long term goal time span:  2-4 months    Plan:   Therapy options:  Physical therapy treatment to continue  Planned therapy interventions:  Therapeutic Activities (CPT 95345), Therapeutic Exercise (CPT 64317), Manual Therapy (CPT 00099), Neuromuscular Re-education (CPT 39574) and Gait Training (CPT 89793)  Frequency:  2x week  Duration in weeks:  12  Duration in visits:  20  Plan details:  Pt to be seen 2x per week for 12 weeks.      Functional Assessment Used          Referring provider co-signature:  I have reviewed this plan of care and my co-signature certifies the need for services.    Certification Period: 04/22/2021 to  07/26/21    Physician Signature: ________________________________ Date: ______________

## 2021-04-23 ENCOUNTER — HOSPITAL ENCOUNTER (OUTPATIENT)
Dept: RADIATION ONCOLOGY | Facility: MEDICAL CENTER | Age: 51
End: 2021-04-23
Payer: COMMERCIAL

## 2021-04-23 LAB
CHEMOTHERAPY INFUSION START DATE: NORMAL
CHEMOTHERAPY RECORDS: 2
CHEMOTHERAPY RECORDS: 4600
CHEMOTHERAPY RECORDS: NORMAL
CHEMOTHERAPY RX CANCER: NORMAL
DATE 1ST CHEMO CANCER: NORMAL
RAD ONC ARIA COURSE LAST TREATMENT DATE: NORMAL
RAD ONC ARIA COURSE TREATMENT ELAPSED DAYS: NORMAL
RAD ONC ARIA REFERENCE POINT DOSAGE GIVEN TO DATE: 30
RAD ONC ARIA REFERENCE POINT DOSAGE GIVEN TO DATE: 31.02
RAD ONC ARIA REFERENCE POINT ID: NORMAL
RAD ONC ARIA REFERENCE POINT ID: NORMAL
RAD ONC ARIA REFERENCE POINT SESSION DOSAGE GIVEN: 2
RAD ONC ARIA REFERENCE POINT SESSION DOSAGE GIVEN: 2.07

## 2021-04-23 PROCEDURE — 77386 HCHG IMRT DELIVERY COMPLEX: CPT | Performed by: RADIOLOGY

## 2021-04-23 PROCEDURE — 77014 PR CT GUIDANCE PLACEMENT RAD THERAPY FIELDS: CPT | Mod: 26 | Performed by: RADIOLOGY

## 2021-04-23 PROCEDURE — 77427 RADIATION TX MANAGEMENT X5: CPT | Performed by: RADIOLOGY

## 2021-04-26 ENCOUNTER — HOSPITAL ENCOUNTER (OUTPATIENT)
Dept: RADIATION ONCOLOGY | Facility: MEDICAL CENTER | Age: 51
End: 2021-04-26
Payer: COMMERCIAL

## 2021-04-26 LAB
CHEMOTHERAPY INFUSION START DATE: NORMAL
CHEMOTHERAPY RECORDS: 2
CHEMOTHERAPY RECORDS: 4600
CHEMOTHERAPY RECORDS: NORMAL
CHEMOTHERAPY RX CANCER: NORMAL
DATE 1ST CHEMO CANCER: NORMAL
RAD ONC ARIA COURSE LAST TREATMENT DATE: NORMAL
RAD ONC ARIA COURSE TREATMENT ELAPSED DAYS: NORMAL
RAD ONC ARIA REFERENCE POINT DOSAGE GIVEN TO DATE: 32
RAD ONC ARIA REFERENCE POINT DOSAGE GIVEN TO DATE: 33.09
RAD ONC ARIA REFERENCE POINT ID: NORMAL
RAD ONC ARIA REFERENCE POINT ID: NORMAL
RAD ONC ARIA REFERENCE POINT SESSION DOSAGE GIVEN: 2
RAD ONC ARIA REFERENCE POINT SESSION DOSAGE GIVEN: 2.07

## 2021-04-26 PROCEDURE — 77386 HCHG IMRT DELIVERY COMPLEX: CPT | Performed by: RADIOLOGY

## 2021-04-26 PROCEDURE — 77014 PR CT GUIDANCE PLACEMENT RAD THERAPY FIELDS: CPT | Mod: 26 | Performed by: RADIOLOGY

## 2021-04-27 ENCOUNTER — HOSPITAL ENCOUNTER (OUTPATIENT)
Dept: RADIATION ONCOLOGY | Facility: MEDICAL CENTER | Age: 51
End: 2021-04-27
Payer: COMMERCIAL

## 2021-04-27 LAB
CHEMOTHERAPY INFUSION START DATE: NORMAL
CHEMOTHERAPY RECORDS: 2
CHEMOTHERAPY RECORDS: 4600
CHEMOTHERAPY RECORDS: NORMAL
CHEMOTHERAPY RX CANCER: NORMAL
DATE 1ST CHEMO CANCER: NORMAL
RAD ONC ARIA COURSE LAST TREATMENT DATE: NORMAL
RAD ONC ARIA COURSE TREATMENT ELAPSED DAYS: NORMAL
RAD ONC ARIA REFERENCE POINT DOSAGE GIVEN TO DATE: 34
RAD ONC ARIA REFERENCE POINT DOSAGE GIVEN TO DATE: 35.16
RAD ONC ARIA REFERENCE POINT ID: NORMAL
RAD ONC ARIA REFERENCE POINT ID: NORMAL
RAD ONC ARIA REFERENCE POINT SESSION DOSAGE GIVEN: 2
RAD ONC ARIA REFERENCE POINT SESSION DOSAGE GIVEN: 2.07

## 2021-04-27 PROCEDURE — 77386 HCHG IMRT DELIVERY COMPLEX: CPT | Performed by: RADIOLOGY

## 2021-04-27 PROCEDURE — 77014 PR CT GUIDANCE PLACEMENT RAD THERAPY FIELDS: CPT | Mod: 26 | Performed by: RADIOLOGY

## 2021-04-28 ENCOUNTER — HOSPITAL ENCOUNTER (OUTPATIENT)
Dept: RADIATION ONCOLOGY | Facility: MEDICAL CENTER | Age: 51
End: 2021-04-28
Payer: COMMERCIAL

## 2021-04-28 ENCOUNTER — TELEPHONE (OUTPATIENT)
Dept: RADIATION ONCOLOGY | Facility: MEDICAL CENTER | Age: 51
End: 2021-04-28

## 2021-04-28 ENCOUNTER — PHYSICAL THERAPY (OUTPATIENT)
Dept: PHYSICAL THERAPY | Facility: REHABILITATION | Age: 51
End: 2021-04-28
Attending: INTERNAL MEDICINE
Payer: COMMERCIAL

## 2021-04-28 VITALS
OXYGEN SATURATION: 97 % | SYSTOLIC BLOOD PRESSURE: 155 MMHG | BODY MASS INDEX: 36.76 KG/M2 | HEART RATE: 104 BPM | TEMPERATURE: 99.6 F | DIASTOLIC BLOOD PRESSURE: 94 MMHG | WEIGHT: 234.68 LBS

## 2021-04-28 DIAGNOSIS — C71.1 MALIGNANT NEOPLASM OF FRONTAL LOBE (HCC): ICD-10-CM

## 2021-04-28 DIAGNOSIS — R53.1 WEAKNESS GENERALIZED: ICD-10-CM

## 2021-04-28 DIAGNOSIS — R26.89 BALANCE PROBLEMS: ICD-10-CM

## 2021-04-28 DIAGNOSIS — R26.9 GAIT ABNORMALITY: ICD-10-CM

## 2021-04-28 LAB
CHEMOTHERAPY INFUSION START DATE: NORMAL
CHEMOTHERAPY RECORDS: 2
CHEMOTHERAPY RECORDS: 4600
CHEMOTHERAPY RECORDS: NORMAL
CHEMOTHERAPY RX CANCER: NORMAL
DATE 1ST CHEMO CANCER: NORMAL
RAD ONC ARIA COURSE LAST TREATMENT DATE: NORMAL
RAD ONC ARIA COURSE TREATMENT ELAPSED DAYS: NORMAL
RAD ONC ARIA REFERENCE POINT DOSAGE GIVEN TO DATE: 36
RAD ONC ARIA REFERENCE POINT DOSAGE GIVEN TO DATE: 37.23
RAD ONC ARIA REFERENCE POINT ID: NORMAL
RAD ONC ARIA REFERENCE POINT ID: NORMAL
RAD ONC ARIA REFERENCE POINT SESSION DOSAGE GIVEN: 2
RAD ONC ARIA REFERENCE POINT SESSION DOSAGE GIVEN: 2.07

## 2021-04-28 PROCEDURE — 77014 PR CT GUIDANCE PLACEMENT RAD THERAPY FIELDS: CPT | Mod: 26 | Performed by: RADIOLOGY

## 2021-04-28 PROCEDURE — 97110 THERAPEUTIC EXERCISES: CPT

## 2021-04-28 PROCEDURE — 97112 NEUROMUSCULAR REEDUCATION: CPT

## 2021-04-28 PROCEDURE — 77386 HCHG IMRT DELIVERY COMPLEX: CPT | Performed by: RADIOLOGY

## 2021-04-28 PROCEDURE — 77336 RADIATION PHYSICS CONSULT: CPT | Performed by: RADIOLOGY

## 2021-04-28 ASSESSMENT — FIBROSIS 4 INDEX: FIB4 SCORE: 0.6

## 2021-04-28 ASSESSMENT — PAIN SCALES - GENERAL: PAINLEVEL: NO PAIN

## 2021-04-28 NOTE — TELEPHONE ENCOUNTER
Nutrition Services: Telephone Encounter  Verbal consult received for nutrition assessment.  Called patient to set up appointment but she did not answer.  RD left a voice message with contact info and discussed will aim to visit after XRT on Friday.     RD to follow-up  [phone]

## 2021-04-28 NOTE — ON TREATMENT VISIT
ON TREATMENT  NOTE  RADIATION ONCOLOGY DEPARTMENT    Patient name:  Melba Frye    Primary Physician:  Trinity Nguyen M.D. MRN: [id number]  CSN: [id number]   Referring physician:  Maxwell Mao M.D. : 1970, 50 y.o.     ENCOUNTER DATE:  21    DIAGNOSIS:  Glioblastoma of frontal lobe (HCC)  Staging form: Brain and Spinal Cord, AJCC 8th Edition  - Pathologic stage from 3/24/2021: WHO Grade IV - Signed by Edenilson Frye M.D. on 3/24/2021  Histologic grading system: 4 grade system  IDH1 mutation: Negative  IDH2 mutation: Negative  MGMT methylation: Absent      TREATMENT SUMMARY:  Aria Treatment Information        Some values may be hidden. Unless noted otherwise, only the newest values recorded on each date are displayed.         Aria Treatment Summary 21   Course First Treatment Date 2021   Course Last Treatment Date 2021   R Front GBM Plan from Course C1_GBM   Fraction 18    Elapsed Course Days  @ [id number]   Prescribed Fraction Dose 200 cGy   Prescribed Total Dose 4,600 cGy   R Front GBM Reference Point from Course C1_GBM   Elapsed Course Days  @ [id number]   Session Dose 200 cGy   Total Dose 3,600 cGy   R Front GBM CP Reference Point from Course C1_GBM   Elapsed Course Days  @ [id number]   Session Dose 207 cGy   Total Dose 3,723 cGy             SUBJECTIVE:  No changes      VITAL SIGNS:  KPS: 100, Fully active, able to carry on all pre-disease performed without restriction (ECOG equivalent 0)  Encounter Vitals  Temperature: 37.6 °C (99.6 °F)  Blood Pressure: 155/94  Pulse: (!) 104  Pulse Oximetry: 97 %  Weight: 106 kg (234 lb 10.9 oz)  Pain Score: No pain  Pain Assessment 2021   Pain Score 0 0 0 0 0   Some recent data might be hidden          PHYSICAL EXAM:    Physical Exam  Constitutional:       Appearance: Normal appearance.   HENT:      Head: Normocephalic and atraumatic.   Skin:     Findings: No erythema.    Neurological:      Mental Status: She is alert.          Toxicity Assessment 4/28/2021 4/21/2021 4/14/2021 4/7/2021   Toxicity Assessment Brain Brain Brain Brain   Fatigue (lethargy, malaise, asthenia) Increased fatigue over baseline, but not altering normal activities None Increased fatigue over baseline, but not altering normal activities None   Radiation Dermatitis Faint erythema or dry desquamation Faint erythema or dry desquamation None None   Nausea Able to eat None None Able to eat   Mouth Dryness Mild Mild Mild Mild   Headache None None Mild pain not interfering with function Mild pain not interfering with function   External Auditory Canal Normal Normal Normal Normal   Inner Ear/Hearing Normal Normal Normal Normal   Middle Ear/Hearing Normal Normal Normal Normal   Dizziness/lightheadedness None None None None   Memory loss Normal Normal Normal Normal   Neuropathy - Motor Normal Normal Subjective weakness but no objective findings Normal   Seizure None None None None   Vertigo None None None None       CURRENT MEDICATIONS:    Current Outpatient Medications:   •  cephALEXin (KEFLEX) 500 MG Cap, , Disp: , Rfl:   •  ondansetron (ZOFRAN) 4 MG Tab tablet, , Disp: , Rfl:   •  sulfamethoxazole-trimethoprim (BACTRIM DS) 800-160 MG tablet, , Disp: , Rfl:   •  hydrOXYzine HCl (ATARAX) 50 MG Tab, Take 1 tablet by mouth every 8 hours as needed for Anxiety., Disp: 90 tablet, Rfl: 0  •  temozolomide (TEMODAR) 5 MG capsule, , Disp: , Rfl:   •  temozolomide (TEMODAR) 140 MG capsule, 150mg once a day (140mg + 2, 5mg tabs), Disp: , Rfl:   •  levetiracetam (KEPPRA) 750 MG tablet, Take 1 tablet by mouth 2 times a day for 90 days., Disp: 180 tablet, Rfl: 2  •  melatonin 3 MG Tab, Take 1 tablet by mouth at bedtime as needed (insomnia)., Disp: 30 tablet, Rfl: 2  •  lamoTRIgine (LAMICTAL) 100 MG Tab, Take 1 tablet by mouth 2 Times a Day., Disp: 180 tablet, Rfl: 2  •  venlafaxine (EFFEXOR) 25 MG Tab, Take 0.5 Tablets by mouth 2  Times a Day., Disp: 90 tablet, Rfl: 2  •  acetaminophen (TYLENOL) 325 MG Tab, Take 2 Tablets by mouth every 6 hours as needed (Mild Pain; (Pain scale 1-3); Temp greater than 100.5 F)., Disp: 30 tablet, Rfl: 0  •  folic acid (FOLVITE) 1 MG Tab, Take 1 tablet by mouth every day., Disp: 30 tablet, Rfl:   •  VITAMIN D PO, Take 1 Units by mouth every evening., Disp: , Rfl:   •  multivitamin (THERAGRAN) Tab, Take 1 tablet by mouth every evening., Disp: , Rfl:     LABORATORY DATA:   Lab Results   Component Value Date/Time    SODIUM 140 03/22/2021 06:15 AM    POTASSIUM 4.4 03/22/2021 06:15 AM    CHLORIDE 104 03/22/2021 06:15 AM    CO2 28 03/22/2021 06:15 AM    GLUCOSE 78 03/22/2021 06:15 AM    BUN 12 03/22/2021 06:15 AM    CREATININE 0.57 03/22/2021 06:15 AM         Lab Results   Component Value Date/Time    WBC 15.2 (H) 03/16/2021 06:03 AM    HEMOGLOBIN 13.4 03/16/2021 06:03 AM    HEMATOCRIT 39.3 03/16/2021 06:03 AM    MCV 96.3 03/16/2021 06:03 AM    PLATELETCT 226 03/16/2021 06:03 AM        RADIOLOGY DATA:  No results found.    IMPRESSION:  Glioblastoma of frontal lobe (HCC)  Staging form: Brain and Spinal Cord, AJCC 8th Edition  - Pathologic stage from 3/24/2021: WHO Grade IV - Signed by Edenilson Frye M.D. on 3/24/2021  Histologic grading system: 4 grade system  IDH1 mutation: Negative  IDH2 mutation: Negative  MGMT methylation: Absent      PLAN:  No change in treatment plan    Disposition:  Treatment plan reviewed. Questions answered. Continue therapy outlined.     Edenilson Frye M.D.    No orders of the defined types were placed in this encounter.

## 2021-04-28 NOTE — OP THERAPY DAILY TREATMENT
Outpatient Physical Therapy  DAILY TREATMENT     [facility] Physical [address].  [address]  [place] [address]  Phone:  [phone]  Fax:  [phone]    Date: 2021    Patient: Melba Frye  YOB: 1970  MRN: [id number]     Time Calculation    Start time: 1332  Stop time: 1430 Time Calculation (min): 58 minutes         Chief Complaint: Weakness, Loss Of Balance, and Unsteady Gait    Visit #: 2    SUBJECTIVE:  Pt reported increased weakness in lt LE and was concerned she was losing ground. Very compliant with HEP.    OBJECTIVE:  Talon Test + for rectus length impairment Lt>rt          Therapeutic Exercises (CPT 55501):     1. Bridge, 2 x 10    2. Talon stretch, 2 x 60 sec    3. Talon bridge, 2 x 10    4. Prone on elbows, 2 min, brief headache    20. 7/      Therapeutic Exercise Summary:    Therapeutic Exercise Summary: HEP: Overload principle for reps (3 x 10) supine bridge with BTB for abduction cues, STS no UE, standing hip abduction, standing hip extension, supine tricep ext, lat pull downs BTB  ADDED : talon stretch, STS Rt LE 2 inch in front of Lt, rectus flex/ext with rolling stool.     Therapeutic Treatments and Modalities:     1. Neuromuscular Re-education (CPT 23171)    Therapeutic Treatment and Modalities Summary: Tall kneelin min mod A    Sit to stands x 10 min/no UE, standard mat table mirrow for feedback and tactile cues for weight shift onto Lt LE    Sit to stands rt LE 6inch step, min UE from standard mat to increase weight bearing throught Lt LE    For improving re-education of rectus femoris during swing phase for hip flexion with knee flexion pt stood in // Bars Lt LE on stool with knee bent approx 90 degrees advancing froward and backwards with mod cues to dec trunk flexion.    Pt ambulated 112 feet without AD, CGA with increased ALLYSON, reciprocal pattern and dec left knee flexion in swing. No LOB.     Time-based  treatments/modalities:    Physical Therapy Timed Treatment Charges  Neuromusc re-ed, balance, coor, post minutes (CPT 89238): 33 minutes  Therapeutic exercise minutes (CPT 25223): 25 minutes          ASSESSMENT:   Educated pt on DOMS related to HEP and interventions as this is likely cause of pt report of feeling weaker near the end of the day. Pt verbalized understanding and very relieved.   Pt experienced significant head ache as soon as she assumed prone on elbows position that went away immediately once instructed to lay flat on table. Pt only laid flat 10-15 seconds before she assumed prone on elbows position without symptoms. Immediate PT concerns related to ICP but could be related to position change or BP. Will continue to assess as it went away quickly.   Pt demo'd significant improvement with STS and equal weight bearing post split sit to stand intervention. Pt relies heavily on rehab techniques, nose over toes ETC and vocalizes them. However, once instructing pt to just stand up and not think so hard it improved significantly with better stabilty.  To impact knee flexion/hip flexion during swing will continue to emphasize rectus femoris training during session as well as overall glute strength. Pt demo'd significant glute weakness and quad deficits with inability to maintain tall kneeling damien than 5 seconds without mod A.   While ambulating without Ad pt gait mechanics very similar to when she is using 4WW. Pt deficits for gait mechanics may be more sensory or proprioceptive in nature as well as weakness impacting gait.   Pt will continue to benefit from skilled intervention in order to improve strength, balance, and gait mechanics in order to return to PLOF and improve quality of life.     PLAN/RECOMMENDATIONS:   Plan for treatment: therapy treatment to continue next visit.  Planned interventions for next visit: continue with current treatment.

## 2021-04-29 ENCOUNTER — HOSPITAL ENCOUNTER (OUTPATIENT)
Dept: RADIATION ONCOLOGY | Facility: MEDICAL CENTER | Age: 51
End: 2021-04-29

## 2021-04-29 ENCOUNTER — APPOINTMENT (OUTPATIENT)
Dept: PHYSICAL THERAPY | Facility: REHABILITATION | Age: 51
End: 2021-04-29
Attending: INTERNAL MEDICINE
Payer: COMMERCIAL

## 2021-04-29 ENCOUNTER — OCCUPATIONAL THERAPY (OUTPATIENT)
Dept: OCCUPATIONAL THERAPY | Facility: REHABILITATION | Age: 51
End: 2021-04-29
Attending: PHYSICAL MEDICINE & REHABILITATION
Payer: COMMERCIAL

## 2021-04-29 DIAGNOSIS — S06.9X0D: ICD-10-CM

## 2021-04-29 LAB
CHEMOTHERAPY INFUSION START DATE: NORMAL
CHEMOTHERAPY RECORDS: 2
CHEMOTHERAPY RECORDS: 4600
CHEMOTHERAPY RECORDS: NORMAL
CHEMOTHERAPY RX CANCER: NORMAL
DATE 1ST CHEMO CANCER: NORMAL
RAD ONC ARIA COURSE LAST TREATMENT DATE: NORMAL
RAD ONC ARIA COURSE TREATMENT ELAPSED DAYS: NORMAL
RAD ONC ARIA REFERENCE POINT DOSAGE GIVEN TO DATE: 38
RAD ONC ARIA REFERENCE POINT DOSAGE GIVEN TO DATE: 39.29
RAD ONC ARIA REFERENCE POINT ID: NORMAL
RAD ONC ARIA REFERENCE POINT ID: NORMAL
RAD ONC ARIA REFERENCE POINT SESSION DOSAGE GIVEN: 2
RAD ONC ARIA REFERENCE POINT SESSION DOSAGE GIVEN: 2.07

## 2021-04-29 PROCEDURE — 77386 HCHG IMRT DELIVERY COMPLEX: CPT | Performed by: RADIOLOGY

## 2021-04-29 PROCEDURE — 97140 MANUAL THERAPY 1/> REGIONS: CPT

## 2021-04-29 PROCEDURE — 97166 OT EVAL MOD COMPLEX 45 MIN: CPT

## 2021-04-29 PROCEDURE — 77014 PR CT GUIDANCE PLACEMENT RAD THERAPY FIELDS: CPT | Mod: 26 | Performed by: RADIOLOGY

## 2021-04-29 SDOH — ECONOMIC STABILITY: GENERAL: QUALITY OF LIFE: POOR

## 2021-04-29 ASSESSMENT — ENCOUNTER SYMPTOMS
PAIN SCALE: 2
PAIN TIMING: INTERMITTENT
QUALITY: ACHING
ALLEVIATING FACTORS: REST

## 2021-04-29 ASSESSMENT — ACTIVITIES OF DAILY LIVING (ADL): AMBULATION_WITH_ASSISTIVE_DEVICE: INDEPENDENT

## 2021-04-29 NOTE — OP THERAPY EVALUATION
Outpatient Occupational Therapy  INITIAL NEUROLOGICAL EVALUATION    [facility] Occupational Therapy [place] [address]  [address]  [place] [address]  Phone:  [phone]  Fax:  [phone]    Date of Evaluation: 2021    Patient: Melba Frye  YOB: 1970  MRN: [id number]     Referring Provider: Yanet Steele D.O.  [address] [place] [address]   Referring Diagnosis Brain injury, without loss of consciousness, subsequent encounter [S06.9X0D]     Time Calculation    Start time: 0100  Stop time: 0200 Time Calculation (min): 60 minutes             Chief Complaint: Extremity Weakness (left UE, motor control)    Visit Diagnoses     ICD-10-CM   1. Brain injury w/o open intracranial wound and with no LOC, subsequent encounter  S06.9X0D       Subjective:   History of Present Illness:     Date of onset:  3/9/2021    Date of surgery:  3/9/2021    Mechanism of injury:  Pt with GBM right medial parietal lobe s/p right frontal parietal craniotomy for biopsy and resection on 3/9/2021, now with residual LUE weakness and decreased motor control which is affecting her ability to perform her ADLs and iADLs.  Pt currently undergoing XRT.  Quality of life:  Poor  Prior level of function:  Independent ADLs, iADLs, driving, walking without AD, was working as an , enjoys hiking, cooking, birding  Pain:     Current pain ratin    Location:  Pain in biceps tendon when flexing elbow    Quality:  Aching    Pain timing:  Intermittent    Relieving factors:  Rest    Exacerbated by: when engaging in tasks that require elbow flexion.  Social Support:     Lives in:  Multiple-level home    Lives with:  Spouse (1 small dog)  Hand dominance:  Right  Treatments:     Previous treatment:  Occupational therapy and physical therapy    Discharged from (in last 30 days): rehabilitation facility      Treatment Comments:  [facility]: 3/15/21-3/27/21  Activities of Daily Living:     Patient  reported ADL status: Mod I ADLs with extra time, pain, and compensation for LUE.  Mod assist iADLs, shares with .  Was performing all iADLs previously.  Dependent for driving.  Patient Goals:     Patient goals for therapy:  Increased strength, decreased pain, independence with ADLs/IADLs and return to work    Other patient goals:  To hold my arm up to do dishes      Past Medical History:   Diagnosis Date   • Anxiety    • Complicated migraine 6/9/2014   • Depression    • Dermatitis, contact 11/16/2015   • Fatigue 6/9/2014   • Hyperlipidemia 1/23/2015   • Lentigo 10/17/2018   • Menopausal symptom 7/2/2014   • Mixed anxiety and depressive disorder 6/9/2014   • Seborrheic keratoses 10/17/2018   • TMJ arthralgia 6/9/2014   • UTI (lower urinary tract infection)      Past Surgical History:   Procedure Laterality Date   • CRANIOTOMY STEALTH Right 3/9/2021    Procedure: RIGHT CRANIOTOMY, USING FRAMELESS STEREOTAXY - FOR OPEN BIOPSY WITH PHASE REVERSAL;  Surgeon: Maxwell Mao M.D.;  Location: [facility];  Service: Neurosurgery   • MYRINGOTOMY  8/27/2010    Performed by [address] at SURGERY SAME DAY [facility] ORS   • LAPAROSCOPY  5/28/2010    Performed by JACKI SHARMA at [facility] ORS   • LYMPH NODE SAMPLING  5/28/2010    Performed by JACKI SHARMA at [facility] ORS   • DEBULKING  5/28/2010    Performed by JACKI SHARMA at [facility] ORS   • CYSTOSCOPY  10/15/08    Performed by YU GODINEZ at SURGERY SAME DAY [facility] ORS   • VAGINAL HYSTERECTOMY SCOPE TOTAL  10/15/08    Performed by YU GODINEZ at SURGERY SAME DAY [facility] ORS   • OTHER  1984    TONSILECTOMY/ ADNOIDS   • APPENDECTOMY  1981   • TONSILLECTOMY AND ADENOIDECTOMY       Social History     Tobacco Use   • Smoking status: Never Smoker   • Smokeless tobacco: Never Used   Substance Use Topics   • Alcohol use: Not Currently     Alcohol/week: 0.0 oz     Family and Occupational History     Socioeconomic  History   • Marital status:      Spouse name: Not on file   • Number of children: Not on file   • Years of education: Not on file   • Highest education level: Not on file   Occupational History   • Not on file       Objective:   Active Range of Motion:   Upper extremity (left):     All left upper extremity active range of motion: All within functional limits  Upper extremity (right):     All right upper extremity active range of motion: All within functional limits      Strength:   Upper extremity strength (left):     Shoulder flexion: 4    Shoulder abduction: 4    Shoulder external rotation:  4    Shoulder internal rotation: 4    Elbow flexion: 4    Elbow extension: 4    Forearm pronation: 4    Forearm supination: 4    Wrist flexion: 4    Wrist extension: 4    Fingers flexion: 4    Fingers extension: 4    Fingers abduction: 4    Fingers adduction: 4  Upper extremity strength (right):    Shoulder flexion: 5    Shoulder abduction: 5    Shoulder external rotation: 5    Shoulder internal rotation: 5    Elbow flexion: 5    Elbow extension: 5    Forearm pronation: 5    Forearm supination: 5    Wrist flexion: 5    Wrist extension: 5    Fingers flexion: 5    Fingers extension: 5    Fingers abduction: 5    Fingers adduction: 5    , Prehension, Pinch:  /Prehension (left):      strength: Impaired (45lbs)    Opposition: Impaired (decreased speed and control, mild tremor)  /Prehension (right):      strength: Impaired (55lbs)    Opposition: Within functional limits  Pinch (left):     Pinch (lateral): Within functional limits (13lbs)  Pinch (right):    Pinch (lateral): Within functional limits (12lbs)    Tone, Sensation and Coordination:   Tone:     Left upper extremity muscle tone: Intentional tremors    Right upper extremity muscle tone: Normal    Modified Gracia:     Tone comments:   Left with mild intentional tremors increased with sustained fine motor tasks    Sensation   Upper extremity (left):      Light touch: Intact    Sharp/dull: Intact     Stereognosis: Intact     Proprioception: Intact   Upper extremity (right):     Light touch: Intact    Sharp/dull: Intact     Stereognosis: Intact     Proprioception: Intact     Sensation comments:   [place]: Normal bilateral hands  Kinesthetic awareness: WNL  Left biceps tendons with mild-moderate TTP    Coordination   Upper extremity (left):     Fine motor: Impaired    Gross motor: Impaired    Finger to finger: Impaired (decreased speed and accuracy, mild tremor)    Finger touch to nose: Impaired (decreased speed and accuracy, mild tremor)  Upper extremity (right):     Fine motor: Within functional limits    Gross motor: Within functional limits    Finger to finger: Within functional limits    Finger touch to nose: Within functional limits      Coordination comments:   Grooved Pegboard: Left: 151 seconds, Right: 59 seconds    Cognition:     Orientation: normal to time, normal to place and normal to person    Direction following: three step    Short term memory: intact (able to recall 3/3 items after 2 minutes)    Long term memory: intact    Attention span: intact    Sequencing: intact    Organization: intact    Problem solving: intact    Judgement and safety awareness: intact    Hearing: intact    Vision/Perception:     Visual tracking: intact    Convergence: intact    Visual attentions: intact    Visual acuity: intact    Visual scanning: intact    Spatial relations: intact    Vision assistive device(s): glasses    Ambulation: Level Surfaces   Ambulation with assistive device: independent (4WW)        Therapeutic Treatments and Modalities:    1. Manual Therapy (CPT 49666)    Therapeutic Treatments and Modalities Summary: TFM to biceps tendon (long head) and STM to biceps with a positive response of decreased pain    Time-based treatments/modalities:    Occupational Therapy Timed Treatment Charges  Manual therapy joint mobilization minutes (CPT 81368): 10  minutes      Assessment, Response and Plan:   Impairments: abnormal ADL function, abnormal coordination, abnormal muscle firing, activity intolerance, difficulty performing job, fine motor function, impaired physical strength, lacks appropriate home exercise program and pain with function    Assessment details:  Pt is a pleasant 50 y.o with GBM right medial parietal lobe s/p right frontal parietal craniotomy for biopsy and resection on 3/9/2021 who presents to outpatient OT functioning below baseline secondary to decreased LUE strength, decreased bilateral  strength, decreased left fine/gross motor control with mild intention tremors, decreased LUE muscle endurance, and left biceps pain which is affecting her ability to perform her ADLs and iADLs.    Prognosis: good    Goals:   Short Term Goals:   Pt will increase her bilateral  strength >= 5lbs for opening jars and carrying grocery bags  Pt will improve her left hand FMC to engage in sustained fine motor tasks with minimal tremors  Pt will engage in the OT pre-driving assessment to determine whether she is safe to operate a motor vehicle  Pt will increase her LUE strength 1/2 grade to perform homemaking tasks such as washing dishes with only mild reports of fatigue  Short term goal timespan:  2-4 weeks    Long Term Goals:   Pt will perform her ADLs and iADLs with minimal extra time and no reports of pain  Pt will be independent HEP for stretching, strengthening, and motor control  Pt will perform the Grooved Pegboard with her LH in <= 121 seconds  Pt will score >= 40/80 on the UEFI  Long term goal timespan:  4-6 weeks    Plan:   Therapy options:  Occupational therapy treatment to continue  Planned therapy interventions:  Manual Therapy (CPT 35140), Neuromuscular Re-education (CPT 10395), Therapeutic Exercise (CPT 65441), Therapeutic Activities (CPT 24813) and Functional Training, Self Care (CPT 22828)  Frequency:  2x week  Duration in weeks:  5  Duration in  visits:  10  Discussed with:  Patient      Functional Assessment Used  OT Functional Assessment Tool Used: UEFI  OT Functional Assessment Score: 23/80     Referring provider co-signature:  I have reviewed this plan of care and my co-signature certifies the need for services.    Certification Period: 04/29/2021 to  07/15/21    Physician Signature: ________________________________ Date: ______________

## 2021-04-30 ENCOUNTER — DOCUMENTATION (OUTPATIENT)
Dept: RADIATION ONCOLOGY | Facility: MEDICAL CENTER | Age: 51
End: 2021-04-30

## 2021-04-30 ENCOUNTER — HOSPITAL ENCOUNTER (OUTPATIENT)
Dept: RADIATION ONCOLOGY | Facility: MEDICAL CENTER | Age: 51
End: 2021-04-30
Payer: COMMERCIAL

## 2021-04-30 LAB
CHEMOTHERAPY INFUSION START DATE: NORMAL
CHEMOTHERAPY RECORDS: 2
CHEMOTHERAPY RECORDS: 4600
CHEMOTHERAPY RECORDS: NORMAL
CHEMOTHERAPY RX CANCER: NORMAL
DATE 1ST CHEMO CANCER: NORMAL
RAD ONC ARIA COURSE LAST TREATMENT DATE: NORMAL
RAD ONC ARIA COURSE TREATMENT ELAPSED DAYS: NORMAL
RAD ONC ARIA REFERENCE POINT DOSAGE GIVEN TO DATE: 40
RAD ONC ARIA REFERENCE POINT DOSAGE GIVEN TO DATE: 41.36
RAD ONC ARIA REFERENCE POINT ID: NORMAL
RAD ONC ARIA REFERENCE POINT ID: NORMAL
RAD ONC ARIA REFERENCE POINT SESSION DOSAGE GIVEN: 2
RAD ONC ARIA REFERENCE POINT SESSION DOSAGE GIVEN: 2.07

## 2021-04-30 PROCEDURE — 77014 PR CT GUIDANCE PLACEMENT RAD THERAPY FIELDS: CPT | Mod: 26 | Performed by: RADIOLOGY

## 2021-04-30 PROCEDURE — 77386 HCHG IMRT DELIVERY COMPLEX: CPT | Performed by: RADIOLOGY

## 2021-04-30 PROCEDURE — 77427 RADIATION TX MANAGEMENT X5: CPT | Performed by: RADIOLOGY

## 2021-04-30 NOTE — PROGRESS NOTES
Nutrition Services: RD Consultation/ New consult from VEE Reilly Cesia Frye is a 50 y.o. female with diagnosis of glioblastoma of frontal lobe.  Per MA, patient with questions about weight gain/loss    Past Medical History:   Diagnosis Date   • Anxiety    • Complicated migraine 6/9/2014   • Depression    • Dermatitis, contact 11/16/2015   • Fatigue 6/9/2014   • Hyperlipidemia 1/23/2015   • Lentigo 10/17/2018   • Menopausal symptom 7/2/2014   • Mixed anxiety and depressive disorder 6/9/2014   • Seborrheic keratoses 10/17/2018   • TMJ arthralgia 6/9/2014   • UTI (lower urinary tract infection)        RD met with pt to assess current intake, appetite, and nutritional status.  Pt presents to appointment alone.  Pt appears nourished and in good spirits.  Patient reports she has gained weight and would like some information on how to be healthier.  She reports that prior to her diagnosis she dieted by calorie counting and lost ~ 40 pounds.  At this time she has been eating for comfort throughout treatment and finds that she is often eating even when she is not hungry because it makes her feel better.  She reports she loves all foods and in general eats a lot of fruits and vegetables but she will also include treats like McDonalds, Starbucks or [employer] frequently.  She reports some nausea with treatment but no vomiting or other side effects at this time.    We discussed nutrition related goals during treatment as well and RD role. Discussed importance of managing any side effects first and then focusing on a healthy diet.  Given the fact that patient has had limited side effects from treatment I encouraged her to incorporate more healthy eating strategies with a focus on weight loss/management per her preferences.  We discussed: building meals around fruits, vegetables and proteins first, listening to the body and eating only when hungry, including lean animal protein and/fish with most meals, drinking water  throughout the day as often thirst can be mistaken for hunger, drinking 1 cup of water before consuming caffeine containing beverages, limiting consumption of high sugar/added sugar products, limit red meat to 2-3 servings per week at most, limit processed meats, smaller more frequent meals and sitting down for meals as able.  Encouraged patient to use mindful eating and explore ways to find comfort in non-food related areas of her life such as talking with family/friends, baths or being in nature.  Encouraged patient to look into resources such as Body Kindness and Intuitive Eating.  Encouraged patient to find a balanced approach to eating and comfort/coping strategies and emphasized that she is allowed to eat and enjoy the foods that she likes as that is part of the whole picture of wellness too.  Patient very appreciative and receptive.     Assessment:  • Pertinent Labs: last chem panel 3/22 reviewed  • Pertinent Meds: reviewed  • Chemo: Temodar oral  • Weight: 106 kg/234 pounds (4/28/2021)   • Height: 5'7''  • BMI: 36      Plan/Recommendations:  • Provided and discussed handout regarding general nutrition guidelines as well as nutritional recommendations for patient's with cancer with emphasis on weight management.  Focuses on mindful eating strategies and encouraged patient to look into Body Kindness and/or Intuitive Eating resources.  • Provided handouts and went over them with patient - more details listed above.  • Focus on high protein foods with fruits and vegetables at all meals/snacks - handout provided.  • Drink water frequently throughout the day. Discussed flavored water, sparkling water and other calorie free options to flavor water.      Pt reports understanding and was receptive to information provided. RD provided contact information. Encouraged pt to reach out as questions/concerns arise.   RD is available PRN.

## 2021-05-03 ENCOUNTER — HOSPITAL ENCOUNTER (OUTPATIENT)
Dept: RADIATION ONCOLOGY | Facility: MEDICAL CENTER | Age: 51
End: 2021-05-03
Payer: COMMERCIAL

## 2021-05-03 ENCOUNTER — HOSPITAL ENCOUNTER (OUTPATIENT)
Dept: RADIATION ONCOLOGY | Facility: MEDICAL CENTER | Age: 51
End: 2021-05-31
Attending: RADIOLOGY
Payer: COMMERCIAL

## 2021-05-03 LAB
CHEMOTHERAPY INFUSION START DATE: NORMAL
CHEMOTHERAPY RECORDS: 2
CHEMOTHERAPY RECORDS: 4600
CHEMOTHERAPY RECORDS: NORMAL
CHEMOTHERAPY RX CANCER: NORMAL
DATE 1ST CHEMO CANCER: NORMAL
RAD ONC ARIA COURSE LAST TREATMENT DATE: NORMAL
RAD ONC ARIA COURSE TREATMENT ELAPSED DAYS: NORMAL
RAD ONC ARIA REFERENCE POINT DOSAGE GIVEN TO DATE: 42
RAD ONC ARIA REFERENCE POINT DOSAGE GIVEN TO DATE: 43.43
RAD ONC ARIA REFERENCE POINT ID: NORMAL
RAD ONC ARIA REFERENCE POINT ID: NORMAL
RAD ONC ARIA REFERENCE POINT SESSION DOSAGE GIVEN: 2
RAD ONC ARIA REFERENCE POINT SESSION DOSAGE GIVEN: 2.07

## 2021-05-03 PROCEDURE — 77014 PR CT GUIDANCE PLACEMENT RAD THERAPY FIELDS: CPT | Mod: 26 | Performed by: RADIOLOGY

## 2021-05-03 PROCEDURE — 77386 HCHG IMRT DELIVERY COMPLEX: CPT | Performed by: RADIOLOGY

## 2021-05-04 ENCOUNTER — HOSPITAL ENCOUNTER (OUTPATIENT)
Dept: RADIATION ONCOLOGY | Facility: MEDICAL CENTER | Age: 51
End: 2021-05-04
Payer: COMMERCIAL

## 2021-05-04 ENCOUNTER — PHYSICAL THERAPY (OUTPATIENT)
Dept: PHYSICAL THERAPY | Facility: REHABILITATION | Age: 51
End: 2021-05-04
Attending: INTERNAL MEDICINE
Payer: COMMERCIAL

## 2021-05-04 DIAGNOSIS — R26.9 GAIT ABNORMALITY: ICD-10-CM

## 2021-05-04 DIAGNOSIS — R26.89 BALANCE PROBLEMS: ICD-10-CM

## 2021-05-04 DIAGNOSIS — C71.1 MALIGNANT NEOPLASM OF FRONTAL LOBE (HCC): ICD-10-CM

## 2021-05-04 DIAGNOSIS — R53.1 WEAKNESS GENERALIZED: ICD-10-CM

## 2021-05-04 LAB
CHEMOTHERAPY INFUSION START DATE: NORMAL
CHEMOTHERAPY RECORDS: 2
CHEMOTHERAPY RECORDS: 4600
CHEMOTHERAPY RECORDS: NORMAL
CHEMOTHERAPY RX CANCER: NORMAL
DATE 1ST CHEMO CANCER: NORMAL
RAD ONC ARIA COURSE LAST TREATMENT DATE: NORMAL
RAD ONC ARIA COURSE TREATMENT ELAPSED DAYS: NORMAL
RAD ONC ARIA REFERENCE POINT DOSAGE GIVEN TO DATE: 44
RAD ONC ARIA REFERENCE POINT DOSAGE GIVEN TO DATE: 45.5
RAD ONC ARIA REFERENCE POINT ID: NORMAL
RAD ONC ARIA REFERENCE POINT ID: NORMAL
RAD ONC ARIA REFERENCE POINT SESSION DOSAGE GIVEN: 2
RAD ONC ARIA REFERENCE POINT SESSION DOSAGE GIVEN: 2.07

## 2021-05-04 PROCEDURE — 77338 DESIGN MLC DEVICE FOR IMRT: CPT | Performed by: RADIOLOGY

## 2021-05-04 PROCEDURE — 97116 GAIT TRAINING THERAPY: CPT

## 2021-05-04 PROCEDURE — 77014 PR CT GUIDANCE PLACEMENT RAD THERAPY FIELDS: CPT | Mod: 26 | Performed by: RADIOLOGY

## 2021-05-04 PROCEDURE — 77300 RADIATION THERAPY DOSE PLAN: CPT | Mod: 26 | Performed by: RADIOLOGY

## 2021-05-04 PROCEDURE — 77338 DESIGN MLC DEVICE FOR IMRT: CPT | Mod: 26 | Performed by: RADIOLOGY

## 2021-05-04 PROCEDURE — 97110 THERAPEUTIC EXERCISES: CPT

## 2021-05-04 PROCEDURE — 77300 RADIATION THERAPY DOSE PLAN: CPT | Performed by: RADIOLOGY

## 2021-05-04 PROCEDURE — 97112 NEUROMUSCULAR REEDUCATION: CPT

## 2021-05-04 PROCEDURE — 77386 HCHG IMRT DELIVERY COMPLEX: CPT | Performed by: RADIOLOGY

## 2021-05-04 NOTE — OP THERAPY DAILY TREATMENT
Outpatient Physical Therapy  DAILY TREATMENT     [facility] Physical [address]  [address]  [place] [address]  Phone:  [phone]  Fax:  [phone]    Date: 05/04/2021    Patient: Melba Frye  YOB: 1970  MRN: [id number]     Time Calculation    Start time: 1327  Stop time: 1430 Time Calculation (min): 63 minutes         Chief Complaint: Weakness, Loss Of Balance, and Unsteady Gait    Visit #: 3    SUBJECTIVE:  Pt reported having increased headaches in prone or when turning her head a certain way that improve immediately after change in position. Encouraged pt to contact MD with report.     OBJECTIVE:  Demonstrated improved knee extension during stance as well as increase step length of Rt LE post session.        Therapeutic Exercises (CPT 92181):     1. Tall kneeling, x 3 min total, chair    2. Tall kneeling heel sits, x 12, red round bolster      Therapeutic Exercise Summary: HEP: Added tall kneeling on couch,ambulation with 1 walking stick.    Tall kneeling heel sit: red bolster placed behind knees as pt does not have strength to perform full ROM. Mod tactile cues at trunk for upright posture to dec hip hinge and target quads.    When moving in to quadruped stepping knee onto mat table pt Lt LE unable to hip flex and knee flex rather was stuck in extension. Pt actively trying to flex hip but unable and collapsed forward onto mat from quadruped, no injury.     Therapeutic Treatments and Modalities:     1. Gait Training (CPT 33440), see below    Therapeutic Treatment and Modalities Summary: Gait training:  For improving quad control Lt LE in stance and improving single limb stance on Lt LE.  [facility] and to free up PT hands for facilitation  Lt LE positioned off of treadmill belt, cues to take large step with Rt LE to clear Lt LE. PT facilitated knee extension during stance on Lt Le via tapping to quad and input to hip extensors for  stabilty. Pt ambulated .4 mph for 11 min total with 2 seated rests. Without tactile cues pt achieve full knee ext approx 50% of time during stance.   Progressed to using bilateral walking sticks x 50 feet with mod cues for knee ext. Further progressed to 1 walking stick in RT UE with improved gait mechanics x 50 feet and mod vc.   For improving stabilty and stride length pt positioned in split stance, RT LE in front and Rt UE walking stick and instructed to take large step backwards on RT LE, then return to start position. Mod tactile cues provided to Rt quad for knee ext during stance and mod VC to take large step to clear Lt LE. X 5 min    Educated pt and demonstrated 2 point and 4 point gait with bilateral and single walking stick.     Time-based treatments/modalities:    Physical Therapy Timed Treatment Charges  Gait training minutes (CPT 48412): 48 minutes  Therapeutic exercise minutes (CPT 44128): 15 minutes        ASSESSMENT:     PT contacted Dr. Cerda office to inform them of pt headaches in prone and with head movements, left message.     Pt demonstrated fair carryover of gait training post session with improve Right step length and Lt knee ext in stance. Occasional clonus of Lt PF with fatigue while ambulating the improved with weight bearing. Progressed pt to ambulating with single walking stick in Rt UE as when ambulating with bilateral walking sticks left UE would lag behind and was not necessary for stabilty.     Pt had a synergy/motor planning deficit as noted moving in to quadruped from standing onto mat table with inability to flex left hip and left knee rather knee/LE staying in extension. This had been observed last session however therapist had believed it was related to fatigue as rectus femoris had been targeted for multiple interventions. Will continue to assess.    Pt will continue to benefit from skilled intervention in order to improve gait mechanics, balance, and strength in order to  increase independence and return to PLOF.     Short Term Goals:   1. Pt will ambulate on uneven surfaces with LRAD  feet in order to improve access to the community.  2. Pt will ascend/descend 16 steps with LRAD independently in order to access her home without supervision.   3. Pt will demonstrate compliance with HEP for improving LE and UE strength and stability in order to progress the POC.   4. Pt will ambulate with LRAD with heel strike 50% of the time without cueing.  Short term goal time span:  4-6 weeks      Long Term Goals:    1. Pt will ambulate mod I with LRAD on uneven surfaces x 1000 feet in order to safely return to hiking and increase independent access to the community.   2. Pt will demonstrate improved functional mobilty with TUG score 15 seconds or less with LRAD.  3. Pt will demonstrate improved dynamic balance with appropriate outcome measure indicating pt at low risk of falling.   4. Pt will demonstrate improved Lt LE strength with ability to perform sit sot stand without UE without trunk shift.     PLAN/RECOMMENDATIONS:   Plan for treatment: therapy treatment to continue next visit.  Planned interventions for next visit: continue with current treatment.

## 2021-05-05 ENCOUNTER — HOSPITAL ENCOUNTER (OUTPATIENT)
Dept: RADIATION ONCOLOGY | Facility: MEDICAL CENTER | Age: 51
End: 2021-05-05
Payer: COMMERCIAL

## 2021-05-05 VITALS
BODY MASS INDEX: 36.5 KG/M2 | TEMPERATURE: 99.3 F | HEART RATE: 92 BPM | OXYGEN SATURATION: 96 % | WEIGHT: 233.03 LBS | SYSTOLIC BLOOD PRESSURE: 147 MMHG | DIASTOLIC BLOOD PRESSURE: 91 MMHG

## 2021-05-05 LAB
CHEMOTHERAPY INFUSION START DATE: NORMAL
CHEMOTHERAPY INFUSION START DATE: NORMAL
CHEMOTHERAPY INFUSION STOP DATE: NORMAL
CHEMOTHERAPY RECORDS: 1400
CHEMOTHERAPY RECORDS: 2
CHEMOTHERAPY RECORDS: 4600
CHEMOTHERAPY RECORDS: 4600
CHEMOTHERAPY RECORDS: NORMAL
CHEMOTHERAPY RX CANCER: NORMAL
DATE 1ST CHEMO CANCER: NORMAL
RAD ONC ARIA COURSE LAST TREATMENT DATE: NORMAL
RAD ONC ARIA COURSE TREATMENT ELAPSED DAYS: NORMAL
RAD ONC ARIA COURSE TREATMENT ELAPSED DAYS: NORMAL
RAD ONC ARIA REFERENCE POINT DOSAGE GIVEN TO DATE: 0
RAD ONC ARIA REFERENCE POINT DOSAGE GIVEN TO DATE: 46
RAD ONC ARIA REFERENCE POINT DOSAGE GIVEN TO DATE: 47.57
RAD ONC ARIA REFERENCE POINT ID: NORMAL
RAD ONC ARIA REFERENCE POINT SESSION DOSAGE GIVEN: 2
RAD ONC ARIA REFERENCE POINT SESSION DOSAGE GIVEN: 2.07

## 2021-05-05 PROCEDURE — 77014 PR CT GUIDANCE PLACEMENT RAD THERAPY FIELDS: CPT | Mod: 26 | Performed by: RADIOLOGY

## 2021-05-05 PROCEDURE — 77386 HCHG IMRT DELIVERY COMPLEX: CPT | Performed by: RADIOLOGY

## 2021-05-05 PROCEDURE — 77336 RADIATION PHYSICS CONSULT: CPT | Performed by: RADIOLOGY

## 2021-05-05 ASSESSMENT — PAIN SCALES - GENERAL: PAINLEVEL: NO PAIN

## 2021-05-05 ASSESSMENT — FIBROSIS 4 INDEX: FIB4 SCORE: 0.6

## 2021-05-05 NOTE — ON TREATMENT VISIT
ON TREATMENT  NOTE  RADIATION ONCOLOGY DEPARTMENT    Patient name:  Melba Frye    Primary Physician:  Trinity Nguyen M.D. MRN: [id number]  CSN: [id number]   Referring physician:  Maxwell Mao M.D. : 1970, 50 y.o.     ENCOUNTER DATE:  21    DIAGNOSIS:  Glioblastoma of frontal lobe (HCC)  Staging form: Brain and Spinal Cord, AJCC 8th Edition  - Pathologic stage from 3/24/2021: WHO Grade IV - Signed by Edenilson Frye M.D. on 3/24/2021  Histologic grading system: 4 grade system  IDH1 mutation: Negative  IDH2 mutation: Negative  MGMT methylation: Absent      TREATMENT SUMMARY:  Aria Treatment Information        Some values may be hidden. Unless noted otherwise, only the newest values recorded on each date are displayed.         Aria Treatment Summary 21   Course First Treatment Date 2021   Course Last Treatment Date 2021   R Front GBM Plan from Course C1_GBM   Fraction 23 of 23   Elapsed Course Days  @ [id number]   Prescribed Fraction Dose 200 cGy   Prescribed Total Dose 4,600 cGy   R Front GBM Reference Point from Course C1_GBM   Elapsed Course Days  @ [id number]   Session Dose 200 cGy   Total Dose 4,600 cGy   R Front GBM CP Reference Point from Course C1_GBM   Elapsed Course Days  @ [id number]   Session Dose 207 cGy   Total Dose 4,757 cGy             SUBJECTIVE:  Well appearing, mild erythema      VITAL SIGNS:  KPS: 90, Able to carry on normal activity; minor signs or symptoms of disease (ECOG equivalent 0)  Encounter Vitals  Temperature: 37.4 °C (99.3 °F)  Blood Pressure: 147/91  Pulse: 92  Pulse Oximetry: 96 %  Weight: 106 kg (233 lb 0.4 oz)  Pain Score: No pain  Pain Assessment 2021   Pain Score 0 0 0 0 0 0   Some recent data might be hidden          PHYSICAL EXAM:    Physical Exam  Constitutional:       Appearance: Normal appearance.   HENT:      Head: Normocephalic and atraumatic.   Skin:     Findings:  Erythema present.   Neurological:      Mental Status: She is alert.          Toxicity Assessment 5/5/2021 4/28/2021 4/21/2021 4/14/2021 4/7/2021   Toxicity Assessment Brain Brain Brain Brain Brain   Fatigue (lethargy, malaise, asthenia) Increased fatigue over baseline, but not altering normal activities Increased fatigue over baseline, but not altering normal activities None Increased fatigue over baseline, but not altering normal activities None   Radiation Dermatitis Faint erythema or dry desquamation Faint erythema or dry desquamation Faint erythema or dry desquamation None None   Nausea Able to eat Able to eat None None Able to eat   Mouth Dryness Mild Mild Mild Mild Mild   Headache Mild pain not interfering with function None None Mild pain not interfering with function Mild pain not interfering with function   External Auditory Canal Normal Normal Normal Normal Normal   Inner Ear/Hearing Normal Normal Normal Normal Normal   Middle Ear/Hearing Normal Normal Normal Normal Normal   Dizziness/lightheadedness None None None None None   Memory loss Normal Normal Normal Normal Normal   Neuropathy - Motor Normal Normal Normal Subjective weakness but no objective findings Normal   Seizure None None None None None   Vertigo None None None None None       CURRENT MEDICATIONS:    Current Outpatient Medications:   •  cephALEXin (KEFLEX) 500 MG Cap, , Disp: , Rfl:   •  ondansetron (ZOFRAN) 4 MG Tab tablet, , Disp: , Rfl:   •  sulfamethoxazole-trimethoprim (BACTRIM DS) 800-160 MG tablet, , Disp: , Rfl:   •  hydrOXYzine HCl (ATARAX) 50 MG Tab, Take 1 tablet by mouth every 8 hours as needed for Anxiety., Disp: 90 tablet, Rfl: 0  •  temozolomide (TEMODAR) 5 MG capsule, , Disp: , Rfl:   •  temozolomide (TEMODAR) 140 MG capsule, 150mg once a day (140mg + 2, 5mg tabs), Disp: , Rfl:   •  levetiracetam (KEPPRA) 750 MG tablet, Take 1 tablet by mouth 2 times a day for 90 days., Disp: 180 tablet, Rfl: 2  •  melatonin 3 MG Tab, Take 1  tablet by mouth at bedtime as needed (insomnia)., Disp: 30 tablet, Rfl: 2  •  lamoTRIgine (LAMICTAL) 100 MG Tab, Take 1 tablet by mouth 2 Times a Day., Disp: 180 tablet, Rfl: 2  •  venlafaxine (EFFEXOR) 25 MG Tab, Take 0.5 Tablets by mouth 2 Times a Day., Disp: 90 tablet, Rfl: 2  •  acetaminophen (TYLENOL) 325 MG Tab, Take 2 Tablets by mouth every 6 hours as needed (Mild Pain; (Pain scale 1-3); Temp greater than 100.5 F)., Disp: 30 tablet, Rfl: 0  •  folic acid (FOLVITE) 1 MG Tab, Take 1 tablet by mouth every day., Disp: 30 tablet, Rfl:   •  VITAMIN D PO, Take 1 Units by mouth every evening., Disp: , Rfl:   •  multivitamin (THERAGRAN) Tab, Take 1 tablet by mouth every evening., Disp: , Rfl:     LABORATORY DATA:   Lab Results   Component Value Date/Time    SODIUM 140 03/22/2021 06:15 AM    POTASSIUM 4.4 03/22/2021 06:15 AM    CHLORIDE 104 03/22/2021 06:15 AM    CO2 28 03/22/2021 06:15 AM    GLUCOSE 78 03/22/2021 06:15 AM    BUN 12 03/22/2021 06:15 AM    CREATININE 0.57 03/22/2021 06:15 AM         Lab Results   Component Value Date/Time    WBC 15.2 (H) 03/16/2021 06:03 AM    HEMOGLOBIN 13.4 03/16/2021 06:03 AM    HEMATOCRIT 39.3 03/16/2021 06:03 AM    MCV 96.3 03/16/2021 06:03 AM    PLATELETCT 226 03/16/2021 06:03 AM        RADIOLOGY DATA:  No results found.    IMPRESSION:  Glioblastoma of frontal lobe (HCC)  Staging form: Brain and Spinal Cord, AJCC 8th Edition  - Pathologic stage from 3/24/2021: WHO Grade IV - Signed by Edenilson Frye M.D. on 3/24/2021  Histologic grading system: 4 grade system  IDH1 mutation: Negative  IDH2 mutation: Negative  MGMT methylation: Absent      PLAN:  No change in treatment plan    Disposition:  Treatment plan reviewed. Questions answered. Continue therapy outlined.     Edenilson Frye M.D.    No orders of the defined types were placed in this encounter.

## 2021-05-06 ENCOUNTER — HOSPITAL ENCOUNTER (OUTPATIENT)
Dept: RADIATION ONCOLOGY | Facility: MEDICAL CENTER | Age: 51
End: 2021-05-06
Payer: COMMERCIAL

## 2021-05-06 ENCOUNTER — APPOINTMENT (OUTPATIENT)
Dept: PHYSICAL THERAPY | Facility: REHABILITATION | Age: 51
End: 2021-05-06
Payer: COMMERCIAL

## 2021-05-06 ENCOUNTER — TELEPHONE (OUTPATIENT)
Dept: RADIATION ONCOLOGY | Facility: MEDICAL CENTER | Age: 51
End: 2021-05-06

## 2021-05-06 DIAGNOSIS — G40.109 LOCALIZATION-RELATED FOCAL EPILEPSY WITH SIMPLE PARTIAL SEIZURES (HCC): ICD-10-CM

## 2021-05-06 LAB
CHEMOTHERAPY INFUSION START DATE: NORMAL
CHEMOTHERAPY RECORDS: 1400
CHEMOTHERAPY RECORDS: 2
CHEMOTHERAPY RECORDS: NORMAL
CHEMOTHERAPY RX CANCER: NORMAL
DATE 1ST CHEMO CANCER: NORMAL
RAD ONC ARIA COURSE LAST TREATMENT DATE: NORMAL
RAD ONC ARIA COURSE TREATMENT ELAPSED DAYS: NORMAL
RAD ONC ARIA REFERENCE POINT DOSAGE GIVEN TO DATE: 2
RAD ONC ARIA REFERENCE POINT DOSAGE GIVEN TO DATE: 2.07
RAD ONC ARIA REFERENCE POINT ID: NORMAL
RAD ONC ARIA REFERENCE POINT ID: NORMAL
RAD ONC ARIA REFERENCE POINT SESSION DOSAGE GIVEN: 2
RAD ONC ARIA REFERENCE POINT SESSION DOSAGE GIVEN: 2.07

## 2021-05-06 PROCEDURE — 77014 PR CT GUIDANCE PLACEMENT RAD THERAPY FIELDS: CPT | Mod: 26 | Performed by: RADIOLOGY

## 2021-05-06 PROCEDURE — 77386 HCHG IMRT DELIVERY COMPLEX: CPT | Performed by: RADIOLOGY

## 2021-05-06 RX ORDER — LEVETIRACETAM 1000 MG/1
1000 TABLET ORAL 2 TIMES DAILY
Qty: 180 TABLET | Refills: 3 | Status: SHIPPED | OUTPATIENT
Start: 2021-05-06 | End: 2021-06-01

## 2021-05-06 NOTE — PROGRESS NOTES
Patient had 3 minute focal seizure today, the first in several week. Occurred shortly after radiation. Recommending increasing keppra to 1000 mg BID and continuing lamictal taper (she is currently at 25 mg BID and will be on this for two more weeks before stopping).    Fady Davidson M.D.  Neurology/Epileptology

## 2021-05-06 NOTE — TELEPHONE ENCOUNTER
"S: Patient called as per her request  B: Patient states,\"I had a seizure at 1:30 pm today. I had therapy this morning at 0945.\"  A: Patient reports that she feels \"okay now\" and her friend is with her. Per patient the seizure lasted \"about four minutes and I'm weaning off Lamictal (lamotrigine).\"  R: Patient advised to report to ER with continued seizure activity, avoid being alone, and contact her neurologist ASAP as this may be medication related.    "

## 2021-05-07 ENCOUNTER — HOSPITAL ENCOUNTER (OUTPATIENT)
Dept: RADIATION ONCOLOGY | Facility: MEDICAL CENTER | Age: 51
End: 2021-05-07
Payer: COMMERCIAL

## 2021-05-07 LAB
CHEMOTHERAPY INFUSION START DATE: NORMAL
CHEMOTHERAPY RECORDS: 1400
CHEMOTHERAPY RECORDS: 2
CHEMOTHERAPY RECORDS: NORMAL
CHEMOTHERAPY RX CANCER: NORMAL
DATE 1ST CHEMO CANCER: NORMAL
RAD ONC ARIA COURSE LAST TREATMENT DATE: NORMAL
RAD ONC ARIA COURSE TREATMENT ELAPSED DAYS: NORMAL
RAD ONC ARIA REFERENCE POINT DOSAGE GIVEN TO DATE: 4
RAD ONC ARIA REFERENCE POINT DOSAGE GIVEN TO DATE: 4.14
RAD ONC ARIA REFERENCE POINT ID: NORMAL
RAD ONC ARIA REFERENCE POINT ID: NORMAL
RAD ONC ARIA REFERENCE POINT SESSION DOSAGE GIVEN: 2
RAD ONC ARIA REFERENCE POINT SESSION DOSAGE GIVEN: 2.07

## 2021-05-07 PROCEDURE — 77386 HCHG IMRT DELIVERY COMPLEX: CPT | Performed by: RADIOLOGY

## 2021-05-07 PROCEDURE — 77014 PR CT GUIDANCE PLACEMENT RAD THERAPY FIELDS: CPT | Mod: 26 | Performed by: RADIOLOGY

## 2021-05-07 PROCEDURE — 77427 RADIATION TX MANAGEMENT X5: CPT | Performed by: RADIOLOGY

## 2021-05-10 ENCOUNTER — HOSPITAL ENCOUNTER (OUTPATIENT)
Dept: RADIATION ONCOLOGY | Facility: MEDICAL CENTER | Age: 51
End: 2021-05-10

## 2021-05-10 ENCOUNTER — TELEMEDICINE (OUTPATIENT)
Dept: MEDICAL GROUP | Facility: IMAGING CENTER | Age: 51
End: 2021-05-10

## 2021-05-10 VITALS — BODY MASS INDEX: 36.41 KG/M2 | WEIGHT: 232 LBS | HEIGHT: 67 IN

## 2021-05-10 DIAGNOSIS — Z02.9 ADMINISTRATIVE ENCOUNTER: ICD-10-CM

## 2021-05-10 LAB
CHEMOTHERAPY INFUSION START DATE: NORMAL
CHEMOTHERAPY RECORDS: 1400
CHEMOTHERAPY RECORDS: 2
CHEMOTHERAPY RECORDS: NORMAL
CHEMOTHERAPY RX CANCER: NORMAL
DATE 1ST CHEMO CANCER: NORMAL
RAD ONC ARIA COURSE LAST TREATMENT DATE: NORMAL
RAD ONC ARIA COURSE TREATMENT ELAPSED DAYS: NORMAL
RAD ONC ARIA REFERENCE POINT DOSAGE GIVEN TO DATE: 6
RAD ONC ARIA REFERENCE POINT DOSAGE GIVEN TO DATE: 6.2
RAD ONC ARIA REFERENCE POINT ID: NORMAL
RAD ONC ARIA REFERENCE POINT ID: NORMAL
RAD ONC ARIA REFERENCE POINT SESSION DOSAGE GIVEN: 2
RAD ONC ARIA REFERENCE POINT SESSION DOSAGE GIVEN: 2.07

## 2021-05-10 PROCEDURE — 77386 HCHG IMRT DELIVERY COMPLEX: CPT | Performed by: RADIOLOGY

## 2021-05-10 PROCEDURE — 77014 PR CT GUIDANCE PLACEMENT RAD THERAPY FIELDS: CPT | Mod: 26 | Performed by: RADIOLOGY

## 2021-05-10 PROCEDURE — 7101 PR PHYSICAL: Mod: 95 | Performed by: FAMILY MEDICINE

## 2021-05-10 ASSESSMENT — FIBROSIS 4 INDEX: FIB4 SCORE: 0.6

## 2021-05-10 ASSESSMENT — PAIN SCALES - GENERAL: PAINLEVEL: NO PAIN

## 2021-05-10 NOTE — PROGRESS NOTES
Telemedicine Visit: New Patient     This encounter was conducted via Zoom.   Verbal consent was obtained. Patient's identity was verified. Patient aware this will be   billed the same as an in person evaluation.  Patient in home, I am in office at [address] [place] [address]  Subjective:     Chief Complaint   Patient presents with   • Paperwork       Melba Frye is a 50 y.o. female with history of glioblastoma on virtual visit to discuss paperwork for disability.  Patient is doing well she has 4 days left of her radiation.  She did sign it for [employer] and insurance did cover it so she will be starting that therapy as well soon.    ROS  See HPI  Constitutional: Negative for fever, chills and malaise/fatigue.   HENT: Negative for congestion, itchy watery eyes  Eyes: Negative for pain or sudden changes in vision  Respiratory: Negative for cough and shortness of breath.    Cardiovascular: Negative for leg swelling or chest pain  Gastrointestinal: Negative for nausea, vomiting, abdominal pain and diarrhea.   Genitourinary: Negative for dysuria and hematuria.   Skin: Negative for rash or concerning moles   Neurological: Negative for dizziness, focal weakness   Psychiatric/Behavioral: Negative for depression.  The patient is not nervous/anxious.    Musculoskeletal: no weakness or joint stiffness    No Known Allergies    Current medicines (including changes today)  Current Outpatient Medications   Medication Sig Dispense Refill   • levetiracetam (KEPPRA) 1000 MG tablet Take 1 tablet by mouth 2 times a day for 360 days. 180 tablet 3   • ondansetron (ZOFRAN) 4 MG Tab tablet      • sulfamethoxazole-trimethoprim (BACTRIM DS) 800-160 MG tablet      • hydrOXYzine HCl (ATARAX) 50 MG Tab Take 1 tablet by mouth every 8 hours as needed for Anxiety. 90 tablet 0   • temozolomide (TEMODAR) 5 MG capsule      • temozolomide (TEMODAR) 140 MG capsule 160 mg. 150mg once a day (140mg + 2, 5mg tabs)     • melatonin 3 MG Tab Take  1 tablet by mouth at bedtime as needed (insomnia). 30 tablet 2   • lamoTRIgine (LAMICTAL) 100 MG Tab Take 1 tablet by mouth 2 Times a Day. (Patient taking differently: Take 25 mg by mouth 2 times a day.) 180 tablet 2   • venlafaxine (EFFEXOR) 25 MG Tab Take 0.5 Tablets by mouth 2 Times a Day. 90 tablet 2   • acetaminophen (TYLENOL) 325 MG Tab Take 2 Tablets by mouth every 6 hours as needed (Mild Pain; (Pain scale 1-3); Temp greater than 100.5 F). 30 tablet 0   • folic acid (FOLVITE) 1 MG Tab Take 1 tablet by mouth every day. 30 tablet    • VITAMIN D PO Take 1 Units by mouth every evening.     • multivitamin (THERAGRAN) Tab Take 1 tablet by mouth every evening.     • cephALEXin (KEFLEX) 500 MG Cap        No current facility-administered medications for this visit.       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  She  has a past surgical history that includes cystoscopy (10/15/08); laparoscopy (5/28/2010); lymph node sampling (5/28/2010); debulking (5/28/2010); appendectomy (1981); other (1984); vaginal hysterectomy scope total (10/15/08); myringotomy (8/27/2010); tonsillectomy and adenoidectomy; and craniotomy stealth (Right, 3/9/2021).      Family History   Problem Relation Age of Onset   • Non-contributory Mother    • Lung Disease Mother         COPD   • Cancer Mother         dysplasia    • Arthritis Mother    • Psychiatric Illness Mother    • Heart Disease Mother    • Hypertension Mother    • Stroke Mother    • Non-contributory Father    • Cancer Father 73        prostate cancer   • Lung Disease Maternal Grandmother    • Lung Disease Paternal Aunt    • Diabetes Paternal Aunt    •  "Hyperlipidemia Paternal Aunt      Family Status   Relation Name Status   • Mo   at age 73   • Fa     • Sis  Alive   • MGMo     • MGFa     • PGMo     • PGFa     • Son  Alive   • PAunt  (Not Specified)       Patient Active Problem List    Diagnosis Date Noted   • 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) 2021   • Glioblastoma of frontal lobe (HCC) 2021   • Demyelinating disease of central nervous system, unspecified (HCC) 2021   • Acute blood loss as cause of postoperative anemia 2021   • Complicated migraine 2014   • Hyperlipidemia 2015   • Mixed anxiety and depressive disorder 2014   • Elevated blood pressure reading 2021   • Vitamin D insufficiency 2021   • Impaired mobility and ADLs 03/15/2021   • Lentigo 10/17/2018   • Seborrheic keratoses 10/17/2018   • Dermatitis, contact 2015   • Menopausal symptom 2014   • Fatigue 2014   • TMJ arthralgia 2014          Objective:   Vitals obtained by patient:  Ht 1.702 m (5' 7\")   Wt 105 kg (232 lb)   BMI 36.34 kg/m²     Physical Exam:  Constitutional: Alert, no distress, well-groomed.  Skin: No rashes in visible areas.  Eye: Round. Conjunctiva clear, lids normal. No icterus.   ENMT: Lips pink without lesions, good dentition, moist mucous membranes. Phonation normal.  Neck: No masses, no thyromegaly. Moves freely without pain.  CV: Pulse as reported by patient  Respiratory: Unlabored respiratory effort, no cough or audible wheeze  Psych: Alert and oriented x3, normal affect and mood.   Neuro: symmetric face. Alert and oriented. Follows commands. No aphasia or dysarthria.    Labs:  Hospital Outpatient Visit on 05/10/2021   Component Date Value Ref Range Status   • Course ID 05/10/2021 C1_GBM   Final   • Course Start Date 05/10/2021 [id number]   Final   • Course First Treatment Date 05/10/2021 2021   Final   • " Course Last Treatment Date 05/10/2021 05/10/2021   Final   • Course Elapsed Days 05/10/2021 35 @ [id number]   Final   • Course Intent 05/10/2021 Curative   Final   • RP ID 05/10/2021 R [facility]   Final   • RP Dosage Given to Date (Gy) 05/10/2021 6   Final   • RP Session Dosage Given (Gy) 05/10/2021 2   Final   • RP ID 05/10/2021 R [facility] CP   Final   • RP Dosage Given to Date (Gy) 05/10/2021 6.95298680   Final   • RP Session Dosage Given (Gy) 05/10/2021 2.63298460   Final   • Plan ID 05/10/2021 R [facility]   Final   • Plan Name 05/10/2021 [facility]   Final   • Plan Fractions Treated to Date 05/10/2021 3 of 7   Final   • Plan Prescribed Dose Per Fraction * 05/10/2021 2   Final   • Plan Total Prescribed Dose (cGy) 05/10/2021 1,400   Final   Hospital Outpatient Visit on 05/07/2021   Component Date Value Ref Range Status   • Course ID 05/07/2021 C1_GBM   Final   • Course Start Date 05/07/2021 [id number]   Final   • Course First Treatment Date 05/07/2021 04/05/2021   Final   • Course Last Treatment Date 05/07/2021 05/07/2021   Final   • Course Elapsed Days 05/07/2021 32 @ [id number]   Final   • Course Intent 05/07/2021 Curative   Final   • RP ID 05/07/2021 R [facility]   Final   • RP Dosage Given to Date (Gy) 05/07/2021 4   Final   • RP Session Dosage Given (Gy) 05/07/2021 2   Final   • RP ID 05/07/2021 R [facility] CP   Final   • RP Dosage Given to Date (Gy) 05/07/2021 4.35651069   Final   • RP Session Dosage Given (Gy) 05/07/2021 2.44138568   Final   • Plan ID 05/07/2021 R [facility]   Final   • Plan Name 05/07/2021 R [facility]   Final   • Plan Fractions Treated to Date 05/07/2021 2 of 7   Final   • Plan Prescribed Dose Per Fraction * 05/07/2021 2   Final   • Plan Total Prescribed Dose (cGy) 05/07/2021 1,400   Final   Hospital Outpatient Visit on 05/06/2021   Component Date Value Ref Range Status   • Course ID 05/06/2021 C1_GBM   Final   • Course Start Date 05/06/2021 20210324133641   Final   • Course First  Treatment Date 05/06/2021 04/05/2021   Final   • Course Last Treatment Date 05/06/2021 05/06/2021   Final   • Course Elapsed Days 05/06/2021 31 @ [id number]   Final   • Course Intent 05/06/2021 Curative   Final   • RP ID 05/06/2021 R Front Bst   Final   • RP Dosage Given to Date (Gy) 05/06/2021 2   Final   • RP Session Dosage Given (Gy) 05/06/2021 2   Final   • RP ID 05/06/2021 R Front Bst CP   Final   • RP Dosage Given to Date (Gy) 05/06/2021 2.46673675   Final   • RP Session Dosage Given (Gy) 05/06/2021 2.02087115   Final   • Plan ID 05/06/2021 R Front Bst   Final   • Plan Name 05/06/2021 R Front Bst   Final   • Plan Fractions Treated to Date 05/06/2021 1 of 7   Final   • Plan Prescribed Dose Per Fraction * 05/06/2021 2   Final   • Plan Total Prescribed Dose (cGy) 05/06/2021 1,400   Final   Orders Only on 05/05/2021   Component Date Value Ref Range Status   • Course ID 05/05/2021 Dosimetry C1   Final   • Course Start Date 05/05/2021 [id number]   Final   • Course End Date 05/05/2021 20210505153129   Final   • Course Elapsed Days 05/05/2021  @    Final   • RP ID 05/05/2021 R Front Bst   Final   • RP Dosage Given to Date (Gy) 05/05/2021 0.0E0   Final   • RP ID 05/05/2021 R Front Bst CP   Final   • RP Dosage Given to Date (Gy) 05/05/2021 0.0E0   Final   • RP ID 05/05/2021 R Front GBM   Final   • RP Dosage Given to Date (Gy) 05/05/2021 0.0E0   Final   • RP ID 05/05/2021 R Front GBM CP   Final   • RP Dosage Given to Date (Gy) 05/05/2021 0.0E0   Final   • Plan ID 05/05/2021 RF Bst 3D   Final   • Plan Name 05/05/2021 RF Bst 3D   Final   • Plan Fractions Treated to Date 05/05/2021 0 of 7   Final   • Plan Prescribed Dose Per Fraction * 05/05/2021 2   Final   • Plan Total Prescribed Dose (cGy) 05/05/2021 1,400   Final   • Plan ID 05/05/2021 RF GBM 3D   Final   • Plan Name 05/05/2021 RF GBM 3D   Final   • Plan Fractions Treated to Date 05/05/2021 0 of 23   Final   • Plan Prescribed Dose Per Fraction * 05/05/2021 2    Final   • Plan Total Prescribed Dose (cGy) 05/05/2021 4,600   Final   Orders Only on 05/05/2021   Component Date Value Ref Range Status   • Course ID 05/05/2021 C1_GBM   Final   • Course Start Date 05/05/2021 [id number]   Final   • Course First Treatment Date 05/05/2021 04/05/2021   Final   • Course Last Treatment Date 05/05/2021 05/05/2021   Final   • Course Elapsed Days 05/05/2021 30 @ [id number]   Final   • Course Intent 05/05/2021 Curative   Final   • RP ID 05/05/2021 R Front GBM   Final   • RP Dosage Given to Date (Gy) 05/05/2021 46   Final   • RP Session Dosage Given (Gy) 05/05/2021 2   Final   • RP ID 05/05/2021 R Front GBM CP   Final   • RP Dosage Given to Date (Gy) 05/05/2021 47.40629227   Final   • RP Session Dosage Given (Gy) 05/05/2021 2.72588495   Final   • Plan ID 05/05/2021 R Front GBM   Final   • Plan Name 05/05/2021 R Front GBM   Final   • Plan Fractions Treated to Date 05/05/2021 23 of 23   Final   • Plan Prescribed Dose Per Fraction * 05/05/2021 2   Final   • Plan Total Prescribed Dose (cGy) 05/05/2021 4,600   Final   Hospital Outpatient Visit on 05/04/2021   Component Date Value Ref Range Status   • Course ID 05/04/2021 C1_GBM   Final   • Course Start Date 05/04/2021 [id number]   Final   • Course First Treatment Date 05/04/2021 04/05/2021   Final   • Course Last Treatment Date 05/04/2021 05/04/2021   Final   • Course Elapsed Days 05/04/2021 29 @ [id number]   Final   • Course Intent 05/04/2021 Curative   Final   • RP ID 05/04/2021 R Front GBM   Final   • RP Dosage Given to Date (Gy) 05/04/2021 44   Final   • RP Session Dosage Given (Gy) 05/04/2021 2   Final   • RP ID 05/04/2021 R Front GBM CP   Final   • RP Dosage Given to Date (Gy) 05/04/2021 45.58318273   Final   • RP Session Dosage Given (Gy) 05/04/2021 2.61393775   Final   • Plan ID 05/04/2021 R Front GBM   Final   • Plan Name 05/04/2021 R Front GBM   Final   • Plan Fractions Treated to Date 05/04/2021 22 of 23   Final   • Plan  Prescribed Dose Per Fraction * 05/04/2021 2   Final   • Plan Total Prescribed Dose (cGy) 05/04/2021 4,600   Final   Orders Only on 05/03/2021   Component Date Value Ref Range Status   • Course ID 05/03/2021 C1_GBM   Final   • Course Start Date 05/03/2021 [id number]   Final   • Course First Treatment Date 05/03/2021 04/05/2021   Final   • Course Last Treatment Date 05/03/2021 05/03/2021   Final   • Course Elapsed Days 05/03/2021 28 @ [id number]   Final   • Course Intent 05/03/2021 Curative   Final   • RP ID 05/03/2021 R Front GBM   Final   • RP Dosage Given to Date (Gy) 05/03/2021 42   Final   • RP Session Dosage Given (Gy) 05/03/2021 2   Final   • RP ID 05/03/2021 R Front GBM CP   Final   • RP Dosage Given to Date (Gy) 05/03/2021 43.63645329   Final   • RP Session Dosage Given (Gy) 05/03/2021 2.98687974   Final   • Plan ID 05/03/2021 R Front GBM   Final   • Plan Name 05/03/2021 R Front GBM   Final   • Plan Fractions Treated to Date 05/03/2021 21 of 23   Final   • Plan Prescribed Dose Per Fraction * 05/03/2021 2   Final   • Plan Total Prescribed Dose (cGy) 05/03/2021 4,600   Final   Hospital Outpatient Visit on 04/30/2021   Component Date Value Ref Range Status   • Course ID 04/30/2021 C1_GBM   Final   • Course Start Date 04/30/2021 [id number]   Final   • Course First Treatment Date 04/30/2021 04/05/2021   Final   • Course Last Treatment Date 04/30/2021 04/30/2021   Final   • Course Elapsed Days 04/30/2021 25 @ [id number]   Final   • Course Intent 04/30/2021 Curative   Final   • RP ID 04/30/2021 R Front GBM   Final   • RP Dosage Given to Date (Gy) 04/30/2021 40   Final   • RP Session Dosage Given (Gy) 04/30/2021 2   Final   • RP ID 04/30/2021 R Front GBM CP   Final   • RP Dosage Given to Date (Gy) 04/30/2021 41.3075127   Final   • RP Session Dosage Given (Gy) 04/30/2021 2.66282576   Final   • Plan ID 04/30/2021 R Front GBM   Final   • Plan Name 04/30/2021 R Front GBM   Final   • Plan Fractions Treated to  Date 04/30/2021 20 of 23   Final   • Plan Prescribed Dose Per Fraction * 04/30/2021 2   Final   • Plan Total Prescribed Dose (cGy) 04/30/2021 4,600   Final   Hospital Outpatient Visit on 04/06/2021   Component Date Value Ref Range Status   • Course ID 04/06/2021 C1_GBM   Final   • Course Start Date 04/06/2021 [id number]   Final   • Course First Treatment Date 04/06/2021 04/05/2021   Final   • Course Last Treatment Date 04/06/2021 04/06/2021   Final   • Course Elapsed Days 04/06/2021 1 @ [id number]   Final   • Course Intent 04/06/2021 Curative   Final   • RP ID 04/06/2021 R Front GBM   Final   • RP Dosage Given to Date (Gy) 04/06/2021 4   Final   • RP Session Dosage Given (Gy) 04/06/2021 2   Final   • RP ID 04/06/2021 R Front GBM CP   Final   • RP Dosage Given to Date (Gy) 04/06/2021 4.53538034   Final   • RP Session Dosage Given (Gy) 04/06/2021 2.73080451   Final   • Plan ID 04/06/2021 R Front GBM   Final   • Plan Name 04/06/2021 R Front GBM   Final   • Plan Fractions Treated to Date 04/06/2021 2 of 23   Final   • Plan Prescribed Dose Per Fraction * 04/06/2021 2   Final   • Plan Total Prescribed Dose (cGy) 04/06/2021 4,600   Final   Hospital Outpatient Visit on 04/29/2021   Component Date Value Ref Range Status   • Course ID 04/29/2021 C1_GBM   Final   • Course Start Date 04/29/2021 [id number]   Final   • Course First Treatment Date 04/29/2021 04/05/2021   Final   • Course Last Treatment Date 04/29/2021 04/29/2021   Final   • Course Elapsed Days 04/29/2021 24 @ [id number]   Final   • Course Intent 04/29/2021 Curative   Final   • RP ID 04/29/2021 R Front GBM   Final   • RP Dosage Given to Date (Gy) 04/29/2021 38   Final   • RP Session Dosage Given (Gy) 04/29/2021 2   Final   • RP ID 04/29/2021 R Front GBM CP   Final   • RP Dosage Given to Date (Gy) 04/29/2021 39.73901099   Final   • RP Session Dosage Given (Gy) 04/29/2021 2.14766829   Final   • Plan ID 04/29/2021 R Front GBM   Final   • Plan Name  04/29/2021 R Front GBM   Final   • Plan Fractions Treated to Date 04/29/2021 19 of 23   Final   • Plan Prescribed Dose Per Fraction * 04/29/2021 2   Final   • Plan Total Prescribed Dose (cGy) 04/29/2021 4,600   Final   There may be more visits with results that are not included.       Imaging:   No results found.    Assessment and Plan:   The following treatment plan was discussed:   Before provided during visit today  Problem List Items Addressed This Visit     None      Visit Diagnoses     Administrative encounter              Follow-up: Return if symptoms worsen or fail to improve.        Portions of this note may be dictated using Dragon NaturallySpeaking voice recognition software.  Variances in spelling and vocabulary are possible and unintentional.  Not all areas may be caught/corrected.  Please notify me if any discrepancies are noted or if the meaning of any statement is not correct/clear.

## 2021-05-11 ENCOUNTER — HOSPITAL ENCOUNTER (OUTPATIENT)
Dept: RADIATION ONCOLOGY | Facility: MEDICAL CENTER | Age: 51
End: 2021-05-11

## 2021-05-11 ENCOUNTER — OCCUPATIONAL THERAPY (OUTPATIENT)
Dept: OCCUPATIONAL THERAPY | Facility: REHABILITATION | Age: 51
End: 2021-05-11
Attending: PHYSICAL MEDICINE & REHABILITATION
Payer: COMMERCIAL

## 2021-05-11 DIAGNOSIS — S06.9X0D: ICD-10-CM

## 2021-05-11 LAB
CHEMOTHERAPY INFUSION START DATE: NORMAL
CHEMOTHERAPY RECORDS: 1400
CHEMOTHERAPY RECORDS: 2
CHEMOTHERAPY RECORDS: NORMAL
CHEMOTHERAPY RX CANCER: NORMAL
DATE 1ST CHEMO CANCER: NORMAL
RAD ONC ARIA COURSE LAST TREATMENT DATE: NORMAL
RAD ONC ARIA COURSE TREATMENT ELAPSED DAYS: NORMAL
RAD ONC ARIA REFERENCE POINT DOSAGE GIVEN TO DATE: 8
RAD ONC ARIA REFERENCE POINT DOSAGE GIVEN TO DATE: 8.27
RAD ONC ARIA REFERENCE POINT ID: NORMAL
RAD ONC ARIA REFERENCE POINT ID: NORMAL
RAD ONC ARIA REFERENCE POINT SESSION DOSAGE GIVEN: 2
RAD ONC ARIA REFERENCE POINT SESSION DOSAGE GIVEN: 2.07

## 2021-05-11 PROCEDURE — 77386 HCHG IMRT DELIVERY COMPLEX: CPT | Performed by: RADIOLOGY

## 2021-05-11 PROCEDURE — 97530 THERAPEUTIC ACTIVITIES: CPT

## 2021-05-11 PROCEDURE — 77014 PR CT GUIDANCE PLACEMENT RAD THERAPY FIELDS: CPT | Mod: 26 | Performed by: RADIOLOGY

## 2021-05-11 PROCEDURE — 97110 THERAPEUTIC EXERCISES: CPT

## 2021-05-11 NOTE — OP THERAPY DAILY TREATMENT
"  Outpatient Occupational Therapy  DAILY TREATMENT     [facility] Occupational Therapy [address].  [address]  [place] [address]  Phone:  [phone]  Fax:  [phone]    Date: 05/11/2021    Patient: Melba Frye  YOB: 1970  MRN: [id number]     Time Calculation  Start time: 0430  Stop time: 0530 Time Calculation (min): 60 minutes         Chief Complaint: Extremity Weakness (LUE, dec FMC/tremor, biceps pain)    Visit #: 2    SUBJECTIVE: \"My left biceps still hurts a little when I bend it\"    OBJECTIVE:  Current objective measures:    strength:  Left: 45lbs, Right: 59lbs  Opposition/FNF: left with mildly decreased speed and accuracy with mild tremor          Therapeutic Exercises (CPT 38840):     1.     Therapeutic Exercise Summary:  Issued and instructed on light-medium resistance theraputty exercises focused on , pinch, rolling a log, in-hand manipulation, picking out coins, pulling apart and folding in intrinsic plus position, and C ther ex punching holes with black plastic tube.   To perform 1-2 x day for 30 minutes    Therapeutic Treatments and Modalities:    1. Therapeutic Activities (CPT 22801)    Therapeutic Treatments and Modalities Summary: Identifying Regulatory Signs: 10/10.  Pass.  Sunray Making Test Part B: 88 seconds.  Pass.  SLUMS: 30/30.  Pass.    Time-based treatments/modalities:  Therapeutic exercise minutes (CPT 95034): 30 minutes  Therapeutic activity minutes (CPT 20244): 30 minutes        ASSESSMENT:   Response to treatment:  Pt passed the cognitive components of the OT pre-driving assessment.  Pt making progress with her bilateral hand strength and motor control for her daily routine in response to initiation of theraputty exercises described above.  HEP issued with good understanding.  Will continue with visual-perceptual testing next visit.    PLAN/RECOMMENDATIONS:   Plan for treatment: therapy treatment to continue next visit.  Planned " interventions for next visit: manual therapy (CPT 47545), therapeutic activities (CPT 74478) and therapeutic exercise (CPT 00901)

## 2021-05-12 ENCOUNTER — PHYSICAL THERAPY (OUTPATIENT)
Dept: PHYSICAL THERAPY | Facility: REHABILITATION | Age: 51
End: 2021-05-12
Attending: PHYSICAL MEDICINE & REHABILITATION
Payer: COMMERCIAL

## 2021-05-12 ENCOUNTER — HOSPITAL ENCOUNTER (OUTPATIENT)
Dept: RADIATION ONCOLOGY | Facility: MEDICAL CENTER | Age: 51
End: 2021-05-12
Payer: COMMERCIAL

## 2021-05-12 VITALS
DIASTOLIC BLOOD PRESSURE: 85 MMHG | HEART RATE: 97 BPM | TEMPERATURE: 98.6 F | BODY MASS INDEX: 37.07 KG/M2 | WEIGHT: 236.66 LBS | OXYGEN SATURATION: 93 % | SYSTOLIC BLOOD PRESSURE: 159 MMHG

## 2021-05-12 DIAGNOSIS — R26.9 GAIT ABNORMALITY: ICD-10-CM

## 2021-05-12 DIAGNOSIS — R26.89 BALANCE PROBLEMS: ICD-10-CM

## 2021-05-12 DIAGNOSIS — C71.1 GLIOBLASTOMA OF FRONTAL LOBE (HCC): ICD-10-CM

## 2021-05-12 DIAGNOSIS — C71.1 MALIGNANT NEOPLASM OF FRONTAL LOBE (HCC): ICD-10-CM

## 2021-05-12 DIAGNOSIS — R53.1 WEAKNESS GENERALIZED: ICD-10-CM

## 2021-05-12 LAB
CHEMOTHERAPY INFUSION START DATE: NORMAL
CHEMOTHERAPY RECORDS: 1400
CHEMOTHERAPY RECORDS: 2
CHEMOTHERAPY RECORDS: NORMAL
CHEMOTHERAPY RX CANCER: NORMAL
DATE 1ST CHEMO CANCER: NORMAL
RAD ONC ARIA COURSE LAST TREATMENT DATE: NORMAL
RAD ONC ARIA COURSE TREATMENT ELAPSED DAYS: NORMAL
RAD ONC ARIA REFERENCE POINT DOSAGE GIVEN TO DATE: 10
RAD ONC ARIA REFERENCE POINT DOSAGE GIVEN TO DATE: 10.34
RAD ONC ARIA REFERENCE POINT ID: NORMAL
RAD ONC ARIA REFERENCE POINT ID: NORMAL
RAD ONC ARIA REFERENCE POINT SESSION DOSAGE GIVEN: 2
RAD ONC ARIA REFERENCE POINT SESSION DOSAGE GIVEN: 2.07

## 2021-05-12 PROCEDURE — 77336 RADIATION PHYSICS CONSULT: CPT | Performed by: RADIOLOGY

## 2021-05-12 PROCEDURE — 77386 HCHG IMRT DELIVERY COMPLEX: CPT | Performed by: RADIOLOGY

## 2021-05-12 PROCEDURE — 97110 THERAPEUTIC EXERCISES: CPT

## 2021-05-12 PROCEDURE — 97112 NEUROMUSCULAR REEDUCATION: CPT

## 2021-05-12 PROCEDURE — 97116 GAIT TRAINING THERAPY: CPT

## 2021-05-12 PROCEDURE — 77014 PR CT GUIDANCE PLACEMENT RAD THERAPY FIELDS: CPT | Mod: 26 | Performed by: RADIOLOGY

## 2021-05-12 ASSESSMENT — PAIN SCALES - GENERAL: PAINLEVEL: NO PAIN

## 2021-05-12 ASSESSMENT — FIBROSIS 4 INDEX: FIB4 SCORE: 0.6

## 2021-05-12 NOTE — ON TREATMENT VISIT
ON TREATMENT  NOTE  RADIATION ONCOLOGY DEPARTMENT    Patient name:  Melba Frye    Primary Physician:  Trinity Nguyen M.D. MRN: [id number]  CSN: [id number]   Referring physician:  Maxwell Mao M.D. : 1970, 50 y.o.     ENCOUNTER DATE:  21    DIAGNOSIS:  Visit Diagnoses     ICD-10-CM   1. Glioblastoma of frontal lobe (HCC)  C71.1     Glioblastoma of frontal lobe (HCC)  Staging form: Brain and Spinal Cord, AJCC 8th Edition  - Pathologic stage from 3/24/2021: WHO Grade IV - Signed by Edenilson Frye M.D. on 3/24/2021  Histologic grading system: 4 grade system  IDH1 mutation: Negative  IDH2 mutation: Negative  MGMT methylation: Absent      TREATMENT SUMMARY:  Aria Treatment Information        Some values may be hidden. Unless noted otherwise, only the newest values recorded on each date are displayed.         Aria Treatment Summary 21   Course First Treatment Date 2021   Course Last Treatment Date 2021   R Front Bst Plan from Course C1_GBM   Fraction 5 of 7   Elapsed Course Days 37 @ [id number]   Prescribed Fraction Dose 200 cGy   Prescribed Total Dose 1,400 cGy   R Front GBM Plan from Course C1_GBM   R Front Bst Reference Point from Course C1_GBM   Elapsed Course Days  @    Session Dose 200 cGy   Total Dose 1,000 cGy   R Front Bst CP Reference Point from Course C1_GBM   Elapsed Course Days  @    Session Dose 207 cGy   Total Dose 1,034 cGy   R Front GBM Reference Point from Course C1_GBM   R Front GBM CP Reference Point from Course C1_GBM   RF Bst 3D Plan from Course Dosimetry C1   RF GBM 3D Plan from Course Dosimetry C1   R Front Bst Reference Point from Course Dosimetry C1   R Front Bst CP Reference Point from Course Dosimetry C1   R Front GBM Reference Point from Course Dosimetry C1   R Front GBM CP Reference Point from Course Dosimetry C1             SUBJECTIVE:  Mild fatigue, no HA      VITAL SIGNS:  KPS: 90, Able to carry on normal  activity; minor signs or symptoms of disease (ECOG equivalent 0)  Encounter Vitals  Temperature: 37 °C (98.6 °F)  Blood Pressure: 159/85  Pulse: 97  Pulse Oximetry: 93 %  Weight: 107 kg (236 lb 10.6 oz)  Pain Score: No pain  Pain Assessment 5/12/2021 5/10/2021 5/5/2021 4/28/2021 4/21/2021 4/14/2021   Pain Score 0 0 0 0 0 0   Some recent data might be hidden          PHYSICAL EXAM:    Physical Exam  Constitutional:       Appearance: Normal appearance.   HENT:      Head: Normocephalic and atraumatic.   Skin:     Findings: No erythema.   Neurological:      Mental Status: She is alert.          Toxicity Assessment 5/12/2021 5/5/2021 4/28/2021 4/21/2021 4/14/2021 4/7/2021   Toxicity Assessment Brain Brain Brain Brain Brain Brain   Fatigue (lethargy, malaise, asthenia) Moderate (e.g., decrease in performance status by 1 ECOG level or 20% Karnofsky) or causing difficulty performing some activities Increased fatigue over baseline, but not altering normal activities Increased fatigue over baseline, but not altering normal activities None Increased fatigue over baseline, but not altering normal activities None   Radiation Dermatitis None Faint erythema or dry desquamation Faint erythema or dry desquamation Faint erythema or dry desquamation None None   Nausea Able to eat Able to eat Able to eat None None Able to eat   Mouth Dryness Mild Mild Mild Mild Mild Mild   Headache None Mild pain not interfering with function None None Mild pain not interfering with function Mild pain not interfering with function   External Auditory Canal Normal Normal Normal Normal Normal Normal   Inner Ear/Hearing Normal Normal Normal Normal Normal Normal   Middle Ear/Hearing Normal Normal Normal Normal Normal Normal   Dizziness/lightheadedness None None None None None None   Memory loss Normal Normal Normal Normal Normal Normal   Neuropathy - Motor Normal Normal Normal Normal Subjective weakness but no objective findings Normal   Seizure None None  None None None None   Vertigo None None None None None None       CURRENT MEDICATIONS:    Current Outpatient Medications:   •  levetiracetam (KEPPRA) 1000 MG tablet, Take 1 tablet by mouth 2 times a day for 360 days., Disp: 180 tablet, Rfl: 3  •  cephALEXin (KEFLEX) 500 MG Cap, , Disp: , Rfl:   •  ondansetron (ZOFRAN) 4 MG Tab tablet, , Disp: , Rfl:   •  sulfamethoxazole-trimethoprim (BACTRIM DS) 800-160 MG tablet, , Disp: , Rfl:   •  hydrOXYzine HCl (ATARAX) 50 MG Tab, Take 1 tablet by mouth every 8 hours as needed for Anxiety., Disp: 90 tablet, Rfl: 0  •  temozolomide (TEMODAR) 5 MG capsule, , Disp: , Rfl:   •  temozolomide (TEMODAR) 140 MG capsule, 160 mg. 150mg once a day (140mg + 2, 5mg tabs), Disp: , Rfl:   •  melatonin 3 MG Tab, Take 1 tablet by mouth at bedtime as needed (insomnia)., Disp: 30 tablet, Rfl: 2  •  lamoTRIgine (LAMICTAL) 100 MG Tab, Take 1 tablet by mouth 2 Times a Day. (Patient taking differently: Take 25 mg by mouth 2 times a day.), Disp: 180 tablet, Rfl: 2  •  venlafaxine (EFFEXOR) 25 MG Tab, Take 0.5 Tablets by mouth 2 Times a Day., Disp: 90 tablet, Rfl: 2  •  acetaminophen (TYLENOL) 325 MG Tab, Take 2 Tablets by mouth every 6 hours as needed (Mild Pain; (Pain scale 1-3); Temp greater than 100.5 F)., Disp: 30 tablet, Rfl: 0  •  folic acid (FOLVITE) 1 MG Tab, Take 1 tablet by mouth every day., Disp: 30 tablet, Rfl:   •  VITAMIN D PO, Take 1 Units by mouth every evening., Disp: , Rfl:   •  multivitamin (THERAGRAN) Tab, Take 1 tablet by mouth every evening., Disp: , Rfl:     LABORATORY DATA:   Lab Results   Component Value Date/Time    SODIUM 140 03/22/2021 06:15 AM    POTASSIUM 4.4 03/22/2021 06:15 AM    CHLORIDE 104 03/22/2021 06:15 AM    CO2 28 03/22/2021 06:15 AM    GLUCOSE 78 03/22/2021 06:15 AM    BUN 12 03/22/2021 06:15 AM    CREATININE 0.57 03/22/2021 06:15 AM         Lab Results   Component Value Date/Time    WBC 15.2 (H) 03/16/2021 06:03 AM    HEMOGLOBIN 13.4 03/16/2021 06:03 AM     HEMATOCRIT 39.3 03/16/2021 06:03 AM    MCV 96.3 03/16/2021 06:03 AM    PLATELETCT 226 03/16/2021 06:03 AM        RADIOLOGY DATA:  No results found.    IMPRESSION:  Glioblastoma of frontal lobe (HCC)  Staging form: Brain and Spinal Cord, AJCC 8th Edition  - Pathologic stage from 3/24/2021: WHO Grade IV - Signed by Edenilson Frye M.D. on 3/24/2021  Histologic grading system: 4 grade system  IDH1 mutation: Negative  IDH2 mutation: Negative  MGMT methylation: Absent      PLAN:  No change in treatment plan    Disposition:  Treatment plan reviewed. Questions answered. Continue therapy outlined. Starting optune soon. MR brain in 1 month.F/u with Dr. Prashant england temodaramya.    Edenilson Frye M.D.    Orders Placed This Encounter   • MR-BRAIN-WITH & W/O

## 2021-05-13 ENCOUNTER — HOSPITAL ENCOUNTER (OUTPATIENT)
Dept: RADIATION ONCOLOGY | Facility: MEDICAL CENTER | Age: 51
End: 2021-05-13
Payer: COMMERCIAL

## 2021-05-13 LAB
CHEMOTHERAPY INFUSION START DATE: NORMAL
CHEMOTHERAPY RECORDS: 1400
CHEMOTHERAPY RECORDS: 2
CHEMOTHERAPY RECORDS: NORMAL
CHEMOTHERAPY RX CANCER: NORMAL
DATE 1ST CHEMO CANCER: NORMAL
RAD ONC ARIA COURSE LAST TREATMENT DATE: NORMAL
RAD ONC ARIA COURSE TREATMENT ELAPSED DAYS: NORMAL
RAD ONC ARIA REFERENCE POINT DOSAGE GIVEN TO DATE: 12
RAD ONC ARIA REFERENCE POINT DOSAGE GIVEN TO DATE: 12.41
RAD ONC ARIA REFERENCE POINT ID: NORMAL
RAD ONC ARIA REFERENCE POINT ID: NORMAL
RAD ONC ARIA REFERENCE POINT SESSION DOSAGE GIVEN: 2
RAD ONC ARIA REFERENCE POINT SESSION DOSAGE GIVEN: 2.07

## 2021-05-13 PROCEDURE — 77386 HCHG IMRT DELIVERY COMPLEX: CPT | Performed by: RADIOLOGY

## 2021-05-13 PROCEDURE — 77014 PR CT GUIDANCE PLACEMENT RAD THERAPY FIELDS: CPT | Mod: 26 | Performed by: RADIOLOGY

## 2021-05-13 NOTE — OP THERAPY DAILY TREATMENT
Outpatient Physical Therapy  DAILY TREATMENT     [facility] Physical [place] [address] [address]  [address]  [place] [address]  Phone:  [phone]  Fax:  [phone]    Date: 05/12/2021    Patient: Melba Frye  YOB: 1970  MRN: [id number]     Time Calculation    Start time: 1330  Stop time: 1427 Time Calculation (min): 57 minutes         Chief Complaint: Weakness, Difficulty Walking, and Loss Of Balance    Visit #: 4    SUBJECTIVE:  Pt reports appt with oncologist on Friday with last round of radiation completed same day. Pt reports she will return for a scan in one month. Pt reports good compliance with HEP. Has stopped using 4WW and is currently using single walking stick in Rt UE.    OBJECTIVE:  Pt demo's improves stabilty while ambulating with single walking stick in Rt UE. Improved knee flexion during swing 25% of time.      Therapeutic Exercises (CPT 56397):     1. STS from mat table, x 12 reps, Rt LE 4 inch box, mod tactile verbal cues dec trunk rotation    2. Rectus fem hip flex/ext c kne flexed stool support, 5 x 5 reps, BUE support, cues dec trunk flexion    Therapeutic Treatments and Modalities:     1. Gait Training (CPT 10062)    2. Neuromuscular Re-education (CPT 94497)    Therapeutic Treatment and Modalities Summary: Gait: pt ambulated 75 feet, 112 feet with cues for Lt UE swing/decrease from mid guard position, knee flexion swing. Significant improvement with knee extension in stance. Utilized Rt walking stick. 112 feet completed with theraband to facilitate knee flexion during swing.     Pt ambulated backward for improving knee extension during stance and stabilty with walking stick and CGA. Mod cues to increase step length. Max tactile cues to dec trunk flexion with hip ext. 75 feet.    NMR: For improving SLS, stabilty, coordination and dual tasking pt completed standing ball kicks with instruction for using alternating LE. CGA required throughout an 3 mod A to  prevent fall due to LOB. Completed 12 reps total.   For rectus femoris re education for gait utilized purple TB around waist/knee/ankle to facilitate knee flexion/hip flexion. Max tactile cues required to achieved appropriate 60 degrees knee flexion. Without tactile assist only able to achieve 20-30 deg knee flexion. Completed 5 x 5 reps    Time-based treatments/modalities:    Physical Therapy Timed Treatment Charges  Gait training minutes (CPT 62941): 12 minutes  Neuromusc re-ed, balance, coor, post minutes (CPT 58970): 20 minutes  Therapeutic exercise minutes (CPT 83111): 25 minutes      ASSESSMENT:   Pt continues to have significant difficulty with hip extension/knee flexion during ambulation rather moving into hip ext/ knee ext in both interventions and ambulating resulting in more circumduction type gait and instability. While performing standing hip flex-ext c knee flexion utilized theraband to as feed back to facilitate knee flexion as well as physical assist into appropriate ROM. Currently pt able to maintain 20-30 degrees knee flexion with extension without assist. Educated pt on location and actions of rectus femoris specifically during gait. While ambulating with theraband facilitation pt demo'd improved knee flexion and overall mechanics. Pt required frequent cues during session to equalize weightbearing as she frequently positions most of her weight onto Rt LE.   Pt demo'd fair coordination and timing with ball kicks. Mod A to recover LOB 3 times. Also good awareness of abilities with ball kicks, rather than put herself at risk of fall pt would not attempt to kick.   Pt will continue to benefit from skilled intervention in order to improve strength, balance, and independence with ambulation in order to return to [facility].     PLAN/RECOMMENDATIONS:   Plan for treatment: therapy treatment to continue next visit.  Planned interventions for next visit: continue with current treatment.    Rectus bridge

## 2021-05-14 ENCOUNTER — APPOINTMENT (OUTPATIENT)
Dept: PHYSICAL THERAPY | Facility: REHABILITATION | Age: 51
End: 2021-05-14
Attending: INTERNAL MEDICINE
Payer: COMMERCIAL

## 2021-05-14 ENCOUNTER — HOSPITAL ENCOUNTER (OUTPATIENT)
Dept: RADIATION ONCOLOGY | Facility: MEDICAL CENTER | Age: 51
End: 2021-05-14
Payer: COMMERCIAL

## 2021-05-14 LAB
CHEMOTHERAPY INFUSION START DATE: NORMAL
CHEMOTHERAPY INFUSION START DATE: NORMAL
CHEMOTHERAPY INFUSION STOP DATE: NORMAL
CHEMOTHERAPY RECORDS: 1400
CHEMOTHERAPY RECORDS: 1400
CHEMOTHERAPY RECORDS: 2
CHEMOTHERAPY RECORDS: 4600
CHEMOTHERAPY RECORDS: NORMAL
CHEMOTHERAPY RX CANCER: NORMAL
CHEMOTHERAPY RX CANCER: NORMAL
DATE 1ST CHEMO CANCER: NORMAL
DATE 1ST CHEMO CANCER: NORMAL
RAD ONC ARIA COURSE LAST TREATMENT DATE: NORMAL
RAD ONC ARIA COURSE LAST TREATMENT DATE: NORMAL
RAD ONC ARIA COURSE TREATMENT ELAPSED DAYS: NORMAL
RAD ONC ARIA COURSE TREATMENT ELAPSED DAYS: NORMAL
RAD ONC ARIA REFERENCE POINT DOSAGE GIVEN TO DATE: 14
RAD ONC ARIA REFERENCE POINT DOSAGE GIVEN TO DATE: 14
RAD ONC ARIA REFERENCE POINT DOSAGE GIVEN TO DATE: 14.47
RAD ONC ARIA REFERENCE POINT DOSAGE GIVEN TO DATE: 14.47
RAD ONC ARIA REFERENCE POINT DOSAGE GIVEN TO DATE: 46
RAD ONC ARIA REFERENCE POINT DOSAGE GIVEN TO DATE: 47.57
RAD ONC ARIA REFERENCE POINT ID: NORMAL
RAD ONC ARIA REFERENCE POINT SESSION DOSAGE GIVEN: 2
RAD ONC ARIA REFERENCE POINT SESSION DOSAGE GIVEN: 2.07

## 2021-05-14 PROCEDURE — 77386 HCHG IMRT DELIVERY COMPLEX: CPT | Performed by: RADIOLOGY

## 2021-05-14 PROCEDURE — 77014 PR CT GUIDANCE PLACEMENT RAD THERAPY FIELDS: CPT | Mod: 26 | Performed by: RADIOLOGY

## 2021-05-14 PROCEDURE — 77427 RADIATION TX MANAGEMENT X5: CPT | Performed by: RADIOLOGY

## 2021-05-18 ENCOUNTER — OFFICE VISIT (OUTPATIENT)
Dept: PHYSICAL MEDICINE AND REHAB | Facility: REHABILITATION | Age: 51
End: 2021-05-18
Payer: COMMERCIAL

## 2021-05-18 VITALS
HEART RATE: 70 BPM | TEMPERATURE: 97.4 F | SYSTOLIC BLOOD PRESSURE: 116 MMHG | RESPIRATION RATE: 18 BRPM | OXYGEN SATURATION: 96 % | DIASTOLIC BLOOD PRESSURE: 70 MMHG

## 2021-05-18 DIAGNOSIS — G81.14 SPASTIC HEMIPLEGIA AFFECTING LEFT NONDOMINANT SIDE, UNSPECIFIED ETIOLOGY (HCC): ICD-10-CM

## 2021-05-18 DIAGNOSIS — M62.838 OTHER MUSCLE SPASM: ICD-10-CM

## 2021-05-18 DIAGNOSIS — C71.1 GLIOBLASTOMA OF FRONTAL LOBE (HCC): Primary | ICD-10-CM

## 2021-05-18 DIAGNOSIS — F41.8 ANXIETY ASSOCIATED WITH DEPRESSION: ICD-10-CM

## 2021-05-18 PROCEDURE — 64642 CHEMODENERV 1 EXTREMITY 1-4: CPT | Performed by: PHYSICAL MEDICINE & REHABILITATION

## 2021-05-18 PROCEDURE — 76942 ECHO GUIDE FOR BIOPSY: CPT | Performed by: PHYSICAL MEDICINE & REHABILITATION

## 2021-05-18 RX ORDER — HYDROXYZINE 50 MG/1
50 TABLET, FILM COATED ORAL EVERY 8 HOURS PRN
Qty: 90 TABLET | Refills: 0 | Status: SHIPPED | OUTPATIENT
Start: 2021-05-18 | End: 2021-06-14

## 2021-05-18 NOTE — PROCEDURES
[facility]  Physical Medicine & Rehabilitation Clinic  [address] [place] [address]  Ph: [phone]    PROCEDURE NOTE    Procedure: Botulinum Injection  Primary Diagnosis & Secondary Diagnoses:   Visit Diagnoses     ICD-10-CM   1. Glioblastoma of frontal lobe (HCC)  C71.1   2. Spastic hemiplegia affecting left nondominant side, unspecified etiology (HCC)  G81.14   3. Other muscle spasm  M62.838       INFORMED CONSENT   The risks and benefits of the procedure were explained to the patient, and all questions were answered. Benefits of the injection include spasticity reduction by decrease in muscle activation following toxin injection. Adverse effects from toxin injection include but are not limited to: excessive weakness, infection, breathing and/or swallowing difficulty.  Common adverse effects from the injection itself are pain and bruising. The patient demonstrated good understanding of risks and benefits and consents to botulinum toxin injection.     Received botulinum toxin product in last 3 mos: No  Have recently received abx (aminoglycosides): No  Take anti-spasticity medications: No  Take allergy/cold medicine:  No  Take a sleep medicine: No  Lactating/pregnant: No  Known NMJ disease: No  Human albumin allergy: No    Pre-procedure time out was performed.     PROCEDURE:  Usual sterile procedure was observed with sterile prep with chlorhexidine. Ultrasound was used for needle guidance for the injections in the following muscles. A negative aspiration of blood was obtained, and the following was injected into each muscle.    Botox: Left lower extremity  Location Botox Amount in Units   Left medial gastrocnemius  100 units   Left lateral gastrocnemius  100 units   Soleus  100 units   Total Units  300 units        Injection sites were cleaned with alcohol and band aid was applied afterwards.  The patient tolerated the procedure well.  There were no complications.  The images were uploaded for permanent  storage    MEDICATION USED:  100 units of botulinum toxin type A, mixed with 2mL of sterile, preservative-free normal saline in two 1cc syringes, for a total of 50 units in 1 mL.     200 units of botulinum toxin type A, mixed with 4mL of sterile, preservative-free normal saline in four 1cc syringes, for a total of 50 units in 1 mL.    Total units injected: 300 units  Total units discarded: 0 units    See MAR for Botox details.     Counseling: Pt was instructed to seek immediate medical attention if fever, difficulty breathing, difficulty swallowing, or other concerning sxs arise. RTC in 2-4 weeks to investigate for effect at peak.    Additional Plan: Continue OT/PT and aggressive stretching s/p Botox    BOTOX (100unit/vial)  NDC 0862-0321-50  Lot  [id number]  Exp 11/2023    BOTOX (200units/vial)  NDC 5414-2630-40  Lot [id number]  Exp 02/2024    Dr. Yanet Steele DO, MS  Department of Physical Medicine & Rehabilitation  Neuro Rehabilitation Clinic  [facility]

## 2021-05-19 ENCOUNTER — PHYSICAL THERAPY (OUTPATIENT)
Dept: PHYSICAL THERAPY | Facility: REHABILITATION | Age: 51
End: 2021-05-19
Attending: PHYSICAL MEDICINE & REHABILITATION
Payer: COMMERCIAL

## 2021-05-19 DIAGNOSIS — R26.9 GAIT ABNORMALITY: ICD-10-CM

## 2021-05-19 DIAGNOSIS — C71.1 MALIGNANT NEOPLASM OF FRONTAL LOBE (HCC): ICD-10-CM

## 2021-05-19 DIAGNOSIS — R26.89 BALANCE PROBLEMS: ICD-10-CM

## 2021-05-19 DIAGNOSIS — R53.1 WEAKNESS GENERALIZED: ICD-10-CM

## 2021-05-19 PROCEDURE — 97530 THERAPEUTIC ACTIVITIES: CPT

## 2021-05-19 PROCEDURE — 97116 GAIT TRAINING THERAPY: CPT

## 2021-05-19 PROCEDURE — 97110 THERAPEUTIC EXERCISES: CPT

## 2021-05-19 NOTE — OP THERAPY DAILY TREATMENT
"  Outpatient Physical Therapy  DAILY TREATMENT     [facility] Physical Therapy [address].  [address]  [place] [address]  Phone:  [phone]  Fax:  [phone]    Date: 05/19/2021    Patient: Melba Frye  YOB: 1970  MRN: [id number]     Time Calculation    Start time: 1531  Stop time: 1630 Time Calculation (min): 59 minutes         Chief Complaint: Weakness, Loss Of Balance, and Unsteady Gait    Visit #: 5    SUBJECTIVE:  Pt reports she has been more anxious lately knowing now that it is a \"wait and see\" for scans in a month to see how chemo/radiation has impacted tumor. Reports improved knee flexion/hip ext when not wearing AFO. Has not noticed any changes post botox. Pt asked if she PT believes she is improving appropriately.    OBJECTIVE:  Pt ambulating with quad cane making contact with 2/4 points consistently and min/no weightbearing.          Therapeutic Exercises (CPT 12506):     1. Supine bridge BOSU ball side up, x 10    2. Supine bridge Lt SL lowering, x10, max A stabilize Lt foot and dec AB    3. Gastroc stretch, Lt 2 x 2min    4. Quadruped donkey kicks, 2 x 8, Assist knee flexion 50% of time    Therapeutic Treatments and Modalities:     1. Gait Training (CPT 28720)    2. Therapeutic Activities (CPT 33414)    Therapeutic Treatment and Modalities Summary: Gait: Pt ambulated with quad cane in Rt UE with mod cues to increase Rt step length. Educated on importance of feedback of performance to improve intrinsic awareness of stride length. 100 ft x 3 SPV. When ambulating out to car therapist walked with pt. When pt began talking to her MIL bilateral step lengths decreased significantly without any foot clearance x 10 feet. Pt aware when therapist brought to attention. Educated in learning new task vs mastering task as this is expected.     TherAct: For improving smooth transitions for turning with sit<>stands: Pt ambulated with Quad cane x 3 feet with direct approach of " chair to sit in. Broke down movement into changing approach (hip position diagonal to chair) pivot foot near front chair leg, hip extension with step of non weightbearing foot, pivot of weight bearing foot to sit. Utilized demonstration frequently. Pt required 10-12 reps with slowing down and significant breakdown turning both left and right. To then improve fluidity with movement pt completed without cues for breakdown with fair carryover with performance feedback 50% of time. Once pt completed 5 in a row without cues and appropriate mechanics moved to more open environment, away from task specific training for carry over. Pt then ambulated in porter way to waiting room 45 feet towards 3 chairs positioned in different directions. When pt 5 feet away from chair PT indicated which chair to sit it. Pt first attempt had excessively large plant step, too far from front of chair, and required small recovery steps. No safety concerns. On second attempt pt walked 20 feet approaching group of 5 chairs. Completed sit with smooth and appropriate motions.     Time-based treatments/modalities:    Physical Therapy Timed Treatment Charges  Gait training minutes (CPT 60793): 12 minutes  Therapeutic activity minutes (CPT 71313): 25 minutes  Therapeutic exercise minutes (CPT 76366): 22 minutes        ASSESSMENT:   Pt demonstrated good carry over of task specific training for improving fluidity of turns into more open environment while ambulating. Educated pt on improvements in strength and ambulation with plan for PN next visit for formal review of outcome measures. Pt demo'd improved hip ext and knee flexion with ability to move into quadruped without physical assistance as well as ability to complete donkey kicks with assist 50% of time and verbal cues for knee flexion 50% of time. Pt responds well to feed back of performance. Encouraged pt to attempt cog tasks while ambulating with spouse for improving automaticity and multitasking  while ambulating including naming items in a specific category. Also encouraged pt to utilize SPC for touch balance as she is not utilizing nor does she need all 4 points of quad cane. Pt verbalized understanding.     PLAN/RECOMMENDATIONS:   Plan for treatment: therapy treatment to continue next visit.  Planned interventions for next visit: continue with current treatment.

## 2021-05-20 ENCOUNTER — APPOINTMENT (OUTPATIENT)
Dept: PHYSICAL THERAPY | Facility: REHABILITATION | Age: 51
End: 2021-05-20
Payer: COMMERCIAL

## 2021-05-20 ENCOUNTER — PATIENT OUTREACH (OUTPATIENT)
Dept: OTHER | Facility: MEDICAL CENTER | Age: 51
End: 2021-05-20

## 2021-05-20 ENCOUNTER — OCCUPATIONAL THERAPY (OUTPATIENT)
Dept: OCCUPATIONAL THERAPY | Facility: REHABILITATION | Age: 51
End: 2021-05-20
Attending: PHYSICAL MEDICINE & REHABILITATION
Payer: COMMERCIAL

## 2021-05-20 DIAGNOSIS — S06.9X0D: ICD-10-CM

## 2021-05-20 PROCEDURE — 97530 THERAPEUTIC ACTIVITIES: CPT
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
## 2021-05-20 NOTE — PROGRESS NOTES
"On May 20, 2021, Oncology Social Worker Katerina Cagle contacted pt. via telephone.  Pt. shared she was \"doing well I have my good and bad days.\"  Pt. was tearful stating this diagnosis to be difficult to process.  Pt. shared she has tried counseling in the past and it has not worked.  Pt. shared she is a 7 on psychosocial distress screening re: \"want to stay with my family.\"  Pt. shared she is having some negative thoughts.  OSSTEPHEN Cagle asked pt. what those thoughts were.  Pt. shared she's Yaz[employer] and feels guilty about being angry at God.  OSSTEPHEN Cagle provided pt. emotional support.    "

## 2021-05-21 ENCOUNTER — PHYSICAL THERAPY (OUTPATIENT)
Dept: PHYSICAL THERAPY | Facility: REHABILITATION | Age: 51
End: 2021-05-21
Attending: INTERNAL MEDICINE
Payer: COMMERCIAL

## 2021-05-21 DIAGNOSIS — C71.1 MALIGNANT NEOPLASM OF FRONTAL LOBE (HCC): ICD-10-CM

## 2021-05-21 DIAGNOSIS — R53.1 WEAKNESS GENERALIZED: ICD-10-CM

## 2021-05-21 DIAGNOSIS — R26.89 BALANCE PROBLEMS: ICD-10-CM

## 2021-05-21 DIAGNOSIS — R26.9 GAIT ABNORMALITY: ICD-10-CM

## 2021-05-21 PROCEDURE — 97116 GAIT TRAINING THERAPY: CPT

## 2021-05-21 PROCEDURE — 97530 THERAPEUTIC ACTIVITIES: CPT

## 2021-05-21 PROCEDURE — 97110 THERAPEUTIC EXERCISES: CPT

## 2021-05-21 NOTE — OP THERAPY DAILY TREATMENT
Outpatient Physical Therapy  DAILY TREATMENT     [facility] Physical [address].  [address]  [place] [address]  Phone:  [phone]  Fax:  [phone]    Date: 05/21/2021    Patient: Melba Frye  YOB: 1970  MRN: [id number]     Time Calculation    Start time: 1300  Stop time: 1359 Time Calculation (min): 59 minutes         Chief Complaint: Weakness, Loss Of Balance, and Unsteady Gait    Visit #: 6    SUBJECTIVE:  Pt reports good compliance with HEP, has questions related to calf stretches. Has been more aware of approaching chairs and sit<>stands and feels she is doing much better and more fluid.    OBJECTIVE:  Pt ambulating with walking stick in Rt UE, mod cues to dec mid guard position of Lt UE, occasional circumduction of left LE rather than hip/knee flexion.     Therapeutic Exercises (CPT 07172):     1. Seated soleus stretch, 4 x 60    2. Seated gastroc stretch, 4 x 60    Therapeutic Treatments and Modalities:     1. Gait Training (CPT 25097)    2. Therapeutic Activities (CPT 33436)    Therapeutic Treatment and Modalities Summary: Gait: For improving gait mechanics, stabilty, and fluidity of ambulation pt ambulated on treadmill with LIteGait support for safety. Ambulated at .5mph 7 min, 6.5 minutes with Rt UE support. Seated rest required between bouts  For improving stabilty and hip extension during gait pt ambulated backwards .4mph x 4 minutes with mirror for external feedback to dec drifting and increase step length. Completed with LiteGait.  Pt ambulated 12 feet up/down ramp CGA, cues to dec left tendencies and increased stride length x 4 reps.    TherAct: For improving stairway negotiation in the home, specifically entering/exiting the home. Pt ascend/descend 1 6 inch step with walking stick,  CGA up with right, down with right x 6 reps. Max A was Cues to drive left LE up to dec catching toes.  Pt then completed ascend/descend 12 inch step single UE with  walking stick x 6 reps. CGA. Max A when attempting descend with Lt LE.  For improving sit<.>stands on various surfaces in open environment pt ambulated and was instructed to quickly sit by PT x 4 reps.    Time-based treatments/modalities:    Physical Therapy Timed Treatment Charges  Gait training minutes (CPT 19430): 32 minutes  Therapeutic activity minutes (CPT 14793): 17 minutes  Therapeutic exercise minutes (CPT 68277): 10 minutes    ASSESSMENT:   While ambulating on treadmill pt does fair with previous carry over of cues for improving right step length. When pt is instructed to look up she drifts significantly to the left resulting in Lt LE stepping off treadmill (not fully). Pt is unaware of when she is drifting to the left without verbal cues. However, when therapist stood on her right side, as compared to left, there were no instances of Lt LE drifting off treadmill. The drift could be related to increased input when therapist on left side, pt turning towards pt, therefore altering gait. Will cont to assess.    Reviewed gastroc soleus stretching with pt. Pt unable to perform gastroc stretch in weight bearing due to quad weakness. Completed in short sit with gait belt. Also educated her on seated soleus stretch.    PLAN/RECOMMENDATIONS:   Plan for treatment: therapy treatment to continue next visit.  Planned interventions for next visit: continue with current treatment.

## 2021-05-24 ENCOUNTER — HOSPITAL ENCOUNTER (OUTPATIENT)
Facility: MEDICAL CENTER | Age: 51
End: 2021-05-24
Attending: INTERNAL MEDICINE
Payer: COMMERCIAL

## 2021-05-24 ENCOUNTER — HOSPITAL ENCOUNTER (OUTPATIENT)
Dept: LAB | Facility: MEDICAL CENTER | Age: 51
End: 2021-05-24
Attending: INTERNAL MEDICINE
Payer: COMMERCIAL

## 2021-05-24 ENCOUNTER — APPOINTMENT (OUTPATIENT)
Dept: OCCUPATIONAL THERAPY | Facility: REHABILITATION | Age: 51
End: 2021-05-24
Attending: PHYSICAL MEDICINE & REHABILITATION
Payer: COMMERCIAL

## 2021-05-24 LAB
ALBUMIN SERPL BCP-MCNC: 4.4 G/DL (ref 3.2–4.9)
ALBUMIN/GLOB SERPL: 2 G/DL
ALP SERPL-CCNC: 86 U/L (ref 30–99)
ALT SERPL-CCNC: 23 U/L (ref 2–50)
ANION GAP SERPL CALC-SCNC: 9 MMOL/L (ref 7–16)
AST SERPL-CCNC: 18 U/L (ref 12–45)
BASOPHILS # BLD AUTO: 0.4 % (ref 0–1.8)
BASOPHILS # BLD: 0.02 K/UL (ref 0–0.12)
BILIRUB SERPL-MCNC: 0.6 MG/DL (ref 0.1–1.5)
BUN SERPL-MCNC: 12 MG/DL (ref 8–22)
C DIFF DNA SPEC QL NAA+PROBE: NEGATIVE
C DIFF TOX GENS STL QL NAA+PROBE: NEGATIVE
CALCIUM SERPL-MCNC: 9.2 MG/DL (ref 8.4–10.2)
CHLORIDE SERPL-SCNC: 108 MMOL/L (ref 96–112)
CO2 SERPL-SCNC: 22 MMOL/L (ref 20–33)
CREAT SERPL-MCNC: 0.6 MG/DL (ref 0.5–1.4)
EOSINOPHIL # BLD AUTO: 0.09 K/UL (ref 0–0.51)
EOSINOPHIL NFR BLD: 1.6 % (ref 0–6.9)
ERYTHROCYTE [DISTWIDTH] IN BLOOD BY AUTOMATED COUNT: 42.8 FL (ref 35.9–50)
GLOBULIN SER CALC-MCNC: 2.2 G/DL (ref 1.9–3.5)
GLUCOSE SERPL-MCNC: 117 MG/DL (ref 65–99)
HCT VFR BLD AUTO: 41.6 % (ref 37–47)
HGB BLD-MCNC: 14.7 G/DL (ref 12–16)
IMM GRANULOCYTES # BLD AUTO: 0.02 K/UL (ref 0–0.11)
IMM GRANULOCYTES NFR BLD AUTO: 0.4 % (ref 0–0.9)
LYMPHOCYTES # BLD AUTO: 0.97 K/UL (ref 1–4.8)
LYMPHOCYTES NFR BLD: 17 % (ref 22–41)
MAGNESIUM SERPL-MCNC: 1.9 MG/DL (ref 1.5–2.5)
MCH RBC QN AUTO: 32.7 PG (ref 27–33)
MCHC RBC AUTO-ENTMCNC: 35.3 G/DL (ref 33.6–35)
MCV RBC AUTO: 92.7 FL (ref 81.4–97.8)
MONOCYTES # BLD AUTO: 0.47 K/UL (ref 0–0.85)
MONOCYTES NFR BLD AUTO: 8.3 % (ref 0–13.4)
NEUTROPHILS # BLD AUTO: 4.12 K/UL (ref 2–7.15)
NEUTROPHILS NFR BLD: 72.3 % (ref 44–72)
NRBC # BLD AUTO: 0 K/UL
NRBC BLD-RTO: 0 /100 WBC
PLATELET # BLD AUTO: 158 K/UL (ref 164–446)
PMV BLD AUTO: 9.2 FL (ref 9–12.9)
POTASSIUM SERPL-SCNC: 3.7 MMOL/L (ref 3.6–5.5)
PROT SERPL-MCNC: 6.6 G/DL (ref 6–8.2)
RBC # BLD AUTO: 4.49 M/UL (ref 4.2–5.4)
SODIUM SERPL-SCNC: 139 MMOL/L (ref 135–145)
WBC # BLD AUTO: 5.7 K/UL (ref 4.8–10.8)

## 2021-05-24 PROCEDURE — 87493 C DIFF AMPLIFIED PROBE: CPT

## 2021-05-24 PROCEDURE — 87899 AGENT NOS ASSAY W/OPTIC: CPT | Mod: 91

## 2021-05-24 PROCEDURE — 85025 COMPLETE CBC W/AUTO DIFF WBC: CPT

## 2021-05-24 PROCEDURE — 83735 ASSAY OF MAGNESIUM: CPT

## 2021-05-24 PROCEDURE — 80053 COMPREHEN METABOLIC PANEL: CPT

## 2021-05-24 PROCEDURE — 36415 COLL VENOUS BLD VENIPUNCTURE: CPT

## 2021-05-24 PROCEDURE — 87045 FECES CULTURE AEROBIC BACT: CPT

## 2021-05-25 LAB
E COLI SXT1+2 STL IA: NORMAL
SIGNIFICANT IND 70042: NORMAL
SITE SITE: NORMAL
SOURCE SOURCE: NORMAL

## 2021-05-26 ENCOUNTER — PATIENT OUTREACH (OUTPATIENT)
Dept: OTHER | Facility: MEDICAL CENTER | Age: 51
End: 2021-05-26

## 2021-05-26 ENCOUNTER — OCCUPATIONAL THERAPY (OUTPATIENT)
Dept: OCCUPATIONAL THERAPY | Facility: REHABILITATION | Age: 51
End: 2021-05-26
Attending: PHYSICAL MEDICINE & REHABILITATION
Payer: COMMERCIAL

## 2021-05-26 ENCOUNTER — PHYSICAL THERAPY (OUTPATIENT)
Dept: PHYSICAL THERAPY | Facility: REHABILITATION | Age: 51
End: 2021-05-26
Attending: INTERNAL MEDICINE
Payer: COMMERCIAL

## 2021-05-26 DIAGNOSIS — R53.1 WEAKNESS GENERALIZED: ICD-10-CM

## 2021-05-26 DIAGNOSIS — C71.1 MALIGNANT NEOPLASM OF FRONTAL LOBE (HCC): ICD-10-CM

## 2021-05-26 DIAGNOSIS — S06.9X0D: ICD-10-CM

## 2021-05-26 DIAGNOSIS — R26.89 BALANCE PROBLEMS: ICD-10-CM

## 2021-05-26 DIAGNOSIS — R26.9 GAIT ABNORMALITY: ICD-10-CM

## 2021-05-26 LAB
BACTERIA STL CULT: NORMAL
C JEJUNI+C COLI AG STL QL: NORMAL
E COLI SXT1+2 STL IA: NORMAL
SIGNIFICANT IND 70042: NORMAL
SITE SITE: NORMAL
SOURCE SOURCE: NORMAL

## 2021-05-26 PROCEDURE — 97112 NEUROMUSCULAR REEDUCATION: CPT

## 2021-05-26 PROCEDURE — 97110 THERAPEUTIC EXERCISES: CPT

## 2021-05-26 PROCEDURE — 97116 GAIT TRAINING THERAPY: CPT

## 2021-05-26 PROCEDURE — 97530 THERAPEUTIC ACTIVITIES: CPT

## 2021-05-26 NOTE — OP THERAPY DAILY TREATMENT
Outpatient Physical Therapy  DAILY TREATMENT     [facility] Physical [address].  [address]  [place] [address]  Phone:  [phone]  Fax:  [phone]    Date: 05/26/2021    Patient: Melba Frye  YOB: 1970  MRN: [id number]     Time Calculation    Start time: 1424  Stop time: 1522 Time Calculation (min): 58 minutes         Chief Complaint: Difficulty Walking, Loss Of Balance, and Weakness    Visit #: 7    SUBJECTIVE:  Pt reports she has been trying to add more dynamic tasks when walking including bird watching which is a hobby. Pt very concerned about insurance changes.     OBJECTIVE:  Significant decrease in bilateral stride length when walking outside, increased ALLYSON      Therapeutic Exercises (CPT 11738):     1. STS on airex, 7, 5 x 3, mat table, BOSU, mirror    2. Split STS, rt LE 6 inch box, 8, 13, 7, BOSU, mirror    Therapeutic Treatments and Modalities:     1. Gait Training (CPT 37092), see below    2. Neuromuscular Re-education (CPT 42503), see below    Therapeutic Treatment and Modalities Summary: Gait training: For improving safe ambulation on uneven surfaces pt ambulated up/down steep incline ramp CGA 40 feet, ambulated on grass x 50 feet, 5 feet on gravel/rocks with Rt walking stick for AD. Pt required mod VC for increasing step length and outdoor awareness with eyes up. No safety concerns on grass or ramp but will cont to train. Significant difficulty on gravel. Difficulty clearing left LE over small rock resulting in instability and mod A to recover. Cues for knee flexion to clear Lt LE.  For improving knee ext in stance pt completed split stance rocking with Rt LE positioned anteriorly. Max tactile cues to left quad for knee ext in stance with fair carry over x 5 min    NMR: For improving stability and coordination/control while ambulating pt completed alternating toe taps 3 x 1 min // bars. Pt continued to circumduct with fair response to cues and  tactile/facilitation. Did improve significantly with application of purple theraband adi cross for facilitation of rectus 3 x 1 min. Mod cues to increase knee ext in standing.     Time-based treatments/modalities:    Physical Therapy Timed Treatment Charges  Gait training minutes (CPT 10806): 26 minutes  Neuromusc re-ed, balance, coor, post minutes (CPT 63233): 15 minutes  Therapeutic exercise minutes (CPT 78499): 17 minutes      ASSESSMENT:   Pt required more frequent cueing for knee ext on left during stance. Likely due to increasing difficulty and more open environments with both outdoor textures as well as incorporation of coordination tasks. Overall, stabilty outside was fair but significant gait  changes. Pt continues to over utilize Rt LE for STS but improves with use of mirror for external feedback.  Pt continues to have significant deficits that will benefit from skilled PT.   PLAN/RECOMMENDATIONS:   Plan for treatment: therapy treatment to continue next visit.  Planned interventions for next visit: continue with current treatment.    Extensive HEP

## 2021-05-26 NOTE — PROGRESS NOTES
"Received message that patient requesting information for support.  Call placed to patient.  Reviewed support groups at [facility], as well as through [employer] ([employer]) and [facility] program.  Will email information per her request.  She was also hoping to get information on possible places that will take her insurance for counseling.  Will update , Katerina, regarding request who has discussed with her in past.  Pt denies other needs from navigator.  She did report it was nice \"having everything in one place\", and now having to change things related to insurance.  Encouraged her to call navigator if has additional questions or concerns.  "

## 2021-05-26 NOTE — OP THERAPY DAILY TREATMENT
"  Outpatient Occupational Therapy  DAILY TREATMENT     [facility] Occupational Therapy [address].  [address]  [place] [address]  Phone:  [phone]  Fax:  [phone]    Date: 05/26/2021    Patient: Melba Frye  YOB: 1970  MRN: [id number]     Time Calculation  Start time: 1030  Stop time: 1130 Time Calculation (min): 60 minutes         Chief Complaint: Extremity Weakness (LUE, dec [facility], pain)    Visit #: 4    SUBJECTIVE: \"I hope I can drive\"    OBJECTIVE:  Current objective measures:         Therapeutic Treatments and Modalities:    1. Therapeutic Activities (CPT 33517)    Therapeutic Treatments and Modalities Summary: Driving simulator: able to manage the pedals, gear shift, ignition, and steering wheel  Brake reaction time: 1.0 seconds, 0.7 seconds.  Pass.  Safe following distances: safe following distance behind a truck, able to maintain mid-[place] position, obeyed speed limit, decreased speed to avoid rear-ending truck when it stopped.  Pass.  Dividing and focusing attention:  Rural: able to maintain mid-[place] position, drove within recommended speed limit, decreased speed around curve, avoided hitting deer that entered from the right.  Pass.  City: able to manage turn signals, safely entered traffic and changed lanes, able to maintain mid-zon[place] position, obeyed speed limit,  obeyed traffic light, safely turned at intersection and avoided hitting pedestrian who crossed the street from the right.  Pass.  Configurable crash avoidance scenarios:  Rural, daytime, good visibility: safely entered road using turn signals, safe following distance behind a tractor, demonstrated good judgement to not pass tractor based on visibility and road lines.  Pass.  City, daytime, good visibility: yielded before entering traffic [place], safely drove within and exited traffic [place] at requested exit, safely passed stationary vehicle, obeyed traffic light. Pass.   City, dusk, moderate rain, " "average visibility: drove at a slower speed to accommodate for conditions, decreased speed to avoid hitting child who crossed the street from the left.  Pass.  City, night, moderate rain, average visibility: able to maintain mid-zon[place] position, drove at a slower speed due to night/rain, decreased speed to avoid an accident with another vehicle that quickly passed through an intersection.  Pass  City, daytime, good visibility: safely changed lanes, obeyed traffic light, safely turned into correct [place], avoided accident with a vehicle at an intersection, drove within recommended speed limit, safely changed 2 lanes to turn right and avoided hitting pedestrian, able to maintain mid-zon[place] position around a curve, avoided accident with a child who crossed the street from the left.  Pass.      Time-based treatments/modalities:  Therapeutic activity minutes (CPT 21973): 60 minutes          ASSESSMENT:   Response to treatment: The objective findings indicate that Melba Frye has the physical, visual, visual-perceptual, and cognitive skills necessary to safely operate a vehicle.  She exhibited quick reaction times and good safety distances with all simulator tasks.  In both rural and city settings where there were mild to moderate distractions, she did not have any accidents in multiple crash avoidance scenarios with pedestrians, animals, vehicles, or stationary objects.   She obeyed all regulatory signs, speed limits, maintained mid-zon[place] position, followed all verbal directions, safely passed other vehicles, and was able to divide and focus her attention throughout the driving simulation.  Overall, Melba Frye demonstrated good safety awareness, judgement, and anticipatory skills.  Based on her performance today, I recommend she transition back to driving under the following conditions: during the day, good weather conditions, at low traffic times, on familiar routes, avoidance of the \"spaghetti bowl\", minimize " "distractions, and with her  as a passenger during her first 5 drives to provide her with \"on-the-road\" feedback.  Melba Frye was in full agreement with these recommendations.    Operating a motor vehicle is a privilege in the [facility].  When a medical condition interferes with a person's ability to drive safely, it is the responsibility of the physician to approve driving or revoke the patient's license.  This test was not an on-the-road driving evaluation.  It was intended to identify the physical, visual, perceptual and cognitive deficits that may impair an individual's ability to safely operate a motor vehicle.    Please contact me with any questions regarding this case at [facility] at [phone].  Thank you for this referral and the safety concerns for your patients and others.    Sincerely,    Javid Pereira M.S. OTR/L    PLAN/RECOMMENDATIONS:   Plan for treatment: therapy treatment to continue next visit.  Planned interventions for next visit: continue with current treatment, manual therapy (CPT 51493), therapeutic activities (CPT 69659) and therapeutic exercise (CPT 38336)       "

## 2021-05-27 ENCOUNTER — APPOINTMENT (OUTPATIENT)
Dept: PHYSICAL THERAPY | Facility: REHABILITATION | Age: 51
End: 2021-05-27
Payer: COMMERCIAL

## 2021-05-27 NOTE — PROGRESS NOTES
On May 26, 2021, Oncology Social Worker Katerina Cagle emailed pt. the following information:    Melba,     Here are the two options for counseling.      [facility]   [phone]  [email or web address]  They accept your insurance     [facility]   [phone]  Your insurance is out of network     Please do not hesitate to contact me if you need additional assistance,     Katerina

## 2021-05-28 ENCOUNTER — PHYSICAL THERAPY (OUTPATIENT)
Dept: PHYSICAL THERAPY | Facility: REHABILITATION | Age: 51
End: 2021-05-28
Attending: INTERNAL MEDICINE
Payer: COMMERCIAL

## 2021-05-28 DIAGNOSIS — R26.9 GAIT ABNORMALITY: ICD-10-CM

## 2021-05-28 DIAGNOSIS — R53.1 WEAKNESS GENERALIZED: ICD-10-CM

## 2021-05-28 DIAGNOSIS — C71.1 MALIGNANT NEOPLASM OF FRONTAL LOBE (HCC): ICD-10-CM

## 2021-05-28 DIAGNOSIS — R26.89 BALANCE PROBLEMS: ICD-10-CM

## 2021-05-28 PROCEDURE — 97110 THERAPEUTIC EXERCISES: CPT

## 2021-05-28 PROCEDURE — 97116 GAIT TRAINING THERAPY: CPT

## 2021-05-28 NOTE — OP THERAPY DAILY TREATMENT
Outpatient Physical Therapy  DAILY TREATMENT     [facility] Physical [address].  [address]  [place] [address]  Phone:  [phone]  Fax:  [phone]    Date: 05/28/2021    Patient: Melba Frye  YOB: 1970  MRN: [id number]     Time Calculation    Start time: 1303  Stop time: 1400 Time Calculation (min): 57 minutes         Chief Complaint: Loss Of Balance, Weakness, and Unsteady Gait    Visit #: 8    SUBJECTIVE:  Pt has been compliant with HEP. Able to ambulate 3-5 min on uneven surface with walking stick and assist of .     OBJECTIVE:  Lt hip ab MMT 2-/5      Therapeutic Exercises (CPT 72774):     1. SL bridge, x 12 B    2. Clamshell holds, 3 x 1 min B    3. TA marches, x 20    4. Hip abuction Lt, supine x 10, mod A dec knee flexion    5. Sidelying hip Ab, Rt x 10    Therapeutic Treatments and Modalities:     1. Gait Training (CPT 27098), see below    Therapeutic Treatment and Modalities Summary: Gait training: For improving safe ambulation on uneven surfaces pt ambulated up/down steep incline ramp CGA 40 feet, ambulated on grass x 50 feet, 5 feet on gravel/rocks with Rt walking stick for AD. Pt required mod VC for increasing step length and outdoor awareness with eyes up. No safety concerns on grass or ramp but will cont to train. Significant difficulty on gravel. Difficulty clearing left LE over small rock resulting in instability and mod A to recover. Cues for knee flexion to clear Lt LE.  Pt also ambulated 50 feet on uneven/loose gravel with step too pattern 50% of the time, good carry over to cues for step through. No LOB. CGA      Time-based treatments/modalities:    Physical Therapy Timed Treatment Charges  Gait training minutes (CPT 12666): 26 minutes  Therapeutic exercise minutes (CPT 19042): 31 minutes      ASSESSMENT:   Pt demod better knee ext in stance than previous. Continues to have significant left abductor weakness as noted with inability to  lift against gravity without hip flexion substitution. Pt attempting to sub hip flexion even in supine. HEP provided with extensive exercises incase insurance results in significant period without PT intervention. Pt demo'd understanding and able to complete SPV. Pt will continue to benefit from skilled intervention in order to improve strength, balance and independence with ambulation that is age appropriate and more aligned with PLOF. Pt currently unable to ambulate on uneven ground without CGA and is high risk of falling.  PLAN/RECOMMENDATIONS:   Plan for treatment: therapy treatment to continue next visit.  Planned interventions for next visit: continue with current treatment.    Extensive HEP

## 2021-06-01 ENCOUNTER — OFFICE VISIT (OUTPATIENT)
Dept: NEUROLOGY | Facility: MEDICAL CENTER | Age: 51
End: 2021-06-01
Attending: PSYCHIATRY & NEUROLOGY
Payer: COMMERCIAL

## 2021-06-01 VITALS
SYSTOLIC BLOOD PRESSURE: 128 MMHG | WEIGHT: 233.69 LBS | HEIGHT: 67 IN | BODY MASS INDEX: 36.68 KG/M2 | TEMPERATURE: 97 F | DIASTOLIC BLOOD PRESSURE: 74 MMHG | OXYGEN SATURATION: 94 % | HEART RATE: 86 BPM

## 2021-06-01 DIAGNOSIS — G40.109 LOCALIZATION-RELATED FOCAL EPILEPSY WITH SIMPLE PARTIAL SEIZURES (HCC): Primary | ICD-10-CM

## 2021-06-01 PROCEDURE — 99212 OFFICE O/P EST SF 10 MIN: CPT | Performed by: PSYCHIATRY & NEUROLOGY

## 2021-06-01 PROCEDURE — 99214 OFFICE O/P EST MOD 30 MIN: CPT | Performed by: PSYCHIATRY & NEUROLOGY

## 2021-06-01 RX ORDER — LEVETIRACETAM 750 MG/1
1500 TABLET ORAL 2 TIMES DAILY
Qty: 360 TABLET | Refills: 2 | Status: SHIPPED | OUTPATIENT
Start: 2021-06-01 | End: 2022-04-14

## 2021-06-01 ASSESSMENT — FIBROSIS 4 INDEX: FIB4 SCORE: 1.21

## 2021-06-01 NOTE — PROGRESS NOTES
"[facility] NEUROLOGY  FOLLOW-UP VISIT    CC: GBM, epilepsy    INTERVAL HISTORY:  Melba Frye is a 50 y.o. woman with GBM as well as a history of migraine headaches, depression, and anxiety.  I last saw in the clinic on 3/31/2021.  At that time I recommended she increase the dosage of levetiracetam to 750 mg BID and begin a lamotrigine taper.  Today, she was accompanied by her , and she provided the following interval history:    5/2021:  Melba experienced a \"little\" event similar to that described below.    5/2021:  Melba experienced a \"bigger\" event concerning for seizure.  She felt as if someone were touching the left side of her head.  This evolved into a tingling sensation over the left upper extremity, left flank, and left groin.  Within a few moments she started twitching in the left lower extremity, left abdomen, and left upper extremity.  The entire event lasted approximately 3 minutes.  There was no associated loss of awareness.  Her  was around.  She breathed and let the event take its course.    Melba tolerates Keppra well.    There have been ~2 minor falls without serious injuries.  Melba has started using a grabber device instead of bending down to reach objects.    MEDICATIONS:  Current Outpatient Medications   Medication Sig   • levetiracetam (KEPPRA) 750 MG tablet Take 2 Tablets by mouth 2 times a day for 90 days.   • hydrOXYzine HCl (ATARAX) 50 MG Tab TAKE 1 TABLET BY MOUTH EVERY 8 HOURS AS NEEDED FOR ANXIETY.   • temozolomide (TEMODAR) 5 MG capsule    • temozolomide (TEMODAR) 140 MG capsule 160 mg. 150mg once a day (140mg + 2, 5mg tabs)   • melatonin 3 MG Tab Take 1 tablet by mouth at bedtime as needed (insomnia).   • venlafaxine (EFFEXOR) 25 MG Tab Take 0.5 Tablets by mouth 2 Times a Day.   • acetaminophen (TYLENOL) 325 MG Tab Take 2 Tablets by mouth every 6 hours as needed (Mild Pain; (Pain scale 1-3); Temp greater than 100.5 F).   • folic acid " (FOLVITE) 1 MG Tab Take 1 tablet by mouth every day.   • VITAMIN D PO Take 1 Units by mouth every evening.   • multivitamin (THERAGRAN) Tab Take 1 tablet by mouth every evening.     MEDICAL, SOCIAL, AND FAMILY HISTORY:  There is no change in the patient's ROS or PFSH from their previous visit on 3/31/2021.    REVIEW OF SYSTEMS:  A ROS was completed.  Pertinent positives and negatives were included in the HPI, above.  All other systems were reviewed and are negative.    PHYSICAL EXAM:  General/Medical:  - NAD  - hair, skin, nails, and joints were normal    Neuro:  MENTAL STATUS: awake and alert; no deficits of speech or language; oriented to person, place, and time; affect was appropriate to situation; pleasant, cooperative    CRANIAL NERVES:    II: acuity was: J1+/J1+ with glasses; fields intact to confrontation; pupils 3/3 to 2/2 without a relative afferent pupillary defect; discs sharp    III/IV/VI: versions intact without nystagmus    V: facial sensation symmetric to light touch    VII: facial expression symmetric    VIII: hearing intact to finger rub    IX/X: palate elevates symmetrically    XI: shoulder shrug symmetric    XII: tongue midline    MOTOR:  - bulk was normal  - spastic in the left upper extremity  Upper Extremity Strength  (R/L)    5/4   Elbow flexion 5/4   Elbow extension 5/4   Shoulder abduction 5/4     Lower Extremity Strength  (R/L)   Hip flexion 5/4   Knee extension 5/4   Knee flexion 5/5   Ankle plantarflexion 5/AFO   Ankle dorsiflexion 5/AFO     - no pronator drift; no abnormal movements    SENSATION:  - light touch: grossly intact over the upper and lower extremities  - vibration (R/L, seconds): at the great toes  - pinprick: NT  - proprioception: NT  - Romberg: absent    COORDINATION:  - finger to nose was normal, no ataxia on exam  - finger tapping was mildly slowed on the left    REFLEXES:  Reflex Right Left   BR 2+ 2+   Biceps 2+ 3+   Triceps 2+ 2+   Patellae 2+ 2+   Achilles NT NT  "  Toes NT NT     GAIT:  - left alma delia-paretic  - walking stick on right    REVIEW OF IMAGING STUDIES:  No new brain imaging available for review.    REVIEW OF LABORATORY STUDIES:  Reviewed.    ASSESSMENT:  Melba Frye is a 50 y.o. woman with GBM and a history otherwise notable for migraine headaches, depression, and anxiety.  Melba experienced two events consistent with breakthrough seizure.  Plan to increase the dosage of Keppra to 1,500 mg BID.  She will alert the clinic if she experiences any additional events concerning for seizure.  She should continue to see Dr. Davidson, and she may follow up with me whenever she would like if issues arise.    PLAN:  GBM with localization-related epilepsy  - increase levetiracetam to 1,500 mg BID    Follow-Up:  - Return if symptoms worsen or fail to improve.    Signed: Yohan Calixto M.D. at 10:37 AM on 03/31/21    BILLING DOCUMENTATION:   I spent 34 minutes reviewing the medical record, interviewing and examining the patient, discussing my impression (see \"assessment\" above), and coordinating care.  "

## 2021-06-04 ENCOUNTER — HOSPITAL ENCOUNTER (OUTPATIENT)
Dept: RADIOLOGY | Facility: MEDICAL CENTER | Age: 51
End: 2021-06-04
Attending: RADIOLOGY
Payer: COMMERCIAL

## 2021-06-04 DIAGNOSIS — C71.1 GLIOBLASTOMA OF FRONTAL LOBE (HCC): ICD-10-CM

## 2021-06-04 PROCEDURE — 70553 MRI BRAIN STEM W/O & W/DYE: CPT

## 2021-06-04 PROCEDURE — A9576 INJ PROHANCE MULTIPACK: HCPCS | Performed by: RADIOLOGY

## 2021-06-04 PROCEDURE — 700117 HCHG RX CONTRAST REV CODE 255: Performed by: RADIOLOGY

## 2021-06-04 RX ADMIN — GADOTERIDOL 20 ML: 279.3 INJECTION, SOLUTION INTRAVENOUS at 08:46

## 2021-06-07 ENCOUNTER — TELEPHONE (OUTPATIENT)
Dept: RADIATION ONCOLOGY | Facility: MEDICAL CENTER | Age: 51
End: 2021-06-07

## 2021-06-07 DIAGNOSIS — C71.1 GLIOBLASTOMA OF FRONTAL LOBE (HCC): ICD-10-CM

## 2021-06-07 NOTE — TELEPHONE ENCOUNTER
Neuro Tumor Board Note 6/7/21    49 yo with right frontalparietal GBM ID[facility] MT s/p stereotactic bx and chemoRT completed 5/14/21  -first scan 6/4/21 looks like progression compared from 3/11/21 scan  -per Dr. Ayala would consider CCNU and/or Avastin and cont TTF will discuss with Dr. Cerda  -repeat MRI in 2 months  -will refer to [facility] Neuro-onc for 2nd opinion re trials or other tx recommendations    CC: Dr. Cerda, Dr. Mao, Dr. Gomez

## 2021-06-09 ENCOUNTER — HOSPITAL ENCOUNTER (OUTPATIENT)
Dept: HOSPITAL 8 - CFH | Age: 51
Discharge: HOME | End: 2021-06-09
Attending: INTERNAL MEDICINE
Payer: COMMERCIAL

## 2021-06-09 DIAGNOSIS — C71.1: Primary | ICD-10-CM

## 2021-06-09 PROCEDURE — 74177 CT ABD & PELVIS W/CONTRAST: CPT

## 2021-06-11 ENCOUNTER — TELEPHONE (OUTPATIENT)
Dept: PHYSICAL THERAPY | Facility: REHABILITATION | Age: 51
End: 2021-06-11

## 2021-06-11 NOTE — OP THERAPY DISCHARGE SUMMARY
Outpatient Physical Therapy  DISCHARGE SUMMARY NOTE      [facility] [address].  [address]  [place] [address]  Phone:  [phone]  Fax:  [phone]    Date of Visit: 06/11/2021    Patient: Melba Frye  YOB: 1970  MRN: [id number]     Referring Provider: Michael Cerda M.D.   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    Comments:  Due to [facility] conflict and [facility] being out of network provider pt will be seeking PT at in-network facility.     Limitations Remaining:  NA    Recommendations:  ARACELIS Fontaine, PT, DPT    Date: 6/11/2021

## 2021-06-14 DIAGNOSIS — F41.8 ANXIETY ASSOCIATED WITH DEPRESSION: ICD-10-CM

## 2021-06-14 RX ORDER — HYDROXYZINE 50 MG/1
50 TABLET, FILM COATED ORAL EVERY 8 HOURS PRN
Qty: 90 TABLET | Refills: 0 | Status: SHIPPED | OUTPATIENT
Start: 2021-06-14 | End: 2021-07-21

## 2021-06-25 ENCOUNTER — APPOINTMENT (OUTPATIENT)
Dept: RADIATION ONCOLOGY | Facility: MEDICAL CENTER | Age: 51
End: 2021-06-25
Attending: RADIOLOGY
Payer: COMMERCIAL

## 2021-07-14 ENCOUNTER — APPOINTMENT (OUTPATIENT)
Dept: OCCUPATIONAL THERAPY | Facility: REHABILITATION | Age: 51
End: 2021-07-14
Attending: PHYSICAL MEDICINE & REHABILITATION
Payer: COMMERCIAL

## 2021-07-21 ENCOUNTER — HOSPITAL ENCOUNTER (OUTPATIENT)
Facility: MEDICAL CENTER | Age: 51
End: 2021-07-21
Attending: FAMILY MEDICINE
Payer: COMMERCIAL

## 2021-07-21 ENCOUNTER — OFFICE VISIT (OUTPATIENT)
Dept: MEDICAL GROUP | Facility: IMAGING CENTER | Age: 51
End: 2021-07-21
Payer: COMMERCIAL

## 2021-07-21 VITALS
RESPIRATION RATE: 12 BRPM | TEMPERATURE: 98.8 F | OXYGEN SATURATION: 96 % | HEIGHT: 67 IN | DIASTOLIC BLOOD PRESSURE: 82 MMHG | HEART RATE: 108 BPM | SYSTOLIC BLOOD PRESSURE: 128 MMHG | WEIGHT: 233 LBS | BODY MASS INDEX: 36.57 KG/M2

## 2021-07-21 DIAGNOSIS — N30.01 ACUTE CYSTITIS WITH HEMATURIA: ICD-10-CM

## 2021-07-21 DIAGNOSIS — R30.0 DYSURIA: ICD-10-CM

## 2021-07-21 DIAGNOSIS — R31.9 HEMATURIA, UNSPECIFIED TYPE: ICD-10-CM

## 2021-07-21 DIAGNOSIS — C71.1 GLIOBLASTOMA OF FRONTAL LOBE (HCC): ICD-10-CM

## 2021-07-21 DIAGNOSIS — G43.109 COMPLICATED MIGRAINE: ICD-10-CM

## 2021-07-21 PROBLEM — G81.90 HEMIPARESIS (HCC): Status: ACTIVE | Noted: 2021-06-10

## 2021-07-21 LAB
APPEARANCE UR: NORMAL
BILIRUB UR STRIP-MCNC: NORMAL MG/DL
COLOR UR AUTO: NORMAL
GLUCOSE UR STRIP.AUTO-MCNC: NORMAL MG/DL
KETONES UR STRIP.AUTO-MCNC: NORMAL MG/DL
LEUKOCYTE ESTERASE UR QL STRIP.AUTO: NORMAL
NITRITE UR QL STRIP.AUTO: NORMAL
PH UR STRIP.AUTO: 5.5 [PH] (ref 5–8)
PROT UR QL STRIP: 300 MG/DL
RBC UR QL AUTO: NORMAL
SP GR UR STRIP.AUTO: 1.03
UROBILINOGEN UR STRIP-MCNC: 1 MG/DL

## 2021-07-21 PROCEDURE — 81002 URINALYSIS NONAUTO W/O SCOPE: CPT | Performed by: FAMILY MEDICINE

## 2021-07-21 PROCEDURE — 87086 URINE CULTURE/COLONY COUNT: CPT

## 2021-07-21 PROCEDURE — 99214 OFFICE O/P EST MOD 30 MIN: CPT | Performed by: FAMILY MEDICINE

## 2021-07-21 PROCEDURE — 87186 SC STD MICRODIL/AGAR DIL: CPT

## 2021-07-21 PROCEDURE — 87077 CULTURE AEROBIC IDENTIFY: CPT

## 2021-07-21 RX ORDER — GABAPENTIN 300 MG/1
300 CAPSULE ORAL
COMMUNITY
Start: 2021-07-13 | End: 2021-08-10 | Stop reason: SDUPTHER

## 2021-07-21 RX ORDER — SULFAMETHOXAZOLE AND TRIMETHOPRIM 800; 160 MG/1; MG/1
1 TABLET ORAL 2 TIMES DAILY
Qty: 6 TABLET | Refills: 0 | Status: SHIPPED | OUTPATIENT
Start: 2021-07-21 | End: 2021-07-24

## 2021-07-21 RX ORDER — TRAMADOL HYDROCHLORIDE 50 MG/1
50 TABLET ORAL NIGHTLY PRN
Qty: 30 TABLET | Refills: 0 | Status: SHIPPED | OUTPATIENT
Start: 2021-07-21 | End: 2021-08-20 | Stop reason: SDUPTHER

## 2021-07-21 RX ORDER — AMLODIPINE BESYLATE 10 MG/1
10 TABLET ORAL DAILY
COMMUNITY
Start: 2021-06-30 | End: 2021-08-10 | Stop reason: SDUPTHER

## 2021-07-21 RX ORDER — TRAMADOL HYDROCHLORIDE 50 MG/1
50 TABLET ORAL
COMMUNITY
Start: 2021-07-12 | End: 2021-07-21 | Stop reason: SDUPTHER

## 2021-07-21 ASSESSMENT — FIBROSIS 4 INDEX: FIB4 SCORE: 1.21

## 2021-07-21 ASSESSMENT — PAIN SCALES - GENERAL: PAINLEVEL: NO PAIN

## 2021-07-21 NOTE — PROGRESS NOTES
Chief Complaint   Patient presents with   • Dysuria     x a few days, on and off, urgency    • Hypertension     HPI:  50-year-old female with a recent diagnosis of glioblastoma multiforme now with hyperlipidemia hypertension TMJ here for follow-up for blood pressure management and with urinary tract symptoms.  No sudden vision changes shortness of breath chest pain.  With dysuria, polyuria. No urgency. No fevers chills nausea or vomiting. With fatigue.   Patient has started several new medications to various providers and would like to consolidate all of these medications to me.  Taking tramadol for some of her pains status post her surgery and the chemo and radiation.      No Known Allergies  Current Outpatient Medications   Medication Sig Dispense Refill   • amLODIPine (NORVASC) 10 MG Tab Take 10 mg by mouth every day.     • gabapentin (NEURONTIN) 300 MG Cap Take 300 mg by mouth at bedtime.     • sulfamethoxazole-trimethoprim (BACTRIM DS) 800-160 MG tablet Take 1 tablet by mouth 2 times a day for 3 days. 6 tablet 0   • traMADol (ULTRAM) 50 MG Tab Take 1 tablet by mouth at bedtime as needed for up to 30 days. 30 tablet 0   • levetiracetam (KEPPRA) 750 MG tablet Take 2 Tablets by mouth 2 times a day for 90 days. 360 tablet 2   • melatonin 3 MG Tab Take 1 tablet by mouth at bedtime as needed (insomnia). 30 tablet 2   • venlafaxine (EFFEXOR) 25 MG Tab Take 0.5 Tablets by mouth 2 Times a Day. (Patient taking differently: Take 25 mg by mouth every day.) 90 tablet 2   • acetaminophen (TYLENOL) 325 MG Tab Take 2 Tablets by mouth every 6 hours as needed (Mild Pain; (Pain scale 1-3); Temp greater than 100.5 F). 30 tablet 0   • VITAMIN D PO Take 1 Units by mouth every evening.     • multivitamin (THERAGRAN) Tab Take 1 tablet by mouth every evening.       No current facility-administered medications for this visit.     Social History     Tobacco Use   • Smoking status: Never Smoker   • Smokeless tobacco: Never Used   Vaping  "Use   • Vaping Use: Never used   Substance Use Topics   • Alcohol use: Not Currently     Alcohol/week: 0.0 oz   • Drug use: No     Family History   Problem Relation Age of Onset   • Non-contributory Mother    • Lung Disease Mother         COPD   • Cancer Mother         dysplasia    • Arthritis Mother    • Psychiatric Illness Mother    • Heart Disease Mother    • Hypertension Mother    • Stroke Mother    • Non-contributory Father    • Cancer Father 73        prostate cancer   • Lung Disease Maternal Grandmother    • Lung Disease Paternal Aunt    • Diabetes Paternal Aunt    • Hyperlipidemia Paternal Aunt        /82   Pulse (!) 108   Temp 37.1 °C (98.8 °F)   Resp 12   Ht 1.702 m (5' 7\")   Wt 106 kg (233 lb)   SpO2 96%  Body mass index is 36.49 kg/m².  Review of Systems   Constitutional: Negative for chills, fever and malaise/fatigue.   HENT: Negative for hearing loss and tinnitus.    Eyes: Negative for double vision and pain.   Respiratory: Negative for cough, shortness of breath and wheezing.    Cardiovascular: Negative for chest pain, palpitations and leg swelling.   Gastrointestinal: Negative for abdominal pain, diarrhea, nausea and vomiting.   Genitourinary: Negative for dysuria and frequency.   Musculoskeletal: Negative for joint pain and myalgias.   Skin: Negative for itching and rash.   Neurological: Negative for dizziness and headaches.     Physical Exam  Constitutional:       General: He is not in acute distress.     Appearance: He is not diaphoretic.   HENT:      Head: Normocephalic and atraumatic.   Eyes:      General: No scleral icterus.        Right eye: No discharge.         Left eye: No discharge.      Conjunctiva/sclera: Conjunctivae normal.      Pupils: Pupils are equal, round, and reactive to light.   Neck:      Thyroid: No thyromegaly.   Pulmonary:      Effort: Pulmonary effort is normal. No respiratory distress.   Abdominal:      General: There is no distension.   Skin:     General: Skin " is warm and dry.   Neurological:      Mental Status: He is alert.   Psychiatric:         Mood and Affect: Affect normal.         Judgment: Judgment normal.         All labs (last 1 month):   Office Visit on 07/21/2021   Component Date Value Ref Range Status   • POC Color 07/21/2021 dark yellow  Negative Final   • POC Appearance 07/21/2021 turbid  Negative Final   • POC Leukocyte Esterase 07/21/2021 moderate  Negative Final   • POC Nitrites 07/21/2021 neg  Negative Final   • POC Urobiligen 07/21/2021 1  Negative (0.2) mg/dL Final   • POC Protein 07/21/2021 300  Negative mg/dL Final   • POC Urine PH 07/21/2021 5.5  5.0 - 8.0 Final   • POC Blood 07/21/2021 moderate  Negative Final   • POC Specific Gravity 07/21/2021 1.030  <1.005 - >1.030 Final   • POC Ketones 07/21/2021 trace  Negative mg/dL Final   • POC Bilirubin 07/21/2021 small  Negative mg/dL Final   • POC Glucose 07/21/2021 neg  Negative mg/dL Final       Lipids:   Lab Results   Component Value Date/Time    CHOLSTRLTOT 220 (H) 04/26/2019 08:35 AM    TRIGLYCERIDE 124 04/26/2019 08:35 AM    HDL 45 04/26/2019 08:35 AM     (H) 04/26/2019 08:35 AM       Imaging: No results found.    A/P  · Will take over all scripts except keppra. She will continue to neuro for this.   · Start bactrim. Risk of severe diarrhea, arhythmia, allergic reaction among other possible reactions discussed. Please always use package inserts with any prescription. Patient and or family expresses understanding  · Patient wishes to not know her diagnosis of GB  · With protein and blood in urine. Patient will repeat ua in two weeks if this continues she will make an apt for workup  · bp well controlled continue amlodipine  · She is aware she will have to come to office every 90 days for tramadol. I did forget to get a controlled substance agreement signed. Will have to do at next apt.     No follow-ups on file.    Problem List Items Addressed This Visit     Complicated migraine    Relevant  Medications    amLODIPine (NORVASC) 10 MG Tab    gabapentin (NEURONTIN) 300 MG Cap    traMADol (ULTRAM) 50 MG Tab    Glioblastoma of frontal lobe (HCC)    Relevant Medications    traMADol (ULTRAM) 50 MG Tab      Other Visit Diagnoses     Dysuria        Relevant Orders    POCT Urinalysis (Completed)    Acute cystitis with hematuria        Relevant Medications    sulfamethoxazole-trimethoprim (BACTRIM DS) 800-160 MG tablet    Other Relevant Orders    URINE CULTURE(NEW)    Hematuria, unspecified type        Relevant Orders    URINALYSIS,CULTURE IF INDICATED          Portions of this note may be dictated using Dragon NaturallySpeaking voice recognition software.  Variances in spelling and vocabulary are possible and unintentional.  Not all areas may be caught/corrected.  Please notify me if any discrepancies are noted or if the meaning of any statement is not correct/clear.

## 2021-07-22 DIAGNOSIS — N30.01 ACUTE CYSTITIS WITH HEMATURIA: ICD-10-CM

## 2021-07-23 ENCOUNTER — HOSPITAL ENCOUNTER (OUTPATIENT)
Dept: RADIATION ONCOLOGY | Facility: MEDICAL CENTER | Age: 51
End: 2021-07-31
Attending: RADIOLOGY
Payer: COMMERCIAL

## 2021-07-23 ENCOUNTER — TELEPHONE (OUTPATIENT)
Dept: MEDICAL GROUP | Facility: IMAGING CENTER | Age: 51
End: 2021-07-23

## 2021-07-23 VITALS
HEART RATE: 100 BPM | TEMPERATURE: 98.4 F | SYSTOLIC BLOOD PRESSURE: 120 MMHG | DIASTOLIC BLOOD PRESSURE: 88 MMHG | OXYGEN SATURATION: 94 %

## 2021-07-23 PROBLEM — C71.1 GLIOBLASTOMA MULTIFORME OF FRONTAL LOBE (HCC): Status: RESOLVED | Noted: 2021-06-10 | Resolved: 2021-07-23

## 2021-07-23 PROCEDURE — 99212 OFFICE O/P EST SF 10 MIN: CPT | Performed by: RADIOLOGY

## 2021-07-23 RX ORDER — TEMOZOLOMIDE 100 MG/1
CAPSULE ORAL
COMMUNITY
Start: 2021-07-22 | End: 2021-09-30

## 2021-07-23 ASSESSMENT — PAIN SCALES - GENERAL: PAINLEVEL: 2=MINIMAL-SLIGHT

## 2021-07-23 NOTE — TELEPHONE ENCOUNTER
Message received from Elif at [facility]. She is requesting authorization for a nursing referral for patient. She states she is going to put it in on her system and would like an okay from Dr. Nguyen.   She can be reached at [phone].

## 2021-07-23 NOTE — NON-PROVIDER
Patient was seen today in clinic with Dr. Frye for follow-up.  Vitals signs and weight were obtained and pain assessment was completed.  Allergies and medications were reviewed with the patient.      Vitals/Pain:  Vitals:    07/23/21 1324   BP: 120/88   BP Location: Left arm   Patient Position: Sitting   BP Cuff Size: Adult   Pulse: 100   Temp: 36.9 °C (98.4 °F)   TempSrc: Temporal   SpO2: 94%   Pain Score: 2=Minimal-Slight    Allergies:   Patient has no known allergies.    Current Medications:  Current Outpatient Medications   Medication Sig Dispense Refill   • amLODIPine (NORVASC) 10 MG Tab Take 10 mg by mouth every day.     • gabapentin (NEURONTIN) 300 MG Cap Take 300 mg by mouth at bedtime.     • sulfamethoxazole-trimethoprim (BACTRIM DS) 800-160 MG tablet Take 1 tablet by mouth 2 times a day for 3 days. 6 tablet 0   • traMADol (ULTRAM) 50 MG Tab Take 1 tablet by mouth at bedtime as needed for up to 30 days. 30 tablet 0   • levetiracetam (KEPPRA) 750 MG tablet Take 2 Tablets by mouth 2 times a day for 90 days. 360 tablet 2   • melatonin 3 MG Tab Take 1 tablet by mouth at bedtime as needed (insomnia). 30 tablet 2   • venlafaxine (EFFEXOR) 25 MG Tab Take 0.5 Tablets by mouth 2 Times a Day. (Patient taking differently: Take 25 mg by mouth every day.) 90 tablet 2   • acetaminophen (TYLENOL) 325 MG Tab Take 2 Tablets by mouth every 6 hours as needed (Mild Pain; (Pain scale 1-3); Temp greater than 100.5 F). 30 tablet 0   • VITAMIN D PO Take 1 Units by mouth every evening.     • multivitamin (THERAGRAN) Tab Take 1 tablet by mouth every evening.       No current facility-administered medications for this encounter.         PCP:  Patrick Stephen R.N.

## 2021-07-23 NOTE — PROGRESS NOTES
RADIATION ONCOLOGY FOLLOW-UP    DATE OF SERVICE: 7/23/2021    IDENTIFICATION:   A 50 y.o. female with   Glioblastoma of frontal lobe (HCC)  Staging form: Brain and Spinal Cord, AJCC 8th Edition  - Pathologic stage from 3/24/2021: WHO Grade IV - Signed by Edenilson Frye M.D. on 3/24/2021  Histologic grading system: 4 grade system  IDH1 mutation: Negative  IDH2 mutation: Negative  MGMT methylation: Absent      RADIATION SUMMARY:  [facility] Treatment Information        Some values may be hidden. Unless noted otherwise, only the newest values recorded on each date are displayed.         [facility] Treatment Summary 5/14/21   Course First Treatment Date 04/05/2021    Course Last Treatment Date 05/14/2021    R Front Bst Plan from Course C1_GBM   Fraction 7 of 7    Elapsed Course Days 39 @ [id number]    Prescribed Fraction Dose 200 cGy    Prescribed Total Dose 1,400 cGy    R Front GBM Plan from Course C1_GBM   Fraction 23 of 23   Elapsed Course Days 39 @ [id number]   Prescribed Fraction Dose 200 cGy   Prescribed Total Dose 4,600 cGy   R Front Bst Reference Point from Course C1_GBM   Elapsed Course Days 39 @ [id number]    Session Dose -    Total Dose 1,400 cGy    R Front Bst CP Reference Point from Course C1_GBM   Elapsed Course Days 39 @ [id number]    Session Dose -    Total Dose 1,447 cGy    R Front GBM Reference Point from Course C1_GBM   Elapsed Course Days 39 @ [id number]   Session Dose -   Total Dose 4,600 cGy   R Front GBM CP Reference Point from Course C1_GBM   Elapsed Course Days 39 @ [id number]   Session Dose -   Total Dose 4,757 cGy   RF Bst 3D Plan from Course Dosimetry C1   RF GBM 3D Plan from Course Dosimetry C1   R Front Bst Reference Point from Course Dosimetry C1   R Front Bst CP Reference Point from Course Dosimetry C1   R Front GBM Reference Point from Course Dosimetry C1   R Front GBM CP Reference Point from Course Dosimetry C1    Some values recorded on this date have been omitted.               HISTORY OF PRESENT ILLNESS:   Patient originally presented with seizures and went to the ER at [facility] and underwent MRI brain December 10, 2020 which showed an approximate 8 x 6 cm enhancing lesion in the right frontal medial gray matter area with tiny satellite nodular enhancement nonspecific.  Patient also had a MRI CT and L-spine on January 9, 2021 which showed no evidence of multiple sclerosis.  Patient then had repeat MRI brain February 12, 2021 which showed marked interval increase in medial posterior right frontal lobe now measuring 3 x 2.4 x 3.1 with irregular thick-walled peripheral enhancement. She had CT chest abdomen pelvis on February 21, 2021 which showed no evidence of metastatic disease. Patient was readmitted with headaches and seizures and underwent MRI stealth brain March 9, 2021 which showed 3.5 x 2.5 cm peripheral enhancing lesion in the right medial parietal lobe with surrounding white matter edema highly suspicious for high-grade neoplasm. Patient underwent surgery with Dr. Mao with craniotomy with motor mapping and given proximity to [facility] strip was really only able to do an open biopsy with pathology showing glioblastoma. MRI brain postoperatively on March 11, 2021 showed postsurgical changes in the right frontal parietal craniotomy and biopsy of right medial parietal enhancing lesion. Currently patient is depressed and has some residual left upper extremity weakness and left lower extremity weakness but she says the left upper extremity weakness is improving.    Interval History:  Patient completed chemoradiation therapy 60 Gy in 30 fractions with concurrent Temodar with Dr. Cerda on May 14, 2021.  Patient had 1 month post treatment MRI on June 4, 2021 which showed interval enlargement of regular heterogeneously enhancing parasagittal right frontal parietal lobe mass in keeping with glioblastoma with marked vasogenic edema in the right cerebral hemisphere which is increased  from prior study and there is increased mass-effect.  Patient was seen at [facility] and had craniotomy on June 25, 2021 by Dr. Dove with pathology consistent with extensive treatment effects and microscopic foci of residual recurrent GBM who grade IV.  She has tapered off her steroids and remains on Keppra.  She has some mild headaches relieved with tramadol.  She has next MRI in early August.  She is going to see Dr. Cerda next week to start her adjuvant Temodar and will start her on Optune as well.    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     • gabapentin (NEURONTIN) 300 MG Cap Take 300 mg by mouth at bedtime.     • sulfamethoxazole-trimethoprim (BACTRIM DS) 800-160 MG tablet Take 1 tablet by mouth 2 times a day for 3 days. 6 tablet 0   • traMADol (ULTRAM) 50 MG Tab Take 1 tablet by mouth at bedtime as needed for up to 30 days. 30 tablet 0   • levetiracetam (KEPPRA) 750 MG tablet Take 2 Tablets by mouth 2 times a day for 90 days. 360 tablet 2   • melatonin 3 MG Tab Take 1 tablet by mouth at bedtime as needed (insomnia). 30 tablet 2   • venlafaxine (EFFEXOR) 25 MG Tab Take 0.5 Tablets by mouth 2 Times a Day. (Patient taking differently: Take 25 mg by  mouth every day.) 90 tablet 2   • acetaminophen (TYLENOL) 325 MG Tab Take 2 Tablets by mouth every 6 hours as needed (Mild Pain; (Pain scale 1-3); Temp greater than 100.5 F). 30 tablet 0   • VITAMIN D PO Take 1 Units by mouth every evening.     • multivitamin (THERAGRAN) Tab Take 1 tablet by mouth every evening.       No current facility-administered medications for this encounter.       ALLERGIES:  Patient has no known allergies.    REVIEW OF SYSTEMS:  A review of systems for today's date of service was reviewed and uploaded into the electronic medical record.    PHYSICAL EXAM:  PERFORMANCE STATUS: 1  /88 (BP Location: Left arm, Patient Position: Sitting, BP Cuff Size: Adult)   Pulse 100   Temp 36.9 °C (98.4 °F) (Temporal)   SpO2 94%   Physical Exam  Constitutional:       Appearance: Normal appearance.   HENT:      Head: Normocephalic and atraumatic.   Skin:     Findings: No erythema.   Neurological:      Mental Status: She is alert. Mental status is at baseline.         LABORATORY DATA:   Lab Results   Component Value Date/Time    WBC 5.7 05/24/2021 1227    WBC 15.2 (H) 03/16/2021 0603    WBC 11.3 (H) 03/15/2021 0440    HEMOGLOBIN 14.7 05/24/2021 1227    HEMOGLOBIN 13.4 03/16/2021 0603    HEMOGLOBIN 12.8 03/15/2021 0440    HEMATOCRIT 41.6 05/24/2021 1227    HEMATOCRIT 39.3 03/16/2021 0603    HEMATOCRIT 37.0 03/15/2021 0440    MCV 92.7 05/24/2021 1227    MCV 96.3 03/16/2021 0603    MCV 96.1 03/15/2021 0440    PLATELETCT 158 (L) 05/24/2021 1227    PLATELETCT 226 03/16/2021 0603    PLATELETCT 225 03/15/2021 0440    NEUTS 4.12 05/24/2021 1227    NEUTS 12.08 (H) 03/16/2021 0603    NEUTS 8.66 (H) 03/15/2021 0440      Lab Results   Component Value Date/Time    SODIUM 139 05/24/2021 1227    SODIUM 140 03/22/2021 0615    SODIUM 138 03/18/2021 0538    POTASSIUM 3.7 05/24/2021 1227    POTASSIUM 4.4 03/22/2021 0615    POTASSIUM 4.5 03/18/2021 0538    BUN 12 05/24/2021 1227    BUN 12 03/22/2021 0615    BUN 18  03/18/2021 0538    CREATININE 0.60 05/24/2021 1227    CREATININE 0.57 03/22/2021 0615    CREATININE 0.52 03/18/2021 0538    CALCIUM 9.2 05/24/2021 1227    CALCIUM 8.7 03/22/2021 0615    CALCIUM 8.5 03/18/2021 0538    ALBUMIN 4.4 05/24/2021 1227    ALBUMIN 3.3 03/16/2021 0603    ALBUMIN 3.3 03/15/2021 0440    ASTSGOT 18 05/24/2021 1227    ASTSGOT 11 (L) 03/16/2021 0603    ASTSGOT 14 03/15/2021 0440    ALKPHOSPHAT 86 05/24/2021 1227    ALKPHOSPHAT 67 03/16/2021 0603    ALKPHOSPHAT 69 03/15/2021 0440    IFNOTAFR >60 05/24/2021 1227    IFNOTAFR >60 03/22/2021 0615    IFNOTAFR >60 03/18/2021 0538       RADIOLOGY DATA:  No results found.    IMPRESSION:    A 50 y.o. with   Glioblastoma of frontal lobe (HCC)  Staging form: Brain and Spinal Cord, AJCC 8th Edition  - Pathologic stage from 3/24/2021: WHO Grade IV - Signed by Edenilson Frye M.D. on 3/24/2021  Histologic grading system: 4 grade system  IDH1 mutation: Negative  IDH2 mutation: Negative  MGMT methylation: Absent      CANCER STATUS:  No Evidence of Disease    RECOMMENDATIONS:   I reviewed with her the pathology from her recent reresection which showed microscopic foci of recurrent residual GBM.  Patient will continue on her adjuvant Temodar in the near future and start Optune as well.  She has her next MRI next week and I have ordered a repeat MRI for mid October after her birthday.  Patient will remain on Keppra as she did have seizures prior to her first resection.  I will see her back in mid October.    Thank you for the opportunity to participate in her care.  If any questions or comments, please do not hesitate in calling.    CC: Dr. Cerda, Dr. Mao, Dr. Gomez

## 2021-07-27 ENCOUNTER — HOSPITAL ENCOUNTER (OUTPATIENT)
Facility: MEDICAL CENTER | Age: 51
End: 2021-07-27
Attending: NURSE PRACTITIONER
Payer: COMMERCIAL

## 2021-07-27 PROCEDURE — 84443 ASSAY THYROID STIM HORMONE: CPT

## 2021-07-27 PROCEDURE — 82550 ASSAY OF CK (CPK): CPT

## 2021-07-27 PROCEDURE — 84480 ASSAY TRIIODOTHYRONINE (T3): CPT

## 2021-07-27 PROCEDURE — 80053 COMPREHEN METABOLIC PANEL: CPT

## 2021-07-27 PROCEDURE — 84439 ASSAY OF FREE THYROXINE: CPT

## 2021-07-28 LAB
ALBUMIN SERPL BCP-MCNC: 4.2 G/DL (ref 3.2–4.9)
ALBUMIN/GLOB SERPL: 1.9 G/DL
ALP SERPL-CCNC: 76 U/L (ref 30–99)
ALT SERPL-CCNC: 16 U/L (ref 2–50)
ANION GAP SERPL CALC-SCNC: 13 MMOL/L (ref 7–16)
AST SERPL-CCNC: 16 U/L (ref 12–45)
BILIRUB SERPL-MCNC: 0.5 MG/DL (ref 0.1–1.5)
BUN SERPL-MCNC: 10 MG/DL (ref 8–22)
CALCIUM SERPL-MCNC: 8.9 MG/DL (ref 8.5–10.5)
CHLORIDE SERPL-SCNC: 108 MMOL/L (ref 96–112)
CK SERPL-CCNC: 36 U/L (ref 0–154)
CO2 SERPL-SCNC: 22 MMOL/L (ref 20–33)
CREAT SERPL-MCNC: 0.5 MG/DL (ref 0.5–1.4)
GLOBULIN SER CALC-MCNC: 2.2 G/DL (ref 1.9–3.5)
GLUCOSE SERPL-MCNC: 115 MG/DL (ref 65–99)
POTASSIUM SERPL-SCNC: 3.7 MMOL/L (ref 3.6–5.5)
PROT SERPL-MCNC: 6.4 G/DL (ref 6–8.2)
SODIUM SERPL-SCNC: 143 MMOL/L (ref 135–145)
T3 SERPL-MCNC: 104 NG/DL (ref 60–181)
T4 FREE SERPL-MCNC: 1.01 NG/DL (ref 0.93–1.7)
TSH SERPL DL<=0.005 MIU/L-ACNC: 0.77 UIU/ML (ref 0.38–5.33)

## 2021-08-02 ENCOUNTER — TELEMEDICINE (OUTPATIENT)
Dept: NEUROLOGY | Facility: MEDICAL CENTER | Age: 51
End: 2021-08-02
Attending: STUDENT IN AN ORGANIZED HEALTH CARE EDUCATION/TRAINING PROGRAM
Payer: COMMERCIAL

## 2021-08-02 VITALS — HEIGHT: 67 IN | BODY MASS INDEX: 36.49 KG/M2

## 2021-08-02 DIAGNOSIS — Z12.31 ENCOUNTER FOR SCREENING MAMMOGRAM FOR BREAST CANCER: ICD-10-CM

## 2021-08-02 PROCEDURE — 99213 OFFICE O/P EST LOW 20 MIN: CPT | Mod: 95 | Performed by: STUDENT IN AN ORGANIZED HEALTH CARE EDUCATION/TRAINING PROGRAM

## 2021-08-02 PROCEDURE — 999999 HB NO CHARGE: Mod: 95 | Performed by: STUDENT IN AN ORGANIZED HEALTH CARE EDUCATION/TRAINING PROGRAM

## 2021-08-02 RX ORDER — DEXAMETHASONE 1 MG
TABLET ORAL
COMMUNITY
Start: 2021-07-27 | End: 2021-09-27

## 2021-08-02 RX ORDER — ONDANSETRON 4 MG/1
TABLET, FILM COATED ORAL
COMMUNITY
Start: 2021-07-30 | End: 2021-09-09

## 2021-08-02 NOTE — PROGRESS NOTES
Telemedicine: Established Patient   This evaluation was conducted via Zoom using secure and encrypted videoconferencing technology. The patient was in a private location in the state [facility].    The patient's identity was confirmed and verbal consent was obtained for this virtual visit.      Neurology Clinic - Follow-up Note        Chief complaint: GBM, focal epilepsy          Interval History May 2021:  .  She remains in good spirits. She recently completed rehab. She is wearing a boot on left foot for left foot drop. She has switched from Keppra to Lamictal. She has been taking keppra 500 mg BID for 3 weeks. She has been instructed to increase keppra to 750 mg BID starting today with instructions to taper lamictal as outlined by Dr Calixto in yesterday's note.     She has had perhaps 2 very brief events which could be consistent with focal seizure involving the left lower extremity.    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 Complete resection  · Started second round  · Keppra 1500 mg BID. Completely off lamictal. NO seizures  · On dexamethasone, 1 mg BID  · Mobility is OK - can grab things a lot better. Doesn't ache anymore  · Sleep is good.  · Surveillance MRI Wed        ROS: Pertinent positives and negatives are as documented above          Current medications:     Current Outpatient Medications   Medication Instructions   • acetaminophen (TYLENOL) 650 mg, Oral, EVERY 6 HOURS PRN   • folic acid (FOLVITE) 1 mg, Oral, DAILY   • hydrOXYzine HCl (ATARAX) 50 mg, Oral, EVERY 8 HOURS PRN   • lamoTRIgine (LAMICTAL) 100 mg, Oral, 2 TIMES DAILY   • levetiracetam (KEPPRA) 750 mg, Oral, 2 TIMES DAILY   • melatonin 3 mg, Oral, NIGHTLY PRN   • multivitamin (THERAGRAN) Tab 1 tablet, Oral, EVERY EVENING   • temozolomide (TEMODAR) 140 MG capsule No dose, route, or frequency recorded.   • temozolomide (TEMODAR) 5 MG capsule No dose,  route, or frequency recorded.   • venlafaxine (EFFEXOR) 12.5 mg, Oral, 2 TIMES DAILY   • VITAMIN D PO 1 Units, Oral, EVERY EVENING          Outpatient Medications Marked as Taking for the 8/2/21 encounter (Telemedicine) with Fady Davidson M.D.   Medication Sig Dispense Refill   • temozolomide (TEMODAR) 100 MG capsule      • amLODIPine (NORVASC) 10 MG Tab Take 10 mg by mouth every day.     • gabapentin (NEURONTIN) 300 MG Cap Take 300 mg by mouth at bedtime.     • traMADol (ULTRAM) 50 MG Tab Take 1 tablet by mouth at bedtime as needed for up to 30 days. 30 tablet 0   • levetiracetam (KEPPRA) 750 MG tablet Take 2 Tablets by mouth 2 times a day for 90 days. 360 tablet 2   • melatonin 3 MG Tab Take 1 tablet by mouth at bedtime as needed (insomnia). 30 tablet 2   • venlafaxine (EFFEXOR) 25 MG Tab Take 0.5 Tablets by mouth 2 Times a Day. (Patient taking differently: Take 25 mg by mouth every day.) 90 tablet 2   • acetaminophen (TYLENOL) 325 MG Tab Take 2 Tablets by mouth every 6 hours as needed (Mild Pain; (Pain scale 1-3); Temp greater than 100.5 F). 30 tablet 0   • VITAMIN D PO Take 1 Units by mouth every evening.     • multivitamin (THERAGRAN) Tab Take 1 tablet by mouth every evening.           Physical examination:   Vitals:     Vitals:       General: Patient in no acute distress, pleasant and cooperative.       NEUROLOGICAL EXAM:   Mental status, orientation: Awake, alert and fully oriented.   Speech and language: speech is clear and fluent.  Patient is able to articulate her past medical history well and comprehension is grossly intact          ANCILLARY DATA   REVIEWED:         Results for orders placed during the hospital encounter of 03/02/21   MR-BRAIN-WITH & W/O    Impression 1.  There are postsurgical changes as evidenced by right frontoparietal craniectomy and biopsy of the right medial parietal enhancing lesion.  2.  7 mm midline shift towards left side  3.  Periventricular T2 hyperintensities adjacent to  the left lateral ventricle.  4.  A few nonspecific T2 hyperintensities in the subcortical and periventricular white matter.        Results for orders placed during the hospital encounter of 06/21/11   MR-BRAIN-W/O    Impression 1. Minimal supratentorial white matter disease with a few rare punctate foci of bright FLAIR signal primarily in the subcortical white matter of the anterior frontal lobes.  There are no periventricular lesions with morphology indicative of multiple sclerosis.  There is no significant change since June 2010.  2. Minimal residual right mastoid opacities consistent postinflammatory change.  3. Paranasal sinus opacities consistent with active inflammatory sinus disease, possible right maxillary sinusitis with air-fluid level.                              Results for orders placed during the hospital encounter of 01/09/21   MR-CERVICAL SPINE-WITH & W/O    Impression 1. MRI OF THE CERVICAL SPINE WITHOUT AND WITH CONTRAST WITHIN NORMAL LIMITS. NO EVIDENCE OF DEMYELINATING DISEASE IN THE CERVICAL SPINAL CORD.           Results for orders placed during the hospital encounter of 01/09/21   MR-LUMBAR SPINE-WITH & W/O    Impression 1.  Minimal annular bulging at the L4-5 and L5-S1 levels.    2.  Small annular fissures in the annulus fibrosis posteriorly at the L3-4, L4-5, and L5-S1 levels.    3.  No evidence of demyelinating process in the conus medullaris.              Results for orders placed during the hospital encounter of 01/09/21   MR-THORACIC SPINE-WITH & W/O    Impression 1. Normal MRI scan of the thoracic spine with or without contrast.    2. No evidence of demyelinating process in the thoracic cord.                                                            No results found for this or any previous visit.         Lab Data:  Reviewed                    ASSESSMENT, PLAN, EDUCATION, AND COUNSELING:  Patient with GBM with localization related epilepsy s/p second resection. Pending MRI Brain to  determine if GBM completely resected. Remains on Keppra 1500 mg BID. Off Lamictal now.  On low dose Dexamthasone as mentioned above d/t effects of vasogenic edema. Having issues with fatigue, tiredness that is likely multifactorial but in part lower of steroids. Will continue to monitor. She will update me on side effects or problems. She has been taking atarax which has helped with her anxiety. F/u as needed     Encounter Diagnosis   Name Primary?   • Encounter for screening mammogram for breast cancer        Orders Placed This Encounter   • MA-SCREENING MAMMO BILAT W/CAD        New Prescriptions    No medications on file        Discontinued Medications    No medications on file                Fady Davidson MD  Epilepsy and General Neurology  Department of Neurology  Instructor of Neurology [facility].   Office: [phone]  Fax: [phone]     BILLING DOCUMENTATION:       Counseling:  I spent a total of 20 minutes of face-to-face time in this visit. Over 50% of the time of the visit today was spent on counseling and or coordination of care wtih the patient and/or family, as above in assessment in plan.

## 2021-08-04 ENCOUNTER — HOSPITAL ENCOUNTER (OUTPATIENT)
Dept: RADIOLOGY | Facility: MEDICAL CENTER | Age: 51
End: 2021-08-04
Attending: RADIOLOGY
Payer: COMMERCIAL

## 2021-08-04 DIAGNOSIS — C71.1 GLIOBLASTOMA OF FRONTAL LOBE (HCC): ICD-10-CM

## 2021-08-04 PROCEDURE — 70553 MRI BRAIN STEM W/O & W/DYE: CPT

## 2021-08-04 PROCEDURE — A9576 INJ PROHANCE MULTIPACK: HCPCS | Performed by: RADIOLOGY

## 2021-08-04 PROCEDURE — 700117 HCHG RX CONTRAST REV CODE 255: Performed by: RADIOLOGY

## 2021-08-04 RX ADMIN — GADOTERIDOL 20 ML: 279.3 INJECTION, SOLUTION INTRAVENOUS at 11:26

## 2021-08-05 DIAGNOSIS — C71.1 GLIOBLASTOMA OF FRONTAL LOBE (HCC): ICD-10-CM

## 2021-08-10 RX ORDER — GABAPENTIN 300 MG/1
300 CAPSULE ORAL
Qty: 90 CAPSULE | Refills: 0 | Status: SHIPPED | OUTPATIENT
Start: 2021-08-10 | End: 2021-09-27

## 2021-08-10 RX ORDER — AMLODIPINE BESYLATE 10 MG/1
10 TABLET ORAL DAILY
Qty: 90 TABLET | Refills: 0 | Status: SHIPPED | OUTPATIENT
Start: 2021-08-10 | End: 2021-11-03

## 2021-08-20 DIAGNOSIS — G43.109 COMPLICATED MIGRAINE: ICD-10-CM

## 2021-08-20 DIAGNOSIS — C71.1 GLIOBLASTOMA OF FRONTAL LOBE (HCC): ICD-10-CM

## 2021-08-20 RX ORDER — TRAMADOL HYDROCHLORIDE 50 MG/1
50 TABLET ORAL NIGHTLY PRN
Qty: 30 TABLET | Refills: 0 | Status: SHIPPED | OUTPATIENT
Start: 2021-08-20 | End: 2021-09-07 | Stop reason: SDUPTHER

## 2021-08-20 RX ORDER — TRAMADOL HYDROCHLORIDE 50 MG/1
50 TABLET ORAL NIGHTLY PRN
Qty: 30 TABLET | Refills: 0 | OUTPATIENT
Start: 2021-08-20 | End: 2021-09-19

## 2021-08-20 NOTE — TELEPHONE ENCOUNTER
Received request via: Patient    Was the patient seen in the last year in this department? Yes    Does the patient have an active prescription (recently filled or refills available) for medication(s) requested? No     Patient calling in to request refill of tramadol. Patient states she will run out on Monday. Requesting refill to [facility] on [address].

## 2021-08-21 NOTE — TELEPHONE ENCOUNTER
Patient was seen in July by Dr. Trinity Nguyen on July 21, 2020.  At that time prescription was reviewed by Dr. Trinity Nguyen.  Within her note in July she mentioned that patient needed to be seen within 90 days and every 90 days for any future refills.  At that time she prescribed 30-day supply.  Patient has 2 more prescriptions of 30-day supplies and no refills per style of PCP before she needs to be seen again.  Covering provider will refill tramadol at this time for another 30-day supply with no refill.  When we are contacted again by pharmacy we can refill 1 more time.  And that patient will need to be seen at that time.  Within July notes Dr. Nguyen is aware that she did not have patient signed a controlled substance and the plan is at next visit patient will sign this.  VARINDER Manzano

## 2021-08-27 NOTE — TELEPHONE ENCOUNTER
Received message from patient requesting refill of her Keppra be sent to [facility] pharmacy on [address]. During patient last visit with Dr. Nguyen, patient had requested to have her prescriptions all come from Dr. Nguyen. Based on last note, Dr. Nguyen states she would like patient to continue receiving Keppra from neurology.     Called and spoke with patient. Patient states she will contact her neurologist for this prescription. No further questions.

## 2021-08-31 ENCOUNTER — TELEPHONE (OUTPATIENT)
Dept: MEDICAL GROUP | Facility: IMAGING CENTER | Age: 51
End: 2021-08-31

## 2021-08-31 NOTE — TELEPHONE ENCOUNTER
Patient called left message regarding medication tramadol.     Patient requesting refill of tramadol.     Called pharmacy and confirmed that patient has already picked up script from pharmacy sent on 8/20/2021.     Returned patient's call, let her know pharmacy confirmed that tramadol was already picked up. Patient states she is going to confirm with  and return call. Patient confirmed with  that tramadol was received by pharmacy, no further action needed. Reminded patient she is due for follow up in October.

## 2021-09-07 ENCOUNTER — APPOINTMENT (OUTPATIENT)
Dept: RADIOLOGY | Facility: MEDICAL CENTER | Age: 51
End: 2021-09-07
Attending: STUDENT IN AN ORGANIZED HEALTH CARE EDUCATION/TRAINING PROGRAM
Payer: COMMERCIAL

## 2021-09-07 ENCOUNTER — PATIENT MESSAGE (OUTPATIENT)
Dept: NEUROLOGY | Facility: MEDICAL CENTER | Age: 51
End: 2021-09-07

## 2021-09-07 DIAGNOSIS — C71.1 GLIOBLASTOMA OF FRONTAL LOBE (HCC): ICD-10-CM

## 2021-09-07 DIAGNOSIS — F41.8 ANXIETY ASSOCIATED WITH DEPRESSION: ICD-10-CM

## 2021-09-07 DIAGNOSIS — G43.109 COMPLICATED MIGRAINE: ICD-10-CM

## 2021-09-07 RX ORDER — HYDROXYZINE 50 MG/1
50 TABLET, FILM COATED ORAL 3 TIMES DAILY PRN
Qty: 90 TABLET | Refills: 5 | Status: SHIPPED | OUTPATIENT
Start: 2021-09-07 | End: 2021-09-30

## 2021-09-07 RX ORDER — HYDROXYZINE 50 MG/1
50 TABLET, FILM COATED ORAL 3 TIMES DAILY PRN
COMMUNITY
End: 2021-09-07 | Stop reason: SDUPTHER

## 2021-09-07 NOTE — TELEPHONE ENCOUNTER
Pt's  J Carlos Frye called on behalf of patient requesting medication refill.     Patient has 11 pills left of Tramadol, patient taking about 2 pills a day as needed. 30 day supply has been lasting about 2 weeks due to patient's increased pain.     Pt husbands states when she was in hospital at [facility] they had increased her dosage up to 4 times a day.    Advised I would send request to covering provider to review, patient may need appointment to discuss dosage changes. Virtual appointment scheduled for 9/9/2021 with covering provider.

## 2021-09-07 NOTE — TELEPHONE ENCOUNTER
From: Melba Frye  To: Physician Fady Davidson  Sent: 9/7/2021 11:24 AM PDT  Subject: Hydroxzine    This message is being sent by J Carlos Frye on behalf of Melba Frye.    Hi Dr. Davidson, could my wife please get a refill of her Hydroxyzine/Atarax? She is taking two 50MG tablets daily. Thank you!

## 2021-09-08 RX ORDER — TRAMADOL HYDROCHLORIDE 50 MG/1
50 TABLET ORAL NIGHTLY PRN
Qty: 30 TABLET | Refills: 0 | Status: SHIPPED | OUTPATIENT
Start: 2021-09-08 | End: 2021-09-30

## 2021-09-08 NOTE — TELEPHONE ENCOUNTER
Please let patient know that I have refilled her medication for 1 more month.  Patient will need to be seen in office before prior refill.  Patient was seen in July by Dr. Nguyen.  Patient needs to be seen every 3 months by a provider.  She is covered until next month.  At that time she will need to be able to be seen by a covering provider due to Dr. Nguyen.  She does not need to be seen this week.  Please advise patient to make appointment for 28 days from now.  JAMIE ManzanoC

## 2021-09-09 ENCOUNTER — TELEMEDICINE (OUTPATIENT)
Dept: MEDICAL GROUP | Facility: IMAGING CENTER | Age: 51
End: 2021-09-09
Payer: COMMERCIAL

## 2021-09-09 VITALS — WEIGHT: 240 LBS | BODY MASS INDEX: 37.67 KG/M2 | HEIGHT: 67 IN

## 2021-09-09 DIAGNOSIS — R53.82 CHRONIC FATIGUE: ICD-10-CM

## 2021-09-09 DIAGNOSIS — C71.1 GLIOBLASTOMA OF FRONTAL LOBE (HCC): ICD-10-CM

## 2021-09-09 DIAGNOSIS — M79.2 NERVE PAIN: ICD-10-CM

## 2021-09-09 DIAGNOSIS — R60.0 LOWER LEG EDEMA: ICD-10-CM

## 2021-09-09 PROCEDURE — 99214 OFFICE O/P EST MOD 30 MIN: CPT | Mod: 95 | Performed by: NURSE PRACTITIONER

## 2021-09-09 RX ORDER — TRAMADOL HYDROCHLORIDE 50 MG/1
50 TABLET ORAL EVERY 8 HOURS PRN
Qty: 60 TABLET | Refills: 0 | Status: SHIPPED | OUTPATIENT
Start: 2021-09-09 | End: 2021-09-28

## 2021-09-09 RX ORDER — PROCHLORPERAZINE MALEATE 10 MG
10 TABLET ORAL EVERY 6 HOURS PRN
Status: ON HOLD | COMMUNITY
Start: 2021-09-01 | End: 2022-03-11

## 2021-09-09 RX ORDER — GABAPENTIN 100 MG/1
CAPSULE ORAL
Qty: 180 CAPSULE | Refills: 1 | Status: SHIPPED | OUTPATIENT
Start: 2021-09-09 | End: 2021-09-27

## 2021-09-09 ASSESSMENT — FIBROSIS 4 INDEX: FIB4 SCORE: 1.29

## 2021-09-09 ASSESSMENT — PAIN SCALES - GENERAL: PAINLEVEL: 2=MINIMAL-SLIGHT

## 2021-09-09 NOTE — PROGRESS NOTES
Virtual Visit: Established Patient   This visit was conducted via Zoom using secure and encrypted videoconferencing technology.   The patient was in a private location in the state of [place].    The patient's identity was confirmed and verbal consent was obtained for this virtual visit.  Patient is aware that this is a billable encounter.  Subjective:   CC:   Chief Complaint   Patient presents with   • Nerve Pain     possible medication increase      Melba Frye is a 50 y.o. female of Dr. Trinity Nguyen who presents with her  for evaluation and management of:    Reports that she is currently experiencing increased nerve pain related to her current diagnosis of glioblastoma.  Reports that she is currently on a regimen of 5 days a month of oral chemo for 6 months.  Reports that she is on her second month.  Reports that she is experiencing increased fatigue and weakness at this time.  States that she is able to move around between her oral chemo dose, but does experience increased symptoms during and near taking her oral chemo.  Reports that she is currently taking gabapentin 300 mg at bedtime and tramadol 50 mg as needed.  Denies any side effects to medication.  Denies any recent falls.  Reports that she will commonly take tramadol 50 mg twice a day.  Reports that she will intermittently also take 100 mg gabapentin as needed for increased pain during the day.  Reports that she is experiencing nervelike pain in her top of her left foot.  Describes pain as burning and aching.  Reports that pain is deep.  Reports that she is also experiencing intermittent nerve pain in her anterior left thigh.  Reports that pain will be intermittent and sometimes sharp waking her up out of her sleep.  Reports that she also is experiencing intermittent jaw pain which she feels is from her surgery.  Reports that she is attempting to take adequate nutrition and hydration.  Reports recently she has been experiencing lower  leg edema around her ankles.  Denies any warmth and calf, increased swelling in calf bilaterally, and/or pain specifically in calves.    ROS   Constitutional: Denies fever, chills, night sweats, weight loss/gain or malaise. Fatigue, see HPI.  HENT: Denies nasal congestion, sore throat, hearing loss, enlarged thyroid, or difficulty swallowing.   Respiratory: Denies cough, SOB at rest or activity.    Cardiovascular: Denies tachycardia, chest pain, and/or palpitations.  Lower leg edema, see HPI.  Gastrointestinal: Denies N/V/C/D, abdominal pain, loss appetite, reflux, or hematochezia.  Genitourinary: Denies difficulty voiding, dysuria, nocturia, or hematuria.   Skin: Negative for rash or worrisome moles.   Neurological: Negative for dizziness, focal weakness and headaches.   Endo/Heme/Allergies: Denies bruise/bleed easily, allergies.   Psychiatric/Behavioral: Denies depression, nervous/anxious, difficulty sleeping.  See HPI.  MSK: See HPI.    Current medicines (including changes today)  Current Outpatient Medications   Medication Sig Dispense Refill   • prochlorperazine (COMPAZINE) 10 MG Tab TAKE 1 TABLET BY MOUTH 30 MIN PRIOR TO EACH DOSE OF TEMODAR AND UP TO THREE TIMES A DAY AS NEEDED     • gabapentin (NEURONTIN) 100 MG Cap Increase gabapentin 100 mg every 5 days as tolerated at HS in addition to current 300 mg dose of gabapentin.  Max bedtime dosage 600 mg. For breakthrough pain during the day may take 100 mg. Increase daytime dosage by 100 mg every 5 days to a max of 300 mg gabapentin as needed. 180 Capsule 1   • traMADol (ULTRAM) 50 MG Tab Take 1 Tablet by mouth every 8 hours as needed for Moderate Pain for up to 30 days. 60 Tablet 0   • traMADol (ULTRAM) 50 MG Tab Take 1 Tablet by mouth at bedtime as needed for up to 30 days. (Patient taking differently: Take 50 mg by mouth at bedtime as needed. Taking 1-2 times a day) 30 Tablet 0   • hydrOXYzine HCl (ATARAX) 50 MG Tab Take 1 Tablet by mouth 3 times a day as  "needed for up to 120 days. 90 Tablet 5   • gabapentin (NEURONTIN) 300 MG Cap Take 1 capsule by mouth at bedtime. 90 capsule 0   • amLODIPine (NORVASC) 10 MG Tab Take 1 tablet by mouth every day. 90 tablet 0   • melatonin 3 MG Tab Take 1 tablet by mouth at bedtime as needed (insomnia). 30 tablet 2   • venlafaxine (EFFEXOR) 25 MG Tab Take 0.5 Tablets by mouth 2 Times a Day. (Patient taking differently: Take 25 mg by mouth every day.) 90 tablet 2   • acetaminophen (TYLENOL) 325 MG Tab Take 2 Tablets by mouth every 6 hours as needed (Mild Pain; (Pain scale 1-3); Temp greater than 100.5 F). 30 tablet 0   • VITAMIN D PO Take 1 Units by mouth every evening.     • multivitamin (THERAGRAN) Tab Take 1 tablet by mouth every evening.     • dexamethasone (DECADRON) 1 MG Tab TAKE 1 TABLET BY MOUTH TWICE A DAY (Patient not taking: Reported on 9/9/2021)     • temozolomide (TEMODAR) 100 MG capsule  (Patient not taking: Reported on 9/9/2021)       No current facility-administered medications for this visit.     Patient Active Problem List    Diagnosis Date Noted   • Hemiparesis (HCC) 06/10/2021   • Elevated blood pressure reading 04/07/2021   • Vitamin D insufficiency 03/16/2021   • Impaired mobility and ADLs 03/15/2021   • Acute blood loss as cause of postoperative anemia 03/11/2021   • 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) 03/02/2021   • Glioblastoma of frontal lobe (HCC) 03/02/2021   • Demyelinating disease of central nervous system, unspecified (HCC) 03/02/2021   • Lentigo 10/17/2018   • Seborrheic keratoses 10/17/2018   • Dermatitis, contact 11/16/2015   • Hyperlipidemia 01/23/2015   • Menopausal symptom 07/02/2014   • Fatigue 06/09/2014   • Mixed anxiety and depressive disorder 06/09/2014   • Complicated migraine 06/09/2014   • TMJ arthralgia 06/09/2014      Objective:   Ht 1.702 m (5' 7\")   Wt 109 kg (240 lb)   BMI 37.59 kg/m²   Respiratory rate observed during visit: 12 bpm    Physical " Exam:  Constitutional: Alert, no distress, well-groomed.  Patient laying in bed during visit.  Skin: No rashes in visible areas.  Eye: Round. Conjunctiva clear, lids normal. No icterus.   ENMT: Lips pink without lesions, good dentition, moist mucous membranes. Phonation normal.  Neck: No masses, no thyromegaly. Moves freely without pain.  Respiratory: Unlabored respiratory effort, no cough or audible wheeze  Psych: Alert and oriented x3, normal affect and mood.   Cardiovascular: Lower leg edema around ankles, appears to be nonpitting.  Can see sock ravin.  No erythema noted at calves.    Assessment and Plan:   1. Glioblastoma of frontal lobe (HCC)  2. Nerve pain  This is a stable condition.  Discussed with patient that pain appears to be more nerve and increasing gabapentin at this time would be beneficial.  Reviewed regimen of increasing gabapentin.  Reviewed possible side effects as increasing gabapentin, verbalized understanding.  Discussed with patient continuing to use 50 mg - 100 mg tramadol every 8 hours as needed.  Discussed importance of treating pain to help promote overall sleep, but also avoiding excessive sedation, verbalized understanding.  Discussed with patient possible referral to pain management if pain is not controlled, verbalized understanding.  Declined refill at this time.    - gabapentin (NEURONTIN) 100 MG Cap; Increase gabapentin 100 mg every 5 days as tolerated at HS in addition to current 300 mg dose of gabapentin.  Max bedtime dosage 600 mg. For breakthrough pain during the day may take 100 mg. Increase daytime dosage by 100 mg every 5 days to a max of 300 mg gabapentin as needed.  Dispense: 180 Capsule; Refill: 1  - traMADol (ULTRAM) 50 MG Tab; Take 1 Tablet by mouth every 8 hours as needed for Moderate Pain for up to 30 days.  Dispense: 60 Tablet; Refill: 0    3. Chronic fatigue  This is a stable condition.  Discussed with patient possibly experiencing increased fatigue due to oral chemo  at this time as well as interrupted sleep.  Discussed with patient importance of doing bed exercises including 10 deep breaths once an hour as tolerated.  Also discussed and reinforced nutrition focusing on higher protein diet and hydration with electrolytes, verbalized understanding and willingness.  Discussed with patient changing positions slowly to avoid dizziness and risk of falling, verbalized understanding and willingness.  Patient points to vitamin C 1000g, 50 mcg zinc, and vitamin D 2000 IUs.    4. Lower leg edema  Discussed with patient importance of wearing compression socks while laying in bed, verbalized understanding.  Discussed different activities while lying in bed to promote decrease lower leg edema, verbalized understanding and willingness to participate.  Discussed with patient and  risk and signs and symptoms of DVT and to report if experienced, verbalized understanding and willingness.    Follow-up: Return in about 2 weeks (around 9/23/2021).

## 2021-09-13 ENCOUNTER — HOSPITAL ENCOUNTER (OUTPATIENT)
Facility: MEDICAL CENTER | Age: 51
End: 2021-09-13
Attending: NURSE PRACTITIONER
Payer: COMMERCIAL

## 2021-09-13 DIAGNOSIS — R35.0 FREQUENCY OF URINATION: ICD-10-CM

## 2021-09-13 DIAGNOSIS — N30.01 ACUTE CYSTITIS WITH HEMATURIA: ICD-10-CM

## 2021-09-13 LAB
APPEARANCE UR: NORMAL
BILIRUB UR STRIP-MCNC: NEGATIVE MG/DL
COLOR UR AUTO: NORMAL
GLUCOSE UR STRIP.AUTO-MCNC: NEGATIVE MG/DL
KETONES UR STRIP.AUTO-MCNC: NEGATIVE MG/DL
LEUKOCYTE ESTERASE UR QL STRIP.AUTO: NORMAL
NITRITE UR QL STRIP.AUTO: POSITIVE
PH UR STRIP.AUTO: 5.5 [PH] (ref 5–8)
PROT UR QL STRIP: 100 MG/DL
RBC UR QL AUTO: NORMAL
SP GR UR STRIP.AUTO: 1.02
UROBILINOGEN UR STRIP-MCNC: 0.2 MG/DL

## 2021-09-13 PROCEDURE — 87086 URINE CULTURE/COLONY COUNT: CPT

## 2021-09-13 PROCEDURE — 87077 CULTURE AEROBIC IDENTIFY: CPT

## 2021-09-13 PROCEDURE — 81002 URINALYSIS NONAUTO W/O SCOPE: CPT | Performed by: NURSE PRACTITIONER

## 2021-09-13 PROCEDURE — 87186 SC STD MICRODIL/AGAR DIL: CPT

## 2021-09-13 RX ORDER — NITROFURANTOIN 25; 75 MG/1; MG/1
100 CAPSULE ORAL 2 TIMES DAILY
Qty: 10 CAPSULE | Refills: 0 | Status: SHIPPED | OUTPATIENT
Start: 2021-09-13 | End: 2021-09-18

## 2021-09-13 NOTE — PROGRESS NOTES
Patient at this time is homebound.   will be arriving for a specimen cup and will return with a urine sample from wife.  If UA is positive for leukocytes, nitrates, blood; please order culture at that time.  Andreina Mathis, ODILON-C

## 2021-09-14 DIAGNOSIS — N30.01 ACUTE CYSTITIS WITH HEMATURIA: ICD-10-CM

## 2021-09-14 NOTE — PROGRESS NOTES
Lab Results   Component Value Date/Time    POCCOLOR dark yellow 09/13/2021 05:15 PM    POCAPPEAR cloudy 09/13/2021 05:15 PM    POCLEUKEST moderate 09/13/2021 05:15 PM    POCNITRITE positive 09/13/2021 05:15 PM    POCUROBILIGE 0.2 09/13/2021 05:15 PM    POCPROTEIN 100 09/13/2021 05:15 PM    POCURPH 5.5 09/13/2021 05:15 PM    POCBLOOD small 09/13/2021 05:15 PM    POCSPGRV 1.025 09/13/2021 05:15 PM    POCKETONES negative 09/13/2021 05:15 PM    POCBILIRUBIN negative 09/13/2021 05:15 PM    POCGLUCUA negative 09/13/2021 05:15 PM      Culture sent.  Macrobid ordered 100 mg twice daily for 5 days.  VARINDER Manzano

## 2021-09-14 NOTE — PROGRESS NOTES
returned urine sample. Sample ran and resulted. Notified Andreina Mathis. Urine culture collected.

## 2021-09-21 ENCOUNTER — TELEPHONE (OUTPATIENT)
Dept: MEDICAL GROUP | Facility: IMAGING CENTER | Age: 51
End: 2021-09-21

## 2021-09-21 NOTE — ASSESSMENT & PLAN NOTE
Post op crani  Minimal resection  Neurochecks per protocol  SBP < 160mmHg  Analgesics and antiemetics prn  Continue Decadron   96

## 2021-09-22 NOTE — TELEPHONE ENCOUNTER
Patient's  J Carlos called left message regarding patient. Suspects patient has a UTI and she is also bedridden. J Carlos is wondering if possible to  supplies for wife to test at home and bring back sample to the office.     Ph. [phone]

## 2021-09-22 NOTE — TELEPHONE ENCOUNTER
Please call patient and let her know that it is absolutely fine if her  picks up UA supplies and return the urine sample.  Please also ask patient if they have a home health nurse coming to her house if she has become bedridden.  We possibly need to place a Bernabe catheter.  VARINDER Manzano  .

## 2021-09-23 ENCOUNTER — TELEPHONE (OUTPATIENT)
Dept: PHYSICAL MEDICINE AND REHAB | Facility: REHABILITATION | Age: 51
End: 2021-09-23

## 2021-09-23 ENCOUNTER — HOSPITAL ENCOUNTER (OUTPATIENT)
Facility: MEDICAL CENTER | Age: 51
End: 2021-09-23
Attending: NURSE PRACTITIONER
Payer: COMMERCIAL

## 2021-09-23 DIAGNOSIS — R31.9 HEMATURIA, UNSPECIFIED TYPE: ICD-10-CM

## 2021-09-23 DIAGNOSIS — N30.01 ACUTE CYSTITIS WITH HEMATURIA: ICD-10-CM

## 2021-09-23 DIAGNOSIS — R35.0 FREQUENCY OF URINATION: ICD-10-CM

## 2021-09-23 DIAGNOSIS — R30.0 DYSURIA: ICD-10-CM

## 2021-09-23 LAB
APPEARANCE UR: NORMAL
BILIRUB UR STRIP-MCNC: NEGATIVE MG/DL
COLOR UR AUTO: NORMAL
GLUCOSE UR STRIP.AUTO-MCNC: NEGATIVE MG/DL
KETONES UR STRIP.AUTO-MCNC: NORMAL MG/DL
LEUKOCYTE ESTERASE UR QL STRIP.AUTO: NORMAL
NITRITE UR QL STRIP.AUTO: NORMAL
PH UR STRIP.AUTO: 5.5 [PH] (ref 5–8)
PROT UR QL STRIP: 100 MG/DL
RBC UR QL AUTO: NORMAL
SP GR UR STRIP.AUTO: 1.03
UROBILINOGEN UR STRIP-MCNC: 0.2 MG/DL

## 2021-09-23 PROCEDURE — 87186 SC STD MICRODIL/AGAR DIL: CPT

## 2021-09-23 PROCEDURE — 81001 URINALYSIS AUTO W/SCOPE: CPT

## 2021-09-23 PROCEDURE — 87086 URINE CULTURE/COLONY COUNT: CPT

## 2021-09-23 PROCEDURE — 87077 CULTURE AEROBIC IDENTIFY: CPT

## 2021-09-23 PROCEDURE — 81002 URINALYSIS NONAUTO W/O SCOPE: CPT | Performed by: NURSE PRACTITIONER

## 2021-09-23 RX ORDER — CEPHALEXIN 500 MG/1
500 CAPSULE ORAL 2 TIMES DAILY
Qty: 10 CAPSULE | Refills: 0 | Status: SHIPPED | OUTPATIENT
Start: 2021-09-23 | End: 2021-09-27

## 2021-09-23 NOTE — TELEPHONE ENCOUNTER
Received a call from Lynda with [facility]. She was wondering if Dr. Steele could see patient and possibly treat patient with botox for left hand pain due to muscle tone. I left her a voicemail and asked her to fax our office a referral.     Lynda: [phone]

## 2021-09-24 ENCOUNTER — TELEMEDICINE (OUTPATIENT)
Dept: MEDICAL GROUP | Facility: IMAGING CENTER | Age: 51
End: 2021-09-24
Payer: COMMERCIAL

## 2021-09-24 VITALS — HEIGHT: 67 IN | BODY MASS INDEX: 37.59 KG/M2

## 2021-09-24 DIAGNOSIS — N15.9 KIDNEY INFECTION: ICD-10-CM

## 2021-09-24 DIAGNOSIS — R52 PAIN: ICD-10-CM

## 2021-09-24 LAB
APPEARANCE UR: ABNORMAL
BACTERIA #/AREA URNS HPF: ABNORMAL /HPF
BILIRUB UR QL STRIP.AUTO: NEGATIVE
CAOX CRY #/AREA URNS HPF: ABNORMAL /HPF
COLOR UR: YELLOW
EPI CELLS #/AREA URNS HPF: ABNORMAL /HPF
GLUCOSE UR STRIP.AUTO-MCNC: NEGATIVE MG/DL
KETONES UR STRIP.AUTO-MCNC: ABNORMAL MG/DL
LEUKOCYTE ESTERASE UR QL STRIP.AUTO: ABNORMAL
MICRO URNS: ABNORMAL
NITRITE UR QL STRIP.AUTO: NEGATIVE
PH UR STRIP.AUTO: 5.5 [PH] (ref 5–8)
PROT UR QL STRIP: 300 MG/DL
RBC # URNS HPF: ABNORMAL /HPF
RBC UR QL AUTO: ABNORMAL
RENAL EPI CELLS #/AREA URNS HPF: NEGATIVE /HPF
SP GR UR STRIP.AUTO: 1.02
UROBILINOGEN UR STRIP.AUTO-MCNC: 0.2 MG/DL
WBC #/AREA URNS HPF: ABNORMAL /HPF

## 2021-09-24 PROCEDURE — 99214 OFFICE O/P EST MOD 30 MIN: CPT | Mod: 95,CR | Performed by: NURSE PRACTITIONER

## 2021-09-24 RX ORDER — SULFAMETHOXAZOLE AND TRIMETHOPRIM 800; 160 MG/1; MG/1
1 TABLET ORAL 2 TIMES DAILY
Qty: 28 TABLET | Refills: 0 | Status: SHIPPED | OUTPATIENT
Start: 2021-09-24 | End: 2021-10-04

## 2021-09-24 NOTE — PROGRESS NOTES
Virtual Visit: Established Patient   This visit was conducted via Zoom using secure and encrypted videoconferencing technology.   The patient was in a private location in the state of [place].    The patient's identity was confirmed and verbal consent was obtained for this virtual visit.  Patient is aware that this is a billable encounter.  Subjective:   CC:   Chief Complaint   Patient presents with   • Medication Management     gabapentin      Melba Frye is a 50 y.o. female who is a patient of Dr. Trinity Nguyen who is  presenting with her  for evaluation and management of:    Reports that they have seen neurologist oncologist today via Zoom conference.  Discussed that they reported increased pain despite being on gabapentin and tramadol.  Reports that the provider prescribed Vimpat due to feeling the patient possibly is having seizures which is causing the pain that causes her to wake up in the middle the night and/or napping periods.  Reports that they were told to stop gabapentin and continue tramadol.  Reports that she is taking 50 mg tramadol daily.  Reports that she is also taking Tylenol 325 mg 2 tablets daily.    Reports that she is been experiencing lower right-sided back pain, chills, body aches.   reports that they do not have a thermometer in the home and he has ordered 1.  States that she feels warm to touch.  Patient denies any nausea and/or vomiting at this time.  States that she initially felt better with the use of Macrobid from previous UTI diagnosis, but symptoms have returned.  Reports burning with urination and increased frequency.  Denies any noted blood in urine.  States that she completed oral chemo about 2 to 3 weeks ago.      9/23/2021 has been brought in urine sample prior to visit.    POC Color Negative dark yellow    POC Appearance Negative cloudy    POC Leukocyte Esterase Negative moderate    POC Nitrites Negative neagtive    POC Urobiligen Negative (0.2) mg/dL  0.2    POC Protein Negative mg/dL 100    POC Urine PH 5.0 - 8.0 5.5    POC Blood Negative moderate    POC Specific Gravity <1.005 - >1.030 1.030    POC Ketones Negative mg/dL neagtive    POC Bilirubin Negative mg/dL negative    POC Glucose Negative mg/dL negative      Urinalysis and microscopic urine ordered.  Culture ordered.  On 9/23/2021    WBC /hpf Packed Abnormal     Comment: Female   <12 Yr 0-2   >12 Yr 0-5   Male   None    RBC /hpf 10-20 Abnormal     Comment: Female   >12 Yr 0-2   Male   None    Bacteria None /hpf Few Abnormal     Epithelial Cells /hpf Few    Epithelial Cells Renal /hpf Negative    Ca Oxalate Crystal /hpf Few      Color  Yellow    Character  Turbid Abnormal     Specific Gravity <1.035 1.022    Ph 5.0 - 8.0 5.5    Glucose Negative mg/dL Negative    Ketones Negative mg/dL Trace Abnormal     Protein Negative mg/dL 300 Abnormal     Bilirubin Negative Negative    Urobilinogen, Urine Negative 0.2    Nitrite Negative Negative    Leukocyte Esterase Negative Large Abnormal     Occult Blood Negative Moderate Abnormal     Micro Urine Req  Microscopic      Culture pending.    ROS:  Constitutional: Denies fever, chills, night sweats, weight loss/gain or malaise. Fatigue, see HPI.  HENT: Denies nasal congestion, sore throat, hearing loss, enlarged thyroid, or difficulty swallowing.   Respiratory: Denies cough, SOB at rest or activity.    Cardiovascular: Denies tachycardia, chest pain, and/or palpitations.  Lower leg edema, see HPI.  Gastrointestinal: Denies N/V/C/D, abdominal pain, loss appetite, reflux, or hematochezia.  Genitourinary: Denies difficulty voiding, nocturia, or hematuria.  Increased frequency, burning with urination, right lower back pain, see HPI.  Skin: Negative for rash or worrisome moles.   Neurological: Negative for dizziness, focal weakness and headaches.   Endo/Heme/Allergies: Denies bruise/bleed easily, allergies.   Psychiatric/Behavioral: Denies depression, nervous/anxious,  difficulty sleeping.  See HPI.  MSK: See HPI.    Current medicines (including changes today)  Current Outpatient Medications   Medication Sig Dispense Refill   • sulfamethoxazole-trimethoprim (BACTRIM DS) 800-160 MG tablet Take 1 Tablet by mouth 2 times a day for 14 days. 28 Tablet 0   • prochlorperazine (COMPAZINE) 10 MG Tab TAKE 1 TABLET BY MOUTH 30 MIN PRIOR TO EACH DOSE OF TEMODAR AND UP TO THREE TIMES A DAY AS NEEDED     • traMADol (ULTRAM) 50 MG Tab Take 1 Tablet by mouth at bedtime as needed for up to 30 days. (Patient taking differently: Take 50 mg by mouth at bedtime as needed. Taking 1-2 times a day) 30 Tablet 0   • hydrOXYzine HCl (ATARAX) 50 MG Tab Take 1 Tablet by mouth 3 times a day as needed for up to 120 days. 90 Tablet 5   • amLODIPine (NORVASC) 10 MG Tab Take 1 tablet by mouth every day. 90 tablet 0   • melatonin 3 MG Tab Take 1 tablet by mouth at bedtime as needed (insomnia). 30 tablet 2   • venlafaxine (EFFEXOR) 25 MG Tab Take 0.5 Tablets by mouth 2 Times a Day. (Patient taking differently: Take 25 mg by mouth every day.) 90 tablet 2   • acetaminophen (TYLENOL) 325 MG Tab Take 2 Tablets by mouth every 6 hours as needed (Mild Pain; (Pain scale 1-3); Temp greater than 100.5 F). 30 tablet 0   • VITAMIN D PO Take 1 Units by mouth every evening.     • multivitamin (THERAGRAN) Tab Take 1 tablet by mouth every evening.     • lacosamide (VIMPAT) 50 MG Tab tablet Take 50 mg by mouth.     • temozolomide (TEMODAR) 100 MG capsule  (Patient not taking: Reported on 9/9/2021)       No current facility-administered medications for this visit.     Patient Active Problem List    Diagnosis Date Noted   • Hemiparesis (HCC) 06/10/2021   • Elevated blood pressure reading 04/07/2021   • Vitamin D insufficiency 03/16/2021   • Impaired mobility and ADLs 03/15/2021   • Acute blood loss as cause of postoperative anemia 03/11/2021   • 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) 03/02/2021   •  "Glioblastoma of frontal lobe (HCC) 03/02/2021   • Demyelinating disease of central nervous system, unspecified ([facility]) 03/02/2021   • Lentigo 10/17/2018   • Seborrheic keratoses 10/17/2018   • Dermatitis, contact 11/16/2015   • Hyperlipidemia 01/23/2015   • Menopausal symptom 07/02/2014   • Fatigue 06/09/2014   • Mixed anxiety and depressive disorder 06/09/2014   • Complicated migraine 06/09/2014   • TMJ arthralgia 06/09/2014      Objective:   Ht 1.702 m (5' 7\")   BMI 37.59 kg/m²   Respiratory rate observed during visit: 14 bpm    Physical Exam:  Constitutional: Alert, no distress, well-groomed.  Skin: No rashes in visible areas.  Eye: Round. Conjunctiva clear, lids normal. No icterus.   ENMT: Lips pink without lesions, good dentition, moist mucous membranes. Phonation normal.  Neck: No masses, no thyromegaly. Moves freely without pain.  Respiratory: Unlabored respiratory effort, no cough or audible wheeze  Psych: Alert and oriented x3, normal affect and mood.     Assessment and Plan:   1. Kidney infection  This is a stable condition.  Discussed recently received UA and microscopic results with patient.  Discussed not taking Keflex at this time and starting Bactrim twice a day for 14 days, verified antibiotic allergies at this time with patient and .  Instructed to maintain hydration with water.  Discussed with patient if symptoms worsen over the weekend including fever and/or vomiting to seek emergency services for further evaluation and possible IV antibiotics, verbalized understanding and willingness.  Discussed due to added symptoms of lower back pain, body aches, chills, feeling warm to touch, blood in urine treating for kidney infection at this time.  Due to patient being stable and Covid pandemic discussed treating as outpatient as long as patient is tolerant and improving, verbalized understanding and agrees to plan of care.  Discussed with patient taking probiotic to possibly decrease overall GI upset " while on antibiotic therapy.  Discussed with  to  specimen collection cup on Monday morning to be able to repeat UA to assess for improvement, verbalized understanding and willingness.    - sulfamethoxazole-trimethoprim (BACTRIM DS) 800-160 MG tablet; Take 1 Tablet by mouth 2 times a day for 14 days.  Dispense: 28 Tablet; Refill: 0    2. Pain  This is a stable condition.  Discussed with patient per neuro oncologist to stop gabapentin at this time and start Vimpat, verbalized understanding and willingness.  Discussed with patient continuing to take Tylenol and tramadol as needed.  Discussed with patient and  not to feel that she cannot increase her tramadol usage and/or dosage.  Discuss possible side effects if she does increase dosage or interval of medication.  Reviewed with patient due to getting good relief from pain from tramadol would be covering PCP's advice at this time to increase her dosage when taking or ensuring she is taking tramadol before sleeping in evening to prevent reported pain during night, verbalized understanding and willingness.  Discussed with patient not taking more than 400 mg of tramadol in 24 hours, verbalized understanding.    Follow-up: Return in about 3 days (around 9/27/2021).

## 2021-09-27 ENCOUNTER — HOSPITAL ENCOUNTER (OUTPATIENT)
Facility: MEDICAL CENTER | Age: 51
End: 2021-09-27
Attending: NURSE PRACTITIONER
Payer: COMMERCIAL

## 2021-09-27 ENCOUNTER — TELEMEDICINE (OUTPATIENT)
Dept: MEDICAL GROUP | Facility: IMAGING CENTER | Age: 51
End: 2021-09-27
Payer: COMMERCIAL

## 2021-09-27 VITALS — BODY MASS INDEX: 36.1 KG/M2 | WEIGHT: 230 LBS | HEIGHT: 67 IN

## 2021-09-27 DIAGNOSIS — R52 PAIN: ICD-10-CM

## 2021-09-27 DIAGNOSIS — R80.9 PROTEINURIA, UNSPECIFIED TYPE: ICD-10-CM

## 2021-09-27 DIAGNOSIS — N15.9 KIDNEY INFECTION: ICD-10-CM

## 2021-09-27 LAB
APPEARANCE UR: NORMAL
BILIRUB UR STRIP-MCNC: NEGATIVE MG/DL
COLOR UR AUTO: NORMAL
GLUCOSE UR STRIP.AUTO-MCNC: NEGATIVE MG/DL
KETONES UR STRIP.AUTO-MCNC: NEGATIVE MG/DL
LEUKOCYTE ESTERASE UR QL STRIP.AUTO: NORMAL
NITRITE UR QL STRIP.AUTO: NEGATIVE
PH UR STRIP.AUTO: 5.5 [PH] (ref 5–8)
PROT UR QL STRIP: 30 MG/DL
RBC UR QL AUTO: NEGATIVE
SP GR UR STRIP.AUTO: 1.03
UROBILINOGEN UR STRIP-MCNC: 0.2 MG/DL

## 2021-09-27 PROCEDURE — 82570 ASSAY OF URINE CREATININE: CPT

## 2021-09-27 PROCEDURE — 81002 URINALYSIS NONAUTO W/O SCOPE: CPT | Performed by: NURSE PRACTITIONER

## 2021-09-27 PROCEDURE — 82043 UR ALBUMIN QUANTITATIVE: CPT

## 2021-09-27 PROCEDURE — 99213 OFFICE O/P EST LOW 20 MIN: CPT | Mod: 95,CR | Performed by: NURSE PRACTITIONER

## 2021-09-27 PROCEDURE — 81001 URINALYSIS AUTO W/SCOPE: CPT

## 2021-09-27 RX ORDER — LACOSAMIDE 50 MG/1
50 TABLET ORAL
COMMUNITY
Start: 2021-09-24 | End: 2021-10-25

## 2021-09-27 ASSESSMENT — FIBROSIS 4 INDEX: FIB4 SCORE: 1.29

## 2021-09-27 ASSESSMENT — PAIN SCALES - GENERAL: PAINLEVEL: NO PAIN

## 2021-09-27 NOTE — PROGRESS NOTES
Virtual Visit: Established Patient   This visit was conducted via Zoom using secure and encrypted videoconferencing technology.   The patient was in a private location in the state of [place].    The patient's identity was confirmed and verbal consent was obtained for this virtual visit.  Patient is aware that this is a billable encounter.  Subjective:   CC:   Chief Complaint   Patient presents with   • Dysuria     Melba Frye is a 50 y.o. female patient of Dr. Trinity Nguyen who is presenting with her  for evaluation and management of:    Reports that she is currently taking Bactrim twice a day.  Denies any side effects to medication.  States that she feels that lower back pain is improving and she is also feeling decreased chills and body aches.   reports that she he feels that she continues intermittently to feel warm.  States that they have not been able to get a thermometer to check her temperature.  States that they are waiting for turn arrive in the mail.   and wife report that she continues to have urinary frequency.  Patient reports overall she is feeling better since starting new antibiotic.    Reports that overall pain is decreased with prescription of Vimpat.  Reports that she continues to take tramadol 50 mg most days.    When PCP asked patient about potential side effects of Vimpat patient reported that she is experiencing balance difficulties since starting medication and  is needing to assist her when she is ambulating.  Denies any falls since last visit.    ROS   Constitutional: Denies fever, night sweats, weight loss/gain or malaise. Fatigue, warm to touch, chills, see HPI.  HENT: Denies nasal congestion, sore throat, hearing loss, enlarged thyroid, or difficulty swallowing.   Respiratory: Denies cough, SOB at rest or activity.    Cardiovascular: Denies tachycardia, chest pain, lower leg edema, and/or palpitations.   Gastrointestinal: Denies N/V/C/D, abdominal  pain, loss appetite, reflux, or hematochezia.  Genitourinary: Denies difficulty voiding, nocturia, dysuria, or hematuria.  Increased frequency, right lower back pain, see HPI.  Skin: Negative for rash or worrisome moles.   Neurological: Negative for dizziness, focal weakness and headaches.   Endo/Heme/Allergies: Denies bruise/bleed easily, allergies.   Psychiatric/Behavioral: Denies depression, nervous/anxious, difficulty sleeping.  See HPI.  MSK: See HPI.    Current medicines (including changes today)  Current Outpatient Medications   Medication Sig Dispense Refill   • lacosamide (VIMPAT) 50 MG Tab tablet Take 50 mg by mouth.     • sulfamethoxazole-trimethoprim (BACTRIM DS) 800-160 MG tablet Take 1 Tablet by mouth 2 times a day for 14 days. 28 Tablet 0   • prochlorperazine (COMPAZINE) 10 MG Tab TAKE 1 TABLET BY MOUTH 30 MIN PRIOR TO EACH DOSE OF TEMODAR AND UP TO THREE TIMES A DAY AS NEEDED     • traMADol (ULTRAM) 50 MG Tab Take 1 Tablet by mouth at bedtime as needed for up to 30 days. (Patient taking differently: Take 50 mg by mouth at bedtime as needed. Taking 1-2 times a day) 30 Tablet 0   • hydrOXYzine HCl (ATARAX) 50 MG Tab Take 1 Tablet by mouth 3 times a day as needed for up to 120 days. 90 Tablet 5   • amLODIPine (NORVASC) 10 MG Tab Take 1 tablet by mouth every day. 90 tablet 0   • melatonin 3 MG Tab Take 1 tablet by mouth at bedtime as needed (insomnia). 30 tablet 2   • venlafaxine (EFFEXOR) 25 MG Tab Take 0.5 Tablets by mouth 2 Times a Day. (Patient taking differently: Take 25 mg by mouth every day.) 90 tablet 2   • acetaminophen (TYLENOL) 325 MG Tab Take 2 Tablets by mouth every 6 hours as needed (Mild Pain; (Pain scale 1-3); Temp greater than 100.5 F). 30 tablet 0   • VITAMIN D PO Take 1 Units by mouth every evening.     • multivitamin (THERAGRAN) Tab Take 1 tablet by mouth every evening.     • temozolomide (TEMODAR) 100 MG capsule  (Patient not taking: Reported on 9/9/2021)       No current  "facility-administered medications for this visit.     Patient Active Problem List    Diagnosis Date Noted   • Hemiparesis (HCC) 06/10/2021   • Elevated blood pressure reading 04/07/2021   • Vitamin D insufficiency 03/16/2021   • Impaired mobility and ADLs 03/15/2021   • Acute blood loss as cause of postoperative anemia 03/11/2021   • 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) 03/02/2021   • Glioblastoma of frontal lobe (HCC) 03/02/2021   • Demyelinating disease of central nervous system, unspecified ([facility]) 03/02/2021   • Lentigo 10/17/2018   • Seborrheic keratoses 10/17/2018   • Dermatitis, contact 11/16/2015   • Hyperlipidemia 01/23/2015   • Menopausal symptom 07/02/2014   • Fatigue 06/09/2014   • Mixed anxiety and depressive disorder 06/09/2014   • Complicated migraine 06/09/2014   • TMJ arthralgia 06/09/2014      Objective:   Ht 1.702 m (5' 7\")   Wt 104 kg (230 lb)   BMI 36.02 kg/m²   Respiratory rate observed during visit: 12 bpm    Physical Exam:  Constitutional: No distress, patient laying in bed during visit, dozed off during visit.  Easily aroused by .  Skin: No rashes in visible areas.  Eye: Round. Conjunctiva clear, lids normal. No icterus.   ENMT: Lips pink without lesions, good dentition, moist mucous membranes. Phonation normal.  Neck: No masses, no thyromegaly. Moves freely without pain.  Respiratory: Unlabored respiratory effort, no cough or audible wheeze  Psych: Alert and oriented x3, normal affect and mood.     Labs:  POCT UA:  POC Color Negative dark yellow    POC Appearance Negative cloudy    POC Leukocyte Esterase Negative small    POC Nitrites Negative negative    POC Urobiligen Negative (0.2) mg/dL 0.2    POC Protein Negative mg/dL 30    POC Urine PH 5.0 - 8.0 5.5    POC Blood Negative negative    POC Specific Gravity <1.005 - >1.030 1.030    POC Ketones Negative mg/dL negative    POC Bilirubin Negative mg/dL negative    POC Glucose Negative mg/dL negative "       Assessment and Plan:   1. Kidney infection  2. Proteinuria, unspecified type  This is a stable improving condition.  Reviewed new POCT UA with patient and  verbalized understanding.  Discussed with patient due to continued protein noted and urine sending for urine microscopic and ACR to further evaluate kidney function and possible cause, verbalized understanding.  Patient instructed to maintain appointment with Katharina Lau PA-C this coming Thursday for continued evaluation due to treating kidney infection as outpatient, verbalized understanding and willingness.  Patient instructed to continue to maintain hydration with fluid.  Instructed to start monitoring temperature once thermometer is received to ensure patient is not having a fever.  Discussed with  and patient if she is having a fever of 101 or higher that is not decreased with Tylenol 500-1000 mg she is to seek emergency services for evaluation and possible IV antibiotics, verbalized understanding and willingness.    - POCT Urinalysis  - URINE MICROSCOPIC (MICROSCOPIC URINALYSIS); Future  -ACR, urine; Future    3. Pain  This is a chronic stable condition.  Discussed with patient reaching out to neuro oncologist to notify them of possible side effects from medication presenting as balance disorder.  Reviewed with patient all side effects of Vimpat, verbalized understanding.  Instructed patient to not get out of bed without assistance to prevent fall, verbalized understanding and willingness.  Encourage patient to take tramadol as previously prescribed for breakthrough pain, verbalized understanding and willingness.    Follow-up: Return in about 3 days (around 9/30/2021).

## 2021-09-28 LAB
APPEARANCE UR: ABNORMAL
BACTERIA #/AREA URNS HPF: ABNORMAL /HPF
BILIRUB UR QL STRIP.AUTO: NEGATIVE
CAOX CRY #/AREA URNS HPF: ABNORMAL /HPF
COLOR UR: YELLOW
CREAT UR-MCNC: 211.09 MG/DL
EPI CELLS #/AREA URNS HPF: ABNORMAL /HPF
GLUCOSE UR STRIP.AUTO-MCNC: NEGATIVE MG/DL
HYALINE CASTS #/AREA URNS LPF: ABNORMAL /LPF
KETONES UR STRIP.AUTO-MCNC: ABNORMAL MG/DL
LEUKOCYTE ESTERASE UR QL STRIP.AUTO: ABNORMAL
MICRO URNS: ABNORMAL
MICROALBUMIN UR-MCNC: 8 MG/DL
MICROALBUMIN/CREAT UR: 38 MG/G (ref 0–30)
NITRITE UR QL STRIP.AUTO: NEGATIVE
PH UR STRIP.AUTO: 6 [PH] (ref 5–8)
PROT UR QL STRIP: 30 MG/DL
RBC # URNS HPF: ABNORMAL /HPF
RBC UR QL AUTO: NEGATIVE
SP GR UR STRIP.AUTO: 1.02
UROBILINOGEN UR STRIP.AUTO-MCNC: 1 MG/DL
WBC #/AREA URNS HPF: ABNORMAL /HPF

## 2021-09-30 ENCOUNTER — APPOINTMENT (OUTPATIENT)
Dept: RADIOLOGY | Facility: MEDICAL CENTER | Age: 51
End: 2021-09-30
Attending: EMERGENCY MEDICINE
Payer: COMMERCIAL

## 2021-09-30 ENCOUNTER — HOSPITAL ENCOUNTER (EMERGENCY)
Facility: MEDICAL CENTER | Age: 51
End: 2021-09-30
Attending: EMERGENCY MEDICINE
Payer: COMMERCIAL

## 2021-09-30 ENCOUNTER — APPOINTMENT (OUTPATIENT)
Dept: MEDICAL GROUP | Facility: IMAGING CENTER | Age: 51
End: 2021-09-30
Payer: COMMERCIAL

## 2021-09-30 VITALS
DIASTOLIC BLOOD PRESSURE: 74 MMHG | HEIGHT: 67 IN | SYSTOLIC BLOOD PRESSURE: 152 MMHG | OXYGEN SATURATION: 91 % | HEART RATE: 104 BPM | WEIGHT: 229.28 LBS | BODY MASS INDEX: 35.99 KG/M2 | RESPIRATION RATE: 20 BRPM | TEMPERATURE: 99 F

## 2021-09-30 DIAGNOSIS — S93.401A SPRAIN OF RIGHT ANKLE, UNSPECIFIED LIGAMENT, INITIAL ENCOUNTER: ICD-10-CM
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
## 2021-09-30 DIAGNOSIS — R11.0 NAUSEA: ICD-10-CM

## 2021-09-30 DIAGNOSIS — S46.912A SHOULDER STRAIN, LEFT, INITIAL ENCOUNTER: ICD-10-CM

## 2021-09-30 LAB
APPEARANCE UR: CLEAR
BACTERIA #/AREA URNS HPF: NEGATIVE /HPF
BILIRUB UR QL STRIP.AUTO: NEGATIVE
COLOR UR: YELLOW
EPI CELLS #/AREA URNS HPF: ABNORMAL /HPF
GLUCOSE UR STRIP.AUTO-MCNC: NEGATIVE MG/DL
HYALINE CASTS #/AREA URNS LPF: ABNORMAL /LPF
KETONES UR STRIP.AUTO-MCNC: NEGATIVE MG/DL
LEUKOCYTE ESTERASE UR QL STRIP.AUTO: NEGATIVE
MICRO URNS: ABNORMAL
NITRITE UR QL STRIP.AUTO: NEGATIVE
PH UR STRIP.AUTO: 6.5 [PH] (ref 5–8)
PROT UR QL STRIP: 30 MG/DL
RBC # URNS HPF: ABNORMAL /HPF
RBC UR QL AUTO: NEGATIVE
SP GR UR STRIP.AUTO: 1.02
WBC #/AREA URNS HPF: ABNORMAL /HPF

## 2021-09-30 PROCEDURE — 71045 X-RAY EXAM CHEST 1 VIEW: CPT

## 2021-09-30 PROCEDURE — 700111 HCHG RX REV CODE 636 W/ 250 OVERRIDE (IP)

## 2021-09-30 PROCEDURE — 700111 HCHG RX REV CODE 636 W/ 250 OVERRIDE (IP): Performed by: EMERGENCY MEDICINE

## 2021-09-30 PROCEDURE — 96374 THER/PROPH/DIAG INJ IV PUSH: CPT

## 2021-09-30 PROCEDURE — 81001 URINALYSIS AUTO W/SCOPE: CPT

## 2021-09-30 PROCEDURE — 73610 X-RAY EXAM OF ANKLE: CPT | Mod: RT

## 2021-09-30 PROCEDURE — 96375 TX/PRO/DX INJ NEW DRUG ADDON: CPT

## 2021-09-30 PROCEDURE — 99285 EMERGENCY DEPT VISIT HI MDM: CPT

## 2021-09-30 PROCEDURE — 51701 INSERT BLADDER CATHETER: CPT

## 2021-09-30 PROCEDURE — 73030 X-RAY EXAM OF SHOULDER: CPT | Mod: LT

## 2021-09-30 RX ORDER — KETOROLAC TROMETHAMINE 30 MG/ML
INJECTION, SOLUTION INTRAMUSCULAR; INTRAVENOUS
Status: COMPLETED
Start: 2021-09-30 | End: 2021-09-30

## 2021-09-30 RX ORDER — MORPHINE SULFATE 4 MG/ML
4 INJECTION, SOLUTION INTRAMUSCULAR; INTRAVENOUS ONCE
Status: DISCONTINUED | OUTPATIENT
Start: 2021-09-30 | End: 2021-09-30

## 2021-09-30 RX ORDER — METOCLOPRAMIDE 10 MG/1
10 TABLET ORAL 4 TIMES DAILY
Qty: 30 TABLET | Refills: 0 | Status: SHIPPED | OUTPATIENT
Start: 2021-09-30 | End: 2021-10-08

## 2021-09-30 RX ORDER — KETOROLAC TROMETHAMINE 30 MG/ML
30 INJECTION, SOLUTION INTRAMUSCULAR; INTRAVENOUS ONCE
Status: COMPLETED | OUTPATIENT
Start: 2021-09-30 | End: 2021-09-30

## 2021-09-30 RX ORDER — MORPHINE SULFATE 4 MG/ML
4 INJECTION, SOLUTION INTRAMUSCULAR; INTRAVENOUS ONCE
Status: COMPLETED | OUTPATIENT
Start: 2021-09-30 | End: 2021-09-30

## 2021-09-30 RX ORDER — OXYCODONE HYDROCHLORIDE AND ACETAMINOPHEN 5; 325 MG/1; MG/1
1 TABLET ORAL EVERY 6 HOURS PRN
Qty: 15 TABLET | Refills: 0 | Status: SHIPPED | OUTPATIENT
Start: 2021-09-30 | End: 2021-10-04

## 2021-09-30 RX ORDER — LEVETIRACETAM 750 MG/1
1500 TABLET ORAL 2 TIMES DAILY
Status: SHIPPED | COMMUNITY
End: 2021-10-25

## 2021-09-30 RX ORDER — HYDROXYZINE 50 MG/1
50 TABLET, FILM COATED ORAL EVERY EVENING
Status: ON HOLD | COMMUNITY
End: 2022-02-13

## 2021-09-30 RX ORDER — VENLAFAXINE 25 MG/1
25 TABLET ORAL EVERY MORNING
Status: SHIPPED | COMMUNITY
End: 2021-10-18

## 2021-09-30 RX ORDER — METOCLOPRAMIDE HYDROCHLORIDE 5 MG/ML
10 INJECTION INTRAMUSCULAR; INTRAVENOUS ONCE
Status: COMPLETED | OUTPATIENT
Start: 2021-09-30 | End: 2021-09-30

## 2021-09-30 RX ORDER — PROMETHAZINE HYDROCHLORIDE 25 MG/1
25 SUPPOSITORY RECTAL EVERY 6 HOURS PRN
Qty: 12 SUPPOSITORY | Refills: 0 | Status: SHIPPED | OUTPATIENT
Start: 2021-09-30 | End: 2021-10-11

## 2021-09-30 RX ORDER — TRAMADOL HYDROCHLORIDE 50 MG/1
50 TABLET ORAL 2 TIMES DAILY PRN
Status: SHIPPED | COMMUNITY
End: 2021-10-25 | Stop reason: SDUPTHER

## 2021-09-30 RX ORDER — LACOSAMIDE 50 MG/1
50 TABLET ORAL 2 TIMES DAILY
Status: SHIPPED | COMMUNITY
Start: 2021-10-02 | End: 2021-10-04

## 2021-09-30 RX ADMIN — METOCLOPRAMIDE 10 MG: 5 INJECTION, SOLUTION INTRAMUSCULAR; INTRAVENOUS at 12:03

## 2021-09-30 RX ADMIN — KETOROLAC TROMETHAMINE 30 MG: 30 INJECTION, SOLUTION INTRAMUSCULAR; INTRAVENOUS at 14:38

## 2021-09-30 RX ADMIN — MORPHINE SULFATE 4 MG: 4 INJECTION INTRAVENOUS at 12:03

## 2021-09-30 RX ADMIN — KETOROLAC TROMETHAMINE 30 MG: 30 INJECTION, SOLUTION INTRAMUSCULAR at 14:38

## 2021-09-30 ASSESSMENT — ENCOUNTER SYMPTOMS
FALLS: 1
NAUSEA: 1
SHORTNESS OF BREATH: 0
CHILLS: 0
VOMITING: 1
FEVER: 0
HEADACHES: 0

## 2021-09-30 ASSESSMENT — FIBROSIS 4 INDEX: FIB4 SCORE: 1.29

## 2021-09-30 NOTE — ED PROVIDER NOTES
"ED Provider Note    ED Provider Note    Primary care provider: Trinity Nguyen M.D.  Means of arrival: EMS  History obtained from: patient  History limited by: None    CHIEF COMPLAINT  Chief Complaint   Patient presents with   • Shoulder Injury     pt fell onto left shoulder this AM, had recent brain surgery and has L sided weekness.   • N/V     Pt taking bactrim for UTI, has been causing vomiting and is unable to keep home medications down. Needs repeat urinalysis per pt.        HPI  Melba Frye is a 50 y.o. female who presents to the Emergency Department via EMS with a chief complaint of left shoulder pain.  Patient has a history of a brain tumor.  The details of which she \"does not want to talk about\".  She underwent her second and most recent, brain surgery at [facility] in June of this year.  Since tumor removal, she has had decreased sensation of her left side and decreased use of her left upper extremity.  She was being pushed by a home health care provider, in a walker with a seat and rollers when she fell out of it, landing on her buttock and her left arm.  EMS was called and when they were trying to move her, she heard a crunch.  She is having extreme pain of her left shoulder.  She is also recently been treated for urinary tract infection and the medication, Bactrim has been making her feel ill.  She took all of her medication this morning and then promptly vomited.  They are requesting evaluation of her urine.  She typically takes Compazine for her nausea but it does not always help.  Tylenol has been managing her pain for the most part.  She denies hitting her head today.  She is experiencing pain to her right ankle.  She abdominal pain or neck pain.  No difficulty breathing.    REVIEW OF SYSTEMS  Review of Systems   Constitutional: Negative for chills and fever.   HENT: Negative for congestion.    Respiratory: Negative for shortness of breath.    Gastrointestinal: Positive for nausea and " vomiting.   Genitourinary: Positive for dysuria.   Musculoskeletal: Positive for falls and joint pain.   Neurological: Negative for headaches.     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    SURGICAL HISTORY   has a past surgical history that includes cystoscopy (10/15/08); laparoscopy (5/28/2010); lymph node sampling (5/28/2010); debulking (5/28/2010); appendectomy (1981); other (1984); vaginal hysterectomy scope total (10/15/08); myringotomy (8/27/2010); tonsillectomy and adenoidectomy; and craniotomy stealth (Right, 3/9/2021).    SOCIAL HISTORY  Social History     Tobacco Use   • Smoking status: Never Smoker   • Smokeless tobacco: Never Used   Vaping Use   • Vaping Use: Never used   Substance Use Topics   • Alcohol use: Not Currently     Alcohol/week: 0.0 oz   • Drug use: No      Social History     Substance and Sexual Activity   Drug Use No       FAMILY HISTORY  Family History   Problem Relation Age of Onset   • Non-contributory Mother    • Lung Disease Mother         COPD   • Cancer Mother         dysplasia    • Arthritis Mother    • Psychiatric Illness Mother    • Heart Disease Mother    • Hypertension Mother    • Stroke Mother    • Non-contributory Father    • Cancer Father 73        prostate cancer   • Lung Disease Maternal Grandmother    • Lung Disease Paternal Aunt    • Diabetes Paternal Aunt    • Hyperlipidemia Paternal Aunt        CURRENT MEDICATIONS  Home Medications     Reviewed by Sarah Hargrove (Pharmacy Tech) on 09/30/21 at 1302  Med List Status: Complete   Medication Last Dose Status   acetaminophen (TYLENOL) 325 MG Tab unknown Active  "  amLODIPine (NORVASC) 10 MG Tab 9/30/2021 Active   hydrOXYzine HCl (ATARAX) 50 MG Tab 9/30/2021 Active   lacosamide (VIMPAT) 50 MG Tab tablet 9/29/2021 Active   lacosamide (VIMPAT) 50 MG Tab tablet NEW DOSE Active   levetiracetam (KEPPRA) 750 MG tablet 9/30/2021 Active   melatonin 3 MG Tab 9/29/2021 Active   multivitamin (THERAGRAN) Tab 9/29/2021 Active   prochlorperazine (COMPAZINE) 10 MG Tab 9/30/2021 Active   sulfamethoxazole-trimethoprim (BACTRIM DS) 800-160 MG tablet 9/30/2021 Active   traMADol (ULTRAM) 50 MG Tab unknown Active   venlafaxine (EFFEXOR) 25 MG Tab 9/30/2021 Active   VITAMIN D PO 9/29/2021 Active                ALLERGIES  No Known Allergies    PHYSICAL EXAM  VITAL SIGNS: /74   Pulse (!) 104   Temp 37.2 °C (99 °F) (Temporal)   Resp 20   Ht 1.702 m (5' 7\")   Wt 104 kg (229 lb 4.5 oz)   SpO2 91%   BMI 35.91 kg/m²   Vitals reviewed.  Constitutional: Patient is oriented to person, place, and time. Appears well-developed and well-nourished.  Mild distress.    Head: Normocephalic and atraumatic.  Healing surgical wound, right parietal scalp  Ears: Normal external ears bilaterally.   Mouth/Throat: Oropharynx is clear and moist.  Eyes: Conjunctivae are normal. Pupils are equal and round.  Left upper lateral  Neck: Normal range of motion. Neck supple.  No midline tenderness or step-offs.  Cardiovascular: Normal rate, regular rhythm and normal heart sounds. Normal peripheral pulses.  Pulmonary/Chest: Effort normal and breath sounds normal. No respiratory distress, no wheezes, rhonchi, or rales. No chest wall tenderness.  Abdominal: Soft. Bowel sounds are normal. There is no tenderness. No rebound or guarding, or peritoneal signs. No CVA tenderness.  Musculoskeletal: No edema and no tenderness, except to the right ankle that is diffusely tender without deformities or significant swelling.  There is a step-off at the left shoulder and diffuse pain to this area.  Contractures of the left upper " extremity.  Neurological: No focal deficits. Decreased sensation to the left upper and lower extremities.  Skin: Skin is warm and dry. No erythema. No pallor.   Psychiatric: Patient has a normal mood and affect.     LABS  Results for orders placed or performed during the hospital encounter of 09/30/21   URINALYSIS    Specimen: Urine, Cath   Result Value Ref Range    Color Yellow     Character Clear     Specific Gravity 1.025 <1.035    Ph 6.5 5.0 - 8.0    Glucose Negative Negative mg/dL    Ketones Negative Negative mg/dL    Protein 30 (A) Negative mg/dL    Bilirubin Negative Negative    Nitrite Negative Negative    Leukocyte Esterase Negative Negative    Occult Blood Negative Negative    Micro Urine Req Microscopic    URINE MICROSCOPIC (W/UA)   Result Value Ref Range    WBC 2-5 /hpf    RBC 0-2 /hpf    Bacteria Negative None /hpf    Epithelial Cells Few Few /hpf    Hyaline Cast 3-5 (A) /lpf       All labs reviewed by me.    RADIOLOGY  DX-ANKLE 3+ VIEWS RIGHT   Final Result      Ossification inferior to the medial malleolus most likely indicates remote deltoid ligament sprain/soft tissue injury      DX-SHOULDER 2+ LEFT   Final Result      Mild acromioclavicular and glenohumeral osteoarthritis      DX-CHEST-LIMITED (1 VIEW)   Final Result      No radiographic evidence of acute cardiopulmonary process.        The radiologist's interpretation of all radiological studies have been reviewed by me.    COURSE & MEDICAL DECISION MAKING  Pertinent Labs & Imaging studies reviewed. (See chart for details)    Obtained and reviewed past medical records.  Patient has numerous outpatient encounters.  According to nurse practitioner's note, she does have a history of glioblastoma.    11:30 AM - Patient seen and examined at bedside. This is a pleasant 50-year-old female.  She has a history of a malignant brain tumor which has been operated on twice, most recently in June.  As a result, she has decreased use and sensation of her left  side, contractures of her left upper extremity.  She fell out of her rolling walker today.  Injuring her left shoulder and right ankle.  Concern for possible dislocation versus fracture and will obtain an x-ray.  She has been having significant nausea and vomiting related to chemotherapy and recent treatment from Bactrim for a urinary tract infection.      2:18 PM patient is reevaluated at the bedside.  We discussed x-ray findings which surprisingly, do not show fracture or dislocation of the shoulder and the ankle has calcified ligaments but no fracture.  Urine analysis is unrevealing.  I hesitate to tell her to discontinue her antibiotics I have referred her to talk to her physician who started her on this but, perhaps she does not need a full 14-day course.  She is improved.  At this point, I feel she can safely be discharged home.  Her  at the bedside and will assist.  She is discharged in stable condition.    FINAL IMPRESSION  1. Fall, initial encounter    2. Shoulder strain, left, initial encounter    3. Sprain of right ankle, unspecified ligament, initial encounter    4. Nausea

## 2021-09-30 NOTE — ED NOTES
Med rec complete per pt and  at bedside. Permission given by pt to review informations with  at bedside. NKDA    Per pt, took AM meds today- vomited shortly after taking.     Bactrim start date 9/24/21

## 2021-09-30 NOTE — ED TRIAGE NOTES
"Chief Complaint   Patient presents with   • Shoulder Injury     pt fell onto left shoulder this AM, had recent brain surgery and has L sided weekness.   • N/V     Pt taking bactrim for UTI, has been causing vomiting and is unable to keep home medications down. Needs repeat urinalysis per pt.      /96   Pulse (!) 108   Temp 36.2 °C (97.1 °F) (Temporal)   Resp 19   Ht 1.702 m (5' 7\")   Wt 104 kg (229 lb 4.5 oz)   SpO2 95%   BMI 35.91 kg/m²     "

## 2021-09-30 NOTE — ED NOTES
Dc instructions and prescriptions reviewed with pt and spouse. Aware of need to  meds at [facility] on [address], f/u with pcp, take reglan daily, every 6 hrs, others as needed, return for worsening s/s

## 2021-09-30 NOTE — ED NOTES
Xray to bedside upon completion of med administration for 8/10 left shoulder pain and female mini cath with spec collection

## 2021-10-04 ENCOUNTER — HOSPITAL ENCOUNTER (OUTPATIENT)
Facility: MEDICAL CENTER | Age: 51
End: 2021-10-04
Attending: PHYSICIAN ASSISTANT
Payer: COMMERCIAL

## 2021-10-04 ENCOUNTER — TELEMEDICINE (OUTPATIENT)
Dept: MEDICAL GROUP | Facility: IMAGING CENTER | Age: 51
End: 2021-10-04
Payer: COMMERCIAL

## 2021-10-04 ENCOUNTER — OFFICE VISIT (OUTPATIENT)
Dept: NEUROLOGY | Facility: MEDICAL CENTER | Age: 51
End: 2021-10-04
Attending: STUDENT IN AN ORGANIZED HEALTH CARE EDUCATION/TRAINING PROGRAM
Payer: COMMERCIAL

## 2021-10-04 VITALS — BODY MASS INDEX: 36.1 KG/M2 | HEIGHT: 67 IN | WEIGHT: 230 LBS

## 2021-10-04 VITALS
WEIGHT: 230 LBS | OXYGEN SATURATION: 97 % | BODY MASS INDEX: 36.1 KG/M2 | HEART RATE: 87 BPM | TEMPERATURE: 97.8 F | HEIGHT: 67 IN

## 2021-10-04 DIAGNOSIS — Z74.09 IMPAIRED FUNCTIONAL MOBILITY AND ACTIVITY TOLERANCE: ICD-10-CM

## 2021-10-04 DIAGNOSIS — R53.83 OTHER FATIGUE: ICD-10-CM

## 2021-10-04 DIAGNOSIS — R35.0 URINARY FREQUENCY: ICD-10-CM

## 2021-10-04 DIAGNOSIS — F41.8 MIXED ANXIETY AND DEPRESSIVE DISORDER: ICD-10-CM

## 2021-10-04 DIAGNOSIS — R27.9 INCOORDINATION: ICD-10-CM

## 2021-10-04 DIAGNOSIS — R15.9 INCONTINENCE OF FECES, UNSPECIFIED FECAL INCONTINENCE TYPE: ICD-10-CM

## 2021-10-04 DIAGNOSIS — Z87.440 HISTORY OF UTI: ICD-10-CM

## 2021-10-04 DIAGNOSIS — R29.6 RECURRENT FALLS: ICD-10-CM

## 2021-10-04 DIAGNOSIS — R25.2 SPASTICITY: ICD-10-CM

## 2021-10-04 DIAGNOSIS — C71.1 GLIOBLASTOMA OF FRONTAL LOBE (HCC): ICD-10-CM

## 2021-10-04 DIAGNOSIS — G40.109 LOCALIZATION-RELATED FOCAL EPILEPSY WITH SIMPLE PARTIAL SEIZURES (HCC): ICD-10-CM

## 2021-10-04 DIAGNOSIS — R32 URINARY INCONTINENCE, UNSPECIFIED TYPE: ICD-10-CM

## 2021-10-04 LAB
APPEARANCE UR: NORMAL
BILIRUB UR STRIP-MCNC: NORMAL MG/DL
COLOR UR AUTO: NORMAL
GLUCOSE UR STRIP.AUTO-MCNC: NEGATIVE MG/DL
KETONES UR STRIP.AUTO-MCNC: NEGATIVE MG/DL
LEUKOCYTE ESTERASE UR QL STRIP.AUTO: NEGATIVE
NITRITE UR QL STRIP.AUTO: NEGATIVE
PH UR STRIP.AUTO: 5.5 [PH] (ref 5–8)
PROT UR QL STRIP: NORMAL MG/DL
RBC UR QL AUTO: NEGATIVE
SP GR UR STRIP.AUTO: >=1.03
UROBILINOGEN UR STRIP-MCNC: 0.2 MG/DL

## 2021-10-04 PROCEDURE — 99215 OFFICE O/P EST HI 40 MIN: CPT | Performed by: STUDENT IN AN ORGANIZED HEALTH CARE EDUCATION/TRAINING PROGRAM

## 2021-10-04 PROCEDURE — 99211 OFF/OP EST MAY X REQ PHY/QHP: CPT | Performed by: STUDENT IN AN ORGANIZED HEALTH CARE EDUCATION/TRAINING PROGRAM

## 2021-10-04 PROCEDURE — 81002 URINALYSIS NONAUTO W/O SCOPE: CPT | Mod: 95 | Performed by: PHYSICIAN ASSISTANT

## 2021-10-04 PROCEDURE — 87086 URINE CULTURE/COLONY COUNT: CPT

## 2021-10-04 PROCEDURE — 99213 OFFICE O/P EST LOW 20 MIN: CPT | Mod: 95 | Performed by: PHYSICIAN ASSISTANT

## 2021-10-04 ASSESSMENT — PAIN SCALES - GENERAL: PAINLEVEL: 7=MODERATE-SEVERE PAIN

## 2021-10-04 ASSESSMENT — FIBROSIS 4 INDEX
FIB4 SCORE: 1.29
FIB4 SCORE: 1.29

## 2021-10-04 NOTE — LETTER
October 6, 2021    To Whom It May Concern:         This is confirmation that Melba Callaway Uday attended her scheduled appointment with Fady Davidson M.D. on 10/04/21.         If you have any questions please do not hesitate to call me at the phone number listed below.    Sincerely,          Fady Davidson M.D.  [phone]

## 2021-10-04 NOTE — ASSESSMENT & PLAN NOTE
Patient status post treatment with 3 different antibiotics for urinary tract infection.  Most recent course was Bactrim twice a day for 7 days.  Patient states that her symptoms have completely resolved but she still has her baseline urinary frequency.  She urinates at least once an hour.  She spoke with her neurologist who stated that it may be related to medications versus her history of brain surgery/underlying pathology.  She is not interested in urinary frequency medication such as Ditropan at this time.    No dysuria, hematuria, fever.

## 2021-10-04 NOTE — PROGRESS NOTES
Virtual Visit: Established Patient   This visit was conducted via Zoom using secure and encrypted videoconferencing technology. The patient was in a private location in the state of [place].    The patient's identity was confirmed and verbal consent was obtained for this virtual visit.    Subjective:   CC:   Chief Complaint   Patient presents with   • Dysuria   • Follow-Up       Melba Frye is a 50 y.o. female presenting for evaluation and management of:    Urinary frequency  Patient status post treatment with 3 different antibiotics for urinary tract infection.  Most recent course was Bactrim twice a day for 7 days.  Patient states that her symptoms have completely resolved but she still has her baseline urinary frequency.  She urinates at least once an hour.  She spoke with her neurologist who stated that it may be related to medications versus her history of brain surgery/underlying pathology.  She is not interested in urinary frequency medication such as Ditropan at this time.    No dysuria, hematuria, fever.      ROS   Denies any recent fevers or chills. No nausea or vomiting. No chest pains or shortness of breath.     No Known Allergies    Current medicines (including changes today)  Current Outpatient Medications   Medication Sig Dispense Refill   • levetiracetam (KEPPRA) 750 MG tablet Take 1,500 mg by mouth 2 times a day.     • hydrOXYzine HCl (ATARAX) 50 MG Tab Take 50 mg by mouth 2 times a day.     • traMADol (ULTRAM) 50 MG Tab Take 50 mg by mouth 2 times a day as needed. Indications: Pain     • venlafaxine (EFFEXOR) 25 MG Tab Take 25 mg by mouth every morning.     • lacosamide (VIMPAT) 50 MG Tab tablet Take 50 mg by mouth at bedtime. Increase to 50 mg BID on 10/2/21     • prochlorperazine (COMPAZINE) 10 MG Tab Take 10 mg by mouth every 6 hours as needed for Nausea/Vomiting.     • amLODIPine (NORVASC) 10 MG Tab Take 1 tablet by mouth every day. 90 tablet 0   • melatonin 3 MG Tab Take 1 tablet by  mouth at bedtime as needed (insomnia). 30 tablet 2   • acetaminophen (TYLENOL) 325 MG Tab Take 2 Tablets by mouth every 6 hours as needed (Mild Pain; (Pain scale 1-3); Temp greater than 100.5 F). 30 tablet 0   • VITAMIN D PO Take 1 Units by mouth every evening.     • multivitamin (THERAGRAN) Tab Take 1 tablet by mouth every evening.     • metoclopramide (REGLAN) 10 MG Tab Take 1 Tablet by mouth 4 times a day for 30 doses. (Patient not taking: Reported on 10/4/2021) 30 Tablet 0   • promethazine (PHENERGAN) 25 MG Suppos Insert 1 Suppository into the rectum every 6 hours as needed for Nausea/Vomiting. (Patient not taking: Reported on 10/4/2021) 12 Suppository 0     No current facility-administered medications for this visit.       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) 03/02/2021   • Glioblastoma of frontal lobe (HCC) 03/02/2021   • Demyelinating disease of central nervous system, unspecified ([facility]) 03/02/2021   • Lentigo 10/17/2018   • Seborrheic keratoses 10/17/2018   • Dermatitis, contact 11/16/2015   • Hyperlipidemia 01/23/2015   • Menopausal symptom 07/02/2014   • Neoplastic (malignant) related fatigue 06/09/2014   • Mixed anxiety and depressive disorder 06/09/2014   • Complicated migraine 06/09/2014   • TMJ arthralgia 06/09/2014       Family History   Problem Relation Age of Onset   • Non-contributory Mother    • Lung Disease Mother         COPD   • Cancer Mother         dysplasia    • Arthritis Mother    • Psychiatric Illness Mother    • Heart Disease Mother    • Hypertension Mother    • Stroke Mother    • Non-contributory Father    • Cancer Father 73        prostate  "cancer   • Lung Disease Maternal Grandmother    • Lung Disease Paternal Aunt    • Diabetes Paternal Aunt    • Hyperlipidemia Paternal Aunt        She  has a past medical history of Anxiety, Complicated migraine (6/9/2014), Depression, Dermatitis, contact (11/16/2015), Fatigue (6/9/2014), Hyperlipidemia (1/23/2015), Lentigo (10/17/2018), Menopausal symptom (7/2/2014), Mixed anxiety and depressive disorder (6/9/2014), Seborrheic keratoses (10/17/2018), 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  She  has a past surgical history that includes cystoscopy (10/15/08); laparoscopy (5/28/2010); lymph node sampling (5/28/2010); debulking (5/28/2010); appendectomy (1981); other (1984); vaginal hysterectomy scope total (10/15/08); myringotomy (8/27/2010); tonsillectomy and adenoidectomy; and craniotomy stealth (Right, 3/9/2021).         Objective:   Ht 1.702 m (5' 7\")   Wt 104 kg (230 lb)   BMI 36.02 kg/m²   RR 12    Physical Exam:  Constitutional: Alert, no distress, well-groomed.  Skin: No rashes in visible areas.  Eye: Round. Conjunctiva clear, lids normal. No icterus.   ENMT: Lips pink without lesions, good dentition, moist mucous membranes. Phonation normal.  Neck: No masses, no thyromegaly. Moves freely without pain.  Respiratory: Unlabored respiratory effort, no cough or audible wheeze  Psych: Alert and oriented x3, normal affect and mood.     Assessment and Plan:   The following treatment plan was discussed:     1.  History of UTI  Urinalysis performed today is without evidence of infection.  Will await culture.  Hold off on antibiotics at this time.  - POCT Urinalysis  - URINE CULTURE(NEW); Future    2. Urinary frequency  Consider future Ditropan +/- urology evaluation.  Patient refusing at this time.      Follow-up: Return if symptoms worsen or fail to improve.  Patient to return for flu shot.         Katharina Lau PA-C ([place])  Physician Assistant Certified  [facility]" [facility]    Please note that this dictation was created using voice recognition software. I have made every reasonable attempt to correct obvious errors, but I expect that there are errors of grammar and possibly content that I did not discover before finalizing the note.

## 2021-10-04 NOTE — PATIENT INSTRUCTIONS
Once resulted, your lab/test/imaging results will show up automatically in your MyChart. Please wait for my interpretation and recommendations prior to viewing your results to avoid any unnecessary confusion or misinterpretation. I will address all of the lab values that I interpret as abnormal and message you accordingly on your MyChart. I will always send you a message on your labs even if they are normal. If you do not hear back from me within 5 business days after getting your labs drawn, then please send me a message on MyChart so I can obtain your labs (especially if you went to an outside lab - [facility], [facility], etc). If you have any additional questions or concerns beyond my interpretation of your results, please make an appointment with me to discuss in further detail.    Please only use the [employer] messaging system for questions regarding your most recent appointment or if advised to use otherwise (glucose or blood pressure reporting). If you have any new problems or concerns, you must make an appointment to discuss. This includes any referral requests.     Thank you,    Katharina Lau PA-C ([place])  Physician Assistant Certified  [facility]

## 2021-10-04 NOTE — PROGRESS NOTES
NEUROLOGY CLINIC FOLLOW-UP - 10/04/2021   REFERRING PROVIDER  Fady Davidson M.D.  [address] [place] [address]    REASON FOR VISIT: Melba Frye 50 y.o. female presents today for follow-up for glioblastoma s/p resection, and localization related epilepsy        INTERVAL HISTORY:  MRI brain 8/4/21 personally reviewed: IMPRESSION:     1.  Interval repeat right frontal-parietal craniotomy with gross total resection of 28 mm x 46 mm right frontal-parietal parasagittal tumor (glioblastoma) as seen on MRI 6/4/2021. As per the given clinical history, surgery was performed at [facility] on   6/25/2021.  2.  Small right frontal-parietal chronic subdural hematoma underlying the craniotomy flap.  3.  Small hemorrhagic postoperative encephalomalacic cavity in the right parasagittal posterior frontal-parietal lobe. Foci of peripheral and nodular enhancement are noted which may represent postoperative change versus small foci of residual or   recurrent tumor. Continued imaging surveillance is recommended.  4.  Marked interval decrease in surrounding vasogenic edema and mass effect. Residual confluent T2 and FLAIR hyperintensity in the right frontal and parietal white matter consistent with residual vasogenic edema, postradiation white matter change, and/or   microcystic encephalomalacia. Nonenhancing infiltrating tumor not excluded but less likely in the absence of mass effect.  5.  There is also confluent T2 and FLAIR hyperintensity in the left frontal and parietal periventricular and deep white matter most consistent with postradiation white matter change.  6.  No evidence of multifocal glioma.    Last round of chemo was around august. Energy has been low. Has had recurrent UTIs.     Was getting severe pain left arm and leg which didn't help with Gabapentin but improved with vimpat. 100 mg QD but there was balance problem. Decreased to 50 mg QD     Bowel and bladder incontinence.    Was getting nausea, may  "have been bactrim.     Not sleeping well.    Left sided spasticity.    Falls a lot. Legs stiffening and coordination prbolems and strength and fatigue problem    Patient's PMH, PSH, FH, and SH were reviewed.    ROS: All review of systems complete and are negative except as documented  CURRENT MEDICATIONS AT THE TIME OF THIS ENCOUNTER:    Current Outpatient Medications:   •  levetiracetam, 1,500 mg, Oral, BID, Taking  •  hydrOXYzine HCl, 50 mg, Oral, BID, Taking  •  traMADol, 50 mg, Oral, BID PRN, Taking  •  venlafaxine, 25 mg, Oral, QAM, Taking  •  metoclopramide, 10 mg, Oral, 4XDAY (Patient not taking: Reported on 10/4/2021), Taking  •  promethazine, 25 mg, Rectal, Q6HRS PRN (Patient not taking: Reported on 10/4/2021), Taking  •  lacosamide, 50 mg, Oral, QHS, Taking  •  prochlorperazine, 10 mg, Oral, Q6HRS PRN, Taking  •  amLODIPine, 10 mg, Oral, DAILY, Taking  •  melatonin, 3 mg, Oral, HS PRN, Taking  •  acetaminophen, 650 mg, Oral, Q6HRS PRN, Taking  •  VITAMIN D PO, 1 Units, Oral, Q EVENING, Taking  •  multivitamin, 1 Tablet, Oral, Q EVENING, Taking     EXAM:   Ambulatory Vitals:  Pulse 87   Temp 36.6 °C (97.8 °F)   Ht 1.702 m (5' 7\")   Wt 104 kg (230 lb)   SpO2 97%    Physical Exam:  Physical Exam  HENT:      Mouth/Throat:      Mouth: Mucous membranes are moist.      Pharynx: No oropharyngeal exudate or posterior oropharyngeal erythema.   Eyes:      Extraocular Movements: Extraocular movements intact.   Cardiovascular:      Rate and Rhythm: Normal rate and regular rhythm.      Heart sounds: No murmur heard.     Pulmonary:      Effort: Pulmonary effort is normal.      Breath sounds: Normal breath sounds. No wheezing.   Musculoskeletal:      Cervical back: No rigidity or tenderness.   Skin:     General: Skin is warm and dry.   Neurological:      Mental Status: She is alert.      Deep Tendon Reflexes:      Reflex Scores:       Tricep reflexes are 2+ on the right side and 3+ on the left side.       Bicep " reflexes are 2+ on the right side and 3+ on the left side.       Brachioradialis reflexes are 2+ on the right side and 3+ on the left side.       Patellar reflexes are 2+ on the right side and 3+ on the left side.       Achilles reflexes are 2+ on the right side and 3+ on the left side.  Psychiatric:         Behavior: Behavior normal.         Thought Content: Thought content normal.         Judgment: Judgment normal.        Neurological Exam   Neurological Exam  Mental Status  Alert.    Cranial Nerves  CN II: Right normal visual field. Left normal visual field. Right funduscopic exam: disc intact. Left funduscopic exam: disc intact.  CN III, IV, VI: Extraocular movements intact bilaterally.   Right pupil: 3 mm. Round. Reactive to light. Reactive to accommodation.   Left pupil: 3 mm. Round. Reactive to light. Reactive to accommodation.  Relative afferent pupillary defect absent.  CN V: Facial sensation is normal.  CN VII: Full and symmetric facial movement.  CN IX, X: Palate elevates symmetrically  CN XII: Tongue midline without atrophy or fasciculations.    Motor   Increased muscle tone. Left hemiparesis.  Left UE and LE spastic paresis. Able to lift shoulder to gravity. Able to slightly extend and flex arm at the elbow only slightly (decrease ROM at elbow). Able to open and close fingers only slightly (again, decrease range of motion). Also ROM at the knee and ankloe of left decrease ROM. Attempts to straighten limb at joint are met with pain..    Sensory  Light touch is normal in upper and lower extremities.  No right-sided hemispatial neglect. No left-sided hemispatial neglect.    Reflexes                                           Right                      Left  Brachioradialis                    2+                         3+  Biceps                                 2+                         3+  Triceps                                2+                         3+  Patellar                                2+                          3+  Achilles                                2+                         3+  Plantar                           Mute                Equivocal    Right pathological reflexes: Crossed adductor present.  Left pathological reflexes: Crossed adductor present.    Coordination  Difficult to test on the left due to extent of weakness.    Gait  Gait deferred patient in wheelchair and has difficulty standing due to condition.       ALL DATA (I.e. labs, procedures, imaging, reports, clinical notes, etc.) FROM [facility] AND/OR OUTSIDE SOURCES, IF AVAILABLE, PERSONALLY REVIEWED:   LABS:    MAGING-    PROCEDURE    OUTSIDE DATA    ASSESSMENT, EDUCATION, AND COUNSELING:  This is a 50 y.o. female patient who presents to the neurology clinic. We had an extensive discussion about the patient's symptoms, signs, and work-up to date, if any. We discussed potential and/or definitive diagnoses, work-up, and potential treatments.     PLAN:  If applicable, the work-up such as labs, imaging, procedures, and/or other testing, referrals, and/or recommended treatment strategies are listed below.  Visit Diagnoses     ICD-10-CM   1. 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 Glioblastoma of frontal lobe (HCC)  C71.1   3. Mixed anxiety and depressive disorder  F41.8   4. Spasticity  R25.2   5. Impaired functional mobility and activity tolerance  Z74.09   6. Incoordination  R27.9   7. Recurrent falls  R29.6   8. Urinary incontinence, unspecified type  R32   9. Incontinence of feces, unspecified fecal incontinence type  R15.9   10. Other fatigue  R53.83      Orders Placed This Encounter   • KEPPRA   • LACOSAMIDE   • URINALYSIS,CULTURE IF INDICATED   • REFERRAL TO NEURODIAGNOSTICS (EEG,EP,EMG/NCS/DBS)   • REFERRAL TO PHYSICAL MEDICINE REHAB        I had an extensive discussion with the patient and . She multiple symptoms that are new and worsening. She has worsening left hemibody spasms and  pain, which at times affect her balance and resulting in falls. I want to repeat an EEG urgently to determine if there is a role of seizures in these symptoms. I do think that she would benefit from botox in both the UE and LE. This will likely improve her pain, increase patient's ROM, and ultimately increase patient's capacity to work with occupation and physical therapy and thus more likley to improve her functioning. Dr. Steele had done Botox before and with good success. Will refer her back to Dr. Steele in PMR. She also urinary and fecal incontinence, generally aware of this, but she does tend to wake up at times incontinence. Again, need to rule out seizures. Repeating urinalysis given her recent episodes of UTI.Because anxiety is also worse as well, I recommend that she resume taking atarax as this may help with her anxiety, issues with sleep, as well as help with incontinence of bl;adder due to anti-cholinergic properties. She is on a low does of effexor so another option would to further increase this as it will help with mood symptoms. Will consider referral to urology if symptoms of incontinence cannot be explained if if they continue. She should continue to take melatonin as this will help with her sleep onset insomnia.     Follow-up:   • Every 2-3 weeks virtually        BILLING DOCUMENTATION:     I spent a total of 45 minutes of face-to-face time in this visit. Over 50% of the time of the visit today was spent on counseling and/or coordination of care wtih the patient and/or family, as above in assessment in plan.    Fady Davidson MD  Epilepsy and General Neurology  Department of Neurology  Clinical  of Neurology [facility].

## 2021-10-05 DIAGNOSIS — Z87.440 HISTORY OF UTI: ICD-10-CM

## 2021-10-06 ENCOUNTER — TELEPHONE (OUTPATIENT)
Dept: MEDICAL GROUP | Facility: IMAGING CENTER | Age: 51
End: 2021-10-06

## 2021-10-06 NOTE — TELEPHONE ENCOUNTER
Received message from Saroj with [facility]. She is calling to report a fall patient had yesterday. She fell as she was transferring from bed. She states patient fell on knees and then to her bottom. She states patient had an elderly helper with her at the time that could not help her help. She states rigo was called to assist patient and she was evaluated.   She states if we have any further questions to give her a call at [phone].

## 2021-10-06 NOTE — PATIENT INSTRUCTIONS
Neurology Clinic Visit     IMPORTANT: If imaging, procedure(s), AND/or referrals were placed for you:  Contact [facility] Scheduling if you have not heard from them in 5 day: [phone]    Outpatient [facility] Imaging Sites.  • [address]  Mon-Fri 8am-5pm   • [address] (Breast Center) Mon-Fri 7am-4pm    • [address]  Mon-Fri 8am-5pm  • [facility] Radiology 7am-6:30pm  • [address] Mon-Fri 8am-7pm  Sat 9am-6pm   • [address]  Mon-Fri 8am-7pm and Sat/Sun 9am-5pm  • [address]  Mon-Fri 8am-5pm  • [address] (PET/CT)  Mon-Fri 8:30am-4:30pm     If labs were ordered for you:  • To Schedule an appointment for lab services, please call [phone] or visit [email or web address].  • [facility] Lab Services Convenient Locations:    [place]: • [address] (Ground Floor)  • [address] (Admitting Entrance)  • [address], [address]  • [address], [address]  • [address] [facility], [address]  • [address] [address]  • [address]: • [address]  • [address]       [place]: • [address]  • [address]     [facility]     [address] [address]     Laboratory Hours: 6:30am - 6pm  Monday - Friday,   7am - 2pm Saturday    [facility]      [address] [address]      Laboratory Hours:  7:30am - 4pm  Monday - Friday,  9am - 3pm Saturday    [facility]     [address]    [address] [address] [address]      Laboratory Hours:  7:00am - 5:30pm  Monday - Friday    [facility]      [address] [address]       Laboratory Hours:  7:30am - 4:30pm  Monday - Friday    [facility]       [address] [place] [address]       Laboratory Hours:  8am - 5pm  Monday - Friday    [facility]       [address] [place] [address]        Laboratory hours:  7:30am - 4pm  Monday - Friday    GENERAL REMINDERS:  · Request refills 1 week in advance to ensure you do not run out of medications  · All diagnostic study results will be reviewed at your next visit, UNLESS there are urgent results that need to be acted on sooner.  · Please remember that it is the your responsibility to check with your Insurance for benefit coverage for visit / visits.  · 24 hours notice is required for all appointment changes or cancellation.  · Please arrive 20 min. before your appointment time  · Please note that because Dr. Davidson has high daily clinic volume, he is unable to accommodate late arrivals. If you are more than 15 minutes late for the scheduled appointment you will be asked to reschedule  · Please bring the following with you:  1) Picture ID  2) Insurance card  3) An updated list of ALL your medications and their dosages (prescribed medications, over the counter medications, and all supplements), as well as allergies with you at all times  4) A list of questions, concerns, comments that you wish to discuss with Dr. Lety Davidson MD  General Neurologist and Epileptologist  Department of Neurology  Instructor of Clinical Neurology [facility].

## 2021-10-07 ENCOUNTER — TELEPHONE (OUTPATIENT)
Dept: MEDICAL GROUP | Facility: IMAGING CENTER | Age: 51
End: 2021-10-07

## 2021-10-07 LAB
BACTERIA UR CULT: NORMAL
SIGNIFICANT IND 70042: NORMAL
SITE SITE: NORMAL
SOURCE SOURCE: NORMAL

## 2021-10-07 NOTE — TELEPHONE ENCOUNTER
"1. Caller Name: Giselle Frye Call Back Number: [phone] (home)       How would the patient prefer to be contacted with a response: Phone call OK to leave a detailed message    Pt left a voice message requesting a referral for a \"fully-electric bed\" for her safety since she is alone over 90% of the day and need to be able to lower or raise bed and hand rails.      Referral form and supporting documents will follow   "

## 2021-10-08 ENCOUNTER — TELEPHONE (OUTPATIENT)
Dept: MEDICAL GROUP | Facility: IMAGING CENTER | Age: 51
End: 2021-10-08

## 2021-10-08 NOTE — TELEPHONE ENCOUNTER
Phone Number Called: [phone]    Call outcome: Spoke to patient regarding message below.    Message: Patient called requesting a low grade sedative for her upcoming MRI on Monday. Said she is starting to get nervous and would like something to calm her down during the process. Pharmacy is up to date.

## 2021-10-11 ENCOUNTER — HOSPITAL ENCOUNTER (OUTPATIENT)
Dept: RADIOLOGY | Facility: MEDICAL CENTER | Age: 51
End: 2021-10-11
Attending: RADIOLOGY
Payer: COMMERCIAL

## 2021-10-11 DIAGNOSIS — C71.1 GLIOBLASTOMA OF FRONTAL LOBE (HCC): ICD-10-CM

## 2021-10-11 DIAGNOSIS — F41.8 SITUATIONAL ANXIETY: ICD-10-CM

## 2021-10-11 PROCEDURE — 700117 HCHG RX CONTRAST REV CODE 255: Performed by: RADIOLOGY

## 2021-10-11 PROCEDURE — 70553 MRI BRAIN STEM W/O & W/DYE: CPT

## 2021-10-11 PROCEDURE — A9576 INJ PROHANCE MULTIPACK: HCPCS | Performed by: RADIOLOGY

## 2021-10-11 RX ORDER — LORAZEPAM 0.5 MG/1
TABLET ORAL
Qty: 1 TABLET | Refills: 0 | Status: SHIPPED | OUTPATIENT
Start: 2021-10-11 | End: 2021-10-12

## 2021-10-11 RX ADMIN — GADOTERIDOL 20 ML: 279.3 INJECTION, SOLUTION INTRAVENOUS at 10:46

## 2021-10-13 LAB
LACOSAMIDE SERPL-MCNC: 2.3 UG/ML (ref 5–10)
LEVETIRACETAM SERPL-MCNC: 25.1 UG/ML (ref 10–40)

## 2021-10-15 ENCOUNTER — HOSPITAL ENCOUNTER (OUTPATIENT)
Dept: RADIATION ONCOLOGY | Facility: MEDICAL CENTER | Age: 51
End: 2021-10-31
Attending: RADIOLOGY
Payer: COMMERCIAL

## 2021-10-15 VITALS
BODY MASS INDEX: 35.77 KG/M2 | HEART RATE: 96 BPM | SYSTOLIC BLOOD PRESSURE: 138 MMHG | OXYGEN SATURATION: 95 % | DIASTOLIC BLOOD PRESSURE: 101 MMHG | WEIGHT: 228.4 LBS

## 2021-10-15 DIAGNOSIS — C71.1 GLIOBLASTOMA OF FRONTAL LOBE (HCC): ICD-10-CM

## 2021-10-15 PROCEDURE — 99214 OFFICE O/P EST MOD 30 MIN: CPT | Performed by: RADIOLOGY

## 2021-10-15 PROCEDURE — 99212 OFFICE O/P EST SF 10 MIN: CPT | Performed by: RADIOLOGY

## 2021-10-15 RX ORDER — IBUPROFEN 200 MG
TABLET ORAL
COMMUNITY
Start: 2021-10-02 | End: 2021-10-25

## 2021-10-15 ASSESSMENT — PAIN SCALES - GENERAL: PAINLEVEL: 5=MODERATE PAIN

## 2021-10-15 ASSESSMENT — FIBROSIS 4 INDEX: FIB4 SCORE: 1.32

## 2021-10-15 NOTE — PROGRESS NOTES
RADIATION ONCOLOGY FOLLOW-UP    DATE OF SERVICE: 10/15/2021    IDENTIFICATION:   A 51 y.o. female with s/p resection by Dr. Mao 3/11/21 s/p chemoRT 60Gy in 30 fx with temodar s/p re-resection 6/25/21 by Dr. Dove with path showing extensive treatment effect and microscopic foci of residual GBM now on adjuvant temodar and optune with Dr. Cerda  Visit Diagnoses     ICD-10-CM   1. Glioblastoma of frontal lobe (HCC)  C71.1     Glioblastoma of frontal lobe (HCC)  Staging form: Brain and Spinal Cord, AJCC 8th Edition  - Pathologic stage from 3/24/2021: WHO Grade IV - Signed by Edenilson Frye M.D. on 3/24/2021  Histologic grading system: 4 grade system  IDH1 mutation: Negative  IDH2 mutation: Negative  MGMT methylation: Absent      RADIATION SUMMARY:  [facility] Treatment Information        Some values may be hidden. Unless noted otherwise, only the newest values recorded on each date are displayed.         [facility] Treatment Summary 5/14/21   Course First Treatment Date 04/05/2021    Course Last Treatment Date 05/14/2021    R Front Bst Plan from Course C1_GBM   Fraction 7 of 7    Elapsed Course Days 39 @ [id number]    Prescribed Fraction Dose 200 cGy    Prescribed Total Dose 1,400 cGy    R Front GBM Plan from Course C1_GBM   Fraction 23 of 23   Elapsed Course Days 39 @ [id number]   Prescribed Fraction Dose 200 cGy   Prescribed Total Dose 4,600 cGy   R Front Bst Reference Point from Course C1_GBM   Elapsed Course Days 39 @ [id number]    Session Dose --    Total Dose 1,400 cGy    R Front Bst CP Reference Point from Course C1_GBM   Elapsed Course Days 39 @ [id number]    Session Dose --    Total Dose 1,447 cGy    R Front GBM Reference Point from Course C1_GBM   Elapsed Course Days 39 @ [id number]   Session Dose --   Total Dose 4,600 cGy   R Front GBM CP Reference Point from Course C1_GBM   Elapsed Course Days 39 @ [id number]   Session Dose --   Total Dose 4,757 cGy   RF Bst 3D Plan from Course Dosimetry C1   RF GBM 3D  Plan from Course Dosimetry C1   R Front Bst Reference Point from Course Dosimetry C1   R Front Bst CP Reference Point from Course Dosimetry C1   R Front GBM Reference Point from Course Dosimetry C1   R Front GBM CP Reference Point from Course Dosimetry C1    Some values recorded on this date have been omitted.              HISTORY OF PRESENT ILLNESS:   Patient originally presented with seizures and went to the ER at [facility] and underwent MRI brain December 10, 2020 which showed an approximate 8 x 6 cm enhancing lesion in the right frontal medial gray matter area with tiny satellite nodular enhancement nonspecific.  Patient also had a MRI CT and L-spine on January 9, 2021 which showed no evidence of multiple sclerosis.  Patient then had repeat MRI brain February 12, 2021 which showed marked interval increase in medial posterior right frontal lobe now measuring 3 x 2.4 x 3.1 with irregular thick-walled peripheral enhancement. She had CT chest abdomen pelvis on February 21, 2021 which showed no evidence of metastatic disease. Patient was readmitted with headaches and seizures and underwent MRI stealth brain March 9, 2021 which showed 3.5 x 2.5 cm peripheral enhancing lesion in the right medial parietal lobe with surrounding white matter edema highly suspicious for high-grade neoplasm. Patient underwent surgery with Dr. Mao with craniotomy with motor mapping and given proximity to motor strip was really only able to do an open biopsy with pathology showing glioblastoma. MRI brain postoperatively on March 11, 2021 showed postsurgical changes in the right frontal parietal craniotomy and biopsy of right medial parietal enhancing lesion. Currently patient is depressed and has some residual left upper extremity weakness and left lower extremity weakness but she says the left upper extremity weakness is improving.     7/23/21 Patient completed chemoradiation therapy 60 Gy in 30 fractions with concurrent Temodar with  Dr. Cerda on May 14, 2021.  Patient had 1 month post treatment MRI on June 4, 2021 which showed interval enlargement of regular heterogeneously enhancing parasagittal right frontal parietal lobe mass in keeping with glioblastoma with marked vasogenic edema in the right cerebral hemisphere which is increased from prior study and there is increased mass-effect.  Patient was seen at [facility] and had craniotomy on June 25, 2021 by Dr. Dove with pathology consistent with extensive treatment effects and microscopic foci of residual recurrent GBM who grade IV.  She has tapered off her steroids and remains on Keppra.  She has some mild headaches relieved with tramadol.  She has next MRI in early August.  She is going to see Dr. Cerda next week to start her adjuvant Temodar and will start her on Optune as well.    Interval History:  Patient is feeling more fatigued and has had recurrent urinary tract infections and some urinary frequency. She was recently put on Vimpat by Dr. Gomez and feels that is helping. She is about to start her next cycle of Temodar. She has been doing PT OT 2 times a week. She feels like overall her left arm weakness is about the same. She is still relatively wheelchair-bound. She has some itching from her Optune.      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     • hydrOXYzine HCl (ATARAX) 50 MG Tab Take 50 mg by mouth 2 times a day.     • traMADol (ULTRAM) 50 MG Tab Take 50 mg by mouth 2 times a day as needed. Indications: Pain     • venlafaxine (EFFEXOR) 25 MG Tab Take 25 mg by mouth every morning.     • lacosamide (VIMPAT) 50 MG Tab tablet Take 50 mg by mouth at bedtime. Increase to 50 mg BID on 10/2/21     • prochlorperazine (COMPAZINE) 10 MG Tab Take 10 mg by mouth every 6 hours as needed for Nausea/Vomiting.     • amLODIPine (NORVASC) 10 MG Tab Take 1 tablet by mouth every day. 90 tablet 0   • melatonin 3 MG Tab Take 1 tablet by mouth at bedtime as needed (insomnia). 30 tablet 2   • acetaminophen (TYLENOL) 325 MG Tab Take 2 Tablets by mouth every 6 hours as needed (Mild Pain; (Pain scale 1-3); Temp greater than 100.5 F). 30 tablet 0   • VITAMIN D PO Take 1 Units by mouth every evening.     • multivitamin (THERAGRAN) Tab Take 1 tablet by mouth every evening.       No current facility-administered medications for this encounter.       ALLERGIES:  Patient has no known allergies.    REVIEW OF SYSTEMS:  A review of systems for today's date of service was reviewed and uploaded into the electronic medical record.    PHYSICAL EXAM:  PERFORMANCE STATUS: 1  /101   Pulse 96   Wt 104 kg (228 lb 6.3 oz)   SpO2 95%   BMI 35.77 kg/m²   Physical Exam  Constitutional:       Appearance: Normal appearance.   HENT:      Head: Normocephalic and atraumatic.      Mouth/Throat:      Mouth: Mucous membranes are moist.   Eyes:      Pupils: Pupils are equal, round, and reactive to light.   Cardiovascular:      Rate and Rhythm: Normal rate.   Abdominal:      General: Abdomen is flat.   Musculoskeletal:         General: Normal range of motion.      Cervical back: Normal range of motion.    Skin:     Findings: No erythema.   Neurological:      Mental Status: She is alert. Mental status is at baseline.      Comments: LUE weakness in hand at baseline   Psychiatric:         Mood and Affect: Mood normal.         LABORATORY DATA:   Lab Results   Component Value Date/Time    WBC 5.7 05/24/2021 1227    WBC 15.2 (H) 03/16/2021 0603    WBC 11.3 (H) 03/15/2021 0440    HEMOGLOBIN 14.7 05/24/2021 1227    HEMOGLOBIN 13.4 03/16/2021 0603    HEMOGLOBIN 12.8 03/15/2021 0440    HEMATOCRIT 41.6 05/24/2021 1227    HEMATOCRIT 39.3 03/16/2021 0603    HEMATOCRIT 37.0 03/15/2021 0440    MCV 92.7 05/24/2021 1227    MCV 96.3 03/16/2021 0603    MCV 96.1 03/15/2021 0440    PLATELETCT 158 (L) 05/24/2021 1227    PLATELETCT 226 03/16/2021 0603    PLATELETCT 225 03/15/2021 0440    NEUTS 4.12 05/24/2021 1227    NEUTS 12.08 (H) 03/16/2021 0603    NEUTS 8.66 (H) 03/15/2021 0440      Lab Results   Component Value Date/Time    SODIUM 143 07/27/2021 1430    SODIUM 139 05/24/2021 1227    SODIUM 140 03/22/2021 0615    POTASSIUM 3.7 07/27/2021 1430    POTASSIUM 3.7 05/24/2021 1227    POTASSIUM 4.4 03/22/2021 0615    BUN 10 07/27/2021 1430    BUN 12 05/24/2021 1227    BUN 12 03/22/2021 0615    CREATININE 0.50 07/27/2021 1430    CREATININE 0.60 05/24/2021 1227    CREATININE 0.57 03/22/2021 0615    CALCIUM 8.9 07/27/2021 1430    CALCIUM 9.2 05/24/2021 1227    CALCIUM 8.7 03/22/2021 0615    ALBUMIN 4.2 07/27/2021 1430    ALBUMIN 4.4 05/24/2021 1227    ALBUMIN 3.3 03/16/2021 0603    ASTSGOT 16 07/27/2021 1430    ASTSGOT 18 05/24/2021 1227    ASTSGOT 11 (L) 03/16/2021 0603    ALKPHOSPHAT 76 07/27/2021 1430    ALKPHOSPHAT 86 05/24/2021 1227    ALKPHOSPHAT 67 03/16/2021 0603    IFNOTAFR >60 07/27/2021 1430    IFNOTAFR >60 05/24/2021 1227    IFNOTAFR >60 03/22/2021 0615       RADIOLOGY DATA:  DX-ANKLE 3+ VIEWS RIGHT    Result Date: 9/30/2021 9/30/2021 11:55 AM HISTORY/REASON FOR EXAM: Pain/Deformity Following Trauma TECHNIQUE/EXAM DESCRIPTION AND  NUMBER OF VIEWS: 3 nonweightbearing views of the RIGHT ankle. COMPARISON: None FINDINGS: There is mostly lateral soft tissue swelling. There is no acute displaced fracture. Smoothly contoured ossification is seen overlying and inferior to the medial malleolus The ankle mortise is symmetric and there is no syndesmotic widening.     Ossification inferior to the medial malleolus most likely indicates remote deltoid ligament sprain/soft tissue injury    DX-CHEST-LIMITED (1 VIEW)    Result Date: 9/30/2021 9/30/2021 11:55 AM HISTORY/REASON FOR EXAM: Pain Following Trauma. TECHNIQUE/EXAM DESCRIPTION AND NUMBER OF VIEWS: Single portable view of the chest. COMPARISON: 12/12/2020 FINDINGS: No acute displaced fracture Cardiomediastinal contours are normal. Lungs demonstrate no consolidation. No pneumothorax is seen.     No radiographic evidence of acute cardiopulmonary process.    DX-SHOULDER 2+ LEFT    Result Date: 9/30/2021 9/30/2021 11:55 AM HISTORY/REASON FOR EXAM: Fall this morning, glioblastoma. Pain/Deformity Following Trauma TECHNIQUE/EXAM DESCRIPTION AND NUMBER OF VIEWS:  3 views of the LEFT shoulder. COMPARISON: None FINDINGS: There is no evidence of acute displaced fracture or dislocation. No acromioclavicular joint widening. No acute abnormality of the visualized hemithorax is noted. Mild inferior glenoid and AC joint spurring     Mild acromioclavicular and glenohumeral osteoarthritis    MR-BRAIN-WITH & W/O    Result Date: 10/11/2021  10/11/2021 9:51 AM HISTORY/REASON FOR EXAM:  Astrocytoma/oligodendroglioma, GBM restaging- SRS Protocol Complete. TECHNIQUE/EXAM DESCRIPTION:   MRI of the brain without and with contrast. T1 sagittal, T2 fast spin-echo axial, T1 coronal, FLAIR coronal, diffusion-weighted and apparent diffusion coefficient (ADC map) axial images were obtained of the whole brain. T1 postcontrast axial and T1 postcontrast coronal images were obtained. The study was performed on a [employer] 1.5 Dede  MRI scanner. 20 mL ProHance contrast was administered intravenously. COMPARISON:  8/4/2021 FINDINGS: Redemonstrated postoperative changes right frontoparietal vertex craniotomy, with decreased tiny extra-axial fluid collection deep to the craniotomy flap. There is a small hemorrhagic postoperative cavity in the right parasagittal frontoparietal region again noted. There is increased nonspecific enhancement surrounding the resection cavity, more thickened than on the prior study. This is nonspecific and could be somewhat accentuated due to the apparent mildly decreased size of the resection cavity. Postoperative changes and/or residual/recurrent tumor are possible and recommend continued attention on follow-up. Redemonstrated is confluent T2 hyperintensity within the cerebral white matter right greater than left. This appears mildly increased in the left periventricular white matter in comparison to the prior study. This most likely represents post treatment/post radiation changes. The presence of infiltrating nonenhancing tumor cannot be excluded. In addition there is an ovoid more focal T2 hyperintense lesion in the left parietal periventricular white matter on series 8 image 57 which is stable from the prior study and shows no enhancement. The appearances somewhat suggestive of a demyelinating lesion. Brainstem and cerebellum are grossly unremarkable. No acute infarct on DWI. No new hemorrhage or extra-axial fluid collection. No midline shift. Mild cerebral atrophy is again noted. 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 Redemonstrated are nonspecific bilateral mastoid effusions.     1.  Postoperative changes right frontoparietal craniotomy again noted status post resection of GBM. Mildly increased nonspecific enhancement around the small hemorrhagic parasagittal right frontoparietal surgical bed is nonspecific and could represent posttreatment changes versus residual/recurrent tumor. Recommend continued attention on follow-up. 2.   Confluent T2 hyperintensity within the cerebral white matter right greater than left, likely postradiation/posttreatment changes, mildly increased on the left. Nonenhancing infiltrating tumor cannot be excluded.      IMPRESSION:    A 51 y.o. with   Visit Diagnoses     ICD-10-CM   1. Glioblastoma of frontal lobe (HCC)  C71.1     Glioblastoma of frontal lobe (HCC)  Staging form: Brain and Spinal Cord, AJCC 8th Edition  - Pathologic stage from 3/24/2021: WHO Grade IV - Signed by Edenilson Frye M.D. on 3/24/2021  Histologic grading system: 4 grade system  IDH1 mutation: Negative  IDH2 mutation: Negative  MGMT methylation: Absent      CANCER STATUS:  Disease Stable    RECOMMENDATIONS:   51 y.o. female with s/p resection by Dr. Mao 3/11/21 s/p chemoRT 60Gy in 30 fx with temodar s/p re-resection 6/25/21 by Dr. Dove with path showing extensive treatment effect and microscopic foci of residual GBM now on adjuvant temodar and optune with Dr. Cerda. I reviewed her most recent MRI from 10/11/2021 which does show some new enhancing areas in the parasagittal right frontal surgical bed area which is nonspecific and have recommended close interval surveillance with a 2-month MRI but to continue to monito and continue with Optune and Temodar. She will also see Dr. Gomez in the near future.    Thank you for the opportunity to participate in her care.  If any questions or comments, please do not hesitate in calling.    Orders Placed This Encounter   • MR-BRAIN-WITH & W/O   • ibuprofen (ADVIL) 200 MG Tab     CC: Dr. Cerda, Dr. Gomez, Dr. Mao

## 2021-10-15 NOTE — ADDENDUM NOTE
Encounter addended by: Edenilson Frye M.D. on: 10/15/2021 11:38 AM   Actions taken: Clinical Note Signed

## 2021-10-15 NOTE — NON-PROVIDER
Patient was seen today in clinic with Dr. Frye for follow up.  Vitals signs and weight were obtained and pain assessment was completed.  Allergies and medications were reviewed with the patient.  Toxicities of treatment assessed.     Vitals/Pain:  Vitals:    10/15/21 0939   BP: 138/101   Pulse: 96   SpO2: 95%   Weight: 104 kg (228 lb 6.3 oz)   Pain Score: 5=Moderate Pain        Allergies:   Patient has no known allergies.    Current Medications:  Current Outpatient Medications   Medication Sig Dispense Refill   • ibuprofen (ADVIL) 200 MG Tab      • levetiracetam (KEPPRA) 750 MG tablet Take 1,500 mg by mouth 2 times a day.     • hydrOXYzine HCl (ATARAX) 50 MG Tab Take 50 mg by mouth 2 times a day.     • traMADol (ULTRAM) 50 MG Tab Take 50 mg by mouth 2 times a day as needed. Indications: Pain     • venlafaxine (EFFEXOR) 25 MG Tab Take 25 mg by mouth every morning.     • lacosamide (VIMPAT) 50 MG Tab tablet Take 50 mg by mouth at bedtime. Increase to 50 mg BID on 10/2/21     • prochlorperazine (COMPAZINE) 10 MG Tab Take 10 mg by mouth every 6 hours as needed for Nausea/Vomiting.     • amLODIPine (NORVASC) 10 MG Tab Take 1 tablet by mouth every day. 90 tablet 0   • melatonin 3 MG Tab Take 1 tablet by mouth at bedtime as needed (insomnia). 30 tablet 2   • acetaminophen (TYLENOL) 325 MG Tab Take 2 Tablets by mouth every 6 hours as needed (Mild Pain; (Pain scale 1-3); Temp greater than 100.5 F). 30 tablet 0   • VITAMIN D PO Take 1 Units by mouth every evening.     • multivitamin (THERAGRAN) Tab Take 1 tablet by mouth every evening.       No current facility-administered medications for this encounter.         PCP:  Patrick Cota, Med Ass't

## 2021-10-18 ENCOUNTER — OFFICE VISIT (OUTPATIENT)
Dept: PHYSICAL MEDICINE AND REHAB | Facility: REHABILITATION | Age: 51
End: 2021-10-18
Payer: COMMERCIAL

## 2021-10-18 VITALS
TEMPERATURE: 98.6 F | RESPIRATION RATE: 15 BRPM | OXYGEN SATURATION: 95 % | DIASTOLIC BLOOD PRESSURE: 62 MMHG | SYSTOLIC BLOOD PRESSURE: 110 MMHG | HEART RATE: 54 BPM

## 2021-10-18 DIAGNOSIS — R45.89 DEPRESSED MOOD: ICD-10-CM

## 2021-10-18 DIAGNOSIS — C71.1 GLIOBLASTOMA OF FRONTAL LOBE (HCC): Primary | ICD-10-CM

## 2021-10-18 DIAGNOSIS — G81.14 SPASTIC HEMIPLEGIA AFFECTING LEFT NONDOMINANT SIDE, UNSPECIFIED ETIOLOGY (HCC): ICD-10-CM

## 2021-10-18 DIAGNOSIS — M62.838 OTHER MUSCLE SPASM: ICD-10-CM

## 2021-10-18 DIAGNOSIS — N31.9 NEUROGENIC BLADDER: ICD-10-CM

## 2021-10-18 PROCEDURE — 99215 OFFICE O/P EST HI 40 MIN: CPT | Performed by: PHYSICAL MEDICINE & REHABILITATION

## 2021-10-18 RX ORDER — VENLAFAXINE 75 MG/1
75 TABLET ORAL DAILY
Qty: 90 TABLET | Refills: 0 | Status: ON HOLD | OUTPATIENT
Start: 2021-10-18 | End: 2022-03-11 | Stop reason: SDUPTHER

## 2021-10-18 NOTE — PROGRESS NOTES
[facility], [place]  PM&R Neuro Rehabilitation Clinic  [address] [place] [address]  Ph: [phone]    FOLLOW UP PATIENT EVALUATION       Patient Name: Melba Frye   Patient : 1970  Patient Age: 50 y.o.     Examining Physician: Dr. Yanet Steele, DO    PM&R History to Date - Background Information:  Dates of Admission/Discharge to ARU: 3/15/2021-3/27/2021    SUBJECTIVE:   Patient Identification: Melba Frye is a 50 y.o. female with PMH significant for migraines, depression/anxiety, seizures, right medial parietal lobe mass seen on MRI 2021 s/p craniotomy and biopsy 3/9 Dr. Mao and rehabilitation history significant for GBM s/p resection 3/9/2021 Dr. Mao s/p craniotomy for recurrence resection [facility] 2021 and is presenting to PM&R clinic for a FOLLOW UP OUTPATIENT evaluation with the following chief complaint/s:    Chief Complaint: Worsening spasticity    Accompanied by Today: , Dario  History of Present Illness:   -Patient was last seen 2021.  Since that time RECORDS REVIEWED and she has had interim repeat craniotomy for resection of recurrence 2021.  This was done at [facility].  She is on chemotherapy.  Had recent MRI early October and will have close repeat 2 months from now.  -Being seen by neurology clinic for seizures.  Remains on Vimpat.  This was causing her to have balance difficulties and was reduced recently.  -Was having neuropathic pain which was thought to be worsened by seizures.  Since being on the Vimpat this is improved.  She is not as concerned about her neuropathic pain at this time.  -Does have weakness and spasticity on the left side.  -This primarily affects her proximal left upper extremity as well as her left lower extremity.  She gets easily triggered clonus.  This prevents her from being able to ambulate well.  She does have fairly significant spasticity of the knee extensors and flexors.  -Interested in treating spasticity.    Review of  Systems:  All other pertinent positive review of systems are noted above in HPI.   All other systems reviewed and are negative.    Past Medical History:  Past Medical History:   Diagnosis Date   • Lentigo 10/17/2018   • Seborrheic keratoses 10/17/2018   • Dermatitis, contact 11/16/2015   • Hyperlipidemia 1/23/2015   • Menopausal symptom 7/2/2014   • Fatigue 6/9/2014   • Mixed anxiety and depressive disorder 6/9/2014   • Complicated migraine 6/9/2014   • TMJ arthralgia 6/9/2014   • Anxiety    • Depression    • UTI (lower urinary tract infection)       Past Surgical History:   Procedure Laterality Date   • CRANIOTOMY STEALTH Right 3/9/2021    Procedure: RIGHT CRANIOTOMY, USING FRAMELESS STEREOTAXY - FOR OPEN BIOPSY WITH PHASE REVERSAL;  Surgeon: Maxwell Mao M.D.;  Location: [facility];  Service: Neurosurgery   • MYRINGOTOMY  8/27/2010    Performed by [address] at SURGERY SAME DAY [facility] ORS   • LAPAROSCOPY  5/28/2010    Performed by JACKI SHARMA at SURGERY [facility] ORS   • LYMPH NODE SAMPLING  5/28/2010    Performed by JACKI SHARMA at SURGERY [facility] ORS   • DEBULKING  5/28/2010    Performed by JACKI SHARMA at SURGERY [facility] ORS   • CYSTOSCOPY  10/15/08    Performed by YU GODINEZ at SURGERY SAME DAY [facility] ORS   • VAGINAL HYSTERECTOMY SCOPE TOTAL  10/15/08    Performed by YU GODINEZ at SURGERY SAME DAY [facility] ORS   • OTHER  1984    TONSILECTOMY/ ADNOIDS   • APPENDECTOMY  1981   • TONSILLECTOMY AND ADENOIDECTOMY          Current Outpatient Medications:   •  venlafaxine (EFFEXOR) 75 MG Tab, Take 1 Tablet by mouth every day., Disp: 90 Tablet, Rfl: 0  •  ibuprofen (ADVIL) 200 MG Tab, , Disp: , Rfl:   •  levetiracetam (KEPPRA) 750 MG tablet, Take 1,500 mg by mouth 2 times a day., Disp: , Rfl:   •  hydrOXYzine HCl (ATARAX) 50 MG Tab, Take 50 mg by mouth 2 times a day., Disp: , Rfl:   •  traMADol (ULTRAM) 50 MG Tab, Take 50 mg by mouth 2 times a day as needed. Indications:  Pain, Disp: , Rfl:   •  lacosamide (VIMPAT) 50 MG Tab tablet, Take 50 mg by mouth at bedtime. Increase to 50 mg BID on 10/2/21, Disp: , Rfl:   •  prochlorperazine (COMPAZINE) 10 MG Tab, Take 10 mg by mouth every 6 hours as needed for Nausea/Vomiting., Disp: , Rfl:   •  amLODIPine (NORVASC) 10 MG Tab, Take 1 tablet by mouth every day., Disp: 90 tablet, Rfl: 0  •  melatonin 3 MG Tab, Take 1 tablet by mouth at bedtime as needed (insomnia)., Disp: 30 tablet, Rfl: 2  •  acetaminophen (TYLENOL) 325 MG Tab, Take 2 Tablets by mouth every 6 hours as needed (Mild Pain; (Pain scale 1-3); Temp greater than 100.5 F)., Disp: 30 tablet, Rfl: 0  •  VITAMIN D PO, Take 1 Units by mouth every evening., Disp: , Rfl:   •  multivitamin (THERAGRAN) Tab, Take 1 tablet by mouth every evening., Disp: , Rfl:   No Known Allergies     Past Social History:  Social History     Socioeconomic History   • Marital status:      Spouse name: Not on file   • Number of children: Not on file   • Years of education: Not on file   • Highest education level: Not on file   Occupational History   • Not on file   Tobacco Use   • Smoking status: Never Smoker   • Smokeless tobacco: Never Used   Vaping Use   • Vaping Use: Never used   Substance and Sexual Activity   • Alcohol use: Not Currently     Alcohol/week: 0.0 oz   • Drug use: No   • Sexual activity: Yes     Partners: Male   Other Topics Concern   • Not on file   Social History Narrative   • Not on file     Social Determinants of Health     Financial Resource Strain:    • Difficulty of Paying Living Expenses:    Food Insecurity:    • Worried About Running Out of Food in the Last Year:    • Ran Out of Food in the Last Year:    Transportation Needs:    • Lack of Transportation (Medical):    • Lack of Transportation (Non-Medical):    Physical Activity:    • Days of Exercise per Week:    • Minutes of Exercise per Session:    Stress:    • Feeling of Stress :    Social Connections:    • Frequency of  Communication with Friends and Family:    • Frequency of Social Gatherings with Friends and Family:    • Attends Mandaeism Services:    • Active Member of Clubs or Organizations:    • Attends Club or Organization Meetings:    • Marital Status:    Intimate Partner Violence:    • Fear of Current or Ex-Partner:    • Emotionally Abused:    • Physically Abused:    • Sexually Abused:         Family History:  Family History   Problem Relation Age of Onset   • Non-contributory Mother    • Lung Disease Mother         COPD   • Cancer Mother         dysplasia    • Arthritis Mother    • Psychiatric Illness Mother    • Heart Disease Mother    • Hypertension Mother    • Stroke Mother    • Non-contributory Father    • Cancer Father 73        prostate cancer   • Lung Disease Maternal Grandmother    • Lung Disease Paternal Aunt    • Diabetes Paternal Aunt    • Hyperlipidemia Paternal Aunt        Depression and Opioid Screening  PHQ-9:  Depression Screen (PHQ-2/PHQ-9) 3/23/2021 3/24/2021 3/25/2021   PHQ-2 Total Score 0 0 0   PHQ-2 Total Score - - -   PHQ-2 Total Score - - -   PHQ-9 Total Score - - -     Interpretation of PHQ-9 Total Score   Score Severity   1-4 No Depression   5-9 Mild Depression   10-14 Moderate Depression   15-19 Moderately Severe Depression   20-27 Severe Depression     OBJECTIVE:   Vital Signs:  Vitals:    10/18/21 0725   BP: 110/62   Pulse: (!) 54   Resp: 15   Temp: 37 °C (98.6 °F)   SpO2: 95%        Pertinent Labs:  Lab Results   Component Value Date/Time    SODIUM 143 07/27/2021 02:30 PM    POTASSIUM 3.7 07/27/2021 02:30 PM    CHLORIDE 108 07/27/2021 02:30 PM    CO2 22 07/27/2021 02:30 PM    GLUCOSE 115 (H) 07/27/2021 02:30 PM    BUN 10 07/27/2021 02:30 PM    CREATININE 0.50 07/27/2021 02:30 PM       No results found for: HBA1C    Lab Results   Component Value Date/Time    WBC 5.7 05/24/2021 12:27 PM    RBC 4.49 05/24/2021 12:27 PM    HEMOGLOBIN 14.7 05/24/2021 12:27 PM    HEMATOCRIT 41.6 05/24/2021 12:27 PM     MCV 92.7 05/24/2021 12:27 PM    MCH 32.7 05/24/2021 12:27 PM    MCHC 35.3 (H) 05/24/2021 12:27 PM    MPV 9.2 05/24/2021 12:27 PM    NEUTSPOLYS 72.30 (H) 05/24/2021 12:27 PM    LYMPHOCYTES 17.00 (L) 05/24/2021 12:27 PM    MONOCYTES 8.30 05/24/2021 12:27 PM    EOSINOPHILS 1.60 05/24/2021 12:27 PM    BASOPHILS 0.40 05/24/2021 12:27 PM       Lab Results   Component Value Date/Time    ASTSGOT 16 07/27/2021 02:30 PM    ALTSGPT 16 07/27/2021 02:30 PM        Physical Exam:   GEN: No apparent distress  HEENT: Head normocephalic, atraumatic.  Sclera nonicteric bilaterally, no ocular discharge appreciated bilaterally.  CV: Extremities warm and well-perfused, no peripheral edema appreciated bilaterally.  PULMONARY: Breathing nonlabored on room air, no respiratory accessory muscle use.  Not requiring supplemental oxygen.  SKIN: No appreciable skin breakdown on exposed areas of skin.  PSYCH: Mood and affect within normal limits.  NEURO: Awake alert.  Conversational.  Logical thought content.  Answers questions appropriately.    Motor Exam Upper Extremities   ? Myotome R L   Shoulder Abduction C5 5 3*   Elbow flexion C5 5 4   Wrist extension C6 5 3   Elbow extension C7 5 4   Finger flexion C8 5 3   Finger abduction T1 5 3   Recently injured.*Strength exam limited due to this    Motor Exam Lower Extremities  ? Myotome R L   Hip flexion L2 5 4   Knee extension L3 5 4   Ankle dorsiflexion L4 5 2*   Toe extension L5 5 2   Ankle plantarflexion S1 5 4   *Limited by plantar flexion spasticity    10-12 beats of clonus left ankle.  Spasticity on MAS: 2/4 spasticity of left knee extensors and flexors, 3/4 spasticity left plantar flexors, 1+/4 spasticity left elbow flexors and extensors.    Imaging:   MRI brain 2/12/2021  Marked interval increase in size of medial posterior right frontal lobe lesion now measuring 3 x 2.4 x 3.1 cm, previously 0.8 x 0.7 x 0.6 cm. There is irregular thick walled peripheral enhancement now seen and new large  amount of vasogenic edema with new   mass effect as detailed above. The lesion remains indeterminate however malignancy must be excluded.     Scattered mild nonspecific T2 hyperintensities elsewhere within the cerebral white matter again noted which could be related to demyelination or other nonspecific gliosis.     ASSESSMENT/PLAN: Melba Frye  is a 50 y.o. female with rehabilitation history significant for GBM s/p resection 3/9/2021 Dr. Mao s/p repeat resection for recurrence 6/25/2021 [facility], here for evaluation. The following plan was discussed with the patient who is in agreement.     Visit Diagnoses     ICD-10-CM   1. Glioblastoma of frontal lobe (HCC)  C71.1   2. Depressed mood  R45.89   3. Neurogenic bladder  N31.9        Rehab/Neuro:   1. GBM right medial parietal lobe s/p right frontal parietal craniotomy for biopsy and resection 3/9/2021 Dr. Mao s/p repeat resection for recurrence 6/25/2021 [facility]  2. Left foot drop: Has custom AFO; not currently wearing.  -Records reviewed as aforementioned in the HPI.  -Therapy: Currently in outpatient PT and OT.  -Referral: Occupational therapy for evaluation and treatment as well as driving evaluation.  -Function: Ambulatory with walker.  Has left lower extremity weakness greater than left upper extremity weakness.  Most weakness is distal left lower extremity requiring AFO.  Does have spasticity of plantar flexors.  10/18/2021 patient has had worsening of her spasticity as well as hemiparesis on the left since we last met in May.  She is largely wheelchair-bound but is using the walker occasionally.    Spasticity: 5/2021 developing left ankle while inpatient.  Continues to be problematic and clonus gets triggered especially in the evening.  10/2021 spasticity worsening in the left lower extremity and the left upper extremity.  In the upper extremity it is more problematic proximally as opposed to distally.  -Conservative: Counseled on the importance of  continued stretching for spasticity prevention.  While patient does have upper extremity spasticity it is largely affecting the elbow flexors and extensors more so than distally.  Discussed with her in great detail that I would not recommend Botox for the finger flexors given the relative lack of tone and risk of making her more weak reducing her  strength which she needs in order to hold her walker.  -Med management: Counseled on different antispasticity medications.  Would not recommend baclofen as it can lower seizure threshold.  All antispasmodics cause sedation and increased risk of falls.  -Referral: Physical medicine rehabilitation for Botox injections     Botox Plan: I plan to inject to the left lower extremity and left upper extremity  Location Botox Amount in Units   Left bicep  75 units   Left tricep  75 units   Left medial gastrocnemius  125 units   Left lateral gastrocnemius  125 units   Soleus  100 units   Total Units  500 units     The risks benefits and alternatives to this procedure were discussed and the patient wishes to proceed with the procedure. Risks include but are not limited to damage to surrounding structures, infection, bleeding, worsening of pain which can be permanent, weakness which can be permanent. Benefits include pain relief, improved function. Alternatives includes not doing the procedure.      Neuropathic Pain: Neuropathic pain improved on Vimpat.  -Counseled on neuropathic pain agents and risk of increasing sedation, other side effects.  -Continue to monitor    Mood: Anxiety and depression, understandably.  -Med management: Increase venlafaxine to 75 mg daily new prescription provided  -Patient has seen  psychiatry and counselor.  States that at this point she just needs assistance with med management.  -If continues to have issues can give her additional resources     GI: Recent weight loss due to the fact that she has had relative lack of appetite.  Not  underweight.  -Counseled on need to supplement meals with boost/Ensure/Premier protein at least twice per day, more ideally 3 times per day.    Neurogenic Bladder: Significant urinary urgency and frequency with incontinence.  Has not trialed any medications.  -Med management: Counseled on ability to use Myrbetriq versus oxybutynin  -Referral: Urogynecology.  -Status: Urinary frequency and urgency significantly limiting sleep which is likely worsening mood.    Sleep: Poor sleep secondary to urinary urgency.  -Referral placed to urogynecology  -Is on sleep medications.  No further medication management recommendations right now.    Follow up: 2 weeks for Botox    Total time spent was 41 minutes.  Included in this time is the time spent preparing for the visit including record review, my exam and evaluation, counseling and education regarding that which is aforementioned in the assessment and plan. Time was spent ordering the appropriate labs, tests, procedures, referrals, medications. Included this time as the time spent obtaining history from someone other than the patient. Discussion involved the patient and .    Please note that this dictation was created using voice recognition software. I have made every reasonable attempt to correct obvious errors but there may be errors of grammar and content that I may have overlooked prior to finalization of this note.    Dr. Yanet Steele DO, MS  Department of Physical Medicine & Rehabilitation  Neuro Rehabilitation Clinic  [facility]

## 2021-10-25 ENCOUNTER — TELEMEDICINE (OUTPATIENT)
Dept: NEUROLOGY | Facility: MEDICAL CENTER | Age: 51
End: 2021-10-25
Attending: STUDENT IN AN ORGANIZED HEALTH CARE EDUCATION/TRAINING PROGRAM
Payer: COMMERCIAL

## 2021-10-25 VITALS — BODY MASS INDEX: 36.1 KG/M2 | TEMPERATURE: 98 F | WEIGHT: 230 LBS | HEIGHT: 67 IN

## 2021-10-25 DIAGNOSIS — C71.1 GLIOBLASTOMA OF FRONTAL LOBE (HCC): ICD-10-CM

## 2021-10-25 DIAGNOSIS — G40.109 LOCALIZATION-RELATED FOCAL EPILEPSY WITH SIMPLE PARTIAL SEIZURES (HCC): ICD-10-CM

## 2021-10-25 DIAGNOSIS — F41.8 MIXED ANXIETY AND DEPRESSIVE DISORDER: ICD-10-CM

## 2021-10-25 DIAGNOSIS — R25.2 SPASTICITY: ICD-10-CM

## 2021-10-25 DIAGNOSIS — M26.621 ARTHRALGIA OF RIGHT TEMPOROMANDIBULAR JOINT: ICD-10-CM

## 2021-10-25 DIAGNOSIS — M79.18 MUSCULOSKELETAL PAIN: ICD-10-CM

## 2021-10-25 DIAGNOSIS — M62.838 MUSCLE SPASM: ICD-10-CM

## 2021-10-25 DIAGNOSIS — R35.0 INCREASED URINARY FREQUENCY: ICD-10-CM

## 2021-10-25 DIAGNOSIS — G43.109 COMPLICATED MIGRAINE: ICD-10-CM

## 2021-10-25 DIAGNOSIS — R53.0 NEOPLASTIC (MALIGNANT) RELATED FATIGUE: ICD-10-CM

## 2021-10-25 DIAGNOSIS — R52 SEVERE PAIN: ICD-10-CM

## 2021-10-25 DIAGNOSIS — R56.9 FOCAL SEIZURE (HCC): ICD-10-CM

## 2021-10-25 DIAGNOSIS — Z74.09 IMPAIRED FUNCTIONAL MOBILITY AND ACTIVITY TOLERANCE: ICD-10-CM

## 2021-10-25 PROBLEM — I10 ESSENTIAL HYPERTENSION: Status: ACTIVE | Noted: 2021-10-21

## 2021-10-25 PROBLEM — R07.89 OTHER CHEST PAIN: Status: ACTIVE | Noted: 2021-10-21

## 2021-10-25 PROBLEM — I26.09 ACUTE PULMONARY EMBOLISM WITH ACUTE COR PULMONALE (HCC): Status: ACTIVE | Noted: 2021-10-21

## 2021-10-25 PROBLEM — C71.9 GLIOBLASTOMA (HCC): Status: ACTIVE | Noted: 2021-10-21

## 2021-10-25 PROBLEM — R00.0 TACHYCARDIA: Status: ACTIVE | Noted: 2021-10-21

## 2021-10-25 PROBLEM — I21.A1 TYPE 2 MYOCARDIAL INFARCTION (HCC): Status: ACTIVE | Noted: 2021-10-21

## 2021-10-25 PROCEDURE — 99215 OFFICE O/P EST HI 40 MIN: CPT | Mod: 95 | Performed by: STUDENT IN AN ORGANIZED HEALTH CARE EDUCATION/TRAINING PROGRAM

## 2021-10-25 PROCEDURE — G2212 PROLONG OUTPT/OFFICE VIS: HCPCS | Mod: 95 | Performed by: STUDENT IN AN ORGANIZED HEALTH CARE EDUCATION/TRAINING PROGRAM

## 2021-10-25 PROCEDURE — 999999 HB NO CHARGE: Mod: 95 | Performed by: STUDENT IN AN ORGANIZED HEALTH CARE EDUCATION/TRAINING PROGRAM

## 2021-10-25 RX ORDER — NALOXONE HYDROCHLORIDE 4 MG/.1ML
SPRAY NASAL
Qty: 2 EACH | Refills: 3 | Status: ON HOLD | OUTPATIENT
Start: 2021-10-25 | End: 2022-03-11

## 2021-10-25 RX ORDER — APIXABAN 5 MG/1
TABLET, FILM COATED ORAL
COMMUNITY
Start: 2021-10-23 | End: 2021-11-10 | Stop reason: SDUPTHER

## 2021-10-25 RX ORDER — TEMOZOLOMIDE 100 MG/1
100 CAPSULE ORAL SEE ADMIN INSTRUCTIONS
Status: ON HOLD | COMMUNITY
Start: 2021-10-18 | End: 2021-12-18

## 2021-10-25 RX ORDER — TRAMADOL HYDROCHLORIDE 50 MG/1
50 TABLET ORAL 2 TIMES DAILY PRN
Qty: 60 TABLET | Refills: 0 | Status: SHIPPED | OUTPATIENT
Start: 2021-10-25 | End: 2021-11-24

## 2021-10-25 ASSESSMENT — PATIENT HEALTH QUESTIONNAIRE - PHQ9
5. POOR APPETITE OR OVEREATING: 0 - NOT AT ALL
CLINICAL INTERPRETATION OF PHQ2 SCORE: 1
SUM OF ALL RESPONSES TO PHQ QUESTIONS 1-9: 5

## 2021-10-25 ASSESSMENT — FIBROSIS 4 INDEX: FIB4 SCORE: 1.32

## 2021-10-25 NOTE — PROGRESS NOTES
Telemedicine: Established Patient   This evaluation was conducted via Zoom using secure and encrypted videoconferencing technology. The patient was in a private location in the state of [place].    The patient's identity was confirmed and verbal consent was obtained for this virtual visit.     NEUROLOGY CLINIC FOLLOW-UP - 10/25/2021   REFERRING PROVIDER  No referring provider defined for this encounter.    REASON FOR VISIT: Melba Frye 51 y.o. female presents today for follow-up for focal epilepsy, GBM s/p resection with resultant left hemibody weakness and spasticity, chronic headaches, chronic MSK pain and painful muscle spasms        INTERVAL HISTORY:  Patient doing well.    She is on increased dose of vimpat 50 mg BID.    Effexor was increased to 75 mg BID    She has pending botox appointment to help with spasticity, pain symptoms realted to it, and possibly improve functioning. Dr. Steele is planning do do this.    Pending EEG next month    Hydroxyzine has not helped other than making her more sleepy    Recently hos[italized for PE and now on anticoagulation, Eliquis. Not able to take NSAIDs. Takes x 2 of tylenol 325 mg.    Patient's PMH, PSH, FH, and SH were reviewed.    ROS: All review of systems complete and are negative except as documented  CURRENT MEDICATIONS AT THE TIME OF THIS ENCOUNTER:    Current Outpatient Medications:   •  Eliquis, , Taking  •  temozolomide, , Taking  •  brivaracetam, 100 mg, Oral, BID  •  traMADol, 50 mg, Oral, BID PRN  •  Naloxone, One spray in one nostril for overdose and call 911.  •  venlafaxine, 75 mg, Oral, DAILY, Taking  •  hydrOXYzine HCl, 50 mg, Oral, BID, Taking  •  prochlorperazine, 10 mg, Oral, Q6HRS PRN, Taking  •  amLODIPine, 10 mg, Oral, DAILY, Taking  •  melatonin, 3 mg, Oral, HS PRN, Taking  •  acetaminophen, 650 mg, Oral, Q6HRS PRN, Taking  •  VITAMIN D PO, 1 Units, Oral, Q EVENING, Taking  •  multivitamin, 1 Tablet, Oral, Q EVENING, Taking     EXAM:  "  Ambulatory Vitals:  Encounter Vitals  Temperature: 36.7 °C (98 °F)  Weight: 104 kg (230 lb)  Height: 170.2 cm (5' 7\")  BMI (Calculated): 36.02   Physical Exam:  Physical Exam  Constitutional:       General: She is not in acute distress.     Appearance: She is not ill-appearing.   HENT:      Mouth/Throat:      Mouth: Mucous membranes are moist.   Neurological:      Cranial Nerves: No dysarthria.        Neurological Exam   Neurological Exam  Mental Status  Awake, alert and oriented to person, place and time. no dysarthria present. Language is fluent with no aphasia. Attention and concentration are normal.       ALL DATA (I.e. labs, procedures, imaging, reports, clinical notes, etc.) FROM [facility] AND/OR OUTSIDE SOURCES, IF AVAILABLE, PERSONALLY REVIEWED:   LABS:    MAGING-    PROCEDURE    OUTSIDE DATA    ASSESSMENT, EDUCATION, AND COUNSELING:  This is a 51 y.o. female patient who presents to the neurology clinic. We had an extensive discussion about the patient's symptoms, signs, and work-up to date, if any. We discussed potential and/or definitive diagnoses, work-up, and potential treatments.       PLAN:  If applicable, the work-up such as labs, imaging, procedures, and/or other testing, referrals, and/or recommended treatment strategies are listed below.  Visit Diagnoses     ICD-10-CM   1. Focal seizure (HCC)  R56.9   2. Severe pain  R52   3. Neoplastic (malignant) related fatigue  R53.0   4. Mixed anxiety and depressive disorder  F41.8   5. Complicated migraine  G43.109   6. Arthralgia of right temporomandibular joint  M26.621   7. 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)  G40.109   8. Glioblastoma of frontal lobe (HCC)  C71.1   9. Impaired functional mobility and activity tolerance  Z74.09   10. Spasticity  R25.2   11. Increased urinary frequency  R35.0   12. Musculoskeletal pain  M79.18   13. Muscle spasm  M62.838      Orders Placed This Encounter   • ELIQUIS 5 MG Tab   • " temozolomide (TEMODAR) 100 MG capsule   • brivaracetam (BRIVIACT) 100 MG Tab tablet   • traMADol (ULTRAM) 50 MG Tab   • Naloxone (NARCAN) 4 MG/0.1ML Liquid   • Consent for Opiate Prescription   • Controlled Substance Treatment Agreement      Increased vimpat to 50 mg BID for increased seizure control. Also has secondary benefit for neuropathic pain. Switch from keppra 1500 mg BID to Briviact equivalent 100 mg BID. Has pending EEG. Further changes to AED regimen will depend on clinical course and EEG results.    Tramadol refilled. Future refills will nned to be refilled by Pain Specialist. I had an extensive discussion with the patient. Patient has chronic severe musculoskeletal pain, painful muscle spasms secondary to brain tumor and it's complications. She recently had a pulmonary embolism and is on anticoagulation which precludes safe use of alternatives (NSAIDS). I am actively seeking other means to treat her pain with Botox (to be done by Dr. Steele). Also recommend increase Tylenol to 1000 mg TID prn.  I did discuss the fact that this is a short term solution to her pain while we find better altneratives. Also discussed that this is a one time refill and any future need for narcotics will need to be refilled by a pain specialist. Also discussed the purpose of Narcan. Patient expresed understanding about what I have discussed with her.    Continue to effexor 75 mg BID for mood and neuropathic pain.    Patient to f/u with Dr. Steele for Botox.    Filling out disability paperwork today and will scan into chart.       Follow-up:   • Next 1-2 weeks        BILLING DOCUMENTATION:     I spent a total of 55 minutes of face-to-face time in this visit. Over 50% of the time of the visit today was spent on counseling and/or coordination of care wtih the patient and/or family, as above in assessment in plan.    Fady Davidson MD  Epilepsy and General Neurology  Department of Neurology  Clinical  of Neurology  [facility].

## 2021-10-28 ENCOUNTER — OFFICE VISIT (OUTPATIENT)
Dept: PHYSICAL MEDICINE AND REHAB | Facility: REHABILITATION | Age: 51
End: 2021-10-28
Payer: COMMERCIAL

## 2021-10-28 VITALS
SYSTOLIC BLOOD PRESSURE: 128 MMHG | DIASTOLIC BLOOD PRESSURE: 92 MMHG | HEART RATE: 80 BPM | OXYGEN SATURATION: 96 % | TEMPERATURE: 99.1 F

## 2021-10-28 DIAGNOSIS — G81.14 SPASTIC HEMIPLEGIA AFFECTING LEFT NONDOMINANT SIDE, UNSPECIFIED ETIOLOGY (HCC): Primary | ICD-10-CM

## 2021-10-28 DIAGNOSIS — C71.1 GLIOBLASTOMA OF FRONTAL LOBE (HCC): ICD-10-CM

## 2021-10-28 DIAGNOSIS — M62.838 OTHER MUSCLE SPASM: ICD-10-CM

## 2021-10-28 PROCEDURE — 64643 CHEMODENERV 1 EXTREM 1-4 EA: CPT | Performed by: PHYSICAL MEDICINE & REHABILITATION

## 2021-10-28 PROCEDURE — 64642 CHEMODENERV 1 EXTREMITY 1-4: CPT | Performed by: PHYSICAL MEDICINE & REHABILITATION

## 2021-10-28 PROCEDURE — 76942 ECHO GUIDE FOR BIOPSY: CPT | Performed by: PHYSICAL MEDICINE & REHABILITATION

## 2021-10-28 NOTE — PROCEDURES
[facility]  Physical Medicine & Rehabilitation Clinic  [facility] [place] [address]  Ph: [phone]    PROCEDURE NOTE    Procedure: Botulinum Injection  Primary Diagnosis & Secondary Diagnoses:   Visit Diagnoses     ICD-10-CM   1. Spastic hemiplegia affecting left nondominant side, unspecified etiology ([facility])  G81.14   2. Glioblastoma of frontal lobe ([facility])  C71.1   3. Other muscle spasm  M62.838       INFORMED CONSENT   The risks and benefits of the procedure were explained to the patient, and all questions were answered. Benefits of the injection include spasticity reduction by decrease in muscle activation following toxin injection. Adverse effects from toxin injection include but are not limited to: excessive weakness, infection, breathing and/or swallowing difficulty.  Common adverse effects from the injection itself are pain and bruising. The patient demonstrated good understanding of risks and benefits and consents to botulinum toxin injection.     Received botulinum toxin product in last 3 mos: No  Have recently received abx (aminoglycosides): No  Take anti-spasticity medications: No  Take allergy/cold medicine:  No  Take a sleep medicine: No  Lactating/pregnant: No  Known NMJ disease: No  Human albumin allergy: No    Patient newly on Eliquis for recent h/o PE. Okay to continue anticoagulation with Eliquis through the procedure for botulinum toxin injection.  The risks of going off the medication outweigh the risks of doing the procedure on the medication.  I will plan to use 27-gauge needles and ultrasound guidance to avoid all blood vessels during the procedure.  We discussed that while on anticoagulation the patient does have an increased risk of bleeding and hematoma.    Pre-procedure time out was performed.     PROCEDURE:  Usual sterile procedure was observed with sterile prep with chlorhexidine. Ultrasound was used for needle guidance for the injections in the following muscles. A negative  aspiration of blood was obtained, and the following was injected into each muscle.    Botox: Left lower extremity & upper extremity  Location Botox Amount in Units   Left bicep  75 units   Left tricep  75 units   Left medial gastrocnemius  100 units   Left lateral gastrocnemius  100 units   Soleus  150 units   Total Units  500 units      Injection sites were cleaned with alcohol and band aid was applied afterwards.  The patient tolerated the procedure well.  There were no complications.  The images were uploaded for permanent storage    MEDICATION USED:  100 units of botulinum toxin type A, mixed with 2mL of sterile, preservative-free normal saline in two 1cc syringes, for a total of 50 units in 1 mL.    Total units injected: 500 units  Total units discarded: 0 units    See MAR for Botox details.     Counseling: Pt was instructed to seek immediate medical attention if fever, difficulty breathing, difficulty swallowing, or other concerning sxs arise. RTC in 2-4 weeks to investigate for effect at peak.    Additional Plan: Continue OT/PT and aggressive stretching s/p Botox    BOTOX (100unit/vial) x 5 vials  NDC 8377-6853-68  Lot  [id number]  Exp 02/2024      Dr. Yanet Steele DO, MS  Department of Physical Medicine & Rehabilitation  Neuro Rehabilitation Clinic  [facility]

## 2021-10-29 DIAGNOSIS — G40.109 FOCAL EPILEPSY (HCC): ICD-10-CM

## 2021-10-29 RX ORDER — LACOSAMIDE 50 MG/1
50 TABLET ORAL 2 TIMES DAILY
Qty: 180 TABLET | Refills: 0 | Status: SHIPPED | OUTPATIENT
Start: 2021-10-29 | End: 2021-11-01 | Stop reason: SDUPTHER

## 2021-11-01 DIAGNOSIS — G40.109 FOCAL EPILEPSY (HCC): ICD-10-CM

## 2021-11-01 RX ORDER — LACOSAMIDE 50 MG/1
50 TABLET ORAL 2 TIMES DAILY
Qty: 180 TABLET | Refills: 0 | Status: SHIPPED | OUTPATIENT
Start: 2021-11-01 | End: 2021-11-15

## 2021-11-01 NOTE — TELEPHONE ENCOUNTER
Received request via: Patient pt  called     Was the patient seen in the last year in this department? Yes    Does the patient have an active prescription (recently filled or refills available) for medication(s) requested? No

## 2021-11-02 ENCOUNTER — NON-PROVIDER VISIT (OUTPATIENT)
Dept: NEUROLOGY | Facility: MEDICAL CENTER | Age: 51
End: 2021-11-02
Attending: STUDENT IN AN ORGANIZED HEALTH CARE EDUCATION/TRAINING PROGRAM
Payer: COMMERCIAL

## 2021-11-02 DIAGNOSIS — G40.109 LOCALIZATION-RELATED FOCAL EPILEPSY WITH SIMPLE PARTIAL SEIZURES (HCC): ICD-10-CM

## 2021-11-02 PROCEDURE — 95700 EEG CONT REC W/VID EEG TECH: CPT | Performed by: STUDENT IN AN ORGANIZED HEALTH CARE EDUCATION/TRAINING PROGRAM

## 2021-11-02 PROCEDURE — 95708 EEG WO VID EA 12-26HR UNMNTR: CPT | Performed by: STUDENT IN AN ORGANIZED HEALTH CARE EDUCATION/TRAINING PROGRAM

## 2021-11-02 PROCEDURE — 95719 EEG PHYS/QHP EA INCR W/O VID: CPT | Performed by: STUDENT IN AN ORGANIZED HEALTH CARE EDUCATION/TRAINING PROGRAM

## 2021-11-02 NOTE — PROCEDURES
AMBULATORY ELECTROENCEPHALOGRAM REPORT      Referring provider:   Fady Davidson M.D.  [address] [address]  [place] [address]      DOS: 11/02/2021       Duration of Recording: (23 hours and 4 minutes)      INDICATION:  Melba Frye 51 y.o. female presenting with  : seizure(s)    CURRENT OUTPATIENT MEDICATION LIST:  Current Outpatient Medications   Medication Instructions   • acetaminophen (TYLENOL) 650 mg, Oral, EVERY 6 HOURS PRN   • amLODIPine (NORVASC) 10 mg, Oral, DAILY   • brivaracetam (BRIVIACT) 100 mg, Oral, 2 TIMES DAILY   • ELIQUIS 5 MG Tab No dose, route, or frequency recorded.   • hydrOXYzine HCl (ATARAX) 50 mg, Oral, 2 TIMES DAILY   • lacosamide (VIMPAT) 50 mg, Oral, 2 TIMES DAILY   • melatonin 3 mg, Oral, NIGHTLY PRN   • multivitamin (THERAGRAN) Tab 1 Tablet, Oral, EVERY EVENING   • Naloxone (NARCAN) 4 MG/0.1ML Liquid One spray in one nostril for overdose and call 911.   • prochlorperazine (COMPAZINE) 10 mg, Oral, EVERY 6 HOURS PRN   • temozolomide (TEMODAR) 100 MG capsule No dose, route, or frequency recorded.   • traMADol (ULTRAM) 50 mg, Oral, 2 TIMES DAILY PRN   • venlafaxine (EFFEXOR) 75 mg, Oral, DAILY   • VITAMIN D PO 1 Units, Oral, EVERY EVENING         TECHNIQUE: The EEG was set up and taken down by a Neurodiagnostic technologist who performed education to patient and staff.     A minimum but not limited to 23 electrodes and 23 channel recording was setup and performed by Neurodiagnostic technologist.     Impedances, electrode integrity, and technical impressions were documented a minimum of every 2-24 hour period by a Neurodiagnostic Technologist and reviewed by Interpreting Physician.    DESCRIPTION OF THE RECORD:  During the awake state, background shows an asymmetrical 9 Hz alpha rhythm on the left and a 7-8 Hz rhythmic on the right.    There was reactivity with eye opening/closure. ly.  During drowsiness, high-amplitude delta frequencies were seen.    During the sleep state,  background shows diffuse high-amplitude 2-3 Hz delta or theta activity.  Symmetrical high-amplitude sleep spindles and vertex sharp activities were seen in the central leads.    ACTIVATION PROCEDURES:   NA    ICTAL AND INTERICTAL FINDINGS:   There was continuous high amplitude and at times sharply contoured slowing maximal over the central parietal and posterior temporal regions.  The slowing was occasionally rhythmic or quasiarrhythmic during wakefulness at 1 to 1.5 Hz, at times with overriding sharply contoured theta.  During sleep, rhythmic slowing became more frequent and prolonged often bordering on the interictal/ictal continuum.    4 seizures arising from the right parasagittal and posterior temporal region of the brain were captured.  Electrographically, seizures started out as low amplitude rhythmic delta slowing at 1 to 1.5 Hz.  The slowing became higher amplitude, sharper, faster at 1.5 to 2 Hz, with more prominent sharp theta/alpha overriding the slowing. The was subtle spread to the contralateral hemisphere, mainly involving the left parasagittal regions.   It was abrupt post ictal slowing.  Seizures occurred out of sleep and appeared to wake the patient up.  Seizures lasted between 2 and 4 minutes on average.  There was occasional ictal tachycardia for to the seizures.  However no distinct arrhythmias were seen.  Patient did not document any clinical events.    Rare right frontotemporal spike and slow wave discharge.     Rare independent left central temporal slowing.    EKG: sampling of the EKG recording did not demonstrate any abnormalities    EVENTS:  None reported or documented    INTERPRETATION:  This is an abnormal ambulatory EEG recording in the awake and drowsy/sleep state(s):   4 seizures arising from the right parasagittal and posterior temporal regions of the brain were captured.   Seizures occurred out of sleep and appeared to wake the patient up.  Seizures lasted between 2 and 4 minutes on  average.  There was occasional ictal tachycardia seen.  However no distinct arrhythmias were seen. Patient did not document any clinical events.    There was continuous high amplitude and at times sharply contoured slowing maximal over the right centro-parietal and posterior temporal regions.  The slowing was occasionally rhythmic or quasiarrhythmic during wakefulness at 1 to 1.5 Hz, rarely having overriding sharply contoured theta activity.  During sleep, the rhythmic slowing became more frequent and prolonged, often bordering on the interictal/ictal continuum (LRDA +F).  Rare right frontotemporal spike and slow wave discharge.  .    Together, these findings suggest underlying focal dysfunction maximal over the right parasagittal brain regions.  In addition, findings are suggestive of a heightened risk for seizures arising from these regions as well.  Clinical and radiographic correlation recommended.          Fady Davidson MD  Epilepsy and General Neurology  Department of Neurology  Clinical  of Neurology [facility].

## 2021-11-03 ENCOUNTER — NON-PROVIDER VISIT (OUTPATIENT)
Dept: NEUROLOGY | Facility: MEDICAL CENTER | Age: 51
End: 2021-11-03
Attending: PSYCHIATRY & NEUROLOGY
Payer: COMMERCIAL

## 2021-11-03 PROCEDURE — 99999 PR NO CHARGE: CPT | Performed by: STUDENT IN AN ORGANIZED HEALTH CARE EDUCATION/TRAINING PROGRAM

## 2021-11-03 RX ORDER — AMLODIPINE BESYLATE 10 MG/1
10 TABLET ORAL DAILY
Qty: 90 TABLET | Refills: 0 | Status: SHIPPED | OUTPATIENT
Start: 2021-11-03 | End: 2022-02-02

## 2021-11-04 ENCOUNTER — GYNECOLOGY VISIT (OUTPATIENT)
Dept: OBGYN | Facility: CLINIC | Age: 51
End: 2021-11-04
Payer: COMMERCIAL

## 2021-11-04 VITALS
HEIGHT: 67 IN | WEIGHT: 230 LBS | SYSTOLIC BLOOD PRESSURE: 126 MMHG | BODY MASS INDEX: 36.1 KG/M2 | DIASTOLIC BLOOD PRESSURE: 81 MMHG | HEART RATE: 90 BPM

## 2021-11-04 DIAGNOSIS — R35.1 NOCTURIA: ICD-10-CM

## 2021-11-04 DIAGNOSIS — N39.41 URGE INCONTINENCE: ICD-10-CM

## 2021-11-04 DIAGNOSIS — N95.2 ATROPHIC VAGINITIS: ICD-10-CM

## 2021-11-04 DIAGNOSIS — N39.0 FREQUENT UTI: ICD-10-CM

## 2021-11-04 DIAGNOSIS — N31.9 NEUROGENIC BLADDER: Primary | ICD-10-CM

## 2021-11-04 DIAGNOSIS — C71.1 GLIOBLASTOMA OF FRONTAL LOBE (HCC): ICD-10-CM

## 2021-11-04 DIAGNOSIS — R39.9 UTI SYMPTOMS: ICD-10-CM

## 2021-11-04 PROCEDURE — 99204 OFFICE O/P NEW MOD 45 MIN: CPT | Performed by: STUDENT IN AN ORGANIZED HEALTH CARE EDUCATION/TRAINING PROGRAM

## 2021-11-04 RX ORDER — ESTRADIOL 0.1 MG/G
CREAM VAGINAL
Qty: 1 EACH | Refills: 3 | Status: SHIPPED | OUTPATIENT
Start: 2021-11-04 | End: 2022-02-09

## 2021-11-04 ASSESSMENT — FIBROSIS 4 INDEX: FIB4 SCORE: 1.32

## 2021-11-04 NOTE — PROGRESS NOTES
Urogynecology and Pelvic Reconstructive Surgery Consultation Visit    Melba Frye MRN:[id number] :1970    Referred by: Yanet Steele DO    Reason for Visit:   Chief Complaint   Patient presents with   • New Patient     Urgency         Subjective     History of Presenting Illness:    Ms.Jennifer Cesia Frye is a 51 y.o. year old P1 with PMH significant for migraines, depression/anxiety, seizures, and glioblastoma, who was referred by her Physiatrist Dr. Steele for the evaluation and management of bothersome urinary symptoms.     She had no bothersome bladder symptoms aside from mild stress urinary incontinence until her diagnosis earlier this year with glioblastoma of the frontal lobe, where she subsequently underwent craniotomy and biopsy in March followed by chemotherapy which is concurrently happening.    Approximately 2 months after this diagnosis she noted that her bladder symptoms started notable for very bothersome nocturia, 3-5 times per night, which is very difficult given her lack of mobility and hemiparesis and need for her  to ambulate her to her bedside commode to empty.  She also has difficulty emptying her bladder and has significant urinary leakage.    Additionally she has had a recent difficult bout with a urinary tract infection (it is unclear whether she had 2 subsequent UTIs or one incompletely treated UTI)    Her gynecologist is Dr. Alvin Paul who is managed her previously, and has taken her off systemic hormone replacement therapy a couple of years ago.  She only notes some mild hot flashes after coming off this therapy.    Prior Pelvic surgery:    LAVH hysterectomy for CIN3 by Dr. Gilles Teresa   laparoscopic lysis of adhesions and bilateral salpingo-oophorectomy by Dr. Tomasz Estrada for complex ovarian mass.       Prior treatment:   None for bladder     Fluid intake:   1 cup water  2-3 cups juice      Urine cultures:   21: E coli >100k pansensitive x  2    Pelvic floor symptom review:     Bladder:   Voids per day: 4-5 Voids per night: 3-4   - has bedside commonde   Urinary incontinence episodes per day: 1-2    Urge leakage:  On Movement to Bathroom and Full Bladder   Stress leakage: With Cough, With Laugh and With Exercise   Continuous / insensible urine loss: No    Nocturnal enuresis: No    Leakage volume: Moderate   Number of pads/day: 1-2    Bladder emptying: Incomplete   Voiding symptoms: Hesitancy, Weak Stream and Double or Triple Voiding   UTI in last 12 months: 2   Other urologic history: no      Prolapse:     Prolapse symptoms: None        Bowel:    Constipation: yes, pain meds. Also gets diarrhea.    Bowel movements per day: irregular    Straining to empty bowels: Yes   Splinting to evacuate: No    Painful evacuation: occasional   Difficulty emptying rectum: No    Incontinence to stool: No   Incontinence to gas: No     Blood in stool: No    Hemorrhoids: No    Bowel conditions:    Most recent colonoscopy:  normal            Past medical and surgical history    Past obstetric history   Number of vaginal deliveries: 1   Number of  deliveries: 0   History of vacuum/forceps: Yes   History of obstetric anal sphincter injury: No     Past gynecological history:    Last menstrual period: No LMP recorded. Patient has had a hysterectomy.     Past medical history:  Past Medical History:   Diagnosis Date   • Lentigo 10/17/2018   • Seborrheic keratoses 10/17/2018   • Dermatitis, contact 2015   • Hyperlipidemia 2015   • Menopausal symptom 2014   • Fatigue 2014   • Mixed anxiety and depressive disorder 2014   • Complicated migraine 2014   • TMJ arthralgia 2014   • Anxiety    • Depression    • UTI (lower urinary tract infection)      Past surgical history:  Past Surgical History:   Procedure Laterality Date   • CRANIOTOMY STEALTH Right 3/9/2021    Procedure: RIGHT CRANIOTOMY, USING FRAMELESS STEREOTAXY - FOR OPEN BIOPSY WITH  PHASE REVERSAL;  Surgeon: Maxwell Mao M.D.;  Location: SURGERY [facility];  Service: Neurosurgery   • MYRINGOTOMY  8/27/2010    Performed by [address] at SURGERY SAME DAY [facility] ORS   • LAPAROSCOPY  5/28/2010    Performed by JACKI SHARMA at SURGERY [facility] ORS   • LYMPH NODE SAMPLING  5/28/2010    Performed by JACKI SHARMA at SURGERY [facility] ORS   • DEBULKING  5/28/2010    Performed by JACKI SHARMA at SURGERY [facility] ORS   • CYSTOSCOPY  10/15/08    Performed by YU GODINEZ at SURGERY SAME DAY [facility] ORS   • VAGINAL HYSTERECTOMY SCOPE TOTAL  10/15/08    Performed by YU GODINEZ at SURGERY SAME DAY [facility] ORS   • OTHER  1984    TONSILECTOMY/ ADNOIDS   • APPENDECTOMY  1981   • TONSILLECTOMY AND ADENOIDECTOMY       Medications:has a current medication list which includes the following prescription(s): estradiol, amlodipine, lacosamide, eliquis, temozolomide, brivaracetam, tramadol, naloxone, venlafaxine, hydroxyzine hcl, prochlorperazine, melatonin, vitamin d, multivitamin, and acetaminophen.  Allergies:Hydrocodone-acetaminophen and Tape  Family history:  Family History   Problem Relation Age of Onset   • Non-contributory Mother    • Lung Disease Mother         COPD   • Cancer Mother         dysplasia    • Arthritis Mother    • Psychiatric Illness Mother    • Heart Disease Mother    • Hypertension Mother    • Stroke Mother    • Non-contributory Father    • Cancer Father 73        prostate cancer   • Lung Disease Maternal Grandmother    • Lung Disease Paternal Aunt    • Diabetes Paternal Aunt    • Hyperlipidemia Paternal Aunt      Social history: reports that she has never smoked. She has never used smokeless tobacco. She reports previous alcohol use. She reports that she does not use drugs.    Review of systems: A full review of systems was performed, and negative with the exception of want is noted above in the HPI.        Objective        /81 (BP Location: Right arm,  "Patient Position: Sitting, BP Cuff Size: Large adult)   Pulse 90   Ht 1.702 m (5' 7\")   Wt 104 kg (230 lb)   BMI 36.02 kg/m²     Physical Exam  Vitals reviewed.   Constitutional:       Appearance: Normal appearance.   HENT:      Head:      Comments: Hair loss - chemotherapy changes     Mouth/Throat:      Mouth: Mucous membranes are moist.   Cardiovascular:      Rate and Rhythm: Normal rate.   Pulmonary:      Effort: Pulmonary effort is normal.   Musculoskeletal:      Comments: Hemiplegia, uses wheelchair     Skin:     General: Skin is warm and dry.   Neurological:      Mental Status: She is alert.   Psychiatric:         Mood and Affect: Mood normal.     Genitourinary: Deferred to UDS given mobility issues.       Procedure Performed: No    Diagnostic test and records review:    Urine dipstick: did not leave sample              Assessment & Plan     Ms.Jennifer Cesia Frye is a 51 y.o. year old P1 with complex recent neurological history including glioblastoma status post craniotomy and chemotherapy with significant overactive bladder, nocturia, urinary incontinence consistent with neurogenic bladder. We discussed my recommendations for further diagnosis and treatment at length today.     1. Neurogenic bladder  2. Nocturia  3. Urge incontinence  4. Glioblastoma of frontal lobe (HCC)  Due the timing of the onset of her urinary symptoms, they are likely secondary to her recent brain tumor diagnosis and treatment. She was thoroughly counseled on the complex relationship between brain and bladder function and urinary symptoms, and that how any insult to the central nervous system very commonly results in bladder changes.  Her most bothersome symptomatology comes from bladder overactivity as well as nocturia which is made more difficult given her mobility issues.  Her symptoms may change and hopefully improve as her glioblastoma improves with chemotherapy, but my goal will be to treat her symptoms at the bladder " directly and to improve her quality of life while she undergoes her neurological treatments.  She was counseled on behavioral therapy including bladder irritant avoidance and fluid management, addition of vaginal estrogen, and pelvic floor physical therapy and exercises.  Segment therapy includes oral medications for chart only recommend a beta 3 agonist given her high risk of cognitive dysfunction given her background history with anticholinergics.  Third line therapy includes intradetrusor Botox injection as well as nerve modulation therapies.  Likely the best treatment that would get her dry is Botox.  If she does pursue this as a therapeutic option we might have to adjust the timing based on her Botox injections she received from Dr. Galicia for her spasticity  - I do recommend complex urodynamic testing both to set a baseline ensure she has no potentially dangerous underlying pathology to her neurogenic bladder which could harm her kidneys or affect bladder compliance.  Additionally this will help guide therapeutic options      5. Frequent UTI  - She was was counseled on the pathophysiology of recurrent UTI. In the normal host, most uropathogens originate in the rectal bernie, colonize the periurethral area and urethra, and ascend to the bladder. Alteration of the normal vaginal bernie, especially loss of [id number] lactobacilli, may predispose women to vaginal colonization with bacteria and to UTI. Risk factors include her decreased  mobility primarily, but may also be related to sexual intercourse, family history, concurrent urinary incontinence, and prior history of UTIs.   - Counseled that bacteria in the urine without symptoms does not require treatment - conversely symptoms without bacteria on culture do not require antibiotics and further workup is required.   - The importance of obtaining urine cultures with each bout of symptoms was emphasized. She should call/message our office when she has new  symptoms, and then present to the closest [facility] lab for a urinalysis and culture to guide treatment. If she is able to correctly predict 3 culture-proven UTI then standing self-treatment prescription can be discussed.   - Rx given for vaginal estrogen therapy which been proven to decrease urinary tract infections in postmenopausal women by reestablishing the natural microbiota.         6. Atrophic vaginitis   This is very common and due to low estrogen levels, which render the vaginal tissue thin, irritated, and open to colonization with gut bernie. This can lead to irritation, dryness, painful sex, urinary infections and urinary urgency. Discussed risks, benefits, and indications for vaginal estrogen therapy.  Vaginal estrogen has negligible absorption into the bloodstream and is not associated with increased risks for uterine or breast cancers. Prescription given for estrace vaginal cream to be placed inside vagina nightly for 2 weeks, then twice weekly thereafter. The effects of vaginal estrogen can take weeks to months.    Other orders  - estradiol (ESTRACE VAGINAL) 0.1 MG/GM vaginal cream; Apply 1g cream inside vagina using applicator nightly for 2 weeks, then twice per week thereafter  Dispense: 1 Each; Refill: 3                 Eliezer Mike MD, FACOG    Female Pelvic Medicine and Reconstructive Surgery  Department of Obstetrics and Gynecology  [facility]        This medical record contains text that has been entered with the assistance of computer voice recognition and dictation software.  Therefore, it may contain unintended errors in text, spelling, punctuation, or grammar

## 2021-11-04 NOTE — Clinical Note
Dear Dr. Steele,     Thank you for the interesting referral. Please see my consultation note attached.     Best regards,   Eliezer Mike MD

## 2021-11-04 NOTE — NON-PROVIDER
PT here today for consult   PT States urgency   Hysterectomy? Yes; 2008  Good #: [phone]   Bladder Scan: N/A  Pharmacy Verified

## 2021-11-10 DIAGNOSIS — I26.09 OTHER PULMONARY EMBOLISM WITH ACUTE COR PULMONALE, UNSPECIFIED CHRONICITY (HCC): ICD-10-CM

## 2021-11-10 DIAGNOSIS — R39.9 UTI SYMPTOMS: Primary | ICD-10-CM

## 2021-11-10 RX ORDER — APIXABAN 5 MG/1
5 TABLET, FILM COATED ORAL 2 TIMES DAILY
Qty: 60 TABLET | Refills: 3 | Status: SHIPPED | OUTPATIENT
Start: 2021-11-10 | End: 2021-11-23 | Stop reason: SDUPTHER

## 2021-11-15 ENCOUNTER — TELEMEDICINE (OUTPATIENT)
Dept: NEUROLOGY | Facility: MEDICAL CENTER | Age: 51
End: 2021-11-15
Attending: STUDENT IN AN ORGANIZED HEALTH CARE EDUCATION/TRAINING PROGRAM
Payer: COMMERCIAL

## 2021-11-15 VITALS — HEIGHT: 67 IN | BODY MASS INDEX: 36.1 KG/M2 | WEIGHT: 230 LBS

## 2021-11-15 DIAGNOSIS — G43.109 COMPLICATED MIGRAINE: ICD-10-CM

## 2021-11-15 DIAGNOSIS — G40.109 LOCALIZATION-RELATED FOCAL EPILEPSY WITH SIMPLE PARTIAL SEIZURES (HCC): ICD-10-CM

## 2021-11-15 DIAGNOSIS — R53.0 NEOPLASTIC (MALIGNANT) RELATED FATIGUE: ICD-10-CM

## 2021-11-15 DIAGNOSIS — M62.838 MUSCLE SPASM: ICD-10-CM

## 2021-11-15 DIAGNOSIS — F41.8 ANXIETY ASSOCIATED WITH DEPRESSION: ICD-10-CM

## 2021-11-15 DIAGNOSIS — M26.621 ARTHRALGIA OF RIGHT TEMPOROMANDIBULAR JOINT: ICD-10-CM

## 2021-11-15 DIAGNOSIS — G81.94 HEMIPARESIS OF LEFT NONDOMINANT SIDE DUE TO NON-CEREBROVASCULAR ETIOLOGY (HCC): ICD-10-CM

## 2021-11-15 DIAGNOSIS — I26.09 OTHER PULMONARY EMBOLISM WITH ACUTE COR PULMONALE, UNSPECIFIED CHRONICITY (HCC): ICD-10-CM

## 2021-11-15 DIAGNOSIS — N31.9 NEUROGENIC BLADDER: ICD-10-CM

## 2021-11-15 DIAGNOSIS — R52 SEVERE PAIN: ICD-10-CM

## 2021-11-15 DIAGNOSIS — R25.2 SPASTICITY: ICD-10-CM

## 2021-11-15 DIAGNOSIS — G40.109 FOCAL EPILEPSY (HCC): ICD-10-CM

## 2021-11-15 DIAGNOSIS — M79.18 MUSCULOSKELETAL PAIN: ICD-10-CM

## 2021-11-15 DIAGNOSIS — Z74.09 IMPAIRED FUNCTIONAL MOBILITY AND ACTIVITY TOLERANCE: ICD-10-CM

## 2021-11-15 DIAGNOSIS — C71.1 GLIOBLASTOMA OF FRONTAL LOBE (HCC): ICD-10-CM

## 2021-11-15 PROCEDURE — 99215 OFFICE O/P EST HI 40 MIN: CPT | Mod: 95 | Performed by: STUDENT IN AN ORGANIZED HEALTH CARE EDUCATION/TRAINING PROGRAM

## 2021-11-15 PROCEDURE — 999999 HB NO CHARGE: Mod: 95 | Performed by: STUDENT IN AN ORGANIZED HEALTH CARE EDUCATION/TRAINING PROGRAM

## 2021-11-15 RX ORDER — LACOSAMIDE 100 MG/1
100 TABLET ORAL 2 TIMES DAILY
Qty: 60 TABLET | Refills: 2 | Status: SHIPPED | OUTPATIENT
Start: 2021-11-15 | End: 2021-12-09 | Stop reason: SDUPTHER

## 2021-11-15 RX ORDER — ACETAMINOPHEN 500 MG
1000 TABLET ORAL EVERY 6 HOURS PRN
COMMUNITY
Start: 2021-10-29 | End: 2022-02-09

## 2021-11-15 ASSESSMENT — FIBROSIS 4 INDEX: FIB4 SCORE: 1.32

## 2021-11-16 NOTE — PROGRESS NOTES
Telemedicine: Established Patient   This evaluation was conducted via Zoom using secure and encrypted videoconferencing technology. The patient was in a private location in the state of [place].    The patient's identity was confirmed and verbal consent was obtained for this virtual visit.     NEUROLOGY CLINIC FOLLOW-UP - 11/15/2021   REFERRING PROVIDER  No referring provider defined for this encounter.    REASON FOR VISIT: Melba Frye 51 y.o. female presents today for follow-up for focal epilepsy, GBM s/p resection w/ resultant left hemibody weakness and spasticity, chronic headaches, chronic MS pain and muscle spasms      INTERVAL HISTORY:  Ambulatory EEG recently completed < 2 weeks ago. Results notable for:  INTERPRETATION:  This is an abnormal ambulatory EEG recording in the awake and drowsy/sleep state(s):   4 seizures arising from the right parasagittal and posterior temporal regions of the brain were captured.   Seizures occurred out of sleep and appeared to wake the patient up.  Seizures lasted between 2 and 4 minutes on average.  There was occasional ictal tachycardia seen.  However no distinct arrhythmias were seen. Patient did not document any clinical events.     There was continuous high amplitude and at times sharply contoured slowing maximal over the right centro-parietal and posterior temporal regions.  The slowing was occasionally rhythmic or quasiarrhythmic during wakefulness at 1 to 1.5 Hz, rarely having overriding sharply contoured theta activity.  During sleep, the rhythmic slowing became more frequent and prolonged, often bordering on the interictal/ictal continuum (LRDA +F).  Rare right frontotemporal spike and slow wave discharge.  .     Together, these findings suggest underlying focal dysfunction maximal over the right parasagittal brain regions.  In addition, findings are suggestive of a heightened risk for seizures arising from these regions as well.  Clinical and radiographic  correlation recommended.    Things going good this week. Able to climb stairs now. Feels stronger. Botox has helped with her functioning.     Pain is better. Hasn't taken tramadol in 4 days. Tylenol is doing the job.     Feeling exhausted in the morning.     Having brief abnormal sensations in left leg,     Mood much better on higher dose of effexor.    No chest pain.     vimpat 50 mg BID  Briviact 100 mg BID    Patient's PMH, PSH, FH, and SH were reviewed.    ROS: All review of systems complete and are negative except as documented  CURRENT MEDICATIONS AT THE TIME OF THIS ENCOUNTER:    Current Outpatient Medications:   •  acetaminophen,   •  lacosamide, 100 mg, Oral, BID  •  Eliquis, 5 mg, Oral, BID  •  estradiol, Apply 1g cream inside vagina using applicator nightly for 2 weeks, then twice per week thereafter  •  amLODIPine, 10 mg, Oral, DAILY  •  temozolomide,   •  brivaracetam, 100 mg, Oral, BID  •  traMADol, 50 mg, Oral, BID PRN  •  Naloxone, One spray in one nostril for overdose and call 911.  •  venlafaxine, 75 mg, Oral, DAILY  •  hydrOXYzine HCl, 50 mg, Oral, BID  •  prochlorperazine, 10 mg, Oral, Q6HRS PRN  •  melatonin, 3 mg, Oral, HS PRN  •  VITAMIN D PO, 1 Units, Oral, Q EVENING  •  multivitamin, 1 Tablet, Oral, Q EVENING     EXAM:   Ambulatory Vitals:      Physical Exam:  Physical Exam  Constitutional:       General: She is awake.      Appearance: Normal appearance.   HENT:      Nose: Nose normal.      Mouth/Throat:      Pharynx: No oropharyngeal exudate.   Musculoskeletal:      Cervical back: Rigidity present.   Neurological:      Mental Status: She is alert.      Cranial Nerves: No dysarthria.        Neurological Exam   Neurological Exam  Mental Status  Awake and alert. no dysarthria present. Language is fluent with no aphasia. Attention and concentration are normal. Fund of knowledge is appropriate for level of education.       ALL DATA (I.e. labs, procedures, imaging, reports, clinical notes, etc.)  FROM [facility] AND/OR OUTSIDE SOURCES, IF AVAILABLE, PERSONALLY REVIEWED:   LABS:    MAGING-    PROCEDURE    OUTSIDE DATA    ASSESSMENT, EDUCATION, AND COUNSELING:  This is a 51 y.o. female patient who presents to the neurology clinic. We had an extensive discussion about the patient's symptoms, signs, and work-up to date, if any. We discussed potential and/or definitive diagnoses, work-up, and potential treatments.       PLAN:  If applicable, the work-up such as labs, imaging, procedures, and/or other testing, referrals, and/or recommended treatment strategies are listed below.  Visit Diagnoses     ICD-10-CM   1. 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 Focal epilepsy (HCC)  G40.109   3. Impaired functional mobility and activity tolerance  Z74.09   4. Hemiparesis of left nondominant side due to non-cerebrovascular etiology (HCC)  G81.94   5. Neoplastic (malignant) related fatigue  R53.0   6. Other pulmonary embolism with acute cor pulmonale, unspecified chronicity (HCC)  I26.09   7. Complicated migraine  G43.109   8. Arthralgia of right temporomandibular joint  M26.621   9. Spasticity  R25.2   10. Glioblastoma of frontal lobe (HCC)  C71.1   11. Anxiety associated with depression  F41.8   12. Musculoskeletal pain  M79.18   13. Muscle spasm  M62.838   14. Severe pain  R52   15. Neurogenic bladder  N31.9      Orders Placed This Encounter   • acetaminophen (TYLENOL) 500 MG Tab   • lacosamide (VIMPAT) 100 MG Tab tablet      Giselle is doing much better functionally and in terms of her pain control.  Her mood is also significantly improved as well, which she attributes largely to increase in Effexor as well as improved functional mobility from a combination of Botox and rehab.  We reviewed her EEG which shows frequent epileptic activity including seizures which is affecting her sleep and preventing her from having deep restful sleep.  Recommending that we increase her Vimpat 200 mg  twice daily, continuing with Briviact 100 mg twice daily.  We will plan to repeat an ambulatory EEG in 1 to 2 months to measure improvement.  Suspect that once her seizures are under better control she will cognitively feel better especially in the morning.  We can continue to increase the Vimpat as tolerated.  Alternatively, we can add Onfi at night for further seizure control if needed.  New prescription of Vimpat sent to the pharmacy.  She has next round of chemo coming up in the next couple weeks.  We will follow up in a few weeks to see how she is doing.  Patient,  in agreement with plan.    Follow-up:   • 2-4 weeks    •     [employer] ADDRESSED, IF APPLICABLE:  Screening for breast cancer - mammogram scheduled for 12/9/21    BILLING DOCUMENTATION:     I spent a total of 50 minutes of face-to-face time in this visit. Over 50% of the time of the visit today was spent on counseling and/or coordination of care wtih the patient and/or family, as above in assessment in plan.    Fady Davidson MD  Epilepsy and General Neurology  Department of Neurology  Clinical  of Neurology [facility].

## 2021-11-19 ENCOUNTER — TELEPHONE (OUTPATIENT)
Dept: MEDICAL GROUP | Facility: IMAGING CENTER | Age: 51
End: 2021-11-19

## 2021-11-19 NOTE — TELEPHONE ENCOUNTER
Patient calling in. Patient states she is having some puffiness, tingling and purple color in her left hand. She states she has a recent history of blood clots and her home health nurse wanted her to give us a call.    Spoke with ODILON Garcia. Recommend patient go into ER to be evaluated for possible blood clot. Patient understood.

## 2021-11-22 ENCOUNTER — OFFICE VISIT (OUTPATIENT)
Dept: PHYSICAL MEDICINE AND REHAB | Facility: REHABILITATION | Age: 51
End: 2021-11-22
Payer: COMMERCIAL

## 2021-11-22 VITALS
SYSTOLIC BLOOD PRESSURE: 102 MMHG | RESPIRATION RATE: 15 BRPM | OXYGEN SATURATION: 96 % | HEART RATE: 95 BPM | DIASTOLIC BLOOD PRESSURE: 60 MMHG | TEMPERATURE: 99 F

## 2021-11-22 DIAGNOSIS — C71.1 GLIOBLASTOMA OF FRONTAL LOBE (HCC): Primary | ICD-10-CM

## 2021-11-22 DIAGNOSIS — G81.14 SPASTIC HEMIPLEGIA AFFECTING LEFT NONDOMINANT SIDE, UNSPECIFIED ETIOLOGY (HCC): ICD-10-CM

## 2021-11-22 DIAGNOSIS — G81.94 LEFT HEMIPARESIS (HCC): ICD-10-CM

## 2021-11-22 DIAGNOSIS — M62.838 OTHER MUSCLE SPASM: ICD-10-CM

## 2021-11-22 PROCEDURE — 99214 OFFICE O/P EST MOD 30 MIN: CPT | Performed by: PHYSICAL MEDICINE & REHABILITATION

## 2021-11-22 NOTE — PROGRESS NOTES
[facility]  PM&R Neuro Rehabilitation Clinic  [address] [place] [address]  Ph: [phone]    FOLLOW UP PATIENT EVALUATION       Patient Name: Melba Frye   Patient : 1970  Patient Age: 50 y.o.     Examining Physician: Dr. Yanet Steele, DO    PM&R History to Date - Background Information:  Dates of Admission/Discharge to ARU: 3/15/2021-3/27/2021    SUBJECTIVE:   Patient Identification: Melba Frye is a 50 y.o. female with PMH significant for migraines, depression/anxiety, seizures, right medial parietal lobe mass seen on MRI 2021 s/p craniotomy and biopsy 3/9 Dr. Mao and rehabilitation history significant for GBM s/p resection 3/9/2021 Dr. Mao s/p craniotomy for recurrence resection [facility] 2021 and is presenting to PM&R clinic for a FOLLOW UP OUTPATIENT evaluation with the following chief complaint/s:    Chief Complaint: Botox follow up    Accompanied by Today: , Dario  History of Present Illness:   - Has noticed improvement in the left leg.   - Has a sling at night to keep arm to keep arm from falling back and being painful.   - Arm does feel looser but had shoulder sprain which set her back.   - Has OT for shoulder ROM.   - Increased Vimpat recently.   - Was able to see Urology and is going to use estrogen cream for a while and then will have bladder UDS.   - Has some left hand swelling.   - Still using a walker. Sometimes does feel like she cannot move her foot.   - Has difficulty grabbing walker due to tightness in the bicep.   -Feels like she would like more function out of her arm than her leg because she can ambulate, but arm is limited now.    Review of Systems:  All other pertinent positive review of systems are noted above in HPI.   All other systems reviewed and are negative.    Past Medical History:  Past Medical History:   Diagnosis Date   • Lentigo 10/17/2018   • Seborrheic keratoses 10/17/2018   • Dermatitis, contact 2015   • Hyperlipidemia  1/23/2015   • Menopausal symptom 7/2/2014   • Fatigue 6/9/2014   • Mixed anxiety and depressive disorder 6/9/2014   • Complicated migraine 6/9/2014   • TMJ arthralgia 6/9/2014   • Anxiety    • Depression    • UTI (lower urinary tract infection)       Past Surgical History:   Procedure Laterality Date   • CRANIOTOMY STEALTH Right 3/9/2021    Procedure: RIGHT CRANIOTOMY, USING FRAMELESS STEREOTAXY - FOR OPEN BIOPSY WITH PHASE REVERSAL;  Surgeon: Maxwell Mao M.D.;  Location: SURGERY [facility];  Service: Neurosurgery   • MYRINGOTOMY  8/27/2010    Performed by [address] at SURGERY SAME DAY [facility] ORS   • LAPAROSCOPY  5/28/2010    Performed by JACKI SHARMA at SURGERY [facility] ORS   • LYMPH NODE SAMPLING  5/28/2010    Performed by JACKI SHARMA at SURGERY [facility] ORS   • DEBULKING  5/28/2010    Performed by JACKI SHARMA at SURGERY [facility] ORS   • CYSTOSCOPY  10/15/08    Performed by YU GODINEZ at SURGERY SAME DAY [facility] ORS   • VAGINAL HYSTERECTOMY SCOPE TOTAL  10/15/08    Performed by YU GODINEZ at SURGERY SAME DAY [facility] ORS   • OTHER  1984    TONSILECTOMY/ ADNOIDS   • APPENDECTOMY  1981   • TONSILLECTOMY AND ADENOIDECTOMY          Current Outpatient Medications:   •  acetaminophen (TYLENOL) 500 MG Tab, , Disp: , Rfl:   •  lacosamide (VIMPAT) 100 MG Tab tablet, Take 1 Tablet by mouth 2 times a day for 90 days., Disp: 60 Tablet, Rfl: 2  •  ELIQUIS 5 MG Tab, Take 1 Tablet by mouth 2 times a day., Disp: 60 Tablet, Rfl: 3  •  estradiol (ESTRACE VAGINAL) 0.1 MG/GM vaginal cream, Apply 1g cream inside vagina using applicator nightly for 2 weeks, then twice per week thereafter, Disp: 1 Each, Rfl: 3  •  amLODIPine (NORVASC) 10 MG Tab, TAKE 1 TABLET BY MOUTH EVERY DAY, Disp: 90 Tablet, Rfl: 0  •  temozolomide (TEMODAR) 100 MG capsule, , Disp: , Rfl:   •  brivaracetam (BRIVIACT) 100 MG Tab tablet, Take 1 Tablet by mouth 2 times a day for 90 days., Disp: 60 Tablet, Rfl: 2  •  traMADol  (ULTRAM) 50 MG Tab, Take 1 Tablet by mouth 2 times a day as needed for Severe Pain (as needed for severe muskuloskeletal pain only after alternatives have been tried and failed.) for up to 30 days. Indications: Pain, Disp: 60 Tablet, Rfl: 0  •  Naloxone (NARCAN) 4 MG/0.1ML Liquid, One spray in one nostril for overdose and call 911., Disp: 2 Each, Rfl: 3  •  venlafaxine (EFFEXOR) 75 MG Tab, Take 1 Tablet by mouth every day., Disp: 90 Tablet, Rfl: 0  •  hydrOXYzine HCl (ATARAX) 50 MG Tab, Take 50 mg by mouth 2 times a day., Disp: , Rfl:   •  prochlorperazine (COMPAZINE) 10 MG Tab, Take 10 mg by mouth every 6 hours as needed for Nausea/Vomiting., Disp: , Rfl:   •  melatonin 3 MG Tab, Take 1 tablet by mouth at bedtime as needed (insomnia)., Disp: 30 tablet, Rfl: 2  •  VITAMIN D PO, Take 1 Units by mouth every evening., Disp: , Rfl:   •  multivitamin (THERAGRAN) Tab, Take 1 tablet by mouth every evening., Disp: , Rfl:   Allergies   Allergen Reactions   • Tape Rash     Use paper tape instead        Past Social History:  Social History     Socioeconomic History   • Marital status:      Spouse name: Not on file   • Number of children: Not on file   • Years of education: Not on file   • Highest education level: Not on file   Occupational History   • Not on file   Tobacco Use   • Smoking status: Never Smoker   • Smokeless tobacco: Never Used   Vaping Use   • Vaping Use: Never used   Substance and Sexual Activity   • Alcohol use: Not Currently     Alcohol/week: 0.0 oz   • Drug use: No   • Sexual activity: Yes     Partners: Male   Other Topics Concern   • Not on file   Social History Narrative   • Not on file     Social Determinants of Health     Financial Resource Strain:    • Difficulty of Paying Living Expenses: Not on file   Food Insecurity:    • Worried About Running Out of Food in the Last Year: Not on file   • Ran Out of Food in the Last Year: Not on file   Transportation Needs:    • Lack of Transportation  (Medical): Not on file   • Lack of Transportation (Non-Medical): Not on file   Physical Activity:    • Days of Exercise per Week: Not on file   • Minutes of Exercise per Session: Not on file   Stress:    • Feeling of Stress : Not on file   Social Connections:    • Frequency of Communication with Friends and Family: Not on file   • Frequency of Social Gatherings with Friends and Family: Not on file   • Attends Jewish Services: Not on file   • Active Member of Clubs or Organizations: Not on file   • Attends Club or Organization Meetings: Not on file   • Marital Status: Not on file   Intimate Partner Violence:    • Fear of Current or Ex-Partner: Not on file   • Emotionally Abused: Not on file   • Physically Abused: Not on file   • Sexually Abused: Not on file   Housing Stability:    • Unable to Pay for Housing in the Last Year: Not on file   • Number of Places Lived in the Last Year: Not on file   • Unstable Housing in the Last Year: Not on file        Family History:  Family History   Problem Relation Age of Onset   • Non-contributory Mother    • Lung Disease Mother         COPD   • Cancer Mother         dysplasia    • Arthritis Mother    • Psychiatric Illness Mother    • Heart Disease Mother    • Hypertension Mother    • Stroke Mother    • Non-contributory Father    • Cancer Father 73        prostate cancer   • Lung Disease Maternal Grandmother    • Lung Disease Paternal Aunt    • Diabetes Paternal Aunt    • Hyperlipidemia Paternal Aunt        Depression and Opioid Screening  PHQ-9:  Depression Screen (PHQ-2/PHQ-9) 3/24/2021 3/25/2021 10/25/2021   PHQ-2 Total Score 0 0 -   PHQ-2 Total Score - - -   PHQ-2 Total Score - - 1   PHQ-9 Total Score - - 5     Interpretation of PHQ-9 Total Score   Score Severity   1-4 No Depression   5-9 Mild Depression   10-14 Moderate Depression   15-19 Moderately Severe Depression   20-27 Severe Depression     OBJECTIVE:   Vital Signs:  Vitals:    11/22/21 1003   BP: 102/60   Pulse: 95    Resp: 15   Temp: 37.2 °C (99 °F)   SpO2: 96%        Pertinent Labs:  Lab Results   Component Value Date/Time    SODIUM 143 07/27/2021 02:30 PM    POTASSIUM 3.7 07/27/2021 02:30 PM    CHLORIDE 108 07/27/2021 02:30 PM    CO2 22 07/27/2021 02:30 PM    GLUCOSE 115 (H) 07/27/2021 02:30 PM    BUN 10 07/27/2021 02:30 PM    CREATININE 0.50 07/27/2021 02:30 PM       No results found for: HBA1C    Lab Results   Component Value Date/Time    WBC 5.7 05/24/2021 12:27 PM    RBC 4.49 05/24/2021 12:27 PM    HEMOGLOBIN 14.7 05/24/2021 12:27 PM    HEMATOCRIT 41.6 05/24/2021 12:27 PM    MCV 92.7 05/24/2021 12:27 PM    MCH 32.7 05/24/2021 12:27 PM    MCHC 35.3 (H) 05/24/2021 12:27 PM    MPV 9.2 05/24/2021 12:27 PM    NEUTSPOLYS 72.30 (H) 05/24/2021 12:27 PM    LYMPHOCYTES 17.00 (L) 05/24/2021 12:27 PM    MONOCYTES 8.30 05/24/2021 12:27 PM    EOSINOPHILS 1.60 05/24/2021 12:27 PM    BASOPHILS 0.40 05/24/2021 12:27 PM       Lab Results   Component Value Date/Time    ASTSGOT 16 07/27/2021 02:30 PM    ALTSGPT 16 07/27/2021 02:30 PM        Physical Exam:   GEN: No apparent distress  HEENT: Head normocephalic, atraumatic.  Sclera nonicteric bilaterally, no ocular discharge appreciated bilaterally.  CV: Extremities warm and well-perfused, no peripheral edema appreciated bilaterally.  PULMONARY: Breathing nonlabored on room air, no respiratory accessory muscle use.  Not requiring supplemental oxygen.  SKIN: No appreciable skin breakdown on exposed areas of skin.  PSYCH: Mood and affect within normal limits.  NEURO: Awake alert.  Conversational.  Logical thought content.  Left hemiparesis.   strength left hand 2/5.  3/5 strength right elbow flexors and extensors.  Strength of shoulder abduction limited by acute shoulder pain, not fully tested today.  Easily triggered clonus left ankle.  Has spasticity at 2/4 of left knee extensors and knee flexors, 3/4 left plantar flexors    ASSESSMENT/PLAN: Melba Frye  is a 50 y.o. female with  rehabilitation history significant for GBM s/p resection 3/9/2021 Dr. Mao s/p repeat resection for recurrence 6/25/2021 [facility], here for evaluation. The following plan was discussed with the patient who is in agreement.     Visit Diagnoses     ICD-10-CM   1. Glioblastoma of frontal lobe (HCC)  C71.1   2. Other muscle spasm  M62.838   3. Spastic hemiplegia affecting left nondominant side, unspecified etiology (HCC)  G81.14   4. Left hemiparesis (HCC)  G81.94         assists with history.    Rehab/Neuro:   1. GBM right medial parietal lobe s/p right frontal parietal craniotomy for biopsy and resection 3/9/2021 Dr. Mao s/p repeat resection for recurrence 6/25/2021 [facility]  2. Left foot drop: Has custom AFO; not currently wearing.  3. Seizures -Vimpat recently increased by neurology  -Therapy: Will be getting occupational therapy for shoulder range of motion.  -Function: Ambulatory with walker.  Has left lower extremity weakness greater than left upper extremity weakness.  Most weakness is distal left lower extremity requiring AFO.  Does have spasticity of plantar flexors.  10/18/2021 patient has had worsening of her spasticity as well as hemiparesis on the left since we last met in May.  She is largely wheelchair-bound but is using the walker occasionally. 11/2021 Botox has made her muscles looser.  She would like more function out of her upper extremity than her lower extremity.  Continues to have easily triggered clonus left ankle.     Spasticity: 5/2021 developing left ankle while inpatient.  Continues to be problematic and clonus gets triggered especially in the evening.  10/2021 spasticity worsening in the left lower extremity and the left upper extremity.  In the upper extremity it is more problematic proximally as opposed to distally. 11/2021 spasticity better after Botox, patient would like more efficacy in her arm compared to her ankle given that she states she can get around if need be.  -Conservative:  Continue range of motion, stretching  -Med management: Patient would benefit from trial of pharmacologic agents given that her spasticity is pretty diffuse on the left side and cannot be addressed solely from Botox.  However, at this time will wait given she has a lot of other things going on.  Definitely would not recommend baclofen given potential to lower seizure threshold.  -Authorized referral for physical medicine rehabilitation for Botox injections; increase units in upper extremity per patient request.  Has difficulty grabbing walker due to bicep tone     Botox Plan: I plan to inject to the left lower extremity and left upper extremity  Location Botox Amount in Units   Left bicep  125 units   Left tricep  100 units   Left medial gastrocnemius  100 units   Left lateral gastrocnemius  75 units   Soleus  100 units   Total Units  500 units     The risks benefits and alternatives to this procedure were discussed and the patient wishes to proceed with the procedure. Risks include but are not limited to damage to surrounding structures, infection, bleeding, worsening of pain which can be permanent, weakness which can be permanent. Benefits include pain relief, improved function. Alternatives includes not doing the procedure.      Neurogenic Bladder: Significant urinary urgency and frequency with incontinence.  Has not trialed any medications.  11/2021 established with urogynecology and will be getting UDS in December.  -Med management: Counseled on  utility of UDS and med management.  -Established with urogynecology.  -Status: Urinary frequency and urgency significantly limiting sleep which is likely worsening mood.  Unchanged.    Sleep: Poor sleep secondary to urinary urgency and seizures which were occurring right when patient transitioned into REM.  -Vimpat increased per urology.  Established with urogynecology.    Follow up: End of January for Botox    Total time spent was 32 minutes.  Included in this time is  the time spent preparing for the visit including record review, my exam and evaluation, counseling and education regarding that which is aforementioned in the assessment and plan. Included this time as the time spent obtaining history from someone other than the patient. Discussion involved the patient and .    Please note that this dictation was created using voice recognition software. I have made every reasonable attempt to correct obvious errors but there may be errors of grammar and content that I may have overlooked prior to finalization of this note.    Dr. Yanet Steele DO, MS  Department of Physical Medicine & Rehabilitation  Neuro Rehabilitation Clinic  [facility]

## 2021-11-23 DIAGNOSIS — I26.09 OTHER PULMONARY EMBOLISM WITH ACUTE COR PULMONALE, UNSPECIFIED CHRONICITY (HCC): ICD-10-CM

## 2021-11-23 RX ORDER — APIXABAN 5 MG/1
5 TABLET, FILM COATED ORAL 2 TIMES DAILY
Qty: 60 TABLET | Refills: 3 | Status: SHIPPED | OUTPATIENT
Start: 2021-11-23 | End: 2021-11-30 | Stop reason: SDUPTHER

## 2021-11-30 DIAGNOSIS — I26.09 OTHER PULMONARY EMBOLISM WITH ACUTE COR PULMONALE, UNSPECIFIED CHRONICITY (HCC): ICD-10-CM

## 2021-11-30 RX ORDER — APIXABAN 5 MG/1
5 TABLET, FILM COATED ORAL 2 TIMES DAILY
Qty: 180 TABLET | Refills: 1 | Status: SHIPPED | OUTPATIENT
Start: 2021-11-30 | End: 2021-12-02 | Stop reason: SDUPTHER

## 2021-11-30 NOTE — PROGRESS NOTES
Re-sent script for Eliquis, 90 day supply instead, which is cheaper for the patient.    Fady Davidson M.D.

## 2021-11-30 NOTE — PROGRESS NOTES
14 day supply of eliquis sent to [facility] to hold her over until her 90 day supply gets approved.    Fady Davidson M.D.

## 2021-12-02 DIAGNOSIS — I26.09 OTHER PULMONARY EMBOLISM WITH ACUTE COR PULMONALE, UNSPECIFIED CHRONICITY (HCC): ICD-10-CM

## 2021-12-02 RX ORDER — APIXABAN 5 MG/1
5 TABLET, FILM COATED ORAL 2 TIMES DAILY
Qty: 180 TABLET | Refills: 1 | Status: SHIPPED | OUTPATIENT
Start: 2021-12-02 | End: 2021-12-06 | Stop reason: SDUPTHER

## 2021-12-06 ENCOUNTER — TELEMEDICINE (OUTPATIENT)
Dept: NEUROLOGY | Facility: MEDICAL CENTER | Age: 51
End: 2021-12-06
Attending: STUDENT IN AN ORGANIZED HEALTH CARE EDUCATION/TRAINING PROGRAM
Payer: COMMERCIAL

## 2021-12-06 VITALS — BODY MASS INDEX: 36.1 KG/M2 | WEIGHT: 230 LBS | HEIGHT: 67 IN

## 2021-12-06 DIAGNOSIS — F41.8 ANXIETY ASSOCIATED WITH DEPRESSION: ICD-10-CM

## 2021-12-06 DIAGNOSIS — C71.1 GLIOBLASTOMA OF FRONTAL LOBE (HCC): ICD-10-CM

## 2021-12-06 DIAGNOSIS — M79.18 MUSCULOSKELETAL PAIN: ICD-10-CM

## 2021-12-06 DIAGNOSIS — F32.A DEPRESSIVE DISORDER: ICD-10-CM

## 2021-12-06 DIAGNOSIS — G81.94 HEMIPARESIS OF LEFT NONDOMINANT SIDE DUE TO NON-CEREBROVASCULAR ETIOLOGY (HCC): ICD-10-CM

## 2021-12-06 DIAGNOSIS — Z74.09 IMPAIRED MOBILITY AND ADLS: ICD-10-CM

## 2021-12-06 DIAGNOSIS — R25.2 SPASTICITY: ICD-10-CM

## 2021-12-06 DIAGNOSIS — Z78.9 IMPAIRED MOBILITY AND ADLS: ICD-10-CM

## 2021-12-06 DIAGNOSIS — I26.09 OTHER PULMONARY EMBOLISM WITH ACUTE COR PULMONALE, UNSPECIFIED CHRONICITY (HCC): ICD-10-CM

## 2021-12-06 DIAGNOSIS — I10 ESSENTIAL HYPERTENSION: ICD-10-CM

## 2021-12-06 DIAGNOSIS — G40.109 LOCALIZATION-RELATED FOCAL EPILEPSY WITH SIMPLE PARTIAL SEIZURES (HCC): ICD-10-CM

## 2021-12-06 DIAGNOSIS — R51.9 NONINTRACTABLE HEADACHE, UNSPECIFIED CHRONICITY PATTERN, UNSPECIFIED HEADACHE TYPE: ICD-10-CM

## 2021-12-06 PROCEDURE — 99214 OFFICE O/P EST MOD 30 MIN: CPT | Mod: 95 | Performed by: STUDENT IN AN ORGANIZED HEALTH CARE EDUCATION/TRAINING PROGRAM

## 2021-12-06 PROCEDURE — 999999 HB NO CHARGE: Mod: 95 | Performed by: STUDENT IN AN ORGANIZED HEALTH CARE EDUCATION/TRAINING PROGRAM

## 2021-12-06 RX ORDER — LEVETIRACETAM 750 MG/1
TABLET ORAL
COMMUNITY
Start: 2021-11-25 | End: 2021-12-06

## 2021-12-06 ASSESSMENT — FIBROSIS 4 INDEX: FIB4 SCORE: 1.32

## 2021-12-06 NOTE — PROGRESS NOTES
Telemedicine: Established Patient   This evaluation was conducted via Zoom using secure and encrypted videoconferencing technology. The patient was in a private location in the state of [place].    The patient's identity was confirmed and verbal consent was obtained for this virtual visit.      NEUROLOGY  FOLLOW-UP - 12/06/2021   REFERRING PROVIDER  No referring provider defined for this encounter.    REASON FOR VISIT: Melba Frye 51 y.o. female presents today for follow-up for focal epilepsy, GBM a/p resection w/ resultant left hemibody weakness and spasticity, chronic headaches, chronic MS bob and muscle spasms        INTERVAL HISTORY:    Doing well. Had    Botox a month, still seeing benefit.    Left foot more rigid as opposed     Tolerating vimpat 100 mg BID. Briviact 100 mg BID. Stopped keppra.    Currently going through another round of chemo.    Sleeping a little bit better.    Has pending appointment with sleep. Was getting up 4-6 times per night but now it decreased in frequency.    Mood much better    Headaches better - more easily controlled with tylenol. Hasn't used tramadol n 3 weeks.    Still occasionally getting spasms of left foot, getting them once     Having shaking of right finger.     December 20 has surveillance MRI.            Patient's PMH, PSH, FH, and SH were reviewed.    ROS: All review of systems complete and are negative except as documented  CURRENT MEDICATIONS AT THE TIME OF THIS ENCOUNTER:    Current Outpatient Medications:   •  Eliquis, 5 mg, Oral, BID, Taking  •  acetaminophen, , Taking  •  lacosamide, 100 mg, Oral, BID, Taking  •  estradiol, Apply 1g cream inside vagina using applicator nightly for 2 weeks, then twice per week thereafter, Taking  •  amLODIPine, 10 mg, Oral, DAILY, Taking  •  temozolomide, , Taking  •  brivaracetam, 100 mg, Oral, BID, Taking  •  Naloxone, One spray in one nostril for overdose and call 911., Taking  •  venlafaxine, 75 mg, Oral, DAILY,  "Taking  •  hydrOXYzine HCl, 50 mg, Oral, BID, Taking  •  prochlorperazine, 10 mg, Oral, Q6HRS PRN, Taking  •  melatonin, 3 mg, Oral, HS PRN, Taking  •  VITAMIN D PO, 1 Units, Oral, Q EVENING, Taking  •  multivitamin, 1 Tablet, Oral, Q EVENING, Taking     EXAM:   Ambulatory Vitals:  Encounter Vitals  Temperature:  (pt could not obtain)  Blood Pressure:  (pt could not obtain)  Pulse:  (pt could not obtain)  Pulse Oximetry:  (pt could not obtain)  Weight: 104 kg (230 lb) (per pt)  Height: 170.2 cm (5' 7\")  BMI (Calculated): 36.02   Physical Exam:  Physical Exam  Constitutional:       General: She is awake.      Appearance: Normal appearance.   Neurological:      Mental Status: She is alert.   Psychiatric:         Speech: Speech normal.        Neurological Exam   Neurological Exam  Mental Status  Awake and alert. Speech is normal. Language is fluent with no aphasia.    Cranial Nerves  CN VII:  Left: There is central facial weakness.       ALL DATA (I.e. labs, procedures, imaging, reports, clinical notes, etc.) FROM [facility] AND/OR OUTSIDE SOURCES, IF AVAILABLE, PERSONALLY REVIEWED:   LABS:    MAGING-    PROCEDURE    OUTSIDE DATA    ASSESSMENT, EDUCATION, AND COUNSELING:  This is a 51 y.o. female patient who presents to the neurology clinic. We had an extensive discussion about the patient's symptoms, signs, and work-up to date, if any. We discussed potential and/or definitive diagnoses, work-up, and potential treatments.       PLAN:  If applicable, the work-up such as labs, imaging, procedures, and/or other testing, referrals, and/or recommended treatment strategies are listed below.  Visit Diagnoses     ICD-10-CM   1. 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 Other pulmonary embolism with acute cor pulmonale, unspecified chronicity (HCC)  I26.09   3. Glioblastoma of frontal lobe (HCC)  C71.1   4. Anxiety associated with depression  F41.8   5. Musculoskeletal pain  M79.18   6. " Spasticity  R25.2   7. Depressive disorder  F32.9   8. Essential hypertension  I10   9. Hemiparesis of left nondominant side due to non-cerebrovascular etiology (HCC)  G81.94   10. Impaired mobility and ADLs  Z74.09    Z78.9   11. Nonintractable headache, unspecified chronicity pattern, unspecified headache type  R51.9      Orders Placed This Encounter   • DISCONTD: levetiracetam (KEPPRA) 750 MG tablet      Doing better on briviact 100 mg BID and vimpat 100 mg BID. Mood is better, in part helped by higher dose of effexor. Has pending MRI that I will follow. This is scheduled in a couple of weeks. Has been getting botox injections which continue to help spasticity and functioning. Has developed some finger tapping on the right that doesn't sound like a seizure. Likely a tic or stereotypy as she is aware of it and is able to suppress it if she wants to. Continue with apixiban 5 mg BID for PE. I would like to examine her again in the next 3-4 months but we have a telemedicine visit in the interim in January. Also will plan to repeat EEG in a couple of months.    Follow-up:   • 1 month telemedicine visit  •     QUALITY [employer] ADDRESSED, IF APPLICABLE:      BILLING DOCUMENTATION:     I spent a total of 30 minutes of face-to-face time in this visit. Over 50% of the time of the visit today was spent on counseling and/or coordination of care wtih the patient and/or family, as above in assessment in plan.    Fady Davidson MD  Epilepsy and General Neurology  Department of Neurology  Clinical  of Neurology [facility].

## 2021-12-07 ENCOUNTER — HOSPITAL ENCOUNTER (OUTPATIENT)
Facility: MEDICAL CENTER | Age: 51
End: 2021-12-07
Attending: STUDENT IN AN ORGANIZED HEALTH CARE EDUCATION/TRAINING PROGRAM
Payer: COMMERCIAL

## 2021-12-07 ENCOUNTER — OFFICE VISIT (OUTPATIENT)
Dept: OBGYN | Facility: CLINIC | Age: 51
End: 2021-12-07
Payer: COMMERCIAL

## 2021-12-07 VITALS — BODY MASS INDEX: 36.02 KG/M2 | WEIGHT: 230 LBS

## 2021-12-07 DIAGNOSIS — R82.90 ABNORMAL URINALYSIS: ICD-10-CM

## 2021-12-07 DIAGNOSIS — N31.9 NEUROGENIC BLADDER: ICD-10-CM

## 2021-12-07 DIAGNOSIS — N39.41 URGE INCONTINENCE: Primary | ICD-10-CM

## 2021-12-07 DIAGNOSIS — R26.89 DECREASED MOBILITY: ICD-10-CM

## 2021-12-07 LAB
APPEARANCE UR: NORMAL
BILIRUB UR STRIP-MCNC: NORMAL MG/DL
COLOR UR AUTO: NORMAL
GLUCOSE UR STRIP.AUTO-MCNC: NEGATIVE MG/DL
KETONES UR STRIP.AUTO-MCNC: NEGATIVE MG/DL
LEUKOCYTE ESTERASE UR QL STRIP.AUTO: NORMAL
NITRITE UR QL STRIP.AUTO: NEGATIVE
PH UR STRIP.AUTO: 7 [PH] (ref 5–8)
PROT UR QL STRIP: NORMAL MG/DL
RBC UR QL AUTO: NORMAL
SP GR UR STRIP.AUTO: 1.02
UROBILINOGEN UR STRIP-MCNC: NORMAL MG/DL

## 2021-12-07 PROCEDURE — 87077 CULTURE AEROBIC IDENTIFY: CPT

## 2021-12-07 PROCEDURE — 51741 ELECTRO-UROFLOWMETRY FIRST: CPT | Mod: 51 | Performed by: STUDENT IN AN ORGANIZED HEALTH CARE EDUCATION/TRAINING PROGRAM

## 2021-12-07 PROCEDURE — 51729 CYSTOMETROGRAM W/VP&UP: CPT | Performed by: STUDENT IN AN ORGANIZED HEALTH CARE EDUCATION/TRAINING PROGRAM

## 2021-12-07 PROCEDURE — 81002 URINALYSIS NONAUTO W/O SCOPE: CPT | Performed by: STUDENT IN AN ORGANIZED HEALTH CARE EDUCATION/TRAINING PROGRAM

## 2021-12-07 PROCEDURE — 51797 INTRAABDOMINAL PRESSURE TEST: CPT | Performed by: STUDENT IN AN ORGANIZED HEALTH CARE EDUCATION/TRAINING PROGRAM

## 2021-12-07 PROCEDURE — 99213 OFFICE O/P EST LOW 20 MIN: CPT | Mod: 25 | Performed by: STUDENT IN AN ORGANIZED HEALTH CARE EDUCATION/TRAINING PROGRAM

## 2021-12-07 PROCEDURE — 51784 ANAL/URINARY MUSCLE STUDY: CPT | Mod: 51 | Performed by: STUDENT IN AN ORGANIZED HEALTH CARE EDUCATION/TRAINING PROGRAM

## 2021-12-07 PROCEDURE — 87186 SC STD MICRODIL/AGAR DIL: CPT

## 2021-12-07 PROCEDURE — 87086 URINE CULTURE/COLONY COUNT: CPT

## 2021-12-07 RX ORDER — NITROFURANTOIN 25; 75 MG/1; MG/1
100 CAPSULE ORAL ONCE
Status: COMPLETED | OUTPATIENT
Start: 2021-12-07 | End: 2021-12-07

## 2021-12-07 RX ADMIN — NITROFURANTOIN 100 MG: 25; 75 CAPSULE ORAL at 09:30

## 2021-12-07 ASSESSMENT — FIBROSIS 4 INDEX: FIB4 SCORE: 1.32

## 2021-12-07 NOTE — PROGRESS NOTES
Urogynecology and Pelvic Reconstructive Surgery -  Follow up visit  Melba Frye MRN:[id number] :1970    Referred by: Yanet Steele DO    Reason for Visit:   Chief Complaint   Patient presents with   • Procedure     UDS         Subjective     History of Presenting Illness:    Ms.Jennifer Cesia Frye is a 51 y.o. year old P1 with PMH significant for migraines, depression/anxiety, seizures, and glioblastoma with neurogenic bladder (nocturia, urge leakage). Sshe is s/p UDS today, and also presentf for pelvic examination and discussion of interventions.     At her last visit she was prescribed vaginal estrogen which she has been taking as instructed. She felt like she may have a UTI at one point with burning, but this subsided spontaneously. She brought her bladder diary (see media and interpretation in UDS note)      Initial HPI: She was referred by her Physiatrist Dr. Steele for the evaluation and management of bothersome urinary symptoms.  She had no bothersome bladder symptoms aside from mild stress urinary incontinence until her diagnosis earlier this year with glioblastoma of the frontal lobe, where she subsequently underwent craniotomy and biopsy in March followed by chemotherapy which is concurrently happening.  - Approximately 2 months after this diagnosis she noted that her bladder symptoms started notable for very bothersome nocturia, 3-5 times per night, which is very difficult given her lack of mobility and hemiparesis and need for her  to ambulate her to her bedside commode to empty.  She also has difficulty emptying her bladder and has significant urinary leakage.  - Additionally she has had a recent difficult bout with a urinary tract infection (it is unclear whether she had 2 subsequent UTIs or one incompletely treated UTI)  - Her gynecologist is Dr. Alvin Paul who is managed her previously, and has taken her off systemic hormone replacement therapy a couple of years ago.  She  only notes some mild hot flashes after coming off this therapy.    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 Gilles Teresa   laparoscopic lysis of adhesions and bilateral salpingo-oophorectomy by Dr. Tomasz Estrada for complex ovarian mass.       Prior treatment:   None for bladder     Fluid intake:   1 cup water  2-3 cups juice      Urine cultures:   21: E coli >100k pansensitive x 2    Pelvic floor symptom review:     Bladder:   Voids per day: 4-5 Voids per night: 3-4   - has bedside commonde   Urinary incontinence episodes per day: 1-2    Urge leakage:  On Movement to Bathroom and Full Bladder   Stress leakage: With Cough, With Laugh and With Exercise   Continuous / insensible urine loss: No    Nocturnal enuresis: No    Leakage volume: Moderate   Number of pads/day: 1-2    Bladder emptying: Incomplete   Voiding symptoms: Hesitancy, Weak Stream and Double or Triple Voiding   UTI in last 12 months: 2   Other urologic history: no      Prolapse:     Prolapse symptoms: None        Bowel:    Constipation: yes, pain meds. Also gets diarrhea.    Bowel movements per day: irregular    Straining to empty bowels: Yes   Splinting to evacuate: No    Painful evacuation: occasional   Difficulty emptying rectum: No    Incontinence to stool: No   Incontinence to gas: No     Blood in stool: No    Hemorrhoids: No    Bowel conditions:    Most recent colonoscopy:  normal            Past medical and surgical history    Past obstetric history   Number of vaginal deliveries: 1   Number of  deliveries: 0   History of vacuum/forceps: Yes   History of obstetric anal sphincter injury: No     Past gynecological history:    Last menstrual period: No LMP recorded. Patient has had a hysterectomy.     Past medical history:  Past Medical History:   Diagnosis Date   • Lentigo 10/17/2018   • Seborrheic keratoses 10/17/2018   • Dermatitis, contact 2015   • Hyperlipidemia 2015   • Menopausal symptom  7/2/2014   • Fatigue 6/9/2014   • Mixed anxiety and depressive disorder 6/9/2014   • Complicated migraine 6/9/2014   • TMJ arthralgia 6/9/2014   • Anxiety    • Depression    • UTI (lower urinary tract infection)      Past surgical history:  Past Surgical History:   Procedure Laterality Date   • CRANIOTOMY STEALTH Right 3/9/2021    Procedure: RIGHT CRANIOTOMY, USING FRAMELESS STEREOTAXY - FOR OPEN BIOPSY WITH PHASE REVERSAL;  Surgeon: Maxwell Mao M.D.;  Location: SURGERY [facility];  Service: Neurosurgery   • MYRINGOTOMY  8/27/2010    Performed by [address] at SURGERY SAME DAY [facility]S   • LAPAROSCOPY  5/28/2010    Performed by JACKI SHARMA at SURGERY [facility] ORS   • LYMPH NODE SAMPLING  5/28/2010    Performed by JACKI SHARMA at SURGERY [facility] ORS   • DEBULKING  5/28/2010    Performed by JACKI SHARMA at SURGERY [facility] ORS   • CYSTOSCOPY  10/15/08    Performed by YU GODINEZ at SURGERY SAME DAY [facility] ORS   • VAGINAL HYSTERECTOMY SCOPE TOTAL  10/15/08    Performed by YU GODINEZ at SURGERY SAME DAY [facility] ORS   • OTHER  1984    TONSILECTOMY/ ADNOIDS   • APPENDECTOMY  1981   • TONSILLECTOMY AND ADENOIDECTOMY       Medications:has a current medication list which includes the following prescription(s): mirabegron er, eliquis, acetaminophen, lacosamide, estradiol, amlodipine, temozolomide, brivaracetam, naloxone, venlafaxine, hydroxyzine hcl, prochlorperazine, melatonin, vitamin d, and multivitamin.  Allergies:Tape  Family history:  Family History   Problem Relation Age of Onset   • Non-contributory Mother    • Lung Disease Mother         COPD   • Cancer Mother         dysplasia    • Arthritis Mother    • Psychiatric Illness Mother    • Heart Disease Mother    • Hypertension Mother    • Stroke Mother    • Non-contributory Father    • Cancer Father 73        prostate cancer   • Lung Disease Maternal Grandmother    • Lung Disease Paternal Aunt    • Diabetes Paternal Aunt    •  Hyperlipidemia Paternal Aunt      Social history: reports that she has never smoked. She has never used smokeless tobacco. She reports previous alcohol use. She reports that she does not use drugs.    Review of systems: A full review of systems was performed, and negative with the exception of want is noted above in the HPI.        Objective        Wt 104 kg (230 lb)   BMI 36.02 kg/m²     Physical Exam  Vitals reviewed.   Constitutional:       Appearance: Normal appearance.   HENT:      Head:      Comments: Hair loss - chemotherapy changes     Mouth/Throat:      Mouth: Mucous membranes are moist.   Cardiovascular:      Rate and Rhythm: Normal rate.   Pulmonary:      Effort: Pulmonary effort is normal.   Musculoskeletal:      Comments: Hemiplegia, uses wheelchair     Skin:     General: Skin is warm and dry.   Neurological:      Mental Status: She is alert.   Psychiatric:         Mood and Affect: Mood normal.         Genitourinary:    External female genitalia: WNL   Vulva: Atrophic   Bulbocavernosus reflex: Intact   Anal wink reflex: Intact   Perineal sensation: WNL   Urethra: WNL   Vagina: Atrophic   Atrophy: Mild   Cough stress test: Negative    Pelvic floor:    POP-Q: no significant pelvic organ prolapse   Prolapse stage: 0   Paravaginal defect: none   Urethral tenderness: No    Bladder/ suprapubic tenderness: No    Levator tenderness: None   Levator muscle tone: Hypertonic      Procedure Performed: Urodynamics - see separate note    Diagnostic test and records review:           Assessment & Plan     Ms.Jennifer Cesia Frye is a 51 y.o. year old P1 with complex recent neurological history including glioblastoma status post craniotomy and chemotherapy with neurogenic bladder (nocturia, urge leakage), and recurrent UTI. We discussed my recommendations for further diagnosis and treatment at length today.     1. Neurogenic bladder  2. Nocturia  3. Urge incontinence  4. Glioblastoma of frontal lobe (HCC)  -  Urodynamics demonstrated significant terminal uninhibited detrusor contractions which led to complete emptying of her bladder.  This occurred at approximately 100 1550 mL of bladder volume.  Her appliance did appear normal and she was able to empty her bladder completely on the initial uroflow, however she was not able to empty with catheters in place.  Given that her symptoms are likely due to significant detrusor overactivity in the setting of neurogenic bladder, I think the best treatment modality for her would be intradetrusor chemodenervation with Botox.  She is also counseled on PTNS as well sacral neuromodulation, however I do not think PTNS would cause sufficient improvement in her symptoms and she has difficulty coming to the office that frequently, and sacral neuromodulation would likely not have as as a significant effect as Botox on the significant do detrusor overactivity though seen on her urodynamics today.  Given the fact that she has had significant Botox (500 units) by Dr. Yanet Steele for muscle spasticity, I would not recommend a bladder Botox injection until at least February 2022 in order to avoid toxicity.    - In the interim I recommend a trial of mirabegron 50 mg ER once daily for 30 days.  She is counseled the efficacy rates of this medication as well as takes approximately 3 weeks in order to see optimal results. She is not a candidate for any anticholinergic therapy (oxybutynin, tolterodine, solifenacin, fesoterodine, trospium) given her glioblastoma multiforme and risk of cognitive dysfunction with these medications.  - Prior authorization request was put in for office Botox procedure.  She is aware she needs to arrive 45minutes before her procedure in order to backfill her bladder with lidocaine for goal anesthesia.  Son the potential risks of transient urinary retention.  I recommend starting with 100 unit dose given that she does not need to catheterize currently, however this is  insufficient she may need to 200 unit dose which increases her risk of postoperative retention, but she would be a good candidate to self cath temporarily if needed.  -     5. Frequent UTI  - Continue vaginal estrogen as primary preventative measure  -Given a dose of Macrobid as prophylaxis for urodynamics today.  A urine culture was sent and if she has bacteria do recommend a short course of Macrobid treatment to prevent further urinary tract infection or upper tract infection.  - Advised to call/message with any new UTI symptoms for culture, as well as advice not to treat asymptomatic bacateruira     6. Atrophic vaginitis  She has vaginal atrophy on examination. Reviewed risks, benefits, and indications for vaginal estrogen therapy.  Continue with vaginal estrogen therapy twice weekly.            Eliezer Mike MD, FACOG    Female Pelvic Medicine and Reconstructive Surgery  Department of Obstetrics and Gynecology  [facility]    CC: Dr. Yanet Steele to coordinate further botox injections for spasciticy.     This medical record contains text that has been entered with the assistance of computer voice recognition and dictation software.  Therefore, it may contain unintended errors in text, spelling, punctuation, or grammar

## 2021-12-07 NOTE — PROCEDURES
Procedure note: Complex urodynamic testing    Procedure performed:    -     95231 Complex Uroflowmetry  - 66157 Complex CMG w/ voiding pressure study AND urethral pressure  - 12274 EMG studies anal or urethral sphincter   - 09106 Intraabdominal catheter   - 75214 Insertion of non-indwelling catheter      Indication: Ms.Jennifer Cesia Frye is a 51 y.o. year old P1 with complex recent neurological history including glioblastoma status post craniotomy and chemotherapy with significant overactive bladder, nocturia, urinary incontinence consistent with neurogenic bladder.    Bladder symptoms:    Bladder:              Voids per day: 4-5       Voids per night: 3-4   - has bedside commonde              Urinary incontinence episodes per day: 1-2               Urge leakage:  On Movement to Bathroom and Full Bladder              Stress leakage: With Cough, With Laugh and With Exercise              Continuous / insensible urine loss: No               Nocturnal enuresis: No               Leakage volume: Moderate              Number of pads/day: 1-2               Bladder emptying: Incomplete              Voiding symptoms: Hesitancy, Weak Stream and Double or Triple Voiding              UTI in last 12 months: 2              Other urologic history: no    POP-Q: no prolapse    She presents for complex urodynamic testing today to fully elucidate her bladder function and symptom pathophysiology and to rule out potentially harmful pathology.     Verbal consent was obtained after review of risk and benefit.     Chaperone: Yanet NOLAN)        Bladder diary:   - Functional bladder capacity: Not recorded  - Daytime frequency: 3-7  - Night time frequency: 1-4  - Average voided volume: not recorded  - Leaks per day: with most voids      Procedure: The patient was taken to the urodynamic suite and placed in the urodynamic chair. She underwent sterile prep with betadine prior to catheterization. There was a UA with leuks but no UTI  symptoms. Air-charged catheters were placed in the urethra/bladder and vagina. Urodynamics were performed using routine techniques.     Antibiotic given: macrobid 100mg once    Urodynamic findings:     Preliminary Uroflometry     o Flow pattern: continuous, although not interpretable due to volume  o Maximum flow: 7.6 mL/sec  o Average flow: 3.9 mL/sec  o Voided volume: 24 mL  o Post-void residual: 4 mL  o Flow time: 6.2 sec    Filling cystometrogram    o First sensation: 48 mL  o First desire: 81 mL  o Strong Desire: 190 mL  o Urodynamic capacity: 190 mL   o Stress leakage: no  o Uninhibited detrusor contractions present: yes, strong tonic terminal contraction  o Leakage with DO: yes - terminal  o Leak point pressures  - At 112mL volume: urge leak (terminal) to 57cm H2O cm H2O  o Compliance: normal    Urethral pressure profile    o Maximum urethral closing pressure (MUCP): 61 cm H2O  o Morphology: normal    Pressure voiding study    o The patient's voiding mechanism was accomplished by terminal DO detrusor contraction -unable to fully void with catheters in place.   o Max flow: 4.9 mL/sec  o Average flow: 1.7 mL/sec  o Voided volume 8mL  o Pdet at peak flow: 9.1 cm H2O  o Post-void residual: 155  o Pelvic floor EMG silenced during voiding: yes  o Catheters removed, patient still unable to void      Pelvic floor EMG: Normal     Assessment:     She has completed urodynamic testing, which was uncomplicated.     - Filling phase: Low bladder capacity with terminal uninhibited detrusor contraction at 112 mL filling. Normal compliance, no stress leak documented due to Dos, but normal MUCP  - Voiding phase: incomplete interpretation due to low volume void, but normal PVR with first uroflow, which was reassuring. She was unable to void with catheters in place.     Plan:   - She has completed urodynamic testing. Please see office note for full exam, follow up, plan  - Counseled on normal post-UDS symptoms including burning  and possible hematuria. If this persists after 2 days she should call or send Viditt message.     Eliezer Mike MD, FACOG    Female Pelvic Medicine and Reconstructive Surgery  Department of Obstetrics and Gynecology  [facility]  [facility]

## 2021-12-07 NOTE — Clinical Note
Ellis Holt - I finished up urodynamic testing for Melba and recommended bladder botox, which I think could lead to significant symptomatic improvement.  She just got 500u from you in November for spacitcity, so I'll be planning to give her 100u in February 2022. Thanks again for the referral!    Eliezer Mike MD

## 2021-12-08 DIAGNOSIS — G40.109 LOCALIZATION-RELATED FOCAL EPILEPSY WITH SIMPLE PARTIAL SEIZURES (HCC): ICD-10-CM

## 2021-12-08 RX ORDER — APIXABAN 5 MG/1
5 TABLET, FILM COATED ORAL 2 TIMES DAILY
Qty: 180 TABLET | Refills: 1 | Status: SHIPPED | OUTPATIENT
Start: 2021-12-08 | End: 2022-05-16 | Stop reason: SDUPTHER

## 2021-12-08 RX ORDER — CLONAZEPAM 1 MG/1
1 TABLET ORAL 2 TIMES DAILY PRN
Qty: 60 TABLET | Refills: 1 | Status: SHIPPED | OUTPATIENT
Start: 2021-12-08 | End: 2021-12-23

## 2021-12-09 DIAGNOSIS — G40.109 LOCALIZATION-RELATED FOCAL EPILEPSY WITH SIMPLE PARTIAL SEIZURES (HCC): ICD-10-CM

## 2021-12-09 DIAGNOSIS — G40.109 FOCAL EPILEPSY (HCC): ICD-10-CM

## 2021-12-09 RX ORDER — LACOSAMIDE 100 MG/1
150 TABLET ORAL 2 TIMES DAILY
Qty: 90 TABLET | Refills: 2 | Status: ON HOLD | OUTPATIENT
Start: 2021-12-09 | End: 2022-02-13

## 2021-12-09 RX ORDER — SULFAMETHOXAZOLE AND TRIMETHOPRIM 800; 160 MG/1; MG/1
1 TABLET ORAL 2 TIMES DAILY
Qty: 6 TABLET | Refills: 0 | Status: SHIPPED | OUTPATIENT
Start: 2021-12-09 | End: 2021-12-10

## 2021-12-10 RX ORDER — CEPHALEXIN 500 MG/1
500 CAPSULE ORAL 2 TIMES DAILY
Qty: 6 CAPSULE | Refills: 0 | Status: SHIPPED | OUTPATIENT
Start: 2021-12-10 | End: 2021-12-13

## 2021-12-13 ENCOUNTER — TELEPHONE (OUTPATIENT)
Dept: OBGYN | Facility: CLINIC | Age: 51
End: 2021-12-13

## 2021-12-13 NOTE — TELEPHONE ENCOUNTER
Called to discuss that her culrure in fact results as resistant to cefazolin ,which is similar to kelfex, and likely not working for UTI. She has increased frequency, denies flank pain.     Her  had previously picked up bactrim, which can make her nauseated, however the UTI is sensitive to this antibiotic. She will take bactrim and use her anti-nausea medication as needed. If any issues or condition worsens she will call back.     All questions answered.     Eliezer iMke MD

## 2021-12-14 ENCOUNTER — APPOINTMENT (OUTPATIENT)
Dept: RADIOLOGY | Facility: MEDICAL CENTER | Age: 51
DRG: 880 | End: 2021-12-14
Attending: STUDENT IN AN ORGANIZED HEALTH CARE EDUCATION/TRAINING PROGRAM
Payer: COMMERCIAL

## 2021-12-14 ENCOUNTER — HOSPITAL ENCOUNTER (INPATIENT)
Facility: MEDICAL CENTER | Age: 51
LOS: 3 days | DRG: 880 | End: 2021-12-18
Attending: STUDENT IN AN ORGANIZED HEALTH CARE EDUCATION/TRAINING PROGRAM | Admitting: INTERNAL MEDICINE
Payer: COMMERCIAL

## 2021-12-14 DIAGNOSIS — Z86.711 HISTORY OF PULMONARY EMBOLUS (PE): ICD-10-CM

## 2021-12-14 DIAGNOSIS — R65.10 SIRS (SYSTEMIC INFLAMMATORY RESPONSE SYNDROME) (HCC): ICD-10-CM

## 2021-12-14 DIAGNOSIS — R53.1 ACUTE LEFT-SIDED WEAKNESS: ICD-10-CM

## 2021-12-14 DIAGNOSIS — C71.1 GLIOBLASTOMA OF FRONTAL LOBE (HCC): ICD-10-CM

## 2021-12-14 DIAGNOSIS — R51.9 ACUTE NONINTRACTABLE HEADACHE, UNSPECIFIED HEADACHE TYPE: ICD-10-CM

## 2021-12-14 DIAGNOSIS — G93.6 VASOGENIC BRAIN EDEMA (HCC): ICD-10-CM

## 2021-12-14 DIAGNOSIS — R50.9 FEVER, UNSPECIFIED FEVER CAUSE: Primary | ICD-10-CM

## 2021-12-14 DIAGNOSIS — Z79.52 CURRENT USE OF STEROID MEDICATION: ICD-10-CM

## 2021-12-14 DIAGNOSIS — G40.109 LOCALIZATION-RELATED FOCAL EPILEPSY WITH SIMPLE PARTIAL SEIZURES (HCC): ICD-10-CM

## 2021-12-14 DIAGNOSIS — R26.81 GAIT INSTABILITY: ICD-10-CM

## 2021-12-14 DIAGNOSIS — G93.9 LESION OF FRONTAL LOBE OF BRAIN: ICD-10-CM

## 2021-12-14 LAB
ALBUMIN SERPL BCP-MCNC: 4.2 G/DL (ref 3.2–4.9)
ALBUMIN/GLOB SERPL: 1.7 G/DL
ALP SERPL-CCNC: 85 U/L (ref 30–99)
ALT SERPL-CCNC: 13 U/L (ref 2–50)
ANION GAP SERPL CALC-SCNC: 13 MMOL/L (ref 7–16)
APPEARANCE UR: CLEAR
APTT PPP: 28.4 SEC (ref 24.7–36)
AST SERPL-CCNC: 24 U/L (ref 12–45)
BASOPHILS # BLD AUTO: 0.1 % (ref 0–1.8)
BASOPHILS # BLD: 0.01 K/UL (ref 0–0.12)
BILIRUB SERPL-MCNC: 0.5 MG/DL (ref 0.1–1.5)
BILIRUB UR QL STRIP.AUTO: NEGATIVE
BUN SERPL-MCNC: 7 MG/DL (ref 8–22)
CALCIUM SERPL-MCNC: 8.9 MG/DL (ref 8.4–10.2)
CHLORIDE SERPL-SCNC: 103 MMOL/L (ref 96–112)
CO2 SERPL-SCNC: 20 MMOL/L (ref 20–33)
COLOR UR: YELLOW
CREAT SERPL-MCNC: 0.65 MG/DL (ref 0.5–1.4)
EOSINOPHIL # BLD AUTO: 0.02 K/UL (ref 0–0.51)
EOSINOPHIL NFR BLD: 0.3 % (ref 0–6.9)
ERYTHROCYTE [DISTWIDTH] IN BLOOD BY AUTOMATED COUNT: 46.6 FL (ref 35.9–50)
FLUAV RNA SPEC QL NAA+PROBE: NEGATIVE
FLUBV RNA SPEC QL NAA+PROBE: NEGATIVE
GLOBULIN SER CALC-MCNC: 2.5 G/DL (ref 1.9–3.5)
GLUCOSE SERPL-MCNC: 98 MG/DL (ref 65–99)
GLUCOSE UR STRIP.AUTO-MCNC: NEGATIVE MG/DL
HCT VFR BLD AUTO: 37.6 % (ref 37–47)
HGB BLD-MCNC: 13 G/DL (ref 12–16)
IMM GRANULOCYTES # BLD AUTO: 0.04 K/UL (ref 0–0.11)
IMM GRANULOCYTES NFR BLD AUTO: 0.6 % (ref 0–0.9)
INR PPP: 1.08 (ref 0.87–1.13)
KETONES UR STRIP.AUTO-MCNC: NEGATIVE MG/DL
LACTATE BLD-SCNC: 1.5 MMOL/L (ref 0.5–2)
LEUKOCYTE ESTERASE UR QL STRIP.AUTO: NEGATIVE
LYMPHOCYTES # BLD AUTO: 0.7 K/UL (ref 1–4.8)
LYMPHOCYTES NFR BLD: 10.5 % (ref 22–41)
MCH RBC QN AUTO: 33.9 PG (ref 27–33)
MCHC RBC AUTO-ENTMCNC: 34.6 G/DL (ref 33.6–35)
MCV RBC AUTO: 98.2 FL (ref 81.4–97.8)
MICRO URNS: NORMAL
MONOCYTES # BLD AUTO: 0.79 K/UL (ref 0–0.85)
MONOCYTES NFR BLD AUTO: 11.8 % (ref 0–13.4)
NEUTROPHILS # BLD AUTO: 5.12 K/UL (ref 2–7.15)
NEUTROPHILS NFR BLD: 76.7 % (ref 44–72)
NITRITE UR QL STRIP.AUTO: NEGATIVE
NRBC # BLD AUTO: 0 K/UL
NRBC BLD-RTO: 0 /100 WBC
PH UR STRIP.AUTO: 7.5 [PH] (ref 5–8)
PLATELET # BLD AUTO: 227 K/UL (ref 164–446)
PMV BLD AUTO: 9.6 FL (ref 9–12.9)
POTASSIUM SERPL-SCNC: 4 MMOL/L (ref 3.6–5.5)
PROT SERPL-MCNC: 6.7 G/DL (ref 6–8.2)
PROT UR QL STRIP: NEGATIVE MG/DL
PROTHROMBIN TIME: 13.2 SEC (ref 12–14.6)
RBC # BLD AUTO: 3.83 M/UL (ref 4.2–5.4)
RBC UR QL AUTO: NEGATIVE
RSV RNA SPEC QL NAA+PROBE: NEGATIVE
SARS-COV-2 RNA RESP QL NAA+PROBE: NOTDETECTED
SODIUM SERPL-SCNC: 136 MMOL/L (ref 135–145)
SP GR UR STRIP.AUTO: 1.02
SPECIMEN SOURCE: NORMAL
WBC # BLD AUTO: 6.7 K/UL (ref 4.8–10.8)

## 2021-12-14 PROCEDURE — 96366 THER/PROPH/DIAG IV INF ADDON: CPT

## 2021-12-14 PROCEDURE — 71045 X-RAY EXAM CHEST 1 VIEW: CPT

## 2021-12-14 PROCEDURE — 700102 HCHG RX REV CODE 250 W/ 637 OVERRIDE(OP): Performed by: STUDENT IN AN ORGANIZED HEALTH CARE EDUCATION/TRAINING PROGRAM

## 2021-12-14 PROCEDURE — 96367 TX/PROPH/DG ADDL SEQ IV INF: CPT

## 2021-12-14 PROCEDURE — 81003 URINALYSIS AUTO W/O SCOPE: CPT

## 2021-12-14 PROCEDURE — C9803 HOPD COVID-19 SPEC COLLECT: HCPCS

## 2021-12-14 PROCEDURE — 85610 PROTHROMBIN TIME: CPT

## 2021-12-14 PROCEDURE — 700111 HCHG RX REV CODE 636 W/ 250 OVERRIDE (IP): Performed by: STUDENT IN AN ORGANIZED HEALTH CARE EDUCATION/TRAINING PROGRAM

## 2021-12-14 PROCEDURE — 87040 BLOOD CULTURE FOR BACTERIA: CPT | Mod: 91

## 2021-12-14 PROCEDURE — 0241U HCHG SARS-COV-2 COVID-19 NFCT DS RESP RNA 4 TRGT MIC: CPT

## 2021-12-14 PROCEDURE — 96365 THER/PROPH/DIAG IV INF INIT: CPT

## 2021-12-14 PROCEDURE — 700105 HCHG RX REV CODE 258: Performed by: STUDENT IN AN ORGANIZED HEALTH CARE EDUCATION/TRAINING PROGRAM

## 2021-12-14 PROCEDURE — 85025 COMPLETE CBC W/AUTO DIFF WBC: CPT

## 2021-12-14 PROCEDURE — 51701 INSERT BLADDER CATHETER: CPT

## 2021-12-14 PROCEDURE — 99285 EMERGENCY DEPT VISIT HI MDM: CPT

## 2021-12-14 PROCEDURE — 87086 URINE CULTURE/COLONY COUNT: CPT

## 2021-12-14 PROCEDURE — 70470 CT HEAD/BRAIN W/O & W/DYE: CPT

## 2021-12-14 PROCEDURE — 700111 HCHG RX REV CODE 636 W/ 250 OVERRIDE (IP)

## 2021-12-14 PROCEDURE — 80053 COMPREHEN METABOLIC PANEL: CPT

## 2021-12-14 PROCEDURE — 700105 HCHG RX REV CODE 258

## 2021-12-14 PROCEDURE — 700117 HCHG RX CONTRAST REV CODE 255: Performed by: STUDENT IN AN ORGANIZED HEALTH CARE EDUCATION/TRAINING PROGRAM

## 2021-12-14 PROCEDURE — 700101 HCHG RX REV CODE 250: Performed by: STUDENT IN AN ORGANIZED HEALTH CARE EDUCATION/TRAINING PROGRAM

## 2021-12-14 PROCEDURE — A9270 NON-COVERED ITEM OR SERVICE: HCPCS | Performed by: STUDENT IN AN ORGANIZED HEALTH CARE EDUCATION/TRAINING PROGRAM

## 2021-12-14 PROCEDURE — 83605 ASSAY OF LACTIC ACID: CPT

## 2021-12-14 PROCEDURE — 36415 COLL VENOUS BLD VENIPUNCTURE: CPT

## 2021-12-14 PROCEDURE — 85730 THROMBOPLASTIN TIME PARTIAL: CPT

## 2021-12-14 RX ORDER — SODIUM CHLORIDE, SODIUM LACTATE, POTASSIUM CHLORIDE, AND CALCIUM CHLORIDE .6; .31; .03; .02 G/100ML; G/100ML; G/100ML; G/100ML
1000 INJECTION, SOLUTION INTRAVENOUS
Status: DISCONTINUED | OUTPATIENT
Start: 2021-12-14 | End: 2021-12-18 | Stop reason: HOSPADM

## 2021-12-14 RX ORDER — HYDROXYZINE HYDROCHLORIDE 25 MG/1
50 TABLET, FILM COATED ORAL ONCE
Status: COMPLETED | OUTPATIENT
Start: 2021-12-14 | End: 2021-12-14

## 2021-12-14 RX ORDER — ACETAMINOPHEN 500 MG
1000 TABLET ORAL ONCE
Status: COMPLETED | OUTPATIENT
Start: 2021-12-14 | End: 2021-12-14

## 2021-12-14 RX ADMIN — HYDROXYZINE HYDROCHLORIDE 50 MG: 25 TABLET ORAL at 23:00

## 2021-12-14 RX ADMIN — VANCOMYCIN HYDROCHLORIDE 2500 MG: 500 INJECTION, POWDER, LYOPHILIZED, FOR SOLUTION INTRAVENOUS at 21:06

## 2021-12-14 RX ADMIN — CEFEPIME 2 G: 2 INJECTION, POWDER, FOR SOLUTION INTRAVENOUS at 19:30

## 2021-12-14 RX ADMIN — ACETAMINOPHEN 1000 MG: 500 TABLET ORAL at 19:07

## 2021-12-14 RX ADMIN — IOHEXOL 50 ML: 350 INJECTION, SOLUTION INTRAVENOUS at 21:07

## 2021-12-14 ASSESSMENT — ENCOUNTER SYMPTOMS
BLURRED VISION: 0
HEADACHES: 1
NAUSEA: 0
CHILLS: 0
DOUBLE VISION: 0
COUGH: 0
NECK PAIN: 0
LOSS OF CONSCIOUSNESS: 0
SORE THROAT: 0
VOMITING: 0
FALLS: 0
ABDOMINAL PAIN: 0
FEVER: 1
SHORTNESS OF BREATH: 0

## 2021-12-14 ASSESSMENT — PAIN DESCRIPTION - PAIN TYPE: TYPE: ACUTE PAIN

## 2021-12-14 ASSESSMENT — FIBROSIS 4 INDEX: FIB4 SCORE: 1.32

## 2021-12-15 ENCOUNTER — APPOINTMENT (OUTPATIENT)
Dept: RADIOLOGY | Facility: MEDICAL CENTER | Age: 51
DRG: 880 | End: 2021-12-15
Attending: INTERNAL MEDICINE
Payer: COMMERCIAL

## 2021-12-15 PROBLEM — R50.9 FEVER: Status: ACTIVE | Noted: 2021-12-15

## 2021-12-15 PROBLEM — I10 HYPERTENSION: Status: ACTIVE | Noted: 2021-12-15

## 2021-12-15 LAB
ANION GAP SERPL CALC-SCNC: 11 MMOL/L (ref 7–16)
BASOPHILS # BLD AUTO: 0.3 % (ref 0–1.8)
BASOPHILS # BLD: 0.02 K/UL (ref 0–0.12)
BUN SERPL-MCNC: 6 MG/DL (ref 8–22)
CALCIUM SERPL-MCNC: 8.9 MG/DL (ref 8.4–10.2)
CHLORIDE SERPL-SCNC: 105 MMOL/L (ref 96–112)
CO2 SERPL-SCNC: 24 MMOL/L (ref 20–33)
CREAT SERPL-MCNC: 0.55 MG/DL (ref 0.5–1.4)
EOSINOPHIL # BLD AUTO: 0.08 K/UL (ref 0–0.51)
EOSINOPHIL NFR BLD: 1.1 % (ref 0–6.9)
ERYTHROCYTE [DISTWIDTH] IN BLOOD BY AUTOMATED COUNT: 47.5 FL (ref 35.9–50)
GLUCOSE SERPL-MCNC: 98 MG/DL (ref 65–99)
HCT VFR BLD AUTO: 34 % (ref 37–47)
HGB BLD-MCNC: 11.8 G/DL (ref 12–16)
IMM GRANULOCYTES # BLD AUTO: 0.03 K/UL (ref 0–0.11)
IMM GRANULOCYTES NFR BLD AUTO: 0.4 % (ref 0–0.9)
LYMPHOCYTES # BLD AUTO: 0.65 K/UL (ref 1–4.8)
LYMPHOCYTES NFR BLD: 8.9 % (ref 22–41)
MAGNESIUM SERPL-MCNC: 2 MG/DL (ref 1.5–2.5)
MCH RBC QN AUTO: 33.9 PG (ref 27–33)
MCHC RBC AUTO-ENTMCNC: 34.7 G/DL (ref 33.6–35)
MCV RBC AUTO: 97.7 FL (ref 81.4–97.8)
MONOCYTES # BLD AUTO: 1.01 K/UL (ref 0–0.85)
MONOCYTES NFR BLD AUTO: 13.8 % (ref 0–13.4)
NEUTROPHILS # BLD AUTO: 5.55 K/UL (ref 2–7.15)
NEUTROPHILS NFR BLD: 75.5 % (ref 44–72)
NRBC # BLD AUTO: 0 K/UL
NRBC BLD-RTO: 0 /100 WBC
PLATELET # BLD AUTO: 198 K/UL (ref 164–446)
PMV BLD AUTO: 9.2 FL (ref 9–12.9)
POTASSIUM SERPL-SCNC: 4 MMOL/L (ref 3.6–5.5)
RBC # BLD AUTO: 3.48 M/UL (ref 4.2–5.4)
SODIUM SERPL-SCNC: 140 MMOL/L (ref 135–145)
WBC # BLD AUTO: 7.3 K/UL (ref 4.8–10.8)

## 2021-12-15 PROCEDURE — 85025 COMPLETE CBC W/AUTO DIFF WBC: CPT

## 2021-12-15 PROCEDURE — 700105 HCHG RX REV CODE 258: Performed by: INTERNAL MEDICINE

## 2021-12-15 PROCEDURE — A9270 NON-COVERED ITEM OR SERVICE: HCPCS | Performed by: INTERNAL MEDICINE

## 2021-12-15 PROCEDURE — C9803 HOPD COVID-19 SPEC COLLECT: HCPCS | Performed by: INTERNAL MEDICINE

## 2021-12-15 PROCEDURE — A9576 INJ PROHANCE MULTIPACK: HCPCS | Performed by: INTERNAL MEDICINE

## 2021-12-15 PROCEDURE — 770006 HCHG ROOM/CARE - MED/SURG/GYN SEMI*

## 2021-12-15 PROCEDURE — 96366 THER/PROPH/DIAG IV INF ADDON: CPT

## 2021-12-15 PROCEDURE — 700111 HCHG RX REV CODE 636 W/ 250 OVERRIDE (IP): Performed by: INTERNAL MEDICINE

## 2021-12-15 PROCEDURE — 99223 1ST HOSP IP/OBS HIGH 75: CPT | Performed by: INTERNAL MEDICINE

## 2021-12-15 PROCEDURE — 700117 HCHG RX CONTRAST REV CODE 255: Performed by: INTERNAL MEDICINE

## 2021-12-15 PROCEDURE — 70553 MRI BRAIN STEM W/O & W/DYE: CPT

## 2021-12-15 PROCEDURE — 83735 ASSAY OF MAGNESIUM: CPT

## 2021-12-15 PROCEDURE — A9270 NON-COVERED ITEM OR SERVICE: HCPCS | Performed by: STUDENT IN AN ORGANIZED HEALTH CARE EDUCATION/TRAINING PROGRAM

## 2021-12-15 PROCEDURE — 700102 HCHG RX REV CODE 250 W/ 637 OVERRIDE(OP): Performed by: INTERNAL MEDICINE

## 2021-12-15 PROCEDURE — 700102 HCHG RX REV CODE 250 W/ 637 OVERRIDE(OP): Performed by: STUDENT IN AN ORGANIZED HEALTH CARE EDUCATION/TRAINING PROGRAM

## 2021-12-15 PROCEDURE — 700101 HCHG RX REV CODE 250: Performed by: INTERNAL MEDICINE

## 2021-12-15 PROCEDURE — 80048 BASIC METABOLIC PNL TOTAL CA: CPT

## 2021-12-15 RX ORDER — TRAMADOL HYDROCHLORIDE 50 MG/1
50 TABLET ORAL EVERY 4 HOURS PRN
Status: ON HOLD | COMMUNITY
End: 2021-12-18

## 2021-12-15 RX ORDER — SULFAMETHOXAZOLE AND TRIMETHOPRIM 800; 160 MG/1; MG/1
1 TABLET ORAL 2 TIMES DAILY
Status: ON HOLD | COMMUNITY
End: 2021-12-18

## 2021-12-15 RX ORDER — PROCHLORPERAZINE EDISYLATE 5 MG/ML
5-10 INJECTION INTRAMUSCULAR; INTRAVENOUS EVERY 4 HOURS PRN
Status: DISCONTINUED | OUTPATIENT
Start: 2021-12-15 | End: 2021-12-18 | Stop reason: HOSPADM

## 2021-12-15 RX ORDER — TRAMADOL HYDROCHLORIDE 50 MG/1
50 TABLET ORAL EVERY 4 HOURS PRN
Status: DISCONTINUED | OUTPATIENT
Start: 2021-12-15 | End: 2021-12-18 | Stop reason: HOSPADM

## 2021-12-15 RX ORDER — BISACODYL 10 MG
10 SUPPOSITORY, RECTAL RECTAL
Status: DISCONTINUED | OUTPATIENT
Start: 2021-12-15 | End: 2021-12-18 | Stop reason: HOSPADM

## 2021-12-15 RX ORDER — PROMETHAZINE HYDROCHLORIDE 25 MG/1
12.5-25 TABLET ORAL EVERY 4 HOURS PRN
Status: DISCONTINUED | OUTPATIENT
Start: 2021-12-15 | End: 2021-12-18 | Stop reason: HOSPADM

## 2021-12-15 RX ORDER — VENLAFAXINE 75 MG/1
75 TABLET ORAL DAILY
Status: DISCONTINUED | OUTPATIENT
Start: 2021-12-15 | End: 2021-12-18 | Stop reason: HOSPADM

## 2021-12-15 RX ORDER — HYDROXYZINE HYDROCHLORIDE 25 MG/1
50 TABLET, FILM COATED ORAL 2 TIMES DAILY PRN
Status: DISCONTINUED | OUTPATIENT
Start: 2021-12-15 | End: 2021-12-18 | Stop reason: HOSPADM

## 2021-12-15 RX ORDER — AMOXICILLIN 250 MG
2 CAPSULE ORAL 2 TIMES DAILY
Status: DISCONTINUED | OUTPATIENT
Start: 2021-12-15 | End: 2021-12-18 | Stop reason: HOSPADM

## 2021-12-15 RX ORDER — LABETALOL HYDROCHLORIDE 5 MG/ML
10 INJECTION, SOLUTION INTRAVENOUS EVERY 4 HOURS PRN
Status: DISCONTINUED | OUTPATIENT
Start: 2021-12-15 | End: 2021-12-18 | Stop reason: HOSPADM

## 2021-12-15 RX ORDER — ZOLPIDEM TARTRATE 5 MG/1
5 TABLET ORAL NIGHTLY PRN
Status: DISCONTINUED | OUTPATIENT
Start: 2021-12-15 | End: 2021-12-18 | Stop reason: HOSPADM

## 2021-12-15 RX ORDER — ONDANSETRON 4 MG/1
4 TABLET, ORALLY DISINTEGRATING ORAL EVERY 4 HOURS PRN
Status: DISCONTINUED | OUTPATIENT
Start: 2021-12-15 | End: 2021-12-18 | Stop reason: HOSPADM

## 2021-12-15 RX ORDER — LACOSAMIDE 50 MG/1
150 TABLET ORAL 2 TIMES DAILY
Status: DISCONTINUED | OUTPATIENT
Start: 2021-12-15 | End: 2021-12-18 | Stop reason: HOSPADM

## 2021-12-15 RX ORDER — AMLODIPINE BESYLATE 5 MG/1
10 TABLET ORAL DAILY
Status: DISCONTINUED | OUTPATIENT
Start: 2021-12-15 | End: 2021-12-18 | Stop reason: HOSPADM

## 2021-12-15 RX ORDER — POLYETHYLENE GLYCOL 3350 17 G/17G
1 POWDER, FOR SOLUTION ORAL
Status: DISCONTINUED | OUTPATIENT
Start: 2021-12-15 | End: 2021-12-18 | Stop reason: HOSPADM

## 2021-12-15 RX ORDER — PROMETHAZINE HYDROCHLORIDE 25 MG/1
12.5-25 SUPPOSITORY RECTAL EVERY 4 HOURS PRN
Status: DISCONTINUED | OUTPATIENT
Start: 2021-12-15 | End: 2021-12-18 | Stop reason: HOSPADM

## 2021-12-15 RX ORDER — TRAMADOL HYDROCHLORIDE 50 MG/1
50 TABLET ORAL ONCE
Status: COMPLETED | OUTPATIENT
Start: 2021-12-15 | End: 2021-12-15

## 2021-12-15 RX ORDER — DIPHENHYDRAMINE HCL 25 MG
50 TABLET ORAL ONCE
Status: COMPLETED | OUTPATIENT
Start: 2021-12-15 | End: 2021-12-15

## 2021-12-15 RX ORDER — ACETAMINOPHEN 325 MG/1
650 TABLET ORAL EVERY 6 HOURS PRN
Status: DISCONTINUED | OUTPATIENT
Start: 2021-12-15 | End: 2021-12-18 | Stop reason: HOSPADM

## 2021-12-15 RX ORDER — CEPHALEXIN 500 MG/1
500 CAPSULE ORAL 2 TIMES DAILY
Status: ON HOLD | COMMUNITY
End: 2021-12-18

## 2021-12-15 RX ORDER — ONDANSETRON 2 MG/ML
4 INJECTION INTRAMUSCULAR; INTRAVENOUS EVERY 4 HOURS PRN
Status: DISCONTINUED | OUTPATIENT
Start: 2021-12-15 | End: 2021-12-18 | Stop reason: HOSPADM

## 2021-12-15 RX ADMIN — VANCOMYCIN HYDROCHLORIDE 1750 MG: 500 INJECTION, POWDER, LYOPHILIZED, FOR SOLUTION INTRAVENOUS at 11:17

## 2021-12-15 RX ADMIN — APIXABAN 5 MG: 5 TABLET, FILM COATED ORAL at 17:25

## 2021-12-15 RX ADMIN — AMLODIPINE BESYLATE 10 MG: 5 TABLET ORAL at 10:11

## 2021-12-15 RX ADMIN — LACOSAMIDE 150 MG: 50 TABLET, FILM COATED ORAL at 10:12

## 2021-12-15 RX ADMIN — APIXABAN 5 MG: 5 TABLET, FILM COATED ORAL at 10:12

## 2021-12-15 RX ADMIN — VENLAFAXINE 75 MG: 75 TABLET ORAL at 10:12

## 2021-12-15 RX ADMIN — TRAMADOL HYDROCHLORIDE 50 MG: 50 TABLET, FILM COATED ORAL at 01:50

## 2021-12-15 RX ADMIN — LACOSAMIDE 150 MG: 50 TABLET, FILM COATED ORAL at 17:25

## 2021-12-15 RX ADMIN — TRAMADOL HYDROCHLORIDE 50 MG: 50 TABLET, COATED ORAL at 15:14

## 2021-12-15 RX ADMIN — DIPHENHYDRAMINE HCL 50 MG: 25 TABLET ORAL at 00:32

## 2021-12-15 RX ADMIN — ZOLPIDEM TARTRATE 5 MG: 5 TABLET ORAL at 22:35

## 2021-12-15 RX ADMIN — GADOTERIDOL 20 ML: 279.3 INJECTION, SOLUTION INTRAVENOUS at 10:54

## 2021-12-15 RX ADMIN — TRAMADOL HYDROCHLORIDE 50 MG: 50 TABLET, COATED ORAL at 22:27

## 2021-12-15 RX ADMIN — CEFEPIME 2 G: 2 INJECTION, POWDER, FOR SOLUTION INTRAVENOUS at 10:13

## 2021-12-15 RX ADMIN — ACETAMINOPHEN 650 MG: 325 TABLET ORAL at 12:31

## 2021-12-15 ASSESSMENT — ENCOUNTER SYMPTOMS
SORE THROAT: 0
DIARRHEA: 0
COUGH: 0
DIZZINESS: 0
EYE DISCHARGE: 0
CHILLS: 0
FLANK PAIN: 0
HEADACHES: 0
SHORTNESS OF BREATH: 0
SPEECH CHANGE: 0
MYALGIAS: 0
HEMOPTYSIS: 0
ORTHOPNEA: 0
DEPRESSION: 0
BLURRED VISION: 0
BLOOD IN STOOL: 0
BACK PAIN: 0
SPUTUM PRODUCTION: 0
HEARTBURN: 0
VOMITING: 0
DOUBLE VISION: 0
WHEEZING: 0
BRUISES/BLEEDS EASILY: 0
FEVER: 1
WEAKNESS: 0
TREMORS: 1
SENSORY CHANGE: 0
PALPITATIONS: 0
PHOTOPHOBIA: 0
ABDOMINAL PAIN: 0
CLAUDICATION: 0
NAUSEA: 0

## 2021-12-15 ASSESSMENT — LIFESTYLE VARIABLES
EVER HAD A DRINK FIRST THING IN THE MORNING TO STEADY YOUR NERVES TO GET RID OF A HANGOVER: NO
HAVE PEOPLE ANNOYED YOU BY CRITICIZING YOUR DRINKING: NO
CONSUMPTION TOTAL: NEGATIVE
ALCOHOL_USE: NO
TOTAL SCORE: 0
TOTAL SCORE: 0
HAVE YOU EVER FELT YOU SHOULD CUT DOWN ON YOUR DRINKING: NO
TOTAL SCORE: 0
ON A TYPICAL DAY WHEN YOU DRINK ALCOHOL HOW MANY DRINKS DO YOU HAVE: 0
HOW MANY TIMES IN THE PAST YEAR HAVE YOU HAD 5 OR MORE DRINKS IN A DAY: 0
AVERAGE NUMBER OF DAYS PER WEEK YOU HAVE A DRINK CONTAINING ALCOHOL: 0
EVER FELT BAD OR GUILTY ABOUT YOUR DRINKING: NO

## 2021-12-15 ASSESSMENT — COGNITIVE AND FUNCTIONAL STATUS - GENERAL
SUGGESTED CMS G CODE MODIFIER DAILY ACTIVITY: CK
TOILETING: A LITTLE
DAILY ACTIVITIY SCORE: 18
PERSONAL GROOMING: A LITTLE
HELP NEEDED FOR BATHING: A LITTLE
CLIMB 3 TO 5 STEPS WITH RAILING: A LOT
DRESSING REGULAR UPPER BODY CLOTHING: A LITTLE
WALKING IN HOSPITAL ROOM: A LOT
MOVING FROM LYING ON BACK TO SITTING ON SIDE OF FLAT BED: A LOT
STANDING UP FROM CHAIR USING ARMS: A LOT
SUGGESTED CMS G CODE MODIFIER MOBILITY: CL
TURNING FROM BACK TO SIDE WHILE IN FLAT BAD: A LITTLE
MOBILITY SCORE: 13
EATING MEALS: A LITTLE
DRESSING REGULAR LOWER BODY CLOTHING: A LITTLE
MOVING TO AND FROM BED TO CHAIR: A LOT

## 2021-12-15 ASSESSMENT — PAIN DESCRIPTION - PAIN TYPE
TYPE: ACUTE PAIN;CHRONIC PAIN
TYPE: ACUTE PAIN

## 2021-12-15 NOTE — ED TRIAGE NOTES
52 yo female bib remsa from home with reports of fever and not getting better on her antibiotics for her UTI.  Patient reports she was on Bactrim then on Keflex but still has pain with urination and developed a fever today.  Patient also reports that she has been having right arm tremors per remsa and  reported they are concerned that perhaps she is having focal seizures.  Patient does report a headache today.    Patient is Covid vaccinated

## 2021-12-15 NOTE — DISCHARGE PLANNING
Anticipated Discharge Disposition: Unknown    Action: Cancelled transfer. Dr Lind reviewed MRI with Neuro. Will obtain bed at [facility].  Shared with [facility] and ER /RN    Barriers to Discharge: Unknown    Plan: Will cont to follow

## 2021-12-15 NOTE — ED NOTES
Patient in bed resting quietly. No complaints at this time. NAD noted. Calm and cooperative. No respiratory distress noted.

## 2021-12-15 NOTE — DISCHARGE PLANNING
Anticipated Discharge Disposition: Transfer     Action: Cont to await bed at [facility]. COBRA started, REMSA PCS sent to RTOC with current status needs.  RTOC / CM on ER or Floor depending on location Will review again prior to transfer to ensure no changes in needs. Will cont to follow closely    Barriers to Discharge: Unknown  Plan: Will cont to follow

## 2021-12-15 NOTE — DISCHARGE PLANNING
Anticipated Discharge Disposition: Transfer    Action: Cont to follow for transfer. MRI done per [facility]. Read pending. Anticipate with in next hr or so. Film room 35429. Updated RT[facility] Sarah as well.  No    Barriers to Discharge: Unknown. Bed availability pending MRI read pending    Plan: Will cont to follow.

## 2021-12-15 NOTE — ASSESSMENT & PLAN NOTE
Complaining of anxiety this morning.  Discussed meditative techniques to which patient was receptive.  Continue home dose venlafaxine.  Start low-dose Ativan as needed.

## 2021-12-15 NOTE — ASSESSMENT & PLAN NOTE
Patient with history of glioblastoma and right frontal lobe.  Status post resection at [facility] in spring of this year.  Followed by radiation.  Followed by Dr. Frye of radiation oncology and also Dr. Cerda of oncology  Completed temozolomide chemotherapy    Start dexamethasone as per above with taper.  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

## 2021-12-15 NOTE — PROGRESS NOTES
"Pharmacy Kinetics 51 y.o. female on vancomycin day # 1 12/15/2021    Currently on Vancomycin 2500 mg iv Loading dose last night  Then 1750 mg IV every 12 hours.  Provider specified end date: 21    Indication for Treatment: CNS infection    Pertinent history per medical record: Admitted on 2021 for 51 year old female Glioblastoma patient on chemotherapy with episodes of fever over the couse of the week    Other antibiotics: Cefepime 2 Gm every 8 hours.    Allergies: Tape     List concerns for renal function:  obesity  Pertinent cultures to date:   21 BCx x 2    MRSA nares swab if pneumonia is a concern (ordered/positive/negative/n-a): N/a    Recent Labs     12/14/21  1850 12/15/21  0905   WBC 6.7 7.3   NEUTSPOLYS 76.70* 75.50*     Recent Labs     12/14/21  1850 12/15/21  0905   BUN 7* 6*   CREATININE 0.65 0.55   ALBUMIN 4.2  --      No results for input(s): VANCOTROUGH, VANCOPEAK, VANCORANDOM in the last 72 hours.No intake or output data in the 24 hours ending 12/15/21 1004   /85   Pulse 80   Temp (!) 38.3 °C (101 °F) (Temporal)   Resp 16   Ht 1.702 m (5' 7\")   Wt 104 kg (230 lb)   SpO2 94%  Temp (24hrs), Av.3 °C (101 °F), Min:38.3 °C (101 °F), Max:38.3 °C (101 °F)      A/P   1. Vancomycin dose change: 1750 mg IV every 12 hours  2. Next vancomycin level: 1000 hours on 21  3. Goal trough: 18-22 mcg/ml  4. Comments: Will follow and adjust regimen per protocol.    Paco Greenberg [facility]    "

## 2021-12-15 NOTE — ED NOTES
IV established, blood cultures x 2 drawn and sent, in and out catheter done and urine to lab, patient tolerated well.  Patient and  updated on plan of care

## 2021-12-15 NOTE — ASSESSMENT & PLAN NOTE
Procalcitonin within normal limits.  Mildly elevated ESR and CRP.  No signs of active infection.  No significant neutropenia.  Suspect possible central etiology    Continue to monitor white count and for fevers.

## 2021-12-15 NOTE — ASSESSMENT & PLAN NOTE
Patiently recently completed therapy with Bactrim and Keflex  Repeat urinalysis on this admission was negative    Continue to monitor for symptoms.

## 2021-12-15 NOTE — H&P
Hospital Medicine History & Physical Note    Date of Service  12/15/2021    Primary Care Physician  Trinity Nguyen M.D.    Consultants  neurosurgery    Specialist Names: Dr. Mao    Code Status  Full Code    Chief Complaint  Chief Complaint   Patient presents with   • Fever   • Urinary Pain       History of Presenting Illness  Melba Frye is a 51 y.o. female who presented 12/14/2021 with past medical history of glioblastoma, hemiparesis, obesity, hypertension, and anxiety who presents to the ED with a chief complaint of fever.  The patient was apparently well until 7 days prior to admission.  She states that she had completed a 10-day course of her chemotherapy session for her history of glioblastoma when she developed new onset tremor of her right hand.  She denies any new motor/sensory deficits.  States that she does have a history of migraines and she has not noted any worsening or progressing headaches from her baseline.  She also denies any new visual changes.  Throughout the course of the week she noted episodes of fever with T-max of 101.0.  She denies any associated cough, diarrhea, or worsening headaches.    Upon arrival to the ED, she did have documented fever.  CBC with differential does not show evidence of neutropenia.  Blood cultures have been negative x2.  Chest x-ray without any acute cardiopulmonary process.  CT scan is consistent with her history of glioblastoma with associated vasogenic edema.  Repeat urinalysis was negative.  No new motor/sensory deficits noted.  The case was discussed with neurosurgery, Dr. Mao with recommendations for MRI, continued antibiotics until MRI is resulted, and transfer to [facility] for further evaluation and management IF with abscess on MRI.    I discussed the plan of care with patient.    Review of Systems  Review of Systems   Constitutional: Positive for fever. Negative for chills and malaise/fatigue.   HENT: Negative for congestion, hearing loss, sore  throat and tinnitus.    Eyes: Negative for blurred vision, double vision, photophobia and discharge.   Respiratory: Negative for cough, hemoptysis, sputum production, shortness of breath and wheezing.    Cardiovascular: Negative for chest pain, palpitations, orthopnea, claudication and leg swelling.   Gastrointestinal: Negative for abdominal pain, blood in stool, diarrhea, heartburn, melena, nausea and vomiting.   Genitourinary: Negative for dysuria, flank pain, hematuria and urgency.   Musculoskeletal: Negative for back pain, joint pain and myalgias.   Skin: Negative for itching and rash.   Neurological: Positive for tremors. Negative for dizziness, sensory change, speech change, weakness and headaches.   Endo/Heme/Allergies: Does not bruise/bleed easily.   Psychiatric/Behavioral: Negative for depression and suicidal ideas.       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    Surgical History   has a past surgical history that includes cystoscopy (10/15/08); laparoscopy (5/28/2010); lymph node sampling (5/28/2010); debulking (5/28/2010); appendectomy (1981); other (1984); vaginal hysterectomy scope total (10/15/08); myringotomy (8/27/2010); tonsillectomy and adenoidectomy; and craniotomy stealth (Right, 3/9/2021).     Family History  family history includes Arthritis in her mother; Cancer in her mother; Cancer (age of onset: 73) in her father; Diabetes in her paternal aunt; Heart Disease in her mother; Hyperlipidemia in her paternal aunt; Hypertension in her mother; Lung Disease in her maternal grandmother, mother, and paternal  aunt; Non-contributory in her father and mother; Psychiatric Illness in her mother; Stroke in her mother.   Family history reviewed with patient. There is family history that is pertinent to the chief complaint.     Social History   reports that she has never smoked. She has never used smokeless tobacco. She reports previous alcohol use. She reports that she does not use drugs.    Allergies  Allergies   Allergen Reactions   • Tape Rash     Use paper tape instead       Medications  Prior to Admission Medications   Prescriptions Last Dose Informant Patient Reported? Taking?   ELIQUIS 5 MG Tab 12/14/2021 at 0800 Patient No No   Sig: Take 1 Tablet by mouth 2 times a day.   FOLIC ACID PO > 2 weeks at AM Patient Yes Yes   Sig: Take 1 Tablet by mouth every day.   Mirabegron ER 50 MG TABLET SR 24 HR 12/13/2021 at PM Patient No No   Sig: Take 50 mg by mouth every day for 30 days.   Patient taking differently: Take 50 mg by mouth every evening. Pt started on 12/7/2021   Naloxone (NARCAN) 4 MG/0.1ML Liquid > Never used Patient No No   Sig: One spray in one nostril for overdose and call 911.   Patient taking differently: Administer 1 Spray into affected nostril(S) as needed. One spray in one nostril for overdose and call 911.  Indications: Opioid Overdose   VITAMIN D PO 12/13/2021 at PM Patient Yes No   Sig: Take 1 Units by mouth every evening.   acetaminophen (TYLENOL) 500 MG Tab 12/14/2021 at 0800 Patient Yes No   Sig: Take 1,000 mg by mouth every 6 hours as needed for Moderate Pain.   amLODIPine (NORVASC) 10 MG Tab 12/14/2021 at 0800 Patient No No   Sig: TAKE 1 TABLET BY MOUTH EVERY DAY   brivaracetam (BRIVIACT) 100 MG Tab tablet 12/14/2021 at 0800 Patient No No   Sig: Take 1 Tablet by mouth 2 times a day for 90 days.   cephALEXin (KEFLEX) 500 MG Cap 12/12/2021 at FINISHED Significant Other Yes Yes   Sig: Take 500 mg by mouth 2 times a day. Pt started on 12/10/2021 for 3 day course   clonazePAM (KLONOPIN) 1 MG Tab NEW RX  Patient No No   Sig: Take 1 Tablet by mouth 2 times a day as needed (use for any repetitive seizures OR prolonged seizure > 5 minutes) for up to 60 days.   estradiol (ESTRACE VAGINAL) 0.1 MG/GM vaginal cream 12/9/2021 at Unknown Patient No No   Sig: Apply 1g cream inside vagina using applicator nightly for 2 weeks, then twice per week thereafter   Patient taking differently: Insert 1 g into the vagina see administration instructions. Pt uses twice a week, on Tue and Thur   hydrOXYzine HCl (ATARAX) 50 MG Tab 12/14/2021 at 0800 Patient Yes No   Sig: Take 50 mg by mouth 2 times a day as needed for Anxiety.   lacosamide (VIMPAT) 100 MG Tab tablet 12/14/2021 at 0800 Patient No No   Sig: Take 1.5 Tablets by mouth 2 times a day for 90 days.   melatonin 3 MG Tab 12/13/2021 at PM Patient Yes No   Sig: Take 1 tablet by mouth at bedtime as needed (insomnia).   multivitamin (THERAGRAN) Tab 12/13/2021 at PM Patient Yes No   Sig: Take 1 tablet by mouth every evening.   prochlorperazine (COMPAZINE) 10 MG Tab 12/14/2021 at 1200 Patient Yes No   Sig: Take 10 mg by mouth every 6 hours as needed for Nausea/Vomiting.   sulfamethoxazole-trimethoprim (BACTRIM DS) 800-160 MG tablet 12/14/2021 at 0800 Significant Other Yes Yes   Sig: Take 1 Tablet by mouth 2 times a day. Pt started on 12/13/2021 for 3 day course   temozolomide (TEMODAR) 100 MG capsule > 1 week at Unknown Patient Yes No   Sig: Take 100 mg by mouth see administration instructions. Pt takes this medication for 5 days and then off for 3 weeks   traMADol (ULTRAM) 50 MG Tab > 2 days at Unknown Patient Yes Yes   Sig: Take 50 mg by mouth every four hours as needed for Moderate Pain.   venlafaxine (EFFEXOR) 75 MG Tab 12/13/2021 at AM Significant Other No No   Sig: Take 1 Tablet by mouth every day.      Facility-Administered Medications: None       Physical Exam  Temp:  [38.3 °C (101 °F)] 38.3 °C (101 °F)  Pulse:  [] 80  Resp:  [12-20] 16  BP: (128-157)/() 130/85  SpO2:   [91 %-98 %] 94 %  Blood Pressure: 130/85   Temperature: (!) 38.3 °C (101 °F)   Pulse: 80   Respiration: 16   Pulse Oximetry: 94 %       Physical Exam  Vitals and nursing note reviewed.   Constitutional:       General: She is not in acute distress.     Appearance: Normal appearance. She is obese.   HENT:      Head: Normocephalic and atraumatic.      Mouth/Throat:      Mouth: Mucous membranes are moist.   Eyes:      Extraocular Movements: Extraocular movements intact.      Conjunctiva/sclera: Conjunctivae normal.      Pupils: Pupils are equal, round, and reactive to light.   Cardiovascular:      Rate and Rhythm: Normal rate and regular rhythm.      Pulses: Normal pulses.      Heart sounds: Normal heart sounds. No murmur heard.  No friction rub.   Pulmonary:      Effort: Pulmonary effort is normal. No respiratory distress.      Breath sounds: Normal breath sounds. No wheezing.   Abdominal:      General: Abdomen is flat. Bowel sounds are normal.      Palpations: Abdomen is soft.      Tenderness: There is no abdominal tenderness. There is no guarding.   Musculoskeletal:         General: No swelling or tenderness. Normal range of motion.      Cervical back: Normal range of motion and neck supple.   Skin:     General: Skin is warm and dry.      Findings: No rash.   Neurological:      Mental Status: She is alert and oriented to person, place, and time. Mental status is at baseline.      Motor: Weakness present.      Comments: Left sided upper and lower extremity weakness at baseline    Psychiatric:         Mood and Affect: Mood normal.         Behavior: Behavior normal.         Laboratory:  Recent Labs     12/14/21 1850   WBC 6.7   RBC 3.83*   HEMOGLOBIN 13.0   HEMATOCRIT 37.6   MCV 98.2*   MCH 33.9*   MCHC 34.6   RDW 46.6   PLATELETCT 227   MPV 9.6     Recent Labs     12/14/21 1850   SODIUM 136   POTASSIUM 4.0   CHLORIDE 103   CO2 20   GLUCOSE 98   BUN 7*   CREATININE 0.65   CALCIUM 8.9     Recent Labs     12/14/21 1850    ALTSGPT 13   ASTSGOT 24   ALKPHOSPHAT 85   TBILIRUBIN 0.5   GLUCOSE 98     Recent Labs     12/14/21  1850   APTT 28.4   INR 1.08     No results for input(s): NTPROBNP in the last 72 hours.      No results for input(s): TROPONINT in the last 72 hours.    Imaging:  CT-HEAD WITH & W/O   Final Result         1.  Enhancing right frontoparietal mass with surrounding vasogenic edema.   2.  Partial effacement of the right ventricle with 1 mm right to left midline shift   3.  Atherosclerosis      DX-CHEST-PORTABLE (1 VIEW)   Final Result      1.  There is no acute cardiopulmonary process.      MR-BRAIN-WITH & W/O    (Results Pending)       X-Ray:  My impression is: no acute cardiopulmonary process    Assessment/Plan:  I anticipate this patient will require at least two midnights for appropriate medical management, necessitating inpatient admission.    * Glioblastoma (HCC)- (present on admission)  Assessment & Plan  Patient with history of glioblastoma and right frontal lobe.  She is currently undergoing chemotherapy.  Last session was about 7 to 10 days ago.  Currently on seizure prophylaxis with Briviact and lacosamide  No new motor/sensory deficits noted and no recent seizure activity    Fever  Assessment & Plan  The patient initially presented with fever and new onset tremor of the right hand.  She does have a history of glioblastoma and had recent 10-day course of chemotherapy which was completed about a week ago.  Her symptoms started shortly after her last chemotherapy session.  Patient denies any other infectious symptomatology such as cough, dysuria, or diarrhea.  She was recently treated for UTI about a week ago.  Repeat urinalysis on this admission was nonrevealing.  Chest x-ray without acute cardiopulmonary process.  CT scan of the head is consistent with her history of glioblastoma associated with vasogenic edema.  These images were discussed between ER physician and Dr. Jauregui of neurosurgery who states that  abscess and infection are unlikely however at this time he would like to continue antibiotics and transfer to [facility] for further evaluation.  CBC with differential does not show evidence of neutropenia at this time.  -Concern for intracranial abscess versus glioblastoma associated infection.  Case discussed with Dr. Jauregui of neurosurgery who recommends antibiotics and transfer to [facility] at this time.  -Continue broad-spectrum antibiotics of vancomycin and cefepime.  -Blood cultures have been negative x2  -Repeat urinalysis negative  -Every 4 neurochecks    Hypertension  Assessment & Plan  Currently at goal  Resume home medications    Frequent UTI- (present on admission)  Assessment & Plan  Patiently recently completed therapy with Bactrim and Keflex  Repeat urinalysis on this admission was negative    Hemiparesis (HCC)- (present on admission)  Assessment & Plan  Currently at baseline  No new motor/sensory deficits    Hyperlipidemia- (present on admission)  Assessment & Plan  Continue to encourage lifestyle modifications, diet and exercise    Mixed anxiety and depressive disorder- (present on admission)  Assessment & Plan  Currently well controlled without any suicidal ideation  Continue home dose venlafaxine      VTE prophylaxis: therapeutic anticoagulation with apixaban

## 2021-12-15 NOTE — ED PROVIDER NOTES
ED Provider Note    Chief Complaint:   Fever    HPI:  Melba Frye is a very pleasant 51-year-old female with past medical history of glioblastoma, last chemotherapy was 10 days ago who presents with fevers after treatment for urinary tract infection with Keflex and Bactrim.  Patient denies urinary symptoms at this time.  Patient does endorse worsening headache for the past 2 days which is moderate intensity, frontal, nonradiating.  Patient denies chest pain, shortness of breath.  Patient does endorse chronic cough.  Patient denies nausea, vomiting, diarrhea.  Patient denies dysuria or hematuria.  Patient reports a small abrasion to the top of her head which has been present for several weeks and these being treated with topical antibiotics.  Patient has baseline left upper and left lower extremity weakness secondary to brain mass.  Patient does endorse new tremors in the right upper extremity.    Review of Systems:  Review of Systems   Constitutional: Positive for fever and malaise/fatigue. Negative for chills.   HENT: Negative for congestion and sore throat.    Eyes: Negative for blurred vision and double vision.   Respiratory: Negative for cough and shortness of breath.    Cardiovascular: Negative for chest pain and leg swelling.   Gastrointestinal: Negative for abdominal pain, nausea and vomiting.   Genitourinary: Negative for dysuria and hematuria.   Musculoskeletal: Negative for falls and neck pain.   Skin: Negative for itching and rash.   Neurological: Positive for headaches. Negative for loss of consciousness.       Past Medical History:   has a past medical history of Anxiety, Cancer (HCC), Complicated migraine (6/9/2014), Depression, Dermatitis, contact (11/16/2015), Fatigue (6/9/2014), Hyperlipidemia (1/23/2015), Lentigo (10/17/2018), Menopausal symptom (7/2/2014), Mixed anxiety and depressive disorder (6/9/2014), Pulmonary embolism (HCC), Seborrheic keratoses (10/17/2018), TMJ arthralgia  "(6/9/2014), and UTI (lower urinary tract infection).    Social History:  Social History     Tobacco Use   • Smoking status: Never Smoker   • Smokeless tobacco: Never Used   Vaping Use   • Vaping Use: Never used   Substance and Sexual Activity   • Alcohol use: Not Currently     Alcohol/week: 0.0 oz   • Drug use: No   • Sexual activity: Yes     Partners: Male       Surgical History:   has a past surgical history that includes cystoscopy (10/15/08); laparoscopy (5/28/2010); lymph node sampling (5/28/2010); debulking (5/28/2010); appendectomy (1981); other (1984); vaginal hysterectomy scope total (10/15/08); myringotomy (8/27/2010); tonsillectomy and adenoidectomy; and craniotomy stealth (Right, 3/9/2021).    Current Medications:  Home Medications    **Home medications have not yet been reviewed for this encounter**         Allergies:  Allergies   Allergen Reactions   • Tape Rash     Use paper tape instead       Physical Exam:  Vital Signs: /81   Pulse 92   Temp (!) 38.3 °C (101 °F) (Temporal)   Resp 19   Ht 1.702 m (5' 7\")   Wt 104 kg (230 lb)   SpO2 94%   BMI 36.02 kg/m²   Physical Exam  Vitals and nursing note reviewed.   Constitutional:       Comments: Patient is lying in bed supine, pleasant, conversant, speaking in complete sentences, appears fatigued   HENT:      Head:      Comments: Patient has well-healing surgical wound craniectomy, there is a central abrasion with some discharge present, no tenderness to palpation.  Eyes:      Extraocular Movements: Extraocular movements intact.      Conjunctiva/sclera: Conjunctivae normal.      Pupils: Pupils are equal, round, and reactive to light.   Cardiovascular:      Pulses: Normal pulses.      Comments: HR 99  Pulmonary:      Effort: Pulmonary effort is normal. No respiratory distress.   Abdominal:      Comments: Abdomen is soft, non-tender, non-distended, non-rigid, no rebound, guarding, masses, no McBurney's point tenderness, no peritoneal signs, " negative Rovsing sign, negative Villafuerte sign.  No CVA tenderness to palpation. Benign abdomen.   Musculoskeletal:         General: No swelling. Normal range of motion.      Cervical back: Normal range of motion. No rigidity.   Skin:     General: Skin is warm and dry.      Capillary Refill: Capillary refill takes less than 2 seconds.   Neurological:      Mental Status: She is alert.      Cranial Nerves: No cranial nerve deficit.      Comments: Left-sided 3-5 strength, sensation in the bilateral upper and lower extremities within normal limits.         Medical records reviewed for continuity of care.       Labs:  Labs Reviewed   CBC WITH DIFFERENTIAL - Abnormal; Notable for the following components:       Result Value    RBC 3.83 (*)     MCV 98.2 (*)     MCH 33.9 (*)     Neutrophils-Polys 76.70 (*)     Lymphocytes 10.50 (*)     Lymphs (Absolute) 0.70 (*)     All other components within normal limits    Narrative:     Protective  Collected By:  Indicate which anticoagulants the patient is on:->UNKNOWN   COMP METABOLIC PANEL - Abnormal; Notable for the following components:    Bun 7 (*)     All other components within normal limits    Narrative:     Protective  Collected By:  Indicate which anticoagulants the patient is on:->UNKNOWN   PROTHROMBIN TIME    Narrative:     Protective  Collected By:  Indicate which anticoagulants the patient is on:->UNKNOWN   APTT    Narrative:     Protective  Collected By:  Indicate which anticoagulants the patient is on:->UNKNOWN   URINE CULTURE(NEW)    Narrative:     Protective  Collected By:  Indication for culture:->Evaluation for sepsis without a  clear source of infection   BLOOD CULTURE    Narrative:     Protective  Collected By:  Blood Cultures X2. Draw one blood culture from central line  and one blood culture peripherally. If no central line  present draw blood cultures times two peripherally   BLOOD CULTURE    Narrative:     Protective  Collected By:  Blood Cultures X2. Draw one  blood culture from central line  and one blood culture peripherally. If no central line  present draw blood cultures times two peripherally   LACTIC ACID    Narrative:     Protective  Collected By:  Indicate which anticoagulants the patient is on:->UNKNOWN   ESTIMATED GFR    Narrative:     Protective  Collected By:  Indicate which anticoagulants the patient is on:->UNKNOWN   COV-2, FLU A/B, AND RSV BY PCR    Narrative:     Protective  Collected By:  Have you been in close contact with a person who is suspected  or known to be positive for COVID-19 within the last 30 days  (e.g. last seen that person < 30 days ago)->Unknown   URINALYSIS    Narrative:     Protective  Collected By:  Indication for culture:->Evaluation for sepsis without a  clear source of infection       Radiology:  CT-HEAD WITH & W/O   Final Result         1.  Enhancing right frontoparietal mass with surrounding vasogenic edema.   2.  Partial effacement of the right ventricle with 1 mm right to left midline shift   3.  Atherosclerosis      DX-CHEST-PORTABLE (1 VIEW)   Final Result      1.  There is no acute cardiopulmonary process.           ED Medications Administered:  Medications   vancomycin 2500 mg/500mL NS IVPB premix (2,500 mg Intravenous New Bag 12/14/21 2106)   lactated ringers infusion (BOLUS) (has no administration in time range)   acetaminophen (TYLENOL) tablet 1,000 mg (1,000 mg Oral Given 12/14/21 1907)   cefepime (Maxipime) 2 g in  mL IVPB (0 g Intravenous Stopped 12/14/21 2000)   iohexol (OMNIPAQUE) 350 mg/mL (IV) (50 mL Intravenous Given 12/14/21 2107)   hydrOXYzine HCl (ATARAX) tablet 50 mg (50 mg Oral Given 12/14/21 2300)       MDM:  Patient is febrile with elevated heart rates of 100, multiple potential sources of infection are within the differential diagnosis including intracranial abscess, meningitis versus encephalitis, UTI, pyelonephritis, pneumonia, bacteremia.  Patient is on chemotherapy and is immunocompromise, CBC to  evaluate for neutropenia.  CBC to evaluate for acute anemia and leukocytosis.  CMP to evaluate for acute electrolyte abnormality, acute kidney injury, acute liver failure or dysfunction.  Chest x-ray to evaluate for acute cardiopulmonary process such as pneumonia, pulmonary edema, pneumothorax.  IV fluids and antibiotics ordered to treat for potential sepsis.  Disposition pending work-up.    Electronically signed by: Pepe Ahumada M.D., 12/14/2021, 7:03 PM    CBC demonstrates no leukocytosis, no anemia, CMP within normal limits, no electrolyte abnormality, no LFT derangement, no acute kidney injury.  SARS-CoV-2 negative, influenza negative.  Coags within normal limits.  Chest x-ray demonstrates no acute cardiopulmonary process. CT brain demonstrates partial effacement of the right ventricle with 1 mm right to left midline shift, vasogenic edema is present as well.  I spoke to Dr. Jauregui of neurosurgery who believes this is likely secondary to her diagnosis of glioblastoma despite resection.  He is not convinced that the patient is suffering from an intracranial infection at this time but does recommend continued antibiotics and MRI with admission to the hospital medicine service at [facility].  I was able to speak with Dr. Davila who graciously accepted the patient to his service for admission.  Disposition transfer to [facility].    This dictation has been created using voice recognition software. I am continuously working with the software to minimize the number of voice recognition errors and I have made every attempt to manually correct the errors within my dictation. However errors  related to this voice recognition software may still exist and should be interpreted within the appropriate context.     Electronically signed by: Pepe Ahumada M.D., 12/14/2021 11:55 PM      Disposition:  [facility] Telemetry unit transfer    Final Impression:  1. Fever, unspecified fever cause     2. Acute nonintractable headache, unspecified headache type    3. Vasogenic brain edema (HCC)    4. SIRS (systemic inflammatory response syndrome) (HCC)

## 2021-12-15 NOTE — ED NOTES
Med rec updated and complete  Allergies reviewed  Called pts  (J Carlos) @ [phone] to verify pts antibiotic, and strength of her VENLAFAXINE         No current facility-administered medications on file prior to encounter.     Current Outpatient Medications on File Prior to Encounter   Medication Sig Dispense Refill   • traMADol (ULTRAM) 50 MG Tab Take 50 mg by mouth every four hours as needed for Moderate Pain.     • cephALEXin (KEFLEX) 500 MG Cap Take 500 mg by mouth 2 times a day. Pt started on 12/10/2021 for 3 day course     • FOLIC ACID PO Take 1 Tablet by mouth every day.     • sulfamethoxazole-trimethoprim (BACTRIM DS) 800-160 MG tablet Take 1 Tablet by mouth 2 times a day. Pt started on 12/13/2021 for 3 day course     • lacosamide (VIMPAT) 100 MG Tab tablet Take 1.5 Tablets by mouth 2 times a day for 90 days. 90 Tablet 2   • ELIQUIS 5 MG Tab Take 1 Tablet by mouth 2 times a day. 180 Tablet 1   • clonazePAM (KLONOPIN) 1 MG Tab Take 1 Tablet by mouth 2 times a day as needed (use for any repetitive seizures OR prolonged seizure > 5 minutes) for up to 60 days. 60 Tablet 1   • Mirabegron ER 50 MG TABLET SR 24 HR Take 50 mg by mouth every day for 30 days. (Patient taking differently: Take 50 mg by mouth every evening. Pt started on 12/7/2021) 30 Tablet 0   • acetaminophen (TYLENOL) 500 MG Tab Take 1,000 mg by mouth every 6 hours as needed for Moderate Pain.     • estradiol (ESTRACE VAGINAL) 0.1 MG/GM vaginal cream Apply 1g cream inside vagina using applicator nightly for 2 weeks, then twice per week thereafter (Patient taking differently: Insert 1 g into the vagina see administration instructions. Pt uses twice a week, on Tue and Thur) 1 Each 3   • amLODIPine (NORVASC) 10 MG Tab TAKE 1 TABLET BY MOUTH EVERY DAY 90 Tablet 0   • temozolomide (TEMODAR) 100 MG capsule Take 100 mg by mouth see administration instructions. Pt takes this medication for 5 days and then off for 3 weeks     • brivaracetam (BRIVIACT)  100 MG Tab tablet Take 1 Tablet by mouth 2 times a day for 90 days. 60 Tablet 2   • Naloxone (NARCAN) 4 MG/0.1ML Liquid One spray in one nostril for overdose and call 911. (Patient taking differently: Administer 1 Spray into affected nostril(S) as needed. One spray in one nostril for overdose and call 911.  Indications: Opioid Overdose) 2 Each 3   • venlafaxine (EFFEXOR) 75 MG Tab Take 1 Tablet by mouth every day. 90 Tablet 0   • hydrOXYzine HCl (ATARAX) 50 MG Tab Take 50 mg by mouth 2 times a day as needed for Anxiety.     • prochlorperazine (COMPAZINE) 10 MG Tab Take 10 mg by mouth every 6 hours as needed for Nausea/Vomiting.     • melatonin 3 MG Tab Take 1 tablet by mouth at bedtime as needed (insomnia). 30 tablet 2   • VITAMIN D PO Take 1 Units by mouth every evening.     • multivitamin (THERAGRAN) Tab Take 1 tablet by mouth every evening.

## 2021-12-16 PROBLEM — Z86.711 HISTORY OF PULMONARY EMBOLUS (PE): Status: ACTIVE | Noted: 2021-12-16

## 2021-12-16 PROBLEM — G40.909 SEIZURE DISORDER (HCC): Chronic | Status: ACTIVE | Noted: 2021-12-16

## 2021-12-16 PROBLEM — R53.1 ACUTE LEFT-SIDED WEAKNESS: Status: ACTIVE | Noted: 2021-12-16

## 2021-12-16 LAB
ANION GAP SERPL CALC-SCNC: 13 MMOL/L (ref 7–16)
BASOPHILS # BLD AUTO: 0.2 % (ref 0–1.8)
BASOPHILS # BLD: 0.01 K/UL (ref 0–0.12)
BUN SERPL-MCNC: 7 MG/DL (ref 8–22)
CALCIUM SERPL-MCNC: 8.7 MG/DL (ref 8.4–10.2)
CHLORIDE SERPL-SCNC: 104 MMOL/L (ref 96–112)
CO2 SERPL-SCNC: 21 MMOL/L (ref 20–33)
CREAT SERPL-MCNC: 0.48 MG/DL (ref 0.5–1.4)
CRP SERPL HS-MCNC: 2.66 MG/DL (ref 0–0.75)
EOSINOPHIL # BLD AUTO: 0.1 K/UL (ref 0–0.51)
EOSINOPHIL NFR BLD: 2 % (ref 0–6.9)
ERYTHROCYTE [DISTWIDTH] IN BLOOD BY AUTOMATED COUNT: 47.1 FL (ref 35.9–50)
ERYTHROCYTE [SEDIMENTATION RATE] IN BLOOD BY WESTERGREN METHOD: 43 MM/HOUR (ref 0–25)
GLUCOSE SERPL-MCNC: 91 MG/DL (ref 65–99)
HCT VFR BLD AUTO: 34.1 % (ref 37–47)
HGB BLD-MCNC: 11.8 G/DL (ref 12–16)
IMM GRANULOCYTES # BLD AUTO: 0.02 K/UL (ref 0–0.11)
IMM GRANULOCYTES NFR BLD AUTO: 0.4 % (ref 0–0.9)
LYMPHOCYTES # BLD AUTO: 0.83 K/UL (ref 1–4.8)
LYMPHOCYTES NFR BLD: 16.8 % (ref 22–41)
MCH RBC QN AUTO: 34.3 PG (ref 27–33)
MCHC RBC AUTO-ENTMCNC: 34.6 G/DL (ref 33.6–35)
MCV RBC AUTO: 99.1 FL (ref 81.4–97.8)
MONOCYTES # BLD AUTO: 0.61 K/UL (ref 0–0.85)
MONOCYTES NFR BLD AUTO: 12.3 % (ref 0–13.4)
NEUTROPHILS # BLD AUTO: 3.37 K/UL (ref 2–7.15)
NEUTROPHILS NFR BLD: 68.3 % (ref 44–72)
NRBC # BLD AUTO: 0 K/UL
NRBC BLD-RTO: 0 /100 WBC
PLATELET # BLD AUTO: 212 K/UL (ref 164–446)
PMV BLD AUTO: 9.5 FL (ref 9–12.9)
POTASSIUM SERPL-SCNC: 3.6 MMOL/L (ref 3.6–5.5)
PROCALCITONIN SERPL-MCNC: 0.1 NG/ML
RBC # BLD AUTO: 3.44 M/UL (ref 4.2–5.4)
SODIUM SERPL-SCNC: 138 MMOL/L (ref 135–145)
WBC # BLD AUTO: 4.9 K/UL (ref 4.8–10.8)

## 2021-12-16 PROCEDURE — 770006 HCHG ROOM/CARE - MED/SURG/GYN SEMI*

## 2021-12-16 PROCEDURE — 700102 HCHG RX REV CODE 250 W/ 637 OVERRIDE(OP): Performed by: INTERNAL MEDICINE

## 2021-12-16 PROCEDURE — 85652 RBC SED RATE AUTOMATED: CPT

## 2021-12-16 PROCEDURE — 99233 SBSQ HOSP IP/OBS HIGH 50: CPT | Performed by: STUDENT IN AN ORGANIZED HEALTH CARE EDUCATION/TRAINING PROGRAM

## 2021-12-16 PROCEDURE — 86140 C-REACTIVE PROTEIN: CPT

## 2021-12-16 PROCEDURE — 700102 HCHG RX REV CODE 250 W/ 637 OVERRIDE(OP): Performed by: STUDENT IN AN ORGANIZED HEALTH CARE EDUCATION/TRAINING PROGRAM

## 2021-12-16 PROCEDURE — 85025 COMPLETE CBC W/AUTO DIFF WBC: CPT

## 2021-12-16 PROCEDURE — A9270 NON-COVERED ITEM OR SERVICE: HCPCS | Performed by: INTERNAL MEDICINE

## 2021-12-16 PROCEDURE — 36415 COLL VENOUS BLD VENIPUNCTURE: CPT

## 2021-12-16 PROCEDURE — 80048 BASIC METABOLIC PNL TOTAL CA: CPT

## 2021-12-16 PROCEDURE — 84145 PROCALCITONIN (PCT): CPT

## 2021-12-16 PROCEDURE — A9270 NON-COVERED ITEM OR SERVICE: HCPCS | Performed by: STUDENT IN AN ORGANIZED HEALTH CARE EDUCATION/TRAINING PROGRAM

## 2021-12-16 RX ORDER — DEXAMETHASONE 4 MG/1
4 TABLET ORAL EVERY 6 HOURS
Status: DISCONTINUED | OUTPATIENT
Start: 2021-12-16 | End: 2021-12-18 | Stop reason: HOSPADM

## 2021-12-16 RX ORDER — LORAZEPAM 0.5 MG/1
0.5 TABLET ORAL EVERY 4 HOURS PRN
Status: DISCONTINUED | OUTPATIENT
Start: 2021-12-16 | End: 2021-12-18 | Stop reason: HOSPADM

## 2021-12-16 RX ORDER — LORAZEPAM 2 MG/ML
2 INJECTION INTRAMUSCULAR
Status: DISCONTINUED | OUTPATIENT
Start: 2021-12-16 | End: 2021-12-18 | Stop reason: HOSPADM

## 2021-12-16 RX ORDER — DEXAMETHASONE 4 MG/1
4 TABLET ORAL EVERY 8 HOURS
Status: DISCONTINUED | OUTPATIENT
Start: 2021-12-16 | End: 2021-12-16

## 2021-12-16 RX ADMIN — DEXAMETHASONE 4 MG: 4 TABLET ORAL at 17:37

## 2021-12-16 RX ADMIN — DEXAMETHASONE 4 MG: 4 TABLET ORAL at 23:07

## 2021-12-16 RX ADMIN — ZOLPIDEM TARTRATE 5 MG: 5 TABLET ORAL at 19:20

## 2021-12-16 RX ADMIN — LACOSAMIDE 150 MG: 50 TABLET, FILM COATED ORAL at 05:11

## 2021-12-16 RX ADMIN — AMLODIPINE BESYLATE 10 MG: 5 TABLET ORAL at 05:10

## 2021-12-16 RX ADMIN — VENLAFAXINE 75 MG: 75 TABLET ORAL at 05:11

## 2021-12-16 RX ADMIN — TRAMADOL HYDROCHLORIDE 50 MG: 50 TABLET, COATED ORAL at 12:08

## 2021-12-16 RX ADMIN — SENNOSIDES AND DOCUSATE SODIUM 2 TABLET: 50; 8.6 TABLET ORAL at 18:12

## 2021-12-16 RX ADMIN — DEXAMETHASONE 4 MG: 4 TABLET ORAL at 11:15

## 2021-12-16 RX ADMIN — APIXABAN 5 MG: 5 TABLET, FILM COATED ORAL at 17:37

## 2021-12-16 RX ADMIN — LACOSAMIDE 150 MG: 50 TABLET, FILM COATED ORAL at 17:37

## 2021-12-16 RX ADMIN — APIXABAN 5 MG: 5 TABLET, FILM COATED ORAL at 05:10

## 2021-12-16 RX ADMIN — POLYETHYLENE GLYCOL 3350 1 PACKET: 17 POWDER, FOR SOLUTION ORAL at 12:30

## 2021-12-16 ASSESSMENT — ENCOUNTER SYMPTOMS
MYALGIAS: 0
DEPRESSION: 0
BACK PAIN: 0
SPUTUM PRODUCTION: 0
HEADACHES: 0
BLURRED VISION: 0
VOMITING: 0
CHILLS: 0
ABDOMINAL PAIN: 0
DIZZINESS: 0
FOCAL WEAKNESS: 1
NAUSEA: 0
NERVOUS/ANXIOUS: 1
SEIZURES: 1
PALPITATIONS: 0
SPEECH CHANGE: 0
FEVER: 1
DIARRHEA: 0
DOUBLE VISION: 0
CONSTIPATION: 0
COUGH: 0

## 2021-12-16 ASSESSMENT — PAIN DESCRIPTION - PAIN TYPE
TYPE: CHRONIC PAIN

## 2021-12-16 NOTE — ASSESSMENT & PLAN NOTE
Patient developed seizure disorder approximately 1 week ago and was started on antiepileptics.  No further seizure activity since then.    Continue home Briviact and lacosamide.  Seizure, fall, and aspiration cautions.  IV Ativan as needed for breakthrough seizures.

## 2021-12-16 NOTE — PROGRESS NOTES
Hospital Medicine Daily Progress Note    Date of Service  12/16/2021    Chief Complaint  Melba Frye is a 51 y.o. female admitted 12/14/2021 with fever, dysuria, and worsening balance.    Hospital Course  No notes on file    Interval Problem Update  12/16: No significant events overnight.  Complaining of significant anxiety.  Provided meditation techniques.  Further history gathered from patient's  who reported that she had resection of a glioblastoma at [facility] in the spring followed by radiation treatment.  She completed Temodar treatment.  For the past couple of weeks, she has been having worsening balance issues.  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  I have placed order for PT and OT consults.  Anticipate patient will need skilled nursing facility due to worsening weakness.  Started dexamethasone 4 mg by mouth every 6 hours and will taper.  Plan for follow-up with outpatient neurosurgery.    I have personally seen and examined the patient at bedside. I discussed the plan of care with patient, bedside RN, charge RN,  and pharmacy.    Consultants/Specialty  neurosurgery in the ER    Code Status  Full Code    Disposition  Patient is not medically cleared.   Anticipate discharge to to skilled nursing facility.  I have placed the appropriate orders for post-discharge needs.    Review of Systems  Review of Systems   Constitutional: Positive for fever. Negative for chills.   Eyes: Negative for blurred vision and double vision.   Respiratory: Negative for cough and sputum production.    Cardiovascular: Negative for chest pain and palpitations.   Gastrointestinal: Negative for abdominal pain, constipation, diarrhea, nausea and vomiting.   Genitourinary: Negative for dysuria and frequency.   Musculoskeletal: Negative  for back pain and myalgias.   Skin: Negative for itching and rash.   Neurological: Positive for focal weakness (stable left upper extremity weakness, worsening left lower extremity weakness) and seizures (started 1 week ago on medication). Negative for dizziness, speech change and headaches.   Psychiatric/Behavioral: Negative for depression. The patient is nervous/anxious.         Physical Exam  Temp:  [36.6 °C (97.8 °F)-36.9 °C (98.4 °F)] 36.6 °C (97.8 °F)  Pulse:  [84-93] 93  Resp:  [19-20] 20  BP: (121-150)/(73-91) 121/76  SpO2:  [93 %-98 %] 93 %    Physical Exam  Constitutional:       Appearance: Normal appearance. She is not ill-appearing.   HENT:      Head: Normocephalic and atraumatic.      Comments: Well-healed craniotomy incision     Mouth/Throat:      Mouth: Mucous membranes are moist.      Pharynx: No oropharyngeal exudate.   Eyes:      General: No scleral icterus.        Right eye: No discharge.         Left eye: No discharge.      Pupils: Pupils are equal, round, and reactive to light.   Cardiovascular:      Rate and Rhythm: Normal rate and regular rhythm.      Pulses: Normal pulses.      Heart sounds: Normal heart sounds. No murmur heard.      Pulmonary:      Effort: Pulmonary effort is normal. No respiratory distress.      Breath sounds: Normal breath sounds. No wheezing or rales.   Abdominal:      General: Abdomen is flat. Bowel sounds are normal. There is no distension.      Palpations: Abdomen is soft.   Musculoskeletal:         General: No tenderness.      Cervical back: Neck supple. No tenderness.      Right lower leg: No edema.      Left lower leg: No edema.   Skin:     General: Skin is warm and dry.      Coloration: Skin is not pale.   Neurological:      Mental Status: She is alert and oriented to person, place, and time.      Sensory: No sensory deficit.      Motor: Weakness present.      Comments: Patient has 0/5 strength in the left upper extremity with flexion contracture position.  Left  lower extremity with 3/5 strength.  Right side with full strength 5/5.  Sensation intact to light touch.  Speech is fluent and comprehensible.   Psychiatric:         Thought Content: Thought content normal.         Judgment: Judgment normal.      Comments: Anxious         Fluids    Intake/Output Summary (Last 24 hours) at 12/16/2021 1523  Last data filed at 12/16/2021 1000  Gross per 24 hour   Intake 240 ml   Output 1 ml   Net 239 ml       Laboratory  Recent Labs     12/14/21  1850 12/15/21  0905 12/16/21  0327   WBC 6.7 7.3 4.9   RBC 3.83* 3.48* 3.44*   HEMOGLOBIN 13.0 11.8* 11.8*   HEMATOCRIT 37.6 34.0* 34.1*   MCV 98.2* 97.7 99.1*   MCH 33.9* 33.9* 34.3*   MCHC 34.6 34.7 34.6   RDW 46.6 47.5 47.1   PLATELETCT 227 198 212   MPV 9.6 9.2 9.5     Recent Labs     12/14/21  1850 12/15/21  0905 12/16/21  0327   SODIUM 136 140 138   POTASSIUM 4.0 4.0 3.6   CHLORIDE 103 105 104   CO2 20 24 21   GLUCOSE 98 98 91   BUN 7* 6* 7*   CREATININE 0.65 0.55 0.48*   CALCIUM 8.9 8.9 8.7     Recent Labs     12/14/21 1850   APTT 28.4   INR 1.08               Imaging  MR-BRAIN-WITH & W/O   Final Result      1.  3.1 cm ring-enhancing mass arising at the  postoperative site in the right posterior frontal/parietal white matter with marked surrounding increased T2 signal intensity effacing the posterior body and atrium of the right lateral ventricle and causing    mild right to left midline shift the ventricular system. This is suspicious for recurrent neoplasm, however conceivably radiation necrosis could have this appearance.      2.  Age-related cerebral atrophy.      3.  Periventricular white matter changes consistent with postirradiation change.      CT-HEAD WITH & W/O   Final Result         1.  Enhancing right frontoparietal mass with surrounding vasogenic edema.   2.  Partial effacement of the right ventricle with 1 mm right to left midline shift   3.  Atherosclerosis      DX-CHEST-PORTABLE (1 VIEW)   Final Result      1.  There is  no acute cardiopulmonary process.             I have personally reviewed the patient's MRI of the brain with and without contrast.  Per my review, there is a ring-enhancing lesion with associated vasogenic edema consistent with recurrence of GBM versus radiation necrosis.         Assessment/Plan  * Acute left-sided weakness- (present on admission)  Assessment & Plan  Patient has baseline left upper extremity weakness 0/5 strength.  However, she has been developing worsening left lower extremity weakness for the past 1 to 2 weeks with worsening balance.  This correlates with the findings on the MRI.  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    Start dexamethasone 4 mg by mouth every 6 hours and taper.  Omeprazole for GI prophylaxis.  Plan for outpatient follow up with neurosurgery.    Seizure disorder (HCC)- (present on admission)  Assessment & Plan  Patient developed seizure disorder approximately 1 week ago and was started on antiepileptics.  No further seizure activity since then.    Continue home Briviact and lacosamide.  Seizure, fall, and aspiration cautions.  IV Ativan as needed for breakthrough seizures.    Fever- (present on admission)  Assessment & Plan  Procalcitonin within normal limits.  Mildly elevated ESR and CRP.  No signs of active infection.  No significant neutropenia.  Suspect possible central etiology    Continue to monitor white count and for fevers.    Hemiparesis (HCC)- (present on admission)  Assessment & Plan  Baseline upper extremity weakness with worsening left lower extremity weakness as per above.    Glioblastoma of frontal lobe (HCC)- (present on admission)  Assessment & Plan  Patient with history of glioblastoma and right frontal lobe.  Status post resection at [facility] in spring of this year.  Followed by radiation.  Followed by Dr. Frye of radiation oncology and also Dr. Cerda of oncology  Completed temozolomide chemotherapy    Start dexamethasone  as per above with taper.  Patient should be reevaluated at [facility] for consideration of resection of radiation necrosis versus recurrence of GBM.    History of pulmonary embolus (PE)- (present on admission)  Assessment & Plan  As per history.  No significant hypoxia or shortness of breath.    Continue home apixaban.    Hypertension- (present on admission)  Assessment & Plan  Currently at goal  Resume home medications    Frequent UTI- (present on admission)  Assessment & Plan  Patiently recently completed therapy with Bactrim and Keflex  Repeat urinalysis on this admission was negative    Continue to monitor for symptoms.    Mixed anxiety and depressive disorder- (present on admission)  Assessment & Plan  Complaining of anxiety this morning.  Discussed meditative techniques to which patient was receptive.  Continue home dose venlafaxine.  Start low-dose Ativan as needed.    Hyperlipidemia- (present on admission)  Assessment & Plan  Continue to encourage lifestyle modifications, diet and exercise       VTE prophylaxis: SCDs/TEDs and therapeutic anticoagulation with apixaban    I have performed a physical exam and reviewed and updated ROS and Plan today (12/16/2021). In review of yesterday's note (12/15/2021), there are no changes except as documented above.

## 2021-12-16 NOTE — DISCHARGE PLANNING
ER CM met with pt at bedside. Very pleasant. AO x4.  She lives in a 2 story home with bedroom upstairs. Stairs in to first floor. Almost  A trilevel lay out. She has a FWW, wheelchair and cane at home. No o2 has not needed it. She is on o2 at the time of the interview. She fills RX at [facility]. She follows with Dr Nguyen. Nuerology is Dr Davidson and has had a complex path.  Spouse is supportive and will be her ride home when cleared.  Care Transition Team Assessment    Information Source  Orientation Level: Oriented X4  Information Given By: Patient  Informant's Name: Melba (Melba)  Who is responsible for making decisions for patient? : Patient         Elopement Risk  Legal Hold: No  Ambulatory or Self Mobile in Wheelchair: No-Not an Elopement Risk    Interdisciplinary Discharge Planning  Primary Care Physician: Dr Nguyen ([facility])  Lives with - Patient's Self Care Capacity: Spouse  Patient or legal guardian wants to designate a caregiver: No  Support Systems: Spouse / Significant Other  Housing / Facility: 2 Story House (Almost trilevel. Lots of stairs. Bedroom upstairs)  Do You Take your Prescribed Medications Regularly: Yes  Mobility Issues: Yes (2 person max assist )  Assistance Needed: Yes  Durable Medical Equipment: Walker,Other - Specify (walker wheelchair  cane)    Discharge Preparedness  What is your plan after discharge?: Uncertain - pending medical team collaboration,Home with help  What are your discharge supports?: Spouse         Finances  Prescription Coverage: Yes ([facility])    Vision / Hearing Impairment  Vision Impairment : No  Hearing Impairment : No              Domestic Abuse  Have you ever been the victim of abuse or violence?: No  Physical Abuse or Sexual Abuse: No  Verbal Abuse or Emotional Abuse: No  Possible Abuse/Neglect Reported to:: Not Applicable    Psychological Assessment  History of Substance Abuse: None    Discharge Risks or  Barriers  Discharge risks or barriers?: Complex medical needs    Anticipated Discharge Information  Discharge Disposition: Still a Patient (30)

## 2021-12-16 NOTE — ASSESSMENT & PLAN NOTE
Patient has baseline left upper extremity weakness 0/5 strength.  However, she has been developing worsening left lower extremity weakness for the past 1 to 2 weeks with worsening balance.  This correlates with the findings on the MRI.  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    Continue dexamethasone 4 mg by mouth every 6 hours and taper.  Omeprazole for GI prophylaxis.  Plan for outpatient follow up with neurosurgery.

## 2021-12-16 NOTE — CARE PLAN
The patient is Stable - Low risk of patient condition declining or worsening    Shift Goals  Clinical Goals: Free from falls and injury, Rest and sleep  Patient Goals: Rest and sleep  Family Goals: Rest and sleep    Progress made toward(s) clinical / shift goals:    Problem: Knowledge Deficit - Standard  Goal: Patient and family/care givers will demonstrate understanding of plan of care, disease process/condition, diagnostic tests and medications  Outcome: Progressing  Note: Patient and  updated on the plan of care. Encouraged to voice feelings and to ask questions. Answered all questions and concerns. Agrees with the plan of care.      Problem: Fall Risk  Goal: Patient will remain free from falls  Outcome: Progressing  Note: Safety precautions in place. Bed alarm ON. Will continue to monitor patient.        Patient is not progressing towards the following goals:

## 2021-12-16 NOTE — PROGRESS NOTES
Received report from nightshift RN and assumed care of patient at 0700. Patient is tearful but denies needs at this time. Call light in reach. Bed in lowest locked position. Hourly rounding to continue.

## 2021-12-16 NOTE — ASSESSMENT & PLAN NOTE
Patient with history of glioblastoma and right frontal lobe.  Status post resection at [facility] in spring of this year.  Followed by radiation.  Followed by Dr. Frye of radiation oncology and also Dr. Cerda of oncology  Completed temozolomide chemotherapy    Continue dexamethasone as per above.  Awaiting response from [facility] regarding acceptance of transfer.

## 2021-12-16 NOTE — PROGRESS NOTES
4 Eyes Skin Assessment Completed by JUDITH Dorman and JUDITH Nava.    Head Scar  Ears WDL  Nose WDL  Mouth WDL  Neck WDL  Breast/Chest WDL  Shoulder Blades WDL  Spine WDL  (R) Arm/Elbow/Hand WDL  (L) Arm/Elbow/Hand WDL  Abdomen WDL  Groin WDL  Scrotum/Coccyx/Buttocks WDL  (R) Leg WDL  (L) Leg WDL  (R) Heel/Foot/Toe WDL  (L) Heel/Foot/Toe WDL          Devices In Places Blood Pressure Cuff and Pulse Ox      Interventions In Place Pillows    Possible Skin Injury No    Pictures Uploaded Into Epic N/A  Wound Consult Placed N/A  RN Wound Prevention Protocol Ordered No

## 2021-12-16 NOTE — PROGRESS NOTES
Received bedside patient report from JUDITH Dorman. Patient resting comfortably in bed, no complaints at this time. Safety precautions in place. Will continue to monitor.

## 2021-12-16 NOTE — DISCHARGE PLANNING
Anticipated Discharge Disposition: rehab vs SNF    Action: LSW met with pt and pt's spouse at bedside to discuss discharge plan. LSW explained that MD is anticipating rehab/SNF placement. LSW provided SNF list. Pt states she would like rehab consult placed.     1520: LSW notified that pt would like to discuss d/c plan. LSW attempted to meet with pt. Per RN, should wait for spouse to get back.    1529: Per RN, spouse is back in room.    Barriers to Discharge: pending PT/OT    Plan: LSW to follow up with PT/OT recs    1540: LSW met with pt's spouse J Carlos in pt room. J Carlos states they would rather return home with HH, they were previously on service with [facility]. J Carlos also requested this LSW look into ordering a pure wick for pt to go home with. J Carlos provided contact information for their  Meron Randhawa ([email or web address]). LSW informed J Carlos we would look into it.

## 2021-12-16 NOTE — PROGRESS NOTES
Pt arrived on unit. POC and medications reviewed with pt. Pt verbalized understanding. Safety measures in place. Will continue to monitor.

## 2021-12-16 NOTE — CARE PLAN
The patient is Stable - Low risk of patient condition declining or worsening    Shift Goals  Clinical Goals: Pain control  Patient Goals: Rest and sleep  Family Goals: Rest and sleep    Progress made toward(s) clinical / shift goals:  Patient medicated per MAR for 7/10 generalized pain. Patient reports acceptable pain level after medication administration.       Problem: Pain - Standard  Goal: Alleviation of pain or a reduction in pain to the patient’s comfort goal  Outcome: Progressing

## 2021-12-16 NOTE — DISCHARGE PLANNING
Agency/Facility Name: [facility]  Spoke To: Marion   Outcome: Per Marion Pt is on service with [facility]

## 2021-12-16 NOTE — PROGRESS NOTES
Isolation Precautions:    Patient is on Protective isolation.    Patient educated on reason for isolation.     Patient educated that Protective precautions involves staff and visitors wearing PPE, follow  Standard Precautions and perform meticulous hand hygiene in order to prevent transmission of infection.

## 2021-12-17 LAB
BACTERIA UR CULT: NORMAL
BASOPHILS # BLD AUTO: 0 % (ref 0–1.8)
BASOPHILS # BLD: 0 K/UL (ref 0–0.12)
EOSINOPHIL # BLD AUTO: 0 K/UL (ref 0–0.51)
EOSINOPHIL NFR BLD: 0 % (ref 0–6.9)
ERYTHROCYTE [DISTWIDTH] IN BLOOD BY AUTOMATED COUNT: 45 FL (ref 35.9–50)
HCT VFR BLD AUTO: 36.6 % (ref 37–47)
HGB BLD-MCNC: 12.8 G/DL (ref 12–16)
IMM GRANULOCYTES # BLD AUTO: 0.03 K/UL (ref 0–0.11)
IMM GRANULOCYTES NFR BLD AUTO: 0.7 % (ref 0–0.9)
LYMPHOCYTES # BLD AUTO: 0.44 K/UL (ref 1–4.8)
LYMPHOCYTES NFR BLD: 10.5 % (ref 22–41)
MCH RBC QN AUTO: 34.2 PG (ref 27–33)
MCHC RBC AUTO-ENTMCNC: 35 G/DL (ref 33.6–35)
MCV RBC AUTO: 97.9 FL (ref 81.4–97.8)
MONOCYTES # BLD AUTO: 0.14 K/UL (ref 0–0.85)
MONOCYTES NFR BLD AUTO: 3.3 % (ref 0–13.4)
NEUTROPHILS # BLD AUTO: 3.58 K/UL (ref 2–7.15)
NEUTROPHILS NFR BLD: 85.5 % (ref 44–72)
NRBC # BLD AUTO: 0 K/UL
NRBC BLD-RTO: 0 /100 WBC
PLATELET # BLD AUTO: 224 K/UL (ref 164–446)
PMV BLD AUTO: 9.5 FL (ref 9–12.9)
RBC # BLD AUTO: 3.74 M/UL (ref 4.2–5.4)
SIGNIFICANT IND 70042: NORMAL
SITE SITE: NORMAL
SOURCE SOURCE: NORMAL
WBC # BLD AUTO: 4.2 K/UL (ref 4.8–10.8)

## 2021-12-17 PROCEDURE — 97535 SELF CARE MNGMENT TRAINING: CPT

## 2021-12-17 PROCEDURE — 700102 HCHG RX REV CODE 250 W/ 637 OVERRIDE(OP): Performed by: INTERNAL MEDICINE

## 2021-12-17 PROCEDURE — 36415 COLL VENOUS BLD VENIPUNCTURE: CPT

## 2021-12-17 PROCEDURE — 85025 COMPLETE CBC W/AUTO DIFF WBC: CPT

## 2021-12-17 PROCEDURE — 770006 HCHG ROOM/CARE - MED/SURG/GYN SEMI*

## 2021-12-17 PROCEDURE — 700102 HCHG RX REV CODE 250 W/ 637 OVERRIDE(OP): Performed by: STUDENT IN AN ORGANIZED HEALTH CARE EDUCATION/TRAINING PROGRAM

## 2021-12-17 PROCEDURE — A9270 NON-COVERED ITEM OR SERVICE: HCPCS | Performed by: INTERNAL MEDICINE

## 2021-12-17 PROCEDURE — A9270 NON-COVERED ITEM OR SERVICE: HCPCS | Performed by: STUDENT IN AN ORGANIZED HEALTH CARE EDUCATION/TRAINING PROGRAM

## 2021-12-17 PROCEDURE — 97163 PT EVAL HIGH COMPLEX 45 MIN: CPT

## 2021-12-17 PROCEDURE — 97167 OT EVAL HIGH COMPLEX 60 MIN: CPT

## 2021-12-17 PROCEDURE — 99233 SBSQ HOSP IP/OBS HIGH 50: CPT | Performed by: STUDENT IN AN ORGANIZED HEALTH CARE EDUCATION/TRAINING PROGRAM

## 2021-12-17 RX ORDER — FOLIC ACID 1 MG/1
1 TABLET ORAL DAILY
Status: DISCONTINUED | OUTPATIENT
Start: 2021-12-17 | End: 2021-12-18 | Stop reason: HOSPADM

## 2021-12-17 RX ORDER — VITAMIN B COMPLEX
1000 TABLET ORAL DAILY
Status: DISCONTINUED | OUTPATIENT
Start: 2021-12-17 | End: 2021-12-18 | Stop reason: HOSPADM

## 2021-12-17 RX ORDER — OMEPRAZOLE 20 MG/1
20 CAPSULE, DELAYED RELEASE ORAL DAILY
Status: DISCONTINUED | OUTPATIENT
Start: 2021-12-17 | End: 2021-12-18 | Stop reason: HOSPADM

## 2021-12-17 RX ADMIN — APIXABAN 5 MG: 5 TABLET, FILM COATED ORAL at 17:43

## 2021-12-17 RX ADMIN — DEXAMETHASONE 4 MG: 4 TABLET ORAL at 04:49

## 2021-12-17 RX ADMIN — DEXAMETHASONE 4 MG: 4 TABLET ORAL at 23:48

## 2021-12-17 RX ADMIN — FOLIC ACID 1 MG: 1 TABLET ORAL at 07:58

## 2021-12-17 RX ADMIN — MAGNESIUM HYDROXIDE 30 ML: 400 SUSPENSION ORAL at 15:34

## 2021-12-17 RX ADMIN — HYDROXYZINE HYDROCHLORIDE 50 MG: 25 TABLET, FILM COATED ORAL at 17:44

## 2021-12-17 RX ADMIN — SENNOSIDES AND DOCUSATE SODIUM 2 TABLET: 50; 8.6 TABLET ORAL at 04:48

## 2021-12-17 RX ADMIN — BISACODYL 10 MG: 10 SUPPOSITORY RECTAL at 18:53

## 2021-12-17 RX ADMIN — OMEPRAZOLE 20 MG: 20 CAPSULE, DELAYED RELEASE ORAL at 19:57

## 2021-12-17 RX ADMIN — LACOSAMIDE 150 MG: 50 TABLET, FILM COATED ORAL at 04:49

## 2021-12-17 RX ADMIN — ZOLPIDEM TARTRATE 5 MG: 5 TABLET ORAL at 19:57

## 2021-12-17 RX ADMIN — Medication 1000 UNITS: at 07:58

## 2021-12-17 RX ADMIN — LACOSAMIDE 150 MG: 50 TABLET, FILM COATED ORAL at 17:43

## 2021-12-17 RX ADMIN — DEXAMETHASONE 4 MG: 4 TABLET ORAL at 12:10

## 2021-12-17 RX ADMIN — VENLAFAXINE 75 MG: 75 TABLET ORAL at 04:48

## 2021-12-17 RX ADMIN — TRAMADOL HYDROCHLORIDE 50 MG: 50 TABLET, COATED ORAL at 03:37

## 2021-12-17 RX ADMIN — THERA TABS 1 TABLET: TAB at 17:43

## 2021-12-17 RX ADMIN — AMLODIPINE BESYLATE 10 MG: 5 TABLET ORAL at 04:49

## 2021-12-17 RX ADMIN — APIXABAN 5 MG: 5 TABLET, FILM COATED ORAL at 04:49

## 2021-12-17 RX ADMIN — DEXAMETHASONE 4 MG: 4 TABLET ORAL at 17:43

## 2021-12-17 RX ADMIN — SENNOSIDES AND DOCUSATE SODIUM 2 TABLET: 50; 8.6 TABLET ORAL at 17:43

## 2021-12-17 ASSESSMENT — COGNITIVE AND FUNCTIONAL STATUS - GENERAL
DRESSING REGULAR UPPER BODY CLOTHING: A LOT
STANDING UP FROM CHAIR USING ARMS: A LOT
TURNING FROM BACK TO SIDE WHILE IN FLAT BAD: A LOT
HELP NEEDED FOR BATHING: A LOT
SUGGESTED CMS G CODE MODIFIER MOBILITY: CL
WALKING IN HOSPITAL ROOM: TOTAL
EATING MEALS: A LITTLE
PERSONAL GROOMING: A LITTLE
MOBILITY SCORE: 10
SUGGESTED CMS G CODE MODIFIER DAILY ACTIVITY: CK
TOILETING: A LOT
DRESSING REGULAR LOWER BODY CLOTHING: A LOT
CLIMB 3 TO 5 STEPS WITH RAILING: TOTAL
DAILY ACTIVITIY SCORE: 14
MOVING TO AND FROM BED TO CHAIR: A LOT
MOVING FROM LYING ON BACK TO SITTING ON SIDE OF FLAT BED: A LOT

## 2021-12-17 ASSESSMENT — ENCOUNTER SYMPTOMS
FOCAL WEAKNESS: 1
COUGH: 0
NERVOUS/ANXIOUS: 0
ABDOMINAL PAIN: 0
DEPRESSION: 0
HEADACHES: 0
FEVER: 0
MYALGIAS: 0
NAUSEA: 0
PALPITATIONS: 0
VOMITING: 0
SPUTUM PRODUCTION: 0
BACK PAIN: 0
DOUBLE VISION: 0
SEIZURES: 0
CHILLS: 0

## 2021-12-17 ASSESSMENT — ACTIVITIES OF DAILY LIVING (ADL): TOILETING: REQUIRES ASSIST

## 2021-12-17 ASSESSMENT — FIBROSIS 4 INDEX: FIB4 SCORE: 1.55

## 2021-12-17 ASSESSMENT — GAIT ASSESSMENTS: GAIT LEVEL OF ASSIST: UNABLE TO PARTICIPATE

## 2021-12-17 ASSESSMENT — PAIN DESCRIPTION - PAIN TYPE
TYPE: CHRONIC PAIN
TYPE: CHRONIC PAIN

## 2021-12-17 NOTE — CARE PLAN
The patient is Stable - Low risk of patient condition declining or worsening    Shift Goals  Clinical Goals: Rest and sleep  Patient Goals: Rest and sleep  Family Goals: Rest and sleep    Progress made toward(s) clinical / shift goals:    Problem: Knowledge Deficit - Standard  Goal: Patient and family/care givers will demonstrate understanding of plan of care, disease process/condition, diagnostic tests and medications  Outcome: Progressing  Note: Patient updated on the plan of care. Encouraged to voice feelings and to ask questions. Answered all questions and concerns. Agrees with the plan of care.        Patient is not progressing towards the following goals:

## 2021-12-17 NOTE — PROGRESS NOTES
Received bedside patient report from JUDITH Huggins. Patient resting comfortably in bed, no complaints at this time. Safety precautions in place. Family at bedside. Will continue to monitor.

## 2021-12-17 NOTE — DISCHARGE PLANNING
Anticipated Discharge Disposition: Home with [facility]    Action: Discussed pt in morning rounds. LSW requested MD place HH order and face to face.     Barriers to Discharge: None    Plan: LSW to fax HH choice to [facility] when order and face to face are completed.     1030: Discussed pt in IDT rounds. LSW requested MD place HH order and face to face.    1445: Per MD, order is placed. LSW faxed HH choice to [facility].

## 2021-12-17 NOTE — DISCHARGE PLANNING
Anticipated Discharge Disposition:   Home with [facility], possible transfer to [facility]     Action:   Chart review complete.     LSW collected choice for HH. HH order and face to face pending.     Patient and family also requesting purewick. Patient's  is going to give inpatient RN CM a call.      information:   Name: Meron [email or web address]   Email: [email or web address]  Phone number: [phone] ex [phone]    A purewick will most likely not be covered by insurance as it may not be considered medically necessary. Patient will most likely need to follow up outpatient with their insurance CM and PCP. A bedside commode may be a better option as it may be covered by insurance.     1345: RN CM received a call from [facility]. Per Meron, she was going to discuss purewick with RN CM but was informed that patient is transferring to [facility].     COBRA packet started.     1500: COBRA form verbally signed MD.     1535: PCS form faxed to transfer center with instructions to contact bedside RN with updates on transfer.     COBRA packet given to charge nurse of [facility].     Barriers to Discharge:   Medical Clearance    Plan:   Hospital care management will continue to assist with discharge planning needs.

## 2021-12-17 NOTE — THERAPY
Missed Therapy     Patient Name: Melba Frye  Age:  51 y.o., Sex:  female  Medical Record #: [id number]  Today's Date: 12/16/2021    Discussed missed therapy with RN        12/16/21 7904   Initial Contact Note    Initial Contact Note Order Received and Verified, Occupational Therapy Evaluation in Progress with Full Report to Follow.   Interdisciplinary Plan of Care Collaboration   Collaboration Comments OT johnny attempted, discussed w/RN pt told RN she did not want to see therapy today. OT will re-attempt tomorrow as able appropriate, per RN pt is now requiring x2 A to BSC, unclear if SO is able to provide that level of assist for safe d/c home. Will formally assess tomorrow as pt is willing; May benefit from palliative consult given dx of GBM?   Session Information   Date / Session Number  12/16 attempted    Priority 4

## 2021-12-17 NOTE — DISCHARGE PLANNING
Received Choice form at 0771  Agency/Facility Name: [facility]  Referral sent per Choice form @ 6964

## 2021-12-17 NOTE — FACE TO FACE
Face to Face Supporting Documentation - Home Health    The encounter with this patient was in whole or in part the primary reason for home health admission.    Date of encounter:   Patient:                    MRN:                       YOB: 2022  Melba Frye  [id number]  1970     Home health to see patient for:  Physical Therapy evaluation and treatment and Occupational therapy evaluation and treatment    Skilled need for:  Exacerbation of Chronic Disease State worsening left lower extremity weakness in setting of glioblastoma    Skilled nursing interventions to include:  Comment: medication management    Homebound status evidenced by:  Need the aid of supportive devices such as crutches, canes, wheelchairs or walkers or Needs the assistance of another person in order to leave the home. Leaving home requires a considerable and taxing effort. There is a normal inability to leave the home.    Community Physician to provide follow up care: Trinity Nguyen M.D.     Optional Interventions? No      I certify the face to face encounter for this home health care referral meets the CMS requirements and the encounter/clinical assessment with the patient was, in whole, or in part, for the medical condition(s) listed above, which is the primary reason for home health care. Based on my clinical findings: the service(s) are medically necessary, support the need for home health care, and the homebound criteria are met.  I certify that this patient has had a face to face encounter by myself.  Ivan Benites M.D. - NPI: [id number]

## 2021-12-17 NOTE — THERAPY
"Occupational Therapy   Initial Evaluation     Patient Name: Melba Frye  Age:  51 y.o., Sex:  female  Medical Record #: [id number]  Today's Date: 12/17/2021     Precautions: Fall Risk  Comments: L hemiplegia w/tone hx of GBM     Assessment  Patient is 51 y.o. female admitted for fever and urinary pain. Pt has an extensive recent past medical hx including: glioblastoma, hemiparesis, obesity, PE, hypertension, and anxiety, pt has had a tumor resection and chemotherapy. 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 Pt and SO present during session reporting pt was able to take some steps and use her LUE functional ~1 week ago. At this time pt has significant hypertonicity of L side body (non-functional) impaired balance, poor postural control and poor activity tolerance. SO demonstrated ability facility functional txfs and assist w/ADL's and do have all needed DME at this time. OT will follow in this setting given fluctuation of tone and functional abilities.     Plan  Recommend Occupational Therapy 3 times per week until therapy goals are met for the following treatments:  Adaptive Equipment, Self Care/Activities of Daily Living, Therapeutic Activities and Therapeutic Exercises.    DC Equipment Recommendations: None  Discharge Recommendations: Recommend home health for continued occupational therapy services     Subjective  \"Thank you for all of the information\"      Objective     12/17/21 1201   Prior Living Situation   Prior Services None   Housing / Facility 2 Story House   Steps In Home 16   Bathroom Set up Walk In Shower;Shower Chair   Equipment Owned Front-Wheel Walker;Bed Side Commode;Hospital Bed;Other (Comments)   Lives with - Patient's Self Care Capacity Spouse;Attendant / Paid Care Giver   Comments spouse is very supportive and able to assist with ADL's and mobility upon d/c to home. Spouse " states they have a house aid to assist as needed when he is at work    Prior Level of ADL Function   Self Feeding Requires Assist   Grooming / Hygiene Requires Assist   Bathing Requires Assist   Dressing Requires Assist   Toileting Requires Assist   Prior Level of IADL Function   Medication Management Dependent   Laundry Dependent   Kitchen Mobility Dependent   Finances Dependent   Home Management Dependent   Shopping Dependent   Prior Level Of Mobility Unable to Negotiate Steps in Home   History of Falls   History of Falls Yes   Precautions   Precautions Fall Risk   Comments L hemiplegia w/tone hx of GBM    Pain 0 - 10 Group   Location Shoulder   Location Orientation Left   Therapist Pain Assessment During Activity;Nurse Notified  (not rated )   Cognition    Cognition / Consciousness X   Speech/ Communication Delayed Responses   Level of Consciousness Alert   Comments pleasant and cooperative, limited memory    Passive ROM Upper Body   Passive ROM Upper Body X   Comments LUE globably impaired by hypertonicity    Active ROM Upper Body   Active ROM Upper Body  X   Dominant Hand Right   Comments non-functional d/t tone    Strength Upper Body   Upper Body Strength  X   Comments RUE WFL; LUE NON functional    Sensation Upper Body   Upper Extremity Sensation  Not Tested   Upper Body Muscle Tone   Upper Body Muscle Tone  X   Lt Upper Extremity Muscle Tone Non Functional;Hypertonic   Neurological Concerns   Neurological Concerns Yes   Lt Upper Extremity Functional Use Impaired   Coordination Upper Body   Coordination X   Comments impaired by tone w/LUE    Balance Assessment   Sitting Balance (Static) Fair +   Sitting Balance (Dynamic) Fair   Standing Balance (Static) Poor +   Standing Balance (Dynamic) Poor -   Weight Shift Sitting Poor   Weight Shift Standing Poor   Comments spouse using gait belt and HHA    Bed Mobility    Supine to Sit Moderate Assist   Sit to Supine Moderate Assist   Scooting Moderate Assist   ADL  Assessment   Grooming Minimal Assist;Seated   Upper Body Dressing Minimal Assist   Lower Body Dressing Maximal Assist   Toileting Maximal Assist   Comments So donned AFO, able to use RUE for self feeding and grooming; incontience of urine    How much help from another person does the patient currently need...   6 Clicks Daily Activity Score 14   Functional Mobility   Sit to Stand Moderate Assist   Bed, Chair, Wheelchair Transfer Moderate Assist   Toilet Transfers Moderate Assist   Mobility EOB>chair    Visual Perception   Visual Perception  Not Tested   Edema / Skin Assessment   Edema / Skin  Not Assessed   Activity Tolerance   Comments c/o fatigue    Patient / Family Goals   Patient / Family Goal #1 to go home    Short Term Goals   Short Term Goal # 1 pt will complete UE ROM w/min A    Short Term Goal # 2 pt will complete txf to BSC w/min A    Education Group   Education Provided Role of Occupational Therapist;Activities of Daily Living;Transfers;Home Safety;Coordination;Upper Extremity Range of Motion;Joint Protection;Back Safety   Role of Occupational Therapist Patient Response Patient;Significant Other;Acceptance;Explanation;Demonstration;Verbal Demonstration;Action Demonstration   Back Safety Patient Response Patient;Significant Other;Acceptance;Explanation;Demonstration   Joint Protection Patient Response Patient;Significant Other;Acceptance;Explanation;Demonstration;Verbal Demonstration;Action Demonstration   Upper Ext ROM Patient Response Patient;Significant Other;Acceptance;Explanation   Coordination Patient Response Patient;Significant Other;Acceptance;Explanation;Demonstration   Home Safety Patient Response Patient;Significant Other;Acceptance;Explanation;Demonstration   Transfers Patient Response Patient;Acceptance;Explanation   ADL Patient Response Patient;Acceptance;Explanation   Additional Comments Extensive education on caregiver training, strateiges for ease and safety w/txfs, ADL modifciation UE  ROM given increased tone and post acute vs  services    Problem List   Problem List Decreased Active Daily Living Skills;Decreased Upper Extremity Strength Left;Decreased Upper Extremity PROM Left;Decreased Upper Extremity AROM Left;Decreased Functional Mobility;Decreased Activity Tolerance;Impaired Postural Control / Balance

## 2021-12-18 VITALS
HEIGHT: 67 IN | WEIGHT: 221.78 LBS | RESPIRATION RATE: 20 BRPM | SYSTOLIC BLOOD PRESSURE: 113 MMHG | DIASTOLIC BLOOD PRESSURE: 63 MMHG | TEMPERATURE: 97.7 F | HEART RATE: 94 BPM | BODY MASS INDEX: 34.81 KG/M2 | OXYGEN SATURATION: 91 %

## 2021-12-18 PROBLEM — G93.9: Status: ACTIVE | Noted: 2021-12-18

## 2021-12-18 PROBLEM — D64.9 NORMOCYTIC ANEMIA: Status: RESOLVED | Noted: 2021-12-18 | Resolved: 2021-12-18

## 2021-12-18 PROBLEM — R50.9 FEVER: Status: RESOLVED | Noted: 2021-12-15 | Resolved: 2021-12-18

## 2021-12-18 PROBLEM — R26.81 GAIT INSTABILITY: Status: ACTIVE | Noted: 2021-12-18

## 2021-12-18 PROBLEM — D64.9 NORMOCYTIC ANEMIA: Status: ACTIVE | Noted: 2021-12-18

## 2021-12-18 PROCEDURE — 700102 HCHG RX REV CODE 250 W/ 637 OVERRIDE(OP): Performed by: STUDENT IN AN ORGANIZED HEALTH CARE EDUCATION/TRAINING PROGRAM

## 2021-12-18 PROCEDURE — A9270 NON-COVERED ITEM OR SERVICE: HCPCS | Performed by: INTERNAL MEDICINE

## 2021-12-18 PROCEDURE — A9270 NON-COVERED ITEM OR SERVICE: HCPCS | Performed by: STUDENT IN AN ORGANIZED HEALTH CARE EDUCATION/TRAINING PROGRAM

## 2021-12-18 PROCEDURE — 99239 HOSP IP/OBS DSCHRG MGMT >30: CPT | Performed by: STUDENT IN AN ORGANIZED HEALTH CARE EDUCATION/TRAINING PROGRAM

## 2021-12-18 PROCEDURE — 700102 HCHG RX REV CODE 250 W/ 637 OVERRIDE(OP): Performed by: INTERNAL MEDICINE

## 2021-12-18 RX ORDER — OMEPRAZOLE 20 MG/1
20 CAPSULE, DELAYED RELEASE ORAL DAILY
Qty: 30 CAPSULE | Refills: 1 | Status: SHIPPED | OUTPATIENT
Start: 2021-12-19 | End: 2022-02-09

## 2021-12-18 RX ORDER — DEXAMETHASONE 4 MG/1
TABLET ORAL
Qty: 26 TABLET | Refills: 1 | Status: SHIPPED | OUTPATIENT
Start: 2021-12-18 | End: 2021-12-23 | Stop reason: SDUPTHER

## 2021-12-18 RX ADMIN — HYDROXYZINE HYDROCHLORIDE 50 MG: 25 TABLET, FILM COATED ORAL at 05:18

## 2021-12-18 RX ADMIN — Medication 1000 UNITS: at 05:08

## 2021-12-18 RX ADMIN — VENLAFAXINE 75 MG: 75 TABLET ORAL at 05:09

## 2021-12-18 RX ADMIN — DEXAMETHASONE 4 MG: 4 TABLET ORAL at 05:08

## 2021-12-18 RX ADMIN — TRAMADOL HYDROCHLORIDE 50 MG: 50 TABLET, COATED ORAL at 04:05

## 2021-12-18 RX ADMIN — FOLIC ACID 1 MG: 1 TABLET ORAL at 05:09

## 2021-12-18 RX ADMIN — LACOSAMIDE 150 MG: 50 TABLET, FILM COATED ORAL at 05:08

## 2021-12-18 RX ADMIN — AMLODIPINE BESYLATE 10 MG: 5 TABLET ORAL at 05:08

## 2021-12-18 RX ADMIN — APIXABAN 5 MG: 5 TABLET, FILM COATED ORAL at 05:08

## 2021-12-18 ASSESSMENT — PAIN DESCRIPTION - PAIN TYPE
TYPE: CHRONIC PAIN

## 2021-12-18 NOTE — PROGRESS NOTES
Report received from Day RN, assumed care of pt.   POC and medications reviewed with pt. Pt verbalized understanding.   AOx4, L sided weakness noted, neurocheck performed, q4h checks in place.  Denies pain, SOB, or dizziness at this time.   Safety measures in place.

## 2021-12-18 NOTE — PROGRESS NOTES
Pt discharged with MD order. Discharge instructions, prescriptions, and follow-up appointments reviewed, all questions answered.

## 2021-12-18 NOTE — CARE PLAN
The patient is Stable - Low risk of patient condition declining or worsening    Shift Goals  Clinical Goals: Remain free of falls  Patient Goals: Have BM  Family Goals: Rest and sleep    Progress made toward(s) clinical / shift goals:  Pt has remained free of falls this shift.     Problem: Pain - Standard  Goal: Alleviation of pain or a reduction in pain to the patient’s comfort goal  Outcome: Progressing     Problem: Knowledge Deficit - Standard  Goal: Patient and family/care givers will demonstrate understanding of plan of care, disease process/condition, diagnostic tests and medications  Outcome: Progressing     Problem: Skin Integrity  Goal: Skin integrity is maintained or improved  Outcome: Progressing     Problem: Fall Risk  Goal: Patient will remain free from falls  Outcome: Progressing       Patient is not progressing towards the following goals:  Pt has not had a BM this shift

## 2021-12-18 NOTE — HOSPITAL COURSE
12/16: No significant events overnight.  Complaining of significant anxiety.  Provided meditation techniques.  Further history gathered from patient's  who reported that she had resection of a glioblastoma at [facility] in the spring followed by radiation treatment.  She completed Temodar treatment.  For the past couple of weeks, she has been having worsening balance issues.  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  I have placed order for PT and OT consults.  Anticipate patient will need skilled nursing facility due to worsening weakness.  Started dexamethasone 4 mg by mouth every 6 hours and will taper.  Plan for follow-up with outpatient neurosurgery.

## 2021-12-18 NOTE — DISCHARGE SUMMARY
Discharge Summary    CHIEF COMPLAINT ON ADMISSION  Chief Complaint   Patient presents with   • Fever   • Urinary Pain       Reason for Admission  EMS     Admission Date  12/14/2021    CODE STATUS  Full Code    HPI & HOSPITAL COURSE  Melba Frye is a 51 y.o. female, who presented 12/14/2021 with fever and dysuria.  She had a history of glioblastoma, seizure disorder, hypertension, and anxiety.  Patient initially developed partial seizures in December 2020.  MRI of the brain performed at that time showed a ring-enhancing lesion in the right frontal medial area, which continued to progress on subsequent a MRI in February 2021.  She underwent Stealth guided right cardiotomy and open biopsy by Dr. Mao of [facility] in March 2021, at which time she was diagnosed with glioblastoma multiforme.  Following discharge to [facility], she was subsequently referred to [facility], where she underwent another craniotomy and subtotal resection in June 2021, followed by radiation therapy.  She recently completed temozolomide chemotherapy under the auspices of Dr. Cerda of [facility] and [facility] neuro-oncology.  Patient had resultant profound left upper extremity weakness since her surgery which has been stable at baseline with no significant improvement.  She had been receiving physical therapy at home.  Patient's left lower extremity began to worsen in the last 2 weeks and was no longer ambulatory.  She also developed new breakthrough cluster seizures approximately 1 week ago with escalation of her antiepileptic medications per neurologist.      Due to patient's presenting fevers, there was a concern for intracranial abscess.  MRI of the brain with contrast was performed, which did not show signs of infection but did show a 3.1 cm irregular ring-enhancing mass in the postoperative site in the right posterior/parietal white matter with associated vasogenic edema concerning for progression of  tumor versus radiation necrosis.  Due to the MRI and clinical findings with acute to subacute worsening of patient's lower extremity weakness, she was placed on dexamethasone 4 mg by mouth every 6 hours with improvement of her left lower extremity strength from 3/5 to 4/5.  Due to concern for progressing malignancy versus radiation necrosis that may need repeat resection, transfer to [facility] was made through the [facility].  Following review and discussion with the patient's oncologist and neurosurgery at [facility], they called the patient to notify her and her  that they felt that it was too early for another resection at this time.  Per discussion with Dr. Cerda, patient's local oncologist, the plan was to follow-up with him following discharge to start Avastin.  Patient continued to improve in her left lower extremity weakness.  She was discharged home with home health physical and occupational therapy as well as dexamethasone steroid taper.     Therefore, she is discharged in fair and stable condition to home with organized home healthcare and close outpatient follow-up.    The patient met 2-midnight criteria for an inpatient stay at the time of discharge.    Discharge Date  12/18/2021    FOLLOW UP ITEMS POST DISCHARGE  -Follow with Dr. Cerda at [facility].  -Follow up with [facility] neurosurgery and neuro-oncology as planned.  -Follow-up with your primary care provider in 1 week.    DISCHARGE DIAGNOSES  Principal Problem:    Acute left-sided weakness POA: Yes  Active Problems:    Glioblastoma of frontal lobe (HCC) POA: Yes    Lesion of frontal lobe of brain POA: Yes    Gait instability POA: Yes    Mixed anxiety and depressive disorder POA: Yes    Hyperlipidemia POA: Yes    Frequent UTI POA: Yes    Hypertension POA: Yes    Seizure disorder (HCC) (Chronic) POA: Yes    History of pulmonary embolus (PE) POA: Yes  Resolved Problems:    Fever POA: Yes    Normocytic anemia POA: Yes      FOLLOW  UP  Future Appointments   Date Time Provider Department Center   12/23/2021 10:00 AM Edenilson Frye M.D. [facility] None   1/3/2022 11:00 AM Fady Davidson M.D. [facility] None   2/1/2022  3:15 PM Yanet Steele D.O. [facility] None     Trinity Nguyen M.D.  [address] [place] [address]  [phone]    Schedule an appointment as soon as possible for a visit in 2 weeks  For follow up.    Michael Cerda M.D.  [address] [place] [address]  [phone]    Schedule an appointment as soon as possible for a visit in 1 week  For follow up.      MEDICATIONS ON DISCHARGE     Medication List      Start taking these medications      Instructions   dexamethasone 4 MG Tabs  Start taking on: December 18, 2021  Commonly known as: DECADRON   Take 1 Tablet by mouth 4 times a day for 1 day, THEN 1 Tablet 3 times a day for 3 days, THEN 0.5 Tablets 3 times a day for 5 days, THEN 0.5 Tablets 2 times a day for 5 days.     omeprazole 20 MG delayed-release capsule  Start taking on: December 19, 2021  Commonly known as: PRILOSEC   Take 1 Capsule by mouth every day. For GI ulcer prophylaxis while taking dexamethasone.  Dose: 20 mg        Change how you take these medications      Instructions   estradiol 0.1 MG/GM vaginal cream  What changed:   · how much to take  · how to take this  · when to take this  · additional instructions  Commonly known as: ESTRACE VAGINAL   Apply 1g cream inside vagina using applicator nightly for 2 weeks, then twice per week thereafter     Mirabegron ER 50 MG Tb24  What changed:   · when to take this  · additional instructions   Take 50 mg by mouth every day for 30 days.  Dose: 50 mg     Naloxone 4 MG/0.1ML Liqd  What changed:   · how much to take  · how to take this  · when to take this  · reasons to take this  Commonly known as: NARCAN   One spray in one nostril for overdose and call 911.        Continue taking these medications      Instructions   amLODIPine 10 MG Tabs  Commonly known as:  NORVASC   TAKE 1 TABLET BY MOUTH EVERY DAY  Dose: 10 mg     brivaracetam 100 MG Tabs tablet  Commonly known as: Briviact   Take 1 Tablet by mouth 2 times a day for 90 days.  Dose: 100 mg     clonazePAM 1 MG Tabs  Commonly known as: KLONOPIN   Take 1 Tablet by mouth 2 times a day as needed (use for any repetitive seizures OR prolonged seizure > 5 minutes) for up to 60 days.  Dose: 1 mg     Eliquis 5mg Tabs  Generic drug: apixaban   Doctor's comments: Re-ordering eliquis to give patient a 90 day supply which is cheaper for them per her insurance  Take 1 Tablet by mouth 2 times a day.  Dose: 5 mg     FOLIC ACID PO   Take 1 Tablet by mouth every day.  Dose: 1 Tablet     hydrOXYzine HCl 50 MG Tabs  Commonly known as: ATARAX   Take 50 mg by mouth 2 times a day as needed for Anxiety.  Dose: 50 mg     lacosamide 100 MG Tabs tablet  Commonly known as: Vimpat   Take 1.5 Tablets by mouth 2 times a day for 90 days.  Dose: 150 mg     melatonin 3 MG Tabs   Take 1 tablet by mouth at bedtime as needed (insomnia).  Dose: 3 mg     multivitamin Tabs   Take 1 tablet by mouth every evening.  Dose: 1 Tablet     prochlorperazine 10 MG Tabs  Commonly known as: COMPAZINE   Take 10 mg by mouth every 6 hours as needed for Nausea/Vomiting.  Dose: 10 mg     TYLENOL 500 MG Tabs  Generic drug: acetaminophen   Take 1,000 mg by mouth every 6 hours as needed for Moderate Pain.  Dose: 1,000 mg     venlafaxine 75 MG Tabs  Commonly known as: EFFEXOR   Take 1 Tablet by mouth every day.  Dose: 75 mg     VITAMIN D PO   Take 1 Units by mouth every evening.  Dose: 1 Units        Stop taking these medications    Keflex 500 MG Caps  Generic drug: cephALEXin     sulfamethoxazole-trimethoprim 800-160 MG tablet  Commonly known as: BACTRIM DS     temozolomide 100 MG capsule  Commonly known as: TEMODAR     traMADol 50 MG Tabs  Commonly known as: ULTRAM            Allergies  Allergies   Allergen Reactions   • Tape Rash     Use paper tape instead       DIET  Orders  Placed This Encounter   Procedures   • Diet Order Diet: Regular     Standing Status:   Standing     Number of Occurrences:   1     Order Specific Question:   Diet:     Answer:   Regular [1]       ACTIVITY  As tolerated.  Exercise encouraged.  Weight bearing as tolerated    CONSULTATIONS  None    PROCEDURES  None    LABORATORY  Lab Results   Component Value Date    SODIUM 138 12/16/2021    POTASSIUM 3.6 12/16/2021    CHLORIDE 104 12/16/2021    CO2 21 12/16/2021    GLUCOSE 91 12/16/2021    BUN 7 (L) 12/16/2021    CREATININE 0.48 (L) 12/16/2021        Lab Results   Component Value Date    WBC 4.2 (L) 12/17/2021    HEMOGLOBIN 12.8 12/17/2021    HEMATOCRIT 36.6 (L) 12/17/2021    PLATELETCT 224 12/17/2021        Total time of the discharge process exceeds 33 minutes.

## 2021-12-18 NOTE — PROGRESS NOTES
Hospital Medicine Daily Progress Note    Date of Service  12/17/2021    Chief Complaint  Melba Frye is a 51 y.o. female admitted 12/14/2021 with fever, dysuria, and worsening balance.    Hospital Course  12/16: No significant events overnight.  Complaining of significant anxiety.  Provided meditation techniques.  Further history gathered from patient's  who reported that she had resection of a glioblastoma at [facility] in the spring followed by radiation treatment.  She completed Temodar treatment.  For the past couple of weeks, she has been having worsening balance issues.  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  I have placed order for PT and OT consults.  Anticipate patient will need skilled nursing facility due to worsening weakness.  Started dexamethasone 4 mg by mouth every 6 hours and will taper.  Plan for follow-up with outpatient neurosurgery.      Interval Problem Update  12/17: Left lower extremity improving.  Home health PT and OT placed with possible skilled nursing for medication management.  Through transfer center, I talked to neurosurgery resident (Dr. Marcelino Brunson) at [facility], who stated that he would be talking with his neuro oncologist attending (Dr. Duran) about accepting the transfer.    I have personally seen and examined the patient at bedside. I discussed the plan of care with patient, bedside RN, charge RN,  and pharmacy.    Consultants/Specialty  neurosurgery in the ER    Code Status  Full Code    Disposition  Patient is not medically cleared.   Anticipate discharge to [facility] vis transfer vs home with home health..  I have placed the appropriate orders for post-discharge needs.    Review of Systems  Review of Systems   Constitutional: Negative for chills and fever.   Eyes: Negative for double  vision.   Respiratory: Negative for cough and sputum production.    Cardiovascular: Negative for chest pain and palpitations.   Gastrointestinal: Negative for abdominal pain, nausea and vomiting.   Genitourinary: Negative for dysuria and frequency.   Musculoskeletal: Negative for back pain and myalgias.   Neurological: Positive for focal weakness (stable left upper extremity weakness, improving left lower extremity weakness). Negative for seizures and headaches.   Psychiatric/Behavioral: Negative for depression. The patient is not nervous/anxious.         Physical Exam  Temp:  [36.3 °C (97.3 °F)-36.7 °C (98 °F)] 36.3 °C (97.3 °F)  Pulse:  [74-98] 92  Resp:  [20] 20  BP: (118-141)/(77-89) 118/77  SpO2:  [90 %-95 %] 90 %    Physical Exam  Constitutional:       Appearance: Normal appearance. She is not ill-appearing.   HENT:      Head: Normocephalic and atraumatic.      Comments: Well-healed craniotomy incision     Mouth/Throat:      Mouth: Mucous membranes are moist.      Pharynx: No oropharyngeal exudate.   Eyes:      General: No scleral icterus.        Right eye: No discharge.         Left eye: No discharge.      Pupils: Pupils are equal, round, and reactive to light.   Cardiovascular:      Rate and Rhythm: Normal rate and regular rhythm.      Pulses: Normal pulses.      Heart sounds: Normal heart sounds. No murmur heard.      Pulmonary:      Effort: Pulmonary effort is normal. No respiratory distress.      Breath sounds: Normal breath sounds. No wheezing or rales.   Abdominal:      General: Abdomen is flat. Bowel sounds are normal. There is no distension.      Palpations: Abdomen is soft.   Musculoskeletal:         General: No tenderness.      Cervical back: Neck supple. No tenderness.      Right lower leg: No edema.      Left lower leg: No edema.   Skin:     General: Skin is warm and dry.      Coloration: Skin is not pale.   Neurological:      Mental Status: She is alert and oriented to person, place, and time.       Sensory: No sensory deficit.      Motor: Weakness present.      Comments: Patient has 0/5 strength in the left upper extremity with flexion contracture position.  Left lower extremity with 4/5 strength.  Right side with full strength 5/5.  Sensation intact to light touch.  Speech is fluent and comprehensible.   Psychiatric:         Thought Content: Thought content normal.         Judgment: Judgment normal.      Comments: Anxious         Fluids    Intake/Output Summary (Last 24 hours) at 12/17/2021 1827  Last data filed at 12/17/2021 1600  Gross per 24 hour   Intake 240 ml   Output 750 ml   Net -510 ml       Laboratory  Recent Labs     12/15/21  0905 12/16/21  0327 12/17/21  0506   WBC 7.3 4.9 4.2*   RBC 3.48* 3.44* 3.74*   HEMOGLOBIN 11.8* 11.8* 12.8   HEMATOCRIT 34.0* 34.1* 36.6*   MCV 97.7 99.1* 97.9*   MCH 33.9* 34.3* 34.2*   MCHC 34.7 34.6 35.0   RDW 47.5 47.1 45.0   PLATELETCT 198 212 224   MPV 9.2 9.5 9.5     Recent Labs     12/14/21  1850 12/15/21  0905 12/16/21  0327   SODIUM 136 140 138   POTASSIUM 4.0 4.0 3.6   CHLORIDE 103 105 104   CO2 20 24 21   GLUCOSE 98 98 91   BUN 7* 6* 7*   CREATININE 0.65 0.55 0.48*   CALCIUM 8.9 8.9 8.7     Recent Labs     12/14/21  1850   APTT 28.4   INR 1.08               Imaging  MR-BRAIN-WITH & W/O   Final Result      1.  3.1 cm ring-enhancing mass arising at the  postoperative site in the right posterior frontal/parietal white matter with marked surrounding increased T2 signal intensity effacing the posterior body and atrium of the right lateral ventricle and causing    mild right to left midline shift the ventricular system. This is suspicious for recurrent neoplasm, however conceivably radiation necrosis could have this appearance.      2.  Age-related cerebral atrophy.      3.  Periventricular white matter changes consistent with postirradiation change.      CT-HEAD WITH & W/O   Final Result         1.  Enhancing right frontoparietal mass with surrounding vasogenic  edema.   2.  Partial effacement of the right ventricle with 1 mm right to left midline shift   3.  Atherosclerosis      DX-CHEST-PORTABLE (1 VIEW)   Final Result      1.  There is no acute cardiopulmonary process.                     Assessment/Plan  * Acute left-sided weakness- (present on admission)  Assessment & Plan  Patient has baseline left upper extremity weakness 0/5 strength.  However, she has been developing worsening left lower extremity weakness for the past 1 to 2 weeks with worsening balance.  This correlates with the findings on the MRI.  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    Continue dexamethasone 4 mg by mouth every 6 hours and taper.  Omeprazole for GI prophylaxis.  Plan for outpatient follow up with neurosurgery.    Seizure disorder (HCC)- (present on admission)  Assessment & Plan  Patient developed seizure disorder approximately 1 week ago and was started on antiepileptics.  No further seizure activity since then.    Continue home Briviact and lacosamide.  Seizure, fall, and aspiration cautions.  IV Ativan as needed for breakthrough seizures.    Fever- (present on admission)  Assessment & Plan  Procalcitonin within normal limits.  Mildly elevated ESR and CRP.  No signs of active infection.  No significant neutropenia.  Suspect possible central etiology    Continue to monitor white count and for fevers.    Hemiparesis (HCC)- (present on admission)  Assessment & Plan  Baseline upper extremity weakness with worsening left lower extremity weakness as per above.    Glioblastoma of frontal lobe (HCC)- (present on admission)  Assessment & Plan  Patient with history of glioblastoma and right frontal lobe.  Status post resection at [facility] in spring of this year.  Followed by radiation.  Followed by Dr. Frye of radiation oncology and also Dr. Cerda of oncology  Completed temozolomide chemotherapy    Continue dexamethasone as per above.  Awaiting response from  [facility] regarding acceptance of transfer.    History of pulmonary embolus (PE)- (present on admission)  Assessment & Plan  As per history.  No significant hypoxia or shortness of breath.    Continue home apixaban.    Hypertension- (present on admission)  Assessment & Plan  Currently at goal  Resume home medications    Frequent UTI- (present on admission)  Assessment & Plan  Patiently recently completed therapy with Bactrim and Keflex  Repeat urinalysis on this admission was negative    Continue to monitor for symptoms.    Mixed anxiety and depressive disorder- (present on admission)  Assessment & Plan  Complaining of anxiety this morning.  Discussed meditative techniques to which patient was receptive.  Continue home dose venlafaxine.  Start low-dose Ativan as needed.    Hyperlipidemia- (present on admission)  Assessment & Plan  Continue to encourage lifestyle modifications, diet and exercise       VTE prophylaxis: SCDs/TEDs and therapeutic anticoagulation with apixaban    I have performed a physical exam and reviewed and updated ROS and Plan today (12/17/2021). In review of yesterday's note (12/16/2021), there are no changes except as documented above.

## 2021-12-18 NOTE — CARE PLAN
The patient is Stable - Low risk of patient condition declining or worsening    Shift Goals  Clinical Goals: Remain free of falls  Patient Goals: Remain free of falls  Family Goals: Rest and sleep    Progress made toward(s) clinical / shift goals:  Pt remained free of falls this shift.     Problem: Pain - Standard  Goal: Alleviation of pain or a reduction in pain to the patient’s comfort goal  Outcome: Progressing     Problem: Knowledge Deficit - Standard  Goal: Patient and family/care givers will demonstrate understanding of plan of care, disease process/condition, diagnostic tests and medications  Outcome: Progressing     Problem: Skin Integrity  Goal: Skin integrity is maintained or improved  Outcome: Progressing     Problem: Fall Risk  Goal: Patient will remain free from falls  Outcome: Progressing       Patient is not progressing towards the following goals:

## 2021-12-18 NOTE — CARE PLAN
The patient is Stable - Low risk of patient condition declining or worsening    Shift Goals  Clinical Goals: remain free from injury, pain control as needed   Patient Goals: Have BM  Family Goals: Rest and sleep    Progress made toward(s) clinical / shift goals:    Problem: Pain - Standard  Goal: Alleviation of pain or a reduction in pain to the patient’s comfort goal  Outcome: Progressing  Note: Pain managed per MAR as needed     Problem: Knowledge Deficit - Standard  Goal: Patient and family/care givers will demonstrate understanding of plan of care, disease process/condition, diagnostic tests and medications  Outcome: Progressing  Note: Reviewed plan of care with pt, pt verbalized understanding, no questions at this time.      Problem: Fall Risk  Goal: Patient will remain free from falls  Outcome: Progressing       Patient is not progressing towards the following goals:

## 2021-12-18 NOTE — ASSESSMENT & PLAN NOTE
3 cm ring-enhancing irregular lesion in the posterior right frontal/anterior parietal lobe with associated vasogenic edema and 1 mm of right-to-left midline shift.  Concerning for recurrence of GBM versus radiation necrosis.    Discussed with [facility] regarding possible transfer.  Outside neuro oncologist as well as neurosurgeon feels too early for repeat resection.

## 2021-12-18 NOTE — DISCHARGE PLANNING
Anticipated Discharge Disposition: Home with HH     Action: Received weekend hand off to follow up with pending HH referral. LSW called [facility]  ([phone]) and spoke with Marline in their after hours answering service department. Marline took LSW's message/contact number and will forward to an [facility]  nurse for a call back to discuss pt's referral.     Barriers to Discharge: HH acceptance     Plan: Follow up with [facility]  regarding referral     0948: Placed another call to [facility]  to follow up with HH. LSW had to leave message with their after hour answering service.

## 2021-12-18 NOTE — DISCHARGE INSTRUCTIONS
Discharge Instructions    Discharged to home by car with relative. Discharged via wheelchair, hospital escort: Yes.  Special equipment needed: Not Applicable    Be sure to schedule a follow-up appointment with your primary care doctor or any specialists as instructed.     Discharge Plan:   Diet Plan: Discussed  Activity Level: Discussed  Confirmed Follow up Appointment: Appointment Scheduled  Confirmed Symptoms Management: Discussed  Medication Reconciliation Updated: Yes  Influenza Vaccine Indication: Not indicated: Previously immunized this influenza season and > 8 years of age    I understand that a diet low in cholesterol, fat, and sodium is recommended for good health. Unless I have been given specific instructions below for another diet, I accept this instruction as my diet prescription.   Other diet: Home diet as tolerated    Special Instructions: None    · Is patient discharged on Warfarin / Coumadin?   No     Depression / Suicide Risk    As you are discharged from this [facility] facility, it is important to learn how to keep safe from harming yourself.    Recognize the warning signs:  · Abrupt changes in personality, positive or negative- including increase in energy   · Giving away possessions  · Change in eating patterns- significant weight changes-  positive or negative  · Change in sleeping patterns- unable to sleep or sleeping all the time   · Unwillingness or inability to communicate  · Depression  · Unusual sadness, discouragement and loneliness  · Talk of wanting to die  · Neglect of personal appearance   · Rebelliousness- reckless behavior  · Withdrawal from people/activities they love  · Confusion- inability to concentrate     If you or a loved one observes any of these behaviors or has concerns about self-harm, here's what you can do:  · Talk about it- your feelings and reasons for harming yourself  · Remove any means that you might use to hurt yourself (examples: pills, rope, extension  cords, firearm)  · Get professional help from the community (Mental Health, Substance Abuse, psychological counseling)  · Do not be alone:Call your Safe Contact- someone whom you trust who will be there for you.  · Call your local CRISIS HOTLINE 897-3320 or 767-438-2563  · Call your local Children's Mobile Crisis Response Team [place] (503) 299-6002 or www.[employer]  · Call the toll free National Suicide Prevention Hotlines   · National Suicide Prevention Lifeline 741-132-BNKG (6945)  · National Hope Line Network 800-SUICIDE (953-9354)

## 2021-12-19 LAB
BACTERIA BLD CULT: NORMAL
BACTERIA BLD CULT: NORMAL
SIGNIFICANT IND 70042: NORMAL
SIGNIFICANT IND 70042: NORMAL
SITE SITE: NORMAL
SITE SITE: NORMAL
SOURCE SOURCE: NORMAL
SOURCE SOURCE: NORMAL

## 2021-12-20 ENCOUNTER — APPOINTMENT (OUTPATIENT)
Dept: RADIOLOGY | Facility: MEDICAL CENTER | Age: 51
End: 2021-12-20
Attending: RADIOLOGY
Payer: COMMERCIAL

## 2021-12-20 ENCOUNTER — TELEPHONE (OUTPATIENT)
Dept: NEUROLOGY | Facility: MEDICAL CENTER | Age: 51
End: 2021-12-20

## 2021-12-20 NOTE — TELEPHONE ENCOUNTER
Spoke to pts case  in regards to getting pts pure wick system for her home covered by insurance but needs  to write a script and me to fax it to [phone] along with clinicals

## 2021-12-23 ENCOUNTER — HOSPITAL ENCOUNTER (OUTPATIENT)
Dept: RADIATION ONCOLOGY | Facility: MEDICAL CENTER | Age: 51
End: 2021-12-31
Attending: RADIOLOGY
Payer: COMMERCIAL

## 2021-12-23 ENCOUNTER — TELEPHONE (OUTPATIENT)
Dept: OBGYN | Facility: CLINIC | Age: 51
End: 2021-12-23

## 2021-12-23 VITALS
OXYGEN SATURATION: 97 % | HEART RATE: 85 BPM | RESPIRATION RATE: 16 BRPM | TEMPERATURE: 99.4 F | DIASTOLIC BLOOD PRESSURE: 92 MMHG | SYSTOLIC BLOOD PRESSURE: 147 MMHG

## 2021-12-23 DIAGNOSIS — C71.1 GLIOBLASTOMA OF FRONTAL LOBE (HCC): ICD-10-CM

## 2021-12-23 DIAGNOSIS — G93.9 LESION OF FRONTAL LOBE OF BRAIN: ICD-10-CM

## 2021-12-23 DIAGNOSIS — R26.81 GAIT INSTABILITY: ICD-10-CM

## 2021-12-23 PROCEDURE — 99212 OFFICE O/P EST SF 10 MIN: CPT | Performed by: RADIOLOGY

## 2021-12-23 PROCEDURE — 99214 OFFICE O/P EST MOD 30 MIN: CPT | Performed by: RADIOLOGY

## 2021-12-23 RX ORDER — DEXAMETHASONE 4 MG/1
4 TABLET ORAL 4 TIMES DAILY
Qty: 120 TABLET | Refills: 0 | Status: SHIPPED | OUTPATIENT
Start: 2021-12-23 | End: 2022-01-22

## 2021-12-23 ASSESSMENT — PAIN SCALES - GENERAL: PAINLEVEL: 1=MINIMAL PAIN

## 2021-12-23 NOTE — TELEPHONE ENCOUNTER
Meron stating is a Nurse calling for Patient that needs a cathter order faxed over that was prescribed by Dr. Mike.       Yanet BAXTER was available to take call so call transferred over.

## 2021-12-23 NOTE — TELEPHONE ENCOUNTER
A nurse Meron called stating she need an external catheter order placed for this PT. I informed her Dr. Mike is not in office today but I will send him a message with this information. Meron verbalized understanding.

## 2021-12-23 NOTE — NON-PROVIDER
Patient was seen today in clinic with Dr. Frye for follow up.  Vitals signs and weight were obtained and pain assessment was completed.  Allergies and medications were reviewed with the patient.      Vitals/Pain:  Vitals:    12/23/21 0949   BP: 147/92   Pulse: 85   Resp: 16   Temp: 37.4 °C (99.4 °F)   SpO2: 97%   Pain Score: 1=Minimal Pain        Allergies:   Tape    Current Medications:  Current Outpatient Medications   Medication Sig Dispense Refill   • dexamethasone (DECADRON) 4 MG Tab Take 1 Tablet by mouth 4 times a day for 1 day, THEN 1 Tablet 3 times a day for 3 days, THEN 0.5 Tablets 3 times a day for 5 days, THEN 0.5 Tablets 2 times a day for 5 days. 26 Tablet 1   • omeprazole (PRILOSEC) 20 MG delayed-release capsule Take 1 Capsule by mouth every day. For GI ulcer prophylaxis while taking dexamethasone. 30 Capsule 1   • FOLIC ACID PO Take 1 Tablet by mouth every day.     • lacosamide (VIMPAT) 100 MG Tab tablet Take 1.5 Tablets by mouth 2 times a day for 90 days. 90 Tablet 2   • ELIQUIS 5 MG Tab Take 1 Tablet by mouth 2 times a day. 180 Tablet 1   • Mirabegron ER 50 MG TABLET SR 24 HR Take 50 mg by mouth every day for 30 days. (Patient taking differently: Take 50 mg by mouth every evening. Pt started on 12/7/2021) 30 Tablet 0   • acetaminophen (TYLENOL) 500 MG Tab Take 1,000 mg by mouth every 6 hours as needed for Moderate Pain.     • estradiol (ESTRACE VAGINAL) 0.1 MG/GM vaginal cream Apply 1g cream inside vagina using applicator nightly for 2 weeks, then twice per week thereafter (Patient taking differently: Insert 1 g into the vagina see administration instructions. Pt uses twice a week, on Tue and Thur) 1 Each 3   • amLODIPine (NORVASC) 10 MG Tab TAKE 1 TABLET BY MOUTH EVERY DAY 90 Tablet 0   • brivaracetam (BRIVIACT) 100 MG Tab tablet Take 1 Tablet by mouth 2 times a day for 90 days. 60 Tablet 2   • Naloxone (NARCAN) 4 MG/0.1ML Liquid One spray in one nostril for overdose and call 911. (Patient taking  differently: Administer 1 Spray into affected nostril(S) as needed. One spray in one nostril for overdose and call 911.  Indications: Opioid Overdose) 2 Each 3   • venlafaxine (EFFEXOR) 75 MG Tab Take 1 Tablet by mouth every day. 90 Tablet 0   • hydrOXYzine HCl (ATARAX) 50 MG Tab Take 50 mg by mouth 2 times a day as needed for Anxiety.     • prochlorperazine (COMPAZINE) 10 MG Tab Take 10 mg by mouth every 6 hours as needed for Nausea/Vomiting.     • melatonin 3 MG Tab Take 1 tablet by mouth at bedtime as needed (insomnia). 30 tablet 2   • VITAMIN D PO Take 1 Units by mouth every evening.     • multivitamin (THERAGRAN) Tab Take 1 tablet by mouth every evening.     • clonazePAM (KLONOPIN) 1 MG Tab Take 1 Tablet by mouth 2 times a day as needed (use for any repetitive seizures OR prolonged seizure > 5 minutes) for up to 60 days. 60 Tablet 1     No current facility-administered medications for this encounter.         PCP:  Patrick Pepper R.N.

## 2021-12-23 NOTE — PROGRESS NOTES
RADIATION ONCOLOGY FOLLOW-UP    DATE OF SERVICE: 12/23/2021    IDENTIFICATION:   A 51 y.o. female with   Glioblastoma of frontal lobe (HCC)  Staging form: Brain and Spinal Cord, AJCC 8th Edition  - Pathologic stage from 3/24/2021: WHO Grade IV - Signed by Edenilson Frye M.D. on 3/24/2021  Histologic grading system: 4 grade system  IDH1 mutation: Negative  IDH2 mutation: Negative  MGMT methylation: Absent      RADIATION SUMMARY:  [facility] Treatment Information        Some values may be hidden. Unless noted otherwise, only the newest values recorded on each date are displayed.         [facility] Treatment Summary 5/14/21   Course First Treatment Date 04/05/2021    Course Last Treatment Date 05/14/2021    R Front Bst Plan from Course C1_GBM   Fraction 7 of 7    Elapsed Course Days 39 @ [id number]    Prescribed Fraction Dose 200 cGy    Prescribed Total Dose 1,400 cGy    R Front GBM Plan from Course C1_GBM   Fraction 23 of 23   Elapsed Course Days 39 @ [id number]   Prescribed Fraction Dose 200 cGy   Prescribed Total Dose 4,600 cGy   R Front Bst Reference Point from Course C1_GBM   Elapsed Course Days 39 @ [id number]    Session Dose --    Total Dose 1,400 cGy    R Front Bst CP Reference Point from Course C1_GBM   Elapsed Course Days 39 @ [id number]    Session Dose --    Total Dose 1,447 cGy    R Front GBM Reference Point from Course C1_GBM   Elapsed Course Days 39 @ [id number]   Session Dose --   Total Dose 4,600 cGy   R Front GBM CP Reference Point from Course C1_GBM   Elapsed Course Days 39 @ [id number]   Session Dose --   Total Dose 4,757 cGy   RF Bst 3D Plan from Course Dosimetry C1   RF GBM 3D Plan from Course Dosimetry C1   R Front Bst Reference Point from Course Dosimetry C1   R Front Bst CP Reference Point from Course Dosimetry C1   R Front GBM Reference Point from Course Dosimetry C1   R Front GBM CP Reference Point from Course Dosimetry C1    Some values recorded on this date have been omitted.               HISTORY OF PRESENT ILLNESS:   Patient originally presented with seizures and went to the ER at [facility] and underwent MRI brain December 10, 2020 which showed an approximate 8 x 6 cm enhancing lesion in the right frontal medial gray matter area with tiny satellite nodular enhancement nonspecific.  Patient also had a MRI CT and L-spine on January 9, 2021 which showed no evidence of multiple sclerosis.  Patient then had repeat MRI brain February 12, 2021 which showed marked interval increase in medial posterior right frontal lobe now measuring 3 x 2.4 x 3.1 with irregular thick-walled peripheral enhancement. She had CT chest abdomen pelvis on February 21, 2021 which showed no evidence of metastatic disease. Patient was readmitted with headaches and seizures and underwent MRI stealth brain March 9, 2021 which showed 3.5 x 2.5 cm peripheral enhancing lesion in the right medial parietal lobe with surrounding white matter edema highly suspicious for high-grade neoplasm. Patient underwent surgery with Dr. Mao with craniotomy with motor mapping and given proximity to [facility] strip was really only able to do an open biopsy with pathology showing glioblastoma. MRI brain postoperatively on March 11, 2021 showed postsurgical changes in the right frontal parietal craniotomy and biopsy of right medial parietal enhancing lesion. Currently patient is depressed and has some residual left upper extremity weakness and left lower extremity weakness but she says the left upper extremity weakness is improving.     7/23/21 Patient completed chemoradiation therapy 60 Gy in 30 fractions with concurrent Temodar with Dr. Cerda on May 14, 2021.  Patient had 1 month post treatment MRI on June 4, 2021 which showed interval enlargement of regular heterogeneously enhancing parasagittal right frontal parietal lobe mass in keeping with glioblastoma with marked vasogenic edema in the right cerebral hemisphere which is increased from prior  study and there is increased mass-effect.  Patient was seen at [facility] and had craniotomy on June 25, 2021 by Dr. Dove with pathology consistent with extensive treatment effects and microscopic foci of residual recurrent GBM who grade IV.  She has tapered off her steroids and remains on Keppra.  She has some mild headaches relieved with tramadol.  She has next MRI in early August.  She is going to see Dr. Cerda next week to start her adjuvant Temodar and will start her on Optune as well.    10/15/21  Patient is feeling more fatigued and has had recurrent urinary tract infections and some urinary frequency. She was recently put on Vimpat by Dr. Gomez and feels that is helping. She is about to start her next cycle of Temodar. She has been doing PT OT 2 times a week. She feels like overall her left arm weakness is about the same. She is still relatively wheelchair-bound. She has some itching from her Optune.    Interval History:  Patient was recently admitted at [facility] for worsening left lower extremity weakness and started on dexamethasone 4 mg every 6 hours.  Patient MRI brain December 15, 2021 which showed a 3.1 cm ring-enhancing mass arising at the postoperative site in the right posterior frontal parietal white matter with marked surrounding increased T2 signal intensity effacing the posterior body and atrium of the right lateral ventricle and causing mild right to left midline shift the ventricular system.  This is suspicious for recurrent neoplasm however conceivably radiation necrosis could have the same appearance.  Patient weakness has improved on dexamethasone and has close follow-up with her [facility] neurooncologist who has enrolled her on a study of surgery with Dr. Dove and postop precision medicine trial.     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)   • Glioblastoma of frontal lobe (HCC)   • Demyelinating disease of central nervous system, unspecified (HCC)   • Acute blood loss as cause of postoperative anemia   • Impaired mobility and ADLs   • Vitamin D insufficiency   • Elevated blood pressure reading   • Urinary frequency   • Acute pulmonary embolism with acute cor pulmonale (HCC)   • Essential hypertension   • Other chest pain   • Tachycardia   • Type 2 myocardial infarction (HCC)   • Neurogenic bladder   • Frequent UTI   • Atrophic vaginitis   • Hypertension   • Acute left-sided weakness   • Seizure disorder (HCC)   • History of pulmonary embolus (PE)   • Lesion of frontal lobe of brain   • Gait instability       CURRENT MEDICATIONS:  Current Outpatient Medications   Medication Sig Dispense Refill   • dexamethasone (DECADRON) 4 MG Tab Take 1 Tablet by mouth 4 times a day for 1 day, THEN 1 Tablet 3 times a day for 3 days, THEN 0.5 Tablets 3 times a day for 5 days, THEN 0.5 Tablets 2 times a day for 5 days. 26 Tablet 1   • omeprazole (PRILOSEC) 20 MG delayed-release capsule Take 1 Capsule by mouth every day. For GI ulcer prophylaxis while taking dexamethasone. 30 Capsule 1   • FOLIC ACID PO Take 1 Tablet by mouth every day.     • lacosamide (VIMPAT) 100 MG Tab tablet Take 1.5 Tablets by mouth 2 times a day for 90 days. 90 Tablet 2   • ELIQUIS 5 MG Tab Take 1 Tablet by mouth 2 times a day. 180 Tablet 1   • Mirabegron ER 50 MG TABLET SR 24 HR Take 50 mg by mouth every day for 30 days. (Patient taking differently: Take 50 mg by mouth every evening. Pt started on 12/7/2021) 30 Tablet 0   • acetaminophen (TYLENOL) 500 MG Tab Take 1,000 mg by mouth every 6 hours as needed for Moderate Pain.     • estradiol (ESTRACE VAGINAL) 0.1 MG/GM vaginal cream Apply 1g cream inside vagina using applicator  nightly for 2 weeks, then twice per week thereafter (Patient taking differently: Insert 1 g into the vagina see administration instructions. Pt uses twice a week, on Tue and Thur) 1 Each 3   • amLODIPine (NORVASC) 10 MG Tab TAKE 1 TABLET BY MOUTH EVERY DAY 90 Tablet 0   • brivaracetam (BRIVIACT) 100 MG Tab tablet Take 1 Tablet by mouth 2 times a day for 90 days. 60 Tablet 2   • Naloxone (NARCAN) 4 MG/0.1ML Liquid One spray in one nostril for overdose and call 911. (Patient taking differently: Administer 1 Spray into affected nostril(S) as needed. One spray in one nostril for overdose and call 911.  Indications: Opioid Overdose) 2 Each 3   • venlafaxine (EFFEXOR) 75 MG Tab Take 1 Tablet by mouth every day. 90 Tablet 0   • hydrOXYzine HCl (ATARAX) 50 MG Tab Take 50 mg by mouth 2 times a day as needed for Anxiety.     • prochlorperazine (COMPAZINE) 10 MG Tab Take 10 mg by mouth every 6 hours as needed for Nausea/Vomiting.     • melatonin 3 MG Tab Take 1 tablet by mouth at bedtime as needed (insomnia). 30 tablet 2   • VITAMIN D PO Take 1 Units by mouth every evening.     • multivitamin (THERAGRAN) Tab Take 1 tablet by mouth every evening.     • clonazePAM (KLONOPIN) 1 MG Tab Take 1 Tablet by mouth 2 times a day as needed (use for any repetitive seizures OR prolonged seizure > 5 minutes) for up to 60 days. 60 Tablet 1     No current facility-administered medications for this encounter.       ALLERGIES:  Tape    REVIEW OF SYSTEMS:  A review of systems for today's date of service was reviewed and uploaded into the electronic medical record.    PHYSICAL EXAM:  PERFORMANCE STATUS: 1  /92   Pulse 85   Temp 37.4 °C (99.4 °F)   Resp 16   SpO2 97%   Physical Exam  Vitals reviewed.   HENT:      Head: Normocephalic.      Mouth/Throat:      Mouth: Mucous membranes are moist.   Eyes:      Pupils: Pupils are equal, round, and reactive to light.   Cardiovascular:      Rate and Rhythm: Normal rate.   Pulmonary:      Effort:  Pulmonary effort is normal.   Abdominal:      General: Abdomen is flat.   Musculoskeletal:         General: Normal range of motion.   Skin:     General: Skin is warm.   Neurological:      Mental Status: She is alert.      Motor: Weakness present.   Psychiatric:         Mood and Affect: Mood normal.         LABORATORY DATA:   Lab Results   Component Value Date/Time    WBC 4.2 (L) 12/17/2021 0506    WBC 4.9 12/16/2021 0327    WBC 7.3 12/15/2021 0905    HEMOGLOBIN 12.8 12/17/2021 0506    HEMOGLOBIN 11.8 (L) 12/16/2021 0327    HEMOGLOBIN 11.8 (L) 12/15/2021 0905    HEMATOCRIT 36.6 (L) 12/17/2021 0506    HEMATOCRIT 34.1 (L) 12/16/2021 0327    HEMATOCRIT 34.0 (L) 12/15/2021 0905    MCV 97.9 (H) 12/17/2021 0506    MCV 99.1 (H) 12/16/2021 0327    MCV 97.7 12/15/2021 0905    PLATELETCT 224 12/17/2021 0506    PLATELETCT 212 12/16/2021 0327    PLATELETCT 198 12/15/2021 0905    NEUTS 3.58 12/17/2021 0506    NEUTS 3.37 12/16/2021 0327    NEUTS 5.55 12/15/2021 0905      Lab Results   Component Value Date/Time    SODIUM 138 12/16/2021 0327    SODIUM 140 12/15/2021 0905    SODIUM 136 12/14/2021 1850    POTASSIUM 3.6 12/16/2021 0327    POTASSIUM 4.0 12/15/2021 0905    POTASSIUM 4.0 12/14/2021 1850    BUN 7 (L) 12/16/2021 0327    BUN 6 (L) 12/15/2021 0905    BUN 7 (L) 12/14/2021 1850    CREATININE 0.48 (L) 12/16/2021 0327    CREATININE 0.55 12/15/2021 0905    CREATININE 0.65 12/14/2021 1850    CALCIUM 8.7 12/16/2021 0327    CALCIUM 8.9 12/15/2021 0905    CALCIUM 8.9 12/14/2021 1850    ALBUMIN 4.2 12/14/2021 1850    ALBUMIN 4.2 07/27/2021 1430    ALBUMIN 4.4 05/24/2021 1227    ASTSGOT 24 12/14/2021 1850    ASTSGOT 16 07/27/2021 1430    ASTSGOT 18 05/24/2021 1227    ALKPHOSPHAT 85 12/14/2021 1850    ALKPHOSPHAT 76 07/27/2021 1430    ALKPHOSPHAT 86 05/24/2021 1227    IFNOTAFR >60 12/16/2021 0327    IFNOTAFR >60 12/15/2021 0905    IFNOTAFR >60 12/14/2021 1850       RADIOLOGY DATA:  CT-HEAD WITH & W/O    Result Date:  12/14/2021 12/14/2021 8:30 PM HISTORY/REASON FOR EXAM: Brain mass or lesion TECHNIQUE/EXAM DESCRIPTION:  CT of the head with and without contrast. Sequential axial images were obtained from the vertex to the skull base without contrast and following administration of 50 cc of Omnipaque 350. Up to date radiation dose reduction adjustments have been utilized to meet ALARA standards for radiation dose reduction. COMPARISON: March 9, 2021 FINDINGS: The brain appears normal in volume and morphology. No space occupying lesions or areas of acute vascular territory infarctions are identified.  There are no abnormal extra axial fluid collections or extra axial hemorrhage identified. Subtle heterogeneously enhancing mass is seen in the posterior right frontal and anterior right parietal lobes measuring 2.7 x 3.0 x 3.5 cm. Surrounding extensive vasogenic edema is seen. Associated partial effacement of the right ventricle is seen with minimal  1 mm right to left midline shift. The visualized paranasal sinuses and mastoid air cells are well aerated bilaterally. No depressed calvarial fractures are identified. The visualized globes and retrobulbar soft tissues appear within normal limits. Atherosclerotic intracranial calcifications are seen.     1.  Enhancing right frontoparietal mass with surrounding vasogenic edema. 2.  Partial effacement of the right ventricle with 1 mm right to left midline shift 3.  Atherosclerosis    DX-CHEST-PORTABLE (1 VIEW)    Result Date: 12/14/2021 12/14/2021 6:49 PM HISTORY/REASON FOR EXAM:  Neutropenic Fever. Sepsis protocol. TECHNIQUE/EXAM DESCRIPTION AND NUMBER OF VIEWS: Single portable view of the chest. COMPARISON: 9/30/2021 FINDINGS: The mediastinal and cardiac silhouette is unremarkable. The pulmonary vascularity is within normal limits. The lung parenchyma is clear. There is no significant pleural effusion. There is no visible pneumothorax. There are no acute bony abnormalities.     1.   There is no acute cardiopulmonary process.    MR-BRAIN-WITH & W/O    Result Date: 12/15/2021  12/15/2021 10:16 AM HISTORY/REASON FOR EXAM:  History of glioblastoma on chemotherapy. New onset right hand tremor. TECHNIQUE/EXAM DESCRIPTION:   MRI of the brain without and with contrast. T1 sagittal, T2 fast spin-echo axial, T1 coronal, FLAIR coronal, diffusion-weighted and apparent diffusion coefficient (ADC map) axial images were obtained of the whole brain. T1 postcontrast axial and T1 postcontrast coronal images were obtained. The study was performed on a Tangentixa 1.5 Dede MRI scanner. 20 mL ProHance contrast was administered intravenously. COMPARISON:  MRI scan of the brain 10/11/2021 FINDINGS: Previous right frontal craniotomy. There is a 3.1 x 2.9 cm irregular ring-enhancing mass arising in the right posterior frontal/parietal white matter in the previous postoperative site. There is also thickening and enhancement of the adjacent posterior falx in this region. There is extensive increased T2 signal intensity throughout the right frontal and parietal white matter. There is effacement of the posterior body and atrium of the right lateral ventricle. There is mild right to left midline shift the ventricular system. There is also confluent increased T2 signal intensity throughout the periventricular white matter. There are no extra-axial fluid collections. The ventricular system and basal cisterns are mild to moderately prominent. There is mild-to-moderate prominence of the cortical sulci and gyri.  There are no mass effects or shift of midline structures. There are no hemorrhagic lesions. The diffusion-weighted axial images show no evidence of acute cerebral infarction. The postcontrast images show no areas of abnormal parenchymal or meningeal enhancement. The brainstem and posterior fossa structures are unremarkable. Vascular flow voids in the vertebrobasilar and carotid arteries, [place], and dural  venous sinuses are intact. The paranasal sinuses and mastoids in the field of view are unremarkable. There is increased T2 signal intensity throughout the mastoid air cells bilaterally.     1.  3.1 cm ring-enhancing mass arising at the  postoperative site in the right posterior frontal/parietal white matter with marked surrounding increased T2 signal intensity effacing the posterior body and atrium of the right lateral ventricle and causing  mild right to left midline shift the ventricular system. This is suspicious for recurrent neoplasm, however conceivably radiation necrosis could have this appearance. 2.  Age-related cerebral atrophy. 3.  Periventricular white matter changes consistent with postirradiation change.      IMPRESSION:    A 51 y.o. with   Glioblastoma of frontal lobe (HCC)  Staging form: Brain and Spinal Cord, AJCC 8th Edition  - Pathologic stage from 3/24/2021: WHO Grade IV - Signed by Edenilson Frye M.D. on 3/24/2021  Histologic grading system: 4 grade system  IDH1 mutation: Negative  IDH2 mutation: Negative  MGMT methylation: Absent      CANCER STATUS:  Disease Progression Local    RECOMMENDATIONS:   I reviewed patient's most recent MRI brain in our neuro-oncology tumor board on Monday, December 21, 2021 and reviewed the notes from [facility] from Dr. Gomez and agree this is most likely local recurrence and explained that options could include standard of care which would be CCNU plus or minus Avastin versus clinical trial and given local process I do feel a clinical trial is appropriate and of encouraged her to participate.  I will have patient follow-up with me after her surgery.  In the meantime I have refilled her Decadron 4 mg 4 times daily until she has surgery and would recommend taper after surgery.     Thank you for the opportunity to participate in her care.  If any questions or comments, please do not hesitate in calling.    CC: Dr. Gomez, Dr. Cerda, Dr Dove, Dr. Mao

## 2022-01-03 ENCOUNTER — TELEMEDICINE (OUTPATIENT)
Dept: NEUROLOGY | Facility: MEDICAL CENTER | Age: 52
End: 2022-01-03
Attending: STUDENT IN AN ORGANIZED HEALTH CARE EDUCATION/TRAINING PROGRAM
Payer: COMMERCIAL

## 2022-01-03 DIAGNOSIS — F32.A DEPRESSIVE DISORDER: ICD-10-CM

## 2022-01-03 DIAGNOSIS — C71.1 GLIOBLASTOMA OF FRONTAL LOBE (HCC): ICD-10-CM

## 2022-01-03 DIAGNOSIS — Z86.711 HISTORY OF PULMONARY EMBOLUS (PE): ICD-10-CM

## 2022-01-03 DIAGNOSIS — I10 PRIMARY HYPERTENSION: ICD-10-CM

## 2022-01-03 DIAGNOSIS — R25.2 SPASTICITY: ICD-10-CM

## 2022-01-03 DIAGNOSIS — M79.18 MUSCULOSKELETAL PAIN: ICD-10-CM

## 2022-01-03 DIAGNOSIS — G40.109 LOCALIZATION-RELATED FOCAL EPILEPSY WITH SIMPLE PARTIAL SEIZURES (HCC): ICD-10-CM

## 2022-01-03 DIAGNOSIS — F41.8 ANXIETY ASSOCIATED WITH DEPRESSION: ICD-10-CM

## 2022-01-03 PROCEDURE — 99214 OFFICE O/P EST MOD 30 MIN: CPT | Mod: 95 | Performed by: STUDENT IN AN ORGANIZED HEALTH CARE EDUCATION/TRAINING PROGRAM

## 2022-01-03 ASSESSMENT — PATIENT HEALTH QUESTIONNAIRE - PHQ9: CLINICAL INTERPRETATION OF PHQ2 SCORE: 0

## 2022-01-04 NOTE — PROGRESS NOTES
Telemedicine: Established Patient   This evaluation was conducted via Zoom using secure and encrypted videoconferencing technology. The patient was in a private location in the state of [place].    The patient's identity was confirmed and verbal consent was obtained for this virtual visit.        NEUROLOGY FOLLOW-UP - 01/03/2022   REFERRING PROVIDER  No referring provider defined for this encounter.    REASON FOR VISIT: Melba Frye 51 y.o. 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    [place] [facility] [place]: Compared to 10/11/2021 and 12/15/2021, interval increased size of the right precentral gyrus lesion, demonstrating reduced diffusion, but with significantly less peripheral enhancement. Similar surrounding vasogenic edema, with less mass effect upon the right lateral ventricle.      Last round of temodar was beginning of December.     Pending pathology and DNA of tumour resection.    In 4 weeks, she will meet with her other    No other complaints    Still on dex 4 mg QID.    Holding eliquis, s/p IVC filter.    Still on Briviact 100 mg BID, vimpat 150 mg BID           Not in pain and no headaches post-surgically.    Patient's PMH, PSH, FH, and SH were reviewed.    ROS: All review of systems complete and are negative except as documented  CURRENT MEDICATIONS AT THE TIME OF THIS ENCOUNTER:    Current Outpatient Medications:   •  dexamethasone, 4 mg, Oral, 4X/DAY  •  omeprazole, 20 mg, Oral, DAILY  •  FOLIC ACID PO, 1 Tablet, Oral, DAILY  •  lacosamide, 150 mg, Oral, BID  •  Eliquis, 5 mg, Oral, BID  •  Mirabegron ER, 50 mg, Oral, DAILY (Patient taking differently: 50 mg, Oral, EVERY EVENING, Pt started on 12/7/2021)  •  acetaminophen, 1,000 mg, Oral, Q6HRS PRN  •  estradiol, Apply 1g cream inside vagina using applicator nightly for 2 weeks, then twice per week thereafter (Patient taking differently: 1 g,  Vaginal, SEE ADMIN INSTRUCTIONS, Pt uses twice a week, on Tue and Thur)  •  amLODIPine, 10 mg, Oral, DAILY  •  brivaracetam, 100 mg, Oral, BID  •  Naloxone, One spray in one nostril for overdose and call 911. (Patient taking differently: 1 Spray, Nasal, PRN, One spray in one nostril for overdose and call 911., Indications: Opioid Overdose)  •  venlafaxine, 75 mg, Oral, DAILY  •  hydrOXYzine HCl, 50 mg, Oral, BID PRN  •  prochlorperazine, 10 mg, Oral, Q6HRS PRN  •  melatonin, 3 mg, Oral, HS PRN  •  VITAMIN D PO, 1 Units, Oral, Q EVENING  •  multivitamin, 1 Tablet, Oral, Q EVENING     EXAM:   Ambulatory Vitals:      Physical Exam:  Physical Exam  Constitutional:       General: She is awake. She is not in acute distress.     Appearance: She is not ill-appearing.   HENT:      Nose: Nose normal.   Neurological:      Mental Status: She is alert.   Psychiatric:         Speech: Speech normal.        Neurological Exam   Neurological Exam  Mental Status  Awake and alert. Speech is normal. Language is fluent with no aphasia. Attention and concentration are normal.  Subjective memory complaints but no issues with recounting recent and remote events..       ALL DATA (I.e. labs, procedures, imaging, reports, clinical notes, etc.) FROM [facility] AND/OR OUTSIDE SOURCES, IF AVAILABLE, PERSONALLY REVIEWED:   LABS:    MAGING-    PROCEDURE    OUTSIDE DATA    ASSESSMENT, EDUCATION, AND COUNSELING:  This is a 51 y.o. female patient who presents to the neurology clinic. We had an extensive discussion about the patient's symptoms, signs, and work-up to date, if any. We discussed potential and/or definitive diagnoses, work-up, and potential treatments.       PLAN:  If applicable, the work-up such as labs, imaging, procedures, and/or other testing, referrals, and/or recommended treatment strategies are listed below.  Visit Diagnoses     ICD-10-CM   1. 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  Glioblastoma of frontal lobe (HCC)  C71.1   3. Anxiety associated with depression  F41.8   4. Musculoskeletal pain  M79.18   5. Depressive disorder  F32.9   6. Spasticity  R25.2   7. Primary hypertension  I10   8. History of pulmonary embolus (PE)  Z86.711      No orders of the defined types were placed in this encounter.     Patient doing well after resection of residual mass at margins of her a prior resection site which was done today. Pathology and DNA of the tissue is pending in will dictate next steps in management. Remains on dexamethasone 4 mg QID to curb extensive vasogenic edema. Doing well and tolerating current AED regimen of briviact and vimpat. No seizures for the past couple of weeks since dose escalation and re-introduction of steroids. No indication to change regimen. Her doctors at [facility] will be discussing her case at tumour board in a few weeks so I will await the results of this discussion and the plan going forward for Melba. Follow-up in 6-8 weeks virtually.    Follow-up:   • 6-8 weeks virtually        BILLING DOCUMENTATION:     I spent a total of 30 minutes of face-to-face time in this visit. Over 50% of the time of the visit today was spent on counseling and/or coordination of care wtih the patient and/or family, as above in assessment in plan.    Fady Davidson MD  Epilepsy and General Neurology  Department of Neurology  Clinical  of Neurology [facility] [employer].

## 2022-01-07 ENCOUNTER — TELEPHONE (OUTPATIENT)
Dept: OBGYN | Facility: CLINIC | Age: 52
End: 2022-01-07

## 2022-01-07 NOTE — TELEPHONE ENCOUNTER
LVM for pt to call back regarding a message we rcvd from Dr Mike. We need to sched patient for a cysto+botox for 30 min. Ivonne GUADARRAMA 01/07/2021

## 2022-01-12 ENCOUNTER — TELEPHONE (OUTPATIENT)
Dept: OBGYN | Facility: CLINIC | Age: 52
End: 2022-01-12
Payer: COMMERCIAL

## 2022-01-12 ENCOUNTER — TELEPHONE (OUTPATIENT)
Dept: OBGYN | Facility: CLINIC | Age: 52
End: 2022-01-12

## 2022-01-12 NOTE — TELEPHONE ENCOUNTER
I tried to contact [facility] nurse trying to get an order for pure wick catheters for this PT. There is not epic order for cath, If she is able to send over an order form for the pure wick catheters then I can have  sign the order and fax it back. Left VM for Meron to call back.   Call back number for [facility] is [phone] Ext. [phone]

## 2022-01-12 NOTE — TELEPHONE ENCOUNTER
A  called states they need an order for a pure wick catheters. She would like a call back at [phone] ext [phone]

## 2022-01-13 ENCOUNTER — TELEPHONE (OUTPATIENT)
Dept: OBGYN | Facility: CLINIC | Age: 52
End: 2022-01-13

## 2022-01-25 ENCOUNTER — TELEPHONE (OUTPATIENT)
Dept: MEDICAL GROUP | Facility: IMAGING CENTER | Age: 52
End: 2022-01-25

## 2022-01-26 NOTE — TELEPHONE ENCOUNTER
Shiela from [facility] called regarding referral they received for patient from [facility]. [facility] would like to confirm with patient's PCP that if they accept the referral from [facility] that she will sign their home health orders.     Requesting return call at:  . [phone] option [phone] (Shiela)

## 2022-02-01 ENCOUNTER — APPOINTMENT (OUTPATIENT)
Dept: PHYSICAL MEDICINE AND REHAB | Facility: REHABILITATION | Age: 52
End: 2022-02-01
Payer: COMMERCIAL

## 2022-02-01 DIAGNOSIS — I10 PRIMARY HYPERTENSION: ICD-10-CM

## 2022-02-02 RX ORDER — SENNOSIDES A AND B 8.6 MG/1
8.6 TABLET, FILM COATED ORAL EVERY EVENING
Status: ON HOLD | COMMUNITY
Start: 2022-01-25 | End: 2022-02-18

## 2022-02-02 RX ORDER — LANOLIN ALCOHOL/MO/W.PET/CERES
3 CREAM (GRAM) TOPICAL
COMMUNITY
Start: 2022-01-25

## 2022-02-02 RX ORDER — DEXAMETHASONE 1 MG
TABLET ORAL
COMMUNITY
Start: 2022-01-03 | End: 2022-02-09

## 2022-02-02 RX ORDER — AMLODIPINE BESYLATE 10 MG/1
10 TABLET ORAL DAILY
Qty: 90 TABLET | Refills: 0 | Status: ON HOLD | OUTPATIENT
Start: 2022-02-02 | End: 2022-03-11 | Stop reason: SDUPTHER

## 2022-02-02 RX ORDER — POLYETHYLENE GLYCOL 3350 17 G/17G
17 POWDER, FOR SOLUTION ORAL
COMMUNITY
Start: 2022-01-25 | End: 2022-02-09

## 2022-02-09 ENCOUNTER — HOSPITAL ENCOUNTER (INPATIENT)
Facility: MEDICAL CENTER | Age: 52
LOS: 9 days | DRG: 054 | End: 2022-02-18
Attending: EMERGENCY MEDICINE | Admitting: STUDENT IN AN ORGANIZED HEALTH CARE EDUCATION/TRAINING PROGRAM
Payer: COMMERCIAL

## 2022-02-09 ENCOUNTER — APPOINTMENT (OUTPATIENT)
Dept: RADIOLOGY | Facility: MEDICAL CENTER | Age: 52
DRG: 054 | End: 2022-02-09
Attending: EMERGENCY MEDICINE
Payer: COMMERCIAL

## 2022-02-09 DIAGNOSIS — G93.9 LESION OF FRONTAL LOBE OF BRAIN: ICD-10-CM

## 2022-02-09 DIAGNOSIS — R55 SYNCOPE, UNSPECIFIED SYNCOPE TYPE: ICD-10-CM

## 2022-02-09 DIAGNOSIS — R07.89 OTHER CHEST PAIN: ICD-10-CM

## 2022-02-09 DIAGNOSIS — J96.01 ACUTE RESPIRATORY FAILURE WITH HYPOXIA (HCC): ICD-10-CM

## 2022-02-09 DIAGNOSIS — K59.00 CONSTIPATION, UNSPECIFIED CONSTIPATION TYPE: ICD-10-CM

## 2022-02-09 DIAGNOSIS — G40.909 SEIZURE DISORDER (HCC): Chronic | ICD-10-CM

## 2022-02-09 DIAGNOSIS — R26.81 GAIT INSTABILITY: ICD-10-CM

## 2022-02-09 DIAGNOSIS — L82.1 SEBORRHEIC KERATOSES: ICD-10-CM

## 2022-02-09 PROBLEM — E66.09 CLASS 1 OBESITY DUE TO EXCESS CALORIES WITH SERIOUS COMORBIDITY AND BODY MASS INDEX (BMI) OF 34.0 TO 34.9 IN ADULT: Status: ACTIVE | Noted: 2022-02-09

## 2022-02-09 LAB
ALBUMIN SERPL BCP-MCNC: 4.3 G/DL (ref 3.2–4.9)
ALBUMIN/GLOB SERPL: 1.4 G/DL
ALP SERPL-CCNC: 104 U/L (ref 30–99)
ALT SERPL-CCNC: 23 U/L (ref 2–50)
ANION GAP SERPL CALC-SCNC: 14 MMOL/L (ref 7–16)
AST SERPL-CCNC: 28 U/L (ref 12–45)
BASOPHILS # BLD AUTO: 0.4 % (ref 0–1.8)
BASOPHILS # BLD: 0.03 K/UL (ref 0–0.12)
BILIRUB SERPL-MCNC: 0.4 MG/DL (ref 0.1–1.5)
BUN SERPL-MCNC: 8 MG/DL (ref 8–22)
CALCIUM SERPL-MCNC: 9.9 MG/DL (ref 8.5–10.5)
CHLORIDE SERPL-SCNC: 103 MMOL/L (ref 96–112)
CO2 SERPL-SCNC: 24 MMOL/L (ref 20–33)
CREAT SERPL-MCNC: 0.54 MG/DL (ref 0.5–1.4)
EKG IMPRESSION: NORMAL
EKG IMPRESSION: NORMAL
EOSINOPHIL # BLD AUTO: 0.06 K/UL (ref 0–0.51)
EOSINOPHIL NFR BLD: 0.7 % (ref 0–6.9)
ERYTHROCYTE [DISTWIDTH] IN BLOOD BY AUTOMATED COUNT: 51.9 FL (ref 35.9–50)
FLUAV RNA SPEC QL NAA+PROBE: NEGATIVE
FLUBV RNA SPEC QL NAA+PROBE: NEGATIVE
GLOBULIN SER CALC-MCNC: 3 G/DL (ref 1.9–3.5)
GLUCOSE SERPL-MCNC: 99 MG/DL (ref 65–99)
HCT VFR BLD AUTO: 38.5 % (ref 37–47)
HGB BLD-MCNC: 13.3 G/DL (ref 12–16)
IMM GRANULOCYTES # BLD AUTO: 0.12 K/UL (ref 0–0.11)
IMM GRANULOCYTES NFR BLD AUTO: 1.4 % (ref 0–0.9)
LIPASE SERPL-CCNC: 36 U/L (ref 11–82)
LYMPHOCYTES # BLD AUTO: 1.21 K/UL (ref 1–4.8)
LYMPHOCYTES NFR BLD: 14.6 % (ref 22–41)
MCH RBC QN AUTO: 34.7 PG (ref 27–33)
MCHC RBC AUTO-ENTMCNC: 34.5 G/DL (ref 33.6–35)
MCV RBC AUTO: 100.5 FL (ref 81.4–97.8)
MONOCYTES # BLD AUTO: 0.95 K/UL (ref 0–0.85)
MONOCYTES NFR BLD AUTO: 11.5 % (ref 0–13.4)
NEUTROPHILS # BLD AUTO: 5.92 K/UL (ref 2–7.15)
NEUTROPHILS NFR BLD: 71.4 % (ref 44–72)
NRBC # BLD AUTO: 0.02 K/UL
NRBC BLD-RTO: 0.2 /100 WBC
PLATELET # BLD AUTO: 244 K/UL (ref 164–446)
PMV BLD AUTO: 9.4 FL (ref 9–12.9)
POTASSIUM SERPL-SCNC: 3.8 MMOL/L (ref 3.6–5.5)
PROT SERPL-MCNC: 7.3 G/DL (ref 6–8.2)
RBC # BLD AUTO: 3.83 M/UL (ref 4.2–5.4)
RSV RNA SPEC QL NAA+PROBE: NEGATIVE
SARS-COV-2 RNA RESP QL NAA+PROBE: NOTDETECTED
SODIUM SERPL-SCNC: 141 MMOL/L (ref 135–145)
SPECIMEN SOURCE: NORMAL
TROPONIN T SERPL-MCNC: 7 NG/L (ref 6–19)
WBC # BLD AUTO: 8.3 K/UL (ref 4.8–10.8)

## 2022-02-09 PROCEDURE — 96374 THER/PROPH/DIAG INJ IV PUSH: CPT

## 2022-02-09 PROCEDURE — 71275 CT ANGIOGRAPHY CHEST: CPT

## 2022-02-09 PROCEDURE — 0241U HCHG SARS-COV-2 COVID-19 NFCT DS RESP RNA 4 TRGT MIC: CPT

## 2022-02-09 PROCEDURE — C9803 HOPD COVID-19 SPEC COLLECT: HCPCS | Performed by: EMERGENCY MEDICINE

## 2022-02-09 PROCEDURE — 70450 CT HEAD/BRAIN W/O DYE: CPT

## 2022-02-09 PROCEDURE — 85025 COMPLETE CBC W/AUTO DIFF WBC: CPT

## 2022-02-09 PROCEDURE — 700117 HCHG RX CONTRAST REV CODE 255: Performed by: EMERGENCY MEDICINE

## 2022-02-09 PROCEDURE — 99285 EMERGENCY DEPT VISIT HI MDM: CPT

## 2022-02-09 PROCEDURE — 700102 HCHG RX REV CODE 250 W/ 637 OVERRIDE(OP): Performed by: STUDENT IN AN ORGANIZED HEALTH CARE EDUCATION/TRAINING PROGRAM

## 2022-02-09 PROCEDURE — 94760 N-INVAS EAR/PLS OXIMETRY 1: CPT

## 2022-02-09 PROCEDURE — 83690 ASSAY OF LIPASE: CPT

## 2022-02-09 PROCEDURE — 80053 COMPREHEN METABOLIC PANEL: CPT

## 2022-02-09 PROCEDURE — 71045 X-RAY EXAM CHEST 1 VIEW: CPT

## 2022-02-09 PROCEDURE — 84484 ASSAY OF TROPONIN QUANT: CPT

## 2022-02-09 PROCEDURE — 99223 1ST HOSP IP/OBS HIGH 75: CPT | Performed by: STUDENT IN AN ORGANIZED HEALTH CARE EDUCATION/TRAINING PROGRAM

## 2022-02-09 PROCEDURE — 82803 BLOOD GASES ANY COMBINATION: CPT

## 2022-02-09 PROCEDURE — 770020 HCHG ROOM/CARE - TELE (206)

## 2022-02-09 PROCEDURE — 700111 HCHG RX REV CODE 636 W/ 250 OVERRIDE (IP): Performed by: EMERGENCY MEDICINE

## 2022-02-09 PROCEDURE — 93005 ELECTROCARDIOGRAM TRACING: CPT | Performed by: EMERGENCY MEDICINE

## 2022-02-09 PROCEDURE — A9270 NON-COVERED ITEM OR SERVICE: HCPCS | Performed by: STUDENT IN AN ORGANIZED HEALTH CARE EDUCATION/TRAINING PROGRAM

## 2022-02-09 RX ORDER — VENLAFAXINE 75 MG/1
75 TABLET ORAL DAILY
Status: DISCONTINUED | OUTPATIENT
Start: 2022-02-10 | End: 2022-02-18 | Stop reason: HOSPADM

## 2022-02-09 RX ORDER — MORPHINE SULFATE 4 MG/ML
4 INJECTION INTRAVENOUS ONCE
Status: COMPLETED | OUTPATIENT
Start: 2022-02-09 | End: 2022-02-09

## 2022-02-09 RX ORDER — LABETALOL HYDROCHLORIDE 5 MG/ML
10 INJECTION, SOLUTION INTRAVENOUS EVERY 4 HOURS PRN
Status: DISCONTINUED | OUTPATIENT
Start: 2022-02-09 | End: 2022-02-18 | Stop reason: HOSPADM

## 2022-02-09 RX ORDER — SENNOSIDES 8.6 MG
650 CAPSULE ORAL 2 TIMES DAILY PRN
COMMUNITY

## 2022-02-09 RX ORDER — CEPHALEXIN 500 MG/1
500 CAPSULE ORAL EVERY 12 HOURS
Status: ON HOLD | COMMUNITY
End: 2022-02-13

## 2022-02-09 RX ORDER — BISACODYL 10 MG
10 SUPPOSITORY, RECTAL RECTAL
Status: DISCONTINUED | OUTPATIENT
Start: 2022-02-09 | End: 2022-02-18 | Stop reason: HOSPADM

## 2022-02-09 RX ORDER — POLYETHYLENE GLYCOL 3350 17 G/17G
1 POWDER, FOR SOLUTION ORAL
Status: DISCONTINUED | OUTPATIENT
Start: 2022-02-09 | End: 2022-02-18 | Stop reason: HOSPADM

## 2022-02-09 RX ORDER — UREA 10 %
3 LOTION (ML) TOPICAL
Status: DISCONTINUED | OUTPATIENT
Start: 2022-02-09 | End: 2022-02-18 | Stop reason: HOSPADM

## 2022-02-09 RX ORDER — LACOSAMIDE 100 MG/1
150 TABLET ORAL 2 TIMES DAILY
Status: DISCONTINUED | OUTPATIENT
Start: 2022-02-09 | End: 2022-02-12

## 2022-02-09 RX ORDER — ACETAMINOPHEN 325 MG/1
650 TABLET ORAL EVERY 6 HOURS PRN
Status: DISCONTINUED | OUTPATIENT
Start: 2022-02-09 | End: 2022-02-10

## 2022-02-09 RX ORDER — AMOXICILLIN 250 MG
2 CAPSULE ORAL 2 TIMES DAILY
Status: DISCONTINUED | OUTPATIENT
Start: 2022-02-09 | End: 2022-02-18 | Stop reason: HOSPADM

## 2022-02-09 RX ORDER — AMLODIPINE BESYLATE 10 MG/1
10 TABLET ORAL DAILY
Status: DISCONTINUED | OUTPATIENT
Start: 2022-02-10 | End: 2022-02-18 | Stop reason: HOSPADM

## 2022-02-09 RX ORDER — TRAMADOL HYDROCHLORIDE 50 MG/1
50 TABLET ORAL EVERY EVENING
Status: ON HOLD | COMMUNITY
End: 2022-02-13

## 2022-02-09 RX ADMIN — BRIVARACETAM 100 MG: 100 TABLET, FILM COATED ORAL at 19:22

## 2022-02-09 RX ADMIN — IOHEXOL 43 ML: 350 INJECTION, SOLUTION INTRAVENOUS at 16:21

## 2022-02-09 RX ADMIN — APIXABAN 5 MG: 5 TABLET, FILM COATED ORAL at 19:22

## 2022-02-09 RX ADMIN — ACETAMINOPHEN 650 MG: 325 TABLET, FILM COATED ORAL at 20:46

## 2022-02-09 RX ADMIN — DOCUSATE SODIUM 50 MG AND SENNOSIDES 8.6 MG 2 TABLET: 8.6; 5 TABLET, FILM COATED ORAL at 19:22

## 2022-02-09 RX ADMIN — MORPHINE SULFATE 4 MG: 4 INJECTION INTRAVENOUS at 16:48

## 2022-02-09 RX ADMIN — Medication 3 MG: at 22:09

## 2022-02-09 RX ADMIN — LACOSAMIDE 150 MG: 100 TABLET, FILM COATED ORAL at 19:22

## 2022-02-09 ASSESSMENT — COGNITIVE AND FUNCTIONAL STATUS - GENERAL
TOILETING: TOTAL
CLIMB 3 TO 5 STEPS WITH RAILING: TOTAL
SUGGESTED CMS G CODE MODIFIER MOBILITY: CM
DAILY ACTIVITIY SCORE: 13
MOVING FROM LYING ON BACK TO SITTING ON SIDE OF FLAT BED: UNABLE
STANDING UP FROM CHAIR USING ARMS: TOTAL
SUGGESTED CMS G CODE MODIFIER DAILY ACTIVITY: CL
MOBILITY SCORE: 7
TURNING FROM BACK TO SIDE WHILE IN FLAT BAD: A LOT
MOVING TO AND FROM BED TO CHAIR: UNABLE
PERSONAL GROOMING: A LITTLE
DRESSING REGULAR UPPER BODY CLOTHING: A LITTLE
EATING MEALS: A LITTLE
DRESSING REGULAR LOWER BODY CLOTHING: A LOT
WALKING IN HOSPITAL ROOM: TOTAL
HELP NEEDED FOR BATHING: TOTAL

## 2022-02-09 ASSESSMENT — ENCOUNTER SYMPTOMS
FEVER: 0
VOMITING: 0
LOSS OF CONSCIOUSNESS: 1
COUGH: 0
HEADACHES: 0
SORE THROAT: 0
DIZZINESS: 0
WEAKNESS: 1
DIARRHEA: 0
NAUSEA: 0
SHORTNESS OF BREATH: 0
ABDOMINAL PAIN: 0
FALLS: 1
CHILLS: 0

## 2022-02-09 ASSESSMENT — LIFESTYLE VARIABLES
HOW MANY TIMES IN THE PAST YEAR HAVE YOU HAD 5 OR MORE DRINKS IN A DAY: 0
EVER FELT BAD OR GUILTY ABOUT YOUR DRINKING: NO
CONSUMPTION TOTAL: NEGATIVE
HAVE YOU EVER FELT YOU SHOULD CUT DOWN ON YOUR DRINKING: NO
TOTAL SCORE: 0
ALCOHOL_USE: NO
DOES PATIENT WANT TO STOP DRINKING: NO
EVER HAD A DRINK FIRST THING IN THE MORNING TO STEADY YOUR NERVES TO GET RID OF A HANGOVER: NO
ON A TYPICAL DAY WHEN YOU DRINK ALCOHOL HOW MANY DRINKS DO YOU HAVE: 0
AVERAGE NUMBER OF DAYS PER WEEK YOU HAVE A DRINK CONTAINING ALCOHOL: 0
HAVE PEOPLE ANNOYED YOU BY CRITICIZING YOUR DRINKING: NO
TOTAL SCORE: 0
TOTAL SCORE: 0

## 2022-02-09 ASSESSMENT — PAIN DESCRIPTION - PAIN TYPE
TYPE: ACUTE PAIN
TYPE: ACUTE PAIN

## 2022-02-09 ASSESSMENT — FIBROSIS 4 INDEX
FIB4 SCORE: 1.24
FIB4 SCORE: 1.55

## 2022-02-09 NOTE — ED TRIAGE NOTES
Chief Complaint   Patient presents with   • T-5000 GLF     Pt has h/o brain tumor with L sided deficit.  She fell forward today and hit her head and face.  Pt does take elequis daily.     • Shortness of Breath     Pt noted to be 86% on RA by [facility] post fall.  Arrives to ER, is 91-92% on RA.       Pt to [facility] in [facility] via [facility].  Left sided weakness noted.  Denies any covid contacts.

## 2022-02-09 NOTE — ED PROVIDER NOTES
"ED Provider  Scribed for Elliott Sylvester D.O. by Merna Taveras. 2/9/2022  2:38 PM    Means of arrival:EMS  History obtained from:Spouse and Nurse  History limited by: None    CHIEF COMPLAINT  Chief Complaint   Patient presents with    T-5000 GLF     Pt has h/o brain tumor with L sided deficit.  She fell forward today and hit her head and face.  Pt does take elequis daily.      Shortness of Breath     Pt noted to be 86% on RA by [facility] post fall.  Arrives to ER, is 91-92% on RA.       HPI  Melba Frye is a 51 y.o. female who presents via EMS to bedside following a ground level fall witnessed by care giver. The patient does not remember the incident, noting that there were \"suddenly firemen around her\". Per , she is forgetful at baseline, but does not usually forget long periods of time, like she did today. She has left sided deficits at this time. She was recently diagnosed with her second brain tumor, localized to the left side of the brain. She was [facility], but is not currently on radiation or chemotherapy. This week she is obtaining an MR to determine course of her further treatment. She underwent a course of chemotherapy and radiation following the first reception in June 2021. Per nursing note, she was hypoxic at 86% following the fall. During initial examination, nurse noted 84% saturation on room air. She currently endorses associated shortness of breath, cough, and slight congestion. She denies any dizziness, lightheadedness, or headache. She has no history of seizures. She takes Eliquis daily for history of PE. She has no further history of asthma, COPD, or heart issues.     REVIEW OF SYSTEMS  See HPI for further details. All other systems are negative.     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    SOCIAL HISTORY  Social History     Tobacco Use    Smoking status: Never Smoker    Smokeless tobacco: Never Used   Vaping Use    Vaping Use: Never used   Substance and Sexual Activity    Alcohol use: Not Currently     Alcohol/week: 0.0 oz    Drug use: No    Sexual activity: Yes     Partners: Male       SURGICAL HISTORY   has a past surgical history that includes cystoscopy (10/15/08); laparoscopy (5/28/2010); lymph node sampling (5/28/2010); debulking (5/28/2010); appendectomy (1981); other (1984); vaginal hysterectomy scope total (10/15/08); myringotomy (8/27/2010); tonsillectomy and adenoidectomy; and craniotomy stealth (Right, 3/9/2021).    CURRENT MEDICATIONS  Home Medications       Reviewed by Sarah Cisneros (Pharmacy Tech) on 02/09/22 at 1727  Med List Status: Complete     Medication Last Dose Status   acetaminophen (ACETAMINOPHEN 8 HOUR) 650 MG CR tablet 2/9/2022 Active   amLODIPine (NORVASC) 10 MG Tab 2/9/2022 Active   brivaracetam (BRIVIACT) 100 MG Tab tablet 2/9/2022 Active   cephALEXin (KEFLEX) 500 MG Cap COMPLETE Active   CHOLECALCIFEROL PO 2/9/2022 Active   cyanocobalamin (VITAMIN B12) 1000 MCG Tab 2/9/2022 Active   ELIQUIS 5 MG Tab 2/9/2022 Active   FOLIC ACID PO 2/9/2022 Active   hydrOXYzine HCl (ATARAX) 50 MG Tab 2/8/2022 Active   lacosamide (VIMPAT) 100 MG Tab tablet 2/9/2022 Active   melatonin 3 MG Tab 2/8/2022 Active   Mirabegron ER 50 MG TABLET SR 24 HR 2/9/2022 Active   multivitamin (THERAGRAN) Tab 2/9/2022 Active   Naloxone (NARCAN) 4 MG/0.1ML Liquid PRN Active   prochlorperazine (COMPAZINE) 10 MG Tab PRN Active   sennosides (SENOKOT) 8.6 MG Tab 2/8/2022 Active   traMADol (ULTRAM) 50 MG Tab 2/8/2022 Active   venlafaxine (EFFEXOR) 75 MG Tab 2/9/2022 Active               "    ALLERGIES  Allergies   Allergen Reactions    Tape Rash     Use paper tape instead       PHYSICAL EXAM  VITAL SIGNS: /91   Pulse 92   Temp 36.7 °C (98 °F) (Temporal)   Resp (!) 21   Ht 1.702 m (5' 7\")   Wt 101 kg (222 lb 10.6 oz)   SpO2 92%   BMI 34.87 kg/m²   Constitutional: Alert in no apparent distress.  HENT: No signs of trauma, mucous membranes are moist  Eyes: Conjunctiva normal, Non-icteric.   Neck: Normal range of motion, No tenderness, Supple.  Lymphatic: No lymphadenopathy noted.   Cardiovascular: Regular rate and rhythm, no murmurs.   Thorax & Lungs: Normal breath sounds, No respiratory distress, No wheezing, No chest tenderness.   Abdomen: Bowel sounds normal, Soft, No tenderness, No masses, No pulsatile masses. No peritoneal signs.  Skin: Warm, Dry, normal color.   Back: No bony tenderness, No CVA tenderness.   Extremities: See Neuro. No edema, No tenderness, No cyanosis  Musculoskeletal: Good range of motion in all major joints. No tenderness to palpation or major deformities noted.   Neurologic: Weakness in the left upper and lower extremities. Alert and oriented x4, Normal sensory function   Psychiatric: Affect normal, Judgment normal, Mood normal.     DIAGNOSTIC STUDIES / PROCEDURES    EKG  12 Lead EKG interpreted by me shown below.      LABS  Results for orders placed or performed during the hospital encounter of 02/09/22   CBC w/ Differential   Result Value Ref Range    WBC 8.3 4.8 - 10.8 K/uL    RBC 3.83 (L) 4.20 - 5.40 M/uL    Hemoglobin 13.3 12.0 - 16.0 g/dL    Hematocrit 38.5 37.0 - 47.0 %    .5 (H) 81.4 - 97.8 fL    MCH 34.7 (H) 27.0 - 33.0 pg    MCHC 34.5 33.6 - 35.0 g/dL    RDW 51.9 (H) 35.9 - 50.0 fL    Platelet Count 244 164 - 446 K/uL    MPV 9.4 9.0 - 12.9 fL    Neutrophils-Polys 71.40 44.00 - 72.00 %    Lymphocytes 14.60 (L) 22.00 - 41.00 %    Monocytes 11.50 0.00 - 13.40 %    Eosinophils 0.70 0.00 - 6.90 %    Basophils 0.40 0.00 - 1.80 %    Immature Granulocytes " 1.40 (H) 0.00 - 0.90 %    Nucleated RBC 0.20 /100 WBC    Neutrophils (Absolute) 5.92 2.00 - 7.15 K/uL    Lymphs (Absolute) 1.21 1.00 - 4.80 K/uL    Monos (Absolute) 0.95 (H) 0.00 - 0.85 K/uL    Eos (Absolute) 0.06 0.00 - 0.51 K/uL    Baso (Absolute) 0.03 0.00 - 0.12 K/uL    Immature Granulocytes (abs) 0.12 (H) 0.00 - 0.11 K/uL    NRBC (Absolute) 0.02 K/uL   Complete Metabolic Panel (CMP)   Result Value Ref Range    Sodium 141 135 - 145 mmol/L    Potassium 3.8 3.6 - 5.5 mmol/L    Chloride 103 96 - 112 mmol/L    Co2 24 20 - 33 mmol/L    Anion Gap 14.0 7.0 - 16.0    Glucose 99 65 - 99 mg/dL    Bun 8 8 - 22 mg/dL    Creatinine 0.54 0.50 - 1.40 mg/dL    Calcium 9.9 8.5 - 10.5 mg/dL    AST(SGOT) 28 12 - 45 U/L    ALT(SGPT) 23 2 - 50 U/L    Alkaline Phosphatase 104 (H) 30 - 99 U/L    Total Bilirubin 0.4 0.1 - 1.5 mg/dL    Albumin 4.3 3.2 - 4.9 g/dL    Total Protein 7.3 6.0 - 8.2 g/dL    Globulin 3.0 1.9 - 3.5 g/dL    A-G Ratio 1.4 g/dL   Troponin STAT   Result Value Ref Range    Troponin T 7 6 - 19 ng/L   Lipase   Result Value Ref Range    Lipase 36 11 - 82 U/L   COV-2, FLU A/B, AND RSV BY PCR (2-4 HOURS CEPHEID): Collect NP swab in VTM    Specimen: Respirate   Result Value Ref Range    Influenza virus A RNA Negative Negative    Influenza virus B, PCR Negative Negative    RSV, PCR Negative Negative    SARS-CoV-2 by PCR NotDetected     SARS-CoV-2 Source NP Swab    ESTIMATED GFR   Result Value Ref Range    GFR If African American >60 >60 mL/min/1.73 m 2    GFR If Non African American >60 >60 mL/min/1.73 m 2   EKG   Result Value Ref Range    Report       [facility]    Test Date:  2022  Pt Name:    MARGE FALL               Department: ER  MRN:        [id number]                      Room:        15  Gender:     Female                       Technician: TCM  :        1970                   Requested By:KENNY TUBBS  Order #:    [id number]                    Gil MD:  KENNY TUBBS D.O.    Measurements  Intervals                                Axis  Rate:       93                           P:          64  IL:         155                          QRS:        50  QRSD:       87                           T:          6  QT:         360  QTc:        448    Interpretive Statements  Sinus rhythm  Low voltage, precordial leads  Borderline T abnormalities, anterior leads  Compared to ECG 12/10/2020 18:53:30  Low QRS voltage now present  T-wave abnormality now present  Electronically Signed On 2022 16:53:09 PST by KENNY TUBBS D.O.     EKG   Result Value Ref Range    Report       [facility] Emergency Dept.    Test Date:  2022  Pt Name:    MARGE FALL               Department: ER  MRN:        [id number]                      Room:        15  Gender:     Female                       Technician: TCM  :        1970                   Requested By:KENNY TUBBS  Order #:    [id number]                    Reading MD:    Measurements  Intervals                                Axis  Rate:       94                           P:          50  IL:         159                          QRS:        47  QRSD:       93                           T:          -19  QT:         353  QTc:        442    Interpretive Statements  Sinus rhythm  Probable inferior infarct, age indeterminate  Compared to ECG 2022 15:54:12  Myocardial infarct finding now present  T-wave abnormality no longer present     All labs reviewed by me.    RADIOLOGY  CT-CTA CHEST PULMONARY ARTERY W/ RECONS   Final Result      1.  No CT evidence for pulmonary emboli.   2.  No pneumonia or pneumothorax.               CT-HEAD W/O   Final Result      1.  Postoperative changes of right frontoparietal mass resection. No evidence of hemorrhage. Basal cisterns are patent.   2.  The right greater than left white matter is hypodense. This may reflect edema or postradiation change. This appears  somewhat decreased from 12/14/2021 CT.   3.  Bilateral mastoid effusions. Correlate for mastoiditis.      DX-CHEST-PORTABLE (1 VIEW)   Final Result      Hypoinflation with minimal RIGHT midlung atelectasis.      EC-ECHOCARDIOGRAM COMPLETE W/O CONT    (Results Pending)     The radiologist's interpretations of all radiological studies have been reviewed by me.    Films have been independently by me      COURSE  Pertinent Labs & Imaging studies reviewed. (See chart for details)    2:38 PM - Patient seen and examined at bedside. Discussed plan of care. Ordered for CT-CTA chest, DX-chest, CT-head, CBC with differential, CMP, Troponin, Lipase, EKG and COV-2, FLU A/B, AND RSV BY PCR (2-4 HOURS CEPHEID): Collect NP swab in VTM   to evaluate her symptoms.     4:54 PM- Patient's pulse oximetry is 87% on room air. With O2 at 2L the patient's saturating at 92-93%.    5:06 PM I discussed the patient's case and the above findings with Dr. Fry (hospitalist) who agrees to hospitalize the patient and take over the plan of care moving forward.    5:07 PM- Patient has been reassessed on all imaging and radiology results. Updated on plan of care that includes further hospitalization. Patient verbalizes understanding and agreement to this plan of care.     MEDICAL DECISION MAKING  This is a 51 y.o. female who presents with a history of brain resection due to tumor, she had a syncopal episode and did have loss of consciousness.  She has been hypoxic on room air she does respond well to supplemental oxygen via nasal cannula.  On evaluation she is awake alert and oriented.  She does have a chronic deficits on left upper and left lower extremity.    CT shows no bleed.  CT of the chest shows no pulmonary embolism.  Covid swabs are pending.  I see no explanation for her hypoxemia at this time.  She will be admitted for further evaluation and treatment.    DISPOSITION:  Patient will be hospitalized by Dr. Fry in guarded condition.    FINAL  IMPRESSION  1. Syncope, unspecified syncope type    2. Acute respiratory failure with hypoxia (HCC)         Merna SEARS (Scribe), am scribing for, and in the presence of, Elliott Sylvester D.O..    Electronically signed by: Merna Taveras (Lissa), 2/9/2022    Elliott SEARS D.O. personally performed the services described in this documentation, as scribed by Merna Taveras in my presence, and it is both accurate and complete.    The note accurately reflects work and decisions made by me.  Elliott Sylvester D.O.  2/9/2022  6:49 PM

## 2022-02-10 ENCOUNTER — APPOINTMENT (OUTPATIENT)
Dept: RADIOLOGY | Facility: MEDICAL CENTER | Age: 52
DRG: 054 | End: 2022-02-10
Attending: INTERNAL MEDICINE
Payer: COMMERCIAL

## 2022-02-10 ENCOUNTER — APPOINTMENT (OUTPATIENT)
Dept: CARDIOLOGY | Facility: MEDICAL CENTER | Age: 52
DRG: 054 | End: 2022-02-10
Attending: STUDENT IN AN ORGANIZED HEALTH CARE EDUCATION/TRAINING PROGRAM
Payer: COMMERCIAL

## 2022-02-10 LAB
ANION GAP SERPL CALC-SCNC: 14 MMOL/L (ref 7–16)
BASE EXCESS BLDA CALC-SCNC: 0 MMOL/L (ref -4–3)
BODY TEMPERATURE: ABNORMAL CENTIGRADE
BUN SERPL-MCNC: 7 MG/DL (ref 8–22)
CALCIUM SERPL-MCNC: 9.1 MG/DL (ref 8.5–10.5)
CHLORIDE SERPL-SCNC: 104 MMOL/L (ref 96–112)
CO2 SERPL-SCNC: 21 MMOL/L (ref 20–33)
CREAT SERPL-MCNC: 0.44 MG/DL (ref 0.5–1.4)
ERYTHROCYTE [DISTWIDTH] IN BLOOD BY AUTOMATED COUNT: 55.9 FL (ref 35.9–50)
GLUCOSE SERPL-MCNC: 130 MG/DL (ref 65–99)
HCO3 BLDA-SCNC: 24 MMOL/L (ref 17–25)
HCT VFR BLD AUTO: 35.6 % (ref 37–47)
HGB BLD-MCNC: 12 G/DL (ref 12–16)
LV EJECT FRACT  99904: 75
LV EJECT FRACT MOD 2C 99903: 59.69
LV EJECT FRACT MOD 4C 99902: 56.57
LV EJECT FRACT MOD BP 99901: 55.14
MCH RBC QN AUTO: 34.8 PG (ref 27–33)
MCHC RBC AUTO-ENTMCNC: 33.7 G/DL (ref 33.6–35)
MCV RBC AUTO: 103.2 FL (ref 81.4–97.8)
PCO2 BLDA: 39.6 MMHG (ref 26–37)
PH BLDA: 7.41 [PH] (ref 7.4–7.5)
PLATELET # BLD AUTO: 220 K/UL (ref 164–446)
PMV BLD AUTO: 9.9 FL (ref 9–12.9)
PO2 BLDA: 69.4 MMHG (ref 64–87)
POTASSIUM SERPL-SCNC: 4.1 MMOL/L (ref 3.6–5.5)
RBC # BLD AUTO: 3.45 M/UL (ref 4.2–5.4)
SAO2 % BLDA: 92.2 % (ref 93–99)
SODIUM SERPL-SCNC: 139 MMOL/L (ref 135–145)
WBC # BLD AUTO: 7.2 K/UL (ref 4.8–10.8)

## 2022-02-10 PROCEDURE — A9576 INJ PROHANCE MULTIPACK: HCPCS | Performed by: INTERNAL MEDICINE

## 2022-02-10 PROCEDURE — 95819 EEG AWAKE AND ASLEEP: CPT | Mod: 26 | Performed by: PSYCHIATRY & NEUROLOGY

## 2022-02-10 PROCEDURE — 4A00X4Z MEASUREMENT OF CENTRAL NERVOUS ELECTRICAL ACTIVITY, EXTERNAL APPROACH: ICD-10-PCS | Performed by: PSYCHIATRY & NEUROLOGY

## 2022-02-10 PROCEDURE — 70553 MRI BRAIN STEM W/O & W/DYE: CPT

## 2022-02-10 PROCEDURE — A9270 NON-COVERED ITEM OR SERVICE: HCPCS | Performed by: STUDENT IN AN ORGANIZED HEALTH CARE EDUCATION/TRAINING PROGRAM

## 2022-02-10 PROCEDURE — 700102 HCHG RX REV CODE 250 W/ 637 OVERRIDE(OP): Performed by: STUDENT IN AN ORGANIZED HEALTH CARE EDUCATION/TRAINING PROGRAM

## 2022-02-10 PROCEDURE — 99233 SBSQ HOSP IP/OBS HIGH 50: CPT | Performed by: INTERNAL MEDICINE

## 2022-02-10 PROCEDURE — A9270 NON-COVERED ITEM OR SERVICE: HCPCS | Performed by: INTERNAL MEDICINE

## 2022-02-10 PROCEDURE — 85027 COMPLETE CBC AUTOMATED: CPT

## 2022-02-10 PROCEDURE — 770020 HCHG ROOM/CARE - TELE (206)

## 2022-02-10 PROCEDURE — 93306 TTE W/DOPPLER COMPLETE: CPT | Mod: 26 | Performed by: INTERNAL MEDICINE

## 2022-02-10 PROCEDURE — 95819 EEG AWAKE AND ASLEEP: CPT | Performed by: PSYCHIATRY & NEUROLOGY

## 2022-02-10 PROCEDURE — 80048 BASIC METABOLIC PNL TOTAL CA: CPT

## 2022-02-10 PROCEDURE — 93306 TTE W/DOPPLER COMPLETE: CPT

## 2022-02-10 PROCEDURE — 700102 HCHG RX REV CODE 250 W/ 637 OVERRIDE(OP): Performed by: INTERNAL MEDICINE

## 2022-02-10 PROCEDURE — 97167 OT EVAL HIGH COMPLEX 60 MIN: CPT

## 2022-02-10 PROCEDURE — 700117 HCHG RX CONTRAST REV CODE 255: Performed by: INTERNAL MEDICINE

## 2022-02-10 PROCEDURE — 36415 COLL VENOUS BLD VENIPUNCTURE: CPT

## 2022-02-10 RX ORDER — ACETAMINOPHEN 500 MG
1000 TABLET ORAL EVERY 6 HOURS
Status: DISPENSED | OUTPATIENT
Start: 2022-02-10 | End: 2022-02-15

## 2022-02-10 RX ORDER — OXYCODONE HYDROCHLORIDE 5 MG/1
5 TABLET ORAL
Status: DISCONTINUED | OUTPATIENT
Start: 2022-02-10 | End: 2022-02-18 | Stop reason: HOSPADM

## 2022-02-10 RX ORDER — NYSTATIN 100000 [USP'U]/G
POWDER TOPICAL 2 TIMES DAILY
Status: COMPLETED | OUTPATIENT
Start: 2022-02-10 | End: 2022-02-17

## 2022-02-10 RX ORDER — OXYCODONE HYDROCHLORIDE 5 MG/1
2.5 TABLET ORAL
Status: DISCONTINUED | OUTPATIENT
Start: 2022-02-10 | End: 2022-02-18 | Stop reason: HOSPADM

## 2022-02-10 RX ORDER — ACETAMINOPHEN 500 MG
1000 TABLET ORAL EVERY 6 HOURS PRN
Status: DISCONTINUED | OUTPATIENT
Start: 2022-02-15 | End: 2022-02-18 | Stop reason: HOSPADM

## 2022-02-10 RX ORDER — ALPRAZOLAM 0.5 MG/1
0.5 TABLET ORAL 2 TIMES DAILY PRN
Status: DISCONTINUED | OUTPATIENT
Start: 2022-02-10 | End: 2022-02-18 | Stop reason: HOSPADM

## 2022-02-10 RX ORDER — HYDROMORPHONE HYDROCHLORIDE 1 MG/ML
0.25 INJECTION, SOLUTION INTRAMUSCULAR; INTRAVENOUS; SUBCUTANEOUS
Status: DISCONTINUED | OUTPATIENT
Start: 2022-02-10 | End: 2022-02-17

## 2022-02-10 RX ADMIN — AMLODIPINE BESYLATE 10 MG: 10 TABLET ORAL at 05:29

## 2022-02-10 RX ADMIN — LACOSAMIDE 150 MG: 100 TABLET, FILM COATED ORAL at 18:35

## 2022-02-10 RX ADMIN — APIXABAN 5 MG: 5 TABLET, FILM COATED ORAL at 05:29

## 2022-02-10 RX ADMIN — BRIVARACETAM 100 MG: 100 TABLET, FILM COATED ORAL at 05:29

## 2022-02-10 RX ADMIN — Medication 3 MG: at 20:39

## 2022-02-10 RX ADMIN — ACETAMINOPHEN 1000 MG: 500 TABLET ORAL at 12:27

## 2022-02-10 RX ADMIN — ACETAMINOPHEN 1000 MG: 500 TABLET ORAL at 18:35

## 2022-02-10 RX ADMIN — GADOTERIDOL 20 ML: 279.3 INJECTION, SOLUTION INTRAVENOUS at 17:33

## 2022-02-10 RX ADMIN — VENLAFAXINE 75 MG: 75 TABLET ORAL at 05:29

## 2022-02-10 RX ADMIN — APIXABAN 5 MG: 5 TABLET, FILM COATED ORAL at 18:35

## 2022-02-10 RX ADMIN — LACOSAMIDE 150 MG: 100 TABLET, FILM COATED ORAL at 05:29

## 2022-02-10 RX ADMIN — DOCUSATE SODIUM 50 MG AND SENNOSIDES 8.6 MG 2 TABLET: 8.6; 5 TABLET, FILM COATED ORAL at 05:28

## 2022-02-10 RX ADMIN — ACETAMINOPHEN 1000 MG: 500 TABLET ORAL at 23:52

## 2022-02-10 RX ADMIN — NYSTATIN: 100000 POWDER TOPICAL at 23:52

## 2022-02-10 RX ADMIN — BRIVARACETAM 100 MG: 100 TABLET, FILM COATED ORAL at 18:43

## 2022-02-10 RX ADMIN — ALPRAZOLAM 0.5 MG: 0.5 TABLET ORAL at 16:32

## 2022-02-10 ASSESSMENT — COGNITIVE AND FUNCTIONAL STATUS - GENERAL
SUGGESTED CMS G CODE MODIFIER DAILY ACTIVITY: CL
DRESSING REGULAR UPPER BODY CLOTHING: A LOT
TOILETING: A LOT
EATING MEALS: A LITTLE
PERSONAL GROOMING: A LITTLE
DAILY ACTIVITIY SCORE: 12
HELP NEEDED FOR BATHING: TOTAL
DRESSING REGULAR LOWER BODY CLOTHING: TOTAL

## 2022-02-10 ASSESSMENT — FIBROSIS 4 INDEX: FIB4 SCORE: 1.38

## 2022-02-10 ASSESSMENT — ACTIVITIES OF DAILY LIVING (ADL): TOILETING: REQUIRES ASSIST

## 2022-02-10 ASSESSMENT — PAIN DESCRIPTION - PAIN TYPE: TYPE: ACUTE PAIN

## 2022-02-10 NOTE — ED NOTES
Pharmacy Medication Reconciliation    ~Med rec updated and complete per patient/patient family at bedside  ~Allergies have been verified and updated  ~Pt home pharmacy:[facility]      ~Patient reports that she finished a course of antibiotics while in rehab in [place] last month for a bladder infection. Med rec found Keflex 500 mg Q 12 hours on 01/22/2022 via [facility] Everywhere

## 2022-02-10 NOTE — DISCHARGE PLANNING
Received Choice form at 1507  Agency/Facility Name: [facility]  Referral sent per Choice form @ 1520     Received Choice form at 150  Agency/Facility Name: [facility]  Referral sent per Choice form @ 1527

## 2022-02-10 NOTE — PROCEDURES
VIDEO ELECTROENCEPHALOGRAM REPORT      Referring provider: Dr. Ramirez.     DOS: 2/10/2022 (total recording of 26 minutes).     INDICATION:  Melba Frye 51 y.o. female presenting with seizure.     CURRENT ANTIEPILEPTIC REGIMEN: Lacosamide, Brivaracetam.     TECHNIQUE: 30 channel video electroencephalogram (EEG) was performed in accordance with the international 10-20 system. The study was reviewed in bipolar and referential montages. The recording examined the patient during wakeful and drowsy/sleep state(s).     DESCRIPTION OF THE RECORD:  During the wakefulness, the background showed an asymmetrical 8 Hz alpha activity posteriorly with amplitude of 70 mV on the left, with slowing and breach rhythm noted in the right posterior region.  There was reactivity to eye closure/opening.  A normal anterior-posterior gradient was noted with faster beta frequencies seen anteriorly.  During drowsiness, theta/delta frequencies were seen.    During the sleep state, background shows diffuse high-amplitude 4-5 Hz delta activity.      ACTIVATION PROCEDURES:   Intermittent Photic stimulation was performed in a stepwise fashion from 1 to 30 Hz, but failed to produce any background changes.       ICTAL AND/OR INTERICTAL FINDINGS:   There is slowing in the right hemisphere. A breach rhythm as well as rare sharps were noted in the right posterior quadrant. No seizures captured.     EKG: sampling of the EKG recording demonstrated sinus rhythm.     EVENTS:  None.     INTERPRETATION:  This is an abnormal video EEG recording in the awake, drowsy/sleep state(s). There is slowing in the right hemisphere. A breach rhythm as well as rare sharps were noted in the right posterior quadrant.  The findings suggest: 1)-a large underlying area of structural abnormality, 2)-an underlying skull defect, 3)-an underlying area of increased cortical irritability. The patient is at an increased risk for seizures, however no seizures captured during  this study. Clinical and radiological correlation is recommended.        Darnell Metz MD   Epilepsy and Neurodiagnostics.   Clinical  of Neurology [facility].   Diplomate in Neurology, Epilepsy, and Electrodiagnostic Medicine.   Office: [phone]  Fax: [phone]

## 2022-02-10 NOTE — ASSESSMENT & PLAN NOTE
History of glioblastoma s/p biopsy done by Dr. Mao, NS  s/p craniotomy/resection by [facility] neuro[facility]  s/p radiation/oncology. Follows with Dr. Frye, [facility] and Dr. Cerda, Oncology  She was brought in because of weakness; declining. She does have chronic L sided weakness but per  has memory lapses and tingling of L extremity at times.   Most recent resection at [facility] 12/31/2021.  Currently working with home PT for left upper and lower extremity weakness and left hemineglect    Plan:   Neurology consulted:   Ordered EEG and MRI; results to decide how to titrate her antiepileptics.  --MRI showed no vasogenic edema or worsening mass  --EEG with no clinical seizures however increased risk  --Added decadron as per on call Oncology  --Vimpat increased per neurology   --Following with tele-neurooncologist

## 2022-02-10 NOTE — CARE PLAN
"  Problem: Pain - Standard  Goal: Alleviation of pain or a reduction in pain to the patient’s comfort goal  2/10/2022 1317 by Sandra Stringer R.N.  Outcome: Progressing  Note: Patient educated on pain medication regimen, per patient would like to have Tramadol PRN ordered due to taking it at home, Dr. Ramirez notified and at bedside. New pain medication regimen orders in place.   2/10/2022 1313 by Sandra Stringer R.N.  Outcome: Progressing     Problem: Skin Integrity  Goal: Skin integrity is maintained or improved  Outcome: Progressing  Note: Ski assessed, cleansed, with appropriate interventions put into place. Waffle mattress, purewick, and mepilex put in place. Sacrum red and slow to amna.      The patient is Watcher - Medium risk of patient condition declining or worsening    Shift Goals: Turning every two hours  Clinical Goals: Monitor neurological assessment, orthostatic blood pressure and seizure precautions  Patient Goals: \"get Tramadol PRN for pain\"    Progress made toward(s) clinical / shift goals:  skin interventions in place    Patient is not progressing towards the following goals: Patient intermittently confused.       "

## 2022-02-10 NOTE — ASSESSMENT & PLAN NOTE
2/14 Resolved   Syncopal episode today, found to be hypoxic in the mid 80s requiring 2 L O2.  Denies any dyspnea at this time or prior to today.    CTA chest negative for PE, acute cardiopulm abnormality  CXR with hypoventilation  Covid negative  Echocardiogram with LVEF of 75%, no shunt, no valvular abnormality  Multiple hospitalizations in the past with no reported hypoxia before per patient  ?  Could be secondary to obesity hypoventilation syndrome  --Encourage IS  --Mobilize  --now on room air

## 2022-02-10 NOTE — THERAPY
"Occupational Therapy   Initial Evaluation     Patient Name: Melba Frye  Age:  51 y.o., Sex:  female  Medical Record #: [id number]  Today's Date: 2/10/2022     Precautions  Precautions: Fall Risk  Comments: L sided deficits , L AFO in room     Assessment  Patient is 51 y.o. female who presents to acute following GLF w/ head strike. PMH includes glioblastoma, w/ seizures, crani w/ subtotal resection, w/ subsequent L sided weakness w/ tone present. Pt reports she has been home from rehab (in [place]) for ~2 1/2 weeks, they have a caregiver for 8 hours 3 days a week. Spouse works 4 days a week. Reports she has had multiple falls since return home primarily during car transfers. Pt (+) for orthostatics BP supine 140/84, sitting 143/105, standing 117/93 w/ c/o fatigue. BP increased once in supine. Pt demo'd impaired L UE function, balance, functional mobility, and activity tolerance. Will continue to follow while in house. Recommend placement at this time.    Plan    Recommend Occupational Therapy 4 times per week until therapy goals are met for the following treatments:  Adaptive Equipment, Neuro Re-Education / Balance, Self Care/Activities of Daily Living, Therapeutic Activities and Therapeutic Exercises.    DC Equipment Recommendations: (P) Unable to determine at this time  Discharge Recommendations: (P) Recommend post-acute placement for additional occupational therapy services prior to discharge home (PM&R consult)     Subjective    \"My goals is to make it home\"     Objective       02/10/22 1046   Total Time Spent   Total Time Spent (Mins) 45   Charge Group   OT Evaluation OT Evaluation High   Initial Contact Note    Initial Contact Note Order Received and Verified, Occupational Therapy Evaluation in Progress with Full Report to Follow.   Prior Living Situation   Prior Services Intermittent Physical Support for ADL Per Family;Skilled Home Health Services   Housing / Facility 2 Story House   Bathroom Set " up Bathtub / Shower Combination;Walk In Shower;Tub Transfer Bench;Shower Chair;Grab Bars   Equipment Owned Single Point Cane;Tub / Shower Seat;Tub Transfer Bench;Grab Bar(s) In Tub / Shower;Grab Bar(s) By Toilet;Hospital Bed   Lives with - Patient's Self Care Capacity Spouse   Comments spouse present and very supportive, reports he works 4 days a week, has caregiver come in 3 days a week for 8 hours and family member provide SPV on 4th day.   Prior Level of ADL Function   Self Feeding Independent   Grooming / Hygiene Independent   Bathing Requires Assist   Dressing Requires Assist   Toileting Requires Assist   Prior Level of IADL Function   Prior Level Of Mobility Supervision With Device in Home   Driving / Transportation Relatives / Others Provide Transportation   Comments spouse reports multiple falls during car txf    Precautions   Precautions Fall Risk   Comments L sided deficits , L AFO in room    Vitals   O2 (LPM) 2   O2 Delivery Device Silicone Nasal Cannula   Pain 0 - 10 Group   Therapist Pain Assessment Post Activity Pain Same as Prior to Activity;Nurse Notified  (c/o mild left shoulder pain )   Cognition    Cognition / Consciousness WDL   Level of Consciousness Alert   Comments pleasent and cooperative , good insight into deficits   Active ROM Upper Body   Active ROM Upper Body  X   Dominant Hand Right   Comments L UE non functional   Strength Upper Body   Comments R UE WFL   Upper Body Muscle Tone   Upper Body Muscle Tone  X   Lt Upper Extremity Muscle Tone Non Functional;Hypertonic   Coordination Upper Body   Coordination X   Fine Motor Coordination impaired L UE   Gross Motor Coordination impaired L UE   Balance Assessment   Sitting Balance (Static) Poor +   Sitting Balance (Dynamic) Poor   Standing Balance (Static) Poor -   Standing Balance (Dynamic) Trace +   Weight Shift Sitting Fair   Weight Shift Standing Poor   Comments w/ trunk and unilateral UE support   Bed Mobility    Supine to Sit Maximal  Assist   Sit to Supine Maximal Assist   Scooting Moderate Assist   ADL Assessment   Grooming Minimal Assist;Seated   Upper Body Dressing Moderate Assist  (gown)   Lower Body Dressing Maximal Assist  (baseline, donned AFO, shoes)   How much help from another person does the patient currently need...   Putting on and taking off regular lower body clothing? 1   Bathing (including washing, rinsing, and drying)? 1   Toileting, which includes using a toilet, bedpan, or urinal? 2   Putting on and taking off regular upper body clothing? 2   Taking care of personal grooming such as brushing teeth? 3   Eating meals? 3   6 Clicks Daily Activity Score 12   Functional Mobility   Sit to Stand Moderate Assist  (x2 people for safety)   Mobility supine<>sit<>stand EOB    Activity Tolerance   Sitting in Chair NT   Sitting Edge of Bed 20 min   Standing 3-4 min total   Patient / Family Goals   Patient / Family Goal #1 To get stronger    Short Term Goals   Short Term Goal # 1 Pt will demo UB dressing w/ SPV   Short Term Goal # 2 Pt will demo BSC txf w/ min A   Short Term Goal # 3 Pt will demo toilet hygiene w/ SPV   Short Term Goal # 4 Pt will demo seated grooming w/ SPV   Education Group   Role of Occupational Therapist Patient Response Patient;Acceptance;Explanation;Demonstration;Verbal Demonstration   Additional Comments ed/trained pt and spouse on recommendations and POC   Problem List   Problem List Decreased Upper Extremity Strength Left;Decreased Upper Extremity AROM Left;Decreased Activity Tolerance;Decreased Upper Extremity PROM Left;Decreased Functional Mobility;Safety Awareness Deficits / Cognition;Impaired Sensation Left Upper Extremity;Impaired Coordination Left Upper Extremity;Impaired Postural Control / Balance;Impaired Upper Extremity Tone Left;Decreased Active Daily Living Skills;Decreased Homemaking Skills   Anticipated Discharge Equipment and Recommendations   DC Equipment Recommendations Unable to determine at this  time   Discharge Recommendations Recommend post-acute placement for additional occupational therapy services prior to discharge home  (PM&R consult)   Interdisciplinary Plan of Care Collaboration   IDT Collaboration with  Nursing   Patient Position at End of Therapy In Bed;Call Light within Reach;Tray Table within Reach;Phone within Reach;Bed Alarm On   Collaboration Comments report given   Session Information   Date / Session Number  2/10, 1 (1/4, 2/16)   Priority 3

## 2022-02-10 NOTE — DISCHARGE PLANNING
Anticipated Discharge Disposition: Acute Rehab vs SNF     Action: Spoke to pt and spouse at bedside regarding pt's need for therapy and likely need for acute rehab vs SNF. Obtained pt's verbal auth, choice forms sent to [facility].   Pt's spouse provided this RNCM contact information for  from [facility] who has been assisting with pt's care.    [facility] :  Sneha Randhawa  [phone] ext [phone]     Barriers to Discharge: medical clearance     Plan:  f/u with medical team and pt to discuss DC needs and barriers, f/u with [facility] regarding SNF/Acute rehab acceptance        Care Transition Team Assessment    Information Source  Orientation Level: Oriented X4  Information Given By: Patient,Spouse    Readmission Evaluation  Is this a readmission?: No    Elopement Risk  Legal Hold: No  Ambulatory or Self Mobile in Wheelchair: No-Not an Elopement Risk  Elopement Risk: Not at Risk for Elopement    Interdisciplinary Discharge Planning  Lives with - Patient's Self Care Capacity: Spouse  Patient or legal guardian wants to designate a caregiver: No  Housing / Facility: [address]  Prior Services: Intermittent Physical Support for ADL Per Family,Skilled Home Health Services    Discharge Preparedness  What is your plan after discharge?: Skilled nursing facility  What are your discharge supports?: Spouse  Prior Functional Level: Needs Assist with Activities of Daily Living    Functional Assesment  Prior Functional Level: Needs Assist with Activities of Daily Living    Finances  Financial Barriers to Discharge: No              Advance Directive  Advance Directive?: DPOA for Health Care  Durable Power of  Name and Contact : on file    Domestic Abuse  Have you ever been the victim of abuse or violence?: No              Anticipated Discharge Information  Discharge Disposition: D/T to SNF with Medicare cert in anticipation of skilled care (03)

## 2022-02-10 NOTE — ASSESSMENT & PLAN NOTE
Syncopal vs seizure episode today while working with PT at home  States she felt dizzy prior to the event then does not remember what happened afterwards.    History of seizures, related to glioblastoma.  Per  she usually has motor activity with her seizures which was not noted today. Compliant with antiepileptics  History of PE, CTA negative for acute abnormality.  Compliant with apixaban  In the ED she is in sinus tachycardia on telemetry.  EKG shows Q waves in the inferior leads and T wave flattening in the anterior lateral leads which is new compared to EKG from 12/2020.  Troponin negative and denies chest pain monitor on telemetry  CT head shows postoperative changes, no hemorrhage.  Right greater than left hypodense white matter likely reflecting edema or postradiation change that has improved since prior CT.  Echo with LVEF 75%  Does not sound like orthostatic hypotension based on presentation.    Brivaracetam dose recently increased which can cause dizziness and balance/ataxia issues  Possible arrhythmia.  Recently started on mirabegron which can cause tachycardia and rarely arrhythmia.  Lacosamide dose recently increased which is known to cause arrhythmias and other cardiovascular abnormality  --orthostatic BP

## 2022-02-10 NOTE — PROGRESS NOTES
Bedside report received from Suad LANZA RN. Pt appears to be resting comfortably. Call light and belongings within reach. Bed locked and in lowest position. Alarm and fall precautions in place.

## 2022-02-10 NOTE — ED NOTES
Received report from JUDITH Braun. At bedside. Pt awake and and alert. Respirations even and unlabored. No signs of distress. Family member at bedside. Pt follows commands. Pt oriented to person place and year.

## 2022-02-10 NOTE — PROGRESS NOTES
4 Eyes Skin Assessment Completed by Suad RN and JUDITH Delgado.    Head Scab, Bruising and Redness  Ears Blanching  Nose Blanching  Mouth WDL  Neck WDL  Breast/Chest Redness under breast folds   Shoulder Blades Blanching  Spine Redness and Blanching  (R) Arm/Elbow/Hand WDL  (L) Arm/Elbow/Hand WDL  Abdomen Scar  Groin WDL  Scrotum/Coccyx/Buttocks WDL  (R) Leg Scab  (L) Leg WDL  (R) Heel/Foot/Toe WDL  (L) Heel/Foot/Toe WDL          Devices In Places Tele Box, Pulse Ox and Nasal Cannula      Interventions In Place Pillows    Possible Skin Injury No    Pictures Uploaded Into Epic N/A  Wound Consult Placed N/A  RN Wound Prevention Protocol Ordered No

## 2022-02-10 NOTE — H&P
Hospital Medicine History & Physical Note    Date of Service  2/9/2022    Primary Care Physician  Trinity Nguyen M.D.    Code Status  Full Code    Chief Complaint  Chief Complaint   Patient presents with   • T-5000 GLF     Pt has h/o brain tumor with L sided deficit.  She fell forward today and hit her head and face.  Pt does take elequis daily.     • Shortness of Breath     Pt noted to be 86% on RA by [facility] post fall.  Arrives to ER, is 91-92% on RA.       History of Presenting Illness  Melba Frye is a 51 y.o. female who presented 2/9/2022 with Syncope. PMH glioblastoma, presented with seizures 12/2020, biopsy 03/2021, craniotomy and subtotal resection 06/2021 at [facility] followed by radiation therapy and chemo with temozolomide.  Follows with Dr. Cerda.  Worsening weakness 12/2021 for which she was admitted at this facility and treated with steroids with improvement in her weakness.  12/31/2021 she went back to [facility] for a second resection and tapered off steroids.  Went to rehab afterwards and is now doing home health for left upper and lower extremity weakness.  She also has left hemineglect since the second resection. PMH also of HTN and PE on apixaban.    Today while working with PT at home she suddenly felt dizzy and decided to take a small break, she does remember anything afterwards.  Per  she fell and had LOC.  No symptoms postevent, denies any dizziness, chest pain, shortness of breath.  She was noted to be hypoxic in the mid 80s by EMS and required 2 L O2.  Denies any dyspnea or SOB previously or currently, no cough.  She is compliant with apixaban.    In the ED hypoxic down to 83% on 2 L O2.  Labs essentially unremarkable.  CTA chest negative for acute changes.    EKG shows sinus rhythm with inferior Q waves and flattening of the T waves in the anterior lateral leads.    I discussed the plan of care with patient, family and bedside RN.    Review of Systems  Review of Systems    Constitutional: Negative for chills and fever.   HENT: Negative for sore throat.    Respiratory: Negative for cough and shortness of breath.    Cardiovascular: Negative for chest pain.   Gastrointestinal: Negative for abdominal pain, diarrhea, nausea and vomiting.   Genitourinary: Negative for dysuria and urgency.   Musculoskeletal: Positive for falls.   Neurological: Positive for loss of consciousness and weakness. Negative for dizziness and headaches.   All other systems reviewed and are negative.      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    Surgical History   has a past surgical history that includes cystoscopy (10/15/08); laparoscopy (5/28/2010); lymph node sampling (5/28/2010); debulking (5/28/2010); appendectomy (1981); other (1984); vaginal hysterectomy scope total (10/15/08); myringotomy (8/27/2010); tonsillectomy and adenoidectomy; and craniotomy stealth (Right, 3/9/2021).     Family History  family history includes Arthritis in her mother; Cancer in her mother; Cancer (age of onset: 73) in her father; Diabetes in her paternal aunt; Heart Disease in her mother; Hyperlipidemia in her paternal aunt; Hypertension in her mother; Lung Disease in her maternal grandmother, mother, and paternal aunt; Non-contributory in her father and mother; Psychiatric Illness in her mother; Stroke in her mother.   Family history reviewed with patient. There is no family history that is pertinent to the chief complaint.     Social History   reports that she has never  smoked. She has never used smokeless tobacco. She reports previous alcohol use. She reports that she does not use drugs.    Allergies  Allergies   Allergen Reactions   • Tape Rash     Use paper tape instead       Medications  Prior to Admission Medications   Prescriptions Last Dose Informant Patient Reported? Taking?   CHOLECALCIFEROL PO 2/9/2022 at [facility] Patient Yes No   Sig: Take 1 Each by mouth every day.   ELIQUIS 5 MG Tab 2/9/2022 at Unknown time Patient No No   Sig: Take 1 Tablet by mouth 2 times a day.   FOLIC ACID PO 2/9/2022 at [facility] Patient Yes No   Sig: Take 1 Tablet by mouth every day.   Mirabegron ER 50 MG TABLET SR 24 HR 2/9/2022 at [facility] Patient Yes No   Sig: Take 50 mg by mouth every morning. 30 DAY COURSE   Naloxone (NARCAN) 4 MG/0.1ML Liquid PRN at PRN Patient No No   Sig: One spray in one nostril for overdose and call 911.   Patient taking differently: 1 Spray as needed. One spray in one nostril for overdose and call 911.   acetaminophen (ACETAMINOPHEN 8 HOUR) 650 MG CR tablet 2/9/2022 at [facility] Patient Yes Yes   Sig: Take 650 mg by mouth 2 times a day as needed for Moderate Pain.   amLODIPine (NORVASC) 10 MG Tab 2/9/2022 at [facility] Patient No No   Sig: TAKE 1 TABLET BY MOUTH EVERY DAY   brivaracetam (BRIVIACT) 100 MG Tab tablet 2/9/2022 at [facility] Patient Yes No   Sig: Take 100 mg by mouth 2 times a day.   cephALEXin (KEFLEX) 500 MG Cap COMPLETE at COMPLETE Historical Yes Yes   Sig: Take 500 mg by mouth every 12 hours.   cyanocobalamin (VITAMIN B12) 1000 MCG Tab 2/9/2022 at [facility] Patient Yes No   Sig: Take 1,000 mcg by mouth every day.   hydrOXYzine HCl (ATARAX) 50 MG Tab 2/8/2022 at PM Patient Yes No   Sig: Take 50 mg by mouth every evening.   lacosamide (VIMPAT) 100 MG Tab tablet 2/9/2022 at [facility] Patient No No   Sig: Take 1.5 Tablets by mouth 2 times a day for 90 days.   melatonin 3 MG Tab 2/8/2022 at HS Patient Yes No   Sig: Take 3 mg by mouth at bedtime.   multivitamin (THERAGRAN) Tab 2/9/2022 at 0854  Patient Yes No   Sig: Take 1 Tablet by mouth every morning.   prochlorperazine (COMPAZINE) 10 MG Tab PRN at PRN Patient Yes No   Sig: Take 10 mg by mouth every 6 hours as needed for Nausea/Vomiting.   sennosides (SENOKOT) 8.6 MG Tab 2/8/2022 at PM Patient Yes No   Sig: Take 8.6 mg by mouth every evening.   traMADol (ULTRAM) 50 MG Tab 2/8/2022 at PM Patient Yes Yes   Sig: Take 50 mg by mouth every evening.   venlafaxine (EFFEXOR) 75 MG Tab 2/9/2022 at 0845 Patient No No   Sig: Take 1 Tablet by mouth every day.      Facility-Administered Medications: None       Physical Exam  Temp:  [36.7 °C (98 °F)] 36.7 °C (98 °F)  Pulse:  [90-98] 94  Resp:  [12-30] 22  BP: (137-162)/(72-95) 151/90  SpO2:  [83 %-98 %] 98 %  Blood Pressure: 151/90   Temperature: 36.7 °C (98 °F)   Pulse: 94   Respiration: (!) 22   Pulse Oximetry: 98 %       Physical Exam  Constitutional:       Appearance: She is obese.   HENT:      Head: Normocephalic and atraumatic.      Mouth/Throat:      Mouth: Mucous membranes are moist.      Pharynx: No oropharyngeal exudate or posterior oropharyngeal erythema.   Eyes:      General: No scleral icterus.     Comments: Bilateral horizontal nystagmus   Cardiovascular:      Rate and Rhythm: Normal rate and regular rhythm.      Pulses: Normal pulses.      Heart sounds: Normal heart sounds. No murmur heard.      Pulmonary:      Effort: Respiratory distress present.      Breath sounds: Normal breath sounds. No wheezing or rales.   Abdominal:      General: There is no distension.      Palpations: Abdomen is soft.      Tenderness: There is no abdominal tenderness.   Musculoskeletal:         General: No swelling or tenderness. Normal range of motion.      Cervical back: Normal range of motion and neck supple.   Skin:     General: Skin is warm and dry.   Neurological:      Mental Status: She is alert and oriented to person, place, and time.      Comments: 4/5 in the right upper and right lower extremity.  3-4/5 weakness in  the left upper and left lower extremity.  Can raise up against gravity but gets fatigued quickly.   Psychiatric:         Mood and Affect: Mood normal.         Laboratory:  Recent Labs     02/09/22  1501   WBC 8.3   RBC 3.83*   HEMOGLOBIN 13.3   HEMATOCRIT 38.5   .5*   MCH 34.7*   MCHC 34.5   RDW 51.9*   PLATELETCT 244   MPV 9.4     Recent Labs     02/09/22  1501   SODIUM 141   POTASSIUM 3.8   CHLORIDE 103   CO2 24   GLUCOSE 99   BUN 8   CREATININE 0.54   CALCIUM 9.9     Recent Labs     02/09/22  1501   ALTSGPT 23   ASTSGOT 28   ALKPHOSPHAT 104*   TBILIRUBIN 0.4   LIPASE 36   GLUCOSE 99         No results for input(s): NTPROBNP in the last 72 hours.      Recent Labs     02/09/22  1501   TROPONINT 7       Imaging:  CT-CTA CHEST PULMONARY ARTERY W/ RECONS   Final Result      1.  No CT evidence for pulmonary emboli.   2.  No pneumonia or pneumothorax.               CT-HEAD W/O   Final Result      1.  Postoperative changes of right frontoparietal mass resection. No evidence of hemorrhage. Basal cisterns are patent.   2.  The right greater than left white matter is hypodense. This may reflect edema or postradiation change. This appears somewhat decreased from 12/14/2021 CT.   3.  Bilateral mastoid effusions. Correlate for mastoiditis.      DX-CHEST-PORTABLE (1 VIEW)   Final Result      Hypoinflation with minimal RIGHT midlung atelectasis.      EC-ECHOCARDIOGRAM COMPLETE W/O CONT    (Results Pending)       X-Ray:  I have personally reviewed the images and compared with prior images.  EKG:  I have personally reviewed the images and compared with prior images.    Assessment/Plan:  I anticipate this patient will require at least two midnights for appropriate medical management, necessitating inpatient admission.    * Acute respiratory failure with hypoxia (HCC)- (present on admission)  Assessment & Plan  Syncopal episode today, found to be hypoxic in the mid 80s requiring 2 L O2.  Denies any dyspnea at this time or  prior to today.  No cough  CTA chest negative for acute abnormality.  Covid negative  Multiple hospitalizations in the past with no reported hypoxia before per patient  Check echocardiogram, ABG.  Incentive spirometer    Syncope- (present on admission)  Assessment & Plan  Syncopal episode today while working with PT at home.  States she felt dizzy prior to the event then does not remember what happened afterwards.  Per  patient fell and EMS were called, on arrival she was found to be hypoxic and requiring oxygen.  No symptoms post event  History of seizures, related to glioblastoma.  Per  she usually has motor activity with her seizures which was not noted today. Compliant with antiepileptics  History of PE, CTA negative for acute abnormality.  Compliant with apixaban  In the ED she is in sinus tachycardia on telemetry.  EKG shows Q waves in the inferior leads and T wave flattening in the anterior lateral leads which is new compared to EKG from 12/2020.  Troponin negative and denies chest pain monitor on telemetry  CT head in the ED showsCT head shows postoperative changes, no hemorrhage.  Right greater than left hypodense white matter likely reflecting edema or postradiation change that has improved since prior CT.    Does not sound like orthostatic hypotension based on presentation.  Check orthostatic pressures  Brivaracetam dose recently increased which can cause dizziness and balance/ataxia issues  Possible arrhythmia.  Recently started on mirabegron which can cause tachycardia and rarely arrhythmia.  Lacosamide dose recently increased which is known to cause arrhythmias and other cardiovascular abnormality  Monitor on telemetry    Seizure disorder (HCC)- (present on admission)  Assessment & Plan  Continue lacosamide and brivaracetam  Doses of both meds increased about a month ago due to breakthrough seizures following her second glioblastoma resection.  Has been controlled since  that    Glioblastoma of frontal lobe (HCC)- (present on admission)  Assessment & Plan  See HPI for more details.  Most recent resection at [facility] 12/31/2021.  Currently working with home PT for left upper and lower extremity weakness and left hemineglect    Mixed anxiety and depressive disorder- (present on admission)  Assessment & Plan  Continue venlafaxine    Class 1 obesity due to excess calories with serious comorbidity and body mass index (BMI) of 34.0 to 34.9 in adult- (present on admission)  Assessment & Plan  BMI 34.87    History of pulmonary embolus (PE)- (present on admission)  Assessment & Plan  Compliant with apixaban, continue    Primary hypertension- (present on admission)  Assessment & Plan  Continue amlodipine      VTE prophylaxis: therapeutic anticoagulation with apixaban

## 2022-02-10 NOTE — PROGRESS NOTES
[facility] Medicine Daily Progress Note    Date of Service  2/10/2022    Chief Complaint  Melba Frye is a 51 y.o. female admitted 2/9/2022 with T-5000 GLF (Pt has h/o brain tumor with L sided deficit.  She fell forward today and hit her head and face.  Pt does take elequis daily.  ) and Shortness of Breath (Pt noted to be 86% on RA by [facility] post fall.  Arrives to ER, is 91-92% on RA.)        Hospital Course  No notes on file  History of glioblastoma s/p biopsy done by Dr. Mao, [facility] [place], s/p craniotomy/resection by [facility] neurosurgeon, s/p radiation and seen by Dr. Frye, [facility], Dr. Cerda, Oncology, also seen by Dr. Prince Neurology for complications for seizures and last increased her antiepileptics. She is a resident at a skilled facility. She was brought in because of weakness; declining. She does have chronic L sided weakness but per  has memory lapses and tingling of L extremity at times.   At the ED, afebrile and hypertensive.  CTA Chest no PE  CT head:  1.  Postoperative changes of right frontoparietal mass resection. No evidence of hemorrhage. Basal cisterns are patent.  2.  The right greater than left white matter is hypodense. This may reflect edema or postradiation change. This appears somewhat decreased from 12/14/2021 CT.  3.  Bilateral mastoid effusions. Correlate for mastoiditis.  Echo:  Technically difficult study incomplete information is obtained  Normal left ventricular size and systolic function.  The left ventricular ejection fraction is visually estimated to be 75%.  Suoptimal opacificatioin with saline contrast to exclude the presence   of intracardiac shunt.  No prior study is available for comparison.       Interval Problem Update  2/10. I spoke to  and patient. Neuro exam shows L hemiparesis but per family this is chornic. Currently no tingling. AAOx4 no confusion.   I spoke with Dr. Prince Neurology. He mentioned it is ok to get and MRI and EEG and will see patient.  "Symptoms of her subclinical seizures are mainly L sided \"spasms\" with which she takes tramadol at night.    I have personally seen and examined the patient at bedside. I discussed the plan of care with patient, family, bedside RN and neurology.    Consultants/Specialty  neurology    Code Status  Full Code    Disposition  Patient is not medically cleared for discharge.   Anticipate discharge to Likely back to rehab or SNF.  I have placed the appropriate orders for post-discharge needs.    Review of Systems  Review of Systems   Unable to perform ROS: Mental acuity        Physical Exam  Temp:  [36.2 °C (97.1 °F)-36.7 °C (98.1 °F)] 36.2 °C (97.1 °F)  Pulse:  [82-97] 90  Resp:  [13-22] 16  BP: (121-151)/(70-94) 133/70  SpO2:  [87 %-98 %] 91 %    Physical Exam  Vitals and nursing note reviewed.   Constitutional:       General: She is not in acute distress.  HENT:      Head: Normocephalic and atraumatic.      Right Ear: External ear normal.      Left Ear: External ear normal.      Nose: Nose normal.      Mouth/Throat:      Mouth: Mucous membranes are moist.   Eyes:      General: No scleral icterus.     Conjunctiva/sclera: Conjunctivae normal.   Cardiovascular:      Rate and Rhythm: Normal rate and regular rhythm.      Pulses: Normal pulses.      Heart sounds: No murmur heard.  No friction rub. No gallop.    Pulmonary:      Effort: Pulmonary effort is normal. No respiratory distress.      Breath sounds: Normal breath sounds. No stridor. No wheezing, rhonchi or rales.   Chest:      Chest wall: No tenderness.   Abdominal:      General: Abdomen is flat. Bowel sounds are normal. There is no distension.      Palpations: Abdomen is soft.      Tenderness: There is no abdominal tenderness. There is no guarding or rebound.   Musculoskeletal:         General: No swelling, tenderness or deformity. Normal range of motion.      Cervical back: Normal range of motion and neck supple. No rigidity.   Skin:     General: Skin is warm.      " Coloration: Skin is not jaundiced.   Neurological:      General: No focal deficit present.      Mental Status: She is alert and oriented to person, place, and time. Mental status is at baseline.      Comments: L hemiparesis  Memory issues  Currently no paresthesias   Psychiatric:         Mood and Affect: Mood normal.         Behavior: Behavior normal.         Thought Content: Thought content normal.         Judgment: Judgment normal.         Fluids    Intake/Output Summary (Last 24 hours) at 2/10/2022 1546  Last data filed at 2/10/2022 0800  Gross per 24 hour   Intake 240 ml   Output --   Net 240 ml       Laboratory  Recent Labs     02/09/22  1501 02/10/22  0003   WBC 8.3 7.2   RBC 3.83* 3.45*   HEMOGLOBIN 13.3 12.0   HEMATOCRIT 38.5 35.6*   .5* 103.2*   MCH 34.7* 34.8*   MCHC 34.5 33.7   RDW 51.9* 55.9*   PLATELETCT 244 220   MPV 9.4 9.9     Recent Labs     02/09/22  1501 02/10/22  0003   SODIUM 141 139   POTASSIUM 3.8 4.1   CHLORIDE 103 104   CO2 24 21   GLUCOSE 99 130*   BUN 8 7*   CREATININE 0.54 0.44*   CALCIUM 9.9 9.1                   Imaging  EC-ECHOCARDIOGRAM COMPLETE W/O CONT   Final Result      CT-CTA CHEST PULMONARY ARTERY W/ RECONS   Final Result      1.  No CT evidence for pulmonary emboli.   2.  No pneumonia or pneumothorax.               CT-HEAD W/O   Final Result      1.  Postoperative changes of right frontoparietal mass resection. No evidence of hemorrhage. Basal cisterns are patent.   2.  The right greater than left white matter is hypodense. This may reflect edema or postradiation change. This appears somewhat decreased from 12/14/2021 CT.   3.  Bilateral mastoid effusions. Correlate for mastoiditis.      DX-CHEST-PORTABLE (1 VIEW)   Final Result      Hypoinflation with minimal RIGHT midlung atelectasis.      MR-BRAIN-WITH & W/O    (Results Pending)        Assessment/Plan  * Glioblastoma of frontal lobe (HCC)- (present on admission)  Assessment & Plan  See HPI for more details.  Most  "recent resection at [facility] 12/31/2021.  Currently working with home PT for left upper and lower extremity weakness and left hemineglect\"    Discussed with Neurology who will see patient  Ordered EEG and MRI; results to decide how to titrate her antiepileptics.  If MRI shows vasogenic edema/mass, will start steroids and speak with Rad Onc or Oncology    Seizure disorder (HCC)- (present on admission)  Assessment & Plan  Continue lacosamide and brivaracetam  Doses of both meds increased about a month ago due to breakthrough seizures following her second glioblastoma resection.  Has been controlled since that\"    As above    Syncope- (present on admission)  Assessment & Plan  Syncopal episode today while working with PT at home.  States she felt dizzy prior to the event then does not remember what happened afterwards.  Per  patient fell and EMS were called, on arrival she was found to be hypoxic and requiring oxygen.  No symptoms post event  History of seizures, related to glioblastoma.  Per  she usually has motor activity with her seizures which was not noted today. Compliant with antiepileptics  History of PE, CTA negative for acute abnormality.  Compliant with apixaban  In the ED she is in sinus tachycardia on telemetry.  EKG shows Q waves in the inferior leads and T wave flattening in the anterior lateral leads which is new compared to EKG from 12/2020.  Troponin negative and denies chest pain monitor on telemetry  CT head in the ED showsCT head shows postoperative changes, no hemorrhage.  Right greater than left hypodense white matter likely reflecting edema or postradiation change that has improved since prior CT.    Does not sound like orthostatic hypotension based on presentation.  Check orthostatic pressures  Brivaracetam dose recently increased which can cause dizziness and balance/ataxia issues  Possible arrhythmia.  Recently started on mirabegron which can cause tachycardia and rarely arrhythmia. " " Lacosamide dose recently increased which is known to cause arrhythmias and other cardiovascular abnormality  Echo ordered. Monitor on telemetry    Class 1 obesity due to excess calories with serious comorbidity and body mass index (BMI) of 34.0 to 34.9 in adult- (present on admission)  Assessment & Plan  BMI 34.87    Acute respiratory failure with hypoxia (HCC)- (present on admission)  Assessment & Plan  Syncopal episode today, found to be hypoxic in the mid 80s requiring 2 L O2.  Denies any dyspnea at this time or prior to today.  No cough  CTA chest negative for acute abnormality.  Covid negative  Multiple hospitalizations in the past with no reported hypoxia before per patient  Check echocardiogram, ABG.  Incentive spirometer    History of pulmonary embolus (PE)- (present on admission)  Assessment & Plan  Compliant with apixaban, continue    Primary hypertension- (present on admission)  Assessment & Plan  Continue amlodipine\"    Vitals:    02/10/22 1541   BP: 133/70   Pulse: 90   Resp: 16   Temp: 36.2 °C (97.1 °F)   SpO2: 91%           Mixed anxiety and depressive disorder- (present on admission)  Assessment & Plan  Continue venlafaxine       VTE prophylaxis: therapeutic anticoagulation with Eliquis    I have performed a physical exam and reviewed and updated ROS and Plan today (2/10/2022). In review of yesterday's note (2/9/2022), there are no changes except as documented above.      "

## 2022-02-10 NOTE — ASSESSMENT & PLAN NOTE
Follows with Dr. Prince, Neurology, for complications for seizures and last increased her antiepileptics.  Continue lacosamide and brivaracetam  Doses of both meds increased about a month ago due to breakthrough seizures following her second glioblastoma resection  --neurology consulted, increased dose of Vimpat   --EEG showed no subclinical seizures but increased risk of seizures

## 2022-02-10 NOTE — PROGRESS NOTES
Patient transferred to 832 bed 2 on zoll with ACLS RN.  Assumed care of patient, bedside report from JUDITH Horton.  Patient oriented to room and call light.  Tele monitor placed on patient.  Monitor room notified.  .  at bedside.  Fall and safety precautions in place.  Discussed POC with patient, patient verbalizes understanding.  No further needs at this time.

## 2022-02-11 LAB
ALBUMIN SERPL BCP-MCNC: 3.5 G/DL (ref 3.2–4.9)
BUN SERPL-MCNC: 8 MG/DL (ref 8–22)
CALCIUM SERPL-MCNC: 8.7 MG/DL (ref 8.5–10.5)
CHLORIDE SERPL-SCNC: 106 MMOL/L (ref 96–112)
CO2 SERPL-SCNC: 22 MMOL/L (ref 20–33)
CREAT SERPL-MCNC: 0.47 MG/DL (ref 0.5–1.4)
GLUCOSE SERPL-MCNC: 128 MG/DL (ref 65–99)
PHOSPHATE SERPL-MCNC: 4 MG/DL (ref 2.5–4.5)
POTASSIUM SERPL-SCNC: 3.5 MMOL/L (ref 3.6–5.5)
SODIUM SERPL-SCNC: 141 MMOL/L (ref 135–145)

## 2022-02-11 PROCEDURE — 80069 RENAL FUNCTION PANEL: CPT

## 2022-02-11 PROCEDURE — A9270 NON-COVERED ITEM OR SERVICE: HCPCS | Performed by: INTERNAL MEDICINE

## 2022-02-11 PROCEDURE — 36415 COLL VENOUS BLD VENIPUNCTURE: CPT

## 2022-02-11 PROCEDURE — 95714 VEEG EA 12-26 HR UNMNTR: CPT | Performed by: PSYCHIATRY & NEUROLOGY

## 2022-02-11 PROCEDURE — A9270 NON-COVERED ITEM OR SERVICE: HCPCS | Performed by: STUDENT IN AN ORGANIZED HEALTH CARE EDUCATION/TRAINING PROGRAM

## 2022-02-11 PROCEDURE — 99223 1ST HOSP IP/OBS HIGH 75: CPT | Mod: 25,FS | Performed by: NURSE PRACTITIONER

## 2022-02-11 PROCEDURE — 99233 SBSQ HOSP IP/OBS HIGH 50: CPT | Performed by: INTERNAL MEDICINE

## 2022-02-11 PROCEDURE — 700102 HCHG RX REV CODE 250 W/ 637 OVERRIDE(OP): Performed by: INTERNAL MEDICINE

## 2022-02-11 PROCEDURE — 95720 EEG PHY/QHP EA INCR W/VEEG: CPT | Performed by: PSYCHIATRY & NEUROLOGY

## 2022-02-11 PROCEDURE — 97162 PT EVAL MOD COMPLEX 30 MIN: CPT

## 2022-02-11 PROCEDURE — 770020 HCHG ROOM/CARE - TELE (206)

## 2022-02-11 PROCEDURE — 95700 EEG CONT REC W/VID EEG TECH: CPT | Performed by: PSYCHIATRY & NEUROLOGY

## 2022-02-11 PROCEDURE — 97535 SELF CARE MNGMENT TRAINING: CPT

## 2022-02-11 PROCEDURE — 700102 HCHG RX REV CODE 250 W/ 637 OVERRIDE(OP): Performed by: STUDENT IN AN ORGANIZED HEALTH CARE EDUCATION/TRAINING PROGRAM

## 2022-02-11 PROCEDURE — 4A10X4Z MONITORING OF CENTRAL NERVOUS ELECTRICAL ACTIVITY, EXTERNAL APPROACH: ICD-10-PCS | Performed by: PSYCHIATRY & NEUROLOGY

## 2022-02-11 RX ADMIN — BRIVARACETAM 100 MG: 100 TABLET, FILM COATED ORAL at 18:00

## 2022-02-11 RX ADMIN — DOCUSATE SODIUM 50 MG AND SENNOSIDES 8.6 MG 2 TABLET: 8.6; 5 TABLET, FILM COATED ORAL at 17:31

## 2022-02-11 RX ADMIN — BRIVARACETAM 100 MG: 100 TABLET, FILM COATED ORAL at 05:38

## 2022-02-11 RX ADMIN — AMLODIPINE BESYLATE 10 MG: 10 TABLET ORAL at 05:38

## 2022-02-11 RX ADMIN — ACETAMINOPHEN 1000 MG: 500 TABLET ORAL at 17:31

## 2022-02-11 RX ADMIN — ACETAMINOPHEN 1000 MG: 500 TABLET ORAL at 12:00

## 2022-02-11 RX ADMIN — LACOSAMIDE 150 MG: 100 TABLET, FILM COATED ORAL at 17:30

## 2022-02-11 RX ADMIN — LACOSAMIDE 150 MG: 100 TABLET, FILM COATED ORAL at 05:38

## 2022-02-11 RX ADMIN — Medication 3 MG: at 20:42

## 2022-02-11 RX ADMIN — NYSTATIN: 100000 POWDER TOPICAL at 18:00

## 2022-02-11 RX ADMIN — DOCUSATE SODIUM 50 MG AND SENNOSIDES 8.6 MG 2 TABLET: 8.6; 5 TABLET, FILM COATED ORAL at 05:38

## 2022-02-11 RX ADMIN — APIXABAN 5 MG: 5 TABLET, FILM COATED ORAL at 05:38

## 2022-02-11 RX ADMIN — APIXABAN 5 MG: 5 TABLET, FILM COATED ORAL at 17:31

## 2022-02-11 RX ADMIN — VENLAFAXINE 75 MG: 75 TABLET ORAL at 05:39

## 2022-02-11 RX ADMIN — ACETAMINOPHEN 1000 MG: 500 TABLET ORAL at 05:38

## 2022-02-11 RX ADMIN — OXYCODONE 2.5 MG: 5 TABLET ORAL at 20:40

## 2022-02-11 RX ADMIN — NYSTATIN: 100000 POWDER TOPICAL at 05:41

## 2022-02-11 ASSESSMENT — GAIT ASSESSMENTS
DISTANCE (FEET): 2
GAIT LEVEL OF ASSIST: MAXIMAL ASSIST

## 2022-02-11 ASSESSMENT — PAIN DESCRIPTION - PAIN TYPE: TYPE: ACUTE PAIN

## 2022-02-11 ASSESSMENT — COGNITIVE AND FUNCTIONAL STATUS - GENERAL
MOVING TO AND FROM BED TO CHAIR: UNABLE
CLIMB 3 TO 5 STEPS WITH RAILING: TOTAL
STANDING UP FROM CHAIR USING ARMS: TOTAL
TURNING FROM BACK TO SIDE WHILE IN FLAT BAD: UNABLE
MOBILITY SCORE: 6
MOVING FROM LYING ON BACK TO SITTING ON SIDE OF FLAT BED: UNABLE
SUGGESTED CMS G CODE MODIFIER MOBILITY: CN
WALKING IN HOSPITAL ROOM: TOTAL

## 2022-02-11 NOTE — CARE PLAN
The patient is Stable - Low risk of patient condition declining or worsening    Shift Goals  Clinical Goals: Manage pain  Patient Goals: would like nystatin powder     Progress made toward(s) clinical / shift goals:  progressing       Problem: Knowledge Deficit - Standard  Goal: Patient and family/care givers will demonstrate understanding of plan of care, disease process/condition, diagnostic tests and medications  Outcome: Progressing  Note: Patients whiteboard has been updated.  Pt has been updated on POC and all questions answered at this time.      Problem: Skin Integrity  Goal: Skin integrity is maintained or improved  Outcome: Progressing  Note: Patient asked for nystatin to be placed under folds of breasts.  Received order and medicated per MAR.   Redness under folds of breast

## 2022-02-11 NOTE — PROGRESS NOTES
Bedside report received from day shift RN. Assumed care at 1900. Pt is A&Ox4. Pt is in bed. Patient is on 1L NC. Pt was updated on plan of care. Pt has call light, personal belongings, and bedside table within reach. Bed locked and in lowest position.

## 2022-02-11 NOTE — DISCHARGE PLANNING
[facility] Transitional Care Coordination    Referral from:  Dr Ramirez  Insurance Provider on Facesheet: [facility]  Potential Rehab Diagnosis: Debility    Chart review indicates patient may need on going medical management and may have therapy needs to possibly meet inpatient rehab facility criteria with the goal of returning to community.    D/C support: Spouse and caregivers - works 4 days a week, has caregiver come in 3 days a week for 8 hours and family member provide SPV on 4th day.     Physiatry consultation forwarded per protocol.     Last Covid test:  2/9 Not detected    Admitted for fall w/ h/o brain tumor and L sided deficit. Physiatry to consult, TCC will follow.    [facility] :  Sneha Randhawa  [phone] ext [phone]     Thank you for the referral.

## 2022-02-11 NOTE — CARE PLAN
Problem: Pain - Standard  Goal: Alleviation of pain or a reduction in pain to the patient’s comfort goal  Outcome: Progressing     Problem: Knowledge Deficit - Standard  Goal: Patient and family/care givers will demonstrate understanding of plan of care, disease process/condition, diagnostic tests and medications  Outcome: Progressing     Problem: Skin Integrity  Goal: Skin integrity is maintained or improved  Outcome: Progressing     Problem: Fall Risk  Goal: Patient will remain free from falls  Outcome: Progressing   The patient is Watcher - Medium risk of patient condition declining or worsening    Shift Goals  Clinical Goals: Monitor pain, seizure precautions, o2  Patient Goals: Rest, pain management.   Family Goals: n/a  Progress made toward(s) clinical / shift goals:  This nurse assumed care at 0700. Patient resting in bed with no discomfort noted. Patient is calm and cooperative with treatment. Patient has no complaints of pain at this time. Will continue to monitor and provide care.     Patient is not progressing towards the following goals:

## 2022-02-11 NOTE — PROGRESS NOTES
[facility] Medicine Daily Progress Note    Date of Service  2/11/2022    Chief Complaint  Melba Frye is a 51 y.o. female admitted 2/9/2022 with T-5000 GLF (Pt has h/o brain tumor with L sided deficit.  She fell forward today and hit her head and face.  Pt does take elequis daily.  ) and Shortness of Breath (Pt noted to be 86% on RA by [facility] post fall.  Arrives to ER, is 91-92% on RA.)        Hospital Course  No notes on file  History of glioblastoma s/p biopsy done by Dr. Mao, [facility], s/p craniotomy/resection by [facility] neurosurgeon, s/p radiation and seen by Dr. Frye, [facility], Dr. Cerda, Oncology, also seen by Dr. Prince Neurology for complications for seizures and last increased her antiepileptics. She is a resident at a skilled facility. She was brought in because of weakness; declining. She does have chronic L sided weakness but per  has memory lapses and tingling of L extremity at times.   At the ED, afebrile and hypertensive.  CTA Chest no PE  CT head:  1.  Postoperative changes of right frontoparietal mass resection. No evidence of hemorrhage. Basal cisterns are patent.  2.  The right greater than left white matter is hypodense. This may reflect edema or postradiation change. This appears somewhat decreased from 12/14/2021 CT.  3.  Bilateral mastoid effusions. Correlate for mastoiditis.  Echo:  Technically difficult study incomplete information is obtained  Normal left ventricular size and systolic function.  The left ventricular ejection fraction is visually estimated to be 75%.  Suoptimal opacificatioin with saline contrast to exclude the presence   of intracardiac shunt.  No prior study is available for comparison.       Interval Problem Update  2/10. I spoke to  and patient. Neuro exam shows L hemiparesis but per family this is chornic. Currently no tingling. AAOx4 no confusion.   I spoke with Dr. Prince Neurology. He mentioned it is ok to get and MRI and EEG and will see patient.  "Symptoms of her subclinical seizures are mainly L sided \"spasms\" with which she takes tramadol at night.  2/11. MRI and EEG done., Updated family. Lety Romeo to see. Rehab planned.    I have personally seen and examined the patient at bedside. I discussed the plan of care with patient, family, bedside RN and neurology.    Consultants/Specialty  neurology    Code Status  Full Code    Disposition  Patient is not medically cleared for discharge.   Anticipate discharge to Likely back to rehab or SNF.  I have placed the appropriate orders for post-discharge needs.    Review of Systems  Review of Systems   Unable to perform ROS: Mental acuity        Physical Exam  Temp:  [35.9 °C (96.6 °F)-36.7 °C (98 °F)] 36.7 °C (98 °F)  Pulse:  [73-96] 92  Resp:  [16-19] 17  BP: (108-144)/(69-85) 144/85  SpO2:  [91 %-98 %] 98 %    Physical Exam  Vitals and nursing note reviewed.   Constitutional:       General: She is not in acute distress.  HENT:      Head: Normocephalic and atraumatic.      Right Ear: External ear normal.      Left Ear: External ear normal.      Nose: Nose normal.      Mouth/Throat:      Mouth: Mucous membranes are moist.   Eyes:      General: No scleral icterus.     Conjunctiva/sclera: Conjunctivae normal.   Cardiovascular:      Rate and Rhythm: Normal rate and regular rhythm.      Pulses: Normal pulses.      Heart sounds: No murmur heard.  No friction rub. No gallop.    Pulmonary:      Effort: Pulmonary effort is normal. No respiratory distress.      Breath sounds: Normal breath sounds. No stridor. No wheezing, rhonchi or rales.   Chest:      Chest wall: No tenderness.   Abdominal:      General: Abdomen is flat. Bowel sounds are normal. There is no distension.      Palpations: Abdomen is soft.      Tenderness: There is no abdominal tenderness. There is no guarding or rebound.   Musculoskeletal:         General: No swelling, tenderness or deformity. Normal range of motion.      Cervical back: Normal range of motion " and neck supple. No rigidity.   Skin:     General: Skin is warm.      Coloration: Skin is not jaundiced.   Neurological:      General: No focal deficit present.      Mental Status: She is alert and oriented to person, place, and time. Mental status is at baseline.      Comments: L hemiparesis  Memory issues  Currently no paresthesias   Psychiatric:         Mood and Affect: Mood normal.         Behavior: Behavior normal.         Thought Content: Thought content normal.         Judgment: Judgment normal.      Comments: Calm         Fluids    Intake/Output Summary (Last 24 hours) at 2/11/2022 1519  Last data filed at 2/11/2022 1000  Gross per 24 hour   Intake --   Output 600 ml   Net -600 ml       Laboratory  Recent Labs     02/09/22  1501 02/10/22  0003   WBC 8.3 7.2   RBC 3.83* 3.45*   HEMOGLOBIN 13.3 12.0   HEMATOCRIT 38.5 35.6*   .5* 103.2*   MCH 34.7* 34.8*   MCHC 34.5 33.7   RDW 51.9* 55.9*   PLATELETCT 244 220   MPV 9.4 9.9     Recent Labs     02/09/22  1501 02/10/22  0003 02/11/22  0154   SODIUM 141 139 141   POTASSIUM 3.8 4.1 3.5*   CHLORIDE 103 104 106   CO2 24 21 22   GLUCOSE 99 130* 128*   BUN 8 7* 8   CREATININE 0.54 0.44* 0.47*   CALCIUM 9.9 9.1 8.7                   Imaging  MR-BRAIN-WITH & W/O   Final Result         Postoperative changes are noted in the medial right posterior frontal parietal region with rim enhancement of the postoperative cavity. Linear thick  enhancement along the medial margin of the operative cavity abutting the falx cerebri is noted.    Comparison with previous films would be helpful to determine if there has been any interval change.      Extensive T2 signal abnormality involving the bilateral cerebral hemispheric white matter is noted, most likely related to posttreatment changes.         EC-ECHOCARDIOGRAM COMPLETE W/O CONT   Final Result      CT-CTA CHEST PULMONARY ARTERY W/ RECONS   Final Result      1.  No CT evidence for pulmonary emboli.   2.  No pneumonia or  "pneumothorax.               CT-HEAD W/O   Final Result      1.  Postoperative changes of right frontoparietal mass resection. No evidence of hemorrhage. Basal cisterns are patent.   2.  The right greater than left white matter is hypodense. This may reflect edema or postradiation change. This appears somewhat decreased from 12/14/2021 CT.   3.  Bilateral mastoid effusions. Correlate for mastoiditis.      DX-CHEST-PORTABLE (1 VIEW)   Final Result      Hypoinflation with minimal RIGHT midlung atelectasis.           Assessment/Plan  * Glioblastoma of frontal lobe (HCC)- (present on admission)  Assessment & Plan  See HPI for more details.  Most recent resection at [facility] 12/31/2021.  Currently working with home PT for left upper and lower extremity weakness and left hemineglect\"    Discussed with Neurology who will see patient  Ordered EEG and MRI; results to decide how to titrate her antiepileptics.  If MRI shows vasogenic edema/mass, will start steroids and speak with Rad Onc or Oncology  Currently MRI showed no vasogenic edema or worsening mass  EEKG showed no subclinicla seizures    Seizure disorder (HCC)- (present on admission)  Assessment & Plan  Continue lacosamide and brivaracetam  Doses of both meds increased about a month ago due to breakthrough seizures following her second glioblastoma resection.  Has been controlled since that\"    As above    Syncope- (present on admission)  Assessment & Plan  Syncopal episode today while working with PT at home.  States she felt dizzy prior to the event then does not remember what happened afterwards.  Per  patient fell and EMS were called, on arrival she was found to be hypoxic and requiring oxygen.  No symptoms post event  History of seizures, related to glioblastoma.  Per  she usually has motor activity with her seizures which was not noted today. Compliant with antiepileptics  History of PE, CTA negative for acute abnormality.  Compliant with apixaban  In " "the ED she is in sinus tachycardia on telemetry.  EKG shows Q waves in the inferior leads and T wave flattening in the anterior lateral leads which is new compared to EKG from 12/2020.  Troponin negative and denies chest pain monitor on telemetry  CT head in the ED showsCT head shows postoperative changes, no hemorrhage.  Right greater than left hypodense white matter likely reflecting edema or postradiation change that has improved since prior CT.    Does not sound like orthostatic hypotension based on presentation.  Check orthostatic pressures  Brivaracetam dose recently increased which can cause dizziness and balance/ataxia issues  Possible arrhythmia.  Recently started on mirabegron which can cause tachycardia and rarely arrhythmia.  Lacosamide dose recently increased which is known to cause arrhythmias and other cardiovascular abnormality  Echo ordered. Monitor on telemetry    Class 1 obesity due to excess calories with serious comorbidity and body mass index (BMI) of 34.0 to 34.9 in adult- (present on admission)  Assessment & Plan  BMI 34.87    Acute respiratory failure with hypoxia (HCC)- (present on admission)  Assessment & Plan  Syncopal episode today, found to be hypoxic in the mid 80s requiring 2 L O2.  Denies any dyspnea at this time or prior to today.  No cough  CTA chest negative for acute abnormality.  Covid negative  Multiple hospitalizations in the past with no reported hypoxia before per patient  Check echocardiogram, ABG.  Incentive spirometer    History of pulmonary embolus (PE)- (present on admission)  Assessment & Plan  Compliant with apixaban, continue    Primary hypertension- (present on admission)  Assessment & Plan  Continue amlodipine\"    Vitals:    02/11/22 1213   BP: 144/85   Pulse: 92   Resp: 17   Temp: 36.7 °C (98 °F)   SpO2: 98%           Mixed anxiety and depressive disorder- (present on admission)  Assessment & Plan  Continue venlafaxine       VTE prophylaxis: therapeutic " anticoagulation with Eliquis    I have performed a physical exam and reviewed and updated ROS and Plan today (2/11/2022). In review of yesterday's note (2/10/2022), there are no changes except as documented above.

## 2022-02-11 NOTE — CONSULTS
Neurology Initial Consult H&P  [facility] Acute Neurology    Referring Physician: Taj Ramirez M.D.    Chief Complaint   Patient presents with   • T-5000 GLF     Pt has h/o brain tumor with L sided deficit.  She fell forward today and hit her head and face.  Pt does take elequis daily.     • Shortness of Breath     Pt noted to be 86% on RA by [facility] post fall.  Arrives to ER, is 91-92% on RA.       History of Present Illness: Melba Frye is a 51 y.o. female with relevant history of right posterior fronto-parietal glioblastoma multiforme s/p right cranioplasty for resection (6/2021, biopsy 3/2021) at [facility], radiation and chemotherapy with temozolomide (last dose 11/2021), focal seizures (on Vimpat and Briviact), lentigo, migraine, hyperlipidemia, PE with cor pulmonale (on Eliquis), anxiety, and depression who presented to [facility] on 2/9/2022 for ground level fall, shortness of breath, and hypoxia. The patient and  report on Wednesday 2/9/22 she was walking back from the bathroom with her caregiver when she reported feeling tired, dizzy, and needed to sit. Her caregiver turned to get a chair at which time the patient fell forward to the ground without catching herself. EMS was notified and upon arrival noted the patient was hypoxic with SpO2 85% on RA. She had a period of about 15 minutes of alerted awareness/consciousness, and does not recall the events. No report of any seizure like activity, tongue biting, or bowel/bladder incontinence. The patient is followed outpatient by neurologist, Dr. Fady Davidson and inpatient neurology was consulted for possible seizure. Typical semiology of her clinical seizures historically are consistent with focal seizures arising from the right post-operative nidus, but she does history of subclinical electrographic seizures as well per Dr. Davidson.     Currently the patient is sitting up in bed, awake, alert, fully oriented, and following commands.  Neurological exam is at baseline with residual left hemiparesis, LUE spasticity, and left hemisensory (visual and tactile) neglect. Denies headache, vision changes, facial weakness, numbness, tingling, ataxia, abnormal movements, or confusion.     Past medical history:   Past Medical History:   Diagnosis Date   • Acute pulmonary embolism with acute cor pulmonale (HCC) 10/21/2021   • Anxiety    • Cancer (HCC)     brain diagnosed in October 2020    • Complicated migraine 6/9/2014   • Depression    • Dermatitis, contact 11/16/2015   • Fatigue 6/9/2014   • Hyperlipidemia 1/23/2015   • Lentigo 10/17/2018   • Menopausal symptom 7/2/2014   • Mixed anxiety and depressive disorder 6/9/2014   • Pulmonary embolism (HCC)    • Seborrheic keratoses 10/17/2018   • TMJ arthralgia 6/9/2014   • UTI (lower urinary tract infection)        Past surgical history:   Past Surgical History:   Procedure Laterality Date   • CRANIOTOMY STEALTH Right 3/9/2021    Procedure: RIGHT CRANIOTOMY, USING FRAMELESS STEREOTAXY - FOR OPEN BIOPSY WITH PHASE REVERSAL;  Surgeon: Maxwell Mao M.D.;  Location: [facility];  Service: Neurosurgery   • MYRINGOTOMY  8/27/2010    Performed by [address]AV [address]RE[address] at SURGERY SAME DAY [facility] ORS   • LAPAROSCOPY  5/28/2010    Performed by JACKI SHARMA at [facility] ORS   • LYMPH NODE SAMPLING  5/28/2010    Performed by JACKI SHARMA at [facility] ORS   • DEBULKING  5/28/2010    Performed by JACKI SHARMA at [facility] ORS   • CYSTOSCOPY  10/15/08    Performed by YU GODINEZ at SURGERY SAME DAY [facility] ORS   • VAGINAL HYSTERECTOMY SCOPE TOTAL  10/15/08    Performed by YU GODINEZ at SURGERY SAME DAY [facility] ORS   • OTHER  1984    TONSILECTOMY/ ADNOIDS   • APPENDECTOMY  1981   • TONSILLECTOMY AND ADENOIDECTOMY         Family history:   Family History   Problem Relation Age of Onset   • Non-contributory Mother    • Lung Disease Mother         COPD   • Cancer Mother          dysplasia    • Arthritis Mother    • Psychiatric Illness Mother    • Heart Disease Mother    • Hypertension Mother    • Stroke Mother    • Non-contributory Father    • Cancer Father 73        prostate cancer   • Lung Disease Maternal Grandmother    • Lung Disease Paternal Aunt    • Diabetes Paternal Aunt    • Hyperlipidemia Paternal Aunt        Social history:   Social History     Socioeconomic History   • Marital status:      Spouse name: Not on file   • Number of children: Not on file   • Years of education: Not on file   • Highest education level: Not on file   Occupational History   • Not on file   Tobacco Use   • Smoking status: Never Smoker   • Smokeless tobacco: Never Used   Vaping Use   • Vaping Use: Never used   Substance and Sexual Activity   • Alcohol use: Not Currently     Alcohol/week: 0.0 oz   • Drug use: No   • Sexual activity: Yes     Partners: Male   Other Topics Concern   • Not on file   Social History Narrative   • Not on file     Social Determinants of Health     Financial Resource Strain:    • Difficulty of Paying Living Expenses: Not on file   Food Insecurity:    • Worried About Running Out of Food in the Last Year: Not on file   • Ran Out of Food in the Last Year: Not on file   Transportation Needs:    • Lack of Transportation (Medical): Not on file   • Lack of Transportation (Non-Medical): Not on file   Physical Activity:    • Days of Exercise per Week: Not on file   • Minutes of Exercise per Session: Not on file   Stress:    • Feeling of Stress : Not on file   Social Connections:    • Frequency of Communication with Friends and Family: Not on file   • Frequency of Social Gatherings with Friends and Family: Not on file   • Attends Congregation Services: Not on file   • Active Member of Clubs or Organizations: Not on file   • Attends Club or Organization Meetings: Not on file   • Marital Status: Not on file   Intimate Partner Violence:    • Fear of Current or Ex-Partner: Not on file   •  Emotionally Abused: Not on file   • Physically Abused: Not on file   • Sexually Abused: Not on file   Housing Stability:    • Unable to Pay for Housing in the Last Year: Not on file   • Number of Places Lived in the Last Year: Not on file   • Unstable Housing in the Last Year: Not on file       Allergies:  Allergies   Allergen Reactions   • Tape Rash     Use paper tape instead       Medications:    Current Facility-Administered Medications:   •  ALPRAZolam (XANAX) tablet 0.5 mg, 0.5 mg, Oral, BID PRN, Taj Ramirez M.D., 0.5 mg at 02/10/22 1632  •  Pharmacy Consult Request ...Pain Management Review 1 Each, 1 Each, Other, PHARMACY TO DOSE, Taj Ramirez M.D.  •  acetaminophen (TYLENOL) tablet 1,000 mg, 1,000 mg, Oral, Q6HRS, 1,000 mg at 02/11/22 1200 **FOLLOWED BY** [START ON 2/15/2022] acetaminophen (TYLENOL) tablet 1,000 mg, 1,000 mg, Oral, Q6HRS PRN, Taj Ramirez M.D.  •  oxyCODONE immediate-release (ROXICODONE) tablet 2.5 mg, 2.5 mg, Oral, Q3HRS PRN **OR** oxyCODONE immediate-release (ROXICODONE) tablet 5 mg, 5 mg, Oral, Q3HRS PRN **OR** HYDROmorphone (Dilaudid) injection 0.25 mg, 0.25 mg, Intravenous, Q3HRS PRN, Taj Ramirez M.D.  •  nystatin (MYCOSTATIN) powder, , Topical, BID, Irvin Marti M.D., Given at 02/11/22 0541  •  senna-docusate (PERICOLACE or SENOKOT S) 8.6-50 MG per tablet 2 Tablet, 2 Tablet, Oral, BID, 2 Tablet at 02/11/22 0538 **AND** polyethylene glycol/lytes (MIRALAX) PACKET 1 Packet, 1 Packet, Oral, QDAY PRN **AND** magnesium hydroxide (MILK OF MAGNESIA) suspension 30 mL, 30 mL, Oral, QDAY PRN **AND** bisacodyl (DULCOLAX) suppository 10 mg, 10 mg, Rectal, QDAY PRN, Yoanna Fry M.D.  •  labetalol (NORMODYNE/TRANDATE) injection 10 mg, 10 mg, Intravenous, Q4HRS PRN, Yoanna Fry M.D.  •  amLODIPine (NORVASC) tablet 10 mg, 10 mg, Oral, DAILY, Yoanna Fry M.D., 10 mg at 02/11/22 0538  •  brivaracetam (Briviact) tablet 100 mg, 100 mg, Oral, BID, Yoanna Fry M.D.,  100 mg at 02/11/22 0538  •  apixaban (ELIQUIS) tablet 5 mg, 5 mg, Oral, BID, Yoanna Fry M.D., 5 mg at 02/11/22 0538  •  lacosamide (VIMPAT) tablet 150 mg, 150 mg, Oral, BID, Yoanna Fry M.D., 150 mg at 02/11/22 0538  •  melatonin tablet 3 mg, 3 mg, Oral, QHS, ALBA FoxD., 3 mg at 02/10/22 2039  •  venlafaxine (EFFEXOR) tablet 75 mg, 75 mg, Oral, DAILY, Yoanna Fry M.D., 75 mg at 02/11/22 0539    Review of systems: In addition to what is detailed in the HPI above, all other systems reviewed and are negative.    Physical Examination:     Vitals:    02/11/22 0412 02/11/22 0536 02/11/22 0810 02/11/22 1213   BP: 108/73 134/69 128/78 144/85   Pulse: 73  94 92   Resp: 16  19 17   Temp: 35.9 °C (96.6 °F)  36 °C (96.8 °F) 36.7 °C (98 °F)   TempSrc: Temporal  Temporal Temporal   SpO2: 96%  96% 98%   Weight:       Height:           General: Patient in no acute distress, pleasant and cooperative.  HEENT: Normocephalic, no signs of acute trauma. Cranioplasty scar noted to right parietal, healed.   Neck: Supple, no meningeal signs or carotid bruits. There is normal range of motion. No tenderness on exam.   Chest: Regular and unlabored breaths on RA. No cough.   CV: RRR.   Skin: Small petechiae to left lower lip, abrasion to left cheek. No signs of acute rashes.   Musculoskeletal: Joints exhibit full range of motion, without any pain to palpation. There are no signs of joint or muscle swelling. There is no tenderness to deep palpation of muscles.   Psychiatric: No hallucinatory behavior. Endorses symptoms of depression regarding health, but no suicidal ideation. Mood and affect appear normal for situation with intermittent tearfulness regarding health.     NEUROLOGICAL EXAM:   Mental status, orientation: Awake, alert, following commands, and fully oriented.   Speech and language: Speech is clear and fluent. The patient is able to name, repeat and comprehend.   Cranial nerve exam: Pupils are 3-4 mm bilaterally  and equally reactive to light. Visual fields are full, left visual hemineglect with simultaneous stimuli. There is no nystagmus on primary or secondary gaze. Intact full EOM in all directions of gaze. Face appears symmetric. Sensation in the face is intact to light touch. Uvula is midline. Palate elevates symmetrically. Tongue is midline and without any signs of tongue biting or fasciculations. Sternocleidomastoid muscles exhibit is normal strength bilaterally. Shoulder shrug is intact bilaterally.   Motor exam: Strength is 5/5 to RUE and RLE; 2/5 to LUE and 4/5 proximal LLE, 3/5 distal LLE. Tone is increased with spasticity to LUE. No abnormal movements were seen on exam.   Sensory exam Reveals normal sense of light touch, and pinprick in all extremities. Left extinction to double tactile stimuli.  Deep tendon reflexes:  3+ throughout. Plantar responses are up-going to left, down-going to right. There is no clonus.   Coordination: No ataxia with normal finger-nose-finger and heel-down-shin tests to LUE and LLE, unable to complete with RUE/RLE.   Gait: Deferred per patient preference.      ANCILLARY DATA REVIEWED:     Labs:  Lab Results   Component Value Date/Time    PROTHROMBTM 13.2 12/14/2021 06:50 PM    INR 1.08 12/14/2021 06:50 PM      Lab Results   Component Value Date/Time    WBC 7.2 02/10/2022 12:03 AM    RBC 3.45 (L) 02/10/2022 12:03 AM    HEMOGLOBIN 12.0 02/10/2022 12:03 AM    HEMATOCRIT 35.6 (L) 02/10/2022 12:03 AM    .2 (H) 02/10/2022 12:03 AM    MCH 34.8 (H) 02/10/2022 12:03 AM    MCHC 33.7 02/10/2022 12:03 AM    MPV 9.9 02/10/2022 12:03 AM    NEUTSPOLYS 71.40 02/09/2022 03:01 PM    LYMPHOCYTES 14.60 (L) 02/09/2022 03:01 PM    MONOCYTES 11.50 02/09/2022 03:01 PM    EOSINOPHILS 0.70 02/09/2022 03:01 PM    BASOPHILS 0.40 02/09/2022 03:01 PM      Lab Results   Component Value Date/Time    SODIUM 141 02/11/2022 01:54 AM    POTASSIUM 3.5 (L) 02/11/2022 01:54 AM    CHLORIDE 106 02/11/2022 01:54 AM     CO2 22 02/11/2022 01:54 AM    GLUCOSE 128 (H) 02/11/2022 01:54 AM    BUN 8 02/11/2022 01:54 AM    CREATININE 0.47 (L) 02/11/2022 01:54 AM      Lab Results   Component Value Date/Time    CHOLSTRLTOT 220 (H) 04/26/2019 08:35 AM     (H) 04/26/2019 08:35 AM    HDL 45 04/26/2019 08:35 AM    TRIGLYCERIDE 124 04/26/2019 08:35 AM       Lab Results   Component Value Date/Time    ALKPHOSPHAT 104 (H) 02/09/2022 03:01 PM    ASTSGOT 28 02/09/2022 03:01 PM    ALTSGPT 23 02/09/2022 03:01 PM    TBILIRUBIN 0.4 02/09/2022 03:01 PM        Imaging/Testing:    I interpreted and/or reviewed the patient's neuroimaging    MR-BRAIN-WITH & W/O   Final Result         Postoperative changes are noted in the medial right posterior frontal parietal region with rim enhancement of the postoperative cavity. Linear thick  enhancement along the medial margin of the operative cavity abutting the falx cerebri is noted.    Comparison with previous films would be helpful to determine if there has been any interval change.      Extensive T2 signal abnormality involving the bilateral cerebral hemispheric white matter is noted, most likely related to posttreatment changes.         EC-ECHOCARDIOGRAM COMPLETE W/O CONT   Final Result      CT-CTA CHEST PULMONARY ARTERY W/ RECONS   Final Result      1.  No CT evidence for pulmonary emboli.   2.  No pneumonia or pneumothorax.               CT-HEAD W/O   Final Result      1.  Postoperative changes of right frontoparietal mass resection. No evidence of hemorrhage. Basal cisterns are patent.   2.  The right greater than left white matter is hypodense. This may reflect edema or postradiation change. This appears somewhat decreased from 12/14/2021 CT.   3.  Bilateral mastoid effusions. Correlate for mastoiditis.      DX-CHEST-PORTABLE (1 VIEW)   Final Result      Hypoinflation with minimal RIGHT midlung atelectasis.            Assessment and Plan:    Melba Frye is a 51 y.o. female with relevant history  of right posterior fronto-parietal glioblastoma multiforme s/p right cranioplasty for resection (6/2021, biopsy 3/2021) at [facility], radiation and chemotherapy with temozolomide (last dose 11/2021), focal seizures (on Vimpat and Briviact), lentigo, migraine, hyperlipidemia, PE with cor pulmonale (on Eliquis), anxiety, and depression who presented to [facility] on 2/9/2022 for ground level fall, shortness of breath, and hypoxia.The patient is followed outpatient by neurologist, Dr. Fady Davidson for her seizure management and inpatient neurology was consulted for possible seizure.     Routine EEG obtained on 2/9/22 was abnormal with right hemispheric slowing, breach rhythm and rare sharps to the right posterior quadrant. These findings indicate an increased risk for seizures, but not entirely unexpected given her known history of right posterior fronto-parietal GBM s/p cranioplasty. MRI brain w/wo contrast 2/10/22 compared to MRI brain w/wo contrast on 12/15/21 (obtained on last admission for seizures) appears stable with postoperative changes and no significant vasogenic edema.      The semiology of the event described prior to this admission is questionable as details are limited, but differential diagnosis also includes syncope given her hypoxia and complaint of dizziness prior to LOC.     Plan:    -Continuous VEEG for 24 hours to evaluate for subclinical seizures or electrographic discharges  -q4h and PRN neuro assessment. VS per nursing/unit protocol.   -Telemetry; currently SR. Screen for tachyarrhythmia.   -Continue Vimpat 150mg PO BID and Briviact 100mg PO BID   -Will make further adjustments as needed pending cEEG results  -Agree with orthostatic BP and HR measurements to evaluate for orthostatic hypotension per primary   -TTE appears normal with EF 75%, no gross structural abnormalities, and no shunt.   -PT/OT eval and treat.   -Fall and seizure precautions  -Follow up outpatient in Epilepsy  Clinic with Dr. Davidson, established patient  -DVT PPX: SCDs and Eliquis BID    The evaluation of the patient, and recommended management, was discussed with Dr. Choudhary, Dr. Davidson, and bedside RN.     Maxwell Salazar, MSN [facility]  Nurse Practitioner  [facility]  t) [phone]

## 2022-02-12 LAB
ALBUMIN SERPL BCP-MCNC: 3.3 G/DL (ref 3.2–4.9)
BUN SERPL-MCNC: 7 MG/DL (ref 8–22)
CALCIUM SERPL-MCNC: 8.8 MG/DL (ref 8.5–10.5)
CHLORIDE SERPL-SCNC: 108 MMOL/L (ref 96–112)
CO2 SERPL-SCNC: 24 MMOL/L (ref 20–33)
CREAT SERPL-MCNC: 0.47 MG/DL (ref 0.5–1.4)
ERYTHROCYTE [DISTWIDTH] IN BLOOD BY AUTOMATED COUNT: 54 FL (ref 35.9–50)
GLUCOSE SERPL-MCNC: 110 MG/DL (ref 65–99)
HCT VFR BLD AUTO: 34.5 % (ref 37–47)
HGB BLD-MCNC: 11.6 G/DL (ref 12–16)
MCH RBC QN AUTO: 34.7 PG (ref 27–33)
MCHC RBC AUTO-ENTMCNC: 33.6 G/DL (ref 33.6–35)
MCV RBC AUTO: 103.3 FL (ref 81.4–97.8)
PHOSPHATE SERPL-MCNC: 3.3 MG/DL (ref 2.5–4.5)
PLATELET # BLD AUTO: 203 K/UL (ref 164–446)
PMV BLD AUTO: 9.5 FL (ref 9–12.9)
POTASSIUM SERPL-SCNC: 3.7 MMOL/L (ref 3.6–5.5)
RBC # BLD AUTO: 3.34 M/UL (ref 4.2–5.4)
SODIUM SERPL-SCNC: 142 MMOL/L (ref 135–145)
WBC # BLD AUTO: 4.6 K/UL (ref 4.8–10.8)

## 2022-02-12 PROCEDURE — 770006 HCHG ROOM/CARE - MED/SURG/GYN SEMI*

## 2022-02-12 PROCEDURE — 700102 HCHG RX REV CODE 250 W/ 637 OVERRIDE(OP): Performed by: STUDENT IN AN ORGANIZED HEALTH CARE EDUCATION/TRAINING PROGRAM

## 2022-02-12 PROCEDURE — A9270 NON-COVERED ITEM OR SERVICE: HCPCS | Performed by: NURSE PRACTITIONER

## 2022-02-12 PROCEDURE — A9270 NON-COVERED ITEM OR SERVICE: HCPCS | Performed by: INTERNAL MEDICINE

## 2022-02-12 PROCEDURE — 80069 RENAL FUNCTION PANEL: CPT

## 2022-02-12 PROCEDURE — 700111 HCHG RX REV CODE 636 W/ 250 OVERRIDE (IP): Performed by: INTERNAL MEDICINE

## 2022-02-12 PROCEDURE — A9270 NON-COVERED ITEM OR SERVICE: HCPCS | Performed by: STUDENT IN AN ORGANIZED HEALTH CARE EDUCATION/TRAINING PROGRAM

## 2022-02-12 PROCEDURE — 99232 SBSQ HOSP IP/OBS MODERATE 35: CPT | Performed by: NURSE PRACTITIONER

## 2022-02-12 PROCEDURE — 36415 COLL VENOUS BLD VENIPUNCTURE: CPT

## 2022-02-12 PROCEDURE — 85027 COMPLETE CBC AUTOMATED: CPT

## 2022-02-12 PROCEDURE — 700102 HCHG RX REV CODE 250 W/ 637 OVERRIDE(OP): Performed by: INTERNAL MEDICINE

## 2022-02-12 PROCEDURE — 99233 SBSQ HOSP IP/OBS HIGH 50: CPT | Performed by: INTERNAL MEDICINE

## 2022-02-12 PROCEDURE — 700102 HCHG RX REV CODE 250 W/ 637 OVERRIDE(OP): Performed by: NURSE PRACTITIONER

## 2022-02-12 RX ORDER — DEXAMETHASONE 1 MG
2 TABLET ORAL EVERY 12 HOURS
Status: COMPLETED | OUTPATIENT
Start: 2022-02-12 | End: 2022-02-15

## 2022-02-12 RX ORDER — LACOSAMIDE 100 MG/1
200 TABLET ORAL 2 TIMES DAILY
Status: DISCONTINUED | OUTPATIENT
Start: 2022-02-12 | End: 2022-02-18 | Stop reason: HOSPADM

## 2022-02-12 RX ADMIN — OXYCODONE 5 MG: 5 TABLET ORAL at 00:36

## 2022-02-12 RX ADMIN — ACETAMINOPHEN 1000 MG: 500 TABLET ORAL at 00:36

## 2022-02-12 RX ADMIN — APIXABAN 5 MG: 5 TABLET, FILM COATED ORAL at 04:30

## 2022-02-12 RX ADMIN — LACOSAMIDE 150 MG: 100 TABLET, FILM COATED ORAL at 04:30

## 2022-02-12 RX ADMIN — ACETAMINOPHEN 1000 MG: 500 TABLET ORAL at 06:11

## 2022-02-12 RX ADMIN — LACOSAMIDE 200 MG: 100 TABLET, FILM COATED ORAL at 17:12

## 2022-02-12 RX ADMIN — AMLODIPINE BESYLATE 10 MG: 10 TABLET ORAL at 04:30

## 2022-02-12 RX ADMIN — OXYCODONE 2.5 MG: 5 TABLET ORAL at 12:20

## 2022-02-12 RX ADMIN — Medication 3 MG: at 21:58

## 2022-02-12 RX ADMIN — DOCUSATE SODIUM 50 MG AND SENNOSIDES 8.6 MG 2 TABLET: 8.6; 5 TABLET, FILM COATED ORAL at 17:13

## 2022-02-12 RX ADMIN — VENLAFAXINE 75 MG: 75 TABLET ORAL at 04:29

## 2022-02-12 RX ADMIN — OXYCODONE 5 MG: 5 TABLET ORAL at 04:33

## 2022-02-12 RX ADMIN — ACETAMINOPHEN 1000 MG: 500 TABLET ORAL at 12:20

## 2022-02-12 RX ADMIN — OXYCODONE 2.5 MG: 5 TABLET ORAL at 17:12

## 2022-02-12 RX ADMIN — ACETAMINOPHEN 1000 MG: 500 TABLET ORAL at 17:13

## 2022-02-12 RX ADMIN — DEXAMETHASONE 2 MG: 4 TABLET ORAL at 17:13

## 2022-02-12 RX ADMIN — NYSTATIN: 100000 POWDER TOPICAL at 06:11

## 2022-02-12 RX ADMIN — BRIVARACETAM 100 MG: 100 TABLET, FILM COATED ORAL at 04:39

## 2022-02-12 RX ADMIN — APIXABAN 5 MG: 5 TABLET, FILM COATED ORAL at 17:22

## 2022-02-12 RX ADMIN — HYDROMORPHONE HYDROCHLORIDE 0.25 MG: 1 INJECTION, SOLUTION INTRAMUSCULAR; INTRAVENOUS; SUBCUTANEOUS at 19:22

## 2022-02-12 RX ADMIN — BRIVARACETAM 100 MG: 100 TABLET, FILM COATED ORAL at 17:11

## 2022-02-12 RX ADMIN — NYSTATIN: 100000 POWDER TOPICAL at 17:16

## 2022-02-12 ASSESSMENT — PAIN DESCRIPTION - PAIN TYPE
TYPE: ACUTE PAIN

## 2022-02-12 ASSESSMENT — PATIENT HEALTH QUESTIONNAIRE - PHQ9
SUM OF ALL RESPONSES TO PHQ9 QUESTIONS 1 AND 2: 0
2. FEELING DOWN, DEPRESSED, IRRITABLE, OR HOPELESS: NOT AT ALL
1. LITTLE INTEREST OR PLEASURE IN DOING THINGS: NOT AT ALL

## 2022-02-12 NOTE — PROGRESS NOTES
Assumed care of patient at bedside report from Bhavna WONG. Pt AOx4, on 1L NC, with complaints of 5/10 shoulder pain. Updated on POC. Call light within reach. Whiteboard updated. Fall precautions in place. Bed locked and in lowest position. All questions answered. No other needs indicated at this time.

## 2022-02-12 NOTE — CARE PLAN
The patient is Watcher - Medium risk of patient condition declining or worsening    Shift Goals  Clinical Goals: Monitor pain, seizure precautions  Patient Goals: Rest, pain management.   Family Goals: n/a    Progress made toward(s) clinical / shift goals:  Patient updated on POC, all questions answered. Patient has remained free from falls. All fall precautions in place: non-slip socks, bed locked and in lowest position, bed alarm on, call light within reach. Pt's pain managed by medicating per MAR. Pt's sacrum pink and blanching; barrier paste, wipes, and waffle overlay in place.         Problem: Pain - Standard  Goal: Alleviation of pain or a reduction in pain to the patient’s comfort goal  Outcome: Progressing     Problem: Knowledge Deficit - Standard  Goal: Patient and family/care givers will demonstrate understanding of plan of care, disease process/condition, diagnostic tests and medications  Outcome: Progressing     Problem: Skin Integrity  Goal: Skin integrity is maintained or improved  Outcome: Progressing     Problem: Fall Risk  Goal: Patient will remain free from falls  Outcome: Progressing

## 2022-02-12 NOTE — PROGRESS NOTES
Bedside report received from night RN Lynda, pt care assumed.Pt reporting 0/10 pain, SR on the monitor. Pt connected to EEG monitor system until 1600. Updated on POC, questions answered. Bed in lowest, locked position, treaded socks on, call light and belongings within reach.

## 2022-02-12 NOTE — THERAPY
"Physical Therapy   Initial Evaluation     Patient Name: Melba Frye  Age:  51 y.o., Sex:  female  Medical Record #: [id number]  Today's Date: 2/11/2022     Precautions  Precautions: Fall Risk  Comments: L sided deficits, L AFO in room    Assessment  Patient is 51 y.o. female admitted after GLF w/ head strike. PMHx of glioblastoma, w/ seizures, crani w/ subtotal resection, w/ subsequent L sided weakness w/ tone present. Pt (+) OH but asymptomatic (see vitals below). Pt presented with impaired balance, sequencing, coordination, strength, activity tolerance and functional mobility. Pt required Max assist to come to EOB and MaxA x2 via HHA to take few steps with assistance to weight shift and advance BLE's. Pt's  extremely supportive and both with great insights into pt's deficits, very motivated. Pt great candidate for intensive daily PT at placement upon d/c. Will continue to follow.     Plan    Recommend Physical Therapy 4 times per week until therapy goals are met for the following treatments:  Bed Mobility, Equipment, Gait Training, Manual Therapy, Neuro Re-Education / Balance, Orthotics Training, Self Care/Home Evaluation, Stair Training, Therapeutic Activities and Therapeutic Exercises    DC Equipment Recommendations: Unable to determine at this time  Discharge Recommendations: Recommend post-acute placement for additional physical therapy services prior to discharge home       Subjective    \"Everybody poops, even Fady.\"      Objective     02/11/22 1147   Total Time Spent   Total Time Spent (Mins) 34   Charge Group   PT Evaluation PT Evaluation Mod   PT Self Care / Home Evaluation  1  (10 min edu)   Initial Contact Note    Initial Contact Note Order Received and Verified, Physical Therapy Evaluation in Progress with Full Report to Follow.   Precautions   Precautions Fall Risk   Comments L sided deficits, L AFO in room   Vitals   O2 (LPM) 1   O2 Delivery Device Silicone Nasal Cannula   Vitals " Comments (+) OH but asymptomatic: supine 150/117, , Repeat supine 144/83, seated 126/92 , returned supine after standing 136/94    Pain 0 - 10 Group   Therapist Pain Assessment Post Activity Pain Same as Prior to Activity;Nurse Notified  (L shoulder pain not rated, agreeable to mobility)   Prior Living Situation   Prior Services Intermittent Physical Support for ADL Per Family;Skilled Home Health Services   Housing / Facility 2 Story House  (with 1st floor set up)   Steps In Home   (threshold)   Bathroom Set up Bathtub / Shower Combination;Walk In Shower;Tub Transfer Bench;Shower Chair;Grab Bars   Equipment Owned Single Point Cane;Front-Wheel Walker   Lives with - Patient's Self Care Capacity Spouse   Comments Spouse present and very supportive. Works 4 day/wk, caregiver comes 3x/wk, some support on other day?    Prior Level of Functional Mobility   Bed Mobility Required Assist   Transfer Status Required Assist   Ambulation Required Assist   Distance Ambulation (Feet)   (limited household)   Assistive Devices Used Single Point Cane  (HHA as well)   Stairs Required Assist   History of Falls   History of Falls Yes   Date of Last Fall   (reason for admit)   Cognition    Cognition / Consciousness WDL   Level of Consciousness Alert   Comments Very pleasant and cooperative, joking during session. Great insight into deficits   Passive ROM Lower Body   Passive ROM Lower Body X   Comments limited ankle DF (to neutral)   Active ROM Lower Body    Active ROM Lower Body  X   Comments LLE limited (baseline past year)   Strength Lower Body   Lower Body Strength  X   Comments LLE 2/5, RLE 3+/5   Sensation Lower Body   Lower Extremity Sensation   X   Comments WFL, however, states occasional hypersensitivity L hemibody   Lower Body Muscle Tone   Lower Body Muscle Tone  X   Lt Lower Extremity Muscle Tone Hypertonic   Comments Use of L knee extensor tone in standing   Strength Upper Body   Upper Body Strength  X   Comments  L elbow flexion 2/5, extension 1/5, finger flexion 2-/5, shoulder flexion 2-/5. RUE WFL   Upper Body Muscle Tone   Upper Body Muscle Tone  X   Lt Upper Extremity Muscle Tone Non Functional;Hypertonic   Comments Flexor synergy pattern noted   Coordination Upper Body   Coordination X   Comments impaired LUE fine and gross motor coordination   Coordination Lower Body    Coordination Lower Body  X   Comments LLE impaired    Vision   Vision Comments C/o L neglect- Intact peripheral vision B, c/o difficulty reading, wears reading glasses. No other changes in vision    Balance Assessment   Sitting Balance (Static) Poor +   Sitting Balance (Dynamic) Poor   Standing Balance (Static) Poor -   Standing Balance (Dynamic) Trace +   Weight Shift Sitting Fair   Weight Shift Standing Poor   Comments via HHA x2. Required assistance weight shifting in standing to advance LE's   Gait Analysis   Gait Level Of Assist Maximal Assist  (HHAx2)   Distance (Feet) 2   # of Times Distance was Traveled 2   Deviation Trendelenberg;Step To;Decreased Base Of Support;Bradykinetic;Decreased Heel Strike;Decreased Toe Off;Antalgic  (LLE hyperextension, dec LE clearance)   # of Stairs Climbed 0   Weight Bearing Status no restrictions   Comments Max VC/TC cues for sequencing side steps/few anterior/posterior steps with assistance to advance LE's and weight shift to offload extremities. Pt's  very encouraging and assist pt to remember sequencing   Bed Mobility    Supine to Sit Maximal Assist   Sit to Supine Maximal Assist   Scooting Moderate Assist   Comments Pt's  assisting, demo'd way  assists patient daily   Functional Mobility   Sit to Stand Moderate Assist  (x2 via HHA)   Bed, Chair, Wheelchair Transfer Unable to Participate  (attempted but pt unable to sequence )   Mobility STS and side steps/ anterior/posterior steps x2   How much difficulty does the patient currently have...   Turning over in bed (including adjusting  bedclothes, sheets and blankets)? 1   Sitting down on and standing up from a chair with arms (e.g., wheelchair, bedside commode, etc.) 1   Moving from lying on back to sitting on the side of the bed? 1   How much help from another person does the patient currently need...   Moving to and from a bed to a chair (including a wheelchair)? 1   Need to walk in a hospital room? 1   Climbing 3-5 steps with a railing? 1   6 clicks Mobility Score 6   Activity Tolerance   Sitting in Chair unable   Sitting Edge of Bed 10 min total   Standing 5-7 min total   Comments limited by weakness   Patient / Family Goals    Patient / Family Goal #1 To get stronger   Short Term Goals    Short Term Goal # 1 Pt will perform supine <> sit with ModA in 6 visits in order to return to PLOF   Short Term Goal # 2 Pt will perform STS with ModA and SPC in 6 visits to return to PLOF   Short Term Goal # 3 Pt will perform functional transfer with ModA and SPC in 6 visits to return to PLOF   Short Term Goal # 4 Pt will ambulate 10ft with ModA and SPC in 6 visits to initiate ambulation   Education Group   Education Provided Role of Physical Therapist;Gait Training;Use of Assistive Device;Exercises - Seated   Role of Physical Therapist Patient Response Patient;Family;Acceptance;Explanation;Action Demonstration   Gait Training Patient Response Patient;Family;Eager;Explanation;Action Demonstration   Use of Assistive Device Patient Response Patient;Family;Acceptance;Explanation;Verbal Demonstration   Exercises - Seated Patient Response Patient;Family;Acceptance;Explanation;Action Demonstration  (Scap squeezes, heel slides, SLR)   Additional Comments Pt and  receptive to edu provided   Problem List    Problems Pain;Impaired Bed Mobility;Impaired Transfers;Impaired Ambulation;Functional ROM Deficit;Functional Strength Deficit;Impaired Balance;Impaired Coordination;Impaired Vision;Decreased Activity Tolerance;Safety Awareness Deficits / Cognition;Motor  Planning / Sequencing   Anticipated Discharge Equipment and Recommendations   DC Equipment Recommendations Unable to determine at this time   Discharge Recommendations Recommend post-acute placement for additional physical therapy services prior to discharge home   Interdisciplinary Plan of Care Collaboration   IDT Collaboration with  Nursing;Therapy Tech   Patient Position at End of Therapy In Bed;Bed Alarm On;Call Light within Reach;Tray Table within Reach;Phone within Reach;Family / Friend in Room  (RN in room)   Collaboration Comments RN updated   Session Information   Date / Session Number  2/11 1 (1/4, 2/17)

## 2022-02-12 NOTE — PROGRESS NOTES
Pt hooked for LTM VEEG. Pt has CT compatible electrodes only. These electrodes can go to CT, theses electrodes cannot go to MRI.

## 2022-02-12 NOTE — PROGRESS NOTES
Neurology Progress Note  [facility]    Referring Physician: Taj Ramirez M.D.    Chief Complaint   Patient presents with   • T-5000 GLF     Pt has h/o brain tumor with L sided deficit.  She fell forward today and hit her head and face.  Pt does take elequis daily.     • Shortness of Breath     Pt noted to be 86% on RA by [facility] post fall.  Arrives to ER, is 91-92% on RA.       HPI: Refer to initial documented Neurology H&P, as detailed in the patient's chart.    Interval History 2/12/2022: No acute events overnight including no clinical seizure activity. cEEG revealed right hemispheric slowing, breach rhythm, and rare rhythmic activity and brief runs of right posterior periodic sharps (right PLDs). Patient reports being at her neurological baseline with residual left hemiparesis, LUE spasticity, and left hemisensory (visual and tactile) neglect. Denies headache, vision changes, facial weakness, numbness, tingling, ataxia, abnormal movements, or confusion.    Past Medical History:   Past Medical History:   Diagnosis Date   • Acute pulmonary embolism with acute cor pulmonale (HCC) 10/21/2021   • Anxiety    • Cancer (HCC)     brain diagnosed in October 2020    • Complicated migraine 6/9/2014   • Depression    • Dermatitis, contact 11/16/2015   • Fatigue 6/9/2014   • Hyperlipidemia 1/23/2015   • Lentigo 10/17/2018   • Menopausal symptom 7/2/2014   • Mixed anxiety and depressive disorder 6/9/2014   • Pulmonary embolism (HCC)    • Seborrheic keratoses 10/17/2018   • TMJ arthralgia 6/9/2014   • UTI (lower urinary tract infection)         FHx:  Family History   Problem Relation Age of Onset   • Non-contributory Mother    • Lung Disease Mother         COPD   • Cancer Mother         dysplasia    • Arthritis Mother    • Psychiatric Illness Mother    • Heart Disease Mother    • Hypertension Mother    • Stroke Mother    • Non-contributory Father    • Cancer Father 73        prostate cancer   • Lung Disease  Maternal Grandmother    • Lung Disease Paternal Aunt    • Diabetes Paternal Aunt    • Hyperlipidemia Paternal Aunt         SHx:  Social History     Socioeconomic History   • Marital status:      Spouse name: Not on file   • Number of children: Not on file   • Years of education: Not on file   • Highest education level: Not on file   Occupational History   • Not on file   Tobacco Use   • Smoking status: Never Smoker   • Smokeless tobacco: Never Used   Vaping Use   • Vaping Use: Never used   Substance and Sexual Activity   • Alcohol use: Not Currently     Alcohol/week: 0.0 oz   • Drug use: No   • Sexual activity: Yes     Partners: Male   Other Topics Concern   • Not on file   Social History Narrative   • Not on file     Social Determinants of Health     Financial Resource Strain:    • Difficulty of Paying Living Expenses: Not on file   Food Insecurity:    • Worried About Running Out of Food in the Last Year: Not on file   • Ran Out of Food in the Last Year: Not on file   Transportation Needs:    • Lack of Transportation (Medical): Not on file   • Lack of Transportation (Non-Medical): Not on file   Physical Activity:    • Days of Exercise per Week: Not on file   • Minutes of Exercise per Session: Not on file   Stress:    • Feeling of Stress : Not on file   Social Connections:    • Frequency of Communication with Friends and Family: Not on file   • Frequency of Social Gatherings with Friends and Family: Not on file   • Attends [employer] Services: Not on file   • Active Member of Clubs or Organizations: Not on file   • Attends Club or Organization Meetings: Not on file   • Marital Status: Not on file   Intimate Partner Violence:    • Fear of Current or Ex-Partner: Not on file   • Emotionally Abused: Not on file   • Physically Abused: Not on file   • Sexually Abused: Not on file   Housing Stability:    • Unable to Pay for Housing in the Last Year: Not on file   • Number of Places Lived in the Last Year: Not on file    • Unstable Housing in the Last Year: Not on file        Medications:    Current Facility-Administered Medications:   •  lacosamide (VIMPAT) tablet 200 mg, 200 mg, Oral, BID, RUPERT English  •  ALPRAZolam (XANAX) tablet 0.5 mg, 0.5 mg, Oral, BID PRN, Taj Ramirez M.D., 0.5 mg at 02/10/22 1632  •  Pharmacy Consult Request ...Pain Management Review 1 Each, 1 Each, Other, PHARMACY TO DOSE, Taj Ramirez M.D.  •  acetaminophen (TYLENOL) tablet 1,000 mg, 1,000 mg, Oral, Q6HRS, 1,000 mg at 02/12/22 0611 **FOLLOWED BY** [START ON 2/15/2022] acetaminophen (TYLENOL) tablet 1,000 mg, 1,000 mg, Oral, Q6HRS PRN, Taj Ramirez M.D.  •  oxyCODONE immediate-release (ROXICODONE) tablet 2.5 mg, 2.5 mg, Oral, Q3HRS PRN, 2.5 mg at 02/11/22 2040 **OR** oxyCODONE immediate-release (ROXICODONE) tablet 5 mg, 5 mg, Oral, Q3HRS PRN, 5 mg at 02/12/22 0433 **OR** HYDROmorphone (Dilaudid) injection 0.25 mg, 0.25 mg, Intravenous, Q3HRS PRN, Taj Ramirez M.D.  •  nystatin (MYCOSTATIN) powder, , Topical, BID, Irvin Marti M.D., Given at 02/12/22 0611  •  senna-docusate (PERICOLACE or SENOKOT S) 8.6-50 MG per tablet 2 Tablet, 2 Tablet, Oral, BID, 2 Tablet at 02/11/22 1731 **AND** polyethylene glycol/lytes (MIRALAX) PACKET 1 Packet, 1 Packet, Oral, QDAY PRN **AND** magnesium hydroxide (MILK OF MAGNESIA) suspension 30 mL, 30 mL, Oral, QDAY PRN **AND** bisacodyl (DULCOLAX) suppository 10 mg, 10 mg, Rectal, QDAY PRN, Yoanna Fry M.D.  •  labetalol (NORMODYNE/TRANDATE) injection 10 mg, 10 mg, Intravenous, Q4HRS PRN, Yoanna Fry M.D.  •  amLODIPine (NORVASC) tablet 10 mg, 10 mg, Oral, DAILY, Yoanna Fry M.D., 10 mg at 02/12/22 0430  •  brivaracetam (Briviact) tablet 100 mg, 100 mg, Oral, BID, Yoanna Fry M.D., 100 mg at 02/12/22 0439  •  apixaban (ELIQUIS) tablet 5 mg, 5 mg, Oral, BID, Yoanna Fry M.D., 5 mg at 02/12/22 0430  •  melatonin tablet 3 mg, 3 mg, Oral, QHS, Yoanna Fry M.D., 3 mg at  02/11/22 2042  •  venlafaxine (EFFEXOR) tablet 75 mg, 75 mg, Oral, DAILY, Yoanna Fry M.D., 75 mg at 02/12/22 0429    Allergies:  Allergies   Allergen Reactions   • Tape Rash     Use paper tape instead        Review of systems: In addition to what is detailed in the HPI above, all other systems reviewed and are negative.      Physical Examination:   Vitals:    02/11/22 1650 02/11/22 2052 02/12/22 0027 02/12/22 0427   BP: 145/82 134/88 137/82 138/86   Pulse: 89 80 89 81   Resp: 18 16 18 18   Temp: 37.2 °C (99 °F) (!) 35.8 °C (96.4 °F) (!) 35.8 °C (96.4 °F) 36.3 °C (97.4 °F)   TempSrc: Temporal Temporal Temporal Oral   SpO2: 97% 97% 96% 97%   Weight:       Height:         General: Patient in no acute distress, pleasant and cooperative.  HEENT: Normocephalic, no signs of acute trauma. Cranioplasty scar noted to right parietal, healed.   Neck: Supple, no meningeal signs or carotid bruits. There is normal range of motion. No tenderness on exam.   Chest: Regular and unlabored breaths on RA. No cough.   CV: RRR.   Skin: Small petechiae to left lower lip, abrasion to left cheek. No signs of acute rashes.   Musculoskeletal: Joints exhibit full range of motion, without any pain to palpation. There are no signs of joint or muscle swelling. There is no tenderness to deep palpation of muscles.   Psychiatric: No hallucinatory behavior. Denies symptoms of depression or suicidal ideation. Mood and affect appear normal on exam.     NEUROLOGICAL EXAM:   Mental status, orientation: Awake, alert, following commands, and fully oriented.   Speech and language: Speech is clear and fluent. The patient is able to name, repeat, and comprehend.   Cranial nerve exam: Pupils are 3-4 mm bilaterally and equally reactive to light. Visual fields are full, left visual hemineglect with simultaneous stimuli. There is no nystagmus on primary or secondary gaze. Intact full EOM in all directions of gaze. Face appears symmetric. Sensation in the face  is intact to light touch. Uvula is midline. Palate elevates symmetrically. Tongue is midline and without any signs of tongue biting or fasciculations. Sternocleidomastoid muscles exhibit is normal strength bilaterally. Shoulder shrug is intact bilaterally.   Motor exam: Strength is 5/5 to RUE and RLE; 2/5 to LUE and 3/5 to LLE. Tone is increased with spasticity to LUE, relieved somewhat with massage of biceps tendon. No abnormal movements were seen on exam.   Sensory exam Reveals normal sense of light touch, and pinprick in all extremities. Left extinction to double tactile stimuli.  Deep tendon reflexes:  3+ throughout. Plantar responses are up-going to left, down-going to right. There is no clonus.   Coordination: No ataxia with normal finger-nose-finger and heel-down-shin tests to LUE and LLE, unable to complete with RUE/RLE.   Gait: Deferred per patient preference.         Ancillary Data Reviewed:    Labs:  Lab Results   Component Value Date/Time    PROTHROMBTM 13.2 12/14/2021 06:50 PM    INR 1.08 12/14/2021 06:50 PM      Lab Results   Component Value Date/Time    WBC 4.6 (L) 02/12/2022 03:57 AM    RBC 3.34 (L) 02/12/2022 03:57 AM    HEMOGLOBIN 11.6 (L) 02/12/2022 03:57 AM    HEMATOCRIT 34.5 (L) 02/12/2022 03:57 AM    .3 (H) 02/12/2022 03:57 AM    MCH 34.7 (H) 02/12/2022 03:57 AM    MCHC 33.6 02/12/2022 03:57 AM    MPV 9.5 02/12/2022 03:57 AM    NEUTSPOLYS 71.40 02/09/2022 03:01 PM    LYMPHOCYTES 14.60 (L) 02/09/2022 03:01 PM    MONOCYTES 11.50 02/09/2022 03:01 PM    EOSINOPHILS 0.70 02/09/2022 03:01 PM    BASOPHILS 0.40 02/09/2022 03:01 PM      Lab Results   Component Value Date/Time    SODIUM 142 02/12/2022 03:57 AM    POTASSIUM 3.7 02/12/2022 03:57 AM    CHLORIDE 108 02/12/2022 03:57 AM    CO2 24 02/12/2022 03:57 AM    GLUCOSE 110 (H) 02/12/2022 03:57 AM    BUN 7 (L) 02/12/2022 03:57 AM    CREATININE 0.47 (L) 02/12/2022 03:57 AM      Lab Results   Component Value Date/Time    CHOLSTRLTOT 220 (H)  04/26/2019 08:35 AM     (H) 04/26/2019 08:35 AM    HDL 45 04/26/2019 08:35 AM    TRIGLYCERIDE 124 04/26/2019 08:35 AM       Lab Results   Component Value Date/Time    ALKPHOSPHAT 104 (H) 02/09/2022 03:01 PM    ASTSGOT 28 02/09/2022 03:01 PM    ALTSGPT 23 02/09/2022 03:01 PM    TBILIRUBIN 0.4 02/09/2022 03:01 PM        Imaging/Testing:    I interpreted and/or reviewed the patient's neuroimaging    MR-BRAIN-WITH & W/O   Final Result         Postoperative changes are noted in the medial right posterior frontal parietal region with rim enhancement of the postoperative cavity. Linear thick  enhancement along the medial margin of the operative cavity abutting the falx cerebri is noted.    Comparison with previous films would be helpful to determine if there has been any interval change.      Extensive T2 signal abnormality involving the bilateral cerebral hemispheric white matter is noted, most likely related to posttreatment changes.         EC-ECHOCARDIOGRAM COMPLETE W/O CONT   Final Result      CT-CTA CHEST PULMONARY ARTERY W/ RECONS   Final Result      1.  No CT evidence for pulmonary emboli.   2.  No pneumonia or pneumothorax.               CT-HEAD W/O   Final Result      1.  Postoperative changes of right frontoparietal mass resection. No evidence of hemorrhage. Basal cisterns are patent.   2.  The right greater than left white matter is hypodense. This may reflect edema or postradiation change. This appears somewhat decreased from 12/14/2021 CT.   3.  Bilateral mastoid effusions. Correlate for mastoiditis.      DX-CHEST-PORTABLE (1 VIEW)   Final Result      Hypoinflation with minimal RIGHT midlung atelectasis.          Assessment and Plan:    Melba Frye is a 51 y.o. female with relevant history of right posterior fronto-parietal glioblastoma multiforme s/p right cranioplasty for resection (6/2021, biopsy 3/2021) at [facility], radiation and chemotherapy with temozolomide (last dose 11/2021), focal  seizures (on Vimpat and Briviact), lentigo, migraine, hyperlipidemia, PE with cor pulmonale (on Eliquis), anxiety, and depression who presented to [facility] on 2/9/2022 for ground level fall, shortness of breath, and hypoxia.The patient is followed outpatient by neurologist, Dr. Fady Davidson for her seizure management and inpatient neurology was consulted for possible seizure.      MRI brain w/wo contrast 2/10/22 compared to MRI brain w/wo contrast on 12/15/21 (obtained on last admission for seizures) appears stable with postoperative changes and no significant vasogenic edema.     Continuous EEG for 13 hr, 23 min overnight 2/11/22 to this morning (2/12/22) was abnormal with right hemispheric slowing, breach rhythm, and rare rhythmic activity and brief runs of right posterior periodic sharps (right PLDs). These findings indicate an increased risk for seizures, but not entirely unexpected given her known history of right posterior fronto-parietal GBM s/p cranioplasty.      The semiology of the event described prior to this admission is questionable as details are limited, but given her increased risk of focal electrographic seizures does warrant AED adjustments. Differential diagnosis also includes syncope given her hypoxia and complaint of dizziness prior to LOC.      Plan:     -Discontinue VEEG given no subclinical seizures  -q4h and PRN neuro assessment. VS per nursing/unit protocol.   -Telemetry; currently SR. Screen for tachyarrhythmia.   -Increased Vimpat to 200 mg PO BID   -Continue Briviact 100mg PO BID             -PT/OT eval and treat- Each discipline recommending post-acute placement for rehab  -Physiatry consult.   -Consider outpatient EMG and for guided botox injections for treatment of LUE and LLE spasticity  -Fall and seizure precautions  -Follow up outpatient in Epilepsy Clinic with Dr. Davidson, established patient  -DVT PPX: SCDs and Eliquis BID    No further recommendations or  further studies from a neurological standpoint at this time. Please re-consult if you have further questions or there is a change in status.    The evaluation of the patient, and recommended management, was discussed with Dr. Choudhary, Dr. Davidson, Dr. Ramirez, and bedside RN. I have performed a physical exam and reviewed and updated ROS and Plan today (2/12/2022). In review of yesterday's note (2/11/2022), there are no changes except as documented above.    Maxwell Salazar, MSN [facility]-BC  Nurse Practitioner  [facility]  (t) [phone]

## 2022-02-12 NOTE — PROGRESS NOTES
[facility] Daily Progress Note    Date of Service  2/12/2022    Chief Complaint  Melba Frye is a 51 y.o. female admitted 2/9/2022 with T-5000 GLF (Pt has h/o brain tumor with L sided deficit.  She fell forward today and hit her head and face.  Pt does take elequis daily.  ) and Shortness of Breath (Pt noted to be 86% on RA by [facility] post fall.  Arrives to ER, is 91-92% on RA.)        Hospital Course  No notes on file  History of glioblastoma s/p biopsy done by Dr. Mao, [facility], s/p craniotomy/resection by [facility] neurosurgeon, s/p radiation and seen by Dr. Frye, [facility], Dr. Cerda, Oncology, also seen by Dr. Prince Neurology for complications for seizures and last increased her antiepileptics. She is a resident at a skilled facility. She was brought in because of weakness; declining. She does have chronic L sided weakness but per  has memory lapses and tingling of L extremity at times.   At the ED, afebrile and hypertensive.  CTA Chest no PE  CT head:  1.  Postoperative changes of right frontoparietal mass resection. No evidence of hemorrhage. Basal cisterns are patent.  2.  The right greater than left white matter is hypodense. This may reflect edema or postradiation change. This appears somewhat decreased from 12/14/2021 CT.  3.  Bilateral mastoid effusions. Correlate for mastoiditis.  Echo:  Technically difficult study incomplete information is obtained  Normal left ventricular size and systolic function.  The left ventricular ejection fraction is visually estimated to be 75%.  Suoptimal opacificatioin with saline contrast to exclude the presence   of intracardiac shunt.  No prior study is available for comparison.       Interval Problem Update  2/10. I spoke to  and patient. Neuro exam shows L hemiparesis but per family this is chornic. Currently no tingling. AAOx4 no confusion.   I spoke with Dr. Prince Neurology. He mentioned it is ok to get and MRI and EEG and will see patient.  "Symptoms of her subclinical seizures are mainly L sided \"spasms\" with which she takes tramadol at night.  2/11. MRI and EEG done., Updated family. Lety Romeo to see. Rehab planned.  2/12. Updated family and Neurology was at bedside. Neuro exam appreciated. Vimpat PM dose has been increased. Per Neurology MRI findings stable. Reached out to Dr. Cerda and Dr. Frye who can review MRIs on Monday. At this time Oncology on call recommended starting decadron as benefits outweigh risk. I started decadron 2mg BID for 7 days which should follow a taper.    I have personally seen and examined the patient at bedside. I discussed the plan of care with patient, family, bedside RN and neurology.    Consultants/Specialty  neurology    Code Status  Full Code    Disposition  Patient is not medically cleared for discharge.   Anticipate discharge to Likely back to rehab or SNF.  I have placed the appropriate orders for post-discharge needs.    Review of Systems  Review of Systems   Unable to perform ROS: Mental acuity        Physical Exam  Temp:  [35.8 °C (96.4 °F)-37.2 °C (99 °F)] 36.3 °C (97.4 °F)  Pulse:  [80-92] 81  Resp:  [16-18] 18  BP: (134-145)/(82-88) 138/86  SpO2:  [96 %-98 %] 97 %    Physical Exam  Vitals and nursing note reviewed.   Constitutional:       General: She is not in acute distress.  HENT:      Head: Normocephalic and atraumatic.      Right Ear: External ear normal.      Left Ear: External ear normal.      Nose: Nose normal.      Mouth/Throat:      Mouth: Mucous membranes are moist.   Eyes:      General: No scleral icterus.     Conjunctiva/sclera: Conjunctivae normal.   Cardiovascular:      Rate and Rhythm: Normal rate and regular rhythm.      Pulses: Normal pulses.      Heart sounds: No murmur heard.  No friction rub. No gallop.    Pulmonary:      Effort: Pulmonary effort is normal. No respiratory distress.      Breath sounds: Normal breath sounds. No stridor. No wheezing, rhonchi or rales.   Chest:      Chest " wall: No tenderness.   Abdominal:      General: Abdomen is flat. Bowel sounds are normal. There is no distension.      Palpations: Abdomen is soft.      Tenderness: There is no abdominal tenderness. There is no guarding or rebound.   Genitourinary:     Comments: L arm contractures and spasticity, L leg as well but to a lesser degree.  Musculoskeletal:         General: No swelling, tenderness or deformity. Normal range of motion.      Cervical back: Normal range of motion and neck supple. No rigidity.   Skin:     General: Skin is warm.      Coloration: Skin is not jaundiced.   Neurological:      General: No focal deficit present.      Mental Status: She is alert and oriented to person, place, and time. Mental status is at baseline.      Motor: Weakness present.      Comments: L hemiparesis  Memory issues  Currently no paresthesias  L neglect present  Clonus and hyperreflexia   Psychiatric:         Mood and Affect: Mood normal.         Behavior: Behavior normal.         Thought Content: Thought content normal.         Judgment: Judgment normal.      Comments: Calm         Fluids    Intake/Output Summary (Last 24 hours) at 2/12/2022 0824  Last data filed at 2/11/2022 1000  Gross per 24 hour   Intake --   Output 600 ml   Net -600 ml       Laboratory  Recent Labs     02/09/22  1501 02/10/22  0003 02/12/22  0357   WBC 8.3 7.2 4.6*   RBC 3.83* 3.45* 3.34*   HEMOGLOBIN 13.3 12.0 11.6*   HEMATOCRIT 38.5 35.6* 34.5*   .5* 103.2* 103.3*   MCH 34.7* 34.8* 34.7*   MCHC 34.5 33.7 33.6   RDW 51.9* 55.9* 54.0*   PLATELETCT 244 220 203   MPV 9.4 9.9 9.5     Recent Labs     02/10/22  0003 02/11/22  0154 02/12/22  0357   SODIUM 139 141 142   POTASSIUM 4.1 3.5* 3.7   CHLORIDE 104 106 108   CO2 21 22 24   GLUCOSE 130* 128* 110*   BUN 7* 8 7*   CREATININE 0.44* 0.47* 0.47*   CALCIUM 9.1 8.7 8.8                   Imaging  MR-BRAIN-WITH & W/O   Final Result         Postoperative changes are noted in the medial right posterior  "frontal parietal region with rim enhancement of the postoperative cavity. Linear thick  enhancement along the medial margin of the operative cavity abutting the falx cerebri is noted.    Comparison with previous films would be helpful to determine if there has been any interval change.      Extensive T2 signal abnormality involving the bilateral cerebral hemispheric white matter is noted, most likely related to posttreatment changes.         EC-ECHOCARDIOGRAM COMPLETE W/O CONT   Final Result      CT-CTA CHEST PULMONARY ARTERY W/ RECONS   Final Result      1.  No CT evidence for pulmonary emboli.   2.  No pneumonia or pneumothorax.               CT-HEAD W/O   Final Result      1.  Postoperative changes of right frontoparietal mass resection. No evidence of hemorrhage. Basal cisterns are patent.   2.  The right greater than left white matter is hypodense. This may reflect edema or postradiation change. This appears somewhat decreased from 12/14/2021 CT.   3.  Bilateral mastoid effusions. Correlate for mastoiditis.      DX-CHEST-PORTABLE (1 VIEW)   Final Result      Hypoinflation with minimal RIGHT midlung atelectasis.           Assessment/Plan  * Glioblastoma of frontal lobe (HCC)- (present on admission)  Assessment & Plan  See HPI for more details.  Most recent resection at [facility] 12/31/2021.  Currently working with home PT for left upper and lower extremity weakness and left hemineglect\"    Discussed with Neurology who will see patient  Ordered EEG and MRI; results to decide how to titrate her antiepileptics.  If MRI shows vasogenic edema/mass, will start steroids and speak with Rad Onc or Oncology  Currently MRI showed no vasogenic edema or worsening mass  EEG showed no subclinical seizures but increased risk of seizures  Vimpat PM dose increased  Added decadron as per on call Oncology  Onc and RadOnc aware and will review on Monday.    Seizure disorder (HCC)- (present on admission)  Assessment & Plan  Continue " "lacosamide and brivaracetam  Doses of both meds increased about a month ago due to breakthrough seizures following her second glioblastoma resection.  Has been controlled since that\"    As above    Syncope- (present on admission)  Assessment & Plan  Syncopal episode today while working with PT at home.  States she felt dizzy prior to the event then does not remember what happened afterwards.  Per  patient fell and EMS were called, on arrival she was found to be hypoxic and requiring oxygen.  No symptoms post event  History of seizures, related to glioblastoma.  Per  she usually has motor activity with her seizures which was not noted today. Compliant with antiepileptics  History of PE, CTA negative for acute abnormality.  Compliant with apixaban  In the ED she is in sinus tachycardia on telemetry.  EKG shows Q waves in the inferior leads and T wave flattening in the anterior lateral leads which is new compared to EKG from 12/2020.  Troponin negative and denies chest pain monitor on telemetry  CT head in the ED showsCT head shows postoperative changes, no hemorrhage.  Right greater than left hypodense white matter likely reflecting edema or postradiation change that has improved since prior CT.    Does not sound like orthostatic hypotension based on presentation.  Check orthostatic pressures  Brivaracetam dose recently increased which can cause dizziness and balance/ataxia issues  Possible arrhythmia.  Recently started on mirabegron which can cause tachycardia and rarely arrhythmia.  Lacosamide dose recently increased which is known to cause arrhythmias and other cardiovascular abnormality  Echo ordered. Monitor on telemetry  Echo 75% EF    Class 1 obesity due to excess calories with serious comorbidity and body mass index (BMI) of 34.0 to 34.9 in adult- (present on admission)  Assessment & Plan  BMI 34.87    Acute respiratory failure with hypoxia (HCC)- (present on admission)  Assessment & " "Plan  Syncopal episode today, found to be hypoxic in the mid 80s requiring 2 L O2.  Denies any dyspnea at this time or prior to today.  No cough  CTA chest negative for acute abnormality.  Covid negative  Multiple hospitalizations in the past with no reported hypoxia before per patient  Check echocardiogram, ABG.  Incentive spirometer    History of pulmonary embolus (PE)- (present on admission)  Assessment & Plan  Compliant with apixaban, continue    Primary hypertension- (present on admission)  Assessment & Plan  Continue amlodipine\"    Vitals:    02/12/22 1130   BP: 130/83   Pulse: 93   Resp: 16   Temp: 36.4 °C (97.5 °F)   SpO2: 95%           Mixed anxiety and depressive disorder- (present on admission)  Assessment & Plan  Continue venlafaxine       VTE prophylaxis: therapeutic anticoagulation with Eliquis    I have performed a physical exam and reviewed and updated ROS and Plan today (2/12/2022). In review of yesterday's note (2/11/2022), there are no changes except as documented above.      "

## 2022-02-12 NOTE — PROCEDURES
CONTINUOUS VIDEO ELECTROENCEPHALOGRAM REPORT      Referring provider: Dr. Davidson / Dr. Choudhary.     DOS:   2/11/2022 (recorded for 13 hours and 23 minutes).     INDICATION:  Melba Frye 51 y.o. female presenting with h/o brain tumor, seizures, ams.     CURRENT ANTIEPILEPTIC REGIMEN: Lacosamide 150 mg bid, Brivaracetam 100 mg bid.      TECHNIQUE: A 30-channel, continuous video electroencephalogram (VEEG) was performed in accordance with the international 10-20 system. This digital study was reviewed in bipolar and referential montages. The recording examined the patient during wakeful, drowsy and sleep states.     The EEG was set up and taken down by a Neurodiagnostic technologist who performed education to patient and staff.     A minimum but not limited to 23 electrodes and 23 channel recording was setup and performed by Neurodiagnostic technologist.    Impedances, electrode integrity, and technical impressions were documented a minimum of every 2-24 hour period by a Neurodiagnostic Technologist and reviewed by Interpreting physician.     DESCRIPTION OF THE RECORD:  During the awake state, background shows symmetrical 8 Hz alpha activity posteriorly with amplitude of 70 mV on the left, with slowing noted in the right hemisphere.  There was reactivity with eye opening/closure.  Normal anterior-posterior gradient was noted with faster beta frequencies seen anteriorly.  During drowsiness, high-amplitude delta frequencies were seen.    During the sleep state, background shows diffuse high-amplitude 4-5 Hz delta activity.      ACTIVATION PROCEDURES:   Not performed.       ICTAL AND/OR INTERICTAL FINDINGS:   There is slowing on the right hemisphere. A breach rhythm as well as rare sharps were noted in the right posterior quadrant. Rarely, brief rhythmic delta activity and brief runs of right posterior periodic sharps (right PLDs) are noted. No seizures were recorded during the study.     EVENT(S):  None marked for  review.     EKG: sampling review of EKG recording demonstrated sinus rhythm.       INTERPRETATION:   This is an abnormal continuous video electroencephalogram recording in the awake, drowsy and sleep state. Asymmetric background with slowing noted on the right hemisphere. A breach rhythm as well as rare sharps were noted on the right posterior quadrant.  Rarely, brief rhythmic delta activity and brief runs of right posterior periodic sharps (right PLDs) are noted. The findings suggest: 1)-a large underlying area of structural abnormality with a disruption of the gray - white matter junction, 2)-an underlying skull defect, 3)-an underlying area of increased cortical irritability. The patient is at an increased risk for seizures, however no seizures captured during this prolonged study. Clinical and radiological correlation is recommended.    Updates provided to Dr. Davidson and Dr. Choudhary.         Darnell Metz MD   Epilepsy and Neurodiagnostics.   Clinical  of Neurology [facility].   Diplomate in Neurology, Epilepsy, and Electrodiagnostic Medicine.   Office: [phone]  Fax: [phone]

## 2022-02-13 PROCEDURE — 770006 HCHG ROOM/CARE - MED/SURG/GYN SEMI*

## 2022-02-13 PROCEDURE — A9270 NON-COVERED ITEM OR SERVICE: HCPCS | Performed by: STUDENT IN AN ORGANIZED HEALTH CARE EDUCATION/TRAINING PROGRAM

## 2022-02-13 PROCEDURE — 700102 HCHG RX REV CODE 250 W/ 637 OVERRIDE(OP): Performed by: NURSE PRACTITIONER

## 2022-02-13 PROCEDURE — A9270 NON-COVERED ITEM OR SERVICE: HCPCS | Performed by: INTERNAL MEDICINE

## 2022-02-13 PROCEDURE — 700102 HCHG RX REV CODE 250 W/ 637 OVERRIDE(OP): Performed by: GENERAL PRACTICE

## 2022-02-13 PROCEDURE — 99232 SBSQ HOSP IP/OBS MODERATE 35: CPT | Mod: FS | Performed by: GENERAL PRACTICE

## 2022-02-13 PROCEDURE — A9270 NON-COVERED ITEM OR SERVICE: HCPCS | Performed by: GENERAL PRACTICE

## 2022-02-13 PROCEDURE — A9270 NON-COVERED ITEM OR SERVICE: HCPCS | Performed by: NURSE PRACTITIONER

## 2022-02-13 PROCEDURE — 700102 HCHG RX REV CODE 250 W/ 637 OVERRIDE(OP): Performed by: INTERNAL MEDICINE

## 2022-02-13 PROCEDURE — 700102 HCHG RX REV CODE 250 W/ 637 OVERRIDE(OP): Performed by: STUDENT IN AN ORGANIZED HEALTH CARE EDUCATION/TRAINING PROGRAM

## 2022-02-13 RX ORDER — LACOSAMIDE 200 MG/1
200 TABLET ORAL 2 TIMES DAILY
Qty: 60 TABLET | Status: ON HOLD
Start: 2022-02-13 | End: 2022-03-11 | Stop reason: SDUPTHER

## 2022-02-13 RX ORDER — POLYETHYLENE GLYCOL 3350 17 G/17G
1 POWDER, FOR SOLUTION ORAL DAILY
Status: DISCONTINUED | OUTPATIENT
Start: 2022-02-13 | End: 2022-02-14

## 2022-02-13 RX ADMIN — LACOSAMIDE 200 MG: 100 TABLET, FILM COATED ORAL at 05:14

## 2022-02-13 RX ADMIN — VENLAFAXINE 75 MG: 75 TABLET ORAL at 05:21

## 2022-02-13 RX ADMIN — DEXAMETHASONE 2 MG: 4 TABLET ORAL at 05:14

## 2022-02-13 RX ADMIN — ACETAMINOPHEN 1000 MG: 500 TABLET ORAL at 12:20

## 2022-02-13 RX ADMIN — LACOSAMIDE 200 MG: 100 TABLET, FILM COATED ORAL at 17:53

## 2022-02-13 RX ADMIN — OXYCODONE 2.5 MG: 5 TABLET ORAL at 20:14

## 2022-02-13 RX ADMIN — POLYETHYLENE GLYCOL 3350 1 PACKET: 17 POWDER, FOR SOLUTION ORAL at 11:08

## 2022-02-13 RX ADMIN — APIXABAN 5 MG: 5 TABLET, FILM COATED ORAL at 05:14

## 2022-02-13 RX ADMIN — ACETAMINOPHEN 1000 MG: 500 TABLET ORAL at 05:14

## 2022-02-13 RX ADMIN — BRIVARACETAM 100 MG: 100 TABLET, FILM COATED ORAL at 17:53

## 2022-02-13 RX ADMIN — OXYCODONE 5 MG: 5 TABLET ORAL at 03:44

## 2022-02-13 RX ADMIN — DOCUSATE SODIUM 50 MG AND SENNOSIDES 8.6 MG 2 TABLET: 8.6; 5 TABLET, FILM COATED ORAL at 17:53

## 2022-02-13 RX ADMIN — OXYCODONE 5 MG: 5 TABLET ORAL at 06:41

## 2022-02-13 RX ADMIN — NYSTATIN: 100000 POWDER TOPICAL at 17:54

## 2022-02-13 RX ADMIN — DEXAMETHASONE 2 MG: 4 TABLET ORAL at 17:53

## 2022-02-13 RX ADMIN — ACETAMINOPHEN 1000 MG: 500 TABLET ORAL at 00:09

## 2022-02-13 RX ADMIN — DOCUSATE SODIUM 50 MG AND SENNOSIDES 8.6 MG 2 TABLET: 8.6; 5 TABLET, FILM COATED ORAL at 05:20

## 2022-02-13 RX ADMIN — Medication 3 MG: at 22:09

## 2022-02-13 RX ADMIN — AMLODIPINE BESYLATE 10 MG: 10 TABLET ORAL at 05:13

## 2022-02-13 RX ADMIN — BRIVARACETAM 100 MG: 100 TABLET, FILM COATED ORAL at 05:13

## 2022-02-13 RX ADMIN — ACETAMINOPHEN 1000 MG: 500 TABLET ORAL at 17:53

## 2022-02-13 RX ADMIN — APIXABAN 5 MG: 5 TABLET, FILM COATED ORAL at 17:53

## 2022-02-13 RX ADMIN — NYSTATIN: 100000 POWDER TOPICAL at 05:15

## 2022-02-13 RX ADMIN — OXYCODONE 2.5 MG: 5 TABLET ORAL at 15:04

## 2022-02-13 RX ADMIN — OXYCODONE 5 MG: 5 TABLET ORAL at 00:10

## 2022-02-13 ASSESSMENT — ENCOUNTER SYMPTOMS
HEARTBURN: 0
FEVER: 0
NERVOUS/ANXIOUS: 1
DEPRESSION: 1
SHORTNESS OF BREATH: 0
CONSTIPATION: 0
CLAUDICATION: 0
HEADACHES: 1
WEIGHT LOSS: 0
COUGH: 0
DIZZINESS: 0
WHEEZING: 0
LOSS OF CONSCIOUSNESS: 0
EYES NEGATIVE: 1
SPEECH CHANGE: 0
TINGLING: 0
VOMITING: 0
PALPITATIONS: 0
NAUSEA: 0
DIARRHEA: 0
ORTHOPNEA: 0
ABDOMINAL PAIN: 0
SPUTUM PRODUCTION: 0
CHILLS: 0
TREMORS: 0
FOCAL WEAKNESS: 1
SEIZURES: 1
WEAKNESS: 1
NECK PAIN: 1
SENSORY CHANGE: 0

## 2022-02-13 ASSESSMENT — PAIN DESCRIPTION - PAIN TYPE
TYPE: CHRONIC PAIN
TYPE: ACUTE PAIN
TYPE: CHRONIC PAIN
TYPE: CHRONIC PAIN

## 2022-02-13 NOTE — PROGRESS NOTES
4 Eyes Skin Assessment Completed by JUDITH Hart and JUDITH Cleary.    Head WDL  Ears WDL  Nose WDL  Mouth WDL  Neck WDL  Breast/Chest WDL  Shoulder Blades WDL  Spine WDL  (R) Arm/Elbow/Hand WDL  (L) Arm/Elbow/Hand WDL  Abdomen Scar  Groin WDL  Scrotum/Coccyx/Buttocks Redness and Blanching  (R) Leg Scab and Bruising  (L) Leg Scab and Bruising  (R) Heel/Foot/Toe WDL  (L) Heel/Foot/Toe WDL          Devices In Places Nasal Cannula, PIV      Interventions In Place Gray Ear Foams, Pillows and Pressure Redistribution Mattress    Possible Skin Injury No    Pictures Uploaded Into Epic No.  Wound Consult Placed N/A  RN Wound Prevention Protocol Ordered No

## 2022-02-13 NOTE — PROGRESS NOTES
Called report to JUDITH Beach. patient and  updated on transfer to [address] [place] [address]. All personal belongings transferred with patient.

## 2022-02-13 NOTE — PROGRESS NOTES
Assumed care of patient at bedside report from NOC RN. Updated on POC. Patient currently A & O x 4, forgetful; on 1 L O2 nasal cannula; up max assist, L sided deficits ntoed; with complaints of 4/10 generalized discomfort. Assessment completed. Call light within reach. Whiteboard updated. Fall precautions in place. Bed locked and in lowest position. All questions answered. No other needs indicated at this time.

## 2022-02-13 NOTE — CARE PLAN
The patient is Stable - Low risk of patient condition declining or worsening    Shift Goals  Clinical Goals: pain management  Patient Goals: pain control; rest  Family Goals: n/a    Progress made toward(s) clinical / shift goals:    Problem: Pain - Standard  Goal: Alleviation of pain or a reduction in pain to the patient’s comfort goal  Outcome: Progressing     Problem: Knowledge Deficit - Standard  Goal: Patient and family/care givers will demonstrate understanding of plan of care, disease process/condition, diagnostic tests and medications  Outcome: Progressing     Problem: Skin Integrity  Goal: Skin integrity is maintained or improved  Outcome: Progressing     Discussed daily POC. Discussed pain intervention options. Pt verbalized understanding.    Patient is not progressing towards the following goals:

## 2022-02-13 NOTE — CARE PLAN
The patient is Watcher - Medium risk of patient condition declining or worsening    Shift Goals  Clinical Goals: fall precautions, pain control  Patient Goals: rest  Family Goals: n/a    Progress made toward(s) clinical / shift goals:  safety measures in place and patient remained free from falls. C/o Left shoulder pain and a headache controlled with 5 mg oxy given 3 times and. 0.25mg dilaudid given when patient first arrived to unit.  Q2 hour turns to maintain skin integrity. Utilized call night frequently throughout the night, all needs met.     Patient is not progressing towards the following goals:

## 2022-02-13 NOTE — PROGRESS NOTES
Hospital Medicine Daily Progress Note    Date of Service  2/13/2022    Chief Complaint  Ground-level fall, shortness of breath    Hospital Course  Melba Frye is a 51-year-old female with past medical history of right posterior fronto-parietal glioblastoma status post right cranioplasty and resection (6/2021 with [facility]) and radiation and chemotherapy with temozolomide (last dose 11/2021), focal seizures (on Vimpat and Briviact), left-sided deficits, hyperlipidemia, PE on Eliquis, anxiety and depression who was admitted to the hospital on 2/9/2022 after having a ground-level fall at home.  Patient reports that she fell forward and struck her head.  She was noted to be hypoxic with oxygen saturation in the mid 80s upon arrival to the emergency department.    Upon admission, laboratory work-up was essentially unremarkable.  Respiratory infectious disease panel was sent and was negative for COVID-19, RSV, and influenza.  CTA chest was obtained and was negative for PE.  CT head was obtained and was negative for acute changes.  Echocardiogram was obtained with estimated LVEF of 75%, no shunt, and no valvular abnormality.  Neurology was consulted and an MRI was obtained which was unchanged from previous imaging. EEG performed noted abnormal readings which is not unexpected with her hx, no active seizures. Patient's Vimpat dose was increased per neurology's recommendation.  Patient is to follow up with her neurologist (Dr. Prince) outpatient.  Patient follows with Dr. Frye, radiation oncology and Dr. Cerda, oncology.    Oncology was consulted and recommend starting decadron 2mg BID x 7 days, then follow with a taper.  Patient to be seen by Dr. Cerda (oncology) and Dr. Frye (radiation oncology) who will review MRI imaging on Monday.  Patient to continue to work with PT and OT.  Per therapy notes on 2/11, PT recommending postacute placement.  Patient agreeable for SNF versus rehab placement at time of discharge.      Interval Problem Update  2/13--no acute events overnight.  Patient alert and oriented x4.  Patient with no complaints this morning.  She does endorse mild headache but attributes this to neck tension.  No acute neuro changes on exam.  Patient currently requiring 1 L of supplemental oxygen via nasal cannula with SPO2 95%. Patient awaiting consult with oncology and radiation oncology, likely tomorrow.    2/12. Updated family and Neurology was at bedside. Neuro exam appreciated. Vimpat PM dose has been increased. Per Neurology MRI findings stable. Reached out to Dr. Cerda and Dr. Frye who can review MRIs on Monday. At this time Oncology on call recommended starting decadron as benefits outweigh risk. I started decadron 2mg BID for 7 days which should follow a taper.    I have personally seen and examined the patient at bedside. I discussed the plan of care with patient, family, bedside RN and neurology.    Consultants/Specialty  neurology    Code Status  Full Code    Disposition  Patient is not medically cleared for discharge.   Anticipate discharge to Likely back to rehab or SNF.  I have placed the appropriate orders for post-discharge needs.    Review of Systems  Review of Systems   Unable to perform ROS: Mental acuity   Constitutional: Negative for chills, fever and weight loss.   HENT: Negative.    Eyes: Negative.    Respiratory: Negative for cough, sputum production, shortness of breath and wheezing.    Cardiovascular: Negative for chest pain, palpitations, orthopnea, claudication and leg swelling.   Gastrointestinal: Negative for abdominal pain, constipation, diarrhea, heartburn, nausea and vomiting.   Genitourinary: Negative.    Musculoskeletal: Positive for neck pain.        Reports chronic   Skin: Negative.    Neurological: Positive for focal weakness, seizures, weakness and headaches. Negative for dizziness, tingling, tremors, sensory change, speech change and loss of consciousness.        Mild;  chronic  Chronic left-sided weakness   Psychiatric/Behavioral: Positive for depression. The patient is nervous/anxious.         Physical Exam  Temp:  [36.1 °C (97 °F)-36.7 °C (98.1 °F)] 36.7 °C (98.1 °F)  Pulse:  [74-93] 74  Resp:  [16-20] 20  BP: (130-150)/(77-96) 142/96  SpO2:  [87 %-97 %] 94 %    Physical Exam  Vitals and nursing note reviewed.   Constitutional:       General: She is not in acute distress.     Appearance: She is ill-appearing. She is not toxic-appearing or diaphoretic.   HENT:      Head: Normocephalic and atraumatic.      Right Ear: External ear normal.      Left Ear: External ear normal.      Nose: Nose normal.      Mouth/Throat:      Mouth: Mucous membranes are moist.   Eyes:      Pupils: Pupils are equal, round, and reactive to light.   Cardiovascular:      Rate and Rhythm: Normal rate and regular rhythm.      Pulses: Normal pulses.      Heart sounds: No murmur heard.  No friction rub. No gallop.    Pulmonary:      Effort: Pulmonary effort is normal. No respiratory distress.      Breath sounds: Normal breath sounds. No stridor. No wheezing, rhonchi or rales.   Chest:      Chest wall: No tenderness.   Abdominal:      General: Abdomen is flat. Bowel sounds are normal. There is no distension.      Palpations: Abdomen is soft.      Tenderness: There is no abdominal tenderness. There is no guarding or rebound.   Musculoskeletal:         General: No swelling, tenderness or deformity. Normal range of motion.      Cervical back: Normal range of motion and neck supple. No rigidity or tenderness.      Right lower leg: No edema.      Left lower leg: No edema.      Comments: L arm contractures and spasticity   Skin:     General: Skin is warm.      Coloration: Skin is not jaundiced.   Neurological:      General: No focal deficit present.      Mental Status: She is alert and oriented to person, place, and time. Mental status is at baseline.      Motor: Weakness present.      Comments: L hemiparesis  Memory  issues  Currently no paresthesias  L neglect present   Psychiatric:         Mood and Affect: Mood normal.         Behavior: Behavior normal.         Thought Content: Thought content normal.         Judgment: Judgment normal.      Comments: Calm         Fluids    Intake/Output Summary (Last 24 hours) at 2/13/2022 1104  Last data filed at 2/13/2022 0530  Gross per 24 hour   Intake --   Output 500 ml   Net -500 ml       Laboratory  Recent Labs     02/12/22  0357   WBC 4.6*   RBC 3.34*   HEMOGLOBIN 11.6*   HEMATOCRIT 34.5*   .3*   MCH 34.7*   MCHC 33.6   RDW 54.0*   PLATELETCT 203   MPV 9.5     Recent Labs     02/11/22  0154 02/12/22  0357   SODIUM 141 142   POTASSIUM 3.5* 3.7   CHLORIDE 106 108   CO2 22 24   GLUCOSE 128* 110*   BUN 8 7*   CREATININE 0.47* 0.47*   CALCIUM 8.7 8.8                   Imaging  MR-BRAIN-WITH & W/O   Final Result         Postoperative changes are noted in the medial right posterior frontal parietal region with rim enhancement of the postoperative cavity. Linear thick  enhancement along the medial margin of the operative cavity abutting the falx cerebri is noted.    Comparison with previous films would be helpful to determine if there has been any interval change.      Extensive T2 signal abnormality involving the bilateral cerebral hemispheric white matter is noted, most likely related to posttreatment changes.         EC-ECHOCARDIOGRAM COMPLETE W/O CONT   Final Result      CT-CTA CHEST PULMONARY ARTERY W/ RECONS   Final Result      1.  No CT evidence for pulmonary emboli.   2.  No pneumonia or pneumothorax.               CT-HEAD W/O   Final Result      1.  Postoperative changes of right frontoparietal mass resection. No evidence of hemorrhage. Basal cisterns are patent.   2.  The right greater than left white matter is hypodense. This may reflect edema or postradiation change. This appears somewhat decreased from 12/14/2021 CT.   3.  Bilateral mastoid effusions. Correlate for  mastoiditis.      DX-CHEST-PORTABLE (1 VIEW)   Final Result      Hypoinflation with minimal RIGHT midlung atelectasis.           Assessment/Plan  * Glioblastoma of frontal lobe (HCC)- (present on admission)  Assessment & Plan  History of glioblastoma s/p biopsy done by Dr. Mao, NSG  s/p craniotomy/resection by [facility] neurosurgeon  s/p radiation/oncology. Follows with Dr. Frye RadOnc and Dr. Cerda, Oncology  She was brought in because of weakness; declining. She does have chronic L sided weakness but per  has memory lapses and tingling of L extremity at times.   Most recent resection at [facility] 12/31/2021.  Currently working with home PT for left upper and lower extremity weakness and left hemineglect    Plan:   Neurology consulted:   Ordered EEG and MRI; results to decide how to titrate her antiepileptics.  --MRI showed no vasogenic edema or worsening mass  --EEG with no clinical seizures however increased risk  --Added decadron as per on call Oncology  --Vimpat increased per neurology   --Onc and [place] aware and will review on Monday    Syncope- (present on admission)  Assessment & Plan  Syncopal vs seizure episode today while working with PT at home  States she felt dizzy prior to the event then does not remember what happened afterwards.    History of seizures, related to glioblastoma.  Per  she usually has motor activity with her seizures which was not noted today. Compliant with antiepileptics  History of PE, CTA negative for acute abnormality.  Compliant with apixaban  In the ED she is in sinus tachycardia on telemetry.  EKG shows Q waves in the inferior leads and T wave flattening in the anterior lateral leads which is new compared to EKG from 12/2020.  Troponin negative and denies chest pain monitor on telemetry  CT head shows postoperative changes, no hemorrhage.  Right greater than left hypodense white matter likely reflecting edema or postradiation change that has improved since prior  CT.  Echo with LVEF 75%  Does not sound like orthostatic hypotension based on presentation.    Brivaracetam dose recently increased which can cause dizziness and balance/ataxia issues  Possible arrhythmia.  Recently started on mirabegron which can cause tachycardia and rarely arrhythmia.  Lacosamide dose recently increased which is known to cause arrhythmias and other cardiovascular abnormality  --orthostatic BP    Class 1 obesity due to excess calories with serious comorbidity and body mass index (BMI) of 34.0 to 34.9 in adult- (present on admission)  Assessment & Plan  BMI 34.87    Acute respiratory failure with hypoxia (HCC)- (present on admission)  Assessment & Plan  Syncopal episode today, found to be hypoxic in the mid 80s requiring 2 L O2.  Denies any dyspnea at this time or prior to today.    CTA chest negative for PE, acute cardiopulm abnormality  CXR with hypoventilation  Covid negative  Echocardiogram with LVEF of 75%, no shunt, no valvular abnormality  Multiple hospitalizations in the past with no reported hypoxia before per patient  ?  Could be secondary to obesity hypoventilation syndrome  --Encourage IS  --Patient currently on 1 L O2, wean O2 as able  --Mobilize    History of pulmonary embolus (PE)- (present on admission)  Assessment & Plan  Compliant with apixaban, continue  CTA chest negative for acute PE    Seizure disorder (HCC)- (present on admission)  Assessment & Plan  Follows with Dr. Prince, Neurology, for complications for seizures and last increased her antiepileptics.  Continue lacosamide and brivaracetam  Doses of both meds increased about a month ago due to breakthrough seizures following her second glioblastoma resection  --neurology consulted, increased dose of Vimpat   --EEG showed no subclinical seizures but increased risk of seizures      Primary hypertension- (present on admission)  Assessment & Plan  Continue amlodipine            Mixed anxiety and depressive disorder- (present on  admission)  Assessment & Plan  Continue venlafaxine       VTE prophylaxis: therapeutic anticoagulation with Eliquis    I have performed a physical exam and reviewed and updated ROS and Plan today (2/13/2022). In review of yesterday's note (2/12/2022), there are no changes except as documented above.

## 2022-02-13 NOTE — CARE PLAN
The patient is Stable - Low risk of patient condition declining or worsening    Shift Goals  Clinical Goals: Monitor for skin breakdown. Comfort.   Patient Goals: Rest  Family Goals: n/a    Progress made toward(s) clinical / shift goals: discussed POC, including pain management, ROM, and neuro evaluating EEG/ MRI. All current questions answered.     Patient is not progressing towards the following goals:  Na      Problem: Pain - Standard  Goal: Alleviation of pain or a reduction in pain to the patient’s comfort goal  Outcome: Progressing     Problem: Knowledge Deficit - Standard  Goal: Patient and family/care givers will demonstrate understanding of plan of care, disease process/condition, diagnostic tests and medications  Outcome: Progressing

## 2022-02-13 NOTE — HOSPITAL COURSE
This is a 51 year old female with PMHx of right posterior fronto-parietal glioblastoma multiforme s/p right cranioplasty for resection (6/2021, [facility]) s/p radiation and chemotherapy with temozolomide (last dose 11/2021), focal seizures (on Vimpat and Briviact), chronic left sided deficit and spasticity, hyperlipidemia, PE with cor pulmonale (on Eliquis), anxiety, and depression who was admitted on 2/9/2022 s/p GLF.    Neurology was consulted, MRI reviewed and unchanged from prior. EEG performed noted abnormal readings which is not unexpected with her hx, no active seizures. Patient's Vimpat dose with increased.  Patient is to follow up with her neurologist outpatient.     Oncology consulted, recommend starting decadron 2mg BID x 7 days, then a taper. She had a tele-neurooncologist appointment and will continue to follow up with them after discharge. She was evaluated by Physiatry, who recommended inpatient rehab. Discharge was delayed due to insurance authorization.

## 2022-02-13 NOTE — PROGRESS NOTES
Patient arrived to unit at 1845. Bedside report received from JUDITH Hart. Patient is alert and oriented. C/o 7/10 pain to left shoulder, 0.25 mg iv dilaudid given. Call light within reach, bed alarm active, and wheels locked. vss on 1Liter nasal cannula.  is at bedside

## 2022-02-14 ENCOUNTER — APPOINTMENT (OUTPATIENT)
Dept: RADIOLOGY | Facility: MEDICAL CENTER | Age: 52
End: 2022-02-14
Attending: INTERNAL MEDICINE
Payer: COMMERCIAL

## 2022-02-14 ENCOUNTER — APPOINTMENT (OUTPATIENT)
Dept: PHYSICAL MEDICINE AND REHAB | Facility: REHABILITATION | Age: 52
End: 2022-02-14
Payer: COMMERCIAL

## 2022-02-14 PROBLEM — K59.00 CONSTIPATION: Status: ACTIVE | Noted: 2022-02-14

## 2022-02-14 PROCEDURE — 770001 HCHG ROOM/CARE - MED/SURG/GYN PRIV*

## 2022-02-14 PROCEDURE — 99232 SBSQ HOSP IP/OBS MODERATE 35: CPT | Mod: FS | Performed by: GENERAL PRACTICE

## 2022-02-14 PROCEDURE — 700102 HCHG RX REV CODE 250 W/ 637 OVERRIDE(OP): Performed by: INTERNAL MEDICINE

## 2022-02-14 PROCEDURE — 97112 NEUROMUSCULAR REEDUCATION: CPT

## 2022-02-14 PROCEDURE — 700102 HCHG RX REV CODE 250 W/ 637 OVERRIDE(OP): Performed by: NURSE PRACTITIONER

## 2022-02-14 PROCEDURE — 700102 HCHG RX REV CODE 250 W/ 637 OVERRIDE(OP): Performed by: GENERAL PRACTICE

## 2022-02-14 PROCEDURE — 99223 1ST HOSP IP/OBS HIGH 75: CPT | Performed by: PHYSICAL MEDICINE & REHABILITATION

## 2022-02-14 PROCEDURE — A9270 NON-COVERED ITEM OR SERVICE: HCPCS | Performed by: STUDENT IN AN ORGANIZED HEALTH CARE EDUCATION/TRAINING PROGRAM

## 2022-02-14 PROCEDURE — A9270 NON-COVERED ITEM OR SERVICE: HCPCS | Performed by: NURSE PRACTITIONER

## 2022-02-14 PROCEDURE — A9270 NON-COVERED ITEM OR SERVICE: HCPCS | Performed by: GENERAL PRACTICE

## 2022-02-14 PROCEDURE — A9270 NON-COVERED ITEM OR SERVICE: HCPCS | Performed by: INTERNAL MEDICINE

## 2022-02-14 PROCEDURE — 97116 GAIT TRAINING THERAPY: CPT

## 2022-02-14 PROCEDURE — 700102 HCHG RX REV CODE 250 W/ 637 OVERRIDE(OP): Performed by: STUDENT IN AN ORGANIZED HEALTH CARE EDUCATION/TRAINING PROGRAM

## 2022-02-14 PROCEDURE — 97535 SELF CARE MNGMENT TRAINING: CPT

## 2022-02-14 PROCEDURE — 700101 HCHG RX REV CODE 250: Performed by: PHYSICAL MEDICINE & REHABILITATION

## 2022-02-14 RX ORDER — BISACODYL 5 MG
10 TABLET, DELAYED RELEASE (ENTERIC COATED) ORAL ONCE
Status: DISCONTINUED | OUTPATIENT
Start: 2022-02-14 | End: 2022-02-14

## 2022-02-14 RX ORDER — POLYETHYLENE GLYCOL 3350 17 G/17G
2 POWDER, FOR SOLUTION ORAL DAILY
Status: DISCONTINUED | OUTPATIENT
Start: 2022-02-15 | End: 2022-02-18 | Stop reason: HOSPADM

## 2022-02-14 RX ORDER — LIDOCAINE 50 MG/G
2 PATCH TOPICAL EVERY 24 HOURS
Status: DISCONTINUED | OUTPATIENT
Start: 2022-02-14 | End: 2022-02-18 | Stop reason: HOSPADM

## 2022-02-14 RX ORDER — BENZOCAINE/MENTHOL 6 MG-10 MG
LOZENGE MUCOUS MEMBRANE 2 TIMES DAILY PRN
Status: DISCONTINUED | OUTPATIENT
Start: 2022-02-14 | End: 2022-02-18 | Stop reason: HOSPADM

## 2022-02-14 RX ADMIN — DEXAMETHASONE 2 MG: 4 TABLET ORAL at 05:28

## 2022-02-14 RX ADMIN — VENLAFAXINE 75 MG: 75 TABLET ORAL at 05:29

## 2022-02-14 RX ADMIN — POLYETHYLENE GLYCOL 3350 1 PACKET: 17 POWDER, FOR SOLUTION ORAL at 05:28

## 2022-02-14 RX ADMIN — APIXABAN 5 MG: 5 TABLET, FILM COATED ORAL at 05:29

## 2022-02-14 RX ADMIN — LACOSAMIDE 200 MG: 100 TABLET, FILM COATED ORAL at 05:29

## 2022-02-14 RX ADMIN — Medication 3 MG: at 21:00

## 2022-02-14 RX ADMIN — ACETAMINOPHEN 1000 MG: 500 TABLET ORAL at 17:11

## 2022-02-14 RX ADMIN — MAGNESIUM HYDROXIDE 30 ML: 400 SUSPENSION ORAL at 08:29

## 2022-02-14 RX ADMIN — APIXABAN 5 MG: 5 TABLET, FILM COATED ORAL at 17:11

## 2022-02-14 RX ADMIN — BRIVARACETAM 100 MG: 100 TABLET, FILM COATED ORAL at 05:29

## 2022-02-14 RX ADMIN — NYSTATIN: 100000 POWDER TOPICAL at 17:12

## 2022-02-14 RX ADMIN — LIDOCAINE 2 PATCH: 50 PATCH TOPICAL at 17:12

## 2022-02-14 RX ADMIN — ACETAMINOPHEN 1000 MG: 500 TABLET ORAL at 05:29

## 2022-02-14 RX ADMIN — ACETAMINOPHEN 1000 MG: 500 TABLET ORAL at 12:22

## 2022-02-14 RX ADMIN — ACETAMINOPHEN 1000 MG: 500 TABLET ORAL at 00:04

## 2022-02-14 RX ADMIN — DOCUSATE SODIUM 50 MG AND SENNOSIDES 8.6 MG 2 TABLET: 8.6; 5 TABLET, FILM COATED ORAL at 05:28

## 2022-02-14 RX ADMIN — LACOSAMIDE 200 MG: 100 TABLET, FILM COATED ORAL at 17:11

## 2022-02-14 RX ADMIN — DEXAMETHASONE 2 MG: 4 TABLET ORAL at 17:11

## 2022-02-14 RX ADMIN — AMLODIPINE BESYLATE 10 MG: 10 TABLET ORAL at 05:30

## 2022-02-14 RX ADMIN — DOCUSATE SODIUM 50 MG AND SENNOSIDES 8.6 MG 2 TABLET: 8.6; 5 TABLET, FILM COATED ORAL at 17:11

## 2022-02-14 RX ADMIN — NYSTATIN: 100000 POWDER TOPICAL at 05:27

## 2022-02-14 RX ADMIN — HYDROCORTISONE: 0.01 CREAM TOPICAL at 20:26

## 2022-02-14 RX ADMIN — BRIVARACETAM 100 MG: 100 TABLET, FILM COATED ORAL at 17:11

## 2022-02-14 RX ADMIN — OXYCODONE 5 MG: 5 TABLET ORAL at 20:57

## 2022-02-14 ASSESSMENT — ENCOUNTER SYMPTOMS
DIARRHEA: 0
VOMITING: 0
ABDOMINAL PAIN: 0
TINGLING: 0
PALPITATIONS: 0
WEIGHT LOSS: 0
WEAKNESS: 1
DEPRESSION: 1
ORTHOPNEA: 0
SHORTNESS OF BREATH: 0
EYES NEGATIVE: 1
HEADACHES: 1
HEARTBURN: 0
FEVER: 0
COUGH: 0
DIZZINESS: 0
WHEEZING: 0
CLAUDICATION: 0
NAUSEA: 0
SPUTUM PRODUCTION: 0
SENSORY CHANGE: 0
NECK PAIN: 1
SEIZURES: 1
LOSS OF CONSCIOUSNESS: 0
NERVOUS/ANXIOUS: 1
SPEECH CHANGE: 0
TREMORS: 0
FOCAL WEAKNESS: 1
CONSTIPATION: 0
CHILLS: 0

## 2022-02-14 ASSESSMENT — COGNITIVE AND FUNCTIONAL STATUS - GENERAL
HELP NEEDED FOR BATHING: A LOT
TOILETING: A LOT
MOBILITY SCORE: 6
WALKING IN HOSPITAL ROOM: TOTAL
PERSONAL GROOMING: A LITTLE
SUGGESTED CMS G CODE MODIFIER DAILY ACTIVITY: CK
DAILY ACTIVITIY SCORE: 15
MOVING TO AND FROM BED TO CHAIR: UNABLE
SUGGESTED CMS G CODE MODIFIER MOBILITY: CN
DRESSING REGULAR UPPER BODY CLOTHING: A LITTLE
TURNING FROM BACK TO SIDE WHILE IN FLAT BAD: UNABLE
MOVING FROM LYING ON BACK TO SITTING ON SIDE OF FLAT BED: UNABLE
EATING MEALS: A LITTLE
CLIMB 3 TO 5 STEPS WITH RAILING: TOTAL
DRESSING REGULAR LOWER BODY CLOTHING: A LOT
STANDING UP FROM CHAIR USING ARMS: TOTAL

## 2022-02-14 ASSESSMENT — GAIT ASSESSMENTS
GAIT LEVEL OF ASSIST: MAXIMAL ASSIST
DISTANCE (FEET): 3
ASSISTIVE DEVICE: QUAD CANE
DEVIATION: ANTALGIC;STEP TO;DECREASED BASE OF SUPPORT;BRADYKINETIC;DECREASED HEEL STRIKE;DECREASED TOE OFF

## 2022-02-14 NOTE — DISCHARGE PLANNING
Admitted for fall w/ h/o brain tumor and L sided deficit. Would appreciate updated therapy notes for physiatry and insurance to review. Physiatry to consult, TCC will follow.

## 2022-02-14 NOTE — DISCHARGE PLANNING
Anticipated Discharge Disposition: Acute Rehab vs SNF     Action: Reviewed chart and in IDT rounds. Pending rehab eval/consult vs skilled.   Noted Ruth Ann, rehab laision’s note, requesting updated PT / OT notes.  Voalte texted Bola with OT and Kristine with PT to updated notes today.     Barriers to Discharge: pending rehab vs snf     Plan:  care coordination to follow for dc planning needs

## 2022-02-14 NOTE — THERAPY
"Occupational Therapy  Daily Treatment     Patient Name: Melba Frye  Age:  51 y.o., Sex:  female  Medical Record #: [id number]  Today's Date: 2/14/2022     Precautions  Precautions: (P) Fall Risk  Comments: (P) L sided deficits, L AFO    Assessment    Pt seen for follow up OT tx session, pt making steady progress with therapy, pt and spouse eager to discharge to acute rehab to increase functional independence for ADLs and functional mobility, pts spouse very involved and open to all education to assist in caring for patient, pt a good candidate for a PMR consult based on current level of function and level of support patient currently has from spouse and caregiver assistance.    Plan    Continue current treatment plan.    DC Equipment Recommendations: (P) Unable to determine at this time  Discharge Recommendations: (P) Recommend post-acute placement for additional occupational therapy services prior to discharge home    Subjective    \"How did I do?\"     Objective       02/14/22 1405   Precautions   Precautions Fall Risk   Comments L sided deficits, L AFO   Pain 0 - 10 Group   Therapist Pain Assessment Post Activity Pain Same as Prior to Activity;Nurse Notified   Non Verbal Descriptors   Non Verbal Scale  Calm   Cognition    Cognition / Consciousness WDL   Level of Consciousness Alert   Comments Very pleasant, motivated, eager to go to rehab   Upper Body Muscle Tone   Upper Body Muscle Tone  X   Lt Upper Extremity Muscle Tone Hypertonic;Fluctuating   Comments flexor synergy pattern   Balance   Sitting Balance (Static) Fair -   Sitting Balance (Dynamic) Poor +   Standing Balance (Static) Poor   Standing Balance (Dynamic) Poor -   Weight Shift Sitting Fair   Weight Shift Standing Poor   Skilled Intervention Verbal Cuing   Comments w/ quad cane   Bed Mobility    Supine to Sit Moderate Assist   Sit to Supine Moderate Assist   Scooting Minimal Assist   Skilled Intervention Verbal Cuing   Activities of Daily Living "   Upper Body Dressing Minimal Assist   Lower Body Dressing Maximal Assist   Toileting Maximal Assist   Skilled Intervention Verbal Cuing   How much help from another person does the patient currently need...   Putting on and taking off regular lower body clothing? 2   Bathing (including washing, rinsing, and drying)? 2   Toileting, which includes using a toilet, bedpan, or urinal? 2   Putting on and taking off regular upper body clothing? 3   Taking care of personal grooming such as brushing teeth? 3   Eating meals? 3   6 Clicks Daily Activity Score 15   Functional Mobility   Sit to Stand Moderate Assist   Transfer Method Stand Step   Mobility bed mobility, STS at EOB multiple times, BTB   Skilled Intervention Verbal Cuing   Comments w/ quad cane   Visual Perception   Visual Perception  X   Right / Left Discrimination Impaired   Neglect Mild Left   Activity Tolerance   Sitting Edge of Bed 15 min   Standing 10 min total   Patient / Family Goals   Patient / Family Goal #1 To get stronger    Goal #1 Outcome Progressing as expected   Short Term Goals   Short Term Goal # 1 Pt will demo UB dressing w/ SPV   Goal Outcome # 1 Progressing as expected   Short Term Goal # 2 Pt will demo BSC txf w/ min A   Goal Outcome # 2 Progressing as expected   Short Term Goal # 3 Pt will demo toilet hygiene w/ SPV   Goal Outcome # 3 Goal not met   Short Term Goal # 4 Pt will demo seated grooming w/ SPV   Goal Outcome # 4 Progressing as expected   Education Group   Education Provided Role of Occupational Therapist   Role of Occupational Therapist Patient Response Patient;Acceptance;Explanation;Demonstration;Verbal Demonstration   Interdisciplinary Plan of Care Collaboration   IDT Collaboration with  Nursing;Family / Caregiver   Patient Position at End of Therapy In Bed;Bed Alarm On;Call Light within Reach;Tray Table within Reach;Phone within Reach;Family / Friend in Room   Collaboration Comments RN updated

## 2022-02-14 NOTE — CONSULTS
Physical Medicine and Rehabilitation Consultation              Date of initial consultation: 2/14/2022  Requested by: Taj Ramirez MD  Consulting physician: Daksha Dodd D.O.  Reason for consultation: assessment of rehabilitation needs  LOS: 5 Day(s)    Chief complaint: increased number of falls and fell on face    This history was prepared after interviewing the patient and reviewing the patient's chart at [facility].    Unreliable historian due to impaired memory from GBM    HPI: The patient is a 51 y.o. female with a past medical history of right posterior fronto-parietal glioblastoma multiforme s/p right cranioplasty for resection (6/2021, biopsy 3/2021) at [facility], with seizures, w/ secondary L sided weakness w/ ton, radiation and chemotherapy with temozolomide (last dose 11/2021), focal seizures (on Vimpat and Briviact), migraine, hyperlipidemia, PE with cor pulmonale (on Eliquis), anxiety, and depression  ;  who presented on 2/9/2022  1:53 PM after multiple ground level falls likely due to a combination of hypoxia, seizure activity, and vasogenic edema of the brain. Per documentation, on Wednesday 2/9/22 she was walking back from the bathroom with her caregiver when she reported feeling tired, dizzy, and needed to sit. Her caregiver turned to get a chair at which time the patient fell forward to the ground without catching herself. EMS was notified and upon arrival noted the patient was hypoxic with SpO2 85% on RA. She had a period of about 15 minutes of alerted awareness/consciousness, and does not recall the events. No report of any seizure like activity, tongue biting, or bowel/bladder incontinence. The patient is followed outpatient by neurologist, Dr. Fady Davidson and inpatient neurology was consulted for possible seizure. Typical semiology of her clinical seizures historically are consistent with focal seizures arising from the right post-operative nidus, but she does  "history of subclinical electrographic seizures as well per Dr. Davidson.     Respiratory infectious disease panel was sent and was negative for COVID-19, RSV, and influenza.  CTA chest was obtained and was negative for PE.  CT head was obtained and was negative for acute changes.  Echocardiogram was obtained with estimated LVEF of 75%, no shunt, and no valvular abnormality.  Neurology was consulted and an MRI was obtained which was unchanged from previous imaging. EEG performed noted abnormal readings which is not unexpected with her hx, no active seizures. Patient's Vimpat dose was increased per neurology's recommendation.      Oncology was consulted and recommend starting decadron 2mg BID x 7 days, then follow with a taper.    Follow up clinic plan includes: neurologist (Dr. Prince) outpatient.  Patient follows with Dr. Frye, radiation oncology and Dr. Cerda, oncology.    CT head with and without contrast shows:  Enhancing right frontoparietal mass with surrounding vasogenic edema.  Partial effacement of the right ventricle with 1 mm right to left midline shift    In terms of his rehabilitation history, patient has previously been at [facility] from 3/15/2021 to 3/27/2021. Per 5/2021 physical therapy note, patient \"ambulated up/down steep incline ramp CGA 40 feet, ambulated on grass x 50 feet, 5 feet on gravel/rocks with Rt walking stick for AD.\"  She was last seen by Dr. Yanet Steele at the neurorehabilitation clinic for botulinum toxin injection in her left bicep and tricep and left grastroc-soleus. Patient planned to have urodynamic study 12/2021 due to her urinary urgency and frequency with incontinence. She was planning to coordinate botox injection to bladder and left limbs with urology and physiatry but it has not been set up.     Physiatry was consulted to assess the patient's rehabilitation needs.    Today, patient reports: She underwent another brain surgery in [place] late last year " and was discharged from [facility] in [place]. She went home and has been having multiple falls. She was primarily walking at home with wheelchair use only when fatigued and in the community.     Goals for rehabilitation include: improving independence, reducing falls, and going home safely    Social and functional history:  Prior to this hospitalization, patient was independent with ADLS and mobility without an assistive device. Patient lives with  spouse in a two story home with.  caregiver for 8 hours 3 days a week. Spouse works 4 days a week    Current function during therapy include:  Restrictions: none  PT: Functional mobility   Cognition    Level of Consciousness Alert   Comments pleasant and cooperative, Spouse present for session and every engaged and supportive    Neurological Concerns   Comments inital posterior lean in standing which improved with facilitation and repetition   Balance   Sitting Balance (Static) Fair -   Sitting Balance (Dynamic) Poor +   Standing Balance (Static) Poor   Standing Balance (Dynamic) Poor -   Weight Shift Sitting Fair   Weight Shift Standing Poor   Skilled Intervention Verbal Cuing;Sequencing;Compensatory Strategies   Comments with quad cane in standing. Seated and standing balance improved with facilitation throughout therapy session   Gait Analysis   Gait Level Of Assist Maximal Assist   Assistive Device Quad Cane   Distance (Feet) 3  (-4)   # of Times Distance was Traveled 3   Deviation Antalgic;Step To;Decreased Base Of Support;Bradykinetic;Decreased Heel Strike;Decreased Toe Off  (L LE hyperextension, hip hike for L foot clearance)   # of Stairs Climbed 0   Weight Bearing Status no restrictions   Skilled Intervention Verbal Cuing;Tactile Cuing;Compensatory Strategies   Comments donned AFO and shoes prior to initiating ambulation. Ambulated fwd/bwd and side stepping to L   Bed Mobility    Supine to Sit Maximal Assist  (to torso)   Sit to Supine Moderate Assist  (to L LE)    Scooting Moderate Assist   Skilled Intervention Verbal Cuing;Sequencing;Compensatory Strategies   Comments requires support to pull and scoot   Functional Mobility   Sit to Stand Moderate Assist  (-> Min A depending on severity of posterior lean)   Bed, Chair, Wheelchair Transfer Unable to Participate  (deferred transfer to chair due to fatigue at end of session)   Skilled Intervention Verbal Cuing   Comments heavy cues to facilitate anterior lean and nose over toes during sit to stand transition   How much difficulty does the patient currently have...   Turning over in bed (including adjusting bedclothes, sheets and blankets)? 1   Sitting down on and standing up from a chair with arms (e.g., wheelchair, bedside commode, etc.) 1   Moving from lying on back to sitting on the side of the bed? 1   How much help from another person does the patient currently need...   Moving to and from a bed to a chair (including a wheelchair)? 1   Need to walk in a hospital room? 1   Climbing 3-5 steps with a railing? 1   6 clicks Mobility Score 6   Activity Tolerance   Sitting Edge of Bed 15-20 min total   Standing 10 min total         OT: Activities of daily living  Cognition    Cognition / Consciousness WDL   Level of Consciousness Alert   Comments Very pleasant, motivated, eager to go to rehab   Upper Body Muscle Tone   Upper Body Muscle Tone  X   Lt Upper Extremity Muscle Tone Hypertonic;Fluctuating   Comments flexor synergy pattern   Balance   Sitting Balance (Static) Fair -   Sitting Balance (Dynamic) Poor +   Standing Balance (Static) Poor   Standing Balance (Dynamic) Poor -   Weight Shift Sitting Fair   Weight Shift Standing Poor   Skilled Intervention Verbal Cuing   Comments w/ quad cane   Bed Mobility    Supine to Sit Moderate Assist   Sit to Supine Moderate Assist   Scooting Minimal Assist   Skilled Intervention Verbal Cuing   Activities of Daily Living   Upper Body Dressing Minimal Assist   Lower Body Dressing Maximal  Assist   Toileting Maximal Assist   Skilled Intervention Verbal Cuing   How much help from another person does the patient currently need...   Putting on and taking off regular lower body clothing? 2   Bathing (including washing, rinsing, and drying)? 2   Toileting, which includes using a toilet, bedpan, or urinal? 2   Putting on and taking off regular upper body clothing? 3   Taking care of personal grooming such as brushing teeth? 3   Eating meals? 3   6 Clicks Daily Activity Score 15   Functional Mobility   Sit to Stand Moderate Assist   Transfer Method Stand Step   Mobility bed mobility, STS at EOB multiple times, BTB   Skilled Intervention Verbal Cuing   Comments w/ quad cane   Visual Perception   Visual Perception  X   Right / Left Discrimination Impaired   Neglect Mild Left   Activity Tolerance   Sitting Edge of Bed 15 min   Standing 10 min total     PMH:  Past Medical History:   Diagnosis Date   • Acute pulmonary embolism with acute cor pulmonale (HCC) 10/21/2021   • Anxiety    • Cancer (HCC)     brain diagnosed in October 2020    • Complicated migraine 6/9/2014   • Depression    • Dermatitis, contact 11/16/2015   • Fatigue 6/9/2014   • Hyperlipidemia 1/23/2015   • Lentigo 10/17/2018   • Menopausal symptom 7/2/2014   • Mixed anxiety and depressive disorder 6/9/2014   • Pulmonary embolism (HCC)    • Seborrheic keratoses 10/17/2018   • TMJ arthralgia 6/9/2014   • UTI (lower urinary tract infection)        PSH:  Past Surgical History:   Procedure Laterality Date   • CRANIOTOMY STEALTH Right 3/9/2021    Procedure: RIGHT CRANIOTOMY, USING FRAMELESS STEREOTAXY - FOR OPEN BIOPSY WITH PHASE REVERSAL;  Surgeon: Maxwell Mao M.D.;  Location: [facility];  Service: Neurosurgery   • MYRINGOTOMY  8/27/2010    Performed by BHARGAV STREET at SURGERY SAME DAY [facility] ORS   • LAPAROSCOPY  5/28/2010    Performed by JACKI SHARMA at SURGERY [facility] ORS   • LYMPH NODE SAMPLING  5/28/2010    Performed by ZACHARY  JACKI NATH at SURGERY [facility] ORS   • DEBULKING  5/28/2010    Performed by AJCKI SHARMA at SURGERY [facility] ORS   • CYSTOSCOPY  10/15/08    Performed by YU GODINEZ at SURGERY SAME DAY [facility] ORS   • VAGINAL HYSTERECTOMY SCOPE TOTAL  10/15/08    Performed by YU GODINEZ at SURGERY SAME DAY [facility] ORS   • OTHER  1984    TONSILECTOMY/ ADNOIDS   • APPENDECTOMY  1981   • TONSILLECTOMY AND ADENOIDECTOMY         FHX:  Family History   Problem Relation Age of Onset   • Non-contributory Mother    • Lung Disease Mother         COPD   • Cancer Mother         dysplasia    • Arthritis Mother    • Psychiatric Illness Mother    • Heart Disease Mother    • Hypertension Mother    • Stroke Mother    • Non-contributory Father    • Cancer Father 73        prostate cancer   • Lung Disease Maternal Grandmother    • Lung Disease Paternal Aunt    • Diabetes Paternal Aunt    • Hyperlipidemia Paternal Aunt        Medications:  Current Facility-Administered Medications   Medication Dose   • hydrocortisone 1 % cream     • bisacodyl EC (DULCOLAX) tablet 10 mg  10 mg   • polyethylene glycol/lytes (MIRALAX) PACKET 1 Packet  1 Packet   • lacosamide (VIMPAT) tablet 200 mg  200 mg   • dexamethasone (DECADRON) tablet 2 mg  2 mg   • ALPRAZolam (XANAX) tablet 0.5 mg  0.5 mg   • Pharmacy Consult Request ...Pain Management Review 1 Each  1 Each   • acetaminophen (TYLENOL) tablet 1,000 mg  1,000 mg    Followed by   • [START ON 2/15/2022] acetaminophen (TYLENOL) tablet 1,000 mg  1,000 mg   • oxyCODONE immediate-release (ROXICODONE) tablet 2.5 mg  2.5 mg    Or   • oxyCODONE immediate-release (ROXICODONE) tablet 5 mg  5 mg    Or   • HYDROmorphone (Dilaudid) injection 0.25 mg  0.25 mg   • nystatin (MYCOSTATIN) powder     • senna-docusate (PERICOLACE or SENOKOT S) 8.6-50 MG per tablet 2 Tablet  2 Tablet    And   • polyethylene glycol/lytes (MIRALAX) PACKET 1 Packet  1 Packet    And   • magnesium hydroxide (MILK OF MAGNESIA) suspension 30 mL   "30 mL    And   • bisacodyl (DULCOLAX) suppository 10 mg  10 mg   • labetalol (NORMODYNE/TRANDATE) injection 10 mg  10 mg   • amLODIPine (NORVASC) tablet 10 mg  10 mg   • brivaracetam (Briviact) tablet 100 mg  100 mg   • apixaban (ELIQUIS) tablet 5 mg  5 mg   • melatonin tablet 3 mg  3 mg   • venlafaxine (EFFEXOR) tablet 75 mg  75 mg       Allergies:  Allergies   Allergen Reactions   • Tape Rash     Use paper tape instead       Review of systems:  Constitutional: denies fevers and chills  Eyes: denies change in vision  Ears, nose, mouth, throat: denies sore throat  Cardiovascular: denies palpitations  Respiratory: denies respiratory discomfort  Gastrointestinal: first BM today  Genitourinary: using purewick  Musculoskeletal: admits to pain in neck and head  Integumentary: denies pressure ulcer  Neurological: admits to spasticity, denies burning or shooting pain, admits to weakness in left arms / legs  Psychiatric: denies change in mood  Endocrine: denies diabetes mellitus  Hematologic/lymphatic: denies history of blood disorders  Allergic/immunologic: has no history of allergy to any known medications    Physical Exam:  Vitals: /101   Pulse 94   Temp 36.2 °C (97.1 °F) (Temporal)   Resp 17   Ht 1.702 m (5' 7\")   Wt 108 kg (237 lb 7 oz)   SpO2 96%   General: well-groomed in no acute distress, no visitors present  Eyes: no scleral icterus or conjunctival injection  Ears, nose, mouth and throat: moist oral mucosa  Cardiovascular: regular rate, good peripheral perfusion  Respiratory: breathing comfortably without use of accessory muscles  Gastrointestinal: soft, nontender, nondistended  Genitourinary: no indwelling east - purewick in place  Musculoskeletal: good symmetry in bilateral shoulders,   Skin: no wounds seen on exposed skin    Neurologic:  Mental status:  A&Ox4 (person, place, date, situation) answers questions appropriately follows commands  Speech: fluent, no aphasia or dysarthria  Motor:    Upper " Extremity  Myotome R L   Shoulder flexion C5 5/5 0/5   Elbow flexion C5 5/5 0/5   Wrist extension C6 5/5 0/5   Elbow extension C7 5/5 0/5   Finger flexion C8 5/5 0/5   Finger abduction T1 5/5 0/5     Lower Extremity Myotome R L   Hip flexion L2 5/5 4/5   Knee extension L3 5/5 4/5   Ankle dorsiflexion L4 5/5 1/5   Toe extension L5 5/5 0/5   Ankle plantarflexion S1 5/5 0/5     Sensory:   intact to light touch through out  intact to proprioception at bilateral great toes  Negative double simultaneous touch bilaterally UE and LE    DTRs: 2+ in bilateral  biceps, 2+ in bilateral patellar tendons  Upgoing toes on left, clonus on right    Tone: +spasticity in triceps, biceps, gastroc-soleus, and quads in left    Psychiatric: appropriate affect  Hematologic/lymphatic/immunologic: ++IV access, no bruises seen on exposed skin    Labs: Reviewed and significant for   Recent Labs     02/12/22  0357   RBC 3.34*   HEMOGLOBIN 11.6*   HEMATOCRIT 34.5*   PLATELETCT 203     Recent Labs     02/12/22  0357   SODIUM 142   POTASSIUM 3.7   CHLORIDE 108   CO2 24   GLUCOSE 110*   BUN 7*   CREATININE 0.47*   CALCIUM 8.8     No results found for this or any previous visit (from the past 24 hour(s)).      ASSESSMENT:  IMPRESSION: The patient is a 51 y.o. female with a past medical history of right posterior fronto-parietal glioblastoma multiforme s/p right cranioplasty for resection (6/2021, biopsy 3/2021) at [facility], with seizures, w/ secondary L sided weakness w/ ton, radiation and chemotherapy with temozolomide (last dose 11/2021), focal seizures (on Vimpat and Briviact), migraine, hyperlipidemia, PE with cor pulmonale (on Eliquis), anxiety, and depression  ;  who presented on 2/9/2022  1:53 PM after multiple ground level falls likely due to a combination of hypoxia, seizure activity, and vasogenic edema of the brain.    [facility] Code: 0002.1 - Brain Dysfunction: Non-Traumatic  -With acute secondary complications of: impaired ADLs and mobility,  "multiple falls, left sided weakness and spasticity  -with chronic conditions of: right posterior fronto-parietal glioblastoma multiforme s/p right cranioplasty for resection (6/2021, biopsy 3/2021) at [facility], with seizures, w/ secondary L sided weakness w/ ton, radiation and chemotherapy with temozolomide (last dose 11/2021), focal seizures (on Vimpat and Briviact), migraine, hyperlipidemia, PE with cor pulmonale (on Eliquis), anxiety, and depression  -and:     Medical Complexity:  Macrocytic anemia, mild  Hyperglycemia, mild    Data points:  Reviewed results of radiology tests  Reviewed clinical lab tests  Reviewed old records    RECOMMENDATIONS:  ##MSK  #Impaired ADLs and mobility: Agree with continuing OT/PT while admitted here.    Per 5/2021 physical therapy note, patient \"ambulated up/down steep incline ramp CGA 40 feet, ambulated on grass x 50 feet, 5 feet on gravel/rocks with Rt walking stick for AD.\" In 3/2021, she was walking with AD with CGA for over 300 feet. 2/14 PT note now reports patient did 3 feet with moderate assist.    Per 3/2021 occupational therapy note, patient was supervised with bed mobility.OT now reporting patient is mod assist for bed mobility.    Therefore, it is my medical opinion that patient would benefit from aggressive OT and PT in acute inpatient rehabilitation to minimize her risk for falls and prevent returning to the hospital.    Patient is a good candidate for acute inpatient rehabilitation provided that: patient can tolerate 3 hours of therapy a day, has aggressive therapy needs, patient is medically stable, bed becomes available, insurance authorization is obtained.    Estimated length of stay: 14-16 days  Anticipated discharge destination: home with assistance  Prognosis: good    #Posttraumatic pain, acute, controlled  Agree with tylenol 1000mg Q6hr, venlafaxine 75mg daily    ##NEURO/PSYCH  #right posterior fronto-parietal glioblastoma multiforme s/p right cranioplasty for " resection (6/2021, biopsy 3/2021) at [facility], with seizures, w/ secondary L sided weakness w/ ton, radiation and chemotherapy with temozolomide (last dose 11/2021), focal seizures (on Vimpat and Briviact),   -now with vasogenic edema  -suspected seizure as contribution to falls  Seizure medication increased and on steroids per neuro - on decadron 2mg Q12 until 2/15    #Spasticity on left arm and leg  Night splint  Passive ROM  PRAFO at night  AFO with therapy    #Chronic insomnia  Agree with melatonin 3mg QHS  Steroids may be contributing to insomnia - consider rescheduling nighttime dose to allow for better night sleep    ##GI  #Acute on chronic constipation  Last BM: today (first since admission)  Goal of one continent BM daily  Agree with pericolace 2 tab BID, miralax 17g daily, milk of mag 30ml PRN constipation  If no BM tomorrow, recommend increasing miralax to 17g BID    ##  #Chronic urinary incontinence  purewick in place  Recommend checking for urinary retention with bladder scan - if random bladder scan greater than 400mL, recommend intermittent cathing or placement of indwelling urethral catheter    ##SKIN  Recommend turning patient Q2hr and monitor for skin changes closely    DVT chemoprophlaxis: apixaban 5mg BID  Code: full resuscitation    Thank you for allowing us to participate in the care of this patient. Physiatry will continue to follow and provide recommendations, as needed.    Patient was seen for  95 minutes on unit/floor of which > 50% of time was spent on counseling and coordination of care regarding the above, including prognosis, risk reduction, benefits of treatment, and options for next stage of care.    Daksha Dodd D.O.   Physical Medicine and Rehabilitation     I have performed a physical exam and reviewed and updated history and plan today (2/14/2022).     Please note that this dictation was created using voice recognition software. I have made every reasonable attempt to  correct obvious errors, but there may be errors of grammar and possibly content that I did not discover before finalizing the note.

## 2022-02-14 NOTE — PROGRESS NOTES
Hospital Medicine Daily Progress Note    Date of Service  2/14/2022    Chief Complaint  Ground-level fall, shortness of breath    Hospital Course  Melba Frye is a 51-year-old female with past medical history of right posterior fronto-parietal glioblastoma status post right cranioplasty and resection (6/2021 with [facility]) and radiation and chemotherapy with temozolomide (last dose 11/2021), focal seizures (on Vimpat and Briviact), left-sided deficits, hyperlipidemia, PE on Eliquis, anxiety and depression who was admitted to the hospital on 2/9/2022 after having a ground-level fall at home.  Patient reports that she fell forward and struck her head.  She was noted to be hypoxic with oxygen saturation in the mid 80s upon arrival to the emergency department.    Upon admission, laboratory work-up was essentially unremarkable.  Respiratory infectious disease panel was sent and was negative for COVID-19, RSV, and influenza.  CTA chest was obtained and was negative for PE.  CT head was obtained and was negative for acute changes.  Echocardiogram was obtained with estimated LVEF of 75%, no shunt, and no valvular abnormality.  Neurology was consulted and an MRI was obtained which was unchanged from previous imaging. EEG performed noted abnormal readings which is not unexpected with her hx, no active seizures. Patient's Vimpat dose was increased per neurology's recommendation.  Patient is to follow up with her neurologist (Dr. Prince) outpatient.  Patient follows with Dr. Frye, radiation oncology and Dr. Cerda, oncology.    Oncology was consulted and recommend starting decadron 2mg BID x 7 days, then follow with a taper.  Patient to be seen by Dr. Cerda (oncology) and Dr. Frye (radiation oncology) who will review MRI imaging on Monday.  Patient to continue to work with PT and OT.  Per therapy notes on 2/11, PT recommending postacute placement.  Patient agreeable for SNF versus rehab placement at time of discharge.      Interval Problem Update  2/14-- no acute events overnight. Supplemental oxygen has been weaned; she is now on room air. No acute neuro changes on exam. Pt reports no bowel movement in 4 days, dulcolax added today. Pt awaiting consult with oncology and rad/onc today.  Patient to work with PT/OT today to determine if patient is a candidate for rehab.    2/13--no acute events overnight.  Patient alert and oriented x4.  Patient with no complaints this morning.  She does endorse mild headache but attributes this to neck tension.  No acute neuro changes on exam.  Patient currently requiring 1 L of supplemental oxygen via nasal cannula with SPO2 95%. Patient awaiting consult with oncology and radiation oncology, likely tomorrow.    2/12. Updated family and Neurology was at bedside. Neuro exam appreciated. Vimpat PM dose has been increased. Per Neurology MRI findings stable. Reached out to Dr. Cerda and Dr. Frye who can review MRIs on Monday. At this time Oncology on call recommended starting decadron as benefits outweigh risk. I started decadron 2mg BID for 7 days which should follow a taper.    I have personally seen and examined the patient at bedside. I discussed the plan of care with patient, family, bedside RN and neurology.    Consultants/Specialty  neurology    Code Status  Full Code    Disposition  Patient is medically cleared for discharge.   Anticipate discharge to Likely back to rehab or SNF.  I have placed the appropriate orders for post-discharge needs.    Review of Systems  Review of Systems   Constitutional: Negative for chills, fever and weight loss.   HENT: Negative.    Eyes: Negative.    Respiratory: Negative for cough, sputum production, shortness of breath and wheezing.    Cardiovascular: Negative for chest pain, palpitations, orthopnea, claudication and leg swelling.   Gastrointestinal: Negative for abdominal pain, constipation, diarrhea, heartburn, nausea and vomiting.   Genitourinary: Negative.     Musculoskeletal: Positive for neck pain.        Reports chronic   Skin: Negative.    Neurological: Positive for focal weakness, seizures, weakness and headaches. Negative for dizziness, tingling, tremors, sensory change, speech change and loss of consciousness.        Mild; chronic  Chronic left-sided weakness   Psychiatric/Behavioral: Positive for depression. The patient is nervous/anxious.         Physical Exam  Temp:  [36.2 °C (97.1 °F)-37.1 °C (98.7 °F)] 36.2 °C (97.1 °F)  Pulse:  [75-94] 94  Resp:  [16-18] 17  BP: (128-157)/() 145/101  SpO2:  [92 %-96 %] 96 %    Physical Exam  Vitals and nursing note reviewed.   Constitutional:       General: She is not in acute distress.     Appearance: She is ill-appearing. She is not toxic-appearing or diaphoretic.   HENT:      Head: Normocephalic and atraumatic.      Right Ear: External ear normal.      Left Ear: External ear normal.      Nose: Nose normal.      Mouth/Throat:      Mouth: Mucous membranes are moist.   Eyes:      Pupils: Pupils are equal, round, and reactive to light.   Cardiovascular:      Rate and Rhythm: Normal rate and regular rhythm.      Pulses: Normal pulses.      Heart sounds: No murmur heard.  No friction rub. No gallop.    Pulmonary:      Effort: Pulmonary effort is normal. No respiratory distress.      Breath sounds: Normal breath sounds. No stridor. No wheezing, rhonchi or rales.   Chest:      Chest wall: No tenderness.   Abdominal:      General: Abdomen is flat. Bowel sounds are normal. There is no distension.      Palpations: Abdomen is soft.      Tenderness: There is no abdominal tenderness. There is no guarding or rebound.   Musculoskeletal:         General: No swelling, tenderness or deformity. Normal range of motion.      Cervical back: Normal range of motion and neck supple. No rigidity or tenderness.      Right lower leg: No edema.      Left lower leg: No edema.      Comments: L arm contractures and spasticity   Skin:     General:  Skin is warm.      Coloration: Skin is not jaundiced.   Neurological:      General: No focal deficit present.      Mental Status: She is alert and oriented to person, place, and time. Mental status is at baseline.      Motor: Weakness present.      Comments: L hemiparesis  Memory issues  Currently no paresthesias  L neglect present   Psychiatric:         Mood and Affect: Mood normal.         Behavior: Behavior normal.         Thought Content: Thought content normal.         Judgment: Judgment normal.      Comments: Calm         Fluids    Intake/Output Summary (Last 24 hours) at 2/14/2022 0936  Last data filed at 2/13/2022 2030  Gross per 24 hour   Intake 450 ml   Output 750 ml   Net -300 ml       Laboratory  Recent Labs     02/12/22  0357   WBC 4.6*   RBC 3.34*   HEMOGLOBIN 11.6*   HEMATOCRIT 34.5*   .3*   MCH 34.7*   MCHC 33.6   RDW 54.0*   PLATELETCT 203   MPV 9.5     Recent Labs     02/12/22  0357   SODIUM 142   POTASSIUM 3.7   CHLORIDE 108   CO2 24   GLUCOSE 110*   BUN 7*   CREATININE 0.47*   CALCIUM 8.8                   Imaging  MR-BRAIN-WITH & W/O   Final Result         Postoperative changes are noted in the medial right posterior frontal parietal region with rim enhancement of the postoperative cavity. Linear thick  enhancement along the medial margin of the operative cavity abutting the falx cerebri is noted.    Comparison with previous films would be helpful to determine if there has been any interval change.      Extensive T2 signal abnormality involving the bilateral cerebral hemispheric white matter is noted, most likely related to posttreatment changes.         EC-ECHOCARDIOGRAM COMPLETE W/O CONT   Final Result      CT-CTA CHEST PULMONARY ARTERY W/ RECONS   Final Result      1.  No CT evidence for pulmonary emboli.   2.  No pneumonia or pneumothorax.               CT-HEAD W/O   Final Result      1.  Postoperative changes of right frontoparietal mass resection. No evidence of hemorrhage. Basal  cisterns are patent.   2.  The right greater than left white matter is hypodense. This may reflect edema or postradiation change. This appears somewhat decreased from 12/14/2021 CT.   3.  Bilateral mastoid effusions. Correlate for mastoiditis.      DX-CHEST-PORTABLE (1 VIEW)   Final Result      Hypoinflation with minimal RIGHT midlung atelectasis.           Assessment/Plan  * Glioblastoma of frontal lobe (HCC)- (present on admission)  Assessment & Plan  History of glioblastoma s/p biopsy done by [place]. Mao, [facility] [place]  s/p craniotomy/[place] [facility] [place]  s/p radiation/oncology. Follows with Dr. Frye, RadOnc and Dr. Cerda, Oncology  She was brought in because of weakness; declining. She does have chronic L sided weakness but per  has memory lapses and tingling of L extremity at times.   Most recent resection at [facility] 12/31/2021.  Currently working with home PT for left upper and lower extremity weakness and left hemineglect    Plan:   Neurology consulted:   Ordered EEG and MRI; results to decide how to titrate her antiepileptics.  --MRI showed no vasogenic edema or worsening mass  --EEG with no clinical seizures however increased risk  --Added decadron as per on call Oncology  --Vimpat increased per neurology   --Onc and RadOnc to review today    Constipation  Assessment & Plan  No BM for 4-5 days  --continue bowel regimen  --dulcolax 10 mg PO today    Syncope- (present on admission)  Assessment & Plan  Syncopal vs seizure episode today while working with PT at home  States she felt dizzy prior to the event then does not remember what happened afterwards.    History of seizures, related to glioblastoma.  Per  she usually has motor activity with her seizures which was not noted today. Compliant with antiepileptics  History of PE, CTA negative for acute abnormality.  Compliant with apixaban  In the ED she is in sinus tachycardia on telemetry.  EKG shows Q waves in the inferior leads and T wave  flattening in the anterior lateral leads which is new compared to EKG from 12/2020.  Troponin negative and denies chest pain monitor on telemetry  CT head shows postoperative changes, no hemorrhage.  Right greater than left hypodense white matter likely reflecting edema or postradiation change that has improved since prior CT.  Echo with LVEF 75%  Does not sound like orthostatic hypotension based on presentation.    Brivaracetam dose recently increased which can cause dizziness and balance/ataxia issues  Possible arrhythmia.  Recently started on mirabegron which can cause tachycardia and rarely arrhythmia.  Lacosamide dose recently increased which is known to cause arrhythmias and other cardiovascular abnormality  --orthostatic BP    Class 1 obesity due to excess calories with serious comorbidity and body mass index (BMI) of 34.0 to 34.9 in adult- (present on admission)  Assessment & Plan  BMI 34.87    Acute respiratory failure with hypoxia (HCC)- (present on admission)  Assessment & Plan  2/14 Resolved   Syncopal episode today, found to be hypoxic in the mid 80s requiring 2 L O2.  Denies any dyspnea at this time or prior to today.    CTA chest negative for PE, acute cardiopulm abnormality  CXR with hypoventilation  Covid negative  Echocardiogram with LVEF of 75%, no shunt, no valvular abnormality  Multiple hospitalizations in the past with no reported hypoxia before per patient  ?  Could be secondary to obesity hypoventilation syndrome  --Encourage IS  --Mobilize  --now on room air     History of pulmonary embolus (PE)- (present on admission)  Assessment & Plan  Compliant with apixaban, continue  CTA chest negative for acute PE    Seizure disorder (HCC)- (present on admission)  Assessment & Plan  Follows with Dr. Prince, Neurology, for complications for seizures and last increased her antiepileptics.  Continue lacosamide and brivaracetam  Doses of both meds increased about a month ago due to breakthrough seizures  following her second glioblastoma resection  --neurology consulted, increased dose of Vimpat   --EEG showed no subclinical seizures but increased risk of seizures      Primary hypertension- (present on admission)  Assessment & Plan  Continue amlodipine            Mixed anxiety and depressive disorder- (present on admission)  Assessment & Plan  Continue venlafaxine       VTE prophylaxis: therapeutic anticoagulation with Eliquis    I have performed a physical exam and reviewed and updated ROS and Plan today (2/14/2022). In review of yesterday's note (2/13/2022), there are no changes except as documented above.

## 2022-02-14 NOTE — THERAPY
Physical Therapy   Daily Treatment     Patient Name: Melba Frye  Age:  51 y.o., Sex:  female  Medical Record #: [id number]  Today's Date: 2/14/2022     Precautions  Precautions: Fall Risk  Comments: L sided deficits, L AFO    Assessment    Pt seen for PT treatment session, Spouse present throughout therapy session and supportive. Today, pt requires Mod- Min A to complete mobility. Cues for anterior lean during sit to stand and required Min A with gait belt support. Initially demonstrated strong posterior lean upon standing at EOB, slow improvement with facilitation and progressed to being able to maintain static standing balance w/o physical assist. Pt tolerated pre - gait and ambulation activities (3x3 ft) with quad cane and Mod A to facilitate and maintain balance. Overall demonstrated ability to learn and carry over with facilitation. PT will continue to follow while in house.     Plan    Continue current treatment plan.    DC Equipment Recommendations: Unable to determine at this time  Discharge Recommendations: Recommend post-acute placement for additional physical therapy services prior to discharge home. Recommend a PHYSIATRY CONSULT       02/14/22 1403   Precautions   Precautions Fall Risk   Comments L sided deficits, L AFO in room   Vitals   O2 Delivery Device None - Room Air   Pain 0 - 10 Group   Therapist Pain Assessment During Activity;Nurse Notified  (no pain reported)   Cognition    Level of Consciousness Alert   Comments pleasant and cooperative, Spouse present for session and every engaged and supportive    Neurological Concerns   Comments inital posterior lean in standing which improved with facilitation and repetition   Balance   Sitting Balance (Static) Fair -   Sitting Balance (Dynamic) Poor +   Standing Balance (Static) Poor   Standing Balance (Dynamic) Poor -   Weight Shift Sitting Fair   Weight Shift Standing Poor   Skilled Intervention Verbal Cuing;Sequencing;Compensatory Strategies    Comments with quad cane in standing. Seated and standing balance improved with facilitation throughout therapy session   Gait Analysis   Gait Level Of Assist Maximal Assist   Assistive Device Quad Cane   Distance (Feet) 3  (-4)   # of Times Distance was Traveled 3   Deviation Antalgic;Step To;Decreased Base Of Support;Bradykinetic;Decreased Heel Strike;Decreased Toe Off  (L LE hyperextension, hip hike for L foot clearance)   # of Stairs Climbed 0   Weight Bearing Status no restrictions   Skilled Intervention Verbal Cuing;Tactile Cuing;Compensatory Strategies   Comments donned AFO and shoes prior to initiating ambulation. Ambulated fwd/bwd and side stepping to L   Bed Mobility    Supine to Sit Maximal Assist  (to torso)   Sit to Supine Moderate Assist  (to L LE)   Scooting Moderate Assist   Skilled Intervention Verbal Cuing;Sequencing;Compensatory Strategies   Comments requires support to pull and scoot   Functional Mobility   Sit to Stand Moderate Assist  (-> Min A depending on severity of posterior lean)   Bed, Chair, Wheelchair Transfer Unable to Participate  (deferred transfer to chair due to fatigue at end of session)   Skilled Intervention Verbal Cuing   Comments heavy cues to facilitate anterior lean and nose over toes during sit to stand transition   How much difficulty does the patient currently have...   Turning over in bed (including adjusting bedclothes, sheets and blankets)? 1   Sitting down on and standing up from a chair with arms (e.g., wheelchair, bedside commode, etc.) 1   Moving from lying on back to sitting on the side of the bed? 1   How much help from another person does the patient currently need...   Moving to and from a bed to a chair (including a wheelchair)? 1   Need to walk in a hospital room? 1   Climbing 3-5 steps with a railing? 1   6 clicks Mobility Score 6   Activity Tolerance   Sitting Edge of Bed 15-20 min total   Standing 10 min total   Patient / Family Goals    Patient / Family  Goal #1 To get stronger   Goal #1 Outcome Goal not met   Short Term Goals    Short Term Goal # 1 Pt will perform supine <> sit with ModA in 6 visits in order to return to PLOF   Goal Outcome # 1 Progressing as expected   Short Term Goal # 2 Pt will perform STS with ModA and SPC in 6 visits to return to PLOF   Goal Outcome # 2 Goal met, new goal added   Short Term Goal # 2 B  Pt will perform sit <> stand with quad cane and CGA to improve functional independence in 6 visits   Short Term Goal # 3 Pt will perform functional transfer with ModA and quad cane in 6 visits to return to PLOF   Short Term Goal # 4 Pt will ambulate 10ft with ModA and quad cane in 6 visits to initiate ambulation   Anticipated Discharge Equipment and Recommendations   Discharge Recommendations Recommend post-acute placement for additional physical therapy services prior to discharge home

## 2022-02-14 NOTE — CARE PLAN
The patient is Stable - Low risk of patient condition declining or worsening    Shift Goals  Clinical Goals: Pt anxiety will decrease  Patient Goals: pain control; rest  Family Goals: n/a    Progress made toward(s) clinical / shift goals:  yes  Problem: Pain - Standard  Goal: Alleviation of pain or a reduction in pain to the patient’s comfort goal  Outcome: Progressing     Problem: Knowledge Deficit - Standard  Goal: Patient and family/care givers will demonstrate understanding of plan of care, disease process/condition, diagnostic tests and medications  Outcome: Progressing     Problem: Skin Integrity  Goal: Skin integrity is maintained or improved  Outcome: Progressing       Patient is not progressing towards the following goals:

## 2022-02-15 PROCEDURE — 700102 HCHG RX REV CODE 250 W/ 637 OVERRIDE(OP): Performed by: STUDENT IN AN ORGANIZED HEALTH CARE EDUCATION/TRAINING PROGRAM

## 2022-02-15 PROCEDURE — A9270 NON-COVERED ITEM OR SERVICE: HCPCS | Performed by: INTERNAL MEDICINE

## 2022-02-15 PROCEDURE — A9270 NON-COVERED ITEM OR SERVICE: HCPCS | Performed by: STUDENT IN AN ORGANIZED HEALTH CARE EDUCATION/TRAINING PROGRAM

## 2022-02-15 PROCEDURE — 99232 SBSQ HOSP IP/OBS MODERATE 35: CPT | Performed by: INTERNAL MEDICINE

## 2022-02-15 PROCEDURE — A9270 NON-COVERED ITEM OR SERVICE: HCPCS | Performed by: GENERAL PRACTICE

## 2022-02-15 PROCEDURE — 770001 HCHG ROOM/CARE - MED/SURG/GYN PRIV*

## 2022-02-15 PROCEDURE — 700101 HCHG RX REV CODE 250: Performed by: PHYSICAL MEDICINE & REHABILITATION

## 2022-02-15 PROCEDURE — A9270 NON-COVERED ITEM OR SERVICE: HCPCS | Performed by: NURSE PRACTITIONER

## 2022-02-15 PROCEDURE — 700102 HCHG RX REV CODE 250 W/ 637 OVERRIDE(OP): Performed by: NURSE PRACTITIONER

## 2022-02-15 PROCEDURE — 700102 HCHG RX REV CODE 250 W/ 637 OVERRIDE(OP): Performed by: INTERNAL MEDICINE

## 2022-02-15 PROCEDURE — 700102 HCHG RX REV CODE 250 W/ 637 OVERRIDE(OP): Performed by: GENERAL PRACTICE

## 2022-02-15 RX ADMIN — APIXABAN 5 MG: 5 TABLET, FILM COATED ORAL at 18:01

## 2022-02-15 RX ADMIN — VENLAFAXINE 75 MG: 75 TABLET ORAL at 05:18

## 2022-02-15 RX ADMIN — APIXABAN 5 MG: 5 TABLET, FILM COATED ORAL at 05:18

## 2022-02-15 RX ADMIN — BRIVARACETAM 100 MG: 100 TABLET, FILM COATED ORAL at 18:01

## 2022-02-15 RX ADMIN — POLYETHYLENE GLYCOL 3350 2 PACKET: 17 POWDER, FOR SOLUTION ORAL at 05:17

## 2022-02-15 RX ADMIN — ACETAMINOPHEN 1000 MG: 500 TABLET ORAL at 05:22

## 2022-02-15 RX ADMIN — DEXAMETHASONE 2 MG: 4 TABLET ORAL at 18:01

## 2022-02-15 RX ADMIN — ACETAMINOPHEN 1000 MG: 500 TABLET ORAL at 18:09

## 2022-02-15 RX ADMIN — DOCUSATE SODIUM 50 MG AND SENNOSIDES 8.6 MG 2 TABLET: 8.6; 5 TABLET, FILM COATED ORAL at 18:01

## 2022-02-15 RX ADMIN — LACOSAMIDE 200 MG: 100 TABLET, FILM COATED ORAL at 18:01

## 2022-02-15 RX ADMIN — NYSTATIN: 100000 POWDER TOPICAL at 18:00

## 2022-02-15 RX ADMIN — NYSTATIN: 100000 POWDER TOPICAL at 05:21

## 2022-02-15 RX ADMIN — DEXAMETHASONE 2 MG: 4 TABLET ORAL at 05:17

## 2022-02-15 RX ADMIN — ACETAMINOPHEN 1000 MG: 500 TABLET ORAL at 05:21

## 2022-02-15 RX ADMIN — AMLODIPINE BESYLATE 10 MG: 10 TABLET ORAL at 05:18

## 2022-02-15 RX ADMIN — LACOSAMIDE 200 MG: 100 TABLET, FILM COATED ORAL at 05:17

## 2022-02-15 RX ADMIN — OXYCODONE 5 MG: 5 TABLET ORAL at 21:08

## 2022-02-15 RX ADMIN — BRIVARACETAM 100 MG: 100 TABLET, FILM COATED ORAL at 05:29

## 2022-02-15 RX ADMIN — DOCUSATE SODIUM 50 MG AND SENNOSIDES 8.6 MG 2 TABLET: 8.6; 5 TABLET, FILM COATED ORAL at 05:17

## 2022-02-15 RX ADMIN — OXYCODONE 5 MG: 5 TABLET ORAL at 11:40

## 2022-02-15 RX ADMIN — Medication 3 MG: at 21:08

## 2022-02-15 RX ADMIN — LIDOCAINE 2 PATCH: 50 PATCH TOPICAL at 18:09

## 2022-02-15 ASSESSMENT — PAIN DESCRIPTION - PAIN TYPE: TYPE: ACUTE PAIN

## 2022-02-15 ASSESSMENT — ENCOUNTER SYMPTOMS
TREMORS: 0
FOCAL WEAKNESS: 1
CHILLS: 0
WHEEZING: 0
HEADACHES: 1
NERVOUS/ANXIOUS: 1
CLAUDICATION: 0
DEPRESSION: 1
WEAKNESS: 1
FEVER: 0
ORTHOPNEA: 0
NECK PAIN: 1
SEIZURES: 1
EYES NEGATIVE: 1
VOMITING: 0
HEARTBURN: 0
DIZZINESS: 0
NAUSEA: 0
SHORTNESS OF BREATH: 0
WEIGHT LOSS: 0
CONSTIPATION: 0
LOSS OF CONSCIOUSNESS: 0
SPUTUM PRODUCTION: 0
DIARRHEA: 0
SENSORY CHANGE: 0
SPEECH CHANGE: 0
TINGLING: 0
PALPITATIONS: 0
COUGH: 0
ABDOMINAL PAIN: 0

## 2022-02-15 NOTE — CARE PLAN
The patient is Watcher - Medium risk of patient condition declining or worsening    Shift Goals  Clinical Goals: Maintain skin integrity  Patient Goals: have a BM, rest  Family Goals: n/a    Progress made toward(s) clinical / shift goals:  yes  Problem: Skin Integrity  Goal: Skin integrity is maintained or improved  Outcome: Progressing     Problem: Fall Risk  Goal: Patient will remain free from falls  Outcome: Progressing     Problem: Pain - Standard  Goal: Alleviation of pain or a reduction in pain to the patient’s comfort goal  Outcome: Progressing       Patient is not progressing towards the following goals:

## 2022-02-15 NOTE — PREADMISSION SCREENING NOTE
Updated Pre-Screen Assessment     Name: Melba Frye  MRN: [id number]  : 1970    Medical Status/ Changes:   Hospital Medicine Daily Progress Note     Date of Service  2022     Chief Complaint  Ground-level fall, shortness of breath     Hospital Course  Melba Frye is a 51-year-old female with past medical history of right posterior fronto-parietal glioblastoma status post right cranioplasty and resection (2021 with [facility]) and radiation and chemotherapy with temozolomide (last dose 2021), focal seizures (on Vimpat and Briviact), left-sided deficits, hyperlipidemia, PE on Eliquis, anxiety and depression who was admitted to the hospital on 2022 after having a ground-level fall at home.  Patient reports that she fell forward and struck her head.  She was noted to be hypoxic with oxygen saturation in the mid 80s upon arrival to the emergency department.     Upon admission, laboratory work-up was essentially unremarkable.  Respiratory infectious disease panel was sent and was negative for COVID-19, RSV, and influenza.  CTA chest was obtained and was negative for PE.  CT head was obtained and was negative for acute changes.  Echocardiogram was obtained with estimated LVEF of 75%, no shunt, and no valvular abnormality.  Neurology was consulted and an MRI was obtained which was unchanged from previous imaging. EEG performed noted abnormal readings which is not unexpected with her hx, no active seizures. Patient's Vimpat dose was increased per neurology's recommendation.  Patient is to follow up with her neurologist (Dr. Prince) outpatient.  Patient follows with Dr. Frye, radiation oncology and Dr. Cerda, oncology.     Oncology was consulted and recommend starting decadron 2mg BID x 7 days, then follow with a taper.  Patient to be seen by Dr. Cerda (oncology) and Dr. Frye (radiation oncology) who will review MRI imaging on Monday.  Patient to continue to work with PT and OT.  Per therapy  notes on 2/11, PT recommending postacute placement.  Patient agreeable for SNF versus rehab placement at time of discharge.      Interval Problem Update  2/17: no new complaints. Awaiting placement. Appreciate CM assistance.     2/16: reports bruising to left side of face. They feel encouraged by their tele-neurooncology consult today. Awaiting insurance auth for inpatient rehab.     2/15: no new complaints. Awaiting insurance auth for inpatient rehab.     2/14-- no acute events overnight. Supplemental oxygen has been weaned; she is now on room air. No acute neuro changes on exam. Pt reports no bowel movement in 4 days, dulcolax added today. Pt awaiting consult with oncology and rad/onc today.  Patient to work with PT/OT today to determine if patient is a candidate for rehab.     2/13--no acute events overnight.  Patient alert and oriented x4.  Patient with no complaints this morning.  She does endorse mild headache but attributes this to neck tension.  No acute neuro changes on exam.  Patient currently requiring 1 L of supplemental oxygen via nasal cannula with SPO2 95%. Patient awaiting consult with oncology and radiation oncology, likely tomorrow.     2/12. Updated family and Neurology was at bedside. Neuro exam appreciated. Vimpat PM dose has been increased. Per Neurology MRI findings stable. Reached out to Dr. Cerda and Dr. Frye who can review MRIs on Monday. At this time Oncology on call recommended starting decadron as benefits outweigh risk. I started decadron 2mg BID for 7 days which should follow a taper.     I have personally seen and examined the patient at bedside. I discussed the plan of care with patient, family, bedside RN and neurology.     Consultants/Specialty  neurology     Code Status  Full Code     Disposition  Patient is medically cleared for discharge.   Anticipate discharge to to an inpatient rehabilitation hospital.  I have placed the appropriate orders for post-discharge needs.     Review  of Systems  Review of Systems   Constitutional: Negative for chills, fever and weight loss.   HENT: Negative.    Eyes: Negative.    Respiratory: Negative for cough, sputum production, shortness of breath and wheezing.    Cardiovascular: Negative for chest pain, palpitations, orthopnea, claudication and leg swelling.   Gastrointestinal: Negative for abdominal pain, constipation, diarrhea, heartburn, nausea and vomiting.   Genitourinary: Negative.    Musculoskeletal: Positive for neck pain.        Reports chronic   Skin: Negative.    Neurological: Positive for focal weakness, seizures, weakness and headaches. Negative for dizziness, tingling, tremors, sensory change, speech change and loss of consciousness.        Mild; chronic  Chronic left-sided weakness   Psychiatric/Behavioral: Positive for depression. The patient is nervous/anxious.    All other systems reviewed and are negative.        Physical Exam  Temp:  [35.8 °C (96.5 °F)-36.7 °C (98.1 °F)] 36.3 °C (97.4 °F)  Pulse:  [89-98] 89  Resp:  [16-18] 18  BP: (139-151)/(75-97) 139/97  SpO2:  [91 %-92 %] 91 %     Physical Exam  Vitals and nursing note reviewed.   Constitutional:       General: She is not in acute distress.     Appearance: She is ill-appearing. She is not toxic-appearing or diaphoretic.   HENT:      Head: Normocephalic and atraumatic.      Right Ear: External ear normal.      Left Ear: External ear normal.      Nose: Nose normal. No congestion.      Mouth/Throat:      Mouth: Mucous membranes are moist.      Pharynx: Oropharynx is clear.   Eyes:      General: No scleral icterus.     Pupils: Pupils are equal, round, and reactive to light.   Cardiovascular:      Rate and Rhythm: Normal rate and regular rhythm.      Pulses: Normal pulses.      Heart sounds: No murmur heard.    No friction rub. No gallop.   Pulmonary:      Effort: Pulmonary effort is normal. No respiratory distress.      Breath sounds: Normal breath sounds. No stridor. No wheezing, rhonchi  or rales.   Chest:      Chest wall: No tenderness.   Abdominal:      General: Abdomen is flat. Bowel sounds are normal. There is no distension.      Palpations: Abdomen is soft.      Tenderness: There is no abdominal tenderness. There is no guarding or rebound.   Musculoskeletal:         General: No swelling, tenderness or deformity. Normal range of motion.      Cervical back: Normal range of motion and neck supple. No rigidity or tenderness.      Right lower leg: No edema.      Left lower leg: No edema.      Comments: L arm contractures and spasticity   Skin:     General: Skin is warm.      Coloration: Skin is not jaundiced.   Neurological:      General: No focal deficit present.      Mental Status: She is alert and oriented to person, place, and time. Mental status is at baseline.      Motor: Weakness present.      Comments: L hemiparesis  Memory issues  Currently no paresthesias  L neglect present   Psychiatric:         Mood and Affect: Mood normal.         Behavior: Behavior normal.         Thought Content: Thought content normal.         Judgment: Judgment normal.      Comments: Calm            Fluids     Intake/Output Summary (Last 24 hours) at 2/17/2022 1426  Last data filed at 2/17/2022 0648      Gross per 24 hour   Intake --   Output 500 ml   Net -500 ml         Laboratory                         Imaging  MR-BRAIN-WITH & W/O   Final Result           Postoperative changes are noted in the medial right posterior frontal parietal region with rim enhancement of the postoperative cavity. Linear thick  enhancement along the medial margin of the operative cavity abutting the falx cerebri is noted.    Comparison with previous films would be helpful to determine if there has been any interval change.       Extensive T2 signal abnormality involving the bilateral cerebral hemispheric white matter is noted, most likely related to posttreatment changes.           EC-ECHOCARDIOGRAM COMPLETE W/O CONT   Final Result        CT-CTA CHEST PULMONARY ARTERY W/ RECONS   Final Result       1.  No CT evidence for pulmonary emboli.   2.  No pneumonia or pneumothorax.                   CT-HEAD W/O   Final Result       1.  Postoperative changes of right frontoparietal mass resection. No evidence of hemorrhage. Basal cisterns are patent.   2.  The right greater than left white matter is hypodense. This may reflect edema or postradiation change. This appears somewhat decreased from 12/14/2021 CT.   3.  Bilateral mastoid effusions. Correlate for mastoiditis.       DX-CHEST-PORTABLE (1 VIEW)   Final Result       Hypoinflation with minimal RIGHT midlung atelectasis.             Assessment/Plan  * Glioblastoma of frontal lobe (HCC)- (present on admission)  Assessment & Plan  History of glioblastoma s/p [place] [facility]  [place] craniotomy/resection by [facility] neurosurgeon  s/p radiation/oncology. Follows with Dr. Frye, [facility] and Dr. eCrda, Oncology  She was brought in because of weakness; declining. She does have chronic L sided weakness but per  has memory lapses and tingling of L extremity at times.   Most recent resection at [facility] 12/31/2021.  Currently working with home PT for left upper and lower extremity weakness and left hemineglect     Plan:   Neurology consulted:   Ordered EEG and MRI; results to decide how to titrate her antiepileptics.  --MRI showed no vasogenic edema or worsening mass  --EEG with no clinical seizures however increased risk  --Added decadron as per on call Oncology  --Vimpat increased per neurology   --Following with tele-neurooncologist      Constipation  Assessment & Plan  No BM for 4-5 days  --continue bowel regimen  --dulcolax 10 mg PO today     Syncope- (present on admission)  Assessment & Plan  Syncopal vs seizure episode today while working with PT at home  States she felt dizzy prior to the event then does not remember what happened afterwards.    History of seizures, related to glioblastoma.  Per   she usually has motor activity with her seizures which was not noted today. Compliant with antiepileptics  History of PE, CTA negative for acute abnormality.  Compliant with apixaban  In the ED she is in sinus tachycardia on telemetry.  EKG shows Q waves in the inferior leads and T wave flattening in the anterior lateral leads which is new compared to EKG from 12/2020.  Troponin negative and denies chest pain monitor on telemetry  CT head shows postoperative changes, no hemorrhage.  Right greater than left hypodense white matter likely reflecting edema or postradiation change that has improved since prior CT.  Echo with LVEF 75%  Does not sound like orthostatic hypotension based on presentation.    Brivaracetam dose recently increased which can cause dizziness and balance/ataxia issues  Possible arrhythmia.  Recently started on mirabegron which can cause tachycardia and rarely arrhythmia.  Lacosamide dose recently increased which is known to cause arrhythmias and other cardiovascular abnormality  --orthostatic BP     Class 1 obesity due to excess calories with serious comorbidity and body mass index (BMI) of 34.0 to 34.9 in adult- (present on admission)  Assessment & Plan  BMI 34.87     Acute respiratory failure with hypoxia (HCC)- (present on admission)  Assessment & Plan  2/14 Resolved   Syncopal episode today, found to be hypoxic in the mid 80s requiring 2 L O2.  Denies any dyspnea at this time or prior to today.    CTA chest negative for PE, acute cardiopulm abnormality  CXR with hypoventilation  Covid negative  Echocardiogram with LVEF of 75%, no shunt, no valvular abnormality  Multiple hospitalizations in the past with no reported hypoxia before per patient  ?  Could be secondary to obesity hypoventilation syndrome  --Encourage IS  --Mobilize  --now on room air      History of pulmonary embolus (PE)- (present on admission)  Assessment & Plan  Compliant with apixaban, continue  CTA chest negative for  acute PE     Seizure disorder (HCC)- (present on admission)  Assessment & Plan  Follows with Dr. Prince, Neurology, for complications for seizures and last increased her antiepileptics.  Continue lacosamide and brivaracetam  Doses of both meds increased about a month ago due to breakthrough seizures following her second glioblastoma resection  --neurology consulted, increased dose of Vimpat   --EEG showed no subclinical seizures but increased risk of seizures        Primary hypertension- (present on admission)  Assessment & Plan  At goal for acute care setting.  Continue amlodipine     Mixed anxiety and depressive disorder- (present on admission)  Assessment & Plan  Continue venlafaxine        VTE prophylaxis: therapeutic anticoagulation with Eliquis     I have performed a physical exam and reviewed and updated ROS and Plan today (2/17/2022). In review of yesterday's note (2/16/2022), there are no changes except as documented above.       Functional Status/ Changes:   Occupational Therapy  Daily Treatment     Patient Name: Melba Frye  Age:  51 y.o., Sex:  female  Medical Record #: [id number]  Today's Date: 2/16/2022        Precautions: Fall Risk  Comments: L side deficits, L AFO     Assessment     Pt seen for OT tx. Pt just BTB w/ PT upon arrival. LUE w/ hypertonic w/ minimal movement. Pt expressed motivation to regain strength and independence in ADLs and ADL transfers. Encouraged pt to continue getting up w/ nrsg staff as tolerated. Will continue to benefit from OT services while in house.      Plan     Continue current treatment plan.     DC Equipment Recommendations: Unable to determine at this time  Discharge Recommendations: Recommend post-acute placement for additional occupational therapy services prior to discharge home          02/16/22 1415   Active ROM Upper Body   Active ROM Upper Body  X   Comments LUE hypertonic   Strength Upper Body   Upper Body Strength  X   Comments LUE limited by weakness,  "non-functional   Short Term Goals   Short Term Goal # 1 Pt will demo UB dressing w/ SPV   Goal Outcome # 1 Progressing as expected   Short Term Goal # 2 Pt will demo BSC txf w/ min A   Goal Outcome # 2 Progressing as expected   Short Term Goal # 3 Pt will demo toilet hygiene w/ SPV   Goal Outcome # 3 Goal not met   Short Term Goal # 4 Pt will demo seated grooming w/ SPV   Goal Outcome # 4 Progressing as expected   Anticipated Discharge Equipment and Recommendations   DC Equipment Recommendations Unable to determine at this time   Discharge Recommendations Recommend post-acute placement for additional occupational therapy services prior to discharge home        Physical Therapy   Daily Treatment     Patient Name: Melba Frye  Age:  51 y.o., Sex:  female  Medical Record #: [id number]  Today's Date: 2/16/2022     Precautions  Precautions: (P) Fall Risk  Comments: (P) L sided deficits, L AFO when ambulating     Assessment     Pt is agreeable to participation in therapy session, requires mod assist for supine to/from sit, mod assist x2 for sit to stand complicated by L LE sliding/decresed control/strength. Pt's L LE demonstrates improved responsiveness/control/strength at end of session. Standing tolerance limited by weakness and fatigue.      Plan     Continue current treatment plan.     DC Equipment Recommendations: (P) Unable to determine at this time  Discharge Recommendations: (P) Recommend post-acute placement for additional physical therapy services prior to discharge home        Subjective     \"My back is itchy. I feel like it will feel good to get up.\"                 02/16/22 1400   Precautions   Precautions Fall Risk   Comments L sided deficits, L AFO when ambulating   Pain 0 - 10 Group   Therapist Pain Assessment Post Activity Pain Same as Prior to Activity   Non Verbal Descriptors   Non Verbal Scale  Calm   Cognition    Cognition / Consciousness WDL   Level of Consciousness Alert   Comments Pleasant, " coopertative   Passive ROM Lower Body   Passive ROM Lower Body X   Comments limited ankle DF   Active ROM Lower Body    Active ROM Lower Body  X   Comments L LLE limited (baseline)   Strength Lower Body   Lower Body Strength  X   Balance   Sitting Balance (Static) Fair   Sitting Balance (Dynamic) Fair -   Standing Balance (Static) Poor +   Standing Balance (Dynamic) Poor +   Weight Shift Sitting Fair   Weight Shift Standing Poor   Skilled Intervention Verbal Cuing   Comments with quad cane   Gait Analysis   Gait Level Of Assist Unable to Participate   Assistive Device Quad Cane   Bed Mobility    Supine to Sit Moderate Assist   Sit to Supine Moderate Assist   Scooting Minimal Assist   Skilled Intervention Verbal Cuing;Tactile Cuing;Sequencing   Functional Mobility   Sit to Stand Moderate Assist  (x2 due to L LE sliding)   Bed, Chair, Wheelchair Transfer Refused   Mobility bed mobility, STS multiple times at EOB   Skilled Intervention Verbal Cuing   Comments with quad cane   How much difficulty does the patient currently have...   Turning over in bed (including adjusting bedclothes, sheets and blankets)? 2   Sitting down on and standing up from a chair with arms (e.g., wheelchair, bedside commode, etc.) 2   Moving from lying on back to sitting on the side of the bed? 2   How much help from another person does the patient currently need...   Moving to and from a bed to a chair (including a wheelchair)? 1   Need to walk in a hospital room? 1   Climbing 3-5 steps with a railing? 1   6 clicks Mobility Score 9   Activity Tolerance   Sitting in Chair not attempte   Sitting Edge of Bed 15 min   Standing 1 min   Comments limited by weakness, L LE decreased control   Patient / Family Goals    Goal #1 Outcome Goal not met   Short Term Goals    Short Term Goal # 1 Pt will perform supine <> sit with ModA in 6 visits in order to return to PLOF   Goal Outcome # 1 Progressing as expected   Short Term Goal # 2 Pt will perform STS  with ModA and SPC in 6 visits to return to PLOF   Goal Outcome # 2 Progressing as expected   Short Term Goal # 2 B  Pt will perform sit <> stand with quad cane and CGA to improve functional independence in 6 visits   Goal Outcome # 2 B Goal not met   Short Term Goal # 3 Pt will perform functional transfer with ModA and quad cane in 6 visits to return to PLOF   Goal Outcome # 3 Goal not met   Short Term Goal # 4 Pt will ambulate 10ft with ModA and quad cane in 6 visits to initiate ambulation   Goal Outcome # 4 Goal not met   Education Group   Education Provided Role of Physical Therapist;Transfer Status   Role of Physical Therapist Patient Response Patient;Acceptance;Explanation;Verbal Demonstration   Transfer Status Patient Response Patient;Acceptance;Explanation;Demonstration;Verbal Demonstration;Action Demonstration   Anticipated Discharge Equipment and Recommendations   DC Equipment Recommendations Unable to determine at this time   Discharge Recommendations Recommend post-acute placement for additional physical therapy services prior to discharge home         Mary Bean DPT      Reviewer: Ruth Ann Fletcher  Date: 2022  Time: 1:58 PMPre-Admission Screening Form    Patient Information:   Name: Melba Frye     MRN: [id number]       : 1970      Age: 51 y.o.   Gender: female      Race: White [7]       Marital Status:  [2]  Family Contact: J Carlos Frye        Relationship: Spouse [17]  Home Phone: [phone]           Cell Phone: [phone]  Advanced Directives: None  Code Status:  FULL  Current Attending Provider: Meron Rebolledo M.D.  Referring Physician: Dr. Ramirez      Physiatrist Consult: Dr. Dodd       Referral Date: 22  Primary Payor Source:  [facility]  Secondary Payor Source:      Medical Information:   Date of Admission to Acute Care [place]2022  Room Number: S624/01  Rehabilitation Diagnosis: 0002.1 - Brain Dysfunction: Non-Traumatic  Immunization History    Administered Date(s) Administered   • Cholera Vaccine - HISTORICAL DATA 12/20/2017   • Hepatitis A Vaccine, Adult 11/29/2017   • Influenza Vaccine Quad Inj (Pf) 03/03/2020   • Influenza Vaccine Quad Inj (Preserved) 01/23/2015   • Moderna SARS-CoV-2 Vaccine 02/06/2021, 03/27/2021   • Pneumococcal polysaccharide vaccine (PPSV-23) 07/02/2021   • Tdap Vaccine 08/04/2014, 11/29/2017   • Typhoid Vaccine (Oral) - HISTORICAL DATA 11/29/2017     Allergies   Allergen Reactions   • Tape Rash     Use paper tape instead     Past Medical History:   Diagnosis Date   • Acute pulmonary embolism with acute cor pulmonale (HCC) 10/21/2021   • Anxiety    • Cancer (HCC)     brain diagnosed in October 2020    • Complicated migraine 6/9/2014   • Depression    • Dermatitis, contact 11/16/2015   • Fatigue 6/9/2014   • Hyperlipidemia 1/23/2015   • Lentigo 10/17/2018   • Menopausal symptom 7/2/2014   • Mixed anxiety and depressive disorder 6/9/2014   • Pulmonary embolism (HCC)    • Seborrheic keratoses 10/17/2018   • TMJ arthralgia 6/9/2014   • UTI (lower urinary tract infection)      Past Surgical History:   Procedure Laterality Date   • CRANIOTOMY STEALTH Right 3/9/2021    Procedure: RIGHT CRANIOTOMY, USING FRAMELESS STEREOTAXY - FOR OPEN BIOPSY WITH PHASE REVERSAL;  Surgeon: Maxwell Mao M.D.;  Location: [facility];  Service: Neurosurgery   • MYRINGOTOMY  8/27/2010    Performed by BHARGAV [address]REET at SURGERY SAME DAY [facility] ORS   • LAPAROSCOPY  5/28/2010    Performed by JACKI SHARMA at SURGERY [facility] ORS   • LYMPH NODE SAMPLING  5/28/2010    Performed by JACKI SHARMA at SURGERY [facility] ORS   • DEBULKING  5/28/2010    Performed by JACKI SHARMA at [facility] ORS   • CYSTOSCOPY  10/15/08    Performed by YU GODINEZ at SURGERY SAME DAY [facility] ORS   • VAGINAL HYSTERECTOMY SCOPE TOTAL  10/15/08    Performed by YU GODINEZ at SURGERY SAME DAY [facility] ORS   • OTHER  1984    TONSILECTOMY/ ADNOIDS   •  APPENDECTOMY  1981   • TONSILLECTOMY AND ADENOIDECTOMY         History Leading to Admission, Conditions that Caused the Need for Rehab (CMS):     H&P Dr Fry:    Hospital Medicine History & Physical Note     Date of Service  2/9/2022     Primary Care Physician  Trinity Nguyen M.D.     Code Status  Full Code     Chief Complaint       Chief Complaint   Patient presents with   • T-5000 GLF       Pt has h/o brain tumor with L sided deficit.  She fell forward today and hit her head and face.  Pt does take elequis daily.     • Shortness of Breath       Pt noted to be 86% on RA by ASHLEY post fall.  Arrives to ER, is 91-92% on RA.         History of Presenting Illness  Melba Frye is a 51 y.o. female who presented 2/9/2022 with Syncope. PMH glioblastoma, presented with seizures 12/2020, biopsy 03/2021, craniotomy and subtotal resection 06/2021 at [facility] followed by radiation therapy and chemo with temozolomide.  Follows with Dr. Cerda.  Worsening weakness 12/2021 for which she was admitted at this facility and treated with steroids with improvement in her weakness.  12/31/2021 she went back to [facility] for a second resection and tapered off steroids.  Went to rehab afterwards and is now doing home health for left upper and lower extremity weakness.  She also has left hemineglect since the second resection. PMH also of HTN and PE on apixaban.     Today while working with PT at home she suddenly felt dizzy and decided to take a small break, she does remember anything afterwards.  Per  she fell and had LOC.  No symptoms postevent, denies any dizziness, chest pain, shortness of breath.  She was noted to be hypoxic in the mid 80s by EMS and required 2 L O2.  Denies any dyspnea or SOB previously or currently, no cough.  She is compliant with apixaban.     In the ED hypoxic down to 83% on 2 L O2.  Labs essentially unremarkable.  CTA chest negative for acute changes.     EKG shows sinus rhythm with inferior Q waves  and flattening of the T waves in the anterior lateral leads.     I discussed the plan of care with patient, family and bedside RN.     Assessment/Plan:  I anticipate this patient will require at least two midnights for appropriate medical management, necessitating inpatient admission.     * Acute respiratory failure with hypoxia (HCC)- (present on admission)  Assessment & Plan  Syncopal episode today, found to be hypoxic in the mid 80s requiring 2 L O2.  Denies any dyspnea at this time or prior to today.  No cough  CTA chest negative for acute abnormality.  Covid negative  Multiple hospitalizations in the past with no reported hypoxia before per patient  Check echocardiogram, ABG.  Incentive spirometer     Syncope- (present on admission)  Assessment & Plan  Syncopal episode today while working with PT at home.  States she felt dizzy prior to the event then does not remember what happened afterwards.  Per  patient fell and EMS were called, on arrival she was found to be hypoxic and requiring oxygen.  No symptoms post event  History of seizures, related to glioblastoma.  Per  she usually has motor activity with her seizures which was not noted today. Compliant with antiepileptics  History of PE, CTA negative for acute abnormality.  Compliant with apixaban  In the ED she is in sinus tachycardia on telemetry.  EKG shows Q waves in the inferior leads and T wave flattening in the anterior lateral leads which is new compared to EKG from 12/2020.  Troponin negative and denies chest pain monitor on telemetry  CT head in the ED showsCT head shows postoperative changes, no hemorrhage.  Right greater than left hypodense white matter likely reflecting edema or postradiation change that has improved since prior CT.     Does not sound like orthostatic hypotension based on presentation.  Check orthostatic pressures  Brivaracetam dose recently increased which can cause dizziness and balance/ataxia issues  Possible  arrhythmia.  Recently started on mirabegron which can cause tachycardia and rarely arrhythmia.  Lacosamide dose recently increased which is known to cause arrhythmias and other cardiovascular abnormality  Monitor on telemetry     Seizure disorder (HCC)- (present on admission)  Assessment & Plan  Continue lacosamide and brivaracetam  Doses of both meds increased about a month ago due to breakthrough seizures following her second glioblastoma resection.  Has been controlled since that     Glioblastoma of frontal lobe (HCC)- (present on admission)  Assessment & Plan  See HPI for more details.  Most recent resection at [facility] 12/31/2021.  Currently working with home PT for left upper and lower extremity weakness and left hemineglect     Mixed anxiety and depressive disorder- (present on admission)  Assessment & Plan  Continue venlafaxine     Class 1 obesity due to excess calories with serious comorbidity and body mass index (BMI) of 34.0 to 34.9 in adult- (present on admission)  Assessment & Plan  BMI 34.87     History of pulmonary embolus (PE)- (present on admission)  Assessment & Plan  Compliant with apixaban, continue     Primary hypertension- (present on admission)  Assessment & Plan  Continue amlodipine        VTE prophylaxis: therapeutic anticoagulation with apixaban          Neurology Consult 2/11 Dr Salazar:  Neurology Initial Consult H&P  [facility] Acute [facility]rology     Referring Physician: Taj Ramirez M.D.          Chief Complaint   Patient presents with   • T-5000 GLF       Pt has h/o brain tumor with L sided deficit.  She fell forward today and hit her head and face.  Pt does take elequis daily.     • Shortness of Breath       Pt noted to be 86% on RA by [facility] post fall.  Arrives to ER, is 91-92% on RA.         History of Present Illness: Melba Frye is a 51 y.o. female with relevant history of right posterior fronto-parietal glioblastoma multiforme s/p right cranioplasty for resection (6/2021, biopsy  3/2021) at [facility], radiation and chemotherapy with temozolomide (last dose 11/2021), focal seizures (on Vimpat and Briviact), lentigo, migraine, hyperlipidemia, PE with cor pulmonale (on Eliquis), anxiety, and depression who presented to [facility] on 2/9/2022 for ground level fall, shortness of breath, and hypoxia. The patient and  report on Wednesday 2/9/22 she was walking back from the bathroom with her caregiver when she reported feeling tired, dizzy, and needed to sit. Her caregiver turned to get a chair at which time the patient fell forward to the ground without catching herself. EMS was notified and upon arrival noted the patient was hypoxic with SpO2 85% on RA. She had a period of about 15 minutes of alerted awareness/consciousness, and does not recall the events. No report of any seizure like activity, tongue biting, or bowel/bladder incontinence. The patient is followed outpatient by neurologist, Dr. Fady Davidson and inpatient neurology was consulted for possible seizure. Typical semiology of her clinical seizures historically are consistent with focal seizures arising from the right post-operative nidus, but she does history of subclinical electrographic seizures as well per Dr. Davidson.      Currently the patient is sitting up in bed, awake, alert, fully oriented, and following commands. Neurological exam is at baseline with residual left hemiparesis, LUE spasticity, and left hemisensory (visual and tactile) neglect. Denies headache, vision changes, facial weakness, numbness, tingling, ataxia, abnormal movements, or confusion.      Assessment and Plan:     Melba Frye is a 51 y.o. female with relevant history of right posterior fronto-parietal glioblastoma multiforme s/p right cranioplasty for resection (6/2021, biopsy 3/2021) at [facility], radiation and chemotherapy with temozolomide (last dose 11/2021), focal seizures (on Vimpat and Briviact), lentigo, migraine,  hyperlipidemia, PE with cor pulmonale (on Eliquis), anxiety, and depression who presented to [facility] on [facility]/9/2022 for ground level fall, shortness of breath, and hypoxia.The patient is followed outpatient by neurologist, Dr. Fady Davidson for her seizure management and inpatient neurology was consulted for possible seizure.      Routine EEG obtained on 2/9/22 was abnormal with right hemispheric slowing, breach rhythm and rare sharps to the right posterior quadrant. These findings indicate an increased risk for seizures, but not entirely unexpected given her known history of right posterior fronto-parietal GBM s/p cranioplasty. MRI brain w/wo contrast 2/10/22 compared to MRI brain w/wo contrast on 12/15/21 (obtained on last admission for seizures) appears stable with postoperative changes and no significant vasogenic edema.      The semiology of the event described prior to this admission is questionable as details are limited, but differential diagnosis also includes syncope given her hypoxia and complaint of dizziness prior to LOC.      Plan:     -Continuous VEEG for 24 hours to evaluate for subclinical seizures or electrographic discharges  -q4h and PRN neuro assessment. VS per nursing/unit protocol.   -Telemetry; currently SR. Screen for tachyarrhythmia.   -Continue Vimpat 150mg PO BID and Briviact 100mg PO BID              -Will make further adjustments as needed pending cEEG results  -Agree with orthostatic BP and HR measurements to evaluate for orthostatic hypotension per primary              -TTE appears normal with EF 75%, no gross structural abnormalities, and no shunt.   -PT/OT eval and treat.   -Fall and seizure precautions  -Follow up outpatient in Epilepsy Clinic with Dr. Davidson, established patient  -DVT PPX: SCDs and Eliquis BID     The evaluation of the patient, and recommended management, was discussed with Dr. Choudhary, Dr. Davidson, and bedside RN.      Maxwell Salazar, MSN  AGACNP-BC  Nurse Practitioner  [facility]  (t) [phone]         Physiatry Consult 2/14:  Physical Medicine and Rehabilitation Consultation                                                                            Date of initial consultation: 2/14/2022  Requested by: Taj Ramirez MD  Consulting physician: Daksha Dodd D.O.  Reason for consultation: assessment of rehabilitation needs  LOS: 5 Day(s)     Chief complaint: increased number of falls and fell on face     This history was prepared after interviewing the patient and reviewing the patient's chart at [facility].     Unreliable historian due to impaired memory from GBM     HPI: The patient is a 51 y.o. female with a past medical history of right posterior fronto-parietal glioblastoma multiforme s/p right cranioplasty for resection (6/2021, biopsy 3/2021) at [facility], with seizures, w/ secondary L sided weakness w/ ton, radiation and chemotherapy with temozolomide (last dose 11/2021), focal seizures (on Vimpat and Briviact), migraine, hyperlipidemia, PE with cor pulmonale (on Eliquis), anxiety, and depression  ;  who presented on 2/9/2022  1:53 PM after multiple ground level falls likely due to a combination of hypoxia, seizure activity, and vasogenic edema of the brain. Per documentation, on Wednesday 2/9/22 she was walking back from the bathroom with her caregiver when she reported feeling tired, dizzy, and needed to sit. Her caregiver turned to get a chair at which time the patient fell forward to the ground without catching herself. EMS was notified and upon arrival noted the patient was hypoxic with SpO2 85% on RA. She had a period of about 15 minutes of alerted awareness/consciousness, and does not recall the events. No report of any seizure like activity, tongue biting, or bowel/bladder incontinence. The patient is followed outpatient by neurologist, Dr. Fady Davidson and inpatient neurology was consulted  "for possible seizure. Typical semiology of her clinical seizures historically are consistent with focal seizures arising from the right post-operative nidus, but she does history of subclinical electrographic seizures as well per Dr. Davidson.      Respiratory infectious disease panel was sent and was negative for COVID-19, RSV, and influenza.  CTA chest was obtained and was negative for PE.  CT head was obtained and was negative for acute changes.  Echocardiogram was obtained with estimated LVEF of 75%, no shunt, and no valvular abnormality.  Neurology was consulted and an MRI was obtained which was unchanged from previous imaging. EEG performed noted abnormal readings which is not unexpected with her hx, no active seizures. Patient's Vimpat dose was increased per neurology's recommendation.       Oncology was consulted and recommend starting decadron 2mg BID x 7 days, then follow with a taper.     Follow up clinic plan includes: neurologist (Dr. Prince) outpatient.  Patient follows with Dr. Frye, radiation oncology and Dr. Cerda, oncology.     CT head with and without contrast shows:  Enhancing right frontoparietal mass with surrounding vasogenic edema.  Partial effacement of the right ventricle with 1 mm right to left midline shift     In terms of his rehabilitation history, patient has previously been at [facility] from 3/15/2021 to 3/27/2021. Per 5/2021 physical therapy note, patient \"ambulated up/down steep incline ramp CGA 40 feet, ambulated on grass x 50 feet, 5 feet on gravel/rocks with Rt walking stick for AD.\"  She was last seen by Dr. Yanet Steele at the neurorehabilitation clinic for botulinum toxin injection in her left bicep and tricep and left grastroc-soleus. Patient planned to have urodynamic study 12/2021 due to her urinary urgency and frequency with incontinence. She was planning to coordinate botox injection to bladder and left limbs with urology and physiatry but it has not been " set up.     Physiatry was consulted to assess the patient's rehabilitation needs.     Today, patient reports: She underwent another brain surgery in [place] late last year and was discharged from [facility] in [place]. She went home and has been having multiple falls. She was primarily walking at home with wheelchair use only when fatigued and in the community.      Goals for rehabilitation include: improving independence, reducing falls, and going home safely     Social and functional history:  Prior to this hospitalization, patient was independent with ADLS and mobility without an assistive device. Patient lives with  spouse in a two story home with.  caregiver for 8 hours 3 days a week. Spouse works 4 days a week     ASSESSMENT:  IMPRESSION: The patient is a 51 y.o. female with a past medical history of right posterior fronto-parietal glioblastoma multiforme s/p right cranioplasty for resection (6/2021, biopsy 3/2021) at [facility], with seizures, w/ secondary L sided weakness w/ ton, radiation and chemotherapy with temozolomide (last dose 11/2021), focal seizures (on Vimpat and Briviact), migraine, hyperlipidemia, PE with cor pulmonale (on Eliquis), anxiety, and depression  ;  who presented on 2/9/2022  1:53 PM after multiple ground level falls likely due to a combination of hypoxia, seizure activity, and vasogenic edema of the brain.     [facility] Code: 0002.1 - Brain Dysfunction: Non-Traumatic  -With acute secondary complications of: impaired ADLs and mobility, multiple falls, left sided weakness and spasticity  -with chronic conditions of: right posterior fronto-parietal glioblastoma multiforme s/p right cranioplasty for resection (6/2021, biopsy 3/2021) at [facility], with seizures, w/ secondary L sided weakness w/ ton, radiation and chemotherapy with temozolomide (last dose 11/2021), focal seizures (on Vimpat and Briviact), migraine, hyperlipidemia, PE with cor pulmonale (on Eliquis), anxiety, and depression  -and:  "     Medical Complexity:  Macrocytic anemia, mild  Hyperglycemia, mild     Data points:  Reviewed results of radiology tests  Reviewed clinical lab tests  Reviewed old records     RECOMMENDATIONS:  ##MSK  #Impaired ADLs and mobility: Agree with continuing OT/PT while admitted here.     Per 5/2021 physical therapy note, patient \"ambulated up/down steep incline ramp CGA 40 feet, ambulated on grass x 50 feet, 5 feet on gravel/rocks with Rt walking stick for AD.\" In 3/2021, she was walking with AD with CGA for over 300 feet. 2/14 PT note now reports patient did 3 feet with moderate assist.     Per 3/2021 occupational therapy note, patient was supervised with bed mobility.OT now reporting patient is mod assist for bed mobility.     Therefore, it is my medical opinion that patient would benefit from aggressive OT and PT in acute inpatient rehabilitation to minimize her risk for falls and prevent returning to the hospital.     Patient is a good candidate for acute inpatient rehabilitation provided that: patient can tolerate 3 hours of therapy a day, has aggressive therapy needs, patient is medically stable, bed becomes available, insurance authorization is obtained.     Estimated length of stay: 14-16 days  Anticipated discharge destination: home with assistance  Prognosis: good     #Posttraumatic pain, acute, controlled  Agree with tylenol 1000mg Q6hr, venlafaxine 75mg daily     ##NEURO/PSYCH  #right posterior fronto-parietal glioblastoma multiforme s/p right cranioplasty for resection (6/2021, biopsy 3/2021) at [facility], with seizures, w/ secondary L sided weakness w/ ton, radiation and chemotherapy with temozolomide (last dose 11/2021), focal seizures (on Vimpat and Briviact),   -now with vasogenic edema  -suspected seizure as contribution to falls  Seizure medication increased and on steroids per neuro - on decadron 2mg Q12 until 2/15     #Spasticity on left arm and leg  Night splint  Passive ROM  PRAFO at night  AFO with " therapy     #Chronic insomnia  Agree with melatonin 3mg QHS  Steroids may be contributing to insomnia - consider rescheduling nighttime dose to allow for better night sleep     ##GI  #Acute on chronic constipation  Last BM: today (first since admission)  Goal of one continent BM daily  Agree with pericolace 2 tab BID, miralax 17g daily, milk of mag 30ml PRN constipation  If no BM tomorrow, recommend increasing miralax to 17g BID     ##  #Chronic urinary incontinence  purewick in place  Recommend checking for urinary retention with bladder scan - if random bladder scan greater than 400mL, recommend intermittent cathing or placement of indwelling urethral catheter     ##SKIN  Recommend turning patient Q2hr and monitor for skin changes closely     DVT chemoprophlaxis: apixaban 5mg BID  Code: full resuscitation     Thank you for allowing us to participate in the care of this patient. Physiatry will continue to follow and provide recommendations, as needed.     Patient was seen for  95 minutes on unit/floor of which > 50% of time was spent on counseling and coordination of care regarding the above, including prognosis, risk reduction, benefits of treatment, and options for next stage of care.     Daksha Dodd D.O.   Physical Medicine and Rehabilitation      I have performed a physical exam and reviewed and updated history and plan today (2/14/2022).      Please note that this dictation was created using voice recognition software. I have made every reasonable attempt to correct obvious errors, but there may be errors of grammar and possibly content that I did not discover before finalizing the note.      VIDEO ELECTROENCEPHALOGRAM REPORT        Referring provider: Dr. Ramirez.      DOS: 2/10/2022 (total recording of 26 minutes).      INDICATION:  Melba Frye 51 y.o. female presenting with seizure.      CURRENT ANTIEPILEPTIC REGIMEN: Lacosamide, Brivaracetam.      TECHNIQUE: 30 channel video  electroencephalogram (EEG) was performed in accordance with the international 10-20 system. The study was reviewed in bipolar and referential montages. The recording examined the patient during wakeful and drowsy/sleep state(s).      DESCRIPTION OF THE RECORD:  During the wakefulness, the background showed an asymmetrical 8 Hz alpha activity posteriorly with amplitude of 70 mV on the left, with slowing and breach rhythm noted in the right posterior region.  There was reactivity to eye closure/opening.  A normal anterior-posterior gradient was noted with faster beta frequencies seen anteriorly.  During drowsiness, theta/delta frequencies were seen.     During the sleep state, background shows diffuse high-amplitude 4-5 Hz delta activity.       ACTIVATION PROCEDURES:   Intermittent Photic stimulation was performed in a stepwise fashion from 1 to 30 Hz, but failed to produce any background changes.         ICTAL AND/OR INTERICTAL FINDINGS:   There is slowing in the right hemisphere. A breach rhythm as well as rare sharps were noted in the right posterior quadrant. No seizures captured.      EKG: sampling of the EKG recording demonstrated sinus rhythm.      EVENTS:  None.      INTERPRETATION:  This is an abnormal video EEG recording in the awake, drowsy/sleep state(s). There is slowing in the right hemisphere. A breach rhythm as well as rare sharps were noted in the right posterior quadrant.  The findings suggest: 1)-a large underlying area of structural abnormality, 2)-an underlying skull defect, 3)-an underlying area of increased cortical irritability. The patient is at an increased risk for seizures, however no seizures captured during this study. Clinical and radiological correlation is recommended.           Darnell Metz MD   Epilepsy and Neurodiagnostics.   Clinical  of Neurology [facility] of [facility].   Diplomate in Neurology, Epilepsy, and  Electrodiagnostic Medicine.   Office: [phone]  Fax: [phone]       CONTINUOUS VIDEO ELECTROENCEPHALOGRAM REPORT        Referring provider: Dr. Davidson / Dr. Choudhary.      DOS:   2/11/2022 (recorded for 13 hours and 23 minutes).      INDICATION:  Melba Frye 51 y.o. female presenting with h/o brain tumor, seizures, ams.      CURRENT ANTIEPILEPTIC REGIMEN: Lacosamide 150 mg bid, Brivaracetam 100 mg bid.      TECHNIQUE: A 30-channel, continuous video electroencephalogram (VEEG) was performed in accordance with the international 10-20 system. This digital study was reviewed in bipolar and referential montages. The recording examined the patient during wakeful, drowsy and sleep states.      The EEG was set up and taken down by a Neurodiagnostic technologist who performed education to patient and staff.      A minimum but not limited to 23 electrodes and 23 channel recording was setup and performed by Neurodiagnostic technologist.     Impedances, electrode integrity, and technical impressions were documented a minimum of every 2-24 hour period by a Neurodiagnostic Technologist and reviewed by Interpreting physician.      DESCRIPTION OF THE RECORD:  During the awake state, background shows symmetrical 8 Hz alpha activity posteriorly with amplitude of 70 mV on the left, with slowing noted in the right hemisphere.  There was reactivity with eye opening/closure.  Normal anterior-posterior gradient was noted with faster beta frequencies seen anteriorly.  During drowsiness, high-amplitude delta frequencies were seen.     During the sleep state, background shows diffuse high-amplitude 4-5 Hz delta activity.       ACTIVATION PROCEDURES:   Not performed.         ICTAL AND/OR INTERICTAL FINDINGS:   There is slowing on the right hemisphere. A breach rhythm as well as rare sharps were noted in the right posterior quadrant. Rarely, brief rhythmic delta activity and brief runs of right posterior periodic sharps (right  PLDs) are noted. No seizures were recorded during the study.      EVENT(S):  None marked for review.      EKG: sampling review of EKG recording demonstrated sinus rhythm.         INTERPRETATION:   This is an abnormal continuous video electroencephalogram recording in the awake, drowsy and sleep state. Asymmetric background with slowing noted on the right hemisphere. A breach rhythm as well as rare sharps were noted on the right posterior quadrant.  Rarely, brief rhythmic delta activity and brief runs of right posterior periodic sharps (right PLDs) are noted. The findings suggest: 1)-a large underlying area of structural abnormality with a disruption of the gray - white matter junction, 2)-an underlying skull defect, 3)-an underlying area of increased cortical irritability. The patient is at an increased risk for seizures, however no seizures captured during this prolonged study. Clinical and radiological correlation is recommended.     Updates provided to Dr. Davidson and Dr. Choudhray.            Darnell Metz MD   Epilepsy and Neurodiagnostics.   Clinical  of Neurology [facility].   Diplomate in Neurology, Epilepsy, and Electrodiagnostic Medicine.   Office: [phone]  Fax: [phone]       CT Head 2/9:  IMPRESSION:     1.  Postoperative changes of right frontoparietal mass resection. No evidence of hemorrhage. Basal cisterns are patent.  2.  The right greater than left white matter is hypodense. This may reflect edema or postradiation change. This appears somewhat decreased from 12/14/2021 CT.  3.  Bilateral mastoid effusions. Correlate for mastoiditis.       MRI Brain 2/10:  IMPRESSION:        Postoperative changes are noted in the medial right posterior frontal parietal region with rim enhancement of the postoperative cavity. Linear thick  enhancement along the medial margin of the operative cavity abutting the falx cerebri is noted.   Comparison  with previous films would be helpful to determine if there has been any interval change.     Extensive T2 signal abnormality involving the bilateral cerebral hemispheric white matter is noted, most likely related to posttreatment changes.       Co-morbidities: See PMH  Potential Risk - Complications: Cognitive Impairment, Deep Vein Thrombosis, Incontinence, Pain, Perceptual Impairment, Pneumonia, Pressure Ulcer, Seizures and Urinary Tract Infection  Level of Risk: High    Ongoing Medical Management Needed (Medical/Nursing Needs):   Patient Active Problem List    Diagnosis Date Noted   • Constipation 02/14/2022   • Acute respiratory failure with hypoxia (HCC) 02/09/2022   • Class 1 obesity due to excess calories with serious comorbidity and body mass index (BMI) of 34.0 to 34.9 in adult 02/09/2022   • Syncope 02/09/2022   • Lesion of frontal lobe of brain 12/18/2021   • Gait instability 12/18/2021   • Acute left-sided weakness 12/16/2021   • Seizure disorder (HCC) 12/16/2021   • History of pulmonary embolus (PE) 12/16/2021   • Primary hypertension 12/15/2021   • Neurogenic bladder 11/04/2021   • Frequent UTI 11/04/2021   • Atrophic vaginitis 11/04/2021   • Other chest pain 10/21/2021   • Tachycardia 10/21/2021   • Type 2 myocardial infarction ([facility]) 10/21/2021   • Urinary frequency 10/04/2021   • Elevated blood pressure reading 04/07/2021   • Vitamin D insufficiency 03/16/2021   • Impaired mobility and ADLs 03/15/2021   • Acute blood loss as cause of postoperative anemia 03/11/2021   • 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) 03/02/2021   • Glioblastoma of frontal lobe (HCC) 03/02/2021   • Demyelinating disease of central nervous system, unspecified (HCC) 03/02/2021   • Lentigo 10/17/2018   • Seborrheic keratoses 10/17/2018   • Dermatitis, contact 11/16/2015   • Hyperlipidemia 01/23/2015   • Menopausal symptom 07/02/2014   • Neoplastic (malignant) related fatigue 06/09/2014   • Mixed  "anxiety and depressive disorder 06/09/2014   • Complicated migraine 06/09/2014   • TMJ arthralgia 06/09/2014     A/O  Current Vital Signs:   Temperature: 36.4 °C (97.6 °F) Pulse: 85 Respiration: 18 Blood Pressure: 124/73  Weight: 108 kg (237 lb 7 oz) Height: 170.2 cm (5' 7\")  Pulse Oximetry: 94 % O2 (LPM): 0      Completed Laboratory Reports:  No results for input(s): WBC, HEMOGLOBIN, HEMATOCRIT, PLATELETCT, SODIUM, POTASSIUM, BUN, CREATININE, ALBUMIN, GLUCOSE, POCGLUCOSE, INR in the last 72 hours.  Additional Labs: Not Applicable    Prior Living Situation:   Housing / Facility: 2 Story House (with 1st floor set up)  Steps In Home:  (threshold)  Lives with - Patient's Self Care Capacity: Spouse  Equipment Owned: Single Point Cane,Front-Wheel Walker    Prior Level of Function / Living Situation:   Physical Therapy: Prior Services: Intermittent Physical Support for ADL Per Family,Skilled Home Health Services  Housing / Facility: 2 Story House (with 1st floor set up)  Steps In Home:  (threshold)  Bathroom Set up: Bathtub / Shower Combination,Walk In Shower,Tub Transfer Bench,Shower Chair,Grab Bars  Equipment Owned: Single Point Cane,Front-Wheel Walker  Lives with - Patient's Self Care Capacity: Spouse  Bed Mobility: Required Assist  Transfer Status: Required Assist  Ambulation: Required Assist  Distance Ambulation (Feet):  (limited household)  Assistive Devices Used: Single Point Cane (HHA as well)  Stairs: Required Assist  Current Level of Function:   Gait Level Of Assist: Maximal Assist  Assistive Device: Quad Cane  Distance (Feet): 3 (-4)  Deviation: Antalgic,Step To,Decreased Base Of Support,Bradykinetic,Decreased Heel Strike,Decreased Toe Off (L LE hyperextension, hip hike for L foot clearance)  # of Stairs Climbed: 0  Weight Bearing Status: no restrictions  Skilled Intervention: Verbal Cuing,Tactile Cuing,Compensatory Strategies  Supine to Sit: Moderate Assist  Sit to Supine: Moderate Assist  Scooting: Minimal " Assist  Skilled Intervention: Verbal Cuing  Comments: requires support to pull and scoot  Sit to Stand: Moderate Assist  Bed, Chair, Wheelchair Transfer: Unable to Participate (deferred transfer to chair due to fatigue at end of session)  Transfer Method: Stand Step  Skilled Intervention: Verbal Cuing  Sitting in Chair: unable  Sitting Edge of Bed: 15 min  Standing: 10 min total  Occupational Therapy:   Self Feeding: Independent  Grooming / Hygiene: Independent  Bathing: Requires Assist  Dressing: Requires Assist  Toileting: Requires Assist  Prior Level Of Mobility: Supervision With Device in Home  Driving / Transportation: Relatives / Others Provide Transportation  Prior Services: Intermittent Physical Support for ADL Per Family,Skilled Home Health Services  Housing / Facility: [address] House (with 1st floor set up)  Current Level of Function:   Upper Body Dressing: Minimal Assist  Lower Body Dressing: Maximal Assist  Toileting: Maximal Assist  Skilled Intervention: Verbal Cuing  Speech Language Pathology:      Rehabilitation Prognosis/Potential: Good  Estimated Length of Stay: 10-14 days    Nursing:      Bernabe in Place    Scope/Intensity of Services Recommended:  Physical Therapy: 1.5 hr / day  5 days / week. Therapeutic Interventions Required: Maximize Endurance, Mobility, Strength and Safety  Occupational Therapy: 1.5 hr / day 5 days / week. Therapeutic Interventions Required: Maximize Self Care, ADLs, IADLs and Energy Conservation  Rehabilitation Nursin/7. Therapeutic Interventions Required: Monitor Pain, Skin, Vital Signs, Intake and Output, Labs, Safety and Family Training  Rehabilitation Physician: 3 - 5 days / week. Therapeutic Interventions Required: Medical Management    She requires 24-hour rehabilitation nursing to manage bowel and bladder function, skin care, nutrition and fluid intake, pain control, safety, medication management and patient/family goals. In addition, rehabilitation nursing will  reiterate and reinforce therapy skills and equipment use, including ADLs, as well as provide education to the patient and family. Melba Frye is willing to participate in and is able to tolerate the proposed plan of care.    Rehabilitation Goals and Plan (Expected frequency & duration of treatment in the IRF):   Return to the Community, Minimal Assist Level of Care and Family Able to Provide 24/7 Assistance  Anticipated Date of Rehabilitation Admission: 02/15/22  Patient/Family oriented IRF level of care/facility/plan: Yes  Patient/Family willing to participate in IRF care/facility/plan: Yes  Patient able to tolerate IRF level of care proposed: Yes  Patient has potential to benefit IRF level of care proposed: Yes  Comments: Not Applicable    Special Needs or Precautions - Medical Necessity:  Pain Management  IV Site: Peripheral  Current Medications:    Current Facility-Administered Medications Ordered in Epic   Medication Dose Route Frequency Provider Last Rate Last Admin   • hydrocortisone 1 % cream   Topical BID PRN Denia Alvarado D.O.   Given at 02/14/22 2026   • lidocaine (LIDODERM) 5 % 2 Patch  2 Patch Transdermal Q24HR Daksha Dodd D.O.   2 Patch at 02/14/22 1712   • polyethylene glycol/lytes (MIRALAX) PACKET 2 Packet  2 Packet Oral DAILY Denia Alvarado D.O.   2 Packet at 02/15/22 0517   • lacosamide (VIMPAT) tablet 200 mg  200 mg Oral BID RUPERT English   200 mg at 02/15/22 0517   • dexamethasone (DECADRON) tablet 2 mg  2 mg Oral Q12HRS Taj Ramirez M.D.   2 mg at 02/15/22 0517   • ALPRAZolam (XANAX) tablet 0.5 mg  0.5 mg Oral BID PRN Taj Ramirez M.D.   0.5 mg at 02/10/22 1632   • Pharmacy Consult Request ...Pain Management Review 1 Each  1 Each Other PHARMACY TO DOSE Taj Ramirez M.D.       • acetaminophen (TYLENOL) tablet 1,000 mg  1,000 mg Oral Q6HRS Taj Ramirez M.D.   1,000 mg at 02/15/22 0522    Followed by   • acetaminophen (TYLENOL) tablet 1,000 mg   1,000 mg Oral Q6HRS PRN Taj Ramirez M.D.   1,000 mg at 02/15/22 0521   • oxyCODONE immediate-release (ROXICODONE) tablet 2.5 mg  2.5 mg Oral Q3HRS PRN Taj Ramirez M.D.   2.5 mg at 02/13/22 2014    Or   • oxyCODONE immediate-release (ROXICODONE) tablet 5 mg  5 mg Oral Q3HRS PRN Taj Ramirez M.D.   5 mg at 02/14/22 2057    Or   • HYDROmorphone (Dilaudid) injection 0.25 mg  0.25 mg Intravenous Q3HRS PRN Taj Ramirez M.D.   0.25 mg at 02/12/22 1922   • nystatin (MYCOSTATIN) powder   Topical BID Irvin Marti M.D.   Given at 02/15/22 0521   • senna-docusate (PERICOLACE or SENOKOT S) 8.6-50 MG per tablet 2 Tablet  2 Tablet Oral BID Yoanna Fry M.D.   2 Tablet at 02/15/22 0517    And   • polyethylene glycol/lytes (MIRALAX) PACKET 1 Packet  1 Packet Oral QDAY PRN Yoanna Fry M.D.        And   • magnesium hydroxide (MILK OF MAGNESIA) suspension 30 mL  30 mL Oral QDAY PRN Yoanna Fry M.D.   30 mL at 02/14/22 0829    And   • bisacodyl (DULCOLAX) suppository 10 mg  10 mg Rectal QDAY PRN Yoanna Fry M.D.       • labetalol (NORMODYNE/TRANDATE) injection 10 mg  10 mg Intravenous Q4HRS PRN Yoanna Fry M.D.       • amLODIPine (NORVASC) tablet 10 mg  10 mg Oral DAILY Yoanna Fry M.D.   10 mg at 02/15/22 0518   • brivaracetam (Briviact) tablet 100 mg  100 mg Oral BID Yoanna Fry M.D.   100 mg at 02/15/22 0529   • apixaban (ELIQUIS) tablet 5 mg  5 mg Oral BID Yoanna Fry M.D.   5 mg at 02/15/22 0518   • melatonin tablet 3 mg  3 mg Oral QHS Yoanna Fry M.D.   3 mg at 02/14/22 2100   • venlafaxine (EFFEXOR) tablet 75 mg  75 mg Oral DAILY Yoanna Fry M.D.   75 mg at 02/15/22 0518     Current Outpatient Medications Ordered in Epic   Medication Sig Dispense Refill   • lacosamide (VIMPAT) 200 MG Tab tablet Take 1 Tablet by mouth 2 times a day for 30 days. 60 Tablet      Diet:   DIET ORDERS (From admission to next 24h)     Start     Ordered    02/09/22 1730  Diet Order Diet:  Regular  ALL MEALS        Question:  Diet:  Answer:  Regular    02/09/22 4558                Anticipated Discharge Destination / Patient/Family Goal:  Destination: Home with Assistance Support System: Spouse and Attendant  Anticipated home health services: OT, PT, Nursing, Social Work and Aide  Previously used HH service/ provider: Not Applicable  Anticipated DME Needs: Not Applicable  Outpatient Services: OT and PT  Alternative resources to address additional identified needs:   None  Pre-Screen Completed: 2/15/2022 8:33 AM Ruth Ann Fletcher

## 2022-02-15 NOTE — PROGRESS NOTES
Patient is alert/oriented x4, forgetful.  Patient VSS, RA  Hx of brain tumor resulting in notable L side defecits.  Patient is up to BSC w/ two person assist. She has a L ankle foot orthotic and a quad cane for use when OOB and another AFO with lambs wool to wear while in bed to help prevent foot drop of the LLE.   Patient medicated per MAR for c/o pain.  Pt is safe with needs met, compliant with medications, bed low/locked, alarm active/audible, safety precautions in place, hourly rounding.  No s/sx of acute distress.

## 2022-02-15 NOTE — CARE PLAN
The patient is Stable - Low risk of patient condition declining or worsening    Shift Goals  Clinical Goals: have a BM, work with PT/OT  Patient Goals: have a BM, rest  Family Goals: n/a    Progress made toward(s) clinical / shift goals:    Problem: Pain - Standard  Goal: Alleviation of pain or a reduction in pain to the patient’s comfort goal  Outcome: Progressing  Note: Pain controlled with scheduled Tylenol.       Problem: Skin Integrity  Goal: Skin integrity is maintained or improved  Outcome: Progressing  Note: Patient turned q2 hours.  Waffle overlay  in place.  Purewick in place.       Problem: Fall Risk  Goal: Patient will remain free from falls  Outcome: Progressing  Note: Free of falls.  Fall precautions in place.  Patient using call light appropriately.         Patient is not progressing towards the following goals:

## 2022-02-16 PROCEDURE — 700102 HCHG RX REV CODE 250 W/ 637 OVERRIDE(OP): Performed by: NURSE PRACTITIONER

## 2022-02-16 PROCEDURE — 700101 HCHG RX REV CODE 250: Performed by: PHYSICAL MEDICINE & REHABILITATION

## 2022-02-16 PROCEDURE — A9270 NON-COVERED ITEM OR SERVICE: HCPCS | Performed by: STUDENT IN AN ORGANIZED HEALTH CARE EDUCATION/TRAINING PROGRAM

## 2022-02-16 PROCEDURE — 770001 HCHG ROOM/CARE - MED/SURG/GYN PRIV*

## 2022-02-16 PROCEDURE — 700102 HCHG RX REV CODE 250 W/ 637 OVERRIDE(OP): Performed by: GENERAL PRACTICE

## 2022-02-16 PROCEDURE — 97535 SELF CARE MNGMENT TRAINING: CPT

## 2022-02-16 PROCEDURE — 99232 SBSQ HOSP IP/OBS MODERATE 35: CPT | Performed by: INTERNAL MEDICINE

## 2022-02-16 PROCEDURE — 97530 THERAPEUTIC ACTIVITIES: CPT

## 2022-02-16 PROCEDURE — 700102 HCHG RX REV CODE 250 W/ 637 OVERRIDE(OP): Performed by: INTERNAL MEDICINE

## 2022-02-16 PROCEDURE — A9270 NON-COVERED ITEM OR SERVICE: HCPCS | Performed by: NURSE PRACTITIONER

## 2022-02-16 PROCEDURE — A9270 NON-COVERED ITEM OR SERVICE: HCPCS | Performed by: INTERNAL MEDICINE

## 2022-02-16 PROCEDURE — A9270 NON-COVERED ITEM OR SERVICE: HCPCS | Performed by: GENERAL PRACTICE

## 2022-02-16 PROCEDURE — 97535 SELF CARE MNGMENT TRAINING: CPT | Mod: CO

## 2022-02-16 PROCEDURE — 700102 HCHG RX REV CODE 250 W/ 637 OVERRIDE(OP): Performed by: STUDENT IN AN ORGANIZED HEALTH CARE EDUCATION/TRAINING PROGRAM

## 2022-02-16 RX ADMIN — APIXABAN 5 MG: 5 TABLET, FILM COATED ORAL at 18:00

## 2022-02-16 RX ADMIN — VENLAFAXINE 75 MG: 75 TABLET ORAL at 05:10

## 2022-02-16 RX ADMIN — DOCUSATE SODIUM 50 MG AND SENNOSIDES 8.6 MG 2 TABLET: 8.6; 5 TABLET, FILM COATED ORAL at 05:09

## 2022-02-16 RX ADMIN — OXYCODONE 5 MG: 5 TABLET ORAL at 20:35

## 2022-02-16 RX ADMIN — APIXABAN 5 MG: 5 TABLET, FILM COATED ORAL at 05:09

## 2022-02-16 RX ADMIN — NYSTATIN: 100000 POWDER TOPICAL at 18:03

## 2022-02-16 RX ADMIN — DOCUSATE SODIUM 50 MG AND SENNOSIDES 8.6 MG 2 TABLET: 8.6; 5 TABLET, FILM COATED ORAL at 18:00

## 2022-02-16 RX ADMIN — Medication 3 MG: at 20:34

## 2022-02-16 RX ADMIN — BRIVARACETAM 100 MG: 100 TABLET, FILM COATED ORAL at 18:00

## 2022-02-16 RX ADMIN — LACOSAMIDE 200 MG: 100 TABLET, FILM COATED ORAL at 18:00

## 2022-02-16 RX ADMIN — ACETAMINOPHEN 1000 MG: 500 TABLET ORAL at 05:09

## 2022-02-16 RX ADMIN — LACOSAMIDE 200 MG: 100 TABLET, FILM COATED ORAL at 05:09

## 2022-02-16 RX ADMIN — OXYCODONE 5 MG: 5 TABLET ORAL at 10:21

## 2022-02-16 RX ADMIN — LIDOCAINE 2 PATCH: 50 PATCH TOPICAL at 18:03

## 2022-02-16 RX ADMIN — OXYCODONE 2.5 MG: 5 TABLET ORAL at 23:50

## 2022-02-16 RX ADMIN — POLYETHYLENE GLYCOL 3350 2 PACKET: 17 POWDER, FOR SOLUTION ORAL at 05:09

## 2022-02-16 RX ADMIN — AMLODIPINE BESYLATE 10 MG: 10 TABLET ORAL at 05:10

## 2022-02-16 RX ADMIN — BRIVARACETAM 100 MG: 100 TABLET, FILM COATED ORAL at 05:10

## 2022-02-16 RX ADMIN — NYSTATIN: 100000 POWDER TOPICAL at 05:14

## 2022-02-16 ASSESSMENT — ENCOUNTER SYMPTOMS
HEARTBURN: 0
TREMORS: 0
DIZZINESS: 0
ORTHOPNEA: 0
LOSS OF CONSCIOUSNESS: 0
ABDOMINAL PAIN: 0
SENSORY CHANGE: 0
CHILLS: 0
SPEECH CHANGE: 0
WHEEZING: 0
PALPITATIONS: 0
VOMITING: 0
COUGH: 0
NECK PAIN: 1
FOCAL WEAKNESS: 1
SHORTNESS OF BREATH: 0
WEAKNESS: 1
SPUTUM PRODUCTION: 0
NERVOUS/ANXIOUS: 1
NAUSEA: 0
FEVER: 0
DEPRESSION: 1
TINGLING: 0
CLAUDICATION: 0
HEADACHES: 1
WEIGHT LOSS: 0
SEIZURES: 1
DIARRHEA: 0
EYES NEGATIVE: 1
CONSTIPATION: 0

## 2022-02-16 ASSESSMENT — COGNITIVE AND FUNCTIONAL STATUS - GENERAL
MOVING TO AND FROM BED TO CHAIR: A LOT
CLIMB 3 TO 5 STEPS WITH RAILING: TOTAL
TURNING FROM BACK TO SIDE WHILE IN FLAT BAD: A LOT
MOVING FROM LYING ON BACK TO SITTING ON SIDE OF FLAT BED: A LOT
STANDING UP FROM CHAIR USING ARMS: TOTAL
SUGGESTED CMS G CODE MODIFIER MOBILITY: CM
WALKING IN HOSPITAL ROOM: TOTAL
MOBILITY SCORE: 9

## 2022-02-16 ASSESSMENT — PAIN DESCRIPTION - PAIN TYPE
TYPE: ACUTE PAIN
TYPE: ACUTE PAIN

## 2022-02-16 ASSESSMENT — GAIT ASSESSMENTS
GAIT LEVEL OF ASSIST: UNABLE TO PARTICIPATE
ASSISTIVE DEVICE: QUAD CANE

## 2022-02-16 NOTE — THERAPY
"Physical Therapy   Daily Treatment     Patient Name: Melba Frye  Age:  51 y.o., Sex:  female  Medical Record #: [id number]  Today's Date: 2/16/2022     Precautions  Precautions: (P) Fall Risk  Comments: (P) L sided deficits, L AFO when ambulating    Assessment    Pt is agreeable to participation in therapy session, requires mod assist for supine to/from sit, mod assist x2 for sit to stand complicated by L LE sliding/decresed control/strength. Pt's L LE demonstrates improved responsiveness/control/strength at end of session. Standing tolerance limited by weakness and fatigue.     Plan    Continue current treatment plan.    DC Equipment Recommendations: (P) Unable to determine at this time  Discharge Recommendations: (P) Recommend post-acute placement for additional physical therapy services prior to discharge home      Subjective    \"My back is itchy. I feel like it will feel good to get up.\"             02/16/22 1400   Precautions   Precautions Fall Risk   Comments L sided deficits, L AFO when ambulating   Pain 0 - 10 Group   Therapist Pain Assessment Post Activity Pain Same as Prior to Activity   Non Verbal Descriptors   Non Verbal Scale  Calm   Cognition    Cognition / Consciousness WDL   Level of Consciousness Alert   Comments Pleasant, coopertative   Passive ROM Lower Body   Passive ROM Lower Body X   Comments limited ankle DF   Active ROM Lower Body    Active ROM Lower Body  X   Comments L LLE limited (baseline)   Strength Lower Body   Lower Body Strength  X   Balance   Sitting Balance (Static) Fair   Sitting Balance (Dynamic) Fair -   Standing Balance (Static) Poor +   Standing Balance (Dynamic) Poor +   Weight Shift Sitting Fair   Weight Shift Standing Poor   Skilled Intervention Verbal Cuing   Comments with quad cane   Gait Analysis   Gait Level Of Assist Unable to Participate   Assistive Device Quad Cane   Bed Mobility    Supine to Sit Moderate Assist   Sit to Supine Moderate Assist   Scooting " Minimal Assist   Skilled Intervention Verbal Cuing;Tactile Cuing;Sequencing   Functional Mobility   Sit to Stand Moderate Assist  (x2 due to L LE sliding)   Bed, Chair, Wheelchair Transfer Refused   Mobility bed mobility, STS multiple times at EOB   Skilled Intervention Verbal Cuing   Comments with quad cane   How much difficulty does the patient currently have...   Turning over in bed (including adjusting bedclothes, sheets and blankets)? 2   Sitting down on and standing up from a chair with arms (e.g., wheelchair, bedside commode, etc.) 2   Moving from lying on back to sitting on the side of the bed? 2   How much help from another person does the patient currently need...   Moving to and from a bed to a chair (including a wheelchair)? 1   Need to walk in a hospital room? 1   Climbing 3-5 steps with a railing? 1   6 clicks Mobility Score 9   Activity Tolerance   Sitting in Chair not attempte   Sitting Edge of Bed 15 min   Standing 1 min   Comments limited by weakness, L LE decreased control   Patient / Family Goals    Goal #1 Outcome Goal not met   Short Term Goals    Short Term Goal # 1 Pt will perform supine <> sit with ModA in 6 visits in order to return to PLOF   Goal Outcome # 1 Progressing as expected   Short Term Goal # 2 Pt will perform STS with ModA and SPC in 6 visits to return to PLOF   Goal Outcome # 2 Progressing as expected   Short Term Goal # 2 B  Pt will perform sit <> stand with quad cane and CGA to improve functional independence in 6 visits   Goal Outcome # 2 B Goal not met   Short Term Goal # 3 Pt will perform functional transfer with ModA and quad cane in 6 visits to return to PLOF   Goal Outcome # 3 Goal not met   Short Term Goal # 4 Pt will ambulate 10ft with ModA and quad cane in 6 visits to initiate ambulation   Goal Outcome # 4 Goal not met   Education Group   Education Provided Role of Physical Therapist;Transfer Status   Role of Physical Therapist Patient Response  Patient;Acceptance;Explanation;Verbal Demonstration   Transfer Status Patient Response Patient;Acceptance;Explanation;Demonstration;Verbal Demonstration;Action Demonstration   Anticipated Discharge Equipment and Recommendations   DC Equipment Recommendations Unable to determine at this time   Discharge Recommendations Recommend post-acute placement for additional physical therapy services prior to discharge home       Mary Bean DPT

## 2022-02-16 NOTE — CARE PLAN
The patient is Stable - Low risk of patient condition declining or worsening    Shift Goals  Clinical Goals: Patient's skin integrity will be maintained      Progress made toward(s) clinical / shift goals:    Patient turned and repositioned every 2 hours, incontinence cares provided as needed to help prevent skin breakdown.       Problem: Pain - Standard  Goal: Alleviation of pain or a reduction in pain to the patient’s comfort goal  Outcome: Progressing     Problem: Knowledge Deficit - Standard  Goal: Patient and family/care givers will demonstrate understanding of plan of care, disease process/condition, diagnostic tests and medications  Outcome: Progressing     Problem: Skin Integrity  Goal: Skin integrity is maintained or improved  Outcome: Progressing     Problem: Fall Risk  Goal: Patient will remain free from falls  Outcome: Progressing

## 2022-02-16 NOTE — THERAPY
Occupational Therapy  Daily Treatment     Patient Name: Melba Frye  Age:  51 y.o., Sex:  female  Medical Record #: [id number]  Today's Date: 2/16/2022       Precautions: Fall Risk  Comments: L side deficits, L AFO    Assessment    Pt seen for OT tx. Pt just BTB w/ PT upon arrival. LUE w/ hypertonic w/ minimal movement. Pt expressed motivation to regain strength and independence in ADLs and ADL transfers. Encouraged pt to continue getting up w/ nrsg staff as tolerated. Will continue to benefit from OT services while in house.     Plan    Continue current treatment plan.    DC Equipment Recommendations: Unable to determine at this time  Discharge Recommendations: Recommend post-acute placement for additional occupational therapy services prior to discharge home       02/16/22 1415   Active ROM Upper Body   Active ROM Upper Body  X   Comments LUE hypertonic   Strength Upper Body   Upper Body Strength  X   Comments LUE limited by weakness, non-functional   Short Term Goals   Short Term Goal # 1 Pt will demo UB dressing w/ SPV   Goal Outcome # 1 Progressing as expected   Short Term Goal # 2 Pt will demo BSC txf w/ min A   Goal Outcome # 2 Progressing as expected   Short Term Goal # 3 Pt will demo toilet hygiene w/ SPV   Goal Outcome # 3 Goal not met   Short Term Goal # 4 Pt will demo seated grooming w/ SPV   Goal Outcome # 4 Progressing as expected   Anticipated Discharge Equipment and Recommendations   DC Equipment Recommendations Unable to determine at this time   Discharge Recommendations Recommend post-acute placement for additional occupational therapy services prior to discharge home

## 2022-02-16 NOTE — PROGRESS NOTES
Hospital Medicine Daily Progress Note    Date of Service  2/15/2022    Chief Complaint  Ground-level fall, shortness of breath    Hospital Course  Melba Frye is a 51-year-old female with past medical history of right posterior fronto-parietal glioblastoma status post right cranioplasty and resection (6/2021 with [facility]) and radiation and chemotherapy with temozolomide (last dose 11/2021), focal seizures (on Vimpat and Briviact), left-sided deficits, hyperlipidemia, PE on Eliquis, anxiety and depression who was admitted to the hospital on 2/9/2022 after having a ground-level fall at home.  Patient reports that she fell forward and struck her head.  She was noted to be hypoxic with oxygen saturation in the mid 80s upon arrival to the emergency department.    Upon admission, laboratory work-up was essentially unremarkable.  Respiratory infectious disease panel was sent and was negative for COVID-19, RSV, and influenza.  CTA chest was obtained and was negative for PE.  CT head was obtained and was negative for acute changes.  Echocardiogram was obtained with estimated LVEF of 75%, no shunt, and no valvular abnormality.  Neurology was consulted and an MRI was obtained which was unchanged from previous imaging. EEG performed noted abnormal readings which is not unexpected with her hx, no active seizures. Patient's Vimpat dose was increased per neurology's recommendation.  Patient is to follow up with her neurologist (Dr. Prince) outpatient.  Patient follows with Dr. Frye, radiation oncology and Dr. Cerda, oncology.    Oncology was consulted and recommend starting decadron 2mg BID x 7 days, then follow with a taper.  Patient to be seen by Dr. Cerda (oncology) and Dr. Frye (radiation oncology) who will review MRI imaging on Monday.  Patient to continue to work with PT and OT.  Per therapy notes on 2/11, PT recommending postacute placement.  Patient agreeable for SNF versus rehab placement at time of discharge.      Interval Problem Update  2/15: no new complaints. Awaiting insurance auth for inpatient rehab.    2/14-- no acute events overnight. Supplemental oxygen has been weaned; she is now on room air. No acute neuro changes on exam. Pt reports no bowel movement in 4 days, dulcolax added today. Pt awaiting consult with oncology and rad/onc today.  Patient to work with PT/OT today to determine if patient is a candidate for rehab.    2/13--no acute events overnight.  Patient alert and oriented x4.  Patient with no complaints this morning.  She does endorse mild headache but attributes this to neck tension.  No acute neuro changes on exam.  Patient currently requiring 1 L of supplemental oxygen via nasal cannula with SPO2 95%. Patient awaiting consult with oncology and radiation oncology, likely tomorrow.    2/12. Updated family and Neurology was at bedside. Neuro exam appreciated. Vimpat PM dose has been increased. Per Neurology MRI findings stable. Reached out to Dr. Cerda and Dr. Frye who can review MRIs on Monday. At this time Oncology on call recommended starting decadron as benefits outweigh risk. I started decadron 2mg BID for 7 days which should follow a taper.    I have personally seen and examined the patient at bedside. I discussed the plan of care with patient, family, bedside RN and neurology.    Consultants/Specialty  neurology    Code Status  Full Code    Disposition  Patient is medically cleared for discharge.   Anticipate discharge to to an inpatient rehabilitation hospital.  I have placed the appropriate orders for post-discharge needs.    Review of Systems  Review of Systems   Constitutional: Negative for chills, fever and weight loss.   HENT: Negative.    Eyes: Negative.    Respiratory: Negative for cough, sputum production, shortness of breath and wheezing.    Cardiovascular: Negative for chest pain, palpitations, orthopnea, claudication and leg swelling.   Gastrointestinal: Negative for abdominal  pain, constipation, diarrhea, heartburn, nausea and vomiting.   Genitourinary: Negative.    Musculoskeletal: Positive for neck pain.        Reports chronic   Skin: Negative.    Neurological: Positive for focal weakness, seizures, weakness and headaches. Negative for dizziness, tingling, tremors, sensory change, speech change and loss of consciousness.        Mild; chronic  Chronic left-sided weakness   Psychiatric/Behavioral: Positive for depression. The patient is nervous/anxious.    All other systems reviewed and are negative.       Physical Exam  Temp:  [36.3 °C (97.3 °F)-36.4 °C (97.6 °F)] 36.3 °C (97.4 °F)  Pulse:  [85-93] 90  Resp:  [14-18] 16  BP: (124-132)/(73-87) 128/85  SpO2:  [94 %-96 %] 96 %    Physical Exam  Vitals and nursing note reviewed.   Constitutional:       General: She is not in acute distress.     Appearance: She is ill-appearing. She is not toxic-appearing or diaphoretic.   HENT:      Head: Normocephalic and atraumatic.      Right Ear: External ear normal.      Left Ear: External ear normal.      Nose: Nose normal. No congestion.      Mouth/Throat:      Mouth: Mucous membranes are moist.      Pharynx: Oropharynx is clear.   Eyes:      General: No scleral icterus.     Pupils: Pupils are equal, round, and reactive to light.   Cardiovascular:      Rate and Rhythm: Normal rate and regular rhythm.      Pulses: Normal pulses.      Heart sounds: No murmur heard.  No friction rub. No gallop.    Pulmonary:      Effort: Pulmonary effort is normal. No respiratory distress.      Breath sounds: Normal breath sounds. No stridor. No wheezing, rhonchi or rales.   Chest:      Chest wall: No tenderness.   Abdominal:      General: Abdomen is flat. Bowel sounds are normal. There is no distension.      Palpations: Abdomen is soft.      Tenderness: There is no abdominal tenderness. There is no guarding or rebound.   Musculoskeletal:         General: No swelling, tenderness or deformity. Normal range of motion.       Cervical back: Normal range of motion and neck supple. No rigidity or tenderness.      Right lower leg: No edema.      Left lower leg: No edema.      Comments: L arm contractures and spasticity   Skin:     General: Skin is warm.      Coloration: Skin is not jaundiced.   Neurological:      General: No focal deficit present.      Mental Status: She is alert and oriented to person, place, and time. Mental status is at baseline.      Motor: Weakness present.      Comments: L hemiparesis  Memory issues  Currently no paresthesias  L neglect present   Psychiatric:         Mood and Affect: Mood normal.         Behavior: Behavior normal.         Thought Content: Thought content normal.         Judgment: Judgment normal.      Comments: Calm         Fluids    Intake/Output Summary (Last 24 hours) at 2/15/2022 1605  Last data filed at 2/15/2022 0900  Gross per 24 hour   Intake 660 ml   Output 1300 ml   Net -640 ml       Laboratory                        Imaging  MR-BRAIN-WITH & W/O   Final Result         Postoperative changes are noted in the medial right posterior frontal parietal region with rim enhancement of the postoperative cavity. Linear thick  enhancement along the medial margin of the operative cavity abutting the falx cerebri is noted.    Comparison with previous films would be helpful to determine if there has been any interval change.      Extensive T2 signal abnormality involving the bilateral cerebral hemispheric white matter is noted, most likely related to posttreatment changes.         EC-ECHOCARDIOGRAM COMPLETE W/O CONT   Final Result      CT-CTA CHEST PULMONARY ARTERY W/ RECONS   Final Result      1.  No CT evidence for pulmonary emboli.   2.  No pneumonia or pneumothorax.               CT-HEAD W/O   Final Result      1.  Postoperative changes of right frontoparietal mass resection. No evidence of hemorrhage. Basal cisterns are patent.   2.  The right greater than left white matter is hypodense. This may  reflect edema or postradiation change. This appears somewhat decreased from 12/14/2021 CT.   3.  Bilateral mastoid effusions. Correlate for mastoiditis.      DX-CHEST-PORTABLE (1 VIEW)   Final Result      Hypoinflation with minimal RIGHT midlung atelectasis.           Assessment/Plan  * Glioblastoma of frontal lobe (HCC)- (present on admission)  Assessment & Plan  History of glioblastoma s/p biopsy done by [place]. Mao, [facility] [place]  s/p craniotomy/resection by [facility] [place] neurosurgeon  s/p radiation/oncology. Follows with Dr. Frye, RadOnc and Dr. Cerda, Oncology  She was brought in because of weakness; declining. She does have chronic L sided weakness but per  has memory lapses and tingling of L extremity at times.   Most recent resection at [facility] 12/31/2021.  Currently working with home PT for left upper and lower extremity weakness and left hemineglect    Plan:   Neurology consulted:   Ordered EEG and MRI; results to decide how to titrate her antiepileptics.  --MRI showed no vasogenic edema or worsening mass  --EEG with no clinical seizures however increased risk  --Added decadron as per on call Oncology  --Vimpat increased per neurology   --Onc and RadOnc to review    Constipation  Assessment & Plan  No BM for 4-5 days  --continue bowel regimen  --dulcolax 10 mg PO today    Syncope- (present on admission)  Assessment & Plan  Syncopal vs seizure episode today while working with PT at home  States she felt dizzy prior to the event then does not remember what happened afterwards.    History of seizures, related to glioblastoma.  Per  she usually has motor activity with her seizures which was not noted today. Compliant with antiepileptics  History of PE, CTA negative for acute abnormality.  Compliant with apixaban  In the ED she is in sinus tachycardia on telemetry.  EKG shows Q waves in the inferior leads and T wave flattening in the anterior lateral leads which is new compared to EKG from 12/2020.  Troponin negative  and denies chest pain monitor on telemetry  CT head shows postoperative changes, no hemorrhage.  Right greater than left hypodense white matter likely reflecting edema or postradiation change that has improved since prior CT.  Echo with LVEF 75%  Does not sound like orthostatic hypotension based on presentation.    Brivaracetam dose recently increased which can cause dizziness and balance/ataxia issues  Possible arrhythmia.  Recently started on mirabegron which can cause tachycardia and rarely arrhythmia.  Lacosamide dose recently increased which is known to cause arrhythmias and other cardiovascular abnormality  --orthostatic BP    Class 1 obesity due to excess calories with serious comorbidity and body mass index (BMI) of 34.0 to 34.9 in adult- (present on admission)  Assessment & Plan  BMI 34.87    Acute respiratory failure with hypoxia (HCC)- (present on admission)  Assessment & Plan  2/14 Resolved   Syncopal episode today, found to be hypoxic in the mid 80s requiring 2 L O2.  Denies any dyspnea at this time or prior to today.    CTA chest negative for PE, acute cardiopulm abnormality  CXR with hypoventilation  Covid negative  Echocardiogram with LVEF of 75%, no shunt, no valvular abnormality  Multiple hospitalizations in the past with no reported hypoxia before per patient  ?  Could be secondary to obesity hypoventilation syndrome  --Encourage IS  --Mobilize  --now on room air     History of pulmonary embolus (PE)- (present on admission)  Assessment & Plan  Compliant with apixaban, continue  CTA chest negative for acute PE    Seizure disorder (HCC)- (present on admission)  Assessment & Plan  Follows with Dr. Prince, Neurology, for complications for seizures and last increased her antiepileptics.  Continue lacosamide and brivaracetam  Doses of both meds increased about a month ago due to breakthrough seizures following her second glioblastoma resection  --neurology consulted, increased dose of Vimpat   --EEG  showed no subclinical seizures but increased risk of seizures      Primary hypertension- (present on admission)  Assessment & Plan  Continue amlodipine            Mixed anxiety and depressive disorder- (present on admission)  Assessment & Plan  Continue venlafaxine       VTE prophylaxis: therapeutic anticoagulation with Eliquis    I have performed a physical exam and reviewed and updated ROS and Plan today (2/15/2022). In review of yesterday's note (2/14/2022), there are no changes except as documented above.

## 2022-02-16 NOTE — PROGRESS NOTES
Hospital Medicine Daily Progress Note    Date of Service  2/16/2022    Chief Complaint  Ground-level fall, shortness of breath    Hospital Course  Melba Frye is a 51-year-old female with past medical history of right posterior fronto-parietal glioblastoma status post right cranioplasty and resection (6/2021 with [facility]) and radiation and chemotherapy with temozolomide (last dose 11/2021), focal seizures (on Vimpat and Briviact), left-sided deficits, hyperlipidemia, PE on Eliquis, anxiety and depression who was admitted to the hospital on 2/9/2022 after having a ground-level fall at home.  Patient reports that she fell forward and struck her head.  She was noted to be hypoxic with oxygen saturation in the mid 80s upon arrival to the emergency department.    Upon admission, laboratory work-up was essentially unremarkable.  Respiratory infectious disease panel was sent and was negative for COVID-19, RSV, and influenza.  CTA chest was obtained and was negative for PE.  CT head was obtained and was negative for acute changes.  Echocardiogram was obtained with estimated LVEF of 75%, no shunt, and no valvular abnormality.  Neurology was consulted and an MRI was obtained which was unchanged from previous imaging. EEG performed noted abnormal readings which is not unexpected with her hx, no active seizures. Patient's Vimpat dose was increased per neurology's recommendation.  Patient is to follow up with her neurologist (Dr. Prince) outpatient.  Patient follows with Dr. Frye, radiation oncology and Dr. Cerda, oncology.    Oncology was consulted and recommend starting decadron 2mg BID x 7 days, then follow with a taper.  Patient to be seen by Dr. Cerda (oncology) and Dr. Frye (radiation oncology) who will review MRI imaging on Monday.  Patient to continue to work with PT and OT.  Per therapy notes on 2/11, PT recommending postacute placement.  Patient agreeable for SNF versus rehab placement at time of discharge.      Interval Problem Update  2/16: reports bruising to left side of face. They feel encouraged by their tele-neurooncology consult today. Awaiting insurance auth for inpatient rehab.    2/15: no new complaints. Awaiting insurance auth for inpatient rehab.    2/14-- no acute events overnight. Supplemental oxygen has been weaned; she is now on room air. No acute neuro changes on exam. Pt reports no bowel movement in 4 days, dulcolax added today. Pt awaiting consult with oncology and rad/onc today.  Patient to work with PT/OT today to determine if patient is a candidate for rehab.    2/13--no acute events overnight.  Patient alert and oriented x4.  Patient with no complaints this morning.  She does endorse mild headache but attributes this to neck tension.  No acute neuro changes on exam.  Patient currently requiring 1 L of supplemental oxygen via nasal cannula with SPO2 95%. Patient awaiting consult with oncology and radiation oncology, likely tomorrow.    2/12. Updated family and Neurology was at bedside. Neuro exam appreciated. Vimpat PM dose has been increased. Per Neurology MRI findings stable. Reached out to Dr. Cerda and Dr. Frye who can review MRIs on Monday. At this time Oncology on call recommended starting decadron as benefits outweigh risk. I started decadron 2mg BID for 7 days which should follow a taper.    I have personally seen and examined the patient at bedside. I discussed the plan of care with patient, family, bedside RN and neurology.    Consultants/Specialty  neurology    Code Status  Full Code    Disposition  Patient is medically cleared for discharge.   Anticipate discharge to to an inpatient rehabilitation hospital.  I have placed the appropriate orders for post-discharge needs.    Review of Systems  Review of Systems   Constitutional: Negative for chills, fever and weight loss.   HENT: Negative.    Eyes: Negative.    Respiratory: Negative for cough, sputum production, shortness of breath and  wheezing.    Cardiovascular: Negative for chest pain, palpitations, orthopnea, claudication and leg swelling.   Gastrointestinal: Negative for abdominal pain, constipation, diarrhea, heartburn, nausea and vomiting.   Genitourinary: Negative.    Musculoskeletal: Positive for neck pain.        Reports chronic   Skin: Negative.    Neurological: Positive for focal weakness, seizures, weakness and headaches. Negative for dizziness, tingling, tremors, sensory change, speech change and loss of consciousness.        Mild; chronic  Chronic left-sided weakness   Psychiatric/Behavioral: Positive for depression. The patient is nervous/anxious.    All other systems reviewed and are negative.       Physical Exam  Temp:  [36.3 °C (97.4 °F)-36.6 °C (97.9 °F)] 36.6 °C (97.9 °F)  Pulse:  [75-89] 84  Resp:  [16-18] 18  BP: (129-145)/(82-95) 145/90  SpO2:  [93 %-96 %] 96 %    Physical Exam  Vitals and nursing note reviewed.   Constitutional:       General: She is not in acute distress.     Appearance: She is ill-appearing. She is not toxic-appearing or diaphoretic.   HENT:      Head: Normocephalic and atraumatic.      Right Ear: External ear normal.      Left Ear: External ear normal.      Nose: Nose normal. No congestion.      Mouth/Throat:      Mouth: Mucous membranes are moist.      Pharynx: Oropharynx is clear.   Eyes:      General: No scleral icterus.     Pupils: Pupils are equal, round, and reactive to light.   Cardiovascular:      Rate and Rhythm: Normal rate and regular rhythm.      Pulses: Normal pulses.      Heart sounds: No murmur heard.    No friction rub. No gallop.   Pulmonary:      Effort: Pulmonary effort is normal. No respiratory distress.      Breath sounds: Normal breath sounds. No stridor. No wheezing, rhonchi or rales.   Chest:      Chest wall: No tenderness.   Abdominal:      General: Abdomen is flat. Bowel sounds are normal. There is no distension.      Palpations: Abdomen is soft.      Tenderness: There is no  abdominal tenderness. There is no guarding or rebound.   Musculoskeletal:         General: No swelling, tenderness or deformity. Normal range of motion.      Cervical back: Normal range of motion and neck supple. No rigidity or tenderness.      Right lower leg: No edema.      Left lower leg: No edema.      Comments: L arm contractures and spasticity   Skin:     General: Skin is warm.      Coloration: Skin is not jaundiced.   Neurological:      General: No focal deficit present.      Mental Status: She is alert and oriented to person, place, and time. Mental status is at baseline.      Motor: Weakness present.      Comments: L hemiparesis  Memory issues  Currently no paresthesias  L neglect present   Psychiatric:         Mood and Affect: Mood normal.         Behavior: Behavior normal.         Thought Content: Thought content normal.         Judgment: Judgment normal.      Comments: Calm         Fluids    Intake/Output Summary (Last 24 hours) at 2/16/2022 1435  Last data filed at 2/16/2022 0900  Gross per 24 hour   Intake 540 ml   Output 800 ml   Net -260 ml       Laboratory                        Imaging  MR-BRAIN-WITH & W/O   Final Result         Postoperative changes are noted in the medial right posterior frontal parietal region with rim enhancement of the postoperative cavity. Linear thick  enhancement along the medial margin of the operative cavity abutting the falx cerebri is noted.    Comparison with previous films would be helpful to determine if there has been any interval change.      Extensive T2 signal abnormality involving the bilateral cerebral hemispheric white matter is noted, most likely related to posttreatment changes.         EC-ECHOCARDIOGRAM COMPLETE W/O CONT   Final Result      CT-CTA CHEST PULMONARY ARTERY W/ RECONS   Final Result      1.  No CT evidence for pulmonary emboli.   2.  No pneumonia or pneumothorax.               CT-HEAD W/O   Final Result      1.  Postoperative changes of right  frontoparietal mass resection. No evidence of hemorrhage. Basal cisterns are patent.   2.  The right greater than left white matter is hypodense. This may reflect edema or postradiation change. This appears somewhat decreased from 12/14/2021 CT.   3.  Bilateral mastoid effusions. Correlate for mastoiditis.      DX-CHEST-PORTABLE (1 VIEW)   Final Result      Hypoinflation with minimal RIGHT midlung atelectasis.           Assessment/Plan  * Glioblastoma of frontal lobe (HCC)- (present on admission)  Assessment & Plan  History of glioblastoma s/p biopsy done by Dr. Mao, NSG  s/p craniotomy/resection by [facility] neurosurgeon  s/p radiation/oncology. Follows with Dr. Frye, RadOnc and Dr. Cerda, Oncology  She was brought in because of weakness; declining. She does have chronic L sided weakness but per  has memory lapses and tingling of L extremity at times.   Most recent resection at [facility] 12/31/2021.  Currently working with home PT for left upper and lower extremity weakness and left hemineglect    Plan:   Neurology consulted:   Ordered EEG and MRI; results to decide how to titrate her antiepileptics.  --MRI showed no vasogenic edema or worsening mass  --EEG with no clinical seizures however increased risk  --Added decadron as per on call Oncology  --Vimpat increased per neurology   --Onc and RadOnc to review    Constipation  Assessment & Plan  No BM for 4-5 days  --continue bowel regimen  --dulcolax 10 mg PO today    Syncope- (present on admission)  Assessment & Plan  Syncopal vs seizure episode today while working with PT at home  States she felt dizzy prior to the event then does not remember what happened afterwards.    History of seizures, related to glioblastoma.  Per  she usually has motor activity with her seizures which was not noted today. Compliant with antiepileptics  History of PE, CTA negative for acute abnormality.  Compliant with apixaban  In the ED she is in sinus tachycardia on telemetry.   EKG shows Q waves in the inferior leads and T wave flattening in the anterior lateral leads which is new compared to EKG from 12/2020.  Troponin negative and denies chest pain monitor on telemetry  CT head shows postoperative changes, no hemorrhage.  Right greater than left hypodense white matter likely reflecting edema or postradiation change that has improved since prior CT.  Echo with LVEF 75%  Does not sound like orthostatic hypotension based on presentation.    Brivaracetam dose recently increased which can cause dizziness and balance/ataxia issues  Possible arrhythmia.  Recently started on mirabegron which can cause tachycardia and rarely arrhythmia.  Lacosamide dose recently increased which is known to cause arrhythmias and other cardiovascular abnormality  --orthostatic BP    Class 1 obesity due to excess calories with serious comorbidity and body mass index (BMI) of 34.0 to 34.9 in adult- (present on admission)  Assessment & Plan  BMI 34.87    Acute respiratory failure with hypoxia (HCC)- (present on admission)  Assessment & Plan  2/14 Resolved   Syncopal episode today, found to be hypoxic in the mid 80s requiring 2 L O2.  Denies any dyspnea at this time or prior to today.    CTA chest negative for PE, acute cardiopulm abnormality  CXR with hypoventilation  Covid negative  Echocardiogram with LVEF of 75%, no shunt, no valvular abnormality  Multiple hospitalizations in the past with no reported hypoxia before per patient  ?  Could be secondary to obesity hypoventilation syndrome  --Encourage IS  --Mobilize  --now on room air     History of pulmonary embolus (PE)- (present on admission)  Assessment & Plan  Compliant with apixaban, continue  CTA chest negative for acute PE    Seizure disorder (HCC)- (present on admission)  Assessment & Plan  Follows with Dr. Prince, Neurology, for complications for seizures and last increased her antiepileptics.  Continue lacosamide and brivaracetam  Doses of both meds  increased about a month ago due to breakthrough seizures following her second glioblastoma resection  --neurology consulted, increased dose of Vimpat   --EEG showed no subclinical seizures but increased risk of seizures      Primary hypertension- (present on admission)  Assessment & Plan  Continue amlodipine            Mixed anxiety and depressive disorder- (present on admission)  Assessment & Plan  Continue venlafaxine       VTE prophylaxis: therapeutic anticoagulation with Eliquis    I have performed a physical exam and reviewed and updated ROS and Plan today (2/16/2022). In review of yesterday's note (2/15/2022), there are no changes except as documented above.

## 2022-02-16 NOTE — PROGRESS NOTES
Assumed care of patient this shift. Patient is alert and oriented x 4, forgetful. Able to make her needs known. Complained of pain this shift, medicated with PRN; see MAR. Fall prevention tactics in place, bed locked and in lowest position and bed alarm on for safety.

## 2022-02-16 NOTE — DISCHARGE PLANNING
0835: Pending insurance authorization from [facility], [facility] will continue to follow.    0955: Contacted [facility]  Sneha Randhawa [phone] ext [phone], left voice message.     1126: Received call back from [facility] cm Sneha yoder is being reviewed by the [facility] medical director. Estimated time unknown, could be a day or two per . [facility] will continue to follow.

## 2022-02-17 PROCEDURE — 99232 SBSQ HOSP IP/OBS MODERATE 35: CPT | Performed by: INTERNAL MEDICINE

## 2022-02-17 PROCEDURE — 700102 HCHG RX REV CODE 250 W/ 637 OVERRIDE(OP): Performed by: NURSE PRACTITIONER

## 2022-02-17 PROCEDURE — 700101 HCHG RX REV CODE 250: Performed by: PHYSICAL MEDICINE & REHABILITATION

## 2022-02-17 PROCEDURE — A9270 NON-COVERED ITEM OR SERVICE: HCPCS | Performed by: INTERNAL MEDICINE

## 2022-02-17 PROCEDURE — 700102 HCHG RX REV CODE 250 W/ 637 OVERRIDE(OP): Performed by: GENERAL PRACTICE

## 2022-02-17 PROCEDURE — 700102 HCHG RX REV CODE 250 W/ 637 OVERRIDE(OP): Performed by: STUDENT IN AN ORGANIZED HEALTH CARE EDUCATION/TRAINING PROGRAM

## 2022-02-17 PROCEDURE — 99232 SBSQ HOSP IP/OBS MODERATE 35: CPT | Performed by: PHYSICAL MEDICINE & REHABILITATION

## 2022-02-17 PROCEDURE — A9270 NON-COVERED ITEM OR SERVICE: HCPCS | Performed by: GENERAL PRACTICE

## 2022-02-17 PROCEDURE — 770006 HCHG ROOM/CARE - MED/SURG/GYN SEMI*

## 2022-02-17 PROCEDURE — 700102 HCHG RX REV CODE 250 W/ 637 OVERRIDE(OP): Performed by: INTERNAL MEDICINE

## 2022-02-17 PROCEDURE — A9270 NON-COVERED ITEM OR SERVICE: HCPCS | Performed by: NURSE PRACTITIONER

## 2022-02-17 PROCEDURE — A9270 NON-COVERED ITEM OR SERVICE: HCPCS | Performed by: STUDENT IN AN ORGANIZED HEALTH CARE EDUCATION/TRAINING PROGRAM

## 2022-02-17 RX ADMIN — NYSTATIN: 100000 POWDER TOPICAL at 05:36

## 2022-02-17 RX ADMIN — LIDOCAINE 2 PATCH: 50 PATCH TOPICAL at 16:38

## 2022-02-17 RX ADMIN — OXYCODONE 5 MG: 5 TABLET ORAL at 15:43

## 2022-02-17 RX ADMIN — Medication 3 MG: at 20:38

## 2022-02-17 RX ADMIN — VENLAFAXINE 75 MG: 75 TABLET ORAL at 05:30

## 2022-02-17 RX ADMIN — ACETAMINOPHEN 1000 MG: 500 TABLET ORAL at 18:13

## 2022-02-17 RX ADMIN — LACOSAMIDE 200 MG: 100 TABLET, FILM COATED ORAL at 05:30

## 2022-02-17 RX ADMIN — HYDROCORTISONE: 0.01 CREAM TOPICAL at 23:46

## 2022-02-17 RX ADMIN — AMLODIPINE BESYLATE 10 MG: 10 TABLET ORAL at 05:30

## 2022-02-17 RX ADMIN — OXYCODONE 2.5 MG: 5 TABLET ORAL at 08:01

## 2022-02-17 RX ADMIN — DOCUSATE SODIUM 50 MG AND SENNOSIDES 8.6 MG 2 TABLET: 8.6; 5 TABLET, FILM COATED ORAL at 18:00

## 2022-02-17 RX ADMIN — APIXABAN 5 MG: 5 TABLET, FILM COATED ORAL at 18:00

## 2022-02-17 RX ADMIN — LACOSAMIDE 200 MG: 100 TABLET, FILM COATED ORAL at 18:14

## 2022-02-17 RX ADMIN — OXYCODONE 5 MG: 5 TABLET ORAL at 02:53

## 2022-02-17 RX ADMIN — BRIVARACETAM 100 MG: 100 TABLET, FILM COATED ORAL at 20:47

## 2022-02-17 RX ADMIN — BRIVARACETAM 100 MG: 100 TABLET, FILM COATED ORAL at 05:30

## 2022-02-17 RX ADMIN — DOCUSATE SODIUM 50 MG AND SENNOSIDES 8.6 MG 2 TABLET: 8.6; 5 TABLET, FILM COATED ORAL at 05:30

## 2022-02-17 RX ADMIN — OXYCODONE 5 MG: 5 TABLET ORAL at 20:39

## 2022-02-17 RX ADMIN — APIXABAN 5 MG: 5 TABLET, FILM COATED ORAL at 05:30

## 2022-02-17 ASSESSMENT — ENCOUNTER SYMPTOMS
SPUTUM PRODUCTION: 0
EYES NEGATIVE: 1
ORTHOPNEA: 0
DIARRHEA: 0
LOSS OF CONSCIOUSNESS: 0
ABDOMINAL PAIN: 0
WEIGHT LOSS: 0
NERVOUS/ANXIOUS: 1
SENSORY CHANGE: 0
VOMITING: 0
COUGH: 0
HEARTBURN: 0
PALPITATIONS: 0
HEADACHES: 1
NAUSEA: 0
DEPRESSION: 1
WEAKNESS: 1
CHILLS: 0
WHEEZING: 0
FEVER: 0
FOCAL WEAKNESS: 1
DIZZINESS: 0
SEIZURES: 1
TINGLING: 0
CONSTIPATION: 0
SHORTNESS OF BREATH: 0
TREMORS: 0
SPEECH CHANGE: 0
CLAUDICATION: 0
NECK PAIN: 1

## 2022-02-17 ASSESSMENT — PAIN DESCRIPTION - PAIN TYPE
TYPE: ACUTE PAIN

## 2022-02-17 NOTE — PROGRESS NOTES
Physical Medicine and Rehabilitation Consultation              Date of initial consultation: 2/14/2022  Requested by: Taj Ramirez MD  Consulting physician: Daksha Dodd D.O.  Reason for consultation: assessment of rehabilitation needs  LOS: 8 Day(s)    SUBJECTIVE  Patient seen in room. No visitors present  GI: having regular BM. Continent  : incontinent, using purewick  Psych: sleep better  MSK: denies pain  Neuro: denies headache    Has been working with therapies. Last 2/16 with OT and PT - continuing to progress. Reviewed ways to manage her left arm/leg spasticity.    Medications:  Current Facility-Administered Medications   Medication Dose   • hydrocortisone 1 % cream     • lidocaine (LIDODERM) 5 % 2 Patch  2 Patch   • polyethylene glycol/lytes (MIRALAX) PACKET 2 Packet  2 Packet   • lacosamide (VIMPAT) tablet 200 mg  200 mg   • ALPRAZolam (XANAX) tablet 0.5 mg  0.5 mg   • Pharmacy Consult Request ...Pain Management Review 1 Each  1 Each   • acetaminophen (TYLENOL) tablet 1,000 mg  1,000 mg   • oxyCODONE immediate-release (ROXICODONE) tablet 2.5 mg  2.5 mg    Or   • oxyCODONE immediate-release (ROXICODONE) tablet 5 mg  5 mg    Or   • HYDROmorphone (Dilaudid) injection 0.25 mg  0.25 mg   • senna-docusate (PERICOLACE or SENOKOT S) 8.6-50 MG per tablet 2 Tablet  2 Tablet    And   • polyethylene glycol/lytes (MIRALAX) PACKET 1 Packet  1 Packet    And   • magnesium hydroxide (MILK OF MAGNESIA) suspension 30 mL  30 mL    And   • bisacodyl (DULCOLAX) suppository 10 mg  10 mg   • labetalol (NORMODYNE/TRANDATE) injection 10 mg  10 mg   • amLODIPine (NORVASC) tablet 10 mg  10 mg   • brivaracetam (Briviact) tablet 100 mg  100 mg   • apixaban (ELIQUIS) tablet 5 mg  5 mg   • melatonin tablet 3 mg  3 mg   • venlafaxine (EFFEXOR) tablet 75 mg  75 mg       Allergies:  Allergies   Allergen Reactions   • Tape Rash     Use paper tape instead     Physical Exam:  Vitals: /97   Pulse 89   Temp 36.3 °C (97.4  "°F) (Temporal)   Resp 18   Ht 1.702 m (5' 7\")   Wt 108 kg (237 lb 7 oz)   SpO2 91%   General: well-groomed in no acute distress, no visitors present  Eyes: no scleral icterus or conjunctival injection  Ears, nose, mouth and throat: moist oral mucosa  Cardiovascular: good peripheral perfusion, no palor  Respiratory: breathing comfortably without use of accessory muscles  Gastrointestinal: soft, nontender, nondistended  Genitourinary: no indwelling east - purewick in place  Musculoskeletal: good symmetry in bilateral shoulders,   Skin: no wounds seen on exposed skin    Neurologic:  Mental status:  A&Ox4 (person, place, date, situation) answers questions appropriately follows commands  Speech: fluent, no aphasia or dysarthria  Motor-not tested today:    Upper Extremity  Myotome R L   Shoulder flexion C5 5/5 0/5   Elbow flexion C5 5/5 0/5   Wrist extension C6 5/5 0/5   Elbow extension C7 5/5 0/5   Finger flexion C8 5/5 0/5   Finger abduction T1 5/5 0/5     Lower Extremity Myotome R L   Hip flexion L2 5/5 4/5   Knee extension L3 5/5 4/5   Ankle dorsiflexion L4 5/5 1/5   Toe extension L5 5/5 0/5   Ankle plantarflexion S1 5/5 0/5     DTRs: 2+ in bilateral  biceps, 2+ in bilateral patellar tendons  Upgoing toes on left, clonus on right    Tone: +spasticity in triceps, biceps, gastroc-soleus, and quads in left    Psychiatric: appropriate affect  Hematologic/lymphatic/immunologic: ++IV access, no bruises seen on exposed skin    Labs: Reviewed and significant for   No results for input(s): RBC, HEMOGLOBIN, HEMATOCRIT, PLATELETCT, PROTHROMBTM, APTT, INR, IRON, FERRITIN, TOTIRONBC in the last 72 hours.      No results found for this or any previous visit (from the past 24 hour(s)).      ASSESSMENT:  IMPRESSION: The patient is a 51 y.o. female with a past medical history of right posterior fronto-parietal glioblastoma multiforme s/p right cranioplasty for resection (6/2021, biopsy 3/2021) at [facility], with seizures, w/ secondary " "L sided weakness w/ ton, radiation and chemotherapy with temozolomide (last dose 11/2021), focal seizures (on Vimpat and Briviact), migraine, hyperlipidemia, PE with cor pulmonale (on Eliquis), anxiety, and depression  ;  who presented on 2/9/2022  1:53 PM after multiple ground level falls likely due to a combination of hypoxia, seizure activity, and vasogenic edema of the brain.    [facility] Code: 0002.1 - Brain Dysfunction: Non-Traumatic  -With acute secondary complications of: impaired ADLs and mobility, multiple falls, left sided weakness and spasticity  -with chronic conditions of: right posterior fronto-parietal glioblastoma multiforme s/p right cranioplasty for resection (6/2021, biopsy 3/2021) at [facility], with seizures, w/ secondary L sided weakness w/ ton, radiation and chemotherapy with temozolomide (last dose 11/2021), focal seizures (on Vimpat and Briviact), migraine, hyperlipidemia, PE with cor pulmonale (on Eliquis), anxiety, and depression  -and:     Medical Complexity:  Macrocytic anemia, mild  Hyperglycemia, mild    Data points:  Reviewed clinical lab tests    RECOMMENDATIONS:  ##MSK  #Impaired ADLs and mobility: Agree with continuing OT/PT while admitted here.    Per 5/2021 physical therapy note, patient \"ambulated up/down steep incline ramp CGA 40 feet, ambulated on grass x 50 feet, 5 feet on gravel/rocks with Rt walking stick for AD.\" In 3/2021, she was walking with AD with CGA for over 300 feet. 2/14 PT note now reports patient did 3 feet with moderate assist.    Per 3/2021 occupational therapy note, patient was supervised with bed mobility.OT now reporting patient is mod assist for bed mobility.    Therefore, it is my medical opinion that patient would benefit from aggressive OT and PT in acute inpatient rehabilitation to minimize her risk for falls and prevent returning to the hospital.    Patient is a good candidate for acute inpatient rehabilitation provided that: patient can tolerate 3 hours of " therapy a day, has aggressive therapy needs, patient is medically stable, bed becomes available, insurance authorization is obtained.    Estimated length of stay: 14-16 days  Anticipated discharge destination: home with assistance  Prognosis: good    #Posttraumatic pain, acute, controlled  Agree with tylenol 1000mg Q6hr PRN pain, venlafaxine 75mg daily, oxycodone 2.5-5mg Q3hr PRN pain  Ordered discontinuation of IV dilaudid  Recommend encouraging use of tylenol as first line pain medication    ##NEURO/PSYCH  #right posterior fronto-parietal glioblastoma multiforme s/p right cranioplasty for resection (6/2021, biopsy 3/2021) at [facility], with seizures, w/ secondary L sided weakness w/ ton, radiation and chemotherapy with temozolomide (last dose 11/2021), focal seizures (on Vimpat and Briviact),   -now with vasogenic edema  -suspected seizure as contribution to falls  Seizure medication increased and on steroids per neuro - completed decadron 2mg Q12 on 2/15    #Spasticity on left arm and leg  Night splint  Passive ROM  PRAFO at night  AFO with therapy    #Chronic insomnia  Continue melatonin 3mg QHS    ##GI  #Acute on chronic constipation  Last BM: daily, continent  Goal of one continent BM daily  Continue pericolace 2 tab daily, miralax 17g BID, milk of mag 30ml PRN constipation    ##  #Chronic urinary incontinence  purewick in place  Recommend checking for urinary retention with bladder scan - if random bladder scan greater than 400mL, recommend intermittent cathing or placement of indwelling urethral catheter    ##SKIN  Recommend turning patient Q2hr and monitor for skin changes closely    DVT chemoprophlaxis: apixaban 5mg BID  Code: full resuscitation    Thank you for allowing us to participate in the care of this patient. Physiatry will continue to follow and provide recommendations, as needed.    Patient was seen for  26 minutes on unit/floor of which > 50% of time was spent on counseling and coordination of care  regarding the above, including prognosis, risk reduction, benefits of treatment, and options for next stage of care.    Daksha Dodd D.O.   Physical Medicine and Rehabilitation     I have performed a physical exam and reviewed and updated history and plan today (2/17/2022).     Please note that this dictation was created using voice recognition software. I have made every reasonable attempt to correct obvious errors, but there may be errors of grammar and possibly content that I did not discover before finalizing the note.

## 2022-02-17 NOTE — PROGRESS NOTES
Hospital Medicine Daily Progress Note    Date of Service  2/17/2022    Chief Complaint  Ground-level fall, shortness of breath    Hospital Course  Melba Frye is a 51-year-old female with past medical history of right posterior fronto-parietal glioblastoma status post right cranioplasty and resection (6/2021 with [facility]) and radiation and chemotherapy with temozolomide (last dose 11/2021), focal seizures (on Vimpat and Briviact), left-sided deficits, hyperlipidemia, PE on Eliquis, anxiety and depression who was admitted to the hospital on 2/9/2022 after having a ground-level fall at home.  Patient reports that she fell forward and struck her head.  She was noted to be hypoxic with oxygen saturation in the mid 80s upon arrival to the emergency department.    Upon admission, laboratory work-up was essentially unremarkable.  Respiratory infectious disease panel was sent and was negative for COVID-19, RSV, and influenza.  CTA chest was obtained and was negative for PE.  CT head was obtained and was negative for acute changes.  Echocardiogram was obtained with estimated LVEF of 75%, no shunt, and no valvular abnormality.  Neurology was consulted and an MRI was obtained which was unchanged from previous imaging. EEG performed noted abnormal readings which is not unexpected with her hx, no active seizures. Patient's Vimpat dose was increased per neurology's recommendation.  Patient is to follow up with her neurologist (Dr. Prince) outpatient.  Patient follows with Dr. Frye, radiation oncology and Dr. Cerda, oncology.    Oncology was consulted and recommend starting decadron 2mg BID x 7 days, then follow with a taper.  Patient to be seen by Dr. Cerda (oncology) and Dr. Frye (radiation oncology) who will review MRI imaging on Monday.  Patient to continue to work with PT and OT.  Per therapy notes on 2/11, PT recommending postacute placement.  Patient agreeable for SNF versus rehab placement at time of discharge.      Interval Problem Update  2/17: no new complaints. Awaiting placement. Appreciate CM assistance.    2/16: reports bruising to left side of face. They feel encouraged by their tele-neurooncology consult today. Awaiting insurance auth for inpatient rehab.    2/15: no new complaints. Awaiting insurance auth for inpatient rehab.    2/14-- no acute events overnight. Supplemental oxygen has been weaned; she is now on room air. No acute neuro changes on exam. Pt reports no bowel movement in 4 days, dulcolax added today. Pt awaiting consult with oncology and rad/onc today.  Patient to work with PT/OT today to determine if patient is a candidate for rehab.    2/13--no acute events overnight.  Patient alert and oriented x4.  Patient with no complaints this morning.  She does endorse mild headache but attributes this to neck tension.  No acute neuro changes on exam.  Patient currently requiring 1 L of supplemental oxygen via nasal cannula with SPO2 95%. Patient awaiting consult with oncology and radiation oncology, likely tomorrow.    2/12. Updated family and Neurology was at bedside. Neuro exam appreciated. Vimpat PM dose has been increased. Per Neurology MRI findings stable. Reached out to Dr. Cerda and Dr. Frye who can review MRIs on Monday. At this time Oncology on call recommended starting decadron as benefits outweigh risk. I started decadron 2mg BID for 7 days which should follow a taper.    I have personally seen and examined the patient at bedside. I discussed the plan of care with patient, family, bedside RN and neurology.    Consultants/Specialty  neurology    Code Status  Full Code    Disposition  Patient is medically cleared for discharge.   Anticipate discharge to to an inpatient rehabilitation hospital.  I have placed the appropriate orders for post-discharge needs.    Review of Systems  Review of Systems   Constitutional: Negative for chills, fever and weight loss.   HENT: Negative.    Eyes: Negative.     Respiratory: Negative for cough, sputum production, shortness of breath and wheezing.    Cardiovascular: Negative for chest pain, palpitations, orthopnea, claudication and leg swelling.   Gastrointestinal: Negative for abdominal pain, constipation, diarrhea, heartburn, nausea and vomiting.   Genitourinary: Negative.    Musculoskeletal: Positive for neck pain.        Reports chronic   Skin: Negative.    Neurological: Positive for focal weakness, seizures, weakness and headaches. Negative for dizziness, tingling, tremors, sensory change, speech change and loss of consciousness.        Mild; chronic  Chronic left-sided weakness   Psychiatric/Behavioral: Positive for depression. The patient is nervous/anxious.    All other systems reviewed and are negative.       Physical Exam  Temp:  [35.8 °C (96.5 °F)-36.7 °C (98.1 °F)] 36.3 °C (97.4 °F)  Pulse:  [89-98] 89  Resp:  [16-18] 18  BP: (139-151)/(75-97) 139/97  SpO2:  [91 %-92 %] 91 %    Physical Exam  Vitals and nursing note reviewed.   Constitutional:       General: She is not in acute distress.     Appearance: She is ill-appearing. She is not toxic-appearing or diaphoretic.   HENT:      Head: Normocephalic and atraumatic.      Right Ear: External ear normal.      Left Ear: External ear normal.      Nose: Nose normal. No congestion.      Mouth/Throat:      Mouth: Mucous membranes are moist.      Pharynx: Oropharynx is clear.   Eyes:      General: No scleral icterus.     Pupils: Pupils are equal, round, and reactive to light.   Cardiovascular:      Rate and Rhythm: Normal rate and regular rhythm.      Pulses: Normal pulses.      Heart sounds: No murmur heard.    No friction rub. No gallop.   Pulmonary:      Effort: Pulmonary effort is normal. No respiratory distress.      Breath sounds: Normal breath sounds. No stridor. No wheezing, rhonchi or rales.   Chest:      Chest wall: No tenderness.   Abdominal:      General: Abdomen is flat. Bowel sounds are normal. There is no  distension.      Palpations: Abdomen is soft.      Tenderness: There is no abdominal tenderness. There is no guarding or rebound.   Musculoskeletal:         General: No swelling, tenderness or deformity. Normal range of motion.      Cervical back: Normal range of motion and neck supple. No rigidity or tenderness.      Right lower leg: No edema.      Left lower leg: No edema.      Comments: L arm contractures and spasticity   Skin:     General: Skin is warm.      Coloration: Skin is not jaundiced.   Neurological:      General: No focal deficit present.      Mental Status: She is alert and oriented to person, place, and time. Mental status is at baseline.      Motor: Weakness present.      Comments: L hemiparesis  Memory issues  Currently no paresthesias  L neglect present   Psychiatric:         Mood and Affect: Mood normal.         Behavior: Behavior normal.         Thought Content: Thought content normal.         Judgment: Judgment normal.      Comments: Calm         Fluids    Intake/Output Summary (Last 24 hours) at 2/17/2022 1426  Last data filed at 2/17/2022 0648  Gross per 24 hour   Intake --   Output 500 ml   Net -500 ml       Laboratory                        Imaging  MR-BRAIN-WITH & W/O   Final Result         Postoperative changes are noted in the medial right posterior frontal parietal region with rim enhancement of the postoperative cavity. Linear thick  enhancement along the medial margin of the operative cavity abutting the falx cerebri is noted.    Comparison with previous films would be helpful to determine if there has been any interval change.      Extensive T2 signal abnormality involving the bilateral cerebral hemispheric white matter is noted, most likely related to posttreatment changes.         EC-ECHOCARDIOGRAM COMPLETE W/O CONT   Final Result      CT-CTA CHEST PULMONARY ARTERY W/ RECONS   Final Result      1.  No CT evidence for pulmonary emboli.   2.  No pneumonia or pneumothorax.                CT-HEAD W/O   Final Result      1.  Postoperative changes of right frontoparietal mass resection. No evidence of hemorrhage. Basal cisterns are patent.   2.  The right greater than left white matter is hypodense. This may reflect edema or postradiation change. This appears somewhat decreased from 12/14/2021 CT.   3.  Bilateral mastoid effusions. Correlate for mastoiditis.      DX-CHEST-PORTABLE (1 VIEW)   Final Result      Hypoinflation with minimal RIGHT midlung atelectasis.           Assessment/Plan  * Glioblastoma of frontal lobe (HCC)- (present on admission)  Assessment & Plan  History of [place] [facility] [place]otomy/resection by [facility] [place] neurosurgeon  s/p radiation/oncology. Follows with Dr. Frye [facility] and Dr. Cerda, Oncology  She was brought in because of weakness; declining. She does have chronic L sided weakness but per  has memory lapses and tingling of L extremity at times.   Most recent resection at [facility] 12/31/2021.  Currently working with home PT for left upper and lower extremity weakness and left hemineglect    Plan:   Neurology consulted:   Ordered EEG and MRI; results to decide how to titrate her antiepileptics.  --MRI showed no vasogenic edema or worsening mass  --EEG with no clinical seizures however increased risk  --Added decadron as per on call Oncology  --Vimpat increased per neurology   --Following with tele-neurooncologist     Constipation  Assessment & Plan  No BM for 4-5 days  --continue bowel regimen  --dulcolax 10 mg PO today    Syncope- (present on admission)  Assessment & Plan  Syncopal vs seizure episode today while working with PT at home  States she felt dizzy prior to the event then does not remember what happened afterwards.    History of seizures, related to glioblastoma.  Per  she usually has motor activity with her seizures which was not noted today. Compliant with antiepileptics  History of PE, CTA negative for acute abnormality.   Compliant with apixaban  In the ED she is in sinus tachycardia on telemetry.  EKG shows Q waves in the inferior leads and T wave flattening in the anterior lateral leads which is new compared to EKG from 12/2020.  Troponin negative and denies chest pain monitor on telemetry  CT head shows postoperative changes, no hemorrhage.  Right greater than left hypodense white matter likely reflecting edema or postradiation change that has improved since prior CT.  Echo with LVEF 75%  Does not sound like orthostatic hypotension based on presentation.    Brivaracetam dose recently increased which can cause dizziness and balance/ataxia issues  Possible arrhythmia.  Recently started on mirabegron which can cause tachycardia and rarely arrhythmia.  Lacosamide dose recently increased which is known to cause arrhythmias and other cardiovascular abnormality  --orthostatic BP    Class 1 obesity due to excess calories with serious comorbidity and body mass index (BMI) of 34.0 to 34.9 in adult- (present on admission)  Assessment & Plan  BMI 34.87    Acute respiratory failure with hypoxia (HCC)- (present on admission)  Assessment & Plan  2/14 Resolved   Syncopal episode today, found to be hypoxic in the mid 80s requiring 2 L O2.  Denies any dyspnea at this time or prior to today.    CTA chest negative for PE, acute cardiopulm abnormality  CXR with hypoventilation  Covid negative  Echocardiogram with LVEF of 75%, no shunt, no valvular abnormality  Multiple hospitalizations in the past with no reported hypoxia before per patient  ?  Could be secondary to obesity hypoventilation syndrome  --Encourage IS  --Mobilize  --now on room air     History of pulmonary embolus (PE)- (present on admission)  Assessment & Plan  Compliant with apixaban, continue  CTA chest negative for acute PE    Seizure disorder (HCC)- (present on admission)  Assessment & Plan  Follows with Dr. Prince, Neurology, for complications for seizures and last increased her  antiepileptics.  Continue lacosamide and brivaracetam  Doses of both meds increased about a month ago due to breakthrough seizures following her second glioblastoma resection  --neurology consulted, increased dose of Vimpat   --EEG showed no subclinical seizures but increased risk of seizures      Primary hypertension- (present on admission)  Assessment & Plan  At goal for acute care setting.  Continue amlodipine    Mixed anxiety and depressive disorder- (present on admission)  Assessment & Plan  Continue venlafaxine       VTE prophylaxis: therapeutic anticoagulation with Eliquis    I have performed a physical exam and reviewed and updated ROS and Plan today (2/17/2022). In review of yesterday's note (2/16/2022), there are no changes except as documented above.

## 2022-02-17 NOTE — CARE PLAN
The patient is Watcher - Medium risk of patient condition declining or worsening    Shift Goals  Clinical Goals: pain control and maintain skin integrity  Patient Goals: sleep  Family Goals: n/a    Progress made toward(s) clinical / shift goals: patient complained of left sided rib pain controlled with 2.5 & 5mg of oxycodone. She was able to sleep intermittently between pain medication. Q2 hour turns to maintain skin integrity. She uses call light frequently and is able to make her needs known.    Patient is not progressing towards the following goals:

## 2022-02-17 NOTE — DISCHARGE PLANNING
Per insurance patient has been denied IPR, Dr Dodd is going to call for [id number]. Updated cm, TCC will follow.

## 2022-02-18 ENCOUNTER — HOSPITAL ENCOUNTER (INPATIENT)
Facility: REHABILITATION | Age: 52
LOS: 21 days | DRG: 101 | End: 2022-03-11
Attending: PHYSICAL MEDICINE & REHABILITATION | Admitting: PHYSICAL MEDICINE & REHABILITATION
Payer: COMMERCIAL

## 2022-02-18 VITALS
RESPIRATION RATE: 18 BRPM | BODY MASS INDEX: 37.27 KG/M2 | HEART RATE: 115 BPM | OXYGEN SATURATION: 91 % | DIASTOLIC BLOOD PRESSURE: 103 MMHG | SYSTOLIC BLOOD PRESSURE: 152 MMHG | TEMPERATURE: 98.3 F | WEIGHT: 237.44 LBS | HEIGHT: 67 IN

## 2022-02-18 DIAGNOSIS — G40.109 LOCALIZATION-RELATED FOCAL EPILEPSY WITH SIMPLE PARTIAL SEIZURES (HCC): ICD-10-CM

## 2022-02-18 DIAGNOSIS — N39.0 FREQUENT UTI: ICD-10-CM

## 2022-02-18 DIAGNOSIS — R03.0 ELEVATED BLOOD PRESSURE READING: ICD-10-CM

## 2022-02-18 DIAGNOSIS — R45.89 DEPRESSED MOOD: ICD-10-CM

## 2022-02-18 DIAGNOSIS — I26.09 OTHER PULMONARY EMBOLISM WITH ACUTE COR PULMONALE, UNSPECIFIED CHRONICITY (HCC): ICD-10-CM

## 2022-02-18 DIAGNOSIS — G43.109 COMPLICATED MIGRAINE: ICD-10-CM

## 2022-02-18 DIAGNOSIS — R25.2 SPASTICITY: ICD-10-CM

## 2022-02-18 DIAGNOSIS — G40.909 SEIZURE DISORDER (HCC): Chronic | ICD-10-CM

## 2022-02-18 DIAGNOSIS — F41.8 MIXED ANXIETY AND DEPRESSIVE DISORDER: ICD-10-CM

## 2022-02-18 DIAGNOSIS — G93.9 LESION OF FRONTAL LOBE OF BRAIN: ICD-10-CM

## 2022-02-18 DIAGNOSIS — I10 PRIMARY HYPERTENSION: ICD-10-CM

## 2022-02-18 DIAGNOSIS — C71.1 GLIOBLASTOMA OF FRONTAL LOBE (HCC): ICD-10-CM

## 2022-02-18 PROBLEM — C71.9 GBM (GLIOBLASTOMA MULTIFORME) (HCC): Status: ACTIVE | Noted: 2022-02-18

## 2022-02-18 PROCEDURE — U0005 INFEC AGEN DETEC AMPLI PROBE: HCPCS

## 2022-02-18 PROCEDURE — 700102 HCHG RX REV CODE 250 W/ 637 OVERRIDE(OP): Performed by: PHYSICAL MEDICINE & REHABILITATION

## 2022-02-18 PROCEDURE — 99223 1ST HOSP IP/OBS HIGH 75: CPT | Performed by: PHYSICAL MEDICINE & REHABILITATION

## 2022-02-18 PROCEDURE — A9270 NON-COVERED ITEM OR SERVICE: HCPCS | Performed by: NURSE PRACTITIONER

## 2022-02-18 PROCEDURE — 700102 HCHG RX REV CODE 250 W/ 637 OVERRIDE(OP): Performed by: INTERNAL MEDICINE

## 2022-02-18 PROCEDURE — 700102 HCHG RX REV CODE 250 W/ 637 OVERRIDE(OP): Performed by: NURSE PRACTITIONER

## 2022-02-18 PROCEDURE — A9270 NON-COVERED ITEM OR SERVICE: HCPCS | Performed by: PHYSICAL MEDICINE & REHABILITATION

## 2022-02-18 PROCEDURE — A9270 NON-COVERED ITEM OR SERVICE: HCPCS | Performed by: INTERNAL MEDICINE

## 2022-02-18 PROCEDURE — 99239 HOSP IP/OBS DSCHRG MGMT >30: CPT | Performed by: INTERNAL MEDICINE

## 2022-02-18 PROCEDURE — U0003 INFECTIOUS AGENT DETECTION BY NUCLEIC ACID (DNA OR RNA); SEVERE ACUTE RESPIRATORY SYNDROME CORONAVIRUS 2 (SARS-COV-2) (CORONAVIRUS DISEASE [COVID-19]), AMPLIFIED PROBE TECHNIQUE, MAKING USE OF HIGH THROUGHPUT TECHNOLOGIES AS DESCRIBED BY CMS-2020-01-R: HCPCS

## 2022-02-18 PROCEDURE — 700102 HCHG RX REV CODE 250 W/ 637 OVERRIDE(OP): Performed by: GENERAL PRACTICE

## 2022-02-18 PROCEDURE — A9270 NON-COVERED ITEM OR SERVICE: HCPCS | Performed by: GENERAL PRACTICE

## 2022-02-18 PROCEDURE — A9270 NON-COVERED ITEM OR SERVICE: HCPCS | Performed by: STUDENT IN AN ORGANIZED HEALTH CARE EDUCATION/TRAINING PROGRAM

## 2022-02-18 PROCEDURE — 700102 HCHG RX REV CODE 250 W/ 637 OVERRIDE(OP): Performed by: STUDENT IN AN ORGANIZED HEALTH CARE EDUCATION/TRAINING PROGRAM

## 2022-02-18 PROCEDURE — 770010 HCHG ROOM/CARE - REHAB SEMI PRIVAT*

## 2022-02-18 RX ORDER — AMLODIPINE BESYLATE 5 MG/1
10 TABLET ORAL DAILY
Status: DISCONTINUED | OUTPATIENT
Start: 2022-02-19 | End: 2022-03-11 | Stop reason: HOSPADM

## 2022-02-18 RX ORDER — AMOXICILLIN 250 MG
2 CAPSULE ORAL 2 TIMES DAILY
Status: DISCONTINUED | OUTPATIENT
Start: 2022-02-18 | End: 2022-02-21

## 2022-02-18 RX ORDER — LACOSAMIDE 100 MG/1
200 TABLET ORAL 2 TIMES DAILY
Status: DISCONTINUED | OUTPATIENT
Start: 2022-02-18 | End: 2022-03-11 | Stop reason: HOSPADM

## 2022-02-18 RX ORDER — VENLAFAXINE 75 MG/1
75 TABLET ORAL DAILY
Status: CANCELLED | OUTPATIENT
Start: 2022-02-19

## 2022-02-18 RX ORDER — OMEPRAZOLE 20 MG/1
20 CAPSULE, DELAYED RELEASE ORAL DAILY
Status: DISCONTINUED | OUTPATIENT
Start: 2022-02-19 | End: 2022-03-10

## 2022-02-18 RX ORDER — OXYCODONE HYDROCHLORIDE 5 MG/1
5 TABLET ORAL
Status: DISCONTINUED | OUTPATIENT
Start: 2022-02-18 | End: 2022-03-11 | Stop reason: HOSPADM

## 2022-02-18 RX ORDER — TRAZODONE HYDROCHLORIDE 50 MG/1
50 TABLET ORAL
Status: DISCONTINUED | OUTPATIENT
Start: 2022-02-18 | End: 2022-03-11 | Stop reason: HOSPADM

## 2022-02-18 RX ORDER — ALUMINA, MAGNESIA, AND SIMETHICONE 2400; 2400; 240 MG/30ML; MG/30ML; MG/30ML
20 SUSPENSION ORAL
Status: DISCONTINUED | OUTPATIENT
Start: 2022-02-18 | End: 2022-03-11 | Stop reason: HOSPADM

## 2022-02-18 RX ORDER — POLYETHYLENE GLYCOL 3350 17 G/17G
17 POWDER, FOR SOLUTION ORAL
Status: ON HOLD
Start: 2022-02-18 | End: 2022-03-11

## 2022-02-18 RX ORDER — AMLODIPINE BESYLATE 10 MG/1
10 TABLET ORAL DAILY
Status: CANCELLED | OUTPATIENT
Start: 2022-02-19

## 2022-02-18 RX ORDER — ACETAMINOPHEN 500 MG
1000 TABLET ORAL EVERY 6 HOURS PRN
Status: CANCELLED | OUTPATIENT
Start: 2022-02-18

## 2022-02-18 RX ORDER — ACETAMINOPHEN 500 MG
1000 TABLET ORAL EVERY 6 HOURS PRN
Status: DISCONTINUED | OUTPATIENT
Start: 2022-02-18 | End: 2022-03-11 | Stop reason: HOSPADM

## 2022-02-18 RX ORDER — ALPRAZOLAM 0.5 MG/1
0.5 TABLET ORAL 2 TIMES DAILY PRN
Status: CANCELLED | OUTPATIENT
Start: 2022-02-18

## 2022-02-18 RX ORDER — ALPRAZOLAM 0.5 MG/1
0.5 TABLET ORAL 2 TIMES DAILY PRN
Status: DISCONTINUED | OUTPATIENT
Start: 2022-02-18 | End: 2022-02-26

## 2022-02-18 RX ORDER — ACETAMINOPHEN 325 MG/1
650 TABLET ORAL EVERY 4 HOURS PRN
Status: DISCONTINUED | OUTPATIENT
Start: 2022-02-18 | End: 2022-03-11 | Stop reason: HOSPADM

## 2022-02-18 RX ORDER — POLYVINYL ALCOHOL 14 MG/ML
1 SOLUTION/ DROPS OPHTHALMIC PRN
Status: DISCONTINUED | OUTPATIENT
Start: 2022-02-18 | End: 2022-03-11 | Stop reason: HOSPADM

## 2022-02-18 RX ORDER — BENZOCAINE/MENTHOL 6 MG-10 MG
LOZENGE MUCOUS MEMBRANE 2 TIMES DAILY PRN
Status: DISCONTINUED | OUTPATIENT
Start: 2022-02-18 | End: 2022-03-02

## 2022-02-18 RX ORDER — BENZOCAINE/MENTHOL 6 MG-10 MG
LOZENGE MUCOUS MEMBRANE 2 TIMES DAILY PRN
Status: CANCELLED | OUTPATIENT
Start: 2022-02-18

## 2022-02-18 RX ORDER — ONDANSETRON 4 MG/1
4 TABLET, ORALLY DISINTEGRATING ORAL 4 TIMES DAILY PRN
Status: DISCONTINUED | OUTPATIENT
Start: 2022-02-18 | End: 2022-03-11 | Stop reason: HOSPADM

## 2022-02-18 RX ORDER — OXYCODONE HYDROCHLORIDE 5 MG/1
2.5 TABLET ORAL
Qty: 30 TABLET | Refills: 0 | Status: ON HOLD
Start: 2022-02-18 | End: 2022-03-11

## 2022-02-18 RX ORDER — HYDRALAZINE HYDROCHLORIDE 25 MG/1
25 TABLET, FILM COATED ORAL EVERY 8 HOURS PRN
Status: DISCONTINUED | OUTPATIENT
Start: 2022-02-18 | End: 2022-03-11 | Stop reason: HOSPADM

## 2022-02-18 RX ORDER — AMOXICILLIN 250 MG
2 CAPSULE ORAL 2 TIMES DAILY
Status: ON HOLD
Start: 2022-02-18 | End: 2022-03-11

## 2022-02-18 RX ORDER — BENZOCAINE/MENTHOL 6 MG-10 MG
LOZENGE MUCOUS MEMBRANE
Qty: 1 EACH | Refills: 0 | Status: ON HOLD | OUTPATIENT
Start: 2022-02-18 | End: 2022-03-11

## 2022-02-18 RX ORDER — MIDAZOLAM HYDROCHLORIDE 5 MG/ML
5 INJECTION INTRAMUSCULAR; INTRAVENOUS PRN
Status: DISCONTINUED | OUTPATIENT
Start: 2022-02-18 | End: 2022-03-11 | Stop reason: HOSPADM

## 2022-02-18 RX ORDER — ONDANSETRON 2 MG/ML
4 INJECTION INTRAMUSCULAR; INTRAVENOUS 4 TIMES DAILY PRN
Status: DISCONTINUED | OUTPATIENT
Start: 2022-02-18 | End: 2022-03-11 | Stop reason: HOSPADM

## 2022-02-18 RX ORDER — LANOLIN ALCOHOL/MO/W.PET/CERES
3 CREAM (GRAM) TOPICAL
Status: DISCONTINUED | OUTPATIENT
Start: 2022-02-18 | End: 2022-03-11 | Stop reason: HOSPADM

## 2022-02-18 RX ORDER — ECHINACEA PURPUREA EXTRACT 125 MG
2 TABLET ORAL PRN
Status: DISCONTINUED | OUTPATIENT
Start: 2022-02-18 | End: 2022-03-11 | Stop reason: HOSPADM

## 2022-02-18 RX ORDER — LACOSAMIDE 100 MG/1
200 TABLET ORAL 2 TIMES DAILY
Status: CANCELLED | OUTPATIENT
Start: 2022-02-18

## 2022-02-18 RX ORDER — UREA 10 %
3 LOTION (ML) TOPICAL
Status: CANCELLED | OUTPATIENT
Start: 2022-02-18

## 2022-02-18 RX ORDER — OXYCODONE HYDROCHLORIDE 10 MG/1
10 TABLET ORAL
Status: DISCONTINUED | OUTPATIENT
Start: 2022-02-18 | End: 2022-03-11 | Stop reason: HOSPADM

## 2022-02-18 RX ORDER — VENLAFAXINE 37.5 MG/1
75 TABLET ORAL DAILY
Status: DISCONTINUED | OUTPATIENT
Start: 2022-02-19 | End: 2022-03-11 | Stop reason: HOSPADM

## 2022-02-18 RX ORDER — LIDOCAINE 50 MG/G
2 PATCH TOPICAL EVERY 24 HOURS
Status: ON HOLD
Start: 2022-02-18 | End: 2022-03-11

## 2022-02-18 RX ORDER — POLYETHYLENE GLYCOL 3350 17 G/17G
1 POWDER, FOR SOLUTION ORAL
Status: DISCONTINUED | OUTPATIENT
Start: 2022-02-18 | End: 2022-02-21

## 2022-02-18 RX ORDER — BISACODYL 10 MG
10 SUPPOSITORY, RECTAL RECTAL
Status: DISCONTINUED | OUTPATIENT
Start: 2022-02-18 | End: 2022-02-21

## 2022-02-18 RX ADMIN — AMLODIPINE BESYLATE 10 MG: 10 TABLET ORAL at 04:28

## 2022-02-18 RX ADMIN — APIXABAN 5 MG: 5 TABLET, FILM COATED ORAL at 20:38

## 2022-02-18 RX ADMIN — BRIVARACETAM 100 MG: 50 TABLET, FILM COATED ORAL at 20:37

## 2022-02-18 RX ADMIN — OXYCODONE 2.5 MG: 5 TABLET ORAL at 04:28

## 2022-02-18 RX ADMIN — VENLAFAXINE 75 MG: 75 TABLET ORAL at 04:24

## 2022-02-18 RX ADMIN — DOCUSATE SODIUM 50 MG AND SENNOSIDES 8.6 MG 2 TABLET: 8.6; 5 TABLET, FILM COATED ORAL at 04:24

## 2022-02-18 RX ADMIN — TRAZODONE HYDROCHLORIDE 50 MG: 50 TABLET ORAL at 20:38

## 2022-02-18 RX ADMIN — LACOSAMIDE 200 MG: 100 TABLET, FILM COATED ORAL at 04:24

## 2022-02-18 RX ADMIN — BRIVARACETAM 100 MG: 100 TABLET, FILM COATED ORAL at 04:24

## 2022-02-18 RX ADMIN — APIXABAN 5 MG: 5 TABLET, FILM COATED ORAL at 04:24

## 2022-02-18 RX ADMIN — POLYETHYLENE GLYCOL 3350 2 PACKET: 17 POWDER, FOR SOLUTION ORAL at 04:23

## 2022-02-18 RX ADMIN — ALPRAZOLAM 0.5 MG: 0.5 TABLET ORAL at 08:03

## 2022-02-18 RX ADMIN — OXYCODONE 5 MG: 5 TABLET ORAL at 19:35

## 2022-02-18 RX ADMIN — MELATONIN TAB 3 MG 3 MG: 3 TAB at 20:38

## 2022-02-18 RX ADMIN — LACOSAMIDE 200 MG: 100 TABLET, FILM COATED ORAL at 20:38

## 2022-02-18 RX ADMIN — SENNOSIDES AND DOCUSATE SODIUM 2 TABLET: 50; 8.6 TABLET ORAL at 20:38

## 2022-02-18 ASSESSMENT — LIFESTYLE VARIABLES
EVER HAD A DRINK FIRST THING IN THE MORNING TO STEADY YOUR NERVES TO GET RID OF A HANGOVER: NO
AVERAGE NUMBER OF DAYS PER WEEK YOU HAVE A DRINK CONTAINING ALCOHOL: 0
EVER FELT BAD OR GUILTY ABOUT YOUR DRINKING: NO
CONSUMPTION TOTAL: NEGATIVE
ON A TYPICAL DAY WHEN YOU DRINK ALCOHOL HOW MANY DRINKS DO YOU HAVE: 0
TOTAL SCORE: 0
TOTAL SCORE: 0
HAVE YOU EVER FELT YOU SHOULD CUT DOWN ON YOUR DRINKING: NO
TOTAL SCORE: 0
HOW MANY TIMES IN THE PAST YEAR HAVE YOU HAD 5 OR MORE DRINKS IN A DAY: 0
ALCOHOL_USE: NO
HAVE PEOPLE ANNOYED YOU BY CRITICIZING YOUR DRINKING: NO

## 2022-02-18 ASSESSMENT — PAIN DESCRIPTION - PAIN TYPE
TYPE: ACUTE PAIN

## 2022-02-18 ASSESSMENT — FIBROSIS 4 INDEX: FIB4 SCORE: 1.5

## 2022-02-18 NOTE — PROGRESS NOTES
4 Eyes Skin Assessment Completed by JUDITH Mccurdy and Magaly RN.    Head scar, bruising  Ears WDL  Nose WDL  Mouth WDL  Neck WDL  Breast/Chest WDL  Shoulder Blades WDL  Spine WDL  (R) Arm/Elbow/Hand WDL  (L) Arm/Elbow/Hand WDL  Abdomen Scar  Groin WDL  Scrotum/Coccyx/Buttocks WDL  (R) Leg scab, bruising  (L) Leg Scab and Bruising  (R) Heel/Foot/Toe WDL  (L) Heel/Foot/Toe WDL          Devices In Places Purewick      Interventions In Place Pillows    Possible Skin Injury No    Pictures Uploaded Into Epic N/A  Wound Consult Placed N/A  RN Wound Prevention Protocol Ordered No

## 2022-02-18 NOTE — PROGRESS NOTES
Discharge instructions given and discussed, signed copy in chart by two RN's. Pt and  verbalized understanding and all questions answered. prescriptions given to pt and . Pt discharged in stable condition on RA to [facility] rehab via medical transport. Personal belongings sent with patient. [facility].

## 2022-02-18 NOTE — H&P
REHABILITATION HISTORY AND PHYSICAL/POST ADMISSION PHYSICAL EVALUATION    Date of Admission: 2/18/2022   Melba Frye    [facility] Code / Diagnosis to Support: 0002.1 - Brain Dysfunction: Non-Traumatic  Etiologic Diagnosis: Glioblastoma of frontal lobe (HCC)/Seizure disorder    CC: Decreased mobility, falls    HPI:  Adapted from Dr. Dodd's PM&R consult notes:  The patient is a 51 y.o. female with a past medical history of right posterior fronto-parietal glioblastoma multiforme s/p right cranioplasty for resection (6/2021, biopsy 3/2021) at [facility], with seizures, w/ secondary L sided weakness w/ tone, radiation and chemotherapy with temozolomide (last dose 11/2021), focal seizures (on Vimpat and Briviact), migraine, hyperlipidemia, PE with cor pulmonale (on Eliquis), anxiety, and depression  ;  who presented on 2/9/2022  1:53 PM after multiple ground level falls likely due to a combination of hypoxia, seizure activity, and vasogenic edema of the brain. Per documentation, on Wednesday 2/9/22 she was walking back from the bathroom with her caregiver when she reported feeling tired, dizzy, and needed to sit. Her caregiver turned to get a chair at which time the patient fell forward to the ground without catching herself. EMS was notified and upon arrival noted the patient was hypoxic with SpO2 85% on RA. She had a period of about 15 minutes of altered awareness/consciousness, and does not recall the events. No report of any seizure like activity, tongue biting, or bowel/bladder incontinence. The patient is followed outpatient by neurologist, Dr. Fady Davidson and inpatient neurology was consulted for possible seizure. Typical semiology of her clinical seizures historically are consistent with focal seizures arising from the right post-operative nidus, but she does history of subclinical electrographic seizures as well per Dr. Davidson.    Respiratory infectious disease panel was sent and was negative for COVID-19,  RSV, and influenza.  CTA chest was obtained and was negative for PE.  CT head was obtained and was negative for acute changes.  Echocardiogram was obtained with estimated LVEF of 75%, no shunt, and no valvular abnormality.  Neurology was consulted and an MRI was obtained which was showed new mass lesion. EEG performed noted abnormal readings which is not unexpected with her hx, no active seizures. Patient's Vimpat dose was increased per neurology's recommendation.  Oncology was consulted and recommend starting decadron 2mg BID x 7 days, then follow with a taper.    Patient tolerated transfer to [facility]. She is encouraged she was able to come back over. She reports she is near her baseline but would benefit from more therapy. She reports limited memory around the time of the fall. She reports she has had multiple falls. Denies pain. She has spasticity to LUE which is somewhat worse. She denies pain with spasticity. Denies NVD. Denies SOB.     REVIEW OF SYSTEMS:     Comprehensive 14 point ROS was reviewed and all were negative except as noted elsewhere in this document.     PMH:  Past Medical History:   Diagnosis Date   • Acute pulmonary embolism with acute cor pulmonale (HCC) 10/21/2021   • Anxiety    • Cancer (HCC)     brain diagnosed in October 2020    • Complicated migraine 6/9/2014   • Depression    • Dermatitis, contact 11/16/2015   • Fatigue 6/9/2014   • Hyperlipidemia 1/23/2015   • Lentigo 10/17/2018   • Menopausal symptom 7/2/2014   • Mixed anxiety and depressive disorder 6/9/2014   • Pulmonary embolism (HCC)    • Seborrheic keratoses 10/17/2018   • TMJ arthralgia 6/9/2014   • UTI (lower urinary tract infection)        PSH:  Past Surgical History:   Procedure Laterality Date   • CRANIOTOMY STEALTH Right 3/9/2021    Procedure: RIGHT CRANIOTOMY, USING FRAMELESS STEREOTAXY - FOR OPEN BIOPSY WITH PHASE REVERSAL;  Surgeon: Maxwell Mao M.D.;  Location: SURGERY [facility];  Service: Neurosurgery   • MYRINGOTOMY   8/27/2010    Performed by [address] at SURGERY SAME DAY [facility]   • LAPAROSCOPY  5/28/2010    Performed by JACKI SHARMA at SURGERY [facility] ORS   • LYMPH NODE SAMPLING  5/28/2010    Performed by JACKI SHARMA at SURGERY [facility] ORS   • DEBULKING  5/28/2010    Performed by JACKI SHARMA at SURGERY [facility] ORS   • CYSTOSCOPY  10/15/08    Performed by YU GODINEZ at SURGERY SAME DAY [facility]   • VAGINAL HYSTERECTOMY SCOPE TOTAL  10/15/08    Performed by YU GODINEZ at SURGERY SAME DAY [facility]   • OTHER  1984    TONSILECTOMY/ ADNOIDS   • APPENDECTOMY  1981   • TONSILLECTOMY AND ADENOIDECTOMY         FAMILY HISTORY:  Family History   Problem Relation Age of Onset   • Non-contributory Mother    • Lung Disease Mother         COPD   • Cancer Mother         dysplasia    • Arthritis Mother    • Psychiatric Illness Mother    • Heart Disease Mother    • Hypertension Mother    • Stroke Mother    • Non-contributory Father    • Cancer Father 73        prostate cancer   • Lung Disease Maternal Grandmother    • Lung Disease Paternal Aunt    • Diabetes Paternal Aunt    • Hyperlipidemia Paternal Aunt         MEDICATIONS:  Current Facility-Administered Medications   Medication Dose   • Respiratory Therapy Consult     • Pharmacy Consult Request ...Pain Management Review 1 Each  1 Each   • hydrALAZINE (APRESOLINE) tablet 25 mg  25 mg   • acetaminophen (Tylenol) tablet 650 mg  650 mg   • senna-docusate (PERICOLACE or SENOKOT S) 8.6-50 MG per tablet 2 Tablet  2 Tablet    And   • polyethylene glycol/lytes (MIRALAX) PACKET 1 Packet  1 Packet    And   • magnesium hydroxide (MILK OF MAGNESIA) suspension 30 mL  30 mL    And   • bisacodyl (DULCOLAX) suppository 10 mg  10 mg   • [START ON 2/19/2022] omeprazole (PRILOSEC) capsule 20 mg  20 mg   • artificial tears ophthalmic solution 1 Drop  1 Drop   • benzocaine-menthol (CEPACOL) lozenge 1 Lozenge  1 Lozenge   • mag hydrox-al hydrox-simeth (MAALOX PLUS ES  or MYLANTA DS) suspension 20 mL  20 mL   • ondansetron (ZOFRAN ODT) dispertab 4 mg  4 mg    Or   • ondansetron (ZOFRAN) syringe/vial injection 4 mg  4 mg   • traZODone (DESYREL) tablet 50 mg  50 mg   • sodium chloride (OCEAN) 0.65 % nasal spray 2 Spray  2 Spray   • oxyCODONE immediate-release (ROXICODONE) tablet 5 mg  5 mg    Or   • oxyCODONE immediate release (ROXICODONE) tablet 10 mg  10 mg   • midazolam (VERSED) 5 mg/mL (1 mL vial)  5 mg   • acetaminophen (TYLENOL) tablet 1,000 mg  1,000 mg   • ALPRAZolam (XANAX) tablet 0.5 mg  0.5 mg   • [START ON 2/19/2022] amLODIPine (NORVASC) tablet 10 mg  10 mg   • apixaban (ELIQUIS) tablet 5 mg  5 mg   • brivaracetam (Briviact) tablet 100 mg  100 mg   • hydrocortisone 1 % cream     • lacosamide (VIMPAT) tablet 200 mg  200 mg   • melatonin tablet 3 mg  3 mg   • [START ON 2/19/2022] venlafaxine (EFFEXOR) tablet 75 mg  75 mg       ALLERGIES:  Tape    PSYCHOSOCIAL HISTORY:  Housing / Facility: 2 Story House (with 1st floor set up)  Steps In Home:  (threshold)  Lives with - Patient's Self Care Capacity: Spouse  Equipment Owned: Single Point Cane,Front-Wheel Walker     Prior Level of Function / Living Situation:   Physical Therapy: Prior Services: Intermittent Physical Support for ADL Per Family,Skilled Home Health Services  Housing / Facility: 2 Story House (with 1st floor set up)  Steps In Home:  (threshold)  Bathroom Set up: Bathtub / Shower Combination,Walk In Shower,Tub Transfer Bench,Shower Chair,Grab Bars  Equipment Owned: Single Point Cane,Front-Wheel Walker  Lives with - Patient's Self Care Capacity: Spouse  Bed Mobility: Required Assist  Transfer Status: Required Assist  Ambulation: Required Assist  Distance Ambulation (Feet):  (limited household)  Assistive Devices Used: Single Point Cane (HHA as well)  Stairs: Required Assist  Current Level of Function:   Gait Level Of Assist: Maximal Assist  Assistive Device: Quad Cane  Distance (Feet): 3 (-4)  Deviation:  "Antalgic,Step To,Decreased Base Of Support,Bradykinetic,Decreased Heel Strike,Decreased Toe Off (L LE hyperextension, hip hike for L foot clearance)  # of Stairs Climbed: 0  Weight Bearing Status: no restrictions  Skilled Intervention: Verbal Cuing,Tactile Cuing,Compensatory Strategies  Supine to Sit: Moderate Assist  Sit to Supine: Moderate Assist  Scooting: Minimal Assist  Skilled Intervention: Verbal Cuing  Comments: requires support to pull and scoot  Sit to Stand: Moderate Assist  Bed, Chair, Wheelchair Transfer: Unable to Participate (deferred transfer to chair due to fatigue at end of session)  Transfer Method: Stand Step  Skilled Intervention: Verbal Cuing  Sitting in Chair: unable  Sitting Edge of Bed: 15 min  Standing: 10 min total  Occupational Therapy:   Self Feeding: Independent  Grooming / Hygiene: Independent  Bathing: Requires Assist  Dressing: Requires Assist  Toileting: Requires Assist  Prior Level Of Mobility: Supervision With Device in Home  Driving / Transportation: Relatives / Others Provide Transportation  Prior Services: Intermittent Physical Support for ADL Per Family,Skilled Home Health Services  Housing / Facility: 2 Story House (with 1st floor set up)  Current Level of Function:   Upper Body Dressing: Minimal Assist  Lower Body Dressing: Maximal Assist  Toileting: Maximal Assist  Skilled Intervention: Verbal Cuing    CURRENT LEVEL OF FUNCTION:   Same as level of function prior to admission to [facility]    PHYSICAL EXAM:     VITAL SIGNS:   height is 1.702 m (5' 7\") and weight is 100 kg (221 lb 8 oz). Her oral temperature is 36.6 °C (97.9 °F). Her blood pressure is 125/88 and her pulse is 109 (abnormal). Her respiration is 18 and oxygen saturation is 95%.      GENERAL: No apparent distress  HEENT: Normocephalic/atraumatic, EOMI and PERRL  CARDIAC: Tachycardic, Regular rhythm, normal S1, S2   LUNGS: Clear to auscultation   ABDOMINAL: bowel sounds present, soft and " nontender    EXTREMITIES: no edema; left UE 3/4 MAS in finger flexors, supinators, and elbow flexors. 2/4 in LLE in left knee  NEURO:  Mental status:  A&Ox4 (person, place, date, situation) answers questions appropriately; left neglect possibly  Speech: fluent, no aphasia or dysarthria;   Motor:  5/5 RUE, Only active felt in LUE was elbow flexor not functional due to spasticity  5/5 RLE, 4/5 L HF KE, otherwise ankle in AFO  Sensory: intact to light touch through out    RADIOLOGY:    Results for orders placed during the hospital encounter of 02/09/22    MR-BRAIN-WITH & W/O    Impression  Postoperative changes are noted in the medial right posterior frontal parietal region with rim enhancement of the postoperative cavity. Linear thick  enhancement along the medial margin of the operative cavity abutting the falx cerebri is noted.  Comparison with previous films would be helpful to determine if there has been any interval change.    Extensive T2 signal abnormality involving the bilateral cerebral hemispheric white matter is noted, most likely related to posttreatment changes.        LABS:                  PRIMARY REHAB DIAGNOSIS:    This patient is a 51 y.o. female admitted for acute inpatient rehabilitation with Glioblastoma of frontal lobe (HCC).    IMPAIRMENTS:   Cognitive  ADLs/IADLs  Mobility    SECONDARY DIAGNOSIS/MEDICAL CO-MORBIDITIES AFFECTING FUNCTION:  HTN  Anemia  Hypokalemia  Anxiety/Depression  Hx of PE    RELEVANT CHANGES SINCE PREADMISSION EVALUATION:    Status unchanged    The patient's rehabilitation potential is Good  The patient's medical prognosis is fair    PLAN:   Discussion and Recommendations:   1. The patient requires an acute inpatient rehabilitation program with a coordinated program of care at an intensity and frequency not available at a lower level of care. This recommendation is substantiated by the patient's medical physicians who recommend that the patient's intervention and assessment  of medical issues needs to be done at an acute level of care for patient's safety and maximum outcome.   2. A coordinated program of care will be supplied by an interdisciplinary team of physical therapy, occupational therapy, rehab physician, rehab nursing, and, if needed, speech therapy and rehab psychology. Rehab team presents a patient-specific rehabilitation and education program concentrating on prevention of future problems related to accessibility, mobility, skin, bowel, bladder, sexuality, and psychosocial and medical/surgical problems.   3. Need for Rehabilitation Physician: The rehab physician will be evaluating the patient on a multi-weekly basis to help coordinate the program of care. The rehab physician communicates between medical physicians, therapists, and nurses to maximize the patient's potential outcome. Specific areas in which the rehab physician will be providing daily assessment include the following:   A. Assessing the patient's heart rate and blood pressure response (vitals monitoring) to activity and making adjustments in medications or conservative measures as needed.   B. The rehab physician will be assessing the frequency at which the program can be increased to allow the patient to reach optimal functional outcome.   C. The rehab physician will also provide assessments in daily skin care, especially in light of patient's impairments in mobility.   D. The rehab physician will provide special expertise in understanding how to work with functional impairment and recommend appropriate interventions, compensatory techniques, and education that will facilitate the patient's outcome.   4. Rehab R.N.   The rehab RN will be working with patient to carry over in room mobility and activities of daily living when the patient is not in 3 hours of skilled therapy. Rehab nursing will be working in conjunction with rehab physician to address all the medical issues above and continue to assess  laboratory work and discuss abnormalities with the treating physicians, assess vitals, and response to activity, and discuss and report abnormalities with the rehab physician. Rehab RN will also continue daily skin care, supervise bladder/bowel program, instruct in medication administration, and ensure patient safety.   5. Rehab Therapy: Therapies to treat at intensity and frequency of (may change after completion of evaluation by all therapeutic disciplines):       PT:  Physical therapy to address mobility, transfer, gait training and evaluation for adaptive equipment needs 1 hour/day at least 5 days/week for the duration of the ELOS (see below)       OT:  Occupational therapy to address ADLs, self-care, home management training, functional mobility/transfers and assistive device evaluation, and community re-integration 1  hour/day at least 5 days/week for the duration of the ELOS (see below).        ST/Dysphagia:  Speech therapy to address speech, language, and cognitive deficits as well as swallowing difficulties with retraining/dysphagia management and community re-integration with comprehension, expression, cognitive training 1  hour/day at least 5 days/week for the duration of the ELOS (see below).     Medical management / Rehabilitation Issues/ Adverse Potential as part of rehabilitation plan     REHABILITATION ISSUES/ADVERSE POTENTIAL:  1.  Seizure/GBM - Patient with syncopal event with AMS and weakness after concerning for seizure vs worsening GBM vs post-treatment changes. Oncology was consulted and recommended steroids then taper which she has completed. Neurology was consulted and recommended MRI which showed diffuse posttreatment changes but no acute lesion. EEG showed non-specific changes and Neurology recommended increasing her Vimpat as her symptoms could have been post-ictal. Cardiac work-up negative.   Patient demonstrates functional deficits in cognition, behavior, strength, balance, coordination,  and ADL's. The patient requires therapy to correct these deficits prior to discharge. Patient is admitted to [facility] for comprehensive rehabilitation therapy, including physical, occupational and speech therapy.     Rehabilitation nursing monitors bowel and bladder control, educates on medication administration, co-morbidities and monitors patient safety.    2.  Neurostimulants: None at this time but continue to assess daily for need to initiate should status change.    3.  DVT prophylaxis:  Patient is on Eliquis for anticoagulation upon transfer. Encourage OOB. Monitor daily for signs and symptoms of DVT including but not limited to swelling and pain to prevent the development of DVT that may interfere with therapies.    4.  GI prophylaxis:  On prilosec to prevent gastritis/dyspepsia which may interfere with therapies.    5.  Pain: No issues with pain currently / Controlled with APAP/Tramadol    6.  Nutrition/Dysphagia: Dietician monitors nutrient intake, recommend supplements prn and provide nutrition education to pt/family to promote optimal nutrition for wound healing/recovery.     7.  Bladder/bowel:  Start bowel and bladder program as described below, to prevent constipation, urinary retention (which may lead to UTI), and urinary incontinence (which will impact upon pt's functional independence).   - Post void bladder scans, I&O cath for PVRs >400  - up to commode after meal     8.  Skin/dermal ulcer prophylaxis: Monitor for new skin conditions with q.2 h. turns as required to prevent the development of skin breakdown.     9.  Cognition/Behavior: As needed psychologist provides adjustment counseling to illness and psychosocial barriers that may be potential barriers to rehabilitation.     10. Respiratory therapy: RT performs O2 management prn, breathing retraining, pulmonary hygiene and bronchospasm management prn to optimize participation in therapies.     MEDICAL CO-MORBIDITIES/ADVERSE  POTENTIAL AFFECTING FUNCTION:  Seizure/GBM - Patient with syncopal event with AMS and weakness after concerning for seizure vs worsening GBM vs post-treatment changes. Oncology was consulted and recommended steroids then taper which she has completed. Neurology was consulted and recommended MRI which showed diffuse posttreatment changes but no acute lesion. EEG showed non-specific changes and Neurology recommended increasing her Vimpat as her symptoms could have been post-ictal. Cardiac work-up negative.    -PT and OT for mobility and ADLs  -SLP for cognitive screen  -Continue Briviact 100 mg BID and Vimpat 200 mg BID    Spasticity - Followed by Dr. Steele, [facility], for Botox injection.     HTN - Patient on Amlodipine 10 mg daily.     Anemia - Check AM CBC    Hypokalemia - Check AM CMP    Anxiety/Depression - Patient on PRN Xanax and Effexor 75 mg daily    Morbid Obesity due to excess calories - BMI of 37.1 on admission. Dietitian to consult.     Hx of PE - Patient on Eliquis BID.    I personally performed a complete drug regimen review and no potential clinically significant medication issues were identified.     Pt was seen today for 72 min, and entire time spent in face-to-face contact was >50% in counseling and coordination of care as detailed in A/P above.        GOALS/EXPECTED LEVEL OF FUNCTION BASED ON CURRENT MEDICAL AND FUNCTIONAL STATUS (may change based on patient's medical status and rate of impairment recovery):  Transfers:   Modified Independent  Mobility/Gait:   Modified Independent  ADL's:   Modified Independent  Cognition:  ind    DISPOSITION: Discharge to pre-morbid independent living setting with the supportive care of patient's family.    ELOS: 7-10 days

## 2022-02-18 NOTE — DISCHARGE SUMMARY
Discharge Summary    CHIEF COMPLAINT ON ADMISSION  Chief Complaint   Patient presents with   • T-5000 GLF     Pt has h/o brain tumor with L sided deficit.  She fell forward today and hit her head and face.  Pt does take elequis daily.     • Shortness of Breath     Pt noted to be 86% on RA by [facility] post fall.  Arrives to ER, is 91-92% on RA.       Reason for Admission  EMS     CODE STATUS  Full Code    HPI & HOSPITAL COURSE  This is a 51 year old female with PMHx of right posterior fronto-parietal glioblastoma multiforme s/p right cranioplasty for resection (6/2021, [facility]) s/p radiation and chemotherapy with temozolomide (last dose 11/2021), focal seizures (on Vimpat and Briviact), chronic left sided deficit and spasticity, hyperlipidemia, PE with cor pulmonale (on Eliquis), anxiety, and depression who was admitted on 2/9/2022 s/p GLF.    Neurology was consulted, MRI reviewed and unchanged from prior. EEG performed noted abnormal readings which is not unexpected with her hx, no active seizures. Patient's Vimpat dose with increased.  Patient is to follow up with her neurologist outpatient.     Oncology consulted, recommend starting decadron 2mg BID x 7 days, then a taper. She had a tele-neurooncologist appointment and will continue to follow up with them after discharge. She was evaluated by Physiatry, who recommended inpatient rehab. Discharge was delayed due to insurance authorization.      Therefore, she is discharged in good and stable condition to an inpatient rehabilitation hospital.    The patient met 2-midnight criteria for an inpatient stay at the time of discharge.      FOLLOW UP ITEMS POST DISCHARGE  Follow up GBM with oncology team  Mobility    DISCHARGE DIAGNOSES  Principal Problem:    Glioblastoma of frontal lobe (HCC) POA: Yes  Active Problems:    Mixed anxiety and depressive disorder POA: Yes    Primary hypertension POA: Yes    Seizure disorder (HCC) (Chronic) POA: Yes    History of pulmonary embolus  (PE) POA: Yes    Acute respiratory failure with hypoxia (HCC) POA: Yes    Class 1 obesity due to excess calories with serious comorbidity and body mass index (BMI) of 34.0 to 34.9 in adult POA: Yes    Syncope POA: Yes    Constipation POA: Unknown  Resolved Problems:    * No resolved hospital problems. *      FOLLOW UP  Trinity Nguyen M.D.  [address] [place] [address] [phone]    Schedule an appointment as soon as possible for a visit  Follow-up appt      MEDICATIONS ON DISCHARGE     Medication List      START taking these medications      Instructions   hydrocortisone 1 % Crea   Apply to rash. Apply Sparingly. Avoid Face     lidocaine 5 % Ptch  Commonly known as: LIDODERM   Place 2 Patches on the skin every 24 hours.  Dose: 2 Patch     oxyCODONE immediate-release 5 MG Tabs  Commonly known as: ROXICODONE   Take 0.5 Tablets by mouth every 3 hours as needed for Severe Pain for up to 3 days.  Dose: 2.5 mg     polyethylene glycol/lytes 17 g Pack  Commonly known as: MIRALAX   Take 1 Packet by mouth 1 time a day as needed (if sennosides and docusate ineffective after 24 hours).  Dose: 17 g     senna-docusate 8.6-50 MG Tabs  Commonly known as: PERICOLACE or SENOKOT S   Take 2 Tablets by mouth 2 times a day.  Dose: 2 Tablet        CHANGE how you take these medications      Instructions   lacosamide 200 MG Tabs tablet  What changed:   · medication strength  · how much to take  Commonly known as: Vimpat   Take 1 Tablet by mouth 2 times a day for 30 days.  Dose: 200 mg     Naloxone 4 MG/0.1ML Liqd  What changed:   · how much to take  · when to take this  · reasons to take this  Commonly known as: NARCAN   One spray in one nostril for overdose and call 911.        CONTINUE taking these medications      Instructions   Acetaminophen 8 Hour 650 MG CR tablet  Generic drug: acetaminophen   Take 650 mg by mouth 2 times a day as needed for Moderate Pain.  Dose: 650 mg     amLODIPine 10 MG Tabs  Commonly known as:  NORVASC   TAKE 1 TABLET BY MOUTH EVERY DAY  Dose: 10 mg     brivaracetam 100 MG Tabs tablet  Commonly known as: Briviact   Take 100 mg by mouth 2 times a day.  Dose: 100 mg     CHOLECALCIFEROL PO   Take 1 Each by mouth every day.  Dose: 1 Each     cyanocobalamin 1000 MCG Tabs  Commonly known as: VITAMIN B12   Take 1,000 mcg by mouth every day.  Dose: 1,000 mcg     Eliquis 5mg Tabs  Generic drug: apixaban   Doctor's comments: Re-ordering eliquis to give patient a 90 day supply which is cheaper for them per her insurance  Take 1 Tablet by mouth 2 times a day.  Dose: 5 mg     FOLIC ACID PO   Take 1 Tablet by mouth every day.  Dose: 1 Tablet     melatonin 3 MG Tabs   Take 3 mg by mouth at bedtime.  Dose: 3 mg     multivitamin Tabs   Take 1 Tablet by mouth every morning.  Dose: 1 Tablet     prochlorperazine 10 MG Tabs  Commonly known as: COMPAZINE   Take 10 mg by mouth every 6 hours as needed for Nausea/Vomiting.  Dose: 10 mg     venlafaxine 75 MG Tabs  Commonly known as: EFFEXOR   Take 1 Tablet by mouth every day.  Dose: 75 mg        STOP taking these medications    cephALEXin 500 MG Caps  Commonly known as: KEFLEX     hydrOXYzine HCl 50 MG Tabs  Commonly known as: ATARAX     Mirabegron ER 50 MG Tb24     sennosides 8.6 MG Tabs  Commonly known as: SENOKOT     traMADol 50 MG Tabs  Commonly known as: ULTRAM            Allergies  Allergies   Allergen Reactions   • Tape Rash     Use paper tape instead       DIET  Orders Placed This Encounter   Procedures   • Diet Order Diet: Regular     Standing Status:   Standing     Number of Occurrences:   1     Order Specific Question:   Diet:     Answer:   Regular [1]       ACTIVITY  As tolerated and directed by rehab.  Weight bearing as tolerated    LINES, DRAINS, AND WOUNDS  This is an automated list. Peripheral IVs will be removed prior to discharge.     External Urinary Catheter (Female Wick) (Active)   Collection Container Suction container 02/16/22 2041   Output (mL) 500 mL  02/17/22 0648      Wound 03/09/21 Incision Head Right Bacitracin Ointment (Active)       Wound 02/10/22 Head Upper scab from patieng scratching (Active)   Wound Image   02/11/22 0050   Site Assessment Pink;Red 02/18/22 0900   Periwound Assessment Intact 02/17/22 1700   Margins CARLITOS 02/12/22 0800   Closure CARLITOS 02/12/22 0800   Drainage Amount None 02/13/22 2000   Dressing Options Open to Air 02/17/22 0815   Dressing Status Open to Air 02/18/22 0900                  MENTAL STATUS ON TRANSFER             CONSULTATIONS  Neurology  Physiatry    PROCEDURES  2/10/2022 Video electroencephalogram  2/11/2022 Continuous video electroencephalogram    LABORATORY  Lab Results   Component Value Date    SODIUM 142 02/12/2022    POTASSIUM 3.7 02/12/2022    CHLORIDE 108 02/12/2022    CO2 24 02/12/2022    GLUCOSE 110 (H) 02/12/2022    BUN 7 (L) 02/12/2022    CREATININE 0.47 (L) 02/12/2022        Lab Results   Component Value Date    WBC 4.6 (L) 02/12/2022    HEMOGLOBIN 11.6 (L) 02/12/2022    HEMATOCRIT 34.5 (L) 02/12/2022    PLATELETCT 203 02/12/2022      MR-BRAIN-WITH & W/O   Final Result         Postoperative changes are noted in the medial right posterior frontal parietal region with rim enhancement of the postoperative cavity. Linear thick  enhancement along the medial margin of the operative cavity abutting the falx cerebri is noted.    Comparison with previous films would be helpful to determine if there has been any interval change.      Extensive T2 signal abnormality involving the bilateral cerebral hemispheric white matter is noted, most likely related to posttreatment changes.         EC-ECHOCARDIOGRAM COMPLETE W/O CONT   Final Result      CT-CTA CHEST PULMONARY ARTERY W/ RECONS   Final Result      1.  No CT evidence for pulmonary emboli.   2.  No pneumonia or pneumothorax.               CT-HEAD W/O   Final Result      1.  Postoperative changes of right frontoparietal mass resection. No evidence of hemorrhage. Basal cisterns  are patent.   2.  The right greater than left white matter is hypodense. This may reflect edema or postradiation change. This appears somewhat decreased from 12/14/2021 CT.   3.  Bilateral mastoid effusions. Correlate for mastoiditis.      DX-CHEST-PORTABLE (1 VIEW)   Final Result      Hypoinflation with minimal RIGHT midlung atelectasis.         Total time of the discharge process exceeds 44 minutes.

## 2022-02-18 NOTE — PROGRESS NOTES
1700- 1930    VSS on room air. Complains of pain to left neck, given lidocaine patch with some relief. Purewick in place.  at bedside. Safety maintained.

## 2022-02-18 NOTE — PROGRESS NOTES
Assumed care of pt at shift change after receiving report from dayshift RN. Pt is A&Ox 4 on RA. Pt c/o headache 8/10 pain - medicated pt per MAR. 2RN skin check complete with Calli WONG. Pt pleasant and cooperative with taking scheduled medications. Bed is locked and in lowest position, call light within reach, fall precautions in place. All needs met at this time.

## 2022-02-18 NOTE — PROGRESS NOTES
Received report from NOC RN, pt care assumed. VS stable on RA. Tachycardic . Call light within reach, hourly rounding initiated. Pt is aaox4. No signs of acute distress.

## 2022-02-18 NOTE — DISCHARGE PLANNING
1117: Insurance denied patient for IPR. Pending peer to peer, TCC will continue to follow.    1400: Dr Dodd completed peer to peer, insurance authorized 7 days. Sent PAS to Dr Fulton to review for potential admission today.    1500: Dr Sim has accepted, transport arranged for 1600 via [facility] to [facility]. Notified bedside nurse, cm, and attending. Met with patient and spouse at bedside, patient has received OP services at [facility] and is familiar with the facility. Explained current visitor/covid policy, patient and spouse report no additional needs at this time.

## 2022-02-18 NOTE — DISCHARGE INSTRUCTIONS
Discharge Instructions    Discharged to other by medical transportation with escort. Discharged via ambulance, hospital escort: Yes.  Special equipment needed: Not Applicable    Be sure to schedule a follow-up appointment with your primary care doctor or any specialists as instructed.     Discharge Plan:   Diet Plan: Discussed  Activity Level: Discussed  Confirmed Follow up Appointment: Patient to Call and Schedule Appointment  Confirmed Symptoms Management: Discussed  Medication Reconciliation Updated: Yes  Influenza Vaccine Indication: Not indicated: Previously immunized this influenza season and > 8 years of age (flu vaccine this flu season, unsure of date)    I understand that a diet low in cholesterol, fat, and sodium is recommended for good health. Unless I have been given specific instructions below for another diet, I accept this instruction as my diet prescription.   Other diet: Regular     Special Instructions: None    · Is patient discharged on Warfarin / Coumadin?   No     Depression / Suicide Risk    As you are discharged from this [facility] facility, it is important to learn how to keep safe from harming yourself.    Recognize the warning signs:  · Abrupt changes in personality, positive or negative- including increase in energy   · Giving away possessions  · Change in eating patterns- significant weight changes-  positive or negative  · Change in sleeping patterns- unable to sleep or sleeping all the time   · Unwillingness or inability to communicate  · Depression  · Unusual sadness, discouragement and loneliness  · Talk of wanting to die  · Neglect of personal appearance   · Rebelliousness- reckless behavior  · Withdrawal from people/activities they love  · Confusion- inability to concentrate     If you or a loved one observes any of these behaviors or has concerns about self-harm, here's what you can do:  · Talk about it- your feelings and reasons for harming yourself  · Remove any means that  you might use to hurt yourself (examples: pills, rope, extension cords, firearm)  · Get professional help from the community (Mental Health, Substance Abuse, psychological counseling)  · Do not be alone:Call your Safe Contact- someone whom you trust who will be there for you.  · Call your local CRISIS HOTLINE 219-4915 or 507-134-5975  · Call your local Children's Mobile Crisis Response Team [place] (180) 121-7804 or wwwNewsCrafted  · Call the toll free National Suicide Prevention Hotlines   · National Suicide Prevention Lifeline 686-224-HTAX (2809)  · [employer] Hope Line Network 800-SUICIDE (967-2052)        Glioblastoma    Glioblastoma, also called Grade IV astrocytoma, is a type of brain cancer. Glioblastoma tumors are made up of an overgrowth of normal brain cells. This type of cancer can grow quickly.  What are the causes?  A tumor is formed when normal brain cells grow into a mass of tissue. What causes normal brain cells to grow into a mass of tissue is not known.  What increases the risk?  The following factors may make you more likely to develop this condition:  · Radiation exposure.  · Family history of cancer syndrome, such as Li-Fraumeni syndrome or Turcot syndrome.  · Age. People between the ages of 45 and 70 are more likely to develop this tumor.  What are the signs or symptoms?  Symptoms of this condition may depend on the size and location of the tumor. Symptoms may include:  · Headache, which may be worse in the morning.  · Nausea and vomiting.  · Vision changes.  · Seizures.  · Not being able to walk.  · Weakness or numbness on one side of the body or in an arm or leg.  · Mood changes.  · Problems with memory or thinking.  · Drowsiness.  Symptoms are most commonly caused by increased pressure in the brain.  How is this diagnosed?  This condition is diagnosed based on a medical history and a physical exam. Brain imaging tests will also be done, such as a CT scan or MRI. A sample of the  tumor will be taken and studied in a laboratory (biopsy) to confirm the diagnosis.  How is this treated?  There are several treatment options for this condition. Often, a person will have more than one type of treatment. Treatment may include:  · Surgery to remove as much of the tumor as possible.  · High-energy rays (radiation therapy) to help shrink or kill the tumor.  · Chemotherapy to shrink or kill the tumor. Because normal cells may also be killed, chemotherapy causes many side effects.  · Targeted therapy. This uses substances that damage or kill cancer cells without affecting normal cells.  · Steroid medicine to decrease brain swelling and improve symptoms.  · New treatments through clinical trials.  Follow these instructions at home:  · Take over-the-counter and prescription medicines only as told by your health care provider.  · Consider joining a support group.  · Keep all follow-up visits as told by your health care provider. This is important.  Where to find more information  · National Cancer [place]: https://www.cancer.gov  · American Cancer Society: http://www.cancer.org  Contact a health care provider if:  · Any of your symptoms come back.  · You have diarrhea, vomiting, or abdominal pain.  · You cannot eat or drink what you need.  · You feel weaker and more tired than usual.  · You lose weight without trying.  Get help right away if:  · Your diarrhea, vomiting, or abdominal pain does not go away.  · You have new symptoms, such as vision problems or trouble walking.  · You have a seizure.  · You have bleeding that does not stop.  · You have trouble breathing.  · You have a fever.  Summary  · Glioblastoma, also called Grade IV astrocytoma, is a type of brain cancer. Glioblastoma tumors are made up of an overgrowth of normal brain cells. This type of cancer can grow quickly.  · This condition is diagnosed based on a medical history and a physical exam. Brain imaging tests will also be done, such as  a CT scan or MRI.  · A sample of the tumor will be taken and studied in a laboratory (biopsy) to confirm the diagnosis.  · Treatment may include surgery to remove the tumor as well as radiation therapy and chemotherapy.  This information is not intended to replace advice given to you by your health care provider. Make sure you discuss any questions you have with your health care provider.  Document Released: 12/23/2014 Document Revised: 11/30/2018 Document Reviewed: 01/16/2018  [employer] Patient Education © 2020 [employer]

## 2022-02-19 LAB
25(OH)D3 SERPL-MCNC: 29 NG/ML (ref 30–100)
ALBUMIN SERPL BCP-MCNC: 4.1 G/DL (ref 3.2–4.9)
ALBUMIN/GLOB SERPL: 1.5 G/DL
ALP SERPL-CCNC: 139 U/L (ref 30–99)
ALT SERPL-CCNC: 17 U/L (ref 2–50)
ANION GAP SERPL CALC-SCNC: 12 MMOL/L (ref 7–16)
AST SERPL-CCNC: 19 U/L (ref 12–45)
BASOPHILS # BLD AUTO: 0.5 % (ref 0–1.8)
BASOPHILS # BLD: 0.04 K/UL (ref 0–0.12)
BILIRUB SERPL-MCNC: 0.7 MG/DL (ref 0.1–1.5)
BUN SERPL-MCNC: 8 MG/DL (ref 8–22)
CALCIUM SERPL-MCNC: 9.7 MG/DL (ref 8.5–10.5)
CHLORIDE SERPL-SCNC: 102 MMOL/L (ref 96–112)
CO2 SERPL-SCNC: 25 MMOL/L (ref 20–33)
CREAT SERPL-MCNC: 0.46 MG/DL (ref 0.5–1.4)
EOSINOPHIL # BLD AUTO: 0.1 K/UL (ref 0–0.51)
EOSINOPHIL NFR BLD: 1.2 % (ref 0–6.9)
ERYTHROCYTE [DISTWIDTH] IN BLOOD BY AUTOMATED COUNT: 55.8 FL (ref 35.9–50)
EST. AVERAGE GLUCOSE BLD GHB EST-MCNC: 103 MG/DL
GLOBULIN SER CALC-MCNC: 2.8 G/DL (ref 1.9–3.5)
GLUCOSE SERPL-MCNC: 105 MG/DL (ref 65–99)
HBA1C MFR BLD: 5.2 % (ref 4–5.6)
HCT VFR BLD AUTO: 43.9 % (ref 37–47)
HGB BLD-MCNC: 14.3 G/DL (ref 12–16)
IMM GRANULOCYTES # BLD AUTO: 0.06 K/UL (ref 0–0.11)
IMM GRANULOCYTES NFR BLD AUTO: 0.7 % (ref 0–0.9)
LYMPHOCYTES # BLD AUTO: 1.87 K/UL (ref 1–4.8)
LYMPHOCYTES NFR BLD: 22.5 % (ref 22–41)
MCH RBC QN AUTO: 34 PG (ref 27–33)
MCHC RBC AUTO-ENTMCNC: 32.6 G/DL (ref 33.6–35)
MCV RBC AUTO: 104.3 FL (ref 81.4–97.8)
MONOCYTES # BLD AUTO: 0.82 K/UL (ref 0–0.85)
MONOCYTES NFR BLD AUTO: 9.9 % (ref 0–13.4)
NEUTROPHILS # BLD AUTO: 5.41 K/UL (ref 2–7.15)
NEUTROPHILS NFR BLD: 65.2 % (ref 44–72)
NRBC # BLD AUTO: 0 K/UL
NRBC BLD-RTO: 0 /100 WBC
PLATELET # BLD AUTO: 262 K/UL (ref 164–446)
PMV BLD AUTO: 9.9 FL (ref 9–12.9)
POTASSIUM SERPL-SCNC: 4 MMOL/L (ref 3.6–5.5)
PROT SERPL-MCNC: 6.9 G/DL (ref 6–8.2)
RBC # BLD AUTO: 4.21 M/UL (ref 4.2–5.4)
SARS-COV-2 RNA RESP QL NAA+PROBE: NOTDETECTED
SODIUM SERPL-SCNC: 139 MMOL/L (ref 135–145)
SPECIMEN SOURCE: NORMAL
TSH SERPL DL<=0.005 MIU/L-ACNC: 1.98 UIU/ML (ref 0.38–5.33)
WBC # BLD AUTO: 8.3 K/UL (ref 4.8–10.8)

## 2022-02-19 PROCEDURE — 700102 HCHG RX REV CODE 250 W/ 637 OVERRIDE(OP): Performed by: PHYSICAL MEDICINE & REHABILITATION

## 2022-02-19 PROCEDURE — 94760 N-INVAS EAR/PLS OXIMETRY 1: CPT

## 2022-02-19 PROCEDURE — A9270 NON-COVERED ITEM OR SERVICE: HCPCS | Performed by: PHYSICAL MEDICINE & REHABILITATION

## 2022-02-19 PROCEDURE — 85025 COMPLETE CBC W/AUTO DIFF WBC: CPT

## 2022-02-19 PROCEDURE — 36415 COLL VENOUS BLD VENIPUNCTURE: CPT

## 2022-02-19 PROCEDURE — 80053 COMPREHEN METABOLIC PANEL: CPT

## 2022-02-19 PROCEDURE — 97535 SELF CARE MNGMENT TRAINING: CPT

## 2022-02-19 PROCEDURE — 97163 PT EVAL HIGH COMPLEX 45 MIN: CPT

## 2022-02-19 PROCEDURE — 84443 ASSAY THYROID STIM HORMONE: CPT

## 2022-02-19 PROCEDURE — 97167 OT EVAL HIGH COMPLEX 60 MIN: CPT

## 2022-02-19 PROCEDURE — 97530 THERAPEUTIC ACTIVITIES: CPT

## 2022-02-19 PROCEDURE — 99233 SBSQ HOSP IP/OBS HIGH 50: CPT | Performed by: PHYSICAL MEDICINE & REHABILITATION

## 2022-02-19 PROCEDURE — 770010 HCHG ROOM/CARE - REHAB SEMI PRIVAT*

## 2022-02-19 PROCEDURE — 83036 HEMOGLOBIN GLYCOSYLATED A1C: CPT

## 2022-02-19 PROCEDURE — 92523 SPEECH SOUND LANG COMPREHEN: CPT

## 2022-02-19 PROCEDURE — 82306 VITAMIN D 25 HYDROXY: CPT

## 2022-02-19 RX ORDER — VITAMIN B COMPLEX
1000 TABLET ORAL DAILY
Status: DISCONTINUED | OUTPATIENT
Start: 2022-02-19 | End: 2022-03-11 | Stop reason: HOSPADM

## 2022-02-19 RX ADMIN — AMLODIPINE BESYLATE 10 MG: 5 TABLET ORAL at 08:16

## 2022-02-19 RX ADMIN — OXYCODONE 5 MG: 5 TABLET ORAL at 17:48

## 2022-02-19 RX ADMIN — VENLAFAXINE HYDROCHLORIDE 75 MG: 37.5 TABLET ORAL at 08:17

## 2022-02-19 RX ADMIN — APIXABAN 5 MG: 5 TABLET, FILM COATED ORAL at 08:16

## 2022-02-19 RX ADMIN — Medication 1000 UNITS: at 10:23

## 2022-02-19 RX ADMIN — OMEPRAZOLE 20 MG: 20 CAPSULE, DELAYED RELEASE ORAL at 08:17

## 2022-02-19 RX ADMIN — TRAZODONE HYDROCHLORIDE 50 MG: 50 TABLET ORAL at 19:24

## 2022-02-19 RX ADMIN — LACOSAMIDE 200 MG: 100 TABLET, FILM COATED ORAL at 19:24

## 2022-02-19 RX ADMIN — BRIVARACETAM 100 MG: 50 TABLET, FILM COATED ORAL at 08:16

## 2022-02-19 RX ADMIN — MELATONIN TAB 3 MG 3 MG: 3 TAB at 19:24

## 2022-02-19 RX ADMIN — APIXABAN 5 MG: 5 TABLET, FILM COATED ORAL at 19:25

## 2022-02-19 RX ADMIN — SENNOSIDES AND DOCUSATE SODIUM 2 TABLET: 50; 8.6 TABLET ORAL at 08:16

## 2022-02-19 RX ADMIN — BRIVARACETAM 100 MG: 50 TABLET, FILM COATED ORAL at 19:25

## 2022-02-19 RX ADMIN — SENNOSIDES AND DOCUSATE SODIUM 2 TABLET: 50; 8.6 TABLET ORAL at 19:24

## 2022-02-19 RX ADMIN — LACOSAMIDE 200 MG: 100 TABLET, FILM COATED ORAL at 08:16

## 2022-02-19 RX ADMIN — ACETAMINOPHEN 1000 MG: 500 TABLET, FILM COATED ORAL at 12:25

## 2022-02-19 ASSESSMENT — ACTIVITIES OF DAILY LIVING (ADL)
TOILETING: REQUIRES ASSIST
TOILET_TRANSFER_DESCRIPTION: GRAB BAR;INCREASED TIME;INITIAL PREPARATION FOR TASK;REQUIRES LIFT;SET-UP OF EQUIPMENT;SUPERVISION FOR SAFETY;VERBAL CUEING
TOILETING_LEVEL_OF_ASSIST_DESCRIPTION: ASSIST FOR HYGIENE;ASSIST TO PULL PANTS UP;ASSIST TO PULL PANTS DOWN;ASSIST FOR STANDING BALANCE;GRAB BAR;INCREASED TIME;INITIAL PREPARATION FOR TASK;SET-UP OF EQUIPMENT;SUPERVISION FOR SAFETY;VERBAL CUEING

## 2022-02-19 ASSESSMENT — GAIT ASSESSMENTS
ASSISTIVE DEVICE: QUAD CANE
DISTANCE (FEET): 5
DEVIATION: STEP TO;DECREASED BASE OF SUPPORT;DECREASED HEEL STRIKE;DECREASED TOE OFF;BRADYKINETIC
GAIT LEVEL OF ASSIST: TOTAL ASSIST X 2

## 2022-02-19 ASSESSMENT — BRIEF INTERVIEW FOR MENTAL STATUS (BIMS)
ASKED TO RECALL SOCK: YES, NO CUE REQUIRED
ASKED TO RECALL BLUE: YES, NO CUE REQUIRED
ASKED TO RECALL BED: YES, NO CUE REQUIRED
WHAT YEAR IS IT: CORRECT
INITIAL REPETITION OF BED BLUE SOCK - FIRST ATTEMPT: 3
WHAT DAY OF THE WEEK IS IT: INCORRECT
BIMS SUMMARY SCORE: 14
WHAT MONTH IS IT: ACCURATE WITHIN 5 DAYS

## 2022-02-19 ASSESSMENT — PAIN DESCRIPTION - PAIN TYPE: TYPE: ACUTE PAIN

## 2022-02-19 ASSESSMENT — LIFESTYLE VARIABLES: EVER_SMOKED: NEVER

## 2022-02-19 NOTE — PROGRESS NOTES
"Rehab Progress Note     Date of Service: 2/19/2022  Chief Complaint: Left-sided weakness and tightness    Interval Events (Subjective)    Patient seen and examined today in her room.  She is working with therapy.  She thinks that I am Dr. Steele and the doctor that did her Botox injections.  She reports she slept well last night.  She reports continued left-sided weakness and tightness but is currently denying any pain.  She reports neurology suggesting she might be a good candidate for Botox in her bladder.      ROS: No changes to bowel, bladder, pain, mood, or sleep.         Objective:  VITAL SIGNS: /71   Pulse 98   Temp 36.2 °C (97.1 °F) (Temporal)   Resp 16   Ht 1.702 m (5' 7\")   Wt 100 kg (221 lb 8 oz)   SpO2 95%   BMI 34.69 kg/m²    Gen: alert, no apparent distress  HEENT: Left temporal scalp with ecchymosis  CV: regular rate and rhythm, no murmurs  Resp: clear to ascultation bilaterally, normal respiratory effort  GI: soft, non-tender abdomen, bowel sounds present  Neuro: notable for spastic left hemiparesis      Recent Results (from the past 72 hour(s))   SARS-CoV-2 PCR (24 hour In-House): Collect NP swab in VT    Collection Time: 02/18/22  6:05 PM    Specimen: Mid-Turbinate; Respirate   Result Value Ref Range    SARS-CoV-2 Source NP Swab    CBC with Differential    Collection Time: 02/19/22  5:57 AM   Result Value Ref Range    WBC 8.3 4.8 - 10.8 K/uL    RBC 4.21 4.20 - 5.40 M/uL    Hemoglobin 14.3 12.0 - 16.0 g/dL    Hematocrit 43.9 37.0 - 47.0 %    .3 (H) 81.4 - 97.8 fL    MCH 34.0 (H) 27.0 - 33.0 pg    MCHC 32.6 (L) 33.6 - 35.0 g/dL    RDW 55.8 (H) 35.9 - 50.0 fL    Platelet Count 262 164 - 446 K/uL    MPV 9.9 9.0 - 12.9 fL    Neutrophils-Polys 65.20 44.00 - 72.00 %    Lymphocytes 22.50 22.00 - 41.00 %    Monocytes 9.90 0.00 - 13.40 %    Eosinophils 1.20 0.00 - 6.90 %    Basophils 0.50 0.00 - 1.80 %    Immature Granulocytes 0.70 0.00 - 0.90 %    Nucleated RBC 0.00 /100 WBC    " Neutrophils (Absolute) 5.41 2.00 - 7.15 K/uL    Lymphs (Absolute) 1.87 1.00 - 4.80 K/uL    Monos (Absolute) 0.82 0.00 - 0.85 K/uL    Eos (Absolute) 0.10 0.00 - 0.51 K/uL    Baso (Absolute) 0.04 0.00 - 0.12 K/uL    Immature Granulocytes (abs) 0.06 0.00 - 0.11 K/uL    NRBC (Absolute) 0.00 K/uL   Comp Metabolic Panel (CMP)    Collection Time: 02/19/22  5:57 AM   Result Value Ref Range    Sodium 139 135 - 145 mmol/L    Potassium 4.0 3.6 - 5.5 mmol/L    Chloride 102 96 - 112 mmol/L    Co2 25 20 - 33 mmol/L    Anion Gap 12.0 7.0 - 16.0    Glucose 105 (H) 65 - 99 mg/dL    Bun 8 8 - 22 mg/dL    Creatinine 0.46 (L) 0.50 - 1.40 mg/dL    Calcium 9.7 8.5 - 10.5 mg/dL    AST(SGOT) 19 12 - 45 U/L    ALT(SGPT) 17 2 - 50 U/L    Alkaline Phosphatase 139 (H) 30 - 99 U/L    Total Bilirubin 0.7 0.1 - 1.5 mg/dL    Albumin 4.1 3.2 - 4.9 g/dL    Total Protein 6.9 6.0 - 8.2 g/dL    Globulin 2.8 1.9 - 3.5 g/dL    A-G Ratio 1.5 g/dL   HEMOGLOBIN A1C    Collection Time: 02/19/22  5:57 AM   Result Value Ref Range    Glycohemoglobin 5.2 4.0 - 5.6 %    Est Avg Glucose 103 mg/dL   TSH with Reflex to FT4    Collection Time: 02/19/22  5:57 AM   Result Value Ref Range    TSH 1.980 0.380 - 5.330 uIU/mL   Vitamin D, 25-hydroxy (blood)    Collection Time: 02/19/22  5:57 AM   Result Value Ref Range    25-Hydroxy   Vitamin D 25 29 (L) 30 - 100 ng/mL   ESTIMATED GFR    Collection Time: 02/19/22  5:57 AM   Result Value Ref Range    GFR If African American >60 >60 mL/min/1.73 m 2    GFR If Non African American >60 >60 mL/min/1.73 m 2       Current Facility-Administered Medications   Medication Frequency   • vitamin D3 (cholecalciferol) tablet 1,000 Units DAILY   • Respiratory Therapy Consult Continuous RT   • Pharmacy Consult Request ...Pain Management Review 1 Each PHARMACY TO DOSE   • hydrALAZINE (APRESOLINE) tablet 25 mg Q8HRS PRN   • acetaminophen (Tylenol) tablet 650 mg Q4HRS PRN   • senna-docusate (PERICOLACE or SENOKOT S) 8.6-50 MG per tablet 2  Tablet BID    And   • polyethylene glycol/lytes (MIRALAX) PACKET 1 Packet QDAY PRN    And   • magnesium hydroxide (MILK OF MAGNESIA) suspension 30 mL QDAY PRN    And   • bisacodyl (DULCOLAX) suppository 10 mg QDAY PRN   • omeprazole (PRILOSEC) capsule 20 mg DAILY   • artificial tears ophthalmic solution 1 Drop PRN   • benzocaine-menthol (CEPACOL) lozenge 1 Lozenge Q2HRS PRN   • mag hydrox-al hydrox-simeth (MAALOX PLUS ES or MYLANTA DS) suspension 20 mL Q2HRS PRN   • ondansetron (ZOFRAN ODT) dispertab 4 mg 4X/DAY PRN    Or   • ondansetron (ZOFRAN) syringe/vial injection 4 mg 4X/DAY PRN   • traZODone (DESYREL) tablet 50 mg QHS PRN   • sodium chloride (OCEAN) 0.65 % nasal spray 2 Spray PRN   • oxyCODONE immediate-release (ROXICODONE) tablet 5 mg Q3HRS PRN    Or   • oxyCODONE immediate release (ROXICODONE) tablet 10 mg Q3HRS PRN   • midazolam (VERSED) 5 mg/mL (1 mL vial) PRN   • acetaminophen (TYLENOL) tablet 1,000 mg Q6HRS PRN   • ALPRAZolam (XANAX) tablet 0.5 mg BID PRN   • amLODIPine (NORVASC) tablet 10 mg DAILY   • apixaban (ELIQUIS) tablet 5 mg BID   • brivaracetam (Briviact) tablet 100 mg BID   • hydrocortisone 1 % cream BID PRN   • lacosamide (VIMPAT) tablet 200 mg BID   • melatonin tablet 3 mg QHS   • venlafaxine (EFFEXOR) tablet 75 mg DAILY       Orders Placed This Encounter   Procedures   • Diet Order Diet: Regular     Standing Status:   Standing     Number of Occurrences:   1     Order Specific Question:   Diet:     Answer:   Regular [1]       Assessment:  Active Hospital Problems    Diagnosis    • *Glioblastoma of frontal lobe (HCC)    • GBM (glioblastoma multiforme) (HCC)    • Constipation    • Syncope    • Class 1 obesity due to excess calories with serious comorbidity and body mass index (BMI) of 34.0 to 34.9 in adult    • Gait instability    • History of pulmonary embolus (PE)    • Seizure disorder (HCC)    • Primary hypertension    • Mixed anxiety and depressive disorder        Medical Decision Making  and Plan:    GBM s/p resection, radiation, chemotherapy  Continue full rehab program  PT/OT/SLP, 1 hr each discipline, 5 days per week    Notes from acute recommended 2 mg Decadron twice daily for 7 days and taper, however patient has been off steroids since 2/15, will defer to primary attending whether to restart    Seizure disorder  Brivaracetam  Lacosamide - just increased    Depression/anxiety  Venlafaxine    History of PE  Eliquis    Hypertension  Amlodipine    GI prophylaxis  Omeprazole    Insomnia  Melatonin    History of vitamin D deficiency  Augmentation    Bowel program  Continue bowel medications  Last BM 2/18    Bladder program  Check PVRs - 12  Bladder scan for no voids  ICP for over 400 cc  Scheduled toileting    DVT prophylaxis  Lovenox    Total time:  35 minutes.  I spent greater than 50% of the time for patient care, counseling, and coordination on this date, including patient face-to face time, unit/floor time with review of records/pertinent lab data and studies, as well as discussing diagnostic evaluation/work up, planned therapeutic interventions, and future disposition of care, as per the interval events/subjective and the assessment and plan as noted above.      Susi Fulton M.D.   Physical Medicine and Rehabilitation

## 2022-02-19 NOTE — FLOWSHEET NOTE
02/19/22 0926   Events/Summary/Plan   Events/Summary/Plan RT Assessment   Vital Signs   Pulse 98   Respiration 16   Pulse Oximetry 95 %   $ Pulse Oximetry (Spot Check) Yes   Respiratory Assessment   Respiratory Pattern Within Normal Limits   Level of Consciousness Alert   Chest Exam   Work Of Breathing / Effort Within Normal Limits   Breath Sounds   RUL Breath Sounds Clear   RML Breath Sounds Clear   RLL Breath Sounds Clear   VÍCTOR Breath Sounds Clear   LLL Breath Sounds Clear   Secretions   Cough Strong;Non Productive   Oxygen   O2 Delivery Device None - Room Air   Smoking History   Have you ever smoked Never

## 2022-02-19 NOTE — CARE PLAN
"The patient is Stable - Low risk of patient condition declining or worsening    Shift Goals  Clinical Goals: sleep      Problem: Pain - Standard  Goal: Alleviation of pain or a reduction in pain to the patient’s comfort goal  Outcome: Progressing: Pt requested a PRN 5mg Oxy for a headache 5/10 at bedtime. Pt informed to ring the call light anytime during the night if she needs any more pain medication. Pt stated she understood.        Problem: Mobility  Goal: Patient's capacity to carry out activities will improve  Outcome: Progressing: Pt is a max 2 assist. Pt had an incontinent episode of bowel and bladder at the beginning of shift and was changed in bed due to flaccidity in her left leg and contractures in her left arm. Bedside commode is available and set up in her room, pt informed to ring her call light next time she has to void so she can be assisted to the commode. Pt stated she understood.     [place] Fall risk Assessment Score: 23    High fall risk Interventions   - Bed and strip alarm   -Room close to nursing station   - Yellow sign by the door   - Yellow wrist band \"Fall risk\"  - Do not leave patient unattended in the bathroom  - Fall risk education provided           "

## 2022-02-19 NOTE — PROGRESS NOTES
Patient admitted to facility at 1626 via w/c; accompanied by hospital transport.  Max transfer to bedPatient assisted to room and positioned in bed for comfort and safety; call light within reach.  Patient assisted with stowing belongings and oriented to room and facility.  Admission assessment performed and documented in computer.  Admission paperwork completed; signed copies placed in chart.  Will continue to monitor.    2 RN Skin Check    2 RN skin check completed with JUDITH Hernandez  Devices in place: N/A.  Skin assessed under devices: no.  Confirmed pressure ulcers found on: N/A.  New potential pressure ulcers noted on N/A. Wound consult placed N/A.  The following interventions in place Pillows, Mepilex, Lotion and Barrier cream.

## 2022-02-19 NOTE — CARE PLAN
The patient is Watcher - Medium risk of patient condition declining or worsening    Shift Goals  Clinical Goals: Safety, Comfort      Problem: Pain - Standard  Goal: Alleviation of pain or a reduction in pain to the patient’s comfort goal  Note: Patient had a 2/10 headache, PRN tylenol was administered, no other complaints of pain at this time, will continue to monitor.      Problem: Bladder / Voiding  Goal: Patient will establish and maintain regular urinary output  Note: Patient is continent /incontinent of void, urine output was clear and yellow, bladder scan during this shift was below 100mL, will continue to monitor.

## 2022-02-19 NOTE — THERAPY
"Occupational Therapy   Initial Evaluation     Patient Name: Melba Frye  Age:  51 y.o., Sex:  female  Medical Record #: [id number]  Today's Date: 2/19/2022     Subjective    \"I love bird watching, I've really gotten into it recently.\"      Objective       02/19/22 0701   Prior Living Situation   Prior Services Continuous (24 Hour) Care Giving Per Service;Continuous (24 Hour) Care Giving Family   Housing / Facility 2 Story House   Steps Into Home 1  (threshold)   Steps In Home 18  (with landing)   Rail Right Rail  (Steps in Home);Left Rail (Steps in Home)   Elevator No   Bathroom Set up Walk In Shower;Grab Bars;Tub Transfer Bench  (upstairs; small threshold into shower)   Equipment Owned Quad Cane;Wheelchair;Hospital Bed;Bed Side Commode;Grab Bar(s) In Tub / Shower   Lives with - Patient's Self Care Capacity Spouse;Attendant / Paid Care Giver   Comments As per pt, caregiver comes 6 days a week and is only alone for an hour or so before  comes home; caregiver and  assist with all ADL's and mobility   Prior Level of ADL Function   Self Feeding Independent   Grooming / Hygiene Independent   Bathing Requires Assist   Dressing Requires Assist   Toileting Requires Assist   Prior Level of IADL Function   Medication Management Dependent   Laundry Dependent   Kitchen Mobility Requires Assist   Finances Dependent   Home Management Dependent   Shopping Dependent   Prior Level Of Mobility Uses Wheel Chair for Community Mobility;Uses Wheel Chair for in Home Mobility  (assist needed for stairs and ambulation using quad cane)   Driving / Transportation Relatives / Others Provide Transportation   Leisure Interests   (bird watching)   Prior Functioning: Everyday Activities   Self Care Needed some help   Indoor Mobility (Ambulation) Needed some help   Stairs Needed some help   Functional Cognition Needed some help   Prior Device Use Manual wheelchair  (and quad cane)   Pain   Intervention Declines   Pain 0 - 10 " Group   Pain Rating Scale (NPRS) 0   Cognition    Level of Consciousness Alert   Ability To Follow Commands 1 Step   Safety Awareness Impaired   New Learning Impaired   Attention Impaired   Sequencing Impaired   Initiation Impaired   ABS (Agitated Behavior Scale)   Agitated Behavior Scale Performed Yes   Short Attention Span, Easy Distractibility, Inability to Concentrate 3   Impulsive, Impatient, Low Tolerance for Pain or Frustration 2   Uncooperative, Resistant to Care, Demanding 1   Violent and/or Threatening Violence Toward People or Property 1   Explosive and/or Unpredictable Anger 1   Rocking, Rubbing, Moaning, Other Self-Stimulating Behavior 1   Pulling at Tubes, Restraints, etc. 1   Wandering from Treatment Area 1   Restlessness, Pacing, Excessive Movement 2   Repetitive Behaviors, Motor and/or Verbal 2   Rapid, Loud or Excessive Talking 1   Sudden Changes of Mood 1   Easily Initiated - Excessive Crying and/or Laughter 1   Self-Abusiveness, Physical and/or Verbal 1   Agitated Behavior Scale Total Score 19   Level of Severity No Agitation   Vision Screen   Vision Not tested   Passive ROM Upper Body   Passive ROM Upper Body X   Comments limited LUE shoulder flexion/abduction; ~80 degrees shoulder flexion; ~60 degrees abduction; limited due to tone   Active ROM Upper Body   Active ROM Upper Body  X   Dominant Hand Right   Comments ~5-10 degrees scaption LUE; ~45 degrees elbow flexion   Strength Upper Body   Upper Body Strength  X   Comments RUE generalized weakness; LUE non-functional   Sensation Upper Body   Upper Extremity Sensation  WDL   Comments no reports of numbness/tingling   Upper Body Muscle Tone   Upper Body Muscle Tone  X   Lt Upper Extremity Muscle Tone Non Functional  (high tone)   Balance Assessment   Sitting Balance (Static) Trace +   Eating   Assistance Needed Supervision;Set-up / clean-up   CARE Score - Eating 4   Eating Discharge Goal   Discharge Goal 5   Oral Hygiene   Assistance Needed  Set-up / clean-up;Supervision   CARE Score - Oral Hygiene 4   Oral Hygiene Discharge Goal   Discharge Goal 5   Shower/Bathe Self   Reason if not Attempted Medical concerns   CARE Score - Shower/Bathe Self 88   Shower/Bathe Self Discharge Goal   Discharge Goal 3   Upper Body Dressing   Assistance Needed Physical assistance   Physical Assistance Level Total assistance   CARE Score - Upper Body Dressing 1   Upper Body Dressing Discharge Goal   Discharge Goal 3   Lower Body Dressing   Assistance Needed Physical assistance   Physical Assistance Level Total assistance   CARE Score - Lower Body Dressing 1   Lower Body Dressing Discharge Goal   Discharge Goal 3   Putting On/Taking Off Footwear   Assistance Needed Physical assistance   Physical Assistance Level Total assistance   CARE Score - Putting On/Taking Off Footwear 1   Putting On/Taking Off Footwear Discharge Goal   Discharge Goal 2   Toileting Hygiene   Assistance Needed Physical assistance   Physical Assistance Level Total assistance   CARE Score - Toileting Hygiene 1   Toileting Hygiene Discharge Goal   Discharge Goal 3   Toilet Transfer   Assistance Needed Physical assistance   Physical Assistance Level Total assistance   CARE Score - Toilet Transfer 1   Toilet Transfer Discharge Goal   Discharge Goal 3   Hearing, Speech, and Vision   Expression of Ideas and Wants Without difficulty   Understanding Verbal and Non-Verbal Content Understands   Functional Level of Assist   Eating Stand by Assist   Eating Description Increased time;Set-up of equipment or meal/tube feeding  (assist to open packages/cut food)   Grooming Standby Assist   Grooming Description Increased time;Initial preparation for task;Seated in wheelchair at sink;Set-up of equipment;Supervision for safety;Verbal cueing   Bathing   (unable to complete due to time constraints)   Upper Body Dressing Total Assist   Upper Body Dressing Description Assit with threading arms through sleeves;Assist with pulling  shirt over head;Increased time;Initial preparation for task;Set-up of equipment;Supervision for safety;Verbal cueing   Lower Body Dressing Total Assist x 2   Lower Body Dressing Description Grab bar;Assist with threading into pant leg;Increased time;Initial preparation for task;Set-up of equipment;Supervision for safety;Verbal cueing   Toileting Total Assist x 2   Toileting Description Assist for hygiene;Assist to pull pants up;Assist to pull pants down;Assist for standing balance;Grab bar;Increased time;Initial preparation for task;Set-up of equipment;Supervision for safety;Verbal cueing   Bed, Chair, Wheelchair Transfer Total Assist X 2   Bed Chair Wheelchair Transfer Description Increased time;Set-up of equipment;Squat pivot transfer to wheelchair;Supervision for safety;Verbal cueing;Initial preparation for task   Toilet Transfers Total Assist X 2   Toilet Transfer Description Grab bar;Increased time;Initial preparation for task;Requires lift;Set-up of equipment;Supervision for safety;Verbal cueing   Tub / Shower Transfers Unable to Participate  (due to time constraints)   Problem List   Problem List Decreased Active Daily Living Skills;Decreased Homemaking Skills;Decreased Upper Extremity Strength Right;Decreased Upper Extremity Strength Left;Decreased Upper Extremity AROM Left;Decreased Upper Extremity PROM Left;Decreased Functional Mobility;Decreased Activity Tolerance;Safety Awareness Deficits / Cognition;Impaired Coordination Left Upper Extremity;Impaired Cognitive Function;Impaired Postural Control / Balance   Precautions   Precautions Fall Risk   Comments L alma delia   Current Discharge Plan   Current Discharge Plan Return to Prior Living Situation   Benefit    Therapy Benefit Patient Would Benefit from Inpatient Rehab Occupational Therapy to Maximize Teller with ADLs, IADLs and Functional Mobility.   Interdisciplinary Plan of Care Collaboration   Patient Position at End of Therapy Seated;Chair Alarm  On;Self Releasing Lap Belt Applied;Tray Table within Reach;Phone within Reach;Family / Friend in Room   Strengths & Barriers   Strengths Able to follow instructions;Effective communication skills;Independent prior level of function;Manages pain appropriately;Motivated for self care and independence;Pleasant and cooperative;Supportive family;Willingly participates in therapeutic activities   Barriers Bladder incontinence;Decreased endurance;Fatigue;Generalized weakness;Hemiparesis;Impaired activity tolerance;Impaired balance;Limited mobility   OT Total Time Spent   OT Individual Total Time Spent (Mins) 60   OT Charge Group   OT Self Care / ADL 1   OT Evaluation OT Evaluation High       Assessment  Patient is 51 y.o. female with a diagnosis of Glioblastoma of frontal lobe. Past medical history of right posterior fronto-parietal glioblastoma multiforme s/p right cranioplasty for resection (6/2021, biopsy 3/2021) at [facility], with seizures, w/ secondary L sided weakness w/ tone, radiation and chemotherapy with temozolomide (last dose 11/2021), focal seizures (on Vimpat and Briviact), migraine, hyperlipidemia, PE with cor pulmonale (on Eliquis), anxiety, and depression  ;  who presented on 2/9/2022  1:53 PM after multiple ground level falls likely due to a combination of hypoxia, seizure activity, and vasogenic edema of the brain. Per documentation, on Wednesday 2/9/22 she was walking back from the bathroom with her caregiver when she reported feeling tired, dizzy, and needed to sit. Her caregiver turned to get a chair at which time the patient fell forward to the ground without catching herself. EMS was notified and upon arrival noted the patient was hypoxic with SpO2 85% on RA. She had a period of about 15 minutes of altered awareness/consciousness, and does not recall the events.    Pt lives in 2 level home- first floor has half-bath and hospital bed, 1 PABLO (threshold). Pt lives with  and has a paid caregiver who  comes everyday except Mondays, when her  is off. Pt reported only being alone 1-1.5 hours a day usually. Pt required assist with all ADL's and functional mobility, except self-feeding and grooming tasks. Pt has walk-in shower with small threshold, grab bars, shower bench at home but on second floor. Pt owns quad cane, manual w/c and would use the quad cane for mobility intermittently but mostly using w/c more recently.     Pt currently presents with trace sitting balance- leaning R and posteriorly seated at EOB, L alma delia with high tone- pain in shoulder during PROM due to tone, decreased mobility of LLE, decreased standing bal/tolerance, need for 2 person assist with transfers and LBD due to safety concerns. Pt will benefit from visual screening, screening for subluxation- might benefit from giv-eugenio and different arm trough, for shower- might benefit from drop arm commode for increased stability.      Plan  Recommend Occupational Therapy  minutes per day 5-7 days per week for 2 weeks for the following treatments:  OT E Stim Attended, OT Self Care/ADL, OT Cognitive Skill Dev, OT Community Reintegration, OT Manual Ther Technique, OT Neuro Re-Ed/Balance, OT Therapeutic Activity, OT Evaluation, and OT Therapeutic Exercise.      Goals:  Long term and short term goals have been discussed with patient and they are in agreement.    Occupational Therapy Goals (Active)       Problem: Dressing       Dates: Start: 02/19/22         Goal: STG-Within one week, patient will dress UB with max a using alma delia-dressing techniques        Dates: Start: 02/19/22            Goal: STG-Within one week, patient will dress LB with total a x 1        Dates: Start: 02/19/22               Problem: Functional Transfers       Dates: Start: 02/19/22         Goal: STG-Within one week, patient will transfer to toilet with max a        Dates: Start: 02/19/22               Problem: OT Long Term Goals       Dates: Start: 02/19/22         Goal:  LTG-By discharge, patient will complete basic self care tasks with min-mod a        Dates: Start: 02/19/22            Goal: LTG-By discharge, patient will perform bathroom transfers with min a        Dates: Start: 02/19/22               Problem: Toileting       Dates: Start: 02/19/22         Goal: STG-Within one week, patient will complete toileting tasks with total a x 1        Dates: Start: 02/19/22

## 2022-02-19 NOTE — FLOWSHEET NOTE
02/19/22 0935   Patient History   Pulmonary Diagnosis Hx of pul embolus with cor pulmonale, resolved   Procedures Relevant to Respiratory Status None   Home O2 No   Nocturnal CPAP No   Home Treatments/Frequency No   Protocol Pathways   Protocol Pathways None

## 2022-02-19 NOTE — THERAPY
"Physical Therapy   Initial Evaluation     Patient Name: Melba Frye  Age:  51 y.o., Sex:  female  Medical Record #: [id number]  Today's Date: 2/19/2022     Subjective    \"I want to be able to go outside and go bird watching again\". Pt became tearful at times during the session. She is very motivated to improve with therapy.     Objective       02/19/22 0931   Prior Living Situation   Prior Services Intermittent Physical Support for ADL Per Family;Intermittent Physical Support for ADL Per Service  (Caregiver 7-4:30 on M-F,  assists otherwise)   Housing / Facility 2 [facility]   Steps Into Home 1   Steps In Home 18  (8, landing, 10 more (per chart review))   Rail Right Rail  (Steps in Home);Left Rail (Steps in Home)   Elevator No   Equipment Owned Quad Cane;Wheelchair;Hospital Bed;Bed Side Commode   Lives with - Patient's Self Care Capacity Spouse;Attendant / Paid Care Giver   Comments Pt is somewhat of an inconsistent historian, reporting many steps to get to the first floor of her home and also the 2nd floor. Per chart review, pt lives in a 2SH and primarily stays on the first floor. Pt reports having a manual wheelchair, LBQC, and commode.   Prior Level of Functional Mobility   Bed Mobility Required Assist   Transfer Status Required Assist   Ambulation Required Assist   Distance Ambulation (Feet) 15   Assistive Devices Used Wheelchair  (and LBQC)   Wheelchair Required Assist   Stairs Required Assist   Comments Pt reports being able to walk ~15 ft at a time and requiring \"a little help\" with all mobility. Pt sleeps in a hospital bed downstairs primarily, but reports going upstairs 2-3x a week. Pt stated that she has difficulty propelling the wheelchair herself. Will need to confirm all PLOF with . Based on extensive chart review, pt was at an IRF in Jan 2022 and left at a dependent level for walking 10ft and min/mod A for transfers. Pt has a L AFO used for walking and transfers.   Prior " Functioning: Everyday Activities   Self Care Needed some help   Indoor Mobility (Ambulation) Needed some help   Stairs Needed some help   Functional Cognition Needed some help   Prior Device Use Manual wheelchair;Orthotics/Prosthetics   Pain   Intervention Declines   Pain 0 - 10 Group   Pain Rating Scale (NPRS) 0   Therapist Pain Assessment   (Pt reports neck pain when sitting too long, but denied during this session.)   Cognition    Level of Consciousness Alert   Comments Pt reports impaired memory, evident with pt's inconsistent description of home setup.   ABS (Agitated Behavior Scale)   Agitated Behavior Scale Performed No   Passive ROM Lower Body   Passive ROM Lower Body X   Comments RLE WFL. LLE ankle DF to neutral with prolonged stretch, which pt reports her  does prior to donning AFO.   Active ROM Lower Body    Comments LLE AROM impaired by strength, tone, and inability to isolate movements.   Strength Lower Body   Comments RLE grossly 4/5 throughout. LLE 3-/5 with hip flexion and knee extension. 2-/5 ankle PF, 1/5 ankle DF. Noted extensor movement pattern with knee ext resulting in hip ext and LUE extension.   Sensation Lower Body   Lower Extremity Sensation   WDL   Lower Body Muscle Tone   Lower Body Muscle Tone  X   Lt Lower Extremity Muscle Tone Hypertonic  (extensor movement pattern at times. Receives botox to L calf.)   Comments Able to passively move hip and knee through full range without notable tension. Clonus present mildly in L ankle. Tone present in L PF and with active movement.   Balance Assessment   Sitting Balance (Static) Poor   Sitting Balance (Dynamic) Poor -   Standing Balance (Static) Trace +   Standing Balance (Dynamic) Trace +   Weight Shift Sitting Poor   Weight Shift Standing Poor   Comments Pt required physical assist to maintain sitting >5-10 sec before falling back. Pt able to maintain static standing with grab bar and CGA, but required assist when using the LBQC or HHA  with transfers.   Bed Mobility    Supine to Sit Maximal Assist   Sit to Supine Maximal Assist   Sit to Stand Moderate Assist   Scooting Maximal Assist   Rolling Maximal Assist to Rt.;Moderate Assist to Lt.   Comments sup>sit performed with HOB elevated.   Neurological Concerns   Clonus Intermittent   Footdrop Present  (baseline since early 2021)   Coordination Lower Body    Coordination Lower Body  X   Apraxia Left Lower Extremity Present   Comments LLE coordination significantly impaired, not acutely. RLE WFL.   Roll Left and Right   Assistance Needed Physical assistance   Physical Assistance Level 51%-75%   CARE Score - Roll Left and Right 2   Roll Left and Right Discharge Goal   Discharge Goal 3   Sit to Lying   Assistance Needed Physical assistance   Physical Assistance Level 51%-75%   CARE Score - Sit to Lying 2   Sit to Lying Discharge Goal   Discharge Goal 3   Lying to Sitting on Side of Bed   Assistance Needed Physical assistance   Physical Assistance Level 51%-75%   CARE Score - Lying to Sitting on Side of Bed 2   Lying to Sitting on Side of Bed Discharge Goal   Discharge Goal 3   Sit to Stand   Assistance Needed Physical assistance   Physical Assistance Level 51%-75%   CARE Score - Sit to Stand 2   Sit to Stand Discharge Goal   Discharge Goal 4   Chair/Bed-to-Chair Transfer   Assistance Needed Physical assistance   Physical Assistance Level 51%-75%   CARE Score - Chair/Bed-to-Chair Transfer 2   Chair/Bed-to-Chair Transfer Discharge Goal   Discharge Goal 4   Car Transfer   Reason if not Attempted Safety concerns   CARE Score - Car Transfer 88   Car Transfer Discharge Goal   Discharge Goal 3   Walk 10 Feet   Assistance Needed Physical assistance   Physical Assistance Level Total assistance   Comment WC follow   CARE Score - Walk 10 Feet 1   Walk 10 Feet Discharge Goal   Discharge Goal 4   Walk 50 Feet with Two Turns   Reason if not Attempted Safety concerns   CARE Score - Walk 50 Feet with Two Turns 88    Walk 50 Feet with Two Turns Discharge Goal   Discharge Goal 88   Walk 150 Feet   Reason if not Attempted Safety concerns   CARE Score - Walk 150 Feet 88   Walk 150 Feet Discharge Goal   Discharge Goal 88   Walking 10 Feet on Uneven Surfaces   Reason if not Attempted Safety concerns   CARE Score - Walking 10 Feet on Uneven Surfaces 88   Walking 10 Feet on Uneven Surfaces Discharge Goal   Discharge Goal 3   1 Step (Curb)   Reason if not Attempted Safety concerns   CARE Score - 1 Step (Curb) 88   1 Step (Curb) Discharge Goal   Discharge Goal 3   4 Steps   Reason if not Attempted Safety concerns   CARE Score - 4 Steps 88   4 Steps Discharge Goal   Discharge Goal 4   12 Steps   Reason if not Attempted Safety concerns   CARE Score - 12 Steps 88   12 Steps Discharge Goal   Discharge Goal 4   Picking Up Object   Reason if not Attempted Safety concerns   CARE Score - Picking Up Object 88   Picking Up Object Discharge Goal   Discharge Goal 3   Wheel 50 Feet with Two Turns   Assistance Needed Physical assistance   Physical Assistance Level 26%-50%   CARE Score - Wheel 50 Feet with Two Turns 3   Type of Wheelchair/Scooter Manual   Wheel 50 Feet with Two Turns Discharge Goal   Discharge Goal 4   Wheel 150 Feet   Assistance Needed Physical assistance   Physical Assistance Level Total assistance   CARE Score - Wheel 150 Feet 1   Type of Wheelchair/Scooter Manual   Wheel 150 Feet Discharge Goal   Discharge Goal 4   Gait Functional Level of Assist    Gait Level Of Assist Total Assist X 2  (Mod to max A plus WC follow)   Assistive Device Quad Cane   Distance (Feet) 5   # of Times Distance was Traveled 1   Deviation Step To;Decreased Base Of Support;Decreased Heel Strike;Decreased Toe Off;Bradykinetic  (assist to advance LLE and shift weight)   Wheelchair Functional Level of Assist   Wheelchair Assist Minimal Assist   Distance Wheelchair (Feet or Distance) 50   Wheelchair Description Assistance with steering;Extra time;Leg rest  management;Impaired coordination;Limited by fatigue;Verbal cueing;Safety concerns  (unable to problem solve controlling direction withfoot)   Stairs Functional Level of Assist   Level of Assist with Stairs Unable to Participate   Transfer Functional Level of Assist   Bed, Chair, Wheelchair Transfer Maximal Assist   Bed Chair Wheelchair Transfer Description Increased time;Assist with one limb;Requires lift;Set-up of equipment;Verbal cueing   Toilet Transfers Maximal Assist   Toilet Transfer Description Grab bar;Assist with one limb;Initial preparation for task;Increased time;Verbal cueing;Set-up of equipment   Problem List    Problems Impaired Bed Mobility;Impaired Transfers;Impaired Ambulation;Functional ROM Deficit;Functional Strength Deficit;Impaired Balance;Impaired Coordination;Decreased Activity Tolerance;Motor Planning / Sequencing;Safety Awareness Deficits / Cognition   Precautions   Precautions Fall Risk   Comments L alma delia, AFO   Current Discharge Plan   Current Discharge Plan Return to Prior Living Situation   Interdisciplinary Plan of Care Collaboration   IDT Collaboration with  Occupational Therapist;Nursing   Patient Position at End of Therapy Seated;Chair Alarm On;Self Releasing Lap Belt Applied;Call Light within Reach;Tray Table within Reach;Phone within Reach   Collaboration Comments CLOF, PLOF   Benefit   Therapy Benefit Patient Would Benefit from Inpatient Rehabilitation Physical Therapy to Maximize Functional [place] with ADLs, IADLs and Mobility.   Strengths & Barriers   Strengths Able to follow instructions;Willingly participates in therapeutic activities;Supportive family;Pleasant and cooperative;Motivated for self care and independence;Effective communication skills   Barriers Decreased endurance;Emotional lability;Fatigue;Generalized weakness;Hemiplegia;Impaired activity tolerance;Impaired balance;Impaired functional cognition;Limited mobility;Spasticity   PT Total Time Spent   PT  Individual Total Time Spent (Mins) 60   PT Charge Group   PT Therapeutic Activities 1   PT Evaluation PT Evaluation High       Assessment  Patient is 51 y.o. female with a diagnosis of multiple GLFs due to hypoxia, seizure activity, and vasogenic edema of the brain.  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    Pt resides with her  in a 2SH and has a caregiver who is there whenever her  is at work during the week. Pt required assist for all mobility prior to admission and it would be beneficial to corroborate PLOF with her . Pt currently presents with LE tone and weakness, impaired balance in sitting and standing, decreased ability to move LLE when in standing, and impaired overall functional mobility worse than prior level of function. Pt is required grossly max A for bed mobility and transfers. Pt required 2P assist for walking due to need for wheelchair follow for safety. Pt will benefit from continued skilled PT intervention to return to and progress functional mobility. Pt may benefit from consideration for power mobility to improve independence in the home.       Plan  Recommend Physical Therapy  minutes per day 5-7 days per week for 1-2 weeks for the following treatments:  PT Group Therapy, PT E Stim Attended, PT Orthotics Training, PT Gait Training, PT Self Care/Home Eval, PT Therapeutic Exercises, PT TENS Application, PT Neuro Re-Ed/Balance, PT Aquatic Therapy, PT Therapeutic Activity, PT Manual Therapy and PT Evaluation.    Passport items to be completed:  Get in/out of bed safely, in/out of a vehicle, safely use mobility device, walk or wheel around home/community, navigate up and down stairs, show how to get  up/down from the ground, ensure home is accessible, demonstrate HEP, complete caregiver training    Goals:  Long term and short term goals have been discussed with patient and they are in agreement.    Physical Therapy Problems (Active)       Problem: Mobility       Dates: Start: 02/19/22         Goal: STG-Within one week, patient will propel wheelchair 50ft in a household type setting with min A for steering.       Dates: Start: 02/19/22            Goal: STG-Within one week, patient will ambulate 10ft with LBQC and min A.       Dates: Start: 02/19/22            Goal: STG-Within one week, patient will ascend and descend four stairs with mod A using R handrail.       Dates: Start: 02/19/22               Problem: Mobility Transfers       Dates: Start: 02/19/22         Goal: STG-Within one week, patient will perform bed mobility with min A using hospital bed functions.       Dates: Start: 02/19/22            Goal: STG-Within one week, patient will sit to stand with min A from wheelchair.       Dates: Start: 02/19/22            Goal: STG-Within one week, patient will transfer bed to chair with min A.       Dates: Start: 02/19/22               Problem: PT-Long Term Goals       Dates: Start: 02/19/22         Goal: LTG-By discharge, patient will propel wheelchair 150ft with power versus manual chair and supervision.       Dates: Start: 02/19/22            Goal: LTG-By discharge, patient will ambulate 20ft with LBQC and CGA.       Dates: Start: 02/19/22            Goal: LTG-By discharge, patient will transfer one surface to another with CGA.       Dates: Start: 02/19/22            Goal: LTG-By discharge, patient will ambulate up/down flight of stairs with min A using R handrail.       Dates: Start: 02/19/22            Goal: LTG-By discharge, patient will transfer in/out of a car with min A.       Dates: Start: 02/19/22

## 2022-02-19 NOTE — CARE PLAN
"  Problem: Fall Risk - Rehab  Goal: Patient will remain free from falls  Outcome: Progressing  Note: [place] Fall risk Assessment Score: 14    Moderate fall risk Interventions  - Bed and strip alarm   - Yellow sign by the door   - Yellow wrist band \"Fall risk\"  - Room near to the nurse station  - Do not leave patient unattended in the bathroom  - Fall risk education provided     "

## 2022-02-20 PROCEDURE — 700102 HCHG RX REV CODE 250 W/ 637 OVERRIDE(OP): Performed by: PHYSICAL MEDICINE & REHABILITATION

## 2022-02-20 PROCEDURE — 97112 NEUROMUSCULAR REEDUCATION: CPT

## 2022-02-20 PROCEDURE — A9270 NON-COVERED ITEM OR SERVICE: HCPCS | Performed by: PHYSICAL MEDICINE & REHABILITATION

## 2022-02-20 PROCEDURE — 770010 HCHG ROOM/CARE - REHAB SEMI PRIVAT*

## 2022-02-20 PROCEDURE — 97535 SELF CARE MNGMENT TRAINING: CPT

## 2022-02-20 RX ADMIN — ACETAMINOPHEN 1000 MG: 500 TABLET, FILM COATED ORAL at 17:17

## 2022-02-20 RX ADMIN — MELATONIN TAB 3 MG 3 MG: 3 TAB at 19:11

## 2022-02-20 RX ADMIN — LACOSAMIDE 200 MG: 100 TABLET, FILM COATED ORAL at 19:10

## 2022-02-20 RX ADMIN — TRAZODONE HYDROCHLORIDE 50 MG: 50 TABLET ORAL at 19:10

## 2022-02-20 RX ADMIN — BRIVARACETAM 100 MG: 50 TABLET, FILM COATED ORAL at 19:10

## 2022-02-20 RX ADMIN — BRIVARACETAM 100 MG: 50 TABLET, FILM COATED ORAL at 08:19

## 2022-02-20 RX ADMIN — APIXABAN 5 MG: 5 TABLET, FILM COATED ORAL at 19:10

## 2022-02-20 RX ADMIN — VENLAFAXINE HYDROCHLORIDE 75 MG: 37.5 TABLET ORAL at 08:19

## 2022-02-20 RX ADMIN — APIXABAN 5 MG: 5 TABLET, FILM COATED ORAL at 08:19

## 2022-02-20 RX ADMIN — LACOSAMIDE 200 MG: 100 TABLET, FILM COATED ORAL at 08:19

## 2022-02-20 RX ADMIN — ACETAMINOPHEN 1000 MG: 500 TABLET, FILM COATED ORAL at 08:21

## 2022-02-20 RX ADMIN — OMEPRAZOLE 20 MG: 20 CAPSULE, DELAYED RELEASE ORAL at 08:19

## 2022-02-20 RX ADMIN — OXYCODONE 5 MG: 5 TABLET ORAL at 19:10

## 2022-02-20 RX ADMIN — Medication 1000 UNITS: at 08:19

## 2022-02-20 RX ADMIN — AMLODIPINE BESYLATE 10 MG: 5 TABLET ORAL at 08:19

## 2022-02-20 ASSESSMENT — ACTIVITIES OF DAILY LIVING (ADL)
TOILETING_LEVEL_OF_ASSIST_DESCRIPTION: ASSIST FOR HYGIENE;ASSIST TO PULL PANTS UP;ASSIST TO PULL PANTS DOWN;ASSIST FOR STANDING BALANCE;GRAB BAR;INCREASED TIME;INITIAL PREPARATION FOR TASK;SET-UP OF EQUIPMENT;SUPERVISION FOR SAFETY;VERBAL CUEING
BED_CHAIR_WHEELCHAIR_TRANSFER_DESCRIPTION: INCREASED TIME;INITIAL PREPARATION FOR TASK;SET-UP OF EQUIPMENT;SQUAT PIVOT TRANSFER TO WHEELCHAIR;SUPERVISION FOR SAFETY;VERBAL CUEING
TUB_SHOWER_TRANSFER_DESCRIPTION: ADAPTIVE EQUIPMENT;INCREASED TIME;INITIAL PREPARATION FOR TASK;SET-UP OF EQUIPMENT;SUPERVISION FOR SAFETY;VERBAL CUEING

## 2022-02-20 ASSESSMENT — PAIN DESCRIPTION - PAIN TYPE: TYPE: ACUTE PAIN

## 2022-02-20 ASSESSMENT — FIBROSIS 4 INDEX: FIB4 SCORE: 0.91

## 2022-02-20 NOTE — CARE PLAN
The patient is Watcher - Medium risk of patient condition declining or worsening    Shift Goals  Clinical Goals: skin integrity, comfort      Problem: Pain - Standard  Goal: Alleviation of pain or a reduction in pain to the patient’s comfort goal  Note: Patient had 3/10 headache, PRN tylenol had been administered, no other complaints of pain at this time, will continue to monitor.      Problem: Bowel Elimination  Goal: Patient will participate in bowel management program  Note: Patient refused scheduled bowel meds, last BM: 2/20, patient is incontinent of bowel, barrier paste has been applied to patients bottom, will continue to monitor.

## 2022-02-20 NOTE — THERAPY
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.\"      Objective     02/20/22 0931   Pain   Intervention Declines   Pain 0 - 10 Group   Pain Rating Scale (NPRS) 0   Cognition    Level of Consciousness Alert   Ability To Follow Commands 1 Step   Functional Level of Assist   Bathing Maximal Assist   Bathing Description Adaptive equipment;Grab bar;Hand held shower;Assit with back;Assit wtih lower extremities;Assit with perineal;Increased time;Initial preparation for task;Set-up of equipment;Supervision for safety;Verbal cueing  (roll in shower chair)   Upper Body Dressing Maximal Assist   Upper Body Dressing Description Assit with threading arms through sleeves;Assist with pulling shirt over head;Increased time;Initial preparation for task;Set-up of equipment;Supervision for safety;Verbal cueing  (cues for sequencing; doffing/donning pull over shirt)   Lower Body Dressing Total Assist x 2   Lower Body Dressing Description Grab bar;Assist with closures;Assist with threading into pant leg;Increased time;Initial preparation for task;Set-up of equipment;Supervision for safety;Verbal cueing  (mod a x 1 for standing balance; other person assisting with doffing/donning brief/pants in standing. total a to doff/don brief/pants/socks/shoes.)   Toileting Total Assist x 2   Toileting Description Assist for hygiene;Assist to pull pants up;Assist to pull pants down;Assist for standing balance;Grab bar;Increased time;Initial preparation for task;Set-up of equipment;Supervision for safety;Verbal cueing   Bed, Chair, Wheelchair Transfer Total Assist X 2   Bed Chair Wheelchair Transfer Description Increased time;Initial preparation for task;Set-up of " equipment;Squat pivot transfer to wheelchair;Supervision for safety;Verbal cueing  (max a x 1 and CGA-min a x 1; 2nd person for safety- EOB to roll in shower chair- reach pivot going to strong R side.)   Tub / Shower Transfers Total Assist X 2   Tub Shower Transfer Description Adaptive equipment;Increased time;Initial preparation for task;Set-up of equipment;Supervision for safety;Verbal cueing  (EOb to roll in shower chair)   Neuro-Muscular Treatments   Neuro-Muscular Treatments Facilitation;Verbal Cuing   Comments educated pt on self-ROM exercises to perform while sitting up in w/c.   Bed Mobility    Supine to Sit Total Assist   Scooting Total Assist   Interdisciplinary Plan of Care Collaboration   Patient Position at End of Therapy Seated;Chair Alarm On;Self Releasing Lap Belt Applied;Call Light within Reach;Tray Table within Reach;Phone within Reach;Family / Friend in Room  (son present)   OT Total Time Spent   OT Individual Total Time Spent (Mins) 90   OT Charge Group   OT Self Care / ADL 5   OT Neuromuscular Re-education / Balance 1       Assessment    Pt tolerated session well. Pt engaged in shower/ADL assessment, rolling shower chair used due to pt's decreased sitting balance and L alma delia. Pt required 2 person assist for all transfers for safety concerns. Educated pt on self-ROM exercises while sitting up in w/c to reduce tone- son present at end of session to assist pt with exercises- will give pt handout with exercises (elbow flexion, horizontal ab/adduction, supination/pronation, wrist/finger flexion/extension). Pt will benefit from use of giv-eugenio sling next session during standing/xfer's for LUE due to pain reported in shoulder during standing and education on shoulder flexion in supine for self-ROM exercises.     Strengths: Able to follow instructions,Effective communication skills,Independent prior level of function,Manages pain appropriately,Motivated for self care and independence,Pleasant and  cooperative,Supportive family,Willingly participates in therapeutic activities  Barriers: Bladder incontinence,Decreased endurance,Fatigue,Generalized weakness,Hemiparesis,Impaired activity tolerance,Impaired balance,Limited mobility    Plan  *needs arm trough for LUE*     ADL's, neuro re-ed for LUE, self-ROM for LUE, sitting/standing bal/tolerance, functional transfers.       Occupational Therapy Goals (Active)       Problem: Dressing       Dates: Start: 02/19/22         Goal: STG-Within one week, patient will dress UB with max a using alma delia-dressing techniques        Dates: Start: 02/19/22            Goal: STG-Within one week, patient will dress LB with total a x 1        Dates: Start: 02/19/22               Problem: Functional Transfers       Dates: Start: 02/19/22         Goal: STG-Within one week, patient will transfer to toilet with max a        Dates: Start: 02/19/22               Problem: OT Long Term Goals       Dates: Start: 02/19/22         Goal: LTG-By discharge, patient will complete basic self care tasks with min-mod a        Dates: Start: 02/19/22            Goal: LTG-By discharge, patient will perform bathroom transfers with min a        Dates: Start: 02/19/22               Problem: Toileting       Dates: Start: 02/19/22         Goal: STG-Within one week, patient will complete toileting tasks with total a x 1        Dates: Start: 02/19/22

## 2022-02-20 NOTE — THERAPY
"Speech Language Pathology   Initial Assessment     Patient Name: Melba Frye  AGE:  51 y.o., SEX:  female  Medical Record #: [id number]  Today's Date: 2/19/2022     Subjective    Pt pleasant and cooperative during tx.     Objective       02/19/22 1401   Prior Living Situation   Prior Services Intermittent Physical Support for ADL Per Service;Intermittent Physical Support for ADL Per Family   Housing / Facility 2 Story House   Lives with - Patient's Self Care Capacity Spouse;Attendant / Paid Care Giver   Prior Level Of Function   Communication Within Functional Limits   Swallow Within Functional Limits   Dentition Intact   Dentures None   Hearing Within Functional Limits for Evaluation   Hearing Aid None   Vision Reading ;Distance;Wears Corrective Lenses   Patient's Primary Language English   Education Completed College   Occupation (Pre-Hospital Vocational) Retired Due To Disability   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)   Receptive Language / Auditory Comprehension   Understands Simple, Structured Conversation  Within Functional Limits (6-7)   Understands Complex Conversation Supervision (5)   Expressive Language   Naming Within Functional Limits (6-7)   Word Finding Deficits Supervision (5)   Written Language Expression   Dominant Hand Right   Cognition   Moderate Attention Minimal (4)   Verbal Short Term Memory 15 Minutes   Visual Short Term Memory Minimal (4)   Insight into Deficits Within Functional Limits (6-7)   Auditory Math Moderate (3)   Cognitive Pattern Assessment   Cognitive Pattern Assessment Used BIMS   Brief Interview for Mental Status (BIMS)   Repetition of Three Words (First Attempt) 3   Temporal Orientation: Year Correct   Temporal Orientation: Month Accurate within 5 days   Temporal Orientation: Day Incorrect   Recall: \"Sock\" Yes, no cue required   Recall: \"Blue\" Yes, no cue required   Recall: \"Bed\" Yes, no cue required   BIMS Summary Score 14   Social / " Pragmatic Communication   Social / Pragmatic Communication WDL   Tracheostomy   Tracheostomy No   Speech Mechanisms / Voice Production   Speech Mechanisms / Voice Production (WDL) WDL   Labial Function   Labial Function (WDL) WDL   Lingual Function   Lingual Function (WDL) WDL   Swallowing/Nutritional Status   Swallowing/Nutritional Status Regular food   Eating   Assistance Needed Supervision;Set-up / clean-up   CARE Score - Eating 4   Functional Level of Assist   Eating Stand by Assist   Eating Description Increased time   Comprehension Supervision   Comprehension Description Glasses;Verbal cues   Expression Supervision   Expression Description Verbal cueing   Social Interaction Independent   Problem Solving Minimal Assist   Problem Solving Description Increased time;Seat belt;Verbal cueing;Therapy schedule;Bed/chair alarm   Memory Minimal Assist   Memory Description Verbal cueing;Therapy schedule   Outcome Measures   Outcome Measures Utilized SCCAN   SCCAN (Scales of Cognitive and Communicative Ability for Neurorehabilitation)   Oral Expression - Raw Score 17   Oral Expression - Scale Performance Score 89   Orientation - Raw Score 12   Orientation - Scale Performance Score 100   Memory - Raw Score 13   Memory - Scale Performance Score 68   Speech Comprehension - Raw Score 12   Speech Comprehension - Scale Performance Score 92   Problem List   Problem List Cognitive-Linguistic Deficits   Current Discharge Plan   Current Discharge Plan Return to Prior Living Situation   Benefit   Therapy Benefit Patient would benefit from Inpatient Rehab Speech-Language Pathology to address above identified deficits.   Interdisciplinary Plan of Care Collaboration   IDT Collaboration with  Occupational Therapist;Physical Therapist   Collaboration Comments CLOF   Strengths & Barriers   Strengths Able to follow instructions;Effective communication skills;Pleasant and cooperative;Willingly participates in therapeutic activities    Barriers Impaired functional cognition   Speech Language Pathologist Assigned   Assigned SLP / Extension LC 60 cog (SPLIT OK)   SLP Total Time Spent   SLP Individual Total Time Spent (Mins) 60   Evaluation Charges   SLP Speech Language Evaluation Speech Sound Language Comprehension       Assessment    Patient is 51 y.o. female with a diagnosis of glioblastoma of frontal lobe.  Additional factors influencing patient status/progress (ie: cognitive factors, co-morbidities, social support, etc): SCCAN was initiated this day.  Pt presented with deficits in short term memory and attn to detail. Pt reported concern in her inability to do math, and would like to improve this skill prior to discharge.  Recommend completion of SCCAN with additional goals as appropriate.  Recommend skilled speech therapy to target aforementioned deficits.      Plan  Recommend Speech Therapy 30-60 minutes per day 5-6 days per week for 4 weeks for the following treatments:  SLP Aphasia Evaluation, SLP Cognitive Skill Development, and SLP Group Treatment.    Passport items to be completed:  [unfilled]    Goals:  Long term and short term goals have been discussed with patient and they are in agreement.    Speech Therapy Problems (Active)       Problem: Memory STGs       Dates: Start: 02/19/22         Goal: STG-Within one week, patient will recall daily events and safety strategies with 80% accuracy, given min verbal cues and use of memory book.        Dates: Start: 02/19/22               Problem: Problem Solving STGs       Dates: Start: 02/19/22         Goal: STG-Within one week, patient will complete SCCAN with additional goals as deemed appropriate.        Dates: Start: 02/19/22            Goal: STG-Within one week, patient will complete basic math with 80% accuracy given min verbal cues.        Dates: Start: 02/19/22               Problem: Speech/Swallowing LTGs       Dates: Start: 02/19/22         Goal: LTG-By discharge, patient will  solve complex problem Neeraj for safe discharge home.       Dates: Start: 02/19/22

## 2022-02-20 NOTE — DISCHARGE PLANNING
CASE MANAGEMENT INITIAL ASSESSMENT    Admit Date:  2/18/2022     Case Management has reviewed the medical chart and will meet with patient and family to discuss role of case management / discharge planning / team conference.   Patient is a  51 y.o. female transferred from [facility] 02/09-02/18.    Attending physician: Dr. Sim  PCP: Trinity Nguyen   Neurosurgeon: Dr. Davidson     Diagnosis: GBM (glioblastoma multiforme) (HCC) [C71.9]    Co-morbidities:   Patient Active Problem List    Diagnosis Date Noted   • GBM (glioblastoma multiforme) (HCC) 02/18/2022   • Constipation 02/14/2022   • Acute respiratory failure with hypoxia (HCC) 02/09/2022   • Class 1 obesity due to excess calories with serious comorbidity and body mass index (BMI) of 34.0 to 34.9 in adult 02/09/2022   • Syncope 02/09/2022   • Lesion of frontal lobe of brain 12/18/2021   • Gait instability 12/18/2021   • Acute left-sided weakness 12/16/2021   • Seizure disorder (HCC) 12/16/2021   • History of pulmonary embolus (PE) 12/16/2021   • Primary hypertension 12/15/2021   • Neurogenic bladder 11/04/2021   • Frequent UTI 11/04/2021   • Atrophic vaginitis 11/04/2021   • Other chest pain 10/21/2021   • Tachycardia 10/21/2021   • Type 2 myocardial infarction (HCC) 10/21/2021   • Urinary frequency 10/04/2021   • Elevated blood pressure reading 04/07/2021   • Vitamin D insufficiency 03/16/2021   • Impaired mobility and ADLs 03/15/2021   • Acute blood loss as cause of postoperative anemia 03/11/2021   • 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) 03/02/2021   • Glioblastoma of frontal lobe (HCC) 03/02/2021   • Demyelinating disease of central nervous system, unspecified (HCC) 03/02/2021   • Lentigo 10/17/2018   • Seborrheic keratoses 10/17/2018   • Dermatitis, contact 11/16/2015   • Hyperlipidemia 01/23/2015   • Menopausal symptom 07/02/2014   • Neoplastic (malignant) related fatigue 06/09/2014   • Mixed anxiety and depressive disorder  06/09/2014   • Complicated migraine 06/09/2014   • TMJ arthralgia 06/09/2014     Prior Living Situation:  Housing / Facility: 2 Story House  Lives with - Patient's Self Care Capacity: Spouse,Attendant / Paid Care Giver    Prior Level of Function:  Medication Management: Dependent  Finances: Dependent  Home Management: Dependent  Shopping: Dependent  Prior Level Of Mobility: Uses Wheel Chair for Community Mobility,Uses Wheel Chair for in Home Mobility (assist needed for stairs and ambulation using quad cane)  Driving / Transportation: Relatives / Others Provide Transportation    Support Systems:  Primary : J Carlos Frye (spouse) [phone], Vel Berman (son)    Previous Services Utilized:   Equipment Owned: Quad Cane,Wheelchair,Hospital Bed,Bed Side Commode  Prior Services: Intermittent Physical Support for ADL Per Service,Intermittent Physical Support for ADL Per Family    Other Information:  Occupation (Pre-Hospital Vocational): Retired Due To Disability  Primary Payor Source: Other (Comments),Private Insurance ([facility])  Primary Care Practitioner : Trinity Nguyen  Other MDs: Dr. Davidson ([facility])    Patient / Family Goal:  Patient / Family Goal: Case Management will discuss goals with patient and family    Plan:  1. Continue to follow patient through hospitalization and provide discharge planning in collaboration with patient, family, physicians and ancillary services.     2. Utilize community resources to ensure a safe discharge.

## 2022-02-20 NOTE — CARE PLAN
The patient is Stable - Low risk of patient condition declining or worsening    Shift Goals  Clinical Goals: safety      Problem: Skin Integrity  Goal: Skin integrity is maintained or improved  Outcome: Progressing: Pt is incontinent of bowel and bladder, brief is checked frequently throughout the night and changed promptly to maintain skin integrity. Barrier cream is applied with each brief change. Pt informed to ring the call light if she feels that she needs to void and we can assist her to the bedside commode to promote continence. Pt stated she understood.      Problem: Fall Risk - Rehab  Goal: Patient will remain free from falls  Outcome: Progressing: Pt is a max 2 transfer. Pt's left arm and leg don't bear any weight and it takes two people to ensure safety when pivoting.

## 2022-02-21 PROCEDURE — 770010 HCHG ROOM/CARE - REHAB SEMI PRIVAT*

## 2022-02-21 PROCEDURE — 97130 THER IVNTJ EA ADDL 15 MIN: CPT

## 2022-02-21 PROCEDURE — A9270 NON-COVERED ITEM OR SERVICE: HCPCS | Performed by: PHYSICAL MEDICINE & REHABILITATION

## 2022-02-21 PROCEDURE — 97110 THERAPEUTIC EXERCISES: CPT

## 2022-02-21 PROCEDURE — 99233 SBSQ HOSP IP/OBS HIGH 50: CPT | Performed by: PHYSICAL MEDICINE & REHABILITATION

## 2022-02-21 PROCEDURE — 700102 HCHG RX REV CODE 250 W/ 637 OVERRIDE(OP): Performed by: PHYSICAL MEDICINE & REHABILITATION

## 2022-02-21 PROCEDURE — 97112 NEUROMUSCULAR REEDUCATION: CPT

## 2022-02-21 PROCEDURE — 97129 THER IVNTJ 1ST 15 MIN: CPT

## 2022-02-21 PROCEDURE — 97535 SELF CARE MNGMENT TRAINING: CPT

## 2022-02-21 PROCEDURE — 97116 GAIT TRAINING THERAPY: CPT

## 2022-02-21 RX ORDER — BISACODYL 10 MG
10 SUPPOSITORY, RECTAL RECTAL
Status: DISCONTINUED | OUTPATIENT
Start: 2022-02-21 | End: 2022-03-11 | Stop reason: HOSPADM

## 2022-02-21 RX ORDER — POLYETHYLENE GLYCOL 3350 17 G/17G
1 POWDER, FOR SOLUTION ORAL
Status: DISCONTINUED | OUTPATIENT
Start: 2022-02-21 | End: 2022-03-11 | Stop reason: HOSPADM

## 2022-02-21 RX ORDER — AMOXICILLIN 250 MG
2 CAPSULE ORAL 2 TIMES DAILY PRN
Status: DISCONTINUED | OUTPATIENT
Start: 2022-02-21 | End: 2022-03-11 | Stop reason: HOSPADM

## 2022-02-21 RX ADMIN — MELATONIN TAB 3 MG 3 MG: 3 TAB at 19:47

## 2022-02-21 RX ADMIN — ACETAMINOPHEN 1000 MG: 500 TABLET, FILM COATED ORAL at 15:18

## 2022-02-21 RX ADMIN — APIXABAN 5 MG: 5 TABLET, FILM COATED ORAL at 09:46

## 2022-02-21 RX ADMIN — VENLAFAXINE HYDROCHLORIDE 75 MG: 37.5 TABLET ORAL at 09:46

## 2022-02-21 RX ADMIN — OXYCODONE 5 MG: 5 TABLET ORAL at 19:47

## 2022-02-21 RX ADMIN — APIXABAN 5 MG: 5 TABLET, FILM COATED ORAL at 19:47

## 2022-02-21 RX ADMIN — LACOSAMIDE 200 MG: 100 TABLET, FILM COATED ORAL at 19:47

## 2022-02-21 RX ADMIN — BRIVARACETAM 100 MG: 50 TABLET, FILM COATED ORAL at 09:46

## 2022-02-21 RX ADMIN — Medication 1000 UNITS: at 09:46

## 2022-02-21 RX ADMIN — AMLODIPINE BESYLATE 10 MG: 5 TABLET ORAL at 09:41

## 2022-02-21 RX ADMIN — OMEPRAZOLE 20 MG: 20 CAPSULE, DELAYED RELEASE ORAL at 09:46

## 2022-02-21 RX ADMIN — BRIVARACETAM 100 MG: 50 TABLET, FILM COATED ORAL at 19:47

## 2022-02-21 RX ADMIN — TRAZODONE HYDROCHLORIDE 50 MG: 50 TABLET ORAL at 19:48

## 2022-02-21 RX ADMIN — LACOSAMIDE 200 MG: 100 TABLET, FILM COATED ORAL at 09:46

## 2022-02-21 RX ADMIN — OXYCODONE 5 MG: 5 TABLET ORAL at 17:12

## 2022-02-21 ASSESSMENT — ACTIVITIES OF DAILY LIVING (ADL)
BED_CHAIR_WHEELCHAIR_TRANSFER_DESCRIPTION: ADAPTIVE EQUIPMENT;INCREASED TIME;SET-UP OF EQUIPMENT;SUPERVISION FOR SAFETY;VERBAL CUEING
BED_CHAIR_WHEELCHAIR_TRANSFER_DESCRIPTION: ADAPTIVE EQUIPMENT;INCREASED TIME;INITIAL PREPARATION FOR TASK;SET-UP OF EQUIPMENT;VERBAL CUEING

## 2022-02-21 ASSESSMENT — GAIT ASSESSMENTS
GAIT LEVEL OF ASSIST: MAXIMAL ASSIST
ASSISTIVE DEVICE: PARALLEL BARS
DEVIATION: STEP TO;DECREASED BASE OF SUPPORT;DECREASED HEEL STRIKE;DECREASED TOE OFF
DISTANCE (FEET): 10

## 2022-02-21 NOTE — CARE PLAN
"The patient is Stable - Low risk of patient condition declining or worsening    Shift Goals  Clinical Goals: safety      Problem: Knowledge Deficit - Standard  Goal: Patient and family/care givers will demonstrate understanding of plan of care, disease process/condition, diagnostic tests and medications  Outcome: Progressing: Pt is forgetful and gets confused in the middle of the night. The call light is placed on her chest and she will ring it and ask where it is. Pt is reoriented multiple times if she awakens during the night and will frequently call a family member from her cell phone and say she can't find her call light when it is on her chest. Pt will also ask for her medications several times after they are already given.      Problem: Pain - Standard  Goal: Alleviation of pain or a reduction in pain to the patient’s comfort goal  Outcome: Progressing: Pt requested a 5mg Oxy with her bedtime meds for a headache that was radiating down her neck 4/10. Pt had gotten Tylenol 2 hours prior but it \"hadn't helped much.\" Pt informed to ring the call light anytime during the night if she needs any more pain medication. Pt stated she understood.       [place] Fall risk Assessment Score: 21    High fall risk Interventions   - Bed and strip alarm   -Room close to nursing station   - Yellow sign by the door   - Yellow wrist band \"Fall risk\"  - Do not leave patient unattended in the bathroom  - Fall risk education provided           "

## 2022-02-21 NOTE — THERAPY
Speech Language Pathology  Daily Treatment     Patient Name: Melba Frye  Age:  51 y.o., Sex:  female  Medical Record #: [id number]  Today's Date: 2/21/2022     Precautions  Precautions: Fall Risk  Comments: L alma delia, AFO    Subjective    Pt pleasant and cooperative, SO present and supportive      Objective       02/21/22 1033   SLP Total Time Spent   SLP Individual Total Time Spent (Mins) 60   Treatment Charges   SLP Cognitive Skill Development First 15 Minutes 1   SLP Cognitive Skill Development Additional 15 Minutes 3     SCCAN completed at this time. Pt achieved a raw score of 67 indicating MODERATE borderline MILD cognitive impairments. Pt with greatest impairments in areas of attention, problem solving and short term memory recall. Reviewed results of assessment with discussion regarding POC, all questions answered at this time. Memory log introduced, pt required MIN cues for orientation, and MIN-MOD A for recall of daily events.     Strengths: Able to follow instructions,Effective communication skills,Pleasant and cooperative,Willingly participates in therapeutic activities  Barriers: Impaired functional cognition    Plan    Assess implementation of memory log, attention sustained and alternating     Speech Therapy Problems (Active)       Problem: Memory STGs       Dates: Start: 02/19/22         Goal: STG-Within one week, patient will recall daily events and safety strategies with 80% accuracy, given min verbal cues and use of memory book.        Dates: Start: 02/19/22               Problem: Problem Solving STGs       Dates: Start: 02/19/22         Goal: STG-Within one week, patient will complete SCCAN with additional goals as deemed appropriate.        Dates: Start: 02/19/22            Goal: STG-Within one week, patient will complete basic math with 80% accuracy given min verbal cues.        Dates: Start: 02/19/22               Problem: Speech/Swallowing LTGs       Dates: Start: 02/19/22         Goal:  LTG-By discharge, patient will solve complex problem Neeraj for safe discharge home.       Dates: Start: 02/19/22

## 2022-02-21 NOTE — PROGRESS NOTES
"Pt requests tylenol for 4/10 headache. Tylenol order in MAR states Tylenol for 1-3 pain. Dr Sim notified via voalte at 15:10 and responded via voalte \"that's fine\" (to give tylenol), at 15:11.  "

## 2022-02-21 NOTE — PROGRESS NOTES
"Rehab Progress Note     Encounter Date: 2/21/2022    CC: Decreased mobility, spasticity    Interval Events (Subjective)  Patient sitting up in room. She had early AM fall which the CNA assisted to the floor due to RLE weakness.  Patient's strength has returned and doing well in therapy. She reports no head strike. Reviewed normal labs with patient and family including improved anemia. Patient with low vitamin D on labs. Started on vitamin D. Denies NVD, would like stool softeners discontinued. Denies SOB.     Objective:  VITAL SIGNS: /92   Pulse 87   Temp 36.6 °C (97.8 °F) (Temporal)   Resp 18   Ht 1.702 m (5' 7\")   Wt 102 kg (224 lb 13.9 oz)   SpO2 94%   BMI 35.22 kg/m²   Gen: NAD  Psych: Mood and affect appropriate  CV: RRR, no edema  Resp: CTAB, no upper airway sounds  Abd: NTND  Neuro: AOx4, following commands, left sided spasticity 3/4 in UE    Recent Results (from the past 72 hour(s))   SARS-CoV-2 PCR (24 hour In-House): Collect NP swab in [place]    Collection Time: 02/18/22  6:05 PM    Specimen: Mid-Turbinate; Respirate   Result Value Ref Range    SARS-CoV-2 Source NP Swab     SARS-CoV-2 by PCR NotDetected    CBC with Differential    Collection Time: 02/19/22  5:57 AM   Result Value Ref Range    WBC 8.3 4.8 - 10.8 K/uL    RBC 4.21 4.20 - 5.40 M/uL    Hemoglobin 14.3 12.0 - 16.0 g/dL    Hematocrit 43.9 37.0 - 47.0 %    .3 (H) 81.4 - 97.8 fL    MCH 34.0 (H) 27.0 - 33.0 pg    MCHC 32.6 (L) 33.6 - 35.0 g/dL    RDW 55.8 (H) 35.9 - 50.0 fL    Platelet Count 262 164 - 446 K/uL    MPV 9.9 9.0 - 12.9 fL    Neutrophils-Polys 65.20 44.00 - 72.00 %    Lymphocytes 22.50 22.00 - 41.00 %    Monocytes 9.90 0.00 - 13.40 %    Eosinophils 1.20 0.00 - 6.90 %    Basophils 0.50 0.00 - 1.80 %    Immature Granulocytes 0.70 0.00 - 0.90 %    Nucleated RBC 0.00 /100 WBC    Neutrophils (Absolute) 5.41 2.00 - 7.15 K/uL    Lymphs (Absolute) 1.87 1.00 - 4.80 K/uL    Monos (Absolute) 0.82 0.00 - 0.85 K/uL    Eos (Absolute) " 0.10 0.00 - 0.51 K/uL    Baso (Absolute) 0.04 0.00 - 0.12 K/uL    Immature Granulocytes (abs) 0.06 0.00 - 0.11 K/uL    NRBC (Absolute) 0.00 K/uL   Comp Metabolic Panel (CMP)    Collection Time: 02/19/22  5:57 AM   Result Value Ref Range    Sodium 139 135 - 145 mmol/L    Potassium 4.0 3.6 - 5.5 mmol/L    Chloride 102 96 - 112 mmol/L    Co2 25 20 - 33 mmol/L    Anion Gap 12.0 7.0 - 16.0    Glucose 105 (H) 65 - 99 mg/dL    Bun 8 8 - 22 mg/dL    Creatinine 0.46 (L) 0.50 - 1.40 mg/dL    Calcium 9.7 8.5 - 10.5 mg/dL    AST(SGOT) 19 12 - 45 U/L    ALT(SGPT) 17 2 - 50 U/L    Alkaline Phosphatase 139 (H) 30 - 99 U/L    Total Bilirubin 0.7 0.1 - 1.5 mg/dL    Albumin 4.1 3.2 - 4.9 g/dL    Total Protein 6.9 6.0 - 8.2 g/dL    Globulin 2.8 1.9 - 3.5 g/dL    A-G Ratio 1.5 g/dL   HEMOGLOBIN A1C    Collection Time: 02/19/22  5:57 AM   Result Value Ref Range    Glycohemoglobin 5.2 4.0 - 5.6 %    Est Avg Glucose 103 mg/dL   TSH with Reflex to FT4    Collection Time: 02/19/22  5:57 AM   Result Value Ref Range    TSH 1.980 0.380 - 5.330 uIU/mL   Vitamin D, 25-hydroxy (blood)    Collection Time: 02/19/22  5:57 AM   Result Value Ref Range    25-Hydroxy   Vitamin D 25 29 (L) 30 - 100 ng/mL   ESTIMATED GFR    Collection Time: 02/19/22  5:57 AM   Result Value Ref Range    GFR If African American >60 >60 mL/min/1.73 m 2    GFR If Non African American >60 >60 mL/min/1.73 m 2       Current Facility-Administered Medications   Medication Frequency   • vitamin D3 (cholecalciferol) tablet 1,000 Units DAILY   • Respiratory Therapy Consult Continuous RT   • Pharmacy Consult Request ...Pain Management Review 1 Each PHARMACY TO DOSE   • hydrALAZINE (APRESOLINE) tablet 25 mg Q8HRS PRN   • acetaminophen (Tylenol) tablet 650 mg Q4HRS PRN   • senna-docusate (PERICOLACE or SENOKOT S) 8.6-50 MG per tablet 2 Tablet BID    And   • polyethylene glycol/lytes (MIRALAX) PACKET 1 Packet QDAY PRN    And   • magnesium hydroxide (MILK OF MAGNESIA) suspension 30 mL  QDAY PRN    And   • bisacodyl (DULCOLAX) suppository 10 mg QDAY PRN   • omeprazole (PRILOSEC) capsule 20 mg DAILY   • artificial tears ophthalmic solution 1 Drop PRN   • benzocaine-menthol (CEPACOL) lozenge 1 Lozenge Q2HRS PRN   • mag hydrox-al hydrox-simeth (MAALOX PLUS ES or MYLANTA DS) suspension 20 mL Q2HRS PRN   • ondansetron (ZOFRAN ODT) dispertab 4 mg 4X/DAY PRN    Or   • ondansetron (ZOFRAN) syringe/vial injection 4 mg 4X/DAY PRN   • traZODone (DESYREL) tablet 50 mg QHS PRN   • sodium chloride (OCEAN) 0.65 % nasal spray 2 Spray PRN   • oxyCODONE immediate-release (ROXICODONE) tablet 5 mg Q3HRS PRN    Or   • oxyCODONE immediate release (ROXICODONE) tablet 10 mg Q3HRS PRN   • midazolam (VERSED) 5 mg/mL (1 mL vial) PRN   • acetaminophen (TYLENOL) tablet 1,000 mg Q6HRS PRN   • ALPRAZolam (XANAX) tablet 0.5 mg BID PRN   • amLODIPine (NORVASC) tablet 10 mg DAILY   • apixaban (ELIQUIS) tablet 5 mg BID   • brivaracetam (Briviact) tablet 100 mg BID   • hydrocortisone 1 % cream BID PRN   • lacosamide (VIMPAT) tablet 200 mg BID   • melatonin tablet 3 mg QHS   • venlafaxine (EFFEXOR) tablet 75 mg DAILY       Orders Placed This Encounter   Procedures   • Diet Order Diet: Regular     Standing Status:   Standing     Number of Occurrences:   1     Order Specific Question:   Diet:     Answer:   Regular [1]       Assessment:  Active Hospital Problems    Diagnosis    • *Glioblastoma of frontal lobe (HCC)    • GBM (glioblastoma multiforme) (HCC)    • Constipation    • Syncope    • Class 1 obesity due to excess calories with serious comorbidity and body mass index (BMI) of 34.0 to 34.9 in adult    • Gait instability    • History of pulmonary embolus (PE)    • Seizure disorder (HCC)    • Primary hypertension    • Mixed anxiety and depressive disorder        Medical Decision Making and Plan:  Seizure/GBM - Patient with syncopal event with AMS and weakness after concerning for seizure vs worsening GBM vs post-treatment changes.  Oncology was consulted and recommended steroids then taper which she has completed. Neurology was consulted and recommended MRI which showed diffuse posttreatment changes but no acute lesion. EEG showed non-specific changes and Neurology recommended increasing her Vimpat as her symptoms could have been post-ictal. Cardiac work-up negative.    -PT and OT for mobility and ADLs  -SLP for cognitive screen  -Continue Briviact 100 mg BID and Vimpat 200 mg BID     Spasticity - Followed by Dr. Steele, [facility], for Botox injection.      HTN - Patient on Amlodipine 10 mg daily. Into high 130s     Anemia - Check AM CBC - 14.3, resolved.      Hypokalemia - Check AM CMP - 4.0, resolved.     Anxiety/Depression - Patient on PRN Xanax and Effexor 75 mg daily     Morbid Obesity due to excess calories - BMI of 37.1 on admission. Dietitian to consult.      Constipation - Resolved, discontinue senna-docusate.     Vitamin D deficiency - 29 on admission. Started on 1000 U     Hx of PE - Patient on Eliquis BID.    Total time:  36 minutes.  I spent greater than 50% of the time for patient care and coordination on this date, including unit/floor time, and face-to-face time with the patient as per assessment and plan above.  Discussion included admission labs, vitamin D deficiency, anemia resolved, and discontinue bowel medications as no longer constipated.     Lucrecia Sim M.D.

## 2022-02-21 NOTE — PROGRESS NOTES
"[place] Fall risk Assessment Score: 22    High fall risk Interventions   - Alarming seatbelt  - Bed and strip alarm   - Yellow sign by the door   - Yellow wrist band \"Fall risk\"  - Room near to the nurse station  - Do not leave patient unattended in the bathroom  - Fall risk education provided         "

## 2022-02-21 NOTE — PROGRESS NOTES
0215 Pt had an assisted fall, lowered to the floor, REBEKAHA Gopal attempted to take pt to the bathroom, pt's BLE stiffened up and pt was unable to bear weight on RLE(strong side) so Gopal lowered pt to the floor, no headstrike, pt denies pain from incident. VS currently being monitored. Pt requested that we inform  Dario around 6am.

## 2022-02-22 PROCEDURE — 700102 HCHG RX REV CODE 250 W/ 637 OVERRIDE(OP): Performed by: PHYSICAL MEDICINE & REHABILITATION

## 2022-02-22 PROCEDURE — 97130 THER IVNTJ EA ADDL 15 MIN: CPT

## 2022-02-22 PROCEDURE — 97112 NEUROMUSCULAR REEDUCATION: CPT

## 2022-02-22 PROCEDURE — A9270 NON-COVERED ITEM OR SERVICE: HCPCS | Performed by: PHYSICAL MEDICINE & REHABILITATION

## 2022-02-22 PROCEDURE — 770010 HCHG ROOM/CARE - REHAB SEMI PRIVAT*

## 2022-02-22 PROCEDURE — 97530 THERAPEUTIC ACTIVITIES: CPT

## 2022-02-22 PROCEDURE — 97535 SELF CARE MNGMENT TRAINING: CPT

## 2022-02-22 PROCEDURE — 97116 GAIT TRAINING THERAPY: CPT

## 2022-02-22 PROCEDURE — 99232 SBSQ HOSP IP/OBS MODERATE 35: CPT | Performed by: PHYSICAL MEDICINE & REHABILITATION

## 2022-02-22 PROCEDURE — 97129 THER IVNTJ 1ST 15 MIN: CPT

## 2022-02-22 RX ORDER — BUTALBITAL, ACETAMINOPHEN AND CAFFEINE 50; 325; 40 MG/1; MG/1; MG/1
1 TABLET ORAL EVERY 4 HOURS PRN
Status: DISCONTINUED | OUTPATIENT
Start: 2022-02-22 | End: 2022-03-11 | Stop reason: HOSPADM

## 2022-02-22 RX ADMIN — OXYCODONE 5 MG: 5 TABLET ORAL at 01:35

## 2022-02-22 RX ADMIN — MELATONIN TAB 3 MG 3 MG: 3 TAB at 19:49

## 2022-02-22 RX ADMIN — AMLODIPINE BESYLATE 10 MG: 5 TABLET ORAL at 08:09

## 2022-02-22 RX ADMIN — TRAZODONE HYDROCHLORIDE 50 MG: 50 TABLET ORAL at 19:54

## 2022-02-22 RX ADMIN — APIXABAN 5 MG: 5 TABLET, FILM COATED ORAL at 08:11

## 2022-02-22 RX ADMIN — BUTALBITAL, ACETAMINOPHEN, AND CAFFEINE 1 TABLET: 50; 325; 40 TABLET ORAL at 16:25

## 2022-02-22 RX ADMIN — ACETAMINOPHEN 1000 MG: 500 TABLET, FILM COATED ORAL at 19:47

## 2022-02-22 RX ADMIN — BRIVARACETAM 100 MG: 50 TABLET, FILM COATED ORAL at 08:09

## 2022-02-22 RX ADMIN — BRIVARACETAM 100 MG: 50 TABLET, FILM COATED ORAL at 19:48

## 2022-02-22 RX ADMIN — Medication 1000 UNITS: at 08:10

## 2022-02-22 RX ADMIN — HYDROCORTISONE: 1 CREAM TOPICAL at 16:25

## 2022-02-22 RX ADMIN — APIXABAN 5 MG: 5 TABLET, FILM COATED ORAL at 19:48

## 2022-02-22 RX ADMIN — VENLAFAXINE HYDROCHLORIDE 75 MG: 37.5 TABLET ORAL at 08:08

## 2022-02-22 RX ADMIN — LACOSAMIDE 200 MG: 100 TABLET, FILM COATED ORAL at 19:48

## 2022-02-22 RX ADMIN — OMEPRAZOLE 20 MG: 20 CAPSULE, DELAYED RELEASE ORAL at 08:10

## 2022-02-22 RX ADMIN — LACOSAMIDE 200 MG: 100 TABLET, FILM COATED ORAL at 08:08

## 2022-02-22 ASSESSMENT — ACTIVITIES OF DAILY LIVING (ADL)
BED_CHAIR_WHEELCHAIR_TRANSFER_DESCRIPTION: ADAPTIVE EQUIPMENT;INCREASED TIME;REQUIRES LIFT;SET-UP OF EQUIPMENT
TOILETING_LEVEL_OF_ASSIST_DESCRIPTION: ASSIST FOR HYGIENE;ASSIST TO PULL PANTS UP;ASSIST TO PULL PANTS DOWN;ASSIST FOR STANDING BALANCE;GRAB BAR;INCREASED TIME;INITIAL PREPARATION FOR TASK;SET-UP OF EQUIPMENT;SUPERVISION FOR SAFETY;VERBAL CUEING
TOILET_TRANSFER_DESCRIPTION: ADAPTIVE EQUIPMENT;GRAB BAR;INCREASED TIME;REQUIRES LIFT
BED_CHAIR_WHEELCHAIR_TRANSFER_DESCRIPTION: ADAPTIVE EQUIPMENT;INCREASED TIME;INITIAL PREPARATION FOR TASK;SET-UP OF EQUIPMENT;SUPERVISION FOR SAFETY;VERBAL CUEING

## 2022-02-22 ASSESSMENT — GAIT ASSESSMENTS
ASSISTIVE DEVICE: PARALLEL BARS
DEVIATION: STEP TO;DECREASED HEEL STRIKE;DECREASED TOE OFF
DISTANCE (FEET): 10
GAIT LEVEL OF ASSIST: MAXIMAL ASSIST

## 2022-02-22 NOTE — PROGRESS NOTES
Handoff shift report given to JUDITH Bhat. POC discussed. Pt is awake in bed w/ no s/s distress noted. Call light and bedside table w/in reach.

## 2022-02-22 NOTE — THERAPY
"Physical Therapy   Daily Treatment     Patient Name: Melba Frye  Age:  51 y.o., Sex:  female  Medical Record #: [id number]  Today's Date: 2/21/2022     Precautions  Precautions: Fall Risk  Comments: L alma delia, AFO    Subjective    Pt up in , spouse present to assist     Objective       02/21/22 1501   Gait Functional Level of Assist    Gait Level Of Assist Maximal Assist  ( follow for safety)   Assistive Device Parallel Bars  (L AFO/ L foot slider/ 1/2\" R shoe lift)   Distance (Feet) 10   # of Times Distance was Traveled 3   Deviation Step To;Decreased Base Of Support;Decreased Heel Strike;Decreased Toe Off  (spastic L alma delia-gait)   Transfer Functional Level of Assist   Bed, Chair, Wheelchair Transfer Maximal Assist  (2 person for safety, CGA / min A of second person)   Bed Chair Wheelchair Transfer Description Adaptive equipment;Increased time;Initial preparation for task;Set-up of equipment;Verbal cueing   Supine Lower Body Exercise   Supine Lower Body Exercises Yes   Bridges Two Legged;3 sets of 10  (LE's on therapy ball)   Trunk Rotation 3 sets of 10;Bilateral  (LE's on therapy ball)   Hip Flexion 3 sets of 10  (alternating marching from hooklying)   Knee to Chest 3 sets of 10;Bilateral  (LE's on therapy ball)   Bed Mobility    Supine to Sit Maximal Assist   Sit to Supine Maximal Assist   Sit to Stand Moderate Assist   Neuro-Muscular Treatments   Neuro-Muscular Treatments Anterior weight shift;Facilitation;Postural Facilitation;Sequencing;Tactile Cuing;Verbal Cuing;Weight Shift Right;Weight Shift Left   Comments Neuro re-ed strength/ gait/ wt shifting/ transfers and spasticity management as noted.   PT Total Time Spent   PT Individual Total Time Spent (Mins) 60   PT Charge Group   PT Gait Training 1   PT Therapeutic Exercise 2   PT Neuromuscular Re-Education / Balance 1       Assessment    Pt with improved gait mechanics using compensatory strategy with // bars, 1/2\" R shoe lift and L foot slider but " continues to require 2 person A for safety and max A to advance LLE due to significant hypertonicity extensor tone.    Strengths: Able to follow instructions,Willingly participates in therapeutic activities,Supportive family,Pleasant and cooperative,Motivated for self care and independence,Effective communication skills  Barriers: Decreased endurance,Emotional lability,Fatigue,Generalized weakness,Hemiplegia,Impaired activity tolerance,Impaired balance,Impaired functional cognition,Limited mobility,Spasticity    Plan    Sit,>stand sequencing/ transfer training, pre-gait/ gait training as able, ROM/ stretching/ spasticity management.    Passport items to be completed:  Get in/out of bed safely, in/out of a vehicle, safely use mobility device, walk or wheel around home/community, navigate up and down stairs, show how to get up/down from the ground, ensure home is accessible, demonstrate HEP, complete caregiver training    Physical Therapy Problems (Active)       Problem: Mobility       Dates: Start: 02/19/22         Goal: STG-Within one week, patient will propel wheelchair 50ft in a household type setting with min A for steering.       Dates: Start: 02/19/22            Goal: STG-Within one week, patient will ambulate 10ft with LBQC and min A.       Dates: Start: 02/19/22            Goal: STG-Within one week, patient will ascend and descend four stairs with mod A using R handrail.       Dates: Start: 02/19/22               Problem: Mobility Transfers       Dates: Start: 02/19/22         Goal: STG-Within one week, patient will perform bed mobility with min A using hospital bed functions.       Dates: Start: 02/19/22            Goal: STG-Within one week, patient will sit to stand with min A from wheelchair.       Dates: Start: 02/19/22            Goal: STG-Within one week, patient will transfer bed to chair with min A.       Dates: Start: 02/19/22               Problem: PT-Long Term Goals       Dates: Start: 02/19/22          Goal: LTG-By discharge, patient will propel wheelchair 150ft with power versus manual chair and supervision.       Dates: Start: 02/19/22            Goal: LTG-By discharge, patient will ambulate 20ft with LBQC and CGA.       Dates: Start: 02/19/22            Goal: LTG-By discharge, patient will transfer one surface to another with CGA.       Dates: Start: 02/19/22            Goal: LTG-By discharge, patient will ambulate up/down flight of stairs with min A using R handrail.       Dates: Start: 02/19/22            Goal: LTG-By discharge, patient will transfer in/out of a car with min A.       Dates: Start: 02/19/22

## 2022-02-22 NOTE — CARE PLAN
"  Problem: Fall Risk - Rehab  Goal: Patient will remain free from falls  Note: [place] Fall risk Assessment Score: 19  High fall risk Interventions     - Bed and strip alarm   - Yellow sign by the door   - Yellow wrist band \"Fall risk\"  - Room near to the nurse station  - Do not leave patient unattended in the bathroom  - Fall risk education provided      Problem: Pain - Standard  Goal: Alleviation of pain or a reduction in pain to the patient’s comfort goal  Note: Medicated per request for pain, 5/10.Repositioned with pillows for comfort.Will continue to monitor and assess pain level and medicate as needed.         "

## 2022-02-22 NOTE — THERAPY
"Occupational Therapy  Daily Treatment     Patient Name: Melba Frye  Age:  51 y.o., Sex:  female  Medical Record #: [id number]  Today's Date: 2/21/2022     Precautions  Precautions: Fall Risk  Comments: L alma delia, AFO         Subjective    \"I'm afraid of falling because of what happened last night, but I'm willing to try a transfer again.\"      Objective       02/21/22 0831   Pain   Intervention Declines   Pain 0 - 10 Group   Pain Rating Scale (NPRS) 0   Cognition    Level of Consciousness Alert   Functional Level of Assist   Upper Body Dressing Maximal Assist   Upper Body Dressing Description Assist with pulling shirt over head;Assit with threading arms through sleeves;Increased time;Initial preparation for task;Set-up of equipment;Supervision for safety;Verbal cueing  (cues for alma delia-technique)   Lower Body Dressing Total Assist x 2   Lower Body Dressing Description Assistive devices;Assist with threading into pant leg;Application of orthotic or brace;Increased time;Initial preparation for task;Set-up of equipment;Supervision for safety;Verbal cueing  (doff brief in bed; don pull up;pants;socks;AFO and shoes in bed; max a for rolling side to side; quad cane used during standing at EOB- therapist donned pants around waist)   Bed, Chair, Wheelchair Transfer Maximal Assist   Bed Chair Wheelchair Transfer Description Adaptive equipment;Increased time;Set-up of equipment;Supervision for safety;Verbal cueing  (stand pivot using quad cane- min a needed to facilitate mvmt RLE; max cues for sequencing foot placement and movement of quad cane; mod a x 1 and min a from other person)   Neuro-Muscular Treatments   Neuro-Muscular Treatments Weight Shift Left;Verbal Cuing;Postural Facilitation;Joint Approximation   Comments seated EOM with mirror in front of pt for visual feedback; postural facilitation- shoulder shrugs, scapular retraction; self-ROM (shoulder horizontal ab/adduction, elbow flexion, pro/supination), WB through " elbow while on therapy ball, WB through L hand through outstretched arm.   Bed Mobility    Supine to Sit Maximal Assist   Scooting Maximal Assist   Interdisciplinary Plan of Care Collaboration   Patient Position at End of Therapy Seated;Chair Alarm On;Self Releasing Lap Belt Applied;Call Light within Reach;Tray Table within Reach;Phone within Reach;Family / Friend in Room   OT Total Time Spent   OT Individual Total Time Spent (Mins) 60   OT Charge Group   OT Self Care / ADL 2   OT Neuromuscular Re-education / Balance 2       Assessment    Pt tolerated session well. Pt reported having a bad night 2/2 fall and being afraid of falling again. Pt reported her legs gave out on her and she was assisted to the floor. Pt requires max cues for sequencing dressing tasks and rolling in bed for LBD. Max a needed to roll side to side in bed. Pt's LLE noted to have increased tone this morning and would not go into knee flexion making rolling more difficult. Noted improvement with knee flexion after standing/WB at EOB for xfer into w/c. Pt required max cues for sequencing xfer's with stand step xfer with quad cane- assist needed to facilitate movement of LLE. Noted improvement in tone in LUE after WB activities at EOM. Pt in elbow flexed position at 90 degrees at start of session and unable to get into elbow extension, but after WB through elbow for ~5-10 min whi therapist providing stability at the shoulder, pt able to come into elbow extension and complete WB through outstretched arm at EOM with therapist blocking L elbow and stabilizing at the shoulder. Arm trough issued to pt for increased shoulder stability- might need to tape down towel for increased comfort.     Strengths: Able to follow instructions,Effective communication skills,Independent prior level of function,Manages pain appropriately,Motivated for self care and independence,Pleasant and cooperative,Supportive family,Willingly participates in therapeutic  activities  Barriers: Bladder incontinence,Decreased endurance,Fatigue,Generalized weakness,Hemiparesis,Impaired activity tolerance,Impaired balance,Limited mobility    Plan    • Fit pt for giv-eugenio sling for xfer's/standing*     ADL's, neuro re-ed for LUE, self-ROM for LUE, sitting/standing bal/tolerance, functional transfers.     Occupational Therapy Goals (Active)       Problem: Dressing       Dates: Start: 02/19/22         Goal: STG-Within one week, patient will dress UB with max a using alma delia-dressing techniques        Dates: Start: 02/19/22            Goal: STG-Within one week, patient will dress LB with total a x 1        Dates: Start: 02/19/22               Problem: Functional Transfers       Dates: Start: 02/19/22         Goal: STG-Within one week, patient will transfer to toilet with max a        Dates: Start: 02/19/22               Problem: OT Long Term Goals       Dates: Start: 02/19/22         Goal: LTG-By discharge, patient will complete basic self care tasks with min-mod a        Dates: Start: 02/19/22            Goal: LTG-By discharge, patient will perform bathroom transfers with min a        Dates: Start: 02/19/22               Problem: Toileting       Dates: Start: 02/19/22         Goal: STG-Within one week, patient will complete toileting tasks with total a x 1        Dates: Start: 02/19/22

## 2022-02-22 NOTE — THERAPY
Recreational Therapy   Initial Evaluation     Patient Name: Melba Frye  Age:  51 y.o., Sex:  female  Medical Record #: [id number]  Today's Date: 2/22/2022     Subjective    Pt reporting that she enjoyed bird watching and going on hikes. Pt sharing surprise at the adaptive equipment she could use to participate in these activities post dc.     Objective       02/21/22 1001   Leisure History   Leisure Interests Other (Comments);Exercise   Leisure Comments Hiking, birdwatching   Pre-Morbid Leisure Lifestyle Individual;Group;Active   Prior Living Arrangements   Lives with - Patient's Self Care Capacity Attendant / Paid Care Giver;Spouse   Steps Into Home 1   Steps In Home 18   Ambulation Required Assist   Assistive Devices Used Wheelchair   Driving / Transportation Relatives / Others Provide Transportation   Functional Ability Status - Physical   Endurance Low   Right  Strong   Left  None   Right Arm Strong   Left Arm None    Right Leg Weak   Left Leg Weak   Upper Extremity Gross Motor Uses Right Arm / Hand;No Function in Left Arm / Hand   Lower Extremity Gross Motor   (BLE Weakness)   Fine Motor Manipulates Small Objects  (w only RUE)   Functional Ability Status - Cognitive   Attention Span Remains on Task   Comprehension Follows Two Step Commands   Judgment Able to Make Independent Decisions   Functional Ability Status - Emotional    Affect Appropriate;Bright   Mood Appropriate   Behavior Appropriate   Leisure Competence Measure   Leisure Awareness Cueing Assist   Leisure Attitude Independent   Leisure Skills Independent   Cultural / Social Behaviors Independent   Interpersonal Skills Independent   Community Integration Skills Moderate Assist   Social Contact Independent   Community Participation Moderate Assist   Clinical Impression   Clinical Impression Impaired Fine Motor Leisure Functioning;Impaired Gross Motor Leisure Functioning;Impaired Community Skills;Impaired Relaxation and Coping  Skills;Impaired Leisure Skills   Current Discharge Plan   Current Discharge Plan Return to Prior Living Situation   Benefit    Benefit Patient would Benefit from Inpatient Recreational Therapy to Maximize Independent Leisure Functioning    Interdisciplinary Plan of Care Collaboration   IDT Collaboration with  Family / Caregiver   Patient Position at End of Therapy Seated;Family / Friend in Room;Tray Table within Reach;Call Light within Reach   Collaboration Comments SO in attendance   Strengths & Barriers   Strengths Able to follow instructions;Alert and oriented;Motivated for self care and independence;Pleasant and cooperative;Supportive family;Willingly participates in therapeutic activities   Barriers Fatigue;Generalized weakness;Decreased endurance;Impaired balance;Impaired activity tolerance   Treatment Time   Total Time Spent (mins) 15   Total Time Missed 15   Reasons for Time Missed Medical-Patient With Nursing   Procedural Tracking   Procedural Tracking Community Re-Integration;Community Skills Development;Leisure Skills Awareness;Leisure Skills Development;Gross Motor Functional Leisure Skills       Assessment  Patient is 51 y.o. female with a diagnosis of Glioblastoma of frontal lobe .  Additional factors influencing patient status / progress (ie: cognitive factors, co-morbidities, social support, etc): past medical history of right posterior fronto-parietal glioblastoma multiforme s/p right cranioplasty for resection (6/2021, biopsy 3/2021) at [facility], with seizures, w/ secondary L sided weakness w/ tone, radiation and chemotherapy with temozolomide (last dose 11/2021), focal seizures (on Vimpat and Briviact), migraine, hyperlipidemia, PE with cor pulmonale (on Eliquis), anxiety, and depression  ;  who presented on 2/9/2022  1:53 PM after multiple ground level falls likely due to a combination of hypoxia, seizure activity, and vasogenic edema of the brain. Per documentation, on Wednesday 2/9/22 she was  walking back from the bathroom with her caregiver when she reported feeling tired, dizzy, and needed to sit. Her caregiver turned to get a chair at which time the patient fell forward to the ground without catching herself .         Plan  Recommend Recreational Therapy 30 minutes per day 2-3 days per week for 2   weeks for the following treatments:  Community Re-Integration, Community Skills Development, Leisure Skills Awareness, Leisure Skills Development and Gross Motor Functional Leisure Skills    Passport items to be completed:  Passport items to be completed:  Verbalize two positive leisure activities, discuss returning to work, hobbies, community groups or volunteer activities, explore community resources     Goals:  Long term and short term goals have been discussed with patient and spouse and they are in agreement.    Recreation Therapy Problems (Active)       Problem: Recreation Therapy       Dates: Start: 02/22/22         Goal: STG-Within one week, patient will demonstrate leisure problem solving by sharing on two positive lesiure activities they would like to participate in either in session or during their free time and any perceived barriers to their participation.       Dates: Start: 02/22/22            Goal: STG-Within one week, patient will attend the community reintegration class.        Dates: Start: 02/22/22            Goal: LTG-By discharge, patient will demonstrate leisure problem solving by sharing on two positive lesiure activities they would like to participate in post dc and any perceived barriers to their participation.       Dates: Start: 02/22/22            Goal: LTG-By discharge, patient will contact at least one community resource to set up future plans post dc for adaptive recreation/rentals.        Dates: Start: 02/22/22

## 2022-02-22 NOTE — THERAPY
"Occupational Therapy  Daily Treatment     Patient Name: Melba Frye  Age:  51 y.o., Sex:  female  Medical Record #: [id number]  Today's Date: 2/22/2022     Precautions  Precautions: (P) Fall Risk  Comments: (P) L alma delia, AFO    Safety   ADL Safety : (P) Requires Physical Assist for Safety,Requires Supervision for Safety  Bathroom Safety: (P) Requires Physical Assist for Safety,Requires Supervision for Safety    Subjective    Pt in bed upon arrival, requesting to go to the bathroom.     \"I can't remember, I guess part of this whole thing is forgetting dates and birthdays\" referring to having difficulty recalling when her last surgery was.      Objective     02/22/22 0831   Precautions   Precautions Fall Risk   Comments L alma delia, AFO   Safety    ADL Safety  Requires Physical Assist for Safety;Requires Supervision for Safety   Bathroom Safety Requires Physical Assist for Safety;Requires Supervision for Safety   Vitals   O2 Delivery Device None - Room Air   Pain   Intervention Declines   Non Verbal Descriptors   Non Verbal Scale  Calm   Functional Level of Assist   Grooming Supervision;Seated  (oral care alma delia technique)   Grooming Description Increased time;Initial preparation for task;Seated in wheelchair at sink;Supervision for safety   Toileting Total Assist x 2   Toileting Description Assist for hygiene;Assist to pull pants up;Assist to pull pants down;Assist for standing balance;Grab bar;Increased time;Initial preparation for task;Set-up of equipment;Supervision for safety;Verbal cueing   Bed, Chair, Wheelchair Transfer Maximal Assist  (2 person for safety, SPT)   Bed Chair Wheelchair Transfer Description Adaptive equipment;Increased time;Initial preparation for task;Set-up of equipment;Supervision for safety;Verbal cueing   Toilet Transfers Maximal Assist  (2 person for safety, SPT via GB)   Toilet Transfer Description Grab bar;Increased time;Assist with one limb;Initial preparation for task;Set-up of " equipment;Supervision for safety;Verbal cueing   Bed Mobility    Supine to Sit Maximal Assist   Scooting Maximal Assist   Interdisciplinary Plan of Care Collaboration   IDT Collaboration with  Family / Caregiver   Patient Position at End of Therapy Seated;Self Releasing Lap Belt Applied;Call Light within Reach;Tray Table within Reach;Phone within Reach   Collaboration Comments Pt's  present at end of session   OT Total Time Spent   OT Individual Total Time Spent (Mins) 60   OT Charge Group   OT Self Care / ADL 2   OT Neuromuscular Re-education / Balance 2     LUE PROM for spasticity mgmt elbow flexion/extension, pronation/supination, wrist flexion/extension and finger extension.     Seated EOM dynamic sitting balance, reaching outside [place] with RUE and crossing midline by reaching for bean bags then tossing into bucket. CGA for safety.     Provided pt and spouse with packet of GB information including ADA specifications, locations to place in bathroom, and styles of grab bars. Pt reports her  is able to install.     Assessment    Pt tolerated OT session well but became fatigued at end of session. MaxA 2 person transfer via SPT with assist to manage LLE due to spasticity and impaired weight shift. Following LUE PROM, pt had increased ROM with less pain. Was able to correct posture back to midline with CGA during above balance activity seated EOM. Required verbal cues for locating bean bag when placed to her side due to inattention. Would benefit from visual screening.     Strengths: Able to follow instructions,Effective communication skills,Independent prior level of function,Manages pain appropriately,Motivated for self care and independence,Pleasant and cooperative,Supportive family,Willingly participates in therapeutic activities  Barriers: Bladder incontinence,Decreased endurance,Fatigue,Generalized weakness,Hemiparesis,Impaired activity tolerance,Impaired balance,Limited mobility    Plan    · Fit pt  for giv-eugenio sling for xfer's/standing*      ADL's, visual screen, neuro re-ed for LUE, self-ROM for LUE, sitting/standing bal/tolerance, functional transfers.     Passport items to be completed:  Perform bathroom transfers, complete dressing, complete feeding, get ready for the day, prepare a simple meal, participate in household tasks, adapt home for safety needs, demonstrate home exercise program, complete caregiver training     Occupational Therapy Goals (Active)       Problem: Dressing       Dates: Start: 02/19/22         Goal: STG-Within one week, patient will dress UB with max a using alma delia-dressing techniques        Dates: Start: 02/19/22            Goal: STG-Within one week, patient will dress LB with total a x 1        Dates: Start: 02/19/22               Problem: Functional Transfers       Dates: Start: 02/19/22         Goal: STG-Within one week, patient will transfer to toilet with max a        Dates: Start: 02/19/22               Problem: OT Long Term Goals       Dates: Start: 02/19/22         Goal: LTG-By discharge, patient will complete basic self care tasks with min-mod a        Dates: Start: 02/19/22            Goal: LTG-By discharge, patient will perform bathroom transfers with min a        Dates: Start: 02/19/22               Problem: Toileting       Dates: Start: 02/19/22         Goal: STG-Within one week, patient will complete toileting tasks with total a x 1        Dates: Start: 02/19/22

## 2022-02-22 NOTE — CARE PLAN
Problem: Skin Integrity  Goal: Skin integrity is maintained or improved  Note: Pt. C/o itchy back. PRN Hydrocortisone is being applied to affected area.      Problem: Neurogenic Bladder  Goal: Patient will demonstrate ability to take care of indwelling catheter  Note: Pt. Has been continent of bladder during this shift. Pt.  is Max x 2 people for transfers to the toilet.    The patient is Stable - Low risk of patient condition declining or worsening

## 2022-02-22 NOTE — PROGRESS NOTES
"Rehab Progress Note     Encounter Date: 2/22/2022    CC: Decreased mobility, spasticity    Interval Events (Subjective)  Patient sitting up in room. She reports therapy is going very well. She reports some itching and rash on her back.  No recent changes in medications. Discussed can start hydrocortisone cream. Otherwise denies fever or chills.     Objective:  VITAL SIGNS: /77   Pulse 83   Temp 37.1 °C (98.8 °F) (Temporal)   Resp 12   Ht 1.702 m (5' 7\")   Wt 102 kg (224 lb 13.9 oz)   SpO2 97%   BMI 35.22 kg/m²   Gen: NAD  Psych: Mood and affect appropriate  CV: RRR, no edema  Resp: CTAB, no upper airway sounds  Abd: NTND  Neuro: AOx4, following commands, left sided spasticity 3/4 in UE  Unchanged from 2/21/22    No results found for this or any previous visit (from the past 72 hour(s)).    Current Facility-Administered Medications   Medication Frequency   • senna-docusate (PERICOLACE or SENOKOT S) 8.6-50 MG per tablet 2 Tablet BID PRN    And   • polyethylene glycol/lytes (MIRALAX) PACKET 1 Packet QDAY PRN    And   • magnesium hydroxide (MILK OF MAGNESIA) suspension 30 mL QDAY PRN    And   • bisacodyl (DULCOLAX) suppository 10 mg QDAY PRN   • vitamin D3 (cholecalciferol) tablet 1,000 Units DAILY   • Respiratory Therapy Consult Continuous RT   • hydrALAZINE (APRESOLINE) tablet 25 mg Q8HRS PRN   • acetaminophen (Tylenol) tablet 650 mg Q4HRS PRN   • omeprazole (PRILOSEC) capsule 20 mg DAILY   • artificial tears ophthalmic solution 1 Drop PRN   • benzocaine-menthol (CEPACOL) lozenge 1 Lozenge Q2HRS PRN   • mag hydrox-al hydrox-simeth (MAALOX PLUS ES or MYLANTA DS) suspension 20 mL Q2HRS PRN   • ondansetron (ZOFRAN ODT) dispertab 4 mg 4X/DAY PRN    Or   • ondansetron (ZOFRAN) syringe/vial injection 4 mg 4X/DAY PRN   • traZODone (DESYREL) tablet 50 mg QHS PRN   • sodium chloride (OCEAN) 0.65 % nasal spray 2 Spray PRN   • oxyCODONE immediate-release (ROXICODONE) tablet 5 mg Q3HRS PRN    Or   • oxyCODONE " immediate release (ROXICODONE) tablet 10 mg Q3HRS PRN   • midazolam (VERSED) 5 mg/mL (1 mL vial) PRN   • acetaminophen (TYLENOL) tablet 1,000 mg Q6HRS PRN   • ALPRAZolam (XANAX) tablet 0.5 mg BID PRN   • amLODIPine (NORVASC) tablet 10 mg DAILY   • apixaban (ELIQUIS) tablet 5 mg BID   • brivaracetam (Briviact) tablet 100 mg BID   • hydrocortisone 1 % cream BID PRN   • lacosamide (VIMPAT) tablet 200 mg BID   • melatonin tablet 3 mg QHS   • venlafaxine (EFFEXOR) tablet 75 mg DAILY       Orders Placed This Encounter   Procedures   • Diet Order Diet: Regular     Standing Status:   Standing     Number of Occurrences:   1     Order Specific Question:   Diet:     Answer:   Regular [1]       Assessment:  Active Hospital Problems    Diagnosis    • *Glioblastoma of frontal lobe (HCC)    • GBM (glioblastoma multiforme) (HCC)    • Constipation    • Syncope    • Class 1 obesity due to excess calories with serious comorbidity and body mass index (BMI) of 34.0 to 34.9 in adult    • Gait instability    • History of pulmonary embolus (PE)    • Seizure disorder (HCC)    • Primary hypertension    • Mixed anxiety and depressive disorder        Medical Decision Making and Plan:  Seizure/GBM - Patient with syncopal event with AMS and weakness after concerning for seizure vs worsening GBM vs post-treatment changes. Oncology was consulted and recommended steroids then taper which she has completed. Neurology was consulted and recommended MRI which showed diffuse posttreatment changes but no acute lesion. EEG showed non-specific changes and Neurology recommended increasing her Vimpat as her symptoms could have been post-ictal. Cardiac work-up negative.    -PT and OT for mobility and ADLs  -SLP for cognitive screen  -Continue Briviact 100 mg BID and Vimpat 200 mg BID     Spasticity - Followed by Dr. Steele, [facility], for Botox injection.      HTN - Patient on Amlodipine 10 mg daily. Into high 130s     Anemia - Check AM CBC - 14.3,  resolved.      Hypokalemia - Check AM CMP - 4.0, resolved.     Anxiety/Depression - Patient on PRN Xanax and Effexor 75 mg daily     Morbid Obesity due to excess calories - BMI of 37.1 on admission. Dietitian to consult.      Back rash - Appears contact. Start Hydrocortisone cream     Constipation - Resolved, discontinue senna-docusate.     Vitamin D deficiency - 29 on admission. Started on 1000 U     Hx of PE - Patient on Eliquis BID.    Total time:  26 minutes.  I spent greater than 50% of the time for patient care, counseling, and coordination on this date, including unit/floor time, and face-to-face time with the patient as per interval events and assessment and plan above. Topics discussed included discharge planning, discussion with  on LOS, back rash and start hydrocortisone.     Lucrecia Sim M.D.

## 2022-02-22 NOTE — CARE PLAN
The patient is Stable - Low risk of patient condition declining or worsening    Shift Goals  Clinical Goals: safety    Problem: Bladder / Voiding  Goal: Patient will establish and maintain regular urinary output  Outcome: Progressing: Pt x2 assist to toilet. Voids to toilet and has been incontinent once.     Problem: Mobility  Goal: Patient's capacity to carry out activities will improve  Outcome: Progressing: participating in therapies. OOB to w/c to toilet and back to bed.

## 2022-02-22 NOTE — THERAPY
Speech Language Pathology  Daily Treatment     Patient Name: Melba Frye  Age:  51 y.o., Sex:  female  Medical Record #: [id number]  Today's Date: 2/22/2022     Precautions  Precautions: Fall Risk  Comments: L alma delia, AFO    Subjective    Pt pleasant and cooperative, SO present and supportive. Pt tearful intermittently throughout session when struggling to complete task, easily redirected with ongoing education and encouragement provided.      Objective       02/22/22 1303   SLP Total Time Spent   SLP Individual Total Time Spent (Mins) 60   Treatment Charges   SLP Cognitive Skill Development First 15 Minutes 1   SLP Cognitive Skill Development Additional 15 Minutes 3       Assessment    Pt had not yet filled out memory log for the day, pt with difficulty recalling tasks completed earlier in the day (pt reporting on tasks and meals consumed yesterday vs today). Encouraged pt to fill out throughout the day to assist in ease of recall and instructed pt to fill out todays log with tasks that remain as of our session with then initiation of tomorrows log in the AM. Pt presented with visual scanning to address left neglect, pt completing task with 60% accuracy indep, all errors located on left side of page, pt requiring MAX to TOTAL A to locate remaining targets. Lastly, auditory math presented to pt (addition) pt completing with 65% accuracy indep, increased to 100% provided MIN-MOD A. Pt noted to use fingers to count however often counted over the number necessary resulting in answer being more than what was appropriate consistently.     Strengths: Able to follow instructions,Effective communication skills,Pleasant and cooperative,Willingly participates in therapeutic activities  Barriers: Impaired functional cognition    Plan    Memory, attention, left neglect     Speech Therapy Problems (Active)       Problem: Memory STGs       Dates: Start: 02/19/22         Goal: STG-Within one week, patient will recall daily  events and safety strategies with 80% accuracy, given min verbal cues and use of memory book.        Dates: Start: 02/19/22               Problem: Problem Solving STGs       Dates: Start: 02/19/22         Goal: STG-Within one week, patient will complete SCCAN with additional goals as deemed appropriate.        Dates: Start: 02/19/22            Goal: STG-Within one week, patient will complete basic math with 80% accuracy given min verbal cues.        Dates: Start: 02/19/22               Problem: Speech/Swallowing LTGs       Dates: Start: 02/19/22         Goal: LTG-By discharge, patient will solve complex problem Neeraj for safe discharge home.       Dates: Start: 02/19/22

## 2022-02-23 DIAGNOSIS — G40.109 LOCALIZATION-RELATED FOCAL EPILEPSY WITH SIMPLE PARTIAL SEIZURES (HCC): ICD-10-CM

## 2022-02-23 PROCEDURE — 770010 HCHG ROOM/CARE - REHAB SEMI PRIVAT*

## 2022-02-23 PROCEDURE — 97129 THER IVNTJ 1ST 15 MIN: CPT

## 2022-02-23 PROCEDURE — 700102 HCHG RX REV CODE 250 W/ 637 OVERRIDE(OP)

## 2022-02-23 PROCEDURE — 97112 NEUROMUSCULAR REEDUCATION: CPT

## 2022-02-23 PROCEDURE — A9270 NON-COVERED ITEM OR SERVICE: HCPCS | Performed by: PHYSICAL MEDICINE & REHABILITATION

## 2022-02-23 PROCEDURE — 97530 THERAPEUTIC ACTIVITIES: CPT

## 2022-02-23 PROCEDURE — 97116 GAIT TRAINING THERAPY: CPT

## 2022-02-23 PROCEDURE — 99233 SBSQ HOSP IP/OBS HIGH 50: CPT | Performed by: PHYSICAL MEDICINE & REHABILITATION

## 2022-02-23 PROCEDURE — 97130 THER IVNTJ EA ADDL 15 MIN: CPT

## 2022-02-23 PROCEDURE — 700102 HCHG RX REV CODE 250 W/ 637 OVERRIDE(OP): Performed by: PHYSICAL MEDICINE & REHABILITATION

## 2022-02-23 PROCEDURE — 97535 SELF CARE MNGMENT TRAINING: CPT

## 2022-02-23 PROCEDURE — A9270 NON-COVERED ITEM OR SERVICE: HCPCS

## 2022-02-23 RX ORDER — TIZANIDINE 4 MG/1
4 TABLET ORAL 2 TIMES DAILY
Status: DISCONTINUED | OUTPATIENT
Start: 2022-02-23 | End: 2022-03-03

## 2022-02-23 RX ORDER — DIPHENHYDRAMINE HCL 25 MG
25 TABLET ORAL EVERY 6 HOURS PRN
Status: DISCONTINUED | OUTPATIENT
Start: 2022-02-23 | End: 2022-03-11 | Stop reason: HOSPADM

## 2022-02-23 RX ORDER — TIZANIDINE 4 MG/1
TABLET ORAL
Status: COMPLETED
Start: 2022-02-23 | End: 2022-02-23

## 2022-02-23 RX ADMIN — BRIVARACETAM 100 MG: 50 TABLET, FILM COATED ORAL at 08:14

## 2022-02-23 RX ADMIN — Medication 1000 UNITS: at 08:15

## 2022-02-23 RX ADMIN — TRAZODONE HYDROCHLORIDE 50 MG: 50 TABLET ORAL at 19:38

## 2022-02-23 RX ADMIN — AMLODIPINE BESYLATE 10 MG: 5 TABLET ORAL at 08:15

## 2022-02-23 RX ADMIN — APIXABAN 5 MG: 5 TABLET, FILM COATED ORAL at 19:38

## 2022-02-23 RX ADMIN — ACETAMINOPHEN 1000 MG: 500 TABLET, FILM COATED ORAL at 16:16

## 2022-02-23 RX ADMIN — LACOSAMIDE 200 MG: 100 TABLET, FILM COATED ORAL at 08:15

## 2022-02-23 RX ADMIN — MELATONIN TAB 3 MG 3 MG: 3 TAB at 19:38

## 2022-02-23 RX ADMIN — BRIVARACETAM 100 MG: 50 TABLET, FILM COATED ORAL at 19:38

## 2022-02-23 RX ADMIN — TIZANIDINE 4 MG: 4 TABLET ORAL at 19:38

## 2022-02-23 RX ADMIN — LACOSAMIDE 200 MG: 100 TABLET, FILM COATED ORAL at 19:38

## 2022-02-23 RX ADMIN — DIPHENHYDRAMINE HCL 25 MG: 25 TABLET ORAL at 18:11

## 2022-02-23 RX ADMIN — VENLAFAXINE HYDROCHLORIDE 75 MG: 37.5 TABLET ORAL at 08:15

## 2022-02-23 RX ADMIN — APIXABAN 5 MG: 5 TABLET, FILM COATED ORAL at 08:15

## 2022-02-23 RX ADMIN — OMEPRAZOLE 20 MG: 20 CAPSULE, DELAYED RELEASE ORAL at 08:15

## 2022-02-23 RX ADMIN — ALPRAZOLAM 0.5 MG: 0.5 TABLET ORAL at 19:38

## 2022-02-23 ASSESSMENT — ACTIVITIES OF DAILY LIVING (ADL)
BED_CHAIR_WHEELCHAIR_TRANSFER_DESCRIPTION: ADAPTIVE EQUIPMENT;INCREASED TIME;INITIAL PREPARATION FOR TASK;REQUIRES LIFT;SET-UP OF EQUIPMENT;SUPERVISION FOR SAFETY;VERBAL CUEING
TOILETING_LEVEL_OF_ASSIST_DESCRIPTION: ASSIST FOR HYGIENE;ASSIST TO PULL PANTS UP;ASSIST TO PULL PANTS DOWN;ASSIST FOR STANDING BALANCE;GRAB BAR;INCREASED TIME;INITIAL PREPARATION FOR TASK;SET-UP OF EQUIPMENT;SUPERVISION FOR SAFETY;VERBAL CUEING

## 2022-02-23 ASSESSMENT — GAIT ASSESSMENTS
ASSISTIVE DEVICE: OTHER (COMMENTS)
DISTANCE (FEET): 20
GAIT LEVEL OF ASSIST: MODERATE ASSIST
DEVIATION: STEP TO;DECREASED BASE OF SUPPORT;BRADYKINETIC;DECREASED TOE OFF;DECREASED HEEL STRIKE

## 2022-02-23 ASSESSMENT — PAIN DESCRIPTION - PAIN TYPE: TYPE: ACUTE PAIN

## 2022-02-23 NOTE — THERAPY
Speech Language Pathology  Daily Treatment     Patient Name: Melba Frye  Age:  51 y.o., Sex:  female  Medical Record #: [id number]  Today's Date: 2/23/2022     Precautions  Precautions: Fall Risk  Comments: L alma delia, AFO    Subjective    Pt pleasant and cooperative during this ST session,  present and supportive      Objective       02/23/22 1401   SLP Total Time Spent   SLP Individual Total Time Spent (Mins) 60   Treatment Charges   SLP Cognitive Skill Development First 15 Minutes 1   SLP Cognitive Skill Development Additional 15 Minutes 3       Assessment    Pt completed single target visual scanning task initially requiring mod cues to scan to the left as well as mod cues to track one line at a time.  As pt progressed through this task was reduced to min A to achieve 100% accuracy.  Pt was intermittently tearful due to difficulty she had concentrating on this task.  Pt also completed 1 and 2 step written directions.  Pt was able to follow single step written directions with min cues required to locate all choices on the page (cues to locate the word on the left) and min-mod cues when completing 2 step written directions to finish the first direction before completing the second.      Strengths: Able to follow instructions,Effective communication skills,Pleasant and cooperative,Willingly participates in therapeutic activities  Barriers: Impaired functional cognition    Plan    Continue targeting visual scanning, functional reading, memory notebook           Speech Therapy Problems (Active)       Problem: Memory STGs       Dates: Start: 02/19/22         Goal: STG-Within one week, patient will recall daily events and safety strategies with 80% accuracy, given min verbal cues and use of memory book.        Dates: Start: 02/19/22               Problem: Problem Solving STGs       Dates: Start: 02/19/22         Goal: STG-Within one week, patient will complete SCCAN with additional goals as deemed  appropriate.        Dates: Start: 02/19/22            Goal: STG-Within one week, patient will complete basic math with 80% accuracy given min verbal cues.        Dates: Start: 02/19/22               Problem: Speech/Swallowing LTGs       Dates: Start: 02/19/22         Goal: LTG-By discharge, patient will solve complex problem Neeraj for safe discharge home.       Dates: Start: 02/19/22

## 2022-02-23 NOTE — THERAPY
Speech Language Pathology  Daily Treatment     Patient Name: Melba Frye  Age:  51 y.o., Sex:  female  Medical Record #: [id number]  Today's Date: 2/23/2022     Precautions  Precautions: Fall Risk  Comments: L alma delia, AFO    Subjective    Pt pleasant and cooperative, SO present and supportive.      Objective       02/23/22 1103   SLP Total Time Spent   SLP Individual Total Time Spent (Mins) 30   Treatment Charges   SLP Cognitive Skill Development First 15 Minutes 1   SLP Cognitive Skill Development Additional 15 Minutes 1       Assessment    Pt with call light on as SLP entered room. Pt reporting need to use restroom and stated that she pushed the red light to get help. Pt assisted out of bed and to restroom with MAX A X2. Pt then participated in sustained attention task with use of ABCs and naming of animals, pt completing with MIN-MOD A for working memory and MIN cues for naming of targets.     Strengths: Able to follow instructions,Effective communication skills,Pleasant and cooperative,Willingly participates in therapeutic activities  Barriers: Impaired functional cognition    Plan    Memory, attention and left neglect     Speech Therapy Problems (Active)       Problem: Memory STGs       Dates: Start: 02/19/22         Goal: STG-Within one week, patient will recall daily events and safety strategies with 80% accuracy, given min verbal cues and use of memory book.        Dates: Start: 02/19/22               Problem: Problem Solving STGs       Dates: Start: 02/19/22         Goal: STG-Within one week, patient will complete SCCAN with additional goals as deemed appropriate.        Dates: Start: 02/19/22            Goal: STG-Within one week, patient will complete basic math with 80% accuracy given min verbal cues.        Dates: Start: 02/19/22               Problem: Speech/Swallowing LTGs       Dates: Start: 02/19/22         Goal: LTG-By discharge, patient will solve complex problem Neeraj for safe discharge home.        Dates: Start: 02/19/22

## 2022-02-23 NOTE — CARE PLAN
"The patient is Stable - Low risk of patient condition declining or worsening    Shift Goals  Clinical Goals: safety    Patient is not progressing towards the following goals:      Problem: Fall Risk - Rehab  Goal: Patient will remain free from falls  Outcome: Not Met  Note: [place] Fall risk Assessment Score: 19    High fall risk Interventions   - Bed and strip alarm   - Yellow sign by the door   - Yellow wrist band \"Fall risk\"  - Room near to the nurse station  - Do not leave patient unattended in the bathroom  - Fall risk education provided     "

## 2022-02-23 NOTE — THERAPY
Occupational Therapy  Daily Treatment     Patient Name: Melba Frye  Age:  51 y.o., Sex:  female  Medical Record #: [id number]  Today's Date: 2/23/2022     Precautions  Precautions: Fall Risk  Comments: L alma delia, AFO    Safety   ADL Safety : Requires Physical Assist for Safety,Requires Supervision for Safety  Bathroom Safety: Requires Physical Assist for Safety,Requires Supervision for Safety    Subjective    Pt in bed upon arrival with  at bedside, agreeable to OT session.      Objective     02/23/22 1301   Functional Level of Assist   Lower Body Dressing Total Assist x 2  (brief and pants change after toileting)   Lower Body Dressing Description Assist with threading into pant leg;Increased time;Initial preparation for task;Set-up of equipment;Supervision for safety;Verbal cueing   Toileting Total Assist x 2   Toileting Description Assist for hygiene;Assist to pull pants up;Assist to pull pants down;Assist for standing balance;Grab bar;Increased time;Initial preparation for task;Set-up of equipment;Supervision for safety;Verbal cueing   Bed, Chair, Wheelchair Transfer Maximal Assist  (modA x1, Josep x1 SPT EOB -> w/c)   Bed Chair Wheelchair Transfer Description Adaptive equipment;Increased time;Initial preparation for task;Requires lift;Set-up of equipment;Supervision for safety;Verbal cueing   Toilet Transfers Maximal Assist  (maxA x1, Josep x1 SPT w/c <> toilet)   Toilet Transfer Description Adaptive equipment;Grab bar;Assist with one limb;Increased time;Initial preparation for task;Requires lift;Set-up of equipment;Supervision for safety;Verbal cueing   Bed Mobility    Supine to Sit Maximal Assist   Scooting Maximal Assist   Interdisciplinary Plan of Care Collaboration   IDT Collaboration with  Family / Caregiver;Speech Therapist   Patient Position at End of Therapy Seated;Call Light within Reach;Tray Table within Reach;Phone within Reach;Family / Friend in Room   Collaboration Comments Pt's   present and supportive throughout session; handoff to ST   OT Total Time Spent   OT Individual Total Time Spent (Mins) 60   OT Charge Group   OT Self Care / ADL 1   OT Neuromuscular Re-education / Balance 2   OT Therapy Activity 1     LUE neuro re-ed: vibration applied to triceps and wrist/finger extensors to breakup spastic motor pattern combined with passive stretching while seated EOM. Reviewed self-ROM for elbow flexion/extension and pronation/supination. Transitioned to standing EOM with pt forearm weightbearing onto wedge and performing modified pushups with AAROM for the LUE.     W/c <> EOM txfer x2 modA x1 and Josep x1 SPT. STS x1 from w/c to stand EOM for weightbearing exercise.    Assessment    Pt tolerated OT session well focused on transfers, LUE neuro re-ed and spasticity mgmt and toileting. Pt continues to require totalA x2 for toileting tasks. Has difficulty weight shifting onto RLE to progress LLE during transfers requiring manual assistance but did demo improved ability to slide LLE backwards this session. Pt's LUE responds well to spasticity tx with improved ROM and stretch following above interventions however her fingers continue to curl into spasticity pattern so provided pt with a rolled towel to hold to prevent skin breakdown from her fingernails. Pt and spouse report she has a night time splint to wear and she will start reminding nursing to don it prior to bed. Pt remains highly motivated for all therapy activities and her  is very involved and supportive.     Strengths: Able to follow instructions,Effective communication skills,Independent prior level of function,Manages pain appropriately,Motivated for self care and independence,Pleasant and cooperative,Supportive family,Willingly participates in therapeutic activities  Barriers: Bladder incontinence,Decreased endurance,Fatigue,Generalized weakness,Hemiparesis,Impaired activity tolerance,Impaired balance,Limited  mobility    Plan    · Fit pt for giv-eugenio sling for xfer's/standing*      ADL's, visual screen, neuro re-ed for LUE, self-ROM for LUE, sitting/standing bal/tolerance, functional transfers.     Passport items to be completed:  Perform bathroom transfers, complete dressing, complete feeding, get ready for the day, prepare a simple meal, participate in household tasks, adapt home for safety needs, demonstrate home exercise program, complete caregiver training     Occupational Therapy Goals (Active)       Problem: Dressing       Dates: Start: 02/19/22         Goal: STG-Within one week, patient will dress UB with max a using alma delia-dressing techniques        Dates: Start: 02/19/22            Goal: STG-Within one week, patient will dress LB with total a x 1        Dates: Start: 02/19/22               Problem: Functional Transfers       Dates: Start: 02/19/22         Goal: STG-Within one week, patient will transfer to toilet with max a        Dates: Start: 02/19/22               Problem: OT Long Term Goals       Dates: Start: 02/19/22         Goal: LTG-By discharge, patient will complete basic self care tasks with min-mod a        Dates: Start: 02/19/22            Goal: LTG-By discharge, patient will perform bathroom transfers with min a        Dates: Start: 02/19/22               Problem: Toileting       Dates: Start: 02/19/22         Goal: STG-Within one week, patient will complete toileting tasks with total a x 1        Dates: Start: 02/19/22

## 2022-02-23 NOTE — THERAPY
02/23/22 0929   Precautions   Precautions Fall Risk   Comments L alma delia, AFO   Pain 0 - 10 Group   Therapist Pain Assessment 0   Cognition    Level of Consciousness Alert   Safety Awareness Impaired   New Learning Impaired   Attention Impaired   Sequencing Impaired   Initiation Impaired   Gait Functional Level of Assist    Gait Level Of Assist Moderate Assist   Assistive Device Other (Comments)  (R handrail)   Distance (Feet) 20  (20 FT x2, 10 FT x1)   # of Times Distance was Traveled 2   Deviation Step To;Decreased Base Of Support;Bradykinetic;Decreased Toe Off;Decreased Heel Strike  (Impaired weight shifting)   Stairs Functional Level of Assist   Level of Assist with Stairs Unable to Participate   Transfer Functional Level of Assist   Bed, Chair, Wheelchair Transfer Maximal Assist   Bed Chair Wheelchair Transfer Description Adaptive equipment;Assist with one limb;Increased time;Supervision for safety;Verbal cueing;Set-up of equipment  (Stand-pivot)   Sitting Lower Body Exercises   Sit to Stand   (1 set of 5 using right handrail)   Bed Mobility    Supine to Sit Moderate Assist   Sit to Stand Moderate Assist   Scooting Maximal Assist   Rolling Maximum Assist to Lt.   Neuro-Muscular Treatments   Neuro-Muscular Treatments Facilitation;Joint Approximation;Sequencing;Tactile Cuing;Verbal Cuing;Tapping;Anterior weight shift;Weight Shift Right;Weight Shift Left   Comments Sequencing bed mobility supine to sit, seated scooting edge of bed, sequencing sit to stand and pivot transfer bed to wheelchair, sequencing sit to/from stand with right handrail for support, midline standing, facilitated dynamic standing weight shift during gait and sequencing gait with a right handrail for support   PT Total Time Spent   PT Individual Total Time Spent (Mins) 60   PT Charge Group   PT Gait Training 1   PT Neuromuscular Re-Education / Balance 2   PT Therapeutic Activities 1   Physical Therapy   Daily Treatment     Patient Name: Melba  Cesia Frye  Age:  51 y.o., Sex:  female  Medical Record #: [id number]  Today's Date: 2/23/2022     Precautions  Precautions: Fall Risk  Comments: L alma delia, AFO    Subjective    The patient was in bed and she agreed to PT.  She had no complaints of pain or lightheadedness.     Objective    Patient participated in sequencing bed mobility for rolling to the left and sitting up to the edge of the bed, seated scooting forward edge of bed and then sequencing sit to stand and pivot for the transfer to the wheelchair.  She required moderate to maximum assistance for these activities.  The patient utilized a right handrail for support and participated in sequencing sit to stand, midline standing posture and PT facilitated gait with facilitation of right and left dynamic standing weight shift.  She tolerated 20 FT x2 and 10 FT x1.    Assessment    The patient participated in the above activities with moderate to maximum assistance.  After her first bout of gait training using the right handrail PT educated the patient regarding position for right and left weight shifting during gait.  The second and third balance were much better with improved weight shift positioning as she was walking.  She requires assistance to advance her leg intermittently.  The patient is motivated to participate in therapy in order to improve function.  Strengths: Able to follow instructions,Willingly participates in therapeutic activities,Supportive family,Pleasant and cooperative,Motivated for self care and independence,Effective communication skills  Barriers: Decreased endurance,Emotional lability,Fatigue,Generalized weakness,Hemiplegia,Impaired activity tolerance,Impaired balance,Impaired functional cognition,Limited mobility,Spasticity    Plan    Safety education, neuromuscular reeducation, bed mobility and transfers stand-pivot, gait training    Passport items to be completed:  [unfilled]    Physical Therapy Problems (Active)       Problem:  Mobility       Dates: Start: 02/19/22         Goal: STG-Within one week, patient will propel wheelchair 50ft in a household type setting with min A for steering.       Dates: Start: 02/19/22            Goal: STG-Within one week, patient will ambulate 10ft with LBQC and min A.       Dates: Start: 02/19/22            Goal: STG-Within one week, patient will ascend and descend four stairs with mod A using R handrail.       Dates: Start: 02/19/22               Problem: Mobility Transfers       Dates: Start: 02/19/22         Goal: STG-Within one week, patient will perform bed mobility with min A using hospital bed functions.       Dates: Start: 02/19/22            Goal: STG-Within one week, patient will sit to stand with min A from wheelchair.       Dates: Start: 02/19/22            Goal: STG-Within one week, patient will transfer bed to chair with min A.       Dates: Start: 02/19/22               Problem: PT-Long Term Goals       Dates: Start: 02/19/22         Goal: LTG-By discharge, patient will propel wheelchair 150ft with power versus manual chair and supervision.       Dates: Start: 02/19/22            Goal: LTG-By discharge, patient will ambulate 20ft with LBQC and CGA.       Dates: Start: 02/19/22            Goal: LTG-By discharge, patient will transfer one surface to another with CGA.       Dates: Start: 02/19/22            Goal: LTG-By discharge, patient will ambulate up/down flight of stairs with min A using R handrail.       Dates: Start: 02/19/22            Goal: LTG-By discharge, patient will transfer in/out of a car with min A.       Dates: Start: 02/19/22

## 2022-02-23 NOTE — PROGRESS NOTES
"Rehab Progress Note     Encounter Date: 2/23/2022    CC: Decreased mobility, spasticity    Interval Events (Subjective)  Patient sitting up in room. She reports she is having whole body itching now. Discussed would add PO benadryl.  She reports her spasticity is limiting her. Discussed case with Dr. Steele who typically does her botox injections and will start Tizanidine twice a day since cannot get botox as inpatient.  Denies pain. Denies disruption to sleep. Discussed Tizanidine may help with sleep as well.      Objective:  VITAL SIGNS: /89   Pulse 91   Temp 36.4 °C (97.6 °F) (Temporal)   Resp 18   Ht 1.702 m (5' 7\")   Wt 102 kg (224 lb 13.9 oz)   SpO2 95%   BMI 35.22 kg/m²   Gen: NAD  Psych: Mood and affect appropriate  CV: RRR, no edema  Resp: CTAB, no upper airway sounds  Abd: NTND  Neuro: AOx4, MAS 3/4 LUE flexors    No results found for this or any previous visit (from the past 72 hour(s)).    Current Facility-Administered Medications   Medication Frequency   • diphenhydrAMINE (BENADRYL) tablet/capsule 25 mg Q6HRS PRN   • tizanidine (ZANAFLEX) tablet 4 mg BID   • butalbital/apap/caffeine -40 mg (Fioricet) per tablet 1 Tablet Q4HRS PRN   • senna-docusate (PERICOLACE or SENOKOT S) 8.6-50 MG per tablet 2 Tablet BID PRN    And   • polyethylene glycol/lytes (MIRALAX) PACKET 1 Packet QDAY PRN    And   • magnesium hydroxide (MILK OF MAGNESIA) suspension 30 mL QDAY PRN    And   • bisacodyl (DULCOLAX) suppository 10 mg QDAY PRN   • vitamin D3 (cholecalciferol) tablet 1,000 Units DAILY   • Respiratory Therapy Consult Continuous RT   • hydrALAZINE (APRESOLINE) tablet 25 mg Q8HRS PRN   • acetaminophen (Tylenol) tablet 650 mg Q4HRS PRN   • omeprazole (PRILOSEC) capsule 20 mg DAILY   • artificial tears ophthalmic solution 1 Drop PRN   • benzocaine-menthol (CEPACOL) lozenge 1 Lozenge Q2HRS PRN   • mag hydrox-al hydrox-simeth (MAALOX PLUS ES or MYLANTA DS) suspension 20 mL Q2HRS PRN   • ondansetron (ZOFRAN " ODT) dispertab 4 mg 4X/DAY PRN    Or   • ondansetron (ZOFRAN) syringe/vial injection 4 mg 4X/DAY PRN   • traZODone (DESYREL) tablet 50 mg QHS PRN   • sodium chloride (OCEAN) 0.65 % nasal spray 2 Spray PRN   • oxyCODONE immediate-release (ROXICODONE) tablet 5 mg Q3HRS PRN    Or   • oxyCODONE immediate release (ROXICODONE) tablet 10 mg Q3HRS PRN   • midazolam (VERSED) 5 mg/mL (1 mL vial) PRN   • acetaminophen (TYLENOL) tablet 1,000 mg Q6HRS PRN   • ALPRAZolam (XANAX) tablet 0.5 mg BID PRN   • amLODIPine (NORVASC) tablet 10 mg DAILY   • apixaban (ELIQUIS) tablet 5 mg BID   • brivaracetam (Briviact) tablet 100 mg BID   • hydrocortisone 1 % cream BID PRN   • lacosamide (VIMPAT) tablet 200 mg BID   • melatonin tablet 3 mg QHS   • venlafaxine (EFFEXOR) tablet 75 mg DAILY       Orders Placed This Encounter   Procedures   • Diet Order Diet: Regular     Standing Status:   Standing     Number of Occurrences:   1     Order Specific Question:   Diet:     Answer:   Regular [1]       Assessment:  Active Hospital Problems    Diagnosis    • *Glioblastoma of frontal lobe (HCC)    • GBM (glioblastoma multiforme) (HCC)    • Constipation    • Syncope    • Class 1 obesity due to excess calories with serious comorbidity and body mass index (BMI) of 34.0 to 34.9 in adult    • Gait instability    • History of pulmonary embolus (PE)    • Seizure disorder (HCC)    • Primary hypertension    • Mixed anxiety and depressive disorder        Medical Decision Making and Plan:  Seizure/GBM - Patient with syncopal event with AMS and weakness after concerning for seizure vs worsening GBM vs post-treatment changes. Oncology was consulted and recommended steroids then taper which she has completed. Neurology was consulted and recommended MRI which showed diffuse posttreatment changes but no acute lesion. EEG showed non-specific changes and Neurology recommended increasing her Vimpat as her symptoms could have been post-ictal. Cardiac work-up negative.     -PT and OT for mobility and ADLs  -SLP for cognitive screen  -Continue Briviact 100 mg BID and Vimpat 200 mg BID     Spasticity - Followed by Dr. Steele, [facility], for Botox injection. Discussed with Dr. Steele. Will trial Tizanidine 4 mg BID      HTN - Patient on Amlodipine 10 mg daily. Into 140s, will monitor another day     Anemia - Check AM CBC - 14.3, resolved.      Hypokalemia - Check AM CMP - 4.0, resolved.     Anxiety/Depression - Patient on PRN Xanax and Effexor 75 mg daily     Morbid Obesity due to excess calories - BMI of 37.1 on admission. Dietitian to consult.      Back rash - Appears contact. Start Hydrocortisone cream. PO Benadryl    Constipation - Resolved, discontinue senna-docusate.     Vitamin D deficiency - 29 on admission. Started on 1000 U     Hx of PE - Patient on Eliquis BID.    Total time:  36 minutes.  I spent greater than 50% of the time for patient care, counseling, and coordination on this date, including unit/floor time, and face-to-face time with the patient as per interval events and assessment and plan above. Topics discussed included discharge planning, spasticity, Tizanidine, PO Benadryl and ongoing elevated SBP.     Lucrecia Sim M.D.

## 2022-02-23 NOTE — CARE PLAN
The patient is Watcher - Medium risk of patient condition declining or worsening    Shift Goals  Clinical Goals: safety    Progress made toward(s) clinical / shift goals:      Problem: Knowledge Deficit - Standard  Goal: Patient and family/care givers will demonstrate understanding of plan of care, disease process/condition, diagnostic tests and medications  Outcome: Progressing  Patient use call light for assistance.      Problem: Pain - Standard  Goal: Alleviation of pain or a reduction in pain to the patient’s comfort goal  Outcome: Progressing  Patient denied any pain or headache during shift.        Patient is not progressing towards the following goals:

## 2022-02-23 NOTE — THERAPY
"Physical Therapy   Daily Treatment     Patient Name: Melba Frye  Age:  51 y.o., Sex:  female  Medical Record #: [id number]  Today's Date: 2/22/2022     Precautions  Precautions: Fall Risk  Comments: L alma delia, AFO    Subjective    Pt resting in bed, spouse assisting to get ready for PT     Objective       02/22/22 1501   Gait Functional Level of Assist    Gait Level Of Assist Maximal Assist   Assistive Device Parallel Bars  (L AFO/ L foot slider/ 1/2\" R shoe lift)   Distance (Feet) 10   # of Times Distance was Traveled 5   Deviation Step To;Decreased Heel Strike;Decreased Toe Off  (spastic L alma delia-gait)   Transfer Functional Level of Assist   Bed, Chair, Wheelchair Transfer Maximal Assist  (2 person A for safety)   Bed Chair Wheelchair Transfer Description Adaptive equipment;Increased time;Requires lift;Set-up of equipment   Toilet Transfers Maximal Assist  (2 person for safety)   Toilet Transfer Description Adaptive equipment;Grab bar;Increased time;Requires lift   Neuro-Muscular Treatments   Neuro-Muscular Treatments Anterior weight shift;Postural Facilitation;Sequencing;Tactile Cuing;Verbal Cuing;Weight Shift Right;Weight Shift Left   Comments standing balance/ visual scanning and reaching activity with RUE through midline/ min/mod A for posture and balance.   PT Total Time Spent   PT Individual Total Time Spent (Mins) 60   PT Charge Group   PT Gait Training 2   PT Neuromuscular Re-Education / Balance 1   PT Therapeutic Activities 1     Trial use of power mobility for visual scanning/ obstacle navigation and problem solving skills. Pt requires max cues and constant re-direction for scanning to L visual field, required min A to manipulate joystick/ steering when driving around car to L. Pt wh multiple \"near miss\" of running LLE into objects on L.    Assessment    Pt tolerated increased gait reps with max A to advance LLE and compensatory AE. Pt with difficulty manipulating power mobility in therapy gym, " question safety of power mobility for home use due to  visual field deficit/ neglect, impaired problem solving, and impaired attention for multi-tasking.    Strengths: Able to follow instructions,Willingly participates in therapeutic activities,Supportive family,Pleasant and cooperative,Motivated for self care and independence,Effective communication skills  Barriers: Decreased endurance,Emotional lability,Fatigue,Generalized weakness,Hemiplegia,Impaired activity tolerance,Impaired balance,Impaired functional cognition,Limited mobility,Spasticity    Plan    Sit<>stand sequencing/ transfer training, pre-gait/ gait training as able, ROM/ stretching/ spasticity management.     Passport items to be completed:  Get in/out of bed safely, in/out of a vehicle, safely use mobility device, walk or wheel around home/community, navigate up and down stairs, show how to get up/down from the ground, ensure home is accessible, demonstrate HEP, complete caregiver training    Physical Therapy Problems (Active)       Problem: Mobility       Dates: Start: 02/19/22         Goal: STG-Within one week, patient will propel wheelchair 50ft in a household type setting with min A for steering.       Dates: Start: 02/19/22            Goal: STG-Within one week, patient will ambulate 10ft with LBQC and min A.       Dates: Start: 02/19/22            Goal: STG-Within one week, patient will ascend and descend four stairs with mod A using R handrail.       Dates: Start: 02/19/22               Problem: Mobility Transfers       Dates: Start: 02/19/22         Goal: STG-Within one week, patient will perform bed mobility with min A using hospital bed functions.       Dates: Start: 02/19/22            Goal: STG-Within one week, patient will sit to stand with min A from wheelchair.       Dates: Start: 02/19/22            Goal: STG-Within one week, patient will transfer bed to chair with min A.       Dates: Start: 02/19/22               Problem: PT-Long Term  Goals       Dates: Start: 02/19/22         Goal: LTG-By discharge, patient will propel wheelchair 150ft with power versus manual chair and supervision.       Dates: Start: 02/19/22            Goal: LTG-By discharge, patient will ambulate 20ft with LBQC and CGA.       Dates: Start: 02/19/22            Goal: LTG-By discharge, patient will transfer one surface to another with CGA.       Dates: Start: 02/19/22            Goal: LTG-By discharge, patient will ambulate up/down flight of stairs with min A using R handrail.       Dates: Start: 02/19/22            Goal: LTG-By discharge, patient will transfer in/out of a car with min A.       Dates: Start: 02/19/22

## 2022-02-24 ENCOUNTER — TELEPHONE (OUTPATIENT)
Dept: NEUROLOGY | Facility: MEDICAL CENTER | Age: 52
End: 2022-02-24

## 2022-02-24 PROCEDURE — 700102 HCHG RX REV CODE 250 W/ 637 OVERRIDE(OP): Performed by: PHYSICAL MEDICINE & REHABILITATION

## 2022-02-24 PROCEDURE — 97129 THER IVNTJ 1ST 15 MIN: CPT

## 2022-02-24 PROCEDURE — A9270 NON-COVERED ITEM OR SERVICE: HCPCS | Performed by: PHYSICAL MEDICINE & REHABILITATION

## 2022-02-24 PROCEDURE — 97116 GAIT TRAINING THERAPY: CPT | Mod: CQ

## 2022-02-24 PROCEDURE — 97112 NEUROMUSCULAR REEDUCATION: CPT | Mod: CQ

## 2022-02-24 PROCEDURE — 97130 THER IVNTJ EA ADDL 15 MIN: CPT

## 2022-02-24 PROCEDURE — 90791 PSYCH DIAGNOSTIC EVALUATION: CPT | Performed by: PSYCHOLOGIST

## 2022-02-24 PROCEDURE — 99233 SBSQ HOSP IP/OBS HIGH 50: CPT | Performed by: PHYSICAL MEDICINE & REHABILITATION

## 2022-02-24 PROCEDURE — 97530 THERAPEUTIC ACTIVITIES: CPT

## 2022-02-24 PROCEDURE — 770010 HCHG ROOM/CARE - REHAB SEMI PRIVAT*

## 2022-02-24 PROCEDURE — 97530 THERAPEUTIC ACTIVITIES: CPT | Mod: CQ

## 2022-02-24 PROCEDURE — 97535 SELF CARE MNGMENT TRAINING: CPT

## 2022-02-24 RX ORDER — LISINOPRIL 5 MG/1
5 TABLET ORAL DAILY
Status: DISCONTINUED | OUTPATIENT
Start: 2022-02-25 | End: 2022-03-01

## 2022-02-24 RX ORDER — LEVETIRACETAM 750 MG/1
1500 TABLET ORAL 2 TIMES DAILY
Qty: 360 TABLET | Refills: 2 | OUTPATIENT
Start: 2022-02-24 | End: 2022-05-25

## 2022-02-24 RX ADMIN — Medication 1000 UNITS: at 08:21

## 2022-02-24 RX ADMIN — TIZANIDINE 4 MG: 4 TABLET ORAL at 08:21

## 2022-02-24 RX ADMIN — BUTALBITAL, ACETAMINOPHEN, AND CAFFEINE 1 TABLET: 50; 325; 40 TABLET ORAL at 16:21

## 2022-02-24 RX ADMIN — AMLODIPINE BESYLATE 10 MG: 5 TABLET ORAL at 08:21

## 2022-02-24 RX ADMIN — MAGNESIUM HYDROXIDE 30 ML: 400 SUSPENSION ORAL at 16:17

## 2022-02-24 RX ADMIN — BRIVARACETAM 100 MG: 50 TABLET, FILM COATED ORAL at 20:39

## 2022-02-24 RX ADMIN — APIXABAN 5 MG: 5 TABLET, FILM COATED ORAL at 20:38

## 2022-02-24 RX ADMIN — BRIVARACETAM 100 MG: 50 TABLET, FILM COATED ORAL at 08:21

## 2022-02-24 RX ADMIN — LACOSAMIDE 200 MG: 100 TABLET, FILM COATED ORAL at 08:21

## 2022-02-24 RX ADMIN — VENLAFAXINE HYDROCHLORIDE 75 MG: 37.5 TABLET ORAL at 08:21

## 2022-02-24 RX ADMIN — LACOSAMIDE 200 MG: 100 TABLET, FILM COATED ORAL at 20:38

## 2022-02-24 RX ADMIN — MELATONIN TAB 3 MG 3 MG: 3 TAB at 20:39

## 2022-02-24 RX ADMIN — POLYETHYLENE GLYCOL 3350 1 PACKET: 17 POWDER, FOR SOLUTION ORAL at 04:09

## 2022-02-24 RX ADMIN — OMEPRAZOLE 20 MG: 20 CAPSULE, DELAYED RELEASE ORAL at 08:21

## 2022-02-24 RX ADMIN — APIXABAN 5 MG: 5 TABLET, FILM COATED ORAL at 08:21

## 2022-02-24 RX ADMIN — TIZANIDINE 4 MG: 4 TABLET ORAL at 20:38

## 2022-02-24 RX ADMIN — ACETAMINOPHEN 650 MG: 325 TABLET ORAL at 16:21

## 2022-02-24 RX ADMIN — DIPHENHYDRAMINE HCL 25 MG: 25 TABLET ORAL at 08:21

## 2022-02-24 ASSESSMENT — GAIT ASSESSMENTS
ASSISTIVE DEVICE: OTHER (COMMENTS)
GAIT LEVEL OF ASSIST: MODERATE ASSIST
DEVIATION: STEP TO;DECREASED BASE OF SUPPORT;BRADYKINETIC;DECREASED TOE OFF;DECREASED HEEL STRIKE

## 2022-02-24 ASSESSMENT — ACTIVITIES OF DAILY LIVING (ADL)
BED_CHAIR_WHEELCHAIR_TRANSFER_DESCRIPTION: INCREASED TIME;INITIAL PREPARATION FOR TASK;SET-UP OF EQUIPMENT;SQUAT PIVOT TRANSFER TO WHEELCHAIR;SUPERVISION FOR SAFETY;VERBAL CUEING
BED_CHAIR_WHEELCHAIR_TRANSFER_DESCRIPTION: INCREASED TIME;REQUIRES LIFT;SET-UP OF EQUIPMENT;SUPERVISION FOR SAFETY;VERBAL CUEING
TOILETING_LEVEL_OF_ASSIST_DESCRIPTION: ASSIST FOR HYGIENE;ASSIST TO PULL PANTS UP;ASSIST TO PULL PANTS DOWN;ASSIST FOR STANDING BALANCE;GRAB BAR;INCREASED TIME;INITIAL PREPARATION FOR TASK;SET-UP OF EQUIPMENT;SUPERVISION FOR SAFETY;VERBAL CUEING

## 2022-02-24 NOTE — CARE PLAN
Problem: Dressing  Goal: STG-Within one week, patient will dress UB with max a using alma delia-dressing techniques   Outcome: Met     Problem: Dressing  Goal: STG-Within one week, patient will dress LB with total a x 1   Outcome: Not Met     Problem: Toileting  Goal: STG-Within one week, patient will complete toileting tasks with total a x 1   Outcome: Not Met     Problem: Functional Transfers  Goal: STG-Within one week, patient will transfer to toilet with max a   Outcome: Not Met  Note: Continues to require 2 person transfer - modA x1, Josep x1

## 2022-02-24 NOTE — CARE PLAN
Problem: Mobility  Goal: STG-Within one week, patient will ambulate 10ft with LBQC and min A.  Outcome: Not Met  Goal: STG-Within one week, patient will ascend and descend four stairs with mod A using R handrail.  Outcome: Not Met     Problem: Mobility Transfers  Goal: STG-Within one week, patient will perform bed mobility with min A using hospital bed functions.  Outcome: Not Met  Goal: STG-Within one week, patient will sit to stand with min A from wheelchair.  Outcome: Not Met  Goal: STG-Within one week, patient will transfer bed to chair with min A.  Outcome: Not Met     Problem: Mobility  Goal: STG-Within one week, patient will propel wheelchair 50ft in a household type setting with min A for steering.  Outcome: Met

## 2022-02-24 NOTE — THERAPY
Recreational Therapy  Daily Treatment     Patient Name: Melba Frye  AGE:  51 y.o., SEX:  female  Medical Record #: [id number]  Today's Date: 2/23/2022       Subjective    Pt sharing that she would like to have the kinetic sand for her family.      Objective       02/22/22 1001   Functional Ability Status - Cognitive   Attention Span Remains on Task   Comprehension Follows Three Step Commands   Judgment Able to Make Independent Decisions   Functional Ability Status - Emotional    Affect Appropriate;Bright   Mood Appropriate   Behavior Appropriate   Skilled Intervention    Skilled Intervention Fine Motor Leisure;Relaxation / Coping Skills   Skilled Intervention Comments Kinetic sand FM activity   Interdisciplinary Plan of Care Collaboration   IDT Collaboration with  Family / Caregiver;Physician   Patient Position at End of Therapy Seated;Tray Table within Reach;Call Light within Reach   Collaboration Comments SO attended session. Physician speaking with Pt in the middle of the session.   Strengths & Barriers   Strengths Able to follow instructions;Alert and oriented;Pleasant and cooperative;Motivated for self care and independence;Supportive family;Willingly participates in therapeutic activities   Barriers Impaired balance;Impaired activity tolerance;Fatigue;Generalized weakness   Treatment Time   Total Time Spent (mins) 30   Procedural Tracking   Procedural Tracking Community Re-Integration;Community Skills Development;Leisure Skills Awareness;Leisure Skills Development       Assessment    Pt was able to find 8 1/4 inch beads in the kinetic sand during the session time.     Strengths: (P) Able to follow instructions,Alert and oriented,Pleasant and cooperative,Motivated for self care and independence,Supportive family,Willingly participates in therapeutic activities  Barriers: (P) Impaired balance,Impaired activity tolerance,Fatigue,Generalized weakness    Plan    Community reintegration class.

## 2022-02-24 NOTE — THERAPY
"Speech Language Pathology  Daily Treatment     Patient Name: Melba Frye  Age:  51 y.o., Sex:  female  Medical Record #: [id number]  Today's Date: 2/24/2022     Precautions  Precautions: Fall Risk  Comments: L alma delia, AFO    Subjective    Pt pleasant and cooperative.      Objective     02/24/22 0833   SLP Total Time Spent   SLP Individual Total Time Spent (Mins) 60   Treatment Charges   SLP Cognitive Skill Development First 15 Minutes 1   SLP Cognitive Skill Development Additional 15 Minutes 3       Assessment    Pt assisted with filling out daily log thus far, pt required MIN cues for completion. Pt however indep stated \"typically I dont fill this out till later in the day or at night\" when asked why this is not recommended pt indep stated \"I probably cant remember the information then.\" Pt presented with simple addition and subtraction work sheets, pt omitted completion of 6 problems completely (all located on left side of page - max cues to locate). Pt completed 38/40 problems correct indep, min cues to achieve 100%. Lastly, visual scanning with single target, pt omitted 5 targets, attempted to locate indep and pt stated \"I know you said they are there but I just cant find them.\" Again remaining targets were all on the left side of the page.     Strengths: Able to follow instructions,Effective communication skills,Pleasant and cooperative,Willingly participates in therapeutic activities  Barriers: Impaired functional cognition    Plan    Attention, memory and left neglect     Speech Therapy Problems (Active)       Problem: Memory STGs       Dates: Start: 02/19/22         Goal: STG-Within one week, patient will recall daily events and safety strategies with 80% accuracy, given min verbal cues and use of memory book.        Dates: Start: 02/19/22               Problem: Problem Solving STGs       Dates: Start: 02/19/22         Goal: STG-Within one week, patient will complete SCCAN with additional goals as deemed " appropriate.        Dates: Start: 02/19/22            Goal: STG-Within one week, patient will complete basic math with 80% accuracy given min verbal cues.        Dates: Start: 02/19/22               Problem: Speech/Swallowing LTGs       Dates: Start: 02/19/22         Goal: LTG-By discharge, patient will solve complex problem Neeraj for safe discharge home.       Dates: Start: 02/19/22

## 2022-02-24 NOTE — TELEPHONE ENCOUNTER
Briviact 100mg Tablet    Request rec'd via [employer], leonardo TC via [employer], STACY paid copay $0.00 #60/30 DS Max 30 DS notifying liaison Rosie Buchanan. - 02/24/2022 3:48pm

## 2022-02-24 NOTE — CARE PLAN
Problem: Recreation Therapy  Goal: STG-Within one week, patient will demonstrate leisure problem solving by sharing on two positive lesiure activities they would like to participate in either in session or during their free time and any perceived barriers to their participation.  Outcome: Met  Goal: STG-Within one week, patient will attend the community reintegration class.   Outcome: Progressing  Goal: LTG-By discharge, patient will demonstrate leisure problem solving by sharing on two positive lesiure activities they would like to participate in post dc and any perceived barriers to their participation.  Outcome: Met  Goal: LTG-By discharge, patient will contact at least one community resource to set up future plans post dc for adaptive recreation/rentals.   Outcome: Progressing

## 2022-02-24 NOTE — THERAPY
Occupational Therapy  Daily Treatment     Patient Name: Melba Frye  Age:  51 y.o., Sex:  female  Medical Record #: [id number]  Today's Date: 2/24/2022     Precautions  Precautions: Fall Risk  Comments: L alma delia, AFO    Safety   ADL Safety : Requires Physical Assist for Safety,Requires Supervision for Safety  Bathroom Safety: Requires Physical Assist for Safety,Requires Supervision for Safety    Subjective    Pt up in w/c upon arrival, agreeable to shower this session.      Objective     02/24/22 0931   Vitals   O2 Delivery Device None - Room Air   Non Verbal Descriptors   Non Verbal Scale  Calm   Vision Screen   Visual Acuity Able to read employee name badge without difficulty   Tracking Able to track stimulus in all quads without difficulty   Saccades Additional eye shifts occurred during testing   Convergence   (eyes do not converge to track stimulus approaching nose)   Visual Fields Difficulty detecting stimulus with right eye in left upper quadrant;Difficulty detecting stimulus with left eye in left upper quadrant;Difficulty detecting stimulus with right eye in left lateral quadrant;Difficulty detecting stimulus with left eye in left lateral quadrant;Difficulty detecting stimulus with right eye in left lower quadrant;Difficulty detecting stimulus with left eye in left lower quadrant  (grossly impaired peripheral, superior and inferior visual fields with pt identifying stimulus primarily in central field of vision)   Functional Level of Assist   Bathing Maximal Assist   Bathing Description Adaptive equipment;Grab bar;Hand held shower;Tub bench;Assit with back;Assit wtih lower extremities;Assit with perineal;Increased time;Initial preparation for task;Set-up of equipment;Supervision for safety;Verbal cueing   Upper Body Dressing Moderate Assist   Upper Body Dressing Description Assit with threading arms through sleeves;Increased time;Initial preparation for task;Set-up of equipment;Supervision for  safety;Verbal cueing   Lower Body Dressing Total Assist x 2  (pt able to thread RLE through but requires two people to pull pants up in standing. Required 3 stands to pull pants up due to very limited standing tolerance secondary to fatigue after shower)   Lower Body Dressing Description Assistive devices;Grab bar;Assist with threading into pant leg;Increased time;Initial preparation for task;Set-up of equipment;Supervision for safety;Verbal cueing   Toileting Total Assist x 2   Toileting Description Assist for hygiene;Assist to pull pants up;Assist to pull pants down;Assist for standing balance;Grab bar;Increased time;Initial preparation for task;Set-up of equipment;Supervision for safety;Verbal cueing   Bed, Chair, Wheelchair Transfer Maximal Assist  (modA x1, Josep x1)   Bed Chair Wheelchair Transfer Description Increased time;Requires lift;Set-up of equipment;Supervision for safety;Verbal cueing   Toilet Transfers Maximal Assist  (modA x1, Josep x1)   Toilet Transfer Description Adaptive equipment;Grab bar;Increased time;Initial preparation for task;Set-up of equipment;Verbal cueing;Supervision for safety;Requires lift   Tub / Shower Transfers Maximal Assist  (modA x1, Josep x1)   Balance   Sitting Balance (Static) Fair -   Sitting Balance (Dynamic) Poor   Standing Balance (Static) Poor -   Standing Balance (Dynamic) Trace +   Weight Shift Sitting Poor   Weight Shift Standing Poor   Bed Mobility    Sit to Supine Maximal Assist   Interdisciplinary Plan of Care Collaboration   IDT Collaboration with  Therapy Tech   Patient Position at End of Therapy In Bed;Call Light within Reach;Tray Table within Reach;Phone within Reach   OT Total Time Spent   OT Individual Total Time Spent (Mins) 90   OT Charge Group   OT Self Care / ADL 4   OT Therapy Activity 2     Assessed pt for Giv Kristie sling fit but due to pt's spasticity sling was not an optimal option for pt so issued standard sling for pt to use during transfers and  functional mobility to provide LUE support.     LUE elbow flexion/extension AAROM and stretching.     Educated pt on LUE positioning when seated in chair and laying in bed to provide shoulder, elbow and wrist support with pillows.     Assessed visual fields and scanning as described above.     Assessment    Pt tolerated OT session well but became fatigued after completing ADL routine and required 3 stands to have pants pulled up. Required max cues for sequencing donning shirt via alma delia technique and required assist to thread LUE through, position shirt and pull down. Trialed shower sitting on built-in drop down bench as pt's sitting balance has improved when sitting EOM but pt had poor posture and positioning of the LUE so recommend use of razz chair next shower. Pt's visual doran are globally impaired upon screening and limited to general central vision as pt was unable to identify stimulus laterally on both sides, superiorly and inferiorly until it came within her central vision. Pt's LUE spasticity was less involved today as she was able to achieve full elbow, wrist and finger ROM with gentle stretching. Continues to report LUE shoulder pain with movement.     Strengths: Able to follow instructions,Effective communication skills,Independent prior level of function,Manages pain appropriately,Motivated for self care and independence,Pleasant and cooperative,Supportive family,Willingly participates in therapeutic activities  Barriers: Bladder incontinence,Decreased endurance,Fatigue,Generalized weakness,Hemiparesis,Impaired activity tolerance,Impaired balance,Limited mobility    Plan    ADL's, neuro re-ed for LUE, self-ROM for LUE, sitting/standing bal/tolerance, functional transfers, TTB transfer     Passport items to be completed:  Perform bathroom transfers, complete dressing, complete feeding, get ready for the day, prepare a simple meal, participate in household tasks, adapt home for safety needs, demonstrate  home exercise program, complete caregiver training     Occupational Therapy Goals (Active)       Problem: Dressing       Dates: Start: 02/19/22         Goal: STG-Within one week, patient will dress UB with max a using alma delia-dressing techniques        Dates: Start: 02/19/22            Goal: STG-Within one week, patient will dress LB with total a x 1        Dates: Start: 02/19/22               Problem: Functional Transfers       Dates: Start: 02/19/22         Goal: STG-Within one week, patient will transfer to toilet with max a        Dates: Start: 02/19/22               Problem: OT Long Term Goals       Dates: Start: 02/19/22         Goal: LTG-By discharge, patient will complete basic self care tasks with min-mod a        Dates: Start: 02/19/22            Goal: LTG-By discharge, patient will perform bathroom transfers with min a        Dates: Start: 02/19/22               Problem: Toileting       Dates: Start: 02/19/22         Goal: STG-Within one week, patient will complete toileting tasks with total a x 1        Dates: Start: 02/19/22

## 2022-02-24 NOTE — CARE PLAN
"The patient is Stable - Low risk of patient condition declining or worsening    Shift Goals  Clinical Goals: safety      Problem: Skin Integrity  Goal: Skin integrity is maintained or improved  Outcome: Progressing:Pt is incontinent, she is checked on frequently throughout the night and changed promptly to maintain skin integrity, Barrier paste is applied after each brief change.       Problem: Psychosocial  Goal: Patient's level of anxiety will decrease  Outcome: Progressing: Pt explained that she was feeling anxious at the beginning of shift. She was given a PRN Xanax with her bedtime medications. Pt was rechecked 30 mins later and was asleep.     [place] Fall risk Assessment Score: 22    High fall risk Interventions   - Bed and strip alarm   -Room close to nursing station   - Yellow sign by the door   - Yellow wrist band \"Fall risk\"  - Do not leave patient unattended in the bathroom  - Fall risk education provided           "

## 2022-02-24 NOTE — CARE PLAN
The patient is Stable - Low risk of patient condition declining or worsening    Shift Goals  Clinical Goals: safety    Progress made toward(s) clinical / shift goals:      Problem: Knowledge Deficit - Standard  Goal: Patient and family/care givers will demonstrate understanding of plan of care, disease process/condition, diagnostic tests and medications  Outcome: Progressing  Patient's LBM on 2/21. MOM given this afternoon for constipation. Will continue to monitor.      Problem: Pain - Standard  Goal: Alleviation of pain or a reduction in pain to the patient’s comfort goal  Outcome: Progressing  Patient denied any pain or headache during shift.        Patient is not progressing towards the following goals:

## 2022-02-24 NOTE — PROGRESS NOTES
"Rehab Progress Note     Encounter Date: 2/24/2022    CC: Decreased mobility, spasticity    Interval Events (Subjective)  Patient sitting up in room. She reports therapy is going well. She reports fatigue but from exercise. She does not notice a new sedation from the Tizanidine. Her hand is more relaxed with much less spasticity.  Discussed will continue to monitor. SBP into 150s. Denies NVD. Denies SOB.     IDT Team Meeting 2/24/2022    ILucrecia M.D., was present and led the interdisciplinary team conference on 2/24/2022.  I led the IDT conference and agree with the IDT conference documentation and plan of care as noted below.     RN:  Diet Regular   % Meal     Pain    Sleep    Bowel Incontinent - timed voids   Bladder Incontinent - timed voids   In's & Out's      PT:  Bed Mobility    Transfers Mod-max   Mobility Josep for wheelchair   Stairs    Limited by spasticity    OT:  Eating    Grooming    Bathing modA   UB Dressing    LB Dressing    Toileting maxA   Shower & Transfer maxA   Fatigues easily   Tizanidine helped    SLP:  Moderate cognitive deficits  Left neglect; poor insight, poor awareness  Poor carryover  Max for recall    Rec:  Adaptive equipment    CM:  Continues to work on disposition and DME needs.      DC/Disposition:  3/11/22    Objective:  VITAL SIGNS: /84   Pulse 98   Temp 37 °C (98.6 °F) (Oral)   Resp 18   Ht 1.702 m (5' 7\")   Wt 102 kg (224 lb 13.9 oz)   SpO2 94%   BMI 35.22 kg/m²   Gen: NAD  Psych: Mood and affect appropriate  CV: RRR, no edema  Resp: CTAB, no upper airway sounds  Abd: NTND  Neuro: AOx4, MAS 3/4 LUE flexors  Unchanged from 2/23/22 except 2/4 MAS in finger flexors    No results found for this or any previous visit (from the past 72 hour(s)).    Current Facility-Administered Medications   Medication Frequency   • diphenhydrAMINE (BENADRYL) tablet/capsule 25 mg Q6HRS PRN   • tizanidine (ZANAFLEX) tablet 4 mg BID   • butalbital/apap/caffeine -40 mg " (Fioricet) per tablet 1 Tablet Q4HRS PRN   • senna-docusate (PERICOLACE or SENOKOT S) 8.6-50 MG per tablet 2 Tablet BID PRN    And   • polyethylene glycol/lytes (MIRALAX) PACKET 1 Packet QDAY PRN    And   • magnesium hydroxide (MILK OF MAGNESIA) suspension 30 mL QDAY PRN    And   • bisacodyl (DULCOLAX) suppository 10 mg QDAY PRN   • vitamin D3 (cholecalciferol) tablet 1,000 Units DAILY   • Respiratory Therapy Consult Continuous RT   • hydrALAZINE (APRESOLINE) tablet 25 mg Q8HRS PRN   • acetaminophen (Tylenol) tablet 650 mg Q4HRS PRN   • omeprazole (PRILOSEC) capsule 20 mg DAILY   • artificial tears ophthalmic solution 1 Drop PRN   • benzocaine-menthol (CEPACOL) lozenge 1 Lozenge Q2HRS PRN   • mag hydrox-al hydrox-simeth (MAALOX PLUS ES or MYLANTA DS) suspension 20 mL Q2HRS PRN   • ondansetron (ZOFRAN ODT) dispertab 4 mg 4X/DAY PRN    Or   • ondansetron (ZOFRAN) syringe/vial injection 4 mg 4X/DAY PRN   • traZODone (DESYREL) tablet 50 mg QHS PRN   • sodium chloride (OCEAN) 0.65 % nasal spray 2 Spray PRN   • oxyCODONE immediate-release (ROXICODONE) tablet 5 mg Q3HRS PRN    Or   • oxyCODONE immediate release (ROXICODONE) tablet 10 mg Q3HRS PRN   • midazolam (VERSED) 5 mg/mL (1 mL vial) PRN   • acetaminophen (TYLENOL) tablet 1,000 mg Q6HRS PRN   • ALPRAZolam (XANAX) tablet 0.5 mg BID PRN   • amLODIPine (NORVASC) tablet 10 mg DAILY   • apixaban (ELIQUIS) tablet 5 mg BID   • brivaracetam (Briviact) tablet 100 mg BID   • hydrocortisone 1 % cream BID PRN   • lacosamide (VIMPAT) tablet 200 mg BID   • melatonin tablet 3 mg QHS   • venlafaxine (EFFEXOR) tablet 75 mg DAILY       Orders Placed This Encounter   Procedures   • Diet Order Diet: Regular     Standing Status:   Standing     Number of Occurrences:   1     Order Specific Question:   Diet:     Answer:   Regular [1]       Assessment:  Active Hospital Problems    Diagnosis    • *Glioblastoma of frontal lobe (HCC)    • GBM (glioblastoma multiforme) (HCC)    • Constipation     • Syncope    • Class 1 obesity due to excess calories with serious comorbidity and body mass index (BMI) of 34.0 to 34.9 in adult    • Gait instability    • History of pulmonary embolus (PE)    • Seizure disorder (HCC)    • Primary hypertension    • Mixed anxiety and depressive disorder        Medical Decision Making and Plan:  Seizure/GBM - Patient with syncopal event with AMS and weakness after concerning for seizure vs worsening GBM vs post-treatment changes. Oncology was consulted and recommended steroids then taper which she has completed. Neurology was consulted and recommended MRI which showed diffuse posttreatment changes but no acute lesion. EEG showed non-specific changes and Neurology recommended increasing her Vimpat as her symptoms could have been post-ictal. Cardiac work-up negative.    -PT and OT for mobility and ADLs  -SLP for cognitive screen  -Continue Briviact 100 mg BID and Vimpat 200 mg BID     Spasticity - Followed by Dr. Steele, [facility], for Botox injection. Discussed with Dr. Steele. Will trial Tizanidine 4 mg BID   -Improved on Tizanidine.Will monitor      HTN - Patient on Amlodipine 10 mg daily. Into 150s, start Lisinopril 5 mg     Anemia - Check AM CBC - 14.3, resolved.      Hypokalemia - Check AM CMP - 4.0, resolved.     Anxiety/Depression - Patient on PRN Xanax and Effexor 75 mg daily     Morbid Obesity due to excess calories - BMI of 37.1 on admission. Dietitian to consult.      Back rash - Appears contact. Start Hydrocortisone cream. PO Benadryl    Constipation - Resolved, discontinue senna-docusate.     Vitamin D deficiency - 29 on admission. Started on 1000 U     Hx of PE - Patient on Eliquis BID.    Total time:  36 minutes.  I spent greater than 50% of the time for patient care, counseling, and coordination on this date, including unit/floor time, and face-to-face time with the patient as per interval events and assessment and plan above. Topics discussed included discharge  planning, improved spasticity, ongoing elevated SBP, and start ACEi. Patient was discussed separately in IDT today; please see details above.    Lucrecia Sim M.D.

## 2022-02-25 ENCOUNTER — TELEPHONE (OUTPATIENT)
Dept: NEUROLOGY | Facility: MEDICAL CENTER | Age: 52
End: 2022-02-25
Payer: COMMERCIAL

## 2022-02-25 PROCEDURE — A9270 NON-COVERED ITEM OR SERVICE: HCPCS | Performed by: PHYSICAL MEDICINE & REHABILITATION

## 2022-02-25 PROCEDURE — 97130 THER IVNTJ EA ADDL 15 MIN: CPT

## 2022-02-25 PROCEDURE — 97129 THER IVNTJ 1ST 15 MIN: CPT

## 2022-02-25 PROCEDURE — 700102 HCHG RX REV CODE 250 W/ 637 OVERRIDE(OP): Performed by: PHYSICAL MEDICINE & REHABILITATION

## 2022-02-25 PROCEDURE — 97535 SELF CARE MNGMENT TRAINING: CPT

## 2022-02-25 PROCEDURE — 99231 SBSQ HOSP IP/OBS SF/LOW 25: CPT | Performed by: PHYSICAL MEDICINE & REHABILITATION

## 2022-02-25 PROCEDURE — 97530 THERAPEUTIC ACTIVITIES: CPT

## 2022-02-25 PROCEDURE — 97112 NEUROMUSCULAR REEDUCATION: CPT

## 2022-02-25 PROCEDURE — 97116 GAIT TRAINING THERAPY: CPT

## 2022-02-25 PROCEDURE — 770010 HCHG ROOM/CARE - REHAB SEMI PRIVAT*

## 2022-02-25 RX ADMIN — LACOSAMIDE 200 MG: 100 TABLET, FILM COATED ORAL at 21:59

## 2022-02-25 RX ADMIN — TRAZODONE HYDROCHLORIDE 50 MG: 50 TABLET ORAL at 21:58

## 2022-02-25 RX ADMIN — MELATONIN TAB 3 MG 3 MG: 3 TAB at 21:58

## 2022-02-25 RX ADMIN — LACOSAMIDE 200 MG: 100 TABLET, FILM COATED ORAL at 08:01

## 2022-02-25 RX ADMIN — BISACODYL 10 MG: 10 SUPPOSITORY RECTAL at 11:43

## 2022-02-25 RX ADMIN — LISINOPRIL 5 MG: 5 TABLET ORAL at 08:01

## 2022-02-25 RX ADMIN — TIZANIDINE 4 MG: 4 TABLET ORAL at 08:01

## 2022-02-25 RX ADMIN — BRIVARACETAM 100 MG: 50 TABLET, FILM COATED ORAL at 08:01

## 2022-02-25 RX ADMIN — BUTALBITAL, ACETAMINOPHEN, AND CAFFEINE 1 TABLET: 50; 325; 40 TABLET ORAL at 14:59

## 2022-02-25 RX ADMIN — APIXABAN 5 MG: 5 TABLET, FILM COATED ORAL at 21:59

## 2022-02-25 RX ADMIN — VENLAFAXINE HYDROCHLORIDE 75 MG: 37.5 TABLET ORAL at 08:01

## 2022-02-25 RX ADMIN — APIXABAN 5 MG: 5 TABLET, FILM COATED ORAL at 08:01

## 2022-02-25 RX ADMIN — TIZANIDINE 4 MG: 4 TABLET ORAL at 21:58

## 2022-02-25 RX ADMIN — BUTALBITAL, ACETAMINOPHEN, AND CAFFEINE 1 TABLET: 50; 325; 40 TABLET ORAL at 08:01

## 2022-02-25 RX ADMIN — AMLODIPINE BESYLATE 10 MG: 5 TABLET ORAL at 08:01

## 2022-02-25 RX ADMIN — OMEPRAZOLE 20 MG: 20 CAPSULE, DELAYED RELEASE ORAL at 08:01

## 2022-02-25 RX ADMIN — BRIVARACETAM 100 MG: 50 TABLET, FILM COATED ORAL at 22:02

## 2022-02-25 RX ADMIN — Medication 1000 UNITS: at 08:01

## 2022-02-25 RX ADMIN — ALPRAZOLAM 0.5 MG: 0.5 TABLET ORAL at 18:19

## 2022-02-25 RX ADMIN — ACETAMINOPHEN 1000 MG: 500 TABLET, FILM COATED ORAL at 02:13

## 2022-02-25 RX ADMIN — DIPHENHYDRAMINE HCL 25 MG: 25 TABLET ORAL at 17:08

## 2022-02-25 ASSESSMENT — ACTIVITIES OF DAILY LIVING (ADL)
TOILETING_LEVEL_OF_ASSIST_DESCRIPTION: ASSIST TO PULL PANTS UP;ASSIST TO PULL PANTS DOWN;ASSIST FOR STANDING BALANCE;GRAB BAR;INCREASED TIME;INITIAL PREPARATION FOR TASK;SET-UP OF EQUIPMENT;SUPERVISION FOR SAFETY;VERBAL CUEING
TOILET_TRANSFER_DESCRIPTION: ADAPTIVE EQUIPMENT;GRAB BAR;INCREASED TIME;REQUIRES LIFT
BED_CHAIR_WHEELCHAIR_TRANSFER_DESCRIPTION: ADAPTIVE EQUIPMENT;INCREASED TIME;REQUIRES LIFT;SET-UP OF EQUIPMENT;SUPERVISION FOR SAFETY;VERBAL CUEING

## 2022-02-25 ASSESSMENT — PAIN DESCRIPTION - PAIN TYPE
TYPE: ACUTE PAIN
TYPE: ACUTE PAIN

## 2022-02-25 ASSESSMENT — GAIT ASSESSMENTS
DEVIATION: STEP TO;DECREASED HEEL STRIKE;DECREASED TOE OFF
ASSISTIVE DEVICE: OTHER (COMMENTS)
DISTANCE (FEET): 25
GAIT LEVEL OF ASSIST: MODERATE ASSIST

## 2022-02-25 NOTE — THERAPY
Physical Therapy   Daily Treatment     Patient Name: Melba Frye  Age:  51 y.o., Sex:  female  Medical Record #: [id number]  Today's Date: 2/24/2022     Precautions  Precautions: Fall Risk  Comments: L alma delia, AFO    Subjective    Pt in bed upon arrival, c/o fatigue but agreeable to perform gait training.     Objective       02/24/22 1501   Cognition    Level of Consciousness Alert   Safety Awareness Impaired   Sequencing Impaired   Gait Functional Level of Assist    Gait Level Of Assist Moderate Assist   Assistive Device Other (Comments)  (R hallway rail, LAFO+slider, strapped shoe lift)   Distance (Feet)   (10x1, 15x2)   Deviation Step To;Decreased Base Of Support;Bradykinetic;Decreased Toe Off;Decreased Heel Strike   Stairs Functional Level of Assist   Level of Assist with Stairs Unable to Participate   Transfer Functional Level of Assist   Bed, Chair, Wheelchair Transfer Maximal Assist  (2nd person CGA/SBA for safety)   Bed Chair Wheelchair Transfer Description Increased time;Initial preparation for task;Set-up of equipment;Squat pivot transfer to wheelchair;Supervision for safety;Verbal cueing  (vcs for head and hip relation)   Toilet Transfers Maximal Assist  (2nd person CGA/SBA for safety/pants management)   Toilet Transfer Description Grab bar;Assist with one limb;Increased time;Set-up of equipment;Supervision for safety;Verbal cueing   Bed Mobility    Supine to Sit Maximal Assist   Sit to Supine Maximal Assist   Sit to Stand Maximal Assist   Scooting Maximal Assist   Neuro-Muscular Treatments   Neuro-Muscular Treatments Anterior weight shift;Inhibition;Sequencing;Verbal Cuing   Comments liz dinero WS and education on head and hip relation   Interdisciplinary Plan of Care Collaboration   IDT Collaboration with  Family / Caregiver   Patient Position at End of Therapy In Bed;Call Light within Reach;Tray Table within Reach;Phone within Reach;Family / Friend in Room   Collaboration Comments pt's   presented throughout session, assisted in xfering and education on safety awareness   PT Total Time Spent   PT Individual Total Time Spent (Mins) 60   PT Charge Group   PT Gait Training 2   PT Neuromuscular Re-Education / Balance 1   PT Therapeutic Activities 1   Supervising Physical Therapist Sylvia Gaines LAFO, slider and R shoe lift at EOB w/ HOB elevated. Performed skin check after doffing AFO, no sign of redness.    Assessment    Pt tolerated session well in gait training at the hallway, required Josep advancing LLE and modA to keep in midline. Pt shows increase of posterior lean when she's fatigue but was motivated w/ her progress.    Strengths: Able to follow instructions,Willingly participates in therapeutic activities,Supportive family,Pleasant and cooperative,Motivated for self care and independence,Effective communication skills  Barriers: Decreased endurance,Emotional lability,Fatigue,Generalized weakness,Hemiplegia,Impaired activity tolerance,Impaired balance,Impaired functional cognition,Limited mobility,Spasticity    Plan    Sit<>stand sequencing/ transfer training, pre-gait/ gait training as able, ROM/ stretching/ spasticity management.     Passport items to be completed:  Get in/out of bed safely, in/out of a vehicle, safely use mobility device, walk or wheel around home/community, navigate up and down stairs, show how to get up/down from the ground, ensure home is accessible, demonstrate HEP, complete caregiver training    Physical Therapy Problems (Active)       Problem: Mobility       Dates: Start: 02/19/22         Goal: STG-Within one week, patient will ambulate 10ft with LBQC and min A.       Dates: Start: 02/19/22            Goal: STG-Within one week, patient will ascend and descend four stairs with mod A using R handrail.       Dates: Start: 02/19/22               Problem: Mobility Transfers       Dates: Start: 02/19/22         Goal: STG-Within one week, patient will perform  bed mobility with min A using hospital bed functions.       Dates: Start: 02/19/22            Goal: STG-Within one week, patient will sit to stand with min A from wheelchair.       Dates: Start: 02/19/22            Goal: STG-Within one week, patient will transfer bed to chair with min A.       Dates: Start: 02/19/22               Problem: PT-Long Term Goals       Dates: Start: 02/19/22         Goal: LTG-By discharge, patient will propel wheelchair 150ft with power versus manual chair and supervision.       Dates: Start: 02/19/22            Goal: LTG-By discharge, patient will ambulate 20ft with LBQC and CGA.       Dates: Start: 02/19/22            Goal: LTG-By discharge, patient will transfer one surface to another with CGA.       Dates: Start: 02/19/22            Goal: LTG-By discharge, patient will ambulate up/down flight of stairs with min A using R handrail.       Dates: Start: 02/19/22            Goal: LTG-By discharge, patient will transfer in/out of a car with min A.       Dates: Start: 02/19/22

## 2022-02-25 NOTE — PROGRESS NOTES
"Rehab Progress Note     Encounter Date: 2/25/2022    CC: Confusion    Interval Events (Subjective)  Patient was evaluated in her room, patient laying comfortably in bed  She reports [place] in the [place] and was discussing things with Dr. Sim.  She is working well with speech therapy    She denies any pain any changes in strength or sensation.    Objective:  Vitals: /69   Pulse 89   Temp 37 °C (98.6 °F) (Temporal)   Resp 18   Ht 1.702 m (5' 7\")   Wt 102 kg (224 lb 13.9 oz)   SpO2 99%   Gen: NAD, Body mass index is 35.22 kg/m².  Significant right-sided neglect  HEENT:  PERRLA, moist mucous membranes  Cardio: RRR, no mumurs  Pulm: CTAB, with normal respiratory effort  Abd: Soft NTND, active bowel sounds,   Ext: No peripheral edema. No calf tenderness. No clubbing/cyanosis. +dorsal pedalis pulses bilaterally.      No results found for this or any previous visit (from the past 72 hour(s)).    Current Facility-Administered Medications   Medication Frequency   • lisinopril (PRINIVIL) tablet 5 mg DAILY   • diphenhydrAMINE (BENADRYL) tablet/capsule 25 mg Q6HRS PRN   • tizanidine (ZANAFLEX) tablet 4 mg BID   • butalbital/apap/caffeine -40 mg (Fioricet) per tablet 1 Tablet Q4HRS PRN   • senna-docusate (PERICOLACE or SENOKOT S) 8.6-50 MG per tablet 2 Tablet BID PRN    And   • polyethylene glycol/lytes (MIRALAX) PACKET 1 Packet QDAY PRN    And   • magnesium hydroxide (MILK OF MAGNESIA) suspension 30 mL QDAY PRN    And   • bisacodyl (DULCOLAX) suppository 10 mg QDAY PRN   • vitamin D3 (cholecalciferol) tablet 1,000 Units DAILY   • Respiratory Therapy Consult Continuous RT   • hydrALAZINE (APRESOLINE) tablet 25 mg Q8HRS PRN   • acetaminophen (Tylenol) tablet 650 mg Q4HRS PRN   • omeprazole (PRILOSEC) capsule 20 mg DAILY   • artificial tears ophthalmic solution 1 Drop PRN   • benzocaine-menthol (CEPACOL) lozenge 1 Lozenge Q2HRS PRN   • mag hydrox-al hydrox-simeth (MAALOX PLUS ES or MYLANTA DS) " suspension 20 mL Q2HRS PRN   • ondansetron (ZOFRAN ODT) dispertab 4 mg 4X/DAY PRN    Or   • ondansetron (ZOFRAN) syringe/vial injection 4 mg 4X/DAY PRN   • traZODone (DESYREL) tablet 50 mg QHS PRN   • sodium chloride (OCEAN) 0.65 % nasal spray 2 Spray PRN   • oxyCODONE immediate-release (ROXICODONE) tablet 5 mg Q3HRS PRN    Or   • oxyCODONE immediate release (ROXICODONE) tablet 10 mg Q3HRS PRN   • midazolam (VERSED) 5 mg/mL (1 mL vial) PRN   • acetaminophen (TYLENOL) tablet 1,000 mg Q6HRS PRN   • ALPRAZolam (XANAX) tablet 0.5 mg BID PRN   • amLODIPine (NORVASC) tablet 10 mg DAILY   • apixaban (ELIQUIS) tablet 5 mg BID   • brivaracetam (Briviact) tablet 100 mg BID   • hydrocortisone 1 % cream BID PRN   • lacosamide (VIMPAT) tablet 200 mg BID   • melatonin tablet 3 mg QHS   • venlafaxine (EFFEXOR) tablet 75 mg DAILY       Orders Placed This Encounter   Procedures   • Diet Order Diet: Regular     Standing Status:   Standing     Number of Occurrences:   1     Order Specific Question:   Diet:     Answer:   Regular [1]       Assessment:  Active Hospital Problems    Diagnosis    • *Glioblastoma of frontal lobe (HCC)    • GBM (glioblastoma multiforme) (HCC)    • Constipation    • Syncope    • Class 1 obesity due to excess calories with serious comorbidity and body mass index (BMI) of 34.0 to 34.9 in adult    • Gait instability    • History of pulmonary embolus (PE)    • Seizure disorder (HCC)    • Primary hypertension    • Mixed anxiety and depressive disorder        Medical Decision Making and Plan:  Modified from Dr. Sim note on 2/24  Seizure/GBM - Patient with syncopal event with AMS and weakness after concerning for seizure vs worsening GBM vs post-treatment changes. Oncology was consulted and recommended steroids then taper which she has completed. Neurology was consulted and recommended MRI which showed diffuse posttreatment changes but no acute lesion. EEG showed non-specific changes and Neurology recommended  increasing her Vimpat as her symptoms could have been post-ictal. Cardiac work-up negative.    -Continue comprehensive acute inpatient rehabilitation  -PT and OT for mobility and ADLs  -SLP for cognitive screen  -Continue Briviact 100 mg BID and Vimpat 200 mg BID     Spasticity - Followed by Dr. Steele, [facility], for Botox injection. Discussed with Dr. Steele. Will trial Tizanidine 4 mg BID   -Improved on Tizanidine.Will monitor      HTN - Patient on Amlodipine 10 mg daily. Into 150s, start Lisinopril 5 mg     Anemia - Check AM CBC - 14.3, resolved.      Hypokalemia - Check AM CMP - 4.0, resolved.     Anxiety/Depression - Patient on PRN Xanax and Effexor 75 mg daily     Morbid Obesity due to excess calories - BMI of 37.1 on admission. Dietitian to consult.      Back rash - Appears contact. Start Hydrocortisone cream. PO Benadryl     Constipation - Resolved, discontinue senna-docusate.      Vitamin D deficiency - 29 on admission. Started on 1000 U      Hx of PE - Patient on Eliquis BID.    Total time:  15 minutes.  I spent greater than 50% of the time for patient care and coordination on this date, including unit/floor time, and face-to-face time with the patient as per assessment and plan above including GBM, discussed neglect and confusion..    Efren Solis D.O.

## 2022-02-25 NOTE — THERAPY
"Speech Language Pathology  Daily Treatment     Patient Name: Melba Frye  Age:  51 y.o., Sex:  female  Medical Record #: [id number]  Today's Date: 2/25/2022     Precautions  Precautions: Fall Risk  Comments: L alma delia, AFO    Subjective    Pt pleasant and cooperative, SO present and supportive.     Objective       02/25/22 1033   SLP Total Time Spent   SLP Individual Total Time Spent (Mins) 60   Treatment Charges   SLP Cognitive Skill Development First 15 Minutes 1   SLP Cognitive Skill Development Additional 15 Minutes 3       Assessment    SLP assisted in witting for memory log, pt indep aware of breakfast items however reported that she ordered orange juice but didn't get any. Education provided re: need to scan to left to ensure that she is seeing all items and not missing any. Pt verbalizing understanding and agreed \"yea that's probably what happened.\" Pt unable to recall tasks completed during OT, again reinforced need to fill out immediately or shortly after due to increased delay results in increased difficulty for recall. Functional scanning completed in room with use of clock and room board, MOD A required for locating of information requested. Written aid provided to both pt and SO due to questions re: current deficits. Discussed areas of raheem involved being frontal and parietal, pt asked if she noted the following changes, pt with good accuracy overall, occasionally stating something was or was not present and SLP and  discussed why we disagreed and pt would then be in agreement stating \"yea I guess you're right.\" Pt is extremely motivated to participate in therapy and pts SO is very involved and actively engages during treatment sessions.     Strengths: Able to follow instructions,Effective communication skills,Pleasant and cooperative,Willingly participates in therapeutic activities  Barriers: Impaired functional cognition,Impulsive,Impaired insight/denial of deficits,Impaired carryover of " learning    Plan    Cont to address recall, attention, insight, left neglect and safety    Speech Therapy Problems (Active)       Problem: Memory STGs       Dates: Start: 02/19/22         Goal: STG-Within one week, patient will recall daily events and safety strategies with 80% accuracy, given min verbal cues and use of memory book.        Dates: Start: 02/19/22               Problem: Problem Solving STGs       Dates: Start: 02/19/22         Goal: STG-Within one week, patient will complete basic math with 80% accuracy given min verbal cues.        Dates: Start: 02/19/22            Goal: STG-Within one week, patient will complete simple visual scanning tasks to address left neglect with 70% accuracy provided MIN cues.        Dates: Start: 02/24/22               Problem: Speech/Swallowing LTGs       Dates: Start: 02/19/22         Goal: LTG-By discharge, patient will solve complex problem Neeraj for safe discharge home.       Dates: Start: 02/19/22

## 2022-02-25 NOTE — THERAPY
Occupational Therapy  Daily Treatment     Patient Name: Melba Frye  Age:  51 y.o., Sex:  female  Medical Record #: [id number]  Today's Date: 2/25/2022     Precautions  Precautions: Fall Risk  Comments: L alma delia, AFO    Safety   ADL Safety : Requires Physical Assist for Safety,Requires Supervision for Safety  Bathroom Safety: Requires Physical Assist for Safety,Requires Supervision for Safety    Subjective    Pt up in w/c upon arrival, c/o fatigue and L shoulder pain but agreeable to OT session.      Objective     02/25/22 0831   Non Verbal Descriptors   Non Verbal Scale  Calm   Functional Level of Assist   Toileting Total Assist x 2   Toileting Description Assist to pull pants up;Assist to pull pants down;Assist for standing balance;Grab bar;Increased time;Initial preparation for task;Set-up of equipment;Supervision for safety;Verbal cueing   Bed, Chair, Wheelchair Transfer Total Assist X 2  (modA x2, increased assist due to fatigue)   Bed Chair Wheelchair Transfer Description Adaptive equipment;Increased time;Requires lift;Set-up of equipment;Supervision for safety;Verbal cueing   Toilet Transfers Total Assist X 2  (modA x2, increased assist due to fatigue)   Toilet Transfer Description Grab bar;Adaptive equipment;Increased time;Initial preparation for task;Requires lift;Set-up of equipment;Supervision for safety;Verbal cueing   Bed Mobility    Sit to Supine Maximal Assist   Interdisciplinary Plan of Care Collaboration   IDT Collaboration with  Occupational Therapist  (2nd OT assist with session)   Patient Position at End of Therapy In Bed;Call Light within Reach;Tray Table within Reach;Phone within Reach   OT Total Time Spent   OT Individual Total Time Spent (Mins) 60   OT Charge Group   OT Self Care / ADL 1   OT Neuromuscular Re-education / Balance 2   OT Therapy Activity 1     L shoulder taping using Leukotape with cover roll underneath to maintain skin integrity in order to provide proprioceptive input and  "support to the joint to reduce risk of subluxation during transfers and functional mobility. Tape was applied in a circumferential pattern with 3 pieces (one midline, one posterior and one anterior) from the proximal humerus and stretched up to the spine of the scapula. A fourth piece was applied from the acromion to the posterior aspect of the humeral head to promote scapular retraction. A second OT assisted with this process.     Visual scanning activity with pt seated in w/c at whiteboard identifying ABCs in alphabetical order with letters scattered in random order within all 4 visual field quadrants to increase visual attention.     Assessment    Pt tolerated OT session well but was limited by fatigue and had increased R lateral lean when seated on the toilet requiring either RUE support on the grab bar or CGA to maintain midline posture. Pt reported the Leukotape \"felt good\" and visually provided improved positioning of the proximal LUE. Instructed pt to alert staff if the tape or position becomes uncomfortable. Pt would like us to train her  on this method of taping to continue use at home. Pt required increased time and verbal cues to complete visual scanning activity due to poor visual attention; question visual field cut vs inattention. Initially started activity with letters A-Z but reduced to letters A-J due to pt having difficulty locating first letter A with the business of all letters.     Strengths: Able to follow instructions,Effective communication skills,Independent prior level of function,Manages pain appropriately,Motivated for self care and independence,Pleasant and cooperative,Supportive family,Willingly participates in therapeutic activities  Barriers: Bladder incontinence,Decreased endurance,Fatigue,Generalized weakness,Hemiparesis,Impaired activity tolerance,Impaired balance,Limited mobility    Plan    ADL's, neuro re-ed for LUE, visual scanning all four quadrants, self-ROM for LUE, " sitting/standing bal/tolerance, functional transfers, TTB transfer     Passport items to be completed:  Perform bathroom transfers, complete dressing, complete feeding, get ready for the day, prepare a simple meal, participate in household tasks, adapt home for safety needs, demonstrate home exercise program, complete caregiver training     Occupational Therapy Goals (Active)       Problem: Dressing       Dates: Start: 02/19/22         Goal: STG-Within one week, patient will dress LB with total a x 1        Dates: Start: 02/19/22               Problem: Functional Transfers       Dates: Start: 02/19/22         Goal: STG-Within one week, patient will transfer to toilet with max a        Dates: Start: 02/19/22         Goal Note filed on 02/24/22 1148 by Yajaira Faith, OT       Continues to require 2 person transfer - modA x1, Josep x1                  Problem: OT Long Term Goals       Dates: Start: 02/19/22         Goal: LTG-By discharge, patient will complete basic self care tasks with min-mod a        Dates: Start: 02/19/22            Goal: LTG-By discharge, patient will perform bathroom transfers with min a        Dates: Start: 02/19/22               Problem: Toileting       Dates: Start: 02/19/22         Goal: STG-Within one week, patient will complete toileting tasks with total a x 1        Dates: Start: 02/19/22

## 2022-02-25 NOTE — CARE PLAN
"The patient is Stable - Low risk of patient condition declining or worsening    Shift Goals  Clinical Goals: safety  Patient Goals: sleep well    Progress made toward(s) clinical / shift goals:  Resting in bed after hs meds, resp even, unlabored. Using call light for assist as needed.     [place] Fall risk Assessment Score: 18    High fall risk Interventions   - Bed and strip alarm   - Yellow sign by the door   - Yellow wrist band \"Fall risk\"  - Room near to the nurse station  - Do not leave patient unattended in the bathroom  - Fall risk education provided  - Call light within reach   - Yellow  socks   - Belongings within reach   - Bed in the lowest position     Problem: Pain - Standard  Goal: Alleviation of pain or a reduction in pain to the patient’s comfort goal  Outcome: Progressing     Problem: Respiratory  Goal: Patient will understand use and administration of respiratory medications to improve respiratory function  Outcome: Progressing         "

## 2022-02-25 NOTE — THERAPY
Recreational Therapy  Daily Treatment     Patient Name: Melba Frye  AGE:  51 y.o., SEX:  female  Medical Record #: [id number]  Today's Date: 2/25/2022       Subjective    Pt sharing appropriately on frustration on CLOF of LUE.      Objective       02/24/22 1301   Functional Ability Status - Cognitive   Attention Span Remains on Task   Comprehension Follows Three Step Commands   Judgment Able to Make Independent Decisions   Functional Ability Status - Emotional    Affect Appropriate;Bright   Mood Appropriate   Behavior Appropriate   Skilled Intervention    Skilled Intervention Gross Motor Leisure   Skilled Intervention Comments Raised peg puzzle with use of mobile arm support   Interdisciplinary Plan of Care Collaboration   IDT Collaboration with  Family / Caregiver   Patient Position at End of Therapy Seated;Tray Table within Reach;Call Light within Reach   Collaboration Comments SO attended sesion and was with Pt in the room after.   Strengths & Barriers   Strengths Able to follow instructions;Alert and oriented;Motivated for self care and independence;Pleasant and cooperative;Supportive family;Willingly participates in therapeutic activities   Barriers Impaired activity tolerance;Impaired balance;Generalized weakness;Decreased endurance   Treatment Time   Total Time Spent (mins) 30   Procedural Tracking   Procedural Tracking Community Re-Integration;Community Skills Development;Leisure Skills Awareness;Leisure Skills Development;Gross Motor Functional Leisure Skills;Fine Motor Functional Leisure Skills       Assessment    PT was set up on the mobile arm support at tension level 7 to assist in LUE ROM.     Strengths: (P) Able to follow instructions,Alert and oriented,Motivated for self care and independence,Pleasant and cooperative,Supportive family,Willingly participates in therapeutic activities  Barriers: (P) Impaired activity tolerance,Impaired balance,Generalized weakness,Decreased  endurance    Plan    RUE movement

## 2022-02-25 NOTE — CARE PLAN
The patient is Watcher - Medium risk of patient condition declining or worsening    Shift Goals  Clinical Goals: Bowel movement, pain control      Problem: Bowel Elimination  Goal: Patient will participate in bowel management program  Note: Patients last BM: 2/21, suppository was administered, patient had two bowel movement, output was soft and large, incontinent, patient did have some abdominal discomfort after 1st BM, will continue to monitor.      Problem: Pain - Standard  Goal: Alleviation of pain or a reduction in pain to the patient’s comfort goal  Note: Patient has a 4/10 headache, PRN fioricet was administered, will continue to monitor.

## 2022-02-25 NOTE — PROGRESS NOTES
Received patient during shift change, report rec'd from day shift RN. Resting in bed, VS stable on room air. Per report incontinent of B&B, Max assist for transfers. A&O x 2-3, able to make needs known. Bed in low position, call light within reach.

## 2022-02-25 NOTE — TELEPHONE ENCOUNTER
Contacted patient at [phone] to discuss [facility] and services/benefits offered. No answer, left voicemail.    Lindsay Stuart  Rx Coordinator   [phone]

## 2022-02-25 NOTE — PROGRESS NOTES
PSYCHOLOGICAL CONSULTATION:  Reason for admission: GBM (glioblastoma multiforme) (HCC) [C71.9]  Reason for consult: Hx of depression, anxiety, adjustment to illness  Requesting Physician: Roney    Legal status: Legal Status: Voluntary    Chief Complaint: Decreased mobility, hx of depression and anxiety    HPI: Melba Frye is a 51 y.o. female with a past medical history of right posterior fronto-parietal glioblastoma multiforme s/p right cranioplasty for resection (6/2021, biopsy 3/2021) at [facility], with seizures, w/ secondary L sided weakness w/ tone, radiation and chemotherapy with temozolomide (last dose 11/2021), focal seizures (on Vimpat and Briviact), migraine, hyperlipidemia, PE with cor pulmonale (on Eliquis), anxiety, and depression  ;  who presented on 2/9/2022  1:53 PM after multiple ground level falls likely due to a combination of hypoxia, seizure activity, and vasogenic edema of the brain. Per documentation, on Wednesday 2/9/22 she was walking back from the bathroom with her caregiver when she reported feeling tired, dizzy, and needed to sit. Her caregiver turned to get a chair at which time the patient fell forward to the ground without catching herself. EMS was notified and upon arrival noted the patient was hypoxic with SpO2 85% on RA. She had a period of about 15 minutes of altered awareness/consciousness, and does not recall the events.    Psychology was consulted due to pt hx of depression and anxiety as well as helping with adjustment to diagnosis as well as stress management and caregiver stress for spouse.     Risk Assessment: current symptoms as reported by pt.  Suicidal Ideation: Denies    Homicidal Ideation: Denies      Psychiatric Review of Systems:current symptoms as reported by pt.  Depression: Endorses   Sena: Denies   Anxiety/Panic Attacks: Endorses   PTSD symptom: Denies   Psychosis: Denies   Other:        Medical Review of Systems: as reported by pt. All systems reviewed.  Only those found to be + are noted below. All others are negative.   Neurological:    TBIs: Endorses   SZs:Endorses   Strokes:Denies   Other:   Other medical symptoms:   Thyroid:Denies   Diabetes: Denies   Cardiovascular disease: Endorses    Past Psychiatric Hx: Depression and anxiety    Family Psychiatric Hx: None reported    Social Hx:   Social History     Socioeconomic History   • Marital status:    Tobacco Use   • Smoking status: Never Smoker   • Smokeless tobacco: Never Used   Vaping Use   • Vaping Use: Never used   Substance and Sexual Activity   • Alcohol use: Not Currently     Alcohol/week: 0.0 oz   • Drug use: No   • Sexual activity: Yes     Partners: Male        Medical Hx: Chart reviewed. Only those findings of potential interest to psychiatry are noted below:  Past Medical History:   Diagnosis Date   • Acute pulmonary embolism with acute cor pulmonale (HCC) 10/21/2021   • Anxiety    • Cancer (HCC)     brain diagnosed in October 2020    • Complicated migraine 6/9/2014   • Depression    • Dermatitis, contact 11/16/2015   • Fatigue 6/9/2014   • Hyperlipidemia 1/23/2015   • Lentigo 10/17/2018   • Menopausal symptom 7/2/2014   • Mixed anxiety and depressive disorder 6/9/2014   • Pulmonary embolism (HCC)    • Seborrheic keratoses 10/17/2018   • TMJ arthralgia 6/9/2014   • UTI (lower urinary tract infection)      Medical Conditions:     Allergies: Tape  Medications (currently prescribed at [facility]):    Current Facility-Administered Medications:   •  [START ON 2/25/2022] lisinopril (PRINIVIL) tablet 5 mg, 5 mg, Oral, DAILY, Lucrecia Sim M.D.  •  diphenhydrAMINE (BENADRYL) tablet/capsule 25 mg, 25 mg, Oral, Q6HRS PRN, Lucrecia Sim M.D., 25 mg at 02/24/22 0821  •  tizanidine (ZANAFLEX) tablet 4 mg, 4 mg, Oral, BID, Lucrecia Sim M.D., 4 mg at 02/24/22 0821  •  butalbital/apap/caffeine -40 mg (Fioricet) per tablet 1 Tablet, 1 Tablet, Oral, Q4HRS PRN, Lucrecia Smith  LAYA Sim, 1 Tablet at 02/22/22 1625  •  senna-docusate (PERICOLACE or SENOKOT S) 8.6-50 MG per tablet 2 Tablet, 2 Tablet, Oral, BID PRN **AND** polyethylene glycol/lytes (MIRALAX) PACKET 1 Packet, 1 Packet, Oral, QDAY PRN, 1 Packet at 02/24/22 0409 **AND** magnesium hydroxide (MILK OF MAGNESIA) suspension 30 mL, 30 mL, Oral, QDAY PRN **AND** bisacodyl (DULCOLAX) suppository 10 mg, 10 mg, Rectal, QDAY PRN, Lucrecia Sim M.D.  •  vitamin D3 (cholecalciferol) tablet 1,000 Units, 1,000 Units, Oral, DAILY, Susi Fulton M.D., 1,000 Units at 02/24/22 0821  •  Respiratory Therapy Consult, , Nebulization, Continuous RT, Lucrecia Sim M.D.  •  hydrALAZINE (APRESOLINE) tablet 25 mg, 25 mg, Oral, Q8HRS PRN, Lucrecia Sim M.D.  •  acetaminophen (Tylenol) tablet 650 mg, 650 mg, Oral, Q4HRS PRN, Lucrecia Sim M.D.  •  omeprazole (PRILOSEC) capsule 20 mg, 20 mg, Oral, DAILY, Lucrecia Sim M.D., 20 mg at 02/24/22 0821  •  artificial tears ophthalmic solution 1 Drop, 1 Drop, Both Eyes, PRN, Lucrecia Sim M.D.  •  benzocaine-menthol (CEPACOL) lozenge 1 Lozenge, 1 Lozenge, Mouth/Throat, Q2HRS PRN, Lucrecia Sim M.D.  •  mag hydrox-al hydrox-simeth (MAALOX PLUS ES or MYLANTA DS) suspension 20 mL, 20 mL, Oral, Q2HRS PRN, Lucrecia Sim M.D.  •  ondansetron (ZOFRAN ODT) dispertab 4 mg, 4 mg, Oral, 4X/DAY PRN **OR** ondansetron (ZOFRAN) syringe/vial injection 4 mg, 4 mg, Intramuscular, 4X/DAY PRN, Lucrecia Sim M.D.  •  traZODone (DESYREL) tablet 50 mg, 50 mg, Oral, QHS PRN, Lucrecia Sim M.D., 50 mg at 02/23/22 1938  •  sodium chloride (OCEAN) 0.65 % nasal spray 2 Spray, 2 Spray, Nasal, PRN, Lucrecia Sim M.D.  •  oxyCODONE immediate-release (ROXICODONE) tablet 5 mg, 5 mg, Oral, Q3HRS PRN, 5 mg at 02/22/22 0135 **OR** oxyCODONE immediate release (ROXICODONE) tablet 10 mg, 10 mg, Oral, Q3HRS PRN, Lucrecia  "Luis Sim M.D.  •  midazolam (VERSED) 5 mg/mL (1 mL vial), 5 mg, Nasal, PRN, Lucrecia Sim M.D.  •  acetaminophen (TYLENOL) tablet 1,000 mg, 1,000 mg, Oral, Q6HRS PRN, Lucrecia Sim M.D., 1,000 mg at 02/23/22 1616  •  ALPRAZolam (XANAX) tablet 0.5 mg, 0.5 mg, Oral, BID PRN, Lucrecia Sim M.D., 0.5 mg at 02/23/22 1938  •  amLODIPine (NORVASC) tablet 10 mg, 10 mg, Oral, DAILY, Lucrecia Sim M.D., 10 mg at 02/24/22 0821  •  apixaban (ELIQUIS) tablet 5 mg, 5 mg, Oral, BID, Lucrecia Sim M.D., 5 mg at 02/24/22 0821  •  brivaracetam (Briviact) tablet 100 mg, 100 mg, Oral, BID, Lucrecia Sim M.D., 100 mg at 02/24/22 0821  •  hydrocortisone 1 % cream, , Topical, BID PRN, Lucrecia Sim M.D., Given at 02/22/22 1625  •  lacosamide (VIMPAT) tablet 200 mg, 200 mg, Oral, BID, Lucrecia Sim M.D., 200 mg at 02/24/22 0821  •  melatonin tablet 3 mg, 3 mg, Oral, QHS, Lucrecia Sim M.D., 3 mg at 02/23/22 1938  •  venlafaxine (EFFEXOR) tablet 75 mg, 75 mg, Oral, DAILY, Lucrecia Sim M.D., 75 mg at 02/24/22 0821  Labs:  No results found for this or any previous visit (from the past 24 hour(s)).       Cranial Imaging Hx: MRI performed 2/10/2022 IMPRESSION:        Postoperative changes are noted in the medial right posterior frontal parietal region with rim enhancement of the postoperative cavity. Linear thick  enhancement along the medial margin of the operative cavity abutting the falx cerebri is noted.   Comparison with previous films would be helpful to determine if there has been any interval change.     Extensive T2 signal abnormality involving the bilateral cerebral hemispheric white matter is noted, most likely related to posttreatment changes.  Other:    Psychiatric Examination:  Vitals: Blood pressure 118/95, pulse 85, temperature 36.2 °C (97.2 °F), resp. rate 18, height 1.702 m (5' 7\"), weight 102 kg (224 lb 13.9 " "oz), SpO2 95 %, not currently breastfeeding.  Musculoskeletal: Given dx, normal psychomotor activity, no tics or unusual mannerisms noted  Appearance and Eye Contact: Easily established rapport WDWN, appropriate dress and grooming. Behavior is calm, cooperative,  appropriate eye contact  Attention/Alertness: Alert  Thought Process: Linear, Logical and Goal Directed    Thought Content: No psychotic processes noted  Speech: Clear with normal rate and rhythm  Mood: \"okay\"            Affect: somewhat dysphoric         SI/HI: Denies    Memory: Recent and remote memory appear intact     Orientation: alert, oriented to person, place and time  Insight into symptoms: good  Judgement into symptoms:good    Neuropsychological Testing:   Not formally tested.          ASSESSMENT: Melba Frye is a 51 y.o. female with a past medical history of right posterior fronto-parietal glioblastoma multiforme s/p right cranioplasty for resection (6/2021, biopsy 3/2021) at [facility], with seizures, w/ secondary L sided weakness w/ tone, radiation and chemotherapy with temozolomide (last dose 11/2021), focal seizures (on Vimpat and Briviact), migraine, hyperlipidemia, PE with cor pulmonale (on Eliquis), anxiety, and depression  ;  who presented on 2/9/2022  1:53 PM after multiple ground level falls likely due to a combination of hypoxia, seizure activity, and vasogenic edema of the brain. Per documentation, on Wednesday 2/9/22 she was walking back from the bathroom with her caregiver when she reported feeling tired, dizzy, and needed to sit. Her caregiver turned to get a chair at which time the patient fell forward to the ground without catching herself. EMS was notified and upon arrival noted the patient was hypoxic with SpO2 85% on RA. She had a period of about 15 minutes of altered awareness/consciousness, and does not recall the events.    Psychology was consulted due to pt hx of depression and anxiety as well as helping with adjustment " to diagnosis as well as stress management and caregiver stress for spouse.     Pt was somewhat tearful when discussing course of treatment and diagnosis. Pt would benefit from EBT such as ACT to support depressive sxs and anxiety associated with managing cancer.     DSM5 Diagnostic Considerations: Adj d/o with mixed anxiety and depressed mood      PLAN:  Records reviewed: Yes  Discussed patient with other provider: Roney  Will continue to follow  Thank you for the consult.    Brandy Hines, Ph.D. PhD

## 2022-02-25 NOTE — THERAPY
Recreational Therapy  Daily Treatment     Patient Name: Melba Frye  AGE:  51 y.o., SEX:  female  Medical Record #: [id number]  Today's Date: 2/25/2022       Subjective    Pt initially reporting that she feels that she could transfer independently to the toilet. Pt was able to speak with this writer on her current barriers to be independent and why this is currently an unsafe task.      Objective       02/25/22 1101   Functional Ability Status - Cognitive   Attention Span Remains on Task   Comprehension Follows Two Step Commands   Judgment Able to Make Independent Decisions   Functional Ability Status - Emotional    Affect Appropriate;Bright   Mood Appropriate   Behavior Appropriate   Skilled Intervention    Skilled Intervention Relaxation / Coping Skills   Skilled Intervention Comments Left neglect visual   Interdisciplinary Plan of Care Collaboration   IDT Collaboration with  Certified Nursing Assistant   Patient Position at End of Therapy In Bed;Tray Table within Reach;Call Light within Reach   Collaboration Comments inflrmed of restrrom need and requiring two person transfer.   Strengths & Barriers   Strengths Able to follow instructions;Alert and oriented;Motivated for self care and independence;Supportive family;Willingly participates in therapeutic activities;Pleasant and cooperative   Barriers Impaired insight/denial of deficits;Impaired functional cognition;Impaired carryover of learning;Impaired activity tolerance;Impaired balance;Decreased endurance   Treatment Time   Total Time Spent (mins) 30   Procedural Tracking   Procedural Tracking Community Re-Integration;Community Skills Development;Leisure Skills Awareness;Leisure Skills Development;Cognitive Skills Training       Assessment    Pt was looking for her phone upon the start of the session. Pt was given her wall phone and was instructed on how to make an outside phone call. Pt was able to do so and her phone was found in her sheets on her left  side. During the session objects were placed on her left side for her to find during the group time.     Strengths: (P) Able to follow instructions,Alert and oriented,Motivated for self care and independence,Supportive family,Willingly participates in therapeutic activities,Pleasant and cooperative  Barriers: (P) Impaired insight/denial of deficits,Impaired functional cognition,Impaired carryover of learning,Impaired activity tolerance,Impaired balance,Decreased endurance    Plan    Cognitive leisure

## 2022-02-26 LAB
ERYTHROCYTE [DISTWIDTH] IN BLOOD BY AUTOMATED COUNT: 54.4 FL (ref 35.9–50)
HCT VFR BLD AUTO: 35.7 % (ref 37–47)
HGB BLD-MCNC: 11.7 G/DL (ref 12–16)
MCH RBC QN AUTO: 34.6 PG (ref 27–33)
MCHC RBC AUTO-ENTMCNC: 32.8 G/DL (ref 33.6–35)
MCV RBC AUTO: 105.6 FL (ref 81.4–97.8)
PLATELET # BLD AUTO: 235 K/UL (ref 164–446)
PMV BLD AUTO: 9.8 FL (ref 9–12.9)
RBC # BLD AUTO: 3.38 M/UL (ref 4.2–5.4)
WBC # BLD AUTO: 5.8 K/UL (ref 4.8–10.8)

## 2022-02-26 PROCEDURE — A9270 NON-COVERED ITEM OR SERVICE: HCPCS | Performed by: PHYSICAL MEDICINE & REHABILITATION

## 2022-02-26 PROCEDURE — 36415 COLL VENOUS BLD VENIPUNCTURE: CPT

## 2022-02-26 PROCEDURE — 85027 COMPLETE CBC AUTOMATED: CPT

## 2022-02-26 PROCEDURE — 97130 THER IVNTJ EA ADDL 15 MIN: CPT

## 2022-02-26 PROCEDURE — 770010 HCHG ROOM/CARE - REHAB SEMI PRIVAT*

## 2022-02-26 PROCEDURE — 99232 SBSQ HOSP IP/OBS MODERATE 35: CPT | Performed by: PHYSICAL MEDICINE & REHABILITATION

## 2022-02-26 PROCEDURE — 97129 THER IVNTJ 1ST 15 MIN: CPT

## 2022-02-26 PROCEDURE — 700102 HCHG RX REV CODE 250 W/ 637 OVERRIDE(OP): Performed by: PHYSICAL MEDICINE & REHABILITATION

## 2022-02-26 RX ORDER — ALPRAZOLAM 0.5 MG/1
0.5 TABLET ORAL EVERY EVENING
Status: DISCONTINUED | OUTPATIENT
Start: 2022-02-26 | End: 2022-03-11 | Stop reason: HOSPADM

## 2022-02-26 RX ORDER — ALPRAZOLAM 0.5 MG/1
0.5 TABLET ORAL
Status: DISCONTINUED | OUTPATIENT
Start: 2022-02-26 | End: 2022-03-11 | Stop reason: HOSPADM

## 2022-02-26 RX ADMIN — Medication 1000 UNITS: at 07:54

## 2022-02-26 RX ADMIN — ACETAMINOPHEN 1000 MG: 500 TABLET, FILM COATED ORAL at 07:53

## 2022-02-26 RX ADMIN — LACOSAMIDE 200 MG: 100 TABLET, FILM COATED ORAL at 07:54

## 2022-02-26 RX ADMIN — TIZANIDINE 4 MG: 4 TABLET ORAL at 07:54

## 2022-02-26 RX ADMIN — LACOSAMIDE 200 MG: 100 TABLET, FILM COATED ORAL at 20:19

## 2022-02-26 RX ADMIN — BRIVARACETAM 100 MG: 50 TABLET, FILM COATED ORAL at 07:54

## 2022-02-26 RX ADMIN — APIXABAN 5 MG: 5 TABLET, FILM COATED ORAL at 07:54

## 2022-02-26 RX ADMIN — OMEPRAZOLE 20 MG: 20 CAPSULE, DELAYED RELEASE ORAL at 07:54

## 2022-02-26 RX ADMIN — ALPRAZOLAM 0.5 MG: 0.5 TABLET ORAL at 16:54

## 2022-02-26 RX ADMIN — TRAZODONE HYDROCHLORIDE 50 MG: 50 TABLET ORAL at 20:22

## 2022-02-26 RX ADMIN — LISINOPRIL 5 MG: 5 TABLET ORAL at 07:54

## 2022-02-26 RX ADMIN — VENLAFAXINE HYDROCHLORIDE 75 MG: 37.5 TABLET ORAL at 07:54

## 2022-02-26 RX ADMIN — TIZANIDINE 4 MG: 4 TABLET ORAL at 20:19

## 2022-02-26 RX ADMIN — MELATONIN TAB 3 MG 3 MG: 3 TAB at 20:19

## 2022-02-26 RX ADMIN — APIXABAN 5 MG: 5 TABLET, FILM COATED ORAL at 20:19

## 2022-02-26 RX ADMIN — BRIVARACETAM 100 MG: 50 TABLET, FILM COATED ORAL at 20:18

## 2022-02-26 RX ADMIN — AMLODIPINE BESYLATE 10 MG: 5 TABLET ORAL at 07:54

## 2022-02-26 ASSESSMENT — PAIN DESCRIPTION - PAIN TYPE: TYPE: ACUTE PAIN

## 2022-02-26 NOTE — CARE PLAN
"  Problem: Skin Integrity  Goal: Skin integrity is maintained or improved  Note: Patient refused shower, redirection and offered bed bath with no good effect, she said that she is tired and exhausted from therapy, denies feeling anxiousness. Patient requesting to have shower in AM after breakfast. CN notified. Will monitor.     Problem: Fall Risk - Rehab  Goal: Patient will remain free from falls  Note: [place] Fall risk Assessment Score: 19    High fall risk Interventions   - Alarming seatbelt  - Wander guard  - Bed and strip alarm   - Yellow sign by the door   - Yellow wrist band \"Fall risk\"  - Room near to the nurse station  - Do not leave patient unattended in the bathroom  - Fall risk education provided           The patient is Stable - Low risk of patient condition declining or worsening    Shift Goals  Clinical Goals: Bowel movement, pain control  Patient Goals: sleep well        "

## 2022-02-26 NOTE — THERAPY
"Speech Language Pathology  Daily Treatment     Patient Name: Melba Frye  Age:  51 y.o., Sex:  female  Medical Record #: [id number]  Today's Date: 2/26/2022     Precautions  Precautions: (P) Fall Risk  Comments: (P) L alma delia    Subjective    Pt seen with her  present. Seen in therapy office. She was cooperative throughout, however benefits from verbal encouragement and reiteration of cause for perceived difficulty with tasks.      Objective       02/26/22 1402   Precautions   Precautions Fall Risk   Comments L alma delia   Interdisciplinary Plan of Care Collaboration   IDT Collaboration with  Family / Caregiver   Patient Position at End of Therapy Seated;Family / Friend in Room   Collaboration Comments  present throughout and very supportive   Speech Language Pathologist Assigned   Assigned SLP / Extension LC 60 cog (SPLIT OK)   Strengths & Barriers   Strengths Able to follow instructions;Supportive family;Pleasant and cooperative;Willingly participates in therapeutic activities   Barriers Impaired carryover of learning;Impaired functional cognition;Impaired insight/denial of deficits  (anxiety)   SLP Total Time Spent   SLP Individual Total Time Spent (Mins) 60   Treatment Charges   SLP Cognitive Skill Development First 15 Minutes 1   SLP Cognitive Skill Development Additional 15 Minutes 3       Assessment    Pt completed 2 component symbol cancellation task. SLP provided visual marker (yellow highlighted line) on left side of page and educated on strategy of \"look for the line\". She required max cues to attend to left side of page despite this accomodation. Of note, perseveration on thickness of line being the perceived brandyn. Pt required max support to complete task with verbal cues to recall 2 letters she was looking for and on rec's for organizational strategies to complete (from left to right, orally say each letter). SLP educated on nature of neuro injury and role of these tasks in assisting with " "working memory and attention. SLP educated on importance of stress/anxiety management. SLP rec'd to stop and take a deep breath/implement positive self talk through tasks. SLP provided pt with \"hw\" of simple addition page, idea of putting pictures on left wall of her room, cont'd reminders from SO to attend to left side to locate items in her room.     Strengths: (P) Able to follow instructions,Supportive family,Pleasant and cooperative,Willingly participates in therapeutic activities  Barriers: (P) Impaired carryover of learning,Impaired functional cognition,Impaired insight/denial of deficits (anxiety)    Plan    F/u on \"hw\"; continue to encourage use of memory log; continue simple math/attention/left neglect/insight building    Passport items to be completed:  [unfilled]    Speech Therapy Problems (Active)       Problem: Memory STGs       Dates: Start: 02/19/22         Goal: STG-Within one week, patient will recall daily events and safety strategies with 80% accuracy, given min verbal cues and use of memory book.        Dates: Start: 02/19/22               Problem: Problem Solving STGs       Dates: Start: 02/19/22         Goal: STG-Within one week, patient will complete basic math with 80% accuracy given min verbal cues.        Dates: Start: 02/19/22            Goal: STG-Within one week, patient will complete simple visual scanning tasks to address left neglect with 70% accuracy provided MIN cues.        Dates: Start: 02/24/22               Problem: Speech/Swallowing LTGs       Dates: Start: 02/19/22         Goal: LTG-By discharge, patient will solve complex problem Neeraj for safe discharge home.       Dates: Start: 02/19/22               "

## 2022-02-26 NOTE — CARE PLAN
"The patient is Watcher - Medium risk of patient condition declining or worsening    Shift Goals  Clinical Goals: Pain control, comfort        Problem: Bladder / Voiding  Goal: Patient will establish and maintain regular urinary output  Note: Patient is continent/ incontinent of void, output is clear and yellow, will continue to monitor.      Problem: Fall Risk - Rehab  Goal: Patient will remain free from falls  Note: [place] Fall risk Assessment Score: 19    High fall risk Interventions   - Bed and strip alarm   - Yellow sign by the door   - Yellow wrist band \"Fall risk\"  - Do not leave patient unattended in the bathroom  - Fall risk education provided.    Patients  is at bedside and aiding nursing staff w/ transfers, gait belt in use for transfers.       Problem: Skin Integrity  Goal: Patient's skin integrity will be maintained or improve  Note: Patient's skin remains intact and free from new or accidental injury this shift.  Will continue to monitor.          "

## 2022-02-26 NOTE — PROGRESS NOTES
"Rehab Progress Note     Encounter Date: 2/26/2022    CC: medication questions    Interval Events (Subjective)  VSS  BM 2/25 incontinent  Bladder incontinent       Patient sleeping but  is at bedside and does have some questions regarding her anxiety.  Patient has seemed more anxious lately.  This specifically occurs in the evenings when she is alone.  She does well during the day when she is active with therapy.  Has been wondering if there is something we can do in order to reduce her anxiety when she is alone at the end of the day.    Full review of systems unable to be obtained due to patient sleeping.     Objective:  VITAL SIGNS: /69   Pulse 97   Temp 36.6 °C (97.8 °F) (Temporal)   Resp 18   Ht 1.702 m (5' 7\")   Wt 102 kg (224 lb 13.9 oz)   SpO2 94%   BMI 35.22 kg/m²     GEN: No apparent distress, sleeping soundly..   HEENT: Head normocephalic, atraumatic.  CV: Extremities warm and well-perfused, no peripheral edema appreciated bilaterally.  PULMONARY: Breathing nonlabored on room air, no respiratory accessory muscle use.  Not requiring supplemental oxygen.  SKIN: No appreciable skin breakdown on exposed areas of skin.  NEURO: Sleeping soundly.       Recent Results (from the past 72 hour(s))   CBC WITHOUT DIFFERENTIAL    Collection Time: 02/26/22  5:17 AM   Result Value Ref Range    WBC 5.8 4.8 - 10.8 K/uL    RBC 3.38 (L) 4.20 - 5.40 M/uL    Hemoglobin 11.7 (L) 12.0 - 16.0 g/dL    Hematocrit 35.7 (L) 37.0 - 47.0 %    .6 (H) 81.4 - 97.8 fL    MCH 34.6 (H) 27.0 - 33.0 pg    MCHC 32.8 (L) 33.6 - 35.0 g/dL    RDW 54.4 (H) 35.9 - 50.0 fL    Platelet Count 235 164 - 446 K/uL    MPV 9.8 9.0 - 12.9 fL       Current Facility-Administered Medications   Medication Frequency   • lisinopril (PRINIVIL) tablet 5 mg DAILY   • diphenhydrAMINE (BENADRYL) tablet/capsule 25 mg Q6HRS PRN   • tizanidine (ZANAFLEX) tablet 4 mg BID   • butalbital/apap/caffeine -40 mg (Fioricet) per tablet 1 Tablet Q4HRS " PRN   • senna-docusate (PERICOLACE or SENOKOT S) 8.6-50 MG per tablet 2 Tablet BID PRN    And   • polyethylene glycol/lytes (MIRALAX) PACKET 1 Packet QDAY PRN    And   • magnesium hydroxide (MILK OF MAGNESIA) suspension 30 mL QDAY PRN    And   • bisacodyl (DULCOLAX) suppository 10 mg QDAY PRN   • vitamin D3 (cholecalciferol) tablet 1,000 Units DAILY   • Respiratory Therapy Consult Continuous RT   • hydrALAZINE (APRESOLINE) tablet 25 mg Q8HRS PRN   • acetaminophen (Tylenol) tablet 650 mg Q4HRS PRN   • omeprazole (PRILOSEC) capsule 20 mg DAILY   • artificial tears ophthalmic solution 1 Drop PRN   • benzocaine-menthol (CEPACOL) lozenge 1 Lozenge Q2HRS PRN   • mag hydrox-al hydrox-simeth (MAALOX PLUS ES or MYLANTA DS) suspension 20 mL Q2HRS PRN   • ondansetron (ZOFRAN ODT) dispertab 4 mg 4X/DAY PRN    Or   • ondansetron (ZOFRAN) syringe/vial injection 4 mg 4X/DAY PRN   • traZODone (DESYREL) tablet 50 mg QHS PRN   • sodium chloride (OCEAN) 0.65 % nasal spray 2 Spray PRN   • oxyCODONE immediate-release (ROXICODONE) tablet 5 mg Q3HRS PRN    Or   • oxyCODONE immediate release (ROXICODONE) tablet 10 mg Q3HRS PRN   • midazolam (VERSED) 5 mg/mL (1 mL vial) PRN   • acetaminophen (TYLENOL) tablet 1,000 mg Q6HRS PRN   • ALPRAZolam (XANAX) tablet 0.5 mg BID PRN   • amLODIPine (NORVASC) tablet 10 mg DAILY   • apixaban (ELIQUIS) tablet 5 mg BID   • brivaracetam (Briviact) tablet 100 mg BID   • hydrocortisone 1 % cream BID PRN   • lacosamide (VIMPAT) tablet 200 mg BID   • melatonin tablet 3 mg QHS   • venlafaxine (EFFEXOR) tablet 75 mg DAILY       Orders Placed This Encounter   Procedures   • Diet Order Diet: Regular     Standing Status:   Standing     Number of Occurrences:   1     Order Specific Question:   Diet:     Answer:   Regular [1]       Assessment:  Active Hospital Problems    Diagnosis    • *Glioblastoma of frontal lobe (HCC)    • GBM (glioblastoma multiforme) (HCC)    • Constipation    • Syncope    • Class 1 obesity due  to excess calories with serious comorbidity and body mass index (BMI) of 34.0 to 34.9 in adult    • Gait instability    • History of pulmonary embolus (PE)    • Seizure disorder (HCC)    • Primary hypertension    • Mixed anxiety and depressive disorder        Medical Decision Making and Plan: Adapted from note dated 2/25  Seizure/GBM - Patient with syncopal event with AMS and weakness after concerning for seizure vs worsening GBM vs post-treatment changes. Oncology was consulted and recommended steroids then taper which she has completed. Neurology was consulted and recommended MRI which showed diffuse posttreatment changes but no acute lesion. EEG showed non-specific changes and Neurology recommended increasing her Vimpat as her symptoms could have been post-ictal. Cardiac work-up negative.    -Continue comprehensive acute inpatient rehabilitation  -PT and OT for mobility and ADLs  -SLP for cognitive screen  -Continue Briviact 100 mg BID and Vimpat 200 mg BID     Spasticity - Followed by Dr. Steele, [facility], for Botox injection. Discussed with Dr. Steele. Will trial Tizanidine 4 mg BID   -Improved on Tizanidine.Will monitor      HTN - Patient on Amlodipine 10 mg daily. Into 150s, start Lisinopril 5 mg     Anemia - Check AM CBC - 14.3, resolved.      Hypokalemia - Check AM CMP - 4.0, resolved.     Anxiety/Depression - Patient on PRN Xanax and Effexor 75 mg daily  2/26: Schedule Xanax 0.5 mg at 5 PM     Morbid Obesity due to excess calories - BMI of 37.1 on admission. Dietitian to consult.      Back rash - Appears contact. Start Hydrocortisone cream. PO Benadryl     Constipation - Resolved, discontinue senna-docusate.      Vitamin D deficiency - 29 on admission. Started on 1000 U      Hx of PE - Patient on Eliquis BID.    Total time:  28 minutes.  I spent greater than 50% of the time for patient care and coordination on this date, including unit/floor time, and face-to-face time with the patient as per  assessment and plan above.    Yanet Steele D.O.

## 2022-02-26 NOTE — THERAPY
Physical Therapy   Daily Treatment     Patient Name: Melba Frye  Age:  51 y.o., Sex:  female  Medical Record #: [id number]  Today's Date: 2/25/2022     Precautions  Precautions: Fall Risk  Comments: L alma delia, AFO    Subjective    Pt resting in bed, willing to participate     Objective       02/25/22 1501   Gait Functional Level of Assist    Gait Level Of Assist Moderate Assist  (2 person A for safety/ wc follow)   Assistive Device Other (Comments)  (R rena rail/ L AFO/ foot slider and R shoe lift)   Distance (Feet) 25  (in addition to 12.5 ft x 2)   # of Times Distance was Traveled 2   Deviation Step To;Decreased Heel Strike;Decreased Toe Off  (spastic L alma delia-gait)   Wheelchair Functional Level of Assist   Wheelchair Assist Minimal Assist   Distance Wheelchair (Feet or Distance) 75   Wheelchair Description Assistance with steering;Extra time;Requires incidental assist;Verbal cueing   Transfer Functional Level of Assist   Bed, Chair, Wheelchair Transfer Maximal Assist  (2 person for safety)   Bed Chair Wheelchair Transfer Description Adaptive equipment;Assist with two limbs;Increased time;Requires lift;Set-up of equipment;Verbal cueing   Toilet Transfers Maximal Assist  (2 person for safety)   Toilet Transfer Description Adaptive equipment;Grab bar;Increased time;Requires lift   Bed Mobility    Supine to Sit Maximal Assist   Sit to Supine Maximal Assist   Sit to Stand Maximal Assist   Scooting Maximal Assist   Rolling Maximal Assist to Rt.;Maximum Assist to Lt.   Neuro-Muscular Treatments   Neuro-Muscular Treatments Anterior weight shift;Postural Facilitation;Sequencing;Tactile Cuing;Verbal Cuing   Comments seated edge of mat with UE's at tumble forms therapy ball for anterior wt shifting 3 x 10. Standing activity for readhing/ atnerior wt shifting for cones 3 x 10. Min A for balance/ anterior wt shifting throughout activity.   PT Total Time Spent   PT Individual Total Time Spent (Mins) 60   PT Charge Group   PT  Gait Training 1   PT Neuromuscular Re-Education / Balance 2   PT Therapeutic Activities 1     Pt completed bed<>wc/ wc<>toilet and wc<>therapy mat transfers with bed rail/ grab bar/ QC and mod/max A with second person CGA for safety.    Assessment    Pt demonstrated heavy posterior lean with gait training today, more pronounced with fatigue. Pt continues to struggle with sit<>stand transition and varies with functioanl performance from mod/total A. Pt with B ankle clon post PT due to fatigue.     Strengths: Able to follow instructions,Willingly participates in therapeutic activities,Supportive family,Pleasant and cooperative,Motivated for self care and independence,Effective communication skills  Barriers: Decreased endurance,Emotional lability,Fatigue,Generalized weakness,Hemiplegia,Impaired activity tolerance,Impaired balance,Impaired functional cognition,Limited mobility,Spasticity    Plan    Sit<>stand sequencing/ transfer training, pre-gait/ gait training as able, ROM/ stretching/ spasticity management.     Passport items to be completed:  Get in/out of bed safely, in/out of a vehicle, safely use mobility device, walk or wheel around home/community, navigate up and down stairs, show how to get up/down from the ground, ensure home is accessible, demonstrate HEP, complete caregiver training    Physical Therapy Problems (Active)       Problem: Mobility       Dates: Start: 02/19/22         Goal: STG-Within one week, patient will ambulate 10ft with LBQC and min A.       Dates: Start: 02/19/22            Goal: STG-Within one week, patient will ascend and descend four stairs with mod A using R handrail.       Dates: Start: 02/19/22               Problem: Mobility Transfers       Dates: Start: 02/19/22         Goal: STG-Within one week, patient will perform bed mobility with min A using hospital bed functions.       Dates: Start: 02/19/22            Goal: STG-Within one week, patient will sit to stand with min A from  wheelchair.       Dates: Start: 02/19/22            Goal: STG-Within one week, patient will transfer bed to chair with min A.       Dates: Start: 02/19/22               Problem: PT-Long Term Goals       Dates: Start: 02/19/22         Goal: LTG-By discharge, patient will propel wheelchair 150ft with power versus manual chair and supervision.       Dates: Start: 02/19/22            Goal: LTG-By discharge, patient will ambulate 20ft with LBQC and CGA.       Dates: Start: 02/19/22            Goal: LTG-By discharge, patient will transfer one surface to another with CGA.       Dates: Start: 02/19/22            Goal: LTG-By discharge, patient will ambulate up/down flight of stairs with min A using R handrail.       Dates: Start: 02/19/22            Goal: LTG-By discharge, patient will transfer in/out of a car with min A.       Dates: Start: 02/19/22

## 2022-02-27 PROCEDURE — 770010 HCHG ROOM/CARE - REHAB SEMI PRIVAT*

## 2022-02-27 PROCEDURE — A9270 NON-COVERED ITEM OR SERVICE: HCPCS | Performed by: PHYSICAL MEDICINE & REHABILITATION

## 2022-02-27 PROCEDURE — 700102 HCHG RX REV CODE 250 W/ 637 OVERRIDE(OP): Performed by: PHYSICAL MEDICINE & REHABILITATION

## 2022-02-27 PROCEDURE — 99232 SBSQ HOSP IP/OBS MODERATE 35: CPT | Performed by: PHYSICAL MEDICINE & REHABILITATION

## 2022-02-27 RX ADMIN — Medication 1000 UNITS: at 08:16

## 2022-02-27 RX ADMIN — APIXABAN 5 MG: 5 TABLET, FILM COATED ORAL at 08:16

## 2022-02-27 RX ADMIN — BUTALBITAL, ACETAMINOPHEN, AND CAFFEINE 1 TABLET: 50; 325; 40 TABLET ORAL at 10:08

## 2022-02-27 RX ADMIN — AMLODIPINE BESYLATE 10 MG: 5 TABLET ORAL at 08:16

## 2022-02-27 RX ADMIN — TIZANIDINE 4 MG: 4 TABLET ORAL at 19:59

## 2022-02-27 RX ADMIN — APIXABAN 5 MG: 5 TABLET, FILM COATED ORAL at 19:59

## 2022-02-27 RX ADMIN — VENLAFAXINE HYDROCHLORIDE 75 MG: 37.5 TABLET ORAL at 08:16

## 2022-02-27 RX ADMIN — ALPRAZOLAM 0.5 MG: 0.5 TABLET ORAL at 16:30

## 2022-02-27 RX ADMIN — MELATONIN TAB 3 MG 3 MG: 3 TAB at 19:59

## 2022-02-27 RX ADMIN — OMEPRAZOLE 20 MG: 20 CAPSULE, DELAYED RELEASE ORAL at 08:16

## 2022-02-27 RX ADMIN — LACOSAMIDE 200 MG: 100 TABLET, FILM COATED ORAL at 19:58

## 2022-02-27 RX ADMIN — BUTALBITAL, ACETAMINOPHEN, AND CAFFEINE 1 TABLET: 50; 325; 40 TABLET ORAL at 16:30

## 2022-02-27 RX ADMIN — BUTALBITAL, ACETAMINOPHEN, AND CAFFEINE 1 TABLET: 50; 325; 40 TABLET ORAL at 20:30

## 2022-02-27 RX ADMIN — BRIVARACETAM 100 MG: 50 TABLET, FILM COATED ORAL at 19:58

## 2022-02-27 RX ADMIN — TIZANIDINE 4 MG: 4 TABLET ORAL at 08:16

## 2022-02-27 RX ADMIN — BRIVARACETAM 100 MG: 50 TABLET, FILM COATED ORAL at 08:16

## 2022-02-27 RX ADMIN — BUTALBITAL, ACETAMINOPHEN, AND CAFFEINE 1 TABLET: 50; 325; 40 TABLET ORAL at 01:14

## 2022-02-27 RX ADMIN — LACOSAMIDE 200 MG: 100 TABLET, FILM COATED ORAL at 08:16

## 2022-02-27 RX ADMIN — LISINOPRIL 5 MG: 5 TABLET ORAL at 08:16

## 2022-02-27 ASSESSMENT — PAIN DESCRIPTION - PAIN TYPE
TYPE: ACUTE PAIN
TYPE: ACUTE PAIN

## 2022-02-27 ASSESSMENT — FIBROSIS 4 INDEX: FIB4 SCORE: 1.02

## 2022-02-27 NOTE — PROGRESS NOTES
"Rehab Progress Note     Encounter Date: 2/27/2022    CC: Anxiety follow-up    Interval Events (Subjective)  Vital signs stable.  BM 2/27  Urinary incontinence into adult briefs    Patient seen and examined in her room.  Son at bedside.  Has questions regarding seizure management.  Xanax at night significantly helped her anxiety and she slept well.  Feels very relaxed at this time.    14 point review of systems negative unless otherwise specified in the HPI.    Objective:  VITAL SIGNS: /78   Pulse 90   Temp 36.6 °C (97.9 °F) (Temporal)   Resp 18   Ht 1.702 m (5' 7\")   Wt 102 kg (224 lb 13.9 oz)   SpO2 95%   BMI 35.22 kg/m²     GEN: No apparent distress, laying in bed comfortably.  HEENT: Head normocephalic.  Sclera nonicteric bilaterally, no ocular discharge appreciated bilaterally.  CV: Extremities warm and well-perfused, no peripheral edema appreciated bilaterally.  PULMONARY: Breathing nonlabored on room air, no respiratory accessory muscle use.  Not requiring supplemental oxygen.  SKIN: No appreciable skin breakdown on exposed areas of skin.  PSYCH: Mood and affect within normal limits.  NEURO: Awake alert.  Conversational.  Logical thought content.  Spasticity improved left upper extremity.    Recent Results (from the past 72 hour(s))   CBC WITHOUT DIFFERENTIAL    Collection Time: 02/26/22  5:17 AM   Result Value Ref Range    WBC 5.8 4.8 - 10.8 K/uL    RBC 3.38 (L) 4.20 - 5.40 M/uL    Hemoglobin 11.7 (L) 12.0 - 16.0 g/dL    Hematocrit 35.7 (L) 37.0 - 47.0 %    .6 (H) 81.4 - 97.8 fL    MCH 34.6 (H) 27.0 - 33.0 pg    MCHC 32.8 (L) 33.6 - 35.0 g/dL    RDW 54.4 (H) 35.9 - 50.0 fL    Platelet Count 235 164 - 446 K/uL    MPV 9.8 9.0 - 12.9 fL       Current Facility-Administered Medications   Medication Frequency   • ALPRAZolam (XANAX) tablet 0.5 mg Q EVENING   • ALPRAZolam (XANAX) tablet 0.5 mg QDAY PRN   • lisinopril (PRINIVIL) tablet 5 mg DAILY   • diphenhydrAMINE (BENADRYL) tablet/capsule 25 mg " Q6HRS PRN   • tizanidine (ZANAFLEX) tablet 4 mg BID   • butalbital/apap/caffeine -40 mg (Fioricet) per tablet 1 Tablet Q4HRS PRN   • senna-docusate (PERICOLACE or SENOKOT S) 8.6-50 MG per tablet 2 Tablet BID PRN    And   • polyethylene glycol/lytes (MIRALAX) PACKET 1 Packet QDAY PRN    And   • magnesium hydroxide (MILK OF MAGNESIA) suspension 30 mL QDAY PRN    And   • bisacodyl (DULCOLAX) suppository 10 mg QDAY PRN   • vitamin D3 (cholecalciferol) tablet 1,000 Units DAILY   • Respiratory Therapy Consult Continuous RT   • hydrALAZINE (APRESOLINE) tablet 25 mg Q8HRS PRN   • acetaminophen (Tylenol) tablet 650 mg Q4HRS PRN   • omeprazole (PRILOSEC) capsule 20 mg DAILY   • artificial tears ophthalmic solution 1 Drop PRN   • benzocaine-menthol (CEPACOL) lozenge 1 Lozenge Q2HRS PRN   • mag hydrox-al hydrox-simeth (MAALOX PLUS ES or MYLANTA DS) suspension 20 mL Q2HRS PRN   • ondansetron (ZOFRAN ODT) dispertab 4 mg 4X/DAY PRN    Or   • ondansetron (ZOFRAN) syringe/vial injection 4 mg 4X/DAY PRN   • traZODone (DESYREL) tablet 50 mg QHS PRN   • sodium chloride (OCEAN) 0.65 % nasal spray 2 Spray PRN   • oxyCODONE immediate-release (ROXICODONE) tablet 5 mg Q3HRS PRN    Or   • oxyCODONE immediate release (ROXICODONE) tablet 10 mg Q3HRS PRN   • midazolam (VERSED) 5 mg/mL (1 mL vial) PRN   • acetaminophen (TYLENOL) tablet 1,000 mg Q6HRS PRN   • amLODIPine (NORVASC) tablet 10 mg DAILY   • apixaban (ELIQUIS) tablet 5 mg BID   • brivaracetam (Briviact) tablet 100 mg BID   • hydrocortisone 1 % cream BID PRN   • lacosamide (VIMPAT) tablet 200 mg BID   • melatonin tablet 3 mg QHS   • venlafaxine (EFFEXOR) tablet 75 mg DAILY       Orders Placed This Encounter   Procedures   • Diet Order Diet: Regular     Standing Status:   Standing     Number of Occurrences:   1     Order Specific Question:   Diet:     Answer:   Regular [1]       Assessment:  Active Hospital Problems    Diagnosis    • *Glioblastoma of frontal lobe (HCC)    • GBM  (glioblastoma multiforme) (HCC)    • Constipation    • Syncope    • Class 1 obesity due to excess calories with serious comorbidity and body mass index (BMI) of 34.0 to 34.9 in adult    • Gait instability    • History of pulmonary embolus (PE)    • Seizure disorder (HCC)    • Primary hypertension    • Mixed anxiety and depressive disorder        Medical Decision Making and Plan: Adapted from note dated 2/25  Seizure/GBM - Patient with syncopal event with AMS and weakness after concerning for seizure vs worsening GBM vs post-treatment changes. Oncology was consulted and recommended steroids then taper which she has completed. Neurology was consulted and recommended MRI which showed diffuse posttreatment changes but no acute lesion. EEG showed non-specific changes and Neurology recommended increasing her Vimpat as her symptoms could have been post-ictal. Cardiac work-up negative.    -Continue comprehensive acute inpatient rehabilitation  -PT and OT for mobility and ADLs  -SLP for cognitive screen  -Continue Briviact 100 mg BID and Vimpat 200 mg BID     Spasticity - Followed by Dr. Steele, [facility], for Botox injection. Discussed with Dr. Steele. Will trial Tizanidine 4 mg BID   -Improved on Tizanidine.Will monitor      HTN - Patient on Amlodipine 10 mg daily. Into 150s, start Lisinopril 5 mg     Anemia - Check AM CBC - 14.3, resolved.      Hypokalemia - Check AM CMP - 4.0, resolved.     Anxiety/Depression - Patient on PRN Xanax and Effexor 75 mg daily  2/26: Schedule Xanax 0.5 mg at 5 PM  2/27: Significant improvement with scheduled Xanax.  Continue.     Morbid Obesity due to excess calories - BMI of 37.1 on admission. Dietitian to consult.      Back rash - Appears contact. Start Hydrocortisone cream. PO Benadryl     Constipation - Resolved, discontinue senna-docusate.      Vitamin D deficiency - 29 on admission. Started on 1000 U      Hx of PE - Patient on Eliquis BID.    Total time: 28 minutes.  I spent greater  than 50% of the time for patient care and coordination on this date, including unit/floor time, and face-to-face time with the patient as per assessment and plan above.    Yanet Steele D.O.

## 2022-02-27 NOTE — CARE PLAN
"  Problem: Fall Risk - Rehab  Goal: Patient will remain free from falls  Note: [place] Fall risk Assessment Score: 19    High fall risk Interventions   - Alarming seatbelt  - Wander guard  - Bed and strip alarm   - Yellow sign by the door   - Yellow wrist band \"Fall risk\"  - Room near to the nurse station  - Do not leave patient unattended in the bathroom  - Fall risk education provided      Problem: Pain - Standard  Goal: Alleviation of pain or a reduction in pain to the patient’s comfort goal  Note: Educate patient of non-pharmacological comfort measures: repositioning, relaxation/breathing technique, cold compress and activities.      Problem: Bladder / Voiding  Goal: Patient will establish and maintain regular urinary output  Note: Incontinent of bladder, kept clean and dry. Will monitor.          The patient is Stable - Low risk of patient condition declining or worsening    Shift Goals  Clinical Goals: Pain control, comfort  Patient Goals: sleep well      "

## 2022-02-28 PROCEDURE — 770010 HCHG ROOM/CARE - REHAB SEMI PRIVAT*

## 2022-02-28 PROCEDURE — 97112 NEUROMUSCULAR REEDUCATION: CPT

## 2022-02-28 PROCEDURE — 99233 SBSQ HOSP IP/OBS HIGH 50: CPT | Performed by: PHYSICAL MEDICINE & REHABILITATION

## 2022-02-28 PROCEDURE — 97116 GAIT TRAINING THERAPY: CPT

## 2022-02-28 PROCEDURE — 97110 THERAPEUTIC EXERCISES: CPT

## 2022-02-28 PROCEDURE — 97130 THER IVNTJ EA ADDL 15 MIN: CPT

## 2022-02-28 PROCEDURE — 700102 HCHG RX REV CODE 250 W/ 637 OVERRIDE(OP): Performed by: PHYSICAL MEDICINE & REHABILITATION

## 2022-02-28 PROCEDURE — A9270 NON-COVERED ITEM OR SERVICE: HCPCS | Performed by: PHYSICAL MEDICINE & REHABILITATION

## 2022-02-28 PROCEDURE — 97129 THER IVNTJ 1ST 15 MIN: CPT

## 2022-02-28 PROCEDURE — 97530 THERAPEUTIC ACTIVITIES: CPT

## 2022-02-28 PROCEDURE — 97535 SELF CARE MNGMENT TRAINING: CPT

## 2022-02-28 RX ORDER — MIRTAZAPINE 15 MG/1
15 TABLET, ORALLY DISINTEGRATING ORAL
Status: DISCONTINUED | OUTPATIENT
Start: 2022-02-28 | End: 2022-03-11 | Stop reason: HOSPADM

## 2022-02-28 RX ADMIN — OMEPRAZOLE 20 MG: 20 CAPSULE, DELAYED RELEASE ORAL at 08:06

## 2022-02-28 RX ADMIN — BENZOCAINE AND MENTHOL 1 LOZENGE: 15; 3.6 LOZENGE ORAL at 07:54

## 2022-02-28 RX ADMIN — AMLODIPINE BESYLATE 10 MG: 5 TABLET ORAL at 08:07

## 2022-02-28 RX ADMIN — BUTALBITAL, ACETAMINOPHEN, AND CAFFEINE 1 TABLET: 50; 325; 40 TABLET ORAL at 16:22

## 2022-02-28 RX ADMIN — ALPRAZOLAM 0.5 MG: 0.5 TABLET ORAL at 17:28

## 2022-02-28 RX ADMIN — BRIVARACETAM 100 MG: 50 TABLET, FILM COATED ORAL at 08:06

## 2022-02-28 RX ADMIN — DIPHENHYDRAMINE HCL 25 MG: 25 TABLET ORAL at 15:50

## 2022-02-28 RX ADMIN — LACOSAMIDE 200 MG: 100 TABLET, FILM COATED ORAL at 20:23

## 2022-02-28 RX ADMIN — MIRTAZAPINE 15 MG: 15 TABLET, ORALLY DISINTEGRATING ORAL at 20:23

## 2022-02-28 RX ADMIN — DIPHENHYDRAMINE HCL 25 MG: 25 TABLET ORAL at 08:33

## 2022-02-28 RX ADMIN — BRIVARACETAM 100 MG: 50 TABLET, FILM COATED ORAL at 20:23

## 2022-02-28 RX ADMIN — BUTALBITAL, ACETAMINOPHEN, AND CAFFEINE 1 TABLET: 50; 325; 40 TABLET ORAL at 08:06

## 2022-02-28 RX ADMIN — LACOSAMIDE 200 MG: 100 TABLET, FILM COATED ORAL at 08:06

## 2022-02-28 RX ADMIN — VENLAFAXINE HYDROCHLORIDE 75 MG: 37.5 TABLET ORAL at 08:06

## 2022-02-28 RX ADMIN — BUTALBITAL, ACETAMINOPHEN, AND CAFFEINE 1 TABLET: 50; 325; 40 TABLET ORAL at 20:23

## 2022-02-28 RX ADMIN — HYDROCORTISONE: 1 CREAM TOPICAL at 08:35

## 2022-02-28 RX ADMIN — MELATONIN TAB 3 MG 3 MG: 3 TAB at 20:24

## 2022-02-28 RX ADMIN — APIXABAN 5 MG: 5 TABLET, FILM COATED ORAL at 08:07

## 2022-02-28 RX ADMIN — TIZANIDINE 4 MG: 4 TABLET ORAL at 08:07

## 2022-02-28 RX ADMIN — Medication 1000 UNITS: at 08:07

## 2022-02-28 RX ADMIN — TIZANIDINE 4 MG: 4 TABLET ORAL at 20:23

## 2022-02-28 RX ADMIN — APIXABAN 5 MG: 5 TABLET, FILM COATED ORAL at 20:23

## 2022-02-28 RX ADMIN — LISINOPRIL 5 MG: 5 TABLET ORAL at 08:07

## 2022-02-28 ASSESSMENT — GAIT ASSESSMENTS
DISTANCE (FEET): 15
GAIT LEVEL OF ASSIST: MODERATE ASSIST
DEVIATION: STEP TO;DECREASED HEEL STRIKE;DECREASED TOE OFF

## 2022-02-28 ASSESSMENT — PAIN DESCRIPTION - PAIN TYPE: TYPE: ACUTE PAIN

## 2022-02-28 ASSESSMENT — ACTIVITIES OF DAILY LIVING (ADL)
TOILET_TRANSFER_DESCRIPTION: ADAPTIVE EQUIPMENT;GRAB BAR;INCREASED TIME;REQUIRES LIFT
BED_CHAIR_WHEELCHAIR_TRANSFER_DESCRIPTION: ADAPTIVE EQUIPMENT

## 2022-02-28 NOTE — PROGRESS NOTES
"Rehab Progress Note     Encounter Date: 2/28/2022    CC: Decreased mobility, spasticity    Interval Events (Subjective)  Patient sitting up in room. She reports the Xanax that was started over the weekend seems to be helping with her spasticity. She and her 's biggest complaint is her sleep issues. He reports she calls very late into the night if she cannot fall asleep. Discussed would try Remeron which can help with mood, sleep, and appetite. Denies NVD.     IDT Team Meeting 2/24/2022  DC/Disposition:  3/11/22    Objective:  VITAL SIGNS: /78   Pulse 76   Temp 36.6 °C (97.8 °F) (Oral)   Resp 18   Ht 1.702 m (5' 7\")   Wt 105 kg (231 lb 7.7 oz)   SpO2 98%   BMI 36.26 kg/m²   Gen: NAD  Psych: Mood and affect appropriate  CV: RRR, no edema  Resp: CTAB, no upper airway sounds  Abd: NTND  Neuro: AOx3, MAS 1/4 RUE    Recent Results (from the past 72 hour(s))   CBC WITHOUT DIFFERENTIAL    Collection Time: 02/26/22  5:17 AM   Result Value Ref Range    WBC 5.8 4.8 - 10.8 K/uL    RBC 3.38 (L) 4.20 - 5.40 M/uL    Hemoglobin 11.7 (L) 12.0 - 16.0 g/dL    Hematocrit 35.7 (L) 37.0 - 47.0 %    .6 (H) 81.4 - 97.8 fL    MCH 34.6 (H) 27.0 - 33.0 pg    MCHC 32.8 (L) 33.6 - 35.0 g/dL    RDW 54.4 (H) 35.9 - 50.0 fL    Platelet Count 235 164 - 446 K/uL    MPV 9.8 9.0 - 12.9 fL       Current Facility-Administered Medications   Medication Frequency   • mirtazapine (Remeron) orally disintegrating tab 15 mg QHS   • ALPRAZolam (XANAX) tablet 0.5 mg Q EVENING   • ALPRAZolam (XANAX) tablet 0.5 mg QDAY PRN   • lisinopril (PRINIVIL) tablet 5 mg DAILY   • diphenhydrAMINE (BENADRYL) tablet/capsule 25 mg Q6HRS PRN   • tizanidine (ZANAFLEX) tablet 4 mg BID   • butalbital/apap/caffeine -40 mg (Fioricet) per tablet 1 Tablet Q4HRS PRN   • senna-docusate (PERICOLACE or SENOKOT S) 8.6-50 MG per tablet 2 Tablet BID PRN    And   • polyethylene glycol/lytes (MIRALAX) PACKET 1 Packet QDAY PRN    And   • magnesium hydroxide (MILK " OF MAGNESIA) suspension 30 mL QDAY PRN    And   • bisacodyl (DULCOLAX) suppository 10 mg QDAY PRN   • vitamin D3 (cholecalciferol) tablet 1,000 Units DAILY   • Respiratory Therapy Consult Continuous RT   • hydrALAZINE (APRESOLINE) tablet 25 mg Q8HRS PRN   • acetaminophen (Tylenol) tablet 650 mg Q4HRS PRN   • omeprazole (PRILOSEC) capsule 20 mg DAILY   • artificial tears ophthalmic solution 1 Drop PRN   • benzocaine-menthol (CEPACOL) lozenge 1 Lozenge Q2HRS PRN   • mag hydrox-al hydrox-simeth (MAALOX PLUS ES or MYLANTA DS) suspension 20 mL Q2HRS PRN   • ondansetron (ZOFRAN ODT) dispertab 4 mg 4X/DAY PRN    Or   • ondansetron (ZOFRAN) syringe/vial injection 4 mg 4X/DAY PRN   • traZODone (DESYREL) tablet 50 mg QHS PRN   • sodium chloride (OCEAN) 0.65 % nasal spray 2 Spray PRN   • oxyCODONE immediate-release (ROXICODONE) tablet 5 mg Q3HRS PRN    Or   • oxyCODONE immediate release (ROXICODONE) tablet 10 mg Q3HRS PRN   • midazolam (VERSED) 5 mg/mL (1 mL vial) PRN   • acetaminophen (TYLENOL) tablet 1,000 mg Q6HRS PRN   • amLODIPine (NORVASC) tablet 10 mg DAILY   • apixaban (ELIQUIS) tablet 5 mg BID   • brivaracetam (Briviact) tablet 100 mg BID   • hydrocortisone 1 % cream BID PRN   • lacosamide (VIMPAT) tablet 200 mg BID   • melatonin tablet 3 mg QHS   • venlafaxine (EFFEXOR) tablet 75 mg DAILY       Orders Placed This Encounter   Procedures   • Diet Order Diet: Regular     Standing Status:   Standing     Number of Occurrences:   1     Order Specific Question:   Diet:     Answer:   Regular [1]       Assessment:  Active Hospital Problems    Diagnosis    • *Glioblastoma of frontal lobe (HCC)    • GBM (glioblastoma multiforme) (HCC)    • Constipation    • Syncope    • Class 1 obesity due to excess calories with serious comorbidity and body mass index (BMI) of 34.0 to 34.9 in adult    • Gait instability    • History of pulmonary embolus (PE)    • Seizure disorder (HCC)    • Primary hypertension    • Mixed anxiety and  depressive disorder        Medical Decision Making and Plan:  Seizure/GBM - Patient with syncopal event with AMS and weakness after concerning for seizure vs worsening GBM vs post-treatment changes. Oncology was consulted and recommended steroids then taper which she has completed. Neurology was consulted and recommended MRI which showed diffuse posttreatment changes but no acute lesion. EEG showed non-specific changes and Neurology recommended increasing her Vimpat as her symptoms could have been post-ictal. Cardiac work-up negative.    -PT and OT for mobility and ADLs  -SLP for cognitive screen  -Continue Briviact 100 mg BID and Vimpat 200 mg BID     Spasticity - Followed by Dr. Steele, [facility], for Botox injection. Discussed with Dr. Steele. Will trial Tizanidine 4 mg BID   -Improved on Tizanidine. Started on Xanax QHS.      HTN - Patient on Amlodipine 10 mg daily. Into 150s, start Lisinopril 5 mg     Anemia - Check AM CBC - 14.3, resolved.      Hypokalemia - Check AM CMP - 4.0, resolved.     Anxiety/Depression - Patient on PRN Xanax and Effexor 75 mg daily     Sleep - Patient with poor sleep. On Xanax QHS and add QHS Remeron    Morbid Obesity due to excess calories - BMI of 37.1 on admission. Dietitian to consult.      Back rash - Appears contact. Start Hydrocortisone cream. PO Benadryl    Constipation - Resolved, discontinue senna-docusate.     Vitamin D deficiency - 29 on admission. Started on 1000 U     Hx of PE - Patient on Eliquis BID.    Total time:  36 minutes.  I spent greater than 50% of the time for patient care, counseling, and coordination on this date, including unit/floor time, and face-to-face time with the patient as per interval events and assessment and plan above. Topics discussed included sleep, spasticity, xanax, and add remeron.     Lucrecia Sim M.D.

## 2022-02-28 NOTE — THERAPY
Physical Therapy   Daily Treatment     Patient Name: Melba Frye  Age:  51 y.o., Sex:  female  Medical Record #: [id number]  Today's Date: 2/28/2022     Precautions  Precautions: Fall Risk  Comments: L alma delia    Subjective    Pt resting in bed, spouse present for encouragement     Objective       02/28/22 1331   Gait Functional Level of Assist    Gait Level Of Assist Moderate Assist  (2 person for safety / wc follow)   Assistive Device   (R porter rail/ L AFO and foot slider)   Distance (Feet) 15   # of Times Distance was Traveled 4   Deviation Step To;Decreased Heel Strike;Decreased Toe Off  (spastic L alma delia-gait)   Transfer Functional Level of Assist   Bed, Chair, Wheelchair Transfer Maximal Assist  (2 person for safety)   Bed Chair Wheelchair Transfer Description Adaptive equipment   Toilet Transfers Maximal Assist  (second person SBA for safety)   Toilet Transfer Description Adaptive equipment;Grab bar;Increased time;Requires lift   Sitting Lower Body Exercises   Nustep Resistance Level 1   Comments 15 minutes with BLE's/ L foot and thigh strap/ RUE.   Bed Mobility    Supine to Sit Maximal Assist   Sit to Supine Moderate Assist   Sit to Stand Maximal Assist   Scooting Maximal Assist   Rolling Maximal Assist to Rt.;Maximum Assist to Lt.   Neuro-Muscular Treatments   Neuro-Muscular Treatments Anterior weight shift;Biofeedback;Tactile Cuing;Sequencing;Verbal Cuing;Weight Shift Right;Weight Shift Left   Comments standing posture/ neuro re-ed gait training as noted with mirror for visual feedback.   PT Total Time Spent   PT Individual Total Time Spent (Mins) 60   PT Charge Group   PT Gait Training 1   PT Therapeutic Exercise 1   PT Neuromuscular Re-Education / Balance 1   PT Therapeutic Activities 1     Pt completed wc<>toilet transfer with mod/max A using grab bar and verbal cues for sequencing, second person present for safety. Pt able to remains standing with min A for balance but requires max A for clothing  management.    Assessment    Pt demonstrated poor standing posture/  lateral and heavy posterior lean during gait with increased flexion/ adduction hypertonicity to LUE today. Requires verbal cues throughout transfer sequencing with QC wc<>Nustep and mod/max A for balance and LLE positioning, limited carryover noted.     Strengths: Able to follow instructions,Willingly participates in therapeutic activities,Supportive family,Pleasant and cooperative,Motivated for self care and independence,Effective communication skills  Barriers: Decreased endurance,Emotional lability,Fatigue,Generalized weakness,Hemiplegia,Impaired activity tolerance,Impaired balance,Impaired functional cognition,Limited mobility,Spasticity    Plan    Sit<>stand sequencing/ transfer training, pre-gait/ gait training as able, ROM/ stretching/ spasticity management.     Passport items to be completed:  Get in/out of bed safely, in/out of a vehicle, safely use mobility device, walk or wheel around home/community, navigate up and down stairs, show how to get up/down from the ground, ensure home is accessible, demonstrate HEP, complete caregiver training    Physical Therapy Problems (Active)       Problem: Mobility       Dates: Start: 02/19/22         Goal: STG-Within one week, patient will ambulate 10ft with LBQC and min A.       Dates: Start: 02/19/22            Goal: STG-Within one week, patient will ascend and descend four stairs with mod A using R handrail.       Dates: Start: 02/19/22               Problem: Mobility Transfers       Dates: Start: 02/19/22         Goal: STG-Within one week, patient will perform bed mobility with min A using hospital bed functions.       Dates: Start: 02/19/22            Goal: STG-Within one week, patient will sit to stand with min A from wheelchair.       Dates: Start: 02/19/22            Goal: STG-Within one week, patient will transfer bed to chair with min A.       Dates: Start: 02/19/22               Problem:  PT-Long Term Goals       Dates: Start: 02/19/22         Goal: LTG-By discharge, patient will propel wheelchair 150ft with power versus manual chair and supervision.       Dates: Start: 02/19/22            Goal: LTG-By discharge, patient will ambulate 20ft with LBQC and CGA.       Dates: Start: 02/19/22            Goal: LTG-By discharge, patient will transfer one surface to another with CGA.       Dates: Start: 02/19/22            Goal: LTG-By discharge, patient will ambulate up/down flight of stairs with min A using R handrail.       Dates: Start: 02/19/22            Goal: LTG-By discharge, patient will transfer in/out of a car with min A.       Dates: Start: 02/19/22

## 2022-02-28 NOTE — PROGRESS NOTES
Spiritual Care Note    Patient Information     Patient's Name: Melba Frye   MRN: [id number]    YOB: 1970   Age and Gender: 51 y.o. female   Service Area:    Room (and Bed): [place] [address]   Ethnicity or Nationality: White   Primary Language: English   Church/Spiritual preference: Taoist   Place of Residence: [place]   Family/Friends/Others Present:    Clinical Team Present:    Medical Diagnosis(-es)/Procedure(s):    Code Status: Full Code    Date of Admission: 2/18/2022   Length of Stay: 10 days        Spiritual Care Provider Information:  ANYA Cruz for Dario Donavan, Volunteer   [phone]   [email or web address]  Total time : 15 minutes    Spiritual Screen Results:    Gen Nursing  Spiritual Screen  Was spiritual care education provided to the patient?: Yes  Cultural/Spiritual Needs During Care: Ceremonial  Ceremonial Considerations: Prayer  Sources of Hope/Marcie: Family,Higher power,Prayer     Palliative Care  [facility] Church/Spiritual Screening  Was spiritual care education provided to the patient?: Yes      Encounter/Request Information  Encounter/Request Type   Visited With: Patient and family together  Nature of the Visit: Initial,On shift  General Visit: Yes  Referral From/ Origin of Request: Epic nursing  Referral To: Other /SCP    Religous Needs/Values  Church Needs Visit  Church Needs: Prayer    Spiritual Assessment   Spiritual Care Encounters  Observations/Symptoms: Accepting,Thankfulness  Interacton/Conversation: Met w/pt and . Pt is encouraged to learn that no brain tumors were found in recent scans. She still experiences falls and pain. Pt reflected on good things that have resulted from the experience, eli. reconciliation w/friends & family. Ended w/prayer for her continued healing.  Assessment: Need  Need: Seeking Spiritual Assistance and Support  Intended Effects: Convey a Calming Presence,Puja Affirmation,Promote a Sense of  "Peace  Methods: Active listening, empthetic presence  Interventions: Prayer  Outcomes: Coping,Forgiveness and Reconciliation,Spiritual Comfort  Plan: Visit Upon Request    Notes:  Per notes provided by rachael Go: \"Met w/pt and . Pt is encouraged to learn that no brain tumors were found in recent scans. She still experiences falls and pain. Pt reflected on good things that have resulted from the experience, eli. reconciliation w/friends & family. Ended w/prayer for her continued healing.\"        "

## 2022-02-28 NOTE — THERAPY
"Occupational Therapy  Daily Treatment     Patient Name: Melba Frye  Age:  51 y.o., Sex:  female  Medical Record #: [id number]  Today's Date: 2/28/2022     Precautions  Precautions: Fall Risk  Comments: L alma delia    Safety   ADL Safety : Requires Physical Assist for Safety,Requires Supervision for Safety  Bathroom Safety: Requires Physical Assist for Safety,Requires Supervision for Safety    Subjective    \"I need some motivation music\" pt requesting to listen to Walk by Charles Fighters during standing      Objective       02/28/22 0831   Pain   Intervention Distraction;Relaxation Technique;Therapeutic Presence  (pro-longed stretch)   Pain 0 - 10 Group   Location Arm   Location Orientation Left   Pain Rating Scale (NPRS) 4   Description Aching   Comfort Goal Comfort with Movement;Perform Activity;Sleep Comfortably   Therapist Pain Assessment During Activity   Cognition    Level of Consciousness Alert   Functional Level of Assist   Grooming Supervision   Grooming Description Increased time;Set-up of equipment;Supervision for safety  (brush teeth)   Upper Body Dressing Moderate Assist   Upper Body Dressing Description Assit with threading arms through sleeves;Assist with pulling shirt over head;Increased time;Initial preparation for task;Set-up of equipment;Supervision for safety;Verbal cueing   Lower Body Dressing Total Assist x 2   Lower Body Dressing Description Assist with threading into pant leg;Increased time;Initial preparation for task;Set-up of equipment;Supervision for safety;Verbal cueing;Assist with closures  (doffing brief in bed' donning brief/pants/socks/shoes in bed- total a for rolling to R side; max a rolling to L side; 2 person assist for balance during standing and assist to don pants/brief over hips in standing with quad cane in front of pt)   Bed, Chair, Wheelchair Transfer Maximal Assist   Bed Chair Wheelchair Transfer Description Adaptive equipment;Increased time;Requires lift;Set-up of " equipment;Verbal cueing;Supervision for safety  (stand pivot; max cues for weight shifting and min a to move LLE using quad cane going to R side; max a x 1 other person there with CGA for safety)   Neuro-Muscular Treatments   Neuro-Muscular Treatments Weight Shift Right;Weight Shift Left;Verbal Cuing;Postural Facilitation;Joint Approximation   Comments Pt completed 6 STS's at sink side with mod-max a for STS and min-mod a during standing for balance; therapist worked on pro-longed stretch of LUE during standing due to increased tone and weight shifting L/R through BLE during standing; pt able to tolerate standing ~15-45 seconds each trial. While pt seated in w/c to engaged in WB through elbow with overpressure applied to elbow and stabilizing at shoulder.   Bed Mobility    Supine to Sit Maximal Assist   Scooting Maximal Assist   Rolling Total Assist to Rt.;Maximum Assist to Lt.   OT Total Time Spent   OT Individual Total Time Spent (Mins) 60   OT Charge Group   OT Self Care / ADL 3   OT Neuromuscular Re-education / Balance 1       Assessment    Pt tolerated session fair. Pt reported feeling very itchy at fatigued at start of session. Nurse applied cream and gave benadryl at start of session.  Pt's LUE/LLE with increased tone at start of session- LLE in extension and resistant to knee/hip flexion, LUE in elbow flexion, hand in flexion. Pt's LUE/LLE able to release with WB and prolonged stretching. Pt reported that she liked the leukotape to support her L shoulder, but it was very itchy and she needed to take it off.     Strengths: Able to follow instructions,Effective communication skills,Independent prior level of function,Manages pain appropriately,Motivated for self care and independence,Pleasant and cooperative,Supportive family,Willingly participates in therapeutic activities  Barriers: Bladder incontinence,Decreased endurance,Fatigue,Generalized weakness,Hemiparesis,Impaired activity tolerance,Impaired  balance,Limited mobility    Plan    ADL's, neuro re-ed for LUE, visual scanning all four quadrants, self-ROM for LUE, sitting/standing bal/tolerance, functional transfers, TTB transfer      Passport items to be completed:  Perform bathroom transfers, complete dressing, complete feeding, get ready for the day, prepare a simple meal, participate in household tasks, adapt home for safety needs, demonstrate home exercise program, complete caregiver training     Occupational Therapy Goals (Active)       Problem: Dressing       Dates: Start: 02/19/22         Goal: STG-Within one week, patient will dress LB with total a x 1        Dates: Start: 02/19/22               Problem: Functional Transfers       Dates: Start: 02/19/22         Goal: STG-Within one week, patient will transfer to toilet with max a        Dates: Start: 02/19/22         Goal Note filed on 02/24/22 1148 by Yajaira Faith, OT       Continues to require 2 person transfer - modA x1, Josep x1                  Problem: OT Long Term Goals       Dates: Start: 02/19/22         Goal: LTG-By discharge, patient will complete basic self care tasks with min-mod a        Dates: Start: 02/19/22            Goal: LTG-By discharge, patient will perform bathroom transfers with min a        Dates: Start: 02/19/22               Problem: Toileting       Dates: Start: 02/19/22         Goal: STG-Within one week, patient will complete toileting tasks with total a x 1        Dates: Start: 02/19/22

## 2022-02-28 NOTE — THERAPY
Speech Language Pathology  Daily Treatment     Patient Name: Melba Frye  Age:  51 y.o., Sex:  female  Medical Record #: [id number]  Today's Date: 2/28/2022     Precautions  Precautions: Fall Risk  Comments: L alma delia    Subjective    Pt pleasant and cooperative, pt and SO reporting rough weekend with pt having most difficulty during the evening when SO is not present. Pt reporting poor sleep and that MD is aware.         02/28/22 1003   SLP Total Time Spent   SLP Individual Total Time Spent (Mins) 60   Treatment Charges   SLP Cognitive Skill Development First 15 Minutes 1   SLP Cognitive Skill Development Additional 15 Minutes 3       Assessment    Pt presented with variety of visual scanning tasks to address attention and left neglect. When pt presented with blank page consisting of 6 letters, numbers or words pt able to locate all targets with 100% accuracy indep. Once pt presented with busier work page pt indep locating 50% of targets, increasing to 85-90% with min cues and 100% provided mod cues. Pt with urinary urgency X2 during session requiring pt be taken back to room and assisted to restroom with then continuation of therapy to follow.     Strengths: Able to follow instructions,Supportive family,Pleasant and cooperative,Willingly participates in therapeutic activities  Barriers: Impaired carryover of learning,Impaired functional cognition,Impaired insight/denial of deficits (anxiety)    Plan    Cont to address safety awareness, recall and attention skills    Speech Therapy Problems (Active)       Problem: Memory STGs       Dates: Start: 02/19/22         Goal: STG-Within one week, patient will recall daily events and safety strategies with 80% accuracy, given min verbal cues and use of memory book.        Dates: Start: 02/19/22               Problem: Problem Solving STGs       Dates: Start: 02/19/22         Goal: STG-Within one week, patient will complete basic math with 80% accuracy given min verbal  cues.        Dates: Start: 02/19/22            Goal: STG-Within one week, patient will complete simple visual scanning tasks to address left neglect with 70% accuracy provided MIN cues.        Dates: Start: 02/24/22               Problem: Speech/Swallowing LTGs       Dates: Start: 02/19/22         Goal: LTG-By discharge, patient will solve complex problem Neeraj for safe discharge home.       Dates: Start: 02/19/22

## 2022-02-28 NOTE — CARE PLAN
"The patient is Stable - Low risk of patient condition declining or worsening      Problem: Fall Risk - Rehab  Goal: Patient will remain free from falls  Note: [place] Fall risk Assessment Score: 19    High fall risk Interventions   - Bed and strip alarm   - Yellow sign by the door   - Yellow wrist band \"Fall risk\"  - Do not leave patient unattended in the bathroom  - Fall risk education provided      Problem: Skin Integrity  Goal: Patient's skin integrity will be maintained or improve  Note: Patient's skin remains intact and free from new or accidental injury this shift.  Will continue to monitor.           "

## 2022-02-28 NOTE — CARE PLAN
"  Problem: Fall Risk - Rehab  Goal: Patient will remain free from falls  Note: [place] Fall risk Assessment Score: 19    High fall risk Interventions   - Alarming seatbelt  - Wander guard  - Bed and strip alarm   - Yellow sign by the door   - Yellow wrist band \"Fall risk\"  - Room near to the nurse station  - Do not leave patient unattended in the bathroom  - Fall risk education provided      Problem: Pain - Standard  Goal: Alleviation of pain or a reduction in pain to the patient’s comfort goal  Note: Educate patient of non-pharmacological comfort measures: repositioning, relaxation/breathing technique, cold compress and activities.           The patient is Stable - Low risk of patient condition declining or worsening    Shift Goals  Clinical Goals: pain control  Patient Goals: sleep well      "

## 2022-02-28 NOTE — THERAPY
Recreational Therapy  Daily Treatment     Patient Name: Melba Frye  AGE:  51 y.o., SEX:  female  Medical Record #: [id number]  Today's Date: 2/28/2022       Subjective    Pt reporting she had not been sleeping well in the past few pays. Pt reported that there will be a sleep medication med change today to address this.      Objective       02/28/22 1101   Functional Ability Status - Cognitive   Attention Span Remains on Task   Comprehension Follows Two Step Commands   Judgment Able to Make Independent Decisions   Functional Ability Status - Emotional    Affect Appropriate;Flat   Mood Appropriate   Behavior Appropriate   Skilled Intervention    Skilled Intervention Relaxation / Coping Skills;Community Skills   Skilled Intervention Comments post dc leisure participation   Interdisciplinary Plan of Care Collaboration   IDT Collaboration with  Family / Caregiver   Patient Position at End of Therapy In Bed;Tray Table within Reach;Call Light within Reach;Family / Friend in Room   Collaboration Comments SO in room   Strengths & Barriers   Strengths Able to follow instructions;Alert and oriented;Making steady progress towards goals;Motivated for self care and independence;Pleasant and cooperative;Supportive family;Willingly participates in therapeutic activities   Barriers Decreased endurance;Fatigue;Generalized weakness;Limited mobility   Treatment Time   Total Time Spent (mins) 15   Total Time Missed 15   Reasons for Time Missed Non-Medical-Other (Please Comment)  (Pt fatigued)   Procedural Tracking   Procedural Tracking Community Re-Integration;Community Skills Development;Leisure Skills Awareness;Leisure Skills Development;Cognitive Skills Training       Assessment    Pt remained in bed during the session time. Pt spoke on community reintegration when she builds strength.     Strengths: (P) Able to follow instructions,Alert and oriented,Making steady progress towards goals,Motivated for self care and  independence,Pleasant and cooperative,Supportive family,Willingly participates in therapeutic activities  Barriers: (P) Decreased endurance,Fatigue,Generalized weakness,Limited mobility    Plan    Community reintegration class.

## 2022-03-01 PROCEDURE — A9270 NON-COVERED ITEM OR SERVICE: HCPCS | Performed by: PHYSICAL MEDICINE & REHABILITATION

## 2022-03-01 PROCEDURE — 97112 NEUROMUSCULAR REEDUCATION: CPT

## 2022-03-01 PROCEDURE — 700102 HCHG RX REV CODE 250 W/ 637 OVERRIDE(OP): Performed by: PHYSICAL MEDICINE & REHABILITATION

## 2022-03-01 PROCEDURE — 97130 THER IVNTJ EA ADDL 15 MIN: CPT

## 2022-03-01 PROCEDURE — 97530 THERAPEUTIC ACTIVITIES: CPT

## 2022-03-01 PROCEDURE — 97535 SELF CARE MNGMENT TRAINING: CPT

## 2022-03-01 PROCEDURE — 770010 HCHG ROOM/CARE - REHAB SEMI PRIVAT*

## 2022-03-01 PROCEDURE — 97129 THER IVNTJ 1ST 15 MIN: CPT

## 2022-03-01 PROCEDURE — 99232 SBSQ HOSP IP/OBS MODERATE 35: CPT | Performed by: PHYSICAL MEDICINE & REHABILITATION

## 2022-03-01 RX ORDER — LISINOPRIL 5 MG/1
10 TABLET ORAL DAILY
Status: DISCONTINUED | OUTPATIENT
Start: 2022-03-02 | End: 2022-03-04

## 2022-03-01 RX ADMIN — BRIVARACETAM 100 MG: 50 TABLET, FILM COATED ORAL at 20:17

## 2022-03-01 RX ADMIN — BUTALBITAL, ACETAMINOPHEN, AND CAFFEINE 1 TABLET: 50; 325; 40 TABLET ORAL at 16:41

## 2022-03-01 RX ADMIN — MELATONIN TAB 3 MG 3 MG: 3 TAB at 20:17

## 2022-03-01 RX ADMIN — LISINOPRIL 5 MG: 5 TABLET ORAL at 08:13

## 2022-03-01 RX ADMIN — TIZANIDINE 4 MG: 4 TABLET ORAL at 08:13

## 2022-03-01 RX ADMIN — MAGNESIUM HYDROXIDE,ALUMINUM HYDROXICE,SIMETHICONE 20 ML: 240; 2400; 2400 SUSPENSION ORAL at 18:21

## 2022-03-01 RX ADMIN — AMLODIPINE BESYLATE 10 MG: 5 TABLET ORAL at 08:13

## 2022-03-01 RX ADMIN — MIRTAZAPINE 15 MG: 15 TABLET, ORALLY DISINTEGRATING ORAL at 20:17

## 2022-03-01 RX ADMIN — LACOSAMIDE 200 MG: 100 TABLET, FILM COATED ORAL at 20:17

## 2022-03-01 RX ADMIN — TIZANIDINE 4 MG: 4 TABLET ORAL at 20:17

## 2022-03-01 RX ADMIN — BRIVARACETAM 100 MG: 50 TABLET, FILM COATED ORAL at 08:12

## 2022-03-01 RX ADMIN — ALPRAZOLAM 0.5 MG: 0.5 TABLET ORAL at 16:41

## 2022-03-01 RX ADMIN — DIPHENHYDRAMINE HCL 25 MG: 25 TABLET ORAL at 08:25

## 2022-03-01 RX ADMIN — DIPHENHYDRAMINE HCL 25 MG: 25 TABLET ORAL at 20:21

## 2022-03-01 RX ADMIN — LACOSAMIDE 200 MG: 100 TABLET, FILM COATED ORAL at 08:12

## 2022-03-01 RX ADMIN — BENZOCAINE AND MENTHOL 1 LOZENGE: 15; 3.6 LOZENGE ORAL at 08:18

## 2022-03-01 RX ADMIN — Medication 1000 UNITS: at 08:13

## 2022-03-01 RX ADMIN — VENLAFAXINE HYDROCHLORIDE 75 MG: 37.5 TABLET ORAL at 08:13

## 2022-03-01 RX ADMIN — OMEPRAZOLE 20 MG: 20 CAPSULE, DELAYED RELEASE ORAL at 08:13

## 2022-03-01 RX ADMIN — APIXABAN 5 MG: 5 TABLET, FILM COATED ORAL at 20:17

## 2022-03-01 RX ADMIN — APIXABAN 5 MG: 5 TABLET, FILM COATED ORAL at 08:13

## 2022-03-01 ASSESSMENT — ACTIVITIES OF DAILY LIVING (ADL)
BED_CHAIR_WHEELCHAIR_TRANSFER_DESCRIPTION: ADAPTIVE EQUIPMENT;INCREASED TIME;REQUIRES LIFT
TOILET_TRANSFER_DESCRIPTION: GRAB BAR;INCREASED TIME;REQUIRES LIFT;VERBAL CUEING

## 2022-03-01 ASSESSMENT — GAIT ASSESSMENTS
DEVIATION: STEP TO;DECREASED HEEL STRIKE;DECREASED TOE OFF
GAIT LEVEL OF ASSIST: MAXIMAL ASSIST
ASSISTIVE DEVICE: HEMI-WALKER
DISTANCE (FEET): 2

## 2022-03-01 NOTE — THERAPY
Recreational Therapy  Daily Treatment     Patient Name: Melba Frye  AGE:  51 y.o., SEX:  female  Medical Record #: [id number]  Today's Date: 3/1/2022       Subjective    Pt reporting that she needed to use the restroom. Pt sharing that she felt her transfers were getting better.     Objective       03/01/22 0831   Functional Ability Status - Cognitive   Attention Span Remains on Task   Comprehension Follows Three Step Commands   Judgment Able to Make Independent Decisions   Functional Ability Status - Emotional    Affect Appropriate   Mood Appropriate   Behavior Appropriate   Skilled Intervention    Skilled Intervention Gross Motor Leisure   Skilled Intervention Comments Transfer from bed to wc to toilet   Interdisciplinary Plan of Care Collaboration   IDT Collaboration with  Certified Nursing Assistant   Patient Position at End of Therapy Seated   Collaboration Comments with CNA in restrrom seated in wc   Strengths & Barriers   Strengths Able to follow instructions;Alert and oriented;Making steady progress towards goals;Manages pain appropriately;Motivated for self care and independence;Pleasant and cooperative;Supportive family;Willingly participates in therapeutic activities   Barriers Fatigue;Generalized weakness;Decreased endurance   Treatment Time   Total Time Spent (mins) 30   Procedural Tracking   Procedural Tracking Community Re-Integration;Community Skills Development;Leisure Skills Awareness;Leisure Skills Development;Gross Motor Functional Leisure Skills       Assessment    Pt was reporting that she needed to use the restroom. Pt is listed as a 2 person transfer so the call light was used as well as the Voalte phone was used to get assistance from the CNA. Second person was used for safety.     Strengths: (P) Able to follow instructions,Alert and oriented,Making steady progress towards goals,Manages pain appropriately,Motivated for self care and independence,Pleasant and cooperative,Supportive  family,Willingly participates in therapeutic activities  Barriers: (P) Fatigue,Generalized weakness,Decreased endurance    Plan    Community reintegration.

## 2022-03-01 NOTE — THERAPY
"Speech Language Pathology  Daily Treatment     Patient Name: Melba Fyre  Age:  51 y.o., Sex:  female  Medical Record #: [id number]  Today's Date: 3/1/2022     Precautions  Precautions: Fall Risk  Comments: L alma delia    Subjective    Pt pleasant and cooperative, therapy completed at bedside with SO present and supportive.     Objective       03/01/22 1403   SLP Total Time Spent   SLP Individual Total Time Spent (Mins) 30   Treatment Charges   SLP Cognitive Skill Development First 15 Minutes 1   SLP Cognitive Skill Development Additional 15 Minutes 1       Assessment    Functional visual scanning completed within room. Pt required MOD A overall to locate items on white board, read the clock, locate phone and call light. Functional problem solving related to safety discussed pt admitting \"megan been feeling like I can do more on my own\" reinforced need for staff assist at all times as for pt is a high risk for falls. Pt in agreement and receptive however will likely require ongoing education and reinforcement due to short term memory deficits.   Strengths: Able to follow instructions,Supportive family,Pleasant and cooperative,Willingly participates in therapeutic activities  Barriers: Impaired carryover of learning,Impaired functional cognition,Impaired insight/denial of deficits (anxiety)    Plan    Visual scanning, simple attention and safety awareness    Speech Therapy Problems (Active)       Problem: Memory STGs       Dates: Start: 02/19/22         Goal: STG-Within one week, patient will recall daily events and safety strategies with 80% accuracy, given min verbal cues and use of memory book.        Dates: Start: 02/19/22               Problem: Problem Solving STGs       Dates: Start: 02/19/22         Goal: STG-Within one week, patient will complete basic math with 80% accuracy given min verbal cues.        Dates: Start: 02/19/22            Goal: STG-Within one week, patient will complete simple visual scanning " tasks to address left neglect with 70% accuracy provided MIN cues.        Dates: Start: 02/24/22               Problem: Speech/Swallowing LTGs       Dates: Start: 02/19/22         Goal: LTG-By discharge, patient will solve complex problem Neeraj for safe discharge home.       Dates: Start: 02/19/22

## 2022-03-01 NOTE — THERAPY
Physical Therapy   Daily Treatment     Patient Name: Melba Frye  Age:  51 y.o., Sex:  female  Medical Record #: [id number]  Today's Date: 3/1/2022     Precautions  Precautions: Fall Risk  Comments: L rolando    Subjective    Pt resting in bed, willing to participate. Reports spouse is home taking care of chores today     Objective       03/01/22 1231   Gait Functional Level of Assist    Gait Level Of Assist Maximal Assist   Assistive Device Rolando-Walker  (L AFO)   Distance (Feet) 2   # of Times Distance was Traveled 2   Deviation Step To;Decreased Heel Strike;Decreased Toe Off  (spastic L rolando-gait/ significant extension tone LLE)   Wheelchair Functional Level of Assist   Wheelchair Assist Minimal Assist   Distance Wheelchair (Feet or Distance) 50   Wheelchair Description Extra time;Assistance with steering;Supervision for safety;Verbal cueing   Transfer Functional Level of Assist   Bed, Chair, Wheelchair Transfer Maximal Assist   Bed Chair Wheelchair Transfer Description Adaptive equipment;Increased time;Requires lift   Toilet Transfers Moderate Assist   Toilet Transfer Description Grab bar;Increased time;Requires lift;Verbal cueing   Bed Mobility    Supine to Sit Moderate Assist   Sit to Supine Moderate Assist   Sit to Stand   (variable performance from min>max A depending on surface height/ fatigue/ spacticity and attention.)   Neuro-Muscular Treatments   Neuro-Muscular Treatments Anterior weight shift;Biofeedback;Facilitation;Postural Facilitation;Sequencing;Tactile Cuing;Verbal Cuing;Weight Shift Left;Weight Shift Right   Comments see narrative below   PT Total Time Spent   PT Individual Total Time Spent (Mins) 60   PT Charge Group   PT Neuromuscular Re-Education / Balance 2   PT Therapeutic Activities 2     Sitting balance edge of mat with mirror for visual feedback/ visual scanning and reaching with RUE to left of midline/ anterior wt shifting to stack cones on floor/ verbal and manual cues for return to  midline.     Standing balance activity with UE support at alma delia-walker and mirror for visual feedback RUE reaching for cones to L of midline and anterior wt shifting to stack cones on stool. Min A for balance and cues for midline repositioning.    Standing/ lateral wt shifting and side stepping with hemiwalker and min/mod A for lateral wt shift/ LLE stepping 5 steps x 2 B.     Assessment    Pt continues to be functionally limited by hypertonicity LUE/ LLE, impaired L attention and impaired carryover/ motor planning. Gait unsafe at this time without second person assist however pt was more stable with alma delia-walker for standing/ transfers.    Strengths: Able to follow instructions,Willingly participates in therapeutic activities,Supportive family,Pleasant and cooperative,Motivated for self care and independence,Effective communication skills  Barriers: Decreased endurance,Emotional lability,Fatigue,Generalized weakness,Hemiplegia,Impaired activity tolerance,Impaired balance,Impaired functional cognition,Limited mobility,Spasticity    Plan    Sit<>stand sequencing/ transfer training, pre-gait/ gait training as able, ROM/ stretching/ spasticity management.     Passport items to be completed:  Get in/out of bed safely, in/out of a vehicle, safely use mobility device, walk or wheel around home/community, navigate up and down stairs, show how to get up/down from the ground, ensure home is accessible, demonstrate HEP, complete caregiver training      Physical Therapy Problems (Active)       Problem: Mobility       Dates: Start: 02/19/22         Goal: STG-Within one week, patient will ambulate 10ft with LBQC and min A.       Dates: Start: 02/19/22            Goal: STG-Within one week, patient will ascend and descend four stairs with mod A using R handrail.       Dates: Start: 02/19/22               Problem: Mobility Transfers       Dates: Start: 02/19/22         Goal: STG-Within one week, patient will perform bed mobility with  min A using hospital bed functions.       Dates: Start: 02/19/22            Goal: STG-Within one week, patient will sit to stand with min A from wheelchair.       Dates: Start: 02/19/22            Goal: STG-Within one week, patient will transfer bed to chair with min A.       Dates: Start: 02/19/22               Problem: PT-Long Term Goals       Dates: Start: 02/19/22         Goal: LTG-By discharge, patient will propel wheelchair 150ft with power versus manual chair and supervision.       Dates: Start: 02/19/22            Goal: LTG-By discharge, patient will ambulate 20ft with LBQC and CGA.       Dates: Start: 02/19/22            Goal: LTG-By discharge, patient will transfer one surface to another with CGA.       Dates: Start: 02/19/22            Goal: LTG-By discharge, patient will ambulate up/down flight of stairs with min A using R handrail.       Dates: Start: 02/19/22            Goal: LTG-By discharge, patient will transfer in/out of a car with min A.       Dates: Start: 02/19/22

## 2022-03-01 NOTE — PROGRESS NOTES
"Rehab Progress Note     Encounter Date: 3/1/2022    CC: Decreased mobility, spasticity    Interval Events (Subjective)  Patient sitting up in room. She reports she is doing well. She reports they are able to stretch her through her spasticity.  She reports improved sleep on Remeron.  Discussed with CM and therapy and concern about level of assist for car transfers.   may have difficulty with the transfer. Will have palliative consult to discuss with them about goals of care. Denies NVD. Denies SOB.     IDT Team Meeting 2/24/2022  DC/Disposition:  3/11/22    Objective:  VITAL SIGNS: /74   Pulse 74   Temp 36.5 °C (97.7 °F) (Oral)   Resp 20   Ht 1.702 m (5' 7\")   Wt 105 kg (231 lb 7.7 oz)   SpO2 96%   BMI 36.26 kg/m²   Gen: NAD  Psych: Mood and affect appropriate  CV: RRR, no edema  Resp: CTAB, no upper airway sounds  Abd: NTND  Neuro: AOx3, MAS 1/4 RUE  Unchanged from 2/28/22    No results found for this or any previous visit (from the past 72 hour(s)).    Current Facility-Administered Medications   Medication Frequency   • mirtazapine (Remeron) orally disintegrating tab 15 mg QHS   • ALPRAZolam (XANAX) tablet 0.5 mg Q EVENING   • ALPRAZolam (XANAX) tablet 0.5 mg QDAY PRN   • lisinopril (PRINIVIL) tablet 5 mg DAILY   • diphenhydrAMINE (BENADRYL) tablet/capsule 25 mg Q6HRS PRN   • tizanidine (ZANAFLEX) tablet 4 mg BID   • butalbital/apap/caffeine -40 mg (Fioricet) per tablet 1 Tablet Q4HRS PRN   • senna-docusate (PERICOLACE or SENOKOT S) 8.6-50 MG per tablet 2 Tablet BID PRN    And   • polyethylene glycol/lytes (MIRALAX) PACKET 1 Packet QDAY PRN    And   • magnesium hydroxide (MILK OF MAGNESIA) suspension 30 mL QDAY PRN    And   • bisacodyl (DULCOLAX) suppository 10 mg QDAY PRN   • vitamin D3 (cholecalciferol) tablet 1,000 Units DAILY   • Respiratory Therapy Consult Continuous RT   • hydrALAZINE (APRESOLINE) tablet 25 mg Q8HRS PRN   • acetaminophen (Tylenol) tablet 650 mg Q4HRS PRN   • " omeprazole (PRILOSEC) capsule 20 mg DAILY   • artificial tears ophthalmic solution 1 Drop PRN   • benzocaine-menthol (CEPACOL) lozenge 1 Lozenge Q2HRS PRN   • mag hydrox-al hydrox-simeth (MAALOX PLUS ES or MYLANTA DS) suspension 20 mL Q2HRS PRN   • ondansetron (ZOFRAN ODT) dispertab 4 mg 4X/DAY PRN    Or   • ondansetron (ZOFRAN) syringe/vial injection 4 mg 4X/DAY PRN   • traZODone (DESYREL) tablet 50 mg QHS PRN   • sodium chloride (OCEAN) 0.65 % nasal spray 2 Spray PRN   • oxyCODONE immediate-release (ROXICODONE) tablet 5 mg Q3HRS PRN    Or   • oxyCODONE immediate release (ROXICODONE) tablet 10 mg Q3HRS PRN   • midazolam (VERSED) 5 mg/mL (1 mL vial) PRN   • acetaminophen (TYLENOL) tablet 1,000 mg Q6HRS PRN   • amLODIPine (NORVASC) tablet 10 mg DAILY   • apixaban (ELIQUIS) tablet 5 mg BID   • brivaracetam (Briviact) tablet 100 mg BID   • hydrocortisone 1 % cream BID PRN   • lacosamide (VIMPAT) tablet 200 mg BID   • melatonin tablet 3 mg QHS   • venlafaxine (EFFEXOR) tablet 75 mg DAILY       Orders Placed This Encounter   Procedures   • Diet Order Diet: Regular     Standing Status:   Standing     Number of Occurrences:   1     Order Specific Question:   Diet:     Answer:   Regular [1]       Assessment:  Active Hospital Problems    Diagnosis    • *Glioblastoma of frontal lobe (HCC)    • GBM (glioblastoma multiforme) (HCC)    • Constipation    • Syncope    • Class 1 obesity due to excess calories with serious comorbidity and body mass index (BMI) of 34.0 to 34.9 in adult    • Gait instability    • History of pulmonary embolus (PE)    • Seizure disorder (HCC)    • Primary hypertension    • Mixed anxiety and depressive disorder        Medical Decision Making and Plan:  Seizure/GBM - Patient with syncopal event with AMS and weakness after concerning for seizure vs worsening GBM vs post-treatment changes. Oncology was consulted and recommended steroids then taper which she has completed. Neurology was consulted and  recommended MRI which showed diffuse posttreatment changes but no acute lesion. EEG showed non-specific changes and Neurology recommended increasing her Vimpat as her symptoms could have been post-ictal. Cardiac work-up negative.    -PT and OT for mobility and ADLs  -SLP for cognitive screen  -Continue Briviact 100 mg BID and Vimpat 200 mg BID     Spasticity - Followed by Dr. Steele, [facility], for Botox injection. Discussed with Dr. Steele. Will trial Tizanidine 4 mg BID   -Improved on Tizanidine. Started on Xanax QHS.      HTN - Patient on Amlodipine 10 mg daily. Into 150s, start Lisinopril 5 mg. Still into 140s. Increase to 10 mg     Anemia - Check AM CBC - 14.3, resolved.      Hypokalemia - Check AM CMP - 4.0, resolved.     Anxiety/Depression - Patient on PRN Xanax and Effexor 75 mg daily     Sleep - Patient with poor sleep. On Xanax QHS and add QHS Remeron    Morbid Obesity due to excess calories - BMI of 37.1 on admission. Dietitian to consult.      Back rash - Appears contact. Start Hydrocortisone cream. PO Benadryl    Constipation - Resolved, discontinue senna-docusate.     Vitamin D deficiency - 29 on admission. Started on 1000 U     Hx of PE - Patient on Eliquis BID.    Total time:  26 minutes.  I spent greater than 50% of the time for patient care, counseling, and coordination on this date, including unit/floor time, and face-to-face time with the patient as per interval events and assessment and plan above. Topics discussed included goals of care, palliative consult, elevated SBP and increase ACEi.     Lucrecia Sim M.D.

## 2022-03-01 NOTE — CARE PLAN
"The patient is Stable - Low risk of patient condition declining or worsening      Problem: Skin Integrity  Goal: Skin integrity is maintained or improved  Note: Patient's skin remains intact and free from new or accidental injury this shift.  Will continue to monitor.      Problem: Fall Risk - Rehab  Goal: Patient will remain free from falls  Note: [place] Fall risk Assessment Score: 19    High fall risk Interventions   - Bed and strip alarm   - Yellow sign by the door   - Yellow wrist band \"Fall risk\"  - Do not leave patient unattended in the bathroom  - Fall risk education provided       "

## 2022-03-01 NOTE — THERAPY
Occupational Therapy  Daily Treatment     Patient Name: Melba Frye  Age:  51 y.o., Sex:  female  Medical Record #: [id number]  Today's Date: 3/1/2022     Precautions  Precautions: (P) Fall Risk  Comments: (P) L alma delia    Safety   ADL Safety : Requires Physical Assist for Safety,Requires Supervision for Safety  Bathroom Safety: Requires Physical Assist for Safety,Requires Supervision for Safety    Subjective    Patient in bed upon arrival, agreeable to participate in OT.      Objective     03/01/22 1031   Precautions   Precautions Fall Risk   Comments L alma delia   Vitals   O2 Delivery Device None - Room Air   Functional Level of Assist   Bed, Chair, Wheelchair Transfer Total Assist  (2 person bed > w/c txfr for safety, cues for sequencing)   Interdisciplinary Plan of Care Collaboration   Patient Position at End of Therapy Seated;Self Releasing Lap Belt Applied;Call Light within Reach   OT Total Time Spent   OT Individual Total Time Spent (Mins) 30   OT Charge Group   OT Self Care / ADL 1   OT Neuromuscular Re-education / Balance 1     Vibration to L tricep w/ goal of inhibiting flexor synergy pattern.     Assessment    Patient tolerated OT session fair with focus on bed txfr and LUE neuro re-ed. Vibration applied to L tricep to inhibit significant flexor synergy pattern; vibration and stretching has short term benefits however improved tone does not sustain.   Strengths: Able to follow instructions,Effective communication skills,Independent prior level of function,Manages pain appropriately,Motivated for self care and independence,Pleasant and cooperative,Supportive family,Willingly participates in therapeutic activities  Barriers: Bladder incontinence,Decreased endurance,Fatigue,Generalized weakness,Hemiparesis,Impaired activity tolerance,Impaired balance,Limited mobility    Plan    ADL's, neuro re-ed for LUE, visual scanning all four quadrants, self-ROM for LUE, sitting/standing bal/tolerance, functional  transfers, TTB transfer      Passport items to be completed:  Perform bathroom transfers, complete dressing, complete feeding, get ready for the day, prepare a simple meal, participate in household tasks, adapt home for safety needs, demonstrate home exercise program, complete caregiver training     Occupational Therapy Goals (Active)       Problem: Dressing       Dates: Start: 02/19/22         Goal: STG-Within one week, patient will dress LB with total a x 1        Dates: Start: 02/19/22               Problem: Functional Transfers       Dates: Start: 02/19/22         Goal: STG-Within one week, patient will transfer to toilet with max a        Dates: Start: 02/19/22         Goal Note filed on 02/24/22 1148 by Yajaira Faith, OT       Continues to require 2 person transfer - modA x1, Josep x1                  Problem: OT Long Term Goals       Dates: Start: 02/19/22         Goal: LTG-By discharge, patient will complete basic self care tasks with min-mod a        Dates: Start: 02/19/22            Goal: LTG-By discharge, patient will perform bathroom transfers with min a        Dates: Start: 02/19/22               Problem: Toileting       Dates: Start: 02/19/22         Goal: STG-Within one week, patient will complete toileting tasks with total a x 1        Dates: Start: 02/19/22

## 2022-03-01 NOTE — THERAPY
"Speech Language Pathology  Daily Treatment     Patient Name: Melba Frye  Age:  51 y.o., Sex:  female  Medical Record #: [id number]  Today's Date: 3/1/2022     Precautions  Precautions: Fall Risk  Comments: L alma delia    Subjective    Patient was willing to participate.      Objective       03/01/22 0932   Cognition   Moderate Attention Minimal (4)   Visual Scanning / Cancellation Skills Moderate (3)   Written Arithmetic Moderate (3)   SLP Total Time Spent   SLP Individual Total Time Spent (Mins) 60   Treatment Charges   SLP Cognitive Skill Development First 15 Minutes 1   SLP Cognitive Skill Development Additional 15 Minutes 3       Assessment    Initiated \"who has more $\" task. Patient with varied performance on task ranging between min-max cues for both visual scanning and arithmetic. Frequent rest breaks needed throughout session.   When returned to room , patient attempted to stand from wheelchair without appropriate safety precautions. Provided education on use of breaks and call light.     Strengths: Able to follow instructions,Supportive family,Pleasant and cooperative,Willingly participates in therapeutic activities  Barriers: Impaired carryover of learning,Impaired functional cognition,Impaired insight/denial of deficits (anxiety)    Plan    Visual scanning, basic attention, safety awareness.       Speech Therapy Problems (Active)       Problem: Memory STGs       Dates: Start: 02/19/22         Goal: STG-Within one week, patient will recall daily events and safety strategies with 80% accuracy, given min verbal cues and use of memory book.        Dates: Start: 02/19/22               Problem: Problem Solving STGs       Dates: Start: 02/19/22         Goal: STG-Within one week, patient will complete basic math with 80% accuracy given min verbal cues.        Dates: Start: 02/19/22            Goal: STG-Within one week, patient will complete simple visual scanning tasks to address left neglect with 70% accuracy " provided MIN cues.        Dates: Start: 02/24/22               Problem: Speech/Swallowing LTGs       Dates: Start: 02/19/22         Goal: LTG-By discharge, patient will solve complex problem Neeraj for safe discharge home.       Dates: Start: 02/19/22

## 2022-03-02 LAB
ERYTHROCYTE [DISTWIDTH] IN BLOOD BY AUTOMATED COUNT: 52.2 FL (ref 35.9–50)
HCT VFR BLD AUTO: 37.6 % (ref 37–47)
HGB BLD-MCNC: 12.5 G/DL (ref 12–16)
MCH RBC QN AUTO: 34.6 PG (ref 27–33)
MCHC RBC AUTO-ENTMCNC: 33.2 G/DL (ref 33.6–35)
MCV RBC AUTO: 104.2 FL (ref 81.4–97.8)
PLATELET # BLD AUTO: 231 K/UL (ref 164–446)
PMV BLD AUTO: 9.8 FL (ref 9–12.9)
RBC # BLD AUTO: 3.61 M/UL (ref 4.2–5.4)
WBC # BLD AUTO: 6.6 K/UL (ref 4.8–10.8)

## 2022-03-02 PROCEDURE — 99233 SBSQ HOSP IP/OBS HIGH 50: CPT | Performed by: PHYSICAL MEDICINE & REHABILITATION

## 2022-03-02 PROCEDURE — 700102 HCHG RX REV CODE 250 W/ 637 OVERRIDE(OP): Performed by: PHYSICAL MEDICINE & REHABILITATION

## 2022-03-02 PROCEDURE — 97130 THER IVNTJ EA ADDL 15 MIN: CPT

## 2022-03-02 PROCEDURE — 97112 NEUROMUSCULAR REEDUCATION: CPT

## 2022-03-02 PROCEDURE — 90832 PSYTX W PT 30 MINUTES: CPT | Performed by: PSYCHOLOGIST

## 2022-03-02 PROCEDURE — A9270 NON-COVERED ITEM OR SERVICE: HCPCS | Performed by: PHYSICAL MEDICINE & REHABILITATION

## 2022-03-02 PROCEDURE — 97535 SELF CARE MNGMENT TRAINING: CPT

## 2022-03-02 PROCEDURE — 97530 THERAPEUTIC ACTIVITIES: CPT

## 2022-03-02 PROCEDURE — 770010 HCHG ROOM/CARE - REHAB SEMI PRIVAT*

## 2022-03-02 PROCEDURE — 36415 COLL VENOUS BLD VENIPUNCTURE: CPT

## 2022-03-02 PROCEDURE — 97116 GAIT TRAINING THERAPY: CPT

## 2022-03-02 PROCEDURE — 85027 COMPLETE CBC AUTOMATED: CPT

## 2022-03-02 PROCEDURE — 97129 THER IVNTJ 1ST 15 MIN: CPT

## 2022-03-02 RX ADMIN — LACOSAMIDE 200 MG: 100 TABLET, FILM COATED ORAL at 08:38

## 2022-03-02 RX ADMIN — BRIVARACETAM 100 MG: 50 TABLET, FILM COATED ORAL at 19:42

## 2022-03-02 RX ADMIN — LISINOPRIL 10 MG: 5 TABLET ORAL at 08:38

## 2022-03-02 RX ADMIN — OXYCODONE 5 MG: 5 TABLET ORAL at 20:46

## 2022-03-02 RX ADMIN — TIZANIDINE 4 MG: 4 TABLET ORAL at 08:38

## 2022-03-02 RX ADMIN — Medication 1000 UNITS: at 08:38

## 2022-03-02 RX ADMIN — APIXABAN 5 MG: 5 TABLET, FILM COATED ORAL at 08:38

## 2022-03-02 RX ADMIN — LACOSAMIDE 200 MG: 100 TABLET, FILM COATED ORAL at 19:42

## 2022-03-02 RX ADMIN — ALPRAZOLAM 0.5 MG: 0.5 TABLET ORAL at 16:20

## 2022-03-02 RX ADMIN — AMLODIPINE BESYLATE 10 MG: 5 TABLET ORAL at 08:37

## 2022-03-02 RX ADMIN — VENLAFAXINE HYDROCHLORIDE 75 MG: 37.5 TABLET ORAL at 08:37

## 2022-03-02 RX ADMIN — BRIVARACETAM 100 MG: 50 TABLET, FILM COATED ORAL at 08:38

## 2022-03-02 RX ADMIN — APIXABAN 5 MG: 5 TABLET, FILM COATED ORAL at 19:41

## 2022-03-02 RX ADMIN — TIZANIDINE 4 MG: 4 TABLET ORAL at 19:42

## 2022-03-02 RX ADMIN — MELATONIN TAB 3 MG 3 MG: 3 TAB at 19:42

## 2022-03-02 RX ADMIN — DIPHENHYDRAMINE HCL 25 MG: 25 TABLET ORAL at 16:20

## 2022-03-02 RX ADMIN — BUTALBITAL, ACETAMINOPHEN, AND CAFFEINE 1 TABLET: 50; 325; 40 TABLET ORAL at 09:49

## 2022-03-02 RX ADMIN — OMEPRAZOLE 20 MG: 20 CAPSULE, DELAYED RELEASE ORAL at 08:38

## 2022-03-02 RX ADMIN — MIRTAZAPINE 15 MG: 15 TABLET, ORALLY DISINTEGRATING ORAL at 19:42

## 2022-03-02 ASSESSMENT — ACTIVITIES OF DAILY LIVING (ADL)
TOILET_TRANSFER_DESCRIPTION: ADAPTIVE EQUIPMENT;GRAB BAR;INCREASED TIME;INITIAL PREPARATION FOR TASK;REQUIRES LIFT;SET-UP OF EQUIPMENT;SUPERVISION FOR SAFETY;VERBAL CUEING
TOILET_TRANSFER_DESCRIPTION: ADAPTIVE EQUIPMENT;GRAB BAR;INCREASED TIME;REQUIRES LIFT

## 2022-03-02 ASSESSMENT — GAIT ASSESSMENTS
ASSISTIVE DEVICE: HEMI-WALKER
DISTANCE (FEET): 15
DEVIATION: STEP TO;DECREASED HEEL STRIKE;DECREASED TOE OFF
GAIT LEVEL OF ASSIST: MODERATE ASSIST

## 2022-03-02 ASSESSMENT — PAIN DESCRIPTION - PAIN TYPE
TYPE: ACUTE PAIN
TYPE: ACUTE PAIN

## 2022-03-02 NOTE — THERAPY
Speech Language Pathology  Daily Treatment     Patient Name: Melba Frye  Age:  51 y.o., Sex:  female  Medical Record #: [id number]  Today's Date: 3/2/2022     Precautions  Precautions: Fall Risk  Comments: L alma delia    Subjective    Pt pleasant and cooperative, often hard on herself when making errors, constant reassurance and encouragement required.      Objective       03/02/22 1003   SLP Total Time Spent   SLP Individual Total Time Spent (Mins) 60   Treatment Charges   SLP Cognitive Skill Development First 15 Minutes 1   SLP Cognitive Skill Development Additional 15 Minutes 3       Assessment    Visual scanning tasks presented to pt with use of following written directions with increasing complexity. Pt initially presented with one step written directions and pt correctly completed 5/8 indep, increased to 8/8 with MIN cues. Pt then repeated similar activity with single step directions and completed 7/8 indep, increased to 8/8 with min cues. Activity increased to two step directions, pt completed 4/8 with MIN cues and 8/8 with MOD to MAX cues, errors were all in form of omitting initial instruction which was located on the left side of the page.   Strengths: Able to follow instructions,Supportive family,Pleasant and cooperative,Willingly participates in therapeutic activities  Barriers: Impaired carryover of learning,Impaired functional cognition,Impaired insight/denial of deficits (anxiety)    Plan    Attention, safety and functional problem solving     Speech Therapy Problems (Active)       Problem: Memory STGs       Dates: Start: 02/19/22         Goal: STG-Within one week, patient will recall daily events and safety strategies with 80% accuracy, given min verbal cues and use of memory book.        Dates: Start: 02/19/22               Problem: Problem Solving STGs       Dates: Start: 02/19/22         Goal: STG-Within one week, patient will complete basic math with 80% accuracy given min verbal cues.         Dates: Start: 02/19/22            Goal: STG-Within one week, patient will complete simple visual scanning tasks to address left neglect with 70% accuracy provided MIN cues.        Dates: Start: 02/24/22               Problem: Speech/Swallowing LTGs       Dates: Start: 02/19/22         Goal: LTG-By discharge, patient will solve complex problem Neeraj for safe discharge home.       Dates: Start: 02/19/22

## 2022-03-02 NOTE — PROGRESS NOTES
"REHABILITATION PSYCHOLOGY FOLLOW-UP:  Reason for admission: GBM (glioblastoma multiforme) (HCC) [C71.9]  Length of Visit: 18 minutes    Chief Complaint: Hx of depression, anxiety, adjustment to illness    S: Met with the patient for brief individual psychotherapy. Patient presented with a somewhat flat affect and reported a \"very tired\" mood. Pt expressed concerns surrounding discharge and stated that she was worried that she will have to leave [facility] due to failure to progress. Session focused on ways to manage anxiety through reorienting attention to present moment and things where pt has some control.    O: Psychiatric Examination:  Vitals: Blood pressure 121/73, pulse 75, temperature 36.4 °C (97.6 °F), temperature source Oral, resp. rate 18, height 1.702 m (5' 7\"), weight 105 kg (231 lb 7.7 oz), SpO2 95 %, not currently breastfeeding.  Musculoskeletal: Given dx, normal psychomotor activity, no tics or unusual mannerisms noted  Appearance and Eye Contact: Easily established rapport WDWN, appropriate dress and grooming. Behavior is calm, cooperative,  appropriate eye contact  Attention/Alertness: Alert  Thought Process: Linear, Logical and Goal Directed    Thought Content: No psychotic processes noted  Speech: Clear with normal rate and rhythm  Mood: \"okay\"            Affect: somewhat dysphoric         SI/HI: Denies     Memory: Recent and remote memory appear intact     Orientation: alert, oriented to person, place and time  Insight into symptoms: good  Judgement into symptoms:good     Neuropsychological Testing:   Not formally tested.              ASSESSMENT: Melba Frye is a 51 y.o. female with a past medical history of right posterior fronto-parietal glioblastoma multiforme s/p right cranioplasty for resection (6/2021, biopsy 3/2021) at [facility], with seizures, w/ secondary L sided weakness w/ tone, radiation and chemotherapy with temozolomide (last dose 11/2021), focal seizures (on Vimpat and Briviact), " migraine, hyperlipidemia, PE with cor pulmonale (on Eliquis), anxiety, and depression  ;  who presented on 2/9/2022  1:53 PM after multiple ground level falls likely due to a combination of hypoxia, seizure activity, and vasogenic edema of the brain. Per documentation, on Wednesday 2/9/22 she was walking back from the bathroom with her caregiver when she reported feeling tired, dizzy, and needed to sit. Her caregiver turned to get a chair at which time the patient fell forward to the ground without catching herself. EMS was notified and upon arrival noted the patient was hypoxic with SpO2 85% on RA. She had a period of about 15 minutes of altered awareness/consciousness, and does not recall the events.     Psychology was consulted due to pt hx of depression and anxiety as well as helping with adjustment to diagnosis as well as stress management and caregiver stress for spouse.      Pt was somewhat tearful when discussing course of treatment and diagnosis. Pt would benefit from EBT such as ACT to support depressive sxs and anxiety associated with managing cancer.      DSM5 Diagnostic Considerations: Adj d/o with mixed anxiety and depressed mood        PLAN:  Records reviewed: Yes  Discussed patient with other provider: Roney  Will continue to follow  Thank you for the consult.     Brandy Hines, Ph.D. PhD

## 2022-03-02 NOTE — THERAPY
Physical Therapy   Daily Treatment     Patient Name: Melba Frye  Age:  51 y.o., Sex:  female  Medical Record #: [id number]  Today's Date: 3/2/2022     Precautions  Precautions: Fall Risk  Comments: L rolando    Subjective    Pt resting in bed, spouse present for encouragement     Objective       03/02/22 1331   Gait Functional Level of Assist    Gait Level Of Assist Moderate Assist  (2 person for wc follow for safety)   Assistive Device Rolando-Walker  (L AFO/ gait belt)   Distance (Feet) 15  (in addition to 5 ft x 2 at edge of mat)   # of Times Distance was Traveled 1   Deviation Step To;Decreased Heel Strike;Decreased Toe Off  (spastic L rolando-gait/ significant extension tone LLE)   Transfer Functional Level of Assist   Bed, Chair, Wheelchair Transfer Moderate Assist  (second person for safety)   Bed Chair Wheelchair Transfer Description Adaptive equipment;Assist with one limb;Increased time;Requires lift;Set-up of equipment;Verbal cueing   Toilet Transfers Moderate Assist   Toilet Transfer Description Adaptive equipment;Grab bar;Increased time;Requires lift   Sitting Lower Body Exercises   Long Arc Quad 1 set of 10   Marching 3 sets of 10   Neuro-Muscular Treatments   Neuro-Muscular Treatments Anterior weight shift;Postural Facilitation;Sequencing;Vibration;Weight Shift Right;Weight Shift Left   Comments sitting balance/ reaching and visual scanning for cones/ standing balance, reaching activity and visual scanning for cones, CGA/ min A and verbal cues for standing balance with rolando-walker.   PT Total Time Spent   PT Individual Total Time Spent (Mins) 60   PT Charge Group   PT Gait Training 1   PT Neuromuscular Re-Education / Balance 2   PT Therapeutic Activities 1       Assessment    Pt improving ability to stand and maintain balance with RUE support at grab bar for toileting tasks. Improved stability and gait today with use of hemiwalker but continues to require 2 person A for safety. Spasticity and L  inattention remain barriers to functional progress.    Strengths: Able to follow instructions,Willingly participates in therapeutic activities,Supportive family,Pleasant and cooperative,Motivated for self care and independence,Effective communication skills  Barriers: Decreased endurance,Emotional lability,Fatigue,Generalized weakness,Hemiplegia,Impaired activity tolerance,Impaired balance,Impaired functional cognition,Limited mobility,Spasticity    Plan      Sit<>stand sequencing/ transfer training, pre-gait/ gait training as able, ROM/ stretching/ spasticity management.     Passport items to be completed:  Get in/out of bed safely, in/out of a vehicle, safely use mobility device, walk or wheel around home/community, navigate up and down stairs, show how to get up/down from the ground, ensure home is accessible, demonstrate HEP, complete caregiver training    Physical Therapy Problems (Active)       Problem: Mobility       Dates: Start: 02/19/22         Goal: STG-Within one week, patient will ambulate 10ft with LBQC and min A.       Dates: Start: 02/19/22            Goal: STG-Within one week, patient will ascend and descend four stairs with mod A using R handrail.       Dates: Start: 02/19/22               Problem: Mobility Transfers       Dates: Start: 02/19/22         Goal: STG-Within one week, patient will perform bed mobility with min A using hospital bed functions.       Dates: Start: 02/19/22            Goal: STG-Within one week, patient will sit to stand with min A from wheelchair.       Dates: Start: 02/19/22            Goal: STG-Within one week, patient will transfer bed to chair with min A.       Dates: Start: 02/19/22               Problem: PT-Long Term Goals       Dates: Start: 02/19/22         Goal: LTG-By discharge, patient will propel wheelchair 150ft with power versus manual chair and supervision.       Dates: Start: 02/19/22            Goal: LTG-By discharge, patient will ambulate 20ft with LBQC  and CGA.       Dates: Start: 02/19/22            Goal: LTG-By discharge, patient will transfer one surface to another with CGA.       Dates: Start: 02/19/22            Goal: LTG-By discharge, patient will ambulate up/down flight of stairs with min A using R handrail.       Dates: Start: 02/19/22            Goal: LTG-By discharge, patient will transfer in/out of a car with min A.       Dates: Start: 02/19/22

## 2022-03-02 NOTE — THERAPY
Occupational Therapy  Daily Treatment     Patient Name: Melba Frye  Age:  51 y.o., Sex:  female  Medical Record #: [id number]  Today's Date: 3/2/2022     Precautions  Precautions: Fall Risk  Comments: L alma delia    Safety   ADL Safety : Requires Physical Assist for Safety,Requires Supervision for Safety  Bathroom Safety: Requires Physical Assist for Safety,Requires Supervision for Safety    Subjective    Pt up in w/c upon arrival, agreeable to OT session. Pt was intermittently tearful throughout session feeling overwhelmed and burned out with everything she has been/is going through.      Objective     03/02/22 0831   Functional Level of Assist   Grooming Supervision;Seated  (oral care)   Grooming Description Increased time;Initial preparation for task;Seated in wheelchair at sink   Toileting Maximal Assist   Toileting Description Assist to pull pants up;Assist to pull pants down;Assist for standing balance;Grab bar;Increased time;Set-up of equipment;Initial preparation for task;Supervision for safety;Verbal cueing   Toilet Transfers Moderate Assist   Toilet Transfer Description Adaptive equipment;Grab bar;Increased time;Initial preparation for task;Requires lift;Set-up of equipment;Supervision for safety;Verbal cueing   Interdisciplinary Plan of Care Collaboration   Patient Position at End of Therapy Seated;Self Releasing Lap Belt Applied;Tray Table within Reach;Phone within Reach;Call Light within Reach   OT Total Time Spent   OT Individual Total Time Spent (Mins) 60   OT Charge Group   OT Self Care / ADL 1   OT Neuromuscular Re-education / Balance 2   OT Therapy Activity 1     LUE K-taping completed in circumferential pattern from the proximal humerus to the spine of the scapula and one piece from the acromion to the posterior humeral head to promote improved positioning and decreased pain. Pt reported the tape felt good after being applied and reported no discomfort. Instructed pt to alert staff if the tape  or position becomes uncomfortable; pt verbalized understanding.     STS x4 from w/c with modA and use of quad cane followed by pt standing in place as long as tolerated to increase standing tolerance and weightbearing through BLEs.     Visual scanning activity at whiteboard having pt identify letters in alphabetical order. Initially attempted to have pt complete in standing to also focus on standing tolerance but pt was unable to maintain balance/standing and dual task while visually scanning board. Pt reported feeling dizzy upon first two standing trials; /92, HR 88 bpm then /79, HR 90 bpm and O2 95%.     Assessment    Pt tolerated OT session fair. Was intermittently tearful throughout session and had some dizziness limiting standing activities. Pt had overall improved visual scanning compared to last time this activity was performed but still needed verbal cues and increased time to locate correct letters. Continues to have impaired seated and standing weight shifting due to spasticity. Demo'd improvement with toileting needing one person mod-max assist for both transfer and toileting tasks. Pt was able to perform hygiene after urinating and partially pull pants up but required assist to doff and remainder of pulling up due to impaired balance and standing tolerance.     Strengths: Able to follow instructions,Effective communication skills,Independent prior level of function,Manages pain appropriately,Motivated for self care and independence,Pleasant and cooperative,Supportive family,Willingly participates in therapeutic activities  Barriers: Bladder incontinence,Decreased endurance,Fatigue,Generalized weakness,Hemiparesis,Impaired activity tolerance,Impaired balance,Limited mobility    Plan    ADL's, neuro re-ed for LUE, visual scanning all four quadrants, self-ROM for LUE, sitting/standing bal/tolerance, functional transfers, TTB transfer     Passport items to be completed:  Perform bathroom  transfers, complete dressing, complete feeding, get ready for the day, prepare a simple meal, participate in household tasks, adapt home for safety needs, demonstrate home exercise program, complete caregiver training     Occupational Therapy Goals (Active)       Problem: Dressing       Dates: Start: 02/19/22         Goal: STG-Within one week, patient will dress LB with total a x 1        Dates: Start: 02/19/22               Problem: Functional Transfers       Dates: Start: 02/19/22         Goal: STG-Within one week, patient will transfer to toilet with max a        Dates: Start: 02/19/22         Goal Note filed on 02/24/22 1148 by Yajaira Faith, OT       Continues to require 2 person transfer - modA x1, Josep x1                  Problem: OT Long Term Goals       Dates: Start: 02/19/22         Goal: LTG-By discharge, patient will complete basic self care tasks with min-mod a        Dates: Start: 02/19/22            Goal: LTG-By discharge, patient will perform bathroom transfers with min a        Dates: Start: 02/19/22               Problem: Toileting       Dates: Start: 02/19/22         Goal: STG-Within one week, patient will complete toileting tasks with total a x 1        Dates: Start: 02/19/22

## 2022-03-02 NOTE — CARE PLAN
The patient is Watcher - Medium risk of patient condition declining or worsening    Shift Goals  Clinical Goals: Pain control, encourage pt to sit up in wc  Patient Goals: Rest    Progress made toward(s) clinical / shift goals:  Pain medications administered as ordered, patient sat up in wc in between therapy sessions.    Patient is not progressing towards the following goals:      Problem: Knowledge Deficit - Standard  Goal: Patient and family/care givers will demonstrate understanding of plan of care, disease process/condition, diagnostic tests and medications  Outcome: Not Progressing  Note: Patient requires reinforcement of previously explained directions. Patient uses call light and waits for assistance of staff.      Problem: Mobility  Goal: Patient's capacity to carry out activities will improve  Outcome: Not Progressing  Note: Patient requires 2 person max assist with transfers due to left knee buckling. Patient was assisted back into wheelchair due to unsafe transferring with nurse and speech therapy. Nursing assisted CNA with transfer of patient into bed.     Problem: Skin Integrity  Goal: Skin integrity is maintained or improved  Outcome: Progressing  Note: Skin remains intact, incision to scalp is healed, Probes to scalp are affixed and connected to stimulator.     Problem: Pain - Standard  Goal: Alleviation of pain or a reduction in pain to the patient’s comfort goal  Outcome: Progressing  Note: Patient requests pain medication PRN for head/neck pain.

## 2022-03-02 NOTE — PROGRESS NOTES
"Rehab Progress Note     Encounter Date: 3/2/2022    CC: Decreased mobility, spasticity    Interval Events (Subjective)  Patient sitting up in room. She reports therapy is going well but would be easier if she had additional equipment for her cranial stimulation that she has at home. Discussed should ask her  to bring it in. Still requiring modA for transfers with OT this morning. Otherwise she is doing well. Denies NVD. Denies SOB.     IDT Team Meeting 2/24/2022  DC/Disposition:  3/11/22    Objective:  VITAL SIGNS: /72   Pulse 82   Temp 36.6 °C (97.8 °F) (Oral)   Resp 18   Ht 1.702 m (5' 7\")   Wt 105 kg (231 lb 7.7 oz)   SpO2 94%   BMI 36.26 kg/m²   Gen: NAD  Psych: Mood and affect appropriate  CV: RRR, no edema  Resp: CTAB, no upper airway sounds  Abd: NTND  Neuro: AOx4, following commands    Recent Results (from the past 72 hour(s))   CBC WITHOUT DIFFERENTIAL    Collection Time: 03/02/22  5:36 AM   Result Value Ref Range    WBC 6.6 4.8 - 10.8 K/uL    RBC 3.61 (L) 4.20 - 5.40 M/uL    Hemoglobin 12.5 12.0 - 16.0 g/dL    Hematocrit 37.6 37.0 - 47.0 %    .2 (H) 81.4 - 97.8 fL    MCH 34.6 (H) 27.0 - 33.0 pg    MCHC 33.2 (L) 33.6 - 35.0 g/dL    RDW 52.2 (H) 35.9 - 50.0 fL    Platelet Count 231 164 - 446 K/uL    MPV 9.8 9.0 - 12.9 fL       Current Facility-Administered Medications   Medication Frequency   • lisinopril (PRINIVIL) tablet 10 mg DAILY   • mirtazapine (Remeron) orally disintegrating tab 15 mg QHS   • ALPRAZolam (XANAX) tablet 0.5 mg Q EVENING   • ALPRAZolam (XANAX) tablet 0.5 mg QDAY PRN   • diphenhydrAMINE (BENADRYL) tablet/capsule 25 mg Q6HRS PRN   • tizanidine (ZANAFLEX) tablet 4 mg BID   • butalbital/apap/caffeine -40 mg (Fioricet) per tablet 1 Tablet Q4HRS PRN   • senna-docusate (PERICOLACE or SENOKOT S) 8.6-50 MG per tablet 2 Tablet BID PRN    And   • polyethylene glycol/lytes (MIRALAX) PACKET 1 Packet QDAY PRN    And   • magnesium hydroxide (MILK OF MAGNESIA) suspension " 30 mL QDAY PRN    And   • bisacodyl (DULCOLAX) suppository 10 mg QDAY PRN   • vitamin D3 (cholecalciferol) tablet 1,000 Units DAILY   • Respiratory Therapy Consult Continuous RT   • hydrALAZINE (APRESOLINE) tablet 25 mg Q8HRS PRN   • acetaminophen (Tylenol) tablet 650 mg Q4HRS PRN   • omeprazole (PRILOSEC) capsule 20 mg DAILY   • artificial tears ophthalmic solution 1 Drop PRN   • benzocaine-menthol (CEPACOL) lozenge 1 Lozenge Q2HRS PRN   • mag hydrox-al hydrox-simeth (MAALOX PLUS ES or MYLANTA DS) suspension 20 mL Q2HRS PRN   • ondansetron (ZOFRAN ODT) dispertab 4 mg 4X/DAY PRN    Or   • ondansetron (ZOFRAN) syringe/vial injection 4 mg 4X/DAY PRN   • traZODone (DESYREL) tablet 50 mg QHS PRN   • sodium chloride (OCEAN) 0.65 % nasal spray 2 Spray PRN   • oxyCODONE immediate-release (ROXICODONE) tablet 5 mg Q3HRS PRN    Or   • oxyCODONE immediate release (ROXICODONE) tablet 10 mg Q3HRS PRN   • midazolam (VERSED) 5 mg/mL (1 mL vial) PRN   • acetaminophen (TYLENOL) tablet 1,000 mg Q6HRS PRN   • amLODIPine (NORVASC) tablet 10 mg DAILY   • apixaban (ELIQUIS) tablet 5 mg BID   • brivaracetam (Briviact) tablet 100 mg BID   • hydrocortisone 1 % cream BID PRN   • lacosamide (VIMPAT) tablet 200 mg BID   • melatonin tablet 3 mg QHS   • venlafaxine (EFFEXOR) tablet 75 mg DAILY       Orders Placed This Encounter   Procedures   • Diet Order Diet: Regular     Standing Status:   Standing     Number of Occurrences:   1     Order Specific Question:   Diet:     Answer:   Regular [1]       Assessment:  Active Hospital Problems    Diagnosis    • *Glioblastoma of frontal lobe (HCC)    • GBM (glioblastoma multiforme) (HCC)    • Constipation    • Syncope    • Class 1 obesity due to excess calories with serious comorbidity and body mass index (BMI) of 34.0 to 34.9 in adult    • Gait instability    • History of pulmonary embolus (PE)    • Seizure disorder (HCC)    • Primary hypertension    • Mixed anxiety and depressive disorder         Medical Decision Making and Plan:  Seizure/GBM - Patient with syncopal event with AMS and weakness after concerning for seizure vs worsening GBM vs post-treatment changes. Oncology was consulted and recommended steroids then taper which she has completed. Neurology was consulted and recommended MRI which showed diffuse posttreatment changes but no acute lesion. EEG showed non-specific changes and Neurology recommended increasing her Vimpat as her symptoms could have been post-ictal. Cardiac work-up negative.    -PT and OT for mobility and ADLs  -SLP for cognitive screen  -Continue Briviact 100 mg BID and Vimpat 200 mg BID     Spasticity - Followed by Dr. Steele, [facility], for Botox injection. Discussed with Dr. Steele. Will trial Tizanidine 4 mg BID   -Improved on Tizanidine. Started on Xanax QHS.      HTN - Patient on Amlodipine 10 mg daily. Into 150s, start Lisinopril 5 mg. Still into 140s. Increase to 10 mg. Improved 120s.      Anemia - Check AM CBC - 14.3, resolved.      Hypokalemia - Check AM CMP - 4.0, resolved.     Anxiety/Depression - Patient on PRN Xanax and Effexor 75 mg daily. Psychology to consult     Sleep - Patient with poor sleep. On Xanax QHS and add QHS Remeron    Morbid Obesity due to excess calories - BMI of 37.1 on admission. Dietitian to consult.      Back rash - Appears contact. Start Hydrocortisone cream. PO Benadryl. Discontinue cream, improved    Constipation - Resolved, discontinue senna-docusate.     Vitamin D deficiency - 29 on admission. Started on 1000 U     Hx of PE - Patient on Eliquis BID.    Dispo - Patient very heavy assistance on transfer. Unclear if family will be able to care for her with this level of assistance. Palliative to consult for discussion of goals of care at this time.     Total time:  36 minutes.  I spent greater than 50% of the time for patient care, counseling, and coordination on this date, including unit/floor time, and face-to-face time with the  patient as per interval events and assessment and plan above. Topics discussed included discharge planning, goals of care, improved SBP, and discontinue hydrocortisone cream.     Lucrecia Sim M.D.

## 2022-03-03 PROCEDURE — 97129 THER IVNTJ 1ST 15 MIN: CPT

## 2022-03-03 PROCEDURE — 770010 HCHG ROOM/CARE - REHAB SEMI PRIVAT*

## 2022-03-03 PROCEDURE — 700102 HCHG RX REV CODE 250 W/ 637 OVERRIDE(OP): Performed by: PHYSICAL MEDICINE & REHABILITATION

## 2022-03-03 PROCEDURE — 99233 SBSQ HOSP IP/OBS HIGH 50: CPT | Performed by: PHYSICAL MEDICINE & REHABILITATION

## 2022-03-03 PROCEDURE — 97110 THERAPEUTIC EXERCISES: CPT

## 2022-03-03 PROCEDURE — 97130 THER IVNTJ EA ADDL 15 MIN: CPT

## 2022-03-03 PROCEDURE — A9270 NON-COVERED ITEM OR SERVICE: HCPCS | Performed by: PHYSICAL MEDICINE & REHABILITATION

## 2022-03-03 PROCEDURE — 97530 THERAPEUTIC ACTIVITIES: CPT

## 2022-03-03 PROCEDURE — 97112 NEUROMUSCULAR REEDUCATION: CPT

## 2022-03-03 PROCEDURE — 97535 SELF CARE MNGMENT TRAINING: CPT

## 2022-03-03 RX ORDER — TIZANIDINE 4 MG/1
4 TABLET ORAL 3 TIMES DAILY
Status: DISCONTINUED | OUTPATIENT
Start: 2022-03-03 | End: 2022-03-11 | Stop reason: HOSPADM

## 2022-03-03 RX ADMIN — MIRTAZAPINE 15 MG: 15 TABLET, ORALLY DISINTEGRATING ORAL at 21:11

## 2022-03-03 RX ADMIN — BRIVARACETAM 100 MG: 50 TABLET, FILM COATED ORAL at 21:11

## 2022-03-03 RX ADMIN — MELATONIN TAB 3 MG 3 MG: 3 TAB at 21:11

## 2022-03-03 RX ADMIN — BRIVARACETAM 100 MG: 50 TABLET, FILM COATED ORAL at 08:05

## 2022-03-03 RX ADMIN — VENLAFAXINE HYDROCHLORIDE 75 MG: 37.5 TABLET ORAL at 08:06

## 2022-03-03 RX ADMIN — LACOSAMIDE 200 MG: 100 TABLET, FILM COATED ORAL at 21:11

## 2022-03-03 RX ADMIN — OXYCODONE 5 MG: 5 TABLET ORAL at 19:00

## 2022-03-03 RX ADMIN — APIXABAN 5 MG: 5 TABLET, FILM COATED ORAL at 21:11

## 2022-03-03 RX ADMIN — Medication 1000 UNITS: at 08:06

## 2022-03-03 RX ADMIN — LISINOPRIL 10 MG: 5 TABLET ORAL at 08:07

## 2022-03-03 RX ADMIN — TIZANIDINE 4 MG: 4 TABLET ORAL at 16:30

## 2022-03-03 RX ADMIN — DIPHENHYDRAMINE HCL 25 MG: 25 TABLET ORAL at 16:35

## 2022-03-03 RX ADMIN — LACOSAMIDE 200 MG: 100 TABLET, FILM COATED ORAL at 08:18

## 2022-03-03 RX ADMIN — TIZANIDINE 4 MG: 4 TABLET ORAL at 21:10

## 2022-03-03 RX ADMIN — AMLODIPINE BESYLATE 10 MG: 5 TABLET ORAL at 08:11

## 2022-03-03 RX ADMIN — OMEPRAZOLE 20 MG: 20 CAPSULE, DELAYED RELEASE ORAL at 08:06

## 2022-03-03 RX ADMIN — DIPHENHYDRAMINE HCL 25 MG: 25 TABLET ORAL at 08:37

## 2022-03-03 RX ADMIN — APIXABAN 5 MG: 5 TABLET, FILM COATED ORAL at 08:06

## 2022-03-03 RX ADMIN — ALPRAZOLAM 0.5 MG: 0.5 TABLET ORAL at 17:28

## 2022-03-03 RX ADMIN — TIZANIDINE 4 MG: 4 TABLET ORAL at 08:06

## 2022-03-03 ASSESSMENT — ACTIVITIES OF DAILY LIVING (ADL)
TOILETING_LEVEL_OF_ASSIST_DESCRIPTION: ASSIST TO PULL PANTS UP;ASSIST TO PULL PANTS DOWN;ASSIST FOR STANDING BALANCE;GRAB BAR;INCREASED TIME;INITIAL PREPARATION FOR TASK;SET-UP OF EQUIPMENT;SUPERVISION FOR SAFETY;VERBAL CUEING
BED_CHAIR_WHEELCHAIR_TRANSFER_DESCRIPTION: ADAPTIVE EQUIPMENT;INCREASED TIME;REQUIRES LIFT;SET-UP OF EQUIPMENT;VERBAL CUEING
TOILET_TRANSFER_DESCRIPTION: GRAB BAR;INCREASED TIME;REQUIRES LIFT;VERBAL CUEING
TOILET_TRANSFER_DESCRIPTION: ADAPTIVE EQUIPMENT;GRAB BAR;INCREASED TIME;INITIAL PREPARATION FOR TASK;REQUIRES LIFT;SET-UP OF EQUIPMENT;SUPERVISION FOR SAFETY;VERBAL CUEING
TUB_SHOWER_TRANSFER_DESCRIPTION: ADAPTIVE EQUIPMENT;GRAB BAR;SHOWER BENCH;INCREASED TIME;INITIAL PREPARATION FOR TASK;REQUIRES LIFT;SET-UP OF EQUIPMENT;SUPERVISION FOR SAFETY;VERBAL CUEING
BED_CHAIR_WHEELCHAIR_TRANSFER_DESCRIPTION: ADAPTIVE EQUIPMENT;INCREASED TIME;INITIAL PREPARATION FOR TASK;SET-UP OF EQUIPMENT;SUPERVISION FOR SAFETY;VERBAL CUEING;REQUIRES LIFT

## 2022-03-03 ASSESSMENT — GAIT ASSESSMENTS
DISTANCE (FEET): 10
DEVIATION: STEP TO;DECREASED HEEL STRIKE;DECREASED TOE OFF
ASSISTIVE DEVICE: HEMI-WALKER
GAIT LEVEL OF ASSIST: MAXIMAL ASSIST

## 2022-03-03 NOTE — PALLIATIVE CARE
Palliative Care follow-up  Visited spouse Kar in [facility], he spoke with pt and they would like to decline a PC consult at this time. They had an extensive GOC discussion on 3/14/21. Pt's goals are to continue treatment, therapies and interventions based on her values and preferences. Goals have not change since last consult.     Will cancel consult. PC team available to meet with pt and spouse again if they are open to discussing goals again.     Active listening, education, validation, normalization, therapeutic touch, and emotional support provided.     Updated:   Dr. Sim      Thank you for allowing Palliative Care to participate in this patient's care. Please feel free to call [phone] with any questions or concerns.

## 2022-03-03 NOTE — THERAPY
"Recreational Therapy  Daily Treatment     Patient Name: Melba Frye  AGE:  51 y.o., SEX:  female  Medical Record #: [id number]  Today's Date: 3/2/2022       Subjective    Pt reporting that she was having a \"good\" day.      Objective       03/02/22 1501   Functional Ability Status - Cognitive   Attention Span Remains on Task   Comprehension Follows Two Step Commands   Judgment Able to Make Independent Decisions   Functional Ability Status - Emotional    Affect Appropriate;Bright   Mood Appropriate   Behavior Appropriate   Skilled Intervention    Skilled Intervention Relaxation / Coping Skills;Community Skills;Leisure Education    Skilled Intervention Comments Community reintegration and education on adaptive and paralympic sport   Interdisciplinary Plan of Care Collaboration   IDT Collaboration with  Family / Caregiver   Patient Position at End of Therapy In Bed;Family / Friend in Room;Tray Table within Reach;Call Light within Reach   Collaboration Comments SO attended the second half of session and assisted in transfer bed to  to toilet and back   Strengths & Barriers   Strengths Able to follow instructions;Alert and oriented;Motivated for self care and independence;Pleasant and cooperative;Supportive family;Willingly participates in therapeutic activities   Barriers Fatigue;Generalized weakness;Hemiparesis;Impaired balance;Impaired activity tolerance   Treatment Time   Total Time Spent (mins) 30   Procedural Tracking   Procedural Tracking Community Re-Integration;Leisure Skills Awareness;Leisure Skills Development;Gross Motor Functional Leisure Skills       Assessment    Pt was assisted in the restroom with a two person transfer. Second person for safety. Pt showing improvement in LLE movement during transfers. Community reintegration class complete.     Strengths: (P) Able to follow instructions,Alert and oriented,Motivated for self care and independence,Pleasant and cooperative,Supportive family,Willingly " participates in therapeutic activities  Barriers: (P) Fatigue,Generalized weakness,Hemiparesis,Impaired balance,Impaired activity tolerance    Plan    Positive coping skills both during stay and post dc.

## 2022-03-03 NOTE — CARE PLAN
Problem: Pain - Standard  Goal: Alleviation of pain or a reduction in pain to the patient’s comfort goal  Note: Medicated per request for pain with good effect.Repositioned with pillows for comfort.Will continue to monitor and assess pain level and medicate as needed.     Problem: Bladder / Voiding  Goal: Patient will establish and maintain regular urinary output  Note: Pt remains incontinent of bladder.Denies any discomfort or dysuria, afebrile.Will continue to monitor.

## 2022-03-03 NOTE — PROGRESS NOTES
"Rehab Progress Note     Encounter Date: 3/3/2022    CC: Decreased mobility, spasticity    Interval Events (Subjective)  Patient sitting up in room. She reports therapy is going well; she just got done playing [employer]. She reports she understands we want her to discuss with palliative. She reports even the term palliative causes her anxiety but she is willing to talk with her  about it. She understands having a plan for goals of care is a good thing. Denies pain. Discussed will have IDT later this afternoon.    IDT Team Meeting 3/3/2022    ILucrecia M.D., was present and led the interdisciplinary team conference on 3/3/2022.  I led the IDT conference and agree with the IDT conference documentation and plan of care as noted below.     RN:  Diet Regular   % Meal     Pain    Sleep    Bowel Continent   Bladder Incontinent - urgency and long transfer   In's & Out's    Vital signs stable  Itching today    PT:  Bed Mobility    Transfers Min-max   Mobility Mod 15 feet alma delia walker   Stairs    Does better with alma delia walker     OT:  Eating    Grooming    Bathing modA   UB Dressing    LB Dressing modA   Toileting    Shower & Transfer maxA   Improvement in ADLs but still needs help  Poor motor planning  Left inattention    SLP:  Left inattention  Functional visual scanning - varies from min to maxA  Poor memory  Poor carry over    CM:  Continues to work on disposition and DME needs.      DC/Disposition:  3/11/22    Objective:  VITAL SIGNS: /80   Pulse 86   Temp 37.7 °C (99.8 °F) (Temporal)   Resp 16   Ht 1.702 m (5' 7\")   Wt 105 kg (231 lb 7.7 oz)   SpO2 96%   BMI 36.26 kg/m²   Gen: NAD  Psych: Mood and affect appropriate  CV: RRR, no edema  Resp: CTAB, no upper airway sounds  Abd: NTND  Neuro: AOx4, following commands  Unchanged from 3/2/22    Recent Results (from the past 72 hour(s))   CBC WITHOUT DIFFERENTIAL    Collection Time: 03/02/22  5:36 AM   Result Value Ref Range    WBC 6.6 4.8 " - 10.8 K/uL    RBC 3.61 (L) 4.20 - 5.40 M/uL    Hemoglobin 12.5 12.0 - 16.0 g/dL    Hematocrit 37.6 37.0 - 47.0 %    .2 (H) 81.4 - 97.8 fL    MCH 34.6 (H) 27.0 - 33.0 pg    MCHC 33.2 (L) 33.6 - 35.0 g/dL    RDW 52.2 (H) 35.9 - 50.0 fL    Platelet Count 231 164 - 446 K/uL    MPV 9.8 9.0 - 12.9 fL       Current Facility-Administered Medications   Medication Frequency   • lisinopril (PRINIVIL) tablet 10 mg DAILY   • mirtazapine (Remeron) orally disintegrating tab 15 mg QHS   • ALPRAZolam (XANAX) tablet 0.5 mg Q EVENING   • ALPRAZolam (XANAX) tablet 0.5 mg QDAY PRN   • diphenhydrAMINE (BENADRYL) tablet/capsule 25 mg Q6HRS PRN   • tizanidine (ZANAFLEX) tablet 4 mg BID   • butalbital/apap/caffeine -40 mg (Fioricet) per tablet 1 Tablet Q4HRS PRN   • senna-docusate (PERICOLACE or SENOKOT S) 8.6-50 MG per tablet 2 Tablet BID PRN    And   • polyethylene glycol/lytes (MIRALAX) PACKET 1 Packet QDAY PRN    And   • magnesium hydroxide (MILK OF MAGNESIA) suspension 30 mL QDAY PRN    And   • bisacodyl (DULCOLAX) suppository 10 mg QDAY PRN   • vitamin D3 (cholecalciferol) tablet 1,000 Units DAILY   • Respiratory Therapy Consult Continuous RT   • hydrALAZINE (APRESOLINE) tablet 25 mg Q8HRS PRN   • acetaminophen (Tylenol) tablet 650 mg Q4HRS PRN   • omeprazole (PRILOSEC) capsule 20 mg DAILY   • artificial tears ophthalmic solution 1 Drop PRN   • benzocaine-menthol (CEPACOL) lozenge 1 Lozenge Q2HRS PRN   • mag hydrox-al hydrox-simeth (MAALOX PLUS ES or MYLANTA DS) suspension 20 mL Q2HRS PRN   • ondansetron (ZOFRAN ODT) dispertab 4 mg 4X/DAY PRN    Or   • ondansetron (ZOFRAN) syringe/vial injection 4 mg 4X/DAY PRN   • traZODone (DESYREL) tablet 50 mg QHS PRN   • sodium chloride (OCEAN) 0.65 % nasal spray 2 Spray PRN   • oxyCODONE immediate-release (ROXICODONE) tablet 5 mg Q3HRS PRN    Or   • oxyCODONE immediate release (ROXICODONE) tablet 10 mg Q3HRS PRN   • midazolam (VERSED) 5 mg/mL (1 mL vial) PRN   • acetaminophen  (TYLENOL) tablet 1,000 mg Q6HRS PRN   • amLODIPine (NORVASC) tablet 10 mg DAILY   • apixaban (ELIQUIS) tablet 5 mg BID   • brivaracetam (Briviact) tablet 100 mg BID   • lacosamide (VIMPAT) tablet 200 mg BID   • melatonin tablet 3 mg QHS   • venlafaxine (EFFEXOR) tablet 75 mg DAILY       Orders Placed This Encounter   Procedures   • Diet Order Diet: Regular     Standing Status:   Standing     Number of Occurrences:   1     Order Specific Question:   Diet:     Answer:   Regular [1]       Assessment:  Active Hospital Problems    Diagnosis    • *Glioblastoma of frontal lobe (HCC)    • GBM (glioblastoma multiforme) (HCC)    • Constipation    • Syncope    • Class 1 obesity due to excess calories with serious comorbidity and body mass index (BMI) of 34.0 to 34.9 in adult    • Gait instability    • History of pulmonary embolus (PE)    • Seizure disorder (HCC)    • Primary hypertension    • Mixed anxiety and depressive disorder        Medical Decision Making and Plan:  Seizure/GBM - Patient with syncopal event with AMS and weakness after concerning for seizure vs worsening GBM vs post-treatment changes. Oncology was consulted and recommended steroids then taper which she has completed. Neurology was consulted and recommended MRI which showed diffuse posttreatment changes but no acute lesion. EEG showed non-specific changes and Neurology recommended increasing her Vimpat as her symptoms could have been post-ictal. Cardiac work-up negative.    -PT and OT for mobility and ADLs  -SLP for cognitive screen  -Continue Briviact 100 mg BID and Vimpat 200 mg BID     Spasticity - Followed by Dr. Steele, [facility], for Botox injection. Discussed with Dr. Steele. Will trial Tizanidine 4 mg BID   -Improved on Tizanidine. Started on Xanax QHS.      HTN - Patient on Amlodipine 10 mg daily. Into 150s, start Lisinopril 5 mg. Still into 140s. Increase to 10 mg. Improved 120s.      Anemia - Check AM CBC - 14.3, resolved.      Hypokalemia -  Check AM CMP - 4.0, resolved.     Anxiety/Depression - Patient on PRN Xanax and Effexor 75 mg daily. Psychology to consult     Sleep - Patient with poor sleep. On Xanax QHS and add QHS Remeron    Morbid Obesity due to excess calories - BMI of 37.1 on admission. Dietitian to consult.      Back rash - Appears contact. Start Hydrocortisone cream. PO Benadryl. Discontinue cream, improved    Constipation - Resolved, discontinue senna-docusate.     Vitamin D deficiency - 29 on admission. Started on 1000 U     Hx of PE - Patient on Eliquis BID.    Dispo - Patient very heavy assistance on transfer. Unclear if family will be able to care for her with this level of assistance. Palliative to consult for discussion of goals of care at this time.  Family and patient to discuss whether they would benefit from PC.      Total time:  36 minutes.  I spent greater than 50% of the time for patient care, counseling, and coordination on this date, including unit/floor time, and face-to-face time with the patient as per interval events and assessment and plan above. Topics discussed included discharge planning, palliative care consult discussion, goals of care, and discussed about level of assistance at discharge. Patient was discussed separately in IDT today; please see details above.    Lucrecia Sim M.D.

## 2022-03-03 NOTE — THERAPY
"Physical Therapy   Daily Treatment     Patient Name: Melba Frye  Age:  51 y.o., Sex:  female  Medical Record #: [id number]  Today's Date: 3/3/2022     Precautions  Precautions: Fall Risk  Comments: L rolando    Subjective    Pt sitting edge of bed with spouse and CNA present, getting ready for PT     Objective       03/03/22 1501   Gait Functional Level of Assist    Gait Level Of Assist Maximal Assist  (second person for wc follow)   Assistive Device Rolando-Walker  (L AFO/ gait belt)   Distance (Feet) 10  (plus 5 ft x 1)   # of Times Distance was Traveled 1   Deviation Step To;Decreased Heel Strike;Decreased Toe Off  (spastic L rolando-gait/ significant extension tone LLE)   Transfer Functional Level of Assist   Bed, Chair, Wheelchair Transfer Moderate Assist  (second person for safety)   Bed Chair Wheelchair Transfer Description Adaptive equipment;Increased time;Requires lift;Set-up of equipment;Verbal cueing   Toilet Transfers Moderate Assist   Toilet Transfer Description Grab bar;Increased time;Requires lift;Verbal cueing   Sitting Lower Body Exercises   Nustep Resistance Level 1   Comments 10 minutes for general ROM/ endurance and reciprocal patterning training. L foot and thigh strap in place.   Bed Mobility    Supine to Sit Moderate Assist   Sit to Stand Moderate Assist   Neuro-Muscular Treatments   Neuro-Muscular Treatments Anterior weight shift;Sequencing;Tactile Cuing;Verbal Cuing;Weight Shift Right;Weight Shift Left   Comments neuro re-ed standing/ sit<>stand/ transfer / gait training as noted   PT Total Time Spent   PT Individual Total Time Spent (Mins) 60   PT Charge Group   PT Therapeutic Exercise 1   PT Neuromuscular Re-Education / Balance 2   PT Therapeutic Activities 1     Car transfer training completed with spouse and personal vehicle x 2 trials. Spouse able to assist pt with sit<>stand and transfer wc<>car in \"dance position\" to assist with wt shifting and turning fully to sit in car vs wc. " "Instructed spouse to assist with LLE management and positioning into car with increased flexion positioning to prevent extensor tone and \"slipping forward\" in seat. PT present as second person for safety with CGA/ min A and for assist with wc/DME management.     Assessment    Pt's spouse able to safely complete car transfer x 2 as noted but requires min A of second person for safety, pt with improved sit<>stand with grab bar for toileting, but less stable with attepmted gait training due  L toe catch/ tone and impaired standing balance.    Strengths: Able to follow instructions,Willingly participates in therapeutic activities,Supportive family,Pleasant and cooperative,Motivated for self care and independence,Effective communication skills  Barriers: Decreased endurance,Emotional lability,Fatigue,Generalized weakness,Hemiplegia,Impaired activity tolerance,Impaired balance,Impaired functional cognition,Limited mobility,Spasticity    Plan    Sit<>stand sequencing/ transfer training, pre-gait/ gait training as able, ROM/ stretching/ spasticity management.     Passport items to be completed:  Get in/out of bed safely, in/out of a vehicle, safely use mobility device, walk or wheel around home/community, navigate up and down stairs, show how to get up/down from the ground, ensure home is accessible, demonstrate HEP, complete caregiver training    Physical Therapy Problems (Active)       Problem: Mobility       Dates: Start: 02/19/22         Goal: STG-Within one week, patient will ambulate 10ft with LBQC and min A.       Dates: Start: 02/19/22            Goal: STG-Within one week, patient will ascend and descend four stairs with mod A using R handrail.       Dates: Start: 02/19/22               Problem: Mobility Transfers       Dates: Start: 02/19/22         Goal: STG-Within one week, patient will perform bed mobility with min A using hospital bed functions.       Dates: Start: 02/19/22            Goal: STG-Within one week, " patient will sit to stand with min A from wheelchair.       Dates: Start: 02/19/22            Goal: STG-Within one week, patient will transfer bed to chair with min A.       Dates: Start: 02/19/22               Problem: PT-Long Term Goals       Dates: Start: 02/19/22         Goal: LTG-By discharge, patient will propel wheelchair 150ft with power versus manual chair and supervision.       Dates: Start: 02/19/22            Goal: LTG-By discharge, patient will ambulate 20ft with LBQC and CGA.       Dates: Start: 02/19/22            Goal: LTG-By discharge, patient will transfer one surface to another with CGA.       Dates: Start: 02/19/22            Goal: LTG-By discharge, patient will ambulate up/down flight of stairs with min A using R handrail.       Dates: Start: 02/19/22            Goal: LTG-By discharge, patient will transfer in/out of a car with min A.       Dates: Start: 02/19/22

## 2022-03-03 NOTE — THERAPY
"Recreational Therapy  Daily Treatment     Patient Name: Melba Frye  AGE:  51 y.o., SEX:  female  Medical Record #: [id number]  Today's Date: 3/3/2022       Subjective  \"I had fun.\"     Objective       03/03/22 1001   Functional Ability Status - Cognitive   Attention Span Remains on Task   Comprehension Follows Two Step Commands;Requires Cueing   Judgment Able to Make Independent Decisions   Cognitive Comments cues for multistep cognitive activity   Functional Ability Status - Emotional    Affect Appropriate   Mood Appropriate   Behavior Appropriate   Skilled Intervention    Skilled Intervention Relaxation / Coping Skills;Cognitive Leisure;Fine Motor Leisure   Skilled Intervention Comments RUE used remote to \"bowl\" using a Angiodroid system   Interdisciplinary Plan of Care Collaboration   Patient Position at End of Therapy Seated;Tray Table within Reach;Call Light within Reach   Strengths & Barriers   Strengths Able to follow instructions;Alert and oriented;Motivated for self care and independence;Pleasant and cooperative;Supportive family;Willingly participates in therapeutic activities   Barriers Fatigue;Decreased endurance;Impaired carryover of learning;Impaired insight/denial of deficits;Impaired functional cognition;Limited mobility   Treatment Time   Total Time Spent (mins) 30   Procedural Tracking   Procedural Tracking Community Re-Integration;Community Skills Development;Leisure Skills Awareness;Leisure Skills Development;Cognitive Skills Training;Fine Motor Functional Leisure Skills       Assessment    Pt was shown how to play the Angiodroid system version of bowling. Pt was given a remote to control the bowling action for her RUE. Pt required Min to Mod verbal cues and at times physical cues to operate the remote to control the bowling action.     Strengths: (P) Able to follow instructions,Alert and oriented,Motivated for self care and independence,Pleasant and cooperative,Supportive family,Willingly " participates in therapeutic activities  Barriers: (P) Fatigue,Decreased endurance,Impaired carryover of learning,Impaired insight/denial of deficits,Impaired functional cognition,Limited mobility    Plan    Community reintegration.

## 2022-03-03 NOTE — THERAPY
"Speech Language Pathology  Daily Treatment     Patient Name: Melba Frye  Age:  51 y.o., Sex:  female  Medical Record #: [id number]  Today's Date: 3/3/2022     Precautions  Precautions: Fall Risk  Comments: L alma delia    Subjective    Pt pleasant and cooperative.     Objective       03/03/22 1033   SLP Total Time Spent   SLP Individual Total Time Spent (Mins) 60   Treatment Charges   SLP Cognitive Skill Development First 15 Minutes 1   SLP Cognitive Skill Development Additional 15 Minutes 3       Assessment    Pt presented with simple math tasks to address attention and visual scanning. Pt completed three activities in full. Task number one - pt omitted answering 5 problems, then completed calculation portion with 17/20 correct indep, increased to 20/20 provided MIN cues. Task number two completed with omission of 8 problems, then completed with 19/20 calculations correct indep, min cues for 100%. Lastly, task number three completed with single omission and 14/20 calculations correctly indep, increased to 100% provided MOD A. Increased errors towards end of task likely due to increased lethargy. All omissions located on left side requiring mod-max cues to locate. Per pt \"I dont believe you that I missed that many but I also know that you would not lie.\"    Strengths: Able to follow instructions,Supportive family,Pleasant and cooperative,Willingly participates in therapeutic activities  Barriers: Impaired carryover of learning,Impaired functional cognition,Impaired insight/denial of deficits (anxiety)    Plan    Safety awareness, insight and attention (simple)    Speech Therapy Problems (Active)       Problem: Memory STGs       Dates: Start: 02/19/22         Goal: STG-Within one week, patient will recall daily events and safety strategies with 80% accuracy, given min verbal cues and use of memory book.        Dates: Start: 02/19/22               Problem: Problem Solving STGs       Dates: Start: 02/19/22         " Goal: STG-Within one week, patient will complete simple visual scanning tasks to address left neglect with 70% accuracy provided MIN cues.        Dates: Start: 02/24/22               Problem: Speech/Swallowing LTGs       Dates: Start: 02/19/22         Goal: LTG-By discharge, patient will solve complex problem Neeraj for safe discharge home.       Dates: Start: 02/19/22

## 2022-03-03 NOTE — CARE PLAN
Problem: Mobility  Goal: STG-Within one week, patient will ambulate 10ft with LBQC and min A.  Outcome: Not Met  Goal: STG-Within one week, patient will ascend and descend four stairs with mod A using R handrail.  Outcome: Not Met     Problem: Mobility Transfers  Goal: STG-Within one week, patient will perform bed mobility with min A using hospital bed functions.  Outcome: Not Met  Goal: STG-Within one week, patient will sit to stand with min A from wheelchair.  Outcome: Not Met  Goal: STG-Within one week, patient will transfer bed to chair with min A.  Outcome: Not Met

## 2022-03-03 NOTE — CARE PLAN
Problem: Dressing  Goal: STG-Within one week, patient will dress LB with total a x 1   Outcome: Met     Problem: Toileting  Goal: STG-Within one week, patient will complete toileting tasks with total a x 1   Outcome: Met     Problem: Functional Transfers  Goal: STG-Within one week, patient will transfer to toilet with max a   Outcome: Met

## 2022-03-03 NOTE — CARE PLAN
Problem: Problem Solving STGs  Goal: STG-Within one week, patient will complete simple visual scanning tasks to address left neglect with 70% accuracy provided MIN cues.   Outcome: Not Met     Problem: Memory STGs  Goal: STG-Within one week, patient will recall daily events and safety strategies with 80% accuracy, given min verbal cues and use of memory book.   Outcome: Not Met     Problem: Problem Solving STGs  Goal: STG-Within one week, patient will complete basic math with 80% accuracy given min verbal cues.   Outcome: Met

## 2022-03-03 NOTE — THERAPY
Occupational Therapy  Daily Treatment     Patient Name: Melba Frye  Age:  51 y.o., Sex:  female  Medical Record #: [id number]  Today's Date: 3/3/2022     Precautions  Precautions: (P) Fall Risk  Comments: (P) L alma delia    Safety   ADL Safety : (P) Requires Physical Assist for Safety,Requires Supervision for Safety,Requires Cueing for Safety  Bathroom Safety: (P) Requires Physical Assist for Safety,Requires Supervision for Safety,Requires Cuing for Safety    Subjective    Pt in bed upon arrival, agreeable to shower this session.      Objective     03/03/22 0831   Precautions   Precautions Fall Risk   Comments L alma delia   Safety    ADL Safety  Requires Physical Assist for Safety;Requires Supervision for Safety;Requires Cueing for Safety   Bathroom Safety Requires Physical Assist for Safety;Requires Supervision for Safety;Requires Cuing for Safety   Pain   Intervention Emotional Support   Pain 0 - 10 Group   Location Shoulder   Location Orientation Left;Proximal   Pain Rating Scale (NPRS) 3   Functional Level of Assist   Grooming Supervision;Seated   Grooming Description Increased time;Initial preparation for task;Seated in wheelchair at sink   Bathing Moderate Assist  (intermittent CGA for balance throughout, assist to wash distal LEs and RUE)   Bathing Description Adaptive equipment;Grab bar;Hand held shower;Tub bench;Assit with back;Assit wtih lower extremities;Increased time;Initial preparation for task;Set-up of equipment;Supervision for safety;Verbal cueing   Upper Body Dressing Moderate Assist   Upper Body Dressing Description Assit with threading arms through sleeves;Assist with pulling shirt over head;Increased time;Initial preparation for task;Set-up of equipment;Supervision for safety;Verbal cueing   Lower Body Dressing Maximal Assist   Lower Body Dressing Description Assistive devices;Grab bar;Reacher;Assist with threading into pant leg;Increased time;Initial preparation for task;Set-up of  equipment;Supervision for safety;Verbal cueing   Toileting Maximal Assist   Toileting Description Assist to pull pants up;Assist to pull pants down;Assist for standing balance;Grab bar;Increased time;Initial preparation for task;Set-up of equipment;Supervision for safety;Verbal cueing   Bed, Chair, Wheelchair Transfer Moderate Assist  (second person for safety)   Bed Chair Wheelchair Transfer Description Adaptive equipment;Increased time;Initial preparation for task;Set-up of equipment;Supervision for safety;Verbal cueing;Requires lift   Toilet Transfers Moderate Assist  (second person for safety)   Toilet Transfer Description Adaptive equipment;Grab bar;Increased time;Initial preparation for task;Requires lift;Set-up of equipment;Supervision for safety;Verbal cueing   Tub / Shower Transfers Moderate Assist  (second person for safety)   Tub Shower Transfer Description Adaptive equipment;Grab bar;Shower bench;Increased time;Initial preparation for task;Requires lift;Set-up of equipment;Supervision for safety;Verbal cueing   Bed Mobility    Supine to Sit Moderate Assist   Scooting Moderate Assist   Interdisciplinary Plan of Care Collaboration   IDT Collaboration with  Therapy Tech   Patient Position at End of Therapy Seated;Chair Alarm On;Self Releasing Lap Belt Applied;Call Light within Reach;Tray Table within Reach;Phone within Reach   Collaboration Comments Therapy tech assist with session   OT Total Time Spent   OT Individual Total Time Spent (Mins) 60   OT Charge Group   OT Self Care / ADL 4       Assessment    Pt tolerated OT session well focused on ADL routine. Demo'd progression with LBD and bathing compared to previous assessment. Was able to perform LBD with maxA x1 with second person SBA for safety vs totalA x2 previously. Pt was also able to tolerate standing with GB long enough to have pants pulled up in one stand vs required 3 stands previously. Continues to be limited by impaired balance, apraxia,  spasticity and L sided inattention requiring frequent cues to attend to the L side of her body during ADLs.     Strengths: Able to follow instructions,Effective communication skills,Independent prior level of function,Manages pain appropriately,Motivated for self care and independence,Pleasant and cooperative,Supportive family,Willingly participates in therapeutic activities  Barriers: Bladder incontinence,Decreased endurance,Fatigue,Generalized weakness,Hemiparesis,Impaired activity tolerance,Impaired balance,Limited mobility    Plan    ADL's, neuro re-ed for LUE, visual scanning all four quadrants, self-ROM for LUE, sitting/standing bal/tolerance, functional transfers, TTB transfer     Passport items to be completed:  Perform bathroom transfers, complete dressing, complete feeding, get ready for the day, prepare a simple meal, participate in household tasks, adapt home for safety needs, demonstrate home exercise program, complete caregiver training     Occupational Therapy Goals (Active)       Problem: Dressing       Dates: Start: 02/19/22         Goal: STG-Within one week, patient will dress LB with total a x 1        Dates: Start: 02/19/22               Problem: Functional Transfers       Dates: Start: 02/19/22         Goal: STG-Within one week, patient will transfer to toilet with max a        Dates: Start: 02/19/22         Goal Note filed on 02/24/22 1148 by Yajaira Faith, OT       Continues to require 2 person transfer - modA x1, Josep x1                  Problem: OT Long Term Goals       Dates: Start: 02/19/22         Goal: LTG-By discharge, patient will complete basic self care tasks with min-mod a        Dates: Start: 02/19/22            Goal: LTG-By discharge, patient will perform bathroom transfers with min a        Dates: Start: 02/19/22               Problem: Toileting       Dates: Start: 02/19/22         Goal: STG-Within one week, patient will complete toileting tasks with total a x 1        Dates:  Start: 02/19/22

## 2022-03-04 PROCEDURE — 700102 HCHG RX REV CODE 250 W/ 637 OVERRIDE(OP): Performed by: PHYSICAL MEDICINE & REHABILITATION

## 2022-03-04 PROCEDURE — A9270 NON-COVERED ITEM OR SERVICE: HCPCS | Performed by: PHYSICAL MEDICINE & REHABILITATION

## 2022-03-04 PROCEDURE — 770010 HCHG ROOM/CARE - REHAB SEMI PRIVAT*

## 2022-03-04 PROCEDURE — 97112 NEUROMUSCULAR REEDUCATION: CPT

## 2022-03-04 PROCEDURE — 97535 SELF CARE MNGMENT TRAINING: CPT

## 2022-03-04 PROCEDURE — 99232 SBSQ HOSP IP/OBS MODERATE 35: CPT | Performed by: PHYSICAL MEDICINE & REHABILITATION

## 2022-03-04 PROCEDURE — 97110 THERAPEUTIC EXERCISES: CPT

## 2022-03-04 PROCEDURE — 97116 GAIT TRAINING THERAPY: CPT

## 2022-03-04 PROCEDURE — 97130 THER IVNTJ EA ADDL 15 MIN: CPT

## 2022-03-04 PROCEDURE — 97129 THER IVNTJ 1ST 15 MIN: CPT

## 2022-03-04 RX ORDER — LISINOPRIL 20 MG/1
20 TABLET ORAL DAILY
Status: DISCONTINUED | OUTPATIENT
Start: 2022-03-05 | End: 2022-03-07

## 2022-03-04 RX ADMIN — MIRTAZAPINE 15 MG: 15 TABLET, ORALLY DISINTEGRATING ORAL at 21:07

## 2022-03-04 RX ADMIN — TIZANIDINE 4 MG: 4 TABLET ORAL at 15:41

## 2022-03-04 RX ADMIN — MELATONIN TAB 3 MG 3 MG: 3 TAB at 21:07

## 2022-03-04 RX ADMIN — AMLODIPINE BESYLATE 10 MG: 5 TABLET ORAL at 08:50

## 2022-03-04 RX ADMIN — APIXABAN 5 MG: 5 TABLET, FILM COATED ORAL at 08:50

## 2022-03-04 RX ADMIN — TIZANIDINE 4 MG: 4 TABLET ORAL at 21:02

## 2022-03-04 RX ADMIN — TRAZODONE HYDROCHLORIDE 50 MG: 50 TABLET ORAL at 21:06

## 2022-03-04 RX ADMIN — TIZANIDINE 4 MG: 4 TABLET ORAL at 08:50

## 2022-03-04 RX ADMIN — LISINOPRIL 10 MG: 5 TABLET ORAL at 08:50

## 2022-03-04 RX ADMIN — ALPRAZOLAM 0.5 MG: 0.5 TABLET ORAL at 17:48

## 2022-03-04 RX ADMIN — Medication 1000 UNITS: at 09:00

## 2022-03-04 RX ADMIN — BRIVARACETAM 100 MG: 50 TABLET, FILM COATED ORAL at 08:56

## 2022-03-04 RX ADMIN — DIPHENHYDRAMINE HCL 25 MG: 25 TABLET ORAL at 15:41

## 2022-03-04 RX ADMIN — OMEPRAZOLE 20 MG: 20 CAPSULE, DELAYED RELEASE ORAL at 08:51

## 2022-03-04 RX ADMIN — VENLAFAXINE HYDROCHLORIDE 75 MG: 37.5 TABLET ORAL at 08:49

## 2022-03-04 RX ADMIN — APIXABAN 5 MG: 5 TABLET, FILM COATED ORAL at 21:02

## 2022-03-04 RX ADMIN — LACOSAMIDE 200 MG: 100 TABLET, FILM COATED ORAL at 08:49

## 2022-03-04 RX ADMIN — LACOSAMIDE 200 MG: 100 TABLET, FILM COATED ORAL at 21:07

## 2022-03-04 RX ADMIN — BRIVARACETAM 100 MG: 50 TABLET, FILM COATED ORAL at 21:02

## 2022-03-04 ASSESSMENT — ACTIVITIES OF DAILY LIVING (ADL)
TOILET_TRANSFER_DESCRIPTION: ADAPTIVE EQUIPMENT;GRAB BAR;INCREASED TIME;INITIAL PREPARATION FOR TASK;REQUIRES LIFT;SET-UP OF EQUIPMENT;SUPERVISION FOR SAFETY;VERBAL CUEING
BED_CHAIR_WHEELCHAIR_TRANSFER_DESCRIPTION: ADAPTIVE EQUIPMENT;INCREASED TIME;INITIAL PREPARATION FOR TASK;REQUIRES LIFT;SET-UP OF EQUIPMENT;SUPERVISION FOR SAFETY;VERBAL CUEING
TOILETING_LEVEL_OF_ASSIST_DESCRIPTION: ASSIST FOR HYGIENE;ASSIST TO PULL PANTS UP;ASSIST TO PULL PANTS DOWN;ASSIST FOR STANDING BALANCE;GRAB BAR;INCREASED TIME;INITIAL PREPARATION FOR TASK;SET-UP OF EQUIPMENT;SUPERVISION FOR SAFETY;VERBAL CUEING
BED_CHAIR_WHEELCHAIR_TRANSFER_DESCRIPTION: ADAPTIVE EQUIPMENT;INCREASED TIME;REQUIRES LIFT;SET-UP OF EQUIPMENT;SUPERVISION FOR SAFETY;VERBAL CUEING

## 2022-03-04 ASSESSMENT — GAIT ASSESSMENTS
DEVIATION: STEP TO;DECREASED HEEL STRIKE;DECREASED TOE OFF
DISTANCE (FEET): 15
GAIT LEVEL OF ASSIST: MODERATE ASSIST
ASSISTIVE DEVICE: HEMI-WALKER

## 2022-03-04 NOTE — THERAPY
"Speech Language Pathology  Daily Treatment     Patient Name: Melba Frye  Age:  51 y.o., Sex:  female  Medical Record #: [id number]  Today's Date: 3/4/2022     Precautions  Precautions: Fall Risk  Comments: L alma delia    Subjective    \"I need to work on my math, that's really important to me.\"     Objective       03/04/22 1003   SLP Total Time Spent   SLP Individual Total Time Spent (Mins) 60   Treatment Charges   SLP Cognitive Skill Development First 15 Minutes 1   SLP Cognitive Skill Development Additional 15 Minutes 3       Assessment    Pt completing simple addition task addressing attention and left visual scanning, pt located and completed all problems located on page and completed 19/20 mathematical calculations indep. Pt then presented with division at pts request, pt omitted to complete 4 problems, MOD cues to locate (all which were on left side of page), pt then completed 13/20 calculations indep, MOD A to correct. Pt presented with functional ready and problem solving related to calendar, pt correctly answered 3/10 questions indep otherwise MOD to MAX A for attention to left side to achieve 100% accuracy.       Strengths: Able to follow instructions,Supportive family,Pleasant and cooperative,Willingly participates in therapeutic activities  Barriers: Impaired carryover of learning,Impaired functional cognition,Impaired insight/denial of deficits (anxiety)    Plan    Simple attention, left visual scanning, functional problem solving related to safety.     Speech Therapy Problems (Active)       Problem: Memory STGs       Dates: Start: 02/19/22         Goal: STG-Within one week, patient will recall daily events and safety strategies with 80% accuracy, given min verbal cues and use of memory book.        Dates: Start: 02/19/22               Problem: Problem Solving STGs       Dates: Start: 02/19/22         Goal: STG-Within one week, patient will complete simple visual scanning tasks to address left neglect " with 70% accuracy provided MIN cues.        Dates: Start: 02/24/22               Problem: Speech/Swallowing LTGs       Dates: Start: 02/19/22         Goal: LTG-By discharge, patient will solve complex problem Neeraj for safe discharge home.       Dates: Start: 02/19/22

## 2022-03-04 NOTE — THERAPY
"Speech Language Pathology  Daily Treatment     Patient Name: Melba Frye  Age:  51 y.o., Sex:  female  Medical Record #: [id number]  Today's Date: 3/3/2022     Precautions  Precautions: Fall Risk  Comments: L alma delia    Subjective    Patient pleasant and cooperative.   Her  accompanied her to the session.     Objective       03/03/22 1301   Cognition   Visual Scanning / Cancellation Skills Moderate (3)   SLP Total Time Spent   SLP Individual Total Time Spent (Mins) 30   Treatment Charges   SLP Cognitive Skill Development First 15 Minutes 1   SLP Cognitive Skill Development Additional 15 Minutes 1       Assessment    Patient attempted the \"who has more\"  change but max A needed to attend to left and to calculate change amounts. This task was abandoned this session.  Patient did complete a 6x6 letter word search with mod A needed to attend to left.  She needed max A initially to implement a strategy to keep her place, using a left to right light house /typewritter scanning strategy, but improved to mod A at end of session..      Strengths: Able to follow instructions,Supportive family,Pleasant and cooperative,Willingly participates in therapeutic activities  Barriers: Impaired carryover of learning,Impaired functional cognition,Impaired insight/denial of deficits (anxiety)    Plan    Target visual scanning, and recall.    Passport items to be completed:  Express basic needs, understand food/liquid recommendations, consistently follow swallow precautions, manage finances, manage medications, arrive to therapy appointments on time, complete daily memory log entries, solve problems related to safety situations, review education related to hospitalization, complete caregiver training     Speech Therapy Problems (Active)       Problem: Memory STGs       Dates: Start: 02/19/22         Goal: STG-Within one week, patient will recall daily events and safety strategies with 80% accuracy, given min verbal cues and use " of memory book.        Dates: Start: 02/19/22               Problem: Problem Solving STGs       Dates: Start: 02/19/22         Goal: STG-Within one week, patient will complete simple visual scanning tasks to address left neglect with 70% accuracy provided MIN cues.        Dates: Start: 02/24/22               Problem: Speech/Swallowing LTGs       Dates: Start: 02/19/22         Goal: LTG-By discharge, patient will solve complex problem Neeraj for safe discharge home.       Dates: Start: 02/19/22

## 2022-03-04 NOTE — CARE PLAN
"The patient is Stable - Low risk of patient condition declining or worsening    Problem: Fall Risk - Rehab  Goal: Patient will remain free from falls  Outcome: Progressing  Note: [place] Fall risk Assessment Score: 19    High fall risk Interventions   - Alarming seatbelt  - Bed and strip alarm   - Yellow sign by the door   - Yellow wrist band \"Fall risk\"  - Room near to the nurse station  - Do not leave patient unattended in the bathroom  - Fall risk education provided       Problem: Pain - Standard  Goal: Alleviation of pain or a reduction in pain to the patient’s comfort goal  Outcome: Progressing  Flowsheets  Taken 3/4/2022 0015 by Meron Rincon RSISSY  OB Pain Intervention: Repositioned  Taken 3/3/2022 2000 by Meron Rincon R.N.  Pain Rating Scale (NPRS): 4  Taken 3/2/2022 2225 by Perlita Ferrer R.N.  Non Verbal Scale:  • Calm  • Sleeping     Problem: Skin Integrity  Goal: Patient's skin integrity will be maintained or improve  Outcome: Progressing     Shift Goals  Clinical Goals: Pain control, encourage pt to sit up in wc  Patient Goals: Rest    Progress made toward(s) clinical / shift goals:  Call light within reach, patient encouraged to use for assistance for any needs and for assistance for safe transferring.  Pt verbalizes and demonstrates understanding.       "

## 2022-03-04 NOTE — THERAPY
Occupational Therapy  Daily Treatment     Patient Name: Melba Frye  Age:  51 y.o., Sex:  female  Medical Record #: [id number]  Today's Date: 3/4/2022     Precautions  Precautions: Fall Risk  Comments: L alma delia    Safety   ADL Safety : Requires Physical Assist for Safety,Requires Supervision for Safety,Requires Cueing for Safety  Bathroom Safety: Requires Physical Assist for Safety,Requires Supervision for Safety,Requires Cuing for Safety    Subjective    Pt in bed upon arrival, agreeable to OT session.      Objective     03/04/22 1301   Functional Level of Assist   Toileting Total Assist   Toileting Description Assist for hygiene;Assist to pull pants up;Assist to pull pants down;Assist for standing balance;Grab bar;Increased time;Initial preparation for task;Set-up of equipment;Supervision for safety;Verbal cueing   Bed, Chair, Wheelchair Transfer Maximal Assist  (second person for safety)   Bed Chair Wheelchair Transfer Description Adaptive equipment;Increased time;Initial preparation for task;Requires lift;Set-up of equipment;Supervision for safety;Verbal cueing   Toilet Transfers Maximal Assist  (second person for safety)   Toilet Transfer Description Adaptive equipment;Grab bar;Increased time;Initial preparation for task;Requires lift;Set-up of equipment;Supervision for safety;Verbal cueing   Bed Mobility    Supine to Sit Total Assist X 2   Sit to Supine Total Assist X 2   Scooting Maximal Assist   Interdisciplinary Plan of Care Collaboration   IDT Collaboration with  Occupational Therapist   Patient Position at End of Therapy In Bed;Bed Alarm On;Call Light within Reach;Tray Table within Reach;Phone within Reach   Collaboration Comments Second OT assist with session as tech   OT Total Time Spent   OT Individual Total Time Spent (Mins) 60   OT Charge Group   OT Self Care / ADL 1   OT Neuromuscular Re-education / Balance 3     LUE neuro re-ed activities including weight-bearing through elbow on wedge standing  EOM, L shoulder stretching seated in w/c and supine on mat to promote scapular retraction and depression and GH external rotation. Vibration was also applied to the L triceps during stretching. Pt continues to c/o L shoulder pain and points to the biceps/deltoid region. Added additional k-tape with one piece down the middle trapezius to promote scapular depression and one piece from the lateral serratus region to medial to promote additional scapular retraction with goal of pain mgmt. Pt tolerated k-tape well and reported a reduction in pain after.     Assessment    Pt continues to be limited by spasticity and had increased tightness in the LUE today requiring increased time to achieve extensor pattern and pt was unable to come to full extension in elbow, wrist and hands due to pain. Pt's transfers were more variable due to fatigue varying from modA with second person SBA to maxA x1 and modA x1.     Strengths: Able to follow instructions,Effective communication skills,Independent prior level of function,Manages pain appropriately,Motivated for self care and independence,Pleasant and cooperative,Supportive family,Willingly participates in therapeutic activities  Barriers: Bladder incontinence,Decreased endurance,Fatigue,Generalized weakness,Hemiparesis,Impaired activity tolerance,Impaired balance,Limited mobility    Plan      ADL's, neuro re-ed for LUE, visual scanning all four quadrants, self-ROM for LUE, sitting/standing bal/tolerance, functional transfers, TTB transfer     Passport items to be completed:  Perform bathroom transfers, complete dressing, complete feeding, get ready for the day, prepare a simple meal, participate in household tasks, adapt home for safety needs, demonstrate home exercise program, complete caregiver training     Occupational Therapy Goals (Active)       Problem: OT Long Term Goals       Dates: Start: 02/19/22         Goal: LTG-By discharge, patient will complete basic self care tasks  with min-mod a        Dates: Start: 02/19/22            Goal: LTG-By discharge, patient will perform bathroom transfers with min a        Dates: Start: 02/19/22

## 2022-03-04 NOTE — THERAPY
Physical Therapy   Daily Treatment     Patient Name: Melba Frye  Age:  51 y.o., Sex:  female  Medical Record #: [id number]  Today's Date: 3/4/2022     Precautions  Precautions: Fall Risk  Comments: L rolando    Subjective    Pt up in , willing to participate     Objective       03/04/22 0901   Gait Functional Level of Assist    Gait Level Of Assist Moderate Assist  (2 person A for wc follow/ safety)   Assistive Device Rolando-Walker  (L AFO/ gait belt)   Distance (Feet) 15   # of Times Distance was Traveled 2   Deviation Step To;Decreased Heel Strike;Decreased Toe Off  (spastic L rolando-gait/ significant extension tone LLE)   Wheelchair Functional Level of Assist   Wheelchair Assist Minimal Assist   Distance Wheelchair (Feet or Distance) 25   Wheelchair Description Extra time;Limited by fatigue   Transfer Functional Level of Assist   Bed, Chair, Wheelchair Transfer Moderate Assist  (second person for safety)   Bed Chair Wheelchair Transfer Description Adaptive equipment;Increased time;Requires lift;Set-up of equipment;Supervision for safety;Verbal cueing  (rolando-walker)   Sitting Lower Body Exercises   Other Exercises john-med bike pedals x 10 minutes o intensity for AROM/ reciprocal patterning and endurance training.   Neuro-Muscular Treatments   Neuro-Muscular Treatments Postural Changes;Postural Facilitation;Sequencing;Tactile Cuing;Verbal Cuing;Weight Shift Right;Weight Shift Left   Comments standing balance with intermiitent RUE support at [facility] 2 minutes x 4 for balloon volley/ CGA-min A for steadying throughout activity.   PT Total Time Spent   PT Individual Total Time Spent (Mins) 60   PT Charge Group   PT Gait Training 1   PT Therapeutic Exercise 1   PT Neuromuscular Re-Education / Balance 2       Assessment    Pt with improved consistency with sit<>stand at mod A today, improved L foot clearance with gait but continues to require ~ 50% assist for advancement of LLE. Standing tolerance limited by  fatigue and increased low back pain.    Strengths: Able to follow instructions,Willingly participates in therapeutic activities,Supportive family,Pleasant and cooperative,Motivated for self care and independence,Effective communication skills  Barriers: Decreased endurance,Emotional lability,Fatigue,Generalized weakness,Hemiplegia,Impaired activity tolerance,Impaired balance,Impaired functional cognition,Limited mobility,Spasticity    Plan    Sit<>stand sequencing/ transfer training, pre-gait/ gait training as able, ROM/ stretching/ spasticity management.     Passport items to be completed:  Get in/out of bed safely, in/out of a vehicle, safely use mobility device, walk or wheel around home/community, navigate up and down stairs, show how to get up/down from the ground, ensure home is accessible, demonstrate HEP, complete caregiver training      Physical Therapy Problems (Active)       Problem: Mobility       Dates: Start: 02/19/22         Goal: STG-Within one week, patient will ambulate 10ft with LBQC and min A.       Dates: Start: 02/19/22            Goal: STG-Within one week, patient will ascend and descend four stairs with mod A using R handrail.       Dates: Start: 02/19/22               Problem: Mobility Transfers       Dates: Start: 02/19/22         Goal: STG-Within one week, patient will perform bed mobility with min A using hospital bed functions.       Dates: Start: 02/19/22            Goal: STG-Within one week, patient will sit to stand with min A from wheelchair.       Dates: Start: 02/19/22            Goal: STG-Within one week, patient will transfer bed to chair with min A.       Dates: Start: 02/19/22               Problem: PT-Long Term Goals       Dates: Start: 02/19/22         Goal: LTG-By discharge, patient will propel wheelchair 150ft with power versus manual chair and supervision.       Dates: Start: 02/19/22            Goal: LTG-By discharge, patient will ambulate 20ft with LBQC and CGA.        Dates: Start: 02/19/22            Goal: LTG-By discharge, patient will transfer one surface to another with CGA.       Dates: Start: 02/19/22            Goal: LTG-By discharge, patient will ambulate up/down flight of stairs with min A using R handrail.       Dates: Start: 02/19/22            Goal: LTG-By discharge, patient will transfer in/out of a car with min A.       Dates: Start: 02/19/22

## 2022-03-04 NOTE — CARE PLAN
Problem: Skin Integrity  Goal: Skin integrity is maintained or improved  Outcome: Progressing     Problem: Fall Risk - Rehab  Goal: Patient will remain free from falls  Outcome: Progressing   The patient is Stable - Low risk of patient condition declining or worsening    Shift Goals  Clinical Goals: Pain control, encourage pt to sit up in wc  Patient Goals: Rest

## 2022-03-04 NOTE — PROGRESS NOTES
"Rehab Progress Note     Encounter Date: 3/4/2022    CC: Decreased mobility, spasticity    Interval Events (Subjective)  Patient sitting up in room. She reports she is tired from all of her morning therapy. She reports she decided she did not want to talk to PC yet. She reports the car transfer went \"wonderfully\" so she thinks she will be able to make it home. Discussed would follow-up with PT. Denies NVD. She does report some itching.     IDT Team Meeting 3/3/2022  DC/Disposition:  3/11/22    Objective:  VITAL SIGNS: /84   Pulse 83   Temp 36.5 °C (97.7 °F) (Temporal)   Resp 18   Ht 1.702 m (5' 7\")   Wt 105 kg (231 lb 7.7 oz)   SpO2 96%   BMI 36.26 kg/m²   Gen: NAD  Psych: Mood and affect appropriate  CV: RRR, no edema  Resp: CTAB, no upper airway sounds  Abd: NTND  Neuro: AOx4, following commands    Recent Results (from the past 72 hour(s))   CBC WITHOUT DIFFERENTIAL    Collection Time: 03/02/22  5:36 AM   Result Value Ref Range    WBC 6.6 4.8 - 10.8 K/uL    RBC 3.61 (L) 4.20 - 5.40 M/uL    Hemoglobin 12.5 12.0 - 16.0 g/dL    Hematocrit 37.6 37.0 - 47.0 %    .2 (H) 81.4 - 97.8 fL    MCH 34.6 (H) 27.0 - 33.0 pg    MCHC 33.2 (L) 33.6 - 35.0 g/dL    RDW 52.2 (H) 35.9 - 50.0 fL    Platelet Count 231 164 - 446 K/uL    MPV 9.8 9.0 - 12.9 fL       Current Facility-Administered Medications   Medication Frequency   • tizanidine (ZANAFLEX) tablet 4 mg TID   • lisinopril (PRINIVIL) tablet 10 mg DAILY   • mirtazapine (Remeron) orally disintegrating tab 15 mg QHS   • ALPRAZolam (XANAX) tablet 0.5 mg Q EVENING   • ALPRAZolam (XANAX) tablet 0.5 mg QDAY PRN   • diphenhydrAMINE (BENADRYL) tablet/capsule 25 mg Q6HRS PRN   • butalbital/apap/caffeine -40 mg (Fioricet) per tablet 1 Tablet Q4HRS PRN   • senna-docusate (PERICOLACE or SENOKOT S) 8.6-50 MG per tablet 2 Tablet BID PRN    And   • polyethylene glycol/lytes (MIRALAX) PACKET 1 Packet QDAY PRN    And   • magnesium hydroxide (MILK OF MAGNESIA) suspension " 30 mL QDAY PRN    And   • bisacodyl (DULCOLAX) suppository 10 mg QDAY PRN   • vitamin D3 (cholecalciferol) tablet 1,000 Units DAILY   • Respiratory Therapy Consult Continuous RT   • hydrALAZINE (APRESOLINE) tablet 25 mg Q8HRS PRN   • acetaminophen (Tylenol) tablet 650 mg Q4HRS PRN   • omeprazole (PRILOSEC) capsule 20 mg DAILY   • artificial tears ophthalmic solution 1 Drop PRN   • benzocaine-menthol (CEPACOL) lozenge 1 Lozenge Q2HRS PRN   • mag hydrox-al hydrox-simeth (MAALOX PLUS ES or MYLANTA DS) suspension 20 mL Q2HRS PRN   • ondansetron (ZOFRAN ODT) dispertab 4 mg 4X/DAY PRN    Or   • ondansetron (ZOFRAN) syringe/vial injection 4 mg 4X/DAY PRN   • traZODone (DESYREL) tablet 50 mg QHS PRN   • sodium chloride (OCEAN) 0.65 % nasal spray 2 Spray PRN   • oxyCODONE immediate-release (ROXICODONE) tablet 5 mg Q3HRS PRN    Or   • oxyCODONE immediate release (ROXICODONE) tablet 10 mg Q3HRS PRN   • midazolam (VERSED) 5 mg/mL (1 mL vial) PRN   • acetaminophen (TYLENOL) tablet 1,000 mg Q6HRS PRN   • amLODIPine (NORVASC) tablet 10 mg DAILY   • apixaban (ELIQUIS) tablet 5 mg BID   • brivaracetam (Briviact) tablet 100 mg BID   • lacosamide (VIMPAT) tablet 200 mg BID   • melatonin tablet 3 mg QHS   • venlafaxine (EFFEXOR) tablet 75 mg DAILY       Orders Placed This Encounter   Procedures   • Diet Order Diet: Regular     Standing Status:   Standing     Number of Occurrences:   1     Order Specific Question:   Diet:     Answer:   Regular [1]       Assessment:  Active Hospital Problems    Diagnosis    • *Glioblastoma of frontal lobe (HCC)    • GBM (glioblastoma multiforme) (HCC)    • Constipation    • Syncope    • Class 1 obesity due to excess calories with serious comorbidity and body mass index (BMI) of 34.0 to 34.9 in adult    • Gait instability    • History of pulmonary embolus (PE)    • Seizure disorder (HCC)    • Primary hypertension    • Mixed anxiety and depressive disorder        Medical Decision Making and  Plan:  Seizure/GBM - Patient with syncopal event with AMS and weakness after concerning for seizure vs worsening GBM vs post-treatment changes. Oncology was consulted and recommended steroids then taper which she has completed. Neurology was consulted and recommended MRI which showed diffuse posttreatment changes but no acute lesion. EEG showed non-specific changes and Neurology recommended increasing her Vimpat as her symptoms could have been post-ictal. Cardiac work-up negative.    -PT and OT for mobility and ADLs  -SLP for cognitive screen  -Continue Briviact 100 mg BID and Vimpat 200 mg BID     Spasticity - Followed by Dr. Steele, [facility], for Botox injection. Discussed with Dr. Steele. Will trial Tizanidine 4 mg BID   -Improved on Tizanidine. Started on Xanax QHS.      HTN - Patient on Amlodipine 10 mg daily. Into 150s, start Lisinopril 5 mg. Still into 140s. Increase to 10 mg.  Into 150s at times. Increase to 20     Anemia - Check AM CBC - 14.3, resolved.      Hypokalemia - Check AM CMP - 4.0, resolved.     Anxiety/Depression - Patient on PRN Xanax and Effexor 75 mg daily. Psychology to consult     Sleep - Patient with poor sleep. On Xanax QHS and add QHS Remeron    Morbid Obesity due to excess calories - BMI of 37.1 on admission. Dietitian to consult.      Back rash - Appears contact. Start Hydrocortisone cream. PO Benadryl. Discontinue cream, improved    Constipation - Resolved, discontinue senna-docusate.     Vitamin D deficiency - 29 on admission. Started on 1000 U     Hx of PE - Patient on Eliquis BID.    Dispo - Patient very heavy assistance on transfer. Unclear if family will be able to care for her with this level of assistance. Palliative to consult for discussion of goals of care at this time.  Patient declined discussion with PC after discussing with her . They were able to do car transfer x2 on 3/3/22     Total time:  26 minutes.  I spent greater than 50% of the time for patient care,  counseling, and coordination on this date, including unit/floor time, and face-to-face time with the patient as per interval events and assessment and plan above. Topics discussed included discharge planning, car transfer, elevated SBP and increase ACEi.     Lucrecia Sim M.D.

## 2022-03-05 PROCEDURE — 700102 HCHG RX REV CODE 250 W/ 637 OVERRIDE(OP): Performed by: PHYSICAL MEDICINE & REHABILITATION

## 2022-03-05 PROCEDURE — 97129 THER IVNTJ 1ST 15 MIN: CPT

## 2022-03-05 PROCEDURE — 770010 HCHG ROOM/CARE - REHAB SEMI PRIVAT*

## 2022-03-05 PROCEDURE — A9270 NON-COVERED ITEM OR SERVICE: HCPCS | Performed by: PHYSICAL MEDICINE & REHABILITATION

## 2022-03-05 PROCEDURE — 97130 THER IVNTJ EA ADDL 15 MIN: CPT

## 2022-03-05 RX ADMIN — DIPHENHYDRAMINE HCL 25 MG: 25 TABLET ORAL at 16:35

## 2022-03-05 RX ADMIN — APIXABAN 5 MG: 5 TABLET, FILM COATED ORAL at 08:19

## 2022-03-05 RX ADMIN — TIZANIDINE 4 MG: 4 TABLET ORAL at 20:18

## 2022-03-05 RX ADMIN — LACOSAMIDE 200 MG: 100 TABLET, FILM COATED ORAL at 20:17

## 2022-03-05 RX ADMIN — AMLODIPINE BESYLATE 10 MG: 5 TABLET ORAL at 08:18

## 2022-03-05 RX ADMIN — MIRTAZAPINE 15 MG: 15 TABLET, ORALLY DISINTEGRATING ORAL at 20:18

## 2022-03-05 RX ADMIN — BRIVARACETAM 100 MG: 50 TABLET, FILM COATED ORAL at 20:17

## 2022-03-05 RX ADMIN — MELATONIN TAB 3 MG 3 MG: 3 TAB at 20:18

## 2022-03-05 RX ADMIN — APIXABAN 5 MG: 5 TABLET, FILM COATED ORAL at 20:18

## 2022-03-05 RX ADMIN — LACOSAMIDE 200 MG: 100 TABLET, FILM COATED ORAL at 08:18

## 2022-03-05 RX ADMIN — TIZANIDINE 4 MG: 4 TABLET ORAL at 14:42

## 2022-03-05 RX ADMIN — LISINOPRIL 20 MG: 20 TABLET ORAL at 08:19

## 2022-03-05 RX ADMIN — ACETAMINOPHEN 1000 MG: 500 TABLET, FILM COATED ORAL at 16:20

## 2022-03-05 RX ADMIN — TIZANIDINE 4 MG: 4 TABLET ORAL at 08:18

## 2022-03-05 RX ADMIN — Medication 1000 UNITS: at 08:19

## 2022-03-05 RX ADMIN — ALPRAZOLAM 0.5 MG: 0.5 TABLET ORAL at 16:35

## 2022-03-05 RX ADMIN — TRAZODONE HYDROCHLORIDE 50 MG: 50 TABLET ORAL at 20:17

## 2022-03-05 RX ADMIN — BRIVARACETAM 100 MG: 50 TABLET, FILM COATED ORAL at 08:18

## 2022-03-05 RX ADMIN — OMEPRAZOLE 20 MG: 20 CAPSULE, DELAYED RELEASE ORAL at 08:19

## 2022-03-05 RX ADMIN — VENLAFAXINE HYDROCHLORIDE 75 MG: 37.5 TABLET ORAL at 08:18

## 2022-03-05 NOTE — THERAPY
Speech Language Pathology  Daily Treatment     Patient Name: Melba Frye  Age:  51 y.o., Sex:  female  Medical Record #: [id number]  Today's Date: 3/5/2022     Precautions  Precautions: Fall Risk  Comments: L alma delia    Subjective    Pt seen at bedside; pt's  present during ST; pt pleasant and cooperative     Objective       03/05/22 1331   Interdisciplinary Plan of Care Collaboration   IDT Collaboration with  Family / Caregiver   Patient Position at End of Therapy In Bed;Bed Alarm On;Call Light within Reach;Tray Table within Reach;Phone within Reach;Family / Friend in Room   Collaboration Comments Pt's  present during ST   SLP Total Time Spent   SLP Individual Total Time Spent (Mins) 60   Treatment Charges   SLP Cognitive Skill Development First 15 Minutes 1   SLP Cognitive Skill Development Additional 15 Minutes 3       Assessment    Pt participated in recall of activities completed prior to ST; pt had not completed daily memory log. Provided encouragement to continue completing daily to target orientation and recall of important information related to hospitalization. Pt required frequent cues to attend to therapist (sitting on her left) during conversation and throughout ST.  Pt recalled visual scanning strategies with min cues. Pt completed following 2-component (abstract) directions with 75% accuracy indep, requiring mod cues (attention to marginal line, reminders to slow down, check answers for accuracy, reviewing all targets) to achieve 100% accuracy. Pt completed simple subtraction activity, achieving 70% accuracy indep, requiring mod cues to follow visual scanning strategies, slow down, and check answers for accuracy to achieve 100% accuracy. Pt's  participated in tx and cueing pt to scan page(s) for marginal line during activities- pt responded well to his direction. Pt and pt's  asked appropriate and relevant questions related to pt's visual deficits and visual neglect  related to her medical condition (pt reported her eye sight has decreased)- pt and family directed to speak with  and provided with a handout to briefly explain the difference between visual neglect vs. visual field cut. Pt initiated crossword activity; however, reported fatigue and increased difficulty. Therefore, activity was left with pt to complete indep and to be reviewed in upcoming ST session.     Strengths: Able to follow instructions,Supportive family,Pleasant and cooperative,Willingly participates in therapeutic activities  Barriers: Impaired carryover of learning,Impaired functional cognition,Impaired insight/denial of deficits (anxiety)    Plan    Continue to target recall of daily activities and reinforce use of daily memory log, target visual scanning and reinforce use of visual scanning strategies during tasks    Passport items to be completed:  [unfilled]    Speech Therapy Problems (Active)       Problem: Memory STGs       Dates: Start: 02/19/22         Goal: STG-Within one week, patient will recall daily events and safety strategies with 80% accuracy, given min verbal cues and use of memory book.        Dates: Start: 02/19/22               Problem: Problem Solving STGs       Dates: Start: 02/19/22         Goal: STG-Within one week, patient will complete simple visual scanning tasks to address left neglect with 70% accuracy provided MIN cues.        Dates: Start: 02/24/22               Problem: Speech/Swallowing LTGs       Dates: Start: 02/19/22         Goal: LTG-By discharge, patient will solve complex problem Neeraj for safe discharge home.       Dates: Start: 02/19/22

## 2022-03-05 NOTE — CARE PLAN
"The patient is Stable - Low risk of patient condition declining or worsening    Shift Goals  Clinical Goals: safety  Patient Goals: sleep well    Progress made toward(s) clinical / shift goals:  Resting in bed after hs meds, resp even, unlabored. Using call light for assist as needed.     [place] Fall risk Assessment Score: 19    High fall risk Interventions   - Bed and strip alarm   - Yellow sign by the door   - Yellow wrist band \"Fall risk\"  - Room near to the nurse station  - Do not leave patient unattended in the bathroom  - Fall risk education provided  - Call light within reach   - Yellow  socks   - Belongings within reach   - Bed in the lowest position     Problem: Psychosocial  Goal: Patient's level of anxiety will decrease  Outcome: Progressing     Problem: Respiratory  Goal: Patient will understand use and administration of respiratory medications to improve respiratory function  Outcome: Progressing         "

## 2022-03-05 NOTE — CARE PLAN
The patient is Stable - Low risk of patient condition declining or worsening    Shift Goals  Clinical Goals: safety  Patient Goals: sleep well    Progress made toward(s) clinical / shift goals:      Problem: Fall Risk - Rehab  Goal: Patient will remain free from falls  Outcome: Progressing     Problem: Pain - Standard  Goal: Alleviation of pain or a reduction in pain to the patient’s comfort goal  Outcome: Progressing  Patient denied any pain or HA during shift.        Patient is not progressing towards the following goals:

## 2022-03-05 NOTE — PROGRESS NOTES
Received patient during shift change, report rec'd from day shift RN. Resting in bed, VS stable on room air. Per report incontinent of B&B, max x 2 assist for transfers. A&O x 3-4, able to make needs known. Bed in low position, call light within reach.

## 2022-03-06 PROCEDURE — 700102 HCHG RX REV CODE 250 W/ 637 OVERRIDE(OP): Performed by: PHYSICAL MEDICINE & REHABILITATION

## 2022-03-06 PROCEDURE — A9270 NON-COVERED ITEM OR SERVICE: HCPCS | Performed by: PHYSICAL MEDICINE & REHABILITATION

## 2022-03-06 PROCEDURE — 770010 HCHG ROOM/CARE - REHAB SEMI PRIVAT*

## 2022-03-06 RX ADMIN — LISINOPRIL 20 MG: 20 TABLET ORAL at 09:15

## 2022-03-06 RX ADMIN — LACOSAMIDE 200 MG: 100 TABLET, FILM COATED ORAL at 22:23

## 2022-03-06 RX ADMIN — LACOSAMIDE 200 MG: 100 TABLET, FILM COATED ORAL at 09:14

## 2022-03-06 RX ADMIN — APIXABAN 5 MG: 5 TABLET, FILM COATED ORAL at 22:23

## 2022-03-06 RX ADMIN — Medication 1000 UNITS: at 09:14

## 2022-03-06 RX ADMIN — MELATONIN TAB 3 MG 3 MG: 3 TAB at 22:22

## 2022-03-06 RX ADMIN — OMEPRAZOLE 20 MG: 20 CAPSULE, DELAYED RELEASE ORAL at 09:14

## 2022-03-06 RX ADMIN — APIXABAN 5 MG: 5 TABLET, FILM COATED ORAL at 09:14

## 2022-03-06 RX ADMIN — ALPRAZOLAM 0.5 MG: 0.5 TABLET ORAL at 17:56

## 2022-03-06 RX ADMIN — BRIVARACETAM 100 MG: 50 TABLET, FILM COATED ORAL at 22:22

## 2022-03-06 RX ADMIN — VENLAFAXINE HYDROCHLORIDE 75 MG: 37.5 TABLET ORAL at 09:14

## 2022-03-06 RX ADMIN — MIRTAZAPINE 15 MG: 15 TABLET, ORALLY DISINTEGRATING ORAL at 22:23

## 2022-03-06 RX ADMIN — AMLODIPINE BESYLATE 10 MG: 5 TABLET ORAL at 09:15

## 2022-03-06 RX ADMIN — SENNOSIDES AND DOCUSATE SODIUM 2 TABLET: 50; 8.6 TABLET ORAL at 10:49

## 2022-03-06 RX ADMIN — TIZANIDINE 4 MG: 4 TABLET ORAL at 22:22

## 2022-03-06 RX ADMIN — TIZANIDINE 4 MG: 4 TABLET ORAL at 09:14

## 2022-03-06 RX ADMIN — TRAZODONE HYDROCHLORIDE 50 MG: 50 TABLET ORAL at 22:23

## 2022-03-06 RX ADMIN — TIZANIDINE 4 MG: 4 TABLET ORAL at 15:49

## 2022-03-06 RX ADMIN — BRIVARACETAM 100 MG: 50 TABLET, FILM COATED ORAL at 09:14

## 2022-03-06 RX ADMIN — OXYCODONE 5 MG: 5 TABLET ORAL at 10:50

## 2022-03-06 ASSESSMENT — PAIN DESCRIPTION - PAIN TYPE
TYPE: CHRONIC PAIN
TYPE: CHRONIC PAIN

## 2022-03-06 NOTE — PROGRESS NOTES
Received patient during shift change, report rec'd from day shift RN. Resting in bed, VS stable on room air. Cont/incontinent of B&B, max x 2 assist for transfers. A&O x 3-4, able to make needs known. Bed in low position, call light within reach.

## 2022-03-06 NOTE — CARE PLAN
"The patient is Stable - Low risk of patient condition declining or worsening    Shift Goals  Clinical Goals: safety  Patient Goals: sleep well    Progress made toward(s) clinical / shift goals:  Resting in bed after hs meds, resp even, unlabored. Using call light for assist as needed.     [place] Fall risk Assessment Score: 19    High fall risk Interventions   - Bed and strip alarm   - Yellow sign by the door   - Yellow wrist band \"Fall risk\"  - Room near to the nurse station  - Do not leave patient unattended in the bathroom  - Fall risk education provided  - Call light within reach   - Yellow  socks   - Belongings within reach   - Bed in the lowest position     Problem: Skin Integrity  Goal: Skin integrity is maintained or improved  Outcome: Progressing     Problem: Pain - Standard  Goal: Alleviation of pain or a reduction in pain to the patient’s comfort goal  Outcome: Progressing         "

## 2022-03-07 ENCOUNTER — APPOINTMENT (OUTPATIENT)
Dept: RADIOLOGY | Facility: REHABILITATION | Age: 52
DRG: 101 | End: 2022-03-07
Attending: PHYSICAL MEDICINE & REHABILITATION
Payer: COMMERCIAL

## 2022-03-07 PROCEDURE — 97535 SELF CARE MNGMENT TRAINING: CPT

## 2022-03-07 PROCEDURE — 770010 HCHG ROOM/CARE - REHAB SEMI PRIVAT*

## 2022-03-07 PROCEDURE — 97110 THERAPEUTIC EXERCISES: CPT

## 2022-03-07 PROCEDURE — A9270 NON-COVERED ITEM OR SERVICE: HCPCS | Performed by: PHYSICAL MEDICINE & REHABILITATION

## 2022-03-07 PROCEDURE — 97129 THER IVNTJ 1ST 15 MIN: CPT

## 2022-03-07 PROCEDURE — 700102 HCHG RX REV CODE 250 W/ 637 OVERRIDE(OP): Performed by: PHYSICAL MEDICINE & REHABILITATION

## 2022-03-07 PROCEDURE — 97116 GAIT TRAINING THERAPY: CPT

## 2022-03-07 PROCEDURE — 97530 THERAPEUTIC ACTIVITIES: CPT

## 2022-03-07 PROCEDURE — 97112 NEUROMUSCULAR REEDUCATION: CPT

## 2022-03-07 PROCEDURE — 99233 SBSQ HOSP IP/OBS HIGH 50: CPT | Performed by: PHYSICAL MEDICINE & REHABILITATION

## 2022-03-07 PROCEDURE — 97130 THER IVNTJ EA ADDL 15 MIN: CPT

## 2022-03-07 RX ADMIN — BRIVARACETAM 100 MG: 50 TABLET, FILM COATED ORAL at 09:58

## 2022-03-07 RX ADMIN — TIZANIDINE 4 MG: 4 TABLET ORAL at 20:42

## 2022-03-07 RX ADMIN — DIPHENHYDRAMINE HCL 25 MG: 25 TABLET ORAL at 01:26

## 2022-03-07 RX ADMIN — MIRTAZAPINE 15 MG: 15 TABLET, ORALLY DISINTEGRATING ORAL at 20:42

## 2022-03-07 RX ADMIN — Medication 1000 UNITS: at 08:51

## 2022-03-07 RX ADMIN — TIZANIDINE 4 MG: 4 TABLET ORAL at 14:46

## 2022-03-07 RX ADMIN — LACOSAMIDE 200 MG: 100 TABLET, FILM COATED ORAL at 20:42

## 2022-03-07 RX ADMIN — APIXABAN 5 MG: 5 TABLET, FILM COATED ORAL at 20:42

## 2022-03-07 RX ADMIN — OXYCODONE 5 MG: 5 TABLET ORAL at 20:42

## 2022-03-07 RX ADMIN — AMLODIPINE BESYLATE 10 MG: 5 TABLET ORAL at 08:51

## 2022-03-07 RX ADMIN — OMEPRAZOLE 20 MG: 20 CAPSULE, DELAYED RELEASE ORAL at 08:51

## 2022-03-07 RX ADMIN — VENLAFAXINE HYDROCHLORIDE 75 MG: 37.5 TABLET ORAL at 08:51

## 2022-03-07 RX ADMIN — TIZANIDINE 4 MG: 4 TABLET ORAL at 08:51

## 2022-03-07 RX ADMIN — ALPRAZOLAM 0.5 MG: 0.5 TABLET ORAL at 17:22

## 2022-03-07 RX ADMIN — BRIVARACETAM 100 MG: 50 TABLET, FILM COATED ORAL at 20:42

## 2022-03-07 RX ADMIN — APIXABAN 5 MG: 5 TABLET, FILM COATED ORAL at 08:52

## 2022-03-07 RX ADMIN — SENNOSIDES AND DOCUSATE SODIUM 2 TABLET: 50; 8.6 TABLET ORAL at 14:45

## 2022-03-07 RX ADMIN — LISINOPRIL 20 MG: 20 TABLET ORAL at 08:51

## 2022-03-07 RX ADMIN — MELATONIN TAB 3 MG 3 MG: 3 TAB at 20:42

## 2022-03-07 RX ADMIN — LACOSAMIDE 200 MG: 100 TABLET, FILM COATED ORAL at 08:51

## 2022-03-07 RX ADMIN — MAGNESIUM HYDROXIDE,ALUMINUM HYDROXICE,SIMETHICONE 20 ML: 240; 2400; 2400 SUSPENSION ORAL at 01:26

## 2022-03-07 RX ADMIN — TRAZODONE HYDROCHLORIDE 50 MG: 50 TABLET ORAL at 20:42

## 2022-03-07 ASSESSMENT — GAIT ASSESSMENTS
DEVIATION: STEP TO;DECREASED HEEL STRIKE;DECREASED TOE OFF
ASSISTIVE DEVICE: PARALLEL BARS
DISTANCE (FEET): 10
GAIT LEVEL OF ASSIST: MODERATE ASSIST

## 2022-03-07 ASSESSMENT — ACTIVITIES OF DAILY LIVING (ADL)
TOILET_TRANSFER_DESCRIPTION: ADAPTIVE EQUIPMENT;GRAB BAR;INCREASED TIME;INITIAL PREPARATION FOR TASK;REQUIRES LIFT;SET-UP OF EQUIPMENT;SUPERVISION FOR SAFETY;VERBAL CUEING
TOILETING_LEVEL_OF_ASSIST_DESCRIPTION: ASSIST TO PULL PANTS UP;ASSIST TO PULL PANTS DOWN;ASSIST FOR STANDING BALANCE;GRAB BAR;INCREASED TIME;INITIAL PREPARATION FOR TASK;SET-UP OF EQUIPMENT;SUPERVISION FOR SAFETY;VERBAL CUEING
BED_CHAIR_WHEELCHAIR_TRANSFER_DESCRIPTION: ADAPTIVE EQUIPMENT;INCREASED TIME;INITIAL PREPARATION FOR TASK;REQUIRES LIFT;SET-UP OF EQUIPMENT;SUPERVISION FOR SAFETY;VERBAL CUEING
BED_CHAIR_WHEELCHAIR_TRANSFER_DESCRIPTION: ADAPTIVE EQUIPMENT;INCREASED TIME;REQUIRES LIFT;SET-UP OF EQUIPMENT;VERBAL CUEING

## 2022-03-07 ASSESSMENT — PAIN DESCRIPTION - PAIN TYPE
TYPE: CHRONIC PAIN
TYPE: CHRONIC PAIN

## 2022-03-07 NOTE — PROGRESS NOTES
"Rehab Progress Note     Encounter Date: 3/7/2022    CC: Decreased mobility, spasticity    Interval Events (Subjective)  Patient sitting up in room. She reports she is more tired today. She also reports dysuria. Discussed will check UA. SBP elevated. Denies fever or chills.     IDT Team Meeting 3/3/2022  DC/Disposition:  3/11/22    Objective:  VITAL SIGNS: /78   Pulse 90   Temp 36.6 °C (97.8 °F) (Temporal)   Resp 18   Ht 1.702 m (5' 7\")   Wt 105 kg (231 lb 7.7 oz)   SpO2 96%   BMI 36.26 kg/m²   Gen: NAD  Psych: Mood and affect appropriate  CV: RRR, no edema  Resp: CTAB, no upper airway sounds  Abd: NTND  Neuro: AOx4, following commands    No results found for this or any previous visit (from the past 72 hour(s)).    Current Facility-Administered Medications   Medication Frequency   • lisinopril (PRINIVIL) tablet 20 mg DAILY   • tizanidine (ZANAFLEX) tablet 4 mg TID   • mirtazapine (Remeron) orally disintegrating tab 15 mg QHS   • ALPRAZolam (XANAX) tablet 0.5 mg Q EVENING   • ALPRAZolam (XANAX) tablet 0.5 mg QDAY PRN   • diphenhydrAMINE (BENADRYL) tablet/capsule 25 mg Q6HRS PRN   • butalbital/apap/caffeine -40 mg (Fioricet) per tablet 1 Tablet Q4HRS PRN   • senna-docusate (PERICOLACE or SENOKOT S) 8.6-50 MG per tablet 2 Tablet BID PRN    And   • polyethylene glycol/lytes (MIRALAX) PACKET 1 Packet QDAY PRN    And   • magnesium hydroxide (MILK OF MAGNESIA) suspension 30 mL QDAY PRN    And   • bisacodyl (DULCOLAX) suppository 10 mg QDAY PRN   • vitamin D3 (cholecalciferol) tablet 1,000 Units DAILY   • Respiratory Therapy Consult Continuous RT   • hydrALAZINE (APRESOLINE) tablet 25 mg Q8HRS PRN   • acetaminophen (Tylenol) tablet 650 mg Q4HRS PRN   • omeprazole (PRILOSEC) capsule 20 mg DAILY   • artificial tears ophthalmic solution 1 Drop PRN   • benzocaine-menthol (CEPACOL) lozenge 1 Lozenge Q2HRS PRN   • mag hydrox-al hydrox-simeth (MAALOX PLUS ES or MYLANTA DS) suspension 20 mL Q2HRS PRN   • " ondansetron (ZOFRAN ODT) dispertab 4 mg 4X/DAY PRN    Or   • ondansetron (ZOFRAN) syringe/vial injection 4 mg 4X/DAY PRN   • traZODone (DESYREL) tablet 50 mg QHS PRN   • sodium chloride (OCEAN) 0.65 % nasal spray 2 Spray PRN   • oxyCODONE immediate-release (ROXICODONE) tablet 5 mg Q3HRS PRN    Or   • oxyCODONE immediate release (ROXICODONE) tablet 10 mg Q3HRS PRN   • midazolam (VERSED) 5 mg/mL (1 mL vial) PRN   • acetaminophen (TYLENOL) tablet 1,000 mg Q6HRS PRN   • amLODIPine (NORVASC) tablet 10 mg DAILY   • apixaban (ELIQUIS) tablet 5 mg BID   • brivaracetam (Briviact) tablet 100 mg BID   • lacosamide (VIMPAT) tablet 200 mg BID   • melatonin tablet 3 mg QHS   • venlafaxine (EFFEXOR) tablet 75 mg DAILY       Orders Placed This Encounter   Procedures   • Diet Order Diet: Regular     Standing Status:   Standing     Number of Occurrences:   1     Order Specific Question:   Diet:     Answer:   Regular [1]       Assessment:  Active Hospital Problems    Diagnosis    • *Glioblastoma of frontal lobe (HCC)    • GBM (glioblastoma multiforme) (HCC)    • Constipation    • Syncope    • Class 1 obesity due to excess calories with serious comorbidity and body mass index (BMI) of 34.0 to 34.9 in adult    • Gait instability    • History of pulmonary embolus (PE)    • Seizure disorder (HCC)    • Primary hypertension    • Mixed anxiety and depressive disorder        Medical Decision Making and Plan:  Seizure/GBM - Patient with syncopal event with AMS and weakness after concerning for seizure vs worsening GBM vs post-treatment changes. Oncology was consulted and recommended steroids then taper which she has completed. Neurology was consulted and recommended MRI which showed diffuse posttreatment changes but no acute lesion. EEG showed non-specific changes and Neurology recommended increasing her Vimpat as her symptoms could have been post-ictal. Cardiac work-up negative.    -PT and OT for mobility and ADLs  -SLP for cognitive  screen  -Continue Briviact 100 mg BID and Vimpat 200 mg BID     Spasticity - Followed by Dr. Steele, [facility], for Botox injection. Discussed with Dr. Steele. Will trial Tizanidine 4 mg BID   -Improved on Tizanidine. Started on Xanax QHS.      HTN - Patient on Amlodipine 10 mg daily. Into 150s, start Lisinopril 5 mg. Still into 140s. Increase to 10 mg.  Into 150s at times. Increase to 20. Into 140s, increase to 30    Dysuria - Patient with dysuria on 3/7/22 and fatigue. Check UA     Anemia - Check AM CBC - 14.3, resolved.      Hypokalemia - Check AM CMP - 4.0, resolved.     Anxiety/Depression - Patient on PRN Xanax and Effexor 75 mg daily. Psychology to consult     Sleep - Patient with poor sleep. On Xanax QHS and add QHS Remeron    Morbid Obesity due to excess calories - BMI of 37.1 on admission. Dietitian to consult.      Back rash - Appears contact. Start Hydrocortisone cream. PO Benadryl. Discontinue cream, improved    Constipation - Resolved, discontinue senna-docusate.     Vitamin D deficiency - 29 on admission. Started on 1000 U     Hx of PE - Patient on Eliquis BID.    Dispo - Patient very heavy assistance on transfer. Unclear if family will be able to care for her with this level of assistance. Palliative to consult for discussion of goals of care at this time.  Patient declined discussion with PC after discussing with her . They were able to do car transfer x2 on 3/3/22     Total time:  36 minutes.  I spent greater than 50% of the time for patient care, counseling, and coordination on this date, including unit/floor time, and face-to-face time with the patient as per interval events and assessment and plan above. Topics discussed included discharge planning, elevated SBP, increase ACEi, and dysuria so check UA.     Lucrecia Sim M.D.

## 2022-03-07 NOTE — CARE PLAN
The patient is Stable - Low risk of patient condition declining or worsening    Shift Goals  Clinical Goals: safety  Patient Goals: sleep well    Problem: Fall Risk - Rehab  Goal: Patient will remain free from falls  Outcome: Progressing. Pt 2 person transfer for safety, not impulsive, uses call light appropriately, remains free from fall.      Problem: Pain - Standard  Goal: Alleviation of pain or a reduction in pain to the patient’s comfort goal  Outcome: Progressing. Pt c/o [place] this morning, 4/10 on 1-10 pain scale. Requests oxy 5 mg - administered at 10:50. Pt napped after lunch, woke up a bit confused, anxious, call  several times (per ) thinking she was in a hotel. Reorienting pt with encounters. Discussed w/ pt and her  perhaps trying tylenol or fioricet rather than oxycodone,as pt was more alert and oriented prior to opioid.

## 2022-03-07 NOTE — THERAPY
"Speech Language Pathology  Daily Treatment     Patient Name: Melba Frye  Age:  51 y.o., Sex:  female  Medical Record #: [id number]  Today's Date: 3/7/2022     Precautions  Precautions: Fall Risk  Comments: L alma delia    Subjective    Pt pleasant and cooperative, SO present and supportive. Pt tearful re: memory loss stating \"why cant I remember, it scares me.\"     Objective       03/07/22 0833   SLP Total Time Spent   SLP Individual Total Time Spent (Mins) 60   Treatment Charges   SLP Cognitive Skill Development First 15 Minutes 1   SLP Cognitive Skill Development Additional 15 Minutes 3       Assessment    Pt reporting that no therapy was completed over the weekend and that  was not present. SLP discussed that pt say her coworker and with cues pt recalled that homework task was provided. Pt presented with attention, functional problem solving and visual scanning task with use of calendar. Pt required additional time for completion and reported \"im stuck, I need help.\" Pt correctly responded to 3/6 completed questions otherwise required MOD A for correction and MOD A for completion of remaining items. Pt cont to require direct cues for attention to left side and thinking about what exactly is being asked rather than assuming what she believes the questions says resulting in inaccurate responses. SO asking questions re: neuro restorative as a transition following dc from [facility]. Provided information as known by SLP with rec to discuss with both CM and MD.     Strengths: Able to follow instructions,Supportive family,Pleasant and cooperative,Willingly participates in therapeutic activities  Barriers: Impaired carryover of learning,Impaired functional cognition,Impaired insight/denial of deficits (anxiety)    Plan    Attention, left neglect, functional problem solving and safety.    Speech Therapy Problems (Active)       Problem: Memory STGs       Dates: Start: 02/19/22         Goal: STG-Within one week, patient " will recall daily events and safety strategies with 80% accuracy, given min verbal cues and use of memory book.        Dates: Start: 02/19/22               Problem: Problem Solving STGs       Dates: Start: 02/19/22         Goal: STG-Within one week, patient will complete simple visual scanning tasks to address left neglect with 70% accuracy provided MIN cues.        Dates: Start: 02/24/22               Problem: Speech/Swallowing LTGs       Dates: Start: 02/19/22         Goal: LTG-By discharge, patient will solve complex problem Neeraj for safe discharge home.       Dates: Start: 02/19/22

## 2022-03-07 NOTE — CARE PLAN
Patient Goals: Sleep well    Problem: Risk for Aspiration  Goal: Patient's risk for aspiration will be absent or decrease  Outcome: Progressing. Pt on regular diet w/ this liquids, takes pills whole with water. No s/s aspiration noted.

## 2022-03-07 NOTE — CARE PLAN
"The patient is Stable - Low risk of patient condition declining or worsening    Shift Goals  Clinical Goals: safety  Patient Goals: Sleep well  Problem: Neurogenic Bowel  Goal: Patient will verbalize signs and symptoms of constipation and how to prevent/alleviate  Outcome: Progressing: pt has formed bm x2 today, pt c/o feeling \"constipated\" and describes discomfort and pressure to rectum. Many smears with toileting today. Manual digital check confirmed stool in rectal vault. PRN senna given with prune juice.     Problem: Neurogenic Bowel  Goal: Patient will perform adequate hygiene with incontinent episodes  Outcome: Not Progressing: pt hemiplegic on left, impaired balance when sitting up unless in chair w/ arm rests. Pt unable to perform hygiene w/ incontinent episodes.     "

## 2022-03-07 NOTE — PROGRESS NOTES
Received bedside shift report from Ilene MIGUEL RN regarding patient and assumed care. Patient awake, calm and stable, currently positioned in bed for comfort and safety; call light within reach. Denies pain or discomfort at this time. Will continue to monitor.

## 2022-03-07 NOTE — CARE PLAN
"  Problem: Knowledge Deficit - Standard  Goal: Patient and family/care givers will demonstrate understanding of plan of care, disease process/condition, diagnostic tests and medications  Outcome: Progressing  Note: Pt agrees with plan of care tonight regarding medications and safety.  Will continue to monitor patient.      Problem: Fall Risk - Rehab  Goal: Patient will remain free from falls  Outcome: Progressing  Note: [place] Fall risk Assessment Score:  21    High fall risk Interventions   - Alarming seatbelt  - Wander guard  - Bed and strip alarm   - Yellow sign by the door   - Yellow wrist band \"Fall risk\"  - Room near to the nurse station  - Do not leave patient unattended in the bathroom  - Fall risk education provided         The patient is Stable - Low risk of patient condition declining or worsening    Shift Goals  Clinical Goals: Safety  Patient Goals: Sleep well    Progress made toward(s) clinical / shift goals:  progressing        "

## 2022-03-08 ENCOUNTER — HOSPITAL ENCOUNTER (OUTPATIENT)
Facility: MEDICAL CENTER | Age: 52
End: 2022-03-08
Attending: PHYSICAL MEDICINE & REHABILITATION
Payer: COMMERCIAL

## 2022-03-08 ENCOUNTER — HOME HEALTH ADMISSION (OUTPATIENT)
Dept: HOME HEALTH SERVICES | Facility: HOME HEALTHCARE | Age: 52
End: 2022-03-08
Payer: COMMERCIAL

## 2022-03-08 LAB
APPEARANCE UR: ABNORMAL
APPEARANCE UR: ABNORMAL
BACTERIA #/AREA URNS HPF: ABNORMAL /HPF
BACTERIA #/AREA URNS HPF: ABNORMAL /HPF
BILIRUB UR QL STRIP.AUTO: NEGATIVE
BILIRUB UR QL STRIP.AUTO: NEGATIVE
COLOR UR: YELLOW
COLOR UR: YELLOW
EPI CELLS #/AREA URNS HPF: ABNORMAL /HPF
EPI CELLS #/AREA URNS HPF: NEGATIVE /HPF
GLUCOSE UR STRIP.AUTO-MCNC: NEGATIVE MG/DL
GLUCOSE UR STRIP.AUTO-MCNC: NEGATIVE MG/DL
HYALINE CASTS #/AREA URNS LPF: ABNORMAL /LPF
KETONES UR STRIP.AUTO-MCNC: NEGATIVE MG/DL
KETONES UR STRIP.AUTO-MCNC: NEGATIVE MG/DL
LEUKOCYTE ESTERASE UR QL STRIP.AUTO: ABNORMAL
LEUKOCYTE ESTERASE UR QL STRIP.AUTO: ABNORMAL
MICRO URNS: ABNORMAL
MICRO URNS: ABNORMAL
NITRITE UR QL STRIP.AUTO: NEGATIVE
NITRITE UR QL STRIP.AUTO: POSITIVE
PH UR STRIP.AUTO: 6.5 [PH] (ref 5–8)
PH UR STRIP.AUTO: 6.5 [PH] (ref 5–8)
PROT UR QL STRIP: 30 MG/DL
PROT UR QL STRIP: NEGATIVE MG/DL
RBC # URNS HPF: ABNORMAL /HPF
RBC # URNS HPF: ABNORMAL /HPF
RBC UR QL AUTO: ABNORMAL
RBC UR QL AUTO: ABNORMAL
SP GR UR STRIP.AUTO: 1.01
SP GR UR STRIP.AUTO: 1.01
UROBILINOGEN UR STRIP.AUTO-MCNC: 0.2 MG/DL
UROBILINOGEN UR STRIP.AUTO-MCNC: 0.2 MG/DL
WBC #/AREA URNS HPF: ABNORMAL /HPF
WBC #/AREA URNS HPF: ABNORMAL /HPF

## 2022-03-08 PROCEDURE — 87086 URINE CULTURE/COLONY COUNT: CPT

## 2022-03-08 PROCEDURE — 97110 THERAPEUTIC EXERCISES: CPT

## 2022-03-08 PROCEDURE — 87077 CULTURE AEROBIC IDENTIFY: CPT

## 2022-03-08 PROCEDURE — 97112 NEUROMUSCULAR REEDUCATION: CPT

## 2022-03-08 PROCEDURE — 99232 SBSQ HOSP IP/OBS MODERATE 35: CPT | Performed by: PHYSICAL MEDICINE & REHABILITATION

## 2022-03-08 PROCEDURE — A9270 NON-COVERED ITEM OR SERVICE: HCPCS | Performed by: PHYSICAL MEDICINE & REHABILITATION

## 2022-03-08 PROCEDURE — 97130 THER IVNTJ EA ADDL 15 MIN: CPT

## 2022-03-08 PROCEDURE — 81001 URINALYSIS AUTO W/SCOPE: CPT

## 2022-03-08 PROCEDURE — 97530 THERAPEUTIC ACTIVITIES: CPT

## 2022-03-08 PROCEDURE — 700102 HCHG RX REV CODE 250 W/ 637 OVERRIDE(OP): Performed by: PHYSICAL MEDICINE & REHABILITATION

## 2022-03-08 PROCEDURE — 97116 GAIT TRAINING THERAPY: CPT

## 2022-03-08 PROCEDURE — 770010 HCHG ROOM/CARE - REHAB SEMI PRIVAT*

## 2022-03-08 PROCEDURE — 97129 THER IVNTJ 1ST 15 MIN: CPT

## 2022-03-08 PROCEDURE — 81001 URINALYSIS AUTO W/SCOPE: CPT | Mod: 91

## 2022-03-08 PROCEDURE — 87186 SC STD MICRODIL/AGAR DIL: CPT

## 2022-03-08 RX ORDER — CEFDINIR 300 MG/1
300 CAPSULE ORAL EVERY 12 HOURS
Status: DISCONTINUED | OUTPATIENT
Start: 2022-03-08 | End: 2022-03-11 | Stop reason: HOSPADM

## 2022-03-08 RX ORDER — LISINOPRIL 20 MG/1
40 TABLET ORAL DAILY
Status: DISCONTINUED | OUTPATIENT
Start: 2022-03-09 | End: 2022-03-11 | Stop reason: HOSPADM

## 2022-03-08 RX ADMIN — TIZANIDINE 4 MG: 4 TABLET ORAL at 19:51

## 2022-03-08 RX ADMIN — LACOSAMIDE 200 MG: 100 TABLET, FILM COATED ORAL at 08:10

## 2022-03-08 RX ADMIN — MIRTAZAPINE 15 MG: 15 TABLET, ORALLY DISINTEGRATING ORAL at 19:52

## 2022-03-08 RX ADMIN — OMEPRAZOLE 20 MG: 20 CAPSULE, DELAYED RELEASE ORAL at 08:09

## 2022-03-08 RX ADMIN — TRAZODONE HYDROCHLORIDE 50 MG: 50 TABLET ORAL at 19:52

## 2022-03-08 RX ADMIN — LISINOPRIL 30 MG: 20 TABLET ORAL at 08:10

## 2022-03-08 RX ADMIN — VENLAFAXINE HYDROCHLORIDE 75 MG: 37.5 TABLET ORAL at 08:10

## 2022-03-08 RX ADMIN — MELATONIN TAB 3 MG 3 MG: 3 TAB at 19:52

## 2022-03-08 RX ADMIN — DIPHENHYDRAMINE HCL 25 MG: 25 TABLET ORAL at 07:43

## 2022-03-08 RX ADMIN — BRIVARACETAM 100 MG: 50 TABLET, FILM COATED ORAL at 08:10

## 2022-03-08 RX ADMIN — Medication 1000 UNITS: at 08:09

## 2022-03-08 RX ADMIN — TIZANIDINE 4 MG: 4 TABLET ORAL at 08:10

## 2022-03-08 RX ADMIN — LACOSAMIDE 200 MG: 100 TABLET, FILM COATED ORAL at 19:53

## 2022-03-08 RX ADMIN — OXYCODONE 5 MG: 5 TABLET ORAL at 19:52

## 2022-03-08 RX ADMIN — ALPRAZOLAM 0.5 MG: 0.5 TABLET ORAL at 17:19

## 2022-03-08 RX ADMIN — TIZANIDINE 4 MG: 4 TABLET ORAL at 15:41

## 2022-03-08 RX ADMIN — APIXABAN 5 MG: 5 TABLET, FILM COATED ORAL at 19:51

## 2022-03-08 RX ADMIN — CEFDINIR 300 MG: 300 CAPSULE ORAL at 17:19

## 2022-03-08 RX ADMIN — AMLODIPINE BESYLATE 10 MG: 5 TABLET ORAL at 08:09

## 2022-03-08 RX ADMIN — BRIVARACETAM 100 MG: 50 TABLET, FILM COATED ORAL at 19:52

## 2022-03-08 RX ADMIN — APIXABAN 5 MG: 5 TABLET, FILM COATED ORAL at 08:09

## 2022-03-08 RX ADMIN — DIPHENHYDRAMINE HCL 25 MG: 25 TABLET ORAL at 16:17

## 2022-03-08 ASSESSMENT — GAIT ASSESSMENTS
DEVIATION: STEP TO;INCREASED BASE OF SUPPORT;DECREASED HEEL STRIKE;DECREASED TOE OFF
DISTANCE (FEET): 10
ASSISTIVE DEVICE: HEMI-WALKER
GAIT LEVEL OF ASSIST: MAXIMAL ASSIST

## 2022-03-08 ASSESSMENT — ACTIVITIES OF DAILY LIVING (ADL)
TOILET_TRANSFER_DESCRIPTION: GRAB BAR;INCREASED TIME;REQUIRES LIFT;VERBAL CUEING
TOILET_TRANSFER_DESCRIPTION: GRAB BAR;INCREASED TIME;REQUIRES LIFT;SET-UP OF EQUIPMENT;SUPERVISION FOR SAFETY;VERBAL CUEING
BED_CHAIR_WHEELCHAIR_TRANSFER_DESCRIPTION: ADAPTIVE EQUIPMENT;INCREASED TIME;REQUIRES LIFT;SET-UP OF EQUIPMENT;VERBAL CUEING

## 2022-03-08 ASSESSMENT — PAIN DESCRIPTION - PAIN TYPE: TYPE: ACUTE PAIN

## 2022-03-08 NOTE — THERAPY
Physical Therapy   Daily Treatment     Patient Name: Melba Frye  Age:  51 y.o., Sex:  female  Medical Record #: [id number]  Today's Date: 3/8/2022     Precautions  Precautions: Fall Risk  Comments: L rolando    Subjective    Pt's spouse assisting pt to edge of bed for toileting task. Willing to participate.     Objective       03/08/22 1401   Gait Functional Level of Assist    Gait Level Of Assist Maximal Assist  (second person CLOSE wc follow)   Assistive Device Rolando-Walker  (L AFO/ gait belt)   Distance (Feet) 10   # of Times Distance was Traveled 2   Deviation Step To;Increased Base Of Support;Decreased Heel Strike;Decreased Toe Off  (spastic L rolando-gait)   Transfer Functional Level of Assist   Bed, Chair, Wheelchair Transfer Maximal Assist   Bed Chair Wheelchair Transfer Description Adaptive equipment;Increased time;Requires lift;Set-up of equipment;Verbal cueing   Toilet Transfers Moderate Assist   Toilet Transfer Description Grab bar;Increased time;Requires lift;Verbal cueing   Bed Mobility    Supine to Sit Moderate Assist   Sit to Supine Moderate Assist   Sit to Stand Moderate Assist   PT Total Time Spent   PT Individual Total Time Spent (Mins) 30   PT Charge Group   PT Gait Training 1   PT Therapeutic Activities 1       Assessment    Pt demonstrates improvement with sit<>stand to rolando-walker today but poor motor planning/ gait sequencing with extensor spasticity and loss of balance today requiring CLOSE wc follow for fall prevention.    Strengths: Able to follow instructions,Willingly participates in therapeutic activities,Supportive family,Pleasant and cooperative,Motivated for self care and independence,Effective communication skills  Barriers: Decreased endurance,Emotional lability,Fatigue,Generalized weakness,Hemiplegia,Impaired activity tolerance,Impaired balance,Impaired functional cognition,Limited mobility,Spasticity    Plan    Sit<>stand sequencing/ transfer training, pre-gait/ gait training as  able, ROM/ stretching/ spasticity management. Care giver training and continued training with spouse.      Passport items to be completed:  Get in/out of bed safely, in/out of a vehicle, safely use mobility device, walk or wheel around home/community, navigate up and down stairs, show how to get up/down from the ground, ensure home is accessible, demonstrate HEP, complete caregiver training      Physical Therapy Problems (Active)       Problem: Mobility       Dates: Start: 02/19/22         Goal: STG-Within one week, patient will ambulate 10ft with LBQC and min A.       Dates: Start: 02/19/22            Goal: STG-Within one week, patient will ascend and descend four stairs with mod A using R handrail.       Dates: Start: 02/19/22               Problem: Mobility Transfers       Dates: Start: 02/19/22         Goal: STG-Within one week, patient will perform bed mobility with min A using hospital bed functions.       Dates: Start: 02/19/22            Goal: STG-Within one week, patient will sit to stand with min A from wheelchair.       Dates: Start: 02/19/22            Goal: STG-Within one week, patient will transfer bed to chair with min A.       Dates: Start: 02/19/22               Problem: PT-Long Term Goals       Dates: Start: 02/19/22         Goal: LTG-By discharge, patient will propel wheelchair 150ft with power versus manual chair and supervision.       Dates: Start: 02/19/22            Goal: LTG-By discharge, patient will ambulate 20ft with LBQC and CGA.       Dates: Start: 02/19/22            Goal: LTG-By discharge, patient will transfer one surface to another with CGA.       Dates: Start: 02/19/22            Goal: LTG-By discharge, patient will ambulate up/down flight of stairs with min A using R handrail.       Dates: Start: 02/19/22            Goal: LTG-By discharge, patient will transfer in/out of a car with min A.       Dates: Start: 02/19/22

## 2022-03-08 NOTE — THERAPY
Occupational Therapy  Daily Treatment     Patient Name: Melba Frye  Age:  51 y.o., Sex:  female  Medical Record #: [id number]  Today's Date: 3/8/2022     Precautions  Precautions: Fall Risk  Comments: L alma delia    Safety   ADL Safety : Requires Physical Assist for Safety,Requires Supervision for Safety,Requires Cueing for Safety  Bathroom Safety: Requires Physical Assist for Safety,Requires Supervision for Safety,Requires Cuing for Safety    Subjective    Pt received in therapy gym from PT with spouse present, agreeable to OT session.      Objective     03/08/22 1431   Functional Level of Assist   Toilet Transfers Moderate Assist   Toilet Transfer Description Grab bar;Increased time;Requires lift;Set-up of equipment;Supervision for safety;Verbal cueing   Interdisciplinary Plan of Care Collaboration   IDT Collaboration with  Family / Caregiver;Physical Therapist   Patient Position at End of Therapy Seated;Family / Friend in Room;Other (Comments)   Collaboration Comments Pt left seated on toilet with spouse and PT present   OT Total Time Spent   OT Individual Total Time Spent (Mins) 30   OT Charge Group   OT Neuromuscular Re-education / Balance 2     LUE neuro re-ed and spasticity mgmt for elbow, wrist and finger passive stretching and weight bearing seated unsupported on EOM. Educated pt's spouse on stretching routine and weight bearing activities for spasticity mgmt and for contracture prevention then pt's spouse return demo'd activities.     Assessment    Pt tolerated OT session well and was able to achieve full elbow, wrist and finger extension with passive stretch then performed a few AAROM bicep flexion/extension exercises. Was unable to tolerate opening fingers to full extension and placing to side on mat for weightbearing but was able to wrap hand around the edge of the mat then weight bear through and push back up into elbow extension with proximal stabilization at the shoulder.     Strengths: Able to  follow instructions,Effective communication skills,Independent prior level of function,Manages pain appropriately,Motivated for self care and independence,Pleasant and cooperative,Supportive family,Willingly participates in therapeutic activities  Barriers: Bladder incontinence,Decreased endurance,Fatigue,Generalized weakness,Hemiparesis,Impaired activity tolerance,Impaired balance,Limited mobility    Plan    ADL's, neuro re-ed for LUE, visual scanning all four quadrants, self-ROM for LUE, sitting/standing bal/tolerance, functional transfers, TTB transfer     Passport items to be completed:  Perform bathroom transfers, complete dressing, complete feeding, get ready for the day, prepare a simple meal, participate in household tasks, adapt home for safety needs, demonstrate home exercise program, complete caregiver training     Occupational Therapy Goals (Active)       Problem: OT Long Term Goals       Dates: Start: 02/19/22         Goal: LTG-By discharge, patient will complete basic self care tasks with min-mod a        Dates: Start: 02/19/22            Goal: LTG-By discharge, patient will perform bathroom transfers with min a        Dates: Start: 02/19/22

## 2022-03-08 NOTE — CARE PLAN
The patient is Watcher - Medium risk of patient condition declining or worsening    Shift Goals  Clinical Goals: safety  Patient Goals: Sleep well    Progress made toward(s) clinical / shift goals:      Problem: Knowledge Deficit - Standard  Goal: Patient and family/care givers will demonstrate understanding of plan of care, disease process/condition, diagnostic tests and medications  Outcome: Progressing     Problem: Bladder / Voiding  Goal: Patient will establish and maintain regular urinary output  Outcome: Progressing  Assisted patient to bathroom this am for UA sample but she already had urine incontinence.   Her  called and expressed concerns that patient seems more confused. Straight cath patient for UA and sent to lab.   UA +, started on omicef for UTI. Culture pending. Her  Dario updated.        Patient is not progressing towards the following goals:

## 2022-03-08 NOTE — THERAPY
Physical Therapy   Daily Treatment     Patient Name: Melba Frye  Age:  51 y.o., Sex:  female  Medical Record #: [id number]  Today's Date: 3/7/2022     Precautions  Precautions: Fall Risk  Comments: L alma delia    Subjective    Pt resting in bed, spouse present for encouragement.      Objective       03/07/22 1501   Gait Functional Level of Assist    Gait Level Of Assist Moderate Assist   Assistive Device Parallel Bars  (L AFO/ hemiwalker)   Distance (Feet) 10   # of Times Distance was Traveled 10   Deviation Step To;Decreased Heel Strike;Decreased Toe Off  (spastic L alma delia-gait/ significant extension tone LLE)   Transfer Functional Level of Assist   Bed, Chair, Wheelchair Transfer Maximal Assist   Bed Chair Wheelchair Transfer Description Adaptive equipment;Increased time;Requires lift;Set-up of equipment;Verbal cueing   Sitting Lower Body Exercises   Long Arc Quad 1 set of 10   Marching 3 sets of 10   Nustep Resistance Level 1  (10 minutes for general cardio/endurance and reciprocal patterning training.)   Bed Mobility    Supine to Sit Maximal Assist   Sit to Supine Maximal Assist   Sit to Stand Moderate Assist   Scooting Maximal Assist   PT Total Time Spent   PT Individual Total Time Spent (Mins) 60   PT Charge Group   PT Gait Training 2   PT Therapeutic Exercise 1   PT Therapeutic Activities 1     Pt/spouse completed car transfer with mod/max A and SBA of therapist for safety and verbal cues.   Pt/spouse requesting to be able to transfer without staff present, discussed safety measures for in room transfer bed<>wc<>toilet with gait belt/ L AFO and bed rail vs grab bar. Pt's spouse able to safety assist pt back to bed post tx, cleared for transfers at this time. Continue to recommend 2 person A for car transfers.    Assessment    Pt's spouse feels confident with body mechanics and transfers in [place] with bed rail/ grab bar available. Continue to recommend 2 person for safety with car transfers and recommend  on-going car transfer training.    Strengths: Able to follow instructions,Willingly participates in therapeutic activities,Supportive family,Pleasant and cooperative,Motivated for self care and independence,Effective communication skills  Barriers: Decreased endurance,Emotional lability,Fatigue,Generalized weakness,Hemiplegia,Impaired activity tolerance,Impaired balance,Impaired functional cognition,Limited mobility,Spasticity    Plan    Sit<>stand sequencing/ transfer training, pre-gait/ gait training as able, ROM/ stretching/ spasticity management.     Passport items to be completed:  Get in/out of bed safely, in/out of a vehicle, safely use mobility device, walk or wheel around home/community, navigate up and down stairs, show how to get up/down from the ground, ensure home is accessible, demonstrate HEP, complete caregiver training      Physical Therapy Problems (Active)       Problem: Mobility       Dates: Start: 02/19/22         Goal: STG-Within one week, patient will ambulate 10ft with LBQC and min A.       Dates: Start: 02/19/22            Goal: STG-Within one week, patient will ascend and descend four stairs with mod A using R handrail.       Dates: Start: 02/19/22               Problem: Mobility Transfers       Dates: Start: 02/19/22         Goal: STG-Within one week, patient will perform bed mobility with min A using hospital bed functions.       Dates: Start: 02/19/22            Goal: STG-Within one week, patient will sit to stand with min A from wheelchair.       Dates: Start: 02/19/22            Goal: STG-Within one week, patient will transfer bed to chair with min A.       Dates: Start: 02/19/22               Problem: PT-Long Term Goals       Dates: Start: 02/19/22         Goal: LTG-By discharge, patient will propel wheelchair 150ft with power versus manual chair and supervision.       Dates: Start: 02/19/22            Goal: LTG-By discharge, patient will ambulate 20ft with LBQC and CGA.       Dates:  Start: 02/19/22            Goal: LTG-By discharge, patient will transfer one surface to another with CGA.       Dates: Start: 02/19/22            Goal: LTG-By discharge, patient will ambulate up/down flight of stairs with min A using R handrail.       Dates: Start: 02/19/22            Goal: LTG-By discharge, patient will transfer in/out of a car with min A.       Dates: Start: 02/19/22

## 2022-03-08 NOTE — THERAPY
Physical Therapy   Daily Treatment     Patient Name: Melba Frye  Age:  51 y.o., Sex:  female  Medical Record #: [id number]  Today's Date: 3/8/2022     Precautions  Precautions: (P) Fall Risk  Comments: (P) L alma delia    Subjective    Pt states she should stay in bed because she was just cathed. Pt agreeable to get up and prepare for days therapy sessions.     Objective       03/08/22 0931   Precautions   Precautions Fall Risk   Comments L alma delia   Cognition    Level of Consciousness Alert   Transfer Functional Level of Assist   Bed, Chair, Wheelchair Transfer Maximal Assist   Bed Chair Wheelchair Transfer Description Adaptive equipment;Assist with one limb;Increased time;Initial preparation for task;Set-up of equipment;Supervision for safety;Verbal cueing  (stand pivot)   Bed Mobility    Supine to Sit Moderate Assist   Sit to Stand Moderate Assist   Scooting Maximal Assist   Rolling Maximal Assist to Rt.;Moderate Assist to Lt.   Neuro-Muscular Treatments   Neuro-Muscular Treatments Sequencing;Tactile Cuing;Verbal Cuing;Facilitation   Comments rolling in bed with assist at hip and shoulder, sequencing for stand pivot, sit to stand w/standing balance using LBQC   Interdisciplinary Plan of Care Collaboration   Patient Position at End of Therapy Seated;Self Releasing Lap Belt Applied  (hand off to SLP)   Strengths & Barriers   Strengths Able to follow instructions;Willingly participates in therapeutic activities;Supportive family;Pleasant and cooperative;Motivated for self care and independence;Effective communication skills   Barriers Decreased endurance;Emotional lability;Fatigue;Generalized weakness;Hemiplegia;Impaired activity tolerance;Impaired balance;Impaired functional cognition;Limited mobility;Spasticity   PT Total Time Spent   PT Individual Total Time Spent (Mins) 30     Pt rolled R/L to don briefs/shorts with max assist.  LLE had noticeable spasticity during supine to sit.  Pt stood ~60sec w/max assist due  to L lateral lean., requested to sit due to fatigue.   Required max cues for sit to stand sequencing: scoot to edge of seat, place both feet under your knees, lean forward, push off surface w/RUE    Assessment    Pt demo LLE spasticity that limited supine to sit and LUE tone that results in inability to utilize LUE in any functional way making R rolling more difficult than L. Pt requires max A to maintain standing balance due to L leaning. Pt appropriately conversational, understands she is having difficulty with short term memory, and was able to follow multi-step directions.    Strengths: (P) Able to follow instructions,Willingly participates in therapeutic activities,Supportive family,Pleasant and cooperative,Motivated for self care and independence,Effective communication skills  Barriers: (P) Decreased endurance,Emotional lability,Fatigue,Generalized weakness,Hemiplegia,Impaired activity tolerance,Impaired balance,Impaired functional cognition,Limited mobility,Spasticity    Plan    Transfer training: STS sequencing  Pre-gait in // bars to gait as able  Spasticity management: ROM, stretching    Passport items to be completed:  Get in/out of bed safely, in/out of a vehicle, safely use mobility device, walk or wheel around home/community, navigate up and down stairs, show how to get up/down from the ground, ensure home is accessible, demonstrate HEP, complete caregiver training    Physical Therapy Problems (Active)       Problem: Mobility       Dates: Start: 02/19/22         Goal: STG-Within one week, patient will ambulate 10ft with LBQC and min A.       Dates: Start: 02/19/22            Goal: STG-Within one week, patient will ascend and descend four stairs with mod A using R handrail.       Dates: Start: 02/19/22               Problem: Mobility Transfers       Dates: Start: 02/19/22         Goal: STG-Within one week, patient will perform bed mobility with min A using hospital bed functions.       Dates: Start:  02/19/22            Goal: STG-Within one week, patient will sit to stand with min A from wheelchair.       Dates: Start: 02/19/22            Goal: STG-Within one week, patient will transfer bed to chair with min A.       Dates: Start: 02/19/22               Problem: PT-Long Term Goals       Dates: Start: 02/19/22         Goal: LTG-By discharge, patient will propel wheelchair 150ft with power versus manual chair and supervision.       Dates: Start: 02/19/22            Goal: LTG-By discharge, patient will ambulate 20ft with LBQC and CGA.       Dates: Start: 02/19/22            Goal: LTG-By discharge, patient will transfer one surface to another with CGA.       Dates: Start: 02/19/22            Goal: LTG-By discharge, patient will ambulate up/down flight of stairs with min A using R handrail.       Dates: Start: 02/19/22            Goal: LTG-By discharge, patient will transfer in/out of a car with min A.       Dates: Start: 02/19/22

## 2022-03-08 NOTE — PROGRESS NOTES
Received bedside shift report from Jacqueline MIGUEL RN regarding patient and assumed care. Patient awake, calm and stable, currently positioned in bed for comfort and safety; call light within reach. Denies pain or discomfort at this time. Will continue to monitor.

## 2022-03-08 NOTE — DISCHARGE PLANNING
ATTN: Case Management  RE: Referral for Home Health    Reason for referral denial: not in network with patient's insurance              Unfortunately, we are not able to accept this referral for the reason listed above. If further clarity is needed, our Transitional Care Specialists are available to discuss any barriers to service at [phone].      We look forward to collaborating with you in the future,  [facility] Team

## 2022-03-08 NOTE — THERAPY
Speech Language Pathology  Daily Treatment     Patient Name: Melba Frye  Age:  51 y.o., Sex:  female  Medical Record #: [id number]  Today's Date: 3/8/2022     Precautions  Precautions: Fall Risk  Comments: L alma deila    Subjective    Pt pleasant and cooperative, pt reporting plan to transition to alternative facility.     Objective       03/08/22 1003   SLP Total Time Spent   SLP Individual Total Time Spent (Mins) 60   Treatment Charges   SLP Cognitive Skill Development First 15 Minutes 1   SLP Cognitive Skill Development Additional 15 Minutes 3       Assessment    Pt requesting to complete word seraches to address visual scanning and attention. Pt completed two puzzles at this time, initially MOD A to locate all targets with assistance than decreasing to MIN A with use of verbal cues and bright line on left side of puzzle. Pt expressed understanding as to why tasks are being completed and demonstrated increased carryover at this time.     Strengths: Able to follow instructions,Supportive family,Pleasant and cooperative,Willingly participates in therapeutic activities  Barriers: Impaired carryover of learning,Impaired functional cognition,Impaired insight/denial of deficits (anxiety)    Plan    Dc planning, attention, left neglect, recall and safety    Speech Therapy Problems (Active)       Problem: Memory STGs       Dates: Start: 02/19/22         Goal: STG-Within one week, patient will recall daily events and safety strategies with 80% accuracy, given min verbal cues and use of memory book.        Dates: Start: 02/19/22               Problem: Problem Solving STGs       Dates: Start: 02/19/22         Goal: STG-Within one week, patient will complete simple visual scanning tasks to address left neglect with 70% accuracy provided MIN cues.        Dates: Start: 02/24/22               Problem: Speech/Swallowing LTGs       Dates: Start: 02/19/22         Goal: LTG-By discharge, patient will solve complex problem Neeraj  for safe discharge home.       Dates: Start: 02/19/22

## 2022-03-08 NOTE — CARE PLAN
"  Problem: Knowledge Deficit - Standard  Goal: Patient and family/care givers will demonstrate understanding of plan of care, disease process/condition, diagnostic tests and medications  Outcome: Progressing  Note: Pt agrees with plan of care tonight regarding medications and safety.  Will continue to monitor patient.      Problem: Fall Risk - Rehab  Goal: Patient will remain free from falls  Outcome: Progressing  Note: [place] Fall risk Assessment Score:  22    High fall risk Interventions   - Alarming seatbelt  - Wander guard  - Bed and strip alarm   - Yellow sign by the door   - Yellow wrist band \"Fall risk\"  - Room near to the nurse station  - Do not leave patient unattended in the bathroom  - Fall risk education provided         The patient is Stable - Low risk of patient condition declining or worsening    Shift Goals  Clinical Goals: Safety  Patient Goals: Sleep well    Progress made toward(s) clinical / shift goals:  progressing        "

## 2022-03-08 NOTE — PROGRESS NOTES
"Rehab Progress Note     Encounter Date: 3/8/2022    CC: Decreased mobility, spasticity    Interval Events (Subjective)  Patient sitting up in room. She reports therapy is going OK. She reportedly has had feeling of constipation. She reports only a small BM yesterday afternoon. She only took senna-docusate. She reports using only occasional opiates. Denies NVD.     IDT Team Meeting 3/3/2022  DC/Disposition:  3/11/22    Objective:  VITAL SIGNS: /84   Pulse 81   Temp 36.4 °C (97.5 °F) (Temporal)   Resp 18   Ht 1.702 m (5' 7\")   Wt 105 kg (231 lb 7.7 oz)   SpO2 95%   BMI 36.26 kg/m²   Gen: NAD  Psych: Mood and affect appropriate  CV: RRR, no edema  Resp: CTAB, no upper airway sounds  Abd: NTND  Neuro: AOx4, following commands   Unchanged from 3/7/22    No results found for this or any previous visit (from the past 72 hour(s)).    Current Facility-Administered Medications   Medication Frequency   • lisinopril (PRINIVIL) tablet 30 mg DAILY   • tizanidine (ZANAFLEX) tablet 4 mg TID   • mirtazapine (Remeron) orally disintegrating tab 15 mg QHS   • ALPRAZolam (XANAX) tablet 0.5 mg Q EVENING   • ALPRAZolam (XANAX) tablet 0.5 mg QDAY PRN   • diphenhydrAMINE (BENADRYL) tablet/capsule 25 mg Q6HRS PRN   • butalbital/apap/caffeine -40 mg (Fioricet) per tablet 1 Tablet Q4HRS PRN   • senna-docusate (PERICOLACE or SENOKOT S) 8.6-50 MG per tablet 2 Tablet BID PRN    And   • polyethylene glycol/lytes (MIRALAX) PACKET 1 Packet QDAY PRN    And   • magnesium hydroxide (MILK OF MAGNESIA) suspension 30 mL QDAY PRN    And   • bisacodyl (DULCOLAX) suppository 10 mg QDAY PRN   • vitamin D3 (cholecalciferol) tablet 1,000 Units DAILY   • Respiratory Therapy Consult Continuous RT   • hydrALAZINE (APRESOLINE) tablet 25 mg Q8HRS PRN   • acetaminophen (Tylenol) tablet 650 mg Q4HRS PRN   • omeprazole (PRILOSEC) capsule 20 mg DAILY   • artificial tears ophthalmic solution 1 Drop PRN   • benzocaine-menthol (CEPACOL) lozenge 1 Lozenge " Q2HRS PRN   • mag hydrox-al hydrox-simeth (MAALOX PLUS ES or MYLANTA DS) suspension 20 mL Q2HRS PRN   • ondansetron (ZOFRAN ODT) dispertab 4 mg 4X/DAY PRN    Or   • ondansetron (ZOFRAN) syringe/vial injection 4 mg 4X/DAY PRN   • traZODone (DESYREL) tablet 50 mg QHS PRN   • sodium chloride (OCEAN) 0.65 % nasal spray 2 Spray PRN   • oxyCODONE immediate-release (ROXICODONE) tablet 5 mg Q3HRS PRN    Or   • oxyCODONE immediate release (ROXICODONE) tablet 10 mg Q3HRS PRN   • midazolam (VERSED) 5 mg/mL (1 mL vial) PRN   • acetaminophen (TYLENOL) tablet 1,000 mg Q6HRS PRN   • amLODIPine (NORVASC) tablet 10 mg DAILY   • apixaban (ELIQUIS) tablet 5 mg BID   • brivaracetam (Briviact) tablet 100 mg BID   • lacosamide (VIMPAT) tablet 200 mg BID   • melatonin tablet 3 mg QHS   • venlafaxine (EFFEXOR) tablet 75 mg DAILY       Orders Placed This Encounter   Procedures   • Diet Order Diet: Regular     Standing Status:   Standing     Number of Occurrences:   1     Order Specific Question:   Diet:     Answer:   Regular [1]       Assessment:  Active Hospital Problems    Diagnosis    • *Glioblastoma of frontal lobe (HCC)    • GBM (glioblastoma multiforme) (HCC)    • Constipation    • Syncope    • Class 1 obesity due to excess calories with serious comorbidity and body mass index (BMI) of 34.0 to 34.9 in adult    • Gait instability    • History of pulmonary embolus (PE)    • Seizure disorder (HCC)    • Primary hypertension    • Mixed anxiety and depressive disorder        Medical Decision Making and Plan:  Seizure/GBM - Patient with syncopal event with AMS and weakness after concerning for seizure vs worsening GBM vs post-treatment changes. Oncology was consulted and recommended steroids then taper which she has completed. Neurology was consulted and recommended MRI which showed diffuse posttreatment changes but no acute lesion. EEG showed non-specific changes and Neurology recommended increasing her Vimpat as her symptoms could have  been post-ictal. Cardiac work-up negative.    -PT and OT for mobility and ADLs  -SLP for cognitive screen  -Continue Briviact 100 mg BID and Vimpat 200 mg BID     Spasticity - Followed by Dr. Steele, [facility], for Botox injection. Discussed with Dr. Steele. Will trial Tizanidine 4 mg BID   -Improved on Tizanidine. Started on Xanax QHS.      HTN - Patient on Amlodipine 10 mg daily. Into 150s, start Lisinopril 5 mg. Still into 140s. Increase to 10 mg.  Into 150s at times. Increase to 20. Into 140s, increase to 40 mg    Dysuria - Patient with dysuria on 3/7/22 and fatigue. Check UA - was not collected due to accident.      Anemia - Check AM CBC - 14.3, resolved.      Hypokalemia - Check AM CMP - 4.0, resolved.     Anxiety/Depression - Patient on PRN Xanax and Effexor 75 mg daily. Psychology to consult     Sleep - Patient with poor sleep. On Xanax QHS and add QHS Remeron    Morbid Obesity due to excess calories - BMI of 37.1 on admission. Dietitian to consult.      Back rash - Appears contact. Start Hydrocortisone cream. PO Benadryl. Discontinue cream, improved    Constipation - Resolved, discontinue senna-docusate. Returned on 3/7/22, PRN senna-docusate and laxatives    Vitamin D deficiency - 29 on admission. Started on 1000 U     Hx of PE - Patient on Eliquis BID.    Dispo - Patient very heavy assistance on transfer. Unclear if family will be able to care for her with this level of assistance. Palliative to consult for discussion of goals of care at this time.  Patient declined discussion with PC after discussing with her . They were able to do car transfer x2 on 3/3/22     Total time:  26 minutes.  I spent greater than 50% of the time for patient care, counseling, and coordination on this date, including unit/floor time, and face-to-face time with the patient as per interval events and assessment and plan above. Topics discussed included UA not completed, constipation, and opiate usage. Restart  Senna-docusate.     Lucrecia Sim M.D.

## 2022-03-08 NOTE — THERAPY
"Physical Therapy   Daily Treatment     Patient Name: Melba Frye  Age:  51 y.o., Sex:  female  Medical Record #: [id number]  Today's Date: 3/8/2022     Precautions  Precautions: Fall Risk  Comments: L alma delia    Subjective    Pt up in bathroom, reports constipation but willing to complete therapy as tolerated.      Objective       03/08/22 1501   Supine Lower Body Exercise   Bridges Two Legged;3 sets of 10   Trunk Rotation 3 sets of 10   Hip Flexion 3 sets of 10  (alternating marching from hooklying)   Hip Abduction Hook Lying;3 sets of 10   Bed Mobility    Supine to Sit Moderate Assist   Sit to Supine Moderate Assist   Sit to Stand Moderate Assist   Neuro-Muscular Treatments   Neuro-Muscular Treatments Postural Changes;Postural Facilitation;Tactile Cuing   Comments standing balance with mi nA and intermittent RUE support at alma delia-walker 2.5 minutes x 2 for balloon volley. Cues for thoracic extension and visual scanning to L.   PT Total Time Spent   PT Individual Total Time Spent (Mins) 30   PT Charge Group   PT Therapeutic Exercise 1   PT Neuromuscular Re-Education / Balance 1     Wc<>therapy mat transfers with alma delia-walker vs in \"dance position\" with max A and second person SBA for safety x 2 trials.  Wc<>toilet transfers with grab bar and min/mod A with cues for sequencing.    Assessment    Pt demonstrates improved transfers with stability of grab bar, remains at high fall risk due to spastic L alma delia-, L neglect, impaired motor planning/ impaired sequencing and impaired memory.     Strengths: Able to follow instructions,Willingly participates in therapeutic activities,Supportive family,Pleasant and cooperative,Motivated for self care and independence,Effective communication skills  Barriers: Decreased endurance,Emotional lability,Fatigue,Generalized weakness,Hemiplegia,Impaired activity tolerance,Impaired balance,Impaired functional cognition,Limited mobility,Spasticity    Plan    Sit<>stand sequencing/ transfer " training, pre-gait/ gait training as able, ROM/ stretching/ spasticity management. Care giver training and continued training with spouse.      Passport items to be completed:  Get in/out of bed safely, in/out of a vehicle, safely use mobility device, walk or wheel around home/community, navigate up and down stairs, show how to get up/down from the ground, ensure home is accessible, demonstrate HEP, complete caregiver training      Physical Therapy Problems (Active)       Problem: Mobility       Dates: Start: 02/19/22         Goal: STG-Within one week, patient will ambulate 10ft with LBQC and min A.       Dates: Start: 02/19/22            Goal: STG-Within one week, patient will ascend and descend four stairs with mod A using R handrail.       Dates: Start: 02/19/22               Problem: Mobility Transfers       Dates: Start: 02/19/22         Goal: STG-Within one week, patient will perform bed mobility with min A using hospital bed functions.       Dates: Start: 02/19/22            Goal: STG-Within one week, patient will sit to stand with min A from wheelchair.       Dates: Start: 02/19/22            Goal: STG-Within one week, patient will transfer bed to chair with min A.       Dates: Start: 02/19/22               Problem: PT-Long Term Goals       Dates: Start: 02/19/22         Goal: LTG-By discharge, patient will propel wheelchair 150ft with power versus manual chair and supervision.       Dates: Start: 02/19/22            Goal: LTG-By discharge, patient will ambulate 20ft with LBQC and CGA.       Dates: Start: 02/19/22            Goal: LTG-By discharge, patient will transfer one surface to another with CGA.       Dates: Start: 02/19/22            Goal: LTG-By discharge, patient will ambulate up/down flight of stairs with min A using R handrail.       Dates: Start: 02/19/22            Goal: LTG-By discharge, patient will transfer in/out of a car with min A.       Dates: Start: 02/19/22

## 2022-03-08 NOTE — DISCHARGE PLANNING
We are currently verifying this patient's insurance and benefits. This will be resolved ASAP.    Respectfully,   [facility]

## 2022-03-08 NOTE — THERAPY
Recreational Therapy  Daily Treatment     Patient Name: Melba Frye  AGE:  51 y.o., SEX:  female  Medical Record #: [id number]  Today's Date: 3/8/2022       Subjective    Pt and her  shared their desire to return to travel specifically travel by plane.      Objective       03/07/22 1301   Functional Ability Status - Cognitive   Attention Span Remains on Task   Comprehension Follows Two Step Commands   Judgment Able to Make Independent Decisions   Functional Ability Status - Emotional    Affect Appropriate;Bright   Mood Appropriate   Behavior Appropriate   Skilled Intervention    Skilled Intervention Gross Motor Leisure;Relaxation / Coping Skills;Community Skills;Leisure Education    Skilled Intervention Comments Began the return to travel class   Interdisciplinary Plan of Care Collaboration   IDT Collaboration with  Family / Caregiver   Patient Position at End of Therapy In Bed;Tray Table within Reach;Call Light within Reach   Collaboration Comments  assisted with transfer to restroom   Strengths & Barriers   Strengths Able to follow instructions;Alert and oriented;Motivated for self care and independence;Pleasant and cooperative;Supportive family;Willingly participates in therapeutic activities   Barriers Impaired activity tolerance;Impaired carryover of learning;Impaired insight/denial of deficits;Impaired functional cognition;Fatigue;Generalized weakness   Treatment Time   Total Time Spent (mins) 30   Procedural Tracking   Procedural Tracking Community Re-Integration;Community Skills Development;Leisure Skills Awareness;Leisure Skills Development;Gross Motor Functional Leisure Skills       Assessment    Pt was given the first half of the return to travel powerpoint. Pt needed to use the restroom during session. 2 person assist with SO.     Strengths: (P) Able to follow instructions,Alert and oriented,Motivated for self care and independence,Pleasant and cooperative,Supportive family,Willingly  participates in therapeutic activities  Barriers: (P) Impaired activity tolerance,Impaired carryover of learning,Impaired insight/denial of deficits,Impaired functional cognition,Fatigue,Generalized weakness    Plan    Complete the travel training.

## 2022-03-08 NOTE — THERAPY
Recreational Therapy  Daily Treatment     Patient Name: Melba Frye  AGE:  51 y.o., SEX:  female  Medical Record #: [id number]  Today's Date: 3/8/2022       Subjective    Pt sharing that she did not remember what was done in the prior days session and asking if this writer knew about her poor short term memory.      Objective       03/08/22 0831   Functional Ability Status - Cognitive   Attention Span Remains on Task   Comprehension Follows Two Step Commands   Judgment Able to Make Independent Decisions   Functional Ability Status - Emotional    Affect Appropriate;Bright   Mood Appropriate   Behavior Appropriate   Skilled Intervention    Skilled Intervention Gross Motor Leisure   Skilled Intervention Comments Transfering from bed to wc to toilet   Interdisciplinary Plan of Care Collaboration   IDT Collaboration with  Certified Nursing Assistant   Patient Position at End of Therapy In Bed;Tray Table within Reach;Call Light within Reach   Collaboration Comments CNA assisting in 2 person assist   Strengths & Barriers   Strengths Able to follow instructions;Alert and oriented;Motivated for self care and independence;Pleasant and cooperative;Supportive family;Willingly participates in therapeutic activities   Barriers Decreased endurance;Fatigue;Impaired balance;Impaired activity tolerance   Treatment Time   Total Time Spent (mins) 30   Procedural Tracking   Procedural Tracking Community Re-Integration;Community Skills Development;Leisure Skills Awareness;Leisure Skills Development;Cognitive Skills Training;Gross Motor Functional Leisure Skills       Assessment    Pt was a two person assist from bed to wc and to the toilet.         Strengths: (P) Able to follow instructions,Alert and oriented,Motivated for self care and independence,Pleasant and cooperative,Supportive family,Willingly participates in therapeutic activities  Barriers: (P) Decreased endurance,Fatigue,Impaired balance,Impaired activity  tolerance    Plan    The return to adaptive flights and travel will be completed at a future date.

## 2022-03-09 PROCEDURE — A9270 NON-COVERED ITEM OR SERVICE: HCPCS | Performed by: PHYSICAL MEDICINE & REHABILITATION

## 2022-03-09 PROCEDURE — 97535 SELF CARE MNGMENT TRAINING: CPT | Mod: CO

## 2022-03-09 PROCEDURE — 97112 NEUROMUSCULAR REEDUCATION: CPT

## 2022-03-09 PROCEDURE — 700102 HCHG RX REV CODE 250 W/ 637 OVERRIDE(OP): Performed by: PHYSICAL MEDICINE & REHABILITATION

## 2022-03-09 PROCEDURE — 97530 THERAPEUTIC ACTIVITIES: CPT

## 2022-03-09 PROCEDURE — 97130 THER IVNTJ EA ADDL 15 MIN: CPT

## 2022-03-09 PROCEDURE — 97129 THER IVNTJ 1ST 15 MIN: CPT

## 2022-03-09 PROCEDURE — 97110 THERAPEUTIC EXERCISES: CPT

## 2022-03-09 PROCEDURE — 770010 HCHG ROOM/CARE - REHAB SEMI PRIVAT*

## 2022-03-09 PROCEDURE — 99233 SBSQ HOSP IP/OBS HIGH 50: CPT | Performed by: PHYSICAL MEDICINE & REHABILITATION

## 2022-03-09 PROCEDURE — 90832 PSYTX W PT 30 MINUTES: CPT | Performed by: PSYCHOLOGIST

## 2022-03-09 RX ADMIN — APIXABAN 5 MG: 5 TABLET, FILM COATED ORAL at 19:54

## 2022-03-09 RX ADMIN — TRAZODONE HYDROCHLORIDE 50 MG: 50 TABLET ORAL at 19:54

## 2022-03-09 RX ADMIN — AMLODIPINE BESYLATE 10 MG: 5 TABLET ORAL at 08:05

## 2022-03-09 RX ADMIN — VENLAFAXINE HYDROCHLORIDE 75 MG: 37.5 TABLET ORAL at 08:05

## 2022-03-09 RX ADMIN — TIZANIDINE 4 MG: 4 TABLET ORAL at 19:54

## 2022-03-09 RX ADMIN — CEFDINIR 300 MG: 300 CAPSULE ORAL at 19:54

## 2022-03-09 RX ADMIN — ALPRAZOLAM 0.5 MG: 0.5 TABLET ORAL at 16:33

## 2022-03-09 RX ADMIN — LACOSAMIDE 200 MG: 100 TABLET, FILM COATED ORAL at 08:05

## 2022-03-09 RX ADMIN — MELATONIN TAB 3 MG 3 MG: 3 TAB at 19:55

## 2022-03-09 RX ADMIN — DIPHENHYDRAMINE HCL 25 MG: 25 TABLET ORAL at 08:04

## 2022-03-09 RX ADMIN — SENNOSIDES AND DOCUSATE SODIUM 2 TABLET: 50; 8.6 TABLET ORAL at 08:05

## 2022-03-09 RX ADMIN — LISINOPRIL 40 MG: 20 TABLET ORAL at 08:04

## 2022-03-09 RX ADMIN — LACOSAMIDE 200 MG: 100 TABLET, FILM COATED ORAL at 19:54

## 2022-03-09 RX ADMIN — OXYCODONE 5 MG: 5 TABLET ORAL at 17:04

## 2022-03-09 RX ADMIN — OMEPRAZOLE 20 MG: 20 CAPSULE, DELAYED RELEASE ORAL at 08:05

## 2022-03-09 RX ADMIN — MIRTAZAPINE 15 MG: 15 TABLET, ORALLY DISINTEGRATING ORAL at 19:54

## 2022-03-09 RX ADMIN — Medication 1000 UNITS: at 08:05

## 2022-03-09 RX ADMIN — CEFDINIR 300 MG: 300 CAPSULE ORAL at 08:05

## 2022-03-09 RX ADMIN — TIZANIDINE 4 MG: 4 TABLET ORAL at 08:05

## 2022-03-09 RX ADMIN — BRIVARACETAM 100 MG: 50 TABLET, FILM COATED ORAL at 08:05

## 2022-03-09 RX ADMIN — APIXABAN 5 MG: 5 TABLET, FILM COATED ORAL at 08:05

## 2022-03-09 RX ADMIN — BRIVARACETAM 100 MG: 50 TABLET, FILM COATED ORAL at 19:54

## 2022-03-09 RX ADMIN — DIPHENHYDRAMINE HCL 25 MG: 25 TABLET ORAL at 16:36

## 2022-03-09 RX ADMIN — TIZANIDINE 4 MG: 4 TABLET ORAL at 16:33

## 2022-03-09 ASSESSMENT — GAIT ASSESSMENTS
DEVIATION: STEP TO;DECREASED HEEL STRIKE;DECREASED TOE OFF
GAIT LEVEL OF ASSIST: MODERATE ASSIST
ASSISTIVE DEVICE: PARALLEL BARS
DISTANCE (FEET): 10

## 2022-03-09 ASSESSMENT — PAIN DESCRIPTION - PAIN TYPE: TYPE: ACUTE PAIN

## 2022-03-09 ASSESSMENT — ACTIVITIES OF DAILY LIVING (ADL): BED_CHAIR_WHEELCHAIR_TRANSFER_DESCRIPTION: ADAPTIVE EQUIPMENT;INCREASED TIME;REQUIRES LIFT

## 2022-03-09 NOTE — THERAPY
Speech Language Pathology  Daily Treatment     Patient Name: Melba Frye  Age:  51 y.o., Sex:  female  Medical Record #: [id number]  Today's Date: 3/9/2022     Precautions  Precautions: Fall Risk  Comments: Left Rolando paresis and tone    Subjective    Pt seated in w/c at time of scheduled therapy. Pt agreeable to tx outside room.      Objective     03/09/22 1004   Cognition   Visual Scanning / Cancellation Skills Minimal (4)   Interdisciplinary Plan of Care Collaboration   Patient Position at End of Therapy Seated;Chair Alarm On;Call Light within Reach;Tray Table within Reach;Phone within Reach       Assessment    Pt IND recalled visual scanning strategies this date. Pt completed visual scanning activity (I.e., highlight X and Y) finding 25/32 targets in the first trial and 18/26 in second trial, requiring MIN verbal and visual cues to find missed targets in both trials. Pt benefits from re-direction to task and reducing visual field.     Strengths: Able to follow instructions,Supportive family,Pleasant and cooperative,Willingly participates in therapeutic activities  Barriers: Impaired carryover of learning,Impaired functional cognition,Impaired insight/denial of deficits (anxiety)    Plan    Visual scanning, attn, d/c planning      Speech Therapy Problems (Active)       Problem: Memory STGs       Dates: Start: 02/19/22         Goal: STG-Within one week, patient will recall daily events and safety strategies with 80% accuracy, given min verbal cues and use of memory book.        Dates: Start: 02/19/22               Problem: Problem Solving STGs       Dates: Start: 02/19/22         Goal: STG-Within one week, patient will complete simple visual scanning tasks to address left neglect with 70% accuracy provided MIN cues.        Dates: Start: 02/24/22               Problem: Speech/Swallowing LTGs       Dates: Start: 02/19/22         Goal: LTG-By discharge, patient will solve complex problem Neeraj for safe discharge  home.       Dates: Start: 02/19/22

## 2022-03-09 NOTE — PROGRESS NOTES
"REHABILITATION PSYCHOLOGY FOLLOW-UP:  Reason for admission: GBM (glioblastoma multiforme) (HCC) [C71.9]  Length of Visit: 21 minutes    Chief Complaint: Memory difficulties    S: Met with the patient for brief individual psychotherapy. Patient presented with a euthymic affect and reported a \"pretty good\" mood. Pt reported struggling with memory loss and stated that she is often unable to remember things from day to day. Session focused on psychoeducation regarding memory and treatment and normalized her experience. Additionally, session focused on caregiver support as her  was in the room with her.     O: Psychiatric Examination:  Vitals: Blood pressure 130/82, pulse 85, temperature 36.8 °C (98.2 °F), temperature source Temporal, resp. rate 18, height 1.702 m (5' 7\"), weight 105 kg (231 lb 7.7 oz), SpO2 94 %, not currently breastfeeding.  Musculoskeletal: Given dx, normal psychomotor activity, no tics or unusual mannerisms noted  Appearance and Eye Contact: Easily established rapport WDWN, appropriate dress and grooming. Behavior is calm, cooperative,  appropriate eye contact  Attention/Alertness: Alert  Thought Process: Linear, Logical and Goal Directed    Thought Content: No psychotic processes noted  Speech: Clear with normal rate and rhythm  Mood: \"okay\"            Affect: somewhat dysphoric         SI/HI: Denies     Memory: Recent and remote memory appear intact     Orientation: alert, oriented to person, place and time  Insight into symptoms: good  Judgement into symptoms:good     ASSESSMENT: Melba Frye is a 51 y.o. female with a past medical history of right posterior fronto-parietal glioblastoma multiforme s/p right cranioplasty for resection (6/2021, biopsy 3/2021) at [facility], with seizures, w/ secondary L sided weakness w/ tone, radiation and chemotherapy with temozolomide (last dose 11/2021), focal seizures (on Vimpat and Briviact), migraine, hyperlipidemia, PE with cor pulmonale (on " Eliquis), anxiety, and depression  ;  who presented on 2/9/2022  1:53 PM after multiple ground level falls likely due to a combination of hypoxia, seizure activity, and vasogenic edema of the brain. Per documentation, on Wednesday 2/9/22 she was walking back from the bathroom with her caregiver when she reported feeling tired, dizzy, and needed to sit. Her caregiver turned to get a chair at which time the patient fell forward to the ground without catching herself. EMS was notified and upon arrival noted the patient was hypoxic with SpO2 85% on RA. She had a period of about 15 minutes of altered awareness/consciousness, and does not recall the events.     Psychology was consulted due to pt hx of depression and anxiety as well as helping with adjustment to diagnosis as well as stress management and caregiver stress for spouse.     DSM5 Diagnostic Considerations: Adj d/o with mixed anxiety and depressed mood        PLAN:  Records reviewed: Yes  Discussed patient with other provider: Roney  Will continue to follow  Thank you for the consult.     Brandy Hines, Ph.D.

## 2022-03-09 NOTE — CARE PLAN
The patient is Stable - Low risk of patient condition declining or worsening    Shift Goals  Clinical Goals: safety  Patient Goals: Sleep well    Progress made toward(s) clinical / shift goals:      Problem: Pain - Standard  Goal: Alleviation of pain or a reduction in pain to the patient’s comfort goal  Outcome: Progressing  Patient denied any pain or headache during shift.     Problem: Infection  Goal: Patient will remain free from infection  Outcome: Progressing  Patient is started on omnicef for UTI. Culture is pending. No adverse reaction noted.        Patient is not progressing towards the following goals:

## 2022-03-09 NOTE — PROGRESS NOTES
"Rehab Progress Note     Encounter Date: 3/9/2022    CC: Decreased mobility, spasticity    Interval Events (Subjective)  Patient sitting up in room. Discussed about UTI and starting antibiotics.  Discussed about urine culture. Discussed about evaluation by neurorestorative. Denies NVD. Denies SOB.     IDT Team Meeting 3/3/2022  DC/Disposition:  3/11/22    Objective:  VITAL SIGNS: /82   Pulse 85   Temp 36.8 °C (98.2 °F) (Temporal)   Resp 18   Ht 1.702 m (5' 7\")   Wt 105 kg (231 lb 7.7 oz)   SpO2 94%   BMI 36.26 kg/m²   Gen: NAD  Psych: Mood and affect appropriate  CV: RRR, no edema  Resp: CTAB, no upper airway sounds  Abd: NTND  Neuro: AOx3, following commands    Recent Results (from the past 72 hour(s))   URINALYSIS    Collection Time: 03/08/22  9:30 AM    Specimen: Urine, Cath   Result Value Ref Range    Color Yellow     Character Turbid (A)     Specific Gravity 1.010 <1.035    Ph 6.5 5.0 - 8.0    Glucose Negative Negative mg/dL    Ketones Negative Negative mg/dL    Protein 30 (A) Negative mg/dL    Bilirubin Negative Negative    Urobilinogen, Urine 0.2 Negative    Nitrite Negative Negative    Leukocyte Esterase Large (A) Negative    Occult Blood Small (A) Negative    Micro Urine Req Microscopic    URINALYSIS    Collection Time: 03/08/22  9:30 AM   Result Value Ref Range    Color Yellow     Character Sl Cloudy (A)     Specific Gravity 1.010 <1.035    Ph 6.5 5.0 - 8.0    Glucose Negative Negative mg/dL    Ketones Negative Negative mg/dL    Protein Negative Negative mg/dL    Bilirubin Negative Negative    Urobilinogen, Urine 0.2 Negative    Nitrite Positive (A) Negative    Leukocyte Esterase Large (A) Negative    Occult Blood Trace (A) Negative    Micro Urine Req Microscopic    URINE MICROSCOPIC (W/UA)    Collection Time: 03/08/22  9:30 AM   Result Value Ref Range    WBC Packed (A) /hpf    RBC 5-10 (A) /hpf    Bacteria Many (A) None /hpf    Epithelial Cells Negative /hpf    Hyaline Cast 3-5 (A) /lpf "   URINE MICROSCOPIC (W/UA)    Collection Time: 03/08/22  9:30 AM   Result Value Ref Range    WBC Packed (A) /hpf    RBC 2-5 (A) /hpf    Bacteria Many (A) None /hpf    Epithelial Cells Rare /hpf       Current Facility-Administered Medications   Medication Frequency   • lisinopril (PRINIVIL) tablet 40 mg DAILY   • cefdinir (OMNICEF) capsule 300 mg Q12HRS   • tizanidine (ZANAFLEX) tablet 4 mg TID   • mirtazapine (Remeron) orally disintegrating tab 15 mg QHS   • ALPRAZolam (XANAX) tablet 0.5 mg Q EVENING   • ALPRAZolam (XANAX) tablet 0.5 mg QDAY PRN   • diphenhydrAMINE (BENADRYL) tablet/capsule 25 mg Q6HRS PRN   • butalbital/apap/caffeine -40 mg (Fioricet) per tablet 1 Tablet Q4HRS PRN   • senna-docusate (PERICOLACE or SENOKOT S) 8.6-50 MG per tablet 2 Tablet BID PRN    And   • polyethylene glycol/lytes (MIRALAX) PACKET 1 Packet QDAY PRN    And   • magnesium hydroxide (MILK OF MAGNESIA) suspension 30 mL QDAY PRN    And   • bisacodyl (DULCOLAX) suppository 10 mg QDAY PRN   • vitamin D3 (cholecalciferol) tablet 1,000 Units DAILY   • Respiratory Therapy Consult Continuous RT   • hydrALAZINE (APRESOLINE) tablet 25 mg Q8HRS PRN   • acetaminophen (Tylenol) tablet 650 mg Q4HRS PRN   • omeprazole (PRILOSEC) capsule 20 mg DAILY   • artificial tears ophthalmic solution 1 Drop PRN   • benzocaine-menthol (CEPACOL) lozenge 1 Lozenge Q2HRS PRN   • mag hydrox-al hydrox-simeth (MAALOX PLUS ES or MYLANTA DS) suspension 20 mL Q2HRS PRN   • ondansetron (ZOFRAN ODT) dispertab 4 mg 4X/DAY PRN    Or   • ondansetron (ZOFRAN) syringe/vial injection 4 mg 4X/DAY PRN   • traZODone (DESYREL) tablet 50 mg QHS PRN   • sodium chloride (OCEAN) 0.65 % nasal spray 2 Spray PRN   • oxyCODONE immediate-release (ROXICODONE) tablet 5 mg Q3HRS PRN    Or   • oxyCODONE immediate release (ROXICODONE) tablet 10 mg Q3HRS PRN   • midazolam (VERSED) 5 mg/mL (1 mL vial) PRN   • acetaminophen (TYLENOL) tablet 1,000 mg Q6HRS PRN   • amLODIPine (NORVASC) tablet 10  mg DAILY   • apixaban (ELIQUIS) tablet 5 mg BID   • brivaracetam (Briviact) tablet 100 mg BID   • lacosamide (VIMPAT) tablet 200 mg BID   • melatonin tablet 3 mg QHS   • venlafaxine (EFFEXOR) tablet 75 mg DAILY       Orders Placed This Encounter   Procedures   • Diet Order Diet: Regular     Standing Status:   Standing     Number of Occurrences:   1     Order Specific Question:   Diet:     Answer:   Regular [1]       Assessment:  Active Hospital Problems    Diagnosis    • *Glioblastoma of frontal lobe (HCC)    • GBM (glioblastoma multiforme) (HCC)    • Constipation    • Syncope    • Class 1 obesity due to excess calories with serious comorbidity and body mass index (BMI) of 34.0 to 34.9 in adult    • Gait instability    • History of pulmonary embolus (PE)    • Seizure disorder (HCC)    • Primary hypertension    • Mixed anxiety and depressive disorder        Medical Decision Making and Plan:  Seizure/GBM - Patient with syncopal event with AMS and weakness after concerning for seizure vs worsening GBM vs post-treatment changes. Oncology was consulted and recommended steroids then taper which she has completed. Neurology was consulted and recommended MRI which showed diffuse posttreatment changes but no acute lesion. EEG showed non-specific changes and Neurology recommended increasing her Vimpat as her symptoms could have been post-ictal. Cardiac work-up negative.    -PT and OT for mobility and ADLs  -SLP for cognitive screen  -Continue Briviact 100 mg BID and Vimpat 200 mg BID     Spasticity - Followed by Dr. Steele, [facility], for Botox injection. Discussed with Dr. Steele. Will trial Tizanidine 4 mg BID   -Improved on Tizanidine. Started on Xanax QHS.      HTN - Patient on Amlodipine 10 mg daily. Into 150s, start Lisinopril 5 mg. Still into 140s. Increase to 10 mg.  Into 150s at times. Increase to 20. Into 140s, increase to 40 mg    Dysuria - Patient with dysuria on 3/7/22 and fatigue. Check UA - collected  3/8/22, UTI+ started on Omnicef.      Anemia - Check AM CBC - 14.3, resolved.      Hypokalemia - Check AM CMP - 4.0, resolved.     Anxiety/Depression - Patient on PRN Xanax and Effexor 75 mg daily. Psychology to consult     Sleep - Patient with poor sleep. On Xanax QHS and add QHS Remeron    Morbid Obesity due to excess calories - BMI of 37.1 on admission. Dietitian to consult.      Back rash - Appears contact. Start Hydrocortisone cream. PO Benadryl. Discontinue cream, improved    Constipation - Resolved, discontinue senna-docusate. Returned on 3/7/22, PRN senna-docusate and laxatives    Vitamin D deficiency - 29 on admission. Started on 1000 U     Hx of PE - Patient on Eliquis BID.    Dispo - Patient very heavy assistance on transfer. Unclear if family will be able to care for her with this level of assistance. Palliative to consult for discussion of goals of care at this time.  Patient declined discussion with PC after discussing with her . They were able to do car transfer x2 on 3/3/22. Family would be interested in neurorestorative    Total time:  36 minutes.  I spent greater than 50% of the time for patient care, counseling, and coordination on this date, including unit/floor time, and face-to-face time with the patient as per interval events and assessment and plan above. Topics discussed included UA, UTI, started on antibiotics, and urine culture. Neurorestorative to evaluate patient.     Lucrecia Sim M.D.

## 2022-03-09 NOTE — THERAPY
Occupational Therapy  Daily Treatment     Patient Name: Melba Frye  Age:  51 y.o., Sex:  female  Medical Record #: [id number]  Today's Date: 3/9/2022     Precautions  Precautions: Fall Risk  Comments: Left Rolando paresis and tone    Safety   ADL Safety : Requires Physical Assist for Safety,Requires Supervision for Safety,Requires Cueing for Safety  Bathroom Safety: Requires Physical Assist for Safety,Requires Supervision for Safety,Requires Cuing for Safety    Subjective    Pt received in gym with spouse and her paid caregiver present for training.      Objective     03/09/22 1431   Pain   Intervention Declines   Non Verbal Descriptors   Non Verbal Scale  Calm   Interdisciplinary Plan of Care Collaboration   IDT Collaboration with  Family / Caregiver   Patient Position at End of Therapy Seated;Other (Comments)  (pt's spouse and caregiver taking pt back to room at end of session)   OT Total Time Spent   OT Individual Total Time Spent (Mins) 30   OT Charge Group   OT Neuromuscular Re-education / Balance 2     Provided pt, spouse, and caregiver education/instruction on LUE spasticity mgmt stretching, application of therapeutic vibration to promote extension to breakup flexor tone, LUE positioning, pain mgmt, and review of k-tape mechanics. Also discussed bladder mgmt as pt needs to frequently go and her caregiver is not able to transfer her herself. Pt's spouse reported she will be having botox injections to the bladder with Dr. Steele on an outpatient basis at d/c and that they also have a purewick at home to utilize. Pt's spouse and caregiver are knowledgeable of importance to keep pt dry for skin integrity and prevention of UTIs. Pt's  works 5 minutes away and is able to come home as needed to assist pt and her caregiver. Pt's caregiver return demo'd shoulder, elbow, wrist and finger stretching including application of vibration to manage tone. They inquired about acquiring a LUE brace for spasticity mgmt  which they reportedly have a physician's order for. Discussed pros/cons and to be mindful of positioning and pain and recommended it would be best to have follow-up with another OT at d/c if they plan to have pt use the brace to assure proper fit and comfort; they verbalized understanding.     Assessment    Pt tolerated OT session well and pt's spouse and caregiver verbalized/demo'd understanding of above LUE spasticity mgmt techniques to prevent contracture and maximize function. They had no further questions or concerns as they already had transfer training with PT earlier this date and pt's  is been present for therapy consistently.   Strengths: Able to follow instructions,Effective communication skills,Independent prior level of function,Manages pain appropriately,Motivated for self care and independence,Pleasant and cooperative,Supportive family,Willingly participates in therapeutic activities  Barriers: Bladder incontinence,Decreased endurance,Fatigue,Generalized weakness,Hemiparesis,Impaired activity tolerance,Impaired balance,Limited mobility    Plan    ADL's, neuro re-ed for LUE, visual scanning all four quadrants, self-ROM for LUE, sitting/standing bal/tolerance, functional transfers, TTB transfer     Passport items to be completed:  Perform bathroom transfers, complete dressing, complete feeding, get ready for the day, prepare a simple meal, participate in household tasks, adapt home for safety needs, demonstrate home exercise program, complete caregiver training     Occupational Therapy Goals (Active)       Problem: OT Long Term Goals       Dates: Start: 02/19/22         Goal: LTG-By discharge, patient will complete basic self care tasks with min-mod a        Dates: Start: 02/19/22            Goal: LTG-By discharge, patient will perform bathroom transfers with min a        Dates: Start: 02/19/22

## 2022-03-09 NOTE — THERAPY
Speech Language Pathology  Daily Treatment     Patient Name: Melba Frye  Age:  51 y.o., Sex:  female  Medical Record #: [id number]  Today's Date: 3/9/2022     Precautions  Precautions: Fall Risk  Comments: Left Rolando paresis and tone    Subjective    Pt's spouse and caregiver present for family training this session. Pt pleasant and cooperative.      Objective       03/09/22 1404   Cognition   Insight into Deficits Within Functional Limits (6-7)   Interdisciplinary Plan of Care Collaboration   IDT Collaboration with  Family / Caregiver   Patient Position at End of Therapy Seated;Family / Friend in Room   Collaboration Comments pt's spouse and paid caregiver attending family training this date   SLP Total Time Spent   SLP Individual Total Time Spent (Mins) 30   Treatment Charges   SLP Cognitive Skill Development First 15 Minutes 1   SLP Cognitive Skill Development Additional 15 Minutes 1       Assessment    Education provided via verbal and written handouts as it pertains to attention, memory and left neglect. Review of compensatory strategies and use of external aids for memory. Spouse reports physical calendars, notepads, sticky notes are preferred method vs smart devices secondary to pt's left inattention and difficulty reading screens. Education on decreasing external distractions, completing one task at a time and scheduling tasks, therapies and appointments when pt feels most rested. Pt and family receptive to education and in agreement with POC recommendations for ongoing ST services via home health.     Strengths: Able to follow instructions,Supportive family,Pleasant and cooperative,Willingly participates in therapeutic activities  Barriers: Impaired carryover of learning,Impaired functional cognition,Impaired insight/denial of deficits (anxiety)    Plan    Review home program with pt prior to anticipated d/c of 3/11/22        Speech Therapy Problems (Active)       Problem: Memory STGs       Dates:  Start: 02/19/22         Goal: STG-Within one week, patient will recall daily events and safety strategies with 80% accuracy, given min verbal cues and use of memory book.        Dates: Start: 02/19/22               Problem: Problem Solving STGs       Dates: Start: 02/19/22         Goal: STG-Within one week, patient will complete simple visual scanning tasks to address left neglect with 70% accuracy provided MIN cues.        Dates: Start: 02/24/22               Problem: Speech/Swallowing LTGs       Dates: Start: 02/19/22         Goal: LTG-By discharge, patient will solve complex problem Neeraj for safe discharge home.       Dates: Start: 02/19/22

## 2022-03-09 NOTE — THERAPY
"Occupational Therapy  Daily Treatment     Patient Name: Melba Frye  Age:  51 y.o., Sex:  female  Medical Record #: [id number]  Today's Date: 3/9/2022     Precautions  Precautions: Fall Risk  Comments: Left Rolando paresis and tone    Safety   ADL Safety : Requires Physical Assist for Safety,Requires Supervision for Safety,Requires Cueing for Safety  Bathroom Safety: Requires Physical Assist for Safety,Requires Supervision for Safety,Requires Cuing for Safety    Subjective     \" No one came in to help me get dressed this morning.\"     Objective     03/09/22 0901   Precautions   Precautions Fall Risk   Comments Left Rolando paresis and tone   Functional Level of Assist   Upper Body Dressing Moderate Assist  (min cues for doffing pull over shirt   mod assist to don)   Lower Body Dressing Total Assist  (socks shoes and AFO total assist   max assist to don pants)   Bed, Chair, Wheelchair Transfer Maximal Assist  (standpivot to the right    cues for technique with sit to stand)   Balance   Sitting Balance (Static) Fair -   Bed Mobility    Supine to Sit Maximal Assist   Interdisciplinary Plan of Care Collaboration   IDT Collaboration with  Therapy Tech   Patient Position at End of Therapy Seated;Call Light within Reach;Tray Table within Reach;Phone within Reach;Self Releasing Lap Belt Applied   Collaboration Comments assisting as needed with tx activity   OT Total Time Spent   OT Individual Total Time Spent (Mins) 30   OT Charge Group   OT Self Care / ADL 2      Left arm trough with elevating pad and strap for positioning applied to w/c       Assessment      Continues with significant flexor tone and pain in  left UE    Max difficulty with forward lean for sit to stands    Poor  to fair sitting balance  this AM sitting at edge of bed maybe due to waffle mattress       Strengths: Able to follow instructions,Effective communication skills,Independent prior level of function,Manages pain appropriately,Motivated for self care " and independence,Pleasant and cooperative,Supportive family,Willingly participates in therapeutic activities  Barriers: Bladder incontinence,Decreased endurance,Fatigue,Generalized weakness,Hemiparesis,Impaired activity tolerance,Impaired balance,Limited mobility    Plan  ADL's, neuro re-ed for LUE, visual scanning all four quadrants, self-ROM for LUE, sitting/standing bal/tolerance, functional transfers, TTB transfer      Passport items to be completed:  Perform bathroom transfers, complete dressing, complete feeding, get ready for the day, prepare a simple meal, participate in household tasks, adapt home for safety needs, demonstrate home exercise program, complete caregiver training     Occupational Therapy Goals (Active)       Problem: OT Long Term Goals       Dates: Start: 02/19/22         Goal: LTG-By discharge, patient will complete basic self care tasks with min-mod a        Dates: Start: 02/19/22            Goal: LTG-By discharge, patient will perform bathroom transfers with min a        Dates: Start: 02/19/22

## 2022-03-09 NOTE — CARE PLAN
Problem: Pain - Standard  Goal: Alleviation of pain or a reduction in pain to the patient’s comfort goal  Flowsheets  Taken 3/8/2022 1952 by Dinorah Subramanian R.N.  Non Verbal Scale: Calm  Pain Rating Scale (NPRS): 4    Note: Patient able to verbalize pain level and verbalize an acceptable level of pain. Patient given PRN medication for pain.     Problem: Skin Integrity  Goal: Patient's skin integrity will be maintained or improve  Note: Patient's skin remains free from new or accidental injury this shift.   The patient is Stable - Low risk of patient condition declining or worsening

## 2022-03-10 PROCEDURE — 99233 SBSQ HOSP IP/OBS HIGH 50: CPT | Performed by: PHYSICAL MEDICINE & REHABILITATION

## 2022-03-10 PROCEDURE — 97129 THER IVNTJ 1ST 15 MIN: CPT

## 2022-03-10 PROCEDURE — 770010 HCHG ROOM/CARE - REHAB SEMI PRIVAT*

## 2022-03-10 PROCEDURE — 90832 PSYTX W PT 30 MINUTES: CPT | Performed by: PSYCHOLOGIST

## 2022-03-10 PROCEDURE — 97110 THERAPEUTIC EXERCISES: CPT

## 2022-03-10 PROCEDURE — A9270 NON-COVERED ITEM OR SERVICE: HCPCS | Performed by: PHYSICAL MEDICINE & REHABILITATION

## 2022-03-10 PROCEDURE — 97130 THER IVNTJ EA ADDL 15 MIN: CPT

## 2022-03-10 PROCEDURE — 700102 HCHG RX REV CODE 250 W/ 637 OVERRIDE(OP): Performed by: PHYSICAL MEDICINE & REHABILITATION

## 2022-03-10 PROCEDURE — 97535 SELF CARE MNGMENT TRAINING: CPT

## 2022-03-10 PROCEDURE — 97116 GAIT TRAINING THERAPY: CPT

## 2022-03-10 RX ADMIN — BRIVARACETAM 100 MG: 50 TABLET, FILM COATED ORAL at 20:10

## 2022-03-10 RX ADMIN — VENLAFAXINE HYDROCHLORIDE 75 MG: 37.5 TABLET ORAL at 09:44

## 2022-03-10 RX ADMIN — LACOSAMIDE 200 MG: 100 TABLET, FILM COATED ORAL at 20:10

## 2022-03-10 RX ADMIN — MELATONIN TAB 3 MG 3 MG: 3 TAB at 20:10

## 2022-03-10 RX ADMIN — TIZANIDINE 4 MG: 4 TABLET ORAL at 15:01

## 2022-03-10 RX ADMIN — OXYCODONE 5 MG: 5 TABLET ORAL at 20:10

## 2022-03-10 RX ADMIN — MIRTAZAPINE 15 MG: 15 TABLET, ORALLY DISINTEGRATING ORAL at 20:11

## 2022-03-10 RX ADMIN — CEFDINIR 300 MG: 300 CAPSULE ORAL at 09:45

## 2022-03-10 RX ADMIN — TIZANIDINE 4 MG: 4 TABLET ORAL at 20:10

## 2022-03-10 RX ADMIN — ACETAMINOPHEN 1000 MG: 500 TABLET, FILM COATED ORAL at 09:58

## 2022-03-10 RX ADMIN — CEFDINIR 300 MG: 300 CAPSULE ORAL at 20:10

## 2022-03-10 RX ADMIN — ALPRAZOLAM 0.5 MG: 0.5 TABLET ORAL at 17:22

## 2022-03-10 RX ADMIN — TRAZODONE HYDROCHLORIDE 50 MG: 50 TABLET ORAL at 20:10

## 2022-03-10 RX ADMIN — TIZANIDINE 4 MG: 4 TABLET ORAL at 09:45

## 2022-03-10 RX ADMIN — BRIVARACETAM 100 MG: 50 TABLET, FILM COATED ORAL at 09:44

## 2022-03-10 RX ADMIN — Medication 1000 UNITS: at 09:44

## 2022-03-10 RX ADMIN — AMLODIPINE BESYLATE 10 MG: 5 TABLET ORAL at 09:44

## 2022-03-10 RX ADMIN — APIXABAN 5 MG: 5 TABLET, FILM COATED ORAL at 09:45

## 2022-03-10 RX ADMIN — LACOSAMIDE 200 MG: 100 TABLET, FILM COATED ORAL at 09:44

## 2022-03-10 RX ADMIN — LISINOPRIL 40 MG: 20 TABLET ORAL at 09:45

## 2022-03-10 RX ADMIN — OMEPRAZOLE 20 MG: 20 CAPSULE, DELAYED RELEASE ORAL at 09:45

## 2022-03-10 RX ADMIN — DIPHENHYDRAMINE HCL 25 MG: 25 TABLET ORAL at 18:31

## 2022-03-10 RX ADMIN — APIXABAN 5 MG: 5 TABLET, FILM COATED ORAL at 20:10

## 2022-03-10 ASSESSMENT — ACTIVITIES OF DAILY LIVING (ADL)
TOILET_TRANSFER_DESCRIPTION: GRAB BAR;INCREASED TIME;INITIAL PREPARATION FOR TASK;REQUIRES LIFT;SET-UP OF EQUIPMENT;SUPERVISION FOR SAFETY;VERBAL CUEING
TOILET_TRANSFER_LEVEL_OF_ASSIST: REQUIRES PHYSICAL ASSIST WITH TOILET TRANSFER
TOILETING_LEVEL_OF_ASSIST: REQUIRES PHYSICAL ASSIST WITH TOILETING
SHOWER_TRANSFER_LEVEL_OF_ASSIST: REQUIRES PHYSICAL ASSIST WITH SHOWER TRANSFER

## 2022-03-10 ASSESSMENT — GAIT ASSESSMENTS
DISTANCE (FEET): 10
GAIT LEVEL OF ASSIST: MODERATE ASSIST
ASSISTIVE DEVICE: PARALLEL BARS
DEVIATION: STEP TO;DECREASED HEEL STRIKE;DECREASED TOE OFF

## 2022-03-10 ASSESSMENT — PAIN DESCRIPTION - PAIN TYPE
TYPE: ACUTE PAIN
TYPE: ACUTE PAIN

## 2022-03-10 NOTE — PROGRESS NOTES
"REHABILITATION PSYCHOLOGY FOLLOW-UP:  Reason for admission: GBM (glioblastoma multiforme) (HCC) [C71.9]  Length of Visit: 29 minutes    Chief Complaint: Memory difficulty    S: Met with the patient for brief individual psychotherapy. Patient presented with a euthymic affect and reported a \"pretty good\" mood. Pt continues to not encode memories and doesn't remember events day to day - she did not remember meeting with current provider yesterday. Session focused on normalizing experience in addition to anxiety management as pt expressed feeling bad/guilty for memory difficulty. Pt is scheduled [facility] tomorrow. Therefore, psychology is signing off.     O: Psychiatric Examination:  Vitals: Blood pressure 140/65, pulse 96, temperature 36.4 °C (97.6 °F), temperature source Temporal, resp. rate 18, height 1.702 m (5' 7\"), weight 105 kg (231 lb 7.7 oz), SpO2 95 %, not currently breastfeeding.  Musculoskeletal: Given dx, normal psychomotor activity, no tics or unusual mannerisms noted  Appearance and Eye Contact: Easily established rapport WDWN, appropriate dress and grooming. Behavior is calm, cooperative,  appropriate eye contact  Attention/Alertness: Alert  Thought Process: Linear, Logical and Goal Directed    Thought Content: No psychotic processes noted  Speech: Clear with normal rate and rhythm  Mood: \"okay\"            Affect: somewhat dysphoric         SI/HI: Denies     Memory: Recent and remote memory appear intact     Orientation: alert, oriented to person, place and time  Insight into symptoms: good  Judgement into symptoms:good     ASSESSMENT: Melba Frye is a 51 y.o. female with a past medical history of right posterior fronto-parietal glioblastoma multiforme s/p right cranioplasty for resection (6/2021, biopsy 3/2021) at [facility], with seizures, w/ secondary L sided weakness w/ tone, radiation and chemotherapy with temozolomide (last dose 11/2021), focal seizures (on Vimpat and " Briviact), migraine, hyperlipidemia, PE with cor pulmonale (on Eliquis), anxiety, and depression  ;  who presented on 2/9/2022  1:53 PM after multiple ground level falls likely due to a combination of hypoxia, seizure activity, and vasogenic edema of the brain. Per documentation, on Wednesday 2/9/22 she was walking back from the bathroom with her caregiver when she reported feeling tired, dizzy, and needed to sit. Her caregiver turned to get a chair at which time the patient fell forward to the ground without catching herself. EMS was notified and upon arrival noted the patient was hypoxic with SpO2 85% on RA. She had a period of about 15 minutes of altered awareness/consciousness, and does not recall the events.     Psychology was consulted due to pt hx of depression and anxiety as well as helping with adjustment to diagnosis as well as stress management and caregiver stress for spouse.      DSM5 Diagnostic Considerations: Adj d/o with mixed anxiety and depressed mood        PLAN:  Records reviewed: Yes  Discussed patient with other provider: Roney  Signing off  Thank you for the consult.     Brandy Hines, Ph.D.

## 2022-03-10 NOTE — CARE PLAN
Problem: OT Long Term Goals  Goal: LTG-By discharge, patient will complete basic self care tasks with min-mod a   Outcome: Discharged - Not Met  Goal: LTG-By discharge, patient will perform bathroom transfers with min a   Outcome: Discharged - Not Met

## 2022-03-10 NOTE — CARE PLAN
Problem: Self Care  Goal: Patient will have the ability to perform ADLs independently or with assistance  Outcome: Progressing  Note: Patient able to perform regular activities this shift.  Pain controlled this shift.  Pain management includes PRN pain meds as well as non-pharmacological measures such as emotional support, rest, and repositioning.  Will continue to monitor.    The patient is Stable - Low risk of patient condition declining or worsening    Shift Goals  Clinical Goals: safety  Patient Goals: Sleep well    Progress made toward(s) clinical / shift goals:  pt participating with therapy and cooperative with nursing staff    Patient is not progressing towards the following goals:

## 2022-03-10 NOTE — CARE PLAN
Problem: Pain - Standard  Goal: Alleviation of pain or a reduction in pain to the patient’s comfort goal  Flowsheets  Taken 3/9/2022 2000 by Dinorah Subramanian R.N.  Non Verbal Scale: Calm  Pain Rating Scale (NPRS): 3    Note: Patient able to verbalize pain level and verbalize an acceptable level of pain.      Problem: Bladder / Voiding  Goal: Patient will establish and maintain bladder regimen  Note: Patient has been incontinent of urine this shift.    The patient is Stable - Low risk of patient condition declining or worsening

## 2022-03-10 NOTE — THERAPY
Physical Therapy   Daily Treatment     Patient Name: Melba Frye  Age:  51 y.o., Sex:  female  Medical Record #: [id number]  Today's Date: 3/9/2022     Precautions  Precautions: Fall Risk  Comments: Left Rolando paresis and tone    Subjective    Pt up in , spouse and caregiver present for mobility training     Objective       03/09/22 1231   Gait Functional Level of Assist    Gait Level Of Assist Moderate Assist  (wc follow)   Assistive Device Parallel Bars  (L AFO/ gait belt)   Distance (Feet) 10   # of Times Distance was Traveled 2   Deviation Step To;Decreased Heel Strike;Decreased Toe Off  (spastic L rolando-gait)   Transfer Functional Level of Assist   Bed, Chair, Wheelchair Transfer Maximal Assist  (second person A for safety)   Bed Chair Wheelchair Transfer Description Adaptive equipment;Increased time;Requires lift   Supine Lower Body Exercise   Bridges Two Legged;2 sets of 10   Trunk Rotation 2 sets of 10   Hip Flexion 2 sets of 10  (alternating marching from hooklying)   Hip Abduction Hook Lying;2 sets of 10   Sitting Lower Body Exercises   Long Arc Quad 1 set of 10   Marching 1 set of 10   Sit to Stand 1 set of 10  (from edge of mat to rolando-walker)   Bed Mobility    Supine to Sit Maximal Assist   Sit to Supine Moderate Assist   Sit to Stand Moderate Assist   Scooting Maximal Assist   Neuro-Muscular Treatments   Neuro-Muscular Treatments Anterior weight shift;Facilitation;Sequencing;Tactile Cuing;Verbal Cuing;Weight Shift Right;Weight Shift Left   Comments neuro re-ed gait/ transfers/ sequencing   PT Total Time Spent   PT Individual Total Time Spent (Mins) 60   PT Charge Group   PT Therapeutic Exercise 1   PT Neuromuscular Re-Education / Balance 1   PT Therapeutic Activities 2     Pt's spouse and caregiver Caroline present for mobility training, educated on supine exercises and ROM, bed mobility and positioning and sit<>stand sequencing with rolando-walker for standing balance. Education provided regarding left  "inattention and need for verbal cues for focus and sequencing mobility tasks. Pt's spouse able to assist with wc<>mat transfers with gait belt in \"dance position\". Instructed Caroline in same transfer going to pt's right, demonstrated good body mechanics and proper verbal cues for pt to sequence stepping forward with right foot, then left, to turn and sit on mat, caregiver requires increased time and assist from PT to safely complete transfers. Discussed with pt, spouse and caregiver safety concerns at home, recommended that care giver only complete exercises and sit<>stand training when alone and not attempt functional transfers without spouse or skilled home health PT/ OT present.    Assessment    Pt/spouse complete basic transfers well with good communication and sequencing, pt remains with L neglect/ hypertonicity and fall risk, recommend caregiver not complete transfers without assist.    Strengths: Able to follow instructions,Willingly participates in therapeutic activities,Supportive family,Pleasant and cooperative,Motivated for self care and independence,Effective communication skills  Barriers: Decreased endurance,Emotional lability,Fatigue,Generalized weakness,Hemiplegia,Impaired activity tolerance,Impaired balance,Impaired functional cognition,Limited mobility,Spasticity    Plan    Sit<>stand sequencing/ transfer training, pre-gait/ gait training as able, ROM/ stretching/ spasticity management. Care giver training and continued training with spouse.      Passport items to be completed:  Get in/out of bed safely, in/out of a vehicle, safely use mobility device, walk or wheel around home/community, navigate up and down stairs, show how to get up/down from the ground, ensure home is accessible, demonstrate HEP, complete caregiver training      Physical Therapy Problems (Active)       Problem: Mobility       Dates: Start: 02/19/22         Goal: STG-Within one week, patient will ambulate 10ft with LBQC and min A.  "      Dates: Start: 02/19/22            Goal: STG-Within one week, patient will ascend and descend four stairs with mod A using R handrail.       Dates: Start: 02/19/22               Problem: Mobility Transfers       Dates: Start: 02/19/22         Goal: STG-Within one week, patient will perform bed mobility with min A using hospital bed functions.       Dates: Start: 02/19/22            Goal: STG-Within one week, patient will sit to stand with min A from wheelchair.       Dates: Start: 02/19/22            Goal: STG-Within one week, patient will transfer bed to chair with min A.       Dates: Start: 02/19/22               Problem: PT-Long Term Goals       Dates: Start: 02/19/22         Goal: LTG-By discharge, patient will propel wheelchair 150ft with power versus manual chair and supervision.       Dates: Start: 02/19/22            Goal: LTG-By discharge, patient will ambulate 20ft with LBQC and CGA.       Dates: Start: 02/19/22            Goal: LTG-By discharge, patient will transfer one surface to another with CGA.       Dates: Start: 02/19/22            Goal: LTG-By discharge, patient will ambulate up/down flight of stairs with min A using R handrail.       Dates: Start: 02/19/22            Goal: LTG-By discharge, patient will transfer in/out of a car with min A.       Dates: Start: 02/19/22

## 2022-03-10 NOTE — THERAPY
"Recreational Therapy  Daily Treatment     Patient Name: Melba Frye  AGE:  51 y.o., SEX:  female  Medical Record #: [id number]  Today's Date: 3/10/2022       Subjective    Pt alseep and was not woken up by this writer due to completion of all Recreation Therapy goals and complete family training.      Objective       03/10/22 1431   Treatment Time   Total Time Spent (mins) 0   Total Time Missed 30   Reasons for Time Missed Non-Medical-Other (Please Comment)  (Pt asleep. Checked in with SO and asked him for any questions from familt training. \"No\")       Assessment    Pts SO was asked if they had any questions or concerns on upcoming dc and following family training.     Strengths: Able to follow instructions,Alert and oriented,Motivated for self care and independence,Pleasant and cooperative,Supportive family,Willingly participates in therapeutic activities  Barriers: Impaired functional cognition,Impaired carryover of learning,Generalized weakness,Fatigue,Decreased endurance    Plan    dc    "

## 2022-03-10 NOTE — THERAPY
Speech Language Pathology  Daily Treatment     Patient Name: Melba Frye  Age:  51 y.o., Sex:  female  Medical Record #: [id number]  Today's Date: 3/10/2022     Precautions  Precautions: Fall Risk  Comments: Left Rolando paresis and tone    Subjective    Pt pleasant and cooperative, expressed worries re: next steps stated unsure is she wants to go to another facility or transition to home with HH therapy.      Objective       03/10/22 1003   SLP Total Time Spent   SLP Individual Total Time Spent (Mins) 60   Treatment Charges   SLP Cognitive Skill Development First 15 Minutes 1   SLP Cognitive Skill Development Additional 15 Minutes 3       Assessment    Pt provided with home exercise program to address attention, left visual scanning and recall. Pt receptive to ongoing education and training. Pt completing visual scanning tasks with MIN A overall. Recall with mental manipulation required MOD A with 4 units. Functional problem solving related to dc planning MIN-MODA    Strengths: Able to follow instructions,Supportive family,Pleasant and cooperative,Willingly participates in therapeutic activities  Barriers: Impaired carryover of learning,Impaired functional cognition,Impaired insight/denial of deficits (anxiety)    Plan    Pt to dc tomorrow with support of SO    Speech Therapy Problems (Active)       Problem: Memory STGs       Dates: Start: 02/19/22         Goal: STG-Within one week, patient will recall daily events and safety strategies with 80% accuracy, given min verbal cues and use of memory book.        Dates: Start: 02/19/22               Problem: Speech/Swallowing LTGs       Dates: Start: 02/19/22         Goal: LTG-By discharge, patient will solve complex problem Neeraj for safe discharge home.       Dates: Start: 02/19/22

## 2022-03-10 NOTE — THERAPY
Recreational Therapy  Daily Treatment     Patient Name: Melba Frye  AGE:  51 y.o., SEX:  female  Medical Record #: [id number]  Today's Date: 3/10/2022       Subjective    Pt sharing excitement to return to traveling and seeing friends.      Objective       03/09/22 1531   Functional Ability Status - Cognitive   Attention Span Remains on Task   Comprehension Follows Three Step Commands   Judgment Able to Make Independent Decisions   Functional Ability Status - Emotional    Affect Appropriate;Bright   Mood Appropriate   Behavior Appropriate   Skilled Intervention    Skilled Intervention Relaxation / Coping Skills;Community Skills   Skilled Intervention Comments Travel training class   Interdisciplinary Plan of Care Collaboration   IDT Collaboration with  Family / Caregiver   Patient Position at End of Therapy In Bed;Call Light within Reach;Tray Table within Reach   Collaboration Comments SO and caretaker both attended session.   Strengths & Barriers   Strengths Able to follow instructions;Alert and oriented;Motivated for self care and independence;Pleasant and cooperative;Supportive family;Willingly participates in therapeutic activities   Barriers Impaired functional cognition;Impaired carryover of learning;Generalized weakness;Fatigue;Decreased endurance   Treatment Time   Total Time Spent (mins) 45   Procedural Tracking   Procedural Tracking Community Re-Integration;Community Skills Development;Leisure Skills Awareness;Leisure Skills Development       Assessment    Pt received the travel training information including tips for navigating travel planning, navigating the airport and the plane as a wc user, booking and researching places to stay that are accessible as well as hygiene tips during travel.     Strengths: (P) Able to follow instructions,Alert and oriented,Motivated for self care and independence,Pleasant and cooperative,Supportive family,Willingly participates in therapeutic activities  Barriers:  (P) Impaired functional cognition,Impaired carryover of learning,Generalized weakness,Fatigue,Decreased endurance    Plan    dc

## 2022-03-10 NOTE — THERAPY
Occupational Therapy  Daily Treatment     Patient Name: Melba Frye  Age:  51 y.o., Sex:  female  Medical Record #: [id number]  Today's Date: 3/10/2022     Precautions  Precautions: Fall Risk  Comments: Left Rolando paresis and tone    Safety   ADL Safety : Requires Physical Assist for Safety,Requires Supervision for Safety,Requires Cueing for Safety  Bathroom Safety: Requires Physical Assist for Safety,Requires Supervision for Safety,Requires Cuing for Safety    Subjective    Pt in bed upon arrival, agreeable to shower this session.      Objective     03/10/22 0831   Non Verbal Descriptors   Non Verbal Scale  Calm   Functional Level of Assist   Eating Supervision   Eating Description Increased time   Grooming Supervision;Seated   Grooming Description Increased time;Initial preparation for task;Seated in wheelchair at sink   Bathing Moderate Assist   Bathing Description Grab bar;Tub bench;Assit with back;Assit wtih lower extremities;Assit with perineal;Increased time;Initial preparation for task;Supervision for safety;Verbal cueing   Upper Body Dressing Moderate Assist   Upper Body Dressing Description Assit with threading arms through sleeves;Assist with pulling shirt over head;Increased time;Initial preparation for task;Set-up of equipment;Supervision for safety;Verbal cueing   Lower Body Dressing Maximal Assist  (second person SBA)   Lower Body Dressing Description Grab bar;Increased time;Initial preparation for task;Set-up of equipment;Supervision for safety;Verbal cueing   Toileting Total Assist  (incontinent)   Toileting Description Assist for hygiene;Assist to pull pants up;Assist to pull pants down;Assist for standing balance;Grab bar;Initial preparation for task;Increased time;Set-up of equipment;Supervision for safety;Verbal cueing   Bed, Chair, Wheelchair Transfer Moderate Assist  (second person for safety)   Bed Chair Wheelchair Transfer Description Adaptive equipment;Initial preparation for  task;Increased time;Requires lift;Set-up of equipment;Supervision for safety;Verbal cueing   Toilet Transfers Moderate Assist  (second person for safety)   Toilet Transfer Description Grab bar;Increased time;Initial preparation for task;Requires lift;Set-up of equipment;Supervision for safety;Verbal cueing   Tub / Shower Transfers Moderate Assist  (second person for safety)   Tub Shower Transfer Description Grab bar;Shower bench;Increased time;Set-up of equipment;Supervision for safety;Verbal cueing;Initial preparation for task;Requires lift   Bed Mobility    Supine to Sit Maximal Assist   Interdisciplinary Plan of Care Collaboration   IDT Collaboration with  Therapy Tech   Patient Position at End of Therapy Seated;Self Releasing Lap Belt Applied;Call Light within Reach;Tray Table within Reach;Phone within Reach   Collaboration Comments Therapy tech assist with session   OT Total Time Spent   OT Individual Total Time Spent (Mins) 60   OT Charge Group   OT Self Care / ADL 4       Assessment    Pt tolerated OT session well focused on ADL routine. Was able to doff her R shoe and sock for the first time today. Continues to require mod-maxA for transfers with second person present for safety and is able to assist with managing her pants on the R side during toileting but is incontinent of urine.   Strengths: Able to follow instructions,Effective communication skills,Independent prior level of function,Manages pain appropriately,Motivated for self care and independence,Pleasant and cooperative,Supportive family,Willingly participates in therapeutic activities  Barriers: Bladder incontinence,Decreased endurance,Fatigue,Generalized weakness,Hemiparesis,Impaired activity tolerance,Impaired balance,Limited mobility    Plan      D/c tomorrow home vs [facility]     Occupational Therapy Goals (Active)       There are no active problems.

## 2022-03-10 NOTE — PROGRESS NOTES
"Rehab Progress Note     Encounter Date: 3/10/2022    CC: Decreased mobility, spasticity    Interval Events (Subjective)  Patient sitting up in room. She and her family have decided that they would like to go to [facility] instead of home. Discussed that they need to get authorization. Discussed they should submit it today. Otherwise discussed will continue therapy for now. Denies NVD. Denies SOB.     IDT Team Meeting 3/10/2022    ILucrecia M.D., was present and led the interdisciplinary team conference on 3/10/2022.  I led the IDT conference and agree with the IDT conference documentation and plan of care as noted below.     RN:  Diet Regular   % Meal     Pain Occasional headache   Sleep    Bowel Continent   Bladder Incontinent   In's & Out's      PT: 0 goals  Bed Mobility    Transfers modA   Mobility modA-maxA   Stairs    Caregivers trained.     OT:  Eating    Grooming    Bathing Mod-max   UB Dressing    LB Dressing    Toileting modA   Shower & Transfer modA   Caregivers trained    SLP:  Limited improvement; very motivated   Left neglect and inattention  Good family support    Rec:  Community reintegration and travel training  Possible flight to [place]    CM:  Continues to work on disposition and DME needs.      DC/Disposition:  3/11/22    Objective:  VITAL SIGNS: /79   Pulse 79   Temp 36.7 °C (98 °F) (Temporal)   Resp 18   Ht 1.702 m (5' 7\")   Wt 105 kg (231 lb 7.7 oz)   SpO2 94%   BMI 36.26 kg/m²   Gen: NAD  Psych: Mood and affect appropriate  CV: RRR, no edema  Resp: CTAB, no upper airway sounds  Abd: NTND  Neuro: AOx3, following commands  Unchanged from 3/9/22    Recent Results (from the past 72 hour(s))   URINALYSIS    Collection Time: 03/08/22  9:30 AM    Specimen: Urine, Cath   Result Value Ref Range    Color Yellow     Character Turbid (A)     Specific Gravity 1.010 <1.035    Ph 6.5 5.0 - 8.0    Glucose Negative Negative mg/dL    Ketones Negative Negative mg/dL    Protein " 30 (A) Negative mg/dL    Bilirubin Negative Negative    Urobilinogen, Urine 0.2 Negative    Nitrite Negative Negative    Leukocyte Esterase Large (A) Negative    Occult Blood Small (A) Negative    Micro Urine Req Microscopic    URINALYSIS    Collection Time: 03/08/22  9:30 AM   Result Value Ref Range    Color Yellow     Character Sl Cloudy (A)     Specific Gravity 1.010 <1.035    Ph 6.5 5.0 - 8.0    Glucose Negative Negative mg/dL    Ketones Negative Negative mg/dL    Protein Negative Negative mg/dL    Bilirubin Negative Negative    Urobilinogen, Urine 0.2 Negative    Nitrite Positive (A) Negative    Leukocyte Esterase Large (A) Negative    Occult Blood Trace (A) Negative    Micro Urine Req Microscopic    URINE MICROSCOPIC (W/UA)    Collection Time: 03/08/22  9:30 AM   Result Value Ref Range    WBC Packed (A) /hpf    RBC 5-10 (A) /hpf    Bacteria Many (A) None /hpf    Epithelial Cells Negative /hpf    Hyaline Cast 3-5 (A) /lpf   URINE CULTURE-EXISTING-LESS THAN 48 HOURS    Collection Time: 03/08/22  9:30 AM    Specimen: Urine, Straight Cath   Result Value Ref Range    Significant Indicator POS (POS)     Source UR     Site URINE, STRAIGHT CATH     Culture Result - (A)     Culture Result (A)      Citrobacter freundii  >100,000 cfu/mL  Susceptibilities in progress     URINE MICROSCOPIC (W/UA)    Collection Time: 03/08/22  9:30 AM   Result Value Ref Range    WBC Packed (A) /hpf    RBC 2-5 (A) /hpf    Bacteria Many (A) None /hpf    Epithelial Cells Rare /hpf       Current Facility-Administered Medications   Medication Frequency   • lisinopril (PRINIVIL) tablet 40 mg DAILY   • cefdinir (OMNICEF) capsule 300 mg Q12HRS   • tizanidine (ZANAFLEX) tablet 4 mg TID   • mirtazapine (Remeron) orally disintegrating tab 15 mg QHS   • ALPRAZolam (XANAX) tablet 0.5 mg Q EVENING   • ALPRAZolam (XANAX) tablet 0.5 mg QDAY PRN   • diphenhydrAMINE (BENADRYL) tablet/capsule 25 mg Q6HRS PRN   • butalbital/apap/caffeine -40 mg  (Fioricet) per tablet 1 Tablet Q4HRS PRN   • senna-docusate (PERICOLACE or SENOKOT S) 8.6-50 MG per tablet 2 Tablet BID PRN    And   • polyethylene glycol/lytes (MIRALAX) PACKET 1 Packet QDAY PRN    And   • magnesium hydroxide (MILK OF MAGNESIA) suspension 30 mL QDAY PRN    And   • bisacodyl (DULCOLAX) suppository 10 mg QDAY PRN   • vitamin D3 (cholecalciferol) tablet 1,000 Units DAILY   • Respiratory Therapy Consult Continuous RT   • hydrALAZINE (APRESOLINE) tablet 25 mg Q8HRS PRN   • acetaminophen (Tylenol) tablet 650 mg Q4HRS PRN   • omeprazole (PRILOSEC) capsule 20 mg DAILY   • artificial tears ophthalmic solution 1 Drop PRN   • benzocaine-menthol (CEPACOL) lozenge 1 Lozenge Q2HRS PRN   • mag hydrox-al hydrox-simeth (MAALOX PLUS ES or MYLANTA DS) suspension 20 mL Q2HRS PRN   • ondansetron (ZOFRAN ODT) dispertab 4 mg 4X/DAY PRN    Or   • ondansetron (ZOFRAN) syringe/vial injection 4 mg 4X/DAY PRN   • traZODone (DESYREL) tablet 50 mg QHS PRN   • sodium chloride (OCEAN) 0.65 % nasal spray 2 Spray PRN   • oxyCODONE immediate-release (ROXICODONE) tablet 5 mg Q3HRS PRN    Or   • oxyCODONE immediate release (ROXICODONE) tablet 10 mg Q3HRS PRN   • midazolam (VERSED) 5 mg/mL (1 mL vial) PRN   • acetaminophen (TYLENOL) tablet 1,000 mg Q6HRS PRN   • amLODIPine (NORVASC) tablet 10 mg DAILY   • apixaban (ELIQUIS) tablet 5 mg BID   • brivaracetam (Briviact) tablet 100 mg BID   • lacosamide (VIMPAT) tablet 200 mg BID   • melatonin tablet 3 mg QHS   • venlafaxine (EFFEXOR) tablet 75 mg DAILY       Orders Placed This Encounter   Procedures   • Diet Order Diet: Regular     Standing Status:   Standing     Number of Occurrences:   1     Order Specific Question:   Diet:     Answer:   Regular [1]       Assessment:  Active Hospital Problems    Diagnosis    • *Glioblastoma of frontal lobe (HCC)    • GBM (glioblastoma multiforme) (HCC)    • Constipation    • Syncope    • Class 1 obesity due to excess calories with serious comorbidity  and body mass index (BMI) of 34.0 to 34.9 in adult    • Gait instability    • History of pulmonary embolus (PE)    • Seizure disorder (HCC)    • Primary hypertension    • Mixed anxiety and depressive disorder        Medical Decision Making and Plan:  Seizure/GBM - Patient with syncopal event with AMS and weakness after concerning for seizure vs worsening GBM vs post-treatment changes. Oncology was consulted and recommended steroids then taper which she has completed. Neurology was consulted and recommended MRI which showed diffuse posttreatment changes but no acute lesion. EEG showed non-specific changes and Neurology recommended increasing her Vimpat as her symptoms could have been post-ictal. Cardiac work-up negative.    -PT and OT for mobility and ADLs  -SLP for cognitive screen  -Continue Briviact 100 mg BID and Vimpat 200 mg BID     Spasticity - Followed by Dr. Steele, [facility], for Botox injection. Discussed with Dr. Steele. Will trial Tizanidine 4 mg BID   -Improved on Tizanidine. Started on Xanax QHS.      HTN - Patient on Amlodipine 10 mg daily. Into 150s, start Lisinopril 5 mg. Still into 140s. Increase to 10 mg.  Into 150s at times. Increase to 20. Into 140s, increase to 40 mg    Dysuria - Patient with dysuria on 3/7/22 and fatigue. Check UA - collected 3/8/22, UTI+ started on Omnicef.      Anemia - Check AM CBC - 14.3, resolved.      Hypokalemia - Check AM CMP - 4.0, resolved.     Anxiety/Depression - Patient on PRN Xanax and Effexor 75 mg daily. Psychology to consult     Sleep - Patient with poor sleep. On Xanax QHS and add QHS Remeron    Morbid Obesity due to excess calories - BMI of 37.1 on admission. Dietitian to consult.      Back rash - Appears contact. Start Hydrocortisone cream. PO Benadryl. Discontinue cream, improved    Constipation - Resolved, discontinue senna-docusate. Returned on 3/7/22, PRN senna-docusate and laxatives    Vitamin D deficiency - 29 on admission. Started on 1000 U      Hx of PE - Patient on Eliquis BID.    Dispo - Patient very heavy assistance on transfer. Unclear if family will be able to care for her with this level of assistance. Palliative to consult for discussion of goals of care at this time.  Patient declined discussion with PC after discussing with her . They were able to do car transfer x2 on 3/3/22. Family would be interested in [facility]    Total time:  36 minutes.  I spent greater than 50% of the time for patient care, counseling, and coordination on this date, including unit/floor time, and face-to-face time with the patient as per interval events and assessment and plan above. Topics discussed included discharge planning, [facility] referral, discharge medications , discontinue PPI, and improved SBP. Patient was discussed separately in IDT today; please see details above.    Lucrecia Sim M.D.

## 2022-03-10 NOTE — DISCHARGE PLANNING
Patient is scheduled to go home on 3/11/22 with home health. Patient is doing well but would like some more therapy so [facility] has a referral and has met with patient and her . [facility] has sent in for auth. Will continue to assist with the discharge planning.

## 2022-03-11 VITALS
DIASTOLIC BLOOD PRESSURE: 81 MMHG | BODY MASS INDEX: 36.33 KG/M2 | TEMPERATURE: 98.5 F | HEIGHT: 67 IN | RESPIRATION RATE: 12 BRPM | WEIGHT: 231.48 LBS | OXYGEN SATURATION: 96 % | HEART RATE: 82 BPM | SYSTOLIC BLOOD PRESSURE: 123 MMHG

## 2022-03-11 PROBLEM — K59.00 CONSTIPATION: Status: RESOLVED | Noted: 2022-02-14 | Resolved: 2022-03-11

## 2022-03-11 LAB
BACTERIA UR CULT: ABNORMAL
BACTERIA UR CULT: ABNORMAL
ERYTHROCYTE [DISTWIDTH] IN BLOOD BY AUTOMATED COUNT: 48.8 FL (ref 35.9–50)
HCT VFR BLD AUTO: 40.5 % (ref 37–47)
HGB BLD-MCNC: 13.3 G/DL (ref 12–16)
MCH RBC QN AUTO: 34.1 PG (ref 27–33)
MCHC RBC AUTO-ENTMCNC: 32.8 G/DL (ref 33.6–35)
MCV RBC AUTO: 103.8 FL (ref 81.4–97.8)
PLATELET # BLD AUTO: 230 K/UL (ref 164–446)
PMV BLD AUTO: 9.9 FL (ref 9–12.9)
RBC # BLD AUTO: 3.9 M/UL (ref 4.2–5.4)
SIGNIFICANT IND 70042: ABNORMAL
SITE SITE: ABNORMAL
SOURCE SOURCE: ABNORMAL
WBC # BLD AUTO: 6 K/UL (ref 4.8–10.8)

## 2022-03-11 PROCEDURE — 700102 HCHG RX REV CODE 250 W/ 637 OVERRIDE(OP): Performed by: PHYSICAL MEDICINE & REHABILITATION

## 2022-03-11 PROCEDURE — A9270 NON-COVERED ITEM OR SERVICE: HCPCS | Performed by: PHYSICAL MEDICINE & REHABILITATION

## 2022-03-11 PROCEDURE — 85027 COMPLETE CBC AUTOMATED: CPT

## 2022-03-11 PROCEDURE — 99239 HOSP IP/OBS DSCHRG MGMT >30: CPT | Performed by: PHYSICAL MEDICINE & REHABILITATION

## 2022-03-11 PROCEDURE — 36415 COLL VENOUS BLD VENIPUNCTURE: CPT

## 2022-03-11 RX ORDER — CEFDINIR 300 MG/1
300 CAPSULE ORAL EVERY 12 HOURS
Qty: 5 CAPSULE | Refills: 0 | Status: SHIPPED | OUTPATIENT
Start: 2022-03-11 | End: 2022-04-01

## 2022-03-11 RX ORDER — ALPRAZOLAM 0.5 MG/1
0.5 TABLET ORAL EVERY EVENING
Qty: 30 TABLET | Refills: 1 | Status: SHIPPED | OUTPATIENT
Start: 2022-03-11 | End: 2022-03-23

## 2022-03-11 RX ORDER — OXYCODONE HYDROCHLORIDE 5 MG/1
5 TABLET ORAL
Qty: 42 TABLET | Refills: 0 | Status: SHIPPED | OUTPATIENT
Start: 2022-03-11 | End: 2022-03-25

## 2022-03-11 RX ORDER — VENLAFAXINE 75 MG/1
75 TABLET ORAL DAILY
Qty: 90 TABLET | Refills: 2 | Status: SHIPPED | OUTPATIENT
Start: 2022-03-11 | End: 2022-06-15 | Stop reason: SDUPTHER

## 2022-03-11 RX ORDER — LISINOPRIL 40 MG/1
40 TABLET ORAL DAILY
Qty: 30 TABLET | Refills: 2 | Status: SHIPPED | OUTPATIENT
Start: 2022-03-12 | End: 2022-04-01

## 2022-03-11 RX ORDER — BUTALBITAL, ACETAMINOPHEN AND CAFFEINE 50; 325; 40 MG/1; MG/1; MG/1
1 TABLET ORAL EVERY 4 HOURS PRN
Qty: 30 TABLET | Refills: 0 | Status: SHIPPED | OUTPATIENT
Start: 2022-03-11 | End: 2022-04-01

## 2022-03-11 RX ORDER — TIZANIDINE 4 MG/1
4 TABLET ORAL 3 TIMES DAILY
Qty: 90 TABLET | Refills: 2 | Status: SHIPPED | OUTPATIENT
Start: 2022-03-11 | End: 2022-06-15

## 2022-03-11 RX ORDER — LACOSAMIDE 200 MG/1
200 TABLET ORAL 2 TIMES DAILY
Qty: 120 TABLET | Refills: 0 | Status: SHIPPED | OUTPATIENT
Start: 2022-03-11 | End: 2022-05-13 | Stop reason: SDUPTHER

## 2022-03-11 RX ORDER — AMLODIPINE BESYLATE 10 MG/1
10 TABLET ORAL DAILY
Qty: 90 TABLET | Refills: 2 | Status: SHIPPED | OUTPATIENT
Start: 2022-03-11 | End: 2022-04-01

## 2022-03-11 RX ORDER — MIRTAZAPINE 15 MG/1
15 TABLET, ORALLY DISINTEGRATING ORAL
Qty: 30 TABLET | Refills: 2 | Status: SHIPPED | OUTPATIENT
Start: 2022-03-11 | End: 2022-04-01

## 2022-03-11 RX ADMIN — TIZANIDINE 4 MG: 4 TABLET ORAL at 14:44

## 2022-03-11 RX ADMIN — BRIVARACETAM 100 MG: 50 TABLET, FILM COATED ORAL at 08:14

## 2022-03-11 RX ADMIN — DIPHENHYDRAMINE HCL 25 MG: 25 TABLET ORAL at 08:13

## 2022-03-11 RX ADMIN — TIZANIDINE 4 MG: 4 TABLET ORAL at 08:15

## 2022-03-11 RX ADMIN — CEFDINIR 300 MG: 300 CAPSULE ORAL at 08:13

## 2022-03-11 RX ADMIN — LISINOPRIL 40 MG: 20 TABLET ORAL at 08:15

## 2022-03-11 RX ADMIN — AMLODIPINE BESYLATE 10 MG: 5 TABLET ORAL at 08:15

## 2022-03-11 RX ADMIN — Medication 1000 UNITS: at 08:12

## 2022-03-11 RX ADMIN — OXYCODONE 5 MG: 5 TABLET ORAL at 08:12

## 2022-03-11 RX ADMIN — LACOSAMIDE 200 MG: 100 TABLET, FILM COATED ORAL at 08:12

## 2022-03-11 RX ADMIN — APIXABAN 5 MG: 5 TABLET, FILM COATED ORAL at 08:15

## 2022-03-11 RX ADMIN — VENLAFAXINE HYDROCHLORIDE 75 MG: 37.5 TABLET ORAL at 08:14

## 2022-03-11 ASSESSMENT — PAIN DESCRIPTION - PAIN TYPE: TYPE: ACUTE PAIN

## 2022-03-11 NOTE — DISCHARGE SUMMARY
Rehab Discharge Summary    Admission Date: 2/18/2022    Discharge Date: 3/11/2022    Attending Provider: Lucrecia Sim MD/PhD    Admission Diagnosis:   Active Hospital Problems    Diagnosis    • *Glioblastoma of frontal lobe (HCC)    • GBM (glioblastoma multiforme) (HCC)    • Syncope    • Class 1 obesity due to excess calories with serious comorbidity and body mass index (BMI) of 34.0 to 34.9 in adult    • Gait instability    • History of pulmonary embolus (PE)    • Seizure disorder (HCC)    • Primary hypertension    • Mixed anxiety and depressive disorder        Discharge Diagnosis:  Active Hospital Problems    Diagnosis    • *Glioblastoma of frontal lobe (HCC)    • GBM (glioblastoma multiforme) (HCC)    • Syncope    • Class 1 obesity due to excess calories with serious comorbidity and body mass index (BMI) of 34.0 to 34.9 in adult    • Gait instability    • History of pulmonary embolus (PE)    • Seizure disorder (HCC)    • Primary hypertension    • Mixed anxiety and depressive disorder        HPI per H&P:  The patient is a 51 y.o. female with a past medical history of right posterior fronto-parietal glioblastoma multiforme s/p right cranioplasty for resection (6/2021, biopsy 3/2021) at [facility], with seizures, w/ secondary L sided weakness w/ tone, radiation and chemotherapy with temozolomide (last dose 11/2021), focal seizures (on Vimpat and Briviact), migraine, hyperlipidemia, PE with cor pulmonale (on Eliquis), anxiety, and depression  ;  who presented on 2/9/2022  1:53 PM after multiple ground level falls likely due to a combination of hypoxia, seizure activity, and vasogenic edema of the brain. Per documentation, on Wednesday 2/9/22 she was walking back from the bathroom with her caregiver when she reported feeling tired, dizzy, and needed to sit. Her caregiver turned to get a chair at which time the patient fell forward to the ground without catching herself. EMS was notified and upon arrival noted  the patient was hypoxic with SpO2 85% on RA. She had a period of about 15 minutes of altered awareness/consciousness, and does not recall the events. No report of any seizure like activity, tongue biting, or bowel/bladder incontinence. The patient is followed outpatient by neurologist, Dr. Fady Davidson and inpatient neurology was consulted for possible seizure. Typical semiology of her clinical seizures historically are consistent with focal seizures arising from the right post-operative nidus, but she does history of subclinical electrographic seizures as well per Dr. Davidson.    Respiratory infectious disease panel was sent and was negative for COVID-19, RSV, and influenza.  CTA chest was obtained and was negative for PE.  CT head was obtained and was negative for acute changes.  Echocardiogram was obtained with estimated LVEF of 75%, no shunt, and no valvular abnormality.  Neurology was consulted and an MRI was obtained which was showed new mass lesion. EEG performed noted abnormal readings which is not unexpected with her hx, no active seizures. Patient's Vimpat dose was increased per neurology's recommendation.  Oncology was consulted and recommend starting decadron 2mg BID x 7 days, then follow with a taper    Patient was admitted to [facility] on 2/18/2022.     Hospital Course by Problem List:  Seizure/GBM - Patient with syncopal event with AMS and weakness after concerning for seizure vs worsening GBM vs post-treatment changes. Oncology was consulted and recommended steroids then taper which she has completed. Neurology was consulted and recommended MRI which showed diffuse posttreatment changes but no acute lesion. EEG showed non-specific changes and Neurology recommended increasing her Vimpat as her symptoms could have been post-ictal. Cardiac work-up negative.   Patient underwent acute inpatient rehabilitation from 2/18/22 to 3/11/22 with improvement in mobility and ADLs. Patient still  requiring modA for transfers. Would benefit from [facility] but as not authorized by insurance yet they will take her home.   -Continue Briviact 100 mg BID and Vimpat 200 mg BID     Spasticity - Followed by Dr. Steele, [facility], for Botox injection. Discussed with Dr. Steele. Will trial Tizanidine 4 mg BID   -Improved on Tizanidine. Started on Xanax QHS.   -Follow-up with Dr. Steele. On Tizanidine 4 mg TID.      HTN - Patient on Amlodipine 10 mg daily. Into 150s, start Lisinopril and titrated up to 40 mg.      Dysuria - Patient with dysuria on 3/7/22 and fatigue. Check UA - collected 3/8/22, UTI+ started on Omnicef.       Anemia - Check AM CBC - 14.3, resolved.      Pain control - Occasional Fioricet usage and Oxycodone 5 mg.   I discussed the risks and benefits of using opiate medications for pain control.  I discussed the risk of addiction, potential for overdose, and respiratory depression (and the potential need for opiate antagonist therapy if this occurs).  I encouraged the patient to take this medication sparingly with the expressed goal of weaning off the medication as soon as is clinically appropriate.  I informed the patient that we are only able to provide a 14 day supply of these medications at discharge and that they will be responsible for requesting any refills needed from their primary care provider or their surgeon.  We discussed the need to safely secure these medications to prevent theft, inadvertent ingestion, or misuse.  Any unused medication should be immediately disposed of through a sanctioned medication disposal program.  We discussed adjunctive pain medications and conservative therapies at length.  I answered the patient's questions regarding this treatment, and the patient indicated understanding and willingness to proceed.    Hypokalemia - Check AM CMP - 4.0, resolved.     Anxiety/Depression - Patient on PRN Xanax and Effexor 75 mg daily. Psychology to consult     Sleep -  Patient with poor sleep. On Xanax QHS and add QHS Remeron     Morbid Obesity due to excess calories - BMI of 37.1 on admission. Dietitian to consult.      Back rash - Appears contact. Start Hydrocortisone cream. PO Benadryl. Discontinue cream, improved     Constipation - Resolved, discontinue senna-docusate. Returned on 3/7/22, PRN senna-docusate and laxatives     Vitamin D deficiency - 29 on admission. Started on 1000 U      Hx of PE - Patient on Eliquis BID.     Dispo - Patient very heavy assistance on transfer. Unclear if family will be able to care for her with this level of assistance. Palliative to consult for discussion of goals of care at this time.  Patient declined discussion with PC after discussing with her . They were able to do car transfer x2 on 3/3/22. Family would be interested in [facility]. Insurance has not authorized as of 3/11/22, family agreeable to take her home for now.     Functional Status at Discharge  Eating:  Supervision  Eating Description:  Increased time  Grooming:  Supervision,Seated  Grooming Description:  Increased time,Initial preparation for task,Seated in wheelchair at sink  Bathing:  Moderate Assist  Bathing Description:  Grab bar,Tub bench,Assit with back,Assit wtih lower extremities,Assit with perineal,Increased time,Initial preparation for task,Supervision for safety,Verbal cueing  Upper Body Dressing:  Moderate Assist  Upper Body Dressing Description:  Assit with threading arms through sleeves,Assist with pulling shirt over head,Increased time,Initial preparation for task,Set-up of equipment,Supervision for safety,Verbal cueing  Lower Body Dressing:  Maximal Assist (second person SBA)  Lower Body Dressing Description:  Grab bar,Increased time,Initial preparation for task,Set-up of equipment,Supervision for safety,Verbal cueing  Discharge Location : Home (Home vs [facility])  Patient Discharging with Assist of: Spouse / Significant Other;Paid  Caregiver  Level of Supervision Required: Intermittent Supervision  Recommended Equipment for Discharge: Rolando-Walker;Grab Bars by Toilet;Grab Bars in Tub / Shower  Recommended Services Upon Discharge: Home Health Occupational Therapy  Long Term Goals Met: 0  Long Term Goals Not Met: 2  Reason(s) for Goals Not Met: Pt continues to require mod-total A for ADLs and transfers  Criteria for Termination of Services: Maximum Function Achieved for Inpatient Rehabilitation  Walk:  Moderate Assist (wc follow for safety)  Distance Walked:  10  Number of Times Distance Was Traveled:  5  Assistive Device:  Parallel Bars (L AFO/ gait belt)  Gait Deviation:  Step To,Decreased Heel Strike,Decreased Toe Off (spastic L rolando-gait)  Wheelchair:  Minimal Assist  Distance Propelled:  50   Wheelchair Description:  Extra time,Limited by fatigue,Requires incidental assist,Verbal cueing  Stairs Unable to Participate  Stairs Description    Patient Discharging with Assist of: Family;Caregiver  Comprehension:  Supervision  Comprehension Description:  Verbal cues,Glasses  Expression:  Modified Independent  Expression Description:  Verbal cueing  Social Interaction:  Independent  Social Interaction Description:     Problem Solving:  Moderate Assist  Problem Solving Description:  Increased time,Seat belt,Verbal cueing,Therapy schedule,Bed/chair alarm  Memory:  Moderate Assist  Memory Description:  Verbal cueing,Therapy schedule       I, Lucrecia Sim M.D., personally performed a complete drug regimen review and no potential clinically significant medication issues were identified.   Discharge Medication:     Medication List      START taking these medications      Instructions   ALPRAZolam 0.5 MG Tabs  Commonly known as: XANAX   Take 1 Tablet by mouth every evening for 60 days.  Dose: 0.5 mg     butalbital/apap/caffeine -40 mg -40 MG Tabs  Commonly known as: Fioricet   Take 1 Tablet by mouth every four hours as needed for  Headache for up to 30 days.  Dose: 1 Tablet     cefdinir 300 MG Caps  Commonly known as: OMNICEF   Take 1 Capsule by mouth every 12 hours.  Dose: 300 mg     lisinopril 40 MG tablet  Start taking on: March 12, 2022  Commonly known as: PRINIVIL   Take 1 Tablet by mouth every day.  Dose: 40 mg     mirtazapine 15 MG Tbdp  Commonly known as: Remeron   Take 1 Tablet by mouth at bedtime.  Dose: 15 mg     tizanidine 4 MG Tabs  Commonly known as: ZANAFLEX   Take 1 Tablet by mouth in the morning, at noon, and at bedtime.  Dose: 4 mg        CHANGE how you take these medications      Instructions   oxyCODONE immediate-release 5 MG Tabs  What changed: how much to take  Commonly known as: ROXICODONE   Take 1 Tablet by mouth every 3 hours as needed for Severe Pain for up to 14 days.  Dose: 5 mg        CONTINUE taking these medications      Instructions   Acetaminophen 8 Hour 650 MG CR tablet  Generic drug: acetaminophen  Notes to patient: Pain    Take 650 mg by mouth 2 times a day as needed for Moderate Pain.  Dose: 650 mg     amLODIPine 10 MG Tabs  Commonly known as: NORVASC   Take 1 Tablet by mouth every day.  Dose: 10 mg     brivaracetam 100 MG Tabs tablet  Commonly known as: Briviact  Notes to patient: Seizures    Doctor's comments: Patient no longer on keppra. She has been taking this medication in its place (continuation of Briviact therapy). Thank you.  Take 1 Tablet by mouth 2 times a day for 90 days.  Dose: 100 mg     CHOLECALCIFEROL PO   Take 1 Each by mouth every day.  Dose: 1 Each     Eliquis 5mg Tabs  Generic drug: apixaban  Notes to patient: Blood thinner    Doctor's comments: Re-ordering eliquis to give patient a 90 day supply which is cheaper for them per her insurance  Take 1 Tablet by mouth 2 times a day.  Dose: 5 mg     FOLIC ACID PO   Take 1 Tablet by mouth every day.  Dose: 1 Tablet     lacosamide 200 MG Tabs tablet  Commonly known as: Vimpat   Take 1 Tablet by mouth 2 times a day for 60 days.  Dose: 200 mg      melatonin 3 MG Tabs  Notes to patient: sleep   Take 3 mg by mouth at bedtime.  Dose: 3 mg     multivitamin Tabs   Take 1 Tablet by mouth every morning.  Dose: 1 Tablet     venlafaxine 75 MG Tabs  Commonly known as: EFFEXOR   Take 1 Tablet by mouth every day.  Dose: 75 mg        STOP taking these medications    cyanocobalamin 1000 MCG Tabs  Commonly known as: VITAMIN B12     hydrocortisone 1 % Crea     lidocaine 5 % Ptch  Commonly known as: LIDODERM     Naloxone 4 MG/0.1ML Liqd  Commonly known as: NARCAN     polyethylene glycol/lytes 17 g Pack  Commonly known as: MIRALAX     prochlorperazine 10 MG Tabs  Commonly known as: COMPAZINE     senna-docusate 8.6-50 MG Tabs  Commonly known as: PERICOLACE or SENOKOT S            Discharge Diet:  Regular    Discharge Activity:  As tolerated     Disposition:  Patient to discharge home with family support and community resources.     Equipment:  WC, cushion    Follow-up & Discharge Instructions:  Follow up with your primary care provider (PCP) within 7-10 days of discharge to review your medications and take over your care.     If you develop chest pain, fever, chills, change in neurologic function (weakness, sensation changes, vision changes), or other concerning sxs, seek immediate medical attention or call 911.      Condition on Discharge:  Good    More than 31 minutes was spent on discharging this patient, including face-to-face time, prescription management, and the dictation of this note.    Lucrecia Sim M.D.    Date of Service: 3/11/2022

## 2022-03-11 NOTE — DISCHARGE PLANNING
CM left messages for the Tania YUAN about transferring to [facility]. She has not returned the call so patient is going home with her  and their caregiver. No further assistance needed.

## 2022-03-11 NOTE — PROGRESS NOTES
Patient discharged to home per order.  Discharge instructions reviewed with patient and ; they verbalize understanding and signed copies placed in chart.  Patient has all belongings; signed copy of form in chart.  Patient left facility at 1515 via wheelchair accompanied by rehab staff and . Have enjoyed working with this pleasant patient.

## 2022-03-11 NOTE — CARE PLAN
Problem: Knowledge Deficit - Standard  Goal: Patient and family/care givers will demonstrate understanding of plan of care, disease process/condition, diagnostic tests and medications  Outcome: Met     Problem: Skin Integrity  Goal: Skin integrity is maintained or improved  Outcome: Met     Problem: Fall Risk - Rehab  Goal: Patient will remain free from falls  Outcome: Met     Problem: Pain - Standard  Goal: Alleviation of pain or a reduction in pain to the patient’s comfort goal  Outcome: Met     Problem: Discharge Barriers/Planning  Goal: Patient's continuum of care needs are met  Outcome: Met     Problem: Psychosocial  Goal: Patient's level of anxiety will decrease  Outcome: Met  Goal: Patient's ability to verbalize feelings about condition will improve  Outcome: Met  Goal: Patient's ability to re-evaluate and adapt role responsibilities will improve  Outcome: Met  Goal: Patient and family will demonstrate ability to cope with life altering diagnosis and/or procedure  Outcome: Met  Goal: Spiritual and cultural needs incorporated into hospitalization  Outcome: Met     Problem: Hemodynamics  Goal: Patient's hemodynamics, fluid balance and neurologic status will be stable or improve  Outcome: Met     Problem: Respiratory  Goal: Patient will understand use and administration of respiratory medications to improve respiratory function  Outcome: Met     Problem: Risk for Aspiration  Goal: Patient's risk for aspiration will be absent or decrease  Outcome: Met     Problem: Bladder / Voiding  Goal: Patient will establish and maintain regular urinary output  Outcome: Met  Goal: Patient will establish and maintain bladder regimen  Outcome: Met     Problem: Neurogenic Bladder  Goal: Patient will demonstrate ability to take care of indwelling catheter  Outcome: Met  Goal: Patient will demonstrate self-cath technique using clean technique and care of the catheter  Outcome: Met     Problem: Bowel Elimination  Goal: Patient  will participate in bowel management program  Outcome: Met     Problem: Neurogenic Bowel  Goal: Patient will perform adequate hygiene with incontinent episodes  Outcome: Met  Goal: Patient will verbalize signs and symptoms of constipation and how to prevent/alleviate  Outcome: Met  Goal: Patient will demonstrates ability to complete digital stimulation technique  Outcome: Met     Problem: Skin Integrity  Goal: Patient's skin integrity will be maintained or improve  Outcome: Met     Problem: Nutrition  Goal: Patient's nutritional and fluid intake will be adequate or improve  Outcome: Met  Goal: Patient will display progressive weight gain toward goal have adequate food and fluid intake  Outcome: Met  Goal: Patient will demonstrate ability to administer respiratory medications  Outcome: Met     Problem: Self Care  Goal: Patient will have the ability to perform ADLs independently or with assistance  Outcome: Met     Problem: Mobility  Goal: Patient's capacity to carry out activities will improve  Outcome: Met     Problem: Infection  Goal: Patient will remain free from infection  Outcome: Met     Problem: VTE Prevention  Goal: Patient will remain free from venous thromboembolism (VTE)  Outcome: Met     Problem: Dialysis  Goal: Patient will maintain stable vital signs and fluid balance  Outcome: Met     Problem: Diabetes Management  Goal: Patient's ability to maintain appropriate glucose levels will be maintained or improve  Outcome: Met   The patient is Stable - Low risk of patient condition declining or worsening    Shift Goals  Clinical Goals: safety  Patient Goals: sleep well

## 2022-03-11 NOTE — DISCHARGE INSTRUCTIONS
[facility] NURSING DISCHARGE INSTRUCTIONS    Blood Pressure: 112/78  Weight: 105 kg (231 lb 7.7 oz)  Nursing recommendations for Melba Frye at time of discharge are as follows:  Family Member verbalized understanding of all discharge instructions and prescriptions.     Review all your home medications and newly ordered medications with your doctor and/or pharmacist. Follow medication instructions as directed by your doctor and/or pharmacist.    Pain Management:   Discharge Pain Medication Instructions:  Comfort Goal: Comfort with Movement,Perform Activity,Sleep Comfortably,Stay Alert  Notify your primary care provider if pain is unrelieved with these measures, if the pain is new, or increased in intensity.    Discharge Skin Characteristics: Warm,Dry  Discharge Skin Exam: Clear    Skin / Wound Care Instructions: Please contact your primary care physician for any change in skin integrity.     If You Have Surgical Incisions / Wounds:  Monitor surgical site(s) for signs of increased swelling, redness or symptoms of drainage from the site or fever as this could indicate signs and symptoms of infection. If these symptoms are noted, notifiy your primary care provider.      Discharge Safety Instructions: Should Not Be Left Alone In The House     Discharge Safety Concerns: Balance Problems (Dizziness, Light Headedness),History Of Falls,Unsteady Gait,Weakness  The interdisciplinary team has made recommendation that you should not be left alone  in the house due to balance problem, history of falls, weakness and unsteady gait  Anti-embolic stockings are not required to increase circulation to the lower extremities.    Discharge Diet: regular     Discharge Liquids: thin  Discharge Bowel Function: Incontinent  Please contact your primary care physician for any changes in bowel habits.  Discharge Bowel Program:    Discharge Bladder Function: Incontinent  Discharge Urinary Devices:  Pad,Brief      Nursing Discharge Plan:   Influenza Vaccine Indication: Indicated: 9 to 64 years of age (STATES SHES HAD THE FLU SHOT THIS SEASON)    Case Management Discharge Instructions:   Discharge Location:    Agency Name/Address/Phone:    Home Health:    Outpatient Services:    DME Provider/Phone:    Medical Equipment Ordered:    Prescription Faxed to:        Discharge Medication Instructions:  Below are the medications your physician expects you to take upon discharge:Fall Prevention in the Home, Adult  Falls can cause injuries. They can happen to people of all ages. There are many things you can do to make your home safe and to help prevent falls. Ask for help when making these changes, if needed.  What actions can I take to prevent falls?  General Instructions  · Use good lighting in all rooms. Replace any light bulbs that burn out.  · Turn on the lights when you go into a dark area. Use night-lights.  · Keep items that you use often in easy-to-reach places. Lower the shelves around your home if necessary.  · Set up your furniture so you have a clear path. Avoid moving your furniture around.  · Do not have throw rugs and other things on the floor that can make you trip.  · Avoid walking on wet floors.  · If any of your floors are uneven, fix them.  · Add color or contrast paint or tape to clearly ravin and help you see:  ? Any grab bars or handrails.  ? First and last steps of stairways.  ? Where the edge of each step is.  · If you use a stepladder:  ? Make sure that it is fully opened. Do not climb a closed stepladder.  ? Make sure that both sides of the stepladder are locked into place.  ? Ask someone to hold the stepladder for you while you use it.  · If there are any pets around you, be aware of where they are.  What can I do in the bathroom?         · Keep the floor dry. Clean up any water that spills onto the floor as soon as it happens.  · Remove soap buildup in the tub or shower regularly.  · Use  non-skid mats or decals on the floor of the tub or shower.  · Attach bath mats securely with double-sided, non-slip rug tape.  · If you need to sit down in the shower, use a plastic, non-slip stool.  · Install grab bars by the toilet and in the tub and shower. Do not use towel bars as grab bars.  What can I do in the bedroom?  · Make sure that you have a light by your bed that is easy to reach.  · Do not use any sheets or blankets that are too big for your bed. They should not hang down onto the floor.  · Have a firm chair that has side arms. You can use this for support while you get dressed.  What can I do in the kitchen?  · Clean up any spills right away.  · If you need to reach something above you, use a strong step stool that has a grab bar.  · Keep electrical cords out of the way.  · Do not use floor polish or wax that makes floors slippery. If you must use wax, use non-skid floor wax.  What can I do with my stairs?  · Do not leave any items on the stairs.  · Make sure that you have a light switch at the top of the stairs and the bottom of the stairs. If you do not have them, ask someone to add them for you.  · Make sure that there are handrails on both sides of the stairs, and use them. Fix handrails that are broken or loose. Make sure that handrails are as long as the stairways.  · Install non-slip stair treads on all stairs in your home.  · Avoid having throw rugs at the top or bottom of the stairs. If you do have throw rugs, attach them to the floor with carpet tape.  · Choose a carpet that does not hide the edge of the steps on the stairway.  · Check any carpeting to make sure that it is firmly attached to the stairs. Fix any carpet that is loose or worn.  What can I do on the outside of my home?  · Use bright outdoor lighting.  · Regularly fix the edges of walkways and driveways and fix any cracks.  · Remove anything that might make you trip as you walk through a door, such as a raised step or  threshold.  · Trim any bushes or trees on the path to your home.  · Regularly check to see if handrails are loose or broken. Make sure that both sides of any steps have handrails.  · Install guardrails along the edges of any raised decks and porches.  · Clear walking paths of anything that might make someone trip, such as tools or rocks.  · Have any leaves, snow, or ice cleared regularly.  · Use sand or salt on walking paths during winter.  · Clean up any spills in your garage right away. This includes grease or oil spills.  What other actions can I take?  · Wear shoes that:  ? Have a low heel. Do not wear high heels.  ? Have rubber bottoms.  ? Are comfortable and fit you well.  ? Are closed at the toe. Do not wear open-toe sandals.  · Use tools that help you move around (mobility aids) if they are needed. These include:  ? Canes.  ? Walkers.  ? Scooters.  ? Crutches.  · Review your medicines with your doctor. Some medicines can make you feel dizzy. This can increase your chance of falling.  Ask your doctor what other things you can do to help prevent falls.  Where to find more information  · Centers for Disease Control and Prevention, [place]AD[place]: https://cdc.gov  · National [place] on Aging: https://dl1vtbu.grant.nih.gov  Contact a doctor if:  · You are afraid of falling at home.  · You feel weak, drowsy, or dizzy at home.  · You fall at home.  Summary  · There are many simple things that you can do to make your home safe and to help prevent falls.  · Ways to make your home safe include removing tripping hazards and installing grab bars in the bathroom.  · Ask for help when making these changes in your home.  This information is not intended to replace advice given to you by your health care provider. Make sure you discuss any questions you have with your health care provider.  Document Released: 10/14/2010 Document Revised: 04/09/2020 Document Reviewed: 08/02/2018  Elsevier Patient Education © 2020 Elsevier  Inc.  COVID-19  COVID-19 is a respiratory infection that is caused by a virus called severe acute respiratory syndrome coronavirus 2 (SARS-CoV-2). The disease is also known as coronavirus disease or novel coronavirus. In some people, the virus may not cause any symptoms. In others, it may cause a serious infection. The infection can get worse quickly and can lead to complications, such as:  · Pneumonia, or infection of the lungs.  · Acute respiratory distress syndrome or ARDS. This is fluid build-up in the lungs.  · Acute respiratory failure. This is a condition in which there is not enough oxygen passing from the lungs to the body.  · Sepsis or septic shock. This is a serious bodily reaction to an infection.  · Blood clotting problems.  · Secondary infections due to bacteria or fungus.  The virus that causes COVID-19 is contagious. This means that it can spread from person to person through droplets from coughs and sneezes (respiratory secretions).  What are the causes?  This illness is caused by a virus. You may catch the virus by:  · Breathing in droplets from an infected person's cough or sneeze.  · Touching something, like a table or a doorknob, that was exposed to the virus (contaminated) and then touching your mouth, nose, or eyes.  What increases the risk?  Risk for infection  You are more likely to be infected with this virus if you:  · Live in or travel to an area with a COVID-19 outbreak.  · Come in contact with a sick person who recently traveled to an area with a COVID-19 outbreak.  · Provide care for or live with a person who is infected with COVID-19.  Risk for serious illness  You are more likely to become seriously ill from the virus if you:  · Are 65 years of age or older.  · Have a long-term disease that lowers your body's ability to fight infection (immunocompromised).  · Live in a nursing home or long-term care facility.  · Have a long-term (chronic) disease such as:  ? Chronic lung disease,  including chronic obstructive pulmonary disease or asthma  ? Heart disease.  ? Diabetes.  ? Chronic kidney disease.  ? Liver disease.  · Are obese.  What are the signs or symptoms?  Symptoms of this condition can range from mild to severe. Symptoms may appear any time from 2 to 14 days after being exposed to the virus. They include:  · A fever.  · A cough.  · Difficulty breathing.  · Chills.  · Muscle pains.  · A sore throat.  · Loss of taste or smell.  Some people may also have stomach problems, such as nausea, vomiting, or diarrhea.  Other people may not have any symptoms of COVID-19.  How is this diagnosed?  This condition may be diagnosed based on:  · Your signs and symptoms, especially if:  ? You live in an area with a COVID-19 outbreak.  ? You recently traveled to or from an area where the virus is common.  ? You provide care for or live with a person who was diagnosed with COVID-19.  · A physical exam.  · Lab tests, which may include:  ? A nasal swab to take a sample of fluid from your nose.  ? A throat swab to take a sample of fluid from your throat.  ? A sample of mucus from your lungs (sputum).  ? Blood tests.  · Imaging tests, which may include, X-rays, CT scan, or ultrasound.  How is this treated?  At present, there is no medicine to treat COVID-19. Medicines that treat other diseases are being used on a trial basis to see if they are effective against COVID-19.  Your health care provider will talk with you about ways to treat your symptoms. For most people, the infection is mild and can be managed at home with rest, fluids, and over-the-counter medicines.  Treatment for a serious infection usually takes places in a hospital intensive care unit (ICU). It may include one or more of the following treatments. These treatments are given until your symptoms improve.  · Receiving fluids and medicines through an IV.  · Supplemental oxygen. Extra oxygen is given through a tube in the nose, a face mask, or a  haer.  · Positioning you to lie on your stomach (prone position). This makes it easier for oxygen to get into the lungs.  · Continuous positive airway pressure (CPAP) or bi-level positive airway pressure (BPAP) machine. This treatment uses mild air pressure to keep the airways open. A tube that is connected to a motor delivers oxygen to the body.  · Ventilator. This treatment moves air into and out of the lungs by using a tube that is placed in your windpipe.  · Tracheostomy. This is a procedure to create a hole in the neck so that a breathing tube can be inserted.  · Extracorporeal membrane oxygenation (ECMO). This procedure gives the lungs a chance to recover by taking over the functions of the heart and lungs. It supplies oxygen to the body and removes carbon dioxide.  Follow these instructions at home:  Lifestyle  · If you are sick, stay home except to get medical care. Your health care provider will tell you how long to stay home. Call your health care provider before you go for medical care.  · Rest at home as told by your health care provider.  · Do not use any products that contain nicotine or tobacco, such as cigarettes, e-cigarettes, and chewing tobacco. If you need help quitting, ask your health care provider.  · Return to your normal activities as told by your health care provider. Ask your health care provider what activities are safe for you.  General instructions  · Take over-the-counter and prescription medicines only as told by your health care provider.  · Drink enough fluid to keep your urine pale yellow.  · Keep all follow-up visits as told by your health care provider. This is important.  How is this prevented?    There is no vaccine to help prevent COVID-19 infection. However, there are steps you can take to protect yourself and others from this virus.  To protect yourself:   · Do not travel to areas where COVID-19 is a risk. The areas where COVID-19 is reported change often. To identify  high-risk areas and travel restrictions, check the [facility] travel website: wwwnc.cdc.gov/travel/notices  · If you live in, or must travel to, an area where COVID-19 is a risk, take precautions to avoid infection.  ? Stay away from people who are sick.  ? Wash your hands often with soap and water for 20 seconds. If soap and water are not available, use an alcohol-based hand .  ? Avoid touching your mouth, face, eyes, or nose.  ? Avoid going out in public, follow guidance from your state and local health authorities.  ? If you must go out in public, wear a cloth face covering or face mask.  ? Disinfect objects and surfaces that are frequently touched every day. This may include:  § Counters and tables.  § Doorknobs and light switches.  § Sinks and faucets.  § Electronics, such as phones, remote controls, keyboards, computers, and tablets.  To protect others:  If you have symptoms of COVID-19, take steps to prevent the virus from spreading to others.  · If you think you have a COVID-19 infection, contact your health care provider right away. Tell your health care team that you think you may have a COVID-19 infection.  · Stay home. Leave your house only to seek medical care. Do not use public transport.  · Do not travel while you are sick.  · Wash your hands often with soap and water for 20 seconds. If soap and water are not available, use alcohol-based hand .  · Stay away from other members of your household. Let healthy household members care for children and pets, if possible. If you have to care for children or pets, wash your hands often and wear a mask. If possible, stay in your own room, separate from others. Use a different bathroom.  · Make sure that all people in your household wash their hands well and often.  · Cough or sneeze into a tissue or your sleeve or elbow. Do not cough or sneeze into your hand or into the air.  · Wear a cloth face covering or face mask.  Where to find more  information  · Centers for Disease Control and Prevention: www.cdc.gov/coronavirus/2019-ncov/index.html  · World Health Organization: www.who.int/health-topics/coronavirus  Contact a health care provider if:  · You live in or have traveled to an area where COVID-19 is a risk and you have symptoms of the infection.  · You have had contact with someone who has COVID-19 and you have symptoms of the infection.  Get help right away if:  · You have trouble breathing.  · You have pain or pressure in your chest.  · You have confusion.  · You have bluish lips and fingernails.  · You have difficulty waking from sleep.  · You have symptoms that get worse.  These symptoms may represent a serious problem that is an emergency. Do not wait to see if the symptoms will go away. Get medical help right away. Call your local emergency services (911 in the [place] Do not drive yourself to the hospital. Let the emergency medical personnel know if you think you have COVID-19.  Summary  · COVID-19 is a respiratory infection that is caused by a virus. It is also known as coronavirus disease or novel coronavirus. It can cause serious infections, such as pneumonia, acute respiratory distress syndrome, acute respiratory failure, or sepsis.  · The virus that causes COVID-19 is contagious. This means that it can spread from person to person through droplets from coughs and sneezes.  · You are more likely to develop a serious illness if you are 65 years of age or older, have a weak immunity, live in a nursing home, or have chronic disease.  · There is no medicine to treat COVID-19. Your health care provider will talk with you about ways to treat your symptoms.  · Take steps to protect yourself and others from infection. Wash your hands often and disinfect objects and surfaces that are frequently touched every day. Stay away from people who are sick and wear a mask if you are sick.  This information is not intended to replace advice given to you by  your health care provider. Make sure you discuss any questions you have with your health care provider.  Document Released: 01/23/2020 Document Revised: 05/14/2020 Document Reviewed: 01/23/2020  Elsevier Patient Education © 2020 [employer]  Depression / Suicide Risk    As you are discharged from this [facility] facility, it is important to learn how to keep safe from harming yourself.    Recognize the warning signs:  · Abrupt changes in personality, positive or negative- including increase in energy   · Giving away possessions  · Change in eating patterns- significant weight changes-  positive or negative  · Change in sleeping patterns- unable to sleep or sleeping all the time   · Unwillingness or inability to communicate  · Depression  · Unusual sadness, discouragement and loneliness  · Talk of wanting to die  · Neglect of personal appearance   · Rebelliousness- reckless behavior  · Withdrawal from people/activities they love  · Confusion- inability to concentrate     If you or a loved one observes any of these behaviors or has concerns about self-harm, here's what you can do:  · Talk about it- your feelings and reasons for harming yourself  · Remove any means that you might use to hurt yourself (examples: pills, rope, extension cords, firearm)  · Get professional help from the community (Mental Health, Substance Abuse, psychological counseling)  · Do not be alone:Call your Safe Contact- someone whom you trust who will be there for you.  · Call your local CRISIS HOTLINE 324-7544 or 053-686-9469  · Call your local Children's Mobile Crisis Response Team [place] (237) 029-3607 or www.[place]  · Call the toll free National Suicide Prevention Hotlines   · National Suicide Prevention Lifeline 749-545-EJTR (7036)  · National Hope Line Network 800-SUICIDE (198-2903)    Occupational Therapy Discharge Instructions for Melba Callaway Uday    3/10/2022    Level of Assist Required for Eating: Able to  Complete Eating without Assist  Level of Assist Required for Grooming: Able to Complete Grooming without Assist  Level of Assist Required for Dressing: Requires Physical Assist with Dressing  Level of Assist Required for Toileting: Requires Physical Assist with Toileting  Level of Assist Required for Toilet Transfer: Requires Physical Assist with Toilet Transfer (two person assist)  Equipment for Toilet Transfer: Commode over Toilet,Grab Bars by Toilet  Level of Assist Required for Bathing: Requires Physical Assist with Bathing  Equipment for Bathing: Shower Chair,Grab Bars in Tub / Shower,Hand Held Shower Head  Level of Assist Required for Shower Transfer: Requires Physical Assist with Shower Transfer (two person assist)  Equipment for Shower Transfer: Shower Chair,Grab Bars in Tub / Shower  Level of Assist Required for Home Mgmt: Requires Physical Assist with Home Management  Level of Assist Required for Meal Prep: Requires Physical Assist with Meal Preparation  Driving: Please Contact Physician Prior to Driving  Home Exercise Program: Refer to Home Exercise Program Handout for Details  Comments: It has been a pleasure to work with youGiselle. Good luck with your continued recovery and keep up the hard work - JONATHAN Gates email: [email or web address]

## 2022-03-11 NOTE — CARE PLAN
Problem: Skin Integrity  Goal: Skin integrity is maintained or improved  Note: Patient's skin remains free from new or accidental injury this shift.       Problem: Pain - Standard  Goal: Alleviation of pain or a reduction in pain to the patient’s comfort goal  Flowsheets  Taken 3/10/2022 2010 by Dinorah Subramanian R.N.  Non Verbal Scale: Calm  Pain Rating Scale (NPRS): 5    Note: Patient able to verbalize pain level and verbalize an acceptable level of pain.    The patient is Stable - Low risk of patient condition declining or worsening

## 2022-03-11 NOTE — THERAPY
"Physical Therapy   Daily Treatment     Patient Name: Melba Frye  Age:  51 y.o., Sex:  female  Medical Record #: [id number]  Today's Date: 3/10/2022     Precautions  Precautions: Fall Risk  Comments: Left Rolando paresis and tone    Subjective    Pt up in , willing to participate, spouse present for encouragement     Objective       03/10/22 1501   Gait Functional Level of Assist    Gait Level Of Assist Moderate Assist  ( follow for safety)   Assistive Device Parallel Bars  (L AFO/ gait belt)   Distance (Feet) 10   # of Times Distance was Traveled 5   Deviation Step To;Decreased Heel Strike;Decreased Toe Off  (spastic L rolando-gait)   Wheelchair Functional Level of Assist   Wheelchair Assist Minimal Assist   Distance Wheelchair (Feet or Distance) 50   Wheelchair Description Extra time;Limited by fatigue;Requires incidental assist;Verbal cueing   Stairs Functional Level of Assist   Level of Assist with Stairs Unable to Participate   Transfer Functional Level of Assist   Bed, Chair, Wheelchair Transfer Moderate Assist  (2 person A for safety)   Supine Lower Body Exercise   Bridges Two Legged;3 sets of 10   Trunk Rotation 3 sets of 10;Light Resistance Theraband   Hip Flexion 3 sets of 10  (alternating marching from hooklying)   Hip Abduction Hook Lying;3 sets of 10;Light Resistance Theraband   Bed Mobility    Supine to Sit Maximal Assist   Sit to Supine Moderate Assist   Sit to Stand Moderate Assist   Scooting Maximal Assist   Rolling Maximal Assist to Rt.;Moderate Assist to Lt.   PT Total Time Spent   PT Individual Total Time Spent (Mins) 60   PT Charge Group   PT Gait Training 2   PT Therapeutic Exercise 2     Attempted 4\" step with UE support at // bars, pt able to step up with RLE, but unable to manage severe extensor spasticity to flex LLE to complete 1 step.    Assessment    Pt remains with L neglect/ hypertonicity and impaired motor planning/ sequencing to be variable with transfers, continue to recommend 2 " person A for basic transfers.    Strengths: Able to follow instructions,Willingly participates in therapeutic activities,Supportive family,Pleasant and cooperative,Motivated for self care and independence,Effective communication skills  Barriers: Decreased endurance,Emotional lability,Fatigue,Generalized weakness,Hemiplegia,Impaired activity tolerance,Impaired balance,Impaired functional cognition,Limited mobility,Spasticity    Plan    D/c tomorrow       Physical Therapy Problems (Active)       Problem: Mobility       Dates: Start: 02/19/22         Goal: STG-Within one week, patient will ambulate 10ft with LBQC and min A.       Dates: Start: 02/19/22            Goal: STG-Within one week, patient will ascend and descend four stairs with mod A using R handrail.       Dates: Start: 02/19/22               Problem: Mobility Transfers       Dates: Start: 02/19/22         Goal: STG-Within one week, patient will perform bed mobility with min A using hospital bed functions.       Dates: Start: 02/19/22            Goal: STG-Within one week, patient will sit to stand with min A from wheelchair.       Dates: Start: 02/19/22            Goal: STG-Within one week, patient will transfer bed to chair with min A.       Dates: Start: 02/19/22               Problem: PT-Long Term Goals       Dates: Start: 02/19/22         Goal: LTG-By discharge, patient will propel wheelchair 150ft with power versus manual chair and supervision.       Dates: Start: 02/19/22            Goal: LTG-By discharge, patient will ambulate 20ft with LBQC and CGA.       Dates: Start: 02/19/22            Goal: LTG-By discharge, patient will transfer one surface to another with CGA.       Dates: Start: 02/19/22            Goal: LTG-By discharge, patient will ambulate up/down flight of stairs with min A using R handrail.       Dates: Start: 02/19/22            Goal: LTG-By discharge, patient will transfer in/out of a car with min A.       Dates: Start: 02/19/22

## 2022-03-14 NOTE — DISCHARGE PLANNING
Received call from [facility] and they have had the patient on services in the past and they feel that due to the lack of caregivers in place, they can't take her again. Other referrals sent, most denied due to insurance. One more will call back. CM notified her  of the situation.

## 2022-03-23 ENCOUNTER — OFFICE VISIT (OUTPATIENT)
Dept: PHYSICAL MEDICINE AND REHAB | Facility: REHABILITATION | Age: 52
End: 2022-03-23
Payer: COMMERCIAL

## 2022-03-23 VITALS
RESPIRATION RATE: 18 BRPM | SYSTOLIC BLOOD PRESSURE: 180 MMHG | TEMPERATURE: 98.2 F | DIASTOLIC BLOOD PRESSURE: 102 MMHG | WEIGHT: 230 LBS | HEIGHT: 67 IN | HEART RATE: 86 BPM | OXYGEN SATURATION: 93 % | BODY MASS INDEX: 36.1 KG/M2

## 2022-03-23 DIAGNOSIS — M62.838 OTHER MUSCLE SPASM: ICD-10-CM

## 2022-03-23 DIAGNOSIS — G81.94 LEFT HEMIPARESIS (HCC): ICD-10-CM

## 2022-03-23 DIAGNOSIS — G81.14 SPASTIC HEMIPLEGIA OF LEFT NONDOMINANT SIDE DUE TO NONCEREBROVASCULAR ETIOLOGY (HCC): Primary | ICD-10-CM

## 2022-03-23 DIAGNOSIS — C71.1 GLIOBLASTOMA OF FRONTAL LOBE (HCC): ICD-10-CM

## 2022-03-23 PROCEDURE — 76942 ECHO GUIDE FOR BIOPSY: CPT | Performed by: PHYSICAL MEDICINE & REHABILITATION

## 2022-03-23 PROCEDURE — 64642 CHEMODENERV 1 EXTREMITY 1-4: CPT | Performed by: PHYSICAL MEDICINE & REHABILITATION

## 2022-03-23 PROCEDURE — 64643 CHEMODENERV 1 EXTREM 1-4 EA: CPT | Performed by: PHYSICAL MEDICINE & REHABILITATION

## 2022-03-23 PROCEDURE — 99214 OFFICE O/P EST MOD 30 MIN: CPT | Mod: 25 | Performed by: PHYSICAL MEDICINE & REHABILITATION

## 2022-03-23 RX ORDER — BENZOCAINE/MENTHOL 6 MG-10 MG
1 LOZENGE MUCOUS MEMBRANE 2 TIMES DAILY
COMMUNITY

## 2022-03-23 RX ORDER — TRAZODONE HYDROCHLORIDE 100 MG/1
100 TABLET ORAL
COMMUNITY
Start: 2022-03-16 | End: 2022-04-05 | Stop reason: SDUPTHER

## 2022-03-23 RX ORDER — DIAZEPAM 5 MG/1
5 TABLET ORAL NIGHTLY
Qty: 30 TABLET | Refills: 2 | Status: SHIPPED | OUTPATIENT
Start: 2022-03-23 | End: 2022-06-20 | Stop reason: SDUPTHER

## 2022-03-23 RX ORDER — DIPHENHYDRAMINE HCL 25 MG
25 TABLET ORAL EVERY 6 HOURS PRN
COMMUNITY

## 2022-03-23 RX ORDER — BISACODYL 10 MG
10 SUPPOSITORY, RECTAL RECTAL DAILY
COMMUNITY
End: 2022-04-01

## 2022-03-23 ASSESSMENT — FIBROSIS 4 INDEX: FIB4 SCORE: 1.04

## 2022-03-23 NOTE — PROGRESS NOTES
[facility], [place], operated by [facility]  PM&R Neuro Rehabilitation Clinic  [facility] [place] [address]  Ph: [phone]    FOLLOW UP PATIENT EVALUATION       Patient Name: Melba Frye   Patient : 1970  Patient Age: 50 y.o.     Examining Physician: Dr. Yanet Steele, DO    PM&R History to Date - Background Information:  Dates of Admission/Discharge to ARU: 3/15/2021-3/27/2021; 2022-3/11/2022    SUBJECTIVE:   Patient Identification: Melba Frye is a 50 y.o. female with PMH significant for migraines, depression/anxiety, seizures, right medial parietal lobe mass seen on MRI 2021 s/p craniotomy and biopsy 3/9/21 Dr. Mao and rehabilitation history significant for GBM s/p resection 3/9/2021 Dr. Mao s/p craniotomy for recurrence resection [facility] 2021 c/b AMS and worsening weakness 2022 work-up negative for new lesion, likely seizure now on AEDs and is presenting to PM&R clinic for a FOLLOW UP OUTPATIENT evaluation with the following chief complaint/s:    Chief Complaint: GBM follow-up. Presents today for purpose of Botox injections.  Also reports difficulty sleeping.     Accompanied by Today: , Dario  History of Present Illness:   -Records reviewed: ARU discharge summary in its entirety.  Readmitted for AMS.  They were interested in neuro restorative, but insurance had not authorized as of 3/11/2022.  She is at home for now.  -Records reviewed: Patient was supposed to have had bladder Botox 2022, but it is now postponed given that she was inpatient.  -Was started on tizanidine 4 mg 3 times daily while inpatient.  This makes her very sleepy.  She feels as though she has had a bottle of wine.  -Also having a lot of difficulty sleeping.  She has Xanax as needed at night.  Dario states that her anxiety at night seems to have subsided.  States that she wakes up around 1 AM and has difficulty going back to sleep.  -Currently residing at neuro [facility].    Review of Systems:  All other  pertinent positive review of systems are noted above in HPI.   All other systems reviewed and are negative.    Past Medical History:  Past Medical History:   Diagnosis Date   • Acute pulmonary embolism with acute cor pulmonale (HCC) 10/21/2021   • Lentigo 10/17/2018   • Seborrheic keratoses 10/17/2018   • Dermatitis, contact 11/16/2015   • Hyperlipidemia 1/23/2015   • Menopausal symptom 7/2/2014   • Fatigue 6/9/2014   • Mixed anxiety and depressive disorder 6/9/2014   • Complicated migraine 6/9/2014   • TMJ arthralgia 6/9/2014   • Anxiety    • Cancer (HCC)     brain diagnosed in October 2020    • Depression    • Pulmonary embolism (HCC)    • UTI (lower urinary tract infection)       Past Surgical History:   Procedure Laterality Date   • CRANIOTOMY STEALTH Right 3/9/2021    Procedure: RIGHT CRANIOTOMY, USING FRAMELESS STEREOTAXY - FOR OPEN BIOPSY WITH PHASE REVERSAL;  Surgeon: Maxwell Mao M.D.;  Location: [facility];  Service: Neurosurgery   • MYRINGOTOMY  8/27/2010    Performed by [address] at SURGERY SAME DAY [facility] ORS   • LAPAROSCOPY  5/28/2010    Performed by JACKI SHARMA at SURGERY [facility] ORS   • LYMPH NODE SAMPLING  5/28/2010    Performed by JACKI SHARMA at SURGERY [facility] ORS   • DEBULKING  5/28/2010    Performed by JACKI SHARMA at [facility] ORS   • CYSTOSCOPY  10/15/08    Performed by YU GODINEZ at SURGERY SAME DAY [facility] ORS   • VAGINAL HYSTERECTOMY SCOPE TOTAL  10/15/08    Performed by YU GODINEZ at SURGERY SAME DAY [facility] ORS   • OTHER  1984    TONSILECTOMY/ ADNOIDS   • APPENDECTOMY  1981   • TONSILLECTOMY AND ADENOIDECTOMY          Current Outpatient Medications:   •  traZODone (DESYREL) 100 MG Tab, Take 100 mg by mouth at bedtime., Disp: , Rfl:   •  diphenhydrAMINE (BENADRYL) 25 MG Tab, Take 25 mg by mouth every 6 hours as needed for Sleep., Disp: , Rfl:   •  bisacodyl (DULCOLAX) 10 MG Suppos, Insert 10 mg into the rectum every day., Disp: ,  Rfl:   •  Sodium Phosphates (FLEET ENEMA [place]), Insert  into the rectum., Disp: , Rfl:   •  hydrocortisone 1 % Cream, Apply 1 Application topically 2 times a day., Disp: , Rfl:   •  Magnesium Hydroxide (MILK OF MAGNESIA PO), Take  by mouth., Disp: , Rfl:   •  ALPRAZolam (XANAX) 0.5 MG Tab, Take 1 Tablet by mouth every evening for 60 days., Disp: 30 Tablet, Rfl: 1  •  brivaracetam (BRIVIACT) 100 MG Tab tablet, Take 1 Tablet by mouth 2 times a day for 90 days., Disp: 180 Tablet, Rfl: 0  •  lacosamide (VIMPAT) 200 MG Tab tablet, Take 1 Tablet by mouth 2 times a day for 60 days., Disp: 120 Tablet, Rfl: 0  •  lisinopril (PRINIVIL) 40 MG tablet, Take 1 Tablet by mouth every day., Disp: 30 Tablet, Rfl: 2  •  oxyCODONE immediate-release (ROXICODONE) 5 MG Tab, Take 1 Tablet by mouth every 3 hours as needed for Severe Pain for up to 14 days., Disp: 42 Tablet, Rfl: 0  •  tizanidine (ZANAFLEX) 4 MG Tab, Take 1 Tablet by mouth in the morning, at noon, and at bedtime., Disp: 90 Tablet, Rfl: 2  •  venlafaxine (EFFEXOR) 75 MG Tab, Take 1 Tablet by mouth every day., Disp: 90 Tablet, Rfl: 2  •  acetaminophen (TYLENOL) 650 MG CR tablet, Take 650 mg by mouth 2 times a day as needed for Moderate Pain., Disp: , Rfl:   •  melatonin 3 MG Tab, Take 3 mg by mouth at bedtime., Disp: , Rfl:   •  CHOLECALCIFEROL PO, Take 1 Each by mouth every day., Disp: , Rfl:   •  FOLIC ACID PO, Take 1 Tablet by mouth every day., Disp: , Rfl:   •  ELIQUIS 5 MG Tab, Take 1 Tablet by mouth 2 times a day., Disp: 180 Tablet, Rfl: 1  •  multivitamin (THERAGRAN) Tab, Take 1 Tablet by mouth every morning., Disp: , Rfl:   •  amLODIPine (NORVASC) 10 MG Tab, Take 1 Tablet by mouth every day. (Patient not taking: Reported on 3/23/2022), Disp: 90 Tablet, Rfl: 2  •  butalbital/apap/caffeine -40 mg (FIORICET) -40 MG Tab, Take 1 Tablet by mouth every four hours as needed for Headache for up to 30 days., Disp: 30 Tablet, Rfl: 0  •  cefdinir (OMNICEF) 300 MG Cap,  Take 1 Capsule by mouth every 12 hours. (Patient not taking: Reported on 3/23/2022), Disp: 5 Capsule, Rfl: 0  •  mirtazapine (REMERON) 15 MG TABLET DISPERSIBLE, Take 1 Tablet by mouth at bedtime. (Patient not taking: Reported on 3/23/2022), Disp: 30 Tablet, Rfl: 2  Allergies   Allergen Reactions   • Tape Rash     Use paper tape instead        Past Social History:  Social History     Socioeconomic History   • Marital status:      Spouse name: Not on file   • Number of children: Not on file   • Years of education: Not on file   • Highest education level: Not on file   Occupational History   • Not on file   Tobacco Use   • Smoking status: Never Smoker   • Smokeless tobacco: Never Used   Vaping Use   • Vaping Use: Never used   Substance and Sexual Activity   • Alcohol use: Not Currently     Alcohol/week: 0.0 oz   • Drug use: No   • Sexual activity: Yes     Partners: Male   Other Topics Concern   • Not on file   Social History Narrative   • Not on file     Social Determinants of Health     Financial Resource Strain: Not on file   Food Insecurity: Not on file   Transportation Needs: Not on file   Physical Activity: Not on file   Stress: Not on file   Social Connections: Not on file   Intimate Partner Violence: Not on file   Housing Stability: Not on file        Family History:  Family History   Problem Relation Age of Onset   • Non-contributory Mother    • Lung Disease Mother         COPD   • Cancer Mother         dysplasia    • Arthritis Mother    • Psychiatric Illness Mother    • Heart Disease Mother    • Hypertension Mother    • Stroke Mother    • Non-contributory Father    • Cancer Father 73        prostate cancer   • Lung Disease Maternal Grandmother    • Lung Disease Paternal Aunt    • Diabetes Paternal Aunt    • Hyperlipidemia Paternal Aunt        Depression and Opioid Screening  PHQ-9:  Depression Screen (PHQ-2/PHQ-9) 3/5/2022 3/8/2022 3/9/2022   PHQ-2 Total Score 0 0 0   PHQ-2 Total Score - - -   PHQ-2  Total Score - - -   PHQ-9 Total Score - - -     Interpretation of PHQ-9 Total Score   Score Severity   1-4 No Depression   5-9 Mild Depression   10-14 Moderate Depression   15-19 Moderately Severe Depression   20-27 Severe Depression     OBJECTIVE:   Vital Signs:  Vitals:    03/23/22 0830   BP: (!) 180/102   Pulse: 86   Resp: 18   Temp: 36.8 °C (98.2 °F)   SpO2: 93%        Pertinent Labs:  Lab Results   Component Value Date/Time    SODIUM 139 02/19/2022 05:57 AM    POTASSIUM 4.0 02/19/2022 05:57 AM    CHLORIDE 102 02/19/2022 05:57 AM    CO2 25 02/19/2022 05:57 AM    GLUCOSE 105 (H) 02/19/2022 05:57 AM    BUN 8 02/19/2022 05:57 AM    CREATININE 0.46 (L) 02/19/2022 05:57 AM       Lab Results   Component Value Date/Time    HBA1C 5.2 02/19/2022 05:57 AM       Lab Results   Component Value Date/Time    WBC 6.0 03/11/2022 07:49 AM    RBC 3.90 (L) 03/11/2022 07:49 AM    HEMOGLOBIN 13.3 03/11/2022 07:49 AM    HEMATOCRIT 40.5 03/11/2022 07:49 AM    .8 (H) 03/11/2022 07:49 AM    MCH 34.1 (H) 03/11/2022 07:49 AM    MCHC 32.8 (L) 03/11/2022 07:49 AM    MPV 9.9 03/11/2022 07:49 AM    NEUTSPOLYS 65.20 02/19/2022 05:57 AM    LYMPHOCYTES 22.50 02/19/2022 05:57 AM    MONOCYTES 9.90 02/19/2022 05:57 AM    EOSINOPHILS 1.20 02/19/2022 05:57 AM    BASOPHILS 0.50 02/19/2022 05:57 AM       Lab Results   Component Value Date/Time    ASTSGOT 19 02/19/2022 05:57 AM    ALTSGPT 17 02/19/2022 05:57 AM        Physical Exam:   GEN: No apparent distress  HEENT: Head normocephalic, has GBM treatment device donned.  Sclera nonicteric bilaterally, no ocular discharge appreciated bilaterally.  CV: Extremities warm and well-perfused, no peripheral edema appreciated bilaterally.  PULMONARY: Breathing nonlabored on room air, no respiratory accessory muscle use.  Not requiring supplemental oxygen.  SKIN: No appreciable skin breakdown on exposed areas of skin.  PSYCH: Mood and affect within normal limits.  NEURO: Awake alert.  Conversational.   Logical thought content.  Significant spasticity of the left upper extremity and left lower extremity.  Plantar flexion is 3/4, very severe and tight but can be moved with a lot of effort.  In the upper extremity she has spasticity at pectoralis, biceps, triceps at 3/4 and wrist flexors finger flexors 2-3/4.  She has automated elbow flexion when trying to straighten the fingers due to spasticity.  Presents in her manual wheelchair.  Clonus cannot even be appreciated any longer as it had last exam due to the fact that her spasticity has worsened.      ASSESSMENT/PLAN: Melba Frye  is a 50 y.o. female with rehabilitation history significant for GBM s/p resection 3/9/2021 Dr. Mao s/p repeat resection for recurrence 6/25/2021 [facility] c/b AMS and worsening weakness 2/9/2022 work-up negative for new lesion, likely seizure now on AEDs, here for evaluation. The following plan was discussed with the patient who is in agreement.     Visit Diagnoses     ICD-10-CM   1. Spastic hemiplegia of left nondominant side due to noncerebrovascular etiology ([facility])  G81.14   2. Left hemiparesis (HCC)  G81.94   3. Glioblastoma of frontal lobe ([facility])  C71.1   4. Other muscle spasm  M62.838       assists with history.  Neuro restorative therapist assist with history.    Rehab/Neuro:   1. GBM right medial parietal lobe s/p right frontal parietal craniotomy for biopsy and resection 3/9/2021 Dr. Mao s/p repeat resection for recurrence 6/25/2021 [facility] c/b AMS and worsening weakness 2/9/2022 work-up negative for new lesion, likely seizure now on AEDs  2. Left foot drop: Has custom AFO  3. Seizures -on Briviact and Vimpat.  -Therapy: Residing at neuro restorative.  -Function: Ambulatory with walker.  Has left lower extremity weakness greater than left upper extremity weakness.  Most weakness is distal left lower extremity requiring AFO.  Does have spasticity of plantar flexors.  10/18/2021 patient has had worsening of her spasticity  as well as hemiparesis on the left since we last met in May.  She is largely wheelchair-bound but is using the walker occasionally. 11/2021 Botox has made her muscles looser.  She would like more function out of her upper extremity than her lower extremity.  Continues to have easily triggered clonus left ankle. 3/23/2022 spasticity worsened.  Transfers harder.  Currently residing at [facility].    Spasticity: 5/2021 developing left ankle while inpatient.  Continues to be problematic and clonus gets triggered especially in the evening.  10/2021 spasticity worsening in the left lower extremity and the left upper extremity.  In the upper extremity it is more problematic proximally as opposed to distally. 11/2021 spasticity better after Botox, patient would like more efficacy in her arm compared to her ankle given that she states she can get around if need be. 3/23/2022 presents in manual wheelchair.  Spasticity in the upper extremity and lower extremity have worsened since we last visited.  -Conservative: Continue ROM and stretching.  -Med management: Continue tizanidine 4 mg 3 times daily.  Will need LFTs checked.  -Plan for repeat injections.   Authorization in place for 1 more visit.    Botox Plan: I plan to inject to the left upper extremity and left lower extremity.  Location Botox Amount in Units   Left pectoralis major/minor  50 units   Left tricep  50 units   Left bicep  50 units   Left FDS/FDP  50/50 units   Left FCR/FCU  25/25 units   Left medial gastrocnemius  75 units   Left lateral gastrocnemius  50 units   Left soleus  75 units   Total Units  500 units       The risks benefits and alternatives to this procedure were discussed and the patient wishes to proceed with the procedure. Risks include but are not limited to damage to surrounding structures, infection, bleeding, worsening of pain which can be permanent, weakness which can be permanent. Benefits include pain relief, improved function.  Alternatives includes not doing the procedure.      Neurogenic Bladder: Significant urinary urgency and frequency with incontinence.  Has not trialed any medications.  11/2021 established with urogynecology and will be getting UDS in December.  -Established with urogynecology.    Mood/sleep: Patient presented for Botox injections today, however, we also addressed her sleep and mood issues which is separate for the reason for presentation today.  -Med management: Continue trazodone 150 mg.  -Med management: STOP Xanax 0.5 mg  -Med management: Prescription for Valium 5 mg at night to assist with both anxiety, sleep, spasticity.  Counseled on risk of sedation, but when scheduled at night we hope that this will assist with sleep.    Follow up: June for Botox.    Please note that this dictation was created using voice recognition software. I have made every reasonable attempt to correct obvious errors but there may be errors of grammar and content that I may have overlooked prior to finalization of this note.    Dr. Yanet Steele DO, MS  Department of Physical Medicine & Rehabilitation  Neuro Rehabilitation Clinic  [facility]

## 2022-03-23 NOTE — PROCEDURES
[facility]  Physical Medicine & Rehabilitation Clinic  [facility] [place] [address]  Ph: [phone]    PROCEDURE NOTE    Procedure: Botulinum Injection  Primary Diagnosis & Secondary Diagnoses:   Visit Diagnoses     ICD-10-CM   1. Spastic hemiplegia of left nondominant side due to noncerebrovascular etiology ([facility])  G81.14   2. Left hemiparesis (HCC)  G81.94   3. Glioblastoma of frontal lobe ([facility])  C71.1   4. Other muscle spasm  M62.838       INFORMED CONSENT   The risks and benefits of the procedure were explained to the patient, and all questions were answered. Benefits of the injection include spasticity reduction by decrease in muscle activation following toxin injection. Adverse effects from toxin injection include but are not limited to: excessive weakness, infection, breathing and/or swallowing difficulty.  Common adverse effects from the injection itself are pain and bruising. The patient demonstrated good understanding of risks and benefits and consents to botulinum toxin injection.     Received botulinum toxin product in last 3 mos: No  Have recently received abx (aminoglycosides): No  Take anti-spasticity medications: No  Take allergy/cold medicine:  No  Take a sleep medicine: No  Lactating/pregnant: No  Known NMJ disease: No  Human albumin allergy: No    Patient on Eliquis due to history of pulmonary embolus. Okay to continue anticoagulation with Eliquis through the procedure for botulinum toxin injection.  The risks of going off the medication outweigh the risks of doing the procedure on the medication.  I will plan to use 27-gauge needles and ultrasound guidance to avoid all blood vessels during the procedure.  We discussed that while on anticoagulation the patient does have an increased risk of bleeding and hematoma.    Pre-procedure time out was performed.     PROCEDURE:  Usual sterile procedure was observed with sterile prep with chlorhexidine. Ultrasound was used for needle guidance for  the injections in the following muscles. A negative aspiration of blood was obtained, and the following was injected into each muscle.    Botox: Left lower extremity & upper extremity  Location Botox Amount in Units   Left pectoralis major/minor  75 units   Left bicep   100 units   Left tricep  75 units   Left medial gastrocnemius  75 units   Left lateral gastrocnemius  100 units   Soleus  75 units   Total Units  500 units      Injection sites were cleaned with alcohol and band aid was applied afterwards.  The patient tolerated the procedure well.  There were no complications.  The images were uploaded for permanent storage    MEDICATION USED:  100 units of botulinum toxin type A, mixed with 2mL of sterile, preservative-free normal saline in two 1cc syringes, for a total of 50 units in 1 mL.  200 units of botulinum toxin type A, mixed with 4mL of sterile, preservative-free normal saline in four 1cc syringes, for a total of 50 units in 1 mL.  Repeated for other vial.    Total units injected: 500 units  Total units discarded: 0 units    See MAR for Botox details.     Counseling: Pt was instructed to seek immediate medical attention if fever, difficulty breathing, difficulty swallowing, or other concerning sxs arise. RTC in 2-4 weeks to investigate for effect at peak.    Additional Plan: Continue therapy and neuro restorative.  See additional progress note for management plan not related to spasticity management.    BOTOX 100 unit vial x1  NDC 1600-0554-45  Lot [id number]  Exp 03/2024    BOTOX 200 unit vial x2  NDC 7303-5171-74  Lot [id number]  Exp 11/2024      Dr. Yanet Steele DO, MS  Department of Physical Medicine & Rehabilitation  Neuro Rehabilitation Clinic  [facility]

## 2022-03-23 NOTE — Clinical Note
Putting a new referral for Botox given that we may have to do more muscles in the upper extremity next time to get the authorization for the right procedural codes.  We will plan to use same amount.

## 2022-04-01 ENCOUNTER — OFFICE VISIT (OUTPATIENT)
Dept: NEUROLOGY | Facility: MEDICAL CENTER | Age: 52
End: 2022-04-01
Attending: STUDENT IN AN ORGANIZED HEALTH CARE EDUCATION/TRAINING PROGRAM
Payer: COMMERCIAL

## 2022-04-01 ENCOUNTER — HOSPITAL ENCOUNTER (OUTPATIENT)
Dept: RADIOLOGY | Facility: MEDICAL CENTER | Age: 52
End: 2022-04-01
Attending: RADIOLOGY
Payer: COMMERCIAL

## 2022-04-01 VITALS
OXYGEN SATURATION: 96 % | WEIGHT: 235 LBS | SYSTOLIC BLOOD PRESSURE: 122 MMHG | HEIGHT: 67 IN | DIASTOLIC BLOOD PRESSURE: 84 MMHG | BODY MASS INDEX: 36.88 KG/M2 | HEART RATE: 91 BPM | RESPIRATION RATE: 16 BRPM | TEMPERATURE: 97.7 F

## 2022-04-01 DIAGNOSIS — M79.18 MUSCULOSKELETAL PAIN: ICD-10-CM

## 2022-04-01 DIAGNOSIS — R25.2 SPASTICITY: ICD-10-CM

## 2022-04-01 DIAGNOSIS — Z74.09 IMPAIRED MOBILITY AND ADLS: ICD-10-CM

## 2022-04-01 DIAGNOSIS — M62.838 MUSCLE SPASM: ICD-10-CM

## 2022-04-01 DIAGNOSIS — I10 PRIMARY HYPERTENSION: ICD-10-CM

## 2022-04-01 DIAGNOSIS — C71.1 GLIOBLASTOMA OF FRONTAL LOBE (HCC): ICD-10-CM

## 2022-04-01 DIAGNOSIS — F32.A DEPRESSIVE DISORDER: ICD-10-CM

## 2022-04-01 DIAGNOSIS — G43.109 COMPLICATED MIGRAINE: ICD-10-CM

## 2022-04-01 DIAGNOSIS — F41.8 ANXIETY ASSOCIATED WITH DEPRESSION: ICD-10-CM

## 2022-04-01 DIAGNOSIS — M79.605 PAIN OF LEFT LOWER EXTREMITY: ICD-10-CM

## 2022-04-01 DIAGNOSIS — Z86.711 HISTORY OF PULMONARY EMBOLUS (PE): ICD-10-CM

## 2022-04-01 DIAGNOSIS — Z78.9 IMPAIRED MOBILITY AND ADLS: ICD-10-CM

## 2022-04-01 DIAGNOSIS — G40.109 LOCALIZATION-RELATED FOCAL EPILEPSY WITH SIMPLE PARTIAL SEIZURES (HCC): ICD-10-CM

## 2022-04-01 DIAGNOSIS — I15.9 SECONDARY HYPERTENSION: ICD-10-CM

## 2022-04-01 PROCEDURE — 99215 OFFICE O/P EST HI 40 MIN: CPT | Performed by: STUDENT IN AN ORGANIZED HEALTH CARE EDUCATION/TRAINING PROGRAM

## 2022-04-01 PROCEDURE — 99212 OFFICE O/P EST SF 10 MIN: CPT | Performed by: STUDENT IN AN ORGANIZED HEALTH CARE EDUCATION/TRAINING PROGRAM

## 2022-04-01 PROCEDURE — G2212 PROLONG OUTPT/OFFICE VIS: HCPCS | Performed by: STUDENT IN AN ORGANIZED HEALTH CARE EDUCATION/TRAINING PROGRAM

## 2022-04-01 PROCEDURE — A9576 INJ PROHANCE MULTIPACK: HCPCS | Performed by: RADIOLOGY

## 2022-04-01 PROCEDURE — 70553 MRI BRAIN STEM W/O & W/DYE: CPT

## 2022-04-01 PROCEDURE — 700117 HCHG RX CONTRAST REV CODE 255: Performed by: RADIOLOGY

## 2022-04-01 RX ORDER — NIFEDIPINE 30 MG/1
TABLET, EXTENDED RELEASE ORAL
COMMUNITY
Start: 2022-03-22 | End: 2022-04-18 | Stop reason: SDUPTHER

## 2022-04-01 RX ORDER — LISINOPRIL 40 MG/1
40 TABLET ORAL DAILY
Qty: 30 TABLET | Refills: 11 | Status: SHIPPED
Start: 2022-04-01 | End: 2022-06-15 | Stop reason: SDUPTHER

## 2022-04-01 RX ADMIN — GADOTERIDOL 20 ML: 279.3 INJECTION, SOLUTION INTRAVENOUS at 10:54

## 2022-04-01 ASSESSMENT — FIBROSIS 4 INDEX: FIB4 SCORE: 1.04

## 2022-04-01 NOTE — PROGRESS NOTES
NEUROLOGY CLINIC FOLLOW-UP - 04/01/2022   REFERRING PROVIDER  Trinity Nguyen M.D.  [address] [place] [address]    PCP  Trinity Nguyen   [phone]   [facility] CHIARA WATTS [[id number]]     REASON FOR VISIT: Melba Frye 51 y.o. female presents today for follow-up.       INTERVAL HISTORY:  She is not doing well. She has becoming increasingly more immobile, spatic, with increased frequencies of being more confused and disoriented.     Having panic attacks at [facility] when alone at night at rehab when her  is not there.     Delirium  occurring at night.    Itchiness all over the place, no rash or lesion on her body though.    Left medial leg deep pain. Oxycodone helps    Has pending MRI for surveillance scan.       Patient's PMH, PSH, FH, and SH were reviewed.    ROS: All review of systems complete and are negative except as documented    CURRENT MEDICATIONS AT THE TIME OF THIS ENCOUNTER:    Current Outpatient Medications:   •  lisinopril, 40 mg, Oral, DAILY  •  diphenhydrAMINE, 25 mg, Oral, Q6HRS PRN, Taking  •  hydrocortisone, 1 Application, Topical, BID, Taking  •  diazePAM, 5 mg, Oral, Nightly, Taking  •  brivaracetam, 100 mg, Oral, BID, Taking  •  lacosamide, 200 mg, Oral, BID, Taking  •  tizanidine, 4 mg, Oral, TID (Patient not taking: Reported on 4/18/2022), Taking  •  venlafaxine, 75 mg, Oral, DAILY, Taking  •  acetaminophen, 650 mg, Oral, BID PRN, PRN  •  melatonin, 3 mg, Oral, QHS, Taking  •  CHOLECALCIFEROL PO, 1 Each, Oral, DAILY, Taking  •  FOLIC ACID PO, 1 Tablet, Oral, DAILY, Taking  •  Eliquis, 5 mg, Oral, BID, Taking  •  multivitamin, 1 Tablet, Oral, QAM, Taking  •  cloNIDine,   •  Probiotic Product (PROBIOTIC PO), Take  by mouth.  •  dexamethasone, Take  by mouth 2 times a day.  •  SENNA PO, Take  by mouth. Sennacot at night, miralax during day  •  oxycodone, 5-30 mg, Oral, Q4HRS PRN  •  NIFEdipine SR, 30 mg, Oral, DAILY  •  levetiracetam, 1,500 mg,  "Oral, BID (Patient not taking: Reported on 4/18/2022)  •  traZODone, 100 mg, Oral, QHS     EXAM:   Encounter Vitals  Standard Vitals  Vitals  Blood Pressure: 122/84  Temperature: 36.5 °C (97.7 °F)  Temp src: Temporal  Pulse: 91  Respiration: 16  Pulse Oximetry: 96 %  Height: 170.2 cm (5' 7\")  Weight: 107 kg (235 lb)  Encounter Vitals  Temperature: 36.5 °C (97.7 °F)  Temp src: Temporal  Blood Pressure: 122/84  Pulse: 91  Respiration: 16  Pulse Oximetry: 96 %  Weight: 107 kg (235 lb)  Height: 170.2 cm (5' 7\")  BMI (Calculated): 36.81  Pulmonary-Specific Vitals     Durable Medical Equipment-Specific Vitals          Physical Exam:  Physical Exam  Constitutional:       General: She is not in acute distress.     Appearance: She is not ill-appearing.   HENT:      Head:      Comments: Clean and intact surgical scar over scalp     Mouth/Throat:      Mouth: Mucous membranes are dry.   Cardiovascular:      Rate and Rhythm: Normal rate and regular rhythm.      Pulses: Normal pulses.      Heart sounds: Normal heart sounds.   Pulmonary:      Effort: Pulmonary effort is normal.   Musculoskeletal:      Cervical back: No rigidity.   Skin:     General: Skin is dry.      Coloration: Skin is not jaundiced.      Findings: No rash.   Neurological:      Mental Status: She is alert.      Deep Tendon Reflexes:      Reflex Scores:       Tricep reflexes are 4+ on the left side.       Bicep reflexes are 4+ on the left side.       Brachioradialis reflexes are 4+ on the left side.       Patellar reflexes are 4+ on the left side.       Achilles reflexes are 4+ on the left side.       Neurological Exam   Neurological Exam  Mental Status  Alert and drowsy.  Patient appears distracted, tired, unfocused, though she remains oriented to person, place, situation. Affect is flat, much different than her typical. She is able to follow simple commands. .    Motor   Increased muscle tone.  Left hemibody spastici weakness, Very limited ROM. Exam has worsened " compared to my prior exams..    Sensory  Left sided neglect to sensation, visual, space..    Reflexes                                            Right                      Left  Brachioradialis                                           4+  Biceps                                                        4+  Triceps                                                       4+  Patellar                                                       4+  Achilles                                                       4+  Plantar                           Equivocal                Upgoing    Coordination  Right: Finger-to-nose normal.  Unable to test the rest given limited ROM.    Gait    Non ambulatory.       ALL DATA (I.e. labs, procedures, imaging, reports, clinical notes, etc.) FROM [facility] AND/OR OUTSIDE SOURCES, IF AVAILABLE, PERSONALLY REVIEWED:       ASSESSMENT, EDUCATION, AND COUNSELING:  This is a 51 y.o. female patient who presents to the neurology clinic. We had an extensive discussion about the patient's symptoms, signs, and work-up to date, if any. We discussed potential and/or definitive diagnoses, work-up, and potential treatments.       PLAN:  If applicable, the work-up such as labs, imaging, procedures, and/or other testing, referrals, and/or recommended treatment strategies are listed below.    Visit Diagnoses     ICD-10-CM   1. Secondary hypertension  I15.9   2. 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 Glioblastoma of frontal lobe (HCC)  C71.1   4. Depressive disorder  F32.9   5. Musculoskeletal pain  M79.18   6. Spasticity  R25.2   7. Anxiety associated with depression  F41.8   8. Primary hypertension  I10   9. History of pulmonary embolus (PE)  Z86.711   10. Impaired mobility and ADLs  Z74.09    Z78.9   11. Complicated migraine  G43.109   12. Muscle spasm  M62.838   13. Pain of left lower extremity  M79.605        Orders Placed This Encounter   • DISCONTD: NIFEdipine SR  (PROCARDIA-XL) 30 MG tablet   • lisinopril (PRINIVIL) 40 MG tablet      Assessment:  Patient with focal epilepsy with h/o of GBM. I am concerned about her clinical deterioation. She has pending MRI that I will f/u closely.     Patient reports focal left leg pain, intermittently tender. I suspect worsneing spasticity might be a factor at play. Recommend f/u for Botox to help with pain, spasticity, and improve mobiltiy.    Patient has ongoing issues with sleep issues, wakes up a lot at night, having panic attacks. Will need to follow-up closely on these symptoms and if measures such as melatonin and valium fail to help, then will consider referral to psychiatry to help with this issue.    Will consider repeat EEG at a later time.    Disucssed new symptoms of whole body itching. There is no rash, inflammaotion, or lesion to explain symptoms. One possible explanation is that she is having sensory seizure given the proximity of the resection site and areas of gliosis to the parietal sensory areas. Discussed a benzo trial to see if this help with her symptoms, which would support this being a sensory seizure.    Will need to have continue close follow-up with me and her other doctors.          BILLING DOCUMENTATION:     I spent a total of 120 minutes of face-to-face time in this visit. Over 50% of the time of the visit today was spent on counseling and/or coordination of care wtih the patient and/or family, as above in assessment in plan.    Fady Davidson MD  Epilepsy and General Neurology  Department of Neurology  Clinical  of Neurology [facility].

## 2022-04-01 NOTE — PATIENT INSTRUCTIONS
Neurology Clinic Visit     IMPORTANT: If imaging, procedure(s), AND/or referrals were placed for you:  Contact [facility] Scheduling if you have not heard from them in 5 day: [phone]    Outpatient [facility] Imaging Sites.  • [address]  Mon-Fri 8am-5pm   • [address] (Breast Center) Mon-Fri 7am-4pm    • [address]  Mon-Fri 8am-5pm  • [facility] 7am-6:30pm  • [address] Mon-Fri 8am-7pm  Sat 9am-6pm   • [address]  Mon-Fri 8am-7pm and Sat/Sun 9am-5pm  • [address]  Mon-Fri 8am-5pm  • [address] (PET/CT)  Mon-Fri 8:30am-4:30pm     If labs were ordered for you:  • To Schedule an appointment for lab services, please call [phone] or visit [email or web address].  • [facility] Lab Services Convenient Locations:    [place]: • [address] (Ground Floor)  • [address] (Admitting Entrance)  • [address], [address]  • [address]  • [address]  • [address]  • [address]: • [address]  • [address]       [place]: • [address]  • [address]     [facility]     [address] [address]     Laboratory Hours: 6:30am - 6pm  Monday - Friday,   7am - 2pm Saturday    [facility]      [address] [place] [address]      Laboratory Hours:  7:30am - 4pm  Monday - Friday,  9am - 3pm Saturday    [facility]     [address]    [place] [address]      Laboratory Hours:  7:00am - 5:30pm  Monday - Friday    [facility]      [address] [address]       Laboratory Hours:  7:30am - 4:30pm  Monday - Friday    [facility]       [address] [place] [address]       Laboratory Hours:  8am - 5pm  Monday - Friday    [facility]       [address] [place] [address]        Laboratory hours:  7:30am - 4pm  Monday - Friday    GENERAL REMINDERS:  · Request refills 1 week in advance to ensure you do not run out of medications  · All diagnostic study results will be reviewed at your next visit, UNLESS there are urgent results that need to be acted on sooner.  · Please remember that it is the your responsibility to check with your Insurance for benefit coverage for visit / visits.  · 24 hours notice is required for all appointment changes or cancellation.  · Please arrive 20 min. before your appointment time  · Please note that because Dr. Davidson has high daily clinic volume, he is unable to accommodate late arrivals. If you are more than 15 minutes late for the scheduled appointment you will be asked to reschedule  · Please bring the following with you:  1) Picture ID  2) Insurance card  3) An updated list of ALL your medications and their dosages (prescribed medications, over the counter medications, and all supplements), as well as allergies with you at all times  4) A list of questions, concerns, comments that you wish to discuss with Dr. Lety Davidson MD  General Neurologist and Epileptologist  Department of Neurology  Instructor of Clinical Neurology [facility].

## 2022-04-04 ENCOUNTER — PATIENT MESSAGE (OUTPATIENT)
Dept: NEUROLOGY | Facility: MEDICAL CENTER | Age: 52
End: 2022-04-04
Payer: COMMERCIAL

## 2022-04-04 DIAGNOSIS — F41.8 ANXIETY ASSOCIATED WITH DEPRESSION: ICD-10-CM

## 2022-04-04 DIAGNOSIS — F41.8 MIXED ANXIETY AND DEPRESSIVE DISORDER: ICD-10-CM

## 2022-04-05 NOTE — TELEPHONE ENCOUNTER
From: Melba Frye  To: Physician Fady Davidson  Sent: 4/4/2022 10:14 PM PDT  Subject: Prescription     This message is being sent by J Carlos Frye on behalf of Melba Frye.    Hi Dr Davidson, Giselle has just over a weeks worth of Trazodone, 100mg tablets, one tablet a day. Could you please write her a refill prescription? We use the [facility] on [address]. Thank you very much.

## 2022-04-06 RX ORDER — TRAZODONE HYDROCHLORIDE 100 MG/1
100 TABLET ORAL
Qty: 30 TABLET | Refills: 5 | Status: SHIPPED | OUTPATIENT
Start: 2022-04-06 | End: 2022-06-15 | Stop reason: SDUPTHER

## 2022-04-08 ENCOUNTER — HOSPITAL ENCOUNTER (OUTPATIENT)
Dept: RADIATION ONCOLOGY | Facility: MEDICAL CENTER | Age: 52
End: 2022-04-30
Attending: RADIOLOGY
Payer: COMMERCIAL

## 2022-04-08 VITALS
TEMPERATURE: 97.8 F | DIASTOLIC BLOOD PRESSURE: 97 MMHG | SYSTOLIC BLOOD PRESSURE: 138 MMHG | HEART RATE: 72 BPM | OXYGEN SATURATION: 97 %

## 2022-04-08 PROCEDURE — 99214 OFFICE O/P EST MOD 30 MIN: CPT | Performed by: RADIOLOGY

## 2022-04-08 PROCEDURE — 99212 OFFICE O/P EST SF 10 MIN: CPT | Performed by: RADIOLOGY

## 2022-04-08 ASSESSMENT — PAIN SCALES - GENERAL: PAINLEVEL: NO PAIN

## 2022-04-11 DIAGNOSIS — G40.109 LOCALIZATION-RELATED FOCAL EPILEPSY WITH SIMPLE PARTIAL SEIZURES (HCC): ICD-10-CM

## 2022-04-11 PROBLEM — G40.909 EPILEPSY, UNSPECIFIED, NOT INTRACTABLE, WITHOUT STATUS EPILEPTICUS (HCC): Status: ACTIVE | Noted: 2021-03-02

## 2022-04-11 PROBLEM — C71.9 MALIGNANT NEOPLASM OF BRAIN, UNSPECIFIED (HCC): Status: ACTIVE | Noted: 2022-03-18

## 2022-04-11 NOTE — PROGRESS NOTES
RADIATION ONCOLOGY FOLLOW-UP    DATE OF SERVICE: 4/8/2022    IDENTIFICATION:   A 51 y.o. female with   Glioblastoma of frontal lobe (HCC)  Staging form: Brain and Spinal Cord, AJCC 8th Edition  - Pathologic stage from 3/24/2021: WHO Grade IV - Signed by Edenilson Frye M.D. on 3/24/2021  Histologic grading system: 4 grade system  IDH1 mutation: Negative  IDH2 mutation: Negative  MGMT methylation: Absent      RADIATION SUMMARY:  Aria Treatment Information        Some values may be hidden. Unless noted otherwise, only the newest values recorded on each date are displayed.         Aria Treatment Summary 5/14/21   Course First Treatment Date 04/05/2021    Course Last Treatment Date 05/14/2021    R Front Bst Plan from Course C1_GBM   Fraction 7 of 7    Elapsed Course Days 39 @ [id number]    Prescribed Fraction Dose 200 cGy    Prescribed Total Dose 1,400 cGy    R Front GBM Plan from Course C1_GBM   Fraction 23 of 23   Elapsed Course Days 39 @ [id number]   Prescribed Fraction Dose 200 cGy   Prescribed Total Dose 4,600 cGy   R Front Bst Reference Point from Course C1_GBM   Elapsed Course Days 39 @ [id number]    Session Dose --    Total Dose 1,400 cGy    R Front Bst CP Reference Point from Course C1_GBM   Elapsed Course Days 39 @ [id number]    Session Dose --    Total Dose 1,447 cGy    R Front GBM Reference Point from Course C1_GBM   Elapsed Course Days 39 @ [id number]   Session Dose --   Total Dose 4,600 cGy   R Front GBM CP Reference Point from Course C1_GBM   Elapsed Course Days 39 @ [id number]   Session Dose --   Total Dose 4,757 cGy   RF Bst 3D Plan from Course Dosimetry C1   RF GBM 3D Plan from Course Dosimetry C1   R Front Bst Reference Point from Course Dosimetry C1   R Front Bst CP Reference Point from Course Dosimetry C1   R Front GBM Reference Point from Course Dosimetry C1   R Front GBM CP Reference Point from Course Dosimetry C1    Some values recorded on this date have been omitted.             HISTORY OF PRESENT ILLNESS:   Patient originally presented with seizures and went to the ER at [facility] and underwent MRI brain December 10, 2020 which showed an approximate 8 x 6 cm enhancing lesion in the right frontal medial gray matter area with tiny satellite nodular enhancement nonspecific.  Patient also had a MRI CT and L-spine on January 9, 2021 which showed no evidence of multiple sclerosis.  Patient then had repeat MRI brain February 12, 2021 which showed marked interval increase in medial posterior right frontal lobe now measuring 3 x 2.4 x 3.1 with irregular thick-walled peripheral enhancement. She had CT chest abdomen pelvis on February 21, 2021 which showed no evidence of metastatic disease. Patient was readmitted with headaches and seizures and underwent MRI stealth brain March 9, 2021 which showed 3.5 x 2.5 cm peripheral enhancing lesion in the right medial parietal lobe with surrounding white matter edema highly suspicious for high-grade neoplasm. Patient underwent surgery with Dr. Mao with craniotomy with motor mapping and given proximity to [facility] strip was really only able to do an open biopsy with pathology showing glioblastoma. MRI brain postoperatively on March 11, 2021 showed postsurgical changes in the right frontal parietal craniotomy and biopsy of right medial parietal enhancing lesion. Currently patient is depressed and has some residual left upper extremity weakness and left lower extremity weakness but she says the left upper extremity weakness is improving.     7/23/21 Patient completed chemoradiation therapy 60 Gy in 30 fractions with concurrent Temodar with Dr. Cerda on May 14, 2021.  Patient had 1 month post treatment MRI on June 4, 2021 which showed interval enlargement of regular heterogeneously enhancing parasagittal right frontal parietal lobe mass in keeping with glioblastoma with marked vasogenic edema in the right cerebral hemisphere which is increased from prior  study and there is increased mass-effect.  Patient was seen at [facility] and had craniotomy on June 25, 2021 by Dr. Dove with pathology consistent with extensive treatment effects and microscopic foci of residual recurrent GBM who grade IV.  She has tapered off her steroids and remains on Keppra.  She has some mild headaches relieved with tramadol.  She has next MRI in early August.  She is going to see Dr. Cerda next week to start her adjuvant Temodar and will start her on Optune as well.     10/15/21  Patient is feeling more fatigued and has had recurrent urinary tract infections and some urinary frequency. She was recently put on Vimpat by Dr. Gomez and feels that is helping. She is about to start her next cycle of Temodar. She has been doing PT OT 2 times a week. She feels like overall her left arm weakness is about the same. She is still relatively wheelchair-bound. She has some itching from her Optune.     12/23/21  Patient was recently admitted at [facility] for worsening left lower extremity weakness and started on dexamethasone 4 mg every 6 hours.  Patient MRI brain December 15, 2021 which showed a 3.1 cm ring-enhancing mass arising at the postoperative site in the right posterior frontal parietal white matter with marked surrounding increased T2 signal intensity effacing the posterior body and atrium of the right lateral ventricle and causing mild right to left midline shift the ventricular system.  This is suspicious for recurrent neoplasm however conceivably radiation necrosis could have the same appearance.  Patient weakness has improved on dexamethasone and has close follow-up with her [facility] neurooncologist who has enrolled her on a study of surgery with Dr. Dove and postop precision medicine trial.     Interval History:   Patient was just in the hospital discharged 3/11/22 with seizures and underwent inpatient rehab. Vimpat was increased and patient placed on dex taper. She was seen by Dr. Gomez  2/17/22 and plan was for MRI in 6-8 weeks.  She had MRI April 1, 2022 which shows interval significant mural thickening and enhancement within the right parietal postoperative cavity with nodular enhancing lesion most prominent along the right inferior anterior portion of the cavity abutting the posterior horn of the right lateral ventricle.  Creeping nodular enhancement along the ependymal margins of the atrium of the right lateral ventricle also noted.       PROBLEM LIST:  Patient Active Problem List   Diagnosis   • Neoplastic (malignant) related fatigue   • Anxiety disorder, unspecified   • Complicated migraine   • TMJ arthralgia   • Menopausal symptom   • Hyperlipidemia   • Dermatitis, contact   • Lentigo   • Seborrheic keratoses   • Epilepsy, unspecified, not intractable, without status epilepticus (HCC)   • Glioblastoma of frontal lobe (HCC)   • Demyelinating disease of central nervous system, unspecified (HCC)   • Acute blood loss as cause of postoperative anemia   • Impaired mobility and ADLs   • Vitamin D insufficiency   • Elevated blood pressure reading   • Urinary frequency   • Other chest pain   • Tachycardia   • Type 2 myocardial infarction (HCC)   • Neurogenic bladder   • Frequent UTI   • Atrophic vaginitis   • Primary hypertension   • Acute left-sided weakness   • Seizure disorder (HCC)   • History of pulmonary embolus (PE)   • Lesion of frontal lobe of brain   • Gait instability   • Acute respiratory failure with hypoxia (HCC)   • Class 1 obesity due to excess calories with serious comorbidity and body mass index (BMI) of 34.0 to 34.9 in adult   • Syncope   • GBM (glioblastoma multiforme) (HCC)   • Malignant neoplasm of brain, unspecified (HCC)       CURRENT MEDICATIONS:  Current Outpatient Medications   Medication Sig Dispense Refill   • traZODone (DESYREL) 100 MG Tab Take 1 Tablet by mouth at bedtime for 180 days. 30 Tablet 5   • NIFEdipine SR (PROCARDIA-XL) 30 MG tablet      • lisinopril  (PRINIVIL) 40 MG tablet Take 1 Tablet by mouth every day for 360 days. 30 Tablet 11   • diphenhydrAMINE (BENADRYL) 25 MG Tab Take 25 mg by mouth every 6 hours as needed for Sleep.     • hydrocortisone 1 % Cream Apply 1 Application topically 2 times a day.     • diazePAM (VALIUM) 5 MG Tab Take 1 Tablet by mouth every evening for 90 days. 30 Tablet 2   • brivaracetam (BRIVIACT) 100 MG Tab tablet Take 1 Tablet by mouth 2 times a day for 90 days. 180 Tablet 0   • lacosamide (VIMPAT) 200 MG Tab tablet Take 1 Tablet by mouth 2 times a day for 60 days. 120 Tablet 0   • tizanidine (ZANAFLEX) 4 MG Tab Take 1 Tablet by mouth in the morning, at noon, and at bedtime. 90 Tablet 2   • venlafaxine (EFFEXOR) 75 MG Tab Take 1 Tablet by mouth every day. 90 Tablet 2   • acetaminophen (TYLENOL) 650 MG CR tablet Take 650 mg by mouth 2 times a day as needed for Moderate Pain.     • melatonin 3 MG Tab Take 3 mg by mouth at bedtime.     • CHOLECALCIFEROL PO Take 1 Each by mouth every day.     • FOLIC ACID PO Take 1 Tablet by mouth every day.     • ELIQUIS 5 MG Tab Take 1 Tablet by mouth 2 times a day. 180 Tablet 1   • multivitamin (THERAGRAN) Tab Take 1 Tablet by mouth every morning.     • Sodium Phosphates (FLEET ENEMA MA) Insert  into the rectum. (Patient not taking: Reported on 4/8/2022)     • Magnesium Hydroxide (MILK OF MAGNESIA PO) Take  by mouth. (Patient not taking: Reported on 4/8/2022)       No current facility-administered medications for this encounter.       ALLERGIES:  Tape    REVIEW OF SYSTEMS:  A review of systems for today's date of service was reviewed and uploaded into the electronic medical record.    PHYSICAL EXAM:  PERFORMANCE STATUS:ECOG 2  /97   Pulse 72   Temp 36.6 °C (97.8 °F)   SpO2 97%   Physical Exam  Vitals reviewed.   HENT:      Mouth/Throat:      Mouth: Mucous membranes are moist.   Eyes:      Pupils: Pupils are equal, round, and reactive to light.   Cardiovascular:      Rate and Rhythm: Normal  rate.   Pulmonary:      Effort: Pulmonary effort is normal.   Abdominal:      General: Abdomen is flat.   Neurological:      Mental Status: She is alert. Mental status is at baseline.         LABORATORY DATA:   Lab Results   Component Value Date/Time    WBC 6.0 03/11/2022 0749    WBC 6.6 03/02/2022 0536    WBC 5.8 02/26/2022 0517    HEMOGLOBIN 13.3 03/11/2022 0749    HEMOGLOBIN 12.5 03/02/2022 0536    HEMOGLOBIN 11.7 (L) 02/26/2022 0517    HEMATOCRIT 40.5 03/11/2022 0749    HEMATOCRIT 37.6 03/02/2022 0536    HEMATOCRIT 35.7 (L) 02/26/2022 0517    .8 (H) 03/11/2022 0749    .2 (H) 03/02/2022 0536    .6 (H) 02/26/2022 0517    PLATELETCT 230 03/11/2022 0749    PLATELETCT 231 03/02/2022 0536    PLATELETCT 235 02/26/2022 0517    NEUTS 5.41 02/19/2022 0557    NEUTS 5.92 02/09/2022 1501    NEUTS 3.58 12/17/2021 0506      Lab Results   Component Value Date/Time    SODIUM 139 02/19/2022 0557    SODIUM 142 02/12/2022 0357    SODIUM 141 02/11/2022 0154    POTASSIUM 4.0 02/19/2022 0557    POTASSIUM 3.7 02/12/2022 0357    POTASSIUM 3.5 (L) 02/11/2022 0154    BUN 8 02/19/2022 0557    BUN 7 (L) 02/12/2022 0357    BUN 8 02/11/2022 0154    CREATININE 0.46 (L) 02/19/2022 0557    CREATININE 0.47 (L) 02/12/2022 0357    CREATININE 0.47 (L) 02/11/2022 0154    CALCIUM 9.7 02/19/2022 0557    CALCIUM 8.8 02/12/2022 0357    CALCIUM 8.7 02/11/2022 0154    ALBUMIN 4.1 02/19/2022 0557    ALBUMIN 3.3 02/12/2022 0357    ALBUMIN 3.5 02/11/2022 0154    ASTSGOT 19 02/19/2022 0557    ASTSGOT 28 02/09/2022 1501    ASTSGOT 24 12/14/2021 1850    ALKPHOSPHAT 139 (H) 02/19/2022 0557    ALKPHOSPHAT 104 (H) 02/09/2022 1501    ALKPHOSPHAT 85 12/14/2021 1850    IFNOTAFR >60 02/19/2022 0557    IFNOTAFR >60 02/12/2022 0357    IFNOTAFR >60 02/11/2022 0154       RADIOLOGY DATA:  MR-BRAIN-WITH & W/O    Result Date: 4/2/2022 4/1/2022 9:06 AM HISTORY/REASON FOR EXAM: Anaplastic gliomas/glioblastoma, initial workup; Metastasis - SRS Protocol  Complete s/p re-resection 6/2021, radiation 5/2021 TECHNIQUE/EXAM DESCRIPTION: T1 sagittal, T2 axial, flair coronal, T1 coronal, and diffusion-weighted axial images were obtained of the brain pre-contrast followed by T1 coronal and axial images post intravenous administration of 20 mL ProHance. COMPARISON: 2/10/2022 FINDINGS: Diffusion-weighted images demonstrate minimal high signal along the margins of the operative cavity in the right high parietal region. The brainstem, cerebellum are normal nonenhancing T2 signal abnormality is noted in the right temporal-parietal region extending to the right frontoparietal region with mass effect upon the right lateral ventricle which is compressed. There is greater mass effect upon the right lateral ventricle compared to the previous study. Also, there is greater effacement of the cerebral sulci in the right cerebral hemisphere. Nonenhancing T2 hyperintensities also noted in the left frontoparietal region, stable. Bilateral mastoid effusions are noted. Interval development of nodular enhancement along the margins of the postoperative cavity in the right parietal region noted with creeping nodular enhancement along the ependymal margins of the right lateral ventricle with mass effect upon the ventricle is concerning for recurrent neoplasm. This lesion now measures 50 x 52 x 27 mm (CC, AP, ML). There is approximately 7 mm midline shift to the left. Dural thickening and enhancement is noted underlying the craniotomy. Mild dural thickening is also evident along the posterior falx cerebri abutting the operative cavity with thick nodular enhancement extending into the craniotomy along the medial aspect. This appearance is unchanged from the previous study. However, this enhancement is contiguous with the nodular enhancement along the margins of the operative cavity. Also noted in the axial T1 postcontrast data set is artifactual hyperintensity due to pulsation artifact along the  phase encoding direction from the fourth ventricle. The craniocervical junction is within normal limits. Bone marrow signal in the calvarium is within normal limits. Included portions of the paranasal sinuses are within normal limits. Bilateral mastoid effusions noted. Included portions of the orbits are within normal limits     Interval significant mural thickening and enhancement within the right parietal postoperative cavity with nodular enhancing lesion most prominent along the right inferior anterior portion of the cavity abutting the posterior horn of the right lateral ventricle. Creeping nodular enhancement along the ependymal margins of the atrium of the right lateral ventricle also noted. There is increased mass effect upon the ventricular system. Also, the nonenhancing T2 signal abnormality in the right cerebral hemisphere slightly worsened with greater sulcal effacement and mass effect upon the right lateral ventricle. There is 7 mm of midline shift to the left new from the previous study. These findings are concerning for recurrent neoplasm. Nonenhancing T2 signal abnormality involving the left cerebral hemisphere is also noted, stable.      IMPRESSION:    A 51 y.o. with   Glioblastoma of frontal lobe (HCC)  Staging form: Brain and Spinal Cord, AJCC 8th Edition  - Pathologic stage from 3/24/2021: WHO Grade IV - Signed by Edenilson Frye M.D. on 3/24/2021  Histologic grading system: 4 grade system  IDH1 mutation: Negative  IDH2 mutation: Negative  MGMT methylation: Absent      CANCER STATUS:  Disease Progression Local    RECOMMENDATIONS:   I reviewed her most recent MRI which does appear to look like some early progression at this point.  Patient will see Dr. Cerda this week but I explained that most likely next systemic therapy would be CCNU +/- Avastin vs clinical trial at [facility], but given her performance status is declining likely CCNU vs hospice would be my recommendation. Re-irradiation using 35Gy in  10 fractions could be considered per RTOG 1205 with avastin but volume is fairly large and she would be at high risk of radiation necrosis. She will discuss with Dr. Gomez and Prashant options this week.     Thank you for the opportunity to participate in her care.  If any questions or comments, please do not hesitate in calling.    No orders of the defined types were placed in this encounter.

## 2022-04-12 ENCOUNTER — TELEPHONE (OUTPATIENT)
Dept: MEDICAL GROUP | Facility: IMAGING CENTER | Age: 52
End: 2022-04-12
Payer: COMMERCIAL

## 2022-04-12 NOTE — TELEPHONE ENCOUNTER
Received request via: Pharmacy    Was the patient seen in the last year in this department? Yes    [facility]    4/1/22  [facility]     none    Does the patient have an active prescription (recently filled or refills available) for medication(s) requested? yes

## 2022-04-12 NOTE — TELEPHONE ENCOUNTER
Isa from [facility] called left message with dermatology in regards to the patients oxygen and would like to discuss raising the oxygen.     Ph. [phone] (Isa, RN)  Attempted to return Isa's call, left message requesting return call to appropriate voicemail.

## 2022-04-14 ENCOUNTER — OFFICE VISIT (OUTPATIENT)
Dept: OBGYN | Facility: CLINIC | Age: 52
End: 2022-04-14
Payer: COMMERCIAL

## 2022-04-14 VITALS — BODY MASS INDEX: 36.81 KG/M2 | WEIGHT: 235 LBS

## 2022-04-14 DIAGNOSIS — N32.89 BLADDER SPASM: ICD-10-CM

## 2022-04-14 DIAGNOSIS — R39.9 UTI SYMPTOMS: ICD-10-CM

## 2022-04-14 DIAGNOSIS — N31.9 NEUROGENIC BLADDER: Primary | ICD-10-CM

## 2022-04-14 DIAGNOSIS — N39.41 URGE URINARY INCONTINENCE: ICD-10-CM

## 2022-04-14 PROCEDURE — 52287 CYSTOSCOPY CHEMODENERVATION: CPT | Performed by: STUDENT IN AN ORGANIZED HEALTH CARE EDUCATION/TRAINING PROGRAM

## 2022-04-14 PROCEDURE — 99213 OFFICE O/P EST LOW 20 MIN: CPT | Mod: 25 | Performed by: STUDENT IN AN ORGANIZED HEALTH CARE EDUCATION/TRAINING PROGRAM

## 2022-04-14 RX ORDER — LEVETIRACETAM 750 MG/1
1500 TABLET ORAL 2 TIMES DAILY
Qty: 360 TABLET | Refills: 2 | Status: SHIPPED | OUTPATIENT
Start: 2022-04-14 | End: 2022-06-15

## 2022-04-14 RX ORDER — NITROFURANTOIN 25; 75 MG/1; MG/1
100 CAPSULE ORAL 2 TIMES DAILY
Qty: 10 CAPSULE | Refills: 0 | Status: SHIPPED | OUTPATIENT
Start: 2022-04-14 | End: 2022-04-19

## 2022-04-14 RX ORDER — NITROFURANTOIN 25; 75 MG/1; MG/1
100 CAPSULE ORAL ONCE
Status: COMPLETED | OUTPATIENT
Start: 2022-04-14 | End: 2022-04-14

## 2022-04-14 RX ORDER — LIDOCAINE HYDROCHLORIDE 20 MG/ML
60 INJECTION, SOLUTION INFILTRATION; PERINEURAL ONCE
Status: COMPLETED | OUTPATIENT
Start: 2022-04-14 | End: 2022-04-14

## 2022-04-14 RX ADMIN — NITROFURANTOIN 100 MG: 25; 75 CAPSULE ORAL at 16:06

## 2022-04-14 RX ADMIN — LIDOCAINE HYDROCHLORIDE 60 ML: 20 INJECTION, SOLUTION INFILTRATION; PERINEURAL at 16:22

## 2022-04-14 ASSESSMENT — FIBROSIS 4 INDEX: FIB4 SCORE: 1.04

## 2022-04-14 NOTE — ADDENDUM NOTE
Addended by: JAVIER SAUCEDO on: 4/14/2022 04:28 PM     Modules accepted: Orders     Thyroid function tests normal, continue current dose of levothyroxine

## 2022-04-14 NOTE — PROGRESS NOTES
Urogynecology and Pelvic Reconstructive Surgery -  Follow up visit  Melba Frye MRN:[id number] :1970    Referred by: Yanet Steele DO    Reason for Visit:   No chief complaint on file.        Subjective     History of Presenting Illness:    Ms.Jennifer Cesia Frye is a 51 y.o. year old P1 with PMH significant for migraines, depression/anxiety, seizures, and glioblastoma with neurogenic bladder (nocturia, urge leakage).     She was last seen 2021, and underwent UDS with severe UUI seen (secondary to neurogenic bladder), and was counseled for botox therapy. Due to surgeries and radiation therapy, it has taken a while for her to be able to come in for the procedure.     She was trialed on mirabegron in there interim, but there were coast issues with filling the medication    She also started to use PureWick to help drain urine away.     She was previously prescribed vaginal estrogen which she has been taking as instructed.    She mentions being due for a pap (s/p hyst for VIJAY 3 in ), however given current clinical status, utility of pap at this time is not necessary. May follow up in the future if needed (recommended screening 20y s/p hyst for dysplasia.       Initial HPI: She was referred by her Physiatrist Dr. Steele for the evaluation and management of bothersome urinary symptoms.  She had no bothersome bladder symptoms aside from mild stress urinary incontinence until her diagnosis earlier this year with glioblastoma of the frontal lobe, where she subsequently underwent craniotomy and biopsy in March followed by chemotherapy which is concurrently happening.  - Approximately 2 months after this diagnosis she noted that her bladder symptoms started notable for very bothersome nocturia, 3-5 times per night, which is very difficult given her lack of mobility and hemiparesis and need for her  to ambulate her to her bedside commode to empty.  She also has difficulty emptying her bladder and  has significant urinary leakage.  - Additionally she has had a recent difficult bout with a urinary tract infection (it is unclear whether she had 2 subsequent UTIs or one incompletely treated UTI)  - Her gynecologist is Dr. Alvin Paul who is managed her previously, and has taken her off systemic hormone replacement therapy a couple of years ago.  She only notes some mild hot flashes after coming off this therapy.    Prior Pelvic surgery:    LAVH hysterectomy for CIN3 by Dr. Gilles Teresa   laparoscopic lysis of adhesions and bilateral salpingo-oophorectomy by Dr. Tomasz Estrada for complex ovarian mass.       Prior treatment:   None for bladder     Fluid intake:   1 cup water  2-3 cups juice      Urine cultures:   21: E coli >100k pansensitive x 2    Pelvic floor symptom review:     Bladder:   Voids per day: 4-5 Voids per night: 3-4   - has bedside commonde   Urinary incontinence episodes per day: 1-2    Urge leakage:  On Movement to Bathroom and Full Bladder   Stress leakage: With Cough, With Laugh and With Exercise   Continuous / insensible urine loss: No    Nocturnal enuresis: No    Leakage volume: Moderate   Number of pads/day: 1-2    Bladder emptying: Incomplete   Voiding symptoms: Hesitancy, Weak Stream and Double or Triple Voiding   UTI in last 12 months: 2   Other urologic history: no      Prolapse:     Prolapse symptoms: None        Bowel:    Constipation: yes, pain meds. Also gets diarrhea.    Bowel movements per day: irregular    Straining to empty bowels: Yes   Splinting to evacuate: No    Painful evacuation: occasional   Difficulty emptying rectum: No    Incontinence to stool: No   Incontinence to gas: No     Blood in stool: No    Hemorrhoids: No    Bowel conditions:    Most recent colonoscopy:  normal            Past medical and surgical history    Past obstetric history   Number of vaginal deliveries: 1   Number of  deliveries: 0   History of vacuum/forceps: Yes   History of  obstetric anal sphincter injury: No     Past gynecological history:    Last menstrual period: No LMP recorded. Patient has had a hysterectomy.     Past medical history:  Past Medical History:   Diagnosis Date   • Acute pulmonary embolism with acute cor pulmonale (HCC) 10/21/2021   • Lentigo 10/17/2018   • Seborrheic keratoses 10/17/2018   • Dermatitis, contact 11/16/2015   • Hyperlipidemia 1/23/2015   • Menopausal symptom 7/2/2014   • Fatigue 6/9/2014   • Mixed anxiety and depressive disorder 6/9/2014   • Complicated migraine 6/9/2014   • TMJ arthralgia 6/9/2014   • Anxiety    • Cancer (HCC)     brain diagnosed in October 2020    • Depression    • Pulmonary embolism (HCC)    • UTI (lower urinary tract infection)      Past surgical history:  Past Surgical History:   Procedure Laterality Date   • CRANIOTOMY STEALTH Right 3/9/2021    Procedure: RIGHT CRANIOTOMY, USING FRAMELESS STEREOTAXY - FOR OPEN BIOPSY WITH PHASE REVERSAL;  Surgeon: Maxwell Mao M.D.;  Location: [facility];  Service: Neurosurgery   • MYRINGOTOMY  8/27/2010    Performed by [address] at SURGERY SAME DAY [facility]   • LAPAROSCOPY  5/28/2010    Performed by JACKI SHARMA at SURGERY [facility] ORS   • LYMPH NODE SAMPLING  5/28/2010    Performed by JACKI SHARMA at [facility] ORS   • DEBULKING  5/28/2010    Performed by JACKI SHARMA at [facility] ORS   • CYSTOSCOPY  10/15/08    Performed by YU GODINEZ at SURGERY SAME DAY [facility] OR[facility]   • VAGINAL HYSTERECTOMY SCOPE TOTAL  10/15/08    Performed by YU GODINEZ at SURGERY SAME DAY [facility] OR[facility]   • OTHER  1984    TONSILECTOMY/ ADNOIDS   • APPENDECTOMY  1981   • TONSILLECTOMY AND ADENOIDECTOMY       Medications:has a current medication list which includes the following prescription(s): trazodone, nifedipine sr, lisinopril, diphenhydramine, sodium phosphates, hydrocortisone, magnesium hydroxide, diazepam, brivaracetam, lacosamide, tizanidine, venlafaxine,  acetaminophen, melatonin, cholecalciferol, folic acid, eliquis, and multivitamin.  Allergies:Tape  Family history:  Family History   Problem Relation Age of Onset   • Non-contributory Mother    • Lung Disease Mother         COPD   • Cancer Mother         dysplasia    • Arthritis Mother    • Psychiatric Illness Mother    • Heart Disease Mother    • Hypertension Mother    • Stroke Mother    • Non-contributory Father    • Cancer Father 73        prostate cancer   • Lung Disease Maternal Grandmother    • Lung Disease Paternal Aunt    • Diabetes Paternal Aunt    • Hyperlipidemia Paternal Aunt      Social history: reports that she has never smoked. She has never used smokeless tobacco. She reports previous alcohol use. She reports that she does not use drugs.    Review of systems: A full review of systems was performed, and negative with the exception of want is noted above in the HPI.        Objective        There were no vitals taken for this visit.    Physical Exam  Vitals reviewed.   Constitutional:       Appearance: Normal appearance.   HENT:      Head:      Comments: Hair loss - chemotherapy changes     Mouth/Throat:      Mouth: Mucous membranes are moist.   Cardiovascular:      Rate and Rhythm: Normal rate.   Pulmonary:      Effort: Pulmonary effort is normal.   Musculoskeletal:      Comments: Hemiplegia, uses wheelchair     Skin:     General: Skin is warm and dry.   Neurological:      Mental Status: She is alert.   Psychiatric:         Mood and Affect: Mood normal.         Genitourinary:    External female genitalia: WNL   Vulva: Atrophic   Bulbocavernosus reflex: Intact   Anal wink reflex: Intact   Perineal sensation: WNL   Urethra: WNL   Vagina: Atrophic   Atrophy: Mild   Cough stress test: Negative    Pelvic floor:    POP-Q: no significant pelvic organ prolapse   Prolapse stage: 0   Paravaginal defect: none   Urethral tenderness: No    Bladder/ suprapubic tenderness: No    Levator tenderness: None   Levator  muscle tone: Hypertonic      Procedure Performed: Botox - see note    Diagnostic test and records review:           Assessment & Plan     Ms.Jennifer Cesia Frye is a 51 y.o. year old P1 with complex recent neurological history including glioblastoma status post craniotomy and chemotherapy/radiotherapy with neurogenic bladder (nocturia, severe urge leakage), and recurrent UTI. We discussed my recommendations for further diagnosis and treatment at length today.     1. Neurogenic bladder  2. Nocturia  3. Urge incontinence  4. Glioblastoma of frontal lobe (HCC)  -She underwent bladder chemodenervation with onabotulinumtoxinA (100 units) today.  -She is counseled that peak effect takes about 1 week, and she was prescribed a course of Macrobid if she develops UTI symptoms over the weekend.  -She will contact me for follow-up if there is suboptimal treatment of her symptoms.    -Next stop therapy if not controlled with Botox would be suprapubic catheter, which I discussed with her today, while this would be ideal, it would make it so she did have constant urinary leakage.  Botox 200 units could be used for further bladder spasms if they are not controlled sufficiently with suprapubic drainage.  I counseled that I would refer to urologist for this as I do not perform this procedure myself.  -    5. Frequent UTI  - Continue vaginal estrogen as primary preventative measure  -Advised that she should call if any further UTI symptoms we can culture and start treatment.  - Advised to call/message with any new UTI symptoms for culture, as well as advice not to treat asymptomatic baceteruira     6. Atrophic vaginitis  She has vaginal atrophy on examination. Reviewed risks, benefits, and indications for vaginal estrogen therapy.  Continue with vaginal estrogen therapy twice weekly.            Eliezer Mike MD, FACOG    Female Pelvic Medicine and Reconstructive Surgery  Department of Obstetrics and  Gynecology  [facility]    This medical record contains text that has been entered with the assistance of computer voice recognition and dictation software.  Therefore, it may contain unintended errors in text, spelling, punctuation, or grammar

## 2022-04-14 NOTE — PROCEDURES
Cystoscopy w/Botox    Date/Time: 4/14/2022 4:03 PM  Performed by: Eliezer Mike M.D.  Authorized by: Eliezer Mike M.D.     Procedure discussed: discussed risks, benefits and alternatives    Chaperone present: yes    Timeout: timeout called immediately prior to procedure    Prep: patient was prepped and draped in usual sterile fashion    Prep type: Betadine    Anesthesia: local anesthesia      Procedure Details     Cystoscope type: flexible    Cystoscopy route: transurethral      Irrigation used: saline      Position: dorsal lithotomy    Urethra     Urethra: normal      Vagina     Vagina: normal      Bladder     Bladder: normal      Bladder comment: Severe bladder spasm with filling, full assessment not possible, however appeared normal     Botox Injection Details     Changes since previous cystoscopy: no changes since previous cystoscopy      Indication: neurogenic bladder      Volume per injection comment: 2 mL      Total number of injections: 5    Total number of units: 100    Post-Procedure Details     Catheter placed: no      Appearance of urine after procedure: clear    Outcome: patient tolerated procedure well with no complications      Disposition: discharged home in satisfactory condition

## 2022-04-18 ENCOUNTER — TELEMEDICINE (OUTPATIENT)
Dept: MEDICAL GROUP | Facility: IMAGING CENTER | Age: 52
End: 2022-04-18
Payer: COMMERCIAL

## 2022-04-18 VITALS
BODY MASS INDEX: 37.2 KG/M2 | OXYGEN SATURATION: 94 % | HEART RATE: 84 BPM | SYSTOLIC BLOOD PRESSURE: 161 MMHG | WEIGHT: 237 LBS | HEIGHT: 67 IN | DIASTOLIC BLOOD PRESSURE: 96 MMHG

## 2022-04-18 DIAGNOSIS — C71.1 MALIGNANT NEOPLASM OF FRONTAL LOBE (HCC): ICD-10-CM

## 2022-04-18 DIAGNOSIS — I10 PRIMARY HYPERTENSION: ICD-10-CM

## 2022-04-18 DIAGNOSIS — R03.0 ELEVATED BLOOD PRESSURE READING: ICD-10-CM

## 2022-04-18 DIAGNOSIS — L29.9 PRURITIC DISORDER: ICD-10-CM

## 2022-04-18 DIAGNOSIS — R06.02 SHORTNESS OF BREATH: ICD-10-CM

## 2022-04-18 DIAGNOSIS — G43.109 COMPLICATED MIGRAINE: ICD-10-CM

## 2022-04-18 PROCEDURE — 99213 OFFICE O/P EST LOW 20 MIN: CPT | Mod: 95

## 2022-04-18 RX ORDER — NIFEDIPINE 30 MG/1
30 TABLET, EXTENDED RELEASE ORAL DAILY
Qty: 90 TABLET | Refills: 3 | Status: SHIPPED | OUTPATIENT
Start: 2022-04-18

## 2022-04-18 RX ORDER — DEXAMETHASONE 4 MG/1
TABLET ORAL 2 TIMES DAILY
COMMUNITY

## 2022-04-18 RX ORDER — OXYCODONE HYDROCHLORIDE 5 MG/1
5-30 CAPSULE ORAL EVERY 4 HOURS PRN
COMMUNITY

## 2022-04-18 RX ORDER — CLONIDINE HYDROCHLORIDE 0.1 MG/1
TABLET ORAL
COMMUNITY
Start: 2022-04-03

## 2022-04-18 ASSESSMENT — FIBROSIS 4 INDEX: FIB4 SCORE: 1.04

## 2022-04-18 NOTE — PROGRESS NOTES
"Virtual Visit: Established Patient   This visit was conducted via Zoom using secure and encrypted videoconferencing technology.   The patient was in their home in the state of [place].    The patient's identity was confirmed and verbal consent was obtained for this virtual visit.    Subjective:   CC:   Chief Complaint   Patient presents with   • Follow-Up     Intense itching all over body, neurologist states it might be related to tumor area   • Medication Refill   • [facility]   • Referral Needed     Home health care, [facility]     Melba Frye is a 51 y.o. female, accompanied by  J Carlos, who assisted in providing majority of the information on behalf of patient. Patient presents today for refill on Nifedipine and discuss the following:     Patient was recently discharged home on 4/1/2022 from [facility] after 2 weeks of treatment. Patient sustained a fall on 2/9/2022 and was taken to [facility] for 3 weeks. She was then transferred to [facility].  Since discharge, patient has been doing well.  Patient is scheduled to follow up with Oncology to start Chemotherapy in May.     Pruritis disorder  Patient reports of having intense generalized itching started 2 months ago. Describes it as \"intense itching\" that starts on her head and spreads through out her body, worst in the afternoon. No rashes associated with pruritis. They consulted her neurologist for this and was told that it's caused by the \"new activity from her brain.\" She has tried benadryl and cortisone cream which helped minimally.   She denies fevers, chills, or malaise    Primary hypertension  Patient's /96. Patient on lisinopril, Clonidine, and nifedipine. Patient's , J Carlos states that patient has not taken catapres today as he typically takes her BP multiple times to ensure that her BP is elevated prior to " giving her medication. He states that he will re-check again and give her Clonidine if BP remains elevated.   She denies chest pain, palpitations, dizziness, or syncope.     Chronic headache  Patient with history of persistent headaches r/t glioblastoma that is managed by Neurology. Patient takes Tylenol for mild to moderate headaches and Oxycodone for severe headaches only. However, headaches have not completely resolved. She was recently started on dexamethasone 4 mg PO daily per neurology which has helped.    Shortness of breath  Patient continues to have shortness of breath that worsens at night. Patient's  states that patient appears as she is gasping for air at times while sleeping. He states that they are still waiting on the oxygen to be delivered.   She denies difficulty breathing, chest pain, lightheadedness, or syncope.      ROS   Per HPI    Current medicines (including changes today)  Current Outpatient Medications   Medication Sig Dispense Refill   • cloNIDine (CATAPRES) 0.1 MG Tab      • Probiotic Product (PROBIOTIC PO) Take  by mouth.     • dexamethasone (DECADRON) 4 MG Tab Take  by mouth 2 times a day.     • SENNA PO Take  by mouth. Sennacot at night, miralax during day     • oxycodone 5 MG capsule Take 5-30 mg by mouth every four hours as needed.     • levetiracetam (KEPPRA) 750 MG tablet TAKE 2 TABLETS BY MOUTH 2 TIMES A DAY FOR 90 DAYS. (Patient not taking: Reported on 4/18/2022) 360 Tablet 2   • nitrofurantoin (MACROBID) 100 MG Cap Take 1 Capsule by mouth 2 times a day for 5 days. 10 Capsule 0   • traZODone (DESYREL) 100 MG Tab Take 1 Tablet by mouth at bedtime for 180 days. 30 Tablet 5   • NIFEdipine SR (PROCARDIA-XL) 30 MG tablet      • lisinopril (PRINIVIL) 40 MG tablet Take 1 Tablet by mouth every day for 360 days. 30 Tablet 11   • diphenhydrAMINE (BENADRYL) 25 MG Tab Take 25 mg by mouth every 6 hours as needed for Sleep.     • hydrocortisone 1 % Cream Apply 1 Application topically 2  times a day.     • diazePAM (VALIUM) 5 MG Tab Take 1 Tablet by mouth every evening for 90 days. 30 Tablet 2   • brivaracetam (BRIVIACT) 100 MG Tab tablet Take 1 Tablet by mouth 2 times a day for 90 days. 180 Tablet 0   • lacosamide (VIMPAT) 200 MG Tab tablet Take 1 Tablet by mouth 2 times a day for 60 days. 120 Tablet 0   • venlafaxine (EFFEXOR) 75 MG Tab Take 1 Tablet by mouth every day. 90 Tablet 2   • acetaminophen (TYLENOL) 650 MG CR tablet Take 650 mg by mouth 2 times a day as needed for Moderate Pain.     • melatonin 3 MG Tab Take 3 mg by mouth at bedtime.     • CHOLECALCIFEROL PO Take 1 Each by mouth every day.     • FOLIC ACID PO Take 1 Tablet by mouth every day.     • ELIQUIS 5 MG Tab Take 1 Tablet by mouth 2 times a day. 180 Tablet 1   • multivitamin (THERAGRAN) Tab Take 1 Tablet by mouth every morning.     • tizanidine (ZANAFLEX) 4 MG Tab Take 1 Tablet by mouth in the morning, at noon, and at bedtime. (Patient not taking: Reported on 4/18/2022) 90 Tablet 2     No current facility-administered medications for this visit.       Patient Active Problem List    Diagnosis Date Noted   • Malignant neoplasm of brain, unspecified (HCC) 03/18/2022   • GBM (glioblastoma multiforme) (HCC) 02/18/2022   • Acute respiratory failure with hypoxia (HCC) 02/09/2022   • Class 1 obesity due to excess calories with serious comorbidity and body mass index (BMI) of 34.0 to 34.9 in adult 02/09/2022   • Syncope 02/09/2022   • Lesion of frontal lobe of brain 12/18/2021   • Gait instability 12/18/2021   • Acute left-sided weakness 12/16/2021   • Seizure disorder (HCC) 12/16/2021   • History of pulmonary embolus (PE) 12/16/2021   • Primary hypertension 12/15/2021   • Neurogenic bladder 11/04/2021   • Frequent UTI 11/04/2021   • Atrophic vaginitis 11/04/2021   • Other chest pain 10/21/2021   • Tachycardia 10/21/2021   • Type 2 myocardial infarction (HCC) 10/21/2021   • Urinary frequency 10/04/2021   • Elevated blood pressure reading  "2021   • Vitamin D insufficiency 2021   • Impaired mobility and ADLs 03/15/2021   • Acute blood loss as cause of postoperative anemia 2021   • Epilepsy, unspecified, not intractable, without status epilepticus (HCC) 2021   • Glioblastoma of frontal lobe (HCC) 2021   • Demyelinating disease of central nervous system, unspecified (HCC) 2021   • Lentigo 10/17/2018   • Seborrheic keratoses 10/17/2018   • Dermatitis, contact 2015   • Hyperlipidemia 2015   • Menopausal symptom 2014   • Neoplastic (malignant) related fatigue 2014   • Anxiety disorder, unspecified 2014   • Complicated migraine 2014   • TMJ arthralgia 2014        Objective:   BP (!) 161/96   Pulse 84   Ht 1.702 m (5' 7\")   Wt 108 kg (237 lb)   SpO2 94%   BMI 37.12 kg/m²     Physical Exam:  Constitutional: Alert, no distress, well-groomed.  Skin: No rashes in visible areas.  Eye: Round. Conjunctiva clear, lids normal.  ENMT: Lips pink without lesions, good dentition, moist mucous membranes. Phonation normal.  Neck:  Moves freely without pain.  Respiratory: Unlabored respiratory effort, no cough.  Psych: Alert and oriented, normal affect and mood. Able to answer some questions. Most information provided by , J Carlos.    Assessment and Plan:   The following treatment plan was discussed:     1. Pruritic disorder  New issue, started 2 months ago. Per neurology, most likely associated with right frontal GBM treatment related changes vs early tumor progression. Patient is s/p GTR 2021 and path c/w extensive treatment related changes with microscopic residual tumor. OTC such as benadryl and cortisone cream ineffective. Discussed hydroxyzine use for pruritis. Patient reports of past use of this medication and it was well tolerated. Patient currently has a bottle of this at home that is not . Advise to use hydroxyzine as needed for pruritis. Side effects discussed. " Prescription not ordered at this time, as she currently has medication at home. Advised to call office for new prescription if needed.    2. Primary hypertension  3. Elevated blood pressure reading  Established issue, currently taking lisinopril, Clonidine, and Nifedipine.   Elevated blood pressure possibly from not taking Clonidine dose today.  Advised spouse to give patient's Clonidine dose. Advised patient's spouse to continue with regular home BP readings and to notify office if BP remains elevated >140/90.   Patient requesting refill on NIfedipine. Med refill done.    4. Complicated migraine  Chronic issue that is managed by Neurology. Currently on dexamethasone for this which is helping with symptoms.   Advised to follow up with Neurology for worsening symptoms.    5. Shortness of breath  Ongoing issue with increasing shortness of breath. Referral for home oxygen delivery still pending. Unable to assess 02 sat level via telehealth visit; however, patient is homebound as a result of her medical condition making it difficult for her to come to the office.  Will get an update on status of pending oxygen delivery.      I performed this evaluation using real-time telehealth tools, including a live video Zoom connection between my location and the patient's location. Prior to initiating, the patient consented to perform this evaluation using telehealth tools.     This was discussed with patient in a shared-decision making conversation, and they understand and agreed with plan of care.      Follow-up: As needed for worsening symptoms. ER symptoms discussed.  Thank you, Shena DONALD

## 2022-04-19 PROBLEM — R06.02 SHORTNESS OF BREATH: Status: ACTIVE | Noted: 2022-04-19

## 2022-04-19 NOTE — ASSESSMENT & PLAN NOTE
Current /96. Takes lisinopril, catapres, and nifedipine for this. Has not taken catapres today.   Will re-check BP, if BP remains high, will take catapres.    She denies chest pain, palpitations, dizziness, or syncope.

## 2022-04-19 NOTE — ASSESSMENT & PLAN NOTE
"Patient reports of having intense generalized itching that starts on her head and spreads through out her body. No rashes associated with pruritis. They consulted her neurologist in which she was told that it's caused by the \"new activity from her brain.\" She has tried benadryl and cortisone cream which helped minimally.   "

## 2022-04-25 ENCOUNTER — PATIENT MESSAGE (OUTPATIENT)
Dept: MEDICAL GROUP | Facility: IMAGING CENTER | Age: 52
End: 2022-04-25
Payer: COMMERCIAL

## 2022-04-28 ENCOUNTER — TELEPHONE (OUTPATIENT)
Dept: MEDICAL GROUP | Facility: IMAGING CENTER | Age: 52
End: 2022-04-28
Payer: COMMERCIAL

## 2022-05-13 ENCOUNTER — TELEPHONE (OUTPATIENT)
Dept: NEUROLOGY | Facility: MEDICAL CENTER | Age: 52
End: 2022-05-13

## 2022-05-13 ENCOUNTER — TELEPHONE (OUTPATIENT)
Dept: NEUROLOGY | Facility: MEDICAL CENTER | Age: 52
End: 2022-05-13
Payer: COMMERCIAL

## 2022-05-13 DIAGNOSIS — G40.909 SEIZURE DISORDER (HCC): Chronic | ICD-10-CM

## 2022-05-13 RX ORDER — LACOSAMIDE 200 MG/1
200 TABLET ORAL 2 TIMES DAILY
Qty: 180 TABLET | Refills: 0 | Status: SHIPPED | OUTPATIENT
Start: 2022-05-13 | End: 2022-06-14 | Stop reason: SDUPTHER

## 2022-05-13 NOTE — TELEPHONE ENCOUNTER
Vimpat 200mg Tablet    Request rec'd via [employer], ran TC via [place], TC paid copay $0.00 #60/30 DS Max 30 DS notifying liaison Rosie Buchanan - 05/13/2022 3:55pm

## 2022-05-13 NOTE — TELEPHONE ENCOUNTER
Received request via: Patient    Was the patient seen in the last year in this department? Yes    Does the patient have an active prescription (recently filled or refills available) for medication(s) requested? No    Pt requesting refill of lacosamide. Unable to click on medication in Epic. Please advise.

## 2022-05-16 ENCOUNTER — PATIENT MESSAGE (OUTPATIENT)
Dept: MEDICAL GROUP | Facility: IMAGING CENTER | Age: 52
End: 2022-05-16
Payer: COMMERCIAL

## 2022-05-16 DIAGNOSIS — I26.09 OTHER PULMONARY EMBOLISM WITH ACUTE COR PULMONALE, UNSPECIFIED CHRONICITY (HCC): ICD-10-CM

## 2022-05-17 RX ORDER — APIXABAN 5 MG/1
5 TABLET, FILM COATED ORAL 2 TIMES DAILY
Qty: 28 TABLET | Refills: 0 | Status: SHIPPED | OUTPATIENT
Start: 2022-05-17

## 2022-05-19 ENCOUNTER — HOSPITAL ENCOUNTER (OUTPATIENT)
Facility: MEDICAL CENTER | Age: 52
End: 2022-05-19
Attending: NURSE PRACTITIONER
Payer: COMMERCIAL

## 2022-05-19 PROCEDURE — 87077 CULTURE AEROBIC IDENTIFY: CPT

## 2022-05-19 PROCEDURE — 87086 URINE CULTURE/COLONY COUNT: CPT

## 2022-05-19 PROCEDURE — 87186 SC STD MICRODIL/AGAR DIL: CPT

## 2022-05-30 ENCOUNTER — PATIENT MESSAGE (OUTPATIENT)
Dept: MEDICAL GROUP | Facility: IMAGING CENTER | Age: 52
End: 2022-05-30
Payer: COMMERCIAL

## 2022-05-30 DIAGNOSIS — L29.9 PRURITUS: ICD-10-CM

## 2022-05-30 DIAGNOSIS — C71.1 GLIOBLASTOMA OF FRONTAL LOBE (HCC): ICD-10-CM

## 2022-06-08 DIAGNOSIS — F41.8 ANXIETY ASSOCIATED WITH DEPRESSION: ICD-10-CM

## 2022-06-08 RX ORDER — HYDROXYZINE 50 MG/1
50 TABLET, FILM COATED ORAL 3 TIMES DAILY PRN
Qty: 90 TABLET | Refills: 11 | Status: SHIPPED | OUTPATIENT
Start: 2022-06-08 | End: 2023-06-03

## 2022-06-08 NOTE — TELEPHONE ENCOUNTER
Received request via: Patient    Was the patient seen in the last year in this department? Yes    Does the patient have an active prescription (recently filled or refills available) for medication(s) requested? No    Requested [facility] pharmacy refill

## 2022-06-09 ENCOUNTER — HOSPITAL ENCOUNTER (OUTPATIENT)
Facility: MEDICAL CENTER | Age: 52
End: 2022-06-09
Attending: NURSE PRACTITIONER
Payer: COMMERCIAL

## 2022-06-09 PROCEDURE — 87077 CULTURE AEROBIC IDENTIFY: CPT

## 2022-06-09 PROCEDURE — 87086 URINE CULTURE/COLONY COUNT: CPT

## 2022-06-09 PROCEDURE — 87186 SC STD MICRODIL/AGAR DIL: CPT | Mod: 91

## 2022-06-14 DIAGNOSIS — I15.9 SECONDARY HYPERTENSION: ICD-10-CM

## 2022-06-14 DIAGNOSIS — F41.8 ANXIETY ASSOCIATED WITH DEPRESSION: ICD-10-CM

## 2022-06-14 DIAGNOSIS — C71.1 GLIOBLASTOMA OF FRONTAL LOBE (HCC): ICD-10-CM

## 2022-06-14 DIAGNOSIS — F41.8 MIXED ANXIETY AND DEPRESSIVE DISORDER: ICD-10-CM

## 2022-06-14 DIAGNOSIS — R45.89 DEPRESSED MOOD: ICD-10-CM

## 2022-06-14 DIAGNOSIS — G40.909 SEIZURE DISORDER (HCC): Chronic | ICD-10-CM

## 2022-06-14 NOTE — TELEPHONE ENCOUNTER
Received request via: Patient    Was the patient seen in the last year in this department? Yes    Does the patient have an active prescription (recently filled or refills available) for medication(s) requested? No     Patient requesting 90 day supply be sent to [facility]. Lety cali.

## 2022-06-15 ENCOUNTER — HOSPITAL ENCOUNTER (EMERGENCY)
Facility: MEDICAL CENTER | Age: 52
End: 2022-06-15
Attending: EMERGENCY MEDICINE
Payer: COMMERCIAL

## 2022-06-15 ENCOUNTER — APPOINTMENT (OUTPATIENT)
Dept: RADIOLOGY | Facility: MEDICAL CENTER | Age: 52
End: 2022-06-15
Attending: EMERGENCY MEDICINE
Payer: COMMERCIAL

## 2022-06-15 ENCOUNTER — TELEPHONE (OUTPATIENT)
Dept: NEUROLOGY | Facility: MEDICAL CENTER | Age: 52
End: 2022-06-15

## 2022-06-15 VITALS
WEIGHT: 237 LBS | TEMPERATURE: 98.7 F | SYSTOLIC BLOOD PRESSURE: 169 MMHG | BODY MASS INDEX: 37.2 KG/M2 | OXYGEN SATURATION: 91 % | HEART RATE: 87 BPM | RESPIRATION RATE: 18 BRPM | DIASTOLIC BLOOD PRESSURE: 86 MMHG | HEIGHT: 67 IN

## 2022-06-15 DIAGNOSIS — L29.9 PRURITIC DISORDER: ICD-10-CM

## 2022-06-15 DIAGNOSIS — N39.0 ACUTE UTI: ICD-10-CM

## 2022-06-15 DIAGNOSIS — G40.909 SEIZURE DISORDER (HCC): Chronic | ICD-10-CM

## 2022-06-15 LAB
ALBUMIN SERPL BCP-MCNC: 4.3 G/DL (ref 3.2–4.9)
ALBUMIN/GLOB SERPL: 1.5 G/DL
ALP SERPL-CCNC: 85 U/L (ref 30–99)
ALT SERPL-CCNC: 17 U/L (ref 2–50)
ANION GAP SERPL CALC-SCNC: 12 MMOL/L (ref 7–16)
APPEARANCE UR: ABNORMAL
AST SERPL-CCNC: 16 U/L (ref 12–45)
BACTERIA #/AREA URNS HPF: ABNORMAL /HPF
BASOPHILS # BLD AUTO: 0.3 % (ref 0–1.8)
BASOPHILS # BLD: 0.02 K/UL (ref 0–0.12)
BILIRUB SERPL-MCNC: 0.3 MG/DL (ref 0.1–1.5)
BILIRUB UR QL STRIP.AUTO: NEGATIVE
BUN SERPL-MCNC: 6 MG/DL (ref 8–22)
CALCIUM SERPL-MCNC: 9.4 MG/DL (ref 8.4–10.2)
CHLORIDE SERPL-SCNC: 100 MMOL/L (ref 96–112)
CO2 SERPL-SCNC: 26 MMOL/L (ref 20–33)
COLOR UR: YELLOW
CREAT SERPL-MCNC: 0.45 MG/DL (ref 0.5–1.4)
EOSINOPHIL # BLD AUTO: 0 K/UL (ref 0–0.51)
EOSINOPHIL NFR BLD: 0 % (ref 0–6.9)
EPI CELLS #/AREA URNS HPF: ABNORMAL /HPF
ERYTHROCYTE [DISTWIDTH] IN BLOOD BY AUTOMATED COUNT: 51.6 FL (ref 35.9–50)
GFR SERPLBLD CREATININE-BSD FMLA CKD-EPI: 115 ML/MIN/1.73 M 2
GLOBULIN SER CALC-MCNC: 2.8 G/DL (ref 1.9–3.5)
GLUCOSE SERPL-MCNC: 115 MG/DL (ref 65–99)
GLUCOSE UR STRIP.AUTO-MCNC: NEGATIVE MG/DL
HCT VFR BLD AUTO: 44.6 % (ref 37–47)
HGB BLD-MCNC: 15.4 G/DL (ref 12–16)
IMM GRANULOCYTES # BLD AUTO: 0.03 K/UL (ref 0–0.11)
IMM GRANULOCYTES NFR BLD AUTO: 0.4 % (ref 0–0.9)
KETONES UR STRIP.AUTO-MCNC: NEGATIVE MG/DL
LACTATE BLD-SCNC: 2.3 MMOL/L (ref 0.5–2)
LEUKOCYTE ESTERASE UR QL STRIP.AUTO: ABNORMAL
LYMPHOCYTES # BLD AUTO: 0.7 K/UL (ref 1–4.8)
LYMPHOCYTES NFR BLD: 9.6 % (ref 22–41)
MCH RBC QN AUTO: 33.8 PG (ref 27–33)
MCHC RBC AUTO-ENTMCNC: 34.5 G/DL (ref 33.6–35)
MCV RBC AUTO: 98 FL (ref 81.4–97.8)
MICRO URNS: ABNORMAL
MONOCYTES # BLD AUTO: 0.57 K/UL (ref 0–0.85)
MONOCYTES NFR BLD AUTO: 7.8 % (ref 0–13.4)
NEUTROPHILS # BLD AUTO: 5.97 K/UL (ref 2–7.15)
NEUTROPHILS NFR BLD: 81.9 % (ref 44–72)
NITRITE UR QL STRIP.AUTO: NEGATIVE
NRBC # BLD AUTO: 0 K/UL
NRBC BLD-RTO: 0 /100 WBC
PH UR STRIP.AUTO: 8.5 [PH] (ref 5–8)
PLATELET # BLD AUTO: 163 K/UL (ref 164–446)
PMV BLD AUTO: 8.9 FL (ref 9–12.9)
POTASSIUM SERPL-SCNC: 4 MMOL/L (ref 3.6–5.5)
PROCALCITONIN SERPL-MCNC: 0.05 NG/ML
PROT SERPL-MCNC: 7.1 G/DL (ref 6–8.2)
PROT UR QL STRIP: 30 MG/DL
RBC # BLD AUTO: 4.55 M/UL (ref 4.2–5.4)
RBC # URNS HPF: ABNORMAL /HPF
RBC UR QL AUTO: ABNORMAL
SODIUM SERPL-SCNC: 138 MMOL/L (ref 135–145)
SP GR UR STRIP.AUTO: 1.02
WBC # BLD AUTO: 7.3 K/UL (ref 4.8–10.8)
WBC #/AREA URNS HPF: ABNORMAL /HPF
YEAST #/AREA URNS HPF: ABNORMAL /HPF

## 2022-06-15 PROCEDURE — 700111 HCHG RX REV CODE 636 W/ 250 OVERRIDE (IP): Performed by: EMERGENCY MEDICINE

## 2022-06-15 PROCEDURE — 83605 ASSAY OF LACTIC ACID: CPT

## 2022-06-15 PROCEDURE — 99285 EMERGENCY DEPT VISIT HI MDM: CPT

## 2022-06-15 PROCEDURE — 36415 COLL VENOUS BLD VENIPUNCTURE: CPT

## 2022-06-15 PROCEDURE — 700102 HCHG RX REV CODE 250 W/ 637 OVERRIDE(OP): Performed by: EMERGENCY MEDICINE

## 2022-06-15 PROCEDURE — 84145 PROCALCITONIN (PCT): CPT

## 2022-06-15 PROCEDURE — 81001 URINALYSIS AUTO W/SCOPE: CPT

## 2022-06-15 PROCEDURE — 71045 X-RAY EXAM CHEST 1 VIEW: CPT

## 2022-06-15 PROCEDURE — A9270 NON-COVERED ITEM OR SERVICE: HCPCS | Performed by: EMERGENCY MEDICINE

## 2022-06-15 PROCEDURE — 85025 COMPLETE CBC W/AUTO DIFF WBC: CPT

## 2022-06-15 PROCEDURE — 80053 COMPREHEN METABOLIC PANEL: CPT

## 2022-06-15 PROCEDURE — 96374 THER/PROPH/DIAG INJ IV PUSH: CPT

## 2022-06-15 RX ORDER — CETIRIZINE HYDROCHLORIDE 10 MG/1
10 TABLET ORAL 2 TIMES DAILY
Qty: 60 TABLET | Refills: 0 | Status: SHIPPED | OUTPATIENT
Start: 2022-06-15

## 2022-06-15 RX ORDER — LACOSAMIDE 200 MG/1
200 TABLET ORAL 2 TIMES DAILY
Qty: 180 TABLET | Refills: 0 | Status: SHIPPED | OUTPATIENT
Start: 2022-06-15 | End: 2022-06-15 | Stop reason: SDUPTHER

## 2022-06-15 RX ORDER — LISINOPRIL 40 MG/1
40 TABLET ORAL DAILY
Qty: 30 TABLET | Refills: 11 | OUTPATIENT
Start: 2022-06-15 | End: 2023-06-10

## 2022-06-15 RX ORDER — SULFAMETHOXAZOLE AND TRIMETHOPRIM 800; 160 MG/1; MG/1
1 TABLET ORAL 2 TIMES DAILY
Qty: 10 TABLET | Refills: 0 | Status: SHIPPED | OUTPATIENT
Start: 2022-06-15 | End: 2022-06-20

## 2022-06-15 RX ORDER — VENLAFAXINE 75 MG/1
75 TABLET ORAL DAILY
Qty: 90 TABLET | Refills: 2 | OUTPATIENT
Start: 2022-06-15

## 2022-06-15 RX ORDER — LISINOPRIL 40 MG/1
40 TABLET ORAL DAILY
Qty: 90 TABLET | Refills: 3 | Status: SHIPPED | OUTPATIENT
Start: 2022-06-15 | End: 2023-06-10

## 2022-06-15 RX ORDER — TRAZODONE HYDROCHLORIDE 100 MG/1
100 TABLET ORAL
Qty: 30 TABLET | Refills: 5 | OUTPATIENT
Start: 2022-06-15 | End: 2022-12-12

## 2022-06-15 RX ORDER — CETIRIZINE HYDROCHLORIDE 10 MG/1
10 TABLET ORAL ONCE
Status: COMPLETED | OUTPATIENT
Start: 2022-06-15 | End: 2022-06-15

## 2022-06-15 RX ORDER — TRAZODONE HYDROCHLORIDE 100 MG/1
100 TABLET ORAL
Qty: 90 TABLET | Refills: 3 | Status: SHIPPED | OUTPATIENT
Start: 2022-06-15 | End: 2023-06-10

## 2022-06-15 RX ORDER — VENLAFAXINE 75 MG/1
75 TABLET ORAL DAILY
Qty: 90 TABLET | Refills: 3 | Status: SHIPPED | OUTPATIENT
Start: 2022-06-15 | End: 2023-06-10

## 2022-06-15 RX ORDER — LACOSAMIDE 200 MG/1
200 TABLET ORAL 2 TIMES DAILY
Qty: 180 TABLET | Refills: 0 | Status: SHIPPED | OUTPATIENT
Start: 2022-06-15 | End: 2022-09-13

## 2022-06-15 RX ORDER — CEFTRIAXONE 1 G/1
1 INJECTION, POWDER, FOR SOLUTION INTRAMUSCULAR; INTRAVENOUS ONCE
Status: COMPLETED | OUTPATIENT
Start: 2022-06-15 | End: 2022-06-15

## 2022-06-15 RX ADMIN — CEFTRIAXONE SODIUM 1 G: 1 INJECTION, POWDER, FOR SOLUTION INTRAMUSCULAR; INTRAVENOUS at 17:02

## 2022-06-15 RX ADMIN — CETIRIZINE HYDROCHLORIDE 10 MG: 10 TABLET, FILM COATED ORAL at 15:21

## 2022-06-15 ASSESSMENT — PAIN DESCRIPTION - PAIN TYPE: TYPE: ACUTE PAIN

## 2022-06-15 ASSESSMENT — PAIN SCALES - WONG BAKER: WONGBAKER_NUMERICALRESPONSE: DOESN'T HURT AT ALL

## 2022-06-15 ASSESSMENT — FIBROSIS 4 INDEX: FIB4 SCORE: 1.04

## 2022-06-15 NOTE — TELEPHONE ENCOUNTER
Briviact 100mg Tablet    Refill request rec'd via [facility], ran TC via [place], TC paid copay $0.00 #60/30 DS Max 30 DS notifying liaison Rosie Buchanan. - 06/15/2022 11:33am    Vimpat 200mg Tablet    RTS - NEXT RFL [id number] DAYS TO RFL 22 LAST FILL [id number] VIA RETAIL FOR EMRG OVD, SCC=13 PER [facility] DISCRETION [id number] - 06/15/2022 11:30am

## 2022-06-15 NOTE — TELEPHONE ENCOUNTER
Received request via: Patient    Was the patient seen in the last year in this department? Yes    Does the patient have an active prescription (recently filled or refills available) for medication(s) requested? No     Patient requesting these be sent to [facility]. Previously sent to [facility]. Please advise.

## 2022-06-15 NOTE — ED PROVIDER NOTES
ED Provider Note    ED Provider Note    Primary care provider: Trinity Nguyen M.D.  Means of arrival: EMS  History obtained from: Patient's     CHIEF COMPLAINT  Chief Complaint   Patient presents with   • Blood Pressure Problem     Pt MAD[facility] sierra for evaluation of asymptomatic high blood pressure today.  190/120 at home, 167/112 per EMS. 157/107 upon arrival. Pt's  also reports my chart showed a UTI in 6/9 UA but has not been started on abx.    • Fever     Pt has had nightly fevers of 101.0 f for past 2 nights.     Seen at 2:39 PM.   HPI  Melba Frye is a 51 y.o. female with history of GBM, status postresection, chronic left-sided weakness secondary to this as well as short-term memory loss, brought in by EMS for fevers and elevated blood pressure.  The  states that the patient had intermittent fevers up to 101 over the weekend (2 to 3 days ago) he was also reviewing the labs ordered by Dr. Cerda, oncology that showed she had a urinary tract infection on 6/9.  The patient has not received any antibiotics for this, he has been attempting to get an answer from oncology but has not heard back.    The patient also has diffuse intractable itching that has been present for a few months, normally treated with Atarax but has been refractory to this over the past few days.  Lastly, home health noted the blood pressure be significantly elevated today with a systolic blood pressure of 190 at home.  They state that normally the systolic blood pressures in the 130s.    The patient has had chronic headaches, she is on a dexamethasone taper.  She tapered from 4 mg down to 3 mg daily, the  attempted to taper further but headaches increased, therefore he went back up to 3 mg daily.  No new numbness, no focal weakness, no new cough, chest pain, shortness of breath, abdominal pain.  The patient has a bowel movement every 3 days which is typical since being diagnosed with the GBM.  She is  nonambulatory/bedbound.    REVIEW OF SYSTEMS  See HPI,   Remainder of ROS negative.     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    SURGICAL HISTORY   has a past surgical history that includes cystoscopy (10/15/08); laparoscopy (5/28/2010); lymph node sampling (5/28/2010); debulking (5/28/2010); appendectomy (1981); other (1984); vaginal hysterectomy scope total (10/15/08); myringotomy (8/27/2010); tonsillectomy and adenoidectomy; and craniotomy stealth (Right, 3/9/2021).    SOCIAL HISTORY  Social History     Tobacco Use   • Smoking status: Never Smoker   • Smokeless tobacco: Never Used   Vaping Use   • Vaping Use: Never used   Substance Use Topics   • Alcohol use: Not Currently     Alcohol/week: 0.0 oz   • Drug use: No      Social History     Substance and Sexual Activity   Drug Use No       FAMILY HISTORY  Family History   Problem Relation Age of Onset   • Non-contributory Mother    • Lung Disease Mother         COPD   • Cancer Mother         dysplasia    • Arthritis Mother    • Psychiatric Illness Mother    • Heart Disease Mother    • Hypertension Mother    • Stroke Mother    • Non-contributory Father    • Cancer Father 73        prostate cancer   • Lung Disease Maternal Grandmother    • Lung Disease Paternal Aunt    • Diabetes Paternal Aunt    • Hyperlipidemia Paternal Aunt        CURRENT MEDICATIONS  Reviewed.  See Encounter Summary.     ALLERGIES  Allergies   Allergen Reactions   • Tape Rash     Use paper tape instead       PHYSICAL EXAM  VITAL SIGNS: BP  "(!) 155/103   Pulse 89   Temp 37.1 °C (98.8 °F) (Temporal)   Resp 17   Ht 1.702 m (5' 7\")   Wt 108 kg (237 lb)   SpO2 90%   BMI 37.12 kg/m²   Constitutional: Awake, alert in no apparent distress.  HENT: Normocephalic, prior craniotomy scar.  Bilateral external ears normal. Nose normal.   Eyes: Conjunctiva normal, non-icteric, EOMI.    Thorax & Lungs: Easy unlabored respirations, Clear to ascultation bilaterally.  Cardiovascular: Regular rate, Regular rhythm, No murmurs, rubs or gallops. Bilateral pulses symmetrical.   Abdomen:  Soft, nontender, nondistended, normal active bowel sounds.   :    Skin: Visualized skin is  Dry, No erythema, No rash.   Musculoskeletal:   No cyanosis, clubbing or edema. No leg asymmetry.   Neurologic: Alert, Grossly non-focal.   Psychiatric: Normal affect, Normal mood  Lymphatic:  No cervical LAD        RADIOLOGY  DX-CHEST-PORTABLE (1 VIEW)   Final Result         1. No acute cardiopulmonary abnormalities are identified.            COURSE & MEDICAL DECISION MAKING  Pertinent Labs & Imaging studies reviewed. (See chart for details)    Differential diagnoses include but are not limited to: Sepsis, UTI    2:39 PM - Medical record reviewed, history of GBM, s/p resection, hypertension, left-sided weakness.  Decision Making:  This is a pleasant 51 y.o. year old female who presents with fever for 48 hours, last about 24 hours ago, recent UTI without treatment.  The patient indeed has a urinary tract infection, urine culture was obtained last week that shows Klebsiella, pansensitive.  The patient has no leukocytosis or leftward shift, no renal insufficiency, she is afebrile, no tachycardia.  She has no SIRS criteria.  The patient was given a dose of Rocephin, as she is not septic she is stable for discharge, placed on Bactrim.    With regards to the hypertension, the patient has had elevated blood pressures for at least the last 6 months, likely longer.  No signs of endorgan dysfunction.  " This can be followed up with her primary care physician.    Discharge Medications:  New Prescriptions    CETIRIZINE (ZYRTEC) 10 MG TAB    Take 1 Tablet by mouth 2 times a day.    SULFAMETHOXAZOLE-TRIMETHOPRIM (BACTRIM DS) 800-160 MG TABLET    Take 1 Tablet by mouth 2 times a day for 5 days.       The patient was discharged home (see d/c instructions) was told to return immediately for any signs or symptoms listed, or any worsening at all.  The patient verbally agreed to the discharge precautions and follow-up plan which is documented in EPIC.        FINAL IMPRESSION  1. Acute UTI    2. Pruritic disorder

## 2022-06-15 NOTE — ED TRIAGE NOTES
Chief Complaint   Patient presents with   • Blood Pressure Problem     Pt BIB [facility] for evaluation of asymptomatic high blood pressure today.  190/120 at home, 167/112 per EMS. 157/107 upon arrival. Pt's  also reports my chart showed a UTI in 6/9 UA but has not been started on abx.    • Fever     Pt has had nightly fevers of 101.0 f for past 2 nights.   PMH: currently receiving tx for a glioblastoma.   Last chemo tx 5/27. Last avastin tx 6/9.  Pt is an inaccurate historian,  at bedside to assist.  Using Guanri device.

## 2022-06-16 ENCOUNTER — TELEPHONE (OUTPATIENT)
Dept: MEDICAL GROUP | Facility: IMAGING CENTER | Age: 52
End: 2022-06-16
Payer: COMMERCIAL

## 2022-06-16 NOTE — TELEPHONE ENCOUNTER
Received message from Wendy with [facility]. She would like Dr. Nguyen to be aware of patients blood pressure. Patient went to the ER yesterday due to high BP of 184/112. She states the ER did not do much. Today BP was 188/116. Patient  gave patient dose of clonidine to help bring pressure down. She states this is just an awareness call and it has been an ongoing frequent issue. If we have any questions we can give her a call back at [phone].

## 2022-06-16 NOTE — TELEPHONE ENCOUNTER
----- Message from José Koch sent at 6/16/2022 10:22 AM PDT -----  Regarding: Verbal Order  [facility] called for a verbal order for an add on nursing visit for today. Patient was in the ER last night. Please call back ASAP [phone] . Thanks!

## 2022-06-16 NOTE — TELEPHONE ENCOUNTER
Trinity with [facility] returned call regarding verbal orders approval for patient. States that patient would also like to add on discussion regarding going on hospice with home health RN and .

## 2022-06-16 NOTE — ED NOTES
Discharge instructions provided.  Pt verbalized the understanding of discharge instructions to follow up with PCP and to return to ER if condition worsens.  Pt wheeled out of ED

## 2022-06-16 NOTE — TELEPHONE ENCOUNTER
Attempted to return call, left message for Trinity WONG stating that reviewed with covering provider due to Patient's PCP not being in the office today and covering provider Dr. Ilene Benites says okay to verbal order.

## 2022-06-16 NOTE — ED NOTES
IV med given per order  Noted pt's RA sat 88-90% while sleeping  O2 NC 2L placed now 95%  In NAD

## 2022-06-20 ENCOUNTER — TELEMEDICINE (OUTPATIENT)
Dept: MEDICAL GROUP | Facility: IMAGING CENTER | Age: 52
End: 2022-06-20
Payer: COMMERCIAL

## 2022-06-20 ENCOUNTER — APPOINTMENT (OUTPATIENT)
Dept: MEDICAL GROUP | Facility: IMAGING CENTER | Age: 52
End: 2022-06-20
Payer: COMMERCIAL

## 2022-06-20 VITALS
HEIGHT: 67 IN | SYSTOLIC BLOOD PRESSURE: 131 MMHG | WEIGHT: 237 LBS | DIASTOLIC BLOOD PRESSURE: 111 MMHG | BODY MASS INDEX: 37.2 KG/M2

## 2022-06-20 DIAGNOSIS — Z51.5 QUALITY OF LIFE PALLIATIVE CARE ENCOUNTER: ICD-10-CM

## 2022-06-20 DIAGNOSIS — R03.0 ELEVATED BLOOD PRESSURE READING: ICD-10-CM

## 2022-06-20 DIAGNOSIS — G81.14 SPASTIC HEMIPLEGIA OF LEFT NONDOMINANT SIDE DUE TO NONCEREBROVASCULAR ETIOLOGY (HCC): ICD-10-CM

## 2022-06-20 DIAGNOSIS — I10 PRIMARY HYPERTENSION: ICD-10-CM

## 2022-06-20 DIAGNOSIS — N31.9 NEUROGENIC BLADDER: ICD-10-CM

## 2022-06-20 DIAGNOSIS — M62.838 OTHER MUSCLE SPASM: ICD-10-CM

## 2022-06-20 PROCEDURE — 99213 OFFICE O/P EST LOW 20 MIN: CPT | Mod: 95

## 2022-06-20 RX ORDER — DIAZEPAM 5 MG/1
5 TABLET ORAL NIGHTLY
Qty: 30 TABLET | Refills: 2 | Status: SHIPPED | OUTPATIENT
Start: 2022-06-20 | End: 2022-09-18

## 2022-06-20 RX ORDER — PROCHLORPERAZINE MALEATE 10 MG
TABLET ORAL
COMMUNITY
Start: 2022-05-12

## 2022-06-20 RX ORDER — LOMUSTINE 100 MG/1
CAPSULE, GELATIN COATED ORAL
COMMUNITY
Start: 2022-05-09

## 2022-06-20 RX ORDER — LOMUSTINE 40 MG/1
CAPSULE, GELATIN COATED ORAL
COMMUNITY
Start: 2022-05-09

## 2022-06-20 RX ORDER — OMEPRAZOLE 10 MG/1
10 CAPSULE, DELAYED RELEASE ORAL DAILY
COMMUNITY

## 2022-06-20 RX ORDER — DEXAMETHASONE 1 MG
2 TABLET ORAL
COMMUNITY
Start: 2022-05-26

## 2022-06-20 ASSESSMENT — PAIN SCALES - GENERAL: PAINLEVEL: NO PAIN

## 2022-06-20 ASSESSMENT — FIBROSIS 4 INDEX: FIB4 SCORE: 1.24

## 2022-06-20 NOTE — PROGRESS NOTES
Subjective:     CC:   Chief Complaint   Patient presents with   • Follow-Up   • Medication Management   • Itching     Whole body itching started April and increased in severity this past weekend per  - hydroxyzine and zyrtec and benedryl not helping       HPI:   Melba presents today to discuss:  J Carlos- spouse     Approaching end of life [facility] switch [facility]  Verge of switching to hospice    Difficulty swallowing  Blood pressure spikes  Incontinent   Bowels aren't work, needing supportive and enemas    These past weekend. Has had itching now unmanageable, anxious     She started to have seizures today        UTI-antibiotic  Taper off dexamethasone- now taking 3 mg for headaches  Recent ER visit       Past Medical History:   Diagnosis Date   • Acute pulmonary embolism with acute cor pulmonale (HCC) 10/21/2021   • Anxiety    • Cancer (HCC)     brain diagnosed in October 2020    • Complicated migraine 6/9/2014   • Depression    • Dermatitis, contact 11/16/2015   • Fatigue 6/9/2014   • Hyperlipidemia 1/23/2015   • Lentigo 10/17/2018   • Menopausal symptom 7/2/2014   • Mixed anxiety and depressive disorder 6/9/2014   • Pulmonary embolism (HCC)    • Seborrheic keratoses 10/17/2018   • TMJ arthralgia 6/9/2014   • UTI (lower urinary tract infection)      Family History   Problem Relation Age of Onset   • Non-contributory Mother    • Lung Disease Mother         COPD   • Cancer Mother         dysplasia    • Arthritis Mother    • Psychiatric Illness Mother    • Heart Disease Mother    • Hypertension Mother    • Stroke Mother    • Non-contributory Father    • Cancer Father 73        prostate cancer   • Lung Disease Maternal Grandmother    • Lung Disease Paternal Aunt    • Diabetes Paternal Aunt    • Hyperlipidemia Paternal Aunt      Past Surgical History:   Procedure Laterality Date   • CRANIOTOMY STEALTH Right 3/9/2021    Procedure: RIGHT CRANIOTOMY, USING FRAMELESS STEREOTAXY - FOR OPEN  BIOPSY WITH PHASE REVERSAL;  Surgeon: Maxwell Mao M.D.;  Location: SURGERY [facility];  Service: Neurosurgery   • MYRINGOTOMY  8/27/2010    Performed by [address] at SURGERY SAME DAY [facility] ORS   • LAPAROSCOPY  5/28/2010    Performed by JACKI SHARMA at SURGERY [facility] ORS   • LYMPH NODE SAMPLING  5/28/2010    Performed by JACKI SHARMA at SURGERY [facility] ORS   • DEBULKING  5/28/2010    Performed by JACKI SHARMA at SURGERY [facility] ORS   • CYSTOSCOPY  10/15/08    Performed by YU GODINEZ at SURGERY SAME DAY [facility] ORS   • VAGINAL HYSTERECTOMY SCOPE TOTAL  10/15/08    Performed by YU GODINEZ at SURGERY SAME DAY [facility] ORS   • OTHER  1984    TONSILECTOMY/ ADNOIDS   • APPENDECTOMY  1981   • TONSILLECTOMY AND ADENOIDECTOMY       Social History     Tobacco Use   • Smoking status: Never Smoker   • Smokeless tobacco: Never Used   Vaping Use   • Vaping Use: Never used   Substance Use Topics   • Alcohol use: Not Currently     Alcohol/week: 0.0 oz   • Drug use: No     Social History     Social History Narrative   • Not on file     Current Outpatient Medications Ordered in Epic   Medication Sig Dispense Refill   • brivaracetam (BRIVIACT) 100 MG Tab tablet Take 1 Tablet by mouth 2 times a day for 90 days. 180 Tablet 0   • lacosamide (VIMPAT) 200 MG Tab tablet Take 1 Tablet by mouth 2 times a day for 90 days. 180 Tablet 0   • diazePAM (VALIUM) 5 MG Tab Take 1 Tablet by mouth every evening for 90 days. 30 Tablet 2   • dexamethasone (DECADRON) 1 MG Tab Take 2 mg by mouth every day.     • GLEOSTINE 100 MG Cap      • GLEOSTINE 40 MG Cap      • prochlorperazine (COMPAZINE) 10 MG Tab PLEASE SEE ATTACHED FOR DETAILED DIRECTIONS     • venlafaxine (EFFEXOR) 75 MG Tab Take 1 Tablet by mouth every day for 360 days. 90 Tablet 3   • lisinopril (PRINIVIL) 40 MG tablet Take 1 Tablet by mouth every day for 360 days. 90 Tablet 3   • traZODone (DESYREL) 100 MG Tab Take 1 Tablet by mouth at bedtime for 360  "days. 90 Tablet 3   • sulfamethoxazole-trimethoprim (BACTRIM DS) 800-160 MG tablet Take 1 Tablet by mouth 2 times a day for 5 days. 10 Tablet 0   • cetirizine (ZYRTEC) 10 MG Tab Take 1 Tablet by mouth 2 times a day. 60 Tablet 0   • hydrOXYzine HCl (ATARAX) 50 MG Tab Take 1 Tablet by mouth 3 times a day as needed for Anxiety for up to 360 days. 90 Tablet 11   • ELIQUIS 5 MG Tab Take 1 Tablet by mouth 2 times a day. 28 Tablet 0   • cloNIDine (CATAPRES) 0.1 MG Tab      • Probiotic Product (PROBIOTIC PO) Take  by mouth.     • dexamethasone (DECADRON) 4 MG Tab Take  by mouth 2 times a day.     • SENNA PO Take  by mouth. Sennacot at night, miralax during day     • oxycodone 5 MG capsule Take 5-30 mg by mouth every four hours as needed.     • NIFEdipine SR (PROCARDIA-XL) 30 MG tablet Take 1 Tablet by mouth every day. 90 Tablet 3   • diphenhydrAMINE (BENADRYL) 25 MG Tab Take 25 mg by mouth every 6 hours as needed for Sleep.     • hydrocortisone 1 % Cream Apply 1 Application topically 2 times a day.     • acetaminophen (TYLENOL) 650 MG CR tablet Take 650 mg by mouth 2 times a day as needed for Moderate Pain.     • melatonin 3 MG Tab Take 3 mg by mouth at bedtime.     • CHOLECALCIFEROL PO Take 1 Each by mouth every day.     • FOLIC ACID PO Take 1 Tablet by mouth every day.     • multivitamin (THERAGRAN) Tab Take 1 Tablet by mouth every morning.       No current [facility]-ordered facility-administered medications on file.     Tape    Health Maintenance: {COMPLETED:658933}    ROS: see hpi  Gen: no fevers/chills  Pulm: no sob, no cough  CV: no chest pain, no palpitations, no edema  GI: no nausea/vomiting, no diarrhea  Skin: no rash    Objective:   Exam:  BP (!) 131/111   Ht 1.702 m (5' 7\")   Wt 108 kg (237 lb)   BMI 37.12 kg/m²    Body mass index is 37.12 kg/m².    Gen: Alert and oriented, No apparent distress.  HEENT: Head atraumatic, normocephalic. Pupils equal and round.  Neck: Neck is supple without lymphadenopathy. "   Lungs: Normal effort, CTA bilaterally, no wheezes, rhonchi, or rales  CV: Regular rate and rhythm. No murmurs, rubs, or gallops.  ABD: +BS. Non-tender, non-distended. No rebound, rigidity, or guarding.  Ext: No clubbing, cyanosis, edema.    Assessment & Plan:     51 y.o. female with the following -       No follow-ups on file.    MAYRA Cabrales.   [facility]    Please note that this dictation was created using voice recognition software. I have made every reasonable attempt to correct obvious errors, but I expect that there are errors of grammar and possibly content that I did not discover before finalizing the note.

## 2022-06-20 NOTE — TELEPHONE ENCOUNTER
[facility] a [facility] diazePAM (VALIUM) 5 MG Tab.    Would you like the pt to have an appt for refill?

## 2022-06-21 PROBLEM — Z51.5 QUALITY OF LIFE PALLIATIVE CARE ENCOUNTER: Status: ACTIVE | Noted: 2022-06-21

## 2022-06-21 NOTE — PROGRESS NOTES
Virtual Visit: Established Patient   This visit was conducted via Zoom using secure and encrypted videoconferencing technology.   The patient was in their home in the state of [place].    The patient's identity was confirmed and verbal consent was obtained for this virtual visit.    Subjective:   CC:   Chief Complaint   Patient presents with   • Follow-Up   • Medication Management   • Itching     Whole body itching started April and increased in severity this past weekend per  - hydroxyzine and zyrtec and benedryl not helping     Melba Cesia Frye is a 51 y.o. female presenting for evaluation and management of:    Patient's spouse J Carlos provided information during this Encounter.  J Carlos reports that patient's condition is declining. She is becoming more confused, having difficulty swallowing, having blood pressure spikes, incontinence of bowel and urine, more anxious, having worsening and unmanageable pruritis, and seizures.   J Carlos states patient was recently in ED for UTI, currently on antibiotics for this and hypertension. He states that her blood pressure was not managed during ER visit.    Today, her /111. He did not give her clonidine due to ; however, he is concerned about her DBP.   He denies of her complaining of chest pain.    She continues to have headaches. He states that he is having difficulty getting in contact with her neurologist for her seizures and headaches. She is currently taking dexamethasone 3 mg for this.  He states she had a seizure today.    J Carlos states that they are approaching end of life. She is currently seeing [facility] but will be switching to [facility].   He is requiring guidance and support on this process.    Patient is having worsening bladder control and urinary incontinence. Patient taking Mirebec and botox injection without improvement.     ROS: per HPI      Current medicines (including changes today)  Current Outpatient  Medications   Medication Sig Dispense Refill   • diazePAM (VALIUM) 5 MG Tab Take 1 Tablet by mouth every evening for 90 days. 30 Tablet 2   • dexamethasone (DECADRON) 1 MG Tab Take 2 mg by mouth every day.     • GLEOSTINE 100 MG Cap      • GLEOSTINE 40 MG Cap      • prochlorperazine (COMPAZINE) 10 MG Tab PLEASE SEE ATTACHED FOR DETAILED DIRECTIONS     • Sulfamethoxazole-Trimethoprim (BACTRIM PO) Take  by mouth.     • omeprazole (PRILOSEC) 10 MG CAPSULE DELAYED RELEASE Take 10 mg by mouth every day.     • venlafaxine (EFFEXOR) 75 MG Tab Take 1 Tablet by mouth every day for 360 days. 90 Tablet 3   • lisinopril (PRINIVIL) 40 MG tablet Take 1 Tablet by mouth every day for 360 days. 90 Tablet 3   • traZODone (DESYREL) 100 MG Tab Take 1 Tablet by mouth at bedtime for 360 days. 90 Tablet 3   • brivaracetam (BRIVIACT) 100 MG Tab tablet Take 1 Tablet by mouth 2 times a day for 90 days. 180 Tablet 0   • lacosamide (VIMPAT) 200 MG Tab tablet Take 1 Tablet by mouth 2 times a day for 90 days. 180 Tablet 0   • hydrOXYzine HCl (ATARAX) 50 MG Tab Take 1 Tablet by mouth 3 times a day as needed for Anxiety for up to 360 days. 90 Tablet 11   • ELIQUIS 5 MG Tab Take 1 Tablet by mouth 2 times a day. 28 Tablet 0   • cloNIDine (CATAPRES) 0.1 MG Tab      • Probiotic Product (PROBIOTIC PO) Take  by mouth.     • dexamethasone (DECADRON) 4 MG Tab Take  by mouth 2 times a day. Patient taking approximately 3mg     • SENNA PO Take  by mouth. Sennacot at night, miralax during day     • oxycodone 5 MG capsule Take 5-30 mg by mouth every four hours as needed.     • NIFEdipine SR (PROCARDIA-XL) 30 MG tablet Take 1 Tablet by mouth every day. 90 Tablet 3   • diphenhydrAMINE (BENADRYL) 25 MG Tab Take 25 mg by mouth every 6 hours as needed for Sleep.     • hydrocortisone 1 % Cream Apply 1 Application topically 2 times a day.     • acetaminophen (TYLENOL) 650 MG CR tablet Take 650 mg by mouth 2 times a day as needed for Moderate Pain.     • melatonin 3  MG Tab Take 3 mg by mouth at bedtime.     • CHOLECALCIFEROL PO Take 1 Each by mouth every day.     • FOLIC ACID PO Take 1 Tablet by mouth every day.     • multivitamin (THERAGRAN) Tab Take 1 Tablet by mouth every morning.     • cetirizine (ZYRTEC) 10 MG Tab Take 1 Tablet by mouth 2 times a day. 60 Tablet 0     No current facility-administered medications for this visit.       Patient Active Problem List    Diagnosis Date Noted   • Quality of life palliative care encounter 06/21/2022   • Shortness of breath 04/19/2022   • Pruritic disorder 04/18/2022   • Malignant neoplasm of brain, unspecified (HCC) 03/18/2022   • GBM (glioblastoma multiforme) (HCC) 02/18/2022   • Acute respiratory failure with hypoxia ([facility]) 02/09/2022   • Class 1 obesity due to excess calories with serious comorbidity and body mass index (BMI) of 34.0 to 34.9 in adult 02/09/2022   • Syncope 02/09/2022   • Lesion of frontal lobe of brain 12/18/2021   • Gait instability 12/18/2021   • Acute left-sided weakness 12/16/2021   • Seizure disorder ([facility]) 12/16/2021   • History of pulmonary embolus (PE) 12/16/2021   • Primary hypertension 12/15/2021   • Neurogenic bladder 11/04/2021   • Frequent UTI 11/04/2021   • Atrophic vaginitis 11/04/2021   • Other chest pain 10/21/2021   • Tachycardia 10/21/2021   • Type 2 myocardial infarction ([facility]) 10/21/2021   • Urinary frequency 10/04/2021   • Elevated blood pressure reading 04/07/2021   • Vitamin D insufficiency 03/16/2021   • Impaired mobility and ADLs 03/15/2021   • Acute blood loss as cause of postoperative anemia 03/11/2021   • Epilepsy, unspecified, not intractable, without status epilepticus (HCC) 03/02/2021   • Glioblastoma of frontal lobe (HCC) 03/02/2021   • Demyelinating disease of central nervous system, unspecified (HCC) 03/02/2021   • Lentigo 10/17/2018   • Seborrheic keratoses 10/17/2018   • Dermatitis, contact 11/16/2015   • Hyperlipidemia 01/23/2015   • Menopausal symptom 07/02/2014   • Neoplastic  "(malignant) related fatigue 06/09/2014   • Anxiety disorder, unspecified 06/09/2014   • Complicated migraine 06/09/2014   • TMJ arthralgia 06/09/2014        Objective:   BP (!) 131/111   Ht 1.702 m (5' 7\")   Wt 108 kg (237 lb)   BMI 37.12 kg/m²     Physical Exam  Constitutional: Patient awake, responsive, no distress, well-groomed.  Skin: No rashes in visible areas.  Eye: Round. Conjunctiva clear, lids normal. No icterus.   ENMT: Lips pink without lesions, good dentition, moist mucous membranes. Phonation normal.  Neck: No masses, no thyromegaly. Moves freely without pain.  Respiratory: Unlabored respiratory effort, no cough or audible wheeze  Psych: Normal affect and mood.     Assessment and Plan:   The following treatment plan was discussed:     1. Elevated blood pressure reading  2. Primary hypertension  Patient having bouts of hypertensive episodes. Patient has not received dose of clonidine today. Recommend for patient to take Clonidine for DBP>100. Recommend to monitor BP at home and notify provider if consistently >140/90, will consider increasing clonidine dose to BID.     3. Quality of life palliative care encounter  Patient and spouse are at the point of considering hospice care due to decline in patient's condition such as more confused, having difficulty swallowing, having blood pressure spikes, incontinence of bowel and urine, more anxious, having worsening and unmanageable pruritis, recurrent UTI, and seizures. Patient is with [facility] and have had discussions with their Hospice team. Patient's spouse very supportive but also emotional through this encounter. Provided support and offered grief counseling. Patient and spouse will be continue to consult with [facility] and Hospice team. Patient's spouse states that he will notify us when they are ready to proceed with hospice care.     4. Neurogenic bladder  Patient presents with worsening neurogenic bladder despite use of " Mirebec and botox injection. Patient's condition continue to deteriorate due to her progressive GBM. Use of purewick device can provide comfort and prevent skin breakdown and ulcers.     Follow-up: No follow-ups on file.

## 2022-06-22 DIAGNOSIS — M79.2 NEUROPATHIC PAIN: ICD-10-CM

## 2022-06-22 DIAGNOSIS — G40.909 SEIZURE DISORDER (HCC): ICD-10-CM

## 2022-06-22 RX ORDER — GABAPENTIN 100 MG/1
100 CAPSULE ORAL 3 TIMES DAILY
Qty: 90 CAPSULE | Refills: 0 | Status: SHIPPED | OUTPATIENT
Start: 2022-06-22 | End: 2022-06-22

## 2022-06-22 RX ORDER — GABAPENTIN 300 MG/1
300 CAPSULE ORAL 3 TIMES DAILY
Qty: 90 CAPSULE | Refills: 5 | Status: SHIPPED | OUTPATIENT
Start: 2022-06-22 | End: 2022-12-19

## 2022-06-29 ENCOUNTER — APPOINTMENT (OUTPATIENT)
Dept: PHYSICAL MEDICINE AND REHAB | Facility: REHABILITATION | Age: 52
End: 2022-06-29
Payer: COMMERCIAL

## 2022-08-16 NOTE — THERAPY
Occupational Therapy  Daily Treatment     Patient Name: Melba Frye  Age:  51 y.o., Sex:  female  Medical Record #: [id number]  Today's Date: 3/7/2022     Precautions  Precautions: Fall Risk  Comments: L alma delia    Safety   ADL Safety : Requires Physical Assist for Safety,Requires Supervision for Safety,Requires Cueing for Safety  Bathroom Safety: Requires Physical Assist for Safety,Requires Supervision for Safety,Requires Cuing for Safety    Subjective    Pt sleeping upon arrival with  at bedside, agreeable to OT session when woken.  actively participated throughout session for training in preparation for d/c this week.      Objective     03/07/22 1031   Vitals   O2 Delivery Device None - Room Air   Pain   Intervention Declines   Non Verbal Descriptors   Non Verbal Scale  Calm   Functional Level of Assist   Toileting Total Assist x2  (spouse assist with standing balance, therapist assist with clothing mgmt)   Toileting Description Assist to pull pants up;Assist to pull pants down;Assist for standing balance;Grab bar;Increased time;Initial preparation for task;Set-up of equipment;Supervision for safety;Verbal cueing   Bed, Chair, Wheelchair Transfer Moderate Assist  (spouse assist, therapist SBA in preparation for d/c)   Bed Chair Wheelchair Transfer Description Adaptive equipment;Increased time;Initial preparation for task;Requires lift;Set-up of equipment;Supervision for safety;Verbal cueing   Toilet Transfers Maximal Assist   (spouse assist with transfer for training in preparation for d/c)   Toilet Transfer Description Adaptive equipment;Grab bar;Increased time;Initial preparation for task;Requires lift;Set-up of equipment;Supervision for safety;Verbal cueing   Bed Mobility    Supine to Sit Maximal Assist   Interdisciplinary Plan of Care Collaboration   IDT Collaboration with  Family / Caregiver;Therapy Tech   Patient Position at End of Therapy Seated;Call Light within Reach;Tray Table within  Reach;Phone within Reach;Family / Friend in Room   Collaboration Comments  assist during session for training to prepare for d/c this week   OT Total Time Spent   OT Individual Total Time Spent (Mins) 60   OT Charge Group   OT Self Care / ADL 1   OT Neuromuscular Re-education / Balance 3     Replacement of LUE shoulder k- tape with training spouse to apply so pt can continue to use at home as she reports improved pain mgmt and support. Instructed pt's spouse in anatomy and biomechanical goal of k-tape then he applied the tape with verbal cues for placement, line of pull and amount of stretch to give. Breezy arrows on the tape as visual cue for line of pull and he took photos once all tape was on.     Instructed pt and spouse in pectoral and shoulder ER stretch with pt seated in w/c to improve posture and reduce spastic flexor tone pattern.     Assessment    Pt tolerated OT session well and reports good success with k-tape for pain mgmt and positioning of the LUE. Pt's skin showed no sign of irritation when previous k-tape was removed. Pt's spouse demo'd good technique donning tape and pt reported no pain or discomfort. Visually, pt had improved shoulder posture and positioning compared to last session.     Strengths: Able to follow instructions,Effective communication skills,Independent prior level of function,Manages pain appropriately,Motivated for self care and independence,Pleasant and cooperative,Supportive family,Willingly participates in therapeutic activities  Barriers: Bladder incontinence,Decreased endurance,Fatigue,Generalized weakness,Hemiparesis,Impaired activity tolerance,Impaired balance,Limited mobility    Plan    ADL's, neuro re-ed for LUE, visual scanning all four quadrants, self-ROM for LUE, sitting/standing bal/tolerance, functional transfers, TTB transfer     Passport items to be completed:  Perform bathroom transfers, complete dressing, complete feeding, get ready for the day, prepare a  simple meal, participate in household tasks, adapt home for safety needs, demonstrate home exercise program, complete caregiver training     Occupational Therapy Goals (Active)       Problem: OT Long Term Goals       Dates: Start: 02/19/22         Goal: LTG-By discharge, patient will complete basic self care tasks with min-mod a        Dates: Start: 02/19/22            Goal: LTG-By discharge, patient will perform bathroom transfers with min a        Dates: Start: 02/19/22               no

## 2023-02-08 NOTE — CARE PLAN
Problem: OT Long Term Goals  Goal: LTG-By discharge, patient will complete basic self care tasks  Description: 1) Individualized Goal:  Mod I using DME/AE as needed at FWW level  2) Interventions:  OT Group Therapy, OT Self Care/ADL, OT Cognitive Skill Dev, OT Community Reintegration, OT Neuro Re-Ed/Balance, OT Therapeutic Activity, and OT Therapeutic Exercise  Outcome: NOT MET  Goal: LTG-By discharge, patient will perform bathroom transfers  Description: 1) Individualized Goal:  Mod I using DME/AE as needed at FWW level  2) Interventions:  OT Group Therapy, OT Self Care/ADL, OT Cognitive Skill Dev, OT Community Reintegration, OT Neuro Re-Ed/Balance, OT Therapeutic Activity, and OT Therapeutic Exercise  Outcome: NOT MET      Home

## 2023-08-28 ENCOUNTER — TELEPHONE (OUTPATIENT)
Dept: OCCUPATIONAL THERAPY | Facility: REHABILITATION | Age: 53
End: 2023-08-28
Payer: COMMERCIAL

## 2023-08-28 NOTE — OP THERAPY DISCHARGE SUMMARY
Outpatient Occupational Therapy  DISCHARGE SUMMARY NOTE    [facility] [address]  [address]  [place] [address]  Phone:  [phone]  Fax:  [phone]    Date of Visit: 08/28/2023    Patient: Melba Frye  YOB: 1970  MRN: [id number]     Referring Provider:  Yanet Steele D.O.      Referring Diagnosis:  Brain injury, without loss of consciousness, subsequent encounter [S06.9X0D]            Functional Assessment Used        Your patient is being discharged from Occupational Therapy with the following comments:   Patient has failed to schedule or reschedule follow-up visits    Comments:  Patient has not been seen by OT in 2 years, will d/c from services.     Limitations Remaining:  none    Recommendations:  Obtain referral as needed    Heidi French MS,OTR/L    Date: 8/28/2023

## 2024-01-02 NOTE — DISCHARGE INSTRUCTIONS
[facility] NURSING DISCHARGE INSTRUCTIONS    Blood Pressure: 146/89  Weight: 96.5 kg (212 lb 11.9 oz)  Nursing recommendations for Melba Frye at time of discharge are as follows:  Client verbalized understanding of all discharge instructions and prescriptions.     Review all your home medications and newly ordered medications with your doctor and/or pharmacist. Follow medication instructions as directed by your doctor and/or pharmacist.    Pain Management: Tylenol as needed  Discharge Pain Medication Instructions:  See medication list  Comfort Goal: Comfort with Movement, Perform Activity, Stay Alert  Notify your primary care provider if pain is unrelieved with these measures, if the pain is new, or increased in intensity.    Discharge Skin Characteristics:  Intact  Discharge Skin Exam:    Wound 03/09/21 Incision Head Right Bacitracin Ointment (Active)   Site Assessment Macdonald;Yellow 03/26/21 2030   Periwound Assessment Clean;Dry;Pink 03/26/21 2030   Closure Approximated;Open to air 03/26/21 0945   Drainage Amount Scant 03/26/21 2030   Drainage Description Serosanguineous 03/26/21 2030   Treatments Cleansed;Site care 03/26/21 0945   Wound Cleansing Normal Saline Irrigation 03/26/21 0945   Periwound Protectant Not Applicable 03/26/21 0945   Dressing Cleansing/Solutions Not Applicable 03/26/21 0945   Dressing Options Open to Air 03/26/21 2030   Dressing Status Open to Air 03/26/21 0945     Skin / Wound Care Instructions: Please contact your primary care physician for any change in skin integrity.    If You Have Surgical Incisions / Wounds:  Monitor surgical site(s) for signs of increased swelling, redness or symptoms of drainage from the site or fever as this could indicate signs and symptoms of infection. If these symptoms are noted, notifiy your primary care provider.      Discharge Safety Instructions: Should Not Be Left Alone In The House     Anti-embolic stockings are required  during the day and off at night to increase circulation to the lower extremities.    Discharge Diet: Regular     Discharge Liquids: Thin  Discharge Bowel Function: Continent  Please contact your primary care physician for any changes in bowel habits.  Discharge Bowel Program:  None  Discharge Bladder Function: Continent  Discharge Urinary Devices:  None    Nursing Discharge Plan:   Influenza Vaccine Indication: Patient Refuses      Case Management Discharge Instructions for Melba Frye  Discharge Location: Home with Home Health  Agency Name/Address/Phone: [facility] [phone]  Home Health: Occupational Therapist, Registered Nurse, Physical Therapist, Speech Therapist  DME Provider/Phone: [facility] [phone]  Medical Equipment Ordered: Wheelchair      Physical Therapy Discharge Instructions for Melba Frye  3/26/2021    Level of Assist Required for Ambulation: Supervision on Flat Surfaces, Supervision on Curbs, Supervision on Stairs  Distance Patient May Ambulate: Up to 250 feet or more as tolerated  Device Recommended for Ambulation: Front-Wheeled Walker  Level of Assist Required to Propel Wheelchair: Requires No Assist  Level of Assist Required for Transfers: Supervision  Device Recommended for Transfers: Front-Wheeled Walker  Home Exercise Program: Refer to Home Exercise Program Handout for Details    Great work in rehab, it was fun working with you!  Kyrie      Fall Prevention in the Home, Adult  Falls can cause injuries and can affect people from all age groups. There are many simple things that you can do to make your home safe and to help prevent falls. Ask for help when making these changes, if needed.  What actions can I take to prevent falls?  General instructions  · Use good lighting in all rooms. Replace any light bulbs that burn out.  · Turn on lights if it is dark. Use night-lights.  · Place frequently used items in easy-to-reach places. Lower the  shelves around your home if necessary.  · Set up furniture so that there are clear paths around it. Avoid moving your furniture around.  · Remove throw rugs and other tripping hazards from the floor.  · Avoid walking on wet floors.  · Fix any uneven floor surfaces.  · Add color or contrast paint or tape to grab bars and handrails in your home. Place contrasting color strips on the first and last steps of stairways.  · When you use a stepladder, make sure that it is completely opened and that the sides are firmly locked. Have someone hold the ladder while you are using it. Do not climb a closed stepladder.  · Be aware of any and all pets.  What can I do in the bathroom?         · Keep the floor dry. Immediately clean up any water that spills onto the floor.  · Remove soap buildup in the tub or shower on a regular basis.  · Use non-skid mats or decals on the floor of the tub or shower.  · Attach bath mats securely with double-sided, non-slip rug tape.  · If you need to sit down while you are in the shower, use a plastic, non-slip stool.  · Install grab bars by the toilet and in the tub and shower. Do not use towel bars as grab bars.  What can I do in the bedroom?  · Make sure that a bedside light is easy to reach.  · Do not use oversized bedding that drapes onto the floor.  · Have a firm chair that has side arms to use for getting dressed.  What can I do in the kitchen?  · Clean up any spills right away.  · If you need to reach for something above you, use a sturdy step stool that has a grab bar.  · Keep electrical cables out of the way.  · Do not use floor polish or wax that makes floors slippery. If you must use wax, make sure that it is non-skid floor wax.  What can I do in the stairways?  · Do not leave any items on the stairs.  · Make sure that you have a light switch at the top of the stairs and the bottom of the stairs. Have them installed if you do not have them.  · Make sure that there are handrails on both  sides of the stairs. Fix handrails that are broken or loose. Make sure that handrails are as long as the stairways.  · Install non-slip stair treads on all stairs in your home.  · Avoid having throw rugs at the top or bottom of stairways, or secure the rugs with carpet tape to prevent them from moving.  · Choose a carpet design that does not hide the edge of steps on the stairway.  · Check any carpeting to make sure that it is firmly attached to the stairs. Fix any carpet that is loose or worn.  What can I do on the outside of my home?  · Use bright outdoor lighting.  · Regularly repair the edges of walkways and driveways and fix any cracks.  · Remove high doorway thresholds.  · Trim any shrubbery on the main path into your home.  · Regularly check that handrails are securely fastened and in good repair. Both sides of any steps should have handrails.  · Install guardrails along the edges of any raised decks or porches.  · Clear walkways of debris and clutter, including tools and rocks.  · Have leaves, snow, and ice cleared regularly.  · Use sand or salt on walkways during winter months.  · In the garage, clean up any spills right away, including grease or oil spills.  What other actions can I take?  · Wear closed-toe shoes that fit well and support your feet. Wear shoes that have rubber soles or low heels.  · Use mobility aids as needed, such as canes, walkers, scooters, and crutches.  · Review your medicines with your health care provider. Some medicines can cause dizziness or changes in blood pressure, which increase your risk of falling.  Talk with your health care provider about other ways that you can decrease your risk of falls. This may include working with a physical therapist or  to improve your strength, balance, and endurance.  Where to find more information  · Centers for Disease Control and PreventionALAN: https://www.cdc.gov  · National [place] [employer] Aging:  https://te4ldkb.grant.nih.gov  Contact a health care provider if:  · You are afraid of falling at home.  · You feel weak, drowsy, or dizzy at home.  · You fall at home.  Summary  · There are many simple things that you can do to make your home safe and to help prevent falls.  · Ways to make your home safe include removing tripping hazards and installing grab bars in the bathroom.  · Ask for help when making these changes in your home.  This information is not intended to replace advice given to you by your health care provider. Make sure you discuss any questions you have with your health care provider.  Document Released: 12/08/2003 Document Revised: 11/30/2018 Document Reviewed: 08/02/2018  [employer] Patient Education © 2020 [employer]      Depression / Suicide Risk    As you are discharged from this [facility] facility, it is important to learn how to keep safe from harming yourself.    Recognize the warning signs:  · Abrupt changes in personality, positive or negative- including increase in energy   · Giving away possessions  · Change in eating patterns- significant weight changes-  positive or negative  · Change in sleeping patterns- unable to sleep or sleeping all the time   · Unwillingness or inability to communicate  · Depression  · Unusual sadness, discouragement and loneliness  · Talk of wanting to die  · Neglect of personal appearance   · Rebelliousness- reckless behavior  · Withdrawal from people/activities they love  · Confusion- inability to concentrate     If you or a loved one observes any of these behaviors or has concerns about self-harm, here's what you can do:  · Talk about it- your feelings and reasons for harming yourself  · Remove any means that you might use to hurt yourself (examples: pills, rope, extension cords, firearm)  · Get professional help from the community (Mental Health, Substance Abuse, psychological counseling)  · Do not be alone:Call your Safe Contact- someone whom you trust  who will be there for you.  · Call your local CRISIS HOTLINE 607-3876 or 862-263-6490  · Call your local Children's Mobile Crisis Response Team [place] (004) 435-1868 or www.[place]  · Call the toll free National Suicide Prevention Hotlines   · National Suicide Prevention Lifeline 163-951-KZJY (1107)  · National [employer] Line Network 800-SUICIDE (634-3313)   no

## 2024-01-29 NOTE — PROCEDURES
VIDEO ELECTROENCEPHALOGRAM REPORT      Referring provider:   Fady Davidson M.D.  [address] [address]  [place] [address]      DOS: 02/11/21 (0 hours and 26 minutes of total recording time).     INDICATION:  Melba Frye 50 y.o. female presenting with epilepsy    CURRENT ANTIEPILEPTIC AND/OR SEDATING REGIMEN: lamictal    TECHNIQUE: CVEEG was set up by a Neurodiagnostic technologist who performed education to the patient and staff. A minimum of 23 electrodes and 23 channel recording was setup and performed by Neurodiagnostic technologist, in accordance with the international 10-20 system. Impedence, electrode integrity, and technical impressions were documented a minimum of every 2-24 hour period by a Neurodiagnostic Technologist and reviewed by Interpreting Physician. The study was reviewed in bipolar and referential montages. The recording examined the patient in the awake and drowsy state(s).     DESCRIPTION OF THE RECORD:  During wakefulness, the background was continuous and showed a 9-10 Hz posterior dominant rhythm.  There was reactivity to eye closure/opening.  A normal anterior-posterior gradient was noted with faster beta frequencies seen anteriorly.  During drowsiness, theta/delta frequencies were seen.    Sleep was captured and was characterized by diffuse background delta/theta activity with a loss of myogenic artifact.  N2 sleep transients in the form of sleep spindles and vertex waves were seen in the leads over the central regions.     ACTIVATION PROCEDURES:   Hyperventilation was performed by the patient for a total of 3 minutes. The technician performing the test noted good effort. This technique produced electroencephalographic changes in keeping with the expected bilaterally synchronous, frontally predominant, high amplitude slow waves build up.      Intermittent Photic stimulation was performed in a stepwise fashion from 1 to 30 Hz, and did not elicit any abnormal  responses.      ICTAL AND INTERICTAL FINDINGS:   No focal or generalized epileptiform activity noted.     No regional slowing was seen during this routine study.      No clinical events or seizures were reported or recorded during the study.     EKG: sampling of the EKG recording did not demonstrate any abnormalities    EVENTS:  None    INTERPRETATION:  Normal video EEG recording in the awake and drowsy state(s):  - No persistent focal asymmetries seen.  - No definitive epileptiform discharges seen   - No seizures. Clinical correlation is recommended.      Note: A normal EEG does not rule out epilepsy.  If the clinical suspicion remains high for seizures, a prolonged recording to capture clinical or subclinical events may be helpful.        Fady Davidson MD  Epilepsy and General Neurology  Department of Neurology  Instructor of Clinical Neurology [facility].   Office: [phone]  Fax: [phone]       39w

## (undated) DEVICE — GOWN WARMING STANDARD FLEX - (30/CA)

## (undated) DEVICE — GLOVE BIOGEL INDICATOR SZ 8 SURGICAL PF LTX - (50/BX 4BX/CA)

## (undated) DEVICE — SURGIFOAM (SIZE 100) - (6EA/CA)

## (undated) DEVICE — SPHERE NAVIGATION STEALTH (5EA/TY 12TY/PK)

## (undated) DEVICE — SENSOR SPO2 NEO LNCS ADHESIVE (20/BX) SEE USER NOTES

## (undated) DEVICE — PIN HEAD MAYFIELD DISP. (3EA/PK 12PK/BX)

## (undated) DEVICE — PATTIES SURG X-RAYCOTTONOID - 1 X 3 IN (200/CA)

## (undated) DEVICE — PATTIES SURG X-RAYCOTTONOID - 1/2 X 3 IN (200/CA)

## (undated) DEVICE — DRAPE LARGE 3 QUARTER - (20/CA)

## (undated) DEVICE — DISSECT TOOL MIDAS F2/8TA23

## (undated) DEVICE — CORETEMP DRAPE FORM-FITTED EASY DROPANDGO DRAPE FOR USE ON THE CORETEMP FLUID MANAGEMENT 56IN X 56IN

## (undated) DEVICE — SET LEADWIRE 5 LEAD BEDSIDE DISPOSABLE ECG (1SET OF 5/EA)

## (undated) DEVICE — ELECTRODE DUAL RETURN W/ CORD - (50/PK)

## (undated) DEVICE — DRAPE STRLE REG TOWEL 18X24 - (10/BX 4BX/CA)"

## (undated) DEVICE — GLOVE BIOGEL PI INDICATOR SZ 6.5 SURGICAL PF LF - (50/BX 4BX/CA)

## (undated) DEVICE — FORCEPS IRRIGATING 8 X 1.5MM (5EA/BX)

## (undated) DEVICE — TRAY SRGPRP PVP IOD WT PRP - (20/CA)

## (undated) DEVICE — PROTECTOR ULNA NERVE - (36PR/CA)

## (undated) DEVICE — SUCTION INSTRUMENT YANKAUER BULBOUS TIP W/O VENT (50EA/CA)

## (undated) DEVICE — SUTURE 2-0 VICRYL PLUS CT-1 - 8 X 18 INCH(12/BX)

## (undated) DEVICE — DRESSING NON-ADHERING 8 X 3 - (50/BX)

## (undated) DEVICE — MASK ANESTHESIA ADULT  - (100/CA)

## (undated) DEVICE — INTRAOP NEURO IN OR 1:1 PER 15 MIN

## (undated) DEVICE — SLEEVE, VASO, THIGH, MED

## (undated) DEVICE — SUTURE 4-0 NUROLON CR/8 TF - (12/BX) ETHICON

## (undated) DEVICE — TRAY SURESTEP FOLEY TEMP SENSING 16FR (10EA/CA) ORDER  #18764 FOR TEMP FOLEY ONLY

## (undated) DEVICE — COVER PROBE STERILE CONE (12EA/CA)

## (undated) DEVICE — GLOVE BIOGEL SZ 8 SURGICAL PF LTX - (50PR/BX 4BX/CA)

## (undated) DEVICE — CANISTER SUCTION 3000ML MECHANICAL FILTER AUTO SHUTOFF MEDI-VAC NONSTERILE LF DISP  (40EA/CA)

## (undated) DEVICE — BLADE CLIPPER FITS 2501 CLIPPER (BLUE)  (20EA/CA)

## (undated) DEVICE — SCALP CLIP RANEY 20-1037 (10EA/PK 20PK/CA)

## (undated) DEVICE — SET EXTENSION WITH 2 PORTS (48EA/CA) ***PART #2C8610 IS A SUBSTITUTE*****

## (undated) DEVICE — CONTAINER SPECIMEN BAG OR - STERILE 4 OZ W/LID (100EA/CA)

## (undated) DEVICE — GLOVE BIOGEL PI INDICATOR SZ 7.0 SURGICAL PF LF - (50/BX 4BX/CA)

## (undated) DEVICE — ELECTRODE 850 FOAM ADHESIVE - HYDROGEL RADIOTRNSPRNT (50/PK)

## (undated) DEVICE — KIT ANESTHESIA W/CIRCUIT & 3/LT BAG W/FILTER (20EA/CA)

## (undated) DEVICE — SUTURE GENERAL

## (undated) DEVICE — SUTURE 2-0 ETHILON FS - (36/BX) 18 INCH

## (undated) DEVICE — BALL COTTON STERILE 5/PK - (5/PK 25PK/CA)

## (undated) DEVICE — TUBING C&T SET FLYING LEADS DRAIN TUBING (10EA/BX)

## (undated) DEVICE — GLOVE SZ 6.5 BIOGEL PI MICRO - PF LF (50PR/BX)

## (undated) DEVICE — RUBBERBAND STERILE #64 LATEX FREE (100/CA)

## (undated) DEVICE — PACK CRANI - (1EA/CA)

## (undated) DEVICE — GLOVE BIOGEL PI ORTHO SZ 6 1/2 SURGICAL PF LF (40PR/BX)

## (undated) DEVICE — KIT SURGIFLO W/OUT THROMBIN - (6EA/CA)

## (undated) DEVICE — FIBRILLAR SURGICEL 4X4 - 10/CA

## (undated) DEVICE — SODIUM CHL IRRIGATION 0.9% 1000ML (12EA/CA)

## (undated) DEVICE — NEPTUNE 4 PORT MANIFOLD - (20/PK)

## (undated) DEVICE — TUBING CLEARLINK DUO-VENT - C-FLO (48EA/CA)

## (undated) DEVICE — LACTATED RINGERS INJ 1000 ML - (14EA/CA 60CA/PF)

## (undated) DEVICE — LACTATED RINGERS INJ. 500 ML - (24EA/CA)